# Patient Record
Sex: MALE | Race: AMERICAN INDIAN OR ALASKA NATIVE | NOT HISPANIC OR LATINO | Employment: OTHER | URBAN - METROPOLITAN AREA
[De-identification: names, ages, dates, MRNs, and addresses within clinical notes are randomized per-mention and may not be internally consistent; named-entity substitution may affect disease eponyms.]

---

## 2017-10-30 PROBLEM — I21.09 ANTEROSEPTAL MYOCARDIAL INFARCTION (CMS/HCC): Status: ACTIVE | Noted: 2017-10-30

## 2017-10-30 PROBLEM — I50.9 CONGESTIVE HEART FAILURE (CHF) (CMS/HCC): Status: ACTIVE | Noted: 2017-10-30

## 2017-10-30 PROBLEM — I72.9 ANEURYSM (CMS/HCC): Status: ACTIVE | Noted: 2017-10-30

## 2017-10-30 PROBLEM — I25.10 CAD (CORONARY ARTERY DISEASE): Status: ACTIVE | Noted: 2017-10-30

## 2017-10-30 PROBLEM — I63.9 CVA (CEREBRAL VASCULAR ACCIDENT) (CMS/HCC): Status: ACTIVE | Noted: 2017-10-30

## 2017-10-30 PROBLEM — I25.5 ISCHEMIC CARDIOMYOPATHY: Status: ACTIVE | Noted: 2017-10-30

## 2017-10-30 PROBLEM — I82.90 THROMBUS: Status: ACTIVE | Noted: 2017-10-30

## 2017-10-30 PROBLEM — I47.20 VENTRICULAR TACHYCARDIA (CMS/HCC): Status: ACTIVE | Noted: 2017-10-30

## 2017-10-30 PROBLEM — Z72.0 TOBACCO USE: Status: ACTIVE | Noted: 2017-10-30

## 2017-10-30 PROBLEM — I10 HYPERTENSION: Status: ACTIVE | Noted: 2017-10-30

## 2017-10-30 PROBLEM — I21.09 ANTERIOR MYOCARDIAL INFARCTION (CMS/HCC): Status: ACTIVE | Noted: 2017-10-30

## 2017-10-30 PROBLEM — E78.5 HYPERLIPIDEMIA: Status: ACTIVE | Noted: 2017-10-30

## 2018-01-29 DIAGNOSIS — I25.10 CORONARY ARTERY DISEASE INVOLVING NATIVE CORONARY ARTERY OF NATIVE HEART WITHOUT ANGINA PECTORIS: Primary | ICD-10-CM

## 2018-01-29 RX ORDER — LISINOPRIL 5 MG/1
5 TABLET ORAL 2 TIMES DAILY
Qty: 60 TABLET | Refills: 5 | Status: SHIPPED | OUTPATIENT
Start: 2018-01-29 | End: 2018-07-28

## 2018-08-02 ENCOUNTER — TELEPHONE (OUTPATIENT)
Dept: CARDIOLOGY | Facility: CLINIC | Age: 53
End: 2018-08-02

## 2018-08-02 DIAGNOSIS — I10 HTN (HYPERTENSION), BENIGN: Primary | ICD-10-CM

## 2018-08-02 RX ORDER — LISINOPRIL 5 MG/1
5 TABLET ORAL 2 TIMES DAILY
Qty: 60 TABLET | Refills: 0 | Status: SHIPPED | OUTPATIENT
Start: 2018-08-02 | End: 2019-08-02

## 2018-08-02 RX ORDER — LISINOPRIL 5 MG/1
5 TABLET ORAL DAILY
Qty: 30 TABLET | Refills: 0 | Status: SHIPPED | OUTPATIENT
Start: 2018-08-02 | End: 2018-08-02 | Stop reason: ENTERED-IN-ERROR

## 2018-08-02 NOTE — TELEPHONE ENCOUNTER
Pete was in the office today and had said he was going to run out of medications. Segundo Cotton PA-C saw him last in the office about a year ago. It appears he is a [facility] patient. When Segundo saw him one year ago he was to follow up with [facility] in one month to assess the need for continuing Xarelto. Segundo refilled his amiodarone, Effient, and Xarelto for two months until he can get an appointment. I emailed the schedulers to call the patient's wife, Suzan, (per their request) to get him in for a follow up with Dr. Jenkins's CNP, [facility].

## 2018-08-02 NOTE — TELEPHONE ENCOUNTER
Verbal order obtained to refill 30 days of lisinopril as patient is due for follow up appointment in January by Dr. Herbert.  See order.  JASON Garay

## 2018-11-21 NOTE — PROGRESS NOTES
Cardiology Outpatient Follow-up Note    Subjective    Patient ID: Pete Trejo is a 53 y.o. male.    Chief Complaint:   Chief Complaint   Patient presents with   • Coronary Artery Disease   .    HPI  52 y/o male with history of coronary artery disease, ICM, AICD, HTN, HLD and tobacco smoker.  Last office visit was with YONG Alves    Today's visit patient denies any complaints.  He denies any chest pain, pressure, tightness.  No lightheadedness or dizziness.  No palpitations.  No shortness of breath, PND orthopnea.  No lower leg edema or claudication.  No visual changes or headaches.  No TIA or strokelike symptoms.  States he has been feeling well.  He has not had his AICD interrogated since 2016 we will ensure that is scheduled today.  He denies any firing of his AICD.    Patient does not have a routine exercise program.  He eats a no added salt diet.  He denies alcohol use.  He states he has quit smoking but is still struggling with that.  He drinks one large cup of coffee a day.    He states he recently had his medications filled by his primary care doctor.  Had labs 1 month ago at [facility].    At today's visit his blood pressure is very stable.  His weight is within 1 pound of his weight at his last visit.  Specialty Problems        Cardiology Problems    Anterior myocardial infarction (CMS/HCC) (HCC)        Anteroseptal myocardial infarction (CMS/HCC) (HCC)        CAD (coronary artery disease)        Congestive heart failure (CHF) (CMS/HCC) (HCC)        Hyperlipidemia        Ischemic cardiomyopathy        Ventricular tachycardia (CMS/HCC) (HCC)              No past medical history on file.    Past Surgical History:   Procedure Laterality Date   • APPENDECTOMY         Allergies as of 11/26/2018 - reviewed    Allergen Reaction Noted   • Morphine  07/30/2017   • Tramadol  07/30/2017       Current Outpatient Medications   Medication Sig Dispense Refill   • amiodarone (PACERONE) 200 mg tablet  Take 1 tablet (200 mg total) by mouth daily. 30 tablet 1   • carvedilol (COREG) 25 mg tablet Take 25 mg by mouth 2 (two) times a day with meals.     • furosemide (LASIX) 20 mg tablet Take 20 mg by mouth daily.     • lisinopril (PRINIVIL,ZESTRIL) 5 mg tablet Take 1 tablet (5 mg total) by mouth 2 (two) times a day. 60 tablet 0   • nitroglycerin (NITROSTAT) 0.4 mg SL tablet Place 0.4 mg under the tongue every 5 (five) minutes as needed for chest pain.     • pantoprazole (PROTONIX) 40 mg EC tablet Take 40 mg by mouth daily.     • prasugrel (EFFIENT) 10 mg tablet Take 1 tablet (10 mg total) by mouth daily. 30 tablet 1   • rivaroxaban (XARELTO) 20 mg tablet Take 1 tablet (20 mg total) by mouth daily. 30 tablet 1   • rosuvastatin (CRESTOR) 40 mg tablet Take 40 mg by mouth daily.     • spironolactone (ALDACTONE) 25 mg tablet Take 25 mg by mouth daily.       No current facility-administered medications for this visit.        Family History   Problem Relation Age of Onset   • Heart attack Mother 62   • Other Mother         Abdominal Aortic Aneurysm   • Lung cancer Mother    • Colon cancer Father         Cause of death   • Diabetes Brother         Non-Insulin Dependent   • Heart attack Brother 51        w/ Stents   • Heart disease Brother    • Other Son         Well       Social History     Tobacco Use   • Smoking status: Light Tobacco Smoker     Years: 20.00     Types: Cigarettes   • Tobacco comment: 4 Cigarettes per day   Substance Use Topics   • Alcohol use: No     Comment: Quit drinking in 2007   • Drug use: Not on file       Review of Systems  Review of Systems   Cardiovascular: Negative for chest pain, claudication, cyanosis, dyspnea on exertion, irregular heartbeat, leg swelling, near-syncope, orthopnea, palpitations, paroxysmal nocturnal dyspnea and syncope.   Respiratory: Negative for shortness of breath, sleep disturbances due to breathing and snoring.    Hematologic/Lymphatic: Does not bruise/bleed easily.    Musculoskeletal: Negative for joint pain and myalgias.   Gastrointestinal: Negative for heartburn.   Genitourinary: Negative for nocturia.   Neurological: Negative for dizziness and light-headedness.   Psychiatric/Behavioral: Negative for memory loss.   All other systems reviewed and are negative.      Objective     Vitals:    11/26/18 0844   BP: 122/68   Pulse: 60   Resp: 20   SpO2: 94%     Weight: 113 kg (249 lb 9.6 oz)  Physical Exam   Constitutional: He is oriented to person, place, and time. He appears well-developed.   Obese BMI 34.81   HENT:   Head: Normocephalic.   Eyes: Conjunctivae are normal. Pupils are equal, round, and reactive to light.   Neck: Normal range of motion. No JVD present.   Cardiovascular: Normal rate, regular rhythm and normal heart sounds.   Pulses:       Radial pulses are 2+ on the right side, and 2+ on the left side.        Dorsalis pedis pulses are 2+ on the right side, and 2+ on the left side.   AICD to upper left infraclavicular space   Pulmonary/Chest: Effort normal and breath sounds normal. No respiratory distress. He has no wheezes.   Abdominal: Soft. Bowel sounds are normal.   Genitourinary:   Genitourinary Comments: Deferred   Musculoskeletal: Normal range of motion. He exhibits no edema.   Neurological: He is alert and oriented to person, place, and time.   Skin: Skin is warm.   Psychiatric: He has a normal mood and affect. His behavior is normal. Judgment and thought content normal.   Vitals reviewed.      Data Review:   Lab Results   Component Value Date     09/20/2018     07/29/2017    K 4.0 09/20/2018    K 4.2 07/29/2017     09/20/2018     07/29/2017    CO2 23.9 09/20/2018    CO2 23 07/29/2017    BUN 10 09/20/2018    BUN 12 07/29/2017    CREATININE 0.93 09/20/2018    CREATININE 0.8 07/29/2017    GLUCOSE 139 09/20/2018    GLUCOSE 103 07/29/2017    CALCIUM 8.6 09/20/2018    CALCIUM 9.1 (Delta) 07/29/2017    CALCIUM 9.1 (Delta) 07/29/2017     Lab  Results   Component Value Date    WBC 9.26 09/20/2018    WBC 8.4 07/29/2017    HGB 15.0 09/20/2018    HGB 15.6 07/29/2017    HCT 42.2 09/20/2018    HCT 44.9 07/29/2017    MCV 92.3 09/20/2018    MCV 93.0 07/29/2017     09/20/2018     07/29/2017     Lab Results   Component Value Date    CHOL 96 09/20/2018    CHOL 113 07/29/2017    TRIG 145 09/20/2018    TRIG 108 07/29/2017    HDL 27 09/20/2018    HDL 26 (L) 07/29/2017                 Assessment/Plan   Diagnoses and all orders for this visit:    Coronary artery disease involving native coronary artery of native heart without angina pectoris  -     ECG 12 lead; Future    Presence of cardiac pacemaker  -     Ambulatory referral to Cardiology    Essential hypertension    Mixed hyperlipidemia    Tobacco use    Patient's on chronic medical therapy.  He is tolerating medical therapy.  EKG today shows atrial ventricular dual paced rhythm with a rate of 60.  Patient has had no angina since his last office visit  He does not require any medication refills today  He is taking his medications as directed  His weight has remained stable since his last office visit  Recommend daily exercise with a goal of 30 minutes twice a day  Heart healthy diet  Weight loss with a goal of 25 pounds over the next year    Patient does have AICD.  He has not had an interrogation since 2016.  There is an order placed for interrogation we will ensure he has this scheduled before he leaves today.  His EKG today shows AV dual paced rhythm  Patient denies any firing of his device.    Pressure is well controlled at 122/68.  We will target his systolic at 130 below diastolic less than 90  No changes were made to his current medications.  Recommend heart healthy diet low in sodium  To daily exercise  Weight loss    Patient is currently on Crestor 40 mg.  He is tolerating his statin.  Denies any statin myalgias.  Recent HDL is 27.  Cholesterol 96.  Target his LDL at 70 or below and HDL at  greater than 40  Recommend a diet low in saturated fats and trans-fats  Daily exercise  Weight loss    Patient does state he currently is not smoking but yet he has not completely quit.  He is struggling with this issue.  He does understand the harmful effects of tobacco use on his cardiovascular health as well as his overall health.  Patient does not have a complete quit date but is trying to quit on a daily basis.  He is down to 4 cigarettes  Recommend discussion on tobacco cessation we had with this patient at every opportunity.    Questions were solicited and answered to the patient's satisfaction.  He verbalized understanding of today's office visit as well as the plan of care.  Recommend the patient contact the office for any questions, concerns  Patient verbalized understanding that Dr. Jenkins would be leaving the practice.  Patient will consider new cardiologist prior to his next visit.  Plan for patient to follow-up in 1 year sooner if needed.      If I can be of further help please give me a call at [phone]  Electronically signed by: SALONI RDZ CNP  11/26/2018  9:47 AM

## 2018-11-26 ENCOUNTER — OFFICE VISIT (OUTPATIENT)
Dept: CARDIOLOGY | Facility: CLINIC | Age: 53
End: 2018-11-26
Payer: OTHER GOVERNMENT

## 2018-11-26 VITALS
HEIGHT: 71 IN | HEART RATE: 60 BPM | WEIGHT: 249.6 LBS | RESPIRATION RATE: 20 BRPM | DIASTOLIC BLOOD PRESSURE: 68 MMHG | SYSTOLIC BLOOD PRESSURE: 122 MMHG | BODY MASS INDEX: 34.94 KG/M2 | OXYGEN SATURATION: 94 %

## 2018-11-26 DIAGNOSIS — E78.2 MIXED HYPERLIPIDEMIA: ICD-10-CM

## 2018-11-26 DIAGNOSIS — Z72.0 TOBACCO USE: ICD-10-CM

## 2018-11-26 DIAGNOSIS — I10 ESSENTIAL HYPERTENSION: ICD-10-CM

## 2018-11-26 DIAGNOSIS — Z95.0 PRESENCE OF CARDIAC PACEMAKER: ICD-10-CM

## 2018-11-26 DIAGNOSIS — I25.10 CORONARY ARTERY DISEASE INVOLVING NATIVE CORONARY ARTERY OF NATIVE HEART WITHOUT ANGINA PECTORIS: Primary | ICD-10-CM

## 2018-11-26 PROCEDURE — 99214 OFFICE O/P EST MOD 30 MIN: CPT | Performed by: NURSE PRACTITIONER

## 2018-11-26 PROCEDURE — 93000 ELECTROCARDIOGRAM COMPLETE: CPT | Performed by: INTERNAL MEDICINE

## 2018-11-26 RX ORDER — CARVEDILOL 25 MG/1
25 TABLET ORAL 2 TIMES DAILY WITH MEALS
COMMUNITY
End: 2021-10-18 | Stop reason: SDUPTHER

## 2018-11-26 RX ORDER — NITROGLYCERIN 0.4 MG/1
0.4 TABLET SUBLINGUAL EVERY 5 MIN PRN
COMMUNITY
End: 2024-07-16 | Stop reason: SDUPTHER

## 2018-11-26 RX ORDER — ROSUVASTATIN CALCIUM 40 MG/1
40 TABLET, COATED ORAL NIGHTLY
COMMUNITY
End: 2021-10-18 | Stop reason: SDUPTHER

## 2018-11-26 RX ORDER — PANTOPRAZOLE SODIUM 40 MG/1
40 TABLET, DELAYED RELEASE ORAL DAILY
COMMUNITY

## 2018-11-26 RX ORDER — FUROSEMIDE 20 MG/1
40 TABLET ORAL DAILY
COMMUNITY
End: 2020-10-23 | Stop reason: SDUPTHER

## 2018-11-26 RX ORDER — SPIRONOLACTONE 25 MG/1
25 TABLET ORAL DAILY
COMMUNITY
End: 2020-10-23

## 2018-11-26 ASSESSMENT — ENCOUNTER SYMPTOMS
ORTHOPNEA: 0
DYSPNEA ON EXERTION: 0
MYALGIAS: 0
IRREGULAR HEARTBEAT: 0
NEAR-SYNCOPE: 0
MEMORY LOSS: 0
SHORTNESS OF BREATH: 0
CLAUDICATION: 0
DIZZINESS: 0
BRUISES/BLEEDS EASILY: 0
SYNCOPE: 0
PALPITATIONS: 0
SNORING: 0
HEARTBURN: 0
PND: 0
LIGHT-HEADEDNESS: 0
SLEEP DISTURBANCES DUE TO BREATHING: 0

## 2018-11-26 ASSESSMENT — PAIN SCALES - GENERAL: PAINLEVEL: 0-NO PAIN

## 2018-11-26 NOTE — PATIENT INSTRUCTIONS
Consider Dr. Sainz for New Cardiologist    No changes to current medications  No refills required  EKG shows AV dual paced rhythm rate 60  Will obtain recent labs from [facility]    Recommend daily walking  No added salt diet  Weight loss  Continued smoking cessation

## 2019-01-31 ENCOUNTER — ANCILLARY PROCEDURE (OUTPATIENT)
Dept: CARDIOLOGY | Facility: CLINIC | Age: 54
End: 2019-01-31
Payer: OTHER GOVERNMENT

## 2019-01-31 DIAGNOSIS — Z45.02 ENCOUNTER FOR IMPLANTABLE DEFIBRILLATOR REPROGRAMMING OR CHECK: ICD-10-CM

## 2019-01-31 PROCEDURE — 93284 PRGRMG EVAL IMPLANTABLE DFB: CPT | Performed by: INTERNAL MEDICINE

## 2019-08-12 ENCOUNTER — ANCILLARY PROCEDURE (OUTPATIENT)
Dept: CARDIOLOGY | Facility: CLINIC | Age: 54
End: 2019-08-12
Payer: OTHER GOVERNMENT

## 2019-08-12 DIAGNOSIS — Z45.02 ENCOUNTER FOR INTERROGATION OF CARDIAC DEFIBRILLATOR: ICD-10-CM

## 2019-08-12 PROCEDURE — 93296 REM INTERROG EVL PM/IDS: CPT | Performed by: INTERNAL MEDICINE

## 2019-08-12 PROCEDURE — 93295 DEV INTERROG REMOTE 1/2/MLT: CPT | Performed by: INTERNAL MEDICINE

## 2019-11-12 ENCOUNTER — ANCILLARY PROCEDURE (OUTPATIENT)
Dept: CARDIOLOGY | Facility: CLINIC | Age: 54
End: 2019-11-12
Payer: OTHER GOVERNMENT

## 2019-11-12 DIAGNOSIS — Z45.02 ENCOUNTER FOR INTERROGATION OF CARDIAC DEFIBRILLATOR: ICD-10-CM

## 2019-11-12 PROCEDURE — 93296 REM INTERROG EVL PM/IDS: CPT | Performed by: INTERNAL MEDICINE

## 2019-11-12 PROCEDURE — 93295 DEV INTERROG REMOTE 1/2/MLT: CPT | Performed by: INTERNAL MEDICINE

## 2020-02-11 ENCOUNTER — ANCILLARY PROCEDURE (OUTPATIENT)
Dept: CARDIOLOGY | Facility: CLINIC | Age: 55
End: 2020-02-11
Payer: OTHER GOVERNMENT

## 2020-02-11 DIAGNOSIS — Z45.02 ENCOUNTER FOR INTERROGATION OF CARDIAC DEFIBRILLATOR: ICD-10-CM

## 2020-02-11 PROCEDURE — 93295 DEV INTERROG REMOTE 1/2/MLT: CPT | Mod: NC | Performed by: INTERNAL MEDICINE

## 2020-02-11 PROCEDURE — 93296 REM INTERROG EVL PM/IDS: CPT | Mod: NC | Performed by: INTERNAL MEDICINE

## 2020-05-12 ENCOUNTER — ANCILLARY PROCEDURE (OUTPATIENT)
Dept: CARDIOLOGY | Facility: CLINIC | Age: 55
End: 2020-05-12
Payer: OTHER GOVERNMENT

## 2020-05-12 DIAGNOSIS — Z45.02 ENCOUNTER FOR INTERROGATION OF CARDIAC DEFIBRILLATOR: ICD-10-CM

## 2020-05-12 PROCEDURE — 93296 REM INTERROG EVL PM/IDS: CPT | Performed by: INTERNAL MEDICINE

## 2020-05-12 PROCEDURE — 93295 DEV INTERROG REMOTE 1/2/MLT: CPT | Performed by: INTERNAL MEDICINE

## 2020-06-07 ENCOUNTER — HOSPITAL ENCOUNTER (INPATIENT)
Facility: HOSPITAL | Age: 55
LOS: 2 days | Discharge: HOME | DRG: 274 | End: 2020-06-10
Attending: EMERGENCY MEDICINE | Admitting: INTERNAL MEDICINE
Payer: OTHER GOVERNMENT

## 2020-06-07 ENCOUNTER — ANCILLARY PROCEDURE (OUTPATIENT)
Dept: CARDIOLOGY | Facility: CLINIC | Age: 55
End: 2020-06-07
Payer: OTHER GOVERNMENT

## 2020-06-07 ENCOUNTER — APPOINTMENT (OUTPATIENT)
Dept: RADIOLOGY | Facility: HOSPITAL | Age: 55
DRG: 274 | End: 2020-06-07
Payer: OTHER GOVERNMENT

## 2020-06-07 DIAGNOSIS — I47.20 VT (VENTRICULAR TACHYCARDIA) (CMS/HCC): ICD-10-CM

## 2020-06-07 DIAGNOSIS — Z45.02 ENCOUNTER FOR INTERROGATION OF CARDIAC DEFIBRILLATOR: ICD-10-CM

## 2020-06-07 DIAGNOSIS — I47.20 VENTRICULAR TACHYCARDIA (CMS/HCC): Primary | ICD-10-CM

## 2020-06-07 DIAGNOSIS — Z86.79 S/P ABLATION OF VENTRICULAR ARRHYTHMIA: ICD-10-CM

## 2020-06-07 DIAGNOSIS — Z98.890 S/P ABLATION OF VENTRICULAR ARRHYTHMIA: ICD-10-CM

## 2020-06-07 DIAGNOSIS — Z45.02 ENCOUNTER FOR FITTING OR ADJUSTMENT OF IMPLANTABLE CARDIOVERTER-DEFIBRILLATOR (ICD): ICD-10-CM

## 2020-06-07 LAB
ALBUMIN SERPL-MCNC: 4 G/DL (ref 3.5–5.3)
ALP SERPL-CCNC: 67 U/L (ref 45–115)
ALT SERPL-CCNC: 22 U/L (ref 7–52)
ANION GAP SERPL CALC-SCNC: 9 MMOL/L (ref 3–11)
AST SERPL-CCNC: 15 U/L
BASOPHILS # BLD AUTO: 0.1 10*3/UL
BASOPHILS NFR BLD AUTO: 1 % (ref 0–2)
BILIRUB SERPL-MCNC: 1.13 MG/DL (ref 0.2–1.4)
BUN SERPL-MCNC: 10 MG/DL (ref 7–25)
CALCIUM ALBUM COR SERPL-MCNC: 8.8 MG/DL (ref 8.6–10.3)
CALCIUM SERPL-MCNC: 8.8 MG/DL (ref 8.6–10.3)
CHLORIDE SERPL-SCNC: 105 MMOL/L (ref 98–107)
CO2 SERPL-SCNC: 22 MMOL/L (ref 21–32)
CREAT SERPL-MCNC: 0.94 MG/DL (ref 0.7–1.3)
DELTA HIGH SENSITIVITY TROPONIN I, 1 HOUR: 369
DELTA HIGH SENSITIVITY TROPONIN I, 2 HOUR: 759.6
EOSINOPHIL # BLD AUTO: 0.2 10*3/UL
EOSINOPHIL NFR BLD AUTO: 2 % (ref 0–3)
ERYTHROCYTE [DISTWIDTH] IN BLOOD BY AUTOMATED COUNT: 14.3 % (ref 11.5–15)
GFR SERPL CREATININE-BSD FRML MDRD: 92 ML/MIN/1.73M*2
GLUCOSE SERPL-MCNC: 129 MG/DL (ref 70–105)
HCT VFR BLD AUTO: 45.7 % (ref 38–50)
HGB BLD-MCNC: 15.6 G/DL (ref 13.2–17.2)
LYMPHOCYTES # BLD AUTO: 3.5 10*3/UL
LYMPHOCYTES NFR BLD AUTO: 44 % (ref 15–47)
MAGNESIUM SERPL-MCNC: 1.8 MG/DL (ref 1.8–2.4)
MCH RBC QN AUTO: 32.5 PG (ref 29–34)
MCHC RBC AUTO-ENTMCNC: 34.2 G/DL (ref 32–36)
MCV RBC AUTO: 95 FL (ref 82–97)
MONOCYTES # BLD AUTO: 0.7 10*3/UL
MONOCYTES NFR BLD AUTO: 9 % (ref 5–13)
NEUTROPHILS # BLD AUTO: 3.5 10*3/UL
NEUTROPHILS NFR BLD AUTO: 44 % (ref 46–70)
PLATELET # BLD AUTO: 239 10*3/UL (ref 130–350)
PMV BLD AUTO: 8.6 FL (ref 6.9–10.8)
POTASSIUM SERPL-SCNC: 4.3 MMOL/L (ref 3.5–5.1)
PROT SERPL-MCNC: 6.3 G/DL (ref 6–8.3)
RBC # BLD AUTO: 4.81 10*6/ΜL (ref 4.1–5.8)
SODIUM SERPL-SCNC: 136 MMOL/L (ref 135–145)
TROPONIN I SERPL-MCNC: 16 PG/ML
TROPONIN I SERPL-MCNC: 385 PG/ML
TROPONIN I SERPL-MCNC: 775.6 PG/ML
TSH SERPL DL<=0.05 MIU/L-ACNC: 0.63 UIU/ML (ref 0.34–4.82)
WBC # BLD AUTO: 8 10*3/UL (ref 3.7–9.6)

## 2020-06-07 PROCEDURE — 83735 ASSAY OF MAGNESIUM: CPT | Performed by: EMERGENCY MEDICINE

## 2020-06-07 PROCEDURE — 80299 QUANTITATIVE ASSAY DRUG: CPT | Performed by: NURSE PRACTITIONER

## 2020-06-07 PROCEDURE — 5A2204Z RESTORATION OF CARDIAC RHYTHM, SINGLE: ICD-10-PCS | Performed by: EMERGENCY MEDICINE

## 2020-06-07 PROCEDURE — 85025 COMPLETE CBC W/AUTO DIFF WBC: CPT | Performed by: EMERGENCY MEDICINE

## 2020-06-07 PROCEDURE — 80053 COMPREHEN METABOLIC PANEL: CPT | Performed by: EMERGENCY MEDICINE

## 2020-06-07 PROCEDURE — 92960 CARDIOVERSION ELECTRIC EXT: CPT

## 2020-06-07 PROCEDURE — 99218 *NO LONGER ACTIVE 2023 DO NOT USE* PR INITIAL OBSERVATION CARE/DAY 30 MINUTES: CPT | Mod: 25 | Performed by: INTERNAL MEDICINE

## 2020-06-07 PROCEDURE — 84484 ASSAY OF TROPONIN QUANT: CPT | Performed by: EMERGENCY MEDICINE

## 2020-06-07 PROCEDURE — 93295 DEV INTERROG REMOTE 1/2/MLT: CPT | Mod: NCNR | Performed by: INTERNAL MEDICINE

## 2020-06-07 PROCEDURE — 93296 REM INTERROG EVL PM/IDS: CPT | Mod: NC | Performed by: INTERNAL MEDICINE

## 2020-06-07 PROCEDURE — 6370000100 HC RX 637 (ALT 250 FOR IP): Performed by: NURSE PRACTITIONER

## 2020-06-07 PROCEDURE — 99285 EMERGENCY DEPT VISIT HI MDM: CPT | Performed by: EMERGENCY MEDICINE

## 2020-06-07 PROCEDURE — 96375 TX/PRO/DX INJ NEW DRUG ADDON: CPT

## 2020-06-07 PROCEDURE — 84443 ASSAY THYROID STIM HORMONE: CPT | Performed by: NURSE PRACTITIONER

## 2020-06-07 PROCEDURE — 93005 ELECTROCARDIOGRAM TRACING: CPT | Performed by: EMERGENCY MEDICINE

## 2020-06-07 PROCEDURE — 71045 X-RAY EXAM CHEST 1 VIEW: CPT

## 2020-06-07 PROCEDURE — 93005 ELECTROCARDIOGRAM TRACING: CPT

## 2020-06-07 PROCEDURE — G0378 HOSPITAL OBSERVATION PER HR: HCPCS

## 2020-06-07 PROCEDURE — 36415 COLL VENOUS BLD VENIPUNCTURE: CPT | Performed by: EMERGENCY MEDICINE

## 2020-06-07 PROCEDURE — 6370000100 HC RX 637 (ALT 250 FOR IP): Performed by: INTERNAL MEDICINE

## 2020-06-07 PROCEDURE — 96374 THER/PROPH/DIAG INJ IV PUSH: CPT

## 2020-06-07 PROCEDURE — 6360000200 HC RX 636 W HCPCS (ALT 250 FOR IP): Performed by: EMERGENCY MEDICINE

## 2020-06-07 RX ORDER — FUROSEMIDE 20 MG/1
20 TABLET ORAL NIGHTLY
Status: DISCONTINUED | OUTPATIENT
Start: 2020-06-07 | End: 2020-06-10 | Stop reason: HOSPADM

## 2020-06-07 RX ORDER — METOCLOPRAMIDE HYDROCHLORIDE 5 MG/ML
10 INJECTION INTRAMUSCULAR; INTRAVENOUS EVERY 6 HOURS PRN
Status: DISCONTINUED | OUTPATIENT
Start: 2020-06-07 | End: 2020-06-10 | Stop reason: HOSPADM

## 2020-06-07 RX ORDER — AMIODARONE HYDROCHLORIDE 200 MG/1
400 TABLET ORAL 2 TIMES DAILY WITH MEALS
Status: DISCONTINUED | OUTPATIENT
Start: 2020-06-07 | End: 2020-06-08

## 2020-06-07 RX ORDER — PRASUGREL 10 MG/1
10 TABLET, FILM COATED ORAL DAILY
Status: DISCONTINUED | OUTPATIENT
Start: 2020-06-08 | End: 2020-06-10 | Stop reason: HOSPADM

## 2020-06-07 RX ORDER — SODIUM CHLORIDE 9 MG/ML
INJECTION, SOLUTION INTRAVENOUS
Status: COMPLETED | OUTPATIENT
Start: 2020-06-07 | End: 2020-06-07

## 2020-06-07 RX ORDER — PROPOFOL 10 MG/ML
INJECTION, EMULSION INTRAVENOUS CODE/TRAUMA/SEDATION MEDICATION
Status: COMPLETED | OUTPATIENT
Start: 2020-06-07 | End: 2020-06-07

## 2020-06-07 RX ORDER — ACETAMINOPHEN 325 MG/1
650 TABLET ORAL EVERY 6 HOURS PRN
Status: DISCONTINUED | OUTPATIENT
Start: 2020-06-07 | End: 2020-06-10 | Stop reason: HOSPADM

## 2020-06-07 RX ORDER — LISINOPRIL 5 MG/1
5 TABLET ORAL 2 TIMES DAILY
Status: DISCONTINUED | OUTPATIENT
Start: 2020-06-07 | End: 2020-06-10 | Stop reason: HOSPADM

## 2020-06-07 RX ORDER — CARVEDILOL 25 MG/1
25 TABLET ORAL 2 TIMES DAILY WITH MEALS
Status: DISCONTINUED | OUTPATIENT
Start: 2020-06-07 | End: 2020-06-10 | Stop reason: HOSPADM

## 2020-06-07 RX ORDER — LANSOPRAZOLE 30 MG/1
30 CAPSULE, DELAYED RELEASE ORAL
Status: DISCONTINUED | OUTPATIENT
Start: 2020-06-07 | End: 2020-06-10 | Stop reason: HOSPADM

## 2020-06-07 RX ORDER — LIDOCAINE HCL/PF 100 MG/5ML
100 SYRINGE (ML) INTRAVENOUS ONCE
Status: DISCONTINUED | OUTPATIENT
Start: 2020-06-07 | End: 2020-06-10 | Stop reason: HOSPADM

## 2020-06-07 RX ORDER — LIDOCAINE HCL/PF 100 MG/5ML
SYRINGE (ML) INTRAVENOUS CODE/TRAUMA/SEDATION MEDICATION
Status: COMPLETED | OUTPATIENT
Start: 2020-06-07 | End: 2020-06-07

## 2020-06-07 RX ORDER — AMIODARONE HYDROCHLORIDE 50 MG/ML
150 INJECTION, SOLUTION INTRAVENOUS ONCE
Status: DISCONTINUED | OUTPATIENT
Start: 2020-06-07 | End: 2020-06-10 | Stop reason: HOSPADM

## 2020-06-07 RX ORDER — ROSUVASTATIN CALCIUM 20 MG/1
40 TABLET, COATED ORAL NIGHTLY
Status: DISCONTINUED | OUTPATIENT
Start: 2020-06-07 | End: 2020-06-10 | Stop reason: HOSPADM

## 2020-06-07 RX ORDER — SPIRONOLACTONE 25 MG/1
25 TABLET ORAL DAILY
Status: DISCONTINUED | OUTPATIENT
Start: 2020-06-08 | End: 2020-06-10 | Stop reason: HOSPADM

## 2020-06-07 RX ORDER — AMIODARONE HYDROCHLORIDE 50 MG/ML
INJECTION, SOLUTION INTRAVENOUS CODE/TRAUMA/SEDATION MEDICATION
Status: COMPLETED | OUTPATIENT
Start: 2020-06-07 | End: 2020-06-07

## 2020-06-07 RX ORDER — NITROGLYCERIN 0.4 MG/1
0.4 TABLET SUBLINGUAL EVERY 5 MIN PRN
Status: DISCONTINUED | OUTPATIENT
Start: 2020-06-07 | End: 2020-06-10 | Stop reason: HOSPADM

## 2020-06-07 RX ORDER — LISINOPRIL 5 MG/1
5 TABLET ORAL 2 TIMES DAILY
COMMUNITY
End: 2021-07-13 | Stop reason: HOSPADM

## 2020-06-07 RX ADMIN — RIVAROXABAN 20 MG: 20 TABLET, FILM COATED ORAL at 15:05

## 2020-06-07 RX ADMIN — PROPOFOL 90 MG: 10 INJECTION, EMULSION INTRAVENOUS at 09:21

## 2020-06-07 RX ADMIN — PROPOFOL 30 MG: 10 INJECTION, EMULSION INTRAVENOUS at 09:22

## 2020-06-07 RX ADMIN — CARVEDILOL 25 MG: 25 TABLET, FILM COATED ORAL at 17:53

## 2020-06-07 RX ADMIN — LISINOPRIL 5 MG: 5 TABLET ORAL at 20:26

## 2020-06-07 RX ADMIN — FUROSEMIDE 20 MG: 20 TABLET ORAL at 20:26

## 2020-06-07 RX ADMIN — LANSOPRAZOLE 30 MG: 30 CAPSULE, DELAYED RELEASE PELLETS ORAL at 15:05

## 2020-06-07 RX ADMIN — ROSUVASTATIN CALCIUM 40 MG: 20 TABLET, FILM COATED ORAL at 20:26

## 2020-06-07 RX ADMIN — LIDOCAINE HYDROCHLORIDE 100 MG: 20 INJECTION INTRAVENOUS at 09:19

## 2020-06-07 RX ADMIN — AMIODARONE HYDROCHLORIDE 150 MG: 50 INJECTION, SOLUTION INTRAVENOUS at 09:15

## 2020-06-07 RX ADMIN — AMIODARONE HYDROCHLORIDE 400 MG: 200 TABLET ORAL at 17:53

## 2020-06-07 RX ADMIN — LISINOPRIL 5 MG: 5 TABLET ORAL at 15:06

## 2020-06-07 ASSESSMENT — ENCOUNTER SYMPTOMS
ORTHOPNEA: 0
EYE PAIN: 0
PND: 0
DIZZINESS: 0
WEIGHT LOSS: 0
BLOOD IN STOOL: 0
IRREGULAR HEARTBEAT: 0
HEMATURIA: 0
PALPITATIONS: 1
SHORTNESS OF BREATH: 1
SYNCOPE: 0
DIFFICULTY URINATING: 0
COUGH: 0
DIZZINESS: 1
ABDOMINAL PAIN: 0
BACK PAIN: 0
FLANK PAIN: 0
WEIGHT GAIN: 0
MYALGIAS: 0
HEADACHES: 0
SORE THROAT: 0
VOMITING: 1
FEVER: 0
BRUISES/BLEEDS EASILY: 1
RHINORRHEA: 0
SEIZURES: 0
FOCAL WEAKNESS: 0
ADENOPATHY: 0
DYSPHORIC MOOD: 0
CLAUDICATION: 0
WEAKNESS: 0
NAUSEA: 1
NEAR-SYNCOPE: 1
DIARRHEA: 0

## 2020-06-07 ASSESSMENT — ACTIVITIES OF DAILY LIVING (ADL): ADEQUATE_TO_COMPLETE_ADL: YES

## 2020-06-07 ASSESSMENT — PAIN DESCRIPTION - DESCRIPTORS: DESCRIPTORS: STABBING

## 2020-06-07 NOTE — Clinical Note
From bilateral groins, figure of eight with three way stop cock used for closure on venous sites and julieta pressure held on the right atrieal site for closure.

## 2020-06-07 NOTE — ED PROVIDER NOTES
HPI:  Chief Complaint   Patient presents with   • Chest Pain     pt arrives with CP and states his Pacer fired. Pt arrives with HR of 160s, vomiting     Patient presents by private auto with complaint of chest pain.  He states he was at home in the bathroom but not on the toilet.  He states he felt head rush and anticipated that his AICD would fire.  He was able to Provera for that.  It did fire but only once.  At about the same time he developed very severe anterior chest pain with shortness of breath nausea and vomiting.  He has had an AICD for several years.  No recent problems with it or battery changes.  He states he has been compliant with all of his medications including Eliquis.  HPI    HISTORY:  Past Medical History:   Diagnosis Date   • AICD (automatic cardioverter/defibrillator) present    • H/O heart artery stent    • Heart attack (CMS/HCC) (HCC)    • V-tach (CMS/HCC) (HCC)        Past Surgical History:   Procedure Laterality Date   • APPENDECTOMY         Family History   Problem Relation Age of Onset   • Heart attack Mother 62   • Other Mother         Abdominal Aortic Aneurysm   • Lung cancer Mother    • Colon cancer Father         Cause of death   • Diabetes Brother         Non-Insulin Dependent   • Heart attack Brother 51        w/ Stents   • Heart disease Brother    • Other Son         Well       Social History     Tobacco Use   • Smoking status: Light Tobacco Smoker     Years: 20.00     Types: Cigarettes   • Tobacco comment: 4 Cigarettes per day   Substance Use Topics   • Alcohol use: No     Comment: Quit drinking in 2007   • Drug use: Not on file         ROS:  Review of Systems   Constitutional: Negative for fever.   HENT: Negative for congestion, rhinorrhea and sore throat.    Eyes: Negative for pain.   Respiratory: Positive for shortness of breath. Negative for cough.    Cardiovascular: Positive for chest pain.   Gastrointestinal: Positive for nausea and vomiting. Negative for abdominal pain,  blood in stool and diarrhea.   Genitourinary: Negative for difficulty urinating and flank pain.   Musculoskeletal: Negative for back pain and myalgias.   Skin: Negative for rash.   Allergic/Immunologic: Negative for immunocompromised state.   Neurological: Negative for dizziness, weakness and headaches.   Hematological: Negative for adenopathy.   Psychiatric/Behavioral: Negative for dysphoric mood.       PE:  ED Triage Vitals   Temp Heart Rate Resp BP SpO2   06/07/20 0915 06/07/20 0915 06/07/20 0915 06/07/20 0915 06/07/20 0915   36.8 °C (98.2 °F) (!) 165 24 140/59 95 %      Temp Source Heart Rate Source Patient Position BP Location FiO2 (%)   06/07/20 0915 06/07/20 0918 06/07/20 0929 -- --   Oral Monitor Head of bed 30 degrees or higher         Physical Exam  Vitals signs and nursing note reviewed.   Constitutional:       General: He is in acute distress.      Appearance: He is well-developed. He is diaphoretic.   HENT:      Head: Normocephalic and atraumatic.      Right Ear: External ear normal.      Left Ear: External ear normal.   Eyes:      Conjunctiva/sclera: Conjunctivae normal.   Neck:      Musculoskeletal: Normal range of motion and neck supple.   Cardiovascular:      Rate and Rhythm: Regular rhythm. Tachycardia present.      Heart sounds: Normal heart sounds. No murmur.   Pulmonary:      Effort: Pulmonary effort is normal. No respiratory distress.      Breath sounds: Normal breath sounds.   Abdominal:      General: There is no distension.      Palpations: Abdomen is soft.      Tenderness: There is no abdominal tenderness.   Musculoskeletal:         General: No tenderness.      Right lower leg: No edema.      Left lower leg: No edema.   Skin:     General: Skin is warm.   Neurological:      General: No focal deficit present.      Mental Status: He is alert and oriented to person, place, and time.   Psychiatric:         Mood and Affect: Mood normal.         ED LABS:  Labs Reviewed   CBC WITH AUTO DIFFERENTIAL  - Abnormal       Result Value    WBC 8.0      RBC 4.81      Hemoglobin 15.6      Hematocrit 45.7      MCV 95.0      MCH 32.5      MCHC 34.2      RDW 14.3      Platelets 239      MPV 8.6      Neutrophils% 44 (*)     Lymphocytes% 44      Monocytes% 9      Eosinophils% 2      Basophils% 1      Neutrophils Absolute 3.50      Lymphocytes Absolute 3.50      Monocytes Absolute 0.70      Eosinophils Absolute 0.20      Basophils Absolute 0.10     COMPREHENSIVE METABOLIC PANEL - Abnormal    Sodium 136      Potassium 4.3      Chloride 105      CO2 22      Anion Gap 9      BUN 10      Creatinine 0.94      Glucose 129 (*)     Calcium 8.8      AST 15      ALT (SGPT) 22      Alkaline Phosphatase 67      Total Protein 6.3      Albumin 4.0      Total Bilirubin 1.13      eGFR 92      Corrected Calcium 8.8      Narrative:     Estimated GFR calculated using the 2009 CKD-EPI creatinine equation.   HIGH SENSITIVE TROPONIN I, 1 HOUR - Abnormal    hsTnI 1 hr 385.0 (*)     Delta from 0 Hour 369 (*)    MAGNESIUM - Normal    Magnesium 1.8     HIGH SENSITIVE TROPONIN I - Normal    hsTnI 0 Hour 16.0     HIGH SENSITIVE TROPONIN I PANEL (0HR, 1HR, 2HR)    Narrative:     The following orders were created for panel order HS Troponin I Panel (0HR, 1HR, 2HR) Blood, Venous.  Procedure                               Abnormality         Status                     ---------                               -----------         ------                     HS Troponin I[[id number]]                 Normal              Final result               1HR High Sensitive Trop I[[id number]]     Abnormal            Final result               2HR High Sensitive Tropon...[[id number]]                      In process                   Please view results for these tests on the individual orders.   HIGH SENSITIVE TROPONIN I, 2 HOUR         ED IMAGES:  XR chest portable 1 view   Final Result   IMPRESSION:   Vascular congestion with mild to moderate parahilar interstitial changes,  likely edema.          ED PROCEDURES:  ECG 12 lead - Chest pain    Date/Time: 6/7/2020 9:36 AM  Performed by: Mnial Erazo MD  Authorized by: Minal Erazo MD     Comments:      Moderate to severe distress EKG demonstrates ventricular tachycardia wide-complex rate of 166.  Possible LVH present.  ECG 12 lead - Arrhythmia    Date/Time: 6/7/2020 9:36 AM  Performed by: Minal Erazo MD  Authorized by: Minal Erazo MD     Comments:      EKG performed post cardioversion shows paced rhythm rate of 65.  Some ST elevation in the anterolateral leads similar but slightly more pronounced than previous EKG  Cardioversion    Date/Time: 6/7/2020 10:21 AM  Performed by: Minal Erazo MD  Authorized by: Minal Erazo MD     Consent:     Consent obtained:  Verbal    Consent given by:  Patient    Risks discussed:  Induced arrhythmia    Alternatives discussed:  No treatment  Pre-procedure details:     Cardioversion basis:  Emergent    Rhythm:  Ventricular tachycardia  Attempt one:     Cardioversion mode:  Synchronous    Shock (Joules):  100    Shock outcome:  Conversion to other rhythm  Post-procedure details:     Patient status:  Alert    Patient tolerance of procedure:  Tolerated well, no immediate complications        ED COURSE:     Patient had trial of amiodarone and then lidocaine without change in his rhythm.  Post cardioversion he resumed AV sequential pacing with some slight ST elevation.  On reexamination his chest pain has resolved with cardioversion and return to normal rate.  He will be discussed with cardiologist for further plan.  Interrogation of the pacemaker did show several episodes of sustained and nonsustained VT today.  It is not clear why his AICD did not continue to fire when the V. tach continued.    Patient remained stable.  He was seen in the emergency department by cardiologist on-call.  He will be admitted to the hospital for observation and for further evaluation by electrophysiologist.  His second troponin is  significantly elevated so this will need to be monitored as well.  It is certainly possible he suffered an MI given the elevated heart rate and typical chest pain.       MDM:  MDM    Final diagnoses:   [I47.2] Ventricular tachycardia (CMS/HCC) (HCC)   Elevated troponin rule out MI.     A voice recognition program was used to aid in documentation of this record. Sometimes words are not printed exactly as they were spoken.  While efforts were made to carefully edit and correct any inaccuracies, some errors may be present; please take these into context.  Please contact the provider if errors are identified.        Minal Erazo MD  06/07/20 [id number]

## 2020-06-07 NOTE — Clinical Note
Transseptal puncture performed   Products Recommended: Sun block with Zinc oxide ( Neutrogena, Sheer Zinc  or others) if  allergic to chemical sunscreen. General Sunscreen Counseling: I recommended a broad spectrum sunscreen with a SPF of 30 or higher.  I explained that SPF 30 sunscreens block approximately 97 percent of the sun's harmful rays.  Sunscreens should be applied at least 30 minutes prior to expected sun exposure and then every 2 hours after that as long as sun exposure continues. If swimming or exercising sunscreen should be reapplied every 45 minutes to an hour after getting wet or sweating.  One ounce, or the equivalent of a shot glass full of sunscreen, is adequate to protect the skin not covered by a bathing suit. I also recommended a lip balm with a sunscreen as well. Sun protective clothing can be used in lieu of sunscreen but must be worn the entire time you are exposed to the sun's rays. Detail Level: Generalized

## 2020-06-07 NOTE — H&P
[address] [address]     CARDIOLOGY INPATIENT HPI       Patient name: Pete Trejo  MRN: [id number]  Admit date: 6/7/2020    Subjective    Patient ID: Pete Trejo is a 54 y.o. male.    Chief Complaint:   Chief Complaint   Patient presents with   • Chest Pain     pt arrives with CP and states his Pacer fired. Pt arrives with HR of 160s, vomiting       HPI  Chest pain and AICD shock.    The patient is a pleasant 54-year-old male with history of coronary artery disease dating back to 2010.  At that time the patient suffered from a large anterior wall myocardial infarction and has been left with severely impaired LV systolic function and a large area of scar.  He has not been a frequent visitor to our clinic.  He was last seen in 2017 and last had a heart cath in 2017 and echo in 2017.  Until recently he has not had routine AICD checks.  Sometimes it would be 2 years between AICD checks.    The patient states that he has had several episodes where he is undergone shocks from his device.  The last was in 2017 before today.    The patient states that earlier today he got up early and was sitting down after breakfast.  While walking to the bathroom he had the sudden onset of a head rush and he knew that he was going to get shocked.  He can up bent over the sink and he stayed there for a period of time and then noted that he received a shock.  He was able to make it into the living room but it felt his heart racing and was quite diaphoretic.  He vomited up his breakfast.  He is also noted tightness in his chest and therefore he called 911 and presented to the emergency department.    In the emergency department the patient was given intravenous lidocaine and intravenous amiodarone but did not  convert to normal sinus rhythm.  Subsequently after an  external shock the patient converted to sinus rhythm and is been maintained on amiodarone infusion.  The patient has been on chronic amiodarone therapy for the last 3 years.    The patient denies any significant recent symptoms at all suggestive of angina.  He is felt really good and has been able to walk and work.  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  He has not missed any medications.  He readily gives me a list of his medicines and describes taking each 1 of them and how many he takes a day.  He is taking Xarelto for uncertain reasons.  He personally says he does not recall he is ever had atrial fibrillation.  There is no history of DVT or PE.  He denies any significant adverse side effects or problems related to his medications.    Previous echo showed ejection fraction at 30% with last nuclear scan showing a large anterior fixed defect with ejection fraction 18%.  He has a large amount of fixed defect with minimal rayray-infarct ischemia.  He had an LAD stent performed back in 2010 and then in 2017 had 2 small diagonal stented.    CARDIAC PROBLEM LIST:  Anterior wall myocardial infarction 2010 with LAD stenting.  Stenting of 2 small diagonals in 2017  AICD placement with minimal follow-up.  History of 2 prior AICD shocks.    Past Medical History:   Diagnosis Date   • AICD (automatic cardioverter/defibrillator) present    • H/O heart artery stent    • Heart attack (CMS/HCC) ([facility])    • V-tach (CMS/HCC) ([facility])        Past Surgical History:   Procedure Laterality Date   • APPENDECTOMY         Allergies as of 06/07/2020 - Reviewed 06/07/2020   Allergen Reaction Noted   • Morphine  07/30/2017   • Tramadol  07/30/2017       Medications Prior to Admission   Medication Sig Dispense Refill Last Dose   • lisinopriL (PRINIVIL,ZESTRIL) 5 mg tablet Take 5 mg by mouth 2 (two) times a day     6/7/2020 at am   • rosuvastatin (CRESTOR) 40 mg tablet Take 40 mg  by mouth nightly     6/5/2020 at pm   • spironolactone (ALDACTONE) 25 mg tablet Take 25 mg by mouth daily.   6/7/2020 at am   • pantoprazole (PROTONIX) 40 mg EC tablet Take 40 mg by mouth daily.   6/7/2020 at am   • carvedilol (COREG) 25 mg tablet Take 25 mg by mouth 2 (two) times a day with meals.   6/7/2020 at am   • furosemide (LASIX) 20 mg tablet Take 20 mg by mouth nightly     6/5/2020 at pm   • amiodarone (PACERONE) 200 mg tablet Take 1 tablet (200 mg total) by mouth daily. 30 tablet 1 6/7/2020 at am   • prasugrel (EFFIENT) 10 mg tablet Take 1 tablet (10 mg total) by mouth daily. 30 tablet 1 6/7/2020 at am   • rivaroxaban (XARELTO) 20 mg tablet Take 1 tablet (20 mg total) by mouth daily. 30 tablet 1 6/5/2020 at pm   • nitroglycerin (NITROSTAT) 0.4 mg SL tablet Place 0.4 mg under the tongue every 5 (five) minutes as needed for chest pain.   Unknown at Unknown time         Current Facility-Administered Medications:   •  lidocaine (cardiac) (XYLOCAINE) injection 100 mg, 100 mg, intravenous, Once, Minal Erazo MD  •  amiodarone (CORDARONE) injection 150 mg, 150 mg, intravenous, Once, Minal Erazo MD  •  acetaminophen (TYLENOL) tablet 650 mg, 650 mg, oral, q6h PRN, Naty Benoit CNP  •  metoclopramide (REGLAN) injection 10 mg, 10 mg, intravenous, q6h PRN, Naty Benoit CNP  •  carvediloL (COREG) tablet 25 mg, 25 mg, oral, 2x daily with meals, Naty Benoit CNP  •  furosemide (LASIX) tablet 20 mg, 20 mg, oral, Nightly, Naty Benoit CNP  •  lisinopriL (PRINIVIL,ZESTRIL) tablet 5 mg, 5 mg, oral, 2x daily, Naty Benoit CNP, 5 mg at 06/07/20 1506  •  lansoprazole (PREVACID) capsule 30 mg, 30 mg, oral, Daily at 1600, Naty Benoit CNP, 30 mg at 06/07/20 1505  •  [START ON 6/8/2020] prasugreL (EFFIENT) tablet 10 mg, 10 mg, oral, Daily, Naty Benoit CNP  •  rosuvastatin (CRESTOR) tablet 40 mg, 40 mg, oral, Nightly, Naty Benoit CNP  •  [START ON 6/8/2020] spironolactone (ALDACTONE) tablet 25 mg, 25 mg, oral, Daily,  "Naty Benoit CNP  •  nitroglycerin (NITROSTAT) SL tablet 0.4 mg, 0.4 mg, sublingual, q5 min PRN, Naty Benoit CNP  •  amiodarone (PACERONE) tablet 400 mg, 400 mg, oral, 2x daily with meals, Naty Benoit CNP  •  rivaroxaban (XARELTO) tablet 20 mg, 20 mg, oral, Daily with dinner, Nir Edwards MD, 20 mg at 06/07/20 1505    Family History   Problem Relation Age of Onset   • Heart attack Mother 62   • Other Mother         Abdominal Aortic Aneurysm   • Lung cancer Mother    • Colon cancer Father         Cause of death   • Diabetes Brother         Non-Insulin Dependent   • Heart attack Brother 51        w/ Stents   • Heart disease Brother    • Other Son         Well       Social History     Tobacco Use   • Smoking status: Light Tobacco Smoker     Years: 20.00     Types: Cigarettes   • Tobacco comment: 4 Cigarettes per day   Substance Use Topics   • Alcohol use: No     Comment: Quit drinking in 2007   • Drug use: Not on file       Review of Systems  Review of Systems   Constitution: Negative for weight gain and weight loss.   HENT: Negative for nosebleeds.    Cardiovascular: Positive for chest pain, near-syncope and palpitations. Negative for claudication, cyanosis, dyspnea on exertion, irregular heartbeat, leg swelling/pain, orthopnea, PND and syncope/fainting.   Hematologic/Lymphatic: Negative for clotting problem, major bleeding and cytopenia. Bruises/bleeds easily.   Musculoskeletal: Negative for back pain, muscle weakness and myalgias/muscle aches.   Gastrointestinal: Negative for melena.   Genitourinary: Negative for hematuria.   Neurological: Positive for dizziness. Negative for focal weakness and seizures.         Objective     /54 (BP Location: Right arm, Patient Position: Sitting, Cuff Size: Long Adult)   Pulse 60   Temp 36.4 °C (97.6 °F) (Oral)   Resp 18   Ht 1.803 m (5' 11\")   Wt 114.3 kg (251 lb 15.8 oz)   SpO2 94%   BMI 35.14 kg/m²   Weight: 114.3 kg (251 lb 15.8 oz)    Physical Exam "   Constitutional: He is oriented to person, place, and time. He appears well-developed and well-nourished.   HENT:   Head: Normocephalic.   Neck: Normal carotid pulses, no hepatojugular reflux and no JVD present. Carotid bruit is not present.   Cardiovascular: Normal rate. Exam reveals no gallop and no friction rub.   No murmur heard.  Pulses:       Carotid pulses are 2+ on the right side and 2+ on the left side.       Radial pulses are 2+ on the right side and 2+ on the left side.        Dorsalis pedis pulses are 2+ on the right side and 2+ on the left side.        Posterior tibial pulses are 2+ on the right side and 2+ on the left side.   Pulmonary/Chest: No accessory muscle usage. No respiratory distress. He has no decreased breath sounds. He has no wheezes. He has no rhonchi. He has no rales.   AICD noted in the left upper chest.   Neurological: He is alert and oriented to person, place, and time.   Psychiatric: He has a normal mood and affect. His speech is normal and behavior is normal. Judgment and thought content normal. Cognition and memory are normal.         Data Review:   Lab Results   Component Value Date     06/07/2020    K 4.3 06/07/2020     06/07/2020    CO2 22 06/07/2020    BUN 10 06/07/2020    CREATININE 0.94 06/07/2020    GLUCOSE 129 (H) 06/07/2020    CALCIUM 8.8 06/07/2020     No results found for: CKTOTAL, CKMB, CKMBINDEX, TROPONINI, BNP, POCBNP  Lipids:    Lab Results   Component Value Date    CHOL 96 09/20/2018    CHOL 113 07/29/2017     Lab Results   Component Value Date    HDL 27 09/20/2018    HDL 26 (L) 07/29/2017     Lab Results   Component Value Date    LDLCALC 53 09/20/2018    LDLCALC 65 07/29/2017     Lab Results   Component Value Date    TRIG 145 09/20/2018    TRIG 108 07/29/2017        TSH:   Lab Results   Component Value Date    TSH 0.627 06/07/2020     Magnesium:   Lab Results   Component Value Date    MG 1.8 06/07/2020     Protime-INR:   Lab Results   Component Value  Date    PT 13.7 07/29/2017    INR 1.1 07/29/2017       Electrocardiogram: 6/7/2020  VTE at a rate between 150 and 160 bpm.  This is likely below his rate cut off for shocking which is why he presented in that rhythm and required external DC cardioversion.    Telemetry:  Predominantly normal sinus rhythm at this time    Radiology:  Xr Chest Portable 1 View    Result Date: 6/7/2020  Narrative: Exam: Portable chest, single view on 06/07/2020 at 0938 hours Clinical History: Chest pain, ventricular tachycardia. Comparison(s): Multiple previous studies, most recent chest x-ray 7/26/2017. Findings: There is a multilead left subclavian pacemaker-defibrillator. Heart size upper limits normal for technique. There is mild vascular engorgement and mild peribronchial and perihilar interstitial thickening. Increase interstitial prominence in the parahilar lung bilaterally, right greater than left. No evidence of pleural effusion.     Impression: IMPRESSION: Vascular congestion with mild to moderate parahilar interstitial changes, likely edema.        Assessment/Plan   Active Problems:    Anteroseptal myocardial infarction (CMS/HCC) (HCC)  Remote in 2010.  He has significant ischemic cardiomyopathy with impaired LV function.  Despite his young age he is on amiodarone.  1 should consider EP evaluation for possibility of VT ablation.    CAD (coronary artery disease)  No significant chest pain until he developed the ventricular tachycardia.  He has not had any significant angina or exertional limitations recently.    Hypertension  Treated and controlled    Hyperlipidemia  Treated and controlled    VT (ventricular tachycardia) (CMS/HCC) (HCC)      Plan  · Continue IV amiodarone:  Reassess LV function    · Consider stress testing:  Possible EP evaluation with VT ablation or resetting of his device.            Full Code    Electronically signed by: BRENDON SHAW MD  6/7/2020  4:57 PM

## 2020-06-07 NOTE — MEDICATION HISTORY SPECIALIST NOTES
[facility] [phone]    CSN: [id number]  : [id number]    Information sources:  EPIC  [facility] medication list  Patient's spouse    Allergies verified.    Patient's spouse interviewed via phone who provided all history. Patient's spouse verified medications and provided last doses. Verified with patient's spouse and RN faxed med list from patients room.    New medications added:  Lisinopril 5 mg    Profile was checked for  medications.     **High alert medications**  Rivoroxaban (Xarelto)    See  for medication resources.

## 2020-06-07 NOTE — Clinical Note
Arrhythmia Type: monomorphic VT.   Method of Cardioversion: synchronous.   The arrhythmia was terminated.   Energy shock delivered: 360 joules.   Time shock delivered: 16:26.   Post cardioversion rhythm: sinus rhythm.

## 2020-06-07 NOTE — ED PROCEDURE NOTE
Procedure  Procedural Sedation    Date/Time: 2020 10:10 AM  Performed by: Danuta Auguste MD  Authorized by: Minal Erazo MD     Consent:     Consent obtained:  Verbal and emergent situation    Consent given by:  Patient  Indications:     Procedure performed:  Emergency procedure and cardioversion    Procedure Performed by:  Different physician    Intended level of sedation:  Deep  Pre-sedation assessment:     Time since last food or drink:  1hr    ASA classification: class 3 - patient with severe systemic disease      Neck mobility:  Limited    Mouth openin finger widths    Mallampati score:  II - soft palate, uvula, fauces visible    History of difficult intubation: no      Pre-sedation assessment completed:  2020 9:17 AM  Immediate pre-procedure details:     Reassessment: Patient reassessed immediately prior to procedure      Reviewed: vital signs    Procedure details (see MAR for exact dosages):     Sedation start time:  2020 9:19 AM    Preoxygenation:  Room air    Intra-procedure monitoring:  Blood pressure monitoring, continuous pulse oximetry and EKG    Intra-procedure events: none      Sedation end time:  2020 9:46 AM    Total sedation time (minutes):  27  Post-procedure details:     Post-sedation assessment completed:  2020 9:53 AM    Attendance: Constant attendance by certified staff until patient recovered      Recovery: Patient returned to pre-procedure baseline      Patient Participation:  Complete - patient participated    Airway Patency:  Patent    Anesthetic Complications: No      Respiratory Status:  Acceptable    Patient is stable for discharge or admission: yes      Patient tolerance:  Tolerated well, no immediate complications                 Danuta Auguste MD  20 1013

## 2020-06-07 NOTE — Clinical Note
Arrhythmia Type: monomorphic VT.   Method of Cardioversion: synchronous.   The arrhythmia was terminated.   Energy shock delivered: 360 joules.   Time shock delivered: 17:59.   Post cardioversion rhythm: sinus rhythm.

## 2020-06-08 ENCOUNTER — APPOINTMENT (OUTPATIENT)
Dept: NUCLEAR MEDICINE | Facility: HOSPITAL | Age: 55
DRG: 274 | End: 2020-06-08
Payer: OTHER GOVERNMENT

## 2020-06-08 ENCOUNTER — APPOINTMENT (OUTPATIENT)
Dept: CARDIOLOGY | Facility: HOSPITAL | Age: 55
DRG: 274 | End: 2020-06-08
Payer: OTHER GOVERNMENT

## 2020-06-08 LAB
ANION GAP SERPL CALC-SCNC: 7 MMOL/L (ref 3–11)
ASCENDING AORTA: 3.04 CM
AV LVOT PEAK GRADIENT: 3.1 MMHG
AV MEAN GRADIENT: 3.47 MMHG
AV PEAK GRADIENT: 6.55 MMHG
BUN SERPL-MCNC: 8 MG/DL (ref 7–25)
CALCIUM SERPL-MCNC: 8.9 MG/DL (ref 8.6–10.3)
CHLORIDE SERPL-SCNC: 107 MMOL/L (ref 98–107)
CHOLEST SERPL-MCNC: 127 MG/DL (ref 0–199)
CO2 SERPL-SCNC: 25 MMOL/L (ref 21–32)
CREAT SERPL-MCNC: 0.84 MG/DL (ref 0.7–1.3)
DOP CALC AO PEAK VEL: 1.28 M/S
DOP CALC AO VTI: 24.9 CM
DOP CALC LVOT DIAMETER: 2.79 CM
DOP CALC LVOT STROKE VOLUME: 110 CM3
DOP CALC MV VTI: 21.87 CM
DOP CALC RVOT PEAK VEL: 0.7 M/S
DOP CALCLVOT PEAK VEL VTI: 18 CM
E/A RATIO: 0.8
E/E' RATIO (AVERAGE): 13.5
E/E' RATIO: 13.8
ECHO EF ESTIMATED: 25 %
EJECTION FRACTION: 30 %
FASTING STATUS PATIENT QL REPORTED: YES
GFR SERPL CREATININE-BSD FRML MDRD: 100 ML/MIN/1.73M*2
GLUCOSE SERPL-MCNC: 106 MG/DL (ref 70–105)
HDLC SERPL-MCNC: 29 MG/DL
INTERVENTRICULAR SEPTUM: 1 CM (ref 0.6–1.1)
LA AREA A4C SYSTOLE: 84.7 CM3
LDLC SERPL CALC-MCNC: 76 MG/DL (ref 20–99)
LEFT ATRIUM SIZE: 5.82 CM
LEFT ATRIUM VOLUME INDEX: 39 ML/M2
LEFT ATRIUM VOLUME: 87.8 CM3
LEFT INTERNAL DIMENSION IN SYSTOLE: 6.6 CM (ref 4.19–6.36)
LEFT VENTRICLE DIASTOLIC VOLUME: 454 CM3
LEFT VENTRICLE DIASTOLIC VOLUME: 588 ML
LEFT VENTRICLE SYSTOLIC VOLUME: 291 CM3
LEFT VENTRICULAR INTERNAL DIMENSION IN DIASTOLE: 7.2 CM (ref 7.25–10.07)
LVAD-AP2: 74.9 CM2
MAGNESIUM SERPL-MCNC: 1.9 MG/DL (ref 1.8–2.4)
ML OF DILUTED DEFINITY INJECTED: 3 ML
MV DEC SLOPE: 2130 MM/S2
MV DT: 327 MS
MV MEAN GRADIENT: 0.6 MMHG
MV PEAK A VEL: 78 CM/S
MV PEAK E VEL: 60 CM/S
MV PEAK GRADIENT: 2 MMHG
MV STENOSIS PRESSURE HALF TIME: 94 MS
MV VALVE AREA P 1/2 METHOD: 2.34 CM2
MV VMAX: 78 CM/S
MVA (VTI): 5.03 CM2
NUC STRESS EJECTION FRACTION: 16 %
NUC STRESS TID: 0.87
POSTERIOR WALL: 1 CM (ref 0.6–1.1)
POTASSIUM SERPL-SCNC: 4.2 MMOL/L (ref 3.5–5.1)
PULM VEIN S/D RATIO: 1.4
PV PEAK D VEL: 33 CM/S
PV PEAK GRADIENT: 7.62 MMHG
PV PEAK S VEL: 45 CM/S
PV VMAX: 1.38 M/S
RA AREA: 20.3 CM2
RH CV ECHO AV VALVE AREA VEL: 4.2 CM2
RH CV ECHO AV VALVE AREA VTI: 4.4 CM2
RH CV NM REST DOSE: 8
RH CV NM STRESS DOSE: 28
RH LVOT PEAK VELOCITY REST: 0.9 M/S
RIGHT VENTRICULAR INTERNAL DIMENSION IN DIASTOLE: 3.6 CM
RV AP4 BASE: 3.8 CM
SODIUM SERPL-SCNC: 139 MMOL/L (ref 135–145)
STRESS BASELINE BP: NORMAL MMHG
STRESS BASELINE HR: 60 BPM
STRESS PERCENT HR: 41 %
STRESS POST PEAK BP: NORMAL MMHG
STRESS POST PEAK HR: 69 BPM
STRESS TARGET HR: 142 BPM
SUMMED DIFFERENCE: 0
SUMMED REST SCORE: 44
SUMMED STRESS SCORE: 44
TDI: 4.4 CM/S
TDILATERAL: 4.6 CM/S
TR MAX PG: 23.04 MMHG
TRICUSPID ANNULAR PLANE SYSTOLIC EXCURSION: 1.8 CM
TRICUSPID VALVE PEAK REGURGITATION VELOCITY: 2.4 M/S
TRIGL SERPL-MCNC: 112 MG/DL
TV REST PULMONARY ARTERY PRESSURE: 28 MMHG
Z-SCORE OF LEFT VENTRICULAR DIMENSION IN END DIASTOLE: -1.71
Z-SCORE OF LEFT VENTRICULAR DIMENSION IN END SYSTOLE: 1.94

## 2020-06-08 PROCEDURE — G1004 CDSM NDSC: HCPCS | Performed by: INTERNAL MEDICINE

## 2020-06-08 PROCEDURE — 6370000100 HC RX 637 (ALT 250 FOR IP): Performed by: NURSE PRACTITIONER

## 2020-06-08 PROCEDURE — 6360000200 HC RX 636 W HCPCS (ALT 250 FOR IP): Performed by: NURSE PRACTITIONER

## 2020-06-08 PROCEDURE — 78452 HT MUSCLE IMAGE SPECT MULT: CPT | Mod: 26,ME | Performed by: INTERNAL MEDICINE

## 2020-06-08 PROCEDURE — 93306 TTE W/DOPPLER COMPLETE: CPT | Mod: 26 | Performed by: INTERNAL MEDICINE

## 2020-06-08 PROCEDURE — 99222 1ST HOSP IP/OBS MODERATE 55: CPT | Performed by: NURSE PRACTITIONER

## 2020-06-08 PROCEDURE — 2580000300 HC RX 258: Performed by: NURSE PRACTITIONER

## 2020-06-08 PROCEDURE — 93005 ELECTROCARDIOGRAM TRACING: CPT | Performed by: NURSE PRACTITIONER

## 2020-06-08 PROCEDURE — 83735 ASSAY OF MAGNESIUM: CPT | Performed by: NURSE PRACTITIONER

## 2020-06-08 PROCEDURE — 93017 CV STRESS TEST TRACING ONLY: CPT

## 2020-06-08 PROCEDURE — 93018 CV STRESS TEST I&R ONLY: CPT | Performed by: INTERNAL MEDICINE

## 2020-06-08 PROCEDURE — 36415 COLL VENOUS BLD VENIPUNCTURE: CPT | Performed by: NURSE PRACTITIONER

## 2020-06-08 PROCEDURE — 80048 BASIC METABOLIC PNL TOTAL CA: CPT | Performed by: NURSE PRACTITIONER

## 2020-06-08 PROCEDURE — 93016 CV STRESS TEST SUPVJ ONLY: CPT | Performed by: INTERNAL MEDICINE

## 2020-06-08 PROCEDURE — 93306 TTE W/DOPPLER COMPLETE: CPT

## 2020-06-08 PROCEDURE — 80061 LIPID PANEL: CPT | Performed by: NURSE PRACTITIONER

## 2020-06-08 PROCEDURE — 93010 ELECTROCARDIOGRAM REPORT: CPT | Mod: 59 | Performed by: INTERNAL MEDICINE

## 2020-06-08 PROCEDURE — 6370000100 HC RX 637 (ALT 250 FOR IP): Performed by: INTERNAL MEDICINE

## 2020-06-08 PROCEDURE — (BLANK) HC ROOM ICU INTERMEDIATE

## 2020-06-08 PROCEDURE — 2550000100 HC RX 255: Performed by: NURSE PRACTITIONER

## 2020-06-08 PROCEDURE — 78452 HT MUSCLE IMAGE SPECT MULT: CPT | Mod: ME

## 2020-06-08 PROCEDURE — 99232 SBSQ HOSP IP/OBS MODERATE 35: CPT | Mod: 25 | Performed by: INTERNAL MEDICINE

## 2020-06-08 RX ORDER — REGADENOSON 0.08 MG/ML
0.4 INJECTION, SOLUTION INTRAVENOUS ONCE
Status: COMPLETED | OUTPATIENT
Start: 2020-06-08 | End: 2020-06-08

## 2020-06-08 RX ORDER — AMIODARONE HYDROCHLORIDE 200 MG/1
400 TABLET ORAL DAILY
Status: DISCONTINUED | OUTPATIENT
Start: 2020-06-09 | End: 2020-06-10 | Stop reason: HOSPADM

## 2020-06-08 RX ORDER — SODIUM CHLORIDE 9 MG/ML
100 INJECTION, SOLUTION INTRAVENOUS CONTINUOUS
Status: DISCONTINUED | OUTPATIENT
Start: 2020-06-08 | End: 2020-06-10 | Stop reason: HOSPADM

## 2020-06-08 RX ADMIN — CARVEDILOL 25 MG: 25 TABLET, FILM COATED ORAL at 18:04

## 2020-06-08 RX ADMIN — SODIUM CHLORIDE 100 ML/HR: 9 INJECTION, SOLUTION INTRAVENOUS at 21:23

## 2020-06-08 RX ADMIN — LISINOPRIL 5 MG: 5 TABLET ORAL at 21:03

## 2020-06-08 RX ADMIN — REGADENOSON 0.4 MG: 0.08 INJECTION, SOLUTION INTRAVENOUS at 10:22

## 2020-06-08 RX ADMIN — PRASUGREL 10 MG: 10 TABLET, FILM COATED ORAL at 11:04

## 2020-06-08 RX ADMIN — RIVAROXABAN 20 MG: 20 TABLET, FILM COATED ORAL at 18:05

## 2020-06-08 RX ADMIN — ROSUVASTATIN CALCIUM 40 MG: 20 TABLET, FILM COATED ORAL at 21:03

## 2020-06-08 RX ADMIN — PERFLUTREN 3 ML: 6.52 INJECTION, SUSPENSION INTRAVENOUS at 13:30

## 2020-06-08 RX ADMIN — CARVEDILOL 25 MG: 25 TABLET, FILM COATED ORAL at 11:06

## 2020-06-08 RX ADMIN — LANSOPRAZOLE 30 MG: 30 CAPSULE, DELAYED RELEASE PELLETS ORAL at 17:19

## 2020-06-08 RX ADMIN — AMIODARONE HYDROCHLORIDE 400 MG: 200 TABLET ORAL at 11:05

## 2020-06-08 RX ADMIN — SPIRONOLACTONE 25 MG: 25 TABLET ORAL at 11:05

## 2020-06-08 RX ADMIN — FUROSEMIDE 20 MG: 20 TABLET ORAL at 21:03

## 2020-06-08 RX ADMIN — LISINOPRIL 5 MG: 5 TABLET ORAL at 11:05

## 2020-06-08 NOTE — PLAN OF CARE
Problem: Cardiovascular - Adult  Goal: Maintains optimal cardiac output and hemodynamic stability  Description: INTERVENTIONS:  1. Monitor vital signs and rhythm  2. Monitor for hypotension and other signs of decreased cardiac output  3. Administer and titrate ordered vasoactive medications to optimize hemodynamic stability  4. Monitor for fluid overload/dehydration, weight gain, shortness of breath and activity intolerance  5. Monitor arterial and/or venous puncture sites for bleeding and/or hematoma  6. Assess quality of pulses, capillary refill, edema, sensation, skin color and temperature  7. Assess for signs of decreased coronary artery perfusion - ex. angina  Outcome: Progressing  Goal: Absence of cardiac dysrhythmias or at baseline  Description: INTERVENTIONS:  1. Continuous cardiac monitoring, monitor vital signs, obtain 12 lead EKG if indicated  2. Administer antiarrhythmic and heart rate control medications as ordered  3. Initiate emergency measures for life threatening arrhythmias  4. Monitor electrolytes and administer replacement therapy as ordered  Outcome: Progressing

## 2020-06-08 NOTE — PROGRESS NOTES
[address] [address]                                                  Cardiology Progress Note    Subjective    Patient ID: Pete Trejo is a 54 y.o. male.      HPI    Patient denies any complaints this morning no chest pain, sitting in a chair and comfortable, waiting for stress test and EP evaluation        LOS: 0 days     Allergies as of 06/07/2020 - Reviewed 06/07/2020   Allergen Reaction Noted   • Morphine  07/30/2017   • Tramadol  07/30/2017       Medication:  regadenoson, 0.4 mg, intravenous, Once  lidocaine (cardiac), 100 mg, intravenous, Once  amiodarone, 150 mg, intravenous, Once  carvediloL, 25 mg, oral, 2x daily with meals  furosemide, 20 mg, oral, Nightly  lisinopriL, 5 mg, oral, 2x daily  lansoprazole, 30 mg, oral, Daily at 1600  prasugreL, 10 mg, oral, Daily  rosuvastatin, 40 mg, oral, Nightly  spironolactone, 25 mg, oral, Daily  amiodarone, 400 mg, oral, 2x daily with meals  rivaroxaban, 20 mg, oral, Daily with dinner           Objective     Vital signs in last 24 hours:   Temp:  [36.4 °C (97.6 °F)-37.2 °C (99 °F)] 36.6 °C (97.9 °F)  Heart Rate:  [59-62] 59  Resp:  [6-24] 18  BP: ()/(45-76) 122/62  Intake/Output this shift:  No intake/output data recorded.    Intake/Output Summary (Last 24 hours) at 6/8/2020 1019  Last data filed at 6/8/2020 0419  Gross per 24 hour   Intake 422 ml   Output 351 ml   Net 71 ml         Weight: 108.6 kg (239 lb 8 oz)    Physical Exam   Constitutional: He is oriented to person, place, and time. He appears well-developed and well-nourished. No distress.   Patient in no distress, BMI 33   HENT:   Head: Normocephalic.   Eyes: Pupils are equal, round, and reactive to light. No scleral icterus.   Neck: Normal range of motion. Neck supple. No JVD present. No tracheal deviation present. No thyromegaly present.   No carotid bruit   Cardiovascular: Normal rate, regular rhythm and normal heart sounds. Exam reveals no gallop  and no friction rub.   No murmur heard.  Left infraclavicular pacemaker/ICD pocket well-healed no erythema tenderness or fluctuation   Pulmonary/Chest: Effort normal and breath sounds normal. No respiratory distress. He has no wheezes. He has no rales.   Symmetric air entry no wheezing   Abdominal: Soft. Bowel sounds are normal. He exhibits distension. There is no abdominal tenderness. There is no rebound.   Musculoskeletal: Normal range of motion.         General: No edema.      Comments: No cyanosis, no clubbing, no edema   Neurological: He is alert and oriented to person, place, and time. He has normal reflexes.   Skin: Skin is warm and dry. No rash noted. No erythema.   Psychiatric: He has a normal mood and affect. His behavior is normal. Judgment and thought content normal.   Nursing note and vitals reviewed.        Labs:  BMP:  Lab Results   Component Value Date     06/08/2020    K 4.2 06/08/2020     06/08/2020    CO2 25 06/08/2020    BUN 8 06/08/2020    CREATININE 0.84 06/08/2020    GLUCOSE 106 (H) 06/08/2020    CALCIUM 8.9 06/08/2020     CBC:   Lab Results   Component Value Date    WBC 8.0 06/07/2020    RBC 4.81 06/07/2020    HGB 15.6 06/07/2020    HCT 45.7 06/07/2020     06/07/2020     CMP:  Lab Results   Component Value Date     06/08/2020    K 4.2 06/08/2020     06/08/2020    CO2 25 06/08/2020    GLUCOSE 106 (H) 06/08/2020    CREATININE 0.84 06/08/2020    CALCIUM 8.9 06/08/2020    ALBUMIN 4.0 06/07/2020    ALKPHOS 67 06/07/2020    BILITOT 1.13 06/07/2020    ALT 22 06/07/2020    AST 15 06/07/2020    BUN 8 06/08/2020    ANIONGAP 7 06/08/2020     No results found for: CKTOTAL, CKMBINDEX, TROPONINI, BNP  Lipid:   Lab Results   Component Value Date    CHOL 127 06/08/2020    CHOL 96 09/20/2018    CHOL 113 07/29/2017     Lab Results   Component Value Date    HDL 29 (L) 06/08/2020    HDL 27 09/20/2018    HDL 26 (L) 07/29/2017     Lab Results   Component Value Date    LDLCALC 76  06/08/2020    LDLCALC 53 09/20/2018    LDLCALC 65 07/29/2017     TSH:  Lab Results   Component Value Date    TSH 0.627 06/07/2020     Magnesium:  Lab Results   Component Value Date    MG 1.9 06/08/2020     PT/INR:   No results found for: PT, INR    EKG/Telemetry:  A paced V paced at 60 bpm       Assessment/Plan   Active Problems:    Anteroseptal myocardial infarction (CMS/[facility]) ([facility])    CAD (coronary artery disease)    Hypertension    Hyperlipidemia    VT (ventricular tachycardia) (CMS/[facility]) ([facility])        Plan:    Ischemic cardiomyopathy:  Last echocardiogram in 2017 EF 30%, repeat echocardiogram pending  Significant troponin elevation peak troponin 775 which may be consistent with a primary coronary event versus troponin elevation related to defibrillator shocks  Stress test has been ordered for today and is pending  Continue amiodarone, carvedilol, Lasix, lisinopril, Effient, Xarelto, Crestor, Aldactone    Sustained ventricular tachycardia:  Status post 3 ICD shocks  On amiodarone  Status post cardioversion in the emergency room for sustained VT  Continue guideline directed medical therapy as above  EP consultation is pending  Ischemic assessment is pending  Continue telemetry  Maintain high normal electrolytes  Continue beta-blocker        Felipe Franz MD

## 2020-06-08 NOTE — INTERDISCIPLINARY/THERAPY
Case Management Admission Note  [id number]    Living Situation: lives in  w/ significant other  ADLs: IADL's  Stairs: N  DME: N  Oxygen: N  -  Liters:   -  Company:   CPAP: N  Home Health: N  PCP: Dr. Kim  Funding: [facility]  Support Person: significant other  Transportation at DC: has transpo  Discharge Needs/Barriers: N    Narrative: CM reviewed pt's plan of care.  Pt admitted with VT.  Currently down for stress test.  CM will continue to monitor for any discharge needs.    Dispo: HO

## 2020-06-08 NOTE — NURSING END OF SHIFT
Nursing End of Shift Summary:    Patient: Pete Trejo  MRN: [id number]  : 1965, Age: 54 y.o.    Location: [address] [place] [address]    Nursing Goals  Clinical Goals for the Shift: Monitor vital signs, comfort and safety    Narrative Summary of Progress Toward Clinical Goals:  Patient slept well, with some anxiety about ordered testing.  He is AV paced on telemetry.    Barriers to Goals/Nursing Concerns:  Yes - Required testing    New Patient or Family Concerns/Issues:  No    Shift Summary:      Significant Events & Communications to Providers (last 12 hours)      Last 5 Values    No documentation.              Oxygen Usage (last 12 hours)      Last 5 Values     Row Name 20 0000 20 0400             Oxygen Weaning Trial by Nursing    Is Patient on Room Air OR on the Same Amount of O2 as at Home?  Yes  Yes  Yes                 Mobility (last 12 hours)      Last 5 Values     Row Name 20 1928 20 0000 20 0400          Mobility    Activity  Ambulate in room  Ambulate in [place];Ambulate in room;Bathroom privileges;Chair;Stand at bedside;Turn;Up ad ezekiel  Ambulate in [place];Ambulate in room;Bathroom privileges;Chair;Stand at bedside;Turn;Up ad ezekiel  Ambulate in room;Bathroom privileges;Chair;Stand at bedside;Turn;Up ad ezekiel     Level of Assistance  Independent  Independent  Independent  Independent     Anti-Embolism Intervention  --  Not Ordered  Not Ordered  Not Ordered         Urethral Catheter    Active Urethral Catheter     None            Active Lines    Active Central venous catheter / Peripherally inserted central catheter / Implantable Port / Hemodialysis catheter / Midline Catheter     None              Infusing Medications   Medication Dose Last Rate     PRN Medications   Medication Dose Last Dose   • acetaminophen  650 mg     • metoclopramide (REGLAN) IV orderable  10 mg     • nitroglycerin  0.4 mg       _________________________  China French RN  20 6:02 AM

## 2020-06-08 NOTE — CONSULTATION
Cardiology Inpatient Consult Note    Subjective    Patient ID: Pete Trejo is a 54 y.o. male.    Chief Complaint:   Chief Complaint   Patient presents with   • Chest Pain     pt arrives with CP and states his Pacer fired. Pt arrives with HR of 160s, vomiting       Patient is a pleasant 54-year-old gentleman who was admitted yesterday morning after receiving a shock from his ICD.  He had been standing in his bathroom when he felt lightheaded and a rush sensation like his ICD was going to fire.  He leaned over the sink to brace himself though began to feel better and stood back up and then his defibrillator fired.  He went out and sat in his recliner and then developed severe chest pain.  He called his girlfriends son to drive him to the emergency room.  He could feel his heart racing while sitting in the recliner.  On the way to the hospital he developed severe anterior chest pain, shortness of breath with nausea and vomiting.  On presentation to the ER he was found to be in sustained ventricular tachycardia with rates around 155-160 bpm.  He was externally cardioverted by the ER physician.  His ICD did not deliver therapy as his VT zone is set for 180 and VF zone for to 10 bpm.     He received shocks from his defibrillator in July 2017 and was initiated on amiodarone. He had a Lexiscan Cardiolite stress test in July 2017 that showed an ejection fraction of 14% with a large scar in the LAD territory with akinesis and a small amount of rayray-infarction ischemia.  He underwent a cardiac cath on 7/26/2017 that showed multivessel coronary artery disease, small caliber LAD with occlusion of the LAD at the time of previous STEMI.  Critical stenosis ostium of the second diagonal branch was successfully treated with balloon and 2.25 x 30 mm DIMAS.   Due to akinetic/dyskinetic LV apex he was initiated on Xarelto.  He has been compliant with his cardiac medications which have been  refilled by his primary care physician.      He was last seen in the cardiology clinic 11/26/2018 by Danisha Peters CNP.  He has a history of coronary artery disease with prior small anteroseptal MI 2009 with stent to the LAD, recurrent MI with total occlusion of the LAD with a late presentation in 2010 with PCI and stent, ischemic cardiomyopathy EF 31% in 2013, CHF prior biventricular ICD implant, hypertension, dyslipidemia and prior tobacco abuse though was able to successfully quit several years ago.  He had previously been followed in the heart doctors clinic by Dr. Jenkins.     He had an ICD check 5/12/2020 in the clinic which showed him to be 98% RV paced and 99% LV paced.  There was one mode switch episode.  His device was expected to last another 2.5 years.  There was one nonsustained VT episode.  His device is programmed DDDR lower rate is 60 max tracking and sensor rate 130.  His VT zone 180 bpm with a VF zone of 210 bpm.  The mode switch is 150 bpm.  Thresholds have been fine and lead impedances as well.  He was 15% atrial paced and 100% by V paced.         He currently is in the process of a stress test.  Echo is pending.  Blood pressure running in the low teens to 120s.  Heart rates 60 bpm.  And saturations 93 to 95%.  He is afebrile.    Pete denies any exertional chest pain.  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  Occasionally if he overdoes it he will be short of breath and then just slows down and rests.  He is orthopneic though denies any episodes of PND.  He is not comfortable sleeping flat in the bed.  He denies any lower extremity edema.  He drinks a lot of water during the day and evening due to being physically very active and is up 3 times a night for nocturia.  Occasionally if he is over exerting himself he will notice some palpitations and fluttering sensation.  He denies other lightheaded or dizzy feelings syncopal or near syncopal events.                                                    Specialty Problems        Cardiology Problems    Anterior myocardial infarction (CMS/HCC) (HCC)        Anteroseptal myocardial infarction (CMS/HCC) (HCC)        CAD (coronary artery disease)        Congestive heart failure (CHF) (CMS/HCC) ([facility])        Hyperlipidemia        Ischemic cardiomyopathy        Ventricular tachycardia (CMS/HCC) (HCC)        VT (ventricular tachycardia) (CMS/HCC) ([facility])              Past Medical History:   Diagnosis Date   • AICD (automatic cardioverter/defibrillator) present    • H/O heart artery stent    • Heart attack (CMS/HCC) ([facility])    • V-tach (CMS/HCC) ([facility])        Past Surgical History:   Procedure Laterality Date   • APPENDECTOMY         Allergies as of 06/07/2020 - Reviewed 06/07/2020   Allergen Reaction Noted   • Morphine  07/30/2017   • Tramadol  07/30/2017         Current Facility-Administered Medications:   •  lidocaine (cardiac) (XYLOCAINE) injection 100 mg, 100 mg, intravenous, Once, Minal Erazo MD  •  amiodarone (CORDARONE) injection 150 mg, 150 mg, intravenous, Once, Minal Erazo MD  •  acetaminophen (TYLENOL) tablet 650 mg, 650 mg, oral, q6h PRN, Naty Benoit CNP  •  metoclopramide (REGLAN) injection 10 mg, 10 mg, intravenous, q6h PRN, Naty Benoit CNP  •  carvediloL (COREG) tablet 25 mg, 25 mg, oral, 2x daily with meals, Naty Benoit CNP, Stopped at 06/08/20 0900  •  furosemide (LASIX) tablet 20 mg, 20 mg, oral, Nightly, Naty Benoit CNP, 20 mg at 06/07/20 2026  •  lisinopriL (PRINIVIL,ZESTRIL) tablet 5 mg, 5 mg, oral, 2x daily, Naty Benoit CNP, Stopped at 06/08/20 0900  •  lansoprazole (PREVACID) capsule 30 mg, 30 mg, oral, Daily at 1600, Naty Benoit CNP, 30 mg at 06/07/20 1505  •  prasugreL (EFFIENT) tablet 10 mg, 10 mg, oral, Daily, Naty Benoit CNP, Stopped at 06/08/20 0900  •  rosuvastatin (CRESTOR) tablet 40 mg, 40 mg, oral, Nightly, Naty Benoit CNP, 40 mg at 06/07/20 2026  •  spironolactone (ALDACTONE) tablet  25 mg, 25 mg, oral, Daily, Naty Benoit CNP, Stopped at 06/08/20 0900  •  nitroglycerin (NITROSTAT) SL tablet 0.4 mg, 0.4 mg, sublingual, q5 min PRN, Naty Benoit CNP  •  amiodarone (PACERONE) tablet 400 mg, 400 mg, oral, 2x daily with meals, Naty Benoit CNP, Stopped at 06/08/20 0900  •  rivaroxaban (XARELTO) tablet 20 mg, 20 mg, oral, Daily with dinner, Nir Edwards MD, 20 mg at 06/07/20 1505 and No current outpatient medications on file.    Family History   Problem Relation Age of Onset   • Heart attack Mother 62   • Other Mother         Abdominal Aortic Aneurysm   • Lung cancer Mother    • Colon cancer Father         Cause of death   • Diabetes Brother         Non-Insulin Dependent   • Heart attack Brother 51        w/ Stents   • Heart disease Brother    • Other Son         Well       Social History     Tobacco Use   • Smoking status: Light Tobacco Smoker     Years: 20.00     Types: Cigarettes   • Tobacco comment: 4 Cigarettes per day   Substance Use Topics   • Alcohol use: No     Comment: Quit drinking in 2007   • Drug use: Not on file       Review of Systems  Review of Systems   All other systems reviewed and are negative.      Objective     Vital signs in last 24 hours:   Temp:  [36.4 °C (97.6 °F)-37.2 °C (99 °F)] 36.6 °C (97.9 °F)  Heart Rate:  [59-62] 59  Resp:  [0-24] 18  BP: ()/(45-76) 122/62  Weight: 108.6 kg (239 lb 8 oz)  Intake/Output last 3 shifts:  I/O last 3 completed shifts:  In: 422 [P.O.:422]  Out: 351 [Urine:351]  Intake/Output this shift:  No intake/output data recorded.    Physical Exam   Constitutional: He is oriented to person, place, and time. He appears well-developed and well-nourished.   HENT:   Head: Normocephalic and atraumatic.   Eyes: Pupils are equal, round, and reactive to light. No scleral icterus.   Neck: Normal range of motion. Neck supple. No JVD present. No thyromegaly present.   Cardiovascular: Normal rate, regular rhythm, normal heart sounds and intact distal  pulses. Exam reveals no gallop and no friction rub.   No murmur heard.  Pulses:       Carotid pulses are 2+ on the right side and 2+ on the left side.       Dorsalis pedis pulses are 2+ on the right side and 2+ on the left side.        Posterior tibial pulses are 2+ on the right side and 2+ on the left side.   Pulmonary/Chest: Breath sounds normal. He has no wheezes. He has no rales.   ICD left upper chest shows no sign of erosion or infection   Abdominal: Soft. Bowel sounds are normal. There is no abdominal tenderness.   Musculoskeletal: Normal range of motion.         General: No edema.   Lymphadenopathy:     He has no cervical adenopathy.   Neurological: He is alert and oriented to person, place, and time.   Skin: Skin is warm and dry. No rash noted.   Psychiatric: He has a normal mood and affect. His behavior is normal. Judgment and thought content normal.   Vitals reviewed.      Data Review:   Lab Results   Component Value Date     06/08/2020     06/07/2020    K 4.2 06/08/2020    K 4.3 06/07/2020    CO2 25 06/08/2020    CO2 22 06/07/2020    BUN 8 06/08/2020    BUN 10 06/07/2020    CREATININE 0.84 06/08/2020    CREATININE 0.94 06/07/2020    GLUCOSE 106 (H) 06/08/2020    GLUCOSE 129 (H) 06/07/2020    CALCIUM 8.9 06/08/2020    CALCIUM 8.8 06/07/2020     Lab Results   Component Value Date    WBC 8.0 06/07/2020    HGB 15.6 06/07/2020    HCT 45.7 06/07/2020    MCV 95.0 06/07/2020     06/07/2020     Lab Results   Component Value Date    CHOL 127 06/08/2020    TRIG 112 06/08/2020    HDL 29 (L) 06/08/2020    LDLCALC 76 06/08/2020         chest x-ray6/7/2020: Vascular congestion with mild to moderate perihilar interstitial changes likely edema.  Chest x-ray    Lexiscan Cardiolite stress test 6/8/2020: Pending.    Echo 6/8/2020: Pending    Assessment/Plan   Patient Active Problem List   Diagnosis   • Ventricular tachycardia (CMS/HCC) (HCC)   • Anteroseptal myocardial infarction (CMS/HCC) (HCC)   •  Anterior myocardial infarction (CMS/HCC) ([facility])   • CAD (coronary artery disease)   • Congestive heart failure (CHF) (CMS/HCC) ([facility])   • Ischemic cardiomyopathy   • Thrombus   • Aneurysm (CMS/HCC) ([facility])   • CVA (cerebral vascular accident) (CMS/HCC) ([facility])   • Hypertension   • Hyperlipidemia   • Tobacco use   • VT (ventricular tachycardia) (CMS/[facility]) ([facility])     Assessment:  1.  Sustained ventricular tachycardia 155 to 160 bpm requiring external cardioversion on admission.  On amiodarone since 2017.  2.  Biventricular ICD in place.  3.  Ischemic cardiomyopathy ejection fraction 35% range previously.  4.  Coronary artery disease with prior anteroseptal MI and previous stents to the LAD and more recently the diagonal.  See above.    5.  Congestive heart failure class II.  Previous biventricular ICD implanted.  100% by V paced on most recent device interrogation 5/20.  6.  Hypertension  7.  Hyperlipidemia      Plan/recommendation:  Patient is a pleasant 54-year-old gentleman with a history of ischemic cardiomyopathy, coronary artery disease with prior stent 2009 and reinfarction 2010 with a large anteroseptal MI.  He had restenting of the LAD at that time.  He had shocks from his defibrillator in 2017 and further stent placement to the diagonal.  He was initiated on amiodarone at that time.  He has done well up until recently.  His last device interrogation 5/20 showed one nonsustained VT episode though with there are quite a few episodes on his total device count.  He presented with sustained ventricular tachycardia at a lower rate then his VT zone was programmed.  He required external cardioversion in the ER.  He currently is undergoing cardiac evaluation with stress test and echo today.  If he does not need any cardiac cath consideration should be entertained for proceeding with a VT ablation.  He will need to have his ICD reprogrammed to a lower rate cut off for his VT zone.  He is young and therefore would like to  avoid ongoing use of amiodarone in such a young patient.  I discussed ablation procedure with him and he seems agreeable to proceed if required.  I discussed postprocedural care and follow-up.  He has not had ongoing cardiac care since Dr. Jenkins retired and will need to be established with electrophysiology and cardiology on discharge.    Thank you for this interesting referral if you have any questions or concerns please do not hesitate to call.  Further recommendations pending review with Dr. Montana.        Electronically signed by: Merced Newton CNP  6/8/2020  10:09 AM

## 2020-06-08 NOTE — NURSING END OF SHIFT
Nursing End of Shift Summary:    Patient: Pete Trejo  MRN: [id number]  : 1965, Age: 54 y.o.    Location: [address] [place] [address]    Nursing Goals  Clinical Goals for the Shift: Patient will remain chest pain free.    Narrative Summary of Progress Toward Clinical Goals:  Patient had no complaints of chest pain during this shift. Remained AV paced on telemetry with no episodes of vtach noted. Hourly rounding done, safety maintained, VSS.     Barriers to Goals/Nursing Concerns:  Yes - Echo, stress test     New Patient or Family Concerns/Issues:  No    Shift Summary:      Significant Events & Communications to Providers (last 12 hours)      Last 5 Values    No documentation.              Oxygen Usage (last 12 hours)      Last 5 Values     Row Name 20 1200                   Oxygen Weaning Trial by Nursing    Is Patient on Room Air OR on the Same Amount of O2 as at Home?  Yes                   Mobility (last 12 hours)      Last 5 Values     Row Name 20 1200                   Mobility    Anti-Embolism Intervention  Not Ordered            Urethral Catheter    Active Urethral Catheter     None            Active Lines    Active Central venous catheter / Peripherally inserted central catheter / Implantable Port / Hemodialysis catheter / Midline Catheter     None              Infusing Medications   Medication Dose Last Rate     PRN Medications   Medication Dose Last Dose   • acetaminophen  650 mg     • metoclopramide (REGLAN) IV orderable  10 mg     • nitroglycerin  0.4 mg       _________________________  Casi Corbett RN  20 6:54 PM

## 2020-06-09 ENCOUNTER — ANESTHESIA EVENT (OUTPATIENT)
Dept: CARDIOLOGY | Facility: HOSPITAL | Age: 55
DRG: 274 | End: 2020-06-09
Payer: OTHER GOVERNMENT

## 2020-06-09 ENCOUNTER — ANESTHESIA (OUTPATIENT)
Dept: CARDIOLOGY | Facility: HOSPITAL | Age: 55
DRG: 274 | End: 2020-06-09
Payer: OTHER GOVERNMENT

## 2020-06-09 LAB
ANION GAP SERPL CALC-SCNC: 6 MMOL/L (ref 3–11)
BASOPHILS # BLD AUTO: 0.1 10*3/UL
BASOPHILS NFR BLD AUTO: 1 % (ref 0–2)
BNP SERPL-MCNC: 99 PG/ML (ref 0–100)
BUN SERPL-MCNC: 10 MG/DL (ref 7–25)
CALCIUM SERPL-MCNC: 8.8 MG/DL (ref 8.6–10.3)
CHLORIDE SERPL-SCNC: 107 MMOL/L (ref 98–107)
CO2 SERPL-SCNC: 25 MMOL/L (ref 21–32)
CREAT SERPL-MCNC: 0.82 MG/DL (ref 0.7–1.3)
EOSINOPHIL # BLD AUTO: 0.1 10*3/UL
EOSINOPHIL NFR BLD AUTO: 2 % (ref 0–3)
ERYTHROCYTE [DISTWIDTH] IN BLOOD BY AUTOMATED COUNT: 14.5 % (ref 11.5–15)
GFR SERPL CREATININE-BSD FRML MDRD: 101 ML/MIN/1.73M*2
GLUCOSE SERPL-MCNC: 100 MG/DL (ref 70–105)
HCT VFR BLD AUTO: 45.1 % (ref 38–50)
HGB BLD-MCNC: 15.4 G/DL (ref 13.2–17.2)
LYMPHOCYTES # BLD AUTO: 2.1 10*3/UL
LYMPHOCYTES NFR BLD AUTO: 32 % (ref 15–47)
MAGNESIUM SERPL-MCNC: 1.8 MG/DL (ref 1.8–2.4)
MCH RBC QN AUTO: 31.6 PG (ref 29–34)
MCHC RBC AUTO-ENTMCNC: 34.1 G/DL (ref 32–36)
MCV RBC AUTO: 92.8 FL (ref 82–97)
MONOCYTES # BLD AUTO: 0.6 10*3/UL
MONOCYTES NFR BLD AUTO: 9 % (ref 5–13)
NEUTROPHILS # BLD AUTO: 3.7 10*3/UL
NEUTROPHILS NFR BLD AUTO: 56 % (ref 46–70)
PLATELET # BLD AUTO: 220 10*3/UL (ref 130–350)
PMV BLD AUTO: 8.2 FL (ref 6.9–10.8)
POCT ACTIVATED CLOTTING TIME: 252 SEC (ref 74–137)
POCT ACTIVATED CLOTTING TIME: 290 SEC (ref 74–137)
POCT ACTIVATED CLOTTING TIME: 312 SEC (ref 74–137)
POCT ACTIVATED CLOTTING TIME: 351 SEC (ref 74–137)
POCT ACTIVATED CLOTTING TIME: 351 SEC (ref 74–137)
POCT ACTIVATED CLOTTING TIME: 356 SEC (ref 74–137)
POTASSIUM SERPL-SCNC: 4.1 MMOL/L (ref 3.5–5.1)
RBC # BLD AUTO: 4.86 10*6/ΜL (ref 4.1–5.8)
SODIUM SERPL-SCNC: 138 MMOL/L (ref 135–145)
WBC # BLD AUTO: 6.5 10*3/UL (ref 3.7–9.6)

## 2020-06-09 PROCEDURE — 36415 COLL VENOUS BLD VENIPUNCTURE: CPT | Performed by: NURSE PRACTITIONER

## 2020-06-09 PROCEDURE — C1893 INTRO/SHEATH, FIXED,NON-PEEL: HCPCS | Performed by: INTERNAL MEDICINE

## 2020-06-09 PROCEDURE — 02K83ZZ MAP CONDUCTION MECHANISM, PERCUTANEOUS APPROACH: ICD-10-PCS | Performed by: INTERNAL MEDICINE

## 2020-06-09 PROCEDURE — 6370000100 HC RX 637 (ALT 250 FOR IP): Performed by: NURSE PRACTITIONER

## 2020-06-09 PROCEDURE — 85347 COAGULATION TIME ACTIVATED: CPT

## 2020-06-09 PROCEDURE — 93662 INTRACARDIAC ECG (ICE): CPT | Mod: 26 | Performed by: INTERNAL MEDICINE

## 2020-06-09 PROCEDURE — (BLANK) HC ROOM ICU INTERMEDIATE

## 2020-06-09 PROCEDURE — 6360000200 HC RX 636 W HCPCS (ALT 250 FOR IP): Performed by: NURSE ANESTHETIST, CERTIFIED REGISTERED

## 2020-06-09 PROCEDURE — 2500000200 HC RX 250 WO HCPCS: Performed by: NURSE ANESTHETIST, CERTIFIED REGISTERED

## 2020-06-09 PROCEDURE — 02583ZZ DESTRUCTION OF CONDUCTION MECHANISM, PERCUTANEOUS APPROACH: ICD-10-PCS | Performed by: INTERNAL MEDICINE

## 2020-06-09 PROCEDURE — 80048 BASIC METABOLIC PNL TOTAL CA: CPT | Performed by: NURSE PRACTITIONER

## 2020-06-09 PROCEDURE — 83880 ASSAY OF NATRIURETIC PEPTIDE: CPT | Performed by: NURSE PRACTITIONER

## 2020-06-09 PROCEDURE — 00537 ANES CARDIAC EP PROCEDURES: CPT | Performed by: NURSE ANESTHETIST, CERTIFIED REGISTERED

## 2020-06-09 PROCEDURE — 93655 ICAR CATH ABLTJ DSCRT ARRHYT: CPT | Performed by: INTERNAL MEDICINE

## 2020-06-09 PROCEDURE — 85025 COMPLETE CBC W/AUTO DIFF WBC: CPT | Performed by: NURSE PRACTITIONER

## 2020-06-09 PROCEDURE — 2580000300 HC RX 258: Performed by: NURSE PRACTITIONER

## 2020-06-09 PROCEDURE — C1887 CATHETER, GUIDING: HCPCS | Performed by: INTERNAL MEDICINE

## 2020-06-09 PROCEDURE — (BLANK) HC RECOVERY PHASE-1 1ST  HOUR ACUITY LEVEL 2: Performed by: INTERNAL MEDICINE

## 2020-06-09 PROCEDURE — 83735 ASSAY OF MAGNESIUM: CPT | Performed by: NURSE PRACTITIONER

## 2020-06-09 PROCEDURE — C1732 CATH, EP, DIAG/ABL, 3D/VECT: HCPCS | Performed by: INTERNAL MEDICINE

## 2020-06-09 PROCEDURE — C1726 CATH, BAL DIL, NON-VASCULAR: HCPCS | Performed by: INTERNAL MEDICINE

## 2020-06-09 PROCEDURE — (BLANK) HC EP LAB LEVEL 3 FIRST 15 MIN: Performed by: INTERNAL MEDICINE

## 2020-06-09 PROCEDURE — 93621 COMP EP EVL L PAC&REC C SINS: CPT | Mod: 26 | Performed by: INTERNAL MEDICINE

## 2020-06-09 PROCEDURE — 2580000300 HC RX 258: Performed by: NURSE ANESTHETIST, CERTIFIED REGISTERED

## 2020-06-09 PROCEDURE — 93654 COMPRE EP EVAL TX VT: CPT | Performed by: INTERNAL MEDICINE

## 2020-06-09 PROCEDURE — 6370000100 HC RX 637 (ALT 250 FOR IP): Performed by: INTERNAL MEDICINE

## 2020-06-09 PROCEDURE — 99231 SBSQ HOSP IP/OBS SF/LOW 25: CPT | Performed by: INTERNAL MEDICINE

## 2020-06-09 PROCEDURE — (BLANK) HC EP LAB LEVEL 3 EACH ADDITIONAL MIN: Performed by: INTERNAL MEDICINE

## 2020-06-09 PROCEDURE — 93005 ELECTROCARDIOGRAM TRACING: CPT | Performed by: NURSE PRACTITIONER

## 2020-06-09 PROCEDURE — 93010 ELECTROCARDIOGRAM REPORT: CPT | Mod: NCNR | Performed by: INTERNAL MEDICINE

## 2020-06-09 RX ORDER — DIPHENHYDRAMINE HYDROCHLORIDE 50 MG/ML
INJECTION INTRAMUSCULAR; INTRAVENOUS AS NEEDED
Status: DISCONTINUED | OUTPATIENT
Start: 2020-06-09 | End: 2020-06-09 | Stop reason: SURG

## 2020-06-09 RX ORDER — ETOMIDATE 2 MG/ML
INJECTION INTRAVENOUS AS NEEDED
Status: DISCONTINUED | OUTPATIENT
Start: 2020-06-09 | End: 2020-06-09 | Stop reason: SURG

## 2020-06-09 RX ORDER — HEPARIN SODIUM 1000 [USP'U]/ML
INJECTION, SOLUTION INTRAVENOUS; SUBCUTANEOUS AS NEEDED
Status: DISCONTINUED | OUTPATIENT
Start: 2020-06-09 | End: 2020-06-09 | Stop reason: SURG

## 2020-06-09 RX ORDER — NORETHINDRONE AND ETHINYL ESTRADIOL 0.5-0.035
KIT ORAL AS NEEDED
Status: DISCONTINUED | OUTPATIENT
Start: 2020-06-09 | End: 2020-06-09 | Stop reason: SURG

## 2020-06-09 RX ORDER — ONDANSETRON HYDROCHLORIDE 2 MG/ML
INJECTION, SOLUTION INTRAVENOUS AS NEEDED
Status: DISCONTINUED | OUTPATIENT
Start: 2020-06-09 | End: 2020-06-09 | Stop reason: SURG

## 2020-06-09 RX ORDER — FENTANYL CITRATE/PF 50 MCG/ML
PLASTIC BAG, INJECTION (ML) INTRAVENOUS AS NEEDED
Status: DISCONTINUED | OUTPATIENT
Start: 2020-06-09 | End: 2020-06-09 | Stop reason: SURG

## 2020-06-09 RX ORDER — SUCCINYLCHOLINE CHLORIDE 20 MG/ML INJECTION SOLUTION
SOLUTION AS NEEDED
Status: DISCONTINUED | OUTPATIENT
Start: 2020-06-09 | End: 2020-06-09 | Stop reason: SURG

## 2020-06-09 RX ORDER — HEPARIN SODIUM 1000 [USP'U]/ML
INJECTION, SOLUTION INTRAVENOUS; SUBCUTANEOUS AS NEEDED
Status: DISCONTINUED | OUTPATIENT
Start: 2020-06-09 | End: 2020-06-09 | Stop reason: HOSPADM

## 2020-06-09 RX ORDER — PROTAMINE SULFATE 10 MG/ML
INJECTION, SOLUTION INTRAVENOUS AS NEEDED
Status: DISCONTINUED | OUTPATIENT
Start: 2020-06-09 | End: 2020-06-09 | Stop reason: SURG

## 2020-06-09 RX ORDER — SODIUM CHLORIDE, SODIUM LACTATE, POTASSIUM CHLORIDE, CALCIUM CHLORIDE 600; 310; 30; 20 MG/100ML; MG/100ML; MG/100ML; MG/100ML
INJECTION, SOLUTION INTRAVENOUS CONTINUOUS PRN
Status: DISCONTINUED | OUTPATIENT
Start: 2020-06-09 | End: 2020-06-09 | Stop reason: SURG

## 2020-06-09 RX ORDER — ROCURONIUM BROMIDE 50 MG/5 ML
SYRINGE (ML) INTRAVENOUS AS NEEDED
Status: DISCONTINUED | OUTPATIENT
Start: 2020-06-09 | End: 2020-06-09 | Stop reason: SURG

## 2020-06-09 RX ADMIN — DIPHENHYDRAMINE HYDROCHLORIDE 25 MG: 50 INJECTION, SOLUTION INTRAMUSCULAR; INTRAVENOUS at 18:45

## 2020-06-09 RX ADMIN — Medication 200 MCG: at 16:44

## 2020-06-09 RX ADMIN — PHENYLEPHRINE HYDROCHLORIDE 50 MCG/MIN: 10 INJECTION INTRAVENOUS at 17:33

## 2020-06-09 RX ADMIN — ETOMIDATE 20 MG: 2 INJECTION, SOLUTION INTRAVENOUS at 14:57

## 2020-06-09 RX ADMIN — AMIODARONE HYDROCHLORIDE 400 MG: 200 TABLET ORAL at 08:43

## 2020-06-09 RX ADMIN — EPHEDRINE SULFATE 10 MG: 50 INJECTION, SOLUTION INTRAVENOUS at 16:51

## 2020-06-09 RX ADMIN — EPHEDRINE SULFATE 10 MG: 50 INJECTION, SOLUTION INTRAVENOUS at 16:25

## 2020-06-09 RX ADMIN — SPIRONOLACTONE 25 MG: 25 TABLET ORAL at 08:43

## 2020-06-09 RX ADMIN — HEPARIN SODIUM 2300 UNITS: 1000 INJECTION, SOLUTION INTRAVENOUS; SUBCUTANEOUS at 17:58

## 2020-06-09 RX ADMIN — HEPARIN SODIUM 4000 UNITS: 1000 INJECTION, SOLUTION INTRAVENOUS; SUBCUTANEOUS at 16:21

## 2020-06-09 RX ADMIN — SODIUM CHLORIDE, POTASSIUM CHLORIDE, SODIUM LACTATE AND CALCIUM CHLORIDE: 600; 310; 30; 20 INJECTION, SOLUTION INTRAVENOUS at 14:50

## 2020-06-09 RX ADMIN — HEPARIN SODIUM 4000 UNITS: 1000 INJECTION, SOLUTION INTRAVENOUS; SUBCUTANEOUS at 16:06

## 2020-06-09 RX ADMIN — Medication 50 MG: at 15:17

## 2020-06-09 RX ADMIN — PRASUGREL 10 MG: 10 TABLET, FILM COATED ORAL at 08:43

## 2020-06-09 RX ADMIN — LISINOPRIL 5 MG: 5 TABLET ORAL at 21:10

## 2020-06-09 RX ADMIN — PROTAMINE SULFATE 50 MG: 10 INJECTION, SOLUTION INTRAVENOUS at 18:07

## 2020-06-09 RX ADMIN — CARVEDILOL 25 MG: 25 TABLET, FILM COATED ORAL at 08:42

## 2020-06-09 RX ADMIN — Medication 200 MCG: at 16:32

## 2020-06-09 RX ADMIN — EPHEDRINE SULFATE 10 MG: 50 INJECTION, SOLUTION INTRAVENOUS at 16:12

## 2020-06-09 RX ADMIN — FENTANYL CITRATE 50 MCG: 50 INJECTION, SOLUTION INTRAMUSCULAR; INTRAVENOUS at 17:12

## 2020-06-09 RX ADMIN — Medication 100 MCG: at 16:59

## 2020-06-09 RX ADMIN — EPHEDRINE SULFATE 10 MG: 50 INJECTION, SOLUTION INTRAVENOUS at 15:29

## 2020-06-09 RX ADMIN — ROSUVASTATIN CALCIUM 40 MG: 20 TABLET, FILM COATED ORAL at 21:10

## 2020-06-09 RX ADMIN — Medication 30 MG: at 17:09

## 2020-06-09 RX ADMIN — HEPARIN SODIUM 1100 UNITS/HR: 20000 INJECTION INTRAVENOUS; SUBCUTANEOUS at 15:44

## 2020-06-09 RX ADMIN — SODIUM CHLORIDE 100 ML/HR: 9 INJECTION, SOLUTION INTRAVENOUS at 22:49

## 2020-06-09 RX ADMIN — SUCCINYLCHOLINE CHLORIDE SOL PREF SYR 200 MG/10ML (20 MG/ML) 160 MG: 200/10 SOLUTION PREFILLED SYRINGE at 14:57

## 2020-06-09 RX ADMIN — LISINOPRIL 5 MG: 5 TABLET ORAL at 08:43

## 2020-06-09 RX ADMIN — FENTANYL CITRATE 50 MCG: 50 INJECTION, SOLUTION INTRAMUSCULAR; INTRAVENOUS at 16:07

## 2020-06-09 RX ADMIN — EPHEDRINE SULFATE 10 MG: 50 INJECTION, SOLUTION INTRAVENOUS at 16:22

## 2020-06-09 RX ADMIN — SUGAMMADEX 200 MG: 100 INJECTION, SOLUTION INTRAVENOUS at 18:13

## 2020-06-09 RX ADMIN — ONDANSETRON 4 MG: 2 INJECTION INTRAMUSCULAR; INTRAVENOUS at 18:44

## 2020-06-09 RX ADMIN — HEPARIN SODIUM 1100 UNITS: 1000 INJECTION, SOLUTION INTRAVENOUS; SUBCUTANEOUS at 15:44

## 2020-06-09 RX ADMIN — HEPARIN SODIUM 2000 UNITS: 1000 INJECTION, SOLUTION INTRAVENOUS; SUBCUTANEOUS at 16:38

## 2020-06-09 RX ADMIN — FUROSEMIDE 20 MG: 20 TABLET ORAL at 21:10

## 2020-06-09 RX ADMIN — Medication 100 MCG: at 17:21

## 2020-06-09 RX ADMIN — FENTANYL CITRATE 50 MCG: 50 INJECTION, SOLUTION INTRAMUSCULAR; INTRAVENOUS at 14:57

## 2020-06-09 RX ADMIN — RIVAROXABAN 20 MG: 20 TABLET, FILM COATED ORAL at 21:10

## 2020-06-09 ASSESSMENT — ENCOUNTER SYMPTOMS: EXERCISE TOLERANCE: GOOD (4-7 METS)

## 2020-06-09 NOTE — PROGRESS NOTES
Electrophysiology Inpatient Progress Note    Pete Trejo is a 54 y.o. male whom we are seeing today for the further evaluation of ventricular tachycardia.    HPI: Patient seen and examined today.  Chart reviewed and plan discussed with nurse.  Patient is sitting comfortably in chair.  He denies chest pain, shortness of breath, palpitations, dizziness lightheadedness.  I did review the upcoming ablation procedure with patient, all questions were answered.  He agrees to proceed.    Chief Complaint:   Chief Complaint   Patient presents with   • Chest Pain     pt arrives with CP and states his Pacer fired. Pt arrives with HR of 160s, vomiting       Current Medications:  Current Facility-Administered Medications   Medication Dose Route Frequency Provider Last Rate Last Dose   • normal saline infusion  100 mL/hr intravenous Continuous Merced Newton  mL/hr at 06/09/20 0400 100 mL/hr at 06/09/20 0400   • amiodarone (PACERONE) tablet 400 mg  400 mg oral Daily Merced Newton CNP   400 mg at 06/09/20 0843   • lidocaine (cardiac) (XYLOCAINE) injection 100 mg  100 mg intravenous Once Minal Erazo MD       • amiodarone (CORDARONE) injection 150 mg  150 mg intravenous Once Minal Erazo MD       • acetaminophen (TYLENOL) tablet 650 mg  650 mg oral q6h PRN Naty Benoit CNP       • metoclopramide (REGLAN) injection 10 mg  10 mg intravenous q6h PRN Naty Benoit CNP       • carvediloL (COREG) tablet 25 mg  25 mg oral 2x daily with meals Naty Benoit CNP   25 mg at 06/09/20 0842   • furosemide (LASIX) tablet 20 mg  20 mg oral Nightly Naty Benoit CNP   20 mg at 06/08/20 2103   • lisinopriL (PRINIVIL,ZESTRIL) tablet 5 mg  5 mg oral 2x daily Naty Benoit CNP   5 mg at 06/09/20 0843   • lansoprazole (PREVACID) capsule 30 mg  30 mg oral Daily at 1600 Naty Benoit CNP   30 mg at 06/08/20 1719   • prasugreL (EFFIENT) tablet 10 mg  10 mg oral Daily Naty Benoit CNP   10 mg  at 06/09/20 0843   • rosuvastatin (CRESTOR) tablet 40 mg  40 mg oral Nightly Naty Benoit CNP   40 mg at 06/08/20 2103   • spironolactone (ALDACTONE) tablet 25 mg  25 mg oral Daily Naty Benoit CNP   25 mg at 06/09/20 0843   • nitroglycerin (NITROSTAT) SL tablet 0.4 mg  0.4 mg sublingual q5 min PRN Naty Benoit CNP       • rivaroxaban (XARELTO) tablet 20 mg  20 mg oral Daily with dinner Nir Edwards MD   20 mg at 06/08/20 1805        Objective     Vital signs in last 24 hours:   Temp:  [36.4 °C (97.5 °F)-36.9 °C (98.4 °F)] 36.4 °C (97.5 °F)  Heart Rate:  [60] 60  Resp:  [14-18] 18  BP: (108-130)/(52-74) 124/64  Weight: 108.6 kg (239 lb 8 oz)    Telemetry: AV paced at a rate of 60 bpm.  Occasional PVCs noted.  No significant arrhythmias noted.    Physical Exam   Constitutional: He is oriented to person, place, and time. He appears well-developed and well-nourished.   HENT:   Head: Normocephalic.   Neck: Normal range of motion.   Cardiovascular: Normal rate and normal heart sounds. An irregular rhythm present.   Pulmonary/Chest: Effort normal and breath sounds normal.   Abdominal: Soft. Bowel sounds are normal.   Musculoskeletal: Normal range of motion.   Neurological: He is alert and oriented to person, place, and time.   Skin: Skin is warm and dry.   Psychiatric: He has a normal mood and affect. His behavior is normal.       Lab Results   Component Value Date    WBC 6.5 06/09/2020    HGB 15.4 06/09/2020    HCT 45.1 06/09/2020     06/09/2020    CHOL 127 06/08/2020    TRIG 112 06/08/2020    HDL 29 (L) 06/08/2020    ALT 22 06/07/2020    AST 15 06/07/2020     06/09/2020    K 4.1 06/09/2020     06/09/2020    CREATININE 0.82 06/09/2020    BUN 10 06/09/2020    CO2 25 06/09/2020    TSH 0.627 06/07/2020    PSA 0.29 09/20/2018    INR 1.1 07/29/2017     Data Review:     Us Echo Complete: 6/8/2020  Severe left ventricular systolic dysfunction. Multiple wall motion and Audiss as described below. Severe  ischemic cardiomyopathy. EF 25%.  The left atrium is moderately dilated.  The right atrium is mildly dilated.  Normal central venous pressure (0-5 mmHg).  No pericardial effusion.  Mild tricuspid regurgitation.  Grade I (mild) left ventricular diastolic abnormality.  Minimally elevated left ventricular end-diastolic pressure.  There is no evidence of pulmonary hypertension. Estimated PA pressure 29 mmHg.  No significant aortic or mitral valve disease.      Xr Chest Portable 1 View:  6/7/2020  IMPRESSION: Vascular congestion with mild to moderate parahilar interstitial changes, likely edema.    Assessment/Plan      1. Ventricular tachycardia: Patient is scheduled for ventricular tachycardia ablation with Dr Montana today.  He is currently NPO.  Continue amiodarone 400 mg daily.  Telemetry shows AV paced rhythm at a rate of 60 bpm occasional PVCs with no ventricular tachycardia.  Continue to monitor telemetry.  Labs satisfactory, magnesium 1.8 and potassium 4.1.  Reviewed upcoming ablation procedure with patient all questions answered.  Patient verbalized understanding and willing to proceed.    2. Ischemic cardiomyopathy: Most current EF of 25% on 6/08/2020.  Biventricular-ICD device in place.  Continue carvedilol 25 mg twice a day, Lasix 20 mg daily, lisinopril 5 mg twice a day, and spironolactone 25 mg daily.  Continue Xarelto for akinetic/dyskinetic LV apex.  Further recommendations per general cardiology.    3. CAD: Status post drug-eluting stent to the diagonal branch of the LAD in July 2017.  Stress test in 6/2020 shows extensive infarction overlying almost all segments of left ventricle with only segments of normal perfusion include the inferior wall, basilar lateral, basilar septum, basilar inferior wall.  No evidence of ischemia.  Continue Effient 10 mg a day, Crestor 40 mg, carvedilol 25 mg twice a day, lisinopril 5 mg twice a day.  Other recommendations per general cardiology.     Patient seen in  conjunction with Dr. Montana.    Electronically signed by: Tanika Sun, CNP

## 2020-06-09 NOTE — ANESTHESIA PROCEDURE NOTES
Airway  Urgency: elective    Airway not difficult    General Information and Staff    Patient location during procedure: OR  Anesthesiologist: Arvind Judd MD  CRNA: Tanner Schilder, CRNA  Performed: CRNA     Indications and Patient Condition  Indications for airway management: anesthesia  Sedation level: deep  Preoxygenated: yes  Patient position: sniffing  MILS maintained throughout  Mask difficulty assessment: 1 - vent by mask    Final Airway Details  Final airway type: endotracheal airway      Successful airway: ETT  Cuffed: yes   Successful intubation technique: direct laryngoscopy  Facilitating devices/methods: stylet  Endotracheal tube insertion site: oral  Blade: Saray  Blade size: #3  ETT size (mm): 8.0  Cormack-Lehane Classification: grade IIa - partial view of glottis  Placement verified by: chest auscultation and capnometry   Measured from: lips  ETT to lips (cm): 24  Number of attempts at approach: 1

## 2020-06-09 NOTE — NURSING END OF SHIFT
Nursing End of Shift Summary:    Patient: Pete Trejo  MRN: [id number]  : 1965, Age: 54 y.o.    Location: [facility]    Nursing Goals  Clinical Goals for the Shift: Monitor VS, Tele, provide comfort and safety    Narrative Summary of Progress Toward Clinical Goals:  As of right now patient is still off unit for his procedure.  He left for his procedure early afternoon.    Barriers to Goals/Nursing Concerns:      New Patient or Family Concerns/Issues:    Shift Summary:      Significant Events & Communications to Providers (last 12 hours)      Last 5 Values    No documentation.              Oxygen Usage (last 12 hours)      Last 5 Values     Row Name 20 0821 20 1200                Oxygen Weaning Trial by Nursing    Is Patient on Room Air OR on the Same Amount of O2 as at Home?  Yes  Yes                  Mobility (last 12 hours)      Last 5 Values     Row Name 20 0821                   Mobility    Activity  Up ad ezekiel        Level of Assistance  Independent            Urethral Catheter    Active Urethral Catheter     None            Active Lines    Active Central venous catheter / Peripherally inserted central catheter / Implantable Port / Hemodialysis catheter / Midline Catheter     None              Infusing Medications   Medication Dose Last Rate   • normal saline  100 mL/hr 100 mL/hr (20 0400)     PRN Medications   Medication Dose Last Dose   • heparin   5,000 Units at 20 1610   • [MAR Hold] acetaminophen  650 mg     • [MAR Hold] metoclopramide (REGLAN) IV orderable  10 mg     • [MAR Hold] nitroglycerin  0.4 mg       _________________________  Laurel Mclean RN  20 5:58 PM

## 2020-06-09 NOTE — ANESTHESIA PREPROCEDURE EVALUATION
"Pre-Procedure Assessment    Patient: Pete Trejo, male, 54 y.o.    Ht Readings from Last 1 Encounters:   06/07/20 1.803 m (5' 11\")     Wt Readings from Last 1 Encounters:   06/08/20 108.6 kg (239 lb 8 oz)       Last Vitals  BP      Temp      Pulse     Resp      SpO2      Pain Score         Problem list reviewed and Medical history reviewed           Airway   Mallampati: II  TM distance: >3 FB  Neck ROM: full      Dental      Pulmonary - negative ROS and normal exam   Cardiovascular   Exercise tolerance: good (4-7 METS)  (+) pacemaker, hypertension, past MI, CAD, CHF,     ECG reviewed  Rhythm: regular  Rate: normal  ROS comment: Interpretation Summary     · Severe left ventricular systolic dysfunction. Multiple wall motion and Audiss as described below. Severe ischemic cardiomyopathy. EF 25%.  · The left atrium is moderately dilated.  · The right atrium is mildly dilated.  · Normal central venous pressure (0-5 mmHg).  · No pericardial effusion.  · Mild tricuspid regurgitation.  · Grade I (mild) left ventricular diastolic abnormality.  · Minimally elevated left ventricular end-diastolic pressure.  · There is no evidence of pulmonary hypertension. Estimated PA pressure 29 mmHg.  · No significant aortic or mitral valve disease.           Mental Status/Neuro/Psych    Pt is alert.      (+) CVA,     GI/Hepatic/Renal - negative ROS     Endo/Other - negative ROS   Abdominal           Social History     Tobacco Use   • Smoking status: Light Tobacco Smoker     Years: 20.00     Types: Cigarettes   • Tobacco comment: 4 Cigarettes per day   Substance Use Topics   • Alcohol use: No     Comment: Quit drinking in 2007      Hematology   WBC   Date Value Ref Range Status   06/09/2020 6.5 3.7 - 9.6 10*3/uL Final     RBC   Date Value Ref Range Status   06/09/2020 4.86 4.10 - 5.80 10*6/µL Final     MCV   Date Value Ref Range Status   06/09/2020 92.8 82.0 - 97.0 fL Final     Hemoglobin   Date Value Ref Range Status   06/09/2020 15.4 " 13.2 - 17.2 g/dL Final     Hematocrit   Date Value Ref Range Status   06/09/2020 45.1 38.0 - 50.0 % Final     Platelets   Date Value Ref Range Status   06/09/2020 220 130 - 350 10*3/uL Final      Coagulation   Protime   Date Value Ref Range Status   07/29/2017 13.7 12.3 - 14.8 SEC      PTT   Date Value Ref Range Status   07/29/2017 29 24 - 37 SEC      INR   Date Value Ref Range Status   07/29/2017 1.1 0.9 - 1.1       General Chemistry   Calcium   Date Value Ref Range Status   06/09/2020 8.8 8.6 - 10.3 mg/dL Final     BUN   Date Value Ref Range Status   06/09/2020 10 7 - 25 mg/dL Final     Creatinine   Date Value Ref Range Status   06/09/2020 0.82 0.70 - 1.30 mg/dL Final     Glucose   Date Value Ref Range Status   06/09/2020 100 70 - 105 mg/dL Final     Sodium   Date Value Ref Range Status   06/09/2020 138 135 - 145 mmol/L Final     Potassium   Date Value Ref Range Status   06/09/2020 4.1 3.5 - 5.1 mmol/L Final     Magnesium   Date Value Ref Range Status   06/09/2020 1.8 1.8 - 2.4 mg/dL Final     CO2   Date Value Ref Range Status   06/09/2020 25 21 - 32 mmol/L Final     Chloride   Date Value Ref Range Status   06/09/2020 107 98 - 107 mmol/L Final     Anesthesia Plan    ASA 4   NPO status reviewed: > 6 hours    General         Induction: intravenous   Airway Planning: LMA          Plan for postoperative opioid use.    Anesthetic plan and risks discussed with patient.

## 2020-06-09 NOTE — PROGRESS NOTES
[address] [address]                                                  Cardiology Progress Note    Subjective    Patient ID: Pete Trejo is a 54 y.o. male.      HPI    Patient seen on morning rounds, sitting in a chair, comfortable, n.p.o. in anticipation of VT ablation today        LOS: 1 day     Allergies as of 06/07/2020 - Reviewed 06/07/2020   Allergen Reaction Noted   • Morphine  07/30/2017   • Tramadol  07/30/2017       Medication:  amiodarone, 400 mg, oral, Daily  lidocaine (cardiac), 100 mg, intravenous, Once  amiodarone, 150 mg, intravenous, Once  carvediloL, 25 mg, oral, 2x daily with meals  furosemide, 20 mg, oral, Nightly  lisinopriL, 5 mg, oral, 2x daily  lansoprazole, 30 mg, oral, Daily at 1600  prasugreL, 10 mg, oral, Daily  rosuvastatin, 40 mg, oral, Nightly  spironolactone, 25 mg, oral, Daily  rivaroxaban, 20 mg, oral, Daily with dinner      normal saline, 100 mL/hr, Last Rate: 100 mL/hr (06/09/20 0400)        Objective     Vital signs in last 24 hours:   Temp:  [36.4 °C (97.5 °F)-36.9 °C (98.4 °F)] 36.4 °C (97.5 °F)  Heart Rate:  [60] 60  Resp:  [14-18] 18  BP: (108-130)/(52-74) 124/64  Intake/Output this shift:  No intake/output data recorded.    Intake/Output Summary (Last 24 hours) at 6/9/2020 0937  Last data filed at 6/9/2020 0857  Gross per 24 hour   Intake 1350.83 ml   Output --   Net 1350.83 ml         Weight: 108.6 kg (239 lb 8 oz)    Physical Exam   Constitutional: He is oriented to person, place, and time. He appears well-developed and well-nourished. No distress.   Patient in no distress, BMI 33   HENT:   Head: Normocephalic.   Eyes: Pupils are equal, round, and reactive to light. No scleral icterus.   Neck: Normal range of motion. Neck supple. No JVD present. No tracheal deviation present. No thyromegaly present.   No carotid bruit   Cardiovascular: Normal rate, regular rhythm and normal heart sounds. Exam reveals no gallop and no  friction rub.   No murmur heard.  Pulmonary/Chest: Effort normal and breath sounds normal. No respiratory distress. He has no wheezes. He has no rales.   Symmetric air entry no wheezing   Abdominal: Soft. Bowel sounds are normal. He exhibits distension. There is no abdominal tenderness. There is no rebound.   Musculoskeletal: Normal range of motion.         General: No edema.      Comments: No cyanosis, no clubbing, no edema   Neurological: He is alert and oriented to person, place, and time. He has normal reflexes.   Skin: Skin is warm and dry. No rash noted. No erythema.   Psychiatric: He has a normal mood and affect. His behavior is normal. Judgment and thought content normal.   Nursing note and vitals reviewed.        Labs:  BMP:  Lab Results   Component Value Date     06/09/2020    K 4.1 06/09/2020     06/09/2020    CO2 25 06/09/2020    BUN 10 06/09/2020    CREATININE 0.82 06/09/2020    GLUCOSE 100 06/09/2020    CALCIUM 8.8 06/09/2020     CBC:   Lab Results   Component Value Date    WBC 6.5 06/09/2020    RBC 4.86 06/09/2020    HGB 15.4 06/09/2020    HCT 45.1 06/09/2020     06/09/2020     CMP:  Lab Results   Component Value Date     06/09/2020    K 4.1 06/09/2020     06/09/2020    CO2 25 06/09/2020    GLUCOSE 100 06/09/2020    CREATININE 0.82 06/09/2020    CALCIUM 8.8 06/09/2020    ALBUMIN 4.0 06/07/2020    ALKPHOS 67 06/07/2020    BILITOT 1.13 06/07/2020    ALT 22 06/07/2020    AST 15 06/07/2020    BUN 10 06/09/2020    ANIONGAP 6 06/09/2020     Lab Results   Component Value Date    BNP 99 06/09/2020     Lipid:   Lab Results   Component Value Date    CHOL 127 06/08/2020    CHOL 96 09/20/2018    CHOL 113 07/29/2017     Lab Results   Component Value Date    HDL 29 (L) 06/08/2020    HDL 27 09/20/2018    HDL 26 (L) 07/29/2017     Lab Results   Component Value Date    LDLCALC 76 06/08/2020    LDLCALC 53 09/20/2018    LDLCALC 65 07/29/2017     TSH:  Lab Results   Component Value Date     TSH 0.627 06/07/2020     Magnesium:  Lab Results   Component Value Date    MG 1.8 06/09/2020     PT/INR:   No results found for: PT, INR    EKG/Telemetry:  A paced V paced at 60 bpm       Assessment/Plan   Active Problems:    Ventricular tachycardia (CMS/HCC) ([facility])    Anteroseptal myocardial infarction (CMS/HCC) ([facility])    CAD (coronary artery disease)    Hypertension    Hyperlipidemia    VT (ventricular tachycardia) (CMS/[facility]) ([facility])        Plan:    Ischemic cardiomyopathy:  EF 25%, no evidence of ischemia but extensive infarction on myocardial perfusion study  Continue amiodarone, carvedilol, Lasix, lisinopril, Effient, Xarelto, Crestor, Aldactone  Guideline directed medical therapy for systolic heart failure    Sustained ventricular tachycardia:  Status post 3 ICD shocks  On amiodarone  Status post cardioversion in the emergency room for sustained VT  Continue telemetry  For VT ablation per EP today    Felipe Franz MD

## 2020-06-09 NOTE — NURSING END OF SHIFT
Nursing End of Shift Summary:    Patient: Pete Trejo  MRN: [id number]  : 1965, Age: 54 y.o.    Location: [address] [place] [address]    Nursing Goals  Clinical Goals for the Shift: Monitor VS, Tele, provide comfort and safety    Narrative Summary of Progress Toward Clinical Goals:  Noted stable VS, on AV paced rhythm, no SOB and O2 desaturation on room air, denies pain, remains safe, for ablation today.    Barriers to Goals/Nursing Concerns:  no    New Patient or Family Concerns/Issues:  no    Shift Summary:      Significant Events & Communications to Providers (last 12 hours)      Last 5 Values    No documentation.              Oxygen Usage (last 12 hours)      Last 5 Values     Row Name 20             Oxygen Weaning Trial by Nursing    Is Patient on Room Air OR on the Same Amount of O2 as at Home?  Yes  Yes  Yes                 Mobility (last 12 hours)      Last 5 Values     Row Name 20                   Mobility    Anti-Embolism Intervention  -- see MAR            Urethral Catheter    Active Urethral Catheter     None            Active Lines    Active Central venous catheter / Peripherally inserted central catheter / Implantable Port / Hemodialysis catheter / Midline Catheter     None              Infusing Medications   Medication Dose Last Rate   • normal saline  100 mL/hr 100 mL/hr (20)     PRN Medications   Medication Dose Last Dose   • acetaminophen  650 mg     • metoclopramide (REGLAN) IV orderable  10 mg     • nitroglycerin  0.4 mg       _________________________  Celeste Ko RN  20 5:27 AM

## 2020-06-10 VITALS
DIASTOLIC BLOOD PRESSURE: 73 MMHG | HEART RATE: 59 BPM | WEIGHT: 236.11 LBS | HEIGHT: 71 IN | BODY MASS INDEX: 33.06 KG/M2 | OXYGEN SATURATION: 94 % | RESPIRATION RATE: 18 BRPM | SYSTOLIC BLOOD PRESSURE: 116 MMHG | TEMPERATURE: 97.3 F

## 2020-06-10 PROBLEM — Z79.01 ANTICOAGULATED: Status: ACTIVE | Noted: 2020-06-10

## 2020-06-10 PROBLEM — Z79.899 ON AMIODARONE THERAPY: Status: ACTIVE | Noted: 2020-06-10

## 2020-06-10 PROBLEM — I25.10 CORONARY ARTERY DISEASE: Status: ACTIVE | Noted: 2020-06-10

## 2020-06-10 PROBLEM — Z95.810 AUTOMATIC IMPLANTABLE CARDIOVERTER-DEFIBRILLATOR IN SITU: Status: ACTIVE | Noted: 2020-06-10

## 2020-06-10 PROBLEM — Z86.79 S/P ABLATION OF VENTRICULAR ARRHYTHMIA: Status: ACTIVE | Noted: 2020-06-10

## 2020-06-10 PROBLEM — Z95.5 PRESENCE OF STENT IN CORONARY ARTERY: Status: ACTIVE | Noted: 2020-06-10

## 2020-06-10 PROBLEM — Z72.0 TOBACCO USE: Status: ACTIVE | Noted: 2020-06-10

## 2020-06-10 PROBLEM — Z98.890 S/P ABLATION OF VENTRICULAR ARRHYTHMIA: Status: ACTIVE | Noted: 2020-06-10

## 2020-06-10 LAB
AMIODARONE SERPL-MCNC: 0.5 MCG/ML
ANION GAP SERPL CALC-SCNC: 8 MMOL/L (ref 3–11)
BUN SERPL-MCNC: 12 MG/DL (ref 7–25)
CALCIUM SERPL-MCNC: 8.4 MG/DL (ref 8.6–10.3)
CHLORIDE SERPL-SCNC: 107 MMOL/L (ref 98–107)
CO2 SERPL-SCNC: 22 MMOL/L (ref 21–32)
CREAT SERPL-MCNC: 0.73 MG/DL (ref 0.7–1.3)
DESETHYLAMIODARONE SERPL-MCNC: 0.3 MCG/ML
GFR SERPL CREATININE-BSD FRML MDRD: 106 ML/MIN/1.73M*2
GLUCOSE SERPL-MCNC: 106 MG/DL (ref 70–105)
MAGNESIUM SERPL-MCNC: 1.7 MG/DL (ref 1.8–2.4)
POTASSIUM SERPL-SCNC: 4 MMOL/L (ref 3.5–5.1)
SODIUM SERPL-SCNC: 137 MMOL/L (ref 135–145)

## 2020-06-10 PROCEDURE — 80048 BASIC METABOLIC PNL TOTAL CA: CPT | Performed by: NURSE PRACTITIONER

## 2020-06-10 PROCEDURE — 6370000100 HC RX 637 (ALT 250 FOR IP): Performed by: NURSE PRACTITIONER

## 2020-06-10 PROCEDURE — 36415 COLL VENOUS BLD VENIPUNCTURE: CPT | Performed by: NURSE PRACTITIONER

## 2020-06-10 PROCEDURE — 83735 ASSAY OF MAGNESIUM: CPT | Performed by: NURSE PRACTITIONER

## 2020-06-10 PROCEDURE — 99239 HOSP IP/OBS DSCHRG MGMT >30: CPT | Performed by: NURSE PRACTITIONER

## 2020-06-10 RX ORDER — AMIODARONE HYDROCHLORIDE 200 MG/1
200 TABLET ORAL 2 TIMES DAILY
Qty: 60 TABLET | Refills: 11 | Status: SHIPPED | OUTPATIENT
Start: 2020-06-10 | End: 2020-07-08

## 2020-06-10 RX ORDER — ACETAMINOPHEN 325 MG/1
650 TABLET ORAL EVERY 4 HOURS PRN
Qty: 1 BOTTLE | Refills: 0 | Status: SHIPPED | OUTPATIENT
Start: 2020-06-10 | End: 2020-06-20

## 2020-06-10 RX ADMIN — SPIRONOLACTONE 25 MG: 25 TABLET ORAL at 09:22

## 2020-06-10 RX ADMIN — AMIODARONE HYDROCHLORIDE 400 MG: 200 TABLET ORAL at 09:22

## 2020-06-10 RX ADMIN — PRASUGREL 10 MG: 10 TABLET, FILM COATED ORAL at 09:22

## 2020-06-10 RX ADMIN — LISINOPRIL 5 MG: 5 TABLET ORAL at 09:22

## 2020-06-10 RX ADMIN — CARVEDILOL 25 MG: 25 TABLET, FILM COATED ORAL at 09:22

## 2020-06-10 NOTE — DISCHARGE SUMMARY
Cardiology Discharge Summary      Admitting Provider: Felipe Franz MD  Discharge Provider: Nir Edwards MD  Primary Care Physician at Discharge: SHAHID SILVA, BRAD [phone] Primary Cardiologist: [phone]  Primary electrophysiologist: Dr. Montana [phone]  Admission Date: 6/7/2020     Discharge Date: 6/10/2020    Primary Discharge Diagnosis  Ventricular tachycardia on amiodarone requiring external cardioversion  Ischemic cardiomyopathy EF 30%  Secondary Discharge Diagnosis  Ventricular tachycardia on amiodarone requiring external cardioversion  Status post VT ablation 6/9/2020 by Dr. Montana  Status post reprogramming ICD to the lower zone         Discharge medication list      START taking these medications      Instructions   acetaminophen 325 mg tablet  Commonly known as:  TYLENOL   Take 2 tablets (650 mg total) by mouth every 4 (four) hours as needed for pain scale 1-3/10 for up to 10 days Indications: headache        CHANGE how you take these medications      Instructions   amiodarone 200 mg tablet  Commonly known as:  PACERONE  What changed:    · when to take this  · additional instructions   Take 1 tablet (200 mg total) by mouth 2 (two) times a day After 1 month decrease to 1 tablet daily        CONTINUE taking these medications      Instructions   Coreg 25 mg tablet  Generic drug:  carvediloL      Crestor 40 mg tablet  Generic drug:  rosuvastatin      furosemide 20 mg tablet  Commonly known as:  LASIX      lisinopriL 5 mg tablet  Commonly known as:  PRINIVIL,ZESTRIL      Nitrostat 0.4 mg SL tablet  Generic drug:  nitroglycerin      pantoprazole 40 mg EC tablet  Commonly known as:  PROTONIX      prasugreL 10 mg tablet  Commonly known as:  EFFIENT   Take 1 tablet (10 mg total) by mouth daily.     rivaroxaban 20 mg tablet  Commonly known as:  Xarelto   Take 1 tablet (20 mg total) by mouth daily.     spironolactone 25 mg tablet  Commonly known as:  ALDACTONE            Where to Get Your  Medications      These medications were sent to [facility] - [place], [place] - [address]  [id number], [place] [address]    Phone:  [phone]   · acetaminophen 325 mg tablet  · amiodarone 200 mg tablet         Discharge Condition: good    Discharge Disposition  Final discharge disposition not confirmed  Code Status at Discharge: Full code    Active Issues Requiring Follow-up  Issue: Ventricular tachycardia  Responsible Individual: Heart and vascular [place]  What is Needed: Follow-up with Dr. Montana phone consult 7/8/2020 at 3 PM    Follow-up with a device clinic 7/29/2020  Follow-up Appointments Arranged: Yes       Outpatient Follow-Up  Future Appointments   Date Time Provider Department Center   7/8/2020  3:00 PM Wilfredo Montana MD [facility]   [facility]       Referrals and Follow-ups to Schedule     Weight lifting restrictions      Maximum Weight to Lift:  5 Pounds    For 1 week.  If any increased bruising or bleeding need to extend this to 2 weeks.    Ambulatory referral to Cardiology      Follow-up with Dr. Montana 7/8/2020: 3 PM phone visit    Device check scheduled for the 29th          Test Results Pending at Discharge  Pending Labs     Order Current Status    Amiodarone level Blood, Venous In process          DETAILS OF HOSPITAL STAY    Presenting Problem/History of Present Illness  Ventricular tachycardia (CMS/HCC) (HCC) [I47.2]  VT (ventricular tachycardia) (CMS/HCC) (HCC) [I47.2]  Ventricular tachycardia (CMS/HCC) (HCC) [I47.2]     Patient is a pleasant 54-year-old gentleman who was admitted yesterday morning after receiving a shock from his ICD.  He had been standing in his bathroom when he felt lightheaded and a rush sensation like his ICD was going to fire.  He leaned over the sink to brace himself though began to feel better and stood back up and then his defibrillator fired.  He went out and sat in  his recliner and then developed severe chest pain.  He called his girlfriends son to drive him to the emergency room.  He could feel his heart racing while sitting in the recliner.  On the way to the hospital he developed severe anterior chest pain, shortness of breath with nausea and vomiting.  On presentation to the ER he was found to be in sustained ventricular tachycardia with rates around 155-160 bpm.  He was externally cardioverted by the ER physician.  His ICD did not deliver therapy as his VT zone is set for 180 and VF zone for to 10 bpm.  He was reloaded with amiodarone.  He had chronically been on 200 mg daily since 2017.      He received shocks from his defibrillator in July 2017 and was initiated on amiodarone. He had a Lexiscan Cardiolite stress test in July 2017 that showed an ejection fraction of 14% with a large scar in the LAD territory with akinesis and a small amount of rayray-infarction ischemia.  He underwent a cardiac cath on 7/26/2017 that showed multivessel coronary artery disease, small caliber LAD with occlusion of the LAD at the time of previous STEMI.  Critical stenosis ostium of the second diagonal branch was successfully treated with balloon and 2.25 x 30 mm DIMAS.   Due to akinetic/dyskinetic LV apex he was initiated on Xarelto.  He has been compliant with his cardiac medications which have been refilled by his primary care physician.       He was last seen in the cardiology clinic 11/26/2018 by Danisha Peters CNP for Dr. Jenkins.  He has a history of coronary artery disease with prior small anteroseptal MI 2009 with stent to the LAD, recurrent MI with total occlusion of the LAD with a late presentation in 2010 with PCI and stent, ischemic cardiomyopathy EF 31% in 2013, CHF prior biventricular ICD implant, hypertension, dyslipidemia and prior tobacco abuse though was able to successfully quit several years ago.  He had not had subsequent cardiac follow up.  He had an ICD check 5/12/2020 in  the clinic which showed him to be 98% RV paced and 99% LV paced.  There was one mode switch episode.  His device was expected to last another 2.5 years.  There was one nonsustained VT episode.  His device was programmed DDDR lower rate is 60 max tracking and sensor rate 130.  His VT zone 180 bpm with a VF zone of 210 bpm.  The mode switch is 150 bpm.  Thresholds have been fine and lead impedances as well.  He was 15% atrial paced and 100% by V paced.       Pete had not had any exertional chest pain.  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  Occasionally if he overdoes it he will be short of breath and then just slows down and rests.  He is orthopneic though denies any episodes of PND.  He is not comfortable sleeping flat in the bed.  He denies any lower extremity edema.  He drinks a lot of water during the day and evening due to being physically very active and is up 3 times a night for nocturia.  Occasionally if he is over exerting himself he will notice some palpitations and fluttering sensation.  He denies other lightheaded or dizzy feelings syncopal or near syncopal events.       Hospital Course  Patient underwent cardiac evaluation.  Peak troponin was 775.6.  He underwent Lexiscan Cardiolite stress test which showed ejection fraction of 16%.  Extensive infarction involving almost all segments of the left ventricle.  Only normal perfusion included the inferior wall basilar lateral wall basilar septum and basilar anterior wall.  There was no reversible ischemia.  Marked LV dilation with end-diastolic volume of 588 mL.  Occasional PVCs noted.  Unchanged from 2017 study. He also had an echo done on 6/8/2020 that showed ejection fraction of 25% with severe LV systolic dysfunction and multiple wall motion abnormalities.  The left atrium was mild to moderately dilated, right atrium mildly dilated, mild TR.  Grade 1 diastolic dysfunction.  No pulmonary hypertension PA P 29 mmHg.  No significant  aortic or mitral valve disease.  The left atrial volume index 39 cc/m².    Patient had no further chest pain during the hospitalization.  His rhythm remained stable.  Vital signs remained stable.    Electrophysiology consult was obtained.  Because of sustained VT on admission on amiodarone he was felt to require a VT ablation which was done on 6/9/2020.  He tolerated the procedure well.  He had no ventricular arrhythmias on telemetry monitoring overnight.  He was continued on amiodarone 400 mg daily for 1 month post discharge.  He has been on this since 2017.  We will reevaluate and see if he needs to continue on the 200 mg daily thereafter.  He was reinitiated on his Xarelto post procedure which is in place due to history of LV thrombus.  He is also on Prasugrel for his coronary artery disease and complex anatomy.  He is otherwise on guideline directed therapy for ischemic cardiomyopathy/congestive heart failure with carvedilol, lisinopril and spironolactone.  He was felt ready for discharge.  He will follow-up with Dr. Montana in 1 month.  He is to call in the interim if any problems arise.  He will have a device check on 7/29/2020.  He was instructed to call with any questions or concerns and to send a device transmission if any concern for arrhythmias.  He was cautioned about lifting more than 5 pounds for the next 1 to 2 weeks while sites healed up.    Operative Procedures Performed  [unfilled]  Treatments: cardiac meds: lisinopril (generic), carvedilol, furosemide, amiodarone and Crestor, anticoagulation: Xarelto and pressor grill and Prasugrel     Procedures: VT ablation 6/9/2020 by Dr. Montana  Consults: Electrophysiology    Echocardiogram:   Interpretation Summary     · Severe left ventricular systolic dysfunction. Multiple wall motion and Audiss as described below. Severe ischemic cardiomyopathy. EF 25%.  · The left atrium is moderately dilated.  · The right atrium is mildly dilated.  · Normal central  venous pressure (0-5 mmHg).  · No pericardial effusion.  · Mild tricuspid regurgitation.  · Grade I (mild) left ventricular diastolic abnormality.  · Minimally elevated left ventricular end-diastolic pressure.  · There is no evidence of pulmonary hypertension. Estimated PA pressure 29 mmHg.  · No significant aortic or mitral valve disease.          Lexiscan Cardiolite stress test 6/8/2020:  Interpretation Summary     1.  Abnormal Lexiscan Cardiolite stress test with extensive infarction involving almost all segments of the left ventricle.  The only segments of normal perfusion include the inferior wall, basilar lateral wall, basilar septum, and basilar anterior wall.  The other segments are infarcted.  2.  No evidence of reversible ischemia.  3.  Severe ischemic cardiomyopathy with EF of 16%.  4.  Marketed left ventricular dilatation with an end-diastolic volume of 588 mL's.  5.  Occasional PVCs.       Comment: I do not think think there has been any change since the 2017 study.       Lab Results   Component Value Date     06/10/2020     06/09/2020     06/08/2020    K 4.0 06/10/2020    K 4.1 06/09/2020    K 4.2 06/08/2020    CO2 22 06/10/2020    CO2 25 06/09/2020    CO2 25 06/08/2020    BUN 12 06/10/2020    BUN 10 06/09/2020    BUN 8 06/08/2020    CREATININE 0.73 06/10/2020    CREATININE 0.82 06/09/2020    CREATININE 0.84 06/08/2020    GLUCOSE 106 (H) 06/10/2020    GLUCOSE 100 06/09/2020    GLUCOSE 106 (H) 06/08/2020    CALCIUM 8.4 (L) 06/10/2020    CALCIUM 8.8 06/09/2020    CALCIUM 8.9 06/08/2020     Lab Results   Component Value Date    BNP 99 06/09/2020     Lab Results   Component Value Date    WBC 6.5 06/09/2020    HGB 15.4 06/09/2020    HCT 45.1 06/09/2020    MCV 92.8 06/09/2020     06/09/2020     Lab Results   Component Value Date    CHOL 127 06/08/2020    TRIG 112 06/08/2020    HDL 29 (L) 06/08/2020    LDLCALC 76 06/08/2020       Physical Exam at Discharge  Discharge Condition:  "good  Heart Rate: 59  Resp: 18  BP: 116/73  Temp: 36.3 °C (97.3 °F)  Weight: 107.1 kg (236 lb 1.8 oz)  /73 (BP Location: Left arm, Patient Position: Sitting, Cuff Size: Regular Adult)   Pulse 59   Temp 36.3 °C (97.3 °F)   Resp 18   Ht 1.803 m (5' 11\")   Wt 107.1 kg (236 lb 1.8 oz)   SpO2 94%   BMI 32.93 kg/m²   General appearance: alert, appears stated age, cooperative and no distress  Head: normocephalic, without obvious abnormality, atraumatic  Neck: no adenopathy, no carotid bruit, no JVD, supple, symmetrical, trachea midline, thyroid not enlarged, symmetric, no tenderness/mass/nodules and No bruits heard  Back: symmetric, no curvature. ROM normal. No CVA tenderness., Mild erythema at the site of defib patch  Lungs: clear to auscultation bilaterally  Chest wall: ICD left upper chest shows no sign of version or infection  Heart: regular rate and rhythm, S1, S2 normal, no murmur, click, rub or gallop  Abdomen: soft, non-tender; bowel sounds normal; no masses, no organomegaly  Extremities: extremities normal, warm and well-perfused; no cyanosis, clubbing, or edema  Pulses: 2+ and symmetric  Neurologic: Grossly normal  Bilateral groins without hematoma or active bleeding.  There is some minimal ecchymosis right groin.    Time spent coordinating discharge: Greater than 30 minutes was spent on this discharge including examination of the patient, review of tests, discussion of follow-up.  No family members were present and all patient  questions were asked and answered.  Included in this is the time for preparation of discharge summary.    If you have any questions or concerns please do not hesitate to call.      Electronically signed by: Merced Newton CNP  6/10/2020  9:59 AM  "

## 2020-06-10 NOTE — ANESTHESIA POSTPROCEDURE EVALUATION
Patient: Pete Trejo    Procedure Summary     Date:  06/09/20 Room / Location:  [facility] EP LAB 1 / [facility] EP Lab    Anesthesia Start:  1450 Anesthesia Stop:  1858    Procedure:  Ablation VT (N/A ) Diagnosis:       Ventricular tachycardia (CMS/HCC) (HCC)      (Ventricular tachycardia (CMS/HCC) (HCC) [I47.2])    Provider:  Wilfredo Montana MD Responsible Provider:  Arvind Judd MD    Anesthesia Type:  general ASA Status:  4          Anesthesia Type: general    Last vitals  Vitals Value Taken Time   /71 6/9/2020  7:30 PM   Temp 36.1 °C (97 °F) 6/9/2020  7:30 PM   Pulse 66 6/9/2020  7:30 PM   Resp 12 6/9/2020  7:30 PM   SpO2 95 % 6/9/2020  7:30 PM   Pain Score         Anesthesia Post Evaluation    Patient location during evaluation: PACU  Patient participation: complete - patient participated  Level of consciousness: awake and alert  Pain management: adequate  Airway patency: patent  Anesthetic complications: no  Cardiovascular status: acceptable  Respiratory status: acceptable  Hydration status: acceptable  May dismiss recovered patient based on consultation with the appropriate physicians and/or meeting appropriate discharge criteria      Cosmetic?

## 2020-06-10 NOTE — PLAN OF CARE
Problem: Cardiovascular - Adult  Goal: Maintains optimal cardiac output and hemodynamic stability  Description: INTERVENTIONS:  1. Monitor vital signs and rhythm  2. Monitor for hypotension and other signs of decreased cardiac output  3. Administer and titrate ordered vasoactive medications to optimize hemodynamic stability  4. Monitor for fluid overload/dehydration, weight gain, shortness of breath and activity intolerance  5. Monitor arterial and/or venous puncture sites for bleeding and/or hematoma  6. Assess quality of pulses, capillary refill, edema, sensation, skin color and temperature  7. Assess for signs of decreased coronary artery perfusion - ex. angina  Outcome: Progressing  Goal: Absence of cardiac dysrhythmias or at baseline  Description: INTERVENTIONS:  1. Continuous cardiac monitoring, monitor vital signs, obtain 12 lead EKG if indicated  2. Administer antiarrhythmic and heart rate control medications as ordered  3. Initiate emergency measures for life threatening arrhythmias  4. Monitor electrolytes and administer replacement therapy as ordered  Outcome: Progressing     Problem: Knowledge Deficit  Goal: Patient/family/caregiver demonstrates understanding of disease process, treatment plan, medications, and discharge instructions  Description: INTERVENTIONS:   1. Complete learning assessment and assess knowledge base  2. Provide teaching at level of understanding   3. Provide teaching via preferred learning methods  Outcome: Progressing     Problem: Potential for Compromised Skin Integrity  Goal: Skin Integrity is Maintained or Improved  Description: INTERVENTIONS:  1. Assess and monitor skin integrity  2. Collaborate with interdisciplinary team and initiate plans and interventions as needed  3. Alternate a full bath with partial baths for elderly   4. Monitor patient's hygiene practices   5. Collaborate with wound, ostomy, and continence nurse  Outcome: Progressing  Goal: Nutritional status is  improving  Description: INTERVENTIONS:  1. Monitor and assess patient for malnutrition (ex- brittle hair, bruises, dry skin, pale skin and conjunctiva, muscle wasting, smooth red tongue, and disorientation)  2. Monitor patient's weight and dietary intake as ordered or per policy  3. Determine patient's food preferences and provide high-protein, high-caloric foods as appropriate  4. Assist patient with eating   5. Allow adequate time for meals   6. Encourage patient to take dietary supplement as ordered   7. Collaborate with dietitian  8. Include patient/family/caregiver in decisions related to nutrition  Outcome: Progressing  Goal: MOBILITY IS MAINTAINED OR IMPROVED  Description: INTERVENTIONS  1. Collaborate with interdisciplinary team and initiate plan and interventions as ordered (PT/OT)  2. Encourage ambulation  3. Up to chair for meals  4. Monitor for signs of deconditioning  Outcome: Progressing     Problem: Urinary Incontinence  Goal: Perineal skin integrity is maintained or improved  Description: INTERVENTIONS:  1. Assess genitourinary system, perineal skin, labs (urinalysis), and history of incontinence to include past management, aggravating, and alleviating factors  2. Collaborate with interdisciplinary team including wound, ostomy, and continence nurse and initiate plans and interventions as needed  4. Consider urine containment device  5. Apply skin protectant   6. Develop skin care regimen  7. Provide privacy when changing patient's incontinence device to maintain their dignity  Outcome: Progressing

## 2020-06-10 NOTE — PLAN OF CARE
Problem: Cardiovascular - Adult  Goal: Maintains optimal cardiac output and hemodynamic stability  Description: INTERVENTIONS:  1. Monitor vital signs and rhythm  2. Monitor for hypotension and other signs of decreased cardiac output  3. Administer and titrate ordered vasoactive medications to optimize hemodynamic stability  4. Monitor for fluid overload/dehydration, weight gain, shortness of breath and activity intolerance  5. Monitor arterial and/or venous puncture sites for bleeding and/or hematoma  6. Assess quality of pulses, capillary refill, edema, sensation, skin color and temperature  7. Assess for signs of decreased coronary artery perfusion - ex. angina  Outcome: Progressing  Flowsheets (Taken 6/7/2020 1257 by Casi Corbett RN)  Maintain optimal cardiac output and hemodynamic stability:   Monitor vital signs and rhythm   Monitor for hypotension and other signs of decreased cardiac output   Monitor for fluid overload/dehydration, weight gain, shortness of breath and activity intolerance   Assess quality of pulses, capillary refill, edema, sensation, skin color and temperature   Assess for signs of decreased coronary artery perfusion - ex. angina  Goal: Absence of cardiac dysrhythmias or at baseline  Description: INTERVENTIONS:  1. Continuous cardiac monitoring, monitor vital signs, obtain 12 lead EKG if indicated  2. Administer antiarrhythmic and heart rate control medications as ordered  3. Initiate emergency measures for life threatening arrhythmias  4. Monitor electrolytes and administer replacement therapy as ordered  Outcome: Progressing  Flowsheets (Taken 6/7/2020 1257 by Casi Corbett RN)  Absence of cardiac dysrhythmias or at baseline:   Continuous cardiac monitoring, monitor vital signs, obtain 12 lead EKG if indicated   Administer antiarrhythmic and heart rate control medications as ordered   Initiate emergency measures for life threatening arrhythmias   Monitor electrolytes and administer  replacement therapy as ordered     Problem: Knowledge Deficit  Goal: Patient/family/caregiver demonstrates understanding of disease process, treatment plan, medications, and discharge instructions  Description: INTERVENTIONS:   1. Complete learning assessment and assess knowledge base  2. Provide teaching at level of understanding   3. Provide teaching via preferred learning methods  Outcome: Progressing  Flowsheets (Taken 6/10/2020 0957)  Patient/family/caregiver demonstrates understanding of disease process, treatment plan, medications, and discharge instructions:   Complete learning assessment and assess knowledge base   Provide teaching via preferred learning methods   Provide teaching at level of understanding

## 2020-06-10 NOTE — PLAN OF CARE
Problem: Cardiovascular - Adult  Goal: Maintains optimal cardiac output and hemodynamic stability  Description: INTERVENTIONS:  1. Monitor vital signs and rhythm  2. Monitor for hypotension and other signs of decreased cardiac output  3. Administer and titrate ordered vasoactive medications to optimize hemodynamic stability  4. Monitor for fluid overload/dehydration, weight gain, shortness of breath and activity intolerance  5. Monitor arterial and/or venous puncture sites for bleeding and/or hematoma  6. Assess quality of pulses, capillary refill, edema, sensation, skin color and temperature  7. Assess for signs of decreased coronary artery perfusion - ex. angina  6/10/2020 0958 by Cedrick Figueredo RN  Outcome: Completed  6/10/2020 0957 by Cedrick Figueredo RN  Outcome: Progressing  Flowsheets (Taken 6/7/2020 1257 by Casi Corbett RN)  Maintain optimal cardiac output and hemodynamic stability:   Monitor vital signs and rhythm   Monitor for hypotension and other signs of decreased cardiac output   Monitor for fluid overload/dehydration, weight gain, shortness of breath and activity intolerance   Assess quality of pulses, capillary refill, edema, sensation, skin color and temperature   Assess for signs of decreased coronary artery perfusion - ex. angina  Goal: Absence of cardiac dysrhythmias or at baseline  Description: INTERVENTIONS:  1. Continuous cardiac monitoring, monitor vital signs, obtain 12 lead EKG if indicated  2. Administer antiarrhythmic and heart rate control medications as ordered  3. Initiate emergency measures for life threatening arrhythmias  4. Monitor electrolytes and administer replacement therapy as ordered  6/10/2020 0958 by Cedrick Figueredo RN  Outcome: Completed  6/10/2020 0957 by Cedrick Figueredo RN  Outcome: Progressing  Flowsheets (Taken 6/7/2020 1257 by Casi Corbett RN)  Absence of cardiac dysrhythmias or at baseline:   Continuous cardiac monitoring, monitor  vital signs, obtain 12 lead EKG if indicated   Administer antiarrhythmic and heart rate control medications as ordered   Initiate emergency measures for life threatening arrhythmias   Monitor electrolytes and administer replacement therapy as ordered     Problem: Knowledge Deficit  Goal: Patient/family/caregiver demonstrates understanding of disease process, treatment plan, medications, and discharge instructions  Description: INTERVENTIONS:   1. Complete learning assessment and assess knowledge base  2. Provide teaching at level of understanding   3. Provide teaching via preferred learning methods  6/10/2020 0958 by Cedrick Figueredo RN  Outcome: Completed  6/10/2020 0957 by Cedrick Figueredo RN  Outcome: Progressing  Flowsheets (Taken 6/10/2020 0957)  Patient/family/caregiver demonstrates understanding of disease process, treatment plan, medications, and discharge instructions:   Complete learning assessment and assess knowledge base   Provide teaching via preferred learning methods   Provide teaching at level of understanding     Problem: Potential for Compromised Skin Integrity  Goal: Skin Integrity is Maintained or Improved  Description: INTERVENTIONS:  1. Assess and monitor skin integrity  2. Collaborate with interdisciplinary team and initiate plans and interventions as needed  3. Alternate a full bath with partial baths for elderly   4. Monitor patient's hygiene practices   5. Collaborate with wound, ostomy, and continence nurse  Outcome: Completed  Goal: Nutritional status is improving  Description: INTERVENTIONS:  1. Monitor and assess patient for malnutrition (ex- brittle hair, bruises, dry skin, pale skin and conjunctiva, muscle wasting, smooth red tongue, and disorientation)  2. Monitor patient's weight and dietary intake as ordered or per policy  3. Determine patient's food preferences and provide high-protein, high-caloric foods as appropriate  4. Assist patient with eating   5. Allow adequate  time for meals   6. Encourage patient to take dietary supplement as ordered   7. Collaborate with dietitian  8. Include patient/family/caregiver in decisions related to nutrition  Outcome: Completed  Goal: MOBILITY IS MAINTAINED OR IMPROVED  Description: INTERVENTIONS  1. Collaborate with interdisciplinary team and initiate plan and interventions as ordered (PT/OT)  2. Encourage ambulation  3. Up to chair for meals  4. Monitor for signs of deconditioning  Outcome: Completed     Problem: Urinary Incontinence  Goal: Perineal skin integrity is maintained or improved  Description: INTERVENTIONS:  1. Assess genitourinary system, perineal skin, labs (urinalysis), and history of incontinence to include past management, aggravating, and alleviating factors  2. Collaborate with interdisciplinary team including wound, ostomy, and continence nurse and initiate plans and interventions as needed  4. Consider urine containment device  5. Apply skin protectant   6. Develop skin care regimen  7. Provide privacy when changing patient's incontinence device to maintain their dignity  Outcome: Completed

## 2020-06-10 NOTE — DISCHARGE INSTR - OTHER INFO
Follow-up instructions post ablation.    No lifting, pulling, pushing, dragging more than 5 pounds for 1 week.  Extend this to 2 weeks if there is any bleeding or hematoma in the groin sites.    Do not do any strenuous exercise such as biking, weightlifting, aerobics, golfing for 7 days post procedure.  Extend this to 2 weeks if any bruising or swelling in the groin sites.  No sexual activity for 1 week.    Do not drive for 24 hours post procedure     okay to return to works/school in 2 days.  Again no lifting, pulling, pushing, dragging or other strenuous activity more than 5 pounds for at least a week while everything heals.    No tub baths or swimming for 2 weeks until catheter sites are completely healed.  Okay to shower.  Do not submerge the wound sites for 2 weeks until completely healed.    Use a Band-Aid on the sites for up to a week until healed.    Call the clinic if any recurrent tachycardia, shortness of breath, chest pain, fevers or chills or other concerning symptoms..   -If you have any active bleeding or swelling of the puncture sites lie down and apply firm pressure with 2 or 3 fingers over the puncture site for 15 minutes.    -If you are alone when bleeding occurs or after holding pressure for 15 minutes the bleeding continues call 911 and to continue hold pressure until help arrives.    -Do not try to drive yourself to the hospital.    Call your provider if  -New or expanding swelling greater than the size of a golf ball at the groin puncture sites,  -Signs of infection, redness drainage fever at the puncture sites  -Change in color temperature or sensation of the leg at the puncture site.  Mild bruising and color change without any pain may occur during normal healing and is not a concern.  -Unusual feelings of weakness or faintness      You will be given a follow-up appointment in 1 months post ablation.  Call in the interim if not doing well.  You can always send in a device transmission if not  feeling well.

## 2020-06-16 ENCOUNTER — APPOINTMENT (OUTPATIENT)
Dept: RADIOLOGY | Facility: HOSPITAL | Age: 55
End: 2020-06-16
Payer: OTHER GOVERNMENT

## 2020-06-16 ENCOUNTER — HOSPITAL ENCOUNTER (OUTPATIENT)
Facility: HOSPITAL | Age: 55
Setting detail: OBSERVATION
Discharge: HOME | End: 2020-06-17
Attending: EMERGENCY MEDICINE | Admitting: HOSPITALIST
Payer: OTHER GOVERNMENT

## 2020-06-16 ENCOUNTER — ANCILLARY PROCEDURE (OUTPATIENT)
Dept: CARDIOLOGY | Facility: CLINIC | Age: 55
End: 2020-06-16
Payer: COMMERCIAL

## 2020-06-16 DIAGNOSIS — Z45.02 AICD DISCHARGE: ICD-10-CM

## 2020-06-16 DIAGNOSIS — Z45.02 ENCOUNTER FOR INTERROGATION OF CARDIAC DEFIBRILLATOR: ICD-10-CM

## 2020-06-16 DIAGNOSIS — R79.89 ELEVATED TROPONIN: ICD-10-CM

## 2020-06-16 DIAGNOSIS — I49.01 VF (VENTRICULAR FIBRILLATION) (CMS/HCC): Primary | ICD-10-CM

## 2020-06-16 LAB
ALBUMIN SERPL-MCNC: 3.7 G/DL (ref 3.5–5.3)
ALP SERPL-CCNC: 69 U/L (ref 45–115)
ALT SERPL-CCNC: 25 U/L (ref 7–52)
ANION GAP SERPL CALC-SCNC: 6 MMOL/L (ref 3–11)
APTT PPP: 36.1 SECONDS (ref 25.1–36.5)
AST SERPL-CCNC: 13 U/L
BACTERIA #/AREA URNS AUTO: NORMAL /HPF
BASOPHILS # BLD AUTO: 0.1 10*3/UL
BASOPHILS NFR BLD AUTO: 1 % (ref 0–2)
BILIRUB SERPL-MCNC: 0.71 MG/DL (ref 0.2–1.4)
BILIRUB UR QL STRIP.AUTO: NEGATIVE
BNP SERPL-MCNC: 127 PG/ML (ref 0–100)
BUN SERPL-MCNC: 8 MG/DL (ref 7–25)
CALCIUM ALBUM COR SERPL-MCNC: 8.9 MG/DL (ref 8.6–10.3)
CALCIUM SERPL-MCNC: 8.7 MG/DL (ref 8.6–10.3)
CHLORIDE SERPL-SCNC: 105 MMOL/L (ref 98–107)
CLARITY UR: CLEAR
CO2 SERPL-SCNC: 27 MMOL/L (ref 21–32)
COLOR UR: YELLOW
CREAT SERPL-MCNC: 0.72 MG/DL (ref 0.7–1.3)
DELTA HIGH SENSITIVITY TROPONIN I, 1 HOUR: 22.2
DELTA HIGH SENSITIVITY TROPONIN I, 2 HOUR: 50.5
EOSINOPHIL # BLD AUTO: 0.2 10*3/UL
EOSINOPHIL NFR BLD AUTO: 4 % (ref 0–3)
ERYTHROCYTE [DISTWIDTH] IN BLOOD BY AUTOMATED COUNT: 14.2 % (ref 11.5–15)
GFR SERPL CREATININE-BSD FRML MDRD: 107 ML/MIN/1.73M*2
GLUCOSE SERPL-MCNC: 95 MG/DL (ref 70–105)
GLUCOSE UR STRIP.AUTO-MCNC: NEGATIVE MG/DL
HCT VFR BLD AUTO: 39.6 % (ref 38–50)
HGB BLD-MCNC: 13.7 G/DL (ref 13.2–17.2)
HGB UR QL STRIP.AUTO: NEGATIVE
INR BLD: 1.3
KETONES UR STRIP.AUTO-MCNC: NEGATIVE MG/DL
LEUKOCYTE ESTERASE UR QL STRIP: NEGATIVE
LIPASE SERPL-CCNC: 12 U/L (ref 11–82)
LYMPHOCYTES # BLD AUTO: 2 10*3/UL
LYMPHOCYTES NFR BLD AUTO: 37 % (ref 15–47)
MAGNESIUM SERPL-MCNC: 1.9 MG/DL (ref 1.8–2.4)
MCH RBC QN AUTO: 31.8 PG (ref 29–34)
MCHC RBC AUTO-ENTMCNC: 34.6 G/DL (ref 32–36)
MCV RBC AUTO: 91.8 FL (ref 82–97)
MONOCYTES # BLD AUTO: 0.5 10*3/UL
MONOCYTES NFR BLD AUTO: 9 % (ref 5–13)
NEUTROPHILS # BLD AUTO: 2.6 10*3/UL
NEUTROPHILS NFR BLD AUTO: 48 % (ref 46–70)
NITRITE UR QL STRIP.AUTO: NEGATIVE
PH UR STRIP.AUTO: 7 PH
PLATELET # BLD AUTO: 213 10*3/UL (ref 130–350)
PMV BLD AUTO: 8 FL (ref 6.9–10.8)
POTASSIUM SERPL-SCNC: 3.9 MMOL/L (ref 3.5–5.1)
PROT SERPL-MCNC: 6.2 G/DL (ref 6–8.3)
PROT UR STRIP.AUTO-MCNC: 30 MG/DL
PROTHROMBIN TIME: 14.9 SECONDS (ref 9.4–12.5)
RBC # BLD AUTO: 4.31 10*6/ΜL (ref 4.1–5.8)
RBC #/AREA URNS AUTO: NORMAL /HPF
SODIUM SERPL-SCNC: 138 MMOL/L (ref 135–145)
SP GR UR STRIP.AUTO: 1 (ref 1–1.03)
SQUAMOUS #/AREA URNS AUTO: NEGATIVE /HPF
TROPONIN I SERPL-MCNC: 108.2 PG/ML
TROPONIN I SERPL-MCNC: 136.5 PG/ML
TROPONIN I SERPL-MCNC: 86 PG/ML
UROBILINOGEN UR STRIP.AUTO-MCNC: 2 E.U./DL
WBC # BLD AUTO: 5.5 10*3/UL (ref 3.7–9.6)
WBC #/AREA URNS AUTO: NORMAL /HPF

## 2020-06-16 PROCEDURE — 36415 COLL VENOUS BLD VENIPUNCTURE: CPT | Performed by: EMERGENCY MEDICINE

## 2020-06-16 PROCEDURE — 6370000100 HC RX 637 (ALT 250 FOR IP): Performed by: HOSPITALIST

## 2020-06-16 PROCEDURE — 83690 ASSAY OF LIPASE: CPT | Performed by: EMERGENCY MEDICINE

## 2020-06-16 PROCEDURE — 99220 *NO LONGER ACTIVE 2023 DO NOT USE* PR INITIAL OBSERVATION CARE/DAY 70 MINUTES: CPT | Performed by: HOSPITALIST

## 2020-06-16 PROCEDURE — 99285 EMERGENCY DEPT VISIT HI MDM: CPT | Performed by: EMERGENCY MEDICINE

## 2020-06-16 PROCEDURE — 2580000300 HC RX 258: Performed by: EMERGENCY MEDICINE

## 2020-06-16 PROCEDURE — G0378 HOSPITAL OBSERVATION PER HR: HCPCS

## 2020-06-16 PROCEDURE — 93295 DEV INTERROG REMOTE 1/2/MLT: CPT | Mod: NCNR | Performed by: INTERNAL MEDICINE

## 2020-06-16 PROCEDURE — 84484 ASSAY OF TROPONIN QUANT: CPT | Performed by: EMERGENCY MEDICINE

## 2020-06-16 PROCEDURE — 85025 COMPLETE CBC W/AUTO DIFF WBC: CPT | Performed by: EMERGENCY MEDICINE

## 2020-06-16 PROCEDURE — 81001 URINALYSIS AUTO W/SCOPE: CPT | Performed by: EMERGENCY MEDICINE

## 2020-06-16 PROCEDURE — 85730 THROMBOPLASTIN TIME PARTIAL: CPT | Performed by: EMERGENCY MEDICINE

## 2020-06-16 PROCEDURE — 93005 ELECTROCARDIOGRAM TRACING: CPT | Performed by: EMERGENCY MEDICINE

## 2020-06-16 PROCEDURE — 85610 PROTHROMBIN TIME: CPT | Performed by: EMERGENCY MEDICINE

## 2020-06-16 PROCEDURE — 83735 ASSAY OF MAGNESIUM: CPT | Performed by: EMERGENCY MEDICINE

## 2020-06-16 PROCEDURE — 83880 ASSAY OF NATRIURETIC PEPTIDE: CPT | Performed by: EMERGENCY MEDICINE

## 2020-06-16 PROCEDURE — 80053 COMPREHEN METABOLIC PANEL: CPT | Performed by: EMERGENCY MEDICINE

## 2020-06-16 PROCEDURE — 71045 X-RAY EXAM CHEST 1 VIEW: CPT

## 2020-06-16 PROCEDURE — 93296 REM INTERROG EVL PM/IDS: CPT | Mod: NC | Performed by: INTERNAL MEDICINE

## 2020-06-16 RX ORDER — METOCLOPRAMIDE HYDROCHLORIDE 5 MG/ML
10 INJECTION INTRAMUSCULAR; INTRAVENOUS EVERY 6 HOURS PRN
Status: DISCONTINUED | OUTPATIENT
Start: 2020-06-16 | End: 2020-06-17 | Stop reason: HOSPADM

## 2020-06-16 RX ORDER — LANSOPRAZOLE 30 MG/1
30 CAPSULE, DELAYED RELEASE ORAL
Status: DISCONTINUED | OUTPATIENT
Start: 2020-06-17 | End: 2020-06-17 | Stop reason: HOSPADM

## 2020-06-16 RX ORDER — PRASUGREL 10 MG/1
10 TABLET, FILM COATED ORAL DAILY
Status: DISCONTINUED | OUTPATIENT
Start: 2020-06-17 | End: 2020-06-17 | Stop reason: HOSPADM

## 2020-06-16 RX ORDER — SPIRONOLACTONE 25 MG/1
25 TABLET ORAL DAILY
Status: DISCONTINUED | OUTPATIENT
Start: 2020-06-17 | End: 2020-06-17 | Stop reason: HOSPADM

## 2020-06-16 RX ORDER — FUROSEMIDE 20 MG/1
20 TABLET ORAL DAILY
Status: DISCONTINUED | OUTPATIENT
Start: 2020-06-16 | End: 2020-06-17 | Stop reason: HOSPADM

## 2020-06-16 RX ORDER — METOCLOPRAMIDE 10 MG/1
10 TABLET ORAL EVERY 6 HOURS PRN
Status: DISCONTINUED | OUTPATIENT
Start: 2020-06-16 | End: 2020-06-17 | Stop reason: HOSPADM

## 2020-06-16 RX ORDER — LISINOPRIL 5 MG/1
5 TABLET ORAL 2 TIMES DAILY
Status: DISCONTINUED | OUTPATIENT
Start: 2020-06-16 | End: 2020-06-17 | Stop reason: HOSPADM

## 2020-06-16 RX ORDER — NITROGLYCERIN 0.4 MG/1
0.4 TABLET SUBLINGUAL EVERY 5 MIN PRN
Status: DISCONTINUED | OUTPATIENT
Start: 2020-06-16 | End: 2020-06-17 | Stop reason: HOSPADM

## 2020-06-16 RX ORDER — AMIODARONE HYDROCHLORIDE 200 MG/1
200 TABLET ORAL 2 TIMES DAILY
Status: DISCONTINUED | OUTPATIENT
Start: 2020-06-16 | End: 2020-06-17 | Stop reason: HOSPADM

## 2020-06-16 RX ORDER — SODIUM CHLORIDE 9 MG/ML
500 INJECTION, SOLUTION INTRAVENOUS ONCE
Status: COMPLETED | OUTPATIENT
Start: 2020-06-16 | End: 2020-06-16

## 2020-06-16 RX ORDER — CARVEDILOL 25 MG/1
25 TABLET ORAL 2 TIMES DAILY WITH MEALS
Status: DISCONTINUED | OUTPATIENT
Start: 2020-06-16 | End: 2020-06-17 | Stop reason: HOSPADM

## 2020-06-16 RX ORDER — ROSUVASTATIN CALCIUM 20 MG/1
40 TABLET, COATED ORAL NIGHTLY
Status: DISCONTINUED | OUTPATIENT
Start: 2020-06-16 | End: 2020-06-17 | Stop reason: HOSPADM

## 2020-06-16 RX ADMIN — RIVAROXABAN 20 MG: 20 TABLET, FILM COATED ORAL at 18:21

## 2020-06-16 RX ADMIN — ROSUVASTATIN CALCIUM 40 MG: 20 TABLET, FILM COATED ORAL at 20:28

## 2020-06-16 RX ADMIN — AMIODARONE HYDROCHLORIDE 200 MG: 200 TABLET ORAL at 20:28

## 2020-06-16 RX ADMIN — LISINOPRIL 5 MG: 5 TABLET ORAL at 20:28

## 2020-06-16 RX ADMIN — SODIUM CHLORIDE 500 ML: 9 INJECTION, SOLUTION INTRAVENOUS at 12:45

## 2020-06-16 RX ADMIN — CARVEDILOL 25 MG: 25 TABLET, FILM COATED ORAL at 18:21

## 2020-06-16 RX ADMIN — FUROSEMIDE 20 MG: 20 TABLET ORAL at 18:21

## 2020-06-16 ASSESSMENT — ENCOUNTER SYMPTOMS
SHORTNESS OF BREATH: 0
COUGH: 0
SYNCOPE: 0
HEMATOCHEZIA: 0
BLOOD IN STOOL: 0
BRUISES/BLEEDS EASILY: 0
DIARRHEA: 0
PALPITATIONS: 0
FATIGUE: 0
FEVER: 0
CHILLS: 0
SORE THROAT: 0
CONFUSION: 0
BACK PAIN: 0
DIAPHORESIS: 1
DYSURIA: 0
FLANK PAIN: 0
DIZZINESS: 1
ARTHRALGIAS: 0
EYES NEGATIVE: 1
ABDOMINAL PAIN: 0
DYSPNEA ON EXERTION: 0
AGITATION: 0
LIGHT-HEADEDNESS: 0
EYE PAIN: 0
NEAR-SYNCOPE: 0
WHEEZING: 0
HEMATURIA: 0
DIZZINESS: 0
MYALGIAS: 0
PND: 0
IRREGULAR HEARTBEAT: 0
HEARTBURN: 0
LIGHT-HEADEDNESS: 1
DIAPHORESIS: 0
ORTHOPNEA: 0
ENDOCRINE NEGATIVE: 1
WEAKNESS: 1
RHINORRHEA: 0
NECK PAIN: 0
PSYCHIATRIC NEGATIVE: 1
HEADACHES: 0
NAUSEA: 0
FALLS: 0
SNORING: 0
VOMITING: 0
CONSTIPATION: 0

## 2020-06-16 NOTE — H&P
[address] [address]  History and Physical  Patient name: Pete Trejo  MRN: [id number]  Admit date: 6/16/2020      Chief Complaint: I had a shock      History of Present Illness: A 54 was old male with essential hypertension, coronary artery disease, chronic systolic CHF with ischemic cardiomyopathy with AICD, history of LV thrombus on Xarelto, dyslipidemia, tobacco abuse who had AICD discharge this morning, and came to the ER.    According to the patient he was with his brother house and he stand very quickly and had near syncope syncope and had AICD discharge.    Patient also has AICD discharge last week Sunday and was admitted to the hospital and had EP studies and had ablation, reprogramming of ICD and cardiology has seen and had a stress test.     ( His device was programmed DDDR lower rate is 60 max tracking and sensor rate 130.  His VT zone 180 bpm with a VF zone of 210 bpm.  The mode switch is 150 bpm. )    ER work-up: Sodium 130, potassium 3.9, CO2 27, BUN 8, creatinine 0.7, glucose 95, magnesium 1.9, albumin 3.7, bilirubin 0.7.  EKG showed paced rhythm.    Chest x-ray was unremarkable.,  Significant cardiomyopathy    ER has spoke with EP team, AICD was interrogated, they do not recommend any further testing nor recommendations.    I have spoke with cardiology team, they do not recommend any further cardiac work-up and therefore we will be observing patient overnight, trend troponin, telemetry, check labs in a.m. and discharge home in a.m.        Past Medical History:   Diagnosis Date   • AICD (automatic cardioverter/defibrillator) present    • H/O heart artery stent    • Heart attack (CMS/HCC) ([facility])    • V-tach (CMS/HCC) ([facility])        Past Surgical History:   Procedure Laterality Date   • ABLATION VT N/A 6/9/2020    Procedure: Ablation VT;  Surgeon: Wilfredo Montana MD;  Location: [facility] EP Lab;  Service: Electrophysiology;  Laterality: N/A;  Check with   "Nan in the a.m. regarding scheduling   • APPENDECTOMY         Family History   Problem Relation Age of Onset   • Heart attack Mother 62   • Other Mother         Abdominal Aortic Aneurysm   • Lung cancer Mother    • Colon cancer Father         Cause of death   • Diabetes Brother         Non-Insulin Dependent   • Heart attack Brother 51        w/ Stents   • Heart disease Brother    • Other Son         Well       Social History     Tobacco Use   • Smoking status: Light Tobacco Smoker     Years: 20.00     Types: Cigarettes   • Smokeless tobacco: Never Used   • Tobacco comment: 4 Cigarettes per day   Substance Use Topics   • Alcohol use: No     Comment: Quit drinking in 2007   • Drug use: Not on file       (Not in a hospital admission)      Allergies   Allergen Reactions   • Morphine      ANXIETY   • Tramadol      ANXIETY         Review of Systems:  Positive: AICD discharge  Negative: Fever, chills, cough, nausea, vomiting, chest pain, dyspnea at rest, melena, nausea, all systems reviewed were negative.      Physical Exam:  /69 (Patient Position: Head of bed 30 degrees or higher)   Pulse 60   Temp 36.6 °C (97.9 °F) (Oral)   Resp 23   Ht 1.803 m (5' 11\")   Wt 112.9 kg (248 lb 14.4 oz)   SpO2 94%   BMI 34.71 kg/m²      GEN: No acute distress, obese  HEENT: Pupils equal round reactive to light and accommodation.  Extraocular movements are intact.  Oropharynx clear with no erythema or exudate  Neck: Soft.  Nontender.  No palpable lymphadenopathy  Lungs: Clear to auscultation bilaterally without adventitious sounds appreciated  Heart: Regular rate and rhythm with normal S1 and S2  Abdomen: Soft. Nontender.  Normal active bowel sounds.  No hepatosplenomegaly.  No other masses appreciated.  Extremities: No clubbing cyanosis or edema  Skin: Normal skin turgor  Neurologic: Alert and oriented ×3.  CN II through XII are intact.  5 out of 5 motor strength throughout.  Nonfocal.          Assessment and Plan:    AICD " discharge: Had AICD interrogation, has sustained ventricular tachycardia, spoke with cardiology, no further cardiac work-up and have recent EP with ablation a week ago, observe overnight, trend troponin and check labs in a.m. and discharge home.    Essential hypertension: Continue Coreg 25 mg twice a day, Lasix 20 mg daily, and Aldactone 25 mg daily, lisinopril 5 mg daily, amiodarone 200 mg twice daily for 3 more weeks    Chronic systolic CHF with ischemic cardiomyopathy: Had AICD, continue medical management.  Effient 10 mg daily, Crestor 40 mg daily    Coronary artery disease: Severe coronary artery disease, last cardiac cath July 2017, last stress test was June 8, 2020 with significant scar tissue, no reversible ischemia    History of LV thrombus: Continue Xarelto 20 mg daily    Tobacco use disorder: Education given.    Obesity BMI 35    DVT prophylaxis    Plan of care: observing patient overnight, trend troponin, telemetry, check labs in a.m. and discharge home in a.m.          Electronically signed by: Eron Kang MD  6/16/2020  3:57 PM

## 2020-06-16 NOTE — CONSULTATION
"  [address] [place] [address]                                                   Electrophysiology Inpatient Consult Note    Subjective    Patient ID: Pete Trejo is a 54 y.o. male.    Reason for consult:   Chief Complaint   Patient presents with   • Dizziness     pt arrives to the ED from visiting his brother with reports of his pacemaker firing. pt reports while he was visiting his brother he stood up and  \"got a head rush\"  and sat down and then his pacemaker fired.        HPI   Patient is a pleasant 54-year-old male with past medical history significant for CAD status post PCI with stenting to diagonal and proximal LAD in 2009 and another PCI with stenting to totally occluded LAD in 2010, ischemic cardiomyopathy with most recent EF 25%, CVA in 2010, left mural thrombus, hypertension, and ventricular tachycardia status post CRT-D.  Patient was recently admitted from 6/7/2020- 6/10/2020 after having sustained VT on amiodarone.  Patient has had numerous ventricular arrhythmias as result of an extensive anterior wall scar involving much of the lateral and septal walls as well.  Therefore, he underwent VT ablation on 6/9/2020 by Dr. Montana.  He had a partially successful ablation of border zone areas of the scar especially the inferior apex and attempt to substrate modify his arrhythmia burden.  He was discharged on amiodarone 400 mg daily for 1 month.  He was reinitiated on Xarelto for his history of LV thrombus.    Patient presented to the emergency department today after getting a shock from his ICD.  He was visiting his brother and was feeling well at the time.  He stood up and \"got a head rush\" and sat back down which is when his ICD shocked him.  He did not lose consciousness and felt fine afterwards but was concerned, so he asked his brother to drive him to the hospital.  Electrophysiology was consulted.    Patient states he has been in his usual state of health.  He " has been feeling significantly better recently.  He cannot identify a trigger for his episode today.  He has not been very active other than taking short walks due to concern for his ICD firing.  He has been taking his medications as prescribed. EKG today shows AV dual paced rhythm at 60 bpm, no significant ST or T wave changes.  Vital signs are stable.  Electrolytes, kidney function, and WBCs are normal.  His initial high-sensitivity troponin was 86.  His delta from 0-hour was 22.2 and then 50.5.  His device was interrogated which showed at 10:46 AM, patient had ventricular tachycardia at 178 bpm and received ATP x4 followed by a 41 J shock when he reached the VF zone at 245 bpm.  Chest x-ray was negative for any acute abnormality.  Patient denies chest pain, dyspnea, lightheadedness, dizziness, palpitations, presyncopal or syncopal episodes, or edema.  No PND or orthopnea.  He denies signs or symptoms of bleeding.      Specialty Problems        Cardiology Problems    Anterior myocardial infarction (CMS/HCC) ([facility])        Anteroseptal myocardial infarction (CMS/HCC) ([facility])        CAD (coronary artery disease)        Congestive heart failure (CHF) (CMS/HCC) ([facility])        Hyperlipidemia        Ischemic cardiomyopathy        Ventricular tachycardia (CMS/HCC) ([facility])        VT (ventricular tachycardia) (CMS/[facility]) ([facility])        Coronary artery disease              Past Medical History:   Diagnosis Date   • AICD (automatic cardioverter/defibrillator) present    • H/O heart artery stent    • Heart attack (CMS/HCC) ([facility])    • V-tach (CMS/HCC) ([facility])        Past Surgical History:   Procedure Laterality Date   • ABLATION VT N/A 6/9/2020    Procedure: Ablation VT;  Surgeon: Wilfredo Montana MD;  Location: [facility] EP Lab;  Service: Electrophysiology;  Laterality: N/A;  Check with Dr. Montana in the a.m. regarding scheduling   • APPENDECTOMY         Allergies as of 06/16/2020 - Reviewed 06/16/2020   Allergen Reaction Noted   • Morphine   07/30/2017   • Tramadol  07/30/2017       No current facility-administered medications for this encounter.     Family History   Problem Relation Age of Onset   • Heart attack Mother 62   • Other Mother         Abdominal Aortic Aneurysm   • Lung cancer Mother    • Colon cancer Father         Cause of death   • Diabetes Brother         Non-Insulin Dependent   • Heart attack Brother 51        w/ Stents   • Heart disease Brother    • Other Son         Well       Social History     Tobacco Use   • Smoking status: Light Tobacco Smoker     Years: 20.00     Types: Cigarettes   • Smokeless tobacco: Never Used   • Tobacco comment: 4 Cigarettes per day   Substance Use Topics   • Alcohol use: No     Comment: Quit drinking in 2007   • Drug use: Not on file       Review of Systems  Review of Systems   Constitution: Negative for chills, diaphoresis, fever and malaise/fatigue.   HENT: Negative for nosebleeds.    Eyes: Negative.    Cardiovascular: Negative for chest pain, dyspnea on exertion, irregular heartbeat, leg swelling, near-syncope, orthopnea, palpitations, paroxysmal nocturnal dyspnea and syncope.        ICD shock this morning   Respiratory: Negative for cough, shortness of breath, snoring and wheezing.    Endocrine: Negative.    Hematologic/Lymphatic: Does not bruise/bleed easily.   Skin: Negative.    Musculoskeletal: Negative for falls, muscle weakness and myalgias.   Gastrointestinal: Negative for abdominal pain, constipation, diarrhea, dysphagia, heartburn, hematochezia, nausea and vomiting.   Genitourinary: Negative for hematuria.   Neurological: Negative for dizziness, headaches and light-headedness.   Psychiatric/Behavioral: Negative.        Objective     Vital signs in last 24 hours:   Temp:  [36.6 °C (97.9 °F)] 36.6 °C (97.9 °F)  Heart Rate:  [60] 60  Resp:  [11-22] 11  BP: (109-137)/(50-83) 133/80  Weight: 112.9 kg (248 lb 14.4 oz)  Intake/Output last 3 shifts:  No intake/output data recorded.  Intake/Output this  shift:  No intake/output data recorded.    Physical Exam   Constitutional: He is oriented to person, place, and time. He appears well-developed and well-nourished. No distress.   HENT:   Head: Normocephalic.   Nose: No epistaxis.   Neck: Normal range of motion. No JVD present. Carotid bruit is not present.   Cardiovascular: Normal rate, regular rhythm, normal heart sounds and intact distal pulses. Exam reveals no gallop and no friction rub.   No murmur heard.  Left upper chest device site without signs of infection or erosion   Pulmonary/Chest: Effort normal and breath sounds normal. He has no wheezes. He has no rales.   Abdominal: Soft. Bowel sounds are normal.   Musculoskeletal:         General: No edema.   Neurological: He is alert and oriented to person, place, and time.   Skin: Skin is warm, dry and intact. He is not diaphoretic.   Psychiatric: He has a normal mood and affect. His behavior is normal.   Vitals reviewed.      Labs:  BMP:  Lab Results   Component Value Date     06/16/2020    K 3.9 06/16/2020     06/16/2020    CO2 27 06/16/2020    BUN 8 06/16/2020    CREATININE 0.72 06/16/2020    GLUCOSE 95 06/16/2020    CALCIUM 8.7 06/16/2020     CBC:   Lab Results   Component Value Date    WBC 5.5 06/16/2020    RBC 4.31 06/16/2020    HGB 13.7 06/16/2020    HCT 39.6 06/16/2020     06/16/2020     CMP:  Lab Results   Component Value Date     06/16/2020    K 3.9 06/16/2020     06/16/2020    CO2 27 06/16/2020    GLUCOSE 95 06/16/2020    CREATININE 0.72 06/16/2020    CALCIUM 8.7 06/16/2020    ALBUMIN 3.7 06/16/2020    ALKPHOS 69 06/16/2020    BILITOT 0.71 06/16/2020    ALT 25 06/16/2020    AST 13 06/16/2020    BUN 8 06/16/2020    ANIONGAP 6 06/16/2020     Lab Results   Component Value Date     (H) 06/16/2020     Lipid: No results found for: CHOL, HDL, TRIG, LDLCALC  TSH:  No results found for: TSH  Magnesium:  Lab Results   Component Value Date    MG 1.9 06/16/2020     PT/INR:    Lab Results   Component Value Date    PT 14.9 (H) 06/16/2020    INR 1.3 (H) 06/16/2020       Testing:  Us Echo Complete    Result Date: 6/8/2020  Narrative: · Severe left ventricular systolic dysfunction. Multiple wall motion and Audiss as described below. Severe ischemic cardiomyopathy. EF 25%. · The left atrium is moderately dilated. · The right atrium is mildly dilated. · Normal central venous pressure (0-5 mmHg). · No pericardial effusion. · Mild tricuspid regurgitation. · Grade I (mild) left ventricular diastolic abnormality. · Minimally elevated left ventricular end-diastolic pressure. · There is no evidence of pulmonary hypertension. Estimated PA pressure 29 mmHg. · No significant aortic or mitral valve disease.      Xr Chest Portable 1 View    Result Date: 6/16/2020  Narrative: Chest x-ray one view- AP upright portable 1214 06/16/2020 Clinical History:  chest pain. Comparison/s: 6/7/2020 Findings: AICD and pacer wires. Given the positioning and degree of inspiration, mild cardiomegaly stable. Pulmonary vessels are normal. Right-sided pleural thickening stable. Lungs are clear. Bones are unremarkable.     Impression: IMPRESSION: 1.  Cardiomegaly stable. Nothing acute.    Xr Chest Portable 1 View    Result Date: 6/7/2020  Narrative: Exam: Portable chest, single view on 06/07/2020 at 0938 hours Clinical History: Chest pain, ventricular tachycardia. Comparison(s): Multiple previous studies, most recent chest x-ray 7/26/2017. Findings: There is a multilead left subclavian pacemaker-defibrillator. Heart size upper limits normal for technique. There is mild vascular engorgement and mild peribronchial and perihilar interstitial thickening. Increase interstitial prominence in the parahilar lung bilaterally, right greater than left. No evidence of pleural effusion.     Impression: IMPRESSION: Vascular congestion with mild to moderate parahilar interstitial changes, likely edema.        Assessment:  Active  Problems:    Ventricular tachycardia (CMS/HCC) (HCC)    CAD (coronary artery disease)    Ischemic cardiomyopathy    Hypertension    Automatic implantable cardioverter-defibrillator in situ    On amiodarone therapy    Tobacco use       Problem List Items Addressed This Visit     None           Plan:  1.  Ventricular tachycardia: Status post partially successful VT ablation on 6/9/2020.  Device was interrogated and showed patient received ATP x4 followed by 41 J shock this morning which was appropriate for ventricular arrhythmia.  He did not lose consciousness and felt fine afterwards.  No electrolyte abnormalities.  He has extensive anterior wall scar involving much of the lateral and septal walls as well.  Continue amiodarone 200 mg twice daily and carvedilol 25 mg twice daily.  I reviewed with the patient when he should call the device clinic or when he should come to the ED after getting a shock.    2.  Ischemic cardiomyopathy: Most recent EF was 25% on echocardiogram on 6/8/2020.  He has a CRT-D device in place.  He is on guideline directed medical therapy.  He is also taking Xarelto for akinetic/dyskinetic LV apex with history of LV thrombus.    3.  CAD as described above: He denies signs or symptoms of ischemia including no chest pain.  EKG is without significant ST or T wave changes.  Lexiscan Cardiolite stress test 8 days ago showed extensive infarction overlying almost all segments of left ventricle with only segments and normal perfusion in the inferior wall, basilar lateral, basilar septum, basilar inferior wall.  Troponins are elevated with delta from 0-hour 22.2 and then 50.5.  I reviewed patient's case with Dr. Apple who does not feel any intervention is needed at this time.  Troponin elevation likely in the setting of ICD shock.    Recommend patient keep his appointment with Dr. Montana on 7/8/2020.    Patient was seen in conjunction with Dr. Montana.    Electronically signed by: Donya Parra  CNP  6/16/2020  3:22 PM      A voice recognition program was used to aid in documentation of this record.  Sometimes words are not printed exactly as they were spoken.  While efforts were made to carefully edit and correct any inaccuracies, some errors may be present; please take these into context.  Please contact the provider if errors are identified.

## 2020-06-16 NOTE — ED PROVIDER NOTES
"    HPI:  Chief Complaint   Patient presents with   • Dizziness     pt arrives to the ED from visiting his brother with reports of his pacemaker firing. pt reports while he was visiting his brother he stood up and  \"got a head rush\"  and sat down and then his pacemaker fired.        HPI  Patient is a 54-year-old male who was out visiting his brother.  He states he has a history of V. tach, V. tach ablation, AICD implanted.  Is recently been seen by EP Dr. Sandoval.  He states he felt \"coming on\" it felt similar to when he had been shocked before.  He felt warm hot flushed and a little weak.  Went to stand up and got \"head rush\" had to sit back down and then shocked by his defibrillator.  He is sure it is felt like previous shocks.  Not the first time it has happened.  He does have a history of coronary artery disease.  Significant cardiomyopathy.  He was here about 9 days ago.  Denies any recent illness cough congestion sore throat or fever.  No Covid-19 exposures.  No recent travel.  No leg swelling or calf tenderness.  Has only been around immediate family.  No chest pain now.  No palpitations now.  No abdominal pain.  Nausea has resolved.  Diaphoresis and feeling of hot flash has resolved.  Currently he states he is asymptomatic.  He is not on anything that brings the symptoms on.  Is not anything makes it better or worse.  The events so far have been random.  Leave this device is a [employer].  We will proceed with interrogation.      HISTORY:  Past Medical History:   Diagnosis Date   • AICD (automatic cardioverter/defibrillator) present    • H/O heart artery stent    • Heart attack (CMS/HCC) ([facility])    • V-tach (CMS/HCC) ([facility])        Past Surgical History:   Procedure Laterality Date   • ABLATION VT N/A 6/9/2020    Procedure: Ablation VT;  Surgeon: Wilfredo Montana MD;  Location: [facility] EP Lab;  Service: Electrophysiology;  Laterality: N/A;  Check with Dr. Montana in the a.m. regarding scheduling   • " APPENDECTOMY         Family History   Problem Relation Age of Onset   • Heart attack Mother 62   • Other Mother         Abdominal Aortic Aneurysm   • Lung cancer Mother    • Colon cancer Father         Cause of death   • Diabetes Brother         Non-Insulin Dependent   • Heart attack Brother 51        w/ Stents   • Heart disease Brother    • Other Son         Well       Social History     Tobacco Use   • Smoking status: Light Tobacco Smoker     Years: 20.00     Types: Cigarettes   • Smokeless tobacco: Never Used   • Tobacco comment: 4 Cigarettes per day   Substance Use Topics   • Alcohol use: No     Comment: Quit drinking in 2007   • Drug use: Not on file         ROS:  Review of Systems   Constitutional: Positive for diaphoresis. Negative for chills, fatigue and fever.   HENT: Negative for congestion, ear pain, rhinorrhea and sore throat.    Eyes: Negative for pain.   Respiratory: Negative for cough and shortness of breath.    Cardiovascular: Negative for chest pain and leg swelling.   Gastrointestinal: Negative for abdominal pain, blood in stool, diarrhea, nausea and vomiting.   Genitourinary: Negative for dysuria, flank pain and hematuria.   Musculoskeletal: Negative for arthralgias, back pain, myalgias and neck pain.   Skin: Negative for rash.   Neurological: Positive for dizziness, weakness and light-headedness. Negative for headaches.   Hematological: Does not bruise/bleed easily.   Psychiatric/Behavioral: Negative for agitation and confusion.   All other systems reviewed and are negative.      PE:  ED Triage Vitals   Temp Heart Rate Resp BP SpO2   06/16/20 1107 06/16/20 1107 06/16/20 1107 06/16/20 1107 06/16/20 1107   36.6 °C (97.9 °F) 60 15 128/83 93 %      Temp Source Heart Rate Source Patient Position BP Location FiO2 (%)   06/16/20 1107 -- 06/16/20 1112 -- --   Oral  Head of bed 30 degrees or higher         Physical Exam  Vitals signs and nursing note reviewed.   Constitutional:       General: He is not in  acute distress.     Appearance: He is well-developed. He is not toxic-appearing.   HENT:      Head: Normocephalic and atraumatic.   Eyes:      Extraocular Movements: Extraocular movements intact.      Conjunctiva/sclera: Conjunctivae normal.      Pupils: Pupils are equal, round, and reactive to light.   Neck:      Musculoskeletal: Normal range of motion and neck supple.   Cardiovascular:      Rate and Rhythm: Normal rate and regular rhythm.      Heart sounds: No murmur.   Pulmonary:      Effort: Pulmonary effort is normal. No respiratory distress.      Breath sounds: Normal breath sounds. No stridor. No wheezing.   Abdominal:      General: There is no distension.      Palpations: Abdomen is soft.      Tenderness: There is no abdominal tenderness.   Musculoskeletal: Normal range of motion.         General: No tenderness or deformity.      Right lower leg: No edema.      Left lower leg: No edema.   Skin:     General: Skin is warm and dry.      Capillary Refill: Capillary refill takes less than 2 seconds.   Neurological:      Mental Status: He is alert and oriented to person, place, and time.   Psychiatric:         Mood and Affect: Mood normal.         Behavior: Behavior normal.         Thought Content: Thought content normal.         Judgment: Judgment normal.         ED LABS:  Labs Reviewed   CBC WITH AUTO DIFFERENTIAL - Abnormal       Result Value    WBC 5.5      RBC 4.31      Hemoglobin 13.7      Hematocrit 39.6      MCV 91.8      MCH 31.8      MCHC 34.6      RDW 14.2      Platelets 213      MPV 8.0      Neutrophils% 48      Lymphocytes% 37      Monocytes% 9      Eosinophils% 4 (*)     Basophils% 1      Neutrophils Absolute 2.60      Lymphocytes Absolute 2.00      Monocytes Absolute 0.50      Eosinophils Absolute 0.20      Basophils Absolute 0.10     PROTIME-INR - Abnormal    Protime 14.9 (*)     INR 1.3 (*)    B-TYPE NATRIURETIC PEPTIDE (BNP) - Abnormal     (*)    URINALYSIS, DIPSTICK ONLY, FOR USE WITH  MICROSCOPIC PANEL - Abnormal    Color, Urine Yellow      Clarity, Urine Clear      Specific Gravity, Urine 1.005      Leukocytes, Urine Negative      Nitrite, Urine Negative      Protein, Urine 30  (*)     Ketones, Urine Negative      Urobilinogen, Urine 2.0 (*)     Bilirubin, Urine Negative      Blood, Urine Negative      Glucose, Urine Negative      pH, Urine 7.0     HIGH SENSITIVE TROPONIN I - Abnormal    hsTnI 0 Hour 86.0 (*)    HIGH SENSITIVE TROPONIN I, 1 HOUR - Abnormal    hsTnI 1 hr 108.2 (*)     Delta from 0 Hour 22.2 (*)    HIGH SENSITIVE TROPONIN I, 2 HOUR - Abnormal    hsTnI 2 hr 136.5 (*)     Delta from 0 Hour 50.5 (*)    PTT (ACTIVATED PARTIAL THROMBOPLASTIN TIME) - Normal    PTT 36.1     COMPREHENSIVE METABOLIC PANEL - Normal    Sodium 138      Potassium 3.9      Chloride 105      CO2 27      Anion Gap 6      BUN 8      Creatinine 0.72      Glucose 95      Calcium 8.7      AST 13      ALT (SGPT) 25      Alkaline Phosphatase 69      Total Protein 6.2      Albumin 3.7      Total Bilirubin 0.71      eGFR 107      Corrected Calcium 8.9      Narrative:     Estimated GFR calculated using the 2009 CKD-EPI creatinine equation.   LIPASE - Normal    Lipase 12     MAGNESIUM - Normal    Magnesium 1.9     URINALYSIS, MICROSCOPIC ONLY - Normal    RBC, Urine 0-2      WBC, Urine 0-4      Squamous Epithelial, Urine Negative      Bacteria, Urine None seen     URINALYSIS WITH MICROSCOPIC    Narrative:     The following orders were created for panel order Urinalysis w/microscopic Urine, Clean Catch.  Procedure                               Abnormality         Status                     ---------                               -----------         ------                     Urinalysis, microscopic U...[[id number]]  Normal              Final result               Urinalysis, dipstick Urin...[[id number]]  Abnormal            Final result                 Please view results for these tests on the individual orders.   HIGH SENSITIVE  TROPONIN I PANEL (0HR, 1HR, 2HR)    Narrative:     The following orders were created for panel order HS Troponin I Panel (0HR, 1HR, 2HR) Blood, Venous.  Procedure                               Abnormality         Status                     ---------                               -----------         ------                     HS Troponin I[[id number]]                 Abnormal            Final result               1HR High Sensitive Trop I[[id number]]     Abnormal            Final result               2HR High Sensitive Tropon...[[id number]]  Abnormal            Final result                 Please view results for these tests on the individual orders.         ED IMAGES:  XR chest portable 1 view   Final Result   IMPRESSION:   1.  Cardiomegaly stable. Nothing acute.          ED PROCEDURES:  ECG 12 lead, Chest Pain    Date/Time: 6/16/2020 12:09 PM  Performed by: Ruthy Montgomery MD  Authorized by: Ruthy Montgomery MD     ECG reviewed by ED Physician in the absence of a cardiologist: yes    Previous ECG:     Previous ECG:  Compared to current    Similarity:  No change  Interpretation:     Interpretation: non-specific    Comments:      Atrial ventricular dual paced rhythm, rate of 60, no ST elevations, unchanged from previous dated 6/9/2020        ED COURSE:            MDM:  MDM  54-year-old male arrives after his AICD fired today.  He had symptoms just prior to this concerning for dysrhythmia.  We will proceed with pacemaker interrogation, full cardiac evaluation, check electrolytes magnesium etc.  Chest x-ray.    [employer] is called me they state the rhythm leading up to the defibrillation was V. fib.  Rate over 200.  Patient is asymptomatic.  First troponin is elevated.  He did receive defibrillation by his AICD today.  We will continue to trend.  EP cardiology has been consulted.    Practitioner for EP cardiology states to have reviewed the tracings exceptor and travayl information.  From their standpoint  there is nothing else to do.  Continue on his routine amiodarone.  However troponin is elevated.  She states  she will discuss this with Dr. Montana. AISHA Parra states they are not concerned with the elevated troponin though the second troponin shows increase over the first, delta of 22.  She states she is low again reviewed with cardiology.    NP Demetris states that the being Dr. Angel Apple or not concerned however the third troponin continues to rise.  With trending up troponins now x3 do not see how patient can be discharged without further observation.  Hospitalist has been consulted for ongoing observation overnight and further evaluation.  Dr. Kang for observation CIU tele        Final diagnoses:   [I49.01] VF (ventricular fibrillation) (CMS/[facility]) ([facility])   [Z45.02] AICD discharge   [R79.89] Elevated troponin           A voice recognition program was used to aid in documentation of this record.  Sometimes words are not printed exactly as they were spoken.  While efforts were made to carefully edit and correct any inaccuracies, some areas may be present; please take these into context.  Please contact the physician if areas are identified.        Ruthy Montgomery MD  06/16/20 2082

## 2020-06-16 NOTE — MEDICATION HISTORY SPECIALIST NOTES
[facility] ED-[id number]    CSN: [id number]  : [id number]    Information sources:  EPIC  Complete Dispense Report    Allergies verified.    Patient/Girlfriend interviewed via phone who provided all history. Patient/Girlfriend verified medications and provided last doses. Verified with Complete Dispense Report.    **High alert medications**  Rivoroxaban (Xarelto)    See  for medication resources.

## 2020-06-17 VITALS
TEMPERATURE: 98.8 F | RESPIRATION RATE: 20 BRPM | HEART RATE: 61 BPM | SYSTOLIC BLOOD PRESSURE: 137 MMHG | BODY MASS INDEX: 33.67 KG/M2 | DIASTOLIC BLOOD PRESSURE: 71 MMHG | WEIGHT: 240.52 LBS | HEIGHT: 71 IN | OXYGEN SATURATION: 96 %

## 2020-06-17 LAB
ALBUMIN SERPL-MCNC: 4.2 G/DL (ref 3.5–5.3)
ALP SERPL-CCNC: 78 U/L (ref 45–115)
ALT SERPL-CCNC: 28 U/L (ref 7–52)
ANION GAP SERPL CALC-SCNC: 4 MMOL/L (ref 3–11)
AST SERPL-CCNC: 14 U/L
BASOPHILS # BLD AUTO: 0.1 10*3/UL
BASOPHILS NFR BLD AUTO: 1 % (ref 0–2)
BILIRUB SERPL-MCNC: 1.07 MG/DL (ref 0.2–1.4)
BUN SERPL-MCNC: 9 MG/DL (ref 7–25)
CALCIUM ALBUM COR SERPL-MCNC: 9 MG/DL (ref 8.6–10.3)
CALCIUM SERPL-MCNC: 9.2 MG/DL (ref 8.6–10.3)
CHLORIDE SERPL-SCNC: 106 MMOL/L (ref 98–107)
CO2 SERPL-SCNC: 26 MMOL/L (ref 21–32)
CREAT SERPL-MCNC: 0.76 MG/DL (ref 0.7–1.3)
EOSINOPHIL # BLD AUTO: 0.2 10*3/UL
EOSINOPHIL NFR BLD AUTO: 2 % (ref 0–3)
ERYTHROCYTE [DISTWIDTH] IN BLOOD BY AUTOMATED COUNT: 13.9 % (ref 11.5–15)
GFR SERPL CREATININE-BSD FRML MDRD: 104 ML/MIN/1.73M*2
GLUCOSE SERPL-MCNC: 117 MG/DL (ref 70–105)
HCT VFR BLD AUTO: 43.1 % (ref 38–50)
HGB BLD-MCNC: 14.7 G/DL (ref 13.2–17.2)
LYMPHOCYTES # BLD AUTO: 1.8 10*3/UL
LYMPHOCYTES NFR BLD AUTO: 27 % (ref 15–47)
MCH RBC QN AUTO: 32.2 PG (ref 29–34)
MCHC RBC AUTO-ENTMCNC: 34.1 G/DL (ref 32–36)
MCV RBC AUTO: 94.4 FL (ref 82–97)
MONOCYTES # BLD AUTO: 0.6 10*3/UL
MONOCYTES NFR BLD AUTO: 8 % (ref 5–13)
NEUTROPHILS # BLD AUTO: 4.2 10*3/UL
NEUTROPHILS NFR BLD AUTO: 62 % (ref 46–70)
PLATELET # BLD AUTO: 223 10*3/UL (ref 130–350)
PMV BLD AUTO: 8.2 FL (ref 6.9–10.8)
POTASSIUM SERPL-SCNC: 4.2 MMOL/L (ref 3.5–5.1)
PROT SERPL-MCNC: 6.9 G/DL (ref 6–8.3)
RBC # BLD AUTO: 4.56 10*6/ΜL (ref 4.1–5.8)
SODIUM SERPL-SCNC: 136 MMOL/L (ref 135–145)
WBC # BLD AUTO: 6.8 10*3/UL (ref 3.7–9.6)

## 2020-06-17 PROCEDURE — 36415 COLL VENOUS BLD VENIPUNCTURE: CPT | Performed by: INTERNAL MEDICINE

## 2020-06-17 PROCEDURE — 85025 COMPLETE CBC W/AUTO DIFF WBC: CPT | Performed by: INTERNAL MEDICINE

## 2020-06-17 PROCEDURE — 80053 COMPREHEN METABOLIC PANEL: CPT | Performed by: INTERNAL MEDICINE

## 2020-06-17 PROCEDURE — 6370000100 HC RX 637 (ALT 250 FOR IP): Performed by: HOSPITALIST

## 2020-06-17 PROCEDURE — 99217 *NO LONGER ACTIVE 2023 DO NOT USE* PR OBSERVATION CARE DISCHARGE MANAGEMENT: CPT | Performed by: INTERNAL MEDICINE

## 2020-06-17 PROCEDURE — G0378 HOSPITAL OBSERVATION PER HR: HCPCS

## 2020-06-17 RX ADMIN — AMIODARONE HYDROCHLORIDE 200 MG: 200 TABLET ORAL at 09:36

## 2020-06-17 RX ADMIN — PRASUGREL 10 MG: 10 TABLET, FILM COATED ORAL at 09:36

## 2020-06-17 RX ADMIN — SPIRONOLACTONE 25 MG: 25 TABLET ORAL at 09:36

## 2020-06-17 RX ADMIN — CARVEDILOL 25 MG: 25 TABLET, FILM COATED ORAL at 09:36

## 2020-06-17 RX ADMIN — FUROSEMIDE 20 MG: 20 TABLET ORAL at 09:36

## 2020-06-17 RX ADMIN — LISINOPRIL 5 MG: 5 TABLET ORAL at 09:36

## 2020-06-17 NOTE — CROSS COVER NOTE
Reviewed patient's telemetry with no concerning findings or incidence of VF or VT.  Recommend continuing with current plan and will follow-up as an outpatient at previously scheduled appointment.

## 2020-06-17 NOTE — PLAN OF CARE
Problem: Safety Adult  Goal: Patient will remain safe during hospitalization  Description: INTERVENTIONS    1. Assess patient for fall risk and implement interventions if needed  2. Use safe transport techniques  3. Assess patient using the appropriate Jean Marie skin assessment scale  4. Assess patient for risk of aspiration  5. Assess patient for risk of elopement  6. Assess patient for risk of suicide  Outcome: Progressing

## 2020-06-17 NOTE — DISCHARGE SUMMARY
Inpatient Discharge Summary    BRIEF OVERVIEW    Admitting Provider: Eron Kang MD  Discharge Provider: Jack Hammond MD  Consultations: Cardiology/EP  Primary Care Physician at Discharge: SHAHID SILVA [phone]      Code Status: Full Code    Admission Date: 6/16/2020     Discharge Date: 6/17/2020    Primary Discharge Diagnosis  Ventricular tachycardia    Secondary Discharge Diagnosis  Ischemic cardiomyopathy  AICD discharge  Essential hypertension  History of LV thrombus on Xarelto  Tobacco abuse  Obesity BMI 33    PAST MEDICAL HISTORY  Past Medical History:   Diagnosis Date   • AICD (automatic cardioverter/defibrillator) present    • H/O heart artery stent    • Heart attack (CMS/HCC) ([facility])    • V-tach (CMS/HCC) ([facility])        PAST SURGICAL HISTORY  Past Surgical History:   Procedure Laterality Date   • ABLATION VT N/A 6/9/2020    Procedure: Ablation VT;  Surgeon: Wilfredo Montana MD;  Location: [facility] EP [facility];  Service: Electrophysiology;  Laterality: N/A;  Check with Dr. Montana in the a.m. regarding scheduling   • APPENDECTOMY         SOCIAL HISTORY  Social History     Socioeconomic History   • Marital status: Single     Spouse name: Not on file   • Number of children: Not on file   • Years of education: Not on file   • Highest education level: Not on file   Occupational History   • Not on file   Social Needs   • Financial resource strain: Not on file   • Food insecurity     Worry: Not on file     Inability: Not on file   • Transportation needs     Medical: Not on file     Non-medical: Not on file   Tobacco Use   • Smoking status: Light Tobacco Smoker     Years: 20.00     Types: Cigarettes   • Smokeless tobacco: Never Used   • Tobacco comment: 4 Cigarettes per day   Substance and Sexual Activity   • Alcohol use: No     Comment: Quit drinking in 2007   • Drug use: Not on file   • Sexual activity: Not on file   Lifestyle   • Physical activity     Days per week: Not on file     Minutes per session: Not on file    • Stress: Not on file   Relationships   • Social connections     Talks on phone: Not on file     Gets together: Not on file     Attends Church service: Not on file     Active member of club or organization: Not on file     Attends meetings of clubs or organizations: Not on file     Relationship status: Not on file   • Intimate partner violence     Fear of current or ex partner: Not on file     Emotionally abused: Not on file     Physically abused: Not on file     Forced sexual activity: Not on file   Other Topics Concern   • Not on file   Social History Narrative   • Not on file       FAMILY HISTORY:  family history includes Colon cancer in his father; Diabetes in his brother; Heart attack (age of onset: 51) in his brother; Heart attack (age of onset: 62) in his mother; Heart disease in his brother; Lung cancer in his mother; Other in his mother and son.    ALLERGIES:  Allergies   Allergen Reactions   • Morphine      ANXIETY   • Tramadol      ANXIETY       MEDICATIONS:   Pete Trejo   Home Medication Instructions MIRIAM:[id number]    Printed on:06/17/20 8506   Medication Information                      acetaminophen (TYLENOL) 325 mg tablet  Take 2 tablets (650 mg total) by mouth every 4 (four) hours as needed for pain scale 1-3/10 for up to 10 days Indications: headache             amiodarone (PACERONE) 200 mg tablet  Take 1 tablet (200 mg total) by mouth 2 (two) times a day After 1 month decrease to 1 tablet daily             carvedilol (COREG) 25 mg tablet  Take 25 mg by mouth 2 (two) times a day with meals.             furosemide (LASIX) 20 mg tablet  Take 20 mg by mouth daily               lisinopriL (PRINIVIL,ZESTRIL) 5 mg tablet  Take 5 mg by mouth 2 (two) times a day               nitroglycerin (NITROSTAT) 0.4 mg SL tablet  Place 0.4 mg under the tongue every 5 (five) minutes as needed for chest pain.             pantoprazole (PROTONIX) 40 mg EC tablet  Take 40 mg by mouth daily.              prasugrel (EFFIENT) 10 mg tablet  Take 1 tablet (10 mg total) by mouth daily.             rivaroxaban (XARELTO) 20 mg tablet  Take 1 tablet (20 mg total) by mouth daily.             rosuvastatin (CRESTOR) 40 mg tablet  Take 40 mg by mouth nightly               spironolactone (ALDACTONE) 25 mg tablet  Take 25 mg by mouth daily.                  DISCHARGE DISPOSITION  Code Status at Discharge: Full Code    Diet: Cardiac low-salt diet  Activity: As tolerated    ACTIVE ISSUES REQUIRING FOLLOW-UP  Follow-up Appointments Arranged: Yes        DETAILS OF HOSPITAL STAY    Presenting Problem/History of Present Illness  VF (ventricular fibrillation) (CMS/[facility]) ([facility]) [I49.01]  Elevated troponin [R79.89]  AICD discharge [Z45.02]      Hospital Course  54-year-old male with a past medical history of partially successful VT ablation 6/9/2020 presented with dizziness and felt that his pacemaker had fired.  Device interrogation revealed the patient received ATP x4 followed by 41 J shock for ventricular arrhythmia.  No loss of consciousness.  Potassium 4.2, magnesium 1.9, TSH normal at 0.627.  Echo from 6/8/2020 revealed ejection fraction 25% with grade 1 diastolic abnormality.  No pulmonary hypertension with PA pressure 29 mmHg.  No significant valvular disease.  He has an extensive anterior wall scar involving much of his lateral and septal walls.  Seen by cardiology.  Telemetry reviewed and there is no concerning findings overnight with no incidence of VF or VT.  Cardiology recommendations are to continue amiodarone 200 mg twice daily and carvedilol 25 mg twice daily.  Patient has an appointment with Dr. Montana 7/8/2020 and he has agreed to keep this appointment.    History of LV thrombus on Xarelto.  Compliant with therapy.    History of tobacco use disorder.  Tobacco cessation counseling provided.        Important tests on this admission:  XR chest portable 1 view   Final Result   IMPRESSION:   1.  Cardiomegaly stable. Nothing  acute.          Us Echo Complete    Result Date: 6/8/2020  Narrative: · Severe left ventricular systolic dysfunction. Multiple wall motion and Audiss as described below. Severe ischemic cardiomyopathy. EF 25%. · The left atrium is moderately dilated. · The right atrium is mildly dilated. · Normal central venous pressure (0-5 mmHg). · No pericardial effusion. · Mild tricuspid regurgitation. · Grade I (mild) left ventricular diastolic abnormality. · Minimally elevated left ventricular end-diastolic pressure. · There is no evidence of pulmonary hypertension. Estimated PA pressure 29 mmHg. · No significant aortic or mitral valve disease.      Xr Chest Portable 1 View    Result Date: 6/16/2020  Narrative: Chest x-ray one view- AP upright portable 1214 06/16/2020 Clinical History:  chest pain. Comparison/s: 6/7/2020 Findings: AICD and pacer wires. Given the positioning and degree of inspiration, mild cardiomegaly stable. Pulmonary vessels are normal. Right-sided pleural thickening stable. Lungs are clear. Bones are unremarkable.     Impression: IMPRESSION: 1.  Cardiomegaly stable. Nothing acute.    Xr Chest Portable 1 View    Result Date: 6/7/2020  Narrative: Exam: Portable chest, single view on 06/07/2020 at 0938 hours Clinical History: Chest pain, ventricular tachycardia. Comparison(s): Multiple previous studies, most recent chest x-ray 7/26/2017. Findings: There is a multilead left subclavian pacemaker-defibrillator. Heart size upper limits normal for technique. There is mild vascular engorgement and mild peribronchial and perihilar interstitial thickening. Increase interstitial prominence in the parahilar lung bilaterally, right greater than left. No evidence of pleural effusion.     Impression: IMPRESSION: Vascular congestion with mild to moderate parahilar interstitial changes, likely edema.        Labs:    Results from last 4 days   Lab Units 06/17/20  0835 06/16/20  1227   WBC AUTO 10*3/uL 6.8 5.5   HEMOGLOBIN  g/dL 14.7 13.7   HEMATOCRIT % 43.1 39.6   PLATELETS AUTO 10*3/uL 223 213       Results from last 4 days   Lab Units 06/17/20  0835 06/16/20  1227   SODIUM mmol/L 136 138   POTASSIUM mmol/L 4.2 3.9   CHLORIDE mmol/L 106 105   CO2 mmol/L 26 27   BUN mg/dL 9 8   CREATININE mg/dL 0.76 0.72   CALCIUM mg/dL 9.2 8.7   TOTAL PROTEIN g/dL 6.9 6.2   BILIRUBIN TOTAL mg/dL 1.07 0.71   ALK PHOS U/L 78 69   ALT U/L 28 25   AST U/L 14 13   GLUCOSE mg/dL 117* 95       Lab Results   Component Value Date    CALCIUM 9.2 06/17/2020       Results from last 4 days   Lab Units 06/16/20  1227   APTT SECONDS 36.1   INR  1.3*         Lab Results   Component Value Date    CHOL 127 06/08/2020     Lab Results   Component Value Date    HDL 29 (L) 06/08/2020     Lab Results   Component Value Date    LDLCALC 76 06/08/2020     Lab Results   Component Value Date    TRIG 112 06/08/2020       I have reviewed all the lab results.      Physical Exam at Discharge  Discharge Condition: good  Heart Rate: 61  Resp: 20  BP: 137/71  Temp: 37.1 °C (98.8 °F)  Weight: 109.1 kg (240 lb 8.4 oz)      Patient consented to exam.  General: Appears comfortable and in no distress.   Neuro: No new focal deficits observed.  No motor or sensory deficits observed.  Cranial nerves intact.  Psychiatric: No hallucinations or delusions.   ENT: MMM and no acute oral pathology detected.  CVS: S1 S2 with equal pulses.  Resp: CTABL with good inspiratory effort.  Abdomen: SNT with +BS. No guarding, rebound or tenderness.  Extremities: Good perfusion.  Radial pulses intact.        A voice recognition program was used to aid in documentation of this record. Sometimes words are not printed exactly as they were spoken.  While efforts were made to carefully edit and correct any inaccuracies, some areas may be present; please take these into context.  Please contact the provider if areas are identified.    Jack Hammond MD

## 2020-06-26 ENCOUNTER — HOSPITAL ENCOUNTER (EMERGENCY)
Facility: HOSPITAL | Age: 55
Discharge: HOME | End: 2020-06-26
Attending: EMERGENCY MEDICINE
Payer: OTHER GOVERNMENT

## 2020-06-26 VITALS
TEMPERATURE: 98.2 F | BODY MASS INDEX: 34.65 KG/M2 | DIASTOLIC BLOOD PRESSURE: 76 MMHG | OXYGEN SATURATION: 97 % | WEIGHT: 248.46 LBS | SYSTOLIC BLOOD PRESSURE: 129 MMHG | HEART RATE: 60 BPM | RESPIRATION RATE: 22 BRPM

## 2020-06-26 DIAGNOSIS — R00.2 PALPITATIONS: ICD-10-CM

## 2020-06-26 DIAGNOSIS — I49.3 PVC'S (PREMATURE VENTRICULAR CONTRACTIONS): Primary | ICD-10-CM

## 2020-06-26 LAB
ANION GAP SERPL CALC-SCNC: 8 MMOL/L (ref 3–11)
BASOPHILS # BLD AUTO: 0.1 10*3/UL
BASOPHILS NFR BLD AUTO: 1 % (ref 0–2)
BUN SERPL-MCNC: 12 MG/DL (ref 7–25)
CALCIUM SERPL-MCNC: 9.2 MG/DL (ref 8.6–10.3)
CHLORIDE SERPL-SCNC: 104 MMOL/L (ref 98–107)
CO2 SERPL-SCNC: 23 MMOL/L (ref 21–32)
CREAT SERPL-MCNC: <0.2 MG/DL (ref 0.7–1.3)
EOSINOPHIL # BLD AUTO: 0.2 10*3/UL
EOSINOPHIL NFR BLD AUTO: 3 % (ref 0–3)
ERYTHROCYTE [DISTWIDTH] IN BLOOD BY AUTOMATED COUNT: 14.1 % (ref 11.5–15)
GLUCOSE SERPL-MCNC: 108 MG/DL (ref 70–105)
HCT VFR BLD AUTO: 44.4 % (ref 38–50)
HGB BLD-MCNC: 14.8 G/DL (ref 13.2–17.2)
LYMPHOCYTES # BLD AUTO: 2.7 10*3/UL
LYMPHOCYTES NFR BLD AUTO: 37 % (ref 15–47)
MCH RBC QN AUTO: 31.7 PG (ref 29–34)
MCHC RBC AUTO-ENTMCNC: 33.4 G/DL (ref 32–36)
MCV RBC AUTO: 94.9 FL (ref 82–97)
MONOCYTES # BLD AUTO: 0.8 10*3/UL
MONOCYTES NFR BLD AUTO: 11 % (ref 5–13)
NEUTROPHILS # BLD AUTO: 3.4 10*3/UL
NEUTROPHILS NFR BLD AUTO: 47 % (ref 46–70)
PLATELET # BLD AUTO: 275 10*3/UL (ref 130–350)
PMV BLD AUTO: 8.3 FL (ref 6.9–10.8)
POTASSIUM SERPL-SCNC: 4.2 MMOL/L (ref 3.5–5.1)
RBC # BLD AUTO: 4.68 10*6/ΜL (ref 4.1–5.8)
SODIUM SERPL-SCNC: 135 MMOL/L (ref 135–145)
WBC # BLD AUTO: 7.2 10*3/UL (ref 3.7–9.6)

## 2020-06-26 PROCEDURE — 85025 COMPLETE CBC W/AUTO DIFF WBC: CPT | Performed by: EMERGENCY MEDICINE

## 2020-06-26 PROCEDURE — 93005 ELECTROCARDIOGRAM TRACING: CPT

## 2020-06-26 PROCEDURE — 80048 BASIC METABOLIC PNL TOTAL CA: CPT | Performed by: EMERGENCY MEDICINE

## 2020-06-26 PROCEDURE — 36415 COLL VENOUS BLD VENIPUNCTURE: CPT | Performed by: EMERGENCY MEDICINE

## 2020-06-26 PROCEDURE — 99284 EMERGENCY DEPT VISIT MOD MDM: CPT | Performed by: EMERGENCY MEDICINE

## 2020-06-26 NOTE — ED PROVIDER NOTES
HPI:  Chief Complaint   Patient presents with   • Irregular Heart Beat     pt sates AICD is firing. pt states AICD did not fire today but felt like it was on the verge. Last time his AICD fired was a week ago.      HPI  54-year-old male comes to the emergency department feeling some heart racing and some lightheadedness which lasted for about 3 to 5 seconds.  Symptoms occurred about 2 hours prior to arrival.  Patient feeling his heart was racing his AICD might fire.  His AICD has not fired for about a week.  He did come to the emergency department here and was hospitalized and observed and his amiodarone dose was changed.  He denies chest pain.  Denies fevers or chills.  He was outside working in some heat.  No specific exacerbating or alleviating factors.    HISTORY:  Past Medical History:   Diagnosis Date   • AICD (automatic cardioverter/defibrillator) present    • H/O heart artery stent    • Heart attack (CMS/HCC) (HCC)    • V-tach (CMS/HCC) (HCC)        Past Surgical History:   Procedure Laterality Date   • ABLATION VT N/A 6/9/2020    Procedure: Ablation VT;  Surgeon: Wilfredo Montana MD;  Location: [facility] EP Lab;  Service: Electrophysiology;  Laterality: N/A;  Check with Dr. Montana in the a.m. regarding scheduling   • APPENDECTOMY         Family History   Problem Relation Age of Onset   • Heart attack Mother 62   • Other Mother         Abdominal Aortic Aneurysm   • Lung cancer Mother    • Colon cancer Father         Cause of death   • Diabetes Brother         Non-Insulin Dependent   • Heart attack Brother 51        w/ Stents   • Heart disease Brother    • Other Son         Well       Social History     Tobacco Use   • Smoking status: Light Tobacco Smoker     Years: 20.00     Types: Cigarettes   • Smokeless tobacco: Never Used   • Tobacco comment: 4 Cigarettes per day   Substance Use Topics   • Alcohol use: No     Comment: Quit drinking in 2007   • Drug use: Not on file       ROS:  Constitutional: negative for  fever.  Positive lightheadedness  Eyes: negative for eye pain  ENT: negative for sore throat  Cardiovascular: negative for chest pain.  Positive palpitations  Respiratory: negative for cough  GI: negative for abdominal pain  : negative for hematuria  Musculoskeletal: negative for back pain  Neuro: negative for headache  Hematology: negative for bleeding  Skin: negative for rash    PHYSICAL EXAM:  ED Triage Vitals   Temp Heart Rate Resp BP SpO2   06/26/20 1256 06/26/20 1256 06/26/20 1256 06/26/20 1256 06/26/20 1256   36.8 °C (98.2 °F) 60 21 131/70 96 %      Temp Source Heart Rate Source Patient Position BP Location FiO2 (%)   06/26/20 1256 -- 06/26/20 1400 -- --   Oral  Head of bed 30 degrees or higher       Nursing note and vitals reviewed.  Constitutional: appears well-developed.   Head: Normocephalic and atraumatic.   Eyes: Conjunctiva normal.  Neck: Supple  Cardiovascular: Regular rate and rhythm   Pulmonary/Chest: No respiratory distress.  Clear to auscultation bilaterally.  Abdominal: Soft and nontender.  Normal bowel sounds.  Back: Non-tender.  Musculoskeletal: No edema  Neurological: Alert. No focal deficits.  Skin: Skin is warm and dry. No rash noted.   Psychiatric: Normal mood and affect.    MDM: Patient's exam is unremarkable.  He has been having palpitations.  His amiodarone was recently changed.  Will interrogate his pacer and check electrolytes    Labs Reviewed   BASIC METABOLIC PANEL - Abnormal       Result Value    Sodium 135      Potassium 4.2      Chloride 104      CO2 23      BUN 12      Creatinine <0.20 (*)     Glucose 108 (*)     Calcium 9.2      Anion Gap 8      Narrative:     Estimated GFR calculated using the 2009 CKD-EPI creatinine equation.   CBC WITH AUTO DIFFERENTIAL    WBC 7.2      RBC 4.68      Hemoglobin 14.8      Hematocrit 44.4      MCV 94.9      MCH 31.7      MCHC 33.4      RDW 14.1      Platelets 275      MPV 8.3      Neutrophils% 47      Lymphocytes% 37      Monocytes% 11       Eosinophils% 3      Basophils% 1      Neutrophils Absolute 3.40      Lymphocytes Absolute 2.70      Monocytes Absolute 0.80      Eosinophils Absolute 0.20      Basophils Absolute 0.10           PROCEDURES:  ECG 12 lead - Arrhythmia    Date/Time: 6/26/2020 2:30 PM  Performed by: Andriy Lamar MD  Authorized by: Andriy Lamar MD     Comments:      AV dual paced rhythm at rate 60.  No ST or T wave change for ischemia.  Essentially unchanged as compared to 6/16/2020.        ED COURSE:  Patient's AICD/pacemaker was interrogated.  [employer] is contacted me via telephone and summarized the report which shows his last shock was June 16 and has had several PVCs and in fact has had bigeminy.  He had a 5 beat run of nonsustained V. tach today which resolved spontaneously.  He has had no dysrhythmia here in the emergency department other than some bigeminy.  Case is discussed with cardiologist, Dr. Sevilla, who does not recommend changing patient's medications.  He is advised to follow-up in the EP clinic.  Patient is reassured and is discharged home in stable condition.    CLINICAL IMPRESSION:  Final diagnoses:   [I49.3] PVC's (premature ventricular contractions)   [R00.2] Palpitations   Lightheadedness  Ischemic heart disease           A voice recognition program was used to aid in documentation of this record.  Sometimes words are not printed exactly as they were spoken.  While efforts were made to carefully edit and correct any inaccuracies, some areas may be present; please take these into context.  Please contact the provider if areas are identified.       Andriy Lamar MD  06/26/20 4883

## 2020-06-26 NOTE — DISCHARGE INSTRUCTIONS
Keep your appointment at the electrophysiology clinic with Dr. Watters in early July as planned.  Call their clinic sooner if you are having more symptoms.  Continue medications as directed.

## 2020-06-26 NOTE — MEDICATION HISTORY SPECIALIST NOTES
[facility] ED-215    CSN: [id number]  : [id number]    Information sources:  EPIC      Allergies verified.    Patient interviewed via phone who provided all history. Patient verified medications and provided last doses. Verified with AVS/. Patient was recently discharged from facility. Patient states no changed in medications.      **High alert medications**  Rivoroxaban (Xarelto)      See  for medication resources.

## 2020-06-29 LAB — BSA FOR ECHO PROCEDURE: 2.38 M2

## 2020-07-08 ENCOUNTER — TELEPHONE - BILLABLE (OUTPATIENT)
Dept: CARDIOLOGY | Facility: CLINIC | Age: 55
End: 2020-07-08
Payer: OTHER GOVERNMENT

## 2020-07-08 DIAGNOSIS — Z86.79 S/P ABLATION OF VENTRICULAR ARRHYTHMIA: ICD-10-CM

## 2020-07-08 DIAGNOSIS — Z45.02 ENCOUNTER FOR FITTING OR ADJUSTMENT OF IMPLANTABLE CARDIOVERTER-DEFIBRILLATOR (ICD): ICD-10-CM

## 2020-07-08 DIAGNOSIS — I47.20 VT (VENTRICULAR TACHYCARDIA) (CMS/HCC): ICD-10-CM

## 2020-07-08 DIAGNOSIS — Z98.890 S/P ABLATION OF VENTRICULAR ARRHYTHMIA: ICD-10-CM

## 2020-07-08 PROCEDURE — 99443 *INACTIVE DO NOT USE* PR PHYS/QHP TELEPHONE EVALUATION 21-30 MIN: CPT | Mod: GT | Performed by: INTERNAL MEDICINE

## 2020-07-08 RX ORDER — MEXILETINE HYDROCHLORIDE 200 MG/1
200 CAPSULE ORAL 2 TIMES DAILY
Qty: 60 CAPSULE | Refills: 11 | Status: SHIPPED | OUTPATIENT
Start: 2020-07-08 | End: 2021-06-23 | Stop reason: SDUPTHER

## 2020-07-08 RX ORDER — AMIODARONE HYDROCHLORIDE 200 MG/1
200 TABLET ORAL DAILY
Qty: 60 TABLET | Refills: 11 | COMMUNITY
Start: 2020-07-08 | End: 2020-08-14 | Stop reason: HOSPADM

## 2020-07-08 SDOH — ECONOMIC STABILITY: FOOD INSECURITY
WITHIN THE PAST 12 MONTHS, YOU WORRIED THAT YOUR FOOD WOULD RUN OUT BEFORE YOU GOT THE MONEY TO BUY MORE.: PATIENT DECLINED

## 2020-07-08 SDOH — ECONOMIC STABILITY: FOOD INSECURITY: HOW HARD IS IT FOR YOU TO PAY FOR THE VERY BASICS LIKE FOOD, HOUSING, MEDICAL CARE, AND HEATING?: PATIENT DECLINED

## 2020-07-08 SDOH — ECONOMIC STABILITY: TRANSPORTATION INSECURITY
IN THE PAST 12 MONTHS, HAS LACK OF TRANSPORTATION KEPT YOU FROM MEDICAL APPOINTMENTS OR FROM GETTING MEDICATIONS?: PATIENT DECLINED

## 2020-07-08 SDOH — ECONOMIC STABILITY: FOOD INSECURITY: WITHIN THE PAST 12 MONTHS, THE FOOD YOU BOUGHT JUST DIDN'T LAST AND YOU DIDN'T HAVE MONEY TO GET MORE.: PATIENT DECLINED

## 2020-07-08 ASSESSMENT — ACTIVITIES OF DAILY LIVING (ADL): LACK_OF_TRANSPORTATION: PATIENT DECLINED

## 2020-07-08 NOTE — PROGRESS NOTES
Subjective   Per discussion with Wilfredo Montana MD, Pete, has verbally consented to be treated via a telephone based visit: Yes. A total of 30 minutes were required for this telephone based visit.   Patient Location: Home  Provider Location: Clinic  Technology used by Provider: Phone    HPI  Pete Trejo is a 54 y.o. male who presents for discussion regarding his ventricular arrhythmia condition.  He is a pleasant 54-year-old gentleman with an ischemic cardiomyopathy who suffered an extensive anterior wall myocardial infarction several years ago and has an existing dual-chamber biventricular defibrillator.    He presented last month with recurrent ventricular tachycardia despite being on amiodarone chronically at 200 mg daily which he was on for a number of years for suppression of prior ventricular arrhythmias.  I brought him to the electrophysiology lab and did a voltage map which revealed extensive anterior wall scarring with extension into both lateral and septal walls as well as out around the apex towards the inferior wall.  Numerous ventricular arrhythmias occurred and were induced requiring shocks of many different axes.  I attempted to ablate the border zone area for substrate modification with partial success but he had a shock about a week after the ablation attempt and amiodarone was increased to 200 mg twice daily which she has been on without recurrence.    His condition of congestive heart failure limits him significantly at this point and he appears to have [place] heart association class III heart failure with marked limitation in his ability to perform physical activities of daily living as result with dyspnea on minimal exertion and with severe fatigue symptoms.    HPI    The following have been reviewed and updated as appropriate in this visit:    Allergies   Allergen Reactions   • Morphine      ANXIETY   • Tramadol      ANXIETY     Current Outpatient Medications   Medication Sig  Dispense Refill   • amiodarone (PACERONE) 200 mg tablet Take 1 tablet (200 mg total) by mouth 2 (two) times a day After 1 month decrease to 1 tablet daily 60 tablet 11   • lisinopriL (PRINIVIL,ZESTRIL) 5 mg tablet Take 5 mg by mouth 2 (two) times a day       • rosuvastatin (CRESTOR) 40 mg tablet Take 40 mg by mouth nightly       • nitroglycerin (NITROSTAT) 0.4 mg SL tablet Place 0.4 mg under the tongue every 5 (five) minutes as needed for chest pain.     • spironolactone (ALDACTONE) 25 mg tablet Take 25 mg by mouth daily.     • pantoprazole (PROTONIX) 40 mg EC tablet Take 40 mg by mouth daily.     • carvedilol (COREG) 25 mg tablet Take 25 mg by mouth 2 (two) times a day with meals.     • furosemide (LASIX) 20 mg tablet Take 20 mg by mouth daily       • prasugrel (EFFIENT) 10 mg tablet Take 1 tablet (10 mg total) by mouth daily. 30 tablet 1   • rivaroxaban (XARELTO) 20 mg tablet Take 1 tablet (20 mg total) by mouth daily. 30 tablet 1     No current facility-administered medications for this visit.      Past Medical History:   Diagnosis Date   • AICD (automatic cardioverter/defibrillator) present    • H/O heart artery stent    • Heart attack (CMS/HCC) (HCC)    • V-tach (CMS/HCC) (HCC)      Past Surgical History:   Procedure Laterality Date   • ABLATION VT N/A 6/9/2020    Procedure: Ablation VT;  Surgeon: Wilfredo Montana MD;  Location: [facility] EP Lab;  Service: Electrophysiology;  Laterality: N/A;  Check with Dr. Montana in the a.m. regarding scheduling   • APPENDECTOMY       Family History   Problem Relation Age of Onset   • Heart attack Mother 62   • Other Mother         Abdominal Aortic Aneurysm   • Lung cancer Mother    • Colon cancer Father         Cause of death   • Diabetes Brother         Non-Insulin Dependent   • Heart attack Brother 51        w/ Stents   • Heart disease Brother    • Other Son         Well     Social History     Occupational History   • Not on file   Tobacco Use   • Smoking status: Former  Smoker     Years: 20.00     Types: Cigarettes   • Smokeless tobacco: Never Used   • Tobacco comment: Stopped June 7th, 2020    Substance and Sexual Activity   • Alcohol use: No     Comment: Quit drinking in 2007   • Drug use: Not Currently   • Sexual activity: Defer   Social History Narrative   • Not on file       Review of Systems  As stated above.      Objective   Physical Exam  Voice was strong, speech was clear.  Mentation was normal.  Mood was good.      Assessment/Plan   Diagnoses and all orders for this visit:    Encounter for fitting or adjustment of implantable cardioverter-defibrillator (ICD)  -     Ambulatory referral to Cardiology    VT (ventricular tachycardia) (CMS/HCC) (HCC)  -     Ambulatory referral to Cardiology    S/P ablation of ventricular arrhythmia  -     Ambulatory referral to Cardiology    Severe refractory ventricular arrhythmias and congestive heart failure in a 54-year-old male who suffered a prior extensive anterior wall myocardial infarction with resultant severe ischemic cardiomyopathy      I have recommended he reduce the amiodarone down to 200 mg daily and start mexiletine 200 mg twice daily.  Follow-up in 6 weeks recommended with a device check at that point in time.  I have reached out to Dr. Edwards regarding the potential referral for LVAD and transplant consideration at the [facility].  I feel he may be a candidate should be considered given his situation and concerns that long-term amiodarone toxicity could develop at a point where he may be deemed no longer a candidate as a result and therefore a window of opportunity might be best considered and met with earlier for transplantation rather than later.  Dr. Edwards agreed.

## 2020-07-09 ENCOUNTER — DOCUMENTATION (OUTPATIENT)
Dept: CARDIOLOGY | Facility: CLINIC | Age: 55
End: 2020-07-09

## 2020-07-09 NOTE — PROGRESS NOTES
From: Dr. Wilfredo Montana   Sent: Wednesday, July 8, 2020 2:45 PM  To: Johanna Francis [email or web address]>; Della Rdz [email or web address]>  Subject: MR#[id number] - Maya    This patient has severe congestive heart failure and ventricular tachycardia which is refractory to ablation and even low-dose amiodarone.  He underwent attempted ablation about a month ago but still had a recurrent episode a week after the ablation and he was a  but at this point time I do not feel it is [place] for him to be able to return to work as a  and I do not know when at any point he may be able to based upon the size of his anterior wall scarring and left ventricular systolic dysfunction with congestive heart failure symptoms.  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    Thank you

## 2020-07-10 ENCOUNTER — TELEPHONE (OUTPATIENT)
Dept: CARDIOLOGY | Facility: CLINIC | Age: 55
End: 2020-07-10

## 2020-07-10 NOTE — TELEPHONE ENCOUNTER
Pete called seeking clarification on his amiodarone and the new medication mexiletine.  Informed him that the amiodarone was decreased to 200 mg daily, and that the mexiletine is 1 tab twice a day.  He verbalized understanding and had no further questions.

## 2020-07-28 ENCOUNTER — TELEPHONE (OUTPATIENT)
Dept: CARDIOLOGY | Facility: CLINIC | Age: 55
End: 2020-07-28

## 2020-07-28 NOTE — TELEPHONE ENCOUNTER
"Pete had called and left a message and I returned his call.   He states \" I just can't work anymore, I am in the process of applying for disability and will need a letter from the doctor.\"     I asked him if he had paperwork that needs filled out, and he said he thought the process started with us.   Informed him that he would need to contact SS office or wherever they do the disability and start the process.  Usually then there would be paperwork for us to fill out at some point.  He voiced understanding and said he would check into it.   "

## 2020-08-11 ENCOUNTER — ANCILLARY PROCEDURE (OUTPATIENT)
Dept: CARDIOLOGY | Facility: CLINIC | Age: 55
End: 2020-08-11
Payer: COMMERCIAL

## 2020-08-11 DIAGNOSIS — Z45.02 ENCOUNTER FOR INTERROGATION OF CARDIAC DEFIBRILLATOR: ICD-10-CM

## 2020-08-12 ENCOUNTER — APPOINTMENT (OUTPATIENT)
Dept: RADIOLOGY | Facility: HOSPITAL | Age: 55
DRG: 309 | End: 2020-08-12
Payer: COMMERCIAL

## 2020-08-12 ENCOUNTER — HOSPITAL ENCOUNTER (INPATIENT)
Facility: HOSPITAL | Age: 55
LOS: 2 days | Discharge: HOME | DRG: 309 | End: 2020-08-14
Attending: EMERGENCY MEDICINE | Admitting: HOSPITALIST
Payer: COMMERCIAL

## 2020-08-12 DIAGNOSIS — I47.20 VENTRICULAR TACHYCARDIA (CMS/HCC): ICD-10-CM

## 2020-08-12 DIAGNOSIS — I25.5 ISCHEMIC CARDIOMYOPATHY: ICD-10-CM

## 2020-08-12 DIAGNOSIS — R79.89 ELEVATED TROPONIN: ICD-10-CM

## 2020-08-12 DIAGNOSIS — I47.20 V TACH (CMS/HCC): Primary | ICD-10-CM

## 2020-08-12 DIAGNOSIS — R00.2 PALPITATIONS: ICD-10-CM

## 2020-08-12 DIAGNOSIS — I25.10 CORONARY ARTERY DISEASE INVOLVING NATIVE CORONARY ARTERY OF NATIVE HEART WITHOUT ANGINA PECTORIS: ICD-10-CM

## 2020-08-12 PROBLEM — I49.9 VENTRICULAR ARRHYTHMIA: Status: ACTIVE | Noted: 2020-08-12

## 2020-08-12 LAB
ALBUMIN SERPL-MCNC: 3.7 G/DL (ref 3.5–5.3)
ALP SERPL-CCNC: 80 U/L (ref 45–115)
ALT SERPL-CCNC: 67 U/L (ref 7–52)
ANION GAP SERPL CALC-SCNC: 7 MMOL/L (ref 3–11)
AST SERPL-CCNC: 27 U/L
BASOPHILS # BLD AUTO: 0 10*3/UL
BASOPHILS NFR BLD AUTO: 1 % (ref 0–2)
BILIRUB SERPL-MCNC: 0.62 MG/DL (ref 0.2–1.4)
BUN SERPL-MCNC: 11 MG/DL (ref 7–25)
CALCIUM ALBUM COR SERPL-MCNC: 9.5 MG/DL (ref 8.6–10.3)
CALCIUM SERPL-MCNC: 9.3 MG/DL (ref 8.6–10.3)
CHLORIDE SERPL-SCNC: 108 MMOL/L (ref 98–107)
CO2 SERPL-SCNC: 23 MMOL/L (ref 21–32)
CREAT SERPL-MCNC: 0.64 MG/DL (ref 0.7–1.3)
DELTA HIGH SENSITIVE TROPONIN I, LATE DRAW 2 HOUR: 60.1
DELTA HIGH SENSITIVITY TROPONIN I, 1 HOUR: 20.6
EOSINOPHIL # BLD AUTO: 0.2 10*3/UL
EOSINOPHIL NFR BLD AUTO: 4 % (ref 0–3)
ERYTHROCYTE [DISTWIDTH] IN BLOOD BY AUTOMATED COUNT: 13.9 % (ref 11.5–15)
GFR SERPL CREATININE-BSD FRML MDRD: 112 ML/MIN/1.73M*2
GLUCOSE SERPL-MCNC: 135 MG/DL (ref 70–105)
HCT VFR BLD AUTO: 44.2 % (ref 38–50)
HGB BLD-MCNC: 15.2 G/DL (ref 13.2–17.2)
LYMPHOCYTES # BLD AUTO: 2.4 10*3/UL
LYMPHOCYTES NFR BLD AUTO: 41 % (ref 15–47)
MAGNESIUM SERPL-MCNC: 1.9 MG/DL (ref 1.8–2.4)
MCH RBC QN AUTO: 31.2 PG (ref 29–34)
MCHC RBC AUTO-ENTMCNC: 34.5 G/DL (ref 32–36)
MCV RBC AUTO: 90.4 FL (ref 82–97)
MONOCYTES # BLD AUTO: 0.7 10*3/UL
MONOCYTES NFR BLD AUTO: 12 % (ref 5–13)
NEUTROPHILS # BLD AUTO: 2.5 10*3/UL
NEUTROPHILS NFR BLD AUTO: 43 % (ref 46–70)
PLATELET # BLD AUTO: 227 10*3/UL (ref 130–350)
PMV BLD AUTO: 8.2 FL (ref 6.9–10.8)
POTASSIUM SERPL-SCNC: 4.2 MMOL/L (ref 3.5–5.1)
PROT SERPL-MCNC: 6.5 G/DL (ref 6–8.3)
RBC # BLD AUTO: 4.88 10*6/ΜL (ref 4.1–5.8)
SODIUM SERPL-SCNC: 138 MMOL/L (ref 135–145)
TROPONIN I SERPL-MCNC: 101.8 PG/ML
TROPONIN I SERPL-MCNC: 41.7 PG/ML
TROPONIN I SERPL-MCNC: 62.3 PG/ML
WBC # BLD AUTO: 5.8 10*3/UL (ref 3.7–9.6)

## 2020-08-12 PROCEDURE — 99285 EMERGENCY DEPT VISIT HI MDM: CPT | Performed by: EMERGENCY MEDICINE

## 2020-08-12 PROCEDURE — 80053 COMPREHEN METABOLIC PANEL: CPT | Performed by: EMERGENCY MEDICINE

## 2020-08-12 PROCEDURE — 6360000200 HC RX 636 W HCPCS (ALT 250 FOR IP): Performed by: EMERGENCY MEDICINE

## 2020-08-12 PROCEDURE — 84484 ASSAY OF TROPONIN QUANT: CPT | Performed by: EMERGENCY MEDICINE

## 2020-08-12 PROCEDURE — 99222 1ST HOSP IP/OBS MODERATE 55: CPT | Mod: AI | Performed by: HOSPITALIST

## 2020-08-12 PROCEDURE — 71045 X-RAY EXAM CHEST 1 VIEW: CPT

## 2020-08-12 PROCEDURE — (BLANK) HC ROOM ICU INTERMEDIATE

## 2020-08-12 PROCEDURE — 6370000100 HC RX 637 (ALT 250 FOR IP): Performed by: HOSPITALIST

## 2020-08-12 PROCEDURE — 92960 CARDIOVERSION ELECTRIC EXT: CPT

## 2020-08-12 PROCEDURE — 83735 ASSAY OF MAGNESIUM: CPT | Performed by: EMERGENCY MEDICINE

## 2020-08-12 PROCEDURE — 6360000200 HC RX 636 W HCPCS (ALT 250 FOR IP)

## 2020-08-12 PROCEDURE — 36415 COLL VENOUS BLD VENIPUNCTURE: CPT

## 2020-08-12 PROCEDURE — 93005 ELECTROCARDIOGRAM TRACING: CPT | Performed by: EMERGENCY MEDICINE

## 2020-08-12 PROCEDURE — 85025 COMPLETE CBC W/AUTO DIFF WBC: CPT | Performed by: EMERGENCY MEDICINE

## 2020-08-12 RX ORDER — SPIRONOLACTONE 25 MG/1
25 TABLET ORAL DAILY
Status: DISCONTINUED | OUTPATIENT
Start: 2020-08-12 | End: 2020-08-14 | Stop reason: HOSPADM

## 2020-08-12 RX ORDER — AMIODARONE HYDROCHLORIDE 50 MG/ML
150 INJECTION, SOLUTION INTRAVENOUS ONCE
Status: DISCONTINUED | OUTPATIENT
Start: 2020-08-12 | End: 2020-08-12

## 2020-08-12 RX ORDER — MAG HYDROX/ALUMINUM HYD/SIMETH 200-200-20
1 SUSPENSION, ORAL (FINAL DOSE FORM) ORAL 2 TIMES DAILY PRN
Status: ON HOLD | COMMUNITY
End: 2021-07-12

## 2020-08-12 RX ORDER — MEXILETINE HYDROCHLORIDE 200 MG/1
200 CAPSULE ORAL 2 TIMES DAILY
Status: DISCONTINUED | OUTPATIENT
Start: 2020-08-12 | End: 2020-08-14 | Stop reason: HOSPADM

## 2020-08-12 RX ORDER — PRASUGREL 10 MG/1
10 TABLET, FILM COATED ORAL DAILY
Status: DISCONTINUED | OUTPATIENT
Start: 2020-08-12 | End: 2020-08-14 | Stop reason: HOSPADM

## 2020-08-12 RX ORDER — NITROGLYCERIN 0.4 MG/1
0.4 TABLET SUBLINGUAL EVERY 5 MIN PRN
Status: DISCONTINUED | OUTPATIENT
Start: 2020-08-12 | End: 2020-08-14 | Stop reason: HOSPADM

## 2020-08-12 RX ORDER — METOCLOPRAMIDE HYDROCHLORIDE 5 MG/ML
10 INJECTION INTRAMUSCULAR; INTRAVENOUS EVERY 6 HOURS PRN
Status: DISCONTINUED | OUTPATIENT
Start: 2020-08-12 | End: 2020-08-14 | Stop reason: HOSPADM

## 2020-08-12 RX ORDER — METOCLOPRAMIDE 10 MG/1
10 TABLET ORAL EVERY 6 HOURS PRN
Status: DISCONTINUED | OUTPATIENT
Start: 2020-08-12 | End: 2020-08-14 | Stop reason: HOSPADM

## 2020-08-12 RX ORDER — ADENOSINE 3 MG/ML
6 INJECTION, SOLUTION INTRAVENOUS ONCE
Status: COMPLETED | OUTPATIENT
Start: 2020-08-12 | End: 2020-08-12

## 2020-08-12 RX ORDER — ROSUVASTATIN CALCIUM 20 MG/1
40 TABLET, COATED ORAL NIGHTLY
Status: DISCONTINUED | OUTPATIENT
Start: 2020-08-12 | End: 2020-08-14 | Stop reason: HOSPADM

## 2020-08-12 RX ORDER — DILTIAZEM HYDROCHLORIDE 5 MG/ML
20 INJECTION INTRAVENOUS ONCE
Status: COMPLETED | OUTPATIENT
Start: 2020-08-12 | End: 2020-08-12

## 2020-08-12 RX ORDER — SODIUM CHLORIDE 0.9 % (FLUSH) 0.9 %
3 SYRINGE (ML) INJECTION AS NEEDED
Status: DISCONTINUED | OUTPATIENT
Start: 2020-08-12 | End: 2020-08-14 | Stop reason: HOSPADM

## 2020-08-12 RX ORDER — ADENOSINE 3 MG/ML
12 INJECTION, SOLUTION INTRAVENOUS ONCE
Status: COMPLETED | OUTPATIENT
Start: 2020-08-12 | End: 2020-08-12

## 2020-08-12 RX ORDER — CARVEDILOL 25 MG/1
25 TABLET ORAL 2 TIMES DAILY WITH MEALS
Status: DISCONTINUED | OUTPATIENT
Start: 2020-08-12 | End: 2020-08-14 | Stop reason: HOSPADM

## 2020-08-12 RX ORDER — ACETAMINOPHEN 325 MG/1
325-650 TABLET ORAL EVERY 4 HOURS PRN
Status: DISCONTINUED | OUTPATIENT
Start: 2020-08-12 | End: 2020-08-14 | Stop reason: HOSPADM

## 2020-08-12 RX ORDER — ADENOSINE 3 MG/ML
18 INJECTION, SOLUTION INTRAVENOUS ONCE
Status: COMPLETED | OUTPATIENT
Start: 2020-08-12 | End: 2020-08-12

## 2020-08-12 RX ORDER — ETOMIDATE 2 MG/ML
INJECTION INTRAVENOUS
Status: DISCONTINUED
Start: 2020-08-12 | End: 2020-08-12 | Stop reason: WASHOUT

## 2020-08-12 RX ORDER — ETOMIDATE 2 MG/ML
10 INJECTION INTRAVENOUS ONCE
Status: DISCONTINUED | OUTPATIENT
Start: 2020-08-12 | End: 2020-08-12

## 2020-08-12 RX ORDER — LISINOPRIL 5 MG/1
5 TABLET ORAL 2 TIMES DAILY
Status: DISCONTINUED | OUTPATIENT
Start: 2020-08-12 | End: 2020-08-14 | Stop reason: HOSPADM

## 2020-08-12 RX ORDER — OXYCODONE AND ACETAMINOPHEN 5; 325 MG/1; MG/1
1-2 TABLET ORAL EVERY 4 HOURS PRN
Status: DISCONTINUED | OUTPATIENT
Start: 2020-08-12 | End: 2020-08-14 | Stop reason: HOSPADM

## 2020-08-12 RX ORDER — FUROSEMIDE 20 MG/1
20 TABLET ORAL DAILY
Status: DISCONTINUED | OUTPATIENT
Start: 2020-08-12 | End: 2020-08-14 | Stop reason: HOSPADM

## 2020-08-12 RX ORDER — LANSOPRAZOLE 30 MG/1
30 CAPSULE, DELAYED RELEASE ORAL
Status: DISCONTINUED | OUTPATIENT
Start: 2020-08-13 | End: 2020-08-14 | Stop reason: HOSPADM

## 2020-08-12 RX ORDER — ADENOSINE 3 MG/ML
INJECTION, SOLUTION INTRAVENOUS
Status: COMPLETED
Start: 2020-08-12 | End: 2020-08-12

## 2020-08-12 RX ORDER — ETOMIDATE 2 MG/ML
0.3 INJECTION INTRAVENOUS ONCE
Status: COMPLETED | OUTPATIENT
Start: 2020-08-12 | End: 2020-08-12

## 2020-08-12 RX ADMIN — RIVAROXABAN 20 MG: 20 TABLET, FILM COATED ORAL at 23:11

## 2020-08-12 RX ADMIN — ROSUVASTATIN CALCIUM 40 MG: 20 TABLET, FILM COATED ORAL at 23:11

## 2020-08-12 RX ADMIN — ADENOSINE 6 MG: 3 INJECTION, SOLUTION INTRAVENOUS at 16:47

## 2020-08-12 RX ADMIN — LISINOPRIL 5 MG: 5 TABLET ORAL at 23:17

## 2020-08-12 RX ADMIN — CARVEDILOL 25 MG: 25 TABLET, FILM COATED ORAL at 23:11

## 2020-08-12 RX ADMIN — ADENOSINE 12 MG: 3 INJECTION, SOLUTION INTRAVENOUS at 16:50

## 2020-08-12 RX ADMIN — ETOMIDATE 20 MG: 2 INJECTION, SOLUTION INTRAVENOUS at 18:06

## 2020-08-12 RX ADMIN — DILTIAZEM HYDROCHLORIDE 20 MG: 5 INJECTION INTRAVENOUS at 17:11

## 2020-08-12 RX ADMIN — ADENOSINE 18 MG: 3 INJECTION, SOLUTION INTRAVENOUS at 16:52

## 2020-08-12 RX ADMIN — AMIODARONE HYDROCHLORIDE 1 MG/MIN: 1.8 INJECTION, SOLUTION INTRAVENOUS at 19:55

## 2020-08-12 ASSESSMENT — ACTIVITIES OF DAILY LIVING (ADL)
ADEQUATE_TO_COMPLETE_ADL: YES
PATIENT'S MEMORY ADEQUATE TO SAFELY COMPLETE DAILY ACTIVITIES?: YES

## 2020-08-12 NOTE — ED PROVIDER NOTES
CC: palpitations  HPI:    Patient is a 54 year old with sudden onset and constant palpitations that began 3 hours ago. Patient states that he felt weak and he heard something pop at the time of onset. He denies chest pain or diaphoresis. Patient had a heart attack a couple of months ago. He has a history of tachycardia, hypertension, demand pacemaker, and CAD. Patient is anti coagulated. Patient has been taking all medications as prescribed.     Past Medical History:   Diagnosis Date   • AICD (automatic cardioverter/defibrillator) present    • H/O heart artery stent    • Heart attack (CMS/HCC) (Formerly [facility])    • V-tach (CMS/HCC) (Formerly [facility])        Past Surgical History:   Procedure Laterality Date   • ABLATION VT N/A 6/9/2020    Procedure: Ablation VT;  Surgeon: Wilfredo Montana MD;  Location: [facility] EP Lab;  Service: Electrophysiology;  Laterality: N/A;  Check with Dr. Montana in the a.m. regarding scheduling   • APPENDECTOMY         Social History     Socioeconomic History   • Marital status: Single     Spouse name: Not on file   • Number of children: Not on file   • Years of education: Not on file   • Highest education level: Not on file   Occupational History   • Not on file   Social Needs   • Financial resource strain: Patient refused   • Food insecurity     Worry: Patient refused     Inability: Patient refused   • Transportation needs     Medical: Patient refused     Non-medical: Patient refused   Tobacco Use   • Smoking status: Former Smoker     Years: 20.00     Types: Cigarettes   • Smokeless tobacco: Never Used   • Tobacco comment: Stopped June 7th, 2020    Substance and Sexual Activity   • Alcohol use: No     Comment: Quit drinking in 2007   • Drug use: Not Currently   • Sexual activity: Defer   Lifestyle   • Physical activity     Days per week: Not on file     Minutes per session: Not on file   • Stress: Not on file   Relationships   • Social connections     Talks on phone: Not on file     Gets together: Not on file      Attends Latter day service: Not on file     Active member of club or organization: Not on file     Attends meetings of clubs or organizations: Not on file     Relationship status: Not on file   • Intimate partner violence     Fear of current or ex partner: Not on file     Emotionally abused: Not on file     Physically abused: Not on file     Forced sexual activity: Not on file   Other Topics Concern   • Not on file   Social History Narrative   • Not on file       Family History   Problem Relation Age of Onset   • Heart attack Mother 62   • Other Mother         Abdominal Aortic Aneurysm   • Lung cancer Mother    • Colon cancer Father         Cause of death   • Diabetes Brother         Non-Insulin Dependent   • Heart attack Brother 51        w/ Stents   • Heart disease Brother    • Other Son         Well       Allergies   Allergen Reactions   • Morphine      ANXIETY   • Tramadol      ANXIETY         Current Outpatient Medications:   •  hydrocortisone 1 % ointment, Apply 1 application topically 2 (two) times a day as needed, Disp: , Rfl:   •  amiodarone (PACERONE) 200 mg tablet, Take 200 mg by mouth daily  , Disp: 60 tablet, Rfl: 11  •  mexiletine (MEXITIL) 200 mg capsule, Take 1 capsule (200 mg total) by mouth 2 (two) times a day, Disp: 60 capsule, Rfl: 11  •  lisinopriL (PRINIVIL,ZESTRIL) 5 mg tablet, Take 5 mg by mouth 2 (two) times a day  , Disp: , Rfl:   •  rosuvastatin (CRESTOR) 40 mg tablet, Take 40 mg by mouth nightly  , Disp: , Rfl:   •  nitroglycerin (NITROSTAT) 0.4 mg SL tablet, Place 0.4 mg under the tongue every 5 (five) minutes as needed for chest pain., Disp: , Rfl:   •  spironolactone (ALDACTONE) 25 mg tablet, Take 25 mg by mouth daily., Disp: , Rfl:   •  pantoprazole (PROTONIX) 40 mg EC tablet, Take 40 mg by mouth daily., Disp: , Rfl:   •  carvedilol (COREG) 25 mg tablet, Take 25 mg by mouth 2 (two) times a day with meals., Disp: , Rfl:   •  furosemide (LASIX) 20 mg tablet, Take 20 mg by mouth daily  ,  Disp: , Rfl:   •  prasugrel (EFFIENT) 10 mg tablet, Take 1 tablet (10 mg total) by mouth daily., Disp: 30 tablet, Rfl: 1  •  rivaroxaban (XARELTO) 20 mg tablet, Take 1 tablet (20 mg total) by mouth daily., Disp: 30 tablet, Rfl: 1      ROS:    Constitutional: negative for fever  Neuro: negative for headache, positive for weakness  Eyes: negative for blurred vision  ENT: negative for sore throat  Cardiovascular: negative for chest pain, positive for palpitaitons  Respiratory: negative for shortness of breath  GI: negative for abdominal pain, negative for vomiting  : negative for burning with urination  Musculoskeletal: negative for new joint pain  Rheumatologic: negative for new joint pain        ED Triage Vitals   Temp Heart Rate Resp BP SpO2   08/12/20 1525 08/12/20 1525 08/12/20 1525 08/12/20 1525 08/12/20 1525   36.8 °C (98.2 °F) (!) 141 18 125/83 98 %      Temp Source Heart Rate Source Patient Position BP Location FiO2 (%)   08/12/20 1525 -- 08/12/20 1600 -- --   Oral  Head of bed 30 degrees or higher           Physical Exam:    Nursing note and vitals reviewed.  Constitutional: Uncomfortable appearing but in no acute distress.   HENT: Conjunctiva is normal and non-injected. Mucous membranes moist.   Eyes: Pupils are equal, round, and reactive to light.   Neck: Trachea midline.   Cardiovascular: Tachycardic on monitor. Rapid pulse.   Pulmonary/Chest: No respiratory distress, equal chest rise, full sentences between breaths.   Abdominal: Non-distended.  Musculoskeletal: No edema.   Neurological: Alert. Normal mentation.   Skin: Exposed areas of skin without rash or lesion.  Psychiatric: Normal affect.      Labs Reviewed   CBC WITH AUTO DIFFERENTIAL - Abnormal       Result Value    WBC 5.8      RBC 4.88      Hemoglobin 15.2      Hematocrit 44.2      MCV 90.4      MCH 31.2      MCHC 34.5      RDW 13.9      Platelets 227      MPV 8.2      Neutrophils% 43 (*)     Lymphocytes% 41      Monocytes% 12      Eosinophils%  4 (*)     Basophils% 1      Neutrophils Absolute 2.50      Lymphocytes Absolute 2.40      Monocytes Absolute 0.70      Eosinophils Absolute 0.20      Basophils Absolute 0.00     COMPREHENSIVE METABOLIC PANEL - Abnormal    Sodium 138      Potassium 4.2      Chloride 108 (*)     CO2 23      Anion Gap 7      BUN 11      Creatinine 0.64 (*)     Glucose 135 (*)     Calcium 9.3      AST 27      ALT (SGPT) 67 (*)     Alkaline Phosphatase 80      Total Protein 6.5      Albumin 3.7      Total Bilirubin 0.62      eGFR 112      Corrected Calcium 9.5      Narrative:     Estimated GFR calculated using the 2009 CKD-EPI creatinine equation.   HIGH SENSITIVE TROPONIN I - Abnormal    hsTnI 0 Hour 41.7 (*)    HIGH SENSITIVE TROPONIN I, 1 HOUR - Abnormal    hsTnI 1 hr 62.3 (*)     Delta from 0 Hour 20.6 (*)    MAGNESIUM - Normal    Magnesium 1.9     HIGH SENSITIVE TROPONIN I PANEL (0HR, 1HR, 2HR)    Narrative:     The following orders were created for panel order HS Troponin I Panel (0HR, 1HR, 2HR) Blood, Venous.  Procedure                               Abnormality         Status                     ---------                               -----------         ------                     HS Troponin I[[id number]]                 Abnormal            Final result                 Please view results for these tests on the individual orders.   HIGH SENSITIVE TROPONIN I, 2 HOUR       XR chest portable 1 view   Final Result   IMPRESSION:   1.  Since 6/16/2020 slight interval improved aeration of the left lower lung. Otherwise no significant interval change. Slight interstitial prominence, likely represents mild pulmonary edema. Enlarged cardiac silhouette. No pleural effusions. No pneumothorax left chest wall ICD. Stable prominence of bilateral chelo. Stable thickening of the right pleura.            Procedures:  ECG 12 lead    Date/Time: 8/12/2020 5:07 PM  Performed by: Chong Mulligan MD  Authorized by: Chong Mulligan MD     ECG reviewed by ED  Physician in the absence of a cardiologist: yes    Rate:     ECG rate:  140    ECG rate assessment: tachycardic    Rhythm:     Rhythm: LBBB    Conduction:     Conduction: normal    T waves:     T waves: normal    Other findings:     Other findings comment:  Impression: Regular wide complex tachycardia with left bundle branch block.   Cardioversion    Date/Time: 8/12/2020 6:02 PM  Performed by: Chong Mulligan MD  Authorized by: Chong Mulligan MD     Consent:     Consent obtained:  Verbal and written    Consent given by:  Patient  Pre-procedure details:     Cardioversion basis:  Emergent    Rhythm:  Ventricular tachycardia    Electrode placement:  Anterior-posterior  Attempt one:     Cardioversion mode:  Synchronous    Shock (Joules):  100    Shock outcome:  Conversion to normal sinus rhythm  Post-procedure details:     Patient status:  Alert    Patient tolerance of procedure:  Tolerated well, no immediate complications  ECG 12 lead    Date/Time: 8/12/2020 6:58 PM  Performed by: Chong Mulligan MD  Authorized by: Chong Mulligan MD     ECG reviewed by ED Physician in the absence of a cardiologist: yes    Rate:     ECG rate:  76    ECG rate assessment: normal    Rhythm:     Rhythm comment:  AV dual paced  Ectopy:     Ectopy: PVCs      PVCs:  Frequent  ST segments:     ST segments:  Normal  T waves:     T waves: normal    Other findings:     Other findings comment:  Impression: Paced rhythm good capture with PVCs.       The high probability of sudden, clinically significant, or life-threatening deterioration required my full and direct attention, intervention, and personal management while the patient was critical.  I provided critical care services including those noted below.    50 minutes in addition to separately billable procedures.  Review of laboratory and radiographic studies obtained in the emergency department, initial and reevaluation's of the patient's physical condition, review of all old studies as available, time  spent ruling out differential diagnosis and diagnostic consideration.        MDM:    Patient is presenting to the ED with signs and symptoms of ventricular tachycardia. Patient had a rapid regular rhythm with a wide complex and PVCs. Patient was given Adenosine without response. Patient was then given Diltiazem without improvements in pace or rhythm. Rate remained between 140-155. A cardioversion was completed. Dr. Fernandez performed sedation. 100 J shock was used and was successful as he converted to normal sinus rhythm. Electrolytes were normal. Troponin is elevated. Case was discussed with Dr. Kang who will be hospitalized for further observation and treatment.       Clinical Impression:  Final diagnoses:   [I47.2] V tach (CMS/[facility]) ([facility])   [R79.89] Elevated troponin   [R00.2] Palpitations       By signing my name, I, Priscila Brown, attest that this documentation has been prepared under the direction and in the presence of the attending physician, 8/12/2020, 7:06 PM.      I, Dr. Mulligan personally performed the services described in this document as written by the scribe in my presence.  It has been reviewed and is both accurate and complete.    A voice recognition program was used to aid in the documentation of this record. Sometimes words are not printed exactly as they were spoken. While efforts were made to carefully edit and correct any inaccuracies, some errors may be present; please take these into context. Please contact the provider if errors are identified.      Chong Mulligan MD  08/15/20 6038

## 2020-08-12 NOTE — ED PROCEDURE NOTE
"Procedure  Procedural Sedation    Date/Time: 8/12/2020 5:48 PM  Performed by: Papi Fernandez MD  Authorized by: Papi Fernandez MD     Consent:     Consent obtained:  Written/electronic and emergent situation    Consent given by:  Patient    Anesthetic risks, benefits, and alternatives explained to the patient: Yes    Indications:     Procedure performed:  Cardioversion    Procedure Performed by:  Different physician    Intended level of sedation:  Deep  Pre-sedation assessment:     Time since last food or drink:  7 hours    ASA classification: class 3 - patient with severe systemic disease      Neck mobility:  Full    Mouth opening:  3 or more finger widths    Thyromental distance:  4 finger widths    Mallampati score:  II - soft palate, uvula, fauces visible    Pre-sedation assessments completed and reviewed: airway patency, cardiovascular function, mental status, nausea/vomiting, pain level and respiratory function      Pre-sedation assessment completed:  8/12/2020 6:00 PM  Immediate pre-procedure details:     Reassessment: Patient reassessed immediately prior to procedure      Reviewed: vital signs and relevant labs/tests    Procedure details (see MAR for exact dosages):     Sedation start time:  8/12/2020 6:01 PM    Preoxygenation:  Nasal cannula and nonrebreather mask    Medications:  Etomidate (20 mg)    Intra-procedure monitoring:  Blood pressure monitoring, continuous capnometry, continuous pulse oximetry, EKG and vital sign checks q5\"    Intra-procedure events: none      Intra-procedure management:  Airway repositioning    Sedation end time:  8/12/2020 6:16 PM    Total sedation time (minutes):  15  Post-procedure details:     Post-sedation assessment completed:  8/12/2020 6:17 PM    Attendance: Constant attendance by certified staff until patient recovered      Recovery: Patient returned to pre-procedure baseline      Patient Participation:  Complete - patient participated    Level of Consciousness:  Awake and " alert    Pain Score:  0    Pain Management:  Satisfactory to patient    Airway Patency:  Patent    Anesthetic Complications: No      Cardiovascular Status:  Blood pressure returned to baseline, hemodynamically stable and acceptable    Respiratory Status:  Acceptable    Postoperative Hydration:  Stable    Patient is stable for discharge or admission: yes      Patient tolerance:  Tolerated well, no immediate complications                 Papi Fernandez MD  08/12/20 8789

## 2020-08-12 NOTE — PLAN OF CARE
Patient appears to be in monomorphic stable VT.  Review of his last device interrogation shows nonsustained VT with rates of 140 bpm.  This is likely similar rhythm given the rate.  Would be reasonable to cardiovert patient in the ER and load with IV amiodarone bolus and drip overnight if admitted through the hospitalist service and EP will evaluate tomorrow and make device changes.  Ultimately patient needs either an LVAD or heart transplantation as this will be a recurrent problem with his end-stage ischemic cardiomyopathy.  Continue Xarelto 20 mg daily post cardioversion.

## 2020-08-13 LAB
ALBUMIN SERPL-MCNC: 3.3 G/DL (ref 3.5–5.3)
ALP SERPL-CCNC: 62 U/L (ref 45–115)
ALT SERPL-CCNC: 65 U/L (ref 7–52)
ANION GAP SERPL CALC-SCNC: 7 MMOL/L (ref 3–11)
AST SERPL-CCNC: 34 U/L
BASOPHILS # BLD AUTO: 0.1 10*3/UL
BASOPHILS NFR BLD AUTO: 1 % (ref 0–2)
BILIRUB DIRECT SERPL-MCNC: 0.11 MG/DL
BILIRUB SERPL-MCNC: 0.71 MG/DL (ref 0.2–1.4)
BUN SERPL-MCNC: 9 MG/DL (ref 7–25)
CALCIUM SERPL-MCNC: 9 MG/DL (ref 8.6–10.3)
CHLORIDE SERPL-SCNC: 106 MMOL/L (ref 98–107)
CO2 SERPL-SCNC: 24 MMOL/L (ref 21–32)
CREAT SERPL-MCNC: 0.71 MG/DL (ref 0.7–1.3)
EOSINOPHIL # BLD AUTO: 0.2 10*3/UL
EOSINOPHIL NFR BLD AUTO: 3 % (ref 0–3)
ERYTHROCYTE [DISTWIDTH] IN BLOOD BY AUTOMATED COUNT: 13.4 % (ref 11.5–15)
GFR SERPL CREATININE-BSD FRML MDRD: 107 ML/MIN/1.73M*2
GLUCOSE BLDC GLUCOMTR-MCNC: 111 MG/DL (ref 70–105)
GLUCOSE SERPL-MCNC: 110 MG/DL (ref 70–105)
HCT VFR BLD AUTO: 40.7 % (ref 38–50)
HGB BLD-MCNC: 14.1 G/DL (ref 13.2–17.2)
LYMPHOCYTES # BLD AUTO: 2.2 10*3/UL
LYMPHOCYTES NFR BLD AUTO: 40 % (ref 15–47)
MAGNESIUM SERPL-MCNC: 1.7 MG/DL (ref 1.8–2.4)
MCH RBC QN AUTO: 31.2 PG (ref 29–34)
MCHC RBC AUTO-ENTMCNC: 34.7 G/DL (ref 32–36)
MCV RBC AUTO: 89.9 FL (ref 82–97)
MONOCYTES # BLD AUTO: 0.7 10*3/UL
MONOCYTES NFR BLD AUTO: 13 % (ref 5–13)
NEUTROPHILS # BLD AUTO: 2.4 10*3/UL
NEUTROPHILS NFR BLD AUTO: 43 % (ref 46–70)
PLATELET # BLD AUTO: 197 10*3/UL (ref 130–350)
PMV BLD AUTO: 7.8 FL (ref 6.9–10.8)
POTASSIUM SERPL-SCNC: 4.1 MMOL/L (ref 3.5–5.1)
PROT SERPL-MCNC: 5.7 G/DL (ref 6–8.3)
RBC # BLD AUTO: 4.53 10*6/ΜL (ref 4.1–5.8)
SODIUM SERPL-SCNC: 137 MMOL/L (ref 135–145)
WBC # BLD AUTO: 5.6 10*3/UL (ref 3.7–9.6)

## 2020-08-13 PROCEDURE — (BLANK) HC ROOM ICU INTERMEDIATE

## 2020-08-13 PROCEDURE — 99232 SBSQ HOSP IP/OBS MODERATE 35: CPT | Performed by: FAMILY MEDICINE

## 2020-08-13 PROCEDURE — 82248 BILIRUBIN DIRECT: CPT | Performed by: HOSPITALIST

## 2020-08-13 PROCEDURE — 99253 IP/OBS CNSLTJ NEW/EST LOW 45: CPT | Performed by: NURSE PRACTITIONER

## 2020-08-13 PROCEDURE — 82947 ASSAY GLUCOSE BLOOD QUANT: CPT | Mod: QW

## 2020-08-13 PROCEDURE — 85025 COMPLETE CBC W/AUTO DIFF WBC: CPT | Performed by: HOSPITALIST

## 2020-08-13 PROCEDURE — 6370000100 HC RX 637 (ALT 250 FOR IP): Performed by: NURSE PRACTITIONER

## 2020-08-13 PROCEDURE — 6370000100 HC RX 637 (ALT 250 FOR IP): Performed by: HOSPITALIST

## 2020-08-13 PROCEDURE — 36415 COLL VENOUS BLD VENIPUNCTURE: CPT | Performed by: HOSPITALIST

## 2020-08-13 PROCEDURE — 83735 ASSAY OF MAGNESIUM: CPT | Performed by: HOSPITALIST

## 2020-08-13 PROCEDURE — 6360000200 HC RX 636 W HCPCS (ALT 250 FOR IP): Performed by: NURSE PRACTITIONER

## 2020-08-13 PROCEDURE — 6360000200 HC RX 636 W HCPCS (ALT 250 FOR IP): Performed by: FAMILY MEDICINE

## 2020-08-13 PROCEDURE — 80053 COMPREHEN METABOLIC PANEL: CPT | Performed by: HOSPITALIST

## 2020-08-13 RX ORDER — SODIUM CHLORIDE 9 MG/ML
25 INJECTION, SOLUTION INTRAVENOUS AS NEEDED
Status: DISCONTINUED | OUTPATIENT
Start: 2020-08-13 | End: 2020-08-14 | Stop reason: HOSPADM

## 2020-08-13 RX ORDER — MAGNESIUM SULFATE HEPTAHYDRATE 40 MG/ML
2 INJECTION, SOLUTION INTRAVENOUS ONCE
Status: COMPLETED | OUTPATIENT
Start: 2020-08-13 | End: 2020-08-13

## 2020-08-13 RX ORDER — AMIODARONE HYDROCHLORIDE 200 MG/1
200 TABLET ORAL 2 TIMES DAILY WITH MEALS
Status: DISCONTINUED | OUTPATIENT
Start: 2020-08-13 | End: 2020-08-14 | Stop reason: HOSPADM

## 2020-08-13 RX ADMIN — AMIODARONE HYDROCHLORIDE 1 MG/MIN: 1.8 INJECTION, SOLUTION INTRAVENOUS at 02:15

## 2020-08-13 RX ADMIN — LISINOPRIL 5 MG: 5 TABLET ORAL at 20:00

## 2020-08-13 RX ADMIN — ROSUVASTATIN CALCIUM 40 MG: 20 TABLET, FILM COATED ORAL at 20:00

## 2020-08-13 RX ADMIN — LISINOPRIL 5 MG: 5 TABLET ORAL at 09:12

## 2020-08-13 RX ADMIN — FUROSEMIDE 20 MG: 20 TABLET ORAL at 09:13

## 2020-08-13 RX ADMIN — AMIODARONE HYDROCHLORIDE 0.5 MG/MIN: 1.8 INJECTION, SOLUTION INTRAVENOUS at 12:00

## 2020-08-13 RX ADMIN — AMIODARONE HYDROCHLORIDE 200 MG: 200 TABLET ORAL at 17:57

## 2020-08-13 RX ADMIN — MEXILETINE HYDROCHLORIDE 200 MG: 200 CAPSULE ORAL at 09:18

## 2020-08-13 RX ADMIN — CARVEDILOL 25 MG: 25 TABLET, FILM COATED ORAL at 09:12

## 2020-08-13 RX ADMIN — LANSOPRAZOLE 30 MG: 30 CAPSULE, DELAYED RELEASE PELLETS ORAL at 15:27

## 2020-08-13 RX ADMIN — SODIUM CHLORIDE 25 ML: 9 INJECTION, SOLUTION INTRAVENOUS at 09:24

## 2020-08-13 RX ADMIN — MAGNESIUM SULFATE 2 G: 2 INJECTION INTRAVENOUS at 09:25

## 2020-08-13 RX ADMIN — CARVEDILOL 25 MG: 25 TABLET, FILM COATED ORAL at 17:06

## 2020-08-13 RX ADMIN — PRASUGREL 10 MG: 10 TABLET, FILM COATED ORAL at 09:12

## 2020-08-13 RX ADMIN — MEXILETINE HYDROCHLORIDE 200 MG: 200 CAPSULE ORAL at 20:00

## 2020-08-13 RX ADMIN — RIVAROXABAN 20 MG: 20 TABLET, FILM COATED ORAL at 17:06

## 2020-08-13 ASSESSMENT — ENCOUNTER SYMPTOMS
CHILLS: 0
FEVER: 0
WHEEZING: 0
SYNCOPE: 0
HOARSE VOICE: 0
DYSPNEA ON EXERTION: 0
PALPITATIONS: 1
HEMATURIA: 0
BLURRED VISION: 0
ORTHOPNEA: 0
MYALGIAS: 0
ALTERED MENTAL STATUS: 0
LIGHT-HEADEDNESS: 1
DIZZINESS: 0
BLOATING: 0
JOINT SWELLING: 0
WEAKNESS: 1
SHORTNESS OF BREATH: 0
VOMITING: 0
NAUSEA: 0
COUGH: 0
WEIGHT GAIN: 0

## 2020-08-13 NOTE — PLAN OF CARE
Problem: Cardiovascular - Adult  Goal: Maintains optimal cardiac output and hemodynamic stability  Description: INTERVENTIONS:  1. Monitor vital signs and rhythm  2. Monitor for hypotension and other signs of decreased cardiac output  3. Administer and titrate ordered vasoactive medications to optimize hemodynamic stability  4. Monitor for fluid overload/dehydration, weight gain, shortness of breath and activity intolerance  5. Monitor arterial and/or venous puncture sites for bleeding and/or hematoma  6. Assess quality of pulses, capillary refill, edema, sensation, skin color and temperature  7. Assess for signs of decreased coronary artery perfusion - ex. angina  Outcome: Progressing  Flowsheets (Taken 8/12/2020 2248)  Maintain optimal cardiac output and hemodynamic stability:   Monitor vital signs and rhythm   Monitor for hypotension and other signs of decreased cardiac output   Administer and titrate ordered vasoactive medications to optimize hemodynamic stability   Monitor for fluid overload/dehydration, weight gain, shortness of breath and activity intolerance   Monitor arterial and/or venous puncture sites for bleeding and/or hematoma   Assess quality of pulses, capillary refill, edema, sensation, skin color and temperature   Assess for signs of decreased coronary artery perfusion - ex. angina  Goal: Absence of cardiac dysrhythmias or at baseline  Description: INTERVENTIONS:  1. Continuous cardiac monitoring, monitor vital signs, obtain 12 lead EKG if indicated  2. Administer antiarrhythmic and heart rate control medications as ordered  3. Initiate emergency measures for life threatening arrhythmias  4. Monitor electrolytes and administer replacement therapy as ordered  Outcome: Progressing  Flowsheets (Taken 8/12/2020 2248)  Absence of cardiac dysrhythmias or at baseline:   Continuous cardiac monitoring, monitor vital signs, obtain 12 lead EKG if indicated   Administer antiarrhythmic and heart rate control  medications as ordered   Initiate emergency measures for life threatening arrhythmias   Monitor electrolytes and administer replacement therapy as ordered     Problem: Hematologic - Adult  Goal: Maintains hematologic stability  Description: INTERVENTIONS  1. Assess for signs and symptoms of bleeding or hemorrhage  2. Monitor labs  3. Administer supportive blood products/factors as ordered and appropriate  4. Administer medications as ordered  5. Initiate bleeding precautions as indicated  6. Educate patient/family to report signs/symptoms of bleeding  Outcome: Progressing  Flowsheets (Taken 8/12/2020 2248)  Maintains hematologic stability:   Assess for signs and symptoms of bleeding or hemorrhage   Administer supportive blood products/factors as ordered and appropriate   Initiate bleeding precautions as indicated   Monitor labs   [place] medications as ordered   Educate patient/family to report signs/symptoms of bleeding     Problem: Pain - Adult  Goal: Verbalizes/displays adequate comfort level or baseline comfort level  Description: INTERVENTIONS:  1. Encourage patient to monitor pain and request interventions  2. Assess pain using the appropriate pain scale  3. Administer analgesics based on type and severity of pain and evaluate response  4. Educate/Implement non-pharmacological measures as appropriate and evaluate response  5. Consider cultural, developmental and social influences on pain and pain management  6. Notify Provider if interventions unsuccessful or patient reports new pain  Outcome: Progressing  Flowsheets (Taken 8/12/2020 2248)  Verbalizes/displays adequate comfort level or baseline comfort level:   Encourage patient to monitor pain and request interventions   Assess pain using the appropriate pain scale   Administer analgesics based on type and severity of pain and evaluate response   Educate/Implement non-pharmacological measures as appropriate and evaluate response   Consider cultural,  developmental and social influences on pain and pain management   Notify Provider if interventions unsuccessful or patient reports new pain     Problem: Safety Adult  Goal: Patient will remain safe during hospitalization  Description: INTERVENTIONS    1. Assess patient for fall risk and implement interventions if needed  2. Use safe transport techniques  3. Assess patient using the appropriate Jean Marie skin assessment scale  4. Assess patient for risk of aspiration  5. Assess patient for risk of elopement  6. Assess patient for risk of suicide  Outcome: Progressing  Flowsheets (Taken 8/12/2020 0458)  Patient will remain safe durning hospitalization:   Assess patient for Fall Risk   Assess Patient for Aspirations   Use safe transport   Assess Patient for Risk of Elopement   Assess Patient using the appropriate Jean Marie scale   Assess Patient for Risk of Suicide

## 2020-08-13 NOTE — PLAN OF CARE
Problem: Cardiovascular - Adult  Goal: Maintains optimal cardiac output and hemodynamic stability  Description: INTERVENTIONS:  1. Monitor vital signs and rhythm  2. Monitor for hypotension and other signs of decreased cardiac output  3. Administer and titrate ordered vasoactive medications to optimize hemodynamic stability  4. Monitor for fluid overload/dehydration, weight gain, shortness of breath and activity intolerance  5. Monitor arterial and/or venous puncture sites for bleeding and/or hematoma  6. Assess quality of pulses, capillary refill, edema, sensation, skin color and temperature  7. Assess for signs of decreased coronary artery perfusion - ex. angina  Outcome: Progressing  Goal: Absence of cardiac dysrhythmias or at baseline  Description: INTERVENTIONS:  1. Continuous cardiac monitoring, monitor vital signs, obtain 12 lead EKG if indicated  2. Administer antiarrhythmic and heart rate control medications as ordered  3. Initiate emergency measures for life threatening arrhythmias  4. Monitor electrolytes and administer replacement therapy as ordered  Outcome: Progressing     Problem: Pain - Adult  Goal: Verbalizes/displays adequate comfort level or baseline comfort level  Description: INTERVENTIONS:  1. Encourage patient to monitor pain and request interventions  2. Assess pain using the appropriate pain scale  3. Administer analgesics based on type and severity of pain and evaluate response  4. Educate/Implement non-pharmacological measures as appropriate and evaluate response  5. Consider cultural, developmental and social influences on pain and pain management  6. Notify Provider if interventions unsuccessful or patient reports new pain  Outcome: Progressing     Problem: Safety Adult  Goal: Patient will remain safe during hospitalization  Description: INTERVENTIONS    1. Assess patient for fall risk and implement interventions if needed  2. Use safe transport techniques  3. Assess patient using the  appropriate Jean Marie skin assessment scale  4. Assess patient for risk of aspiration  5. Assess patient for risk of elopement  6. Assess patient for risk of suicide  Outcome: Progressing

## 2020-08-13 NOTE — NURSING END OF SHIFT
Nursing End of Shift Summary:    Patient: Pete Trejo  MRN: [id number]  : 1965, Age: 54 y.o.    Location: [place] [address]    Nursing Goals  Clinical Goals for the Shift: Monitor vital signs, Arrhythmias and safety    Narrative Summary of Progress Toward Clinical Goals:  Vital signs are stable, no arrhythmias noted, no chest pain, Patient is ambulating independently. Safety maintained.     Barriers to Goals/Nursing Concerns:  None    New Patient or Family Concerns/Issues:  No    Shift Summary:      Significant Events & Communications to Providers (last 12 hours)      Last 5 Values     Row Name 20 0534                   Provider Notification    Reason for Communication  Other (Comment) mg 1.7  -LORIE        Provider Name  Myesha BAPTISTE          User Key  (r) = Recorded By, (t) = Taken By, (c) = Cosigned By    Initials Name    LORIE Mclean RN               Oxygen Usage (last 12 hours)      Last 5 Values    No documentation.              Mobility (last 12 hours)      Last 5 Values     Row Name 20 0729 20 0900                Mobility    Activity  --  Ambulate in room;Bathroom privileges       Level of Assistance  --  Independent       Anti-Embolism Devices  Bilateral;AE calf pump  --       Anti-Embolism Intervention  Refused  --           Urethral Catheter    Active Urethral Catheter     None            Active Lines    Active Central venous catheter / Peripherally inserted central catheter / Implantable Port / Hemodialysis catheter / Midline Catheter     None              Infusing Medications   Medication Dose Last Rate   • amiodarone  0.5 mg/min 0.5 mg/min (20 1638)     PRN Medications   Medication Dose Last Dose   • sodium chloride 0.9% (NS)  25 mL Stopped at 20 1155   • nitroglycerin  0.4 mg     • sodium chloride  3 mL     • acetaminophen  325-650 mg     • oxyCODONE-acetaminophen  1-2 tablet     • metoclopramide  10 mg      Or   • metoclopramide  10 mg        _________________________  Og Hess RN  08/13/20 5:31 PM

## 2020-08-13 NOTE — MEDICATION HISTORY SPECIALIST NOTES
[facility] [id number]    Information sources:  EPIC   referral     Patient home medications updated per resources. Multiple attempts made to contact patient and/or family were unsuccessful. Therefore, at this time I am unable to verify information provided by resources and in Epic, along with last doses and compliance of home medications. Medication history specialist will follow up with patient or family  tomorrow morning to continue to attempt to obtain this information.

## 2020-08-13 NOTE — INTERDISCIPLINARY/THERAPY
Case Management Admission Note  [phone]    Living Situation: Lives with SO in private residence   ADLs: Independent   Stairs: N  DME: N  Oxygen: N  -  Liters:   -  Company:   CPAP: N  Home Health: N  PCP: Roula Kim  Funding: [facility]  Support Person: Significant other   Transportation at DC: Has ride   Discharge Needs/Barriers: CM will continue to follow.     Narrative: Admitted with ventricular arrhythmias.  Per cardiology -  monomorphic stable VT. Patient was cardioverted and converted, started on Amiodarone drip. Drip continues. EP consult. Possibly needs LVAD or heart transplant.     Dispo: DCP-DOP     Dr. Wu requesting number for [place] consult. Provider # for transfer center. [phone]    Face sheet faxed to the transfer center and [place] [phone]. Images pushed per radiology by Sonya STAHL.

## 2020-08-13 NOTE — CONSULTATION
ELECTROPHYSIOLOGY CONSULT    Chief Complaint:    I was asked by Dr. Ji to evaluate the patient for ventricular tachycardia.    HPI:    Pete Trejo is a very pleasant 54 y.o. y/o male who presents with a PMH significant for   Patient Active Problem List   Diagnosis   • Ventricular tachycardia (CMS/HCC) (HCC)   • Anteroseptal myocardial infarction (CMS/HCC) (HCC)   • Anterior myocardial infarction (CMS/HCC) (HCC)   • CAD (coronary artery disease)   • Congestive heart failure (CHF) (CMS/HCC) (HCC)   • Ischemic cardiomyopathy   • Thrombus   • Aneurysm (CMS/HCC) (HCC)   • CVA (cerebral vascular accident) (CMS/HCC) (HCC)   • Hypertension   • Hyperlipidemia   • Tobacco use   • VT (ventricular tachycardia) (CMS/HCC) (HCC)   • S/P ablation of ventricular arrhythmia   • Anticoagulated   • Automatic implantable cardioverter-defibrillator in situ   • Presence of stent in coronary artery   • On amiodarone therapy   • Coronary artery disease   • Tobacco use   • AICD discharge   • Ventricular arrhythmia   • V tach (CMS/HCC) (HCC)         Pete is pleasant 54 year old male patient of Dr Montana who presented to the emergency room with complaints of heart palpitations.      Present to the emergency room on 8/12/2020 with complaints of heart palpitations EKG revealed monomorphic ventricular tachycardia at a rate of 141 bpm.  Patient then was given adenosine with no response.  Diltiazem was tried with improvements in heart rhythm but rate remained between 140 and 155 bpm.  Patient was then successfully cardioverted by Dr. Fernandez with 100 J.  Patient was then started amiodarone drip with a referral to electrophysiology for further recommendations and treatment.    He has a known history of CAD, ventricular tachycardia, ischemic cardiomyopathy is post dual-chamber biventricular relator, LV thrombus on Xarelto, dyslipidemia, hypertension.  Patient was recently taken to the EP lab for possible VT ablation with Dr. Montana on  6/9/2020 unfortunately patient had recurrent numerous ventricular arrhythmias as result of extensive anterior wall scar also involving much of the lateral and septal walls.  He did partially successfully ablate a border zone area of the scar especially in the inferior apex and attempt to substrate modify his arrhythmia burden.  Patient was sent home on amiodarone 200 mg twice a day for a month and then 200 mg daily.  He is currently taking mexiletine 200 mg twice a day.  On 7/8/2020 patient had a telemedicine visit with Dr. Montana and a potential referral for LVAD and transplant consideration at [facility] was discussed.    Patient was seen and examined today.  Chart review and plan discussed with nurse.  Patient is sitting comfortably in bed with his son at bedside.  During examination patient denies chest pain, shortness of breath, palpitations, dizziness or lightheadedness.  Dr. Wu discussed possible options with patient including referral to [facility] or [place] for possible LVAD or heart transplant.  All questions were answered, patient and son verbalized understanding.    Medications:    Current Facility-Administered Medications   Medication Dose Route Frequency Provider Last Rate Last Dose   • amiodarone (NEXTERONE) 360 mg/200 mL (1.8 mg/mL) infusion (premix)  0.5 mg/min intravenous Continuous Angelica Brunner CNP 16.67 mL/hr at 08/13/20 1200 0.5 mg/min at 08/13/20 1200   • sodium chloride 0.9% (NS) carrier flush 25 mL  25 mL intravenous PRN Mayra Ji DO   Stopped at 08/13/20 1155   • prasugreL (EFFIENT) tablet 10 mg  10 mg oral Daily Eron Kang MD   10 mg at 08/13/20 0912   • rosuvastatin (CRESTOR) tablet 40 mg  40 mg oral Nightly Eron Kang MD   40 mg at 08/12/20 2311   • nitroglycerin (NITROSTAT) SL tablet 0.4 mg  0.4 mg sublingual q5 min PRN Eron aKng MD       • spironolactone (ALDACTONE) tablet 25 mg  25 mg oral Daily Eron Kang MD       • lansoprazole (PREVACID) capsule 30 mg  30 mg oral Daily  at 1600 Eron Kang MD       • carvediloL (COREG) tablet 25 mg  25 mg oral 2x daily with meals Eron Kang MD   25 mg at 08/13/20 0912   • furosemide (LASIX) tablet 20 mg  20 mg oral Daily Eron Kang MD   20 mg at 08/13/20 0913   • lisinopriL (PRINIVIL,ZESTRIL) tablet 5 mg  5 mg oral 2x daily Eron Kang MD   5 mg at 08/13/20 0912   • mexiletine (MEXITIL) capsule 200 mg  200 mg oral 2x daily Eron Kang MD   200 mg at 08/13/20 0918   • sodium chloride flush 3 mL  3 mL intravenous PRN Eron Kang MD       • acetaminophen (TYLENOL) tablet 325-650 mg  325-650 mg oral q4h PRN Eron Kang MD       • oxyCODONE-acetaminophen (PERCOCET) 5-325 mg 1-2 tablet  1-2 tablet oral q4h PRN Eron Kang MD       • metoclopramide (REGLAN) tablet 10 mg  10 mg oral q6h PRN Eron Kang MD        Or   • metoclopramide (REGLAN) injection 10 mg  10 mg intravenous q6h PRN Eron Kang MD       • rivaroxaban (XARELTO) tablet 20 mg  20 mg oral Daily with dinner Eron Kang MD   20 mg at 08/12/20 2311        Fam Hx:    No family history of sudden cardiac death.   Family History   Problem Relation Age of Onset   • Heart attack Mother 62   • Other Mother         Abdominal Aortic Aneurysm   • Lung cancer Mother    • Colon cancer Father         Cause of death   • Diabetes Brother         Non-Insulin Dependent   • Heart attack Brother 51        w/ Stents   • Heart disease Brother    • Other Son         Well          Soc Hx:    Social History     Occupational History   • Not on file   Tobacco Use   • Smoking status: Former Smoker     Years: 20.00     Types: Cigarettes   • Smokeless tobacco: Never Used   • Tobacco comment: Stopped June 7th, 2020    Substance and Sexual Activity   • Alcohol use: No     Comment: Quit drinking in 2007   • Drug use: Not Currently   • Sexual activity: Defer   Social History Narrative   • Not on file        ROS:    Review of Systems   Constitution: Negative for chills, fever and weight gain.   HENT: Negative for congestion and hoarse voice.     Eyes: Negative for blurred vision.   Cardiovascular: Positive for palpitations. Negative for chest pain, dyspnea on exertion, leg swelling, orthopnea and syncope.   Respiratory: Negative for cough, shortness of breath and wheezing.    Endocrine: Negative for cold intolerance and heat intolerance.   Hematologic/Lymphatic: Negative for bleeding problem.   Skin: Negative for itching.   Musculoskeletal: Negative for joint swelling and myalgias.   Gastrointestinal: Negative for bloating, nausea and vomiting.   Genitourinary: Negative for hematuria.   Neurological: Positive for light-headedness and weakness. Negative for dizziness.   Psychiatric/Behavioral: Negative for altered mental status.   Allergic/Immunologic: Negative for hives.       Physical Exam:    Vitals:  Temp:  [36.6 °C (97.9 °F)-37 °C (98.6 °F)] 36.6 °C (97.9 °F)  Heart Rate:  [] 60  Resp:  [14-27] 20  BP: ()/(38-92) 118/64      Telemetry: Paced rhythm at 60 bpm.  No ectopy or significant arrhythmias noted.    Physical Exam    Laboratory values:    Lab Results   Component Value Date    INR 1.3 (H) 06/16/2020    INR 1.1 07/29/2017     Lab Results   Component Value Date    WBC 5.6 08/13/2020    RBC 4.53 08/13/2020    HGB 14.1 08/13/2020    HCT 40.7 08/13/2020    MCV 89.9 08/13/2020    MCH 31.2 08/13/2020    MCHC 34.7 08/13/2020    RDW 13.4 08/13/2020     08/13/2020     No components found for: TROP   Lab Results   Component Value Date    TSH 0.627 06/07/2020        Diagnostic Data:    Xr Chest Portable 1 View: 8/12/2020    IMPRESSION: 1.  Since 6/16/2020 slight interval improved aeration of the left lower lung. Otherwise no significant interval change. Slight interstitial prominence, likely represents mild pulmonary edema. Enlarged cardiac silhouette. No pleural effusions. No pneumothorax left chest wall ICD. Stable prominence of bilateral chelo. Stable thickening of the right pleura.    Assessment and Plan:    1. Ventricular tachycardia:  Patient presented to the emergency room in ventricular tachycardia at a rate of 141 bpm.  He was given adenosine and diltiazem with little response.  He was then cardioverted with 100 J and transferred to the floor for further recommendations and evaluations.  Telemetry reviewed showing no further episodes of ventricular tachycardia or ectopy.  He is currently on amiodarone drip this will be discontinued after the current bag and started on oral amiodarone 200 mg twice a day.  He should continue mexiletine 100 mg twice a day.  Patient's device was adjusted by Dr. Wu to a lower detection rate of 130 bpm.  Patient will be referred to [facility] or [facility] for further recommendations and treatment.    2. Ischemic cardiomyopathy: Status post biventricular ICD placement.  Recent echocardiogram on 6/8/2020 revealed severe left ventricular systolic dysfunction.  Multiple wall motion abnormalities with an ejection fraction of 25%.  Left atrium is moderately dilated at 5.82 cm and an ROCKY at 39 mL/m2.  Grade 1 mild left ventricular diastolic abnormality.  Continue current medical therapy.    3. Coronary artery disease: Status post PCI to LAD in 2009, 2010, and 2017.  Currently on carvedilol 25 mg twice a day, lisinopril 5 mg twice a day, Effient 10 mg daily, and atorvastatin 40 mg daily.  Continue current medical therapy.     Thank you for allowing our participation in the care of this very pleasant patient.  Please do not hesitate to contact us at [phone] with any questions or concerns that you may have.    Seen in conjunction with Dr Wu.    Tanika Sun, CNP     Please note that portions of this document were generated with speech recognition software.  Reasonable efforts have been made to correct any transcriptional errors however some typographical errors may remain.

## 2020-08-13 NOTE — PROGRESS NOTES
Internal/Family Medicine Daily Progress Note   LOS: 1 day     Chief complaint  Palpitation with generalized weakness      Subjective      Patient presented to the emergency department yesterday with heart palpitations, EKG indicated monomorphic ventricular tachycardia with rate of 141 bpm.  Adenosine was given without response, diltiazem yielded improvement but rate continued between 140 and 155 bpm.  He was subsequently cardioverted with 100 J.  Amiodarone infusion was initiated.  Patient is well-known to cardiology department and is a patient of Dr. Montana.  He has multiple cardiac diagnoses as well as MI x3.  Dr. Montana has been discussing referral for LVAD and transplant at [facility].  Dr. uW (EP) was on call today and evaluated patient.  I discussed case with Dr. Wu who has spoken to [facility] twice today in consideration of above.  Patient AICD was adjusted by Dr. Wu and current health status as well as medical management were fully evaluated by him.      Objective       Patient consented for exam    Vital signs:  Temp:  [36.7 °C (98 °F)-37 °C (98.6 °F)] 37 °C (98.6 °F)  Heart Rate:  [] 61  Resp:  [14-27] 20  BP: ()/(38-92) 140/86    Physical Exam  Vitals signs reviewed.   Constitutional:       Appearance: Normal appearance.   Cardiovascular:      Rate and Rhythm: Normal rate and regular rhythm.      Pulses: Normal pulses.   Pulmonary:      Effort: Pulmonary effort is normal.      Breath sounds: Normal breath sounds.   Abdominal:      General: Bowel sounds are normal.      Palpations: Abdomen is soft.   Musculoskeletal:      Right lower leg: Edema present.      Left lower leg: Edema present.   Skin:     General: Skin is warm and dry.      Capillary Refill: Capillary refill takes less than 2 seconds.   Neurological:      General: No focal deficit present.      Mental Status: He is alert and oriented to person, place, and time.   Psychiatric:         Mood and Affect: Mood normal.          Behavior: Behavior normal.         Thought Content: Thought content normal.         Judgment: Judgment normal.     Telemetry indicates paced rhythm low 60s      Assessment/Plan       Principal Problem:    Ventricular arrhythmia  Active Problems:    CAD (coronary artery disease)    Congestive heart failure (CHF) (CMS/[facility]) ([facility])    Ischemic cardiomyopathy    Thrombus    Aneurysm (CMS/[facility]) ([facility])    Hypertension    Hyperlipidemia    Tobacco use    S/P ablation of ventricular arrhythmia    Automatic implantable cardioverter-defibrillator in situ    Presence of stent in coronary artery    Coronary artery disease    V tach (CMS/[facility]) ([facility])         Plan of care for today:  Multiple cardiac diagnoses as noted above who was evaluated by Dr. Wu today.  He wishes to observe patient overnight as well as continue investigation for further recommendations and treatment from either [facility] or the [facility].  Current medical management is to continue.  Hospitalist service will support Dr. Wu with discharge or transfer as indicated.      DVT Prophylaxis: Xarelto    Code Status: Full Code    Reason for continued hospitalization:    Anticipated date of discharge: Tomorrow    Time spent on this encounter:    25 min, >50% spent on counseling and coordination of care with patient, consultant and nurse        I have reviewed all the lab results.    Admission on 08/12/2020   Component Date Value Ref Range Status   • WBC 08/12/2020 5.8  3.7 - 9.6 10*3/uL Final   • RBC 08/12/2020 4.88  4.10 - 5.80 10*6/µL Final   • Hemoglobin 08/12/2020 15.2  13.2 - 17.2 g/dL Final   • Hematocrit 08/12/2020 44.2  38.0 - 50.0 % Final   • MCV 08/12/2020 90.4  82.0 - 97.0 fL Final   • MCH 08/12/2020 31.2  29.0 - 34.0 pg Final   • MCHC 08/12/2020 34.5  32.0 - 36.0 g/dL Final   • RDW 08/12/2020 13.9  11.5 - 15.0 % Final   • Platelets 08/12/2020 227  130 - 350 10*3/uL Final   • MPV 08/12/2020 8.2  6.9 - 10.8 fL Final   • Neutrophils%  08/12/2020 43* 46 - 70 % Final   • Lymphocytes% 08/12/2020 41  15 - 47 % Final   • Monocytes% 08/12/2020 12  5 - 13 % Final   • Eosinophils% 08/12/2020 4* 0 - 3 % Final   • Basophils% 08/12/2020 1  0 - 2 % Final   • Neutrophils Absolute 08/12/2020 2.50  10*3/uL Final   • Lymphocytes Absolute 08/12/2020 2.40  10*3/uL Final   • Monocytes Absolute 08/12/2020 0.70  10*3/uL Final   • Eosinophils Absolute 08/12/2020 0.20  10*3/uL Final   • Basophils Absolute 08/12/2020 0.00  10*3/uL Final   • Sodium 08/12/2020 138  135 - 145 mmol/L Final   • Potassium 08/12/2020 4.2  3.5 - 5.1 mmol/L Final   • Chloride 08/12/2020 108* 98 - 107 mmol/L Final   • CO2 08/12/2020 23  21 - 32 mmol/L Final   • Anion Gap 08/12/2020 7  3 - 11 mmol/L Final   • BUN 08/12/2020 11  7 - 25 mg/dL Final   • Creatinine 08/12/2020 0.64* 0.70 - 1.30 mg/dL Final   • Glucose 08/12/2020 135* 70 - 105 mg/dL Final   • Calcium 08/12/2020 9.3  8.6 - 10.3 mg/dL Final   • AST 08/12/2020 27  <40 U/L Final   • ALT (SGPT) 08/12/2020 67* 7 - 52 U/L Final   • Alkaline Phosphatase 08/12/2020 80  45 - 115 U/L Final   • Total Protein 08/12/2020 6.5  6.0 - 8.3 g/dL Final   • Albumin 08/12/2020 3.7  3.5 - 5.3 g/dL Final   • Total Bilirubin 08/12/2020 0.62  0.20 - 1.40 mg/dL Final   • eGFR 08/12/2020 112  >60 mL/min/1.73m*2 Final   • Corrected Calcium 08/12/2020 9.5  8.6 - 10.3 mg/dL Final   • Magnesium 08/12/2020 1.9  1.8 - 2.4 mg/dL Final   • hsTnI 0 Hour 08/12/2020 41.7* <=19.8 pg/mL Final   • hsTnI 1 hr 08/12/2020 62.3* <=19.8 pg/mL Final   • Delta from 0 Hour 08/12/2020 20.6* <12 Final   • hsTnI 2 hr 08/12/2020 101.8* <=19.8 pg/mL Final   • Late Draw Delta from 0 Hour 08/12/2020 60.1   Final   • Sodium 08/13/2020 137  135 - 145 mmol/L Final   • Potassium 08/13/2020 4.1  3.5 - 5.1 mmol/L Final   • Chloride 08/13/2020 106  98 - 107 mmol/L Final   • CO2 08/13/2020 24  21 - 32 mmol/L Final   • BUN 08/13/2020 9  7 - 25 mg/dL Final   • Creatinine 08/13/2020 0.71  0.70 - 1.30  mg/dL Final   • Glucose 08/13/2020 110* 70 - 105 mg/dL Final   • Calcium 08/13/2020 9.0  8.6 - 10.3 mg/dL Final   • Anion Gap 08/13/2020 7  3 - 11 mmol/L Final   • eGFR 08/13/2020 107  >60 mL/min/1.73m*2 Final   • WBC 08/13/2020 5.6  3.7 - 9.6 10*3/uL Final   • RBC 08/13/2020 4.53  4.10 - 5.80 10*6/µL Final   • Hemoglobin 08/13/2020 14.1  13.2 - 17.2 g/dL Final   • Hematocrit 08/13/2020 40.7  38.0 - 50.0 % Final   • MCV 08/13/2020 89.9  82.0 - 97.0 fL Final   • MCH 08/13/2020 31.2  29.0 - 34.0 pg Final   • MCHC 08/13/2020 34.7  32.0 - 36.0 g/dL Final   • RDW 08/13/2020 13.4  11.5 - 15.0 % Final   • Platelets 08/13/2020 197  130 - 350 10*3/uL Final   • MPV 08/13/2020 7.8  6.9 - 10.8 fL Final   • Neutrophils% 08/13/2020 43* 46 - 70 % Final   • Lymphocytes% 08/13/2020 40  15 - 47 % Final   • Monocytes% 08/13/2020 13  5 - 13 % Final   • Eosinophils% 08/13/2020 3  0 - 3 % Final   • Basophils% 08/13/2020 1  0 - 2 % Final   • Neutrophils Absolute 08/13/2020 2.40  10*3/uL Final   • Lymphocytes Absolute 08/13/2020 2.20  10*3/uL Final   • Monocytes Absolute 08/13/2020 0.70  10*3/uL Final   • Eosinophils Absolute 08/13/2020 0.20  10*3/uL Final   • Basophils Absolute 08/13/2020 0.10  10*3/uL Final   • Magnesium 08/13/2020 1.7* 1.8 - 2.4 mg/dL Final   • Albumin 08/13/2020 3.3* 3.5 - 5.3 g/dL Final   • Total Bilirubin 08/13/2020 0.71  0.20 - 1.40 mg/dL Final   • Bilirubin, Direct 08/13/2020 0.11  <0.20 mg/dL Final   • Alkaline Phosphatase 08/13/2020 62  45 - 115 U/L Final   • AST 08/13/2020 34  <40 U/L Final   • ALT (SGPT) 08/13/2020 65* 7 - 52 U/L Final   • Total Protein 08/13/2020 5.7* 6.0 - 8.3 g/dL Final       Pain Management Panel     There is no flowsheet data to display.            No results found for: CD4  No results found for: IRAIS  BP Readings from Last 3 Encounters:   08/13/20 140/86   06/26/20 129/76   06/17/20 137/71     Lab Results   Component Value Date    NEUTROABS 2.40 08/13/2020     No results found for:  PHENYTOIN, PHENOBARB, VPAT, CBMZ  Lab Results   Component Value Date    CKTOTAL 83 03/09/2015    TROPONINI <0.030 07/28/2017     No results found for: CTLJVPJF19  No results found for:   No results found for: CEA  No results found for: DDIMER    No results found for: HGBA1C  Lab Results   Component Value Date    LDLCALC 76 06/08/2020    CREATININE 0.71 08/13/2020     No results found for: SEDRATE  No results found for: CRP  No results found for: HEPAIGM, HEPBIGM, HEPCAB, HEPBSAB, HEPBSAG  No components found for: HIVCOMBO  No results found for: IRON, TIBC, FERRITIN  No results found for: DIGOXIN  Lab Results   Component Value Date    CHOL 127 06/08/2020    CHOL 96 09/20/2018    CHOL 113 07/29/2017     Lab Results   Component Value Date    HDL 29 (L) 06/08/2020    HDL 27 09/20/2018    HDL 26 (L) 07/29/2017     Lab Results   Component Value Date    LDLCALC 76 06/08/2020    LDLCALC 53 09/20/2018    LDLCALC 65 07/29/2017     Lab Results   Component Value Date    TRIG 112 06/08/2020    TRIG 145 09/20/2018    TRIG 108 07/29/2017     No results found for: OCCULTBLD  Lab Results   Component Value Date    CALCIUM 9.0 08/13/2020     No results found for: HCGQUAL, HCGPREGUR  Lab Results   Component Value Date    INR 1.3 (H) 06/16/2020    INR 1.1 07/29/2017    PT 14.9 (H) 06/16/2020    PT 13.7 07/29/2017     No results found for: SPEP, UPEP  Lab Results   Component Value Date    TSH 0.627 06/07/2020     No results found for: RF        XR chest portable 1 view   Final Result   IMPRESSION:   1.  Since 6/16/2020 slight interval improved aeration of the left lower lung. Otherwise no significant interval change. Slight interstitial prominence, likely represents mild pulmonary edema. Enlarged cardiac silhouette. No pleural effusions. No pneumothorax left chest wall ICD. Stable prominence of bilateral chelo. Stable thickening of the right pleura.        Xr Chest Portable 1 View    Result Date: 8/12/2020  Narrative: Exam: Portable  Chest AP 08/12/2020 Clinical History:  Palpitations Comparison(s): Chest radiograph 6/16/2020 Findings: Since 6/16/2020 slight interval improved aeration of the left lower lung. Otherwise no significant interval change. Slight interstitial prominence, likely represents mild pulmonary edema. Enlarged cardiac silhouette. No pleural effusions. No pneumothorax left chest wall ICD. Stable prominence of bilateral chelo. Stable thickening of the right pleura.     Impression: IMPRESSION: 1.  Since 6/16/2020 slight interval improved aeration of the left lower lung. Otherwise no significant interval change. Slight interstitial prominence, likely represents mild pulmonary edema. Enlarged cardiac silhouette. No pleural effusions. No pneumothorax left chest wall ICD. Stable prominence of bilateral chelo. Stable thickening of the right pleura.      Microbiology Results (last 21 days)     ** No results found for the last 504 hours. **                Mayra Ji, DO    Hospitalist Service         A voice recognition program was used to aid in documentation of this record.  Sometimes words are not printed exactly as they were spoken.  While efforts were made to carefully edit and correct any inaccuracies, some errors may be present; please take these into context.  Please contact the provider if errors are identified.

## 2020-08-13 NOTE — H&P
[address] [address]    History and Physical    Patient name: Pete Trejo  MRN: [id number]  Admit date: 8/12/2020         Chief Complaint:      History of Present Illness: 54 was old male with essential hypertension, coronary artery disease, chronic systolic CHF with ischemic cardiomyopathy with AICD, history of LV thrombus on Xarelto, dyslipidemia, tobacco abuse who had sudden onset of generalized weakness associated with palpitation this afternoon.    Patient was very active this morning outside doing his own yard, the 20 take a rest at home and suddenly he feel very weak and his heart was palpitating.     In ER patient has amiodarone GTT, had unstable nonsustained V. tach, was on amiodarone infusion and had cardioversion with 100 J, and converted back to normal sinus rhythm.      ER work-up:  Sodium 130, potassium 4.2, magnesium 1.9, albumin 3.7, troponin 41.7, and 62.3, WBC 5, hemoglobin 15,     EKG showed nonsustained V. tach.     Chest x-ray was unremarkable,  Significant cardiomyopathy    ER has consulted cardiology, recommend amiodarone GTT overnight and have EP to evaluate patient in the morning for further medication changes or device reprogramming.         Past Medical History:   Diagnosis Date   • AICD (automatic cardioverter/defibrillator) present    • H/O heart artery stent    • Heart attack (CMS/HCC) ([facility])    • V-tach (CMS/HCC) ([facility])        Past Surgical History:   Procedure Laterality Date   • ABLATION VT N/A 6/9/2020    Procedure: Ablation VT;  Surgeon: Wilfredo Montana MD;  Location: [facility] EP Lab;  Service: Electrophysiology;  Laterality: N/A;  Check with Dr. Montana in the a.m. regarding scheduling   • APPENDECTOMY         Family History   Problem Relation Age of Onset   • Heart attack Mother 62   • Other Mother         Abdominal Aortic Aneurysm   • Lung cancer Mother    • Colon cancer Father         Cause of death   • Diabetes Brother          Non-Insulin Dependent   • Heart attack Brother 51        w/ Stents   • Heart disease Brother    • Other Son         Well       Social History     Tobacco Use   • Smoking status: Former Smoker     Years: 20.00     Types: Cigarettes   • Smokeless tobacco: Never Used   • Tobacco comment: Stopped June 7th, 2020    Substance Use Topics   • Alcohol use: No     Comment: Quit drinking in 2007   • Drug use: Not Currently       (Not in a hospital admission)      Allergies   Allergen Reactions   • Morphine      ANXIETY   • Tramadol      ANXIETY         Review of Systems:  Positive:  Significant weakness and palpitation, all resolved  Negative: Fever, chills, cough, nausea, vomiting, chest pain, dyspnea at rest, melena, nausea, all systems reviewed were negative.    Physical Exam:  /63 (Patient Position: Head of bed 30 degrees or higher)   Pulse 61   Temp 36.9 °C (98.4 °F) (Oral)   Resp 23   Wt 116.3 kg (256 lb 6.3 oz)   SpO2 93%   BMI 35.76 kg/m²        GEN: No acute distress, obese  HEENT: Pupils equal round reactive to light and accommodation.  Extraocular movements are intact.  Oropharynx clear with no erythema or exudate  Neck: Soft.  Nontender.  No palpable lymphadenopathy  Lungs: Clear to auscultation bilaterally without adventitious sounds appreciated  Heart: Regular rate and rhythm with normal S1 and S2  Abdomen: Soft. Nontender.  Normal active bowel sounds.  No hepatosplenomegaly.  No other masses appreciated.  Extremities: No clubbing cyanosis or edema  Skin: Normal skin turgor  Neurologic: Alert and oriented ×3.  CN II through XII are intact.  5 out of 5 motor strength throughout.  Nonfocal.        Assessment and Plan:    Ventricular arrhythmia:  unstable nonsustained VT treat tach, had cardioversion with 100 J, on amiodarone GTTand mexiletine 200 mg twice daily, await EP evaluation for medication changes or device programming, they are considering left ventricular assistive device and heart transplant  at [place]     Essential hypertension: Continue Coreg 25 mg twice a day, Lasix 20 mg daily, and Aldactone 25 mg daily, lisinopril 5 mg daily, amiodarone 200 mg daily      Chronic systolic CHF with ischemic cardiomyopathy: Had AICD, continue medical management.  Effient 10 mg daily, Crestor 40 mg daily     Coronary artery disease: Severe coronary artery disease, last cardiac cath July 2017, last stress test was June 8, 2020 with significant scar tissue, no reversible ischemia     History of LV thrombus: Continue Xarelto 20 mg daily     Tobacco use disorder: Education given.     Obesity BMI 35     DVT prophylaxis: Xarelto daily started tonight     Plan of care:  Continue amiodarone GTT, mexiletine 200 mg twice daily, await EP recommendation in a.m.    Electronically signed by: Eron Kang MD  8/12/2020  7:54 PM

## 2020-08-14 VITALS
WEIGHT: 253.53 LBS | SYSTOLIC BLOOD PRESSURE: 121 MMHG | RESPIRATION RATE: 22 BRPM | OXYGEN SATURATION: 95 % | DIASTOLIC BLOOD PRESSURE: 61 MMHG | TEMPERATURE: 98.7 F | HEIGHT: 71 IN | BODY MASS INDEX: 35.49 KG/M2 | HEART RATE: 60 BPM

## 2020-08-14 PROCEDURE — 6370000100 HC RX 637 (ALT 250 FOR IP): Performed by: NURSE PRACTITIONER

## 2020-08-14 PROCEDURE — 6370000100 HC RX 637 (ALT 250 FOR IP): Performed by: HOSPITALIST

## 2020-08-14 PROCEDURE — 99232 SBSQ HOSP IP/OBS MODERATE 35: CPT | Performed by: FAMILY MEDICINE

## 2020-08-14 PROCEDURE — 99238 HOSP IP/OBS DSCHRG MGMT 30/<: CPT | Performed by: NURSE PRACTITIONER

## 2020-08-14 PROCEDURE — 93010 ELECTROCARDIOGRAM REPORT: CPT | Performed by: INTERNAL MEDICINE

## 2020-08-14 PROCEDURE — 93005 ELECTROCARDIOGRAM TRACING: CPT | Performed by: NURSE PRACTITIONER

## 2020-08-14 RX ORDER — AMIODARONE HYDROCHLORIDE 200 MG/1
200 TABLET ORAL 2 TIMES DAILY WITH MEALS
Qty: 60 TABLET | Refills: 4 | Status: SHIPPED | OUTPATIENT
Start: 2020-08-14 | End: 2021-01-19 | Stop reason: SDUPTHER

## 2020-08-14 RX ADMIN — MEXILETINE HYDROCHLORIDE 200 MG: 200 CAPSULE ORAL at 10:53

## 2020-08-14 RX ADMIN — LISINOPRIL 5 MG: 5 TABLET ORAL at 08:40

## 2020-08-14 RX ADMIN — PRASUGREL 10 MG: 10 TABLET, FILM COATED ORAL at 08:41

## 2020-08-14 RX ADMIN — AMIODARONE HYDROCHLORIDE 200 MG: 200 TABLET ORAL at 08:40

## 2020-08-14 RX ADMIN — SPIRONOLACTONE 25 MG: 25 TABLET ORAL at 08:40

## 2020-08-14 RX ADMIN — FUROSEMIDE 20 MG: 20 TABLET ORAL at 08:40

## 2020-08-14 RX ADMIN — CARVEDILOL 25 MG: 25 TABLET, FILM COATED ORAL at 08:40

## 2020-08-14 NOTE — NURSING NOTE
[facility] called looking for us to send images and documentation from echo, along with an H&P. LM for CM, will follow up 8/15   [phone]  Fax: [phone]

## 2020-08-14 NOTE — PLAN OF CARE
Problem: Cardiovascular - Adult  Goal: Maintains optimal cardiac output and hemodynamic stability  Description: INTERVENTIONS:  1. Monitor vital signs and rhythm  2. Monitor for hypotension and other signs of decreased cardiac output  3. Administer and titrate ordered vasoactive medications to optimize hemodynamic stability  4. Monitor for fluid overload/dehydration, weight gain, shortness of breath and activity intolerance  5. Monitor arterial and/or venous puncture sites for bleeding and/or hematoma  6. Assess quality of pulses, capillary refill, edema, sensation, skin color and temperature  7. Assess for signs of decreased coronary artery perfusion - ex. angina  Outcome: Progressing  Flowsheets (Taken 8/12/2020 2248 by Laurel Mclean RN)  Maintain optimal cardiac output and hemodynamic stability:   Monitor vital signs and rhythm   Monitor for hypotension and other signs of decreased cardiac output   Administer and titrate ordered vasoactive medications to optimize hemodynamic stability   Monitor for fluid overload/dehydration, weight gain, shortness of breath and activity intolerance   Monitor arterial and/or venous puncture sites for bleeding and/or hematoma   Assess quality of pulses, capillary refill, edema, sensation, skin color and temperature   Assess for signs of decreased coronary artery perfusion - ex. angina  Goal: Absence of cardiac dysrhythmias or at baseline  Description: INTERVENTIONS:  1. Continuous cardiac monitoring, monitor vital signs, obtain 12 lead EKG if indicated  2. Administer antiarrhythmic and heart rate control medications as ordered  3. Initiate emergency measures for life threatening arrhythmias  4. Monitor electrolytes and administer replacement therapy as ordered  Outcome: Progressing  Flowsheets (Taken 8/12/2020 2248 by Laurel Mclean RN)  Absence of cardiac dysrhythmias or at baseline:   Continuous cardiac monitoring, monitor vital signs, obtain 12 lead EKG if  indicated   Administer antiarrhythmic and heart rate control medications as ordered   Initiate emergency measures for life threatening arrhythmias   Monitor electrolytes and administer replacement therapy as ordered     Problem: Hematologic - Adult  Goal: Maintains hematologic stability  Description: INTERVENTIONS  1. Assess for signs and symptoms of bleeding or hemorrhage  2. Monitor labs  3. Administer supportive blood products/factors as ordered and appropriate  4. Administer medications as ordered  5. Initiate bleeding precautions as indicated  6. Educate patient/family to report signs/symptoms of bleeding  Outcome: Progressing  Flowsheets (Taken 8/12/2020 2248 by Laurel Mclean RN)  Maintains hematologic stability:   Assess for signs and symptoms of bleeding or hemorrhage   Administer supportive blood products/factors as ordered and appropriate   Initiate bleeding precautions as indicated   Monitor labs   [place] medications as ordered   Educate patient/family to report signs/symptoms of bleeding     Problem: Pain - Adult  Goal: Verbalizes/displays adequate comfort level or baseline comfort level  Description: INTERVENTIONS:  1. Encourage patient to monitor pain and request interventions  2. Assess pain using the appropriate pain scale  3. Administer analgesics based on type and severity of pain and evaluate response  4. Educate/Implement non-pharmacological measures as appropriate and evaluate response  5. Consider cultural, developmental and social influences on pain and pain management  6. Notify Provider if interventions unsuccessful or patient reports new pain  Outcome: Progressing  Flowsheets (Taken 8/12/2020 2248 by Laurel Mclean RN)  Verbalizes/displays adequate comfort level or baseline comfort level:   Encourage patient to monitor pain and request interventions   Assess pain using the appropriate pain scale   Administer analgesics based on type and severity of pain and evaluate response    Educate/Implement non-pharmacological measures as appropriate and evaluate response   Consider cultural, developmental and social influences on pain and pain management   Notify Provider if interventions unsuccessful or patient reports new pain     Problem: Safety Adult  Goal: Patient will remain safe during hospitalization  Description: INTERVENTIONS    1. Assess patient for fall risk and implement interventions if needed  2. Use safe transport techniques  3. Assess patient using the appropriate Jean Marie skin assessment scale  4. Assess patient for risk of aspiration  5. Assess patient for risk of elopement  6. Assess patient for risk of suicide  Outcome: Progressing  Flowsheets (Taken 8/12/2020 0252 by Laurel Mclean RN)  Patient will remain safe durning hospitalization:   Assess patient for Fall Risk   Assess Patient for Aspirations   Use safe transport   Assess Patient for Risk of Elopement   Assess Patient using the appropriate Jean Marie scale   Assess Patient for Risk of Suicide

## 2020-08-14 NOTE — DISCHARGE SUMMARY
Cardiology Discharge Summary      Admitting Provider: Eron Kang MD  Discharge Provider: Jasmyne Tyson MD  Primary Care Physician at Discharge: SHAHID SILVA, [phone]   Primary Cardiologist: Dr Edwards    Admission Date: 8/12/2020     Discharge Date: 8/14/2020    Primary Discharge Diagnosis  Principal Problem:    Ventricular arrhythmia  Active Problems:    CAD (coronary artery disease)    Congestive heart failure (CHF) (CMS/[facility]) ([facility])    Ischemic cardiomyopathy    Thrombus    Aneurysm (CMS/[facility]) ([facility])    Hypertension    Hyperlipidemia    Tobacco use    S/P ablation of ventricular arrhythmia    Automatic implantable cardioverter-defibrillator in situ    Presence of stent in coronary artery    Coronary artery disease    V tach (CMS/[facility]) ([facility])       Discharge medication list      CHANGE how you take these medications      Instructions   amiodarone 200 mg tablet  Commonly known as:  PACERONE  What changed:  when to take this   Take 1 tablet (200 mg total) by mouth 2 (two) times a day with meals        CONTINUE taking these medications      Instructions   Coreg 25 mg tablet  Generic drug:  carvediloL      Crestor 40 mg tablet  Generic drug:  rosuvastatin      furosemide 20 mg tablet  Commonly known as:  LASIX      hydrocortisone 1 % ointment      lisinopriL 5 mg tablet  Commonly known as:  PRINIVIL,ZESTRIL      mexiletine 200 mg capsule  Commonly known as:  MEXITIL   Take 1 capsule (200 mg total) by mouth 2 (two) times a day     Nitrostat 0.4 mg SL tablet  Generic drug:  nitroglycerin      pantoprazole 40 mg EC tablet  Commonly known as:  PROTONIX      prasugreL 10 mg tablet  Commonly known as:  EFFIENT   Take 1 tablet (10 mg total) by mouth daily.     rivaroxaban 20 mg tablet  Commonly known as:  Xarelto   Take 1 tablet (20 mg total) by mouth daily.     spironolactone 25 mg tablet  Commonly known as:  ALDACTONE            Where to Get Your Medications      These medications were sent to [facility]  [facility] - [place], [place] - [address]  [id number], [place] [address]    Phone:  [phone]   · amiodarone 200 mg tablet         Discharge Condition: good    Outpatient Follow-Up  Future Appointments   Date Time Provider Department Center   8/19/2020  2:30 PM [facility] DEVICE 1 [facility]       Referrals and Follow-ups to Schedule     Ambulatory referral to Cardiac Electrophysiology      Please schedule patient with dr rojas only in 2 weeks.    Ambulatory referral to Cardiology      Please schedule patient to see Dr. Edwards in the next 2 to 4 weeks.  Post hospital visit          DETAILS OF HOSPITAL STAY    Presenting Problem/History of Present Illness  Palpitations [R00.2]  V tach (CMS/HCC) (HCC) [I47.2]  Elevated troponin [R79.89]    Hospital Course  Pete is a pleasant 54-year-old patient of Dr. Rojas's.  He has a lengthy history of ischemic cardiomyopathy status post ICD placement.  Ventricular tachycardia status EP study with VT ablation.    Pete presented the emergency room on 8/12/2020 with complaints of heart palpitations EKG revealed monomorphic ventricular tachycardia at a rate of 141 bpm.  Patient then was given adenosine with no response.  Diltiazem was tried with improvements in heart rhythm but rate remained between 140 and 155 bpm.  Patient was then successfully cardioverted by Dr. Fernandez with 100 J.  Patient was then started amiodarone drip with a referral to electrophysiology for further recommendations and treatment.    Patient had his defibrillator reprogrammed yesterday to allow for antitachycardia pacing to begin at a rate of 130 bpm rather than 140 bpm.  For the full details of reprogramming please refer to the scanned Paceart document. Telemetry was reviewed today patient has not had further episodes of NSVT or ventricular tachycardia.  The amiodarone was increased to 200 mg twice a day until his follow-up with Dr. Rojas.  He will remain on  mexiletine 200 mg twice a day.  Patient will refer to [facility] or [facility] for evaluation at transplant center.    Patient seen and examined today.  Chart and plan discussed nurse.  Patient is sitting comfortably in the bed with wife at bedside.  Patient is eager to go home today.  He has had no further symptoms of palpitations, shortness of breath, or chest pain.  I reviewed upcoming plan with patient and his wife in regards to referral process to [facility] or [facility].  Patient is eager to get the process started.    Patient will follow-up with Dr. Montana in 2 weeks and Dr. Edwards in the next 2 to 4 weeks.  We will continue the referral process for a transplant program at either [facility] or [facility].    Patient Active Problem List   Diagnosis   • Ventricular tachycardia (CMS/HCC) (HCC)   • Anteroseptal myocardial infarction (CMS/HCC) (HCC)   • Anterior myocardial infarction (CMS/HCC) (HCC)   • CAD (coronary artery disease)   • Congestive heart failure (CHF) (CMS/HCC) ([facility])   • Ischemic cardiomyopathy   • Thrombus   • Aneurysm (CMS/HCC) (HCC)   • CVA (cerebral vascular accident) (CMS/HCC) ([facility])   • Hypertension   • Hyperlipidemia   • Tobacco use   • VT (ventricular tachycardia) (CMS/HCC) ([facility])   • S/P ablation of ventricular arrhythmia   • Anticoagulated   • Automatic implantable cardioverter-defibrillator in situ   • Presence of stent in coronary artery   • On amiodarone therapy   • Coronary artery disease   • Tobacco use   • AICD discharge   • Ventricular arrhythmia   • V tach (CMS/HCC) ([facility])     Physical Exam at Discharge  Discharge Condition: good  Heart Rate: 60  Resp: 22  BP: 121/61  Temp: 37.1 °C (98.7 °F)  Weight: 115 kg (253 lb 8.5 oz)    Telemetry: Paced rhythm at 60 bpm.  No NSVT or sustained ventricular tachycardia noted.    Physical Exam   Constitutional: He is oriented to person, place, and time. He appears well-developed and well-nourished.   HENT:   Head:  Normocephalic.   Neck: Normal range of motion.   Cardiovascular: Normal rate, regular rhythm and normal heart sounds.   Pulmonary/Chest: Effort normal and breath sounds normal.   Abdominal: Soft. Bowel sounds are normal.   Musculoskeletal: Normal range of motion.   Neurological: He is alert and oriented to person, place, and time.   Skin: Skin is warm and dry.   Psychiatric: He has a normal mood and affect. His behavior is normal.     Time spent coordinating discharge: 20 minutes    Patient seen in conjunction with Dr Wu.    Electronically signed by: Tanika Sun CNP

## 2020-08-14 NOTE — MEDICATION HISTORY SPECIALIST NOTES
[facility] [id number]    Follow up on previous unable to assess patient. Patient initially updated by med history specialist with information available at that time.    Interviewed patient in [place] who confirmed medications.

## 2020-08-14 NOTE — INTERDISCIPLINARY/THERAPY
Case Management Progress Note  [phone]    Diagnosis: Ventricular arrthmia    Narrative/Plan of Care: EP interrogated and reprogrammed the patients defibrillator to allow for antitachycardia pacing beginning at a rate of 130 bpm. Currently AV paced. Per EP notes - Dr. Wu discussed possible options with patient including referral to [facility] or [place] for possible LVAD or heart transplant.      Discussed Discharge Needs/Topics: Dr. Wu requested number for [facility] consult. Provided # for transfer center. [phone]. Received call from Elizabeth at [facility] [phone]. They would like a [employer] application sent for [place] Medicaid as they work exclusively with them.     Worked with MAVIS Cramer to complete the [employer] application. Sent to Trice at [employer]. Contacted Trice at [employer] to escalate the application. Voicemail left for her. Trice called back and sent paperwork to Bela GAMBLE. Trice will contact the patient once the paperwork signed and will file the application this afternoon for review early Monday morning.     Contacted Bela GAMBLE. She will get the signatures on the application and send to Trice at [employer].     Verified which [facility] patient belongs to and he belongs to [facility]. Attempted to contact [facility] to place a referral for the [facility] but they did not answer their phones in contract health.      Face sheet faxed to the [facility] transfer center and [facility] [phone]. Images pushed per radiology by Sonya STAHL.     Disposition: DCP-DOP

## 2020-08-14 NOTE — PROGRESS NOTES
[address] [address]  Daily Progress Note  Patient name: Pete Trejo  MRN: [id number]  Full Code  Primary Care Physician: SHAHID SILVA CNP   LOS: 2 days   Date of Service: August 14, 2020    Summary of care: Patient presented initially to the emergency room with palpitations, EKG indicated monomorphic ventricular tachycardia with a rate of 141.  He was given adenosine without response.  Diltiazem yielded minimal improvement with rate still between 140 and 155.  He was subsequently cardioverted with 100 J.  Started on an amiodarone infusion.  Cardiology was consulted as he has had multiple cardiac issues.  Dr. Montana had been discussing referral for LVAD and transplant at [facility].  Dr. Wu has been handling this patient here in the hospital and they are currently awaiting calls back from [facility] for potential transfer.    Subjective   Pete is doing well today.  He denies any chest pain or palpitations, no shortness of breath.  Objective   Vitals:Temp:  [36.6 °C (97.8 °F)-36.8 °C (98.3 °F)] 36.7 °C (98.1 °F)  Heart Rate:  [59-61] 59  Resp:  [18-20] 18  BP: (119-132)/(66-76) 125/66  Physical Exam:   Patient consented to exam    Constitutional: Pleasant 54-year-old gentleman sitting in bed comfortably being visited by his son, cooperative  HEENT: Normocephalic/atraumatic  Neck: Soft, supple  Heart:: S1, S2, regular rate rhythm  Lungs: Clear  Abdomen: Bowel sounds positive, soft  Extremities: Trace edema bilaterally  Neurologic: Alert and oriented x3  Psych: Appropriate affect, appropriate mood  Skin: Warm, dry, well perfused  Medications:   Scheduled Meds: amiodarone, 200 mg, oral, 2x daily with meals  prasugreL, 10 mg, oral, Daily  rosuvastatin, 40 mg, oral, Nightly  spironolactone, 25 mg, oral, Daily  lansoprazole, 30 mg, oral, Daily at 1600  carvediloL, 25 mg, oral, 2x daily with meals  furosemide, 20 mg, oral,  Daily  lisinopriL, 5 mg, oral, 2x daily  mexiletine, 200 mg, oral, 2x daily  rivaroxaban, 20 mg, oral, Daily with dinner      Continuous Infusions:    PRN Meds: •  sodium chloride 0.9% (NS)  •  nitroglycerin  •  Insert peripheral IV **AND** Maintain IV access **AND** Saline lock IV **AND** sodium chloride  •  acetaminophen  •  oxyCODONE-acetaminophen  •  metoclopramide **OR** metoclopramide    Consultants: Cardiology, EP  Procedures: Cardioversion    Problem List:  Principal Problem:    Ventricular arrhythmia  Active Problems:    CAD (coronary artery disease)    Congestive heart failure (CHF) (CMS/[facility]) ([facility])    Ischemic cardiomyopathy    Thrombus    Aneurysm (CMS/[facility]) ([facility])    Hypertension    Hyperlipidemia    Tobacco use    S/P ablation of ventricular arrhythmia    Automatic implantable cardioverter-defibrillator in situ    Presence of stent in coronary artery    Coronary artery disease    V tach (CMS/[facility]) ([facility])        Assessment/Plan   Ventricular arrhythmia-awaiting recommendations from cardiology EP to decipher whether patient can be discharged home with outpatient follow-up with [facility] versus [facility].  Currently controlled with amiodarone, Mexitil, Coreg    Hypertension-continue Coreg, Lasix, lisinopril, spironolactone      DVT prophylaxis: Xarelto  GI prophylaxis: Low risk  Disposition: Will depend on coordination between EP cardiology and referral Center    Electronically signed by: Jasmyne Tyson MD  8/14/2020  3:24 PM

## 2020-08-14 NOTE — PLAN OF CARE
Problem: Cardiovascular - Adult  Goal: Maintains optimal cardiac output and hemodynamic stability  Description: INTERVENTIONS:  1. Monitor vital signs and rhythm  2. Monitor for hypotension and other signs of decreased cardiac output  3. Administer and titrate ordered vasoactive medications to optimize hemodynamic stability  4. Monitor for fluid overload/dehydration, weight gain, shortness of breath and activity intolerance  5. Monitor arterial and/or venous puncture sites for bleeding and/or hematoma  6. Assess quality of pulses, capillary refill, edema, sensation, skin color and temperature  7. Assess for signs of decreased coronary artery perfusion - ex. angina  Outcome: Progressing  Goal: Absence of cardiac dysrhythmias or at baseline  Description: INTERVENTIONS:  1. Continuous cardiac monitoring, monitor vital signs, obtain 12 lead EKG if indicated  2. Administer antiarrhythmic and heart rate control medications as ordered  3. Initiate emergency measures for life threatening arrhythmias  4. Monitor electrolytes and administer replacement therapy as ordered  Outcome: Progressing     Problem: Hematologic - Adult  Goal: Maintains hematologic stability  Description: INTERVENTIONS  1. Assess for signs and symptoms of bleeding or hemorrhage  2. Monitor labs  3. Administer supportive blood products/factors as ordered and appropriate  4. Administer medications as ordered  5. Initiate bleeding precautions as indicated  6. Educate patient/family to report signs/symptoms of bleeding  Outcome: Progressing     Problem: Safety Adult  Goal: Patient will remain safe during hospitalization  Description: INTERVENTIONS    1. Assess patient for fall risk and implement interventions if needed  2. Use safe transport techniques  3. Assess patient using the appropriate Jean Marie skin assessment scale  4. Assess patient for risk of aspiration  5. Assess patient for risk of elopement  6. Assess patient for risk of suicide  Outcome:  Progressing

## 2020-08-15 PROCEDURE — 93295 DEV INTERROG REMOTE 1/2/MLT: CPT | Performed by: INTERNAL MEDICINE

## 2020-08-15 PROCEDURE — 93296 REM INTERROG EVL PM/IDS: CPT | Performed by: INTERNAL MEDICINE

## 2020-08-17 NOTE — INTERDISCIPLINARY/THERAPY
Note: 8/17/2020     Faxed  H&P, Discharge Summary and Echo report to [phone]. Images for Echos were pushed on 8/13/2020. Will follow up with radiology to resend.

## 2020-08-19 ENCOUNTER — ANCILLARY PROCEDURE (OUTPATIENT)
Dept: CARDIOLOGY | Facility: CLINIC | Age: 55
End: 2020-08-19
Payer: COMMERCIAL

## 2020-08-19 DIAGNOSIS — Z45.02 ENCOUNTER FOR INTERROGATION OF CARDIAC DEFIBRILLATOR: Primary | ICD-10-CM

## 2020-08-19 PROCEDURE — 93284 PRGRMG EVAL IMPLANTABLE DFB: CPT | Performed by: INTERNAL MEDICINE

## 2020-08-28 ENCOUNTER — TELEMEDICINE (OUTPATIENT)
Dept: CARDIOLOGY | Facility: CLINIC | Age: 55
End: 2020-08-28
Payer: COMMERCIAL

## 2020-08-28 DIAGNOSIS — I47.20 VENTRICULAR TACHYCARDIA (CMS/HCC): ICD-10-CM

## 2020-08-28 DIAGNOSIS — I25.5 ISCHEMIC CARDIOMYOPATHY: ICD-10-CM

## 2020-08-28 PROCEDURE — 98966 PH1 ASSMT&MGMT NQHP 5-10: CPT | Performed by: INTERNAL MEDICINE

## 2020-08-28 NOTE — PROGRESS NOTES
Subjective   Per discussion with Wilfredo Montana MD, Pete, has verbally consented to be treated via telemedicine (telephone) visit: Yes.  Patient Location: Home  Provider Location: Clinic  Technology used by Provider: Phone    HPI  Pete Trejo is a 54 y.o. male who presents for follow-up of his ventricular tachycardia condition.    He has a lengthy history of ischemic cardiomyopathy status post ICD placement.  Ventricular tachycardia status EP study with VT ablation in mid June 2024 recurrent arrhythmias which were refractory to medical therapy at which time we found extensive scarring with only a single strip of viable muscle tissue in the inferior wall and numerous arrhythmia circuits were noted and he was unstable during these which prevented mapping during tachycardia to any significant degree he has been tried on high dose amiodarone and mexiletine at this point and recently was discharged from the hospital.     Pete presented to the emergency room on 8/12/2020 with complaints of heart palpitations EKG revealed monomorphic ventricular tachycardia at a rate of 141 bpm.  Patient then was given adenosine with no response.  Diltiazem was tried with improvements in heart rhythm but rate remained between 140 and 155 bpm.  Patient was then successfully cardioverted by Dr. Fernandez with 100 J.  Patient was then started amiodarone drip with a referral to electrophysiology for further recommendations and treatment.     Patient had his defibrillator reprogrammed yesterday to allow for antitachycardia pacing to begin at a rate of 130 bpm rather than 140 bpm.  For the full details of reprogramming please refer to the scanned Paceart document. Telemetry was reviewed today patient has not had further episodes of NSVT or ventricular tachycardia.  The amiodarone was increased to 200 mg twice a day.    We reached out to larger centers in which transplant and heart failure management is specialized and are having some  difficulty with getting any place to accept him for evaluation.  He is an [facility] patient.    Both [facility] and [facility] have been reached out to and we are waiting to hear back.    Since discharge she has had no recurrence of arrhythmia symptoms and is feeling in his usual state but is not doing much because he is concerned that he may have arrhythmias again.    HPI    The following have been reviewed and updated as appropriate in this visit:    Allergies   Allergen Reactions   • Morphine      ANXIETY   • Tramadol      ANXIETY     Current Outpatient Medications   Medication Sig Dispense Refill   • amiodarone (PACERONE) 200 mg tablet Take 1 tablet (200 mg total) by mouth 2 (two) times a day with meals 60 tablet 4   • hydrocortisone 1 % ointment Apply 1 application topically 2 (two) times a day as needed     • mexiletine (MEXITIL) 200 mg capsule Take 1 capsule (200 mg total) by mouth 2 (two) times a day 60 capsule 11   • lisinopriL (PRINIVIL,ZESTRIL) 5 mg tablet Take 5 mg by mouth 2 (two) times a day       • rosuvastatin (CRESTOR) 40 mg tablet Take 40 mg by mouth nightly       • nitroglycerin (NITROSTAT) 0.4 mg SL tablet Place 0.4 mg under the tongue every 5 (five) minutes as needed for chest pain.     • spironolactone (ALDACTONE) 25 mg tablet Take 25 mg by mouth daily.     • pantoprazole (PROTONIX) 40 mg EC tablet Take 40 mg by mouth daily.     • carvedilol (COREG) 25 mg tablet Take 25 mg by mouth 2 (two) times a day with meals.     • furosemide (LASIX) 20 mg tablet Take 20 mg by mouth daily       • prasugrel (EFFIENT) 10 mg tablet Take 1 tablet (10 mg total) by mouth daily. 30 tablet 1   • rivaroxaban (XARELTO) 20 mg tablet Take 1 tablet (20 mg total) by mouth daily. 30 tablet 1     No current facility-administered medications for this visit.      Past Medical History:   Diagnosis Date   • AICD (automatic cardioverter/defibrillator) present    • H/O heart artery stent    • Heart attack (CMS/HCC) ([facility])     • V-tach (CMS/HCC) (HCC)      Past Surgical History:   Procedure Laterality Date   • ABLATION VT N/A 6/9/2020    Procedure: Ablation VT;  Surgeon: Wilfredo Montana MD;  Location: [facility] EP Lab;  Service: Electrophysiology;  Laterality: N/A;  Check with Dr. Montana in the a.m. regarding scheduling   • APPENDECTOMY        Family History   Problem Relation Age of Onset   • Heart attack Mother 62   • Other Mother         Abdominal Aortic Aneurysm   • Lung cancer Mother    • Colon cancer Father         Cause of death   • Diabetes Brother         Non-Insulin Dependent   • Heart attack Brother 51        w/ Stents   • Heart disease Brother    • Other Son         Well     Social History     Occupational History   • Not on file   Tobacco Use   • Smoking status: Former Smoker     Years: 20.00     Types: Cigarettes   • Smokeless tobacco: Never Used   • Tobacco comment: Stopped June 7th, 2020    Substance and Sexual Activity   • Alcohol use: No     Comment: Quit drinking in 2007   • Drug use: Not Currently   • Sexual activity: Defer   Social History Narrative   • Not on file       Review of Systems  Negative except as stated above in history of present illness  Objective   Physical Exam  Voice was strong, speech was clear.  Mentation was normal.  Mood was good.  Assessment/Plan   Diagnoses and all orders for this visit:    Ischemic cardiomyopathy  -     Ambulatory referral to Cardiac Electrophysiology    Ventricular tachycardia (CMS/HCC) (HCC)  -     Ambulatory referral to Cardiac Electrophysiology      These ventricular arrhythmias have been difficult to manage and refractory to therapies and we have concerns for long-term use of amiodarone as result of organ toxicity potential.  We will continue to try to reach out to specialized centers for help with potential management including potential transplantation since it is going to be difficult for any ablation therapy to be successful given the degree of scar and the amount of  ventricular arrhythmias.  However, if we cannot get help from a specialized transplant center I would consider trying once again to perform a ventricular arrhythmia ablation but this time utilizing support with an Impella pump.  This would be a last ditch effort and will be held in reserve until such time that we are clearly unable to get help from other specialized centers.      Due to technical difficulties telephone visit was done instead of telemedicine/tele-video

## 2020-08-31 ENCOUNTER — TELEPHONE (OUTPATIENT)
Dept: CARDIOLOGY | Facility: CLINIC | Age: 55
End: 2020-08-31

## 2020-09-01 ENCOUNTER — TELEPHONE (OUTPATIENT)
Dept: CARDIOLOGY | Facility: CLINIC | Age: 55
End: 2020-09-01

## 2020-09-01 NOTE — TELEPHONE ENCOUNTER
Pete and his son Buddy had called regarding the referral to [facility] as indicated per Dr. Montana clinic visit note of 8/28/2020.      They both indicate that [facility] needs  information so that they can work on authorizing appointments/referral at the [facility].  So can be faxed to [phone] attention Monika or Kirill at [facility] .   Informed them I would fax the clinic visit note from Dr. Montana as it explains it all well.   They voiced agreement.    Copy of visit note faxed to # above.      I called and spoke with Gerardo Sun CNP and she indicates that the referral process has been started with the [facility], and that our medical records was notified and sent his records from here.

## 2020-09-01 NOTE — TELEPHONE ENCOUNTER
Re: Ref to [facility] - [facility] needs statement re: treatment. - please call PT>[phone] PT states this is Urgent.

## 2020-09-15 ENCOUNTER — TELEPHONE (OUTPATIENT)
Dept: CARDIOLOGY | Facility: CLINIC | Age: 55
End: 2020-09-15

## 2020-09-15 NOTE — TELEPHONE ENCOUNTER
Please call Monika Haynes CNP office please call as she needs to know why PT is having [facility] in Oct with [facility]AP - [phone] leave detailed msg if no ans as she is in clinic.

## 2020-09-15 NOTE — TELEPHONE ENCOUNTER
Called nurse back and let her know that we have not received a referral card from [facility] and patient had to be cancelled for his appointment that was scheduled for today and rescheduled for October. Awaiting for referral from S.

## 2020-09-27 ENCOUNTER — ANCILLARY PROCEDURE (OUTPATIENT)
Dept: CARDIOLOGY | Facility: CLINIC | Age: 55
End: 2020-09-27
Payer: COMMERCIAL

## 2020-09-27 DIAGNOSIS — Z45.02 ENCOUNTER FOR INTERROGATION OF CARDIAC DEFIBRILLATOR: ICD-10-CM

## 2020-09-28 PROCEDURE — 93296 REM INTERROG EVL PM/IDS: CPT | Mod: NC | Performed by: INTERNAL MEDICINE

## 2020-09-28 PROCEDURE — 93295 DEV INTERROG REMOTE 1/2/MLT: CPT | Mod: NC | Performed by: INTERNAL MEDICINE

## 2020-09-29 ENCOUNTER — ANCILLARY PROCEDURE (OUTPATIENT)
Dept: CARDIOLOGY | Facility: CLINIC | Age: 55
End: 2020-09-29
Payer: COMMERCIAL

## 2020-09-29 DIAGNOSIS — Z45.02 ENCOUNTER FOR INTERROGATION OF CARDIAC DEFIBRILLATOR: ICD-10-CM

## 2020-09-30 PROCEDURE — 93295 DEV INTERROG REMOTE 1/2/MLT: CPT | Mod: NC | Performed by: INTERNAL MEDICINE

## 2020-09-30 PROCEDURE — 93296 REM INTERROG EVL PM/IDS: CPT | Mod: NC | Performed by: INTERNAL MEDICINE

## 2020-10-02 ENCOUNTER — ANCILLARY PROCEDURE (OUTPATIENT)
Dept: CARDIOLOGY | Facility: CLINIC | Age: 55
End: 2020-10-02
Payer: COMMERCIAL

## 2020-10-02 DIAGNOSIS — Z45.02 ENCOUNTER FOR INTERROGATION OF CARDIAC DEFIBRILLATOR: ICD-10-CM

## 2020-10-02 PROCEDURE — 93295 DEV INTERROG REMOTE 1/2/MLT: CPT | Mod: NC | Performed by: INTERNAL MEDICINE

## 2020-10-02 PROCEDURE — 93296 REM INTERROG EVL PM/IDS: CPT | Mod: NC | Performed by: INTERNAL MEDICINE

## 2020-10-03 ENCOUNTER — ANCILLARY PROCEDURE (OUTPATIENT)
Dept: CARDIOLOGY | Facility: CLINIC | Age: 55
End: 2020-10-03
Payer: COMMERCIAL

## 2020-10-03 DIAGNOSIS — Z45.02 ENCOUNTER FOR INTERROGATION OF CARDIAC DEFIBRILLATOR: ICD-10-CM

## 2020-10-03 PROCEDURE — 93296 REM INTERROG EVL PM/IDS: CPT | Mod: NC | Performed by: INTERNAL MEDICINE

## 2020-10-03 PROCEDURE — 93295 DEV INTERROG REMOTE 1/2/MLT: CPT | Mod: NC | Performed by: INTERNAL MEDICINE

## 2020-10-05 ENCOUNTER — ANCILLARY PROCEDURE (OUTPATIENT)
Dept: CARDIOLOGY | Facility: CLINIC | Age: 55
End: 2020-10-05
Payer: COMMERCIAL

## 2020-10-05 DIAGNOSIS — Z45.02 ENCOUNTER FOR INTERROGATION OF CARDIAC DEFIBRILLATOR: ICD-10-CM

## 2020-10-05 PROCEDURE — 93295 DEV INTERROG REMOTE 1/2/MLT: CPT | Mod: NC | Performed by: INTERNAL MEDICINE

## 2020-10-05 PROCEDURE — 93296 REM INTERROG EVL PM/IDS: CPT | Mod: NC | Performed by: INTERNAL MEDICINE

## 2020-10-23 ENCOUNTER — OFFICE VISIT (OUTPATIENT)
Dept: CARDIOLOGY | Facility: CLINIC | Age: 55
End: 2020-10-23
Payer: COMMERCIAL

## 2020-10-23 VITALS
WEIGHT: 262.8 LBS | SYSTOLIC BLOOD PRESSURE: 128 MMHG | HEART RATE: 63 BPM | HEIGHT: 71 IN | OXYGEN SATURATION: 95 % | BODY MASS INDEX: 36.79 KG/M2 | DIASTOLIC BLOOD PRESSURE: 62 MMHG

## 2020-10-23 DIAGNOSIS — I25.10 CORONARY ARTERY DISEASE INVOLVING NATIVE CORONARY ARTERY OF NATIVE HEART WITHOUT ANGINA PECTORIS: ICD-10-CM

## 2020-10-23 DIAGNOSIS — Z95.0 PRESENCE OF CARDIAC PACEMAKER: ICD-10-CM

## 2020-10-23 DIAGNOSIS — I47.20 VT (VENTRICULAR TACHYCARDIA) (CMS/HCC): Primary | ICD-10-CM

## 2020-10-23 DIAGNOSIS — Z79.899 ON AMIODARONE THERAPY: ICD-10-CM

## 2020-10-23 DIAGNOSIS — I25.5 ISCHEMIC CARDIOMYOPATHY: ICD-10-CM

## 2020-10-23 DIAGNOSIS — I25.5 ISCHEMIC CARDIOMYOPATHY: Primary | ICD-10-CM

## 2020-10-23 DIAGNOSIS — Z95.810 AUTOMATIC IMPLANTABLE CARDIOVERTER-DEFIBRILLATOR IN SITU: ICD-10-CM

## 2020-10-23 PROCEDURE — 99214 OFFICE O/P EST MOD 30 MIN: CPT | Performed by: INTERNAL MEDICINE

## 2020-10-23 RX ORDER — SPIRONOLACTONE 25 MG/1
25 TABLET ORAL 2 TIMES DAILY
Qty: 180 TABLET | Refills: 3 | Status: SHIPPED | OUTPATIENT
Start: 2020-10-23 | End: 2021-10-18 | Stop reason: SDUPTHER

## 2020-10-23 RX ORDER — FUROSEMIDE 20 MG/1
40 TABLET ORAL DAILY
Qty: 180 TABLET | Refills: 3 | Status: ON HOLD | OUTPATIENT
Start: 2020-10-23 | End: 2021-07-12

## 2020-10-23 ASSESSMENT — ENCOUNTER SYMPTOMS
CLAUDICATION: 0
PND: 0
SYNCOPE: 0
HEMATOCHEZIA: 0
LIGHT-HEADEDNESS: 1
ORTHOPNEA: 0
SHORTNESS OF BREATH: 1
WEIGHT GAIN: 1
DIZZINESS: 1
IRREGULAR HEARTBEAT: 0
BRUISES/BLEEDS EASILY: 0
PALPITATIONS: 1
NEAR-SYNCOPE: 0
MYALGIAS: 0

## 2020-10-23 ASSESSMENT — PAIN SCALES - GENERAL: PAINLEVEL: 0-NO PAIN

## 2020-10-23 NOTE — LETTER
[facility]  [address] [place] [address] [phone]  Dept: [phone]  10/23/20      Roula Kim CNP  [address]   [place] [address]        To: Roula Kim CNP      Thank you for referring your patient, Pete Trejo, to receive care in my office. I have enclosed a summary of the care provided to Pete on 10/23/20.    Please contact me with any questions you may have regarding the visit.    Sincerely,         BRENDON SHAW MD  [address] [place] [id number] [phone]    CC: BRAD Martínez MD

## 2020-10-23 NOTE — PATIENT INSTRUCTIONS
Increase furosemide to 40 mg daily  Increase spironolactone to 50 mg daily  BMP in 2 weeks  Amiodarone level in 2 weeks

## 2020-10-23 NOTE — PROGRESS NOTES
Cardiology Outpatient Follow-up Note    Subjective    Patient ID: Pete Trejo is a 55 y.o. male.    Chief Complaint: No chief complaint on file.  .    HPI  Overall the patient is not doing as well as he would like.  He notes exertional symptoms of shortness of breath but without PND orthopnea.  His weight is up to 263 pounds he states normally he runs in the 2 43-2 48 range.  He has not noticed any significant lower extremity edema.  He states his exercise tolerance is very poor and he finds it difficult to go into stores because of lightheadedness and dizziness although his blood pressure is quite good today.  He continues to have episodic irregular heartbeats.  On his device check on October 5 he continues to have multiple episodes of ventricular tachycardia treated with ATP sometimes up to 8 times.  He is not had specific adverse side effects or problems related to the mexiletine or amiodarone at this time but it does not seem that it is working very well.  He has not had any AICD shocks.    He denies any chest pain or angina.  But he clearly feels like he is gone downhill since August after he was admitted and had his device reprogrammed and initiated on antirhythm therapy.    Specialty Problems        Cardiology Problems    Anterior myocardial infarction (CMS/HCC) ([facility])        Anteroseptal myocardial infarction (CMS/HCC) ([facility])        CAD (coronary artery disease)        Congestive heart failure (CHF) (CMS/HCC) ([facility])        Hyperlipidemia        Ischemic cardiomyopathy        Ventricular tachycardia (CMS/HCC) (HCC)        VT (ventricular tachycardia) (CMS/HCC) ([facility])        Coronary artery disease        V tach (CMS/HCC) ([facility])        Ventricular arrhythmia              Past Medical History:   Diagnosis Date   • AICD (automatic cardioverter/defibrillator) present    • H/O heart artery stent    • Heart attack (CMS/HCC) ([facility])    • V-tach (CMS/HCC) ([facility])        Past  Surgical History:   Procedure Laterality Date   • ABLATION VT N/A 6/9/2020    Procedure: Ablation VT;  Surgeon: Wilfredo Montana MD;  Location: [facility] EP Lab;  Service: Electrophysiology;  Laterality: N/A;  Check with Dr. Montana in the a.m. regarding scheduling   • APPENDECTOMY         Allergies as of 10/23/2020 - Reviewed 10/23/2020   Allergen Reaction Noted   • Morphine  07/30/2017   • Tramadol  07/30/2017       Current Outpatient Medications   Medication Sig Dispense Refill   • spironolactone (ALDACTONE) 25 mg tablet Take 1 tablet (25 mg total) by mouth 2 (two) times a day 180 tablet 3   • furosemide (LASIX) 20 mg tablet Take 2 tablets (40 mg total) by mouth daily 180 tablet 3   • amiodarone (PACERONE) 200 mg tablet Take 1 tablet (200 mg total) by mouth 2 (two) times a day with meals 60 tablet 4   • mexiletine (MEXITIL) 200 mg capsule Take 1 capsule (200 mg total) by mouth 2 (two) times a day 60 capsule 11   • lisinopriL (PRINIVIL,ZESTRIL) 5 mg tablet Take 5 mg by mouth 2 (two) times a day       • rosuvastatin (CRESTOR) 40 mg tablet Take 40 mg by mouth nightly       • nitroglycerin (NITROSTAT) 0.4 mg SL tablet Place 0.4 mg under the tongue every 5 (five) minutes as needed for chest pain.     • pantoprazole (PROTONIX) 40 mg EC tablet Take 40 mg by mouth daily.     • carvedilol (COREG) 25 mg tablet Take 25 mg by mouth 2 (two) times a day with meals.     • prasugrel (EFFIENT) 10 mg tablet Take 1 tablet (10 mg total) by mouth daily. 30 tablet 1   • rivaroxaban (XARELTO) 20 mg tablet Take 1 tablet (20 mg total) by mouth daily. 30 tablet 1   • hydrocortisone 1 % ointment Apply 1 application topically 2 (two) times a day as needed       No current facility-administered medications for this visit.        Family History   Problem Relation Age of Onset   • Heart attack Mother 62   • Other Mother         Abdominal Aortic Aneurysm   • Lung cancer Mother    • Colon cancer Father         Cause of death   • Diabetes Brother          Non-Insulin Dependent   • Heart attack Brother 51        w/ Stents   • Heart disease Brother    • Other Son         Well       Social History     Tobacco Use   • Smoking status: Former Smoker     Years: 20.00     Types: Cigarettes   • Smokeless tobacco: Never Used   • Tobacco comment: Stopped June 7th, 2020    Substance Use Topics   • Alcohol use: No     Comment: Quit drinking in 2007   • Drug use: Not Currently       Review of Systems  Review of Systems   Constitution: Positive for weight gain.   HENT: Negative for nosebleeds.    Cardiovascular: Positive for dyspnea on exertion and palpitations. Negative for chest pain, claudication, cyanosis, irregular heartbeat, leg swelling/pain, near-syncope, orthopnea, PND and syncope/fainting.   Respiratory: Positive for shortness of breath.    Hematologic/Lymphatic: Negative for major bleeding. Does not bruise/bleed easily.   Musculoskeletal: Negative for muscle weakness and myalgias/muscle aches.   Gastrointestinal: Negative for hematochezia and melena.   Neurological: Positive for dizziness and light-headedness.   All other systems reviewed and are negative.    Objective     Vitals:    10/23/20 0823   BP: 128/62   Pulse: 63   SpO2: 95%     Weight: 119.2 kg (262 lb 12.8 oz)  Physical Exam   Constitutional: He is oriented to person, place, and time. He appears well-developed and well-nourished.   HENT:   Head: Normocephalic and atraumatic.   Neck: JVD present. Normal carotid pulses and no hepatojugular reflux present. Carotid bruit is not present.   Cardiovascular: Normal rate and regular rhythm.  Occasional extrasystoles are present. Exam reveals gallop, S3 and S4.   Pulses:       Carotid pulses are 2+ on the right side and 2+ on the left side.       Radial pulses are 2+ on the right side and 2+ on the left side.   No significant edema   Pulmonary/Chest: No accessory muscle usage. No respiratory distress. He has no decreased breath sounds. He has no wheezes. He has no  rhonchi. He has no rales.   Neurological: He is alert and oriented to person, place, and time.   Psychiatric: His speech is normal and behavior is normal. Judgment and thought content normal. His mood appears anxious. Cognition and memory are normal.       Data Review:   Sodium   Date Value Ref Range Status   08/13/2020 137 135 - 145 mmol/L Final     Potassium   Date Value Ref Range Status   08/13/2020 4.1 3.5 - 5.1 mmol/L Final     Chloride   Date Value Ref Range Status   08/13/2020 106 98 - 107 mmol/L Final     CO2   Date Value Ref Range Status   08/13/2020 24 21 - 32 mmol/L Final     BUN   Date Value Ref Range Status   08/13/2020 9 7 - 25 mg/dL Final     Creatinine   Date Value Ref Range Status   08/13/2020 0.71 0.70 - 1.30 mg/dL Final     Glucose   Date Value Ref Range Status   08/13/2020 110 (H) 70 - 105 mg/dL Final     Calcium   Date Value Ref Range Status   08/13/2020 9.0 8.6 - 10.3 mg/dL Final           Assessment/Plan   Problem List Items Addressed This Visit        Circulatory    Coronary artery disease    Relevant Medications    spironolactone (ALDACTONE) 25 mg tablet    Ischemic cardiomyopathy  Class III congestive heart failure.  Mild jugular venous distention on examination today but is probably mildly volume overloaded.  Despite his lightheadedness and dizziness and the increase his diuretics including furosemide 40 mg daily and spironolactone 50 mg daily.  We will get follow-up laboratory [place] [facility] and 2 weeks.  We will also check amiodarone level at that time and to see how he is doing with his device check.  We will send a note to Dr. Montana and see if he would be willing to consider an attempted ablation with an Impella in place.    Relevant Medications    spironolactone (ALDACTONE) 25 mg tablet    furosemide (LASIX) 20 mg tablet    VT (ventricular tachycardia) (CMS/HCC) ([facility]) - Primary  This remains consistent although ATP is pacing him consistently back into sinus rhythm but he does have  symptomatic tachycardia.    Relevant Medications    spironolactone (ALDACTONE) 25 mg tablet       Other    Automatic implantable cardioverter-defibrillator in situ  See recent device check    On amiodarone therapy  Check amiodarone levels in 2 weeks and have a follow-up with electrophysiology.  Follow-up with us in 4 weeks.            Electronically signed by: BRENDON SHAW MD  10/23/2020  9:06 AM

## 2020-11-02 ENCOUNTER — ANCILLARY PROCEDURE (OUTPATIENT)
Dept: CARDIOLOGY | Facility: CLINIC | Age: 55
End: 2020-11-02
Payer: COMMERCIAL

## 2020-11-02 DIAGNOSIS — Z45.02 ENCOUNTER FOR INTERROGATION OF CARDIAC DEFIBRILLATOR: ICD-10-CM

## 2020-11-02 PROCEDURE — 93295 DEV INTERROG REMOTE 1/2/MLT: CPT | Mod: NC | Performed by: INTERNAL MEDICINE

## 2020-11-02 PROCEDURE — 93296 REM INTERROG EVL PM/IDS: CPT | Mod: NC | Performed by: INTERNAL MEDICINE

## 2020-11-10 ENCOUNTER — ANCILLARY PROCEDURE (OUTPATIENT)
Dept: CARDIOLOGY | Facility: CLINIC | Age: 55
End: 2020-11-10
Payer: COMMERCIAL

## 2020-11-10 DIAGNOSIS — Z45.02 ENCOUNTER FOR INTERROGATION OF CARDIAC DEFIBRILLATOR: ICD-10-CM

## 2020-11-10 PROCEDURE — 93296 REM INTERROG EVL PM/IDS: CPT | Mod: NC | Performed by: INTERNAL MEDICINE

## 2020-11-10 PROCEDURE — 93295 DEV INTERROG REMOTE 1/2/MLT: CPT | Mod: NC | Performed by: INTERNAL MEDICINE

## 2020-11-18 ENCOUNTER — OFFICE VISIT (OUTPATIENT)
Dept: CARDIOLOGY | Facility: CLINIC | Age: 55
End: 2020-11-18
Payer: COMMERCIAL

## 2020-11-18 VITALS
HEART RATE: 60 BPM | DIASTOLIC BLOOD PRESSURE: 76 MMHG | OXYGEN SATURATION: 95 % | HEIGHT: 72 IN | WEIGHT: 262 LBS | SYSTOLIC BLOOD PRESSURE: 144 MMHG | BODY MASS INDEX: 35.49 KG/M2

## 2020-11-18 DIAGNOSIS — I50.42 CHRONIC COMBINED SYSTOLIC AND DIASTOLIC CONGESTIVE HEART FAILURE (CMS/HCC): ICD-10-CM

## 2020-11-18 DIAGNOSIS — Z95.810 AUTOMATIC IMPLANTABLE CARDIOVERTER-DEFIBRILLATOR IN SITU: ICD-10-CM

## 2020-11-18 DIAGNOSIS — I25.5 ISCHEMIC CARDIOMYOPATHY: Primary | ICD-10-CM

## 2020-11-18 DIAGNOSIS — I47.20 V TACH (CMS/HCC): ICD-10-CM

## 2020-11-18 PROCEDURE — 99214 OFFICE O/P EST MOD 30 MIN: CPT | Performed by: INTERNAL MEDICINE

## 2020-11-18 SDOH — HEALTH STABILITY: MENTAL HEALTH: HOW OFTEN DO YOU HAVE SIX OR MORE DRINKS ON ONE OCCASION?: NOT ASKED

## 2020-11-18 SDOH — HEALTH STABILITY: MENTAL HEALTH: HOW MANY DRINKS CONTAINING ALCOHOL DO YOU HAVE ON A TYPICAL DAY WHEN YOU ARE DRINKING?: NOT ASKED

## 2020-11-18 SDOH — HEALTH STABILITY: MENTAL HEALTH: HOW OFTEN DO YOU HAVE A DRINK CONTAINING ALCOHOL?: NOT ASKED

## 2020-11-18 ASSESSMENT — ENCOUNTER SYMPTOMS
MYALGIAS: 1
LOSS OF BALANCE: 1
WEIGHT GAIN: 1
PALPITATIONS: 1
DIZZINESS: 1
ORTHOPNEA: 1
BRUISES/BLEEDS EASILY: 1
LIGHT-HEADEDNESS: 1

## 2020-11-18 ASSESSMENT — PAIN SCALES - GENERAL: PAINLEVEL: 0-NO PAIN

## 2020-11-18 NOTE — PROGRESS NOTES
Cardiology Outpatient Follow-up Note    Subjective    Patient ID: Pete Trejo is a 55 y.o. male.    Chief Complaint:   Chief Complaint   Patient presents with   • Coronary Artery Disease   • Ventricular Tachycardia   .    HPI  The patient is seen from the standpoint of coronary artery disease with ischemic cardiomyopathy.  The patient's weight continues to go up because he cannot really do much for physical activity due to shortness of breath.  He does deny PND or orthopnea.  He does not feel that upping his diuretics improved his shortness of breath at all.  He states in fact it can make him feel worse as he noted worsening lightheadedness and dizziness and an overall sense of feeling wobbly.  He describes no AICD shocks.  Upon review of his most recent remote device he has had multiple ATPs.  He may go for a month without having anything and then he may have an hour or period of time where he has multiple multiple events.    He notices occasional chest tightness with exertion.  He does note a sense of his heart racing at times.  He does seem to tolerate all of his medications quite well and has been quite compliant.  He has not had any outside evaluation.  I did review notes with Dr. Montana recently.  The patient himself would like [facility] for a more comprehensive ablation perhaps with the use of Impella because of the hypotension involved in the patient's previous study which did not allow them to get adequate time to do this ablation.    The patient should also be seen in advanced heart failure clinic.  He is doing well on his present medications and would recommend continuation of his Spironolactone 50 mg daily and laboratory work.  We will have him get labs presently and then will see how he does.    Specialty Problems        Cardiology Problems    Anterior myocardial infarction (CMS/HCC) (HCC)         Anteroseptal myocardial infarction (CMS/HCC) ([facility])        CAD (coronary artery disease)        Congestive heart failure (CHF) (CMS/[facility]) ([facility])        Hyperlipidemia        Ischemic cardiomyopathy        Ventricular tachycardia (CMS/[facility]) ([facility])        VT (ventricular tachycardia) (CMS/[facility]) ([facility])        Coronary artery disease        V tach (CMS/[facility]) ([facility])        Ventricular arrhythmia              Past Medical History:   Diagnosis Date   • CAD (coronary artery disease)    • CHF (congestive heart failure) (CMS/[facility]) ([facility])    • CVA (cerebral vascular accident) (CMS/[facility]) ([facility]) 2010   • GERD (gastroesophageal reflux disease)    • Heart attack (CMS/[facility]) ([facility])    • Ischemic cardiomyopathy    • V-tach (CMS/[facility]) ([facility])        Past Surgical History:   Procedure Laterality Date   • ABLATION VT N/A 06/09/2020    Procedure: Ablation VT;  Surgeon: Wilfredo Montana MD;  Location: [facility] EP Lab;  Service: Electrophysiology;  Laterality: N/A;  Check with Dr. Montana in the a.m. regarding scheduling   • APPENDECTOMY     • CORONARY ARTERY STENTING  2009    2.5 X 24-mm Taxus DIMAS to first diagonal. 3.0 X 8-mm Taxus stent to proximal LAD just proximal to diagonal.   • CORONARY ARTERY STENTING  2010    3.5 X 15-mm Promus DIMAS to totally occluded LAD   • CORONARY ARTERY STENTING  2011    2.25 X 30-mm Resolute DIMAS to 2nd diagonal.  Balloon angioplasty through strut to improve blood flow to distal LAD   • ICD SC NEW  2011    [employer] Cognis Model #N119, Serial #[id number]. [employer] Reliance Model #0185, Serial #[id number]. LV lead Acuity Model #45+91, Serial #[id number]. Atrial lead Model #4469, Serial #[id number]       Allergies as of 11/18/2020 - Reviewed 11/18/2020   Allergen Reaction Noted   • Morphine  07/30/2017   • Tramadol  07/30/2017       Current Outpatient Medications   Medication Sig Dispense Refill   • spironolactone (ALDACTONE) 25 mg tablet Take 1 tablet (25 mg total) by mouth 2 (two) times a day 180 tablet 3   • furosemide (LASIX) 20 mg  tablet Take 2 tablets (40 mg total) by mouth daily 180 tablet 3   • amiodarone (PACERONE) 200 mg tablet Take 1 tablet (200 mg total) by mouth 2 (two) times a day with meals 60 tablet 4   • hydrocortisone 1 % ointment Apply 1 application topically 2 (two) times a day as needed     • mexiletine (MEXITIL) 200 mg capsule Take 1 capsule (200 mg total) by mouth 2 (two) times a day 60 capsule 11   • lisinopriL (PRINIVIL,ZESTRIL) 5 mg tablet Take 5 mg by mouth 2 (two) times a day       • rosuvastatin (CRESTOR) 40 mg tablet Take 40 mg by mouth nightly       • nitroglycerin (NITROSTAT) 0.4 mg SL tablet Place 0.4 mg under the tongue every 5 (five) minutes as needed for chest pain.     • pantoprazole (PROTONIX) 40 mg EC tablet Take 40 mg by mouth daily.     • carvedilol (COREG) 25 mg tablet Take 25 mg by mouth 2 (two) times a day with meals.     • prasugrel (EFFIENT) 10 mg tablet Take 1 tablet (10 mg total) by mouth daily. 30 tablet 1   • rivaroxaban (XARELTO) 20 mg tablet Take 1 tablet (20 mg total) by mouth daily. 30 tablet 1     No current facility-administered medications for this visit.        Family History   Problem Relation Age of Onset   • Heart attack Mother 62   • Other Mother         Abdominal Aortic Aneurysm   • Lung cancer Mother    • Colon cancer Father         Cause of death   • Diabetes Brother         Non-Insulin Dependent   • Heart attack Brother 51        w/ Stents   • Heart disease Brother    • Other Son         Well       Social History     Tobacco Use   • Smoking status: Former Smoker     Packs/day: 0.75     Years: 30.00     Pack years: 22.50     Types: Cigarettes     Quit date: 2020     Years since quittin.4   • Smokeless tobacco: Never Used   Substance Use Topics   • Alcohol use: Yes     Alcohol/week: 6.0 standard drinks     Types: 6 Cans of beer per week     Comment: Quit drinking in    • Drug use: Not Currently       Review of Systems  Review of Systems   Constitution: Positive for  malaise/fatigue and weight gain.   HENT: Positive for hearing loss.    Cardiovascular: Positive for dyspnea on exertion, orthopnea and palpitations.   Hematologic/Lymphatic: Bruises/bleeds easily.   Musculoskeletal: Positive for myalgias/muscle aches.   Gastrointestinal: Positive for acid reflux.   Genitourinary: Positive for nocturia.   Neurological: Positive for dizziness, light-headedness and loss of balance.   All other systems reviewed and are negative.    Objective     Vitals:    11/18/20 1303   BP: 144/76   Pulse: 60   SpO2: 95%     Weight: 118.8 kg (262 lb)  Physical Exam   Constitutional: He is oriented to person, place, and time. He appears well-developed and well-nourished.   HENT:   Head: Normocephalic.   Neck: Normal carotid pulses, no hepatojugular reflux and no JVD present. Carotid bruit is not present.   Cardiovascular: Normal rate. Exam reveals no gallop and no friction rub.   No murmur heard.  Pulses:       Carotid pulses are 2+ on the right side and 2+ on the left side.       Radial pulses are 2+ on the right side and 2+ on the left side.        Dorsalis pedis pulses are 2+ on the right side and 2+ on the left side.        Posterior tibial pulses are 2+ on the right side and 2+ on the left side.   Pulmonary/Chest: No accessory muscle usage. No respiratory distress. He has no decreased breath sounds. He has no wheezes. He has no rhonchi. He has no rales.   Neurological: He is alert and oriented to person, place, and time.   Psychiatric: He has a normal mood and affect. His speech is normal and behavior is normal. Judgment and thought content normal. Cognition and memory are normal.       Data Review:   Sodium   Date Value Ref Range Status   08/13/2020 137 135 - 145 mmol/L Final     Potassium   Date Value Ref Range Status   08/13/2020 4.1 3.5 - 5.1 mmol/L Final     Chloride   Date Value Ref Range Status   08/13/2020 106 98 - 107 mmol/L Final     CO2   Date Value Ref Range Status   08/13/2020 24 21 -  32 mmol/L Final     BUN   Date Value Ref Range Status   08/13/2020 9 7 - 25 mg/dL Final     Creatinine   Date Value Ref Range Status   08/13/2020 0.71 0.70 - 1.30 mg/dL Final     Glucose   Date Value Ref Range Status   08/13/2020 110 (H) 70 - 105 mg/dL Final     Calcium   Date Value Ref Range Status   08/13/2020 9.0 8.6 - 10.3 mg/dL Final           Assessment/Plan   Problem List Items Addressed This Visit        Circulatory    Congestive heart failure (CHF) (CMS/[facility]) ([facility])  The patient symptoms would be considered class III at this time.  I strongly recommend the patient be seen and evaluated at the [facility] cardiology and EP program for advanced heart failure management and consideration for an Impella assisted ablation of his ventricular tachycardia.  If it cannot be arranged at [facility] we can put in a consultation request through [facility].    Ischemic cardiomyopathy - Primary  No recurrent symptoms of ischemia and recent evaluations have demonstrated no evidence of significant obstructive coronary disease.    V tach (CMS/[facility]) (HCC)       Other    Automatic implantable cardioverter-defibrillator in situ  The patient continues to experience intermittent heart racing with multiple episodes of ATP.  He is pacing 96% of the time in the right and left ventricle.  We will continue the same medications and follow-up in 3 months.            Electronically signed by: BRENDON SHAW MD  11/18/2020  1:26 PM

## 2020-11-25 ENCOUNTER — TELEPHONE (OUTPATIENT)
Dept: CARDIOLOGY | Facility: CLINIC | Age: 55
End: 2020-11-25

## 2020-11-25 NOTE — TELEPHONE ENCOUNTER
----- Message from Nir Edwards MD sent at 11/25/2020  8:32 AM MST -----  Could you please call the patient and let them know that their recent testing was normal.  All of his labs were normal and the level of the amiodarone was in the perfect range so no changes in health.  
Patient is notified and verbalizes understanding.  
No lesions; no rash

## 2020-12-04 ENCOUNTER — TELEPHONE (OUTPATIENT)
Dept: CARDIOLOGY | Facility: CLINIC | Age: 55
End: 2020-12-04

## 2020-12-11 ENCOUNTER — TELEPHONE (OUTPATIENT)
Dept: CARDIOLOGY | Facility: CLINIC | Age: 55
End: 2020-12-11

## 2020-12-11 NOTE — TELEPHONE ENCOUNTER
Patient is called.  He is told that Dr Edwards is out this week but will return next week.  He should expect a call back from us then.  He verbalizes understanding.

## 2020-12-23 ENCOUNTER — TELEPHONE (OUTPATIENT)
Dept: CARDIOLOGY | Facility: CLINIC | Age: 55
End: 2020-12-23

## 2020-12-24 ENCOUNTER — TELEPHONE (OUTPATIENT)
Dept: CARDIOLOGY | Facility: CLINIC | Age: 55
End: 2020-12-24

## 2020-12-24 NOTE — TELEPHONE ENCOUNTER
I spoke with Roula Kim nurse ( [phone])  she said that the  Lauren is still working on approval.  Patient was approved for his referral to   CHF clinic. I notified patient of CHF clinic approval then I  transferred to scheduling for appointment date/time. .  I will follow up on Monday regarding referral to [facility] EP department.     NATY Hill LPN

## 2020-12-24 NOTE — TELEPHONE ENCOUNTER
Msg sent to Lauren MCMANUS who had sent a msg to scheduling regarding this patient as I am not aware of this pt needing an appt with [facility] and per Prema, pt does not have auth from [facility] to set up the appt with Casi.

## 2020-12-29 ENCOUNTER — TELEPHONE (OUTPATIENT)
Dept: CARDIOLOGY | Facility: CLINIC | Age: 55
End: 2020-12-29

## 2020-12-29 NOTE — TELEPHONE ENCOUNTER
Pt calling in regarding status of referral to [facility]     Please call back regarding status

## 2020-12-29 NOTE — TELEPHONE ENCOUNTER
I spoke with [place] case management again regarding referral to [facility] Ep/Advanced CHF Clinic .  All office notes regarding referral request faxed again to [facility]  .  I then spoke with patient about the waiting approval for from [place] to proceed with the referral.  Upon receiving the approval his referral will be completed at that time.   Patient voiced verbal understanding of waiting on approval of referral at this time.    NATY Hill LPN

## 2020-12-31 NOTE — TELEPHONE ENCOUNTER
Patient was referred to the [facility] EP and advanced CHF clinic.  [facility] was unsure as to which CHF clinic patient was referred to.  I sent a new referral with all office notes to [facility] for approved to [facility].  I spoke with patient about the referral to [facility]  Referral.  Patient voiced verbal understanding of going to [place] .  NATY Hill LPN

## 2021-01-11 ENCOUNTER — TELEPHONE (OUTPATIENT)
Dept: CARDIOLOGY | Facility: CLINIC | Age: 56
End: 2021-01-11

## 2021-01-11 NOTE — TELEPHONE ENCOUNTER
I spoke with  regarding patients referral to the [facility] EP/Advanced CHF Clinic.  [facility] approval for referral was fax to [phone] on 11/18/20.  I spoke with the [facility] referral line in [place].  The referral did not go through.  I contacted [facility] and requested the referral approval be faxed to [facility] to be placed with all office visit notes, labs and imaging to fax to [facility] [place] Fax # [phone]  Phone # [phone] .. Attention : Dinh upon receipt patient will be scheduled.  I contacted patient regarding referral process he will contact  in the am to request referral approval be faxed to [facility] to process.  NATY Hill LPN

## 2021-01-19 ENCOUNTER — TELEPHONE (OUTPATIENT)
Dept: CARDIOLOGY | Facility: CLINIC | Age: 56
End: 2021-01-19

## 2021-01-19 DIAGNOSIS — I47.20 VENTRICULAR TACHYCARDIA (CMS/HCC): ICD-10-CM

## 2021-01-19 DIAGNOSIS — I25.5 ISCHEMIC CARDIOMYOPATHY: ICD-10-CM

## 2021-01-19 RX ORDER — AMIODARONE HYDROCHLORIDE 200 MG/1
200 TABLET ORAL 2 TIMES DAILY WITH MEALS
Qty: 180 TABLET | Refills: 3 | Status: SHIPPED | OUTPATIENT
Start: 2021-01-19 | End: 2021-07-13 | Stop reason: HOSPADM

## 2021-01-19 NOTE — TELEPHONE ENCOUNTER
Patient called for refill of amiodarone. Med protocol reviewed, patient advised to get eye exam. All other protocol items pass.

## 2021-02-08 DIAGNOSIS — Z79.899 LONG TERM CURRENT USE OF ANTIARRHYTHMIC MEDICAL THERAPY: Primary | ICD-10-CM

## 2021-02-09 ENCOUNTER — OFFICE VISIT (OUTPATIENT)
Dept: CARDIOLOGY | Facility: CLINIC | Age: 56
End: 2021-02-09
Payer: COMMERCIAL

## 2021-02-09 ENCOUNTER — ANCILLARY PROCEDURE (OUTPATIENT)
Dept: CARDIOLOGY | Facility: CLINIC | Age: 56
End: 2021-02-09
Payer: COMMERCIAL

## 2021-02-09 ENCOUNTER — APPOINTMENT (OUTPATIENT)
Dept: LAB | Facility: CLINIC | Age: 56
End: 2021-02-09
Payer: COMMERCIAL

## 2021-02-09 VITALS
OXYGEN SATURATION: 95 % | HEART RATE: 60 BPM | HEIGHT: 72 IN | SYSTOLIC BLOOD PRESSURE: 142 MMHG | DIASTOLIC BLOOD PRESSURE: 82 MMHG | WEIGHT: 275.6 LBS | BODY MASS INDEX: 37.33 KG/M2 | RESPIRATION RATE: 20 BRPM

## 2021-02-09 DIAGNOSIS — E78.2 MIXED HYPERLIPIDEMIA: ICD-10-CM

## 2021-02-09 DIAGNOSIS — Z72.0 TOBACCO USE: ICD-10-CM

## 2021-02-09 DIAGNOSIS — Z79.899 LONG TERM CURRENT USE OF ANTIARRHYTHMIC MEDICAL THERAPY: ICD-10-CM

## 2021-02-09 DIAGNOSIS — Z95.810 AUTOMATIC IMPLANTABLE CARDIOVERTER-DEFIBRILLATOR IN SITU: ICD-10-CM

## 2021-02-09 DIAGNOSIS — I25.5 ISCHEMIC CARDIOMYOPATHY: ICD-10-CM

## 2021-02-09 DIAGNOSIS — I50.42 CHRONIC COMBINED SYSTOLIC AND DIASTOLIC CONGESTIVE HEART FAILURE (CMS/HCC): ICD-10-CM

## 2021-02-09 DIAGNOSIS — I50.9 HEART FAILURE, UNSPECIFIED HF CHRONICITY, UNSPECIFIED HEART FAILURE TYPE (CMS/HCC): ICD-10-CM

## 2021-02-09 DIAGNOSIS — Z45.02 ENCOUNTER FOR INTERROGATION OF CARDIAC DEFIBRILLATOR: ICD-10-CM

## 2021-02-09 DIAGNOSIS — I47.20 VENTRICULAR TACHYCARDIA (CMS/HCC): Primary | ICD-10-CM

## 2021-02-09 LAB
ALBUMIN SERPL-MCNC: 4.3 G/DL (ref 3.5–5.3)
ALP SERPL-CCNC: 75 U/L (ref 45–115)
ALT SERPL-CCNC: 98 U/L (ref 7–52)
ANION GAP SERPL CALC-SCNC: 9 MMOL/L (ref 3–11)
AST SERPL-CCNC: 45 U/L
BILIRUB DIRECT SERPL-MCNC: 0.16 MG/DL
BILIRUB SERPL-MCNC: 1 MG/DL (ref 0.2–1.4)
BUN SERPL-MCNC: 13 MG/DL (ref 7–25)
CALCIUM ALBUM COR SERPL-MCNC: 8.8 MG/DL (ref 8.6–10.3)
CALCIUM SERPL-MCNC: 9 MG/DL (ref 8.6–10.3)
CHLORIDE SERPL-SCNC: 104 MMOL/L (ref 98–107)
CO2 SERPL-SCNC: 23 MMOL/L (ref 21–32)
CREAT SERPL-MCNC: 0.87 MG/DL (ref 0.7–1.3)
GFR SERPL CREATININE-BSD FRML MDRD: 98 ML/MIN/1.73M*2
GLUCOSE SERPL-MCNC: 143 MG/DL (ref 70–105)
MAGNESIUM SERPL-MCNC: 1.9 MG/DL (ref 1.8–2.4)
POTASSIUM SERPL-SCNC: 3.9 MMOL/L (ref 3.5–5.1)
PROT SERPL-MCNC: 6.8 G/DL (ref 6–8.3)
SODIUM SERPL-SCNC: 136 MMOL/L (ref 135–145)
TSH SERPL DL<=0.05 MIU/L-ACNC: 2.21 UIU/ML (ref 0.34–4.82)

## 2021-02-09 PROCEDURE — 80053 COMPREHEN METABOLIC PANEL: CPT

## 2021-02-09 PROCEDURE — 83735 ASSAY OF MAGNESIUM: CPT

## 2021-02-09 PROCEDURE — 99212 OFFICE O/P EST SF 10 MIN: CPT | Performed by: INTERNAL MEDICINE

## 2021-02-09 PROCEDURE — 82248 BILIRUBIN DIRECT: CPT

## 2021-02-09 PROCEDURE — 93296 REM INTERROG EVL PM/IDS: CPT | Performed by: INTERNAL MEDICINE

## 2021-02-09 PROCEDURE — 93295 DEV INTERROG REMOTE 1/2/MLT: CPT | Performed by: INTERNAL MEDICINE

## 2021-02-09 PROCEDURE — 36415 COLL VENOUS BLD VENIPUNCTURE: CPT | Performed by: INTERNAL MEDICINE

## 2021-02-09 PROCEDURE — 84443 ASSAY THYROID STIM HORMONE: CPT

## 2021-02-09 ASSESSMENT — ENCOUNTER SYMPTOMS
FOCAL WEAKNESS: 0
DIZZINESS: 0
CLAUDICATION: 0
ORTHOPNEA: 1
LIGHT-HEADEDNESS: 0
IRREGULAR HEARTBEAT: 0
SYNCOPE: 0
PALPITATIONS: 0
HEMATOCHEZIA: 0
PND: 0
NEAR-SYNCOPE: 0
SHORTNESS OF BREATH: 1
HEMATURIA: 0
BRUISES/BLEEDS EASILY: 1

## 2021-02-09 ASSESSMENT — PAIN SCALES - GENERAL: PAINLEVEL: 0-NO PAIN

## 2021-02-09 NOTE — LETTER
[facility]ARDIOLOG[facility]  [address] [place] [address]  [phone]  Dept: [phone]  02/09/21      Kirill Mancia DNP  [address] [place] [address]        To: Kirill Mancia DNP      Thank you for referring your patient, Pete Trejo, to receive care in my office. I have enclosed a summary of the care provided to Pete on 02/09/21.    Please contact me with any questions you may have regarding the visit.    Sincerely,         BRENDON SHAW MD  [address] [place] [address]  [phone]    CC: Wilfredo Montana MD

## 2021-02-09 NOTE — PROGRESS NOTES
Cardiology Outpatient Follow-up Note    Subjective    Patient ID: Pete Trejo is a 55 y.o. male.    Chief Complaint:   Chief Complaint   Patient presents with   • Coronary Artery Disease   • Congestive Heart Failure   • Hyperlipidemia   • Cardiomyopathy   .    HPI  The patient seems to be getting along well at this time.  He denies any significant shocks from his defibrillator.  He is had any sense or awareness of his heart rhythm.  He denies any significant symptomatic chest pain.  He however notes significant limiting symptoms of shortness of breath.  He denies any adverse side effects other than gastrointestinal from the standpoint of his antirhythm therapy.    The patient denies any significant symptomatic bleeding complications or problems.  He describes no angina.  He is not aware of any antitachycardia pacing which fortunately he has had very little of.  He was recently seen and evaluated in [place] at [facility] for possible transplant or further evaluation.  Fortunately his rhythm is stable.  His BNP is normal at 36 despite his symptomatic shortness of breath.  He was able to test negative for nicotine, alcohol or other adverse medications of abuse.  He is to go back there next week for additional evaluation including pulmonary function testing.    Specialty Problems        Cardiology Problems    Anterior myocardial infarction (CMS/HCC) (HCC)        Anteroseptal myocardial infarction (CMS/HCC) (HCC)        CAD (coronary artery disease)        Congestive heart failure (CHF) (CMS/HCC) (HCC)        Hyperlipidemia        Ischemic cardiomyopathy        Ventricular tachycardia (CMS/HCC) (HCC)        VT (ventricular tachycardia) (CMS/HCC) (HCC)        Coronary artery disease        V tach (CMS/HCC) (HCC)        Ventricular arrhythmia              Past Medical History:   Diagnosis Date   • CAD (coronary artery disease)    • CHF (congestive heart failure) (CMS/HCC)  ([facility])    • CVA (cerebral vascular accident) (CMS/HCC) ([facility]) 2010   • GERD (gastroesophageal reflux disease)    • Heart attack (CMS/HCC) ([facility])    • Ischemic cardiomyopathy    • V-tach (CMS/HCC) ([facility])        Past Surgical History:   Procedure Laterality Date   • ABLATION VT N/A 06/09/2020    Procedure: Ablation VT;  Surgeon: Wilfredo Montana MD;  Location: [facility] EP Lab;  Service: Electrophysiology;  Laterality: N/A;  Check with Dr. Montana in the a.m. regarding scheduling   • APPENDECTOMY     • CORONARY ARTERY STENTING  2009    2.5 X 24-mm Taxus DIMAS to first diagonal. 3.0 X 8-mm Taxus stent to proximal LAD just proximal to diagonal.   • CORONARY ARTERY STENTING  2010    3.5 X 15-mm Promus DIMAS to totally occluded LAD   • CORONARY ARTERY STENTING  2011    2.25 X 30-mm Resolute DIMAS to 2nd diagonal.  Balloon angioplasty through strut to improve blood flow to distal LAD   • ICD SC NEW  2011    [employer] Cognis Model #N119, Serial #[id number]. Endotak Reliance Model #0185, Serial #[id number]. LV lead Acuity Model #45+91, Serial #[id number]. Atrial lead Model #4469, Serial #[id number]       Allergies as of 02/09/2021 - Reviewed 02/09/2021   Allergen Reaction Noted   • Morphine  07/30/2017   • Tramadol  07/30/2017       Current Outpatient Medications   Medication Sig Dispense Refill   • amiodarone (PACERONE) 200 mg tablet Take 1 tablet (200 mg total) by mouth 2 (two) times a day with meals 180 tablet 3   • spironolactone (ALDACTONE) 25 mg tablet Take 1 tablet (25 mg total) by mouth 2 (two) times a day 180 tablet 3   • furosemide (LASIX) 20 mg tablet Take 2 tablets (40 mg total) by mouth daily 180 tablet 3   • hydrocortisone 1 % ointment Apply 1 application topically 2 (two) times a day as needed     • mexiletine (MEXITIL) 200 mg capsule Take 1 capsule (200 mg total) by mouth 2 (two) times a day 60 capsule 11   • lisinopriL (PRINIVIL,ZESTRIL) 5 mg tablet Take 5 mg by mouth 2 (two) times a day       • rosuvastatin (CRESTOR) 40 mg  tablet Take 40 mg by mouth nightly       • nitroglycerin (NITROSTAT) 0.4 mg SL tablet Place 0.4 mg under the tongue every 5 (five) minutes as needed for chest pain.     • pantoprazole (PROTONIX) 40 mg EC tablet Take 40 mg by mouth daily.     • carvedilol (COREG) 25 mg tablet Take 25 mg by mouth 2 (two) times a day with meals.     • prasugrel (EFFIENT) 10 mg tablet Take 1 tablet (10 mg total) by mouth daily. 30 tablet 1   • rivaroxaban (XARELTO) 20 mg tablet Take 1 tablet (20 mg total) by mouth daily. 30 tablet 1     No current facility-administered medications for this visit.        Family History   Problem Relation Age of Onset   • Heart attack Mother 62   • Other Mother         Abdominal Aortic Aneurysm   • Lung cancer Mother    • Colon cancer Father         Cause of death   • Diabetes Brother         Non-Insulin Dependent   • Heart attack Brother 51        w/ Stents   • Heart disease Brother    • Other Son         Well       Social History     Tobacco Use   • Smoking status: Former Smoker     Packs/day: 0.75     Years: 30.00     Pack years: 22.50     Types: Cigarettes     Quit date: 2020     Years since quittin.6   • Smokeless tobacco: Never Used   Substance Use Topics   • Alcohol use: Not Currently     Comment: Quit drinking in    • Drug use: Not Currently       Review of Systems  Review of Systems   Cardiovascular: Positive for dyspnea on exertion and orthopnea. Negative for chest pain, claudication, cyanosis, irregular heartbeat, leg swelling/pain, near-syncope, palpitations, PND and syncope/fainting.   Respiratory: Positive for shortness of breath.    Hematologic/Lymphatic: Bruises/bleeds easily.   Gastrointestinal: Negative for hematochezia and melena.   Genitourinary: Negative for hematuria.   Neurological: Negative for dizziness, focal weakness and light-headedness.   All other systems reviewed and are negative.    Objective     Vitals:    21 0942   BP: 142/82   Pulse: 60   Resp: 20    SpO2: 95%     Weight: 125 kg (275 lb 9.6 oz)  Physical Exam   Constitutional: He is oriented to person, place, and time. He appears well-developed and well-nourished.   HENT:   Head: Normocephalic.   Neck: Normal carotid pulses, no hepatojugular reflux and no JVD present. Carotid bruit is not present.   Cardiovascular: Normal rate. Exam reveals no gallop and no friction rub.   No murmur heard.  Pulses:       Carotid pulses are 2+ on the right side and 2+ on the left side.       Radial pulses are 2+ on the right side and 2+ on the left side.        Dorsalis pedis pulses are 2+ on the right side and 2+ on the left side.        Posterior tibial pulses are 2+ on the right side and 2+ on the left side.   Pulmonary/Chest: No accessory muscle usage. No respiratory distress. He has no decreased breath sounds. He has no wheezes. He has no rhonchi. He has no rales.   AICD noted in the left upper chest.   Neurological: He is alert and oriented to person, place, and time.   Psychiatric: He has a normal mood and affect. His speech is normal and behavior is normal. Judgment and thought content normal. Cognition and memory are normal.       Data Review:   Sodium   Date Value Ref Range Status   02/09/2021 136 135 - 145 mmol/L Final     Potassium   Date Value Ref Range Status   02/09/2021 3.9 3.5 - 5.1 mmol/L Final     Chloride   Date Value Ref Range Status   02/09/2021 104 98 - 107 mmol/L Final     CO2   Date Value Ref Range Status   02/09/2021 23 21 - 32 mmol/L Final     BUN   Date Value Ref Range Status   02/09/2021 13 7 - 25 mg/dL Final     Creatinine   Date Value Ref Range Status   02/09/2021 0.87 0.70 - 1.30 mg/dL Final     Glucose   Date Value Ref Range Status   02/09/2021 143 (H) 70 - 105 mg/dL Final     Calcium   Date Value Ref Range Status   02/09/2021 9.0 8.6 - 10.3 mg/dL Final           Assessment/Plan   Problem List Items Addressed This Visit        Circulatory    Congestive heart failure (CHF) (CMS/[facility])  ([facility])  Despite his shortness of breath his BNP was normal at 36.  He appears to be euvolemic on examination today.  His lungs demonstrate decreased breath sounds but no significant rales.  There is no jugular venous distention noted.    Ischemic cardiomyopathy  No significant angina.  Large anterior apical scar.  Previous catheterization was reviewed.  LAD and diagonals were widely patent at that time.  We will continue to monitor    Ventricular tachycardia (CMS/HCC) ([facility]) - Primary  Last remote check was in November.  He had several episodes of ATP in October and one in November.  He has no sense or awareness of his heart rhythm presently       Endocrine/Metabolic    Hyperlipidemia  Treated and controlled       Other    Automatic implantable cardioverter-defibrillator in situ  This appears to be working normally.  He is going back to [place] and they will likely assess his device and we will not assess that here in the office today.    Tobacco use  Based on recent nicotine urinary testing he has not been smoking.      Other Visit Diagnoses     Heart failure, unspecified HF chronicity, unspecified heart failure type (CMS/HCC) ([facility])  Follow-up in 3 months              Electronically signed by: Deidre Stover RN  2/9/2021  9:46 AM

## 2021-03-10 LAB — BSA FOR ECHO PROCEDURE: 2.52 M2

## 2021-05-11 ENCOUNTER — ANCILLARY PROCEDURE (OUTPATIENT)
Dept: CARDIOLOGY | Facility: CLINIC | Age: 56
End: 2021-05-11
Payer: COMMERCIAL

## 2021-05-11 DIAGNOSIS — Z45.02 ENCOUNTER FOR INTERROGATION OF CARDIAC DEFIBRILLATOR: ICD-10-CM

## 2021-05-12 ENCOUNTER — OFFICE VISIT (OUTPATIENT)
Dept: CARDIOLOGY | Facility: CLINIC | Age: 56
End: 2021-05-12
Payer: COMMERCIAL

## 2021-05-12 VITALS
WEIGHT: 275 LBS | HEART RATE: 60 BPM | BODY MASS INDEX: 37.3 KG/M2 | SYSTOLIC BLOOD PRESSURE: 121 MMHG | RESPIRATION RATE: 18 BRPM | OXYGEN SATURATION: 96 % | DIASTOLIC BLOOD PRESSURE: 83 MMHG

## 2021-05-12 DIAGNOSIS — Z72.0 TOBACCO USE: ICD-10-CM

## 2021-05-12 DIAGNOSIS — I21.09: Primary | ICD-10-CM

## 2021-05-12 DIAGNOSIS — I47.20 V TACH (CMS/HCC): ICD-10-CM

## 2021-05-12 DIAGNOSIS — I50.42 CHRONIC COMBINED SYSTOLIC AND DIASTOLIC CONGESTIVE HEART FAILURE (CMS/HCC): ICD-10-CM

## 2021-05-12 DIAGNOSIS — I50.9 HEART FAILURE, UNSPECIFIED HF CHRONICITY, UNSPECIFIED HEART FAILURE TYPE (CMS/HCC): ICD-10-CM

## 2021-05-12 DIAGNOSIS — E78.2 MIXED HYPERLIPIDEMIA: ICD-10-CM

## 2021-05-12 DIAGNOSIS — Z95.810 AUTOMATIC IMPLANTABLE CARDIOVERTER-DEFIBRILLATOR IN SITU: ICD-10-CM

## 2021-05-12 DIAGNOSIS — I25.5 ISCHEMIC CARDIOMYOPATHY: ICD-10-CM

## 2021-05-12 DIAGNOSIS — Z86.79 S/P ABLATION OF VENTRICULAR ARRHYTHMIA: ICD-10-CM

## 2021-05-12 DIAGNOSIS — Z98.890 S/P ABLATION OF VENTRICULAR ARRHYTHMIA: ICD-10-CM

## 2021-05-12 DIAGNOSIS — Z79.899 ON AMIODARONE THERAPY: ICD-10-CM

## 2021-05-12 PROCEDURE — 99214 OFFICE O/P EST MOD 30 MIN: CPT | Performed by: INTERNAL MEDICINE

## 2021-05-12 ASSESSMENT — PAIN SCALES - GENERAL: PAINLEVEL: 0-NO PAIN

## 2021-05-12 ASSESSMENT — ENCOUNTER SYMPTOMS
NEAR-SYNCOPE: 0
BACK PAIN: 0
JOINT SWELLING: 0
DIZZINESS: 0
ORTHOPNEA: 0
SYNCOPE: 0
HEMATURIA: 0
PALPITATIONS: 0
LIGHT-HEADEDNESS: 0
CLAUDICATION: 0
PND: 0
IRREGULAR HEARTBEAT: 0
BRUISES/BLEEDS EASILY: 1

## 2021-05-12 NOTE — LETTER
[facility]  [address] [place] [address]  [phone]  Dept: [phone]  05/12/21      Kirill Mancia DNP  [address] [place] [address]        To: Kirill Mancia DNP      Thank you for referring your patient, Pete Trejo, to receive care in my office. I have enclosed a summary of the care provided to Pete on 05/12/21.    Please contact me with any questions you may have regarding the visit.    Sincerely,         BRENDON SHAW MD  [address] [place] [address]  [phone]    CC: No Recipients

## 2021-05-12 NOTE — PROGRESS NOTES
Cardiology Outpatient Follow-up Note    Subjective    Patient ID: Pete Trejo is a 55 y.o. male.    Chief Complaint:   Chief Complaint   Patient presents with   • Congestive Heart Failure   • Cardiomyopathy   • Hyperlipidemia   .    HPI  The patient is seen in follow-up from standpoint of dilated cardiomyopathy with severe ischemic cardiomyopathy.  The patient has just returned from [facility] where he was reevaluated for cardiac transplantation.  The patient seems to be doing better at this particular time and tolerating his medications well.  He is not had any ICD shocks.  He demonstrated 2 episodes of nonsustained ventricular tachycardia.  These are relatively slow and treated with ATP.  He has not had any significant symptomatic episodes.  He denies angina.  He has had some lightheadedness and dizziness but generally seems to tolerate his medications well.    No change in his breathing, episodes of tachycardia or any significant lower extremity edema laboratory testing was all unremarkable recently.    Specialty Problems        Cardiology Problems    Anterior myocardial infarction (CMS/HCC) ([facility])        Anteroseptal myocardial infarction (CMS/HCC) ([facility])        CAD (coronary artery disease)        Congestive heart failure (CHF) (CMS/[facility]) ([facility])        Hyperlipidemia        Ischemic cardiomyopathy        Ventricular tachycardia (CMS/[facility]) ([facility])        VT (ventricular tachycardia) (CMS/[facility]) ([facility])        Coronary artery disease        V tach (CMS/[facility]) ([facility])        Ventricular arrhythmia              Past Medical History:   Diagnosis Date   • CAD (coronary artery disease)    • CHF (congestive heart failure) (CMS/HCC) ([facility])    • CVA (cerebral vascular accident) (CMS/HCC) ([facility]) 2010   • GERD (gastroesophageal reflux disease)    • Heart attack (CMS/HCC) ([facility])    • Ischemic cardiomyopathy    • V-tach (CMS/[facility]) ([facility])        Past Surgical History:   Procedure  Laterality Date   • ABLATION VT N/A 06/09/2020    Procedure: Ablation VT;  Surgeon: Wilfreod Montana MD;  Location: [facility] EP Lab;  Service: Electrophysiology;  Laterality: N/A;  Check with Dr. Montana in the a.m. regarding scheduling   • APPENDECTOMY     • CORONARY ARTERY STENTING  2009    2.5 X 24-mm Taxus DIMAS to first diagonal. 3.0 X 8-mm Taxus stent to proximal LAD just proximal to diagonal.   • CORONARY ARTERY STENTING  2010    3.5 X 15-mm Promus DIMAS to totally occluded LAD   • CORONARY ARTERY STENTING  2011    2.25 X 30-mm Resolute DIMAS to 2nd diagonal.  Balloon angioplasty through strut to improve blood flow to distal LAD   • CORONARY ARTERY STENTING  07/28/2017    second diagonal branch LAD Resolute 2.25 x 30-mm DIMAS   • ICD SC NEW  2011    [place] Cognis Model #N119, Serial #[id number]. [employer] Reliance Model #0185, Serial #[id number]. LV lead Acuity Model #45+91, Serial #[id number]. Atrial lead Model #4469, Serial #[id number]       Allergies as of 05/12/2021 - Reviewed 05/12/2021   Allergen Reaction Noted   • Morphine  07/30/2017   • Tramadol  07/30/2017       Current Outpatient Medications   Medication Sig Dispense Refill   • amiodarone (PACERONE) 200 mg tablet Take 1 tablet (200 mg total) by mouth 2 (two) times a day with meals 180 tablet 3   • spironolactone (ALDACTONE) 25 mg tablet Take 1 tablet (25 mg total) by mouth 2 (two) times a day 180 tablet 3   • furosemide (LASIX) 20 mg tablet Take 2 tablets (40 mg total) by mouth daily 180 tablet 3   • mexiletine (MEXITIL) 200 mg capsule Take 1 capsule (200 mg total) by mouth 2 (two) times a day 60 capsule 11   • lisinopriL (PRINIVIL,ZESTRIL) 5 mg tablet Take 5 mg by mouth 2 (two) times a day       • rosuvastatin (CRESTOR) 40 mg tablet Take 40 mg by mouth nightly       • nitroglycerin (NITROSTAT) 0.4 mg SL tablet Place 0.4 mg under the tongue every 5 (five) minutes as needed for chest pain.     • pantoprazole (PROTONIX) 40 mg EC tablet Take 40 mg by mouth daily.     •  carvedilol (COREG) 25 mg tablet Take 25 mg by mouth 2 (two) times a day with meals.     • prasugrel (EFFIENT) 10 mg tablet Take 1 tablet (10 mg total) by mouth daily. 30 tablet 1   • rivaroxaban (XARELTO) 20 mg tablet Take 1 tablet (20 mg total) by mouth daily. 30 tablet 1   • hydrocortisone 1 % ointment Apply 1 application topically 2 (two) times a day as needed       No current facility-administered medications for this visit.       Family History   Problem Relation Age of Onset   • Heart attack Mother 62   • Other Mother         Abdominal Aortic Aneurysm   • Lung cancer Mother    • Colon cancer Father         Cause of death   • Diabetes Brother         Non-Insulin Dependent   • Heart attack Brother 51        w/ Stents   • Heart disease Brother    • Other Son         Well       Social History     Tobacco Use   • Smoking status: Former Smoker     Packs/day: 0.75     Years: 30.00     Pack years: 22.50     Types: Cigarettes     Quit date: 2020     Years since quittin.9   • Smokeless tobacco: Never Used   Substance Use Topics   • Alcohol use: Not Currently     Comment: Quit drinking in    • Drug use: Not Currently       Review of Systems  Review of Systems   HENT: Negative for nosebleeds.    Cardiovascular: Positive for dyspnea on exertion. Negative for chest pain, claudication, cyanosis, irregular heartbeat, leg swelling/pain, near-syncope, orthopnea, palpitations, PND and syncope/fainting.   Hematologic/Lymphatic: Bruises/bleeds easily.   Musculoskeletal: Negative for back pain, joint pain and joint swelling.   Gastrointestinal: Negative for melena.   Genitourinary: Negative for hematuria.   Neurological: Negative for dizziness and light-headedness.   All other systems reviewed and are negative.    Objective     Vitals:    21 0830   BP: 121/83   Pulse: 60   Resp: 18   SpO2: 96%     Weight: 124.7 kg (275 lb)  Physical Exam  Constitutional:       Appearance: He is well-developed.   HENT:      Head:  Normocephalic.   Neck:      Vascular: Normal carotid pulses. No carotid bruit, hepatojugular reflux or JVD.   Cardiovascular:      Rate and Rhythm: Normal rate.      Pulses:           Carotid pulses are 2+ on the right side and 2+ on the left side.       Radial pulses are 2+ on the right side and 2+ on the left side.        Dorsalis pedis pulses are 2+ on the right side and 2+ on the left side.        Posterior tibial pulses are 2+ on the right side and 2+ on the left side.      Heart sounds: No murmur heard.   No friction rub. No gallop.    Pulmonary:      Effort: No accessory muscle usage or respiratory distress.      Breath sounds: No decreased breath sounds, wheezing, rhonchi or rales.   Neurological:      Mental Status: He is alert and oriented to person, place, and time.   Psychiatric:         Speech: Speech normal.         Behavior: Behavior normal.         Thought Content: Thought content normal.         Judgment: Judgment normal.         Data Review:   Sodium   Date Value Ref Range Status   02/09/2021 136 135 - 145 mmol/L Final     Potassium   Date Value Ref Range Status   02/09/2021 3.9 3.5 - 5.1 mmol/L Final     Chloride   Date Value Ref Range Status   02/09/2021 104 98 - 107 mmol/L Final     CO2   Date Value Ref Range Status   02/09/2021 23 21 - 32 mmol/L Final     BUN   Date Value Ref Range Status   02/09/2021 13 7 - 25 mg/dL Final     Creatinine   Date Value Ref Range Status   02/09/2021 0.87 0.70 - 1.30 mg/dL Final     Glucose   Date Value Ref Range Status   02/09/2021 143 (H) 70 - 105 mg/dL Final     Calcium   Date Value Ref Range Status   02/09/2021 9.0 8.6 - 10.3 mg/dL Final           Assessment/Plan   Problem List Items Addressed This Visit        Cardiovascular and Mediastinum    Anteroseptal myocardial infarction (CMS/HCC) (HCC) - Primary  No recurrent angina    Congestive heart failure (CHF) (CMS/HCC) (HCC)  Chronic stable shortness of breath with normal BNP.    Relevant Orders    Comprehensive  metabolic panel Blood, Venous    B-type natriuretic peptide (BNP) Blood, Venous    CBC Blood, Venous    Thyroid Stimulating Hormone, Ultrasensitive Blood, Venous    Hyperlipidemia  Treated and controlled    Ischemic cardiomyopathy  Ejection fraction is stable at 25%    Relevant Orders    Thyroid Stimulating Hormone, Ultrasensitive Blood, Venous    V tach (CMS/HCC) ([facility])  No symptomatic ventricular arrhythmias.  Several slow episodes of nonsustained VT treated with ATP.    Relevant Orders    Thyroid Stimulating Hormone, Ultrasensitive Blood, Venous       Other    Automatic implantable cardioverter-defibrillator in situ  He is pacing 98% of the time in the right ventricle and 99% of the time in the left ventricle.  The patient will follow up with us in 4 months with repeat laboratory work.    On amiodarone therapy    Relevant Orders    Thyroid Stimulating Hormone, Ultrasensitive Blood, Venous    S/P ablation of ventricular arrhythmia    Tobacco use      Other Visit Diagnoses     Heart failure, unspecified HF chronicity, unspecified heart failure type (CMS/HCC) ([facility])                Electronically signed by: BRENDON SHAW MD  5/12/2021  8:37 AM

## 2021-05-24 ENCOUNTER — ANCILLARY PROCEDURE (OUTPATIENT)
Dept: CARDIOLOGY | Facility: CLINIC | Age: 56
End: 2021-05-24
Payer: COMMERCIAL

## 2021-05-24 DIAGNOSIS — Z45.02 ENCOUNTER FOR INTERROGATION OF CARDIAC DEFIBRILLATOR: Primary | ICD-10-CM

## 2021-05-24 PROCEDURE — 93284 PRGRMG EVAL IMPLANTABLE DFB: CPT | Performed by: INTERNAL MEDICINE

## 2021-05-25 PROCEDURE — 93295 DEV INTERROG REMOTE 1/2/MLT: CPT | Performed by: INTERNAL MEDICINE

## 2021-05-25 PROCEDURE — 93296 REM INTERROG EVL PM/IDS: CPT | Performed by: INTERNAL MEDICINE

## 2021-06-23 DIAGNOSIS — I47.20 VT (VENTRICULAR TACHYCARDIA) (CMS/HCC): ICD-10-CM

## 2021-06-24 RX ORDER — MEXILETINE HYDROCHLORIDE 200 MG/1
200 CAPSULE ORAL 2 TIMES DAILY
Qty: 180 CAPSULE | Refills: 1 | Status: SHIPPED | OUTPATIENT
Start: 2021-06-24 | End: 2021-07-15

## 2021-07-10 ENCOUNTER — APPOINTMENT (OUTPATIENT)
Dept: RADIOLOGY | Facility: HOSPITAL | Age: 56
DRG: 309 | End: 2021-07-10
Payer: COMMERCIAL

## 2021-07-10 ENCOUNTER — HOSPITAL ENCOUNTER (INPATIENT)
Facility: HOSPITAL | Age: 56
LOS: 1 days | Discharge: HOME | DRG: 309 | End: 2021-07-13
Attending: EMERGENCY MEDICINE | Admitting: HOSPITALIST
Payer: COMMERCIAL

## 2021-07-10 DIAGNOSIS — R07.9 CHEST PAIN WITH HIGH RISK FOR CARDIAC ETIOLOGY: Primary | ICD-10-CM

## 2021-07-10 DIAGNOSIS — R06.02 SHORTNESS OF BREATH: ICD-10-CM

## 2021-07-10 DIAGNOSIS — I50.42 CHRONIC COMBINED SYSTOLIC AND DIASTOLIC CHF, NYHA CLASS 2 AND ACC/AHA STAGE C (CMS/HCC): ICD-10-CM

## 2021-07-10 DIAGNOSIS — I25.5 ISCHEMIC CARDIOMYOPATHY: ICD-10-CM

## 2021-07-10 PROBLEM — I20.0 UNSTABLE ANGINA (CMS/HCC): Status: ACTIVE | Noted: 2021-07-10

## 2021-07-10 LAB
ALBUMIN SERPL-MCNC: 4.1 G/DL (ref 3.5–5.3)
ALP SERPL-CCNC: 73 U/L (ref 45–115)
ALT SERPL-CCNC: 91 U/L (ref 7–52)
ANION GAP SERPL CALC-SCNC: 8 MMOL/L (ref 3–11)
APTT PPP: 38.6 SECONDS (ref 25.1–36.5)
AST SERPL-CCNC: 42 U/L
BACTERIA #/AREA URNS AUTO: NORMAL /HPF
BASOPHILS # BLD AUTO: 0.1 10*3/UL
BASOPHILS NFR BLD AUTO: 1 % (ref 0–2)
BILIRUB SERPL-MCNC: 1 MG/DL (ref 0.2–1.4)
BILIRUB UR QL STRIP.AUTO: NEGATIVE
BNP SERPL-MCNC: 51 PG/ML (ref 0–100)
BUN SERPL-MCNC: 13 MG/DL (ref 7–25)
CALCIUM ALBUM COR SERPL-MCNC: 8.9 MG/DL (ref 8.6–10.3)
CALCIUM SERPL-MCNC: 9 MG/DL (ref 8.6–10.3)
CHLORIDE SERPL-SCNC: 102 MMOL/L (ref 98–107)
CLARITY UR: CLEAR
CO2 SERPL-SCNC: 22 MMOL/L (ref 21–32)
COLOR UR: ABNORMAL
CREAT SERPL-MCNC: 0.84 MG/DL (ref 0.7–1.3)
DELTA HIGH SENSITIVITY TROPONIN I, 1 HOUR: 3.4
EOSINOPHIL # BLD AUTO: 0.3 10*3/UL
EOSINOPHIL NFR BLD AUTO: 5 % (ref 0–3)
ERYTHROCYTE [DISTWIDTH] IN BLOOD BY AUTOMATED COUNT: 14.2 % (ref 11.5–15)
ETHANOL SERPL-MCNC: <10 MG/DL (ref 0–10)
GFR SERPL CREATININE-BSD FRML MDRD: 99 ML/MIN/1.73M*2
GLUCOSE SERPL-MCNC: 105 MG/DL (ref 70–105)
GLUCOSE UR STRIP.AUTO-MCNC: NEGATIVE MG/DL
HCT VFR BLD AUTO: 43.9 % (ref 38–50)
HGB BLD-MCNC: 15 G/DL (ref 13.2–17.2)
HGB UR QL STRIP.AUTO: ABNORMAL
INR BLD: 1.2
KETONES UR STRIP.AUTO-MCNC: NEGATIVE MG/DL
LEUKOCYTE ESTERASE UR QL STRIP: NEGATIVE
LIPASE SERPL-CCNC: 12 U/L (ref 11–82)
LYMPHOCYTES # BLD AUTO: 2.4 10*3/UL
LYMPHOCYTES NFR BLD AUTO: 41 % (ref 15–47)
MAGNESIUM SERPL-MCNC: 1.8 MG/DL (ref 1.8–2.4)
MCH RBC QN AUTO: 32 PG (ref 29–34)
MCHC RBC AUTO-ENTMCNC: 34.2 G/DL (ref 32–36)
MCV RBC AUTO: 93.4 FL (ref 82–97)
MONOCYTES # BLD AUTO: 0.7 10*3/UL
MONOCYTES NFR BLD AUTO: 13 % (ref 5–13)
NEUTROPHILS # BLD AUTO: 2.5 10*3/UL
NEUTROPHILS NFR BLD AUTO: 41 % (ref 46–70)
NITRITE UR QL STRIP.AUTO: NEGATIVE
PH UR STRIP.AUTO: 6 PH
PLATELET # BLD AUTO: 202 10*3/UL (ref 130–350)
PMV BLD AUTO: 8.2 FL (ref 6.9–10.8)
POTASSIUM SERPL-SCNC: 4 MMOL/L (ref 3.5–5.1)
PROT SERPL-MCNC: 6.6 G/DL (ref 6–8.3)
PROT UR STRIP.AUTO-MCNC: NEGATIVE MG/DL
PROTHROMBIN TIME: 14.1 SECONDS (ref 9.4–12.5)
RBC # BLD AUTO: 4.7 10*6/ΜL (ref 4.1–5.8)
RBC #/AREA URNS AUTO: NEGATIVE /HPF
SODIUM SERPL-SCNC: 132 MMOL/L (ref 135–145)
SP GR UR STRIP.AUTO: 1 (ref 1–1.03)
SQUAMOUS #/AREA URNS AUTO: NORMAL /HPF
TROPONIN I SERPL-MCNC: 21.9 PG/ML
TROPONIN I SERPL-MCNC: 25.3 PG/ML
UROBILINOGEN UR STRIP.AUTO-MCNC: <2 E.U./DL
WBC # BLD AUTO: 6 10*3/UL (ref 3.7–9.6)
WBC #/AREA URNS AUTO: NEGATIVE /HPF

## 2021-07-10 PROCEDURE — 36415 COLL VENOUS BLD VENIPUNCTURE: CPT | Performed by: EMERGENCY MEDICINE

## 2021-07-10 PROCEDURE — 81001 URINALYSIS AUTO W/SCOPE: CPT | Performed by: EMERGENCY MEDICINE

## 2021-07-10 PROCEDURE — 80053 COMPREHEN METABOLIC PANEL: CPT | Performed by: EMERGENCY MEDICINE

## 2021-07-10 PROCEDURE — 99284 EMERGENCY DEPT VISIT MOD MDM: CPT | Performed by: EMERGENCY MEDICINE

## 2021-07-10 PROCEDURE — 85025 COMPLETE CBC W/AUTO DIFF WBC: CPT | Performed by: EMERGENCY MEDICINE

## 2021-07-10 PROCEDURE — 83880 ASSAY OF NATRIURETIC PEPTIDE: CPT | Performed by: EMERGENCY MEDICINE

## 2021-07-10 PROCEDURE — 84484 ASSAY OF TROPONIN QUANT: CPT | Performed by: EMERGENCY MEDICINE

## 2021-07-10 PROCEDURE — 85610 PROTHROMBIN TIME: CPT | Performed by: EMERGENCY MEDICINE

## 2021-07-10 PROCEDURE — 82077 ASSAY SPEC XCP UR&BREATH IA: CPT | Performed by: EMERGENCY MEDICINE

## 2021-07-10 PROCEDURE — 85730 THROMBOPLASTIN TIME PARTIAL: CPT | Performed by: EMERGENCY MEDICINE

## 2021-07-10 PROCEDURE — 83690 ASSAY OF LIPASE: CPT | Performed by: EMERGENCY MEDICINE

## 2021-07-10 PROCEDURE — 93005 ELECTROCARDIOGRAM TRACING: CPT | Performed by: EMERGENCY MEDICINE

## 2021-07-10 PROCEDURE — 71045 X-RAY EXAM CHEST 1 VIEW: CPT

## 2021-07-10 PROCEDURE — 99220 *NO LONGER ACTIVE 2023 DO NOT USE* PR INITIAL OBSERVATION CARE/DAY 70 MINUTES: CPT | Performed by: INTERNAL MEDICINE

## 2021-07-10 PROCEDURE — 83735 ASSAY OF MAGNESIUM: CPT | Performed by: EMERGENCY MEDICINE

## 2021-07-10 RX ORDER — NAPROXEN SODIUM 220 MG/1
324 TABLET, FILM COATED ORAL ONCE
Status: COMPLETED | OUTPATIENT
Start: 2021-07-10 | End: 2021-07-10

## 2021-07-10 RX ADMIN — NAPROXEN SODIUM 324 MG: 220 TABLET, FILM COATED ORAL at 23:29

## 2021-07-11 ENCOUNTER — APPOINTMENT (OUTPATIENT)
Dept: NUCLEAR MEDICINE | Facility: HOSPITAL | Age: 56
DRG: 309 | End: 2021-07-11
Payer: COMMERCIAL

## 2021-07-11 PROBLEM — I50.42: Status: ACTIVE | Noted: 2021-07-11

## 2021-07-11 LAB
ANION GAP SERPL CALC-SCNC: 10 MMOL/L (ref 3–11)
BUN SERPL-MCNC: 11 MG/DL (ref 7–25)
CALCIUM SERPL-MCNC: 9.1 MG/DL (ref 8.6–10.3)
CHLORIDE SERPL-SCNC: 103 MMOL/L (ref 98–107)
CHOLEST SERPL-MCNC: 119 MG/DL (ref 0–199)
CO2 SERPL-SCNC: 23 MMOL/L (ref 21–32)
CREAT SERPL-MCNC: 0.78 MG/DL (ref 0.7–1.3)
DELTA HIGH SENSITIVITY TROPONIN I, 2 HOUR: 6.3
FASTING STATUS PATIENT QL REPORTED: YES
GFR SERPL CREATININE-BSD FRML MDRD: 102 ML/MIN/1.73M*2
GLUCOSE SERPL-MCNC: 105 MG/DL (ref 70–105)
HDLC SERPL-MCNC: 35 MG/DL
LDLC SERPL CALC-MCNC: 60 MG/DL (ref 20–99)
NUC STRESS TID: 1.04
NUC STRESS TPD: 65 %
POTASSIUM SERPL-SCNC: 4.1 MMOL/L (ref 3.5–5.1)
RH CV NM REST DOSE: 7.4
RH CV NM STRESS DOSE: 22.3
SODIUM SERPL-SCNC: 136 MMOL/L (ref 135–145)
STRESS BASELINE BP: ABNORMAL MMHG
STRESS BASELINE HR: 60 BPM
STRESS O2 SAT REST: 95 %
STRESS PERCENT HR: 40 %
STRESS POST O2 SAT PEAK: 96 %
STRESS POST PEAK BP: ABNORMAL MMHG
STRESS POST PEAK HR: 67 BPM
STRESS TARGET HR: 141 BPM
SUMMED DIFFERENCE: 1
SUMMED REST SCORE: 41
SUMMED STRESS SCORE: 42
TRIGL SERPL-MCNC: 120 MG/DL
TROPONIN I SERPL-MCNC: 28.2 PG/ML

## 2021-07-11 PROCEDURE — 2580000300 HC RX 258: Performed by: INTERNAL MEDICINE

## 2021-07-11 PROCEDURE — 93018 CV STRESS TEST I&R ONLY: CPT | Performed by: INTERNAL MEDICINE

## 2021-07-11 PROCEDURE — 99232 SBSQ HOSP IP/OBS MODERATE 35: CPT | Performed by: HOSPITALIST

## 2021-07-11 PROCEDURE — 80061 LIPID PANEL: CPT | Performed by: INTERNAL MEDICINE

## 2021-07-11 PROCEDURE — G0378 HOSPITAL OBSERVATION PER HR: HCPCS

## 2021-07-11 PROCEDURE — 93016 CV STRESS TEST SUPVJ ONLY: CPT | Performed by: INTERNAL MEDICINE

## 2021-07-11 PROCEDURE — 99284 EMERGENCY DEPT VISIT MOD MDM: CPT

## 2021-07-11 PROCEDURE — 36415 COLL VENOUS BLD VENIPUNCTURE: CPT | Performed by: INTERNAL MEDICINE

## 2021-07-11 PROCEDURE — 78452 HT MUSCLE IMAGE SPECT MULT: CPT | Mod: 26,MG | Performed by: INTERNAL MEDICINE

## 2021-07-11 PROCEDURE — 6360000200 HC RX 636 W HCPCS (ALT 250 FOR IP): Performed by: INTERNAL MEDICINE

## 2021-07-11 PROCEDURE — 96365 THER/PROPH/DIAG IV INF INIT: CPT

## 2021-07-11 PROCEDURE — G1004 CDSM NDSC: HCPCS | Performed by: INTERNAL MEDICINE

## 2021-07-11 PROCEDURE — 80048 BASIC METABOLIC PNL TOTAL CA: CPT | Performed by: INTERNAL MEDICINE

## 2021-07-11 PROCEDURE — 93017 CV STRESS TEST TRACING ONLY: CPT

## 2021-07-11 PROCEDURE — 6370000100 HC RX 637 (ALT 250 FOR IP): Performed by: INTERNAL MEDICINE

## 2021-07-11 PROCEDURE — 6370000100 HC RX 637 (ALT 250 FOR IP): Performed by: HOSPITALIST

## 2021-07-11 PROCEDURE — A9500 TC99M SESTAMIBI: HCPCS

## 2021-07-11 RX ORDER — MEXILETINE HYDROCHLORIDE 200 MG/1
200 CAPSULE ORAL 2 TIMES DAILY
Status: DISCONTINUED | OUTPATIENT
Start: 2021-07-11 | End: 2021-07-13 | Stop reason: HOSPADM

## 2021-07-11 RX ORDER — NITROGLYCERIN 0.4 MG/1
0.4 TABLET SUBLINGUAL EVERY 5 MIN PRN
Status: DISCONTINUED | OUTPATIENT
Start: 2021-07-11 | End: 2021-07-13 | Stop reason: HOSPADM

## 2021-07-11 RX ORDER — ACETAMINOPHEN 325 MG/1
650 TABLET ORAL EVERY 4 HOURS PRN
Status: DISCONTINUED | OUTPATIENT
Start: 2021-07-11 | End: 2021-07-13 | Stop reason: HOSPADM

## 2021-07-11 RX ORDER — LISINOPRIL 5 MG/1
5 TABLET ORAL 2 TIMES DAILY
Status: DISCONTINUED | OUTPATIENT
Start: 2021-07-11 | End: 2021-07-12

## 2021-07-11 RX ORDER — MAGNESIUM SULFATE HEPTAHYDRATE 40 MG/ML
2 INJECTION, SOLUTION INTRAVENOUS ONCE AS NEEDED
Status: DISCONTINUED | OUTPATIENT
Start: 2021-07-11 | End: 2021-07-13 | Stop reason: HOSPADM

## 2021-07-11 RX ORDER — PRASUGREL 10 MG/1
10 TABLET, FILM COATED ORAL DAILY
Status: DISCONTINUED | OUTPATIENT
Start: 2021-07-11 | End: 2021-07-13 | Stop reason: HOSPADM

## 2021-07-11 RX ORDER — SPIRONOLACTONE 25 MG/1
25 TABLET ORAL 2 TIMES DAILY
Status: DISCONTINUED | OUTPATIENT
Start: 2021-07-11 | End: 2021-07-13 | Stop reason: HOSPADM

## 2021-07-11 RX ORDER — REGADENOSON 0.08 MG/ML
0.4 INJECTION, SOLUTION INTRAVENOUS ONCE
Status: COMPLETED | OUTPATIENT
Start: 2021-07-11 | End: 2021-07-11

## 2021-07-11 RX ORDER — ROSUVASTATIN CALCIUM 20 MG/1
40 TABLET, COATED ORAL NIGHTLY
Status: DISCONTINUED | OUTPATIENT
Start: 2021-07-11 | End: 2021-07-13 | Stop reason: HOSPADM

## 2021-07-11 RX ORDER — SODIUM CHLORIDE 9 MG/ML
50 INJECTION, SOLUTION INTRAVENOUS CONTINUOUS
Status: DISCONTINUED | OUTPATIENT
Start: 2021-07-11 | End: 2021-07-11

## 2021-07-11 RX ORDER — SODIUM CHLORIDE 0.9 % (FLUSH) 0.9 %
3 SYRINGE (ML) INJECTION AS NEEDED
Status: DISCONTINUED | OUTPATIENT
Start: 2021-07-11 | End: 2021-07-13 | Stop reason: HOSPADM

## 2021-07-11 RX ORDER — AMIODARONE HYDROCHLORIDE 200 MG/1
200 TABLET ORAL 2 TIMES DAILY WITH MEALS
Status: DISCONTINUED | OUTPATIENT
Start: 2021-07-11 | End: 2021-07-11

## 2021-07-11 RX ORDER — FUROSEMIDE 40 MG/1
40 TABLET ORAL DAILY
Status: DISCONTINUED | OUTPATIENT
Start: 2021-07-11 | End: 2021-07-12

## 2021-07-11 RX ORDER — AMIODARONE HYDROCHLORIDE 200 MG/1
200 TABLET ORAL
Status: DISCONTINUED | OUTPATIENT
Start: 2021-07-11 | End: 2021-07-13 | Stop reason: HOSPADM

## 2021-07-11 RX ORDER — HYDRALAZINE HYDROCHLORIDE 20 MG/ML
10 INJECTION INTRAMUSCULAR; INTRAVENOUS EVERY 4 HOURS PRN
Status: DISCONTINUED | OUTPATIENT
Start: 2021-07-11 | End: 2021-07-13 | Stop reason: HOSPADM

## 2021-07-11 RX ORDER — LANSOPRAZOLE 30 MG/1
30 CAPSULE, DELAYED RELEASE ORAL
Status: DISCONTINUED | OUTPATIENT
Start: 2021-07-11 | End: 2021-07-13 | Stop reason: HOSPADM

## 2021-07-11 RX ORDER — CARVEDILOL 25 MG/1
25 TABLET ORAL 2 TIMES DAILY WITH MEALS
Status: DISCONTINUED | OUTPATIENT
Start: 2021-07-11 | End: 2021-07-13 | Stop reason: HOSPADM

## 2021-07-11 RX ORDER — ONDANSETRON HYDROCHLORIDE 2 MG/ML
4 INJECTION, SOLUTION INTRAVENOUS EVERY 6 HOURS PRN
Status: DISCONTINUED | OUTPATIENT
Start: 2021-07-11 | End: 2021-07-13 | Stop reason: HOSPADM

## 2021-07-11 RX ORDER — ASPIRIN 81 MG/1
81 TABLET ORAL DAILY
Status: DISCONTINUED | OUTPATIENT
Start: 2021-07-11 | End: 2021-07-13 | Stop reason: HOSPADM

## 2021-07-11 RX ORDER — BISACODYL 10 MG/1
10 SUPPOSITORY RECTAL DAILY PRN
Status: DISCONTINUED | OUTPATIENT
Start: 2021-07-11 | End: 2021-07-13 | Stop reason: HOSPADM

## 2021-07-11 RX ADMIN — PRASUGREL 10 MG: 10 TABLET, FILM COATED ORAL at 14:08

## 2021-07-11 RX ADMIN — FUROSEMIDE 40 MG: 40 TABLET ORAL at 14:08

## 2021-07-11 RX ADMIN — SPIRONOLACTONE 25 MG: 25 TABLET ORAL at 14:08

## 2021-07-11 RX ADMIN — ROSUVASTATIN CALCIUM 40 MG: 20 TABLET, FILM COATED ORAL at 21:00

## 2021-07-11 RX ADMIN — AMIODARONE HYDROCHLORIDE 200 MG: 200 TABLET ORAL at 18:24

## 2021-07-11 RX ADMIN — MAGNESIUM SULFATE HEPTAHYDRATE 1 G: 500 INJECTION, SOLUTION INTRAMUSCULAR; INTRAVENOUS at 01:12

## 2021-07-11 RX ADMIN — LISINOPRIL 5 MG: 5 TABLET ORAL at 21:01

## 2021-07-11 RX ADMIN — MEXILETINE HYDROCHLORIDE 200 MG: 200 CAPSULE ORAL at 14:07

## 2021-07-11 RX ADMIN — SPIRONOLACTONE 25 MG: 25 TABLET ORAL at 21:01

## 2021-07-11 RX ADMIN — RIVAROXABAN 20 MG: 20 TABLET, FILM COATED ORAL at 18:24

## 2021-07-11 RX ADMIN — RIVAROXABAN 20 MG: 20 TABLET, FILM COATED ORAL at 01:12

## 2021-07-11 RX ADMIN — LISINOPRIL 5 MG: 5 TABLET ORAL at 14:07

## 2021-07-11 RX ADMIN — SODIUM CHLORIDE 50 ML/HR: 9 INJECTION, SOLUTION INTRAVENOUS at 02:18

## 2021-07-11 RX ADMIN — CARVEDILOL 25 MG: 25 TABLET, FILM COATED ORAL at 14:08

## 2021-07-11 RX ADMIN — MEXILETINE HYDROCHLORIDE 200 MG: 200 CAPSULE ORAL at 21:54

## 2021-07-11 RX ADMIN — CARVEDILOL 25 MG: 25 TABLET, FILM COATED ORAL at 18:23

## 2021-07-11 RX ADMIN — ASPIRIN 81 MG: 81 TABLET ORAL at 11:45

## 2021-07-11 RX ADMIN — SODIUM CHLORIDE 50 ML/HR: 9 INJECTION, SOLUTION INTRAVENOUS at 00:14

## 2021-07-11 RX ADMIN — AMIODARONE HYDROCHLORIDE 200 MG: 200 TABLET ORAL at 14:08

## 2021-07-11 RX ADMIN — REGADENOSON 0.4 MG: 0.08 INJECTION, SOLUTION INTRAVENOUS at 09:27

## 2021-07-11 ASSESSMENT — HEART SCORE
HEART SCORE: 7
TROPONIN: 1-3 TIMES NORMAL LIMIT
AGE: 45-64
HISTORY: HIGHLY SUSPICIOUS
ECG: NON-SPECIFIC REPOLARIZATION DISTURBANCE
RISK FACTORS: >2 RISK FACTORS OR HX OF ATHEROSCLEROTIC DISEASE

## 2021-07-11 ASSESSMENT — ENCOUNTER SYMPTOMS
FLANK PAIN: 0
BACK PAIN: 0
NECK PAIN: 0
DYSURIA: 0
BLOOD IN STOOL: 0
CONFUSION: 0
NAUSEA: 0
FEVER: 0
SORE THROAT: 0
BRUISES/BLEEDS EASILY: 0
CHILLS: 0
EYE PAIN: 0
DIAPHORESIS: 0
FATIGUE: 0
SHORTNESS OF BREATH: 1
ARTHRALGIAS: 0
HEMATURIA: 0
RHINORRHEA: 0
ABDOMINAL PAIN: 0
DIARRHEA: 0
AGITATION: 0
COUGH: 0
HEADACHES: 0
MYALGIAS: 0
WEAKNESS: 0
VOMITING: 0

## 2021-07-11 ASSESSMENT — ACTIVITIES OF DAILY LIVING (ADL): ADEQUATE_TO_COMPLETE_ADL: YES

## 2021-07-11 NOTE — MEDICATION HISTORY SPECIALIST NOTES
"    [facility] [id number]    CSN: [id number]  : [id number]    Information sources:  EPIC  Rx meds in [facility] are \"house meds\" and have been e-prescribed accordingly.       Allergies verified.    Patient interviewed via phone who provided all history. Patient verified medications and provided last doses. [facility] to follow up with [facility] to obtain a medication list. Rx meds in Epic are \"house meds\" and have been e-prescribed accordingly.       Profile was checked for  medications.     Discrepancies:  See yellow notes. [facility] to follow up with [facility] in the morning.    **High alert medications**  Rivoroxaban (Xarelto)        "

## 2021-07-11 NOTE — ED PROVIDER NOTES
"    HPI:  Chief Complaint   Patient presents with   • Chest Pain     PT ARRIVES POV FOR REPORTS OF CP STARTING AROUND 7 PM TONIGHT. PT REPORTS CENTRALIZED CP, DENIES RADIATION, DESCRIBES PAIN AS PRESSURE. PT REPORTS HE HAS A PACEMAKER DUE TO HEART ATTACKS IN THE PAST. PT REPORTS PAIN IS NOT SIMILAR TO PAIN IN THE PAST AND HAS NOT \"FELT RIGHT\" ALL WEEK.    • Dizziness     PT REPORTS DIZZINESS WHEN HE IS LAYING DOWN        HPI  Patient is a 55-year-old gentleman who presents with chest pain.  Patient has a longstanding history of coronary artery disease, CHF, severe ischemic cardiomyopathy with an EF of 13%.  Has even been previously evaluated for heart transplant.  He states tonight he was with his son.  They were looking at a truck.  All of a sudden he began having the sensation that he was \"going to get shocked\".  States he sat back and prepared himself to be defibrillated.  He became ill, diaphoretic, lightheaded.  All of the symptoms that he has had before when he thought he was going to get shocked.  He states however he never got shocked.  He then had increasing centralized chest pain and pressure.  It was heaviness tightness that radiated across the anterior chest.  It was similar to previous heart attacks.  He states all week he just has not felt \"right\".  He felt dizzy.  He got lightheaded.  He did not pass out.  There is no focal weakness numbness slurred speech facial droop or altered mental status.  Symptoms persisted for some time but have since resolved.  Lives here now symptom-free.  No defibrillation.  He denies any recent illness cough or congestion.  Has not had Covid vaccinations.  Has not had Covid.  No leg swelling or calf tenderness.  No history of PE or DVT.  Recently had a nondiagnostic stress test in [place] being evaluated for heart transplant.  He states he was told that he was not a candidate for heart transplant as the medications are \"doing their job\".  Test results according to their " MyChart was nondiagnostic because heart rate target was not achieved.      HISTORY:  Past Medical History:   Diagnosis Date   • CAD (coronary artery disease)    • CHF (congestive heart failure) (CMS/[facility]) ([facility])    • CVA (cerebral vascular accident) (CMS/[facility]) ([facility])    • GERD (gastroesophageal reflux disease)    • Heart attack (CMS/[facility]) ([facility])    • Ischemic cardiomyopathy    • V-tach (CMS/[facility]) ([facility])        Past Surgical History:   Procedure Laterality Date   • ABLATION VT N/A 2020    Procedure: Ablation VT;  Surgeon: Wilfredo Montana MD;  Location: [facility] EP Lab;  Service: Electrophysiology;  Laterality: N/A;  Check with Dr. Montana in the a.m. regarding scheduling   • APPENDECTOMY     • CORONARY ARTERY STENTING      2.5 X 24-mm Taxus DIMAS to first diagonal. 3.0 X 8-mm Taxus stent to proximal LAD just proximal to diagonal.   • CORONARY ARTERY STENTING      3.5 X 15-mm Promus DIMAS to totally occluded LAD   • CORONARY ARTERY STENTING      2.25 X 30-mm Resolute DIMAS to 2nd diagonal.  Balloon angioplasty through strut to improve blood flow to distal LAD   • CORONARY ARTERY STENTING  2017    second diagonal branch LAD Resolute 2.25 x 30-mm DIMAS   • ICD SC NEW      [employer] Cognis Model #N119, Serial #[id number]. Endotak Reliance Model #0185, Serial #[id number]. LV lead Acuity Model #45+91, Serial #[id number]. Atrial lead Model #4469, Serial #[id number]       Family History   Problem Relation Age of Onset   • Heart attack Mother 62   • Other Mother         Abdominal Aortic Aneurysm   • Lung cancer Mother    • Colon cancer Father         Cause of death   • Diabetes Brother         Non-Insulin Dependent   • Heart attack Brother 51        w/ Stents   • Heart disease Brother    • Other Son         Well       Social History     Tobacco Use   • Smoking status: Former Smoker     Packs/day: 0.75     Years: 30.00     Pack years: 22.50     Types: Cigarettes     Quit date: 2020     Years since quittin.0   •  Smokeless tobacco: Never Used   Substance Use Topics   • Alcohol use: Not Currently     Comment: Quit drinking in 2007   • Drug use: Not Currently         ROS:  Review of Systems   Constitutional: Negative for chills, diaphoresis, fatigue and fever.   HENT: Negative for congestion, ear pain, rhinorrhea and sore throat.    Eyes: Negative for pain.   Respiratory: Positive for shortness of breath. Negative for cough.    Cardiovascular: Positive for chest pain. Negative for leg swelling.   Gastrointestinal: Negative for abdominal pain, blood in stool, diarrhea, nausea and vomiting.   Genitourinary: Negative for dysuria, flank pain and hematuria.   Musculoskeletal: Negative for arthralgias, back pain, myalgias and neck pain.   Skin: Negative for rash.   Neurological: Negative for weakness and headaches.   Hematological: Does not bruise/bleed easily.   Psychiatric/Behavioral: Negative for agitation and confusion.   All other systems reviewed and are negative.      PE:  ED Triage Vitals   Temp Heart Rate Resp BP SpO2   07/10/21 2010 07/10/21 2010 07/10/21 2010 07/10/21 2010 07/10/21 2010   36.5 °C (97.7 °F) 60 16 (!) 184/92 97 %      Temp Source Heart Rate Source Patient Position BP Location FiO2 (%)   07/10/21 2010 07/11/21 0134 07/10/21 2045 07/11/21 0134 --   Oral Monitor Head of bed 30 degrees or higher Left arm        Physical Exam  Vitals and nursing note reviewed.   Constitutional:       General: He is not in acute distress.     Appearance: He is well-developed. He is not toxic-appearing.   HENT:      Head: Normocephalic and atraumatic.   Eyes:      Extraocular Movements: Extraocular movements intact.      Conjunctiva/sclera: Conjunctivae normal.      Pupils: Pupils are equal, round, and reactive to light.   Cardiovascular:      Rate and Rhythm: Normal rate and regular rhythm.      Heart sounds: Murmur heard.     Pulmonary:      Effort: Pulmonary effort is normal. No respiratory distress.      Breath sounds: Normal  breath sounds. No stridor. No wheezing.   Abdominal:      General: There is no distension.      Palpations: Abdomen is soft.      Tenderness: There is no abdominal tenderness.   Musculoskeletal:         General: No tenderness or deformity. Normal range of motion.      Cervical back: Normal range of motion and neck supple.      Right lower leg: No edema.      Left lower leg: No edema.   Skin:     General: Skin is warm and dry.      Capillary Refill: Capillary refill takes less than 2 seconds.   Neurological:      General: No focal deficit present.      Mental Status: He is alert and oriented to person, place, and time.   Psychiatric:         Mood and Affect: Mood normal.         Behavior: Behavior normal.         Thought Content: Thought content normal.         Judgment: Judgment normal.         ED LABS:  Labs Reviewed   CBC WITH AUTO DIFFERENTIAL - Abnormal       Result Value    WBC 6.0      RBC 4.70      Hemoglobin 15.0      Hematocrit 43.9      MCV 93.4      MCH 32.0      MCHC 34.2      RDW 14.2      Platelets 202      MPV 8.2      Neutrophils% 41 (*)     Lymphocytes% 41      Monocytes% 13      Eosinophils% 5 (*)     Basophils% 1      ANC (auto diff) 2.50      Lymphocytes Absolute 2.40      Monocytes Absolute 0.70      Eosinophils Absolute 0.30      Basophils Absolute 0.10     PROTIME-INR - Abnormal    Protime 14.1 (*)     INR 1.2 (*)    PTT (ACTIVATED PARTIAL THROMBOPLASTIN TIME) - Abnormal    PTT 38.6 (*)    COMPREHENSIVE METABOLIC PANEL - Abnormal    Sodium 132 (*)     Potassium 4.0      Chloride 102      CO2 22      Anion Gap 8      BUN 13      Creatinine 0.84      Glucose 105      Calcium 9.0      AST 42 (*)     ALT (SGPT) 91 (*)     Alkaline Phosphatase 73      Total Protein 6.6      Albumin 4.1      Total Bilirubin 1.00      eGFR 99      Corrected Calcium 8.9      Narrative:     Estimated GFR calculated using the 2009 CKD-EPI creatinine equation.   HIGH SENSITIVE TROPONIN I - Abnormal    hsTnI 0 Hour 21.9  (*)    URINALYSIS, DIPSTICK ONLY, FOR USE WITH MICROSCOPIC PANEL - Abnormal    Color, Urine Straw      Clarity, Urine Clear      Specific Gravity, Urine 1.003      Leukocytes, Urine Negative      Nitrite, Urine Negative      Protein, Urine Negative      Ketones, Urine Negative      Urobilinogen, Urine <2.0      Bilirubin, Urine Negative      Blood, Urine Small (*)     Glucose, Urine Negative      pH, Urine 6.0     HIGH SENSITIVE TROPONIN I, 1 HOUR - Abnormal    hsTnI 1 hr 25.3 (*)     Delta from 0 Hour 3.4     HIGH SENSITIVE TROPONIN I, 2 HOUR - Abnormal    Delta from 0 Hour 6.3      hsTnI 2 hr 28.2 (*)    LIPASE - Normal    Lipase 12     MAGNESIUM - Normal    Magnesium 1.8     B-TYPE NATRIURETIC PEPTIDE (BNP) - Normal    BNP 51     ETHANOL - Normal    Ethanol Lvl <10      Narrative:     This blood alcohol is for medical use only.   80 mg/dL is legally intoxicated.      URINALYSIS, MICROSCOPIC ONLY - Normal    RBC, Urine Negative      WBC, Urine Negative      Squamous Epithelial, Urine 0-4      Bacteria, Urine None seen     HIGH SENSITIVE TROPONIN I PANEL (0HR, 1HR, 2HR)    Narrative:     The following orders were created for panel order HS Troponin I Panel (0HR, 1HR, 2HR) Blood, Venous.  Procedure                               Abnormality         Status                     ---------                               -----------         ------                     HS Troponin I[[id number]]                 Abnormal            Final result               1HR High Sensitive Trop I[[id number]]     Abnormal            Final result               2HR High Sensitive Tropon...[[id number]]  Abnormal            Final result                 Please view results for these tests on the individual orders.   URINALYSIS WITH MICROSCOPIC    Narrative:     The following orders were created for panel order Urinalysis w/microscopic Urine, Clean Catch.  Procedure                               Abnormality         Status                     ---------                                -----------         ------                     Urinalysis, microscopic U...[[id number]]  Normal              Final result               Urinalysis, dipstick Urin...[[id number]]  Abnormal            Final result                 Please view results for these tests on the individual orders.         ED IMAGES:  XR chest portable 1 view   Final Result   IMPRESSION:   1.  Interval increased cardiomegaly with mild pulmonary edema.          ED PROCEDURES:  ECG 12 lead, Chest Pain    Date/Time: 7/10/2021 9:01 PM  Performed by: Ruthy Montgomery MD  Authorized by: Ruthy Montgomery MD     ECG reviewed by ED Physician in the absence of a cardiologist: yes    Previous ECG:     Previous ECG:  Compared to current    Similarity:  No change  Interpretation:     Interpretation: non-specific    Comments:      Dual paced complexes, rate 68, nonspecific ST-T morphology secondary to pacemaker, this is unchanged from previous dated 8/14/2020, no change in ST morphology from previous, does not meet STEMI criteria.        ED COURSE:       HEART Score: 7    Sepsis Quality Bundle     MDM:  MDM  (Now, symptom-free 55-year-old gentleman with a history of significant coronary artery disease and the story concerning for acute coronary syndrome.  We will proceed with laboratory diagnostics, cardiac evaluation, chest x-ray, interrogation of pacemaker.  It is a [employer].    First troponin is mildly elevated.  Somewhat nondiagnostic.  Patient has had both higher and lower troponins previously.  We will continue to trend.  Given his high risk comorbidities I have requested hospitalist consultation for ongoing inpatient cardiac evaluation.  It appears patient's recent stress test in [place] was nondiagnostic.  He probably needs a Lexiscan as he is not able to reach target heart rate on an exercise stress test      Final diagnoses:   [R07.9] Chest pain with high risk for cardiac etiology           A voice recognition program  was used to aid in documentation of this record.  Sometimes words are not printed exactly as they were spoken.  While efforts were made to carefully edit and correct any inaccuracies, some areas may be present; please take these into context.  Please contact the physician if areas are identified.        Ruthy Montgomery MD  07/11/21 0222

## 2021-07-11 NOTE — H&P
"Chief complaint: Chest Pressure     HPI  Pete Trejo is a 55 y.o. male, with complicated PMHx of CAD S/p multiple LAD & Dx stents, ICM with combined systolic & diastolic CHF w/ EF 25%, V- tach s/p ablation, s/p AICD, hyperlipidemia CVA and GERD presents to ER with chest pressure which started around 7 PM tonight.  Patient was sitting in his car when noted \" chest and head pressure\" similar to the feeling prior to being shocked.  Chest pressure midsternal without radiation.  Severity 4/10 initially and currently resolved.  Reports chronic dyspnea on exertion, unchanged.  Reports generalized weakness over the past week.  No nausea or vomiting.  No fevers or chills.  Denies any exertional component.  Does report dizziness when laying down.  Denies any shocks from his AICD.      At [facility], patient is hypertensive 184/92 afebrile 97.7 °F.  Labs with creatinine of 0.84, magnesium 1.8, potassium 4.0, troponin 21.9 followed by 25.3.  BNP 51.  WBC 6.0, hemoglobin 15 platelets 202.  Patient given aspirin 324 mg p.o. x1.      Past Medical History:   Diagnosis Date   • CAD (coronary artery disease)    • CHF (congestive heart failure) (CMS/[facility]) ([facility])    • CVA (cerebral vascular accident) (CMS/HCC) ([facility]) 2010   • GERD (gastroesophageal reflux disease)    • Heart attack (CMS/[facility]) ([facility])    • Ischemic cardiomyopathy    • V-tach (CMS/[facility]) ([facility])           Past Surgical History:   Procedure Laterality Date   • ABLATION VT N/A 06/09/2020    Procedure: Ablation VT;  Surgeon: Wilfredo Montana MD;  Location: [facility] EP Lab;  Service: Electrophysiology;  Laterality: N/A;  Check with Dr. Montana in the a.m. regarding scheduling   • APPENDECTOMY     • CORONARY ARTERY STENTING  2009    2.5 X 24-mm Taxus DIMAS to first diagonal. 3.0 X 8-mm Taxus stent to proximal LAD just proximal to diagonal.   • CORONARY ARTERY STENTING  2010    3.5 X 15-mm Promus DIMAS to totally occluded LAD   • CORONARY ARTERY STENTING  2011    2.25 X 30-mm " Resolute DIMAS to 2nd diagonal.  Balloon angioplasty through strut to improve blood flow to distal LAD   • CORONARY ARTERY STENTING  07/28/2017    second diagonal branch LAD Resolute 2.25 x 30-mm DIMAS   • ICD SC NEW  2011    [employer] Cognis Model #N119, Serial #[id number]. [employer] Reliance Model #0185, Serial #[id number]. LV lead Acuity Model #45+91, Serial #[id number]. Atrial lead Model #4469, Serial #[id number]         Social History:  Pete Trejo  reports that he quit smoking about 13 months ago. His smoking use included cigarettes. He has a 22.50 pack-year smoking history. He has never used smokeless tobacco. He reports previous alcohol use. He reports previous drug use.    Family History:  family history includes Colon cancer in his father; Diabetes in his brother; Heart attack (age of onset: 51) in his brother; Heart attack (age of onset: 62) in his mother; Heart disease in his brother; Lung cancer in his mother; Other in his mother and son.      Allergies:  Allergies   Allergen Reactions   • Morphine      ANXIETY   • Tramadol      ANXIETY       Medications:   Prior to Admission medications    Medication Sig Start Date End Date Taking? Authorizing Provider   mexiletine (MEXITIL) 200 mg capsule Take 1 capsule (200 mg total) by mouth 2 (two) times a day 6/24/21 6/24/22  Wilfredo Montana MD   amiodarone (PACERONE) 200 mg tablet Take 1 tablet (200 mg total) by mouth 2 (two) times a day with meals 1/19/21 1/19/22  Nir Edwards MD   spironolactone (ALDACTONE) 25 mg tablet Take 1 tablet (25 mg total) by mouth 2 (two) times a day 10/23/20   Nir Edwards MD   furosemide (LASIX) 20 mg tablet Take 2 tablets (40 mg total) by mouth daily 10/23/20 10/23/21  Nir Edwards MD   hydrocortisone 1 % ointment Apply 1 application topically 2 (two) times a day as needed    Historical Provider, MD   lisinopriL (PRINIVIL,ZESTRIL) 5 mg tablet Take 5 mg by mouth 2 (two) times a day      Historical Provider, MD   rosuvastatin (CRESTOR) 40 mg  tablet Take 40 mg by mouth nightly      Historical Provider, MD   nitroglycerin (NITROSTAT) 0.4 mg SL tablet Place 0.4 mg under the tongue every 5 (five) minutes as needed for chest pain.    Historical Provider, MD   pantoprazole (PROTONIX) 40 mg EC tablet Take 40 mg by mouth daily.    Historical Provider, MD   carvedilol (COREG) 25 mg tablet Take 25 mg by mouth 2 (two) times a day with meals.    Historical Provider, MD   prasugrel (EFFIENT) 10 mg tablet Take 1 tablet (10 mg total) by mouth daily. 8/2/18   YONG Alves   rivaroxaban (XARELTO) 20 mg tablet Take 1 tablet (20 mg total) by mouth daily. 8/2/18   YONG Alves       Review of Systems:  10 Point Review of System was discussed with patient & family, Essential negative except for Pertinent Positive reported in HPI.     Objective     Vital signs:    Temp:  [36.5 °C (97.7 °F)] 36.5 °C (97.7 °F)  Heart Rate:  [60] 60  Resp:  [16-18] 18  BP: (138-184)/(79-96) 138/81      Exam:  General appearance: alert, appears older than stated age, cooperative, fatigued, no distress and moderately obese  Neck: no JVD  Lungs: diminished breath sounds  Heart: S1, S2 normal  Abdomen: soft, non-tender; bowel sounds normal; no masses, no organomegaly  Extremities: extremities normal, warm and well-perfused; no cyanosis, clubbing, or edema  Skin: Skin color, texture, turgor normal. No rashes or lesions  Neurologic: Mental status: Alert, oriented, thought content appropriate    EKG: AV paced at 60 bpm.  QTc 478.  Nonspecific ST changes.     Labs:  A1c: No results found for: HGBA1C  CBC with Platelet:    Lab Results   Component Value Date    WBC 6.0 07/10/2021    HGB 15.0 07/10/2021    HCT 43.9 07/10/2021     07/10/2021    RBC 4.70 07/10/2021    MCV 93.4 07/10/2021    MCH 32.0 07/10/2021    MCHC 34.2 07/10/2021    RDW 14.2 07/10/2021    MPV 8.2 07/10/2021     Comp:   Lab Results   Component Value Date     (L) 07/10/2021    K 4.0 07/10/2021     07/10/2021     CO2 22 07/10/2021    BUN 13 07/10/2021    CREATININE 0.84 07/10/2021    GLUCOSE 105 07/10/2021    CALCIUM 9.0 07/10/2021    PROT 6.6 07/10/2021    ALBUMIN 4.1 07/10/2021    AST 42 (H) 07/10/2021    ALT 91 (H) 07/10/2021    ALKPHOS 73 07/10/2021    BILITOT 1.00 07/10/2021     Magnesium:   Lab Results   Component Value Date    MG 1.8 07/10/2021     Protime-INR:   Lab Results   Component Value Date    PT 14.1 (H) 07/10/2021    INR 1.2 (H) 07/10/2021     TSH:   Lab Results   Component Value Date    TSH 2.207 02/09/2021     U/A Macroscopic:    Lab Results   Component Value Date    COLORU Straw 07/10/2021    SPECGRAVU 1.003 07/10/2021    MELLY 6.0 07/10/2021    LEUKOCYTESU Negative 07/10/2021    NITRITEU Negative 07/10/2021    PROTUR Negative 07/10/2021    GLUCOSEU Negative 07/10/2021    KETONESU Negative 07/10/2021    UROBILINOGEN <2.0 07/10/2021    BLOODU Small (A) 07/10/2021     High sensitive troponin: 21.9 > 25.3  BNP 51      Imaging:  XR chest portable 1 view    Result Date: 7/10/2021  Exam: Portable Chest AP 07/10/2021 Clinical History:  Shortness of breath Comparison(s): Chest radiograph 8/12/2020 Findings: Since 8/12/2020 interval increased size of the enlarged cardiac silhouette. Left chest wall ICD. Mild interstitial prominence, likely representing mild pulmonary edema. No focal pulmonary consolidations.     IMPRESSION: 1.  Interval increased cardiomegaly with mild pulmonary edema.        Assessment/Plan      Principal Problem:    Unstable angina (CMS/HCC) ([facility])  Active Problems:    CAD (coronary artery disease)    S/P ablation of ventricular arrhythmia    Chronic combined systolic and diastolic CHF, NYHA class 2 and ACC/AHA stage C (CMS/HCC) ([facility])    Automatic implantable cardioverter-defibrillator in situ       55-year-old male with complicated PMHx of CAD S/p multiple LAD & Dx stents, ICM with combined systolic & diastolic CHF w/ EF 25%, V- tach s/p ablation, s/p AICD, hyperlipidemia CVA and GERD being  admitted with suspected unstable angina.    # Unstable Angina  # CAD S/p multiple LAD & Dx stents  # Chronic combined Systolic & Diastolic CHF with EF of 25%  # VT S/p Ablation   # S/p AICD    -Patient currently asymptomatic and mildly elevated troponin.  -Unstable angina protocol order set utilized.  -Restarted home regimen of Aspirin, Effient, and Xarelto as anticoagulation.  -Resume home regimen of Crestor, lisinopril and Coreg.  -Resume home regimen of mexiletine and amiodarone for VT.  -AICD interrogation done in the ER, summary not available  -Lexiscan stress test done at [place] on May 2021 was nondiagnostic, due to patient achieving 85% heart rate.  -Monitor on telemetry with serial troponins.  -Discussed with patient, he is in agreement with repeating Lexiscan in a.m.  -May need to consider cardiology consultation if Lexiscan abnormal.  -Cardiac transplant evaluation was done at [place], currently not being pursued actively.      DVT Prophylaxis: pharmacologic prophylaxis (with any of the following: Xarelto)      Catheters & Lines: Peripheral    Wounds: None reported    Code Status: Full Code    Expected Length of Stay: Hospitalized 1-2 midnights    Discussed with Provider: Dr. Montgomery in ER      Time Spent:   Total 70 Min, of which more than 50% on care coordination and counseling with patient and wife Suzan at bedside.  Discussed with the ER physician along with any necessary consultants.  Case will be discussed with daytime hospitalist in the morning for assumption of care.      AARON SHARPE MD      Please note:  Dragon Voice recognition program was used to aid in documentation of this record.  Sometimes words are not printed exactly as they were spoken.  While efforts were made to carefully edit and correct any inaccuracies, some areas may be present; please take these into context.  Please contact the provider if areas are identified.

## 2021-07-11 NOTE — PROGRESS NOTES
[facility] Hospitalist Services  [address] [place] [address]    HOSPITALIST PROGRESS NOTE    Date of Service: 7/11/2021      Patient name: Pete Trejo  MRN: [id number]   LOS: 0 days     Subjective   Chief complaint:     Not having chest pain at this time .  No shortness of breath     No fever no cough .     Occasionally having sensation of light-headedness    Review of systems  10 point review of systems performed and negative other than stated above    Objective     Temp:  [36.5 °C (97.7 °F)-36.6 °C (97.9 °F)] 36.5 °C (97.7 °F)  Heart Rate:  [60-61] 61  Resp:  [10-20] 20  BP: (135-184)/(75-96) 135/76    I/O last 3 completed shifts:  In: 107.4 [I.V.:107.4]  Out: 625 [Urine:625]    No intake/output data recorded.    Physical Exam  General: Awake, alert, appears comfortable, NAD. Cooperative, pleasant.  Neuro: No motor or sensory deficits observed. Cranial nerves intact. O x 3  Psychiatric: No hallucinations or delusions.   ENT: MMM, EOMI, PERRLA  CVS: normal S1, S2. No murmur, rubs, gallops.  Intact and equal pulses.  Resp: normal lung sounds bilaterally, equal air entry, no respiratory distress  Abdomen: soft, non-tender, non-distended. +BS. No rebound or guarding  Extremities: well perfused.  Radial pulses intact. No cyanosis, clubbing or edema    Medications:     Current Facility-Administered Medications:   •  Insert peripheral IV, , , Once **AND** Maintain IV access, , , Until discontinued **AND** Saline lock IV, , , Once **AND** sodium chloride flush 3 mL, 3 mL, intravenous, PRN, Joni Grant MD  •  nitroglycerin (NITROSTAT) SL tablet 0.4 mg, 0.4 mg, sublingual, q5 min PRN, Joni Grant MD  •  acetaminophen (TYLENOL) tablet 650 mg, 650 mg, oral, q4h PRN, Joni Grant MD  •  magnesium sulfate 2 g in SWFI 50 mL IVPB (premix), 2 g, intravenous, Once PRN, Joni Grant MD  •  aspirin EC tablet 81 mg, 81 mg, oral, Daily, Joni Grant MD  •  bisacodyL (DULCOLAX) suppository 10  mg, 10 mg, rectal, Daily PRN, Joni Grant MD  •  ondansetron (ZOFRAN) injection 4 mg, 4 mg, intravenous, q6h PRN, Joni Grant MD  •  antacid/lidocaine 2% viscous (GI COCKTAIL KIT) 45 mL suspension, 45 mL, oral, 3x daily PRN, Joni Grant MD  •  amiodarone (PACERONE) tablet 200 mg, 200 mg, oral, 2x daily with meals, Joni Grant MD  •  carvediloL (COREG) tablet 25 mg, 25 mg, oral, 2x daily with meals, Joni Grant MD  •  furosemide (LASIX) tablet 40 mg, 40 mg, oral, Daily, Joni Grant MD  •  lisinopriL (PRINIVIL,ZESTRIL) tablet 5 mg, 5 mg, oral, 2x daily, Joni Grant MD  •  mexiletine (MEXITIL) capsule 200 mg, 200 mg, oral, 2x daily, Joni Grant MD  •  lansoprazole (PREVACID) capsule 30 mg, 30 mg, oral, Daily at 1600, Jnoi Grant MD  •  prasugreL (EFFIENT) tablet 10 mg, 10 mg, oral, Daily, Joni Grant MD  •  rivaroxaban (XARELTO) tablet 20 mg, 20 mg, oral, Daily with dinner, Joni Grant MD, 20 mg at 07/11/21 0112  •  rosuvastatin (CRESTOR) tablet 40 mg, 40 mg, oral, Nightly, Joni Grant MD  •  spironolactone (ALDACTONE) tablet 25 mg, 25 mg, oral, 2x daily, Joni Grant MD  •  hydrALAZINE (APRESOLINE) injection 10 mg, 10 mg, intravenous, q4h PRN, Joni Grant MD  •  sodium chloride 0.9 % infusion, 50 mL/hr, intravenous, Continuous, Joni Grant MD, Last Rate: 50 mL/hr at 07/11/21 0218, 50 mL/hr at 07/11/21 0218    Lab Results   Component Value Date    WBC 6.0 07/10/2021    HGB 15.0 07/10/2021    HCT 43.9 07/10/2021    MCV 93.4 07/10/2021     07/10/2021       Lab Results   Component Value Date    GLUCOSE 105 07/11/2021    CALCIUM 9.1 07/11/2021     07/11/2021    K 4.1 07/11/2021    CO2 23 07/11/2021     07/11/2021    BUN 11 07/11/2021    CREATININE 0.78 07/11/2021    ANIONGAP 10 07/11/2021         MDM    Chest x ray with mild pulmonary edema    Assessment/Plan   Assessment:  Principal Problem:    Unstable angina (CMS/HCC) (HCC)  Active Problems:     CAD (coronary artery disease)    S/P ablation of ventricular arrhythmia    Automatic implantable cardioverter-defibrillator in situ    Chronic combined systolic and diastolic CHF, NYHA class 2 and ACC/AHA stage C (CMS/HCC) ([facility])    Plan:    Ventricular tachycardia -     Numerous episodes over the past year of V Tach resolving by anti tachycardia from his AICD.    He was hospitalized now after episode of V Tach which resolved by anti tachycardia pacing from his AICD    Today he had another episode with rates in the 120's.    He is describing symptoms similar to past episodes with AICD firing    He is already taking multiple medications for his CAD and V Tach.  Increase frequency of Amiodarone from twice to three times a day    Dr. Escalona planning on repeating ablation in the near future     History of CAD -   Several cardiac catheterization in the past for placement of stents to LAD with last cardiac cath on 7/20217 placing stent to second diagonal branch of LAD     Restarted home regimen of Aspirin, Effient, and Xarelto as anticoagulation.  Resume home regimen of Crestor, lisinopril and Coreg.  Resume home regimen of mexiletine and amiodarone for VT.    Nuclear stress test with finding of large size, severe intensity fixed perfusion defect in apex, apical inferior, apical anterior to mid anterior, apical septum and apical lateral.  Nuclear scan this morning awaiting results    No cardiac catheterization at this time      Additional Cares:  Lines:  Etienne:  Telemetry:  DVT prophylaxis:  CODE STATUS:  Isolation:  Disposition:    Care plan discussed with patient and nurse. All questions answered. Please see orders.     Time spent: 35 minutes with > 50% spent counseling and coordinating care and discussing plan regarding     Electronically signed by:   Barry Tobias MD  7/11/2021      If any questions please call

## 2021-07-11 NOTE — PLAN OF CARE
Problem: Pain - Adult  Goal: Verbalizes/displays adequate comfort level or baseline comfort level  Description: INTERVENTIONS:  1. Encourage patient to monitor pain and request interventions  2. Assess pain using the appropriate pain scale  3. Administer analgesics based on type and severity of pain and evaluate response  4. Educate/Implement non-pharmacological measures as appropriate and evaluate response  5. Consider cultural, developmental and social influences on pain and pain management  6. Notify Provider if interventions unsuccessful or patient reports new pain  Outcome: Progressing     Problem: Infection - Adult  Goal: Absence of infection during hospitalization  Description: INTERVENTIONS:  1. Assess and monitor for signs and symptoms of infection  2. Monitor lab/diagnostic results  3. Monitor all insertion sites/wounds/incisions  4. Monitor secretions for changes in amount and color  5. Administer medications as ordered  6. Educate and encourage patient and family to use good hand hygiene technique  7. Identify and educate in appropriate isolation precautions for identified infection/condition  Outcome: Progressing     Problem: Safety Adult  Goal: Patient will remain safe during hospitalization  Description: INTERVENTIONS    1. Assess patient for fall risk and implement interventions if needed  2. Use safe transport techniques  3. Assess patient using the appropriate Jean Marie skin assessment scale  4. Assess patient for risk of aspiration  5. Assess patient for risk of elopement  6. Assess patient for risk of suicide  Outcome: Progressing     Problem: Daily Care  Goal: Daily care needs are met  Description: INTERVENTIONS:   1. Assess and monitor skin integrity  2. Identify patients at risk for skin breakdown on admission and per policy  3. Assess and monitor ability to perform self care and identify potential discharge needs  4. Assess skin integrity/risk for skin breakdown  5. Assist patient with  activities of daily living as needed  6. Encourage independent activity per ability   7. Provide mouth care   8. Include patient/family/caregiver in decisions related to daily care   Outcome: Progressing     Problem: Knowledge Deficit  Goal: Patient/family/caregiver demonstrates understanding of disease process, treatment plan, medications, and discharge instructions  Description: INTERVENTIONS:   1. Complete learning assessment and assess knowledge base  2. Provide teaching at level of understanding   3. Provide teaching via preferred learning methods  Outcome: Progressing     Problem: Discharge Barriers  Goal: Patient's discharge needs are met  Description: INTERVENTIONS:  1. Assess patient for self-management skills  2. Encourage participation in management  3. Identify potential discharge barriers on admission and throughout hospital stay  4. Involve patient/family/caregiver in discharge planning process  5. Collaborate with case management/ for discharge needs  Outcome: Progressing     Problem: Cardiovascular - Adult  Goal: Maintains optimal cardiac output and hemodynamic stability  Description: INTERVENTIONS:  1. Monitor vital signs and rhythm  2. Monitor for hypotension and other signs of decreased cardiac output  3. Administer and titrate ordered vasoactive medications to optimize hemodynamic stability  4. Monitor for fluid overload/dehydration, weight gain, shortness of breath and activity intolerance  5. Monitor arterial and/or venous puncture sites for bleeding and/or hematoma  6. Assess quality of pulses, capillary refill, edema, sensation, skin color and temperature  7. Assess for signs of decreased coronary artery perfusion - ex. angina  Outcome: Progressing  Goal: Absence of cardiac dysrhythmias or at baseline  Description: INTERVENTIONS:  1. Continuous cardiac monitoring, monitor vital signs, obtain 12 lead EKG if indicated  2. Administer antiarrhythmic and heart rate control  medications as ordered  3. Initiate emergency measures for life threatening arrhythmias  4. Monitor electrolytes and administer replacement therapy as ordered  Outcome: Progressing     Problem: Respiratory - Adult  Goal: Achieves optimal ventilation and oxygenation  Description: INTERVENTIONS:  1. Assess for changes in respiratory status  2. Assess for changes in mentation and behavior  3. Position to facilitate oxygenation and minimize respiratory effort  4. Oxygen supplementation based on oxygen saturation or ABGs  5. Assess patient's ability to cough effectively  6. Encourage broncho-pulmonary hygiene including cough, deep breathe  7. Assess the need for suctioning   8. Assess and instruct to report SOB or any respiratory difficulty  9. Respiratory Therapy support as indicated, including medications and treatment.  Outcome: Progressing  Goal: Achieves optimal ventilation and oxygenation with noninvasive CPAP/BiLEVEL support  Description: INTERVENTIONS:  1. Provide education to patient/family about rationale and expected outcomes associated with therapy  2. Position patient to facilitate optimal ventilation/oxygenation status and minimize respiratory effort  3. Position patient to reduce aspiration risk, elevate head of bed at least 35 degrees or higher, as applicable  4. Assess effectiveness of therapy on ventilation/oxygenation status based on oxygen saturation and/or arterial blood gases, as indicated  5. Assess patient for changes in respiratory and physiological status  6. Auscultate breath sounds and assess chest excursion, as indicated  7. Assess patient for changes in mentation and behavior  8. Routinely monitor equipment for proper performance and settings  9. Assess and monitor skin condition, in relationship to the respiratory interface  10. Assure equipment alarm volume is adequate for the patient's environment  11. Immediately respond to equipment alarm, to assess patient and/or cause for alarm  12.  Follow universal infection control/hospital policy(ies)/standards  Outcome: Progressing     Problem: Metabolic/Fluid and Electrolytes - Adult  Goal: Electrolytes maintained within normal limits  Description: INTERVENTIONS:  1. Monitor labs and assess patient for signs and symptoms of electrolyte imbalances  2. Administer electrolyte replacement as ordered  3. Monitor response to electrolyte replacements, including repeat lab results as appropriate  4. Fluid restriction as ordered  5. Instruct patient on fluid and nutrition restrictions as appropriate  Outcome: Progressing  Goal: Maintain Optimal Renal Function and Hemodynamic Stability  Description: INTERVENTIONS:  1. Monitor labs and assess for signs and symptoms of volume excess or deficit  2. Monitor intake, output and patient weight  3. Monitor urine specific gravity, serum osmolarity and serum sodium as indicated or ordered  4. Monitor response to interventions for patient's volume status, including labs, urine output, blood pressure (other measures as available)  5. Encourage oral intake as appropriate  6. Instruct patient on fluid and nutrition restrictions as appropriate  Outcome: Progressing  Goal: Glucose maintained within prescribed range  Description: INTERVENTIONS:  1. Monitor blood glucose as ordered  2. Assess for signs and symptoms of hyperglycemia and hypoglycemia  3. Administer ordered medications to maintain glucose within target range  4. Assess barriers to adequate nutritional intake and initiate nutrition consult as needed  5. Assess baseline knowledge and provide education as indicated  6. Monitor exercise as may reduce the requirements for insulin  Outcome: Progressing     Problem: Hematologic - Adult  Goal: Maintains hematologic stability  Description: INTERVENTIONS  1. Assess for signs and symptoms of bleeding or hemorrhage  2. Monitor labs  3. Administer supportive blood products/factors as ordered and appropriate  4. Administer  medications as ordered  5. Initiate bleeding precautions as indicated  6. Educate patient/family to report signs/symptoms of bleeding  Outcome: Progressing

## 2021-07-12 PROBLEM — R07.9 CHEST PAIN WITH HIGH RISK FOR CARDIAC ETIOLOGY: Status: ACTIVE | Noted: 2021-07-12

## 2021-07-12 LAB
ANION GAP SERPL CALC-SCNC: 9 MMOL/L (ref 3–11)
BASOPHILS # BLD AUTO: 0 10*3/UL
BASOPHILS NFR BLD AUTO: 1 % (ref 0–2)
BNP SERPL-MCNC: 50 PG/ML (ref 0–100)
BUN SERPL-MCNC: 14 MG/DL (ref 7–25)
CALCIUM SERPL-MCNC: 9.1 MG/DL (ref 8.6–10.3)
CHLORIDE SERPL-SCNC: 102 MMOL/L (ref 98–107)
CO2 SERPL-SCNC: 24 MMOL/L (ref 21–32)
CREAT SERPL-MCNC: 0.9 MG/DL (ref 0.7–1.3)
EOSINOPHIL # BLD AUTO: 0.2 10*3/UL
EOSINOPHIL NFR BLD AUTO: 4 % (ref 0–3)
ERYTHROCYTE [DISTWIDTH] IN BLOOD BY AUTOMATED COUNT: 14.3 % (ref 11.5–15)
GFR SERPL CREATININE-BSD FRML MDRD: 96 ML/MIN/1.73M*2
GLUCOSE SERPL-MCNC: 112 MG/DL (ref 70–105)
HCT VFR BLD AUTO: 42.8 % (ref 38–50)
HGB BLD-MCNC: 14.6 G/DL (ref 13.2–17.2)
LYMPHOCYTES # BLD AUTO: 1.8 10*3/UL
LYMPHOCYTES NFR BLD AUTO: 35 % (ref 15–47)
MAGNESIUM SERPL-MCNC: 2 MG/DL (ref 1.8–2.4)
MCH RBC QN AUTO: 31.8 PG (ref 29–34)
MCHC RBC AUTO-ENTMCNC: 34.1 G/DL (ref 32–36)
MCV RBC AUTO: 93.3 FL (ref 82–97)
MONOCYTES # BLD AUTO: 0.5 10*3/UL
MONOCYTES NFR BLD AUTO: 10 % (ref 5–13)
NEUTROPHILS # BLD AUTO: 2.5 10*3/UL
NEUTROPHILS NFR BLD AUTO: 50 % (ref 46–70)
PHOSPHATE SERPL-MCNC: 3.3 MG/DL (ref 2.5–4.9)
PLATELET # BLD AUTO: 191 10*3/UL (ref 130–350)
PMV BLD AUTO: 8.1 FL (ref 6.9–10.8)
POTASSIUM SERPL-SCNC: 4 MMOL/L (ref 3.5–5.1)
RBC # BLD AUTO: 4.59 10*6/ΜL (ref 4.1–5.8)
SODIUM SERPL-SCNC: 135 MMOL/L (ref 135–145)
WBC # BLD AUTO: 5.1 10*3/UL (ref 3.7–9.6)

## 2021-07-12 PROCEDURE — 6370000100 HC RX 637 (ALT 250 FOR IP): Performed by: INTERNAL MEDICINE

## 2021-07-12 PROCEDURE — 83880 ASSAY OF NATRIURETIC PEPTIDE: CPT | Performed by: NURSE PRACTITIONER

## 2021-07-12 PROCEDURE — 80048 BASIC METABOLIC PNL TOTAL CA: CPT | Performed by: HOSPITALIST

## 2021-07-12 PROCEDURE — 99232 SBSQ HOSP IP/OBS MODERATE 35: CPT | Performed by: HOSPITALIST

## 2021-07-12 PROCEDURE — 85025 COMPLETE CBC W/AUTO DIFF WBC: CPT | Performed by: HOSPITALIST

## 2021-07-12 PROCEDURE — 6370000100 HC RX 637 (ALT 250 FOR IP): Performed by: HOSPITALIST

## 2021-07-12 PROCEDURE — 36415 COLL VENOUS BLD VENIPUNCTURE: CPT | Performed by: HOSPITALIST

## 2021-07-12 PROCEDURE — 80299 QUANTITATIVE ASSAY DRUG: CPT | Performed by: NURSE PRACTITIONER

## 2021-07-12 PROCEDURE — 6370000100 HC RX 637 (ALT 250 FOR IP): Performed by: NURSE PRACTITIONER

## 2021-07-12 PROCEDURE — 83735 ASSAY OF MAGNESIUM: CPT | Performed by: HOSPITALIST

## 2021-07-12 PROCEDURE — 80151 DRUG ASSAY AMIODARONE: CPT | Performed by: NURSE PRACTITIONER

## 2021-07-12 PROCEDURE — 1110000100 HC ROOM PRIVATE W TELE

## 2021-07-12 PROCEDURE — 84100 ASSAY OF PHOSPHORUS: CPT | Performed by: HOSPITALIST

## 2021-07-12 RX ORDER — FUROSEMIDE 40 MG/1
40 TABLET ORAL
Status: DISCONTINUED | OUTPATIENT
Start: 2021-07-12 | End: 2021-07-13 | Stop reason: HOSPADM

## 2021-07-12 RX ORDER — FUROSEMIDE 40 MG/1
40 TABLET ORAL DAILY
COMMUNITY
End: 2021-07-13 | Stop reason: HOSPADM

## 2021-07-12 RX ORDER — SACUBITRIL AND VALSARTAN 24; 26 MG/1; MG/1
1 TABLET, FILM COATED ORAL 2 TIMES DAILY
Status: CANCELLED | OUTPATIENT
Start: 2021-07-13

## 2021-07-12 RX ORDER — SACUBITRIL AND VALSARTAN 24; 26 MG/1; MG/1
1 TABLET, FILM COATED ORAL 2 TIMES DAILY
Status: DISCONTINUED | OUTPATIENT
Start: 2021-07-13 | End: 2021-07-13 | Stop reason: HOSPADM

## 2021-07-12 RX ADMIN — SPIRONOLACTONE 25 MG: 25 TABLET ORAL at 20:11

## 2021-07-12 RX ADMIN — LANSOPRAZOLE 30 MG: 30 CAPSULE, DELAYED RELEASE ORAL at 17:13

## 2021-07-12 RX ADMIN — FUROSEMIDE 40 MG: 40 TABLET ORAL at 17:12

## 2021-07-12 RX ADMIN — CARVEDILOL 25 MG: 25 TABLET, FILM COATED ORAL at 09:53

## 2021-07-12 RX ADMIN — MEXILETINE HYDROCHLORIDE 200 MG: 200 CAPSULE ORAL at 20:11

## 2021-07-12 RX ADMIN — AMIODARONE HYDROCHLORIDE 200 MG: 200 TABLET ORAL at 17:12

## 2021-07-12 RX ADMIN — ASPIRIN 81 MG: 81 TABLET ORAL at 09:53

## 2021-07-12 RX ADMIN — PRASUGREL 10 MG: 10 TABLET, FILM COATED ORAL at 09:53

## 2021-07-12 RX ADMIN — CARVEDILOL 25 MG: 25 TABLET, FILM COATED ORAL at 17:12

## 2021-07-12 RX ADMIN — ROSUVASTATIN CALCIUM 40 MG: 20 TABLET, FILM COATED ORAL at 20:11

## 2021-07-12 RX ADMIN — AMIODARONE HYDROCHLORIDE 200 MG: 200 TABLET ORAL at 13:35

## 2021-07-12 RX ADMIN — MEXILETINE HYDROCHLORIDE 200 MG: 200 CAPSULE ORAL at 11:09

## 2021-07-12 RX ADMIN — LISINOPRIL 5 MG: 5 TABLET ORAL at 09:53

## 2021-07-12 RX ADMIN — FUROSEMIDE 40 MG: 40 TABLET ORAL at 09:53

## 2021-07-12 RX ADMIN — RIVAROXABAN 20 MG: 20 TABLET, FILM COATED ORAL at 17:12

## 2021-07-12 RX ADMIN — AMIODARONE HYDROCHLORIDE 200 MG: 200 TABLET ORAL at 09:53

## 2021-07-12 RX ADMIN — SPIRONOLACTONE 25 MG: 25 TABLET ORAL at 09:53

## 2021-07-12 NOTE — PROGRESS NOTES
[facility] Hospitalist Services  [address] [place] [address]    HOSPITALIST PROGRESS NOTE    Date of Service: 7/12/2021      Patient name: Pete Trejo  MRN: [id number]   LOS: 0 days     Subjective   Chief complaint:     No chest pain , no shortness of breath overnight    Review of systems  10 point review of systems performed and negative other than stated above    Objective     Temp:  [36.5 °C (97.7 °F)-36.9 °C (98.4 °F)] 36.9 °C (98.4 °F)  Heart Rate:  [60-65] 65  Resp:  [17-21] 19  BP: (112-130)/(60-70) 112/61    I/O last 3 completed shifts:  In: 987.8 [P.O.:600; I.V.:387.8]  Out: 2175 [Urine:2175]    I/O this shift:  In: 480 [P.O.:480]  Out: -     Physical Exam  General: Awake, alert, appears comfortable, NAD. Cooperative, pleasant.  Neuro: No motor or sensory deficits observed. Cranial nerves intact. O x 3  Psychiatric: No hallucinations or delusions.   ENT: MMM, EOMI, PERRLA  CVS: normal S1, S2. No murmur, rubs, gallops.  Intact and equal pulses.  Resp: normal lung sounds bilaterally, equal air entry, no respiratory distress  Abdomen:moderate breath sounds  Extremities: well perfused.  Radial pulses intact. No cyanosis, clubbing or edema    Medications:     Current Facility-Administered Medications:   •  furosemide (LASIX) tablet 40 mg, 40 mg, oral, 2x daily diuretic, Merced Newton CNP  •  [START ON 7/13/2021] sacubitriL-valsartan (ENTRESTO) 24-26 mg per tablet 1 tablet, 1 tablet, oral, 2x daily, Merced ProveBRAD hyman  •  Insert peripheral IV, , , Once **AND** Maintain IV access, , , Until discontinued **AND** Saline lock IV, , , Once **AND** sodium chloride flush 3 mL, 3 mL, intravenous, PRN, Joni Grant MD  •  nitroglycerin (NITROSTAT) SL tablet 0.4 mg, 0.4 mg, sublingual, q5 min PRN, Joni Grant MD  •  acetaminophen (TYLENOL) tablet 650 mg, 650 mg, oral, q4h PRN, Joni Grant MD  •  magnesium sulfate 2 g in SWFI 50 mL IVPB (premix), 2 g, intravenous, Once PRN, Joni  MD Juanita  •  aspirin EC tablet 81 mg, 81 mg, oral, Daily, Joni Grant MD, 81 mg at 07/12/21 0953  •  bisacodyL (DULCOLAX) suppository 10 mg, 10 mg, rectal, Daily PRN, Joni Grant MD  •  ondansetron (ZOFRAN) injection 4 mg, 4 mg, intravenous, q6h PRN, Joni Grant MD  •  antacid/lidocaine 2% viscous (GI COCKTAIL KIT) 45 mL suspension, 45 mL, oral, 3x daily PRN, Joni Grant MD  •  carvediloL (COREG) tablet 25 mg, 25 mg, oral, 2x daily with meals, Joni Grant MD, 25 mg at 07/12/21 0953  •  mexiletine (MEXITIL) capsule 200 mg, 200 mg, oral, 2x daily, Joni Grant MD, 200 mg at 07/12/21 1109  •  lansoprazole (PREVACID) capsule 30 mg, 30 mg, oral, Daily at 1600, Joni Grant MD  •  prasugreL (EFFIENT) tablet 10 mg, 10 mg, oral, Daily, Joni Grant MD, 10 mg at 07/12/21 0953  •  rivaroxaban (XARELTO) tablet 20 mg, 20 mg, oral, Daily with dinner, Joni Grant MD, 20 mg at 07/11/21 1824  •  rosuvastatin (CRESTOR) tablet 40 mg, 40 mg, oral, Nightly, Joni Grant MD, 40 mg at 07/11/21 2100  •  spironolactone (ALDACTONE) tablet 25 mg, 25 mg, oral, 2x daily, Joni Grant MD, 25 mg at 07/12/21 0953  •  hydrALAZINE (APRESOLINE) injection 10 mg, 10 mg, intravenous, q4h PRN, Joni Grant MD  •  amiodarone (PACERONE) tablet 200 mg, 200 mg, oral, 3x daily with meals, Barry Tobias MD, 200 mg at 07/12/21 1335    Lab Results   Component Value Date    WBC 5.1 07/12/2021    HGB 14.6 07/12/2021    HCT 42.8 07/12/2021    MCV 93.3 07/12/2021     07/12/2021       Lab Results   Component Value Date    GLUCOSE 112 (H) 07/12/2021    CALCIUM 9.1 07/12/2021     07/12/2021    K 4.0 07/12/2021    CO2 24 07/12/2021     07/12/2021    BUN 14 07/12/2021    CREATININE 0.90 07/12/2021    ANIONGAP 9 07/12/2021         MDM        Assessment/Plan   Assessment:  Principal Problem:    Unstable angina (CMS/HCC) (HCC)  Active Problems:    CAD (coronary artery disease)    S/P ablation of ventricular  arrhythmia    Automatic implantable cardioverter-defibrillator in situ    Chronic combined systolic and diastolic CHF, NYHA class 2 and ACC/AHA stage C (CMS/HCC) (HCC)    Chest pain with high risk for cardiac etiology        Plan:    Ventricular tachycardia -      Numerous episodes over the past year of V Tach resolving by anti tachycardia from his AICD.     He was hospitalized now after episode of V Tach which resolved by anti tachycardia pacing from his AICD     Today he had another episode with rates in the 120's.    He is describing symptoms similar to past episodes with AICD firing     He is already taking multiple medications for his CAD and V Tach.  Increase frequency of Amiodarone from twice to three times a day     Dr. Escalona planning on repeating ablation in the near future      History of CAD -   Several cardiac catheterization in the past for placement of stents to LAD with last cardiac cath on 7/20217 placing stent to second diagonal branch of LAD     Nuclear stress test with finding of large size, severe intensity fixed perfusion defect in apex, apical inferior, apical anterior to mid anterior, apical septum and apical lateral.  Based on results of stress test cardiac catheterization is not recommended    Restarted home regimen of Aspirin, Effient, and Xarelto as anticoagulation.  Resume home regimen of Crestor, lisinopril and Coreg.  Continue home regimen of mexiletine and amiodarone for VT.  Entresto was started today .     Close monitoring of BP and kidney function while on Entresto    History of tobacco use disorder  Lung exam with diminished breath sounds  No wheezing on exam but recommend inhaler as needed at discharge        Additional Cares:  Lines:  Etienne:  Telemetry:  DVT prophylaxis:  CODE STATUS:  Isolation:  Disposition:    Care plan discussed with patient and nurse. All questions answered. Please see orders.     Electronically signed by:   Barry Tobias MD  7/12/2021      If any questions  please call

## 2021-07-12 NOTE — INTERDISCIPLINARY/THERAPY
Case Management Admission Note    Phone # [phone]    Living Situation: Spouse/significant other, Family members Private residence  [place]          ADLs: Needs Assistance  Stairs:      HME/CPAP: None      Oxygen: No      Home Health:No     Current Resources: Home health services      Diabetes/supplies: Do you have Diabetes?: No  PCP: Kirill Mancia DNP  Funding: [facility]  Pharmacy:[facility] - [place], [place] - [address]    Support Person: Primary Emergency Contact: Suzan Alves, Home Phone: [phone], Mobile Phone: [phone], Relation: Significant Other  Needs transportation assistance at DC: No     Discharge Needs/Barriers:  No barriers identified at this time  Narrative: Admitted with bilateral LE cellulitis. Reviewed chart, pt sleeping. Will meet with patient tomorrow.  Dispo:    DOP

## 2021-07-12 NOTE — MEDICATION HISTORY SPECIALIST NOTES
[facility] [id number]    Follow up. Called [facility] to obtain a medication list to verify medications.  EPIC updated accordingly.    See  for medication resources.

## 2021-07-12 NOTE — CONSULTATION
"  [address] [place] [address]                                                    Electrophysiology Inpatient Progress Note    Subjective    Patient ID: Pete Trejo is a 55 y.o. male.    Chief Complaint:   Chief Complaint   Patient presents with   • Chest Pain     PT ARRIVES POV FOR REPORTS OF CP STARTING AROUND 7 PM TONIGHT. PT REPORTS CENTRALIZED CP, DENIES RADIATION, DESCRIBES PAIN AS PRESSURE. PT REPORTS HE HAS A PACEMAKER DUE TO HEART ATTACKS IN THE PAST. PT REPORTS PAIN IS NOT SIMILAR TO PAIN IN THE PAST AND HAS NOT \"FELT RIGHT\" ALL WEEK.    • Dizziness     PT REPORTS DIZZINESS WHEN HE IS LAYING DOWN         LOS: 0 days     HPI:  Patient presented to the ER 7/10/2021 with complaints of chest pain.  He began having a sensation that he was going to get shocked and became ill, diaphoretic and lightheaded which were all the symptoms he had prior prior to getting shocked previously.  He did not get a shock though had increasing centralized chest pain and pressure, heaviness tightness radiating across the anterior chest.  He became presyncopal though did not pass out.  The symptoms persisted for quite some time though by the time he was admitted had resolved.  He has been chronically on amiodarone 200 mg twice daily and mexiletine 200 mg twice daily.  He has a biventricular ICD in place.  QRS duration on twelve-lead EKG is 160 ms. He currently is AV paced on telemetry monitoring at a rate of 60 bpm.  He had an 11 beat run of VT at 1/19/2002 yesterday.    Patient had device interrogated in the emergency department on admission. Patient did have an episode of ventricular tachycardia occurring at 1849 on July 10, 2021 heart rate about 141 with 2 attempts at ATP, the last of which being successful.  The entire duration lasted 1 minute 22 seconds. He has had 61 total episodes since 8/19/2020 with 18 occurring in the VT 1 zone and 43 nonsustained VT episodes.  ATP was delivered 29 " times with a success rate of 72%.  He is 90% atrial paced and 98 to 99% BiV paced.  No atrial arrhythmias.  He has had 11 point 1K total PVCs with 7 episodes of 3 or more PVCs. He has had some NSVT while on telemetry at the hospital.  He follows with Dr. Edwards for general cardiology.      He was seen by Bozena Vasquez CNP and amiodarone increased to 3 times daily.  He had a Lexiscan Cardiolite stress test yesterday.  EF was not obtained as they were not able to gait the imaging due to LV being severely dilated.  Imaging revealed a large sized severe intensity fixed perfusion defect apex, apical inferior, apical anterior to mid anterior, apical septum and apical lateral.  There was no reversible ischemia.  His most recent ejection fraction by echo 6/8/2020 was 25%.  Chest x-ray revealed increased cardiomegaly with mild pulmonary edema.  He currently is on guideline directed medical therapy for ischemic cardiomyopathy with carvedilol 25 mg twice daily, lisinopril 5 mg twice daily and spironolactone 25 mg twice daily.  He has mildly elevated liver enzymes with an AST of 42 and an ALT of 91.  He is on Crestor 40 mg nightly.    Patient seen and examined.  Discussed with nurse and referring doctor.  He currently denies any chest discomfort.  He was having some intermittent shortness of breath prior to admission.  He is orthopneic.  He has had to sleep elevated for quite some time.  He denies episodes of PND.  No lower extremity edema though he does feel abdominal bloating and swelling at times.  He has been adding some salt to his food.      Patient has a longstanding history of ventricular tachycardia which are refractory to medical therapies.    Presented to the ER 8/12/2020 with complaints of heart palpitations and was found to be in monomorphic VT at 141 bpm.  There was no improvement with adenosine or diltiazem and he was subsequently cardioverted with 100 J shock and started on IV amiodarone.  He underwent EP study  with VT ablation in June 2020 by Dr. Montana however there was extensive scarring and numerous arrhythmia circuits were noted.  He was unstable during mapping which prevented any significant attempts at ablation.  He has been maintained on high-dose amiodarone and mexiletine.  It was recommended at that time he pursue work-up but more specialized centers for potential management, including heart transplantation.  There was discussion, however, that if there was no help to be gained from the specialized centers, Dr. Montana would consider repeat attempt with Impella assisted VT ablation. He has recently been worked up for possible heart transplantation at [facility] 5/21 but was felt to be stable and not requiring transplant at the time.  EF 17.5% on transthoracic echo at that time.    He has a known history of CAD, ventricular tachycardia, ischemic cardiomyopathy is post dual-chamber biventricular relator, LV thrombus on Xarelto, dyslipidemia, hypertension.  Patient was recently taken to the EP lab for possible VT ablation with Dr. Montana on 6/9/2020 unfortunately patient had recurrent numerous ventricular arrhythmias as result of extensive anterior wall scar also involving much of the lateral and septal walls.  He did partially successfully ablate a border zone area of the scar especially in the inferior apex and attempt to substrate modify his arrhythmia burden.  Patient was sent home on amiodarone 200 mg twice a day for a month and then 200 mg daily.  He is currently taking mexiletine 200 mg twice a day.  On 7/8/2020 patient had a telemedicine visit with Dr. Montana and a potential referral for LVAD and transplant consideration at [facility] was discussed.    He has a known history of CAD, ventricular tachycardia, ischemic cardiomyopathy is post dual-chamber biventricular relator, LV thrombus on Xarelto, dyslipidemia, hypertension.  Patient was recently taken to the EP lab for possible VT ablation with  Dr. Montana on 6/9/2020 unfortunately patient had recurrent numerous ventricular arrhythmias as result of extensive anterior wall scar also involving much of the lateral and septal walls.  He did partially successfully ablate a border zone area of the scar especially in the inferior apex and attempt to substrate modify his arrhythmia burden.  Patient was sent home on amiodarone 200 mg twice a day for a month and then 200 mg daily as well as mexiletine 200 mg twice a day.                ICD:  Settings as of 5/24/21  Ventricular Tachy Settings   bpm ATP 41J, 41J, 41Jx6   bpm Scan Burst 41J, 41J, 41Jx4  VT-1 120 bpm Burst Ramp Shocks Off  Atrial Tachy Settings  ATR Mode Switch 150 bpm VDI  Ketan/CRT Settings  Mode DDDR  Lower Rate Limit 60 ppm  Maximum Tracking Rate 110 ppm  Maximum Sensor Rate 115 ppm  Paced AV Delay 130 - 130 ms  Sensed AV Delay 110 - 110 ms  A-Refractory (PVARP) 240 - 280 ms  RV-Refractory (RVRP) 230 - 250 ms  LV-Refractory (LVRP) 250 ms  Ventricular Pacing Chamber BiV  LV Offset -20 ms  Pacing Output  Atrial 2.0 V @ 0.4ms  Right Ventricular 2.5 V @ 0.4ms  Left Ventricular 2.6 V @ 0.4ms  Sensitivity  Atrial AGC 0.25 mV  Right Ventricular AGC 0.6 mV  Left Ventricular AGC 1.0 mV  Leads Configuration (Pace/Sense)  Atrial Bipolar  Right Ventricular Bipolar  Left Ventricular LVtip>>RV  Sensors  Accelerometer On    Allergies as of 07/10/2021 - Reviewed 07/10/2021   Allergen Reaction Noted   • Morphine  07/30/2017   • Tramadol  07/30/2017       Current Facility-Administered Medications:   •  Insert peripheral IV, , , Once **AND** Maintain IV access, , , Until discontinued **AND** Saline lock IV, , , Once **AND** sodium chloride flush 3 mL, 3 mL, intravenous, PRN, Joni Grant MD  •  nitroglycerin (NITROSTAT) SL tablet 0.4 mg, 0.4 mg, sublingual, q5 min PRN, Joni Grant MD  •  acetaminophen (TYLENOL) tablet 650 mg, 650 mg, oral, q4h PRN, Joni Grant MD  •  magnesium sulfate 2 g in  SWFI 50 mL IVPB (premix), 2 g, intravenous, Once PRN, Joni Grant MD  •  aspirin EC tablet 81 mg, 81 mg, oral, Daily, Joni Grant MD, 81 mg at 07/12/21 0953  •  bisacodyL (DULCOLAX) suppository 10 mg, 10 mg, rectal, Daily PRN, Joni Grant MD  •  ondansetron (ZOFRAN) injection 4 mg, 4 mg, intravenous, q6h PRN, Joni Grant MD  •  antacid/lidocaine 2% viscous (GI COCKTAIL KIT) 45 mL suspension, 45 mL, oral, 3x daily PRN, Joni Grant MD  •  carvediloL (COREG) tablet 25 mg, 25 mg, oral, 2x daily with meals, Joni Grant MD, 25 mg at 07/12/21 0953  •  furosemide (LASIX) tablet 40 mg, 40 mg, oral, Daily, Joni Grant MD, 40 mg at 07/12/21 0953  •  lisinopriL (PRINIVIL,ZESTRIL) tablet 5 mg, 5 mg, oral, 2x daily, Joni Grant MD, 5 mg at 07/12/21 0953  •  mexiletine (MEXITIL) capsule 200 mg, 200 mg, oral, 2x daily, Joni Grant MD, 200 mg at 07/11/21 2154  •  lansoprazole (PREVACID) capsule 30 mg, 30 mg, oral, Daily at 1600, Joni Grant MD  •  prasugreL (EFFIENT) tablet 10 mg, 10 mg, oral, Daily, Joni Grant MD, 10 mg at 07/12/21 0953  •  rivaroxaban (XARELTO) tablet 20 mg, 20 mg, oral, Daily with dinner, Joni Grant MD, 20 mg at 07/11/21 1824  •  rosuvastatin (CRESTOR) tablet 40 mg, 40 mg, oral, Nightly, Joni Grant MD, 40 mg at 07/11/21 2100  •  spironolactone (ALDACTONE) tablet 25 mg, 25 mg, oral, 2x daily, Joni Grant MD, 25 mg at 07/12/21 0953  •  hydrALAZINE (APRESOLINE) injection 10 mg, 10 mg, intravenous, q4h PRN, Joni Grant MD  •  amiodarone (PACERONE) tablet 200 mg, 200 mg, oral, 3x daily with meals, Barry Tobias MD, 200 mg at 07/12/21 0953    Objective     Vital signs in last 24 hours:   Temp:  [36.5 °C (97.7 °F)-36.9 °C (98.4 °F)] 36.8 °C (98.3 °F)  Heart Rate:  [59-80] 60  Resp:  [18-21] 19  BP: (113-137)/(60-71) 113/62  Weight: 126.2 kg (278 lb 3.5 oz)    Intake/Output this shift:  I/O this shift:  In: 240 [P.O.:240]  Out: -     Intake/Output Summary (Last  24 hours) at 7/12/2021 1032  Last data filed at 7/12/2021 0932  Gross per 24 hour   Intake 1120.36 ml   Output 1550 ml   Net -429.64 ml     Cumulative I&O:    Physical Exam  Vitals reviewed.   Constitutional:       Appearance: Normal appearance. He is obese.   HENT:      Head: Normocephalic and atraumatic.   Eyes:      General: No scleral icterus.        Right eye: No discharge.         Left eye: No discharge.      Conjunctiva/sclera: Conjunctivae normal.   Cardiovascular:      Rate and Rhythm: Normal rate and regular rhythm.      Pulses: Normal pulses.      Heart sounds: Normal heart sounds.      Comments: ICD left upper chest shows no evidence of erosion or infection.  Positive HJR.  Pulmonary:      Effort: Pulmonary effort is normal.      Comments: Diminished breath sounds  Abdominal:      General: Bowel sounds are normal.      Palpations: Abdomen is soft.      Comments: Central obesity    Musculoskeletal:         General: No swelling.      Cervical back: Normal range of motion and neck supple.      Right lower leg: No edema.      Left lower leg: No edema.   Skin:     General: Skin is warm and dry.   Neurological:      General: No focal deficit present.      Mental Status: He is alert and oriented to person, place, and time.   Psychiatric:         Mood and Affect: Mood normal.         Behavior: Behavior normal.         Data Review:   BMP:  Lab Results   Component Value Date     07/12/2021    K 4.0 07/12/2021     07/12/2021    CO2 24 07/12/2021    BUN 14 07/12/2021    CREATININE 0.90 07/12/2021    GLUCOSE 112 (H) 07/12/2021    CALCIUM 9.1 07/12/2021     CBC:   Lab Results   Component Value Date    WBC 5.1 07/12/2021    RBC 4.59 07/12/2021    HGB 14.6 07/12/2021    HCT 42.8 07/12/2021     07/12/2021     CMP:  Lab Results   Component Value Date     07/12/2021    K 4.0 07/12/2021     07/12/2021    CO2 24 07/12/2021    GLUCOSE 112 (H) 07/12/2021    CREATININE 0.90 07/12/2021    CALCIUM  9.1 07/12/2021    ALBUMIN 4.1 07/10/2021    ALKPHOS 73 07/10/2021    BILITOT 1.00 07/10/2021    ALT 91 (H) 07/10/2021    AST 42 (H) 07/10/2021    BUN 14 07/12/2021    ANIONGAP 9 07/12/2021     Lab Results   Component Value Date    BNP 51 07/10/2021     Lipid:   Lab Results   Component Value Date    CHOL 119 07/11/2021    HDL 35 (L) 07/11/2021    TRIG 120 07/11/2021    LDLCALC 60 07/11/2021     TSH:  No results found for: TSH  Magnesium:  Lab Results   Component Value Date    MG 2.0 07/12/2021     PT/INR:   Lab Results   Component Value Date    PT 14.1 (H) 07/10/2021    INR 1.2 (H) 07/10/2021       Tests:  NM Lexiscan cardiolite complete    Result Date: 7/11/2021  Narrative: · Technically difficult study to perform.  Gated images could not able to obtain hence cannot comment on left ventricle systolic function and contractility. · There is a large size, severe intensity fixed perfusion defect in apex, apical inferior, apical anterior to mid anterior, apical septum and apical lateral.  There is no reversible ischemia.  Unable to obtain gated images because left ventricle is severely dilated.  Cannot comment the left ventricle systolic function and wall motion.     XR chest portable 1 view    Result Date: 7/10/2021  Narrative: Exam: Portable Chest AP 07/10/2021 Clinical History:  Shortness of breath Comparison(s): Chest radiograph 8/12/2020 Findings: Since 8/12/2020 interval increased size of the enlarged cardiac silhouette. Left chest wall ICD. Mild interstitial prominence, likely representing mild pulmonary edema. No focal pulmonary consolidations.     Impression: IMPRESSION: 1.  Interval increased cardiomegaly with mild pulmonary edema.      Assessment/Plan   Patient Active Problem List    Diagnosis Date Noted   • Chronic combined systolic and diastolic CHF, NYHA class 2 and ACC/AHA stage C (CMS/HCC) ([facility]) 07/11/2021   • Unstable angina (CMS/HCC) ([facility]) 07/10/2021   • Ventricular arrhythmia 08/12/2020   • V tach  (CMS/[facility]) ([facility]) 08/12/2020   • AICD discharge 06/16/2020   • S/P ablation of ventricular arrhythmia 06/10/2020   • Anticoagulated 06/10/2020   • Automatic implantable cardioverter-defibrillator in situ 06/10/2020   • Presence of stent in coronary artery 06/10/2020   • On amiodarone therapy 06/10/2020   • Coronary artery disease 06/10/2020   • Tobacco use 06/10/2020   • VT (ventricular tachycardia) (CMS/[facility]) ([facility]) 06/07/2020   • Ventricular tachycardia (CMS/[facility]) ([facility]) 10/30/2017   • Anteroseptal myocardial infarction (CMS/[facility]) ([facility]) 10/30/2017   • Anterior myocardial infarction (CMS/[facility]) ([facility]) 10/30/2017   • CAD (coronary artery disease) 10/30/2017   • Congestive heart failure (CHF) (CMS/[facility]) ([facility]) 10/30/2017   • Ischemic cardiomyopathy 10/30/2017   • Thrombus 10/30/2017   • Aneurysm (CMS/[facility]) ([facility]) 10/30/2017   • CVA (cerebral vascular accident) (CMS/[facility]) ([facility]) 10/30/2017   • Hypertension 10/30/2017   • Hyperlipidemia 10/30/2017   • Tobacco use 10/30/2017       Assessment:  1.  Recurrent VT on amiodarone 200 twice daily and mexiletine 200 mg twice daily.  2.  Severe ischemic cardiomyopathy, most recent ejection fraction 17.5% [facility] 5/21.  3.  Coronary artery disease with multiple prior MIs and stents.  See above.  4.  Congestive heart failure systolic/diastolic, chronic slightly decompensated on admission.  5.  Anticoagulated for low ejection fraction.  6.  Cardiac risk factors of hypertension, dyslipidemia, history of tobacco use,  7.  Biventricular ICD in place with approximately 1-1/2 years remaining on the battery longevity.    Plan:   Ventricular tachycardia:Patient presented with episode of ventricular tachycardia at a rate of 141 bpm terminated with second burst of ATP. He has had 61 total episodes since 8/19/2020 with 18 occurring in the VT 1 zone and 43 nonsustained VT episodes.  ATP was delivered 29 times with a success rate of 72%.  He is 90% atrial paced and 98 to 99% BiV paced.  No atrial  arrhythmias.  Amiodarone was increased to 200 mg 3 times daily.  He continues on mexiletine 200 mg twice daily.  He had one nonsustained VT episode yesterday lasting 11 beats.  We will check amiodarone and mexiletine levels to see if we have room to increase the mexiletine further.  His liver enzymes have been elevated with AST 42 and ALT 91, he is also on Crestor.   We will continue to monitor his device for further ventricular arrhythmias.  He does have follow-up scheduled with Dr. Montana in the next month or so.  He has had previous attempt at VT ablation though was hemodynamically unstable with a VT and was not able to be done.  Would consider Impella assisted VT in the future if needed.        Ischemic cardiomyopathy: He has been evaluated at [facility] and was not felt to require transplant at this time.  EF 17.5% on TTE 5/21.      CHF: He has been orthopneic, his chest x-ray showed mild pulmonary edema and he has noted abdominal swelling as well.  He has been utilizing some salt at times.  I encouraged him to follow a 2 g sodium diet or below.  He should be weighing himself daily.  We will increase Bumex to 40 mg twice daily.  We will stop lisinopril and switch to Entresto 24/26 mg p.o. twice daily.  He has been on Lasix 40 mg daily, will increase to 40 mg twice daily.  He is on guideline directed therapy for CHF currently with lisinopril, carvedilol and spironolactone.  Could consider tweaking his biventricular ICD with AV optimization as an outpatient.  QRS duration paced is 160 ms.  He is currently 98 to 99% BiV paced.  No atrial arrhythmias have been seen on device interrogation.  We will recheck electrolytes in a.m.    We will discuss with Dr. Wu for any further recommendations.    Electronically signed by: Merced Newton CNP  7/12/2021  10:32 AM      A voice recognition program was used to aid in documentation of this record.  Sometimes words are not printed exactly as they were  spoken.  While efforts were made to carefully edit and correct any inaccuracies, some errors may be present; please take these into context.  Please contact the provider if errors are identified.

## 2021-07-13 ENCOUNTER — TELEPHONE (OUTPATIENT)
Dept: INTERNAL MEDICINE | Facility: CLINIC | Age: 56
End: 2021-07-13

## 2021-07-13 VITALS
SYSTOLIC BLOOD PRESSURE: 134 MMHG | RESPIRATION RATE: 19 BRPM | BODY MASS INDEX: 38.64 KG/M2 | TEMPERATURE: 98.5 F | OXYGEN SATURATION: 94 % | WEIGHT: 276.02 LBS | DIASTOLIC BLOOD PRESSURE: 62 MMHG | HEIGHT: 71 IN | HEART RATE: 60 BPM

## 2021-07-13 PROBLEM — I25.10 LAD STENOSIS: Status: ACTIVE | Noted: 2021-07-13

## 2021-07-13 LAB
ANION GAP SERPL CALC-SCNC: 8 MMOL/L (ref 3–11)
BUN SERPL-MCNC: 16 MG/DL (ref 7–25)
CALCIUM SERPL-MCNC: 9 MG/DL (ref 8.6–10.3)
CHLORIDE SERPL-SCNC: 102 MMOL/L (ref 98–107)
CO2 SERPL-SCNC: 26 MMOL/L (ref 21–32)
CREAT SERPL-MCNC: 0.96 MG/DL (ref 0.7–1.3)
GFR SERPL CREATININE-BSD FRML MDRD: 89 ML/MIN/1.73M*2
GLUCOSE SERPL-MCNC: 110 MG/DL (ref 70–105)
MAGNESIUM SERPL-MCNC: 1.8 MG/DL (ref 1.8–2.4)
POTASSIUM SERPL-SCNC: 4.3 MMOL/L (ref 3.5–5.1)
SODIUM SERPL-SCNC: 136 MMOL/L (ref 135–145)

## 2021-07-13 PROCEDURE — 6370000100 HC RX 637 (ALT 250 FOR IP): Performed by: HOSPITALIST

## 2021-07-13 PROCEDURE — 36415 COLL VENOUS BLD VENIPUNCTURE: CPT | Performed by: NURSE PRACTITIONER

## 2021-07-13 PROCEDURE — 80048 BASIC METABOLIC PNL TOTAL CA: CPT | Performed by: NURSE PRACTITIONER

## 2021-07-13 PROCEDURE — 6370000100 HC RX 637 (ALT 250 FOR IP): Performed by: INTERNAL MEDICINE

## 2021-07-13 PROCEDURE — 83735 ASSAY OF MAGNESIUM: CPT | Performed by: NURSE PRACTITIONER

## 2021-07-13 PROCEDURE — 6370000100 HC RX 637 (ALT 250 FOR IP): Performed by: NURSE PRACTITIONER

## 2021-07-13 PROCEDURE — 99238 HOSP IP/OBS DSCHRG MGMT 30/<: CPT | Performed by: HOSPITALIST

## 2021-07-13 RX ORDER — AMIODARONE HYDROCHLORIDE 200 MG/1
200 TABLET ORAL
Qty: 270 TABLET | Refills: 3 | Status: SHIPPED | OUTPATIENT
Start: 2021-07-13 | End: 2021-10-18

## 2021-07-13 RX ORDER — ALBUTEROL SULFATE 90 UG/1
2 INHALANT RESPIRATORY (INHALATION) EVERY 6 HOURS PRN
Qty: 1 INHALER | Refills: 1 | Status: SHIPPED | OUTPATIENT
Start: 2021-07-13

## 2021-07-13 RX ORDER — FUROSEMIDE 40 MG/1
40 TABLET ORAL DAILY
Qty: 90 TABLET | Refills: 3 | Status: SHIPPED | OUTPATIENT
Start: 2021-07-13 | End: 2021-10-18 | Stop reason: SDUPTHER

## 2021-07-13 RX ORDER — ASPIRIN 81 MG/1
81 TABLET ORAL DAILY
Qty: 90 TABLET | Refills: 3 | Status: ON HOLD | OUTPATIENT
Start: 2021-07-14 | End: 2023-10-19 | Stop reason: WASHOUT

## 2021-07-13 RX ORDER — SACUBITRIL AND VALSARTAN 24; 26 MG/1; MG/1
1 TABLET, FILM COATED ORAL 2 TIMES DAILY
Qty: 180 TABLET | Refills: 3 | Status: SHIPPED | OUTPATIENT
Start: 2021-07-13 | End: 2021-10-18 | Stop reason: SDUPTHER

## 2021-07-13 RX ADMIN — PRASUGREL 10 MG: 10 TABLET, FILM COATED ORAL at 09:49

## 2021-07-13 RX ADMIN — ACETAMINOPHEN 650 MG: 325 TABLET ORAL at 03:40

## 2021-07-13 RX ADMIN — SPIRONOLACTONE 25 MG: 25 TABLET ORAL at 09:48

## 2021-07-13 RX ADMIN — ASPIRIN 81 MG: 81 TABLET ORAL at 09:49

## 2021-07-13 RX ADMIN — FUROSEMIDE 40 MG: 40 TABLET ORAL at 09:49

## 2021-07-13 RX ADMIN — CARVEDILOL 25 MG: 25 TABLET, FILM COATED ORAL at 09:48

## 2021-07-13 RX ADMIN — AMIODARONE HYDROCHLORIDE 200 MG: 200 TABLET ORAL at 12:32

## 2021-07-13 RX ADMIN — MEXILETINE HYDROCHLORIDE 200 MG: 200 CAPSULE ORAL at 11:26

## 2021-07-13 RX ADMIN — AMIODARONE HYDROCHLORIDE 200 MG: 200 TABLET ORAL at 09:48

## 2021-07-13 RX ADMIN — FUROSEMIDE 40 MG: 40 TABLET ORAL at 12:32

## 2021-07-13 NOTE — INTERDISCIPLINARY/THERAPY
Case Management Discharge Note    Phone # [phone]    Discharge Disposition:  HO    Needs transportation assistance at DC: No       Specialty Referrals: Cardiology, PCP    Support System Notified: by patient    Narrative: No needs identified at time of discharge.

## 2021-07-13 NOTE — TELEPHONE ENCOUNTER
Caller would like to discuss (a) appointment Writer has advised caller of a callback from within 24 hours.    Patient: Pete Trejo    Caller Name (Last and first, relation/role): Sonya    Name of Facility: Hospital    Callback Number: [phone]    Best Availability: anytime    Fax Number: [phone]    Additional Info: Needing hospital discharge appt set up - new pt     Did you confirm the message with the caller: Yes    Is it okay that the nurse communicates your response through [employer]? No

## 2021-07-13 NOTE — DISCHARGE SUMMARY
"Hospitalist Discharge Summary    Patient Name: Pete Trejo  : 1965  Medical Record Number: [id number]  Admit Date: 7/10/2021  Discharge Date:  2021    Length of Stay: 1  Admitting Provider: Barry Tobias MD Discharge Provider: Barry Tobias MD    Admitting Diagnosis: Unstable angina (CMS/[facility]) ([facility])    Final Diagnoses:     Ventricular tachycardia  Coronary artery disease   LAD stenosis     Brief History:     55-year-old gentleman with history of CAD , CHF , severe ischemic cardiomyopathy with an EF of 13% previously evaluated for  Heart transplant presented to ED complaining of sensation of \"going to get shocked\"   .  He felt lightheaded , diaphoretic All of the symptoms that he has had before when he thought he was going to get shocked.   but never received a shock , next he felt chest pain and pressure   Vital signs in ED :  Temp 97.7 pulse 60 /92 oxygen 97%.    Chest x ray with interval increased cardiomegaly with mild pulmonary edema .  He was admitted for treatment of Ventricular Tachycardia , interrogation of AICD .  Possible doing nuclear stress test in am     Past Medical History  Past Medical History:   Diagnosis Date   • CAD (coronary artery disease)    • CHF (congestive heart failure) (CMS/HCC) ([facility])    • CVA (cerebral vascular accident) (CMS/[facility]) ([facility])    • GERD (gastroesophageal reflux disease)    • Heart attack (CMS/[facility]) ([facility])    • Ischemic cardiomyopathy    • V-tach (CMS/[facility]) ([facility])        Past Surgical History  Past Surgical History:   Procedure Laterality Date   • ABLATION VT N/A 2020    Procedure: Ablation VT;  Surgeon: Wilfredo Montana MD;  Location: [facility] EP Lab;  Service: Electrophysiology;  Laterality: N/A;  Check with Dr. Montana in the a.m. regarding scheduling   • APPENDECTOMY     • CORONARY ARTERY STENTING      2.5 X 24-mm Taxus DIMAS to first diagonal. 3.0 X 8-mm Taxus stent to proximal LAD just proximal to diagonal.   • CORONARY ARTERY STENTING      3.5 X " 15-mm Promus DIMAS to totally occluded LAD   • CORONARY ARTERY STENTING  2011    2.25 X 30-mm Resolute DIMAS to 2nd diagonal.  Balloon angioplasty through strut to improve blood flow to distal LAD   • CORONARY ARTERY STENTING  07/28/2017    second diagonal branch LAD Resolute 2.25 x 30-mm DIMAS   • ICD SC NEW  2011    [employer] Cognis Model #N119, Serial #[id number]. [employer]iance Model #0185, Serial #[id number]. LV lead Acuity Model #45+91, Serial #[id number]. Atrial lead Model #4469, Serial #[id number]       Allergies  Allergies   Allergen Reactions   • Morphine      ANXIETY   • Tramadol      ANXIETY       Hospital Course by Problem List:     55 y.o. male, with complicated PMHx of CAD S/p multiple LAD & Dx stents, ICM with combined systolic & diastolic CHF w/ EF 25%, V- tach s/p ablation, s/p AICD, hyperlipidemia CVA and GERD presents to ER .   History of ventricular tachycardia .   He presented with chest pressure numerous episodes of V Tachycardia over the past year resolving with anti tachycardia from his AICD.   Interrogation of AICD revealed that the AICD was firing at rate of 141 beats per minute terminated with second burst of anti tachycardia pacing .     Chest x ray with interval increased cardiomegaly with mild pulmonary edema  .  During hospital course he had another short episode of tachycardia with rates in the 120's with development of symptoms similar to the ones he described initially in the ED He is seen in Cardiology clinic by Dr. Escalona who is planning on repeating ablation in the near future  He has history of CAD undergoing several cardiac catheterizations with placement of stents to LAD with last cardiac cath on 7/2017 with placement of stent to second diagonal branch of LAD .  During this admission he underwent nuclear stress test with finding of large size, severe intensity fixed perfusion defect in apex, apical inferior, apical anterior to mid anterior, apical septum and apical lateral.    Based on  results of stress test cardiac catheterization was not recommended  He was restarted on his home regimen of Aspirin, Effient, and Xarelto as anticoagulation.  Also restarted on home regimen of Crestor, lisinopril and Coreg.   He is currently taking Amiodarone and Mexiletine for his A Fib with Amiodarone increased in frequency from twice to three times a day for two weeks after discharge .   No change to home medication of Mexiletine   .  Seen by Electrophysiology team who recommended starting on Entresto at discharge.    In addition he was prescribed albuterol inhaler as needed because diminished breath sounds on exam and long standing history of tobacco use disorder with 1 pack per day x 30 years .       Consultants:    Cardiology     Procedures:    Nuclear stress test on 7/11/2021 :    · There is a large size, severe intensity fixed perfusion defect in apex, apical inferior, apical anterior to mid anterior, apical septum and apical lateral.  There is no reversible ischemia.  Unable to obtain gated images because left ventricle is severely dilated.  Cannot comment the left ventricle systolic function and wall motion.       Condition at Discharge: Fair      Final Exam:     General: Awake, alert, appears comfortable, NAD. Cooperative, pleasant.  Neuro: No motor or sensory deficits observed. Cranial nerves intact. O x 3  Psychiatric: No hallucinations or delusions.   ENT: MMM, EOMI, PERRLA  CVS: normal S1, S2. No murmur, rubs, gallops.  Intact and equal pulses.  Resp: normal lung sounds bilaterally, equal air entry, no respiratory distress  Abdomen:moderate breath sounds  Extremities: well perfused.  Radial pulses intact. No cyanosis, clubbing or edema    Disposition/Plans for Post-Hospital Care/Assistance: 01 - Home or Self-Care    Outpatient Follow-Up    Nir Edwards MD  [address] [place] [id number]  [phone]          [facility]  [address] [facility]  [id number] [phone]        Test Results Pending at Discharge  Pending Labs     Order Current Status    Amiodarone level Blood, Venous In process    Mexiletine, serum Blood, Venous In process        Medication Instructions Given to the Patient at Discharge:  Current Discharge Medication List      START taking these medications    Details   sacubitriL-valsartan (ENTRESTO) 24-26 mg tablet Take 1 tablet by mouth 2 (two) times a day  Qty: 180 tablet, Refills: 3    Associated Diagnoses: Chronic combined systolic and diastolic CHF, NYHA class 2 and ACC/AHA stage C (CMS/[facility]) ([facility])      aspirin 81 mg EC tablet Take 1 tablet (81 mg total) by mouth daily  Qty: 90 tablet, Refills: 3    Associated Diagnoses: Chronic combined systolic and diastolic CHF, NYHA class 2 and ACC/AHA stage C (CMS/[facility]) ([facility])      albuterol HFA (PROVENTIL HFA;VENTOLIN HFA) 90 mcg/actuation inhaler Inhale 2 puffs every 6 (six) hours as needed for wheezing or shortness of breath  Qty: 1 Inhaler, Refills: 1    Associated Diagnoses: Shortness of breath           Current Discharge Medication List      CONTINUE these medications which have NOT CHANGED    Details   mexiletine (MEXITIL) 200 mg capsule Take 1 capsule (200 mg total) by mouth 2 (two) times a day  Qty: 180 capsule, Refills: 1    Associated Diagnoses: VT (ventricular tachycardia) (CMS/[facility]) ([facility])      spironolactone (ALDACTONE) 25 mg tablet Take 1 tablet (25 mg total) by mouth 2 (two) times a day  Qty: 180 tablet, Refills: 3    Associated Diagnoses: Ischemic cardiomyopathy; Coronary artery disease involving native coronary artery of native heart without angina pectoris; VT (ventricular tachycardia) (CMS/[facility]) ([facility])      pantoprazole (PROTONIX) 40 mg EC tablet Take 40 mg by mouth daily.      carvedilol (COREG) 25 mg tablet Take 25 mg by mouth 2 (two) times a day with meals.      prasugrel (EFFIENT) 10 mg tablet Take 1 tablet (10 mg total) by mouth daily.  Qty: 30 tablet, Refills: 1    Associated  Diagnoses: Coronary artery disease involving native coronary artery of native heart without angina pectoris      rivaroxaban (XARELTO) 20 mg tablet Take 1 tablet (20 mg total) by mouth daily.  Qty: 30 tablet, Refills: 1    Associated Diagnoses: Coronary artery disease involving native coronary artery of native heart without angina pectoris      rosuvastatin (CRESTOR) 40 mg tablet Take 40 mg by mouth nightly        nitroglycerin (NITROSTAT) 0.4 mg SL tablet Place 0.4 mg under the tongue every 5 (five) minutes as needed for chest pain.           Current Discharge Medication List      CONTINUE these medications which have CHANGED    Details   amiodarone (PACERONE) 200 mg tablet Take 1 tablet (200 mg total) by mouth 3 (three) times a day with meals Cut down to twice daily with meals in 2 weeks  Qty: 270 tablet, Refills: 3    Associated Diagnoses: Chronic combined systolic and diastolic CHF, NYHA class 2 and ACC/AHA stage C (CMS/[facility]) (H[facility])      furosemide (LASIX) 40 mg tablet Take 1 tablet (40 mg total) by mouth daily If weight goes up 3 pounds overnight take an extra dose of Lasix x1 day  Qty: 90 tablet, Refills: 3    Associated Diagnoses: Chronic combined systolic and diastolic CHF, NYHA class 2 and ACC/AHA stage C (CMS/HCC) (HCC)           Current Discharge Medication List      STOP taking these medications       lisinopriL (PRINIVIL,ZESTRIL) 5 mg tablet Comments:   Reason for Stopping:                 Diet: regular diet    Pertinent Diagnostic Results:  Radiology: NM Lexiscan cardiolite complete    Result Date: 7/11/2021  Narrative: · Technically difficult study to perform.  Gated images could not able to obtain hence cannot comment on left ventricle systolic function and contractility. · There is a large size, severe intensity fixed perfusion defect in apex, apical inferior, apical anterior to mid anterior, apical septum and apical lateral.  There is no reversible ischemia.  Unable to obtain gated images because  left ventricle is severely dilated.  Cannot comment the left ventricle systolic function and wall motion.     XR chest portable 1 view    Result Date: 7/10/2021  Narrative: Exam: Portable Chest AP 07/10/2021 Clinical History:  Shortness of breath Comparison(s): Chest radiograph 8/12/2020 Findings: Since 8/12/2020 interval increased size of the enlarged cardiac silhouette. Left chest wall ICD. Mild interstitial prominence, likely representing mild pulmonary edema. No focal pulmonary consolidations.     Impression: IMPRESSION: 1.  Interval increased cardiomegaly with mild pulmonary edema.      Barry Tobias MD  7/13/2021  1:00 PM    Copies of this summary should be routed to the following:  Primary Care Provider: Kirill Mancia

## 2021-07-13 NOTE — TELEPHONE ENCOUNTER
Called Sonya back, pt anticipates d/c from [facility] on 7/13/21. Pt was scheduled with Dr. Smith for f/u and to establish care on 7/27/21. Please contact patient within two business days. (Sonya carreno mgr will advise pt to change medicaid or obtain a referral prior to apptmt. She will also advise to request records. Pt wants to switch providers from [facility]).

## 2021-07-13 NOTE — DISCHARGE INSTRUCTIONS
Please follow up with your primary care provider at [facility] post hospital stay in 7-10 days. Dr. Cobos on July 22nd at 11:00am.

## 2021-07-13 NOTE — PROGRESS NOTES
"[address] [place] [address]                                                    Electrophysiology Inpatient Progress Note    Subjective    Patient ID: Pete rTejo is a 55 y.o. male.    Chief Complaint:   Chief Complaint   Patient presents with   • Chest Pain     PT ARRIVES POV FOR REPORTS OF CP STARTING AROUND 7 PM TONIGHT. PT REPORTS CENTRALIZED CP, DENIES RADIATION, DESCRIBES PAIN AS PRESSURE. PT REPORTS HE HAS A PACEMAKER DUE TO HEART ATTACKS IN THE PAST. PT REPORTS PAIN IS NOT SIMILAR TO PAIN IN THE PAST AND HAS NOT \"FELT RIGHT\" ALL WEEK.    • Dizziness     PT REPORTS DIZZINESS WHEN HE IS LAYING DOWN         LOS: 1 day     HPI:  Patient is anticipating discharge today.  No recurrent arrhythmias since 7/11/2021.  Remains sinus rhythm BiV paced at 60 bpm.  QTc interval 470 ms range.  QRS duration 160 ms.  Lisinopril discontinued yesterday and will be replacing with Entresto 24/26 mg p.o. twice daily starting tonight after 36-hour washout.  Mexiletine and amiodarone levels drawn yesterday.  Results are still pending.  No recurrent arrhythmias since 7/11/2021 when he had a 11 beat run of nonsustained VT.    Patient seen and examined.  He is feeling well.  He is anxious to be discharged.  He denies any chest discomfort or significant shortness of breath.  He has chronic orthopnea sleeping with the head of the bed and 2 pillow elevation.  No edema.  No other significant complaints.  Does note occasional acid reflux and spite of acid suppression medication.      Allergies as of 07/10/2021 - Reviewed 07/10/2021   Allergen Reaction Noted   • Morphine  07/30/2017   • Tramadol  07/30/2017       Current Facility-Administered Medications:   •  furosemide (LASIX) tablet 40 mg, 40 mg, oral, 2x daily diuretic, Merced Provell, CNP, 40 mg at 07/13/21 0997  •  sacubitriL-valsartan (ENTRESTO) 24-26 mg per tablet 1 tablet, 1 tablet, oral, 2x daily, Merced Newton CNP  •  Insert peripheral " IV, , , Once **AND** Maintain IV access, , , Until discontinued **AND** Saline lock IV, , , Once **AND** sodium chloride flush 3 mL, 3 mL, intravenous, PRN, Joni Grant MD  •  nitroglycerin (NITROSTAT) SL tablet 0.4 mg, 0.4 mg, sublingual, q5 min PRN, Joni Grant MD  •  acetaminophen (TYLENOL) tablet 650 mg, 650 mg, oral, q4h PRN, Joni Grant MD, 650 mg at 07/13/21 0340  •  magnesium sulfate 2 g in SWFI 50 mL IVPB (premix), 2 g, intravenous, Once PRN, Joni Grant MD  •  aspirin EC tablet 81 mg, 81 mg, oral, Daily, Joni Grant MD, 81 mg at 07/13/21 0949  •  bisacodyL (DULCOLAX) suppository 10 mg, 10 mg, rectal, Daily PRN, Joni Grant MD  •  ondansetron (ZOFRAN) injection 4 mg, 4 mg, intravenous, q6h PRN, Joni Grant MD  •  antacid/lidocaine 2% viscous (GI COCKTAIL KIT) 45 mL suspension, 45 mL, oral, 3x daily PRN, Joni Grant MD  •  carvediloL (COREG) tablet 25 mg, 25 mg, oral, 2x daily with meals, Joni Grant MD, 25 mg at 07/13/21 0948  •  mexiletine (MEXITIL) capsule 200 mg, 200 mg, oral, 2x daily, Joni Grant MD, 200 mg at 07/13/21 1126  •  lansoprazole (PREVACID) capsule 30 mg, 30 mg, oral, Daily at 1600, Joni Grant MD, 30 mg at 07/12/21 1713  •  prasugreL (EFFIENT) tablet 10 mg, 10 mg, oral, Daily, Joni Grant MD, 10 mg at 07/13/21 0949  •  rivaroxaban (XARELTO) tablet 20 mg, 20 mg, oral, Daily with dinner, Joni Grant MD, 20 mg at 07/12/21 1712  •  rosuvastatin (CRESTOR) tablet 40 mg, 40 mg, oral, Nightly, Joni Grant MD, 40 mg at 07/12/21 2011  •  spironolactone (ALDACTONE) tablet 25 mg, 25 mg, oral, 2x daily, Joni Grant MD, 25 mg at 07/13/21 0948  •  hydrALAZINE (APRESOLINE) injection 10 mg, 10 mg, intravenous, q4h PRN, Joni Grant MD  •  amiodarone (PACERONE) tablet 200 mg, 200 mg, oral, 3x daily with meals, Barry Tobias MD, 200 mg at 07/13/21 0948    Objective     Vital signs in last 24 hours:   Temp:  [36.7 °C (98 °F)-37 °C (98.6 °F)] 36.9 °C  (98.5 °F)  Heart Rate:  [60-81] 60  Resp:  [17-19] 19  BP: (112-128)/(61-70) 128/68  Weight: 125.2 kg (276 lb 0.3 oz)    Intake/Output this shift:  I/O this shift:  In: 586 [P.O.:586]  Out: -     Intake/Output Summary (Last 24 hours) at 7/13/2021 1209  Last data filed at 7/13/2021 1052  Gross per 24 hour   Intake 1276 ml   Output --   Net 1276 ml         Physical Exam  Vitals reviewed.   Constitutional:       Appearance: Normal appearance. He is obese.   HENT:      Head: Normocephalic and atraumatic.   Eyes:      General: No scleral icterus.        Right eye: No discharge.         Left eye: No discharge.      Conjunctiva/sclera: Conjunctivae normal.   Cardiovascular:      Rate and Rhythm: Normal rate and regular rhythm.      Pulses: Normal pulses.      Heart sounds: Normal heart sounds.      Comments: ICD left upper chest shows no evidence of erosion or infection.  Positive HJR.  Pulmonary:      Effort: Pulmonary effort is normal.      Comments: Diminished breath sounds  Abdominal:      General: Bowel sounds are normal.      Palpations: Abdomen is soft.      Comments: Central obesity    Musculoskeletal:         General: No swelling.      Cervical back: Normal range of motion and neck supple.      Right lower leg: No edema.      Left lower leg: No edema.   Skin:     General: Skin is warm and dry.   Neurological:      General: No focal deficit present.      Mental Status: He is alert and oriented to person, place, and time.   Psychiatric:         Mood and Affect: Mood normal.         Behavior: Behavior normal.         Data Review:   BMP:  Lab Results   Component Value Date     07/13/2021    K 4.3 07/13/2021     07/13/2021    CO2 26 07/13/2021    BUN 16 07/13/2021    CREATININE 0.96 07/13/2021    GLUCOSE 110 (H) 07/13/2021    CALCIUM 9.0 07/13/2021     CBC:   Lab Results   Component Value Date    WBC 5.1 07/12/2021    RBC 4.59 07/12/2021    HGB 14.6 07/12/2021    HCT 42.8 07/12/2021     07/12/2021      CMP:  Lab Results   Component Value Date     07/13/2021    K 4.3 07/13/2021     07/13/2021    CO2 26 07/13/2021    GLUCOSE 110 (H) 07/13/2021    CREATININE 0.96 07/13/2021    CALCIUM 9.0 07/13/2021    ALBUMIN 4.1 07/10/2021    ALKPHOS 73 07/10/2021    BILITOT 1.00 07/10/2021    ALT 91 (H) 07/10/2021    AST 42 (H) 07/10/2021    BUN 16 07/13/2021    ANIONGAP 8 07/13/2021     Lab Results   Component Value Date    BNP 50 07/12/2021    BNP 51 07/10/2021     Lipid:   Lab Results   Component Value Date    CHOL 119 07/11/2021    HDL 35 (L) 07/11/2021    TRIG 120 07/11/2021    LDLCALC 60 07/11/2021     TSH:  No results found for: TSH  Magnesium:  Lab Results   Component Value Date    MG 1.8 07/13/2021     PT/INR:   Lab Results   Component Value Date    PT 14.1 (H) 07/10/2021    INR 1.2 (H) 07/10/2021       Tests:  NM Lexiscan cardiolite complete    Result Date: 7/11/2021  Narrative: · Technically difficult study to perform.  Gated images could not able to obtain hence cannot comment on left ventricle systolic function and contractility. · There is a large size, severe intensity fixed perfusion defect in apex, apical inferior, apical anterior to mid anterior, apical septum and apical lateral.  There is no reversible ischemia.  Unable to obtain gated images because left ventricle is severely dilated.  Cannot comment the left ventricle systolic function and wall motion.     XR chest portable 1 view    Result Date: 7/10/2021  Narrative: Exam: Portable Chest AP 07/10/2021 Clinical History:  Shortness of breath Comparison(s): Chest radiograph 8/12/2020 Findings: Since 8/12/2020 interval increased size of the enlarged cardiac silhouette. Left chest wall ICD. Mild interstitial prominence, likely representing mild pulmonary edema. No focal pulmonary consolidations.     Impression: IMPRESSION: 1.  Interval increased cardiomegaly with mild pulmonary edema.      Assessment/Plan   Patient Active Problem List    Diagnosis  Date Noted   • Chest pain with high risk for cardiac etiology 07/12/2021   • Chronic combined systolic and diastolic CHF, NYHA class 2 and ACC/AHA stage C (CMS/[facility]) ([facility]) 07/11/2021   • Unstable angina (CMS/[facility]) ([facility]) 07/10/2021   • Ventricular arrhythmia 08/12/2020   • V tach (CMS/[facility]) ([facility]) 08/12/2020   • AICD discharge 06/16/2020   • S/P ablation of ventricular arrhythmia 06/10/2020   • Anticoagulated 06/10/2020   • Automatic implantable cardioverter-defibrillator in situ 06/10/2020   • Presence of stent in coronary artery 06/10/2020   • On amiodarone therapy 06/10/2020   • Coronary artery disease 06/10/2020   • Tobacco use 06/10/2020   • VT (ventricular tachycardia) (CMS/[facility]) ([facility]) 06/07/2020   • Ventricular tachycardia (CMS/[facility]) ([facility]) 10/30/2017   • Anteroseptal myocardial infarction (CMS/[facility]) ([facility]) 10/30/2017   • Anterior myocardial infarction (CMS/[facility]) ([facility]) 10/30/2017   • CAD (coronary artery disease) 10/30/2017   • Congestive heart failure (CHF) (CMS/[facility]) ([facility]) 10/30/2017   • Ischemic cardiomyopathy 10/30/2017   • Thrombus 10/30/2017   • Aneurysm (CMS/[facility]) ([facility]) 10/30/2017   • CVA (cerebral vascular accident) (CMS/[facility]) ([facility]) 10/30/2017   • Hypertension 10/30/2017   • Hyperlipidemia 10/30/2017   • Tobacco use 10/30/2017       Assessment:  1.  Recurrent VT on amiodarone 200 twice daily and mexiletine 200 mg twice daily.  2.  Severe ischemic cardiomyopathy, most recent ejection fraction 17.5% [facility] 5/21.  3.  Coronary artery disease with multiple prior MIs and stents.  See above.  4.  Congestive heart failure systolic/diastolic, chronic slightly decompensated on admission.  5.  Anticoagulated for low ejection fraction.  6.  Cardiac risk factors of hypertension, dyslipidemia, history of tobacco use,  7.  Biventricular ICD in place with approximately 1-1/2 years remaining on the battery longevity.    Plan:   Ventricular tachycardia:Patient presented with episode of ventricular tachycardia at a rate  of 141 bpm terminated with second burst of ATP. He has had 61 total episodes since 8/19/2020 with 18 occurring in the VT 1 zone and 43 nonsustained VT episodes.  ATP was delivered 29 times with a success rate of 72%.  He is 90% atrial paced and 98 to 99% BiV paced.  No atrial arrhythmias.  Amiodarone was increased to 200 mg 3 times daily.  He continues on mexiletine 200 mg twice daily.  He had one nonsustained VT episode yesterday lasting 11 beats.    Amiodarone and mexiletine levels are pending.  He has not had any recurrent arrhythmias since the 11th when he had a 11 beat run. His liver enzymes have been elevated with AST 42 and ALT 91, he is also on Crestor.   We will continue to monitor his device for further ventricular arrhythmias.    Will need to continue to monitor liver enzymes.  He does have follow-up scheduled with Dr. Montana in the next month or so.  He has had previous attempt at VT ablation though was hemodynamically unstable with a VT and was not able to be done.  Would consider Impella assisted VT in the future if needed.    He does have some GI irritation at times in the encouraged him to make sure he is taking amiodarone and mexiletine on a full stomach.  We will continue amiodarone 200 mg 3 times daily with meals for 2 weeks then decrease to twice daily.  May consider going up on his mexiletine to 3 times daily if levels are reasonable.       Ischemic cardiomyopathy: He has been evaluated at [facility] and was not felt to require transplant at this time.  EF 17.5% on TTE 5/21.        CHF: He has had chronic orthopneic, his chest x-ray on admission showed mild pulmonary edema and he has noted abdominal swelling as well.  He has been utilizing some salt at times.  I encouraged him to follow a 2 g sodium diet or below.  He should be weighing himself daily.  He did receive an extra dose of Lasix yesterday.  His weight is down 1 kg.   He was instructed to take an extra dose of Lasix on a  as needed basis for weight gain of 3 pounds overnight x1 dose.  We stopped lisinopril and transitioning to Entresto 24/26 mg p.o. twice daily.  He has been on guideline directed therapy for CHF currently with lisinopril (switching to Entresto), carvedilol and spironolactone.  Continue an exercise program 150 minutes weekly.  Follow-up with Dr. Edwards. Could consider tweaking his biventricular ICD with AV optimization as an outpatient.  QRS duration paced is 160 ms.  He is currently 98 to 99% BiV paced.  No atrial arrhythmias have been seen on device interrogation.     Plan discussed in conjunction with Dr. Wu.    Electronically signed by: Merced Newton CNP  7/13/2021  12:09 PM      A voice recognition program was used to aid in documentation of this record.  Sometimes words are not printed exactly as they were spoken.  While efforts were made to carefully edit and correct any inaccuracies, some errors may be present; please take these into context.  Please contact the provider if errors are identified.

## 2021-07-14 LAB
AMIODARONE SERPL-MCNC: 1.3 MCG/ML
DESETHYLAMIODARONE SERPL-MCNC: 0.9 MCG/ML
MEXILETINE TROUGH SERPL-MCNC: 0.2 MCG/ML

## 2021-07-15 NOTE — TELEPHONE ENCOUNTER
The patient was contacted 7/15/21 following recent hospitalization at [facility].   The patient was discharged from the hospital on 7/13/21.  Information was obtained today through patient and chart review of the recent discharge summary.  The patient’s main diagnosis during the hospitalization was atrial fibrillation.  There were medication changes.  The medication list has been updated on the patient’s chart.  The patient has a follow up appointment scheduled with Dr. Smith on 7/27/21.  The patient does not need any additional referrals.  Any additional testing and labs will be discussed at the patient’s upcoming post-hospital follow up appointment.     Patient states he is doing well and was able to  his medications. He states he has no questions re: discharge instructions. He states understanding that he is to call Medicaid to have them switch his PCP to Dr. Smith and that he is to request records be sent to our office. Directions given to the clinic and appointment details reviewed.

## 2021-07-16 NOTE — PROGRESS NOTES
Please call the patient regarding abnormal results.  Amiodarone levels are okay continue with the same plan.  After 2 weeks cut back the amiodarone to twice a day.  Take with meals.The mexiletine level was quite low.  Increase the mexiletine to 3 times a day with meals.  New prescription sent.

## 2021-07-27 ENCOUNTER — APPOINTMENT (OUTPATIENT)
Dept: LAB | Facility: CLINIC | Age: 56
End: 2021-07-27
Payer: COMMERCIAL

## 2021-07-27 ENCOUNTER — OFFICE VISIT (OUTPATIENT)
Dept: INTERNAL MEDICINE | Facility: CLINIC | Age: 56
End: 2021-07-27
Payer: COMMERCIAL

## 2021-07-27 VITALS
HEIGHT: 71 IN | SYSTOLIC BLOOD PRESSURE: 110 MMHG | WEIGHT: 275 LBS | RESPIRATION RATE: 18 BRPM | HEART RATE: 60 BPM | DIASTOLIC BLOOD PRESSURE: 72 MMHG | BODY MASS INDEX: 38.5 KG/M2 | TEMPERATURE: 98.2 F | OXYGEN SATURATION: 95 %

## 2021-07-27 DIAGNOSIS — I10 ESSENTIAL HYPERTENSION: ICD-10-CM

## 2021-07-27 DIAGNOSIS — Z79.899 ON AMIODARONE THERAPY: ICD-10-CM

## 2021-07-27 DIAGNOSIS — I63.9 CEREBROVASCULAR ACCIDENT (CVA), UNSPECIFIED MECHANISM (CMS/HCC): ICD-10-CM

## 2021-07-27 DIAGNOSIS — I25.10 CORONARY ARTERY DISEASE INVOLVING NATIVE HEART, UNSPECIFIED VESSEL OR LESION TYPE, UNSPECIFIED WHETHER ANGINA PRESENT: ICD-10-CM

## 2021-07-27 DIAGNOSIS — R11.0 NAUSEA: ICD-10-CM

## 2021-07-27 DIAGNOSIS — R06.83 SNORING: Primary | ICD-10-CM

## 2021-07-27 LAB
ALBUMIN SERPL-MCNC: 4.3 G/DL (ref 3.5–5.3)
ALP SERPL-CCNC: 65 U/L (ref 45–115)
ALT SERPL-CCNC: 83 U/L (ref 7–52)
ANION GAP SERPL CALC-SCNC: 10 MMOL/L (ref 3–11)
AST SERPL-CCNC: 36 U/L
BASOPHILS # BLD AUTO: 0.1 10*3/UL
BASOPHILS NFR BLD AUTO: 1 % (ref 0–2)
BILIRUB SERPL-MCNC: 0.84 MG/DL (ref 0.2–1.4)
BUN SERPL-MCNC: 9 MG/DL (ref 7–25)
CALCIUM ALBUM COR SERPL-MCNC: 8.8 MG/DL (ref 8.6–10.3)
CALCIUM SERPL-MCNC: 9 MG/DL (ref 8.6–10.3)
CHLORIDE SERPL-SCNC: 102 MMOL/L (ref 98–107)
CO2 SERPL-SCNC: 24 MMOL/L (ref 21–32)
CREAT SERPL-MCNC: 0.86 MG/DL (ref 0.7–1.3)
EOSINOPHIL # BLD AUTO: 0.2 10*3/UL
EOSINOPHIL NFR BLD AUTO: 4 % (ref 0–3)
ERYTHROCYTE [DISTWIDTH] IN BLOOD BY AUTOMATED COUNT: 14.7 % (ref 11.5–15)
GFR SERPL CREATININE-BSD FRML MDRD: 98 ML/MIN/1.73M*2
GLUCOSE SERPL-MCNC: 98 MG/DL (ref 70–105)
HCT VFR BLD AUTO: 46.3 % (ref 38–50)
HGB BLD-MCNC: 15.2 G/DL (ref 13.2–17.2)
LYMPHOCYTES # BLD AUTO: 2.4 10*3/UL
LYMPHOCYTES NFR BLD AUTO: 41 % (ref 15–47)
MCH RBC QN AUTO: 31 PG (ref 29–34)
MCHC RBC AUTO-ENTMCNC: 32.9 G/DL (ref 32–36)
MCV RBC AUTO: 94.3 FL (ref 82–97)
MONOCYTES # BLD AUTO: 0.8 10*3/UL
MONOCYTES NFR BLD AUTO: 13 % (ref 5–13)
NEUTROPHILS # BLD AUTO: 2.4 10*3/UL
NEUTROPHILS NFR BLD AUTO: 41 % (ref 46–70)
PLATELET # BLD AUTO: 240 10*3/UL (ref 130–350)
PMV BLD AUTO: 7.7 FL (ref 6.9–10.8)
POTASSIUM SERPL-SCNC: 3.9 MMOL/L (ref 3.5–5.1)
PROT SERPL-MCNC: 6.6 G/DL (ref 6–8.3)
RBC # BLD AUTO: 4.91 10*6/ΜL (ref 4.1–5.8)
SODIUM SERPL-SCNC: 136 MMOL/L (ref 135–145)
WBC # BLD AUTO: 5.8 10*3/UL (ref 3.7–9.6)

## 2021-07-27 PROCEDURE — 85025 COMPLETE CBC W/AUTO DIFF WBC: CPT | Performed by: INTERNAL MEDICINE

## 2021-07-27 PROCEDURE — 36415 COLL VENOUS BLD VENIPUNCTURE: CPT | Performed by: INTERNAL MEDICINE

## 2021-07-27 PROCEDURE — 99204 OFFICE O/P NEW MOD 45 MIN: CPT | Performed by: INTERNAL MEDICINE

## 2021-07-27 PROCEDURE — 80053 COMPREHEN METABOLIC PANEL: CPT | Performed by: INTERNAL MEDICINE

## 2021-07-27 ASSESSMENT — PAIN SCALES - GENERAL: PAINLEVEL: 0-NO PAIN

## 2021-07-27 NOTE — PROGRESS NOTES
Subjective      Pete Trejo is a 55 y.o. male who presents for establishing care.    HPI    Pete Pereira is a 55-year-old male with a past medical history significant for CVA, coronary artery disease with combined systolic and diastolic heart failure, congestive heart failure, hyperlipidemia, GERD, and tobacco use who comes in today for hospital follow-up as well as to establish care.    The patient was recently seen in the hospital from 7/10/2021 until 7/13/2021 for unstable angina and was also noted to have palpitations and found to be in ventricular tachycardia.  Ejection fraction was noted to be 13%.  He does have an AICD and felt like it was going to shock him however it never did release the shock.  Over the past year it has been noted that he has had multiple episodes of ventricular tachycardia that resolved with intervention from his AICD.  Interrogation of his AICD for this last hospital stay did reveal that his heart was beating at a rate of 141 bpm and that it was terminated with a burst of antitachycardia pacing.  Chest x-ray in the hospital showed increasing of his cardiomegaly and did show some pulmonary edema.  Patient was seen by cardiology with plan for repeating an ablation in the near future.  He does have a history of multiple stent placements for CAD with the last one being in July 2017 with a placement of a stent to the second diagonal branch of the LAD.  During this last hospital stay he did have a nuclear stress test showing a perfusion defect in the apex.  Based on these results repeat cardiac catheterization was not recommended.  He was started on Entresto at the recommendation of electrophysiology.    Concerns today:   - Since the recent hospital stay Pete notes that he is more light headed than normal and more dizzy.  He notes that his mexilitine was increased to 3 times daily and that his amiodarone was also increased in the hospital.  No  vomiting and no diarrhea or changes with  urination.  He does feel like he is constantly urinating with his furosemide.  Other new medications includes sacubitril-valsartan.  This light headedness has been every day since the hospital.  Has not been worsening.  Has not had any falls due to this.     - Also feeling very nauseated, almost feels like he always has to have something in his stomach.  Some days are better than others but no particular triggers are noted.  No particular foods are bothering him.  He does note that he felt worse while taking any caffeine so he stopped drinking any caffeine but does not feel better.      Recent medical issues:   -Recent hospitalization as described above.  He was also seen in the hospital from 8/12/2020 until 8/14/2020 for an episode of monomorphic ventricular tachycardia with a rate of 141 bpm.    -Patient has been following with cardiology for his cardiac history.  Of note he has recently been seen by the [facility] for possibility of cardiac transplantation for his ischemic cardiomyopathy.    Chronic medical issues   -Coronary artery disease: Since hospitalization has not had any chest pain with exertion.     -Hyperlipidemia:LDL was 60 on 7/11/2021   -Ischemic cardiomyopathy:  Has always had some orthopnea but has not noticed it worsening.  No peripheral edema.    -Ventricular tachycardia: No episodes of palpitations since being released from the hospital.  Heart rate is 60 today.     -History of CVA: Happened a couple of days after a heart attack.  Patient was having facial droop but it then resolved.   - Has STOP-BANG score of 8.    Is feeling tired after sleeping.  Does have history of observed apnea.  Does have history of hypertension.  Does have loud snoring.      Healthcare maintenance   - Colon cancer screening: last done about 2 years ago, plan was to follow up in 5 years.  His father has a history of colon cancer.   - No family history of prostate cancer.   - Up to date on shingles  vaccination   - Up to date on pneumonia vaccination   - Still due for COVID vaccination    The following have been reviewed and updated as appropriate in this visit:  Tobacco  Allergies  Meds  Problems  Med Hx  Surg Hx  Fam Hx         Allergies   Allergen Reactions   • Morphine      ANXIETY   • Tramadol      ANXIETY     Current Outpatient Medications   Medication Sig Dispense Refill   • mexiletine (MEXITIL) 200 mg capsule Take 1 capsule (200 mg total) by mouth 3 (three) times a day with meals 270 capsule 3   • amiodarone (PACERONE) 200 mg tablet Take 1 tablet (200 mg total) by mouth 3 (three) times a day with meals Cut down to twice daily with meals in 2 weeks 270 tablet 3   • furosemide (LASIX) 40 mg tablet Take 1 tablet (40 mg total) by mouth daily If weight goes up 3 pounds overnight take an extra dose of Lasix x1 day 90 tablet 3   • sacubitriL-valsartan (ENTRESTO) 24-26 mg tablet Take 1 tablet by mouth 2 (two) times a day 180 tablet 3   • aspirin 81 mg EC tablet Take 1 tablet (81 mg total) by mouth daily 90 tablet 3   • albuterol HFA (PROVENTIL HFA;VENTOLIN HFA) 90 mcg/actuation inhaler Inhale 2 puffs every 6 (six) hours as needed for wheezing or shortness of breath 1 Inhaler 1   • spironolactone (ALDACTONE) 25 mg tablet Take 1 tablet (25 mg total) by mouth 2 (two) times a day 180 tablet 3   • rosuvastatin (CRESTOR) 40 mg tablet Take 40 mg by mouth nightly       • nitroglycerin (NITROSTAT) 0.4 mg SL tablet Place 0.4 mg under the tongue every 5 (five) minutes as needed for chest pain.     • pantoprazole (PROTONIX) 40 mg EC tablet Take 40 mg by mouth daily.     • carvedilol (COREG) 25 mg tablet Take 25 mg by mouth 2 (two) times a day with meals.     • prasugrel (EFFIENT) 10 mg tablet Take 1 tablet (10 mg total) by mouth daily. 30 tablet 1   • rivaroxaban (XARELTO) 20 mg tablet Take 1 tablet (20 mg total) by mouth daily. 30 tablet 1     No current facility-administered medications for this visit.     Past  Medical History:   Diagnosis Date   • CAD (coronary artery disease)    • CHF (congestive heart failure) (CMS/[facility]) ([facility])    • CVA (cerebral vascular accident) (CMS/[facility]) ([facility]) 2010   • GERD (gastroesophageal reflux disease)    • Heart attack (CMS/[facility]) ([facility])     x3   • Ischemic cardiomyopathy    • V-tach (CMS/[facility]) ([facility])      Past Surgical History:   Procedure Laterality Date   • ABLATION VT N/A 2020    Procedure: Ablation VT;  Surgeon: Wilfredo Montana MD;  Location: [facility];  Service: Electrophysiology;  Laterality: N/A;  Check with Dr. Montana in the a.m. regarding scheduling   • APPENDECTOMY     • CORONARY ARTERY STENTING      2.5 X 24-mm Taxus DIMAS to first diagonal. 3.0 X 8-mm Taxus stent to proximal LAD just proximal to diagonal.   • CORONARY ARTERY STENTING      3.5 X 15-mm Promus DIMAS to totally occluded LAD   • CORONARY ARTERY STENTING      2.25 X 30-mm Resolute DIMAS to 2nd diagonal.  Balloon angioplasty through strut to improve blood flow to distal LAD   • CORONARY ARTERY STENTING  2017    second diagonal branch LAD Resolute 2.25 x 30-mm DIMAS   • ICD SC NEW      [employer] Cognis Model #N119, Serial #[id number]. [employer] Reliance Model #0185, Serial #[id number]. LV lead Acuity Model #45+91, Serial #[id number]. Atrial lead Model #4469, Serial #[id number]     Family History   Problem Relation Age of Onset   • Heart attack Mother 62   • Other Mother         Abdominal Aortic Aneurysm   • Lung cancer Mother    • Colon cancer Father         Cause of death   • Diabetes Brother         Non-Insulin Dependent   • Heart attack Brother 51        w/ Stents   • Heart disease Brother    • Other Son         Well     Social History     Occupational History   • Not on file   Tobacco Use   • Smoking status: Former Smoker     Packs/day: 0.75     Years: 30.00     Pack years: 22.50     Types: Cigarettes     Quit date: 2020     Years since quittin.1   • Smokeless tobacco: Never Used   Substance and Sexual  "Activity   • Alcohol use: Not Currently     Comment: Quit drinking in 2007   • Drug use: Not Currently   • Sexual activity: Defer   Social History Narrative   • Not on file       Review of Systems    10 point review of systems performed and negative except as per HPI.    Objective   /80 (BP Location: Left arm, Patient Position: Sitting, Cuff Size: Regular Adult)   Pulse 60   Temp 36.8 °C (98.2 °F) (Temporal)   Resp 18   Ht 1.803 m (5' 11\")   Wt 124.7 kg (275 lb)   SpO2 95%   BMI 38.35 kg/m²     Physical Exam  Vitals reviewed.   Constitutional:       General: He is not in acute distress.  HENT:      Head: Normocephalic and atraumatic.      Mouth/Throat:      Mouth: Mucous membranes are moist.      Pharynx: Oropharynx is clear. No oropharyngeal exudate.   Eyes:      General: No scleral icterus.     Extraocular Movements: Extraocular movements intact.      Pupils: Pupils are equal, round, and reactive to light.   Cardiovascular:      Rate and Rhythm: Normal rate and regular rhythm.      Pulses: Normal pulses.      Heart sounds: No murmur heard.     Pulmonary:      Effort: Pulmonary effort is normal. No respiratory distress.      Breath sounds: No wheezing or rales.   Abdominal:      General: Bowel sounds are normal. There is no distension.      Palpations: Abdomen is soft.      Tenderness: There is no abdominal tenderness. There is no guarding.   Musculoskeletal:         General: No tenderness or deformity.      Cervical back: Neck supple. No rigidity.      Right lower leg: No edema.      Left lower leg: No edema.   Lymphadenopathy:      Cervical: No cervical adenopathy.   Skin:     General: Skin is warm and dry.      Capillary Refill: Capillary refill takes less than 2 seconds.      Findings: No erythema or rash.   Neurological:      Mental Status: He is alert.      Cranial Nerves: No cranial nerve deficit.      Motor: No weakness.      Deep Tendon Reflexes: Reflexes normal.   Psychiatric:         Mood " and Affect: Mood normal.         Behavior: Behavior normal.         Thought Content: Thought content normal.     Results for BENY BARBOSA (MRN [id number]) as of 8/3/2021 13:47   Ref. Range 7/27/2021 15:21   Sodium Latest Ref Range: 135 - 145 mmol/L 136   Potassium Latest Ref Range: 3.5 - 5.1 mmol/L 3.9   Chloride Latest Ref Range: 98 - 107 mmol/L 102   CO2 Latest Ref Range: 21 - 32 mmol/L 24   Anion Gap Latest Ref Range: 3 - 11 mmol/L 10   BUN Latest Ref Range: 7 - 25 mg/dL 9   Creatinine, Ser Latest Ref Range: 0.70 - 1.30 mg/dL 0.86   eGFR Latest Ref Range: >60 mL/min/1.73m*2 98   Glucose Latest Ref Range: 70 - 105 mg/dL 98   Calcium Latest Ref Range: 8.6 - 10.3 mg/dL 9.0   Alkaline Phosphatase Latest Ref Range: 45 - 115 U/L 65   Albumin Latest Ref Range: 3.5 - 5.3 g/dL 4.3   Total Protein Latest Ref Range: 6.0 - 8.3 g/dL 6.6   AST Latest Ref Range: <40 U/L 36   ALT (SGPT) Latest Ref Range: 7 - 52 U/L 83 (H)   Total Bilirubin Latest Ref Range: 0.20 - 1.40 mg/dL 0.84   Corrected Calcium Latest Ref Range: 8.6 - 10.3 mg/dL 8.8   WBC Latest Ref Range: 3.7 - 9.6 10*3/uL 5.8   RBC Latest Ref Range: 4.10 - 5.80 10*6/µL 4.91   Hemoglobin Latest Ref Range: 13.2 - 17.2 g/dL 15.2   Hematocrit Latest Ref Range: 38.0 - 50.0 % 46.3   MCV Latest Ref Range: 82.0 - 97.0 fL 94.3   MCH Latest Ref Range: 29.0 - 34.0 pg 31.0   MCHC Latest Ref Range: 32.0 - 36.0 g/dL 32.9   RDW Latest Ref Range: 11.5 - 15.0 % 14.7   Platelets Latest Ref Range: 130 - 350 10*3/uL 240   MPV Latest Ref Range: 6.9 - 10.8 fL 7.7   Neutrophils% Latest Ref Range: 46 - 70 % 41 (L)   Lymphocytes% Latest Ref Range: 15 - 47 % 41   Monocytes% Latest Ref Range: 5 - 13 % 13   Eosinophils% Latest Ref Range: 0 - 3 % 4 (H)   Basophils% Latest Ref Range: 0 - 2 % 1   ANC (auto diff) Latest Units: 10*3/UL 2.40   Lymphs # (Absolute) Latest Units: 10*3/uL 2.40   Monocytes # (Absolute) Latest Units: 10*3/uL 0.80   Eos # (Absolute) Latest Units: 10*3/uL 0.20   Baso # (Absolute)  Latest Units: 10*3/uL 0.10       ASSESSMENT AND PLAN   1. Snoring  Patient does have a STOP-BANG score of 8 and does have a history of snoring.  We will be doing a home sleep test  - Home sleep test; Future    2. Nausea  No cause for his nausea noted on his lab work today.  - CBC w/auto differential Blood, Venous; Future  - Comprehensive metabolic panel Blood, Venous; Future    3. Cerebrovascular accident (CVA), unspecified mechanism (CMS/HCC) (HCC)  Stable neurologic exam.  No other concerns today.    4. Coronary artery disease involving native heart, unspecified vessel or lesion type, unspecified whether angina present  Stable physical exam.  No concerning symptoms.    5. Essential hypertension  Blood pressure looks good today.  Patient does have some lightheadedness on standing.  He will actually be going down on his amiodarone and we discussed that after he goes down on his amiodarone if he continues to have symptoms then he should contact the clinic and I can reach out to cardiology to see if they would adjust any of his other medications.    6. On amiodarone therapy     Nikhil Smith MD

## 2021-08-10 ENCOUNTER — ANCILLARY PROCEDURE (OUTPATIENT)
Dept: CARDIOLOGY | Facility: CLINIC | Age: 56
End: 2021-08-10
Payer: COMMERCIAL

## 2021-08-10 DIAGNOSIS — Z45.02 ENCOUNTER FOR INTERROGATION OF CARDIAC DEFIBRILLATOR: ICD-10-CM

## 2021-08-10 PROCEDURE — 93295 DEV INTERROG REMOTE 1/2/MLT: CPT | Mod: NC | Performed by: INTERNAL MEDICINE

## 2021-08-10 PROCEDURE — 93296 REM INTERROG EVL PM/IDS: CPT | Mod: NC | Performed by: INTERNAL MEDICINE

## 2021-08-11 ENCOUNTER — TELEPHONE (OUTPATIENT)
Dept: CARDIOLOGY | Facility: CLINIC | Age: 56
End: 2021-08-11

## 2021-08-12 NOTE — TELEPHONE ENCOUNTER
Informed Pete that our records indicate this medication was ordered 7/13/2021 and given 9 months worth.  This nurse also called [facility] to verify that they have this on file and will be filling medication.  Called Pete to notify him the prescription will be filled.  Advised to call when prescription runs out to have medication reordered under Dr. Edwards's name as it was ordered under hospitalist while patient was in the hospital.

## 2021-08-17 ENCOUNTER — ANCILLARY PROCEDURE (OUTPATIENT)
Dept: SLEEP MEDICINE | Facility: HOSPITAL | Age: 56
End: 2021-08-17
Payer: COMMERCIAL

## 2021-08-17 VITALS — WEIGHT: 275 LBS | BODY MASS INDEX: 38.5 KG/M2 | HEIGHT: 71 IN

## 2021-08-17 DIAGNOSIS — R06.83 SNORING: ICD-10-CM

## 2021-08-17 PROCEDURE — 95806 SLEEP STUDY UNATT&RESP EFFT: CPT | Mod: 26 | Performed by: PSYCHIATRY & NEUROLOGY

## 2021-08-17 PROCEDURE — 95806 SLEEP STUDY UNATT&RESP EFFT: CPT

## 2021-08-17 NOTE — PATIENT INSTRUCTIONS
Patient is here to  HST equipment. Patient watched Sleep Disorders video. With assistance patient was instructed on how to apply HST equipment by self-application. Application instructions,  patient morning questionnaire, and activity log sent with patient. Patient instructed to call the [facility] with any concerns during the study. Patient will do the study tonight and will return equipment tomorrow.    MARK Fisher 8/17/21

## 2021-08-20 DIAGNOSIS — G47.33 OBSTRUCTIVE SLEEP APNEA: Primary | ICD-10-CM

## 2021-08-24 ENCOUNTER — TELEPHONE (OUTPATIENT)
Dept: SLEEP MEDICINE | Facility: HOSPITAL | Age: 56
End: 2021-08-24

## 2021-09-06 ENCOUNTER — ANCILLARY PROCEDURE (OUTPATIENT)
Dept: CARDIOLOGY | Facility: CLINIC | Age: 56
End: 2021-09-06
Payer: COMMERCIAL

## 2021-09-06 DIAGNOSIS — Z45.02 ENCOUNTER FOR INTERROGATION OF CARDIAC DEFIBRILLATOR: ICD-10-CM

## 2021-09-06 PROCEDURE — 93295 DEV INTERROG REMOTE 1/2/MLT: CPT | Performed by: INTERNAL MEDICINE

## 2021-09-06 PROCEDURE — 93296 REM INTERROG EVL PM/IDS: CPT | Performed by: INTERNAL MEDICINE

## 2021-09-18 ENCOUNTER — APPOINTMENT (OUTPATIENT)
Dept: RADIOLOGY | Facility: HOSPITAL | Age: 56
DRG: 309 | End: 2021-09-18
Payer: COMMERCIAL

## 2021-09-18 ENCOUNTER — HOSPITAL ENCOUNTER (INPATIENT)
Facility: HOSPITAL | Age: 56
LOS: 2 days | Discharge: HOME | DRG: 309 | End: 2021-09-20
Attending: EMERGENCY MEDICINE | Admitting: INTERNAL MEDICINE
Payer: COMMERCIAL

## 2021-09-18 DIAGNOSIS — R55 NEAR SYNCOPE: ICD-10-CM

## 2021-09-18 DIAGNOSIS — Z79.01 CHRONIC ANTICOAGULATION: ICD-10-CM

## 2021-09-18 DIAGNOSIS — I47.20 SUSTAINED VENTRICULAR TACHYCARDIA (CMS/HCC): ICD-10-CM

## 2021-09-18 DIAGNOSIS — R42 DIZZINESS: Primary | ICD-10-CM

## 2021-09-18 DIAGNOSIS — I47.20 VT (VENTRICULAR TACHYCARDIA) (CMS/HCC): ICD-10-CM

## 2021-09-18 DIAGNOSIS — R00.2 PALPITATIONS: ICD-10-CM

## 2021-09-18 DIAGNOSIS — I25.5 ISCHEMIC CARDIOMYOPATHY: ICD-10-CM

## 2021-09-18 DIAGNOSIS — I50.42 CHRONIC COMBINED SYSTOLIC AND DIASTOLIC CHF, NYHA CLASS 2 AND ACC/AHA STAGE C (CMS/HCC): ICD-10-CM

## 2021-09-18 DIAGNOSIS — Z86.79 HISTORY OF VENTRICULAR TACHYCARDIA: ICD-10-CM

## 2021-09-18 DIAGNOSIS — Z95.0 PACEMAKER: ICD-10-CM

## 2021-09-18 DIAGNOSIS — R79.89 ELEVATED TROPONIN: ICD-10-CM

## 2021-09-18 DIAGNOSIS — Z20.822 COVID-19 RULED OUT BY CLINICAL CRITERIA: ICD-10-CM

## 2021-09-18 PROBLEM — I47.29 VENTRICULAR TACHYCARDIA (PAROXYSMAL) (CMS/HCC): Status: ACTIVE | Noted: 2021-09-18

## 2021-09-18 LAB
ALBUMIN SERPL-MCNC: 4.3 G/DL (ref 3.5–5.3)
ALP SERPL-CCNC: 66 U/L (ref 45–115)
ALT SERPL-CCNC: 85 U/L (ref 7–52)
ANION GAP SERPL CALC-SCNC: 8 MMOL/L (ref 3–11)
AST SERPL-CCNC: 45 U/L
BILIRUB SERPL-MCNC: 1.16 MG/DL (ref 0.2–1.4)
BUN SERPL-MCNC: 13 MG/DL (ref 7–25)
CALCIUM ALBUM COR SERPL-MCNC: 9 MG/DL (ref 8.6–10.3)
CALCIUM SERPL-MCNC: 9.2 MG/DL (ref 8.6–10.3)
CHLORIDE SERPL-SCNC: 102 MMOL/L (ref 98–107)
CO2 SERPL-SCNC: 24 MMOL/L (ref 21–32)
CREAT SERPL-MCNC: 0.86 MG/DL (ref 0.7–1.3)
DELTA HIGH SENSITIVITY TROPONIN I, 1 HOUR: 17.6
DELTA HIGH SENSITIVITY TROPONIN I, 2 HOUR: 24.2
ERYTHROCYTE [DISTWIDTH] IN BLOOD BY AUTOMATED COUNT: 14.2 % (ref 11.5–15)
GFR SERPL CREATININE-BSD FRML MDRD: 98 ML/MIN/1.73M*2
GLUCOSE SERPL-MCNC: 117 MG/DL (ref 70–105)
HCT VFR BLD AUTO: 46.2 % (ref 38–50)
HGB BLD-MCNC: 15.8 G/DL (ref 13.2–17.2)
MAGNESIUM SERPL-MCNC: 1.9 MG/DL (ref 1.8–2.4)
MCH RBC QN AUTO: 32 PG (ref 29–34)
MCHC RBC AUTO-ENTMCNC: 34.3 G/DL (ref 32–36)
MCV RBC AUTO: 93.4 FL (ref 82–97)
PLATELET # BLD AUTO: 221 10*3/UL (ref 130–350)
PMV BLD AUTO: 8.4 FL (ref 6.9–10.8)
POTASSIUM SERPL-SCNC: 4.1 MMOL/L (ref 3.5–5.1)
PROT SERPL-MCNC: 6.9 G/DL (ref 6–8.3)
RBC # BLD AUTO: 4.95 10*6/ΜL (ref 4.1–5.8)
SODIUM SERPL-SCNC: 134 MMOL/L (ref 135–145)
TROPONIN I SERPL-MCNC: 71.4 PG/ML
TROPONIN I SERPL-MCNC: 89 PG/ML
TROPONIN I SERPL-MCNC: 95.6 PG/ML
TSH SERPL DL<=0.05 MIU/L-ACNC: 4.02 UIU/ML (ref 0.34–4.82)
WBC # BLD AUTO: 6.5 10*3/UL (ref 3.7–9.6)

## 2021-09-18 PROCEDURE — 80151 DRUG ASSAY AMIODARONE: CPT | Performed by: EMERGENCY MEDICINE

## 2021-09-18 PROCEDURE — 71045 X-RAY EXAM CHEST 1 VIEW: CPT

## 2021-09-18 PROCEDURE — 84484 ASSAY OF TROPONIN QUANT: CPT | Performed by: EMERGENCY MEDICINE

## 2021-09-18 PROCEDURE — 36415 COLL VENOUS BLD VENIPUNCTURE: CPT | Performed by: EMERGENCY MEDICINE

## 2021-09-18 PROCEDURE — 80053 COMPREHEN METABOLIC PANEL: CPT | Performed by: EMERGENCY MEDICINE

## 2021-09-18 PROCEDURE — 6360000200 HC RX 636 W HCPCS (ALT 250 FOR IP): Performed by: EMERGENCY MEDICINE

## 2021-09-18 PROCEDURE — 99285 EMERGENCY DEPT VISIT HI MDM: CPT | Performed by: EMERGENCY MEDICINE

## 2021-09-18 PROCEDURE — 99223 1ST HOSP IP/OBS HIGH 75: CPT | Mod: AI | Performed by: INTERNAL MEDICINE

## 2021-09-18 PROCEDURE — 85027 COMPLETE CBC AUTOMATED: CPT | Performed by: EMERGENCY MEDICINE

## 2021-09-18 PROCEDURE — 84443 ASSAY THYROID STIM HORMONE: CPT | Performed by: INTERNAL MEDICINE

## 2021-09-18 PROCEDURE — 2580000300 HC RX 258: Performed by: EMERGENCY MEDICINE

## 2021-09-18 PROCEDURE — 83735 ASSAY OF MAGNESIUM: CPT | Performed by: EMERGENCY MEDICINE

## 2021-09-18 PROCEDURE — (BLANK) HC ROOM ICU INTERMEDIATE

## 2021-09-18 PROCEDURE — 93005 ELECTROCARDIOGRAM TRACING: CPT | Performed by: EMERGENCY MEDICINE

## 2021-09-18 PROCEDURE — 96366 THER/PROPH/DIAG IV INF ADDON: CPT

## 2021-09-18 PROCEDURE — 96365 THER/PROPH/DIAG IV INF INIT: CPT

## 2021-09-18 RX ORDER — ADHESIVE BANDAGE
30 BANDAGE TOPICAL DAILY PRN
Status: DISCONTINUED | OUTPATIENT
Start: 2021-09-18 | End: 2021-09-20 | Stop reason: HOSPADM

## 2021-09-18 RX ORDER — ZOLPIDEM TARTRATE 5 MG/1
5 TABLET ORAL NIGHTLY PRN
Status: DISCONTINUED | OUTPATIENT
Start: 2021-09-18 | End: 2021-09-20 | Stop reason: HOSPADM

## 2021-09-18 RX ORDER — NITROGLYCERIN 0.4 MG/1
0.4 TABLET SUBLINGUAL EVERY 5 MIN PRN
Status: DISCONTINUED | OUTPATIENT
Start: 2021-09-18 | End: 2021-09-18 | Stop reason: SDUPTHER

## 2021-09-18 RX ORDER — SODIUM CHLORIDE 9 MG/ML
10 INJECTION, SOLUTION INTRAVENOUS CONTINUOUS
Status: DISCONTINUED | OUTPATIENT
Start: 2021-09-18 | End: 2021-09-20 | Stop reason: HOSPADM

## 2021-09-18 RX ORDER — ONDANSETRON HYDROCHLORIDE 2 MG/ML
4 INJECTION, SOLUTION INTRAVENOUS EVERY 6 HOURS PRN
Status: DISCONTINUED | OUTPATIENT
Start: 2021-09-18 | End: 2021-09-20 | Stop reason: HOSPADM

## 2021-09-18 RX ORDER — NAPROXEN SODIUM 220 MG/1
324 TABLET, FILM COATED ORAL ONCE
Status: COMPLETED | OUTPATIENT
Start: 2021-09-18 | End: 2021-09-18

## 2021-09-18 RX ORDER — SODIUM CHLORIDE 9 MG/ML
100 INJECTION, SOLUTION INTRAVENOUS CONTINUOUS
Status: DISCONTINUED | OUTPATIENT
Start: 2021-09-18 | End: 2021-09-19

## 2021-09-18 RX ORDER — ALUMINUM HYDROXIDE, MAGNESIUM HYDROXIDE, AND SIMETHICONE 1200; 120; 1200 MG/30ML; MG/30ML; MG/30ML
30 SUSPENSION ORAL EVERY 4 HOURS PRN
Status: DISCONTINUED | OUTPATIENT
Start: 2021-09-18 | End: 2021-09-20 | Stop reason: HOSPADM

## 2021-09-18 RX ORDER — BISACODYL 5 MG
5 TABLET, DELAYED RELEASE (ENTERIC COATED) ORAL DAILY PRN
Status: DISCONTINUED | OUTPATIENT
Start: 2021-09-18 | End: 2021-09-20 | Stop reason: HOSPADM

## 2021-09-18 RX ORDER — NITROGLYCERIN 0.4 MG/1
0.4 TABLET SUBLINGUAL EVERY 5 MIN PRN
Status: DISCONTINUED | OUTPATIENT
Start: 2021-09-18 | End: 2021-09-20 | Stop reason: HOSPADM

## 2021-09-18 RX ORDER — ACETAMINOPHEN 325 MG/1
650 TABLET ORAL EVERY 4 HOURS PRN
Status: DISCONTINUED | OUTPATIENT
Start: 2021-09-18 | End: 2021-09-20 | Stop reason: HOSPADM

## 2021-09-18 RX ADMIN — AMIODARONE HYDROCHLORIDE 150 MG: 1.5 INJECTION, SOLUTION INTRAVENOUS at 20:58

## 2021-09-18 RX ADMIN — SODIUM CHLORIDE 100 ML/HR: 9 INJECTION, SOLUTION INTRAVENOUS at 20:54

## 2021-09-18 RX ADMIN — NAPROXEN SODIUM 324 MG: 220 TABLET, FILM COATED ORAL at 20:37

## 2021-09-18 RX ADMIN — AMIODARONE HYDROCHLORIDE 1 MG/MIN: 1.8 INJECTION, SOLUTION INTRAVENOUS at 21:12

## 2021-09-18 RX ADMIN — SODIUM CHLORIDE 100 ML/HR: 9 INJECTION, SOLUTION INTRAVENOUS at 18:19

## 2021-09-18 ASSESSMENT — ACTIVITIES OF DAILY LIVING (ADL)
PATIENT'S MEMORY ADEQUATE TO SAFELY COMPLETE DAILY ACTIVITIES?: YES
ADEQUATE_TO_COMPLETE_ADL: YES

## 2021-09-18 NOTE — ED PROVIDER NOTES
Palpitations (PT arrives to ED co rapid heart beat and dizziness. Symptom onset approx 0900 this am. Hx Afib. Pt has a pacemaker, ([employer]).) and Dizziness        HPI:    Patient is a 55 y.o. male presenting to the emergency department with intermittent palpitations over the past 8 hours. He reports associated dizziness, chest pain, and shortness of breath. He describes his chest pain as a fullness. His symptoms started suddenly at 9 am this morning. Before this he felt fine. His symptoms are currently gone as well. He denies any syncope. Patient has a history of ischemic cardiomyopathy and v-tach. He has a pacemaker. He has had similar symptoms in the past which he thinks have been related to dehydration. He is fully vaccinated against COVID-19 and denies COVID-19 symptoms.    Past Medical History:   Diagnosis Date   • CAD (coronary artery disease)    • CHF (congestive heart failure) (CMS/[facility]) ([facility])    • CVA (cerebral vascular accident) (CMS/[facility]) ([facility]) 2010   • GERD (gastroesophageal reflux disease)    • Heart attack (CMS/[facility]) ([facility])     x3   • Ischemic cardiomyopathy    • V-tach (CMS/[facility]) ([facility])        Past Surgical History:   Procedure Laterality Date   • ABLATION VT N/A 06/09/2020    Procedure: Ablation VT;  Surgeon: Wilfredo Montana MD;  Location: [facility] EP Lab;  Service: Electrophysiology;  Laterality: N/A;  Check with Dr. Montana in the a.m. regarding scheduling   • APPENDECTOMY     • CORONARY ARTERY STENTING  2009    2.5 X 24-mm Taxus DIMAS to first diagonal. 3.0 X 8-mm Taxus stent to proximal LAD just proximal to diagonal.   • CORONARY ARTERY STENTING  2010    3.5 X 15-mm Promus DIMAS to totally occluded LAD   • CORONARY ARTERY STENTING  2011    2.25 X 30-mm Resolute DIMAS to 2nd diagonal.  Balloon angioplasty through strut to improve blood flow to distal LAD   • CORONARY ARTERY STENTING  07/28/2017    second diagonal branch LAD Resolute 2.25 x 30-mm DIMAS   • ICD SC NEW  2011    [employer] Cognis  Model #N119, Serial #[id number]. [employer]iance Model #0185, Serial #[id number]. LV lead Acuity Model #45+91, Serial #[id number]. Atrial lead Model #4469, Serial #[id number]       Social History     Socioeconomic History   • Marital status: Single     Spouse name: Not on file   • Number of children: Not on file   • Years of education: Not on file   • Highest education level: Not on file   Occupational History   • Not on file   Tobacco Use   • Smoking status: Former Smoker     Packs/day: 0.75     Years: 30.00     Pack years: 22.50     Types: Cigarettes     Quit date: 2020     Years since quittin.2   • Smokeless tobacco: Never Used   Substance and Sexual Activity   • Alcohol use: Not Currently     Comment: Quit drinking in    • Drug use: Not Currently   • Sexual activity: Defer   Other Topics Concern   • Not on file   Social History Narrative   • Not on file     Social Determinants of Health     Financial Resource Strain:    • Difficulty of Paying Living Expenses: Not on file   Food Insecurity:    • Worried About Running Out of Food in the Last Year: Not on file   • Ran Out of Food in the Last Year: Not on file   Transportation Needs:    • Lack of Transportation (Medical): Not on file   • Lack of Transportation (Non-Medical): Not on file   Physical Activity:    • Days of Exercise per Week: Not on file   • Minutes of Exercise per Session: Not on file   Stress:    • Feeling of Stress : Not on file   Social Connections:    • Frequency of Communication with Friends and Family: Not on file   • Frequency of Social Gatherings with Friends and Family: Not on file   • Attends [employer] Services: Not on file   • Active Member of Clubs or Organizations: Not on file   • Attends Club or Organization Meetings: Not on file   • Marital Status: Not on file   Intimate Partner Violence:    • Fear of Current or Ex-Partner: Not on file   • Emotionally Abused: Not on file   • Physically Abused: Not on file   • Sexually Abused: Not on file        Family History   Problem Relation Age of Onset   • Heart attack Mother 62   • Other Mother         Abdominal Aortic Aneurysm   • Lung cancer Mother    • Colon cancer Father         Cause of death   • Diabetes Brother         Non-Insulin Dependent   • Heart attack Brother 51        w/ Stents   • Heart disease Brother    • Other Son         Well       Allergies   Allergen Reactions   • Morphine      ANXIETY   • Tramadol      ANXIETY       No current outpatient medications on file.      ROS:  Constitutional: Negative for fever.   HENT: Negative for sore throat.    Eyes: Negative for pain.   Respiratory: positive for shortness of breath.    Cardiovascular: positive for chest pain, positive for palpitations.   Gastrointestinal: Negative for abdominal pain.   Endocrine: Negative for polyuria.   Genitourinary: Negative for flank pain.   Musculoskeletal: Negative for back pain.   Neurological: Negative for headaches, positive for dizziness.   Hematological: Negative for bleeding.       ED Triage Vitals   Temp Heart Rate Resp BP SpO2   09/18/21 1552 09/18/21 1552 09/18/21 1552 09/18/21 1552 09/18/21 1552   (S) 36.5 °C (97.7 °F) 60 18 125/68 96 %      Temp Source Heart Rate Source Patient Position BP Location FiO2 (%)   09/18/21 1552 09/18/21 2259 09/18/21 1751 09/18/21 2259 --   Oral Monitor Head of bed 30 degrees or higher Left arm          Physical Exam:  Nursing note and vitals reviewed.  Constitutional: appears well-developed. Mild distress.   HENT: Normal.   Head: Normocephalic and atraumatic.   Eyes: Pupils are equal, round, and reactive to light.   Neck: Supple, no lymphadenopathy  Cardiovascular: Regular rate and rhythm with no murmur, rub, or gallop.  Normal pulses. Left upper chest AICD pacemaker.  Pulmonary/Chest: No respiratory distress.  Clear to auscultation bilaterally.  Abdominal: Soft and nontender.    Back: No CVA tenderness.  Musculoskeletal: No edema  Neurological: Alert and oriented.  No gross  weakness.  Skin: Skin is warm and dry. No rash noted.   Psychiatric: Normal mood and affect.       Labs Reviewed   COMPREHENSIVE METABOLIC PANEL - Abnormal       Result Value    Sodium 134 (*)     Potassium 4.1      Chloride 102      CO2 24      Anion Gap 8      BUN 13      Creatinine 0.86      Glucose 117 (*)     Calcium 9.2      AST 45 (*)     ALT (SGPT) 85 (*)     Alkaline Phosphatase 66      Total Protein 6.9      Albumin 4.3      Total Bilirubin 1.16      eGFR 98      Corrected Calcium 9.0      Narrative:     Estimated GFR calculated using the 2009 CKD-EPI creatinine equation.   HIGH SENSITIVE TROPONIN I - Abnormal    hsTnI 0 Hour 71.4 (*)    HIGH SENSITIVE TROPONIN I, 1 HOUR - Abnormal    hsTnI 1 hr 89.0 (*)     Delta from 0 Hour 17.6 (*)    HIGH SENSITIVE TROPONIN I, 2 HOUR - Abnormal    Delta from 0 Hour 24.2 (*)     hsTnI 2 hr 95.6 (*)    MAGNESIUM - Normal    Magnesium 1.9     CBC - Normal    WBC 6.5      RBC 4.95      Hemoglobin 15.8      Hematocrit 46.2      MCV 93.4      MCH 32.0      MCHC 34.3      RDW 14.2      Platelets 221      MPV 8.4     TSH - Normal    TSH 4.023     HIGH SENSITIVE TROPONIN I PANEL (0HR, 1HR, 2HR)    Narrative:     The following orders were created for panel order HS Troponin I Panel (0HR, 1HR, 2HR) Blood, Venous.  Procedure                               Abnormality         Status                     ---------                               -----------         ------                     HS Troponin I[[id number]]                 Abnormal            Final result               1HR High Sensitive Trop I[[id number]]     Abnormal            Final result               2HR High Sensitive Tropon...[[id number]]  Abnormal            Final result                 Please view results for these tests on the individual orders.   AMIODARONE LEVEL       XR chest portable 1 view   Final Result   IMPRESSION:   No acute disease.            ED Course as of Sep 19 0013   Sat Sep 18, 2021   2036 Given patient's  persistent sustained ventricular tachycardia will initiate amiodarone therapy obtain urgent cardiology evaluation.    [PT]   2042 Patient is currently pain-free at this time.    [PT]      ED Course User Index  [PT] Doroteo Murcia MD         MDM:    Old records reviewed. Echo in June 2020 showed EF of 25%. Stress test 2 months ago was read as difficult to interpret. Comprehensive evaluation performed in the emergency department today. Continuous cardiac and respiratory monitoring were performed in the ED. Nursing notes were reviewed as well as labs and imaging studies. I spoke with [facility] after pacemaker interrogation and patient did have sustained run of v-tach for quite some time from 9 am to 1 pm. Patient given Amiodarone bolus and was placed on Amiodarone infusion protocol. Patient was pain free in the emergency department. I spoke with cardiologist on call Dr. Rodriguez who agreed with Amiodarone and recommended hospitalist admission. He did not feel any intervention was required at this time. Case was discussed with the hospitalist who agreed to hospitalize the patient for further evaluation and management. Patient remained stable.      Pete Trejo was evaluated in the Emergency Department on 9/18/2021 for the symptoms described in the history of present illness. He was evaluated in the context of the global COVID-19 pandemic, which necessitated consideration that the patient might be at risk for infection with the SARS-COV-2 virus that causes COVID-19. Institutional protocols and algorithms that pertain to the evaluation of patients at risk for COVID-19 are in a state of rapid change based on information released by regulatory bodies including the CDC and federal and state regulations. These policies and algorithms were followed during the patient's care in the ED.    Critical Care  Performed by: DOROTEO MURCIA  Authorized by: DOROTEO MURCIA     Critical care provider statement:      Critical care time (minutes):  35    Critical care time was exclusive of:  Separately billable procedures and treating other patients    Critical care was time spent personally by me on the following activities:  Blood draw for specimens, development of treatment plan with patient or surrogate, discussions with consultants, evaluation of patient's response to treatment, examination of patient, obtaining history from patient or surrogate, ordering and performing treatments and interventions, ordering and review of laboratory studies, ordering and review of radiographic studies, pulse oximetry, re-evaluation of patient's condition and review of old charts        ECG 12 lead, Arrhythmia    Date/Time: 9/18/2021 6:11 PM  Performed by: Doroteo Murcia MD  Authorized by: Doroteo Murcia MD     ECG reviewed by ED Physician in the absence of a cardiologist: yes    Comments:      Impression: AV dual paced rhythm, rate 60.            Clinical Impression:  Final diagnoses:   [R42] Dizziness   [R00.2] Palpitations   [Z95.0] Pacemaker   [Z79.01] Chronic anticoagulation   [I25.5] Ischemic cardiomyopathy   [Z86.79] History of ventricular tachycardia   [Z20.822] COVID-19 ruled out by clinical criteria   [R77.8] Elevated troponin   [I47.2] Sustained ventricular tachycardia (CMS/HCC) (HCC)   [R55] Near syncope       By signing my name, I, Segundo Laureano, attest that this documentation has been prepared under the direction and in the presence of Dr. Murcia, 9/18/2021, 6:08 PM.    I, Dr. Doroteo Murcia, personally performed the services described in this documentation as typed by the scribe while in my presence and it is both accurate and complete.     A voice recognition program was used to aid in the documentation of this record. Sometimes words are not printed exactly as they were spoken. While efforts were made to carefully edit and correct any inaccuracies, some errors may be present; please take these into context. Please  contact the provider if errors are identified.      Doroteo Murcia MD  09/19/21 0013

## 2021-09-19 ENCOUNTER — APPOINTMENT (OUTPATIENT)
Dept: CARDIOLOGY | Facility: HOSPITAL | Age: 56
DRG: 309 | End: 2021-09-19
Payer: COMMERCIAL

## 2021-09-19 PROBLEM — Z72.0 TOBACCO USE: Status: RESOLVED | Noted: 2020-06-10 | Resolved: 2021-09-19

## 2021-09-19 PROBLEM — I63.9 CVA (CEREBRAL VASCULAR ACCIDENT) (CMS/HCC): Status: RESOLVED | Noted: 2017-10-30 | Resolved: 2021-09-19

## 2021-09-19 PROBLEM — R79.89 ELEVATED TROPONIN: Status: ACTIVE | Noted: 2021-09-19

## 2021-09-19 PROBLEM — Z20.822 COVID-19 RULED OUT BY CLINICAL CRITERIA: Status: ACTIVE | Noted: 2021-09-19

## 2021-09-19 PROBLEM — I21.09 ANTERIOR MYOCARDIAL INFARCTION (CMS/HCC): Status: RESOLVED | Noted: 2017-10-30 | Resolved: 2021-09-19

## 2021-09-19 PROBLEM — Z86.79 HISTORY OF VENTRICULAR TACHYCARDIA: Status: ACTIVE | Noted: 2021-09-19

## 2021-09-19 PROBLEM — R07.9 CHEST PAIN WITH HIGH RISK FOR CARDIAC ETIOLOGY: Status: RESOLVED | Noted: 2021-07-12 | Resolved: 2021-09-19

## 2021-09-19 PROBLEM — I47.20 VT (VENTRICULAR TACHYCARDIA) (CMS/HCC): Status: RESOLVED | Noted: 2020-06-07 | Resolved: 2021-09-19

## 2021-09-19 PROBLEM — I20.0 UNSTABLE ANGINA (CMS/HCC): Status: RESOLVED | Noted: 2021-07-10 | Resolved: 2021-09-19

## 2021-09-19 PROBLEM — I47.20 VENTRICULAR TACHYCARDIA (CMS/HCC): Status: RESOLVED | Noted: 2017-10-30 | Resolved: 2021-09-19

## 2021-09-19 PROBLEM — I21.09 ANTEROSEPTAL MYOCARDIAL INFARCTION (CMS/HCC): Status: RESOLVED | Noted: 2017-10-30 | Resolved: 2021-09-19

## 2021-09-19 PROBLEM — I48.0 PAROXYSMAL ATRIAL FIBRILLATION (CMS/HCC): Status: ACTIVE | Noted: 2021-09-19

## 2021-09-19 PROBLEM — I49.9 VENTRICULAR ARRHYTHMIA: Status: RESOLVED | Noted: 2020-08-12 | Resolved: 2021-09-19

## 2021-09-19 PROBLEM — I25.10 CAD (CORONARY ARTERY DISEASE): Status: RESOLVED | Noted: 2017-10-30 | Resolved: 2021-09-19

## 2021-09-19 PROBLEM — Z79.01 CHRONIC ANTICOAGULATION: Status: ACTIVE | Noted: 2021-09-19

## 2021-09-19 PROBLEM — I25.10 LAD STENOSIS: Status: RESOLVED | Noted: 2021-07-13 | Resolved: 2021-09-19

## 2021-09-19 PROBLEM — I82.90 THROMBUS: Status: RESOLVED | Noted: 2017-10-30 | Resolved: 2021-09-19

## 2021-09-19 PROBLEM — I50.9 CONGESTIVE HEART FAILURE (CHF) (CMS/HCC): Status: RESOLVED | Noted: 2017-10-30 | Resolved: 2021-09-19

## 2021-09-19 PROBLEM — I25.10 CORONARY ARTERY DISEASE: Status: RESOLVED | Noted: 2020-06-10 | Resolved: 2021-09-19

## 2021-09-19 PROBLEM — I47.20 SUSTAINED VENTRICULAR TACHYCARDIA (CMS/HCC): Status: ACTIVE | Noted: 2021-09-19

## 2021-09-19 PROBLEM — Z45.02 AICD DISCHARGE: Status: RESOLVED | Noted: 2020-06-16 | Resolved: 2021-09-19

## 2021-09-19 PROBLEM — I47.20 V TACH (CMS/HCC): Status: RESOLVED | Noted: 2020-08-12 | Resolved: 2021-09-19

## 2021-09-19 PROBLEM — R55 NEAR SYNCOPE: Status: ACTIVE | Noted: 2021-09-19

## 2021-09-19 PROBLEM — I72.9 ANEURYSM (CMS/HCC): Status: RESOLVED | Noted: 2017-10-30 | Resolved: 2021-09-19

## 2021-09-19 PROBLEM — Z79.01 ANTICOAGULATED: Status: RESOLVED | Noted: 2020-06-10 | Resolved: 2021-09-19

## 2021-09-19 PROBLEM — Z72.0 TOBACCO USE: Status: RESOLVED | Noted: 2017-10-30 | Resolved: 2021-09-19

## 2021-09-19 LAB
ANION GAP SERPL CALC-SCNC: 7 MMOL/L (ref 3–11)
ASCENDING AORTA: 3.18 CM
AV LVOT PEAK GRADIENT: 9.6 MMHG
AV MEAN GRADIENT: 5.81 MMHG
AV PEAK GRADIENT: 11.02 MMHG
BUN SERPL-MCNC: 10 MG/DL (ref 7–25)
CALCIUM SERPL-MCNC: 8.6 MG/DL (ref 8.6–10.3)
CHLORIDE SERPL-SCNC: 104 MMOL/L (ref 98–107)
CO2 SERPL-SCNC: 26 MMOL/L (ref 21–32)
CREAT SERPL-MCNC: 0.83 MG/DL (ref 0.7–1.3)
DOP CALC AO PEAK VEL: 1.66 M/S
DOP CALC AO VTI: 28.7 CM
DOP CALC LVOT DIAMETER: 2.52 CM
DOP CALC LVOT STROKE VOLUME: 128 CM3
DOP CALC MV VTI: 30.38 CM
DOP CALC RVOT PEAK VEL: 0.9 M/S
DOP CALCLVOT PEAK VEL VTI: 25.7 CM
E/A RATIO: 0.7
E/E' RATIO (AVERAGE): 8.3
E/E' RATIO: 7.2
EJECTION FRACTION: 27 %
GFR SERPL CREATININE-BSD FRML MDRD: 100 ML/MIN/1.73M*2
GLUCOSE SERPL-MCNC: 87 MG/DL (ref 70–105)
INTERVENTRICULAR SEPTUM: 1 CM (ref 0.6–1.1)
LA AREA A4C SYSTOLE: 114 CM3
LEFT ATRIUM SIZE: 4.46 CM
LEFT ATRIUM VOLUME INDEX: 54 ML/M2
LEFT ATRIUM VOLUME: 131 CM3
LEFT INTERNAL DIMENSION IN SYSTOLE: 6.5 CM (ref 2.1–4)
LEFT VENTRICLE DIASTOLIC VOLUME: 575 CM3
LEFT VENTRICLE SYSTOLIC VOLUME: 418 CM3
LEFT VENTRICULAR INTERNAL DIMENSION IN DIASTOLE: 7.4 CM (ref 3.5–6)
LVAD-AP2: 89.4 CM2
MAGNESIUM SERPL-MCNC: 2 MG/DL (ref 1.8–2.4)
ML OF DILUTED DEFINITY INJECTED: 3 ML
MV DEC SLOPE: 5480 MM/S2
MV DT: 190 MS
MV MEAN GRADIENT: 1.4 MMHG
MV PEAK A VEL: 72 CM/S
MV PEAK E VEL: 51.4 CM/S
MV PEAK GRADIENT: 4 MMHG
MV STENOSIS PRESSURE HALF TIME: 55 MS
MV VALVE AREA P 1/2 METHOD: 4 CM2
MV VMAX: 106 CM/S
MVA (VTI): 4.22 CM2
PHOSPHATE SERPL-MCNC: 2.8 MG/DL (ref 2.5–4.9)
POSTERIOR WALL: 1 CM (ref 0.6–1.1)
POTASSIUM SERPL-SCNC: 3.8 MMOL/L (ref 3.5–5.1)
PULM VEIN S/D RATIO: 1.3
PV PEAK D VEL: 31 CM/S
PV PEAK GRADIENT: 4.58 MMHG
PV PEAK S VEL: 40 CM/S
PV VMAX: 1.07 M/S
RA AREA: 18.5 CM2
RH CV ECHO AV VALVE AREA VEL: 4.7 CM2
RH CV ECHO AV VALVE AREA VTI: 4.5 CM2
RH LVOT PEAK VELOCITY REST: 1.6 M/S
RIGHT VENTRICULAR INTERNAL DIMENSION IN DIASTOLE: 3.5 CM
RV AP4 BASE: 3.7 CM
S': 15 CM/S
SODIUM SERPL-SCNC: 137 MMOL/L (ref 135–145)
TDI: 7.2 CM/S
TDILATERAL: 5.4 CM/S
TRICUSPID ANNULAR PLANE SYSTOLIC EXCURSION: 2.4 CM
TROPONIN I SERPL-MCNC: 92.7 PG/ML

## 2021-09-19 PROCEDURE — 6360000200 HC RX 636 W HCPCS (ALT 250 FOR IP): Performed by: INTERNAL MEDICINE

## 2021-09-19 PROCEDURE — 93306 TTE W/DOPPLER COMPLETE: CPT | Mod: 26 | Performed by: INTERNAL MEDICINE

## 2021-09-19 PROCEDURE — 84484 ASSAY OF TROPONIN QUANT: CPT | Performed by: INTERNAL MEDICINE

## 2021-09-19 PROCEDURE — 84100 ASSAY OF PHOSPHORUS: CPT | Performed by: INTERNAL MEDICINE

## 2021-09-19 PROCEDURE — 93306 TTE W/DOPPLER COMPLETE: CPT

## 2021-09-19 PROCEDURE — 36415 COLL VENOUS BLD VENIPUNCTURE: CPT | Performed by: INTERNAL MEDICINE

## 2021-09-19 PROCEDURE — (BLANK) HC ROOM ICU INTERMEDIATE

## 2021-09-19 PROCEDURE — 99232 SBSQ HOSP IP/OBS MODERATE 35: CPT | Performed by: STUDENT IN AN ORGANIZED HEALTH CARE EDUCATION/TRAINING PROGRAM

## 2021-09-19 PROCEDURE — 2580000300 HC RX 258: Performed by: INTERNAL MEDICINE

## 2021-09-19 PROCEDURE — 80048 BASIC METABOLIC PNL TOTAL CA: CPT | Performed by: INTERNAL MEDICINE

## 2021-09-19 PROCEDURE — 2550000100 HC RX 255: Performed by: INTERNAL MEDICINE

## 2021-09-19 PROCEDURE — 6360000200 HC RX 636 W HCPCS (ALT 250 FOR IP): Performed by: EMERGENCY MEDICINE

## 2021-09-19 PROCEDURE — 83735 ASSAY OF MAGNESIUM: CPT | Performed by: INTERNAL MEDICINE

## 2021-09-19 PROCEDURE — 6370000100 HC RX 637 (ALT 250 FOR IP): Performed by: INTERNAL MEDICINE

## 2021-09-19 RX ORDER — MEXILETINE HYDROCHLORIDE 200 MG/1
200 CAPSULE ORAL
Status: DISCONTINUED | OUTPATIENT
Start: 2021-09-19 | End: 2021-09-20 | Stop reason: HOSPADM

## 2021-09-19 RX ORDER — SPIRONOLACTONE 25 MG/1
25 TABLET ORAL
Status: DISCONTINUED | OUTPATIENT
Start: 2021-09-19 | End: 2021-09-20 | Stop reason: HOSPADM

## 2021-09-19 RX ORDER — SODIUM CHLORIDE 0.9 % (FLUSH) 0.9 %
3 SYRINGE (ML) INJECTION ONCE
Status: COMPLETED | OUTPATIENT
Start: 2021-09-19 | End: 2021-09-19

## 2021-09-19 RX ORDER — ROSUVASTATIN CALCIUM 20 MG/1
40 TABLET, COATED ORAL NIGHTLY
Status: DISCONTINUED | OUTPATIENT
Start: 2021-09-19 | End: 2021-09-20 | Stop reason: HOSPADM

## 2021-09-19 RX ORDER — LANSOPRAZOLE 30 MG/1
30 CAPSULE, DELAYED RELEASE ORAL
Status: DISCONTINUED | OUTPATIENT
Start: 2021-09-19 | End: 2021-09-20 | Stop reason: HOSPADM

## 2021-09-19 RX ORDER — FUROSEMIDE 40 MG/1
40 TABLET ORAL DAILY
Status: DISCONTINUED | OUTPATIENT
Start: 2021-09-19 | End: 2021-09-20 | Stop reason: HOSPADM

## 2021-09-19 RX ORDER — ASPIRIN 81 MG/1
81 TABLET ORAL DAILY
Status: DISCONTINUED | OUTPATIENT
Start: 2021-09-19 | End: 2021-09-20 | Stop reason: HOSPADM

## 2021-09-19 RX ORDER — PRASUGREL 10 MG/1
10 TABLET, FILM COATED ORAL DAILY
Status: DISCONTINUED | OUTPATIENT
Start: 2021-09-19 | End: 2021-09-20 | Stop reason: HOSPADM

## 2021-09-19 RX ORDER — SACUBITRIL AND VALSARTAN 24; 26 MG/1; MG/1
1 TABLET, FILM COATED ORAL 2 TIMES DAILY
Status: DISCONTINUED | OUTPATIENT
Start: 2021-09-19 | End: 2021-09-20 | Stop reason: HOSPADM

## 2021-09-19 RX ORDER — CARVEDILOL 25 MG/1
25 TABLET ORAL 2 TIMES DAILY WITH MEALS
Status: DISCONTINUED | OUTPATIENT
Start: 2021-09-19 | End: 2021-09-20 | Stop reason: HOSPADM

## 2021-09-19 RX ADMIN — SODIUM CHLORIDE 10 ML/HR: 9 INJECTION, SOLUTION INTRAVENOUS at 00:36

## 2021-09-19 RX ADMIN — ROSUVASTATIN CALCIUM 40 MG: 20 TABLET, FILM COATED ORAL at 21:04

## 2021-09-19 RX ADMIN — SPIRONOLACTONE 25 MG: 25 TABLET ORAL at 15:24

## 2021-09-19 RX ADMIN — MAGNESIUM SULFATE HEPTAHYDRATE 1 G: 500 INJECTION, SOLUTION INTRAMUSCULAR; INTRAVENOUS at 00:37

## 2021-09-19 RX ADMIN — Medication 3 ML: at 11:25

## 2021-09-19 RX ADMIN — SACUBITRIL AND VALSARTAN 1 TABLET: 24; 26 TABLET, FILM COATED ORAL at 21:04

## 2021-09-19 RX ADMIN — SPIRONOLACTONE 25 MG: 25 TABLET ORAL at 09:47

## 2021-09-19 RX ADMIN — PRASUGREL 10 MG: 10 TABLET, FILM COATED ORAL at 09:47

## 2021-09-19 RX ADMIN — RIVAROXABAN 20 MG: 20 TABLET, FILM COATED ORAL at 09:46

## 2021-09-19 RX ADMIN — AMIODARONE HYDROCHLORIDE 1 MG/MIN: 1.8 INJECTION, SOLUTION INTRAVENOUS at 02:46

## 2021-09-19 RX ADMIN — MEXILETINE HYDROCHLORIDE 200 MG: 200 CAPSULE ORAL at 09:47

## 2021-09-19 RX ADMIN — AMIODARONE HYDROCHLORIDE 0.5 MG/MIN: 1.8 INJECTION, SOLUTION INTRAVENOUS at 23:46

## 2021-09-19 RX ADMIN — FUROSEMIDE 40 MG: 40 TABLET ORAL at 09:47

## 2021-09-19 RX ADMIN — PERFLUTREN 3 ML: 6.52 INJECTION, SUSPENSION INTRAVENOUS at 11:25

## 2021-09-19 RX ADMIN — CARVEDILOL 25 MG: 25 TABLET, FILM COATED ORAL at 18:06

## 2021-09-19 RX ADMIN — MEXILETINE HYDROCHLORIDE 200 MG: 200 CAPSULE ORAL at 18:06

## 2021-09-19 RX ADMIN — AMIODARONE HYDROCHLORIDE 0.5 MG/MIN: 1.8 INJECTION, SOLUTION INTRAVENOUS at 12:26

## 2021-09-19 RX ADMIN — CARVEDILOL 25 MG: 25 TABLET, FILM COATED ORAL at 09:47

## 2021-09-19 RX ADMIN — LANSOPRAZOLE 30 MG: 30 CAPSULE, DELAYED RELEASE ORAL at 16:10

## 2021-09-19 RX ADMIN — ASPIRIN 81 MG: 81 TABLET ORAL at 09:47

## 2021-09-19 RX ADMIN — MEXILETINE HYDROCHLORIDE 200 MG: 200 CAPSULE ORAL at 13:19

## 2021-09-19 RX ADMIN — SACUBITRIL AND VALSARTAN 1 TABLET: 24; 26 TABLET, FILM COATED ORAL at 09:47

## 2021-09-19 ASSESSMENT — ENCOUNTER SYMPTOMS
WEAKNESS: 0
JOINT SWELLING: 0
SHORTNESS OF BREATH: 0
ORTHOPNEA: 0
FEVER: 0
DIZZINESS: 0
BLOATING: 0
COUGH: 0
MYALGIAS: 0
DYSPNEA ON EXERTION: 0
SYNCOPE: 0
ALTERED MENTAL STATUS: 0
WHEEZING: 0
PALPITATIONS: 1
HEMATURIA: 0
BLURRED VISION: 0
LIGHT-HEADEDNESS: 0
WEIGHT GAIN: 0
NAUSEA: 0
VOMITING: 0
CHILLS: 0
HOARSE VOICE: 0

## 2021-09-19 NOTE — CONSULTATION
ELECTROPHYSIOLOGY CONSULT    Chief Complaint:    I was asked by Dr. Meehan to evaluate the patient for reticular tachycardia requiring therapy.    HPI:    Pete Trejo is a very pleasant 55 y.o. y/o male who presents with a PMH significant for   Patient Active Problem List   Diagnosis   • Ischemic cardiomyopathy   • Hypertension   • Hyperlipidemia   • S/P ablation of ventricular arrhythmia   • Automatic implantable cardioverter-defibrillator in situ   • Presence of stent in coronary artery   • On amiodarone therapy   • Chronic combined systolic and diastolic CHF, NYHA class 2 and ACC/AHA stage C (CMS/HCC) (HCC)   • Ventricular tachycardia (paroxysmal) (CMS/HCC) (HCC)   • COVID-19 ruled out by clinical criteria   • Near syncope   • Sustained ventricular tachycardia (CMS/HCC) (HCC)   • Chronic anticoagulation   • Elevated troponin   • History of ventricular tachycardia   • Paroxysmal atrial fibrillation (CMS/HCC) (HCC)         Pete is well-known to the electrophysiology practice.  He has multiple medical issues with a significant history of ventricular tachycardia requiring therapy from his ICD.    He has a known history of CAD, ventricular tachycardia, ischemic cardiomyopathy is post dual-chamber biventricular relator, LV thrombus on Xarelto, dyslipidemia, hypertension.  Patient was recently taken to the EP lab in 06/2020 possible VT ablation with Dr. Montana on 6/9/2020 unfortunately patient had recurrent numerous ventricular arrhythmias as result of extensive anterior wall scar also involving much of the lateral and septal walls.  He did partially successfully ablate a border zone area of the scar especially in the inferior apex and attempt to substrate modify his arrhythmia burden.  Patient has been on amiodarone and mexiletine therapy and was referred to [facility] for further recommendations for possible LVAD or transplant.  Patient was told that his medical therapy was working and he did not  qualify for LVAD or transplant at that time.    Patient present to the emergency room on 9/18/2021 with complaints of intermittent palpitations for roughly 8 hours.  He underwent a pacemaker interrogation in the emergency room he is found to have multiple episodes of ventricular tachycardia throughout the day requiring ATP.  Patient was started on amiodarone drip and cardiology electrophysiology was consulted.    Patient was seen and examined today.  Chart reviewed plan discussed with nurse.  Patient is resting company in bed with no complaints this morning.  He tells me that on Saturday he was quite a bit more active than usual moving things out in the garage.  He does believe that he was possibly dehydrated.  He did not have chest pain or shortness of breath but yesterday he felt fluttering in his chest all day.  He denies ICD shock.  He has not missed his medications he has been very compliant.      Medications:    Current Facility-Administered Medications   Medication Dose Route Frequency Provider Last Rate Last Admin   • aspirin EC tablet 81 mg  81 mg oral Daily Jan Ernst MD       • carvediloL (COREG) tablet 25 mg  25 mg oral 2x daily with meals Jan Ernst MD       • furosemide (LASIX) tablet 40 mg  40 mg oral Daily Jan Ernst MD       • mexiletine (MEXITIL) capsule 200 mg  200 mg oral 3x daily with meals Jan Ernst MD       • lansoprazole (PREVACID) capsule 30 mg  30 mg oral Daily at 1600 Jan Ernst MD       • prasugreL (EFFIENT) tablet 10 mg  10 mg oral Daily Jan Ernst MD       • rivaroxaban (XARELTO) tablet 20 mg  20 mg oral Daily Jan Ernst MD       • rosuvastatin (CRESTOR) tablet 40 mg  40 mg oral Nightly Jan Ernst MD       • sacubitriL-valsartan (ENTRESTO) 24-26 mg per tablet 1 tablet  1 tablet oral 2x daily Jan Ernst MD       • spironolactone (ALDACTONE) tablet 25 mg  25 mg oral 2x daily diuretic Jan Ernst MD       • amiodarone (NEXTERONE) 360 mg/200 mL (1.8 mg/mL) infusion (premix)  0.5  mg/min intravenous Continuous Doroteo Murcia MD 16.67 mL/hr at 21 0345 0.5 mg/min at 21 0345   • sodium chloride 0.9% (NS) carrier infusion  10 mL/hr intravenous Continuous Jan Ernst MD   Stopped at 21 0201   • acetaminophen (TYLENOL) tablet 650 mg  650 mg oral q4h PRN Jan Ernst MD       • nitroglycerin (NITROSTAT) SL tablet 0.4 mg  0.4 mg sublingual q5 min PRN Jan Ernst MD       • magnesium hydroxide (MILK OF MAGNESIA) 400 mg/5 mL oral suspension 30 mL  30 mL oral Daily PRN Jan Ernst MD       • ondansetron (ZOFRAN) injection 4 mg  4 mg intravenous q6h PRN Jan Ernst MD       • alum-mag hydroxide-simeth (MAALOX ADVANCED) suspension 30 mL  30 mL oral q4h PRN Jan Ernst MD       • zolpidem (AMBIEN) tablet 5 mg  5 mg oral Nightly PRN Jan Ernst MD       • bisacodyL (DULCOLAX) EC tablet 5 mg  5 mg oral Daily PRN Jan Ernst MD            Fam Hx:    No family history of sudden cardiac death.   Family History   Problem Relation Age of Onset   • Heart attack Mother 62   • Other Mother         Abdominal Aortic Aneurysm   • Lung cancer Mother    • Colon cancer Father         Cause of death   • Diabetes Brother         Non-Insulin Dependent   • Heart attack Brother 51        w/ Stents   • Heart disease Brother    • Other Son         Well          Soc Hx:    Social History     Occupational History   • Not on file   Tobacco Use   • Smoking status: Former Smoker     Packs/day: 0.75     Years: 30.00     Pack years: 22.50     Types: Cigarettes     Quit date: 2020     Years since quittin.2   • Smokeless tobacco: Never Used   Substance and Sexual Activity   • Alcohol use: Not Currently     Comment: Quit drinking in    • Drug use: Not Currently   • Sexual activity: Defer   Social History Narrative   • Not on file        ROS:    Review of Systems   Constitutional: Negative for chills, fever and weight gain.   HENT: Negative for congestion and hoarse voice.    Eyes: Negative for blurred  vision.   Cardiovascular: Positive for palpitations. Negative for chest pain, dyspnea on exertion, leg swelling, orthopnea and syncope.   Respiratory: Negative for cough, shortness of breath and wheezing.    Endocrine: Negative for cold intolerance and heat intolerance.   Hematologic/Lymphatic: Negative for bleeding problem.   Skin: Negative for itching.   Musculoskeletal: Negative for joint swelling and myalgias.   Gastrointestinal: Negative for bloating, nausea and vomiting.   Genitourinary: Negative for hematuria.   Neurological: Negative for dizziness, light-headedness and weakness.   Psychiatric/Behavioral: Negative for altered mental status.   Allergic/Immunologic: Negative for hives.       Physical Exam:    Vitals:  Temp:  [36.5 °C (97.7 °F)-36.9 °C (98.4 °F)] 36.8 °C (98.2 °F)  Heart Rate:  [59-60] 60  Resp:  [15-27] 18  BP: (105-144)/(61-84) 135/81      Telemetry: AV paced at 61 beats a minute.  No ventricular arrhythmias overnight.        Physical Exam  Constitutional:       Appearance: He is well-developed.   HENT:      Head: Normocephalic.   Cardiovascular:      Rate and Rhythm: Normal rate and regular rhythm.      Heart sounds: Normal heart sounds.   Pulmonary:      Effort: Pulmonary effort is normal.      Breath sounds: Normal breath sounds.   Abdominal:      General: Bowel sounds are normal.      Palpations: Abdomen is soft.   Musculoskeletal:         General: Normal range of motion.      Cervical back: Normal range of motion.   Skin:     General: Skin is warm and dry.   Neurological:      Mental Status: He is alert and oriented to person, place, and time.   Psychiatric:         Behavior: Behavior normal.       Laboratory values:    Lab Results   Component Value Date    INR 1.2 (H) 07/10/2021    INR 1.3 (H) 06/16/2020    INR 1.1 07/29/2017     Lab Results   Component Value Date    WBC 6.5 09/18/2021    RBC 4.95 09/18/2021    HGB 15.8 09/18/2021    HCT 46.2 09/18/2021    MCV 93.4 09/18/2021    MCH 32.0  2021    MCHC 34.3 2021    RDW 14.2 2021     2021     No components found for: TROP   Lab Results   Component Value Date    TSH 4.023 2021      Diagnostic data:    XR chest portable 1 view: 2021  IMPRESSION: No acute disease.    Echocardiogram: 2021 (preliminary result)  Biplane ejection fraction is 27%.  Severe left ventricular systolic dysfunction.  Normal left ventricular wall thickness.  Left ventricular diastolic abnormality.  The left atrium is severely dilated.  The right atrium is dilated.  No pericardial effusion.     [id number]        Assessment and Plan:    1.  Ventricular tachycardia: Patient had multiple episodes of ventricular tachycardia yesterday on his device check requiring numerous episodes of ATP.  EF 27%.  Patient has been compliant medically.  Continue amiodarone drip and mexiletine 200 mg 3 times a day.  Telemetry overnight has revealed AV paced rhythm with no ventricular arrhythmias.  Recommend keeping potassium greater than 4.0 and magnesium around 2.0.  Will reevaluate patient's rhythm in the morning and possibly schedule patient to follow-up with Dr. Leiva in the clinic.    Thank you for allowing our participation in the care of this very pleasant patient.  Please do not hesitate to contact us at [phone] with any questions or concerns that you may have.    Tanika Sun, CNP     Plan made in conjunction with Dr. Montana.    Please note that portions of this document were generated with speech recognition software.  Reasonable efforts have been made to correct any transcriptional errors however some typographical errors may remain.

## 2021-09-19 NOTE — INTERDISCIPLINARY/THERAPY
"Case Management Admission Note    Phone # [phone]    Living Situation: Spouse/significant other Private residence            ADLs: Independent  Stairs: No    HME/CPAP: None      Oxygen: No      Home Health:No     Current Resources: None      Diabetes/supplies: Do you have Diabetes?: No  PCP: Nikhil Smith MD  Funding: Medicaid  Pharmacy:[facility] - [place], [place] - [address]    Support Person: Primary Emergency Contact: Suzan Alves, Home Phone: [phone], Mobile Phone: [phone], Relation: Significant Other  Needs transportation assistance at DC: No     Discharge Needs/Barriers:  none  Narrative: Admit with  Arrythmias, dizziness, has AICD in place. He states he feels a bit better today but not \"normal\" he was able to completed CM assessment.  Dispo: home          "

## 2021-09-19 NOTE — H&P
History and Physical    09/18/21  9:31 PM    Chief Complaint   Patient presents with   • Palpitations     PT arrives to ED co rapid heart beat and dizziness. Symptom onset approx 0900 this am. Hx Afib. Pt has a pacemaker, ([employer]).   • Dizziness       HPI  Patient is a 55 y.o. moderately obese male with significant PMH of recurrent ventricular tachycardia with AICD in place, paroxysmal atrial fibrillation on Xarelto, hypertension, coronary artery disease, cardiac stent, combined systolic and diastolic CHF, ischemic cardiomyopathy, GERD and others as listed below presented to ER with complaint of palpitation and dizziness since 9 AM today morning.  Patient's wife present at bedside during my ablation in ER.  Patient says that he had intermittent racing heart since morning which was not going away. He was also feeling fullness and heaviness in his chest.  He did not pass out. He also felt dizziness and lightheadedness in the evening which prompted him to come to ER.   He denies any fever, chills, chest pain, shortness of breath, headache, vomiting, abdominal pain, diarrhea or dysuria.  He was recently admitted in this hospital approximately 2 months ago in July 2021.    Upon arrival in ER, his AICD was interrogated which revealed sustained run of V. tach for some time in the morning between 9 AM to 1 PM.  Subsequently, amiodarone infusion protocol after its bolus dose has been started in the ER.  Now currently, he is in sinus rhythm.  He is feeling much better.  He denies any complaints at this time.  Cardiology consult to Dr. Rodriguez has been called by Dr. Murcia and had recommended to admit under hospitalist service, continue amiodarone drip per protocol and cardiology service will consult in the morning.    Significant labs: Troponin 71.4, 89 and 95.6.  Magnesium 1.9, TSH 4.02, blood sugar 117, WBC count 6500, platelet count 221,000, AST 45, potassium 4.1.  Chest x-ray shows a stable cardiomegaly.  No  acute pending,      Past Medical History:   Diagnosis Date   • CAD (coronary artery disease)    • CHF (congestive heart failure) (CMS/HCC) ([facility])    • CVA (cerebral vascular accident) (CMS/HCC) ([facility]) 2010   • Dyslipidemia    • GERD (gastroesophageal reflux disease)    • GERD (gastroesophageal reflux disease)    • Heart attack (CMS/HCC) ([facility])     x3   • Hypertension    • Ischemic cardiomyopathy    • Paroxysmal atrial fibrillation (CMS/HCC) ([facility])     On Xarelto   • V-tach (CMS/HCC) ([facility])     AICD in place       Past Surgical History:   Procedure Laterality Date   • ABLATION VT N/A 06/09/2020    Procedure: Ablation VT;  Surgeon: Wilfredo Montana MD;  Location: [facility];  Service: Electrophysiology;  Laterality: N/A;  Check with Dr. Montana in the a.m. regarding scheduling   • APPENDECTOMY     • CORONARY ARTERY STENTING  2009    2.5 X 24-mm Taxus DIMAS to first diagonal. 3.0 X 8-mm Taxus stent to proximal LAD just proximal to diagonal.   • CORONARY ARTERY STENTING  2010    3.5 X 15-mm Promus DIMAS to totally occluded LAD   • CORONARY ARTERY STENTING  2011    2.25 X 30-mm Resolute DIMAS to 2nd diagonal.  Balloon angioplasty through strut to improve blood flow to distal LAD   • CORONARY ARTERY STENTING  07/28/2017    second diagonal branch LAD Resolute 2.25 x 30-mm DIMAS   • ICD SC NEW  2011    [employer] Cognis Model #N119, Serial #[id number]. [employer] Reliance Model #0185, Serial #[id number]. LV lead Acuity Model #45+91, Serial #[id number]. Atrial lead Model #4469, Serial #[id number]       Family History   Problem Relation Age of Onset   • Heart attack Mother 62   • Other Mother         Abdominal Aortic Aneurysm   • Lung cancer Mother    • Colon cancer Father         Cause of death   • Diabetes Brother         Non-Insulin Dependent   • Heart attack Brother 51        w/ Stents   • Heart disease Brother    • Other Son         Well       Social History     Socioeconomic History   • Marital status: Single     Spouse name: Not on file   •  Number of children: Not on file   • Years of education: Not on file   • Highest education level: Not on file   Occupational History   • Not on file   Tobacco Use   • Smoking status: Former Smoker     Packs/day: 0.75     Years: 30.00     Pack years: 22.50     Types: Cigarettes     Quit date: 2020     Years since quittin.2   • Smokeless tobacco: Never Used   Substance and Sexual Activity   • Alcohol use: Not Currently     Comment: Quit drinking in    • Drug use: Not Currently   • Sexual activity: Defer   Other Topics Concern   • Not on file   Social History Narrative   • Not on file     Social Determinants of Health     Financial Resource Strain:    • Difficulty of Paying Living Expenses: Not on file   Food Insecurity:    • Worried About Running Out of Food in the Last Year: Not on file   • Ran Out of Food in the Last Year: Not on file   Transportation Needs:    • Lack of Transportation (Medical): Not on file   • Lack of Transportation (Non-Medical): Not on file   Physical Activity:    • Days of Exercise per Week: Not on file   • Minutes of Exercise per Session: Not on file   Stress:    • Feeling of Stress : Not on file   Social Connections:    • Frequency of Communication with Friends and Family: Not on file   • Frequency of Social Gatherings with Friends and Family: Not on file   • Attends Nondenominational Services: Not on file   • Active Member of Clubs or Organizations: Not on file   • Attends Club or Organization Meetings: Not on file   • Marital Status: Not on file   Intimate Partner Violence:    • Fear of Current or Ex-Partner: Not on file   • Emotionally Abused: Not on file   • Physically Abused: Not on file   • Sexually Abused: Not on file       Allergies   Allergen Reactions   • Morphine      ANXIETY   • Tramadol      ANXIETY       Current Facility-Administered Medications   Medication Dose Route Frequency Provider Last Rate Last Admin   • aspirin EC tablet 81 mg  81 mg oral Daily Jan Ernst MD       •  carvediloL (COREG) tablet 25 mg  25 mg oral 2x daily with meals Jan Ernst MD       • furosemide (LASIX) tablet 40 mg  40 mg oral Daily Jan Ernst MD       • mexiletine (MEXITIL) capsule 200 mg  200 mg oral 3x daily with meals Jan Ernst MD       • lansoprazole (PREVACID) capsule 30 mg  30 mg oral Daily at 1600 Jan Ernst MD       • prasugreL (EFFIENT) tablet 10 mg  10 mg oral Daily Jan Ernst MD       • rivaroxaban (XARELTO) tablet 20 mg  20 mg oral Daily Jan Ernst MD       • rosuvastatin (CRESTOR) tablet 40 mg  40 mg oral Nightly Jan Ernst MD       • sacubitriL-valsartan (ENTRESTO) 24-26 mg per tablet 1 tablet  1 tablet oral 2x daily Jan Ernst MD       • spironolactone (ALDACTONE) tablet 25 mg  25 mg oral 2x daily diuretic Jan Ernst MD       • amiodarone (NEXTERONE) 360 mg/200 mL (1.8 mg/mL) infusion (premix)  1 mg/min intravenous Continuous Doroteo Murcia MD 33.3 mL/hr at 09/19/21 0034 1 mg/min at 09/19/21 0034    Followed by   • amiodarone (NEXTERONE) 360 mg/200 mL (1.8 mg/mL) infusion (premix)  0.5 mg/min intravenous Continuous Doroteo Murcia MD       • sodium chloride 0.9% (NS) carrier infusion  10 mL/hr intravenous Continuous Jan Ernst MD 10 mL/hr at 09/19/21 0036 10 mL/hr at 09/19/21 0036   • acetaminophen (TYLENOL) tablet 650 mg  650 mg oral q4h PRN Jan Ernst MD       • nitroglycerin (NITROSTAT) SL tablet 0.4 mg  0.4 mg sublingual q5 min PRN Jan Ernst MD       • magnesium hydroxide (MILK OF MAGNESIA) 400 mg/5 mL oral suspension 30 mL  30 mL oral Daily PRN Jan Ernst MD       • ondansetron (ZOFRAN) injection 4 mg  4 mg intravenous q6h PRN Jan Ernst MD       • alum-mag hydroxide-simeth (MAALOX ADVANCED) suspension 30 mL  30 mL oral q4h PRN Jan Ernst MD       • zolpidem (AMBIEN) tablet 5 mg  5 mg oral Nightly PRN Jan Ernst MD       • bisacodyL (DULCOLAX) EC tablet 5 mg  5 mg oral Daily PRN Jan Ernst MD           Prior to Admission medications    Medication Sig Start Date End  Date Taking? Authorizing Provider   mexiletine (MEXITIL) 200 mg capsule Take 1 capsule (200 mg total) by mouth 3 (three) times a day with meals 7/15/21 7/15/22  Merced Newton CNP   amiodarone (PACERONE) 200 mg tablet Take 1 tablet (200 mg total) by mouth 3 (three) times a day with meals Cut down to twice daily with meals in 2 weeks 7/13/21 7/13/22  Barry Tobias MD   furosemide (LASIX) 40 mg tablet Take 1 tablet (40 mg total) by mouth daily If weight goes up 3 pounds overnight take an extra dose of Lasix x1 day 7/13/21 7/13/22  Barry Tobias MD   sacubitriL-valsartan (ENTRESTO) 24-26 mg tablet Take 1 tablet by mouth 2 (two) times a day 7/13/21 7/13/22  Barry Tobias MD   aspirin 81 mg EC tablet Take 1 tablet (81 mg total) by mouth daily 7/14/21 7/14/22  Barry Tobias MD   albuterol HFA (PROVENTIL HFA;VENTOLIN HFA) 90 mcg/actuation inhaler Inhale 2 puffs every 6 (six) hours as needed for wheezing or shortness of breath 7/13/21   Barry Tobias MD   spironolactone (ALDACTONE) 25 mg tablet Take 1 tablet (25 mg total) by mouth 2 (two) times a day 10/23/20   Nir Edwards MD   rosuvastatin (CRESTOR) 40 mg tablet Take 40 mg by mouth nightly      Historical Provider, MD   nitroglycerin (NITROSTAT) 0.4 mg SL tablet Place 0.4 mg under the tongue every 5 (five) minutes as needed for chest pain.    Historical Provider, MD   pantoprazole (PROTONIX) 40 mg EC tablet Take 40 mg by mouth daily.    Historical Provider, MD   carvedilol (COREG) 25 mg tablet Take 25 mg by mouth 2 (two) times a day with meals.    Historical Provider, MD   prasugrel (EFFIENT) 10 mg tablet Take 1 tablet (10 mg total) by mouth daily. 8/2/18   YONG Alves   rivaroxaban (XARELTO) 20 mg tablet Take 1 tablet (20 mg total) by mouth daily. 8/2/18   YONG Alves       Review of Systems  10 point review of systems have been reviewed and are grossly unremarkable except those which have been mentioned in the history of present illness above otherwise  negative.    Temp:  [36.5 °C (97.7 °F)-36.9 °C (98.4 °F)] 36.5 °C (97.7 °F)  Heart Rate:  [59-60] 60  Resp:  [15-27] 19  BP: (105-144)/(61-84) 135/79    No intake/output data recorded.    Physical Exam  Constitutional:       General: He is not in acute distress.     Appearance: Normal appearance. He is obese. He is not ill-appearing.   HENT:      Head: Normocephalic and atraumatic.      Mouth/Throat:      Mouth: Mucous membranes are dry.   Eyes:      Extraocular Movements: Extraocular movements intact.      Conjunctiva/sclera: Conjunctivae normal.      Pupils: Pupils are equal, round, and reactive to light.   Cardiovascular:      Rate and Rhythm: Normal rate and regular rhythm.      Pulses: Normal pulses.      Heart sounds: Normal heart sounds.   Pulmonary:      Effort: Pulmonary effort is normal.      Breath sounds: Normal breath sounds. No wheezing.   Abdominal:      General: Abdomen is flat. Bowel sounds are normal. There is no distension.      Palpations: Abdomen is soft.      Tenderness: There is no abdominal tenderness. There is no guarding.   Musculoskeletal:         General: Normal range of motion.      Cervical back: Normal range of motion and neck supple. No rigidity.      Right lower leg: No edema.      Left lower leg: No edema.   Skin:     General: Skin is warm and dry.   Neurological:      General: No focal deficit present.      Mental Status: He is alert and oriented to person, place, and time.      Motor: No weakness.   Psychiatric:         Mood and Affect: Mood normal.         Behavior: Behavior normal.        Basic:   Lab Results   Component Value Date     (L) 09/18/2021    K 4.1 09/18/2021     09/18/2021    CO2 24 09/18/2021    BUN 13 09/18/2021    CREATININE 0.86 09/18/2021    GLUCOSE 117 (H) 09/18/2021    CALCIUM 9.2 09/18/2021     Adult ABG: No results found for: PHART, UKR2UFJ, PO2ART, PDG8RHS, BEART, Z8OUNVFT, HGBART, KZV6QYT  CBC with Platelet:    Lab Results   Component Value  Date    WBC 6.5 09/18/2021    HGB 15.8 09/18/2021    HCT 46.2 09/18/2021     09/18/2021    RBC 4.95 09/18/2021    MCV 93.4 09/18/2021    MCH 32.0 09/18/2021    MCHC 34.3 09/18/2021    RDW 14.2 09/18/2021    MPV 8.4 09/18/2021     Comp:   Lab Results   Component Value Date     (L) 09/18/2021    K 4.1 09/18/2021     09/18/2021    CO2 24 09/18/2021    BUN 13 09/18/2021    CREATININE 0.86 09/18/2021    GLUCOSE 117 (H) 09/18/2021    CALCIUM 9.2 09/18/2021    PROT 6.9 09/18/2021    ALBUMIN 4.3 09/18/2021    AST 45 (H) 09/18/2021    ALT 85 (H) 09/18/2021    ALKPHOS 66 09/18/2021    BILITOT 1.16 09/18/2021     Coags:   Lab Results   Component Value Date    PT 14.1 (H) 07/10/2021    APTT 38.6 (H) 07/10/2021    INR 1.2 (H) 07/10/2021     Magnesium:   Lab Results   Component Value Date    MG 1.9 09/18/2021     U/A Macroscopic:    Lab Results   Component Value Date    COLORU Straw 07/10/2021    SPECGRAVU 1.003 07/10/2021    MELLY 6.0 07/10/2021    LEUKOCYTESU Negative 07/10/2021    NITRITEU Negative 07/10/2021    PROTUR Negative 07/10/2021    GLUCOSEU Negative 07/10/2021    KETONESU Negative 07/10/2021    UROBILINOGEN <2.0 07/10/2021    BLOODU Small (A) 07/10/2021     Blood (Aerobic and Anaerobic):  No results found for: BLOODCX  Cerebrospinal Fluid (CSF): No results found for: CSFCX  Urine: No results found for: URINECX    XR chest portable 1 view    Result Date: 9/18/2021  Narrative: Exam: Portable chest, single view 09/18/2021 Clinical History:  Shortness of breath Comparison(s): Available Findings: The lungs are clear. The heart size enlarged unchanged but pulmonary vascularity are within normal limits. Cardiac device     Impression: IMPRESSION: No acute disease.      Principal Problem:    Ventricular tachycardia (paroxysmal) (CMS/HCC) (HCC)  Active Problems:    Ischemic cardiomyopathy    Hypertension    Hyperlipidemia    S/P ablation of ventricular arrhythmia    Automatic implantable  cardioverter-defibrillator in situ    Presence of stent in coronary artery    On amiodarone therapy    Chronic combined systolic and diastolic CHF, NYHA class 2 and ACC/AHA stage C (CMS/HCC) ([facility])    COVID-19 ruled out by clinical criteria    Near syncope    Sustained ventricular tachycardia (CMS/HCC) ([facility])    Chronic anticoagulation    Elevated troponin    History of ventricular tachycardia    Paroxysmal atrial fibrillation (CMS/HCC) ([facility])      Assessment and Plan:    1.  Paroxysmal ventricular tachycardia with AICD in place.  AICD interrogation revealed sustained V. tach episodes sometime in between 9 AM-1 PM today.  Magnesium, potassium and TSH level within normal limits.  Magnesium level is 1.9.  Troponin slightly elevated possibly due to rapid ventricular response.  Patient denies any chest pain.  Chest x-ray shows stable cardiomegaly and no any acute infiltrates.  Amiodarone drip started in ER after bolus dose.  Admit to HVU with telemetry.  2D echocardiogram in the morning.  Continue amiodarone drip per protocol.  Magnesium sulfate 1 g IV x1 dose.  Check BMP and magnesium level in the morning.  Follow cardiology consult in the morning.    2.  Hypertension, history of sustained V. tach, paroxysmal atrial fibrillation, ischemic cardiomyopathy, history of cardiac stent, CHF and dyslipidemia.  Continue his aspirin, TPN, Xarelto, mexiletine, Entresto, Coreg, Lasix and Crestor.  Will hold his Pacerone as he is on amiodarone drip.    3.  GERD.  Continue his Protonix.    DVT prophylaxis: On Xarelto.    Plan of care discussed with the patient and his wife at bedside in detail and they verbalized understanding.    Further medical care will be resumed by day hospitalist in the morning per hospital policy.    Total time spent in reviewing the chart, writing the orders and face-to-face evaluation of the patient is 75 minutes out of which more than 50% of the time was spent in face-to-face counseling the patient and  coordinating his care. I have also discussed in entirety with ER physician Dr. Murcia regarding the management of the patient.    A voice recognition program was used to aid in documentation of this record.  Sometimes words are not printed exactly as they were spoken.  While efforts were made to carefully edit and correct any inaccuracies, some areas may be present; please take these into context.  Please contact the provider if areas are identified.    TIMOTEO CORTES MD

## 2021-09-19 NOTE — PLAN OF CARE
Problem: Cardiovascular - Adult  Goal: Maintains optimal cardiac output and hemodynamic stability  Description: INTERVENTIONS:  1. Monitor vital signs and rhythm  2. Monitor for hypotension and other signs of decreased cardiac output  3. Administer and titrate ordered vasoactive medications to optimize hemodynamic stability  4. Monitor for fluid overload/dehydration, weight gain, shortness of breath and activity intolerance  5. Monitor arterial and/or venous puncture sites for bleeding and/or hematoma  6. Assess quality of pulses, capillary refill, edema, sensation, skin color and temperature  7. Assess for signs of decreased coronary artery perfusion - ex. angina  Outcome: Progressing  Flowsheets (Taken 9/19/2021 1132)  Maintain optimal cardiac output and hemodynamic stability:   Monitor vital signs and rhythm   Monitor for hypotension and other signs of decreased cardiac output   Monitor for fluid overload/dehydration, weight gain, shortness of breath and activity intolerance   Administer and titrate ordered vasoactive medications to optimize hemodynamic stability   Monitor arterial and/or venous puncture sites for bleeding and/or hematoma   Assess for signs of decreased coronary artery perfusion - ex. angina   Assess quality of pulses, capillary refill, edema, sensation, skin color and temperature  Goal: Absence of cardiac dysrhythmias or at baseline  Description: INTERVENTIONS:  1. Continuous cardiac monitoring, monitor vital signs, obtain 12 lead EKG if indicated  2. Administer antiarrhythmic and heart rate control medications as ordered  3. Initiate emergency measures for life threatening arrhythmias  4. Monitor electrolytes and administer replacement therapy as ordered  Outcome: Progressing  Flowsheets (Taken 9/19/2021 1132)  Absence of cardiac dysrhythmias or at baseline:   Continuous cardiac monitoring, monitor vital signs, obtain 12 lead EKG if indicated   Initiate emergency measures for life threatening  arrhythmias   Administer antiarrhythmic and heart rate control medications as ordered   Monitor electrolytes and administer replacement therapy as ordered

## 2021-09-19 NOTE — PROGRESS NOTES
Provider Progress Note    Assessment/Plan        LOS: 1 day     Pete Trejo is a 55 y.o. male admitted for palpitations secondary to sustained VTach on device interrogation    Assessment    • Paroxysmal Ventricular Tachycardia -  Sustained episodes between 9am-1pm 09/18. S/p Biventricular ICD. Started on amiodarone gtt. Echo pending. Cardiology evaluation. Keep Mg>2, K>4.      Chronic Medical Conditions    • GERD - continue protonix  • Ischemic cardiomyopathy/Systolic heart failure -  EF 17.5% in 05/21  • CAD with history of myocardial infarction - aspirin, crestor, prasuguel, entresto, xarelto. Last angiorgam 7/2017 with placement of stent to second diagonal branch of LAD   • Hx of tobacco use    VTE Prophylaxis: Xarelto  Code Status/Goals of Care: Full Code  Disposition: Pending clinical improvement     Guanakito Jarquin MD  Hospitalist    A voice recognition program was used to aid in documentation of this record.  Sometime words are not presented exactly as they were spoken while efforts were made to carefully edit and correct any inaccuracies, some errors may be present .  please take this into context.  Please contact the provider if errors are identified  ____________________________________________________________________________________________________________    Subjective        No chest pain. No dizziness since admission     Objective       VITAL SIGNS:  Temp:  [36.5 °C (97.7 °F)-36.9 °C (98.4 °F)] 36.5 °C (97.7 °F)  Heart Rate:  [59-60] 60  Resp:  [15-27] 18  BP: (105-144)/(61-84) 136/79  Wt Readings from Last 3 Encounters:   09/19/21 124.8 kg (275 lb 2.2 oz)   08/17/21 124.7 kg (275 lb)   07/27/21 124.7 kg (275 lb)      Intake/Output last 3 shifts:  I/O last 3 completed shifts:  In: 1142.9 [P.O.:225; I.V.:917.9]  Out: 375 [Urine:375]  Intake/Output this shift:    Intake/Output Summary (Last 24 hours) at 9/19/2021 0652  Last data filed at 9/19/2021 0419  Gross per 24 hour   Intake 1142.88 ml   Output 375 ml    Net 767.88 ml       Physical Exam:    General: Awake, alert, appropriate and appears comfortable  HEENT: PERRLA, neck supple  Cv: regular rate rhythm, no murmurs  Pulm: Bilateral clear to auscultation, no crackles or wheeze. No respiratory distress  GI/Abdomen: Soft, nontender, nondistended, normal BS  MSK/Extremities: Warm, normal cap refill, no pedal edema  Neuro: CNII-CXII grossly normal.  Pysch: Normal affect and speech. Mood appropriate.   Derm: No rashes      RESULTS AND DECISION MAKING:  I have personally reviewed all the lab results.  Results from last 4 days   Lab Units 09/18/21  1826   WBC AUTO 10*3/uL 6.5   HEMOGLOBIN g/dL 15.8   HEMATOCRIT % 46.2   PLATELETS AUTO 10*3/uL 221     Results from last 4 days   Lab Units 09/18/21  1826   SODIUM mmol/L 134*   POTASSIUM mmol/L 4.1   CHLORIDE mmol/L 102   CO2 mmol/L 24   BUN mg/dL 13   CREATININE mg/dL 0.86   CALCIUM mg/dL 9.2   TOTAL PROTEIN g/dL 6.9   BILIRUBIN TOTAL mg/dL 1.16   ALK PHOS U/L 66   ALT U/L 85*   AST U/L 45*   GLUCOSE mg/dL 117*                     Lab Results   Component Value Date    TROPHS 71.4 (H) 09/18/2021    CEWTWG5WQ 89.0 (H) 09/18/2021    HSDELTA1 17.6 (HH) 09/18/2021    TRFLRB1BH 95.6 (H) 09/18/2021    HSDELTA2 24.2 (HH) 09/18/2021                                  No results found for: [id number]  Results from last 4 days   Lab Units 09/18/21  1826   TSH uIU/ml 4.023         No results found for: OCCULTBLD  Lab Results   Component Value Date    CALCIUM 9.2 09/18/2021    PHOS 3.3 07/12/2021     No results found for: HCGQUAL, HCGPREGUR      CULTURES:  Microbiology Results (last 21 days)     ** No results found for the last 504 hours. **          IMAGING:  I have personally reviewed the imaging.  XR chest portable 1 view  Narrative: Exam:   Portable chest, single view 09/18/2021    Clinical History:   Shortness of breath    Comparison(s):  Available    Findings:  The lungs are clear. The heart size enlarged unchanged but pulmonary  vascularity are within normal limits. Cardiac device  Impression: IMPRESSION:  No acute disease.

## 2021-09-20 VITALS
TEMPERATURE: 98.2 F | OXYGEN SATURATION: 96 % | HEIGHT: 71 IN | BODY MASS INDEX: 38.3 KG/M2 | HEART RATE: 61 BPM | RESPIRATION RATE: 18 BRPM | WEIGHT: 273.59 LBS | SYSTOLIC BLOOD PRESSURE: 108 MMHG | DIASTOLIC BLOOD PRESSURE: 70 MMHG

## 2021-09-20 LAB
ALBUMIN SERPL-MCNC: 3.7 G/DL (ref 3.5–5.3)
ALP SERPL-CCNC: 57 U/L (ref 45–115)
ALT SERPL-CCNC: 80 U/L (ref 7–52)
ANION GAP SERPL CALC-SCNC: 7 MMOL/L (ref 3–11)
AST SERPL-CCNC: 40 U/L
BILIRUB SERPL-MCNC: 0.8 MG/DL (ref 0.2–1.4)
BUN SERPL-MCNC: 9 MG/DL (ref 7–25)
CALCIUM ALBUM COR SERPL-MCNC: 8.8 MG/DL (ref 8.6–10.3)
CALCIUM SERPL-MCNC: 8.6 MG/DL (ref 8.6–10.3)
CHLORIDE SERPL-SCNC: 106 MMOL/L (ref 98–107)
CO2 SERPL-SCNC: 25 MMOL/L (ref 21–32)
CREAT SERPL-MCNC: 0.83 MG/DL (ref 0.7–1.3)
GFR SERPL CREATININE-BSD FRML MDRD: 100 ML/MIN/1.73M*2
GLUCOSE SERPL-MCNC: 124 MG/DL (ref 70–105)
POTASSIUM SERPL-SCNC: 4 MMOL/L (ref 3.5–5.1)
PROT SERPL-MCNC: 6.3 G/DL (ref 6–8.3)
SODIUM SERPL-SCNC: 138 MMOL/L (ref 135–145)

## 2021-09-20 PROCEDURE — 80053 COMPREHEN METABOLIC PANEL: CPT | Performed by: STUDENT IN AN ORGANIZED HEALTH CARE EDUCATION/TRAINING PROGRAM

## 2021-09-20 PROCEDURE — 36415 COLL VENOUS BLD VENIPUNCTURE: CPT | Performed by: STUDENT IN AN ORGANIZED HEALTH CARE EDUCATION/TRAINING PROGRAM

## 2021-09-20 PROCEDURE — 6360000200 HC RX 636 W HCPCS (ALT 250 FOR IP): Performed by: EMERGENCY MEDICINE

## 2021-09-20 PROCEDURE — 6370000100 HC RX 637 (ALT 250 FOR IP): Performed by: INTERNAL MEDICINE

## 2021-09-20 PROCEDURE — 99238 HOSP IP/OBS DSCHRG MGMT 30/<: CPT | Performed by: STUDENT IN AN ORGANIZED HEALTH CARE EDUCATION/TRAINING PROGRAM

## 2021-09-20 RX ORDER — AMIODARONE HYDROCHLORIDE 200 MG/1
200 TABLET ORAL
Qty: 270 TABLET | Refills: 3 | Status: CANCELLED | OUTPATIENT
Start: 2021-09-20 | End: 2022-09-20

## 2021-09-20 RX ORDER — MEXILETINE HYDROCHLORIDE 200 MG/1
200 CAPSULE ORAL
Qty: 270 CAPSULE | Refills: 3 | OUTPATIENT
Start: 2021-09-20 | End: 2022-09-20

## 2021-09-20 RX ADMIN — ASPIRIN 81 MG: 81 TABLET ORAL at 08:30

## 2021-09-20 RX ADMIN — CARVEDILOL 25 MG: 25 TABLET, FILM COATED ORAL at 08:30

## 2021-09-20 RX ADMIN — MEXILETINE HYDROCHLORIDE 200 MG: 200 CAPSULE ORAL at 08:30

## 2021-09-20 RX ADMIN — AMIODARONE HYDROCHLORIDE 0.5 MG/MIN: 1.8 INJECTION, SOLUTION INTRAVENOUS at 10:46

## 2021-09-20 RX ADMIN — SPIRONOLACTONE 25 MG: 25 TABLET ORAL at 08:30

## 2021-09-20 RX ADMIN — FUROSEMIDE 40 MG: 40 TABLET ORAL at 08:30

## 2021-09-20 RX ADMIN — SACUBITRIL AND VALSARTAN 1 TABLET: 24; 26 TABLET, FILM COATED ORAL at 08:30

## 2021-09-20 RX ADMIN — RIVAROXABAN 20 MG: 20 TABLET, FILM COATED ORAL at 08:30

## 2021-09-20 RX ADMIN — PRASUGREL 10 MG: 10 TABLET, FILM COATED ORAL at 08:30

## 2021-09-20 NOTE — PROGRESS NOTES
[address] [place] [address]                                                    Electrophysiology Inpatient Progress Note    Subjective    Patient ID: Pete Trejo is a 55 y.o. male.    Chief Complaint:   Chief Complaint   Patient presents with   • Palpitations     PT arrives to ED co rapid heart beat and dizziness. Symptom onset approx 0900 this am. Hx Afib. Pt has a pacemaker, ([employer]).   • Dizziness        LOS: 2 days     HPI:      Patient was continued on amiodarone drip and mexiletine 200 mg 3 times daily.    He had been in the hospital and was discharged 7/13/2021 on amiodarone 200 mg 3 times daily for 2 weeks then twice daily as well as mexiletine 200 mg twice daily.  He was also on carvedilol 25 mg twice daily.  I did contact the patient post discharge to increase the mexiletine to 200 mg 3 times daily with meals assess mexiletine level came back low.          Allergies as of 09/18/2021 - Reviewed 09/18/2021   Allergen Reaction Noted   • Morphine  07/30/2017   • Tramadol  07/30/2017       Current Facility-Administered Medications:   •  aspirin EC tablet 81 mg, 81 mg, oral, Daily, Jan Ernst MD, 81 mg at 09/20/21 0830  •  carvediloL (COREG) tablet 25 mg, 25 mg, oral, 2x daily with meals, Jan Ernst MD, 25 mg at 09/20/21 0830  •  furosemide (LASIX) tablet 40 mg, 40 mg, oral, Daily, Jan Ernst MD, 40 mg at 09/20/21 0830  •  mexiletine (MEXITIL) capsule 200 mg, 200 mg, oral, 3x daily with meals, Jan Ernst MD, 200 mg at 09/20/21 0830  •  lansoprazole (PREVACID) capsule 30 mg, 30 mg, oral, Daily at 1600, Jan Ernst MD, 30 mg at 09/19/21 1610  •  prasugreL (EFFIENT) tablet 10 mg, 10 mg, oral, Daily, Jan Ernst MD, 10 mg at 09/20/21 0830  •  rivaroxaban (XARELTO) tablet 20 mg, 20 mg, oral, Daily, Jan Ernst MD, 20 mg at 09/20/21 0830  •  rosuvastatin (CRESTOR) tablet 40 mg, 40 mg, oral, Nightly, Jan Ernst MD, 40 mg at 09/19/21 0390  •   sacubitriL-valsartan (ENTRESTO) 24-26 mg per tablet 1 tablet, 1 tablet, oral, 2x daily, Jan Ernst MD, 1 tablet at 21 0830  •  spironolactone (ALDACTONE) tablet 25 mg, 25 mg, oral, 2x daily diuretic, Jan Ernst MD, 25 mg at 21 0830  •  [COMPLETED] amiodarone 150 mg/100 mL (1.5 mg/mL) IVPB - LOADING DOSE (premix), 150 mg, intravenous, Once, Stopped at 21 **FOLLOWED BY** [] amiodarone (NEXTERONE) 360 mg/200 mL (1.8 mg/mL) infusion (premix), 1 mg/min, intravenous, Continuous, Stopped at 21 0344 **FOLLOWED BY** amiodarone (NEXTERONE) 360 mg/200 mL (1.8 mg/mL) infusion (premix), 0.5 mg/min, intravenous, Continuous, Doroteo Murcia MD, Last Rate: 16.67 mL/hr at 21, 0.5 mg/min at 21  •  sodium chloride 0.9% (NS) carrier infusion, 10 mL/hr, intravenous, Continuous, Jan Ernst MD, Stopped at 21 0201  •  acetaminophen (TYLENOL) tablet 650 mg, 650 mg, oral, q4h PRN, Jan Ernst MD  •  nitroglycerin (NITROSTAT) SL tablet 0.4 mg, 0.4 mg, sublingual, q5 min PRN, Jan Ernst MD  •  magnesium hydroxide (MILK OF MAGNESIA) 400 mg/5 mL oral suspension 30 mL, 30 mL, oral, Daily PRN, Jan Ernst MD  •  ondansetron (ZOFRAN) injection 4 mg, 4 mg, intravenous, q6h PRN, Jan Ernst MD  •  alum-mag hydroxide-simeth (MAALOX ADVANCED) suspension 30 mL, 30 mL, oral, q4h PRN, Jan Ernst MD  •  zolpidem (AMBIEN) tablet 5 mg, 5 mg, oral, Nightly PRN, Jan Ernst MD  •  bisacodyL (DULCOLAX) EC tablet 5 mg, 5 mg, oral, Daily PRN, Jan Ernst MD    Objective     Vital signs in last 24 hours:   Temp:  [36.4 °C (97.5 °F)-36.9 °C (98.4 °F)] 36.9 °C (98.4 °F)  Heart Rate:  [60] 60  Resp:  [12-20] 20  BP: (111-135)/(58-86) 135/86  Weight: 124.1 kg (273 lb 9.5 oz)    Intake/Output this shift:  No intake/output data recorded.    Intake/Output Summary (Last 24 hours) at 2021 0916  Last data filed at 2021 0627  Gross per 24 hour   Intake 1548.36 ml   Output 2225 ml   Net -676.64 ml      Cumulative I&O: Weight down 4 kg since admission    Physical Exam  Vitals reviewed.   Constitutional:       Appearance: He is well-developed. He is obese.   HENT:      Head: Normocephalic and atraumatic.   Eyes:      General: No scleral icterus.     Pupils: Pupils are equal, round, and reactive to light.   Neck:      Thyroid: No thyromegaly.      Vascular: No JVD.   Cardiovascular:      Rate and Rhythm: Normal rate and regular rhythm.      Pulses: Intact distal pulses.           Carotid pulses are 2+ on the right side and 2+ on the left side.       Dorsalis pedis pulses are 2+ on the right side and 2+ on the left side.        Posterior tibial pulses are 2+ on the right side and 2+ on the left side.      Heart sounds: Normal heart sounds. No murmur heard.   No friction rub. No gallop.       Comments: ICD left upper chest shows no sign of erosion or infection  Pulmonary:      Breath sounds: Normal breath sounds. No wheezing or rales.   Abdominal:      General: Bowel sounds are normal.      Palpations: Abdomen is soft.      Tenderness: There is no abdominal tenderness.   Musculoskeletal:         General: Normal range of motion.      Cervical back: Normal range of motion and neck supple.   Lymphadenopathy:      Cervical: No cervical adenopathy.   Skin:     General: Skin is warm and dry.      Findings: No rash.   Neurological:      General: No focal deficit present.      Mental Status: He is alert and oriented to person, place, and time.   Psychiatric:         Mood and Affect: Mood normal.         Behavior: Behavior normal.         Thought Content: Thought content normal.         Judgment: Judgment normal.                   Data Review:   BMP:  Lab Results   Component Value Date     09/20/2021    K 4.0 09/20/2021     09/20/2021    CO2 25 09/20/2021    BUN 9 09/20/2021    CREATININE 0.83 09/20/2021    GLUCOSE 124 (H) 09/20/2021    CALCIUM 8.6 09/20/2021     CBC:   Lab Results   Component Value Date    WBC  6.5 09/18/2021    RBC 4.95 09/18/2021    HGB 15.8 09/18/2021    HCT 46.2 09/18/2021     09/18/2021     CMP:  Lab Results   Component Value Date     09/20/2021    K 4.0 09/20/2021     09/20/2021    CO2 25 09/20/2021    GLUCOSE 124 (H) 09/20/2021    CREATININE 0.83 09/20/2021    CALCIUM 8.6 09/20/2021    ALBUMIN 3.7 09/20/2021    ALKPHOS 57 09/20/2021    BILITOT 0.80 09/20/2021    ALT 80 (H) 09/20/2021    AST 40 (H) 09/20/2021    BUN 9 09/20/2021    ANIONGAP 7 09/20/2021     No results found for: CKTOTAL, CKMBINDEX, TROPONINI, BNP  Lipid: No results found for: CHOL, HDL, TRIG, LDLCALC  TSH:  Lab Results   Component Value Date    TSH 4.023 09/18/2021     Magnesium:  Lab Results   Component Value Date    MG 2.0 09/19/2021     PT/INR:   No results found for: PT, INR    Tests:  US Echo complete    Result Date: 9/19/2021  Narrative: · Left ventricle is severely dilated and globally hypokinetic · Severely reduced LV function, EF 20 to 25% · Grade 1 diastolic dysfunction with normal LV filling pressures · No LV thrombus noted with Definity contrast · Normal RV size and function · Severe left atrial enlargement (LA VI 54 mL/m²) · No significant valvulopathy · No pericardial effusion · No significant change from prior echo report    XR chest portable 1 view    Result Date: 9/18/2021  Narrative: Exam: Portable chest, single view 09/18/2021 Clinical History:  Shortness of breath Comparison(s): Available Findings: The lungs are clear. The heart size enlarged unchanged but pulmonary vascularity are within normal limits. Cardiac device     Impression: IMPRESSION: No acute disease.    Radiology: NM Lexiscan cardiolite complete     Result Date: 7/11/2021  Narrative: · Technically difficult study to perform.  Gated images could not able to obtain hence cannot comment on left ventricle systolic function and contractility. · There is a large size, severe intensity fixed perfusion defect in apex, apical inferior,  apical anterior to mid anterior, apical septum and apical lateral.  There is no reversible ischemia.  Unable to obtain gated images because left ventricle is severely dilated.  Cannot comment the left ventricle systolic function and wall motion.      Echo 6/20:  Interpretation Summary    · Severe left ventricular systolic dysfunction. Multiple wall motion and Audiss as described below. Severe ischemic cardiomyopathy. EF 25%.  · The left atrium is moderately dilated.  · The right atrium is mildly dilated.  · Normal central venous pressure (0-5 mmHg).  · No pericardial effusion.  · Mild tricuspid regurgitation.  · Grade I (mild) left ventricular diastolic abnormality.  · Minimally elevated left ventricular end-diastolic pressure.  · There is no evidence of pulmonary hypertension. Estimated PA pressure 29 mmHg.  · No significant aortic or mitral valve disease.                       Assessment/Plan   Patient Active Problem List    Diagnosis Date Noted   • COVID-19 ruled out by clinical criteria 09/19/2021   • Near syncope 09/19/2021   • Sustained ventricular tachycardia (CMS/HCC) ([facility]) 09/19/2021   • Chronic anticoagulation 09/19/2021   • Elevated troponin 09/19/2021   • History of ventricular tachycardia 09/19/2021   • Paroxysmal atrial fibrillation (CMS/HCC) ([facility]) 09/19/2021   • Ventricular tachycardia (paroxysmal) (CMS/HCC) ([facility]) 09/18/2021   • Chronic combined systolic and diastolic CHF, NYHA class 2 and ACC/AHA stage C (CMS/HCC) ([facility]) 07/11/2021   • S/P ablation of ventricular arrhythmia 06/10/2020   • Automatic implantable cardioverter-defibrillator in situ 06/10/2020   • Presence of stent in coronary artery 06/10/2020   • On amiodarone therapy 06/10/2020   • Ischemic cardiomyopathy 10/30/2017   • Hypertension 10/30/2017   • Hyperlipidemia 10/30/2017       Plan:  Patient was readmitted with multiple episodes of VT requiring numerous episodes of ATP.  EF is 27%.  He had been compliant on his amiodarone and  mexiletine.  Currently on amiodarone infusion and mexiletine 200 mg 3 times daily with meals.  Need to keep potassium greater than 4 and magnesium greater than 2.  He was concerned with the battery status on his defibrillator.  Discussed with him his most recent remote transmission early September revealed 1 year remaining on the battery.  Currently on IV amiodarone.  Will resume p.o. 200 mg 3 times daily with meals.  He will continue mexiletine 200 mg 3 times daily with meals.  Again I cautioned him to make sure he is taking it on a full stomach.  Follow-up in the clinic in 1 month.    Plan discussed in conjunction with Dr. Montana.    Electronically signed by: Merced Newton CNP  9/20/2021  9:28 AM      A voice recognition program was used to aid in documentation of this record.  Sometimes words are not printed exactly as they were spoken.  While efforts were made to carefully edit and correct any inaccuracies, some errors may be present; please take these into context.  Please contact the provider if errors are identified.

## 2021-09-20 NOTE — DISCHARGE SUMMARY
Inpatient Discharge Summary    BRIEF OVERVIEW  Admitting Provider: Jan Ernst MD  Discharge Provider: Guanakito Jarquin MD  Primary Care Physician at Discharge: Nikhil Smith -[phone]    Admission Date: 9/18/2021     Discharge Date: 9/20/2021    Principal Problem:    Ventricular tachycardia (paroxysmal) (CMS/HCC) ([facility])  Active Problems:    Ischemic cardiomyopathy    Hypertension    Hyperlipidemia    S/P ablation of ventricular arrhythmia    Automatic implantable cardioverter-defibrillator in situ    Presence of stent in coronary artery    On amiodarone therapy    Chronic combined systolic and diastolic CHF, NYHA class 2 and ACC/AHA stage C (CMS/HCC) ([facility])    COVID-19 ruled out by clinical criteria    Near syncope    Sustained ventricular tachycardia (CMS/HCC) ([facility])    Chronic anticoagulation    Elevated troponin    History of ventricular tachycardia    Paroxysmal atrial fibrillation (CMS/[facility]) ([facility])      Discharge Disposition  Disposition: Home   Code Status at Discharge: Full    DETAILS OF HOSPITAL STAY    Presenting Problem/History of Present Illness  Patient is a 55 y.o. moderately obese male with significant PMH of recurrent ventricular tachycardia with AICD in place, paroxysmal atrial fibrillation on Xarelto, hypertension, coronary artery disease, cardiac stent, combined systolic and diastolic CHF, ischemic cardiomyopathy, GERD and others as listed below presented to ER with complaint of palpitation and dizziness since 9 AM today morning.  Patient's wife present at bedside during my ablation in ER.  Patient says that he had intermittent racing heart since morning which was not going away. He was also feeling fullness and heaviness in his chest.  He did not pass out. He also felt dizziness and lightheadedness in the evening which prompted him to come to ER.   He denies any fever, chills, chest pain, shortness of breath, headache, vomiting, abdominal pain, diarrhea or dysuria.  He was recently admitted in this  [facility] approximately 2 months ago in July 2021.     Upon arrival in ER, his AICD was interrogated which revealed sustained run of V. tach for some time in the morning between 9 AM to 1 PM.  Subsequently, amiodarone infusion protocol after its bolus dose has been started in the ER.  Now currently, he is in sinus rhythm.  He is feeling much better.  He denies any complaints at this time.  Cardiology consult to Dr. Rodriguez has been called by Dr. Murcia and had recommended to admit under hospitalist service, continue amiodarone drip per protocol and cardiology service will consult in the morning.     Significant labs: Troponin 71.4, 89 and 95.6.  Magnesium 1.9, TSH 4.02, blood sugar 117, WBC count 6500, platelet count 221,000, AST 45, potassium 4.1.  Chest x-ray shows a stable cardiomegaly.  No acute pending,          Hospital Course  Pete was admitted for presyncope and palpitations secondary to recurrent paroxysmal ventricular tachycardia from underlying ischemic cardiomyopathy.  This was despite medical compliance. his AICD was interrogated on admission and was functioning well.  He was started on amiodarone drip and this was discontinued on day of discharge.  He will be transitioned to oral amiodarone which he had been prescribed prior admission. he had no further events while inpatient.  Cardiology evaluated him and recommended keeping potassium and magnesium within normal limits and follow-up with Dr. Leiva in clinic.  His echocardiography did not show anything new.  He has chronic EF 20-25%.  It was recommended he maintain compliance with this medications and follow-up with cardiology as an outpatient.    Consults: Cardiology  Procedures: Echo  Pertinent Test Results: None  Pertinent Medication changes: None    Active Issues Requiring Follow-up    1.  Paroxysmal ventricular tachycardia -follow-up with cardiology Dr. Leiva October 18 in the clinic.  Continue medications as prescribed prior  admission.      Outpatient Follow-Up  Future Appointments   Date Time Provider Department Center   9/30/2021  6:30 PM SLEEP MEDICINE [facility], ROOM [facility] SLEEP    10/18/2021  1:50 PM Devorah Aleman CNP [facility]    10/27/2021  8:30 AM Nikhil Smith MD [facility] INMED    11/29/2021 10:30 AM [facility] DEVICE 1 [facility]          Test Results Pending at Discharge  Pending Labs     Order Current Status    Amiodarone level Blood, Venous In process                 Your medication list      CONTINUE taking these medications      Instructions Last Dose Given Next Dose Due   albuterol HFA 90 mcg/actuation inhaler      Inhale 2 puffs every 6 (six) hours as needed for wheezing or shortness of breath       amiodarone 200 mg tablet  Commonly known as: PACERONE      Take 1 tablet (200 mg total) by mouth 3 (three) times a day with meals Cut down to twice daily with meals in 2 weeks       aspirin 81 mg EC tablet      Take 1 tablet (81 mg total) by mouth daily       Coreg 25 mg tablet  Generic drug: carvediloL           Crestor 40 mg tablet  Generic drug: rosuvastatin           furosemide 40 mg tablet  Commonly known as: LASIX      Take 1 tablet (40 mg total) by mouth daily If weight goes up 3 pounds overnight take an extra dose of Lasix x1 day       mexiletine 200 mg capsule  Commonly known as: MEXITIL      Take 1 capsule (200 mg total) by mouth 3 (three) times a day with meals       Nitrostat 0.4 mg SL tablet  Generic drug: nitroglycerin           pantoprazole 40 mg EC tablet  Commonly known as: PROTONIX           prasugreL 10 mg tablet  Commonly known as: EFFIENT      Take 1 tablet (10 mg total) by mouth daily.       rivaroxaban 20 mg tablet  Commonly known as: Xarelto      Take 1 tablet (20 mg total) by mouth daily.       sacubitriL-valsartan 24-26 mg tablet  Commonly known as: ENTRESTO      Take 1 tablet by mouth 2 (two) times a day       spironolactone 25 mg tablet  Commonly known as: ALDACTONE      Take 1 tablet (25 mg  total) by mouth 2 (two) times a day              Physical Exam at Discharge  Discharge Condition: good  Heart Rate: 60  Resp: 20  BP: 135/86  Temp: 36.9 °C (98.4 °F)  Weight: 124.1 kg (273 lb 9.5 oz)    Constitutional:  Well-developed well-nourished. No apparent distress.  Respiratory: Lungs clear to auscultation. No wheezes, rales, or rhonchi.  Cardiovascular: Regular. Normal S1-S2. No murmurs. No JVD. No peripheral edema.  Abdomen: Nontender, nondistended  Extremities: No cyanosis, no swelling, no edema. No calf tenderness.  Skin: Warm dry. No rashes, no jaundice, no lesions.  Neuro: Alert and oriented ×3. nonfocal.

## 2021-09-20 NOTE — INTERDISCIPLINARY/THERAPY
Case Management Discharge Note  [id number]    Discharge Disposition: HO    Transportation: family    Specialty Referrals: cardiology    Support System Notified: per patient

## 2021-09-21 ENCOUNTER — PATIENT OUTREACH (OUTPATIENT)
Dept: FAMILY MEDICINE | Facility: CLINIC | Age: 56
End: 2021-09-21

## 2021-09-21 NOTE — Clinical Note
TCM call completed.  Unable to schedule patient within the 14 day timeframe, please follow-up with pt.  Patient discharged on 9/20/21

## 2021-09-21 NOTE — PROGRESS NOTES
Pete Trejo was contacted following recent hospitalization at [facility]. The patient was discharged from the hospital on 9/20/2021 .The Discharge Summary and After Visit Summary were reviewed. The patient's main diagnosis during the hospitalization was fast heart beat.  Followup appointment: NEEDED with PCP. Any additional testing and labs will be discussed at the patient's upcoming post-hospital follow up appointment.    Transitional Care Management Follow Up (most recent)     Transitional Care Management - 09/21/21 0959        OTHER    Date of post hospital outreach 09/21/21     Contact Status Contact done     Speaking with the Patient or Patient's Caregiver? Patient     Is the patient on the Diabetes registry? N     Were patient's home medications changed or did they have any new medications added during the hospitalization? N     Did someone go over the discharge summary with the patient or the patient's caregiver and discuss the medications listed on it? Y     Does the patient or patient's caregiver have any questions about the medication changes? No     Patient verbalized understanding of when to contact health care provider or when to get help right away? Y     Discharge instructions reviewed with patient or patient's caregiver and all questions answered? Y     Is there a Home Health/DME Plan being enacted? Please note name of HH agency, DME vendor, contacted/received N     Does patient have psychosocial issues that might impact their health status? None     Does patient have financial barriers to care? None                  Juana Gold RN  September 21, 2021 10:07 AM

## 2021-09-22 LAB
AMIODARONE SERPL-MCNC: 1.4 MCG/ML
DESETHYLAMIODARONE SERPL-MCNC: 0.9 MCG/ML

## 2021-09-28 ENCOUNTER — OFFICE VISIT (OUTPATIENT)
Dept: INTERNAL MEDICINE | Facility: CLINIC | Age: 56
End: 2021-09-28
Payer: COMMERCIAL

## 2021-09-28 VITALS
OXYGEN SATURATION: 96 % | DIASTOLIC BLOOD PRESSURE: 70 MMHG | RESPIRATION RATE: 16 BRPM | HEART RATE: 60 BPM | SYSTOLIC BLOOD PRESSURE: 116 MMHG | TEMPERATURE: 98.1 F | BODY MASS INDEX: 38.63 KG/M2 | WEIGHT: 277 LBS

## 2021-09-28 DIAGNOSIS — I48.0 PAROXYSMAL ATRIAL FIBRILLATION (CMS/HCC): ICD-10-CM

## 2021-09-28 DIAGNOSIS — I10 ESSENTIAL HYPERTENSION: ICD-10-CM

## 2021-09-28 DIAGNOSIS — Z98.890 S/P ABLATION OF VENTRICULAR ARRHYTHMIA: ICD-10-CM

## 2021-09-28 DIAGNOSIS — I50.42 CHRONIC COMBINED SYSTOLIC AND DIASTOLIC CHF, NYHA CLASS 2 AND ACC/AHA STAGE C (CMS/HCC): Primary | ICD-10-CM

## 2021-09-28 DIAGNOSIS — Z86.79 S/P ABLATION OF VENTRICULAR ARRHYTHMIA: ICD-10-CM

## 2021-09-28 PROCEDURE — 99214 OFFICE O/P EST MOD 30 MIN: CPT | Performed by: NURSE PRACTITIONER

## 2021-09-28 ASSESSMENT — ENCOUNTER SYMPTOMS
DIARRHEA: 0
COUGH: 0
FATIGUE: 0
SLEEP DISTURBANCE: 0
FREQUENCY: 0
ABDOMINAL PAIN: 0
EYES NEGATIVE: 1
ENDOCRINE NEGATIVE: 1
DIFFICULTY URINATING: 0
WEAKNESS: 0
NUMBNESS: 0
HEADACHES: 0
LIGHT-HEADEDNESS: 0
NERVOUS/ANXIOUS: 0
CONSTITUTIONAL NEGATIVE: 1
CONSTIPATION: 0
CHEST TIGHTNESS: 0
CARDIOVASCULAR NEGATIVE: 1
MUSCULOSKELETAL NEGATIVE: 1
DIZZINESS: 0
NAUSEA: 0
SHORTNESS OF BREATH: 0

## 2021-09-28 ASSESSMENT — PAIN SCALES - GENERAL: PAINLEVEL: 0-NO PAIN

## 2021-09-28 NOTE — PROGRESS NOTES
Subjective      Patient ID: Pete Trejo is a 56 y.o. male.    Chief Complaint   Patient presents with   • Hospital Follow Up        HPI  HPI   Admission Date: 9/18/2021     Discharge Date: 9/20/2021  Patient is a 55 y.o. moderately obese male with significant PMH of recurrent ventricular tachycardia with AICD in place, paroxysmal atrial fibrillation on Xarelto, hypertension, coronary artery disease, cardiac stent, combined systolic and diastolic CHF, ischemic cardiomyopathy, GERD and others as listed below presented to ER with complaint of palpitation and dizziness.  Patient's wife present at bedside during my ablation in ER.  Patient says that he had intermittent racing heart since morning which was not going away. He was also feeling fullness and heaviness in his chest.  He did not pass out. He also felt dizziness and lightheadedness in the evening which prompted him to come to ER.   He denies any fever, chills, chest pain, shortness of breath, headache, vomiting, abdominal pain, diarrhea or dysuria.  He was recently admitted in this hospital approximately 2 months ago in July 2021.    Admitted for presyncope and palpitations secondary to recurrent paroxysmal ventricular tachycardia from underlying ischemic cardiomyopathy.  This was despite medical compliance. his AICD was interrogated on admission and was functioning well.  He was started on amiodarone drip and this was discontinued on day of discharge.  He will be transitioned to oral amiodarone which he had been prescribed prior admission. he had no further events while inpatient.  Cardiology evaluated him and recommended keeping potassium and magnesium within normal limits and follow-up with Dr. Leiva in clinic.  His echocardiography did not show anything new.  He has chronic EF 20-25%.  It was recommended he maintain compliance with this medications and follow-up with cardiology as an outpatient.  Principal Problem:    Ventricular tachycardia  (paroxysmal) (CMS/HCC) (HCC)  Active Problems:    Ischemic cardiomyopathy    Hypertension    Hyperlipidemia    S/P ablation of ventricular arrhythmia    Automatic implantable cardioverter-defibrillator in situ    Presence of stent in coronary artery    On amiodarone therapy    Chronic combined systolic and diastolic CHF, NYHA class 2 and ACC/AHA stage C (CMS/HCC) (HCC)    COVID-19 ruled out by clinical criteria    Near syncope    Sustained ventricular tachycardia (CMS/HCC) (HCC)    Chronic anticoagulation    Elevated troponin    History of ventricular tachycardia    Paroxysmal atrial fibrillation (CMS/HCC) (HCC)  ROS  Review of Systems   Constitutional: Negative.  Negative for fatigue.   HENT: Negative.    Eyes: Negative.    Respiratory: Negative for cough, chest tightness and shortness of breath.    Cardiovascular: Negative.    Gastrointestinal: Negative for abdominal pain, constipation, diarrhea and nausea.   Endocrine: Negative.    Genitourinary: Negative for difficulty urinating, frequency and urgency.   Musculoskeletal: Negative.    Skin: Negative.    Neurological: Negative for dizziness, weakness, light-headedness, numbness and headaches.   Psychiatric/Behavioral: Negative for behavioral problems, self-injury, sleep disturbance and suicidal ideas. The patient is not nervous/anxious.         Comprehensive Medical and Social History  Patient Active Problem List   Diagnosis   • Ischemic cardiomyopathy   • Hypertension   • Hyperlipidemia   • S/P ablation of ventricular arrhythmia   • Automatic implantable cardioverter-defibrillator in situ   • Presence of stent in coronary artery   • On amiodarone therapy   • Chronic combined systolic and diastolic CHF, NYHA class 2 and ACC/AHA stage C (CMS/HCC) (HCC)   • Ventricular tachycardia (paroxysmal) (CMS/HCC) (HCC)   • COVID-19 ruled out by clinical criteria   • Near syncope   • Sustained ventricular tachycardia (CMS/HCC) (HCC)   • Chronic anticoagulation   • Elevated  troponin   • History of ventricular tachycardia   • Paroxysmal atrial fibrillation (CMS/HCC) ([facility])     Past Medical History:   Diagnosis Date   • CAD (coronary artery disease)    • CHF (congestive heart failure) (CMS/HCC) ([facility])    • CVA (cerebral vascular accident) (CMS/HCC) ([facility]) 2010   • Dyslipidemia    • GERD (gastroesophageal reflux disease)    • GERD (gastroesophageal reflux disease)    • Heart attack (CMS/HCC) ([facility])     x3   • Hypertension    • Ischemic cardiomyopathy    • Paroxysmal atrial fibrillation (CMS/[facility]) ([facility])     On Xarelto   • V-tach (CMS/HCC) ([facility])     AICD in place     Past Surgical History:   Procedure Laterality Date   • ABLATION VT N/A 06/09/2020    Procedure: Ablation VT;  Surgeon: Wilfredo Montana MD;  Location: [facility] EP Lab;  Service: Electrophysiology;  Laterality: N/A;  Check with Dr. Montana in the a.m. regarding scheduling   • APPENDECTOMY     • CORONARY ARTERY STENTING  2009    2.5 X 24-mm Taxus DIMAS to first diagonal. 3.0 X 8-mm Taxus stent to proximal LAD just proximal to diagonal.   • CORONARY ARTERY STENTING  2010    3.5 X 15-mm Promus DIMAS to totally occluded LAD   • CORONARY ARTERY STENTING  2011    2.25 X 30-mm Resolute DIMAS to 2nd diagonal.  Balloon angioplasty through strut to improve blood flow to distal LAD   • CORONARY ARTERY STENTING  07/28/2017    second diagonal branch LAD Resolute 2.25 x 30-mm DIMAS   • ICD SC NEW  2011    [employer] [place] [employer] Cognis Model #N119, Serial #[id number]. [employer]otak Reliance Model #0185, Serial #[id number]. LV lead Acuity Model #45+91, Serial #[id number]. Atrial lead Model #4469, Serial #[id number]     Allergies   Allergen Reactions   • Morphine      ANXIETY   • Tramadol      ANXIETY     Current Outpatient Medications   Medication Sig Dispense Refill   • mexiletine (MEXITIL) 200 mg capsule Take 1 capsule (200 mg total) by mouth 3 (three) times a day with meals 270 capsule 3   • amiodarone (PACERONE) 200 mg tablet Take 1 tablet (200 mg total) by mouth 3 (three) times a  day with meals Cut down to twice daily with meals in 2 weeks 270 tablet 3   • furosemide (LASIX) 40 mg tablet Take 1 tablet (40 mg total) by mouth daily If weight goes up 3 pounds overnight take an extra dose of Lasix x1 day 90 tablet 3   • sacubitriL-valsartan (ENTRESTO) 24-26 mg tablet Take 1 tablet by mouth 2 (two) times a day 180 tablet 3   • aspirin 81 mg EC tablet Take 1 tablet (81 mg total) by mouth daily 90 tablet 3   • albuterol HFA (PROVENTIL HFA;VENTOLIN HFA) 90 mcg/actuation inhaler Inhale 2 puffs every 6 (six) hours as needed for wheezing or shortness of breath 1 Inhaler 1   • spironolactone (ALDACTONE) 25 mg tablet Take 1 tablet (25 mg total) by mouth 2 (two) times a day 180 tablet 3   • rosuvastatin (CRESTOR) 40 mg tablet Take 40 mg by mouth nightly       • nitroglycerin (NITROSTAT) 0.4 mg SL tablet Place 0.4 mg under the tongue every 5 (five) minutes as needed for chest pain.     • pantoprazole (PROTONIX) 40 mg EC tablet Take 40 mg by mouth daily.     • carvedilol (COREG) 25 mg tablet Take 25 mg by mouth 2 (two) times a day with meals.     • prasugrel (EFFIENT) 10 mg tablet Take 1 tablet (10 mg total) by mouth daily. 30 tablet 1   • rivaroxaban (XARELTO) 20 mg tablet Take 1 tablet (20 mg total) by mouth daily. 30 tablet 1     No current facility-administered medications for this visit.     Social History     Occupational History   • Not on file   Tobacco Use   • Smoking status: Former Smoker     Packs/day: 0.75     Years: 30.00     Pack years: 22.50     Types: Cigarettes     Quit date: 2020     Years since quittin.3   • Smokeless tobacco: Never Used   Substance and Sexual Activity   • Alcohol use: Not Currently     Comment: Quit drinking in    • Drug use: Not Currently   • Sexual activity: Defer   Social History Narrative   • Not on file       Physical Exam  /70 (BP Location: Left arm, Patient Position: Sitting, Cuff Size: Large Adult)   Pulse 60   Temp 36.7 °C (98.1 °F)  (Temporal)   Resp 16   Wt 125.6 kg (277 lb)   SpO2 96%   BMI 38.63 kg/m²     GENERAL: Patient is alert and oriented, in no acute distress.  HEENT: Normocephalic, atraumatic. EOMI, PERRLA, sclerae anicteric.  Oropharynx within normal limits.  No tenderness to palpation of sinuses.    NECK: No cervical lymphadenopathy.  No carotid bruits, thyromegaly, or supraclavicular lymph nodes appreciated.  Trachea is midline.  LUNGS:  Clear to auscultation bilaterally, both anterior and posterior.  CARDIOVASCULAR EXAM:Regular rate and rhythm without murmur, rub, or gallop.    ABDOMEN:  Soft. Positive bowel sounds in all four quadrants.  No rebound. No guarding.  No hepatosplenomegaly or renal bruits appreciated.  Nontender, nondistended.  EXTREMITIES:  No clubbing, cyanosis, or edema.    Assessment/Plan     Problem List Items Addressed This Visit        Cardiovascular and Mediastinum    Chronic combined systolic and diastolic CHF, NYHA class 2 and ACC/AHA stage C (CMS/HCC) (HCC) - Primary    Paroxysmal atrial fibrillation (CMS/HCC) (HCC)       Circulatory    Hypertension       Other    S/P ablation of ventricular arrhythmia           Plan of care is already established per patient and discharge summary.  Patient scheduled for sleep study tomorrow and will follow up with cardiology on October 18..  To Dr. Crenshaw follow-up with him on 27 October.  Patient is very well read and has excellent knowledge of his cardiac health.  Patient return to clinic as directed by plan of care.      JACKY TALAMANTES CNP  2:19 PM, 9/28/2021.        A voice recognition program was used to aid in documentation of this record. Sometimes words are not presented exactly as they were spoken.  While efforts were made to carefully edit and correct any inaccuracies, some errors may be present.  Please take this into context.  Please contact the provider if errors are identified.

## 2021-09-30 ENCOUNTER — ANCILLARY PROCEDURE (OUTPATIENT)
Dept: SLEEP MEDICINE | Facility: HOSPITAL | Age: 56
End: 2021-09-30
Payer: COMMERCIAL

## 2021-09-30 VITALS
RESPIRATION RATE: 18 BRPM | HEART RATE: 60 BPM | HEIGHT: 71 IN | DIASTOLIC BLOOD PRESSURE: 71 MMHG | BODY MASS INDEX: 38.5 KG/M2 | WEIGHT: 275 LBS | SYSTOLIC BLOOD PRESSURE: 118 MMHG | OXYGEN SATURATION: 94 %

## 2021-09-30 PROCEDURE — 95811 POLYSOM 6/>YRS CPAP 4/> PARM: CPT | Mod: 26 | Performed by: PSYCHIATRY & NEUROLOGY

## 2021-09-30 PROCEDURE — 95811 POLYSOM 6/>YRS CPAP 4/> PARM: CPT

## 2021-10-01 ENCOUNTER — CONSULT (OUTPATIENT)
Dept: SLEEP MEDICINE | Facility: HOSPITAL | Age: 56
End: 2021-10-01
Payer: COMMERCIAL

## 2021-10-01 DIAGNOSIS — Z95.810 AUTOMATIC IMPLANTABLE CARDIOVERTER-DEFIBRILLATOR IN SITU: ICD-10-CM

## 2021-10-01 DIAGNOSIS — G47.19 EXCESSIVE DAYTIME SLEEPINESS: ICD-10-CM

## 2021-10-01 DIAGNOSIS — I48.0 PAROXYSMAL ATRIAL FIBRILLATION (CMS/HCC): ICD-10-CM

## 2021-10-01 DIAGNOSIS — I25.5 ISCHEMIC CARDIOMYOPATHY: ICD-10-CM

## 2021-10-01 DIAGNOSIS — I50.42 CHRONIC COMBINED SYSTOLIC AND DIASTOLIC CHF, NYHA CLASS 2 AND ACC/AHA STAGE C (CMS/HCC): ICD-10-CM

## 2021-10-01 DIAGNOSIS — I10 ESSENTIAL HYPERTENSION: ICD-10-CM

## 2021-10-01 DIAGNOSIS — G47.33 OBSTRUCTIVE SLEEP APNEA: Primary | ICD-10-CM

## 2021-10-01 DIAGNOSIS — R06.83 SNORING: ICD-10-CM

## 2021-10-01 PROCEDURE — 99204 OFFICE O/P NEW MOD 45 MIN: CPT

## 2021-10-01 NOTE — PROGRESS NOTES
HPI:  Pete Trejo is an 56 y.o. male.  The recent portable 4-channel home sleep study performed August 17 confirmed the presence of obstructive sleep apnea with 39.1 apneas/hypopneas per hour and a minimum O2 sat of 80%.  In addition he did complete a quality of life assessment index and sleep questionnaire for this visit.    He is here with complaint of being tired all the time.  He has had multiple problems.  He has had snoring but in addition has had stroke and significant cardiac difficulty.  He believes that he sleeps 5 or 6 hours in a typical night.  He has had loud snoring.  Apneas have been witnessed.  He feels like he gets too little sleep at night and does have daytime hypersomnolence.  He is often sleepy throughout the day.  He notes fatigue and lack of energy.  He has had action filled dreams but denies other parasomnias.  He denies trouble with memory, concentration or focus.  He has had episodes of weakness with emotional situations.  He denies other narcoleptic symptoms.  He denies leg symptoms interfering with sleep.  Sleep has not impacted his quality of life nor been associated with emotional impairments.    [place] sleepiness score is 12/24.    Habits include consumption of no alcohol.  He denies caffeine consumption.  He quit smoking 2 years ago but did smoke a pack per day for 20 years.    Past Medical History:   Diagnosis Date   • CAD (coronary artery disease)    • CHF (congestive heart failure) (CMS/[facility]) ([facility])    • CVA (cerebral vascular accident) (CMS/HCC) ([facility]) 2010   • Dyslipidemia    • GERD (gastroesophageal reflux disease)    • GERD (gastroesophageal reflux disease)    • Heart attack (CMS/HCC) ([facility])     x3   • Hypertension    • Ischemic cardiomyopathy    • Paroxysmal atrial fibrillation (CMS/HCC) ([facility])     On Xarelto   • V-tach (CMS/HCC) ([facility])     AICD in place        Past Surgical History:   Procedure Laterality Date   • ABLATION VT N/A 06/09/2020    Procedure: Ablation VT;   Surgeon: Wilfredo Montana MD;  Location: [facility] EP Lab;  Service: Electrophysiology;  Laterality: N/A;  Check with Dr. Montana in the a.m. regarding scheduling   • APPENDECTOMY     • CORONARY ARTERY STENTING  2009    2.5 X 24-mm Taxus DIMAS to first diagonal. 3.0 X 8-mm Taxus stent to proximal LAD just proximal to diagonal.   • CORONARY ARTERY STENTING  2010    3.5 X 15-mm Promus DIMAS to totally occluded LAD   • CORONARY ARTERY STENTING  2011    2.25 X 30-mm Resolute DIMAS to 2nd diagonal.  Balloon angioplasty through strut to improve blood flow to distal LAD   • CORONARY ARTERY STENTING  07/28/2017    second diagonal branch LAD Resolute 2.25 x 30-mm DIMAS   • ICD SC NEW  2011    [employer] [employer] Cognis Model #N119, Serial #[id number]. [employer] Reliance Model #0185, Serial #[id number]. LV lead Acuity Model #45+91, Serial #[id number]. Atrial lead Model #4469, Serial #[id number]         Current Outpatient Medications:   •  mexiletine (MEXITIL) 200 mg capsule, Take 1 capsule (200 mg total) by mouth 3 (three) times a day with meals, Disp: 270 capsule, Rfl: 3  •  amiodarone (PACERONE) 200 mg tablet, Take 1 tablet (200 mg total) by mouth 3 (three) times a day with meals Cut down to twice daily with meals in 2 weeks, Disp: 270 tablet, Rfl: 3  •  furosemide (LASIX) 40 mg tablet, Take 1 tablet (40 mg total) by mouth daily If weight goes up 3 pounds overnight take an extra dose of Lasix x1 day, Disp: 90 tablet, Rfl: 3  •  sacubitriL-valsartan (ENTRESTO) 24-26 mg tablet, Take 1 tablet by mouth 2 (two) times a day, Disp: 180 tablet, Rfl: 3  •  aspirin 81 mg EC tablet, Take 1 tablet (81 mg total) by mouth daily, Disp: 90 tablet, Rfl: 3  •  albuterol HFA (PROVENTIL HFA;VENTOLIN HFA) 90 mcg/actuation inhaler, Inhale 2 puffs every 6 (six) hours as needed for wheezing or shortness of breath, Disp: 1 Inhaler, Rfl: 1  •  spironolactone (ALDACTONE) 25 mg tablet, Take 1 tablet (25 mg total) by mouth 2 (two) times a day, Disp: 180 tablet, Rfl: 3  •   rosuvastatin (CRESTOR) 40 mg tablet, Take 40 mg by mouth nightly  , Disp: , Rfl:   •  nitroglycerin (NITROSTAT) 0.4 mg SL tablet, Place 0.4 mg under the tongue every 5 (five) minutes as needed for chest pain., Disp: , Rfl:   •  pantoprazole (PROTONIX) 40 mg EC tablet, Take 40 mg by mouth daily., Disp: , Rfl:   •  carvedilol (COREG) 25 mg tablet, Take 25 mg by mouth 2 (two) times a day with meals., Disp: , Rfl:   •  prasugrel (EFFIENT) 10 mg tablet, Take 1 tablet (10 mg total) by mouth daily., Disp: 30 tablet, Rfl: 1  •  rivaroxaban (XARELTO) 20 mg tablet, Take 1 tablet (20 mg total) by mouth daily., Disp: 30 tablet, Rfl: 1    Allergies   Allergen Reactions   • Morphine      ANXIETY   • Tramadol      ANXIETY       Family History   Problem Relation Age of Onset   • Heart attack Mother 62   • Other Mother         Abdominal Aortic Aneurysm   • Lung cancer Mother    • Colon cancer Father         Cause of death   • Diabetes Brother         Non-Insulin Dependent   • Heart attack Brother 51        w/ Stents   • Heart disease Brother    • Other Son         Well       Social History     Socioeconomic History   • Marital status: Single     Spouse name: Not on file   • Number of children: Not on file   • Years of education: Not on file   • Highest education level: Not on file   Occupational History   • Not on file   Tobacco Use   • Smoking status: Former Smoker     Packs/day: 0.75     Years: 30.00     Pack years: 22.50     Types: Cigarettes     Quit date: 2020     Years since quittin.3   • Smokeless tobacco: Never Used   Substance and Sexual Activity   • Alcohol use: Not Currently     Comment: Quit drinking in    • Drug use: Not Currently   • Sexual activity: Defer   Other Topics Concern   • Not on file   Social History Narrative   • Not on file     Social Determinants of Health     Financial Resource Strain:    • Difficulty of Paying Living Expenses: Not on file   Food Insecurity:    • Worried About Running Out of  Food in the Last Year: Not on file   • Ran Out of Food in the Last Year: Not on file   Transportation Needs:    • Lack of Transportation (Medical): Not on file   • Lack of Transportation (Non-Medical): Not on file   Physical Activity:    • Days of Exercise per Week: Not on file   • Minutes of Exercise per Session: Not on file   Stress:    • Feeling of Stress : Not on file   Social Connections:    • Frequency of Communication with Friends and Family: Not on file   • Frequency of Social Gatherings with Friends and Family: Not on file   • Attends Qu[employer] Services: Not on file   • Active Member of Clubs or Organizations: Not on file   • Attends Club or Organization Meetings: Not on file   • Marital Status: Not on file   Intimate Partner Violence:    • Fear of Current or Ex-Partner: Not on file   • Emotionally Abused: Not on file   • Physically Abused: Not on file   • Sexually Abused: Not on file   He is single and lives here in [place],.  He is not working outside the home.  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    Review of Systems   He did complete a full 14 point review of systems in the questionnaire for this visit.  Pertinent positives include unexpected weight change.  He notes dental problems as well as rhinorrhea and tinnitus.  He notes apnea.  There has been shortness of breath and wheezing.  He notes urinary urgency.  Dizziness has been noted as well as lightheadedness and numbness.  He notes tremor and weakness.  Easy bruisability has been noted.  Has had snoring, daytime sleepiness, nocturia, difficulty initiating and maintaining sleep.  He denies other pertinent positives.    I have reviewed and discussed the entire history above with the patient.    Vital signs last night reveal height 71 inches, weight 275 pounds, BMI is 38.4, pulse 60, respiration 18, blood pressure 118/71, SPO2 94%, pain level 0, neck circumference 20 inches, [place] sleepiness score  12/24.      General: No acute distress   HEENT: Head is atraumatic. Pharynx is clear. Mucus membranes are moist. no oral lesions   Neck: Supple. No carotid bruits.   Heart: Regular rate and rhythm. No murmurs noted.   Lungs: Clear to auscultation; normal respiratory effort     Extremities: No edema or cyanosis. peripheral pulses intact       Neurological exam:   Mental status: Alert and oriented x 3. Speech and language are normal. Attention and concentration are normal. Recent and remote memory intact. Fund of knowledge normal. Mood and affect are normal.  Cranial nerves: CN I: not tested. CN II: Pupils equal and reactive. Visual fields are full to confrontation. Fundoscopic exam is normal bilaterally. CN III, IV,VI: extraocular motility is intact. No nystagmus. Normal smooth pursuit and saccade initiation. CN V: facial sensation is intact to light touch in V1, V2, and V3 distribution. CN VII: Muscles of facial expression are strong and symmetric. CN VIII: hearing is grossly intact. CN IX,X: Palate elevates symmetrically. CN XI: sternocleidomastoid and trapezius are normal. CN XII: tongue protrudes in midline.   Motor: Normal tone. No abnormal movements. No pronator drift.   Proximal/Distal RUE 5/5 Proximal/Distal LUE 5/5   Proximal/Distal RLE 5/5 Proximal/Distal LLE 5/5   Sensation: Intact to light touch and  pin prick throughout. Normal vibration.  Reflexes: (R/L) Biceps 2+/2+ Triceps 2+/2+ Brachioradialis 2+/2+ Patellar 2+/2+ Achilles 2+/2+ Plantar response downgoing bilaterally   Coordination: Intact to finger to nose and heel to shin. Also FFM and HENOK are normal.  Gait: Normal station and gait. Able to heel, toe, and tandem walk without difficulty.  Romberg was absent.  The patient turns without difficulty.    I have completely reviewed and discussed the sleep study with the patient and the technicians.  I also gave the patient pages from the sleep study demonstrating fairly normal sleep as well as his  difficulty with sleep apnea.      Assessment    1.  Severe obstructive sleep apnea as confirmed by the recent portable 4-channel home sleep study from August 17 with 39.1 apneas/hypopneas per hour and a minimum O2 sat of 80%.  With CPAP titration he did have some difficulty with intolerance.  He did have some treatment emergent central sleep apneas.  During the diagnostic component he did not have any centrals.  It appears that tolerance is the major problem here.    2.  Daytime hypersomnolence    3.  Snoring    4.  Essential hypertension    5.  History of 3 heart attacks    6.  Congestive heart failure with combined systolic and diastolic involvement.    7.  Ischemic cardiomyopathy with cardiac ejection fraction of 20- 25%.    8.  Paroxysmal atrial fibrillation    Plan    1.  The initial results of the sleep study were reviewed with the patient and the technicians    2.  CPAP is being prescribed at 8 cm water through a nasal mask with heated humidifier and exhalation unloading her heated tubing    3.  I am asking the patient to begin using CPAP just sitting in a recliner watching TV for 30 to 60 minutes at a time for a week or 2 before you can try using this in bed.  He definitely struggled with tolerance with the CPAP.    4.  Certainly further review with repeat study in the future could be pursued.  The patient's cardiac ejection fraction was low enough that if there were problems with persistent central apneas ASV would not be a real consideration.  It looks like he has been trouble was obstructive based on the diagnostic component of the study.    5.  Follow-up in sleep clinic will be coordinated with patient for 6 to 8 weeks.    8.  Patient does have an AICD in situ.    The plan for this visit has been reviewed.  The patient and/or the patient's surrogate has expressed a good understanding of the assessment and recommendation from today's visit.  Verbal and/or written instructions have been included.  No  apparent barriers to understanding this information.  Patient's questions were addressed.  A total of 45 minutes was required for this visit.  Greater than 50% of this time was spent in direct face-to-face communication with the patient and/or surrogate caregiver to provide counseling and coordination of care for sleep difficulty.  More than 45 minutes of time was involved in examining this patient, obtaining further history from him, reviewing the sleep questionnaire with him that he completed for this visit, reviewing electronic health records, giving him pages from the sleep study and discussing sleep apnea and normal sleep, discussing CPAP equipment and the vendors and plans for management and follow-up, dictating notes and writing orders..           Dragon voice recognition program was used to aid in this documentation which might generate inaccuracies despite efforts made to avoid them.  Please take it into context and contact the provider if areas of questionable text are identified.

## 2021-10-01 NOTE — LETTER
[facility]  [address] [place] [address] [place] [address]  [phone]  Dept: [phone]  October 1, 2021     Nikhil Smith MD  [address] [facility] [place] [address]    Patient: Pete Trejo   YOB: 1965   Date of Visit: 10/1/2021       To:  Nikhil Smith MD    Our mutual patient, Pete Trejo, was seen in my office on 10/1/2021. Below are my notes.     Dar Piña MD  10/1/2021  9:39 AM  Signed    HPI:  Pete Trejo is an 56 y.o. male.  The recent portable 4-channel home sleep study performed August 17 confirmed the presence of obstructive sleep apnea with 39.1 apneas/hypopneas per hour and a minimum O2 sat of 80%.  In addition he did complete a quality of life assessment index and sleep questionnaire for this visit.    He is here with complaint of being tired all the time.  He has had multiple problems.  He has had snoring but in addition has had stroke and significant cardiac difficulty.  He believes that he sleeps 5 or 6 hours in a typical night.  He has had loud snoring.  Apneas have been witnessed.  He feels like he gets too little sleep at night and does have daytime hypersomnolence.  He is often sleepy throughout the day.  He notes fatigue and lack of energy.  He has had action filled dreams but denies other parasomnias.  He denies trouble with memory, concentration or focus.  He has had episodes of weakness with emotional situations.  He denies other narcoleptic symptoms.  He denies leg symptoms interfering with sleep.  Sleep has not impacted his quality of life nor been associated with emotional impairments.    [place] sleepiness score is 12/24.    Habits include consumption of no alcohol.  He denies caffeine consumption.  He quit smoking 2 years ago but did smoke a pack per day for 20 years.    Past Medical History:   Diagnosis Date   • CAD (coronary artery disease)    • CHF (congestive heart failure) (CMS/HCC) ([facility])    • CVA (cerebral vascular accident)  (CMS/HCC) ([facility]) 2010   • Dyslipidemia    • GERD (gastroesophageal reflux disease)    • GERD (gastroesophageal reflux disease)    • Heart attack (CMS/HCC) (HCC)     x3   • Hypertension    • Ischemic cardiomyopathy    • Paroxysmal atrial fibrillation (CMS/HCC) (HCC)     On Xarelto   • V-tach (CMS/HCC) (HCC)     AICD in place        Past Surgical History:   Procedure Laterality Date   • ABLATION VT N/A 06/09/2020    Procedure: Ablation VT;  Surgeon: Wilfredo Montana MD;  Location: [facility];  Service: Electrophysiology;  Laterality: N/A;  Check with Dr. Montana in the a.m. regarding scheduling   • APPENDECTOMY     • CORONARY ARTERY STENTING  2009    2.5 X 24-mm Taxus DIMAS to first diagonal. 3.0 X 8-mm Taxus stent to proximal LAD just proximal to diagonal.   • CORONARY ARTERY STENTING  2010    3.5 X 15-mm Promus DIMAS to totally occluded LAD   • CORONARY ARTERY STENTING  2011    2.25 X 30-mm Resolute DIMAS to 2nd diagonal.  Balloon angioplasty through strut to improve blood flow to distal LAD   • CORONARY ARTERY STENTING  07/28/2017    second diagonal branch LAD Resolute 2.25 x 30-mm DIMAS   • ICD SC NEW  2011    [employer] Cognis Model #N119, Serial #[id number]. [place] Reliance Model #0185, Serial #[id number]. LV lead Acuity Model #45+91, Serial #[id number]. Atrial lead Model #4469, Serial #[id number]         Current Outpatient Medications:   •  mexiletine (MEXITIL) 200 mg capsule, Take 1 capsule (200 mg total) by mouth 3 (three) times a day with meals, Disp: 270 capsule, Rfl: 3  •  amiodarone (PACERONE) 200 mg tablet, Take 1 tablet (200 mg total) by mouth 3 (three) times a day with meals Cut down to twice daily with meals in 2 weeks, Disp: 270 tablet, Rfl: 3  •  furosemide (LASIX) 40 mg tablet, Take 1 tablet (40 mg total) by mouth daily If weight goes up 3 pounds overnight take an extra dose of Lasix x1 day, Disp: 90 tablet, Rfl: 3  •  sacubitriL-valsartan (ENTRESTO) 24-26 mg tablet, Take 1 tablet by mouth 2 (two) times a day,  Disp: 180 tablet, Rfl: 3  •  aspirin 81 mg EC tablet, Take 1 tablet (81 mg total) by mouth daily, Disp: 90 tablet, Rfl: 3  •  albuterol HFA (PROVENTIL HFA;VENTOLIN HFA) 90 mcg/actuation inhaler, Inhale 2 puffs every 6 (six) hours as needed for wheezing or shortness of breath, Disp: 1 Inhaler, Rfl: 1  •  spironolactone (ALDACTONE) 25 mg tablet, Take 1 tablet (25 mg total) by mouth 2 (two) times a day, Disp: 180 tablet, Rfl: 3  •  rosuvastatin (CRESTOR) 40 mg tablet, Take 40 mg by mouth nightly  , Disp: , Rfl:   •  nitroglycerin (NITROSTAT) 0.4 mg SL tablet, Place 0.4 mg under the tongue every 5 (five) minutes as needed for chest pain., Disp: , Rfl:   •  pantoprazole (PROTONIX) 40 mg EC tablet, Take 40 mg by mouth daily., Disp: , Rfl:   •  carvedilol (COREG) 25 mg tablet, Take 25 mg by mouth 2 (two) times a day with meals., Disp: , Rfl:   •  prasugrel (EFFIENT) 10 mg tablet, Take 1 tablet (10 mg total) by mouth daily., Disp: 30 tablet, Rfl: 1  •  rivaroxaban (XARELTO) 20 mg tablet, Take 1 tablet (20 mg total) by mouth daily., Disp: 30 tablet, Rfl: 1    Allergies   Allergen Reactions   • Morphine      ANXIETY   • Tramadol      ANXIETY       Family History   Problem Relation Age of Onset   • Heart attack Mother 62   • Other Mother         Abdominal Aortic Aneurysm   • Lung cancer Mother    • Colon cancer Father         Cause of death   • Diabetes Brother         Non-Insulin Dependent   • Heart attack Brother 51        w/ Stents   • Heart disease Brother    • Other Son         Well       Social History     Socioeconomic History   • Marital status: Single     Spouse name: Not on file   • Number of children: Not on file   • Years of education: Not on file   • Highest education level: Not on file   Occupational History   • Not on file   Tobacco Use   • Smoking status: Former Smoker     Packs/day: 0.75     Years: 30.00     Pack years: 22.50     Types: Cigarettes     Quit date: 2020     Years since quittin.3   •  Smokeless tobacco: Never Used   Substance and Sexual Activity   • Alcohol use: Not Currently     Comment: Quit drinking in 2007   • Drug use: Not Currently   • Sexual activity: Defer   Other Topics Concern   • Not on file   Social History Narrative   • Not on file     Social Determinants of Health     Financial Resource Strain:    • Difficulty of Paying Living Expenses: Not on file   Food Insecurity:    • Worried About Running Out of Food in the Last Year: Not on file   • Ran Out of Food in the Last Year: Not on file   Transportation Needs:    • Lack of Transportation (Medical): Not on file   • Lack of Transportation (Non-Medical): Not on file   Physical Activity:    • Days of Exercise per Week: Not on file   • Minutes of Exercise per Session: Not on file   Stress:    • Feeling of Stress : Not on file   Social Connections:    • Frequency of Communication with Friends and Family: Not on file   • Frequency of Social Gatherings with Friends and Family: Not on file   • Attends Druze Services: Not on file   • Active Member of Clubs or Organizations: Not on file   • Attends Club or Organization Meetings: Not on file   • Marital Status: Not on file   Intimate Partner Violence:    • Fear of Current or Ex-Partner: Not on file   • Emotionally Abused: Not on file   • Physically Abused: Not on file   • Sexually Abused: Not on file   He is single and lives here in [place],.  He is not working outside the home.  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    Review of Systems   He did complete a full 14 point review of systems in the questionnaire for this visit.  Pertinent positives include unexpected weight change.  He notes dental problems as well as rhinorrhea and tinnitus.  He notes apnea.  There has been shortness of breath and wheezing.  He notes urinary urgency.  Dizziness has been noted as well as lightheadedness and numbness.  He notes tremor and weakness.  Easy bruisability has been  noted.  Has had snoring, daytime sleepiness, nocturia, difficulty initiating and maintaining sleep.  He denies other pertinent positives.    I have reviewed and discussed the entire history above with the patient.    Vital signs last night reveal height 71 inches, weight 275 pounds, BMI is 38.4, pulse 60, respiration 18, blood pressure 118/71, SPO2 94%, pain level 0, neck circumference 20 inches, [place] sleepiness score 12/24.      General: No acute distress   HEENT: Head is atraumatic. Pharynx is clear. Mucus membranes are moist. no oral lesions   Neck: Supple. No carotid bruits.   Heart: Regular rate and rhythm. No murmurs noted.   Lungs: Clear to auscultation; normal respiratory effort     Extremities: No edema or cyanosis. peripheral pulses intact       Neurological exam:   Mental status: Alert and oriented x 3. Speech and language are normal. Attention and concentration are normal. Recent and remote memory intact. Fund of knowledge normal. Mood and affect are normal.  Cranial nerves: CN I: not tested. CN II: Pupils equal and reactive. Visual fields are full to confrontation. Fundoscopic exam is normal bilaterally. CN III, IV,VI: extraocular motility is intact. No nystagmus. Normal smooth pursuit and saccade initiation. CN V: facial sensation is intact to light touch in V1, V2, and V3 distribution. CN VII: Muscles of facial expression are strong and symmetric. CN VIII: hearing is grossly intact. CN IX,X: Palate elevates symmetrically. CN XI: sternocleidomastoid and trapezius are normal. CN XII: tongue protrudes in midline.   Motor: Normal tone. No abnormal movements. No pronator drift.   Proximal/Distal RUE 5/5 Proximal/Distal LUE 5/5   Proximal/Distal RLE 5/5 Proximal/Distal LLE 5/5   Sensation: Intact to light touch and  pin prick throughout. Normal vibration.  Reflexes: (R/L) Biceps 2+/2+ Triceps 2+/2+ Brachioradialis 2+/2+ Patellar 2+/2+ Achilles 2+/2+ Plantar response downgoing bilaterally   Coordination:  Intact to finger to nose and heel to shin. Also FFM and HENOK are normal.  Gait: Normal station and gait. Able to heel, toe, and tandem walk without difficulty.  Romberg was absent.  The patient turns without difficulty.    I have completely reviewed and discussed the sleep study with the patient and the technicians.  I also gave the patient pages from the sleep study demonstrating fairly normal sleep as well as his difficulty with sleep apnea.      Assessment    1.  Severe obstructive sleep apnea as confirmed by the recent portable 4-channel home sleep study from August 17 with 39.1 apneas/hypopneas per hour and a minimum O2 sat of 80%.  With CPAP titration he did have some difficulty with intolerance.  He did have some treatment emergent central sleep apneas.  During the diagnostic component he did not have any centrals.  It appears that tolerance is the major problem here.    2.  Daytime hypersomnolence    3.  Snoring    4.  Essential hypertension    5.  History of 3 heart attacks    6.  Congestive heart failure with combined systolic and diastolic involvement.    7.  Ischemic cardiomyopathy with cardiac ejection fraction of 20- 25%.    8.  Paroxysmal atrial fibrillation    Plan    1.  The initial results of the sleep study were reviewed with the patient and the technicians    2.  CPAP is being prescribed at 8 cm water through a nasal mask with heated humidifier and exhalation unloading her heated tubing    3.  I am asking the patient to begin using CPAP just sitting in a recliner watching TV for 30 to 60 minutes at a time for a week or 2 before you can try using this in bed.  He definitely struggled with tolerance with the CPAP.    4.  Certainly further review with repeat study in the future could be pursued.  The patient's cardiac ejection fraction was low enough that if there were problems with persistent central apneas ASV would not be a real consideration.  It looks like he has been trouble was obstructive  based on the diagnostic component of the study.    5.  Follow-up in sleep clinic will be coordinated with patient for 6 to 8 weeks.    8.  Patient does have an AICD in situ.    The plan for this visit has been reviewed.  The patient and/or the patient's surrogate has expressed a good understanding of the assessment and recommendation from today's visit.  Verbal and/or written instructions have been included.  No apparent barriers to understanding this information.  Patient's questions were addressed.  A total of 45 minutes was required for this visit.  Greater than 50% of this time was spent in direct face-to-face communication with the patient and/or surrogate caregiver to provide counseling and coordination of care for sleep difficulty.  More than 45 minutes of time was involved in examining this patient, obtaining further history from him, reviewing the sleep questionnaire with him that he completed for this visit, reviewing electronic health records, giving him pages from the sleep study and discussing sleep apnea and normal sleep, discussing CPAP equipment and the vendors and plans for management and follow-up, dictating notes and writing orders..           Dragon voice recognition program was used to aid in this documentation which might generate inaccuracies despite efforts made to avoid them.  Please take it into context and contact the provider if areas of questionable text are identified.                 If you have questions, please do not hesitate to call me. I look forward to following your patient along with you.         Sincerely,        Sleep Medicine Consult        CC: No Recipients

## 2021-10-06 ENCOUNTER — APPOINTMENT (OUTPATIENT)
Dept: RADIOLOGY | Facility: HOSPITAL | Age: 56
End: 2021-10-06
Payer: COMMERCIAL

## 2021-10-06 ENCOUNTER — HOSPITAL ENCOUNTER (EMERGENCY)
Facility: HOSPITAL | Age: 56
Discharge: HOME | End: 2021-10-06
Attending: STUDENT IN AN ORGANIZED HEALTH CARE EDUCATION/TRAINING PROGRAM | Admitting: INTERNAL MEDICINE
Payer: COMMERCIAL

## 2021-10-06 ENCOUNTER — DOCUMENTATION (OUTPATIENT)
Dept: CARDIOLOGY | Facility: CLINIC | Age: 56
End: 2021-10-06

## 2021-10-06 VITALS
HEIGHT: 71 IN | WEIGHT: 279.98 LBS | SYSTOLIC BLOOD PRESSURE: 113 MMHG | HEART RATE: 60 BPM | RESPIRATION RATE: 20 BRPM | BODY MASS INDEX: 39.2 KG/M2 | DIASTOLIC BLOOD PRESSURE: 81 MMHG | OXYGEN SATURATION: 95 % | TEMPERATURE: 97.7 F

## 2021-10-06 DIAGNOSIS — Z95.0 CARDIAC PACEMAKER: ICD-10-CM

## 2021-10-06 DIAGNOSIS — I47.29 VENTRICULAR TACHYCARDIA (PAROXYSMAL) (CMS/HCC): Primary | ICD-10-CM

## 2021-10-06 LAB
ALBUMIN SERPL-MCNC: 4 G/DL (ref 3.5–5.3)
ALP SERPL-CCNC: 77 U/L (ref 45–115)
ALT SERPL-CCNC: 106 U/L (ref 7–52)
ANION GAP SERPL CALC-SCNC: 6 MMOL/L (ref 3–11)
APTT PPP: 41.9 SECONDS (ref 25.1–36.5)
AST SERPL-CCNC: 50 U/L
BASOPHILS # BLD AUTO: 0.1 10*3/UL
BASOPHILS NFR BLD AUTO: 1 % (ref 0–2)
BILIRUB SERPL-MCNC: 0.86 MG/DL (ref 0.2–1.4)
BUN SERPL-MCNC: 14 MG/DL (ref 7–25)
CALCIUM ALBUM COR SERPL-MCNC: 9 MG/DL (ref 8.6–10.3)
CALCIUM SERPL-MCNC: 9 MG/DL (ref 8.6–10.3)
CHLORIDE SERPL-SCNC: 105 MMOL/L (ref 98–107)
CO2 SERPL-SCNC: 27 MMOL/L (ref 21–32)
CREAT SERPL-MCNC: 1.09 MG/DL (ref 0.7–1.3)
DELTA HIGH SENSITIVITY TROPONIN I, 2 HOUR: -0.8
EOSINOPHIL # BLD AUTO: 0.2 10*3/UL
EOSINOPHIL NFR BLD AUTO: 4 % (ref 0–3)
ERYTHROCYTE [DISTWIDTH] IN BLOOD BY AUTOMATED COUNT: 14.5 % (ref 11.5–15)
GFR SERPL CREATININE-BSD FRML MDRD: 76 ML/MIN/1.73M*2
GLUCOSE SERPL-MCNC: 148 MG/DL (ref 70–105)
HCT VFR BLD AUTO: 46.1 % (ref 38–50)
HGB BLD-MCNC: 15.4 G/DL (ref 13.2–17.2)
INR BLD: 1.9
LYMPHOCYTES # BLD AUTO: 2.3 10*3/UL
LYMPHOCYTES NFR BLD AUTO: 39 % (ref 15–47)
MAGNESIUM SERPL-MCNC: 1.8 MG/DL (ref 1.8–2.4)
MCH RBC QN AUTO: 31.8 PG (ref 29–34)
MCHC RBC AUTO-ENTMCNC: 33.5 G/DL (ref 32–36)
MCV RBC AUTO: 95 FL (ref 82–97)
MONOCYTES # BLD AUTO: 0.7 10*3/UL
MONOCYTES NFR BLD AUTO: 11 % (ref 5–13)
NEUTROPHILS # BLD AUTO: 2.7 10*3/UL
NEUTROPHILS NFR BLD AUTO: 45 % (ref 46–70)
PLATELET # BLD AUTO: 233 10*3/UL (ref 130–350)
PMV BLD AUTO: 8.4 FL (ref 6.9–10.8)
POTASSIUM SERPL-SCNC: 4 MMOL/L (ref 3.5–5.1)
PROT SERPL-MCNC: 6.6 G/DL (ref 6–8.3)
PROTHROMBIN TIME: 22.7 SECONDS (ref 9.4–12.5)
RBC # BLD AUTO: 4.85 10*6/ΜL (ref 4.1–5.8)
SODIUM SERPL-SCNC: 138 MMOL/L (ref 135–145)
TROPONIN I SERPL-MCNC: 25.4 PG/ML
TROPONIN I SERPL-MCNC: 26.2 PG/ML
TROPONIN I SERPL-MCNC: 26.2 PG/ML
WBC # BLD AUTO: 6 10*3/UL (ref 3.7–9.6)

## 2021-10-06 PROCEDURE — 99285 EMERGENCY DEPT VISIT HI MDM: CPT | Performed by: STUDENT IN AN ORGANIZED HEALTH CARE EDUCATION/TRAINING PROGRAM

## 2021-10-06 PROCEDURE — 71045 X-RAY EXAM CHEST 1 VIEW: CPT

## 2021-10-06 PROCEDURE — 80053 COMPREHEN METABOLIC PANEL: CPT | Performed by: STUDENT IN AN ORGANIZED HEALTH CARE EDUCATION/TRAINING PROGRAM

## 2021-10-06 PROCEDURE — 84484 ASSAY OF TROPONIN QUANT: CPT | Performed by: STUDENT IN AN ORGANIZED HEALTH CARE EDUCATION/TRAINING PROGRAM

## 2021-10-06 PROCEDURE — 83735 ASSAY OF MAGNESIUM: CPT | Performed by: STUDENT IN AN ORGANIZED HEALTH CARE EDUCATION/TRAINING PROGRAM

## 2021-10-06 PROCEDURE — 93005 ELECTROCARDIOGRAM TRACING: CPT

## 2021-10-06 PROCEDURE — 85025 COMPLETE CBC W/AUTO DIFF WBC: CPT | Performed by: STUDENT IN AN ORGANIZED HEALTH CARE EDUCATION/TRAINING PROGRAM

## 2021-10-06 PROCEDURE — 6360000200 HC RX 636 W HCPCS (ALT 250 FOR IP): Performed by: STUDENT IN AN ORGANIZED HEALTH CARE EDUCATION/TRAINING PROGRAM

## 2021-10-06 PROCEDURE — 85610 PROTHROMBIN TIME: CPT | Performed by: STUDENT IN AN ORGANIZED HEALTH CARE EDUCATION/TRAINING PROGRAM

## 2021-10-06 PROCEDURE — 96366 THER/PROPH/DIAG IV INF ADDON: CPT

## 2021-10-06 PROCEDURE — 36415 COLL VENOUS BLD VENIPUNCTURE: CPT | Performed by: STUDENT IN AN ORGANIZED HEALTH CARE EDUCATION/TRAINING PROGRAM

## 2021-10-06 PROCEDURE — 96365 THER/PROPH/DIAG IV INF INIT: CPT

## 2021-10-06 PROCEDURE — 2580000300 HC RX 258: Performed by: STUDENT IN AN ORGANIZED HEALTH CARE EDUCATION/TRAINING PROGRAM

## 2021-10-06 PROCEDURE — 85730 THROMBOPLASTIN TIME PARTIAL: CPT | Performed by: STUDENT IN AN ORGANIZED HEALTH CARE EDUCATION/TRAINING PROGRAM

## 2021-10-06 PROCEDURE — 96376 TX/PRO/DX INJ SAME DRUG ADON: CPT

## 2021-10-06 RX ORDER — SODIUM CHLORIDE 9 MG/ML
25-50 INJECTION, SOLUTION INTRAVENOUS AS NEEDED
Status: DISCONTINUED | OUTPATIENT
Start: 2021-10-06 | End: 2021-10-06 | Stop reason: HOSPADM

## 2021-10-06 RX ORDER — SODIUM CHLORIDE, SODIUM LACTATE, POTASSIUM CHLORIDE, AND CALCIUM CHLORIDE .6; .31; .03; .02 G/100ML; G/100ML; G/100ML; G/100ML
1000 INJECTION, SOLUTION INTRAVENOUS ONCE
Status: COMPLETED | OUTPATIENT
Start: 2021-10-06 | End: 2021-10-06

## 2021-10-06 RX ADMIN — AMIODARONE HYDROCHLORIDE 1 MG/MIN: 1.8 INJECTION, SOLUTION INTRAVENOUS at 05:07

## 2021-10-06 RX ADMIN — SODIUM CHLORIDE, POTASSIUM CHLORIDE, SODIUM LACTATE AND CALCIUM CHLORIDE 1000 ML: 600; 310; 30; 20 INJECTION, SOLUTION INTRAVENOUS at 04:46

## 2021-10-06 RX ADMIN — AMIODARONE HYDROCHLORIDE 150 MG: 1.5 INJECTION, SOLUTION INTRAVENOUS at 04:52

## 2021-10-06 ASSESSMENT — ENCOUNTER SYMPTOMS
SYNCOPE: 0
BLOATING: 0
WHEEZING: 0
ALTERED MENTAL STATUS: 0
HOARSE VOICE: 0
WEIGHT GAIN: 0
PALPITATIONS: 1
ORTHOPNEA: 0
HEMATURIA: 0
FEVER: 0
NAUSEA: 0
JOINT SWELLING: 0
COUGH: 0
BLURRED VISION: 0
DIZZINESS: 0
VOMITING: 0
WEAKNESS: 0
SHORTNESS OF BREATH: 0
MYALGIAS: 0
CHILLS: 0
DYSPNEA ON EXERTION: 0
LIGHT-HEADEDNESS: 0

## 2021-10-06 NOTE — PROGRESS NOTES
I called Dr Nam's office at [facility] to schedule an appointment for Pete to follow-up.  The soonest telehealth appointment he had was the end of December.  I did ask the nurse to ask Dr. Nam if patient could be seen sooner.  She was going to review the case with him and call patient directly.  I did let Pete know this update.

## 2021-10-06 NOTE — ED PROVIDER NOTES
Chief Complaint   Patient presents with   • Palpitations     patient feels like his heart is just taking off; diaphoretic. feels short of breath and denies chest pain. says it woke him up out of a ddead sleep and is similiar to his previous MI. history of 3 MIs, pacemaker and afib.           HPI:    Patient is a 56 y.o. male who presents with palpitations, chest tightness, dizziness and dyspnea.  He states he woke up from sleep at approximately 0300 hrs. this morning with palpitations and chest tightness.  He does have a history of atrial fibrillation with a pacemaker in place, he is anticoagulated on Xarelto.  Current on exam he continues to complain of rapid palpitations and mild chest tightness.  He denies syncope.  He feels mildly short of breath.  He was recently admitted with similar symptoms, per EMR chart review, patient was discharged on 9/20/2021 after admission for paroxysmal ventricular tachycardia.        Past Medical History:   Diagnosis Date   • CAD (coronary artery disease)    • CHF (congestive heart failure) (CMS/[facility]) ([facility])    • CVA (cerebral vascular accident) (CMS/HCC) ([facility]) 2010   • Dyslipidemia    • GERD (gastroesophageal reflux disease)    • GERD (gastroesophageal reflux disease)    • Heart attack (CMS/HCC) ([facility])     x3   • Hypertension    • Ischemic cardiomyopathy    • Paroxysmal atrial fibrillation (CMS/HCC) ([facility])     On Xarelto   • V-tach (CMS/HCC) ([facility])     AICD in place       Past Surgical History:   Procedure Laterality Date   • ABLATION VT N/A 06/09/2020    Procedure: Ablation VT;  Surgeon: Wilfredo Montana MD;  Location: [facility] EP Lab;  Service: Electrophysiology;  Laterality: N/A;  Check with Dr. Montana in the a.m. regarding scheduling   • APPENDECTOMY     • CORONARY ARTERY STENTING  2009    2.5 X 24-mm Taxus DIMAS to first diagonal. 3.0 X 8-mm Taxus stent to proximal LAD just proximal to diagonal.   • CORONARY ARTERY STENTING  2010    3.5 X 15-mm Promus DIMAS to totally occluded LAD   •  CORONARY ARTERY STENTING      2.25 X 30-mm Resolute DIMAS to 2nd diagonal.  Balloon angioplasty through strut to improve blood flow to distal LAD   • CORONARY ARTERY STENTING  2017    second diagonal branch LAD Resolute 2.25 x 30-mm DIMAS   • ICD SC NEW      [employer] Cognis Model #N119, Serial #[id number]. [place] [employer] Model #0185, Serial #[id number]. LV lead Acuity Model #45+91, Serial #[id number]. Atrial lead Model #4469, Serial #[id number]       Social History     Socioeconomic History   • Marital status: Single     Spouse name: Not on file   • Number of children: Not on file   • Years of education: Not on file   • Highest education level: Not on file   Occupational History   • Not on file   Tobacco Use   • Smoking status: Former Smoker     Packs/day: 0.75     Years: 30.00     Pack years: 22.50     Types: Cigarettes     Quit date: 2020     Years since quittin.3   • Smokeless tobacco: Never Used   Substance and Sexual Activity   • Alcohol use: Not Currently     Comment: Quit drinking in    • Drug use: Not Currently   • Sexual activity: Defer   Other Topics Concern   • Not on file   Social History Narrative   • Not on file     Social Determinants of Health     Financial Resource Strain:    • Difficulty of Paying Living Expenses: Not on file   Food Insecurity:    • Worried About Running Out of Food in the Last Year: Not on file   • Ran Out of Food in the Last Year: Not on file   Transportation Needs:    • Lack of Transportation (Medical): Not on file   • Lack of Transportation (Non-Medical): Not on file   Physical Activity:    • Days of Exercise per Week: Not on file   • Minutes of Exercise per Session: Not on file   Stress:    • Feeling of Stress : Not on file   Social Connections:    • Frequency of Communication with Friends and Family: Not on file   • Frequency of Social Gatherings with Friends and Family: Not on file   • Attends [employer] Services: Not on file   • Active Member of Clubs or  Organizations: Not on file   • Attends Club or Organization Meetings: Not on file   • Marital Status: Not on file   Intimate Partner Violence:    • Fear of Current or Ex-Partner: Not on file   • Emotionally Abused: Not on file   • Physically Abused: Not on file   • Sexually Abused: Not on file       Family History   Problem Relation Age of Onset   • Heart attack Mother 62   • Other Mother         Abdominal Aortic Aneurysm   • Lung cancer Mother    • Colon cancer Father         Cause of death   • Diabetes Brother         Non-Insulin Dependent   • Heart attack Brother 51        w/ Stents   • Heart disease Brother    • Other Son         Well       Allergies   Allergen Reactions   • Morphine      ANXIETY   • Tramadol      ANXIETY         Current Outpatient Medications:   •  mexiletine (MEXITIL) 200 mg capsule, Take 1 capsule (200 mg total) by mouth 3 (three) times a day with meals, Disp: 270 capsule, Rfl: 3  •  amiodarone (PACERONE) 200 mg tablet, Take 1 tablet (200 mg total) by mouth 3 (three) times a day with meals Cut down to twice daily with meals in 2 weeks, Disp: 270 tablet, Rfl: 3  •  furosemide (LASIX) 40 mg tablet, Take 1 tablet (40 mg total) by mouth daily If weight goes up 3 pounds overnight take an extra dose of Lasix x1 day, Disp: 90 tablet, Rfl: 3  •  sacubitriL-valsartan (ENTRESTO) 24-26 mg tablet, Take 1 tablet by mouth 2 (two) times a day, Disp: 180 tablet, Rfl: 3  •  aspirin 81 mg EC tablet, Take 1 tablet (81 mg total) by mouth daily, Disp: 90 tablet, Rfl: 3  •  albuterol HFA (PROVENTIL HFA;VENTOLIN HFA) 90 mcg/actuation inhaler, Inhale 2 puffs every 6 (six) hours as needed for wheezing or shortness of breath, Disp: 1 Inhaler, Rfl: 1  •  spironolactone (ALDACTONE) 25 mg tablet, Take 1 tablet (25 mg total) by mouth 2 (two) times a day, Disp: 180 tablet, Rfl: 3  •  rosuvastatin (CRESTOR) 40 mg tablet, Take 40 mg by mouth nightly  , Disp: , Rfl:   •  nitroglycerin (NITROSTAT) 0.4 mg SL tablet, Place 0.4  mg under the tongue every 5 (five) minutes as needed for chest pain., Disp: , Rfl:   •  pantoprazole (PROTONIX) 40 mg EC tablet, Take 40 mg by mouth daily., Disp: , Rfl:   •  carvedilol (COREG) 25 mg tablet, Take 25 mg by mouth 2 (two) times a day with meals., Disp: , Rfl:   •  prasugrel (EFFIENT) 10 mg tablet, Take 1 tablet (10 mg total) by mouth daily., Disp: 30 tablet, Rfl: 1  •  rivaroxaban (XARELTO) 20 mg tablet, Take 1 tablet (20 mg total) by mouth daily., Disp: 30 tablet, Rfl: 1      ROS:  Constitutional: Denies fever  Eyes: Denies eye pain or discharge  ENT: Denies sore throat. Denies congestion  Cardiovascular: Denies chest pain.    Respiratory: Denies cough. Denies hemoptysis  GI: Denies abdominal pain.  Denies nausea or vomiting.  Denies diarrhea.  : Denies dysuria.  Denies hematuria.  Musculoskeletal: Denies back pain  Neuro: Denies headache.  Denies peripheral numbness or tingling.  Hematology: Denies easy bleeding or bruising  Dermatology: Denies rash      ED Triage Vitals   Temp Heart Rate Resp BP SpO2   10/06/21 0357 10/06/21 0357 10/06/21 0357 10/06/21 0400 10/06/21 0357   36.5 °C (97.7 °F) 119 20 100/64 92 %      Temp Source Heart Rate Source Patient Position BP Location FiO2 (%)   10/06/21 0357 -- 10/06/21 0526 -- --   Oral  Head of bed 30 degrees or higher           Physical Exam:  Nursing note and vitals reviewed.  Constitutional: appears well-developed.   Head: Normocephalic and atraumatic.   Eyes: Pupils are equal, round, and reactive to light.  Extraocular movements intact.  Neck: Supple, full range of motion.  Cardiovascular: Regular rate and rhythm.  No murmur, rub, or gallop.  Normal pulses.  Normal peripheral perfusion. No peripheral edema  Pulmonary/Chest: No respiratory distress.  Clear to auscultation bilaterally.  No chest wall tenderness.  Abdominal: Soft and nontender.    Back: No CVA tenderness.  No midline tenderness.  Full range of motion.  Musculoskeletal: No swelling or  deformity.  Full range of motion of bilateral upper and lower extremities.  Neurological: Alert.  CN II-XII intact.  Motor and sensation intact and equal throughout.  No focal neurologic deficits.  Skin: Skin is warm and dry. No rash noted.   Psychiatric: Normal mood and affect.           Labs:  Labs Reviewed   CBC WITH AUTO DIFFERENTIAL - Abnormal       Result Value    WBC 6.0      RBC 4.85      Hemoglobin 15.4      Hematocrit 46.1      MCV 95.0      MCH 31.8      MCHC 33.5      RDW 14.5      Platelets 233      MPV 8.4      Neutrophils% 45 (*)     Lymphocytes% 39      Monocytes% 11      Eosinophils% 4 (*)     Basophils% 1      ANC (auto diff) 2.70      Lymphocytes Absolute 2.30      Monocytes Absolute 0.70      Eosinophils Absolute 0.20      Basophils Absolute 0.10     COMPREHENSIVE METABOLIC PANEL - Abnormal    Sodium 138      Potassium 4.0      Chloride 105      CO2 27      Anion Gap 6      BUN 14      Creatinine 1.09      Glucose 148 (*)     Calcium 9.0      AST 50 (*)     ALT (SGPT) 106 (*)     Alkaline Phosphatase 77      Total Protein 6.6      Albumin 4.0      Total Bilirubin 0.86      eGFR 76      Corrected Calcium 9.0      Narrative:     Estimated GFR calculated using the 2009 CKD-EPI creatinine equation.   PROTIME-INR - Abnormal    Protime 22.7 (*)     INR 1.9 (*)    PTT (ACTIVATED PARTIAL THROMBOPLASTIN TIME) - Abnormal    PTT 41.9 (*)    HIGH SENSITIVE TROPONIN I - Abnormal    hsTnI 0 Hour 26.2 (*)    HIGH SENSITIVE TROPONIN I, 1 HOUR - Abnormal    hsTnI 1 hr 26.2 (*)    HIGH SENSITIVE TROPONIN I, 2 HOUR - Abnormal    Delta from 0 Hour -0.8      hsTnI 2 hr 25.4 (*)    MAGNESIUM - Normal    Magnesium 1.8     HIGH SENSITIVE TROPONIN I PANEL (0HR, 1HR, 2HR)    Narrative:     The following orders were created for panel order HS Troponin I Panel (0HR, 1HR, 2HR) Blood, Venous.  Procedure                               Abnormality         Status                     ---------                                -----------         ------                     HS Troponin I[[id number]]                 Abnormal            Final result               1HR High Sensitive Trop I[[id number]]     Abnormal            Final result               2HR High Sensitive Tropon...[[id number]]  Abnormal            Final result                 Please view results for these tests on the individual orders.   LAVENDER TOP RAINBOW         Imaging:  XR chest portable 1 view   Final Result   IMPRESSION:   1.  Cardiomegaly stable.   2.  Nothing acute.                MDM:    Patient presents for evaluation of palpitations, chest pain or dyspnea.  Concern for recurrent ventricular tachycardia, EKG concerning for ventricular tachycardia.  Low suspicion for ACS with known history of CAD.  Suspicion for pneumonia with no other respiratory symptoms.  Patient placed on continuous cardiac monitoring continuous pulse oximetry secondary to chest pain to monitor for dysrhythmia as patient is in imminent risk of deterioration with continued ventricular tachycardia, cardiac monitor does show monomorphic ventricular tachycardia per my interpretation with rates in the 110s to 130s.  Patient did become transiently hypotensive in the emergency department, defibrillator set up with potential need for emergent cardioversion.  Patient was provided IV fluid bolus with improvement in blood pressure.  Initiated amiodarone bolus and drip with appropriate rate control, conversion of ventricular tachycardia and patient ultimately was noted to be in inappropriately paced rhythm with dual paced rhythm on cardiac monitor with rate of 60.  Remains otherwise hemodynamically stable on amiodarone drip.  Comprehensive medical evaluation performed in the emergency department.  Prior records reviewed in EMR with recent admission with similar complaint, patient with paroxysmal ventricular tachycardia.  Chest x-ray with no acute cardiopulmonary process per my interpretation  Labs including CBC  and chemistry with mild hyperglycemia at 148 without DKA, transaminitis, otherwise unremarkable.  No leukocytosis or anemia.  No evidence of acute kidney injury.  No other significant acute electrolyte abnormality.  Troponin mildly elevated, likely chronic troponin leak, downtrending per EMR chart review.  Continue to trend.  Case discussed with cardiology, Dr. Herbert, initial plan for admission to hospitalist service with plan for electrophysiology consult as inpatient..  Case was discussed with Dr. Merino who accepts patient for admission.  Patient remains hemodynamically stable in the emergency department  Discussed patient's case with admitting hospitalist, Dr. Alexander, after coordinating care, decision made to proceed with cardiology admission.  Case was discussed with EP cardiology, Gerardo Sun CNP.  Case also discussed with Dr. Meehan, cardiology.  Patient ultimately accepted for admission with cardiology service with EP to follow as inpatient.      Given   ED Medication Administration from 10/06/2021 0351 to 10/06/2021 0759       Date/Time Order Dose Route Action Action by     10/06/2021 0446 LR bolus 1,000 mL 1,000 mL intravenous New Bag/New Syringe Dove, A     10/06/2021 0551 LR bolus 1,000 mL 0 mL intravenous Stopped Dove, RAMIREZ     10/06/2021 0452 amiodarone 150 mg/100 mL (1.5 mg/mL) IVPB - LOADING DOSE (premix) 150 mg intravenous New Bag/New Syringe Dove, A     10/06/2021 0505 amiodarone 150 mg/100 mL (1.5 mg/mL) IVPB - LOADING DOSE (premix) 0 mg intravenous Stopped LISSETTE Quigley     10/06/2021 0507 amiodarone (NEXTERONE) 360 mg/200 mL (1.8 mg/mL) infusion (premix) 1 mg/min intravenous New Bag/New Syringe LISSETTE Quigley            ED Course as of Oct 06 0759   Wed Oct 06, 2021   0404 EKG: Sinus tachycardia at 116 bpm.  Wide-complex tachycardia, concerning for ventricular tachycardia.  Diffuse ST changes associated with wide-complex rhythm.  QTc prolonged at 630.  Concerning for ischemia.  Rhythm strip  with sinus tachycardia at 116 bpm.  Compared to prior EKG from 9/18/2021 at which time patient had dual paced rhythm.    [MF]   0445 Cardiac monitor with ventricular tachycardia with rates in the 110s to 130s per my interpretation.    [MF]   0456 BP improving with IV fluid bolus.  Will initiate amiodarone bolus and drip with concern for ventricular tachycardia.    [MF]   0520 Patient with rate control on cardiac monitor, rate currently at 60 with dual paced rhythm per my interpretation with underlying atrial fibrillation.    [MF]   0536 Mildly elevated, downtrending per EMR chart review and from 1 week ago.   HS Troponin I Panel (0HR, 1HR, 2HR) Blood, Venous(!):    hsTnI 0 Hour 26.2(!) [MF]   0544 Discussed with Dr. Herbert, cardiology.  Recommends admission to hospitalist service, EP cardiology to be consulted as inpatient.    [MF]   0744 Discussed with Dr. Alexander, admitting hospitalist, recommends patient be primarily admitted with electrophysiology with paroxysmal ventricular tachycardia on amiodarone drip.    [MF]   0751 Discussed with Gerardo Sun CNP with EP cardiology. Will evaluate    [MF]   0755 Discussed with Dr. Meehan, cardiology, accepts patient for admission.    [MF]      ED Course User Index  [MF] Alexandrea Castillo MD         Procedures    Critical Care  Performed by: ALEXANDREA CASTILLO  Authorized by: ALEXANDREA CASTILLO    Critical care provider statement:     Critical care time (minutes):  35    Critical care time was exclusive of:  Separately billable procedures and treating other patients    Critical care was time spent personally by me on the following activities:  Blood draw for specimens, development of treatment plan with patient or surrogate, discussions with consultants, evaluation of patient's response to treatment, examination of patient, obtaining history from patient or surrogate, ordering and performing treatments and interventions, ordering and review of laboratory studies, ordering and review  of radiographic studies, pulse oximetry, re-evaluation of patient's condition and review of old charts      Clinical Impression:    Paroxysmal ventricular tachycardia  Palpitations  Chest pain  Dyspnea  Dizziness  Ischemic cardiomyopathy  Chronic troponin leak  Chronic anticoagulation  Paced cardiac rhythm  Atrial fibrillation  Transaminitis  Hyperglycemia  Obesity        Final diagnoses:   [I47.2] Ventricular tachycardia (paroxysmal) (CMS/HCC) (HCC)   [Z95.0] Cardiac pacemaker           A voice recognition program was used to aid in documentation of this record.  Sometimes words are not printed exactly as they were spoken.  While efforts were made to carefully edit and correct any inaccuracies, some areas may be present; please take these into context.  Please contact the provider if areas are identified.     Vaibhav Castillo MD  10/06/21 0924

## 2021-10-06 NOTE — CONSULTATION
ELECTROPHYSIOLOGY CONSULT    Chief Complaint:    I was asked by Dr. Castillo to evaluate the patient for ventricular tachycardia.    HPI:    Pete Trejo is a very pleasant 56 y.o. y/o male who presents with a PMH significant for   Patient Active Problem List   Diagnosis   • Ischemic cardiomyopathy   • Hypertension   • Hyperlipidemia   • S/P ablation of ventricular arrhythmia   • Automatic implantable cardioverter-defibrillator in situ   • Presence of stent in coronary artery   • On amiodarone therapy   • Chronic combined systolic and diastolic CHF, NYHA class 2 and ACC/AHA stage C (CMS/HCC) (HCC)   • Ventricular tachycardia (paroxysmal) (CMS/HCC) (HCC)   • COVID-19 ruled out by clinical criteria   • Near syncope   • Sustained ventricular tachycardia (CMS/HCC) (HCC)   • Chronic anticoagulation   • Elevated troponin   • History of ventricular tachycardia   • Paroxysmal atrial fibrillation (CMS/HCC) (HCC)   • Obstructive sleep apnea         Pete  presents today to the emergency room after he was awoken his sleep with palpitations.  He did not receive an ICD shock.  He has been very compliant with his medications.  He was started on IV amiodarone in the emergency room with resolution of his arrhythmia.    Patient was recently admitted in September after he had intermittent episodes of ventricular tachycardia requiring ATP.  Patient was given IV amiodarone during that admission and sent home on increased dose of amiodarone.    Patient is well-known to the electrophysiology practice.  He has multiple medical issues with a significant history of ventricular tachycardia requiring therapy from his ICD.     He has a known history of CAD, ventricular tachycardia, ischemic cardiomyopathy is post dual-chamber biventricular relator, LV thrombus on Xarelto, dyslipidemia, hypertension.  Patient was recently taken to the EP lab in 06/2020 possible VT ablation with Dr. Montana on 6/9/2020 unfortunately patient had recurrent  numerous ventricular arrhythmias as result of extensive anterior wall scar also involving much of the lateral and septal walls.  He did partially successfully ablate a border zone area of the scar especially in the inferior apex and attempt to substrate modify his arrhythmia burden.  Patient has been on amiodarone and mexiletine therapy and was referred to [facility] for further recommendations for possible LVAD or transplant. Patient was told that his medical therapy was working and he did not qualify for LVAD or transplant at that time.     Patient was seen and examined today.  Chart reviewed and plan discussed with the emergency room nurse and patient.  During examination patient denies chest pain, shortness of breath, palpitations, lightheaded or dizziness.  Patient's been compliant with medications and denies missing a dose.  During his last admission a few weeks ago he had been outside working and thought it was related to dehydration but today he denies being dehydrated.  Patient was awoke in the middle the night with severe palpitations.  He tells me he had no accompanying symptoms with these palpitations.    I did discussed with patient Dr. Montana's plan of patient returning to the [facility] for further recommendations for possible complex ventricular tachycardia ablation versus LVAD.  Patient agreed with plan.    Medications:    Current Facility-Administered Medications   Medication Dose Route Frequency Provider Last Rate Last Admin   • sodium chloride 0.9% (NS) carrier flush 25-50 mL  25-50 mL intravenous PRN Vaibhav Castillo MD       • sodium chloride 0.9% (NS) carrier flush 25-50 mL  25-50 mL intravenous PRN Vaibhav Castillo MD       • amiodarone (NEXTERONE) 360 mg/200 mL (1.8 mg/mL) infusion (premix)  1 mg/min intravenous Continuous Vaibhav Castillo MD 33.3 mL/hr at 10/06/21 0507 1 mg/min at 10/06/21 0507    Followed by   • amiodarone (NEXTERONE) 360 mg/200 mL (1.8 mg/mL) infusion  (premix)  0.5 mg/min intravenous Continuous Vaibhav Castillo MD         Current Outpatient Medications   Medication Sig Dispense Refill   • mexiletine (MEXITIL) 200 mg capsule Take 1 capsule (200 mg total) by mouth 3 (three) times a day with meals 270 capsule 3   • amiodarone (PACERONE) 200 mg tablet Take 1 tablet (200 mg total) by mouth 3 (three) times a day with meals Cut down to twice daily with meals in 2 weeks 270 tablet 3   • furosemide (LASIX) 40 mg tablet Take 1 tablet (40 mg total) by mouth daily If weight goes up 3 pounds overnight take an extra dose of Lasix x1 day 90 tablet 3   • sacubitriL-valsartan (ENTRESTO) 24-26 mg tablet Take 1 tablet by mouth 2 (two) times a day 180 tablet 3   • aspirin 81 mg EC tablet Take 1 tablet (81 mg total) by mouth daily 90 tablet 3   • albuterol HFA (PROVENTIL HFA;VENTOLIN HFA) 90 mcg/actuation inhaler Inhale 2 puffs every 6 (six) hours as needed for wheezing or shortness of breath 1 Inhaler 1   • spironolactone (ALDACTONE) 25 mg tablet Take 1 tablet (25 mg total) by mouth 2 (two) times a day 180 tablet 3   • rosuvastatin (CRESTOR) 40 mg tablet Take 40 mg by mouth nightly       • nitroglycerin (NITROSTAT) 0.4 mg SL tablet Place 0.4 mg under the tongue every 5 (five) minutes as needed for chest pain.     • pantoprazole (PROTONIX) 40 mg EC tablet Take 40 mg by mouth daily.     • carvedilol (COREG) 25 mg tablet Take 25 mg by mouth 2 (two) times a day with meals.     • prasugrel (EFFIENT) 10 mg tablet Take 1 tablet (10 mg total) by mouth daily. 30 tablet 1   • rivaroxaban (XARELTO) 20 mg tablet Take 1 tablet (20 mg total) by mouth daily. 30 tablet 1        Fam Hx:    No family history of sudden cardiac death.   Family History   Problem Relation Age of Onset   • Heart attack Mother 62   • Other Mother         Abdominal Aortic Aneurysm   • Lung cancer Mother    • Colon cancer Father         Cause of death   • Diabetes Brother         Non-Insulin Dependent   • Heart attack Brother  51        w/ Stents   • Heart disease Brother    • Other Son         Well          Soc Hx:    Social History     Occupational History   • Not on file   Tobacco Use   • Smoking status: Former Smoker     Packs/day: 0.75     Years: 30.00     Pack years: 22.50     Types: Cigarettes     Quit date: 2020     Years since quittin.3   • Smokeless tobacco: Never Used   Substance and Sexual Activity   • Alcohol use: Not Currently     Comment: Quit drinking in    • Drug use: Not Currently   • Sexual activity: Defer   Social History Narrative   • Not on file        ROS:    Review of Systems   Constitutional: Negative for chills, fever and weight gain.   HENT: Negative for congestion and hoarse voice.    Eyes: Negative for blurred vision.   Cardiovascular: Positive for palpitations. Negative for chest pain, dyspnea on exertion, leg swelling, orthopnea and syncope.   Respiratory: Negative for cough, shortness of breath and wheezing.    Endocrine: Negative for cold intolerance and heat intolerance.   Hematologic/Lymphatic: Negative for bleeding problem.   Skin: Negative for itching.   Musculoskeletal: Negative for joint swelling and myalgias.   Gastrointestinal: Negative for bloating, nausea and vomiting.   Genitourinary: Negative for hematuria.   Neurological: Negative for dizziness, light-headedness and weakness.   Psychiatric/Behavioral: Negative for altered mental status.   Allergic/Immunologic: Negative for hives.       Physical Exam:    Vitals:  Temp:  [36.5 °C (97.7 °F)] 36.5 °C (97.7 °F)  Heart Rate:  [] 60  Resp:  [11-22] 15  BP: ()/(60-86) 109/77      Telemetry: Paced rhythm at 60 beats a minute, with episodes of ventricular tachycardia noted.            Physical Exam  Constitutional:       Appearance: He is well-developed.   HENT:      Head: Normocephalic.   Cardiovascular:      Rate and Rhythm: Normal rate and regular rhythm.      Heart sounds: Normal heart sounds.   Pulmonary:      Effort:  Pulmonary effort is normal.      Breath sounds: Normal breath sounds.   Abdominal:      General: Bowel sounds are normal.      Palpations: Abdomen is soft.   Musculoskeletal:         General: Normal range of motion.      Cervical back: Normal range of motion.   Skin:     General: Skin is warm and dry.   Neurological:      Mental Status: He is alert and oriented to person, place, and time.   Psychiatric:         Behavior: Behavior normal.           Laboratory values:    Lab Results   Component Value Date    INR 1.9 (H) 10/06/2021    INR 1.2 (H) 07/10/2021    INR 1.3 (H) 06/16/2020     Lab Results   Component Value Date    WBC 6.0 10/06/2021    RBC 4.85 10/06/2021    HGB 15.4 10/06/2021    HCT 46.1 10/06/2021    MCV 95.0 10/06/2021    MCH 31.8 10/06/2021    MCHC 33.5 10/06/2021    RDW 14.5 10/06/2021     10/06/2021     No components found for: TROP   Lab Results   Component Value Date    TSH 4.023 09/18/2021      Diagnostic Data:     US Echo complete: 9/19/2021  · Left ventricle is severely dilated and globally hypokinetic · Severely reduced LV function, EF 20 to 25% · Grade 1 diastolic dysfunction with normal LV filling pressures · No LV thrombus noted with Definity contrast · Normal RV size and function · Severe left atrial enlargement (LA VI 54 mL/m²) · No significant valvulopathy · No pericardial effusion · No significant change from prior echo report    XR chest portable 1 view: 10/6/2021  IMPRESSION: 1.  Cardiomegaly stable. 2.  Nothing acute.    EKG: 10/06/2021        Assessment and Plan:    1. Ventricular tachycardia: Ventricular tachycardia noted on EKG and patient's ICD.  Patient received ATP with no shocks.  Given IV amiodarone bolus with resolution of her arrhythmia.  I discussed the case with Dr. Montana who would like patient to follow-up at the [facility] as this is his second admission for ventricular tachycardia in 2 weeks.  Patient is on amiodarone and mexiletine.  I will call  patient with the recommendations of the [facility].      Thank you for allowing our participation in the care of this very pleasant patient.  Please do not hesitate to contact us at [phone] with any questions or concerns that you may have.    Tanika Sun, CNP     Plan made in conjunction with Dr Montana.    Please note that portions of this document were generated with speech recognition software.  Reasonable efforts have been made to correct any transcriptional errors however some typographical errors may remain.

## 2021-10-14 ENCOUNTER — TELEPHONE (OUTPATIENT)
Dept: CARDIOLOGY | Facility: CLINIC | Age: 56
End: 2021-10-14

## 2021-10-14 DIAGNOSIS — I50.42 CHRONIC COMBINED SYSTOLIC AND DIASTOLIC CHF, NYHA CLASS 2 AND ACC/AHA STAGE C (CMS/HCC): ICD-10-CM

## 2021-10-14 NOTE — TELEPHONE ENCOUNTER
Minoo with [facility] pharmacy wanting clarification on amiodarone frequency.  States patient had stated that amiodarone was supposed be taken 3 times a day but per order should have been decreased to twice a day after 2 weeks. This nurse spoke to Pete who stated that after he was in the hospital on 10/6/2021 he was informed that he was supposed to be going back up to amiodarone 200 mg 3 times a day for 2 weeks and then decrease again to twice a day. Will clarify with provider

## 2021-10-18 ENCOUNTER — OFFICE VISIT (OUTPATIENT)
Dept: CARDIOLOGY | Facility: CLINIC | Age: 56
End: 2021-10-18
Payer: COMMERCIAL

## 2021-10-18 VITALS — RESPIRATION RATE: 20 BRPM | BODY MASS INDEX: 39.53 KG/M2 | HEIGHT: 71 IN | OXYGEN SATURATION: 95 % | WEIGHT: 282.4 LBS

## 2021-10-18 DIAGNOSIS — Z98.890 S/P ABLATION OF VENTRICULAR ARRHYTHMIA: ICD-10-CM

## 2021-10-18 DIAGNOSIS — G47.33 OBSTRUCTIVE SLEEP APNEA: ICD-10-CM

## 2021-10-18 DIAGNOSIS — Z86.79 S/P ABLATION OF VENTRICULAR ARRHYTHMIA: ICD-10-CM

## 2021-10-18 DIAGNOSIS — Z79.899 ON AMIODARONE THERAPY: ICD-10-CM

## 2021-10-18 DIAGNOSIS — I47.20 VT (VENTRICULAR TACHYCARDIA) (CMS/HCC): ICD-10-CM

## 2021-10-18 DIAGNOSIS — I48.0 PAROXYSMAL ATRIAL FIBRILLATION (CMS/HCC): ICD-10-CM

## 2021-10-18 DIAGNOSIS — E78.2 MIXED HYPERLIPIDEMIA: ICD-10-CM

## 2021-10-18 DIAGNOSIS — Z95.5 PRESENCE OF STENT IN CORONARY ARTERY: ICD-10-CM

## 2021-10-18 DIAGNOSIS — I25.10 CORONARY ARTERY DISEASE INVOLVING NATIVE CORONARY ARTERY OF NATIVE HEART WITHOUT ANGINA PECTORIS: ICD-10-CM

## 2021-10-18 DIAGNOSIS — Z95.810 AUTOMATIC IMPLANTABLE CARDIOVERTER-DEFIBRILLATOR IN SITU: ICD-10-CM

## 2021-10-18 DIAGNOSIS — I47.29 VENTRICULAR TACHYCARDIA (PAROXYSMAL) (CMS/HCC): ICD-10-CM

## 2021-10-18 DIAGNOSIS — I25.5 ISCHEMIC CARDIOMYOPATHY: ICD-10-CM

## 2021-10-18 DIAGNOSIS — Z79.01 CHRONIC ANTICOAGULATION: ICD-10-CM

## 2021-10-18 DIAGNOSIS — I47.20 SUSTAINED VENTRICULAR TACHYCARDIA (CMS/HCC): Primary | ICD-10-CM

## 2021-10-18 DIAGNOSIS — I50.42 CHRONIC COMBINED SYSTOLIC AND DIASTOLIC CHF, NYHA CLASS 2 AND ACC/AHA STAGE C (CMS/HCC): ICD-10-CM

## 2021-10-18 PROBLEM — R79.89 ELEVATED TROPONIN: Status: RESOLVED | Noted: 2021-09-19 | Resolved: 2021-10-18

## 2021-10-18 PROBLEM — R55 NEAR SYNCOPE: Status: RESOLVED | Noted: 2021-09-19 | Resolved: 2021-10-18

## 2021-10-18 PROCEDURE — 99214 OFFICE O/P EST MOD 30 MIN: CPT | Performed by: NURSE PRACTITIONER

## 2021-10-18 PROCEDURE — 93005 ELECTROCARDIOGRAM TRACING: CPT | Performed by: NURSE PRACTITIONER

## 2021-10-18 RX ORDER — AMIODARONE HYDROCHLORIDE 200 MG/1
200 TABLET ORAL 2 TIMES DAILY
Qty: 60 TABLET | Refills: 6 | Status: ON HOLD | OUTPATIENT
Start: 2021-10-18 | End: 2022-01-27 | Stop reason: SDUPTHER

## 2021-10-18 RX ORDER — PRASUGREL 10 MG/1
10 TABLET, FILM COATED ORAL DAILY
Qty: 30 TABLET | Refills: 6 | Status: SHIPPED | OUTPATIENT
Start: 2021-10-18 | End: 2021-11-23 | Stop reason: ALTCHOICE

## 2021-10-18 RX ORDER — SACUBITRIL AND VALSARTAN 24; 26 MG/1; MG/1
1 TABLET, FILM COATED ORAL 2 TIMES DAILY
Qty: 60 TABLET | Refills: 6 | Status: SHIPPED | OUTPATIENT
Start: 2021-10-18 | End: 2022-06-02 | Stop reason: SDUPTHER

## 2021-10-18 RX ORDER — SPIRONOLACTONE 25 MG/1
25 TABLET ORAL 2 TIMES DAILY
Qty: 60 TABLET | Refills: 6 | Status: SHIPPED
Start: 2021-10-18 | End: 2022-06-02 | Stop reason: SDUPTHER

## 2021-10-18 RX ORDER — FUROSEMIDE 40 MG/1
40 TABLET ORAL DAILY
Qty: 30 TABLET | Refills: 6 | Status: SHIPPED | OUTPATIENT
Start: 2021-10-18 | End: 2022-07-28 | Stop reason: SDUPTHER

## 2021-10-18 RX ORDER — ROSUVASTATIN CALCIUM 40 MG/1
40 TABLET, COATED ORAL NIGHTLY
Qty: 30 TABLET | Refills: 6 | Status: SHIPPED | OUTPATIENT
Start: 2021-10-18 | End: 2022-06-02 | Stop reason: SDUPTHER

## 2021-10-18 RX ORDER — CARVEDILOL 25 MG/1
25 TABLET ORAL 2 TIMES DAILY WITH MEALS
Qty: 60 TABLET | Refills: 6 | Status: SHIPPED | OUTPATIENT
Start: 2021-10-18 | End: 2022-06-02 | Stop reason: SDUPTHER

## 2021-10-18 ASSESSMENT — ENCOUNTER SYMPTOMS
NERVOUS/ANXIOUS: 1
IRREGULAR HEARTBEAT: 0
WEIGHT GAIN: 1
DYSPNEA ON EXERTION: 1
NAUSEA: 1
BRUISES/BLEEDS EASILY: 1
MUSCULOSKELETAL NEGATIVE: 1
ABDOMINAL PAIN: 0
HEMATURIA: 0
ANOREXIA: 0
SHORTNESS OF BREATH: 1
LIGHT-HEADEDNESS: 1
DIZZINESS: 1
PALPITATIONS: 1
SNORING: 1

## 2021-10-18 ASSESSMENT — PAIN SCALES - GENERAL: PAINLEVEL: 0-NO PAIN

## 2021-10-18 NOTE — PATIENT INSTRUCTIONS
1.  We will review Pete's medications including prasugrel and aspirin with Dr. Edwards.  To see if he still continues to need to be on dual antiplatelet therapy.  Taking dual antiplatelet therapy along with his blood thinner can increase risk of bleeding.  2.  He has appointment with [facility] this week for telemedicine point appointment to review his treatment for ventricular tachycardia.  3.  We will have him come back to the clinic in 6 months for reevaluation before that time if needed depending on [facility] and electrophysiology's recommendation.

## 2021-10-18 NOTE — PROGRESS NOTES
[address] [place] [address]                                         Cardiology Outpatient Follow-up Note    Subjective    Patient ID: Pete Trejo is a 56 y.o. male.    Chief Complaint: Cardiomyopathy, Hypertension, Hyperlipidemia, Congestive Heart Failure, Atrial Fibrillation, Coronary Artery Disease, and Hx Vtach      Pete is here today for post hospital visit.  He has had a longstanding history of anterior scar related monomorphoc nonsustained VT, this has worsened over the last few months.  He is also followed by Dr. Montana and Gerardo Sun CNP.  Pt is here today for post hospital visit.     Patient was seen in July when he presented to the emergency room for lightheadedness, diaphoresis, and feelings like he was going to pass out.  Interpretation showed that he had nonsustained VT resolved with ATP.  He did not receive a shock.  Patient underwent a stress test that admission and showed large area of scar in the anterior distribution consistent with his prior infarct.  He was on amiodarone and mexiletine at that time.  He was initiated on Entresto.  He then represented to the hospital September 19 again with palpitations lasting for about an hour, interrogation showed multiple episodes of VT requiring ATP-he was treated with amiodarone fusion. His mexiletine dose was increased to 3 times daily for his levels came back low.  He verified he was taking carvedilol.  His potassium and magnesium was normal when he presented.  And they increased his amiodarone back to 200 mg 3 times daily.  He then again represented October 6 again with nonsustained VT, receiving ATP but no shocks.    Patient comes in today for evaluation, he denies any palpitations or feeling of racing heart rhythm.  He is having a virtual appointment with a physician from [facility] and consideration for complex ventricular tachycardia ablation versus LVAD.  Speaking with the patient he states he went to [facility]  last year for LVAD evaluation.  They recommended continuing medical therapy for now.    He does have some nausea with his medications but no weight loss.  He states he has been tobacco free for about 2 years.  He is put on about 50 to 60 pounds.  He was told by the [facility] that he needed to lose weight.  He states he is bored and he does a lot of snacking at home.     Patient is on triple therapy.  He remains on Effient.  There is mention of a stroke on his chart, he states this occurred at the time of his infarct but the symptoms were transient.  He has had no recent stenting, his initial stent was a Taxus stent, 1 year later he had a Promus drug-eluting stent placed to the proximal LAD.    Twelve-lead EKG from October 6 shows VT:  a QRS width of 205 ms.  VT rate was 116 bpm.  No other EKG showing his nonsustained VT.  Twelve-lead EKG today shows atrial sensed biventricular paced, QRS width of 159 ms.  BiV paced 99% of the time per most recent interrogation.               Specialty Comments/Past Cardiac Hx:  Last updated by: Deidre Stover 05/04/2021   Primary Cardiologist: Jerry     CAD:   Anterior MI [PCI. 3.5 X 15-mm Promus drug eluting stent to totally occluded LAD] - 12/3/2010   Sm Anteroseptal MI [PCI. 2.25 X 24-mm Taxus drug eluting stent to diagonal. 3.0 X 8-mm Taxus stent to proximal LAD] - 7/25/2009   Progressive CAD ivolving LAD/Diagonal [Stent]     CHF/CM:   CHF   EF 31% - 2013   Severe ischemic cardionyopathy [BiVentricular ICD. [employer] Cognis HE Model #N119,Serial #[id number]. RV lead Guidant Model #0185,Serial #[id number]. LV lead BS Model #4591,Serial #[id number]. RA lead Guidant Model #4469, Serial #[id number]] - 2011       ARRHYTHMIA:   Ventricular Tachycardia [AICD]   2021 recurrent NS-VT, slow VT  2020 Hx of VT ablation per Dr. Montana - 06/09/2020 VT-Partially successful ablation of border zone areas of the scar especially the inferior apex in an attempt to substrate modify his  arrhythmia burden       PVD:   CVA - 12/2010 - at time of MI, no residual    OTHER:   LV Mural Thrombus - resolved  Anteroapical akinetic aneurysm     RISK FACTORS:   Hypertension   Dyslipidemia [Hyperlipidemia]       CARDIOVASCULAR PROCEDURES:     PCI (PTCA: LAD bifurcation   Stent: D2 2.25x30 (RESOLUTE))   PCI (%   Stent: LAD (PROMUS) 3.5x15mm) - 12/3/2010   PCI (Stent: D1 (TAXUS) 2.5x24mm, LAD prox (TAXUS) 30x8mm) - 7/25/2009   PCI 07/28/2017 second diagonal branch LAD Resolute 2.25 x 30-mm DIMAS       Prior Imaging/Testing History     EKG - 10/8/2015   EKG - 08/14/2020 AV paced 60 bpm     Devices (Bi-Ventricular ICD ([employer] N119), Serial# [id number]) - 10/10/2011   Devices (ICD Interrogation: 1 mode-switch, 1 Nonsustained VT)     Echo - 06/08/2020 · Severe LV systolic dysfunction, Severe ischemic CM, EF 25%,   LA moderately dilated; RA mildly dilated, Mild TR, Grade I LV diastolic abnormality, Minimally elevated LV end-diastolic pressure.     MPI (Mild rayray-infarct anterior wall ischemia; Lexiscan)   Lexiscan - 07/27/2017 -Large scar noted in the LAD territory associated with akinesis   secondary to prior MI, small amount of rayray-infarct ischemia noted, LV systolic function is severely depressed: EF 14%   Lexiscan - 06/08/2020 No changes since 2017 study      Current Outpatient Medications:   •  spironolactone (ALDACTONE) 25 mg tablet, Take 1 tablet (25 mg total) by mouth 2 (two) times a day, Disp: 60 tablet, Rfl: 6  •  carvediloL (Coreg) 25 mg tablet, Take 1 tablet (25 mg total) by mouth 2 (two) times a day with meals, Disp: 60 tablet, Rfl: 6  •  sacubitriL-valsartan (ENTRESTO) 24-26 mg tablet, Take 1 tablet by mouth 2 (two) times a day, Disp: 60 tablet, Rfl: 6  •  rosuvastatin (Crestor) 40 mg tablet, Take 1 tablet (40 mg total) by mouth nightly, Disp: 30 tablet, Rfl: 6  •  rivaroxaban (Xarelto) 20 mg tablet, Take 1 tablet (20 mg total) by mouth daily, Disp: 30 tablet, Rfl: 6  •  furosemide  (LASIX) 40 mg tablet, Take 1 tablet (40 mg total) by mouth daily If weight goes up 3 pounds overnight take an extra dose of Lasix x1 day, Disp: 30 tablet, Rfl: 6  •  prasugreL (EFFIENT) 10 mg tablet, Take 1 tablet (10 mg total) by mouth daily, Disp: 30 tablet, Rfl: 6  •  amiodarone (PACERONE) 200 mg tablet, Take 1 tablet (200 mg total) by mouth 2 (two) times a day, Disp: 60 tablet, Rfl: 6  •  mexiletine (MEXITIL) 200 mg capsule, Take 1 capsule (200 mg total) by mouth 3 (three) times a day with meals, Disp: 270 capsule, Rfl: 3  •  aspirin 81 mg EC tablet, Take 1 tablet (81 mg total) by mouth daily, Disp: 90 tablet, Rfl: 3  •  albuterol HFA (PROVENTIL HFA;VENTOLIN HFA) 90 mcg/actuation inhaler, Inhale 2 puffs every 6 (six) hours as needed for wheezing or shortness of breath, Disp: 1 Inhaler, Rfl: 1  •  nitroglycerin (NITROSTAT) 0.4 mg SL tablet, Place 0.4 mg under the tongue every 5 (five) minutes as needed for chest pain., Disp: , Rfl:   •  pantoprazole (PROTONIX) 40 mg EC tablet, Take 40 mg by mouth daily., Disp: , Rfl:     Review of Systems  Review of Systems   Constitutional: Positive for malaise/fatigue and weight gain.   HENT: Negative for nosebleeds.    Cardiovascular: Positive for chest pain, dyspnea on exertion and palpitations (In and out of Vtach X2 since 10/6/21.). Negative for irregular heartbeat and leg swelling.   Respiratory: Positive for shortness of breath and snoring (Patient states he just got diagnosed with LAVON. No CPAP yet. ).    Hematologic/Lymphatic: Bruises/bleeds easily.   Musculoskeletal: Negative.    Gastrointestinal: Positive for nausea. Negative for abdominal pain, anorexia and melena.   Genitourinary: Negative for hematuria.   Neurological: Positive for dizziness and light-headedness.   Psychiatric/Behavioral: The patient is nervous/anxious (When in fast rhythm. ).        Objective     Vitals:    10/18/21 1420   Resp:    SpO2: 95%     Weight: 128.1 kg (282 lb 6.4 oz)  Physical  Exam  Vitals reviewed.   Constitutional:       Appearance: Normal appearance. He is obese.   HENT:      Head: Normocephalic and atraumatic.      Nose: Nose normal.      Mouth/Throat:      Mouth: Mucous membranes are dry.   Eyes:      General: No scleral icterus.  Cardiovascular:      Rate and Rhythm: Normal rate and regular rhythm.      Heart sounds: Normal heart sounds. No murmur heard.        Comments: Left pectoral ICD without tenderness or erosion.  Pulmonary:      Effort: Pulmonary effort is normal.      Breath sounds: Normal breath sounds. No rales.   Chest:      Chest wall: No tenderness.   Abdominal:      General: There is distension.      Palpations: Abdomen is soft.   Musculoskeletal:      Right lower leg: No edema.      Left lower leg: No edema.   Skin:     General: Skin is warm and dry.      Capillary Refill: Capillary refill takes less than 2 seconds.      Findings: No rash.   Neurological:      General: No focal deficit present.      Mental Status: He is alert and oriented to person, place, and time.   Psychiatric:         Mood and Affect: Mood normal.         Behavior: Behavior normal.         Data Review:   Sodium   Date Value Ref Range Status   10/06/2021 138 135 - 145 mmol/L Final     Potassium   Date Value Ref Range Status   10/06/2021 4.0 3.5 - 5.1 mmol/L Final     Chloride   Date Value Ref Range Status   10/06/2021 105 98 - 107 mmol/L Final     CO2   Date Value Ref Range Status   10/06/2021 27 21 - 32 mmol/L Final     BUN   Date Value Ref Range Status   10/06/2021 14 7 - 25 mg/dL Final     Creatinine   Date Value Ref Range Status   10/06/2021 1.09 0.70 - 1.30 mg/dL Final     Glucose   Date Value Ref Range Status   10/06/2021 148 (H) 70 - 105 mg/dL Final     Calcium   Date Value Ref Range Status   10/06/2021 9.0 8.6 - 10.3 mg/dL Final     WBC   Date Value Ref Range Status   10/06/2021 6.0 3.7 - 9.6 10*3/uL Final     Hemoglobin   Date Value Ref Range Status   10/06/2021 15.4 13.2 - 17.2 g/dL Final      Hematocrit   Date Value Ref Range Status   10/06/2021 46.1 38.0 - 50.0 % Final     MCV   Date Value Ref Range Status   10/06/2021 95.0 82.0 - 97.0 fL Final     Platelets   Date Value Ref Range Status   10/06/2021 233 130 - 350 10*3/uL Final       Lipid:   Cholesterol   Date Value Ref Range Status   07/11/2021 119 0 - 199 mg/dL Final     HDL   Date Value Ref Range Status   07/11/2021 35 (L) >=40 mg/dL Final     Triglycerides   Date Value Ref Range Status   07/11/2021 120 <=149 mg/dL Final     LDL Calculated   Date Value Ref Range Status   07/11/2021 60 20 - 99 mg/dL Final       Assessment/Plan   Diagnoses and all orders for this visit:    Sustained ventricular tachycardia (CMS/HCC) (HCC)  -     ECG 12 lead -Normal, Today    Chronic combined systolic and diastolic CHF, NYHA class 2 and ACC/AHA stage C (CMS/HCC) (HCC)  -     Ambulatory referral to Cardiology    Mixed hyperlipidemia    Ischemic cardiomyopathy    Paroxysmal atrial fibrillation (CMS/HCC) (HCC)    Ventricular tachycardia (paroxysmal) (CMS/HCC) ([facility])    Automatic implantable cardioverter-defibrillator in situ    Chronic anticoagulation    Obstructive sleep apnea    On amiodarone therapy    S/P ablation of ventricular arrhythmia    Presence of stent in coronary artery      Plan /Discussion  #1 patient has a virtual appointment with the [facility] this Friday regarding treatment plan for nonsustained VT.  #2.  Patient since discharge has not had any worsening palpitations or tachycardias and no ICD shock.  #3.  Patient is on triple therapy of rivaroxaban, prasugrel, and aspirin.  His stenting procedure was last done in 2010.  It is unclear why he is still on dual antiplatelet therapy.    Review of his medications, it is not clear why he remains on Xarelto.  He had been last seen by cardiology in 2018, and then when he represented in June 2020 it was due to his first ICD shock due to VT. He did have a history of an LV thrombus in the remote  past probable at time of infarct.     Reviewing the chart I do not see any evidence of paroxysmal atrial fibrillation, there was a note that he had been on Xarelto for LV thrombus in the past.  Reviewing echocardiograms as far back as 2017 there has been no mention of intracardiac Thrombi.  His EF is varied between 25 and 30%.  An apical left ventricular thrombus noted per echocardiogram December 2010 EF 40% at that time.  A follow-up echo was not performed until July 2011 were no thrombus was noted.    Event recorder was 2011 which did not mention any atrial or ventricular arrhythmias.    Although patient may be at higher risk for paroxysmal atrial fibrillation, I do not see any EKGs with any to paroxysmal atrial fibrillation.  There was a ICD interpretation from January 2019 to June 2020 that showed 2.3 minutes of atrial fibrillation however this was over 495 days.  Interpretation since 2020 and 2021 show no atrial fibrillation burden.    I would like to mitigate his risk of bleeding, will review with Dr. Edwards his anticoagulant and antiplatelet therapy.  We may be able to discontinue prasugrel and aspirin-consider long-term Plavix, it is not clear whether he is on dual antiplatelet therapy based on the location of his LAD stent, and review continued anticoagulation therapy with Dr. Edwards.    4.  He did have elevated AST of 50 and ALT of 106 when he was in the hospital.  This is trended upward since February 2021, it may be related to higher doses of amiodarone and the addition of mexiletine.  Would recommend rechecking hepatic panel in 1 month.  Most recent TSH is from September 18 which is 4.023.  This is also slowly trending upward.  Would recommend rechecking this in 1 month also.    4.  45 min with pt and chart- see back in 6 months with CMP, TSH.        Patient Instructions   1.  We will review Pete's medications including prasugrel and aspirin with Dr. Edwards.  To see if he still continues to need to be on  dual antiplatelet therapy.  Taking dual antiplatelet therapy along with his blood thinner can increase risk of bleeding.  2.  He has appointment with [facility] this week for telemedicine point appointment to review his treatment for ventricular tachycardia.  3.  We will have him come back to the clinic in 6 months for reevaluation before that time if needed depending on [facility] and electrophysiology's recommendation.      Return for Physical - In Office.    The patient verbalized correct understanding and agreement to today's instructuctions and plan.  Patient voiced that questions have been addressed.    Electronically signed by: Devorah Aleman CNP  10/18/2021  5:31 PM

## 2021-10-18 NOTE — Clinical Note
Dr. Edwards can you review my note in regards to anticoagulation and DAPT.  I don't see PAF, but definitely has risk factors for potential. I don't see strong indication for DAPT.  Should we stop DAPT?  Would like to avoid triple therapy.

## 2021-10-19 NOTE — TELEPHONE ENCOUNTER
Tanika Sun, CNP  You 4 hours ago (9:30 AM)     SS    Yes he should be on 200mg twice a day      Pete notified and verbalized understanding.

## 2021-10-25 ENCOUNTER — DOCUMENTATION (OUTPATIENT)
Dept: CARDIOLOGY | Facility: CLINIC | Age: 56
End: 2021-10-25

## 2021-10-25 NOTE — PROGRESS NOTES
Please contact pt  That Dr. Edwards reviewed and  he may stop his Prasugrel.  Continue low dose  ASA and Xarelto, triple therapy is no longer needed.      Thanks MD Devorah Yen CNP  I do not see an indication for DAPT.  I do not believe he has ever had atrial fibrillation but he certainly is at risk for stroke given his heart failure and wall motion abnormality of the apex.  I think I would just choose to get rid of either aspirin or Plavix and continue other anticoagulation.             Previous Messages       ----- Message -----   From: Devorah Aleman CNP   Sent: 10/18/2021   5:59 PM MDT   To: MD Dr. Jerry Benites can you review my note in regards to anticoagulation and DAPT.  I don't see PAF, but definitely has risk factors for potential. I don't see strong indication for DAPT.  Should we stop DAPT?  Would like to avoid triple therapy.

## 2021-10-27 ENCOUNTER — OFFICE VISIT (OUTPATIENT)
Dept: INTERNAL MEDICINE | Facility: CLINIC | Age: 56
End: 2021-10-27
Payer: COMMERCIAL

## 2021-10-27 VITALS
SYSTOLIC BLOOD PRESSURE: 120 MMHG | HEIGHT: 71 IN | TEMPERATURE: 97.3 F | OXYGEN SATURATION: 94 % | RESPIRATION RATE: 16 BRPM | HEART RATE: 60 BPM | BODY MASS INDEX: 38.64 KG/M2 | DIASTOLIC BLOOD PRESSURE: 76 MMHG | WEIGHT: 276 LBS

## 2021-10-27 DIAGNOSIS — Z79.01 CHRONIC ANTICOAGULATION: ICD-10-CM

## 2021-10-27 DIAGNOSIS — I10 PRIMARY HYPERTENSION: ICD-10-CM

## 2021-10-27 DIAGNOSIS — Z00.00 HEALTHCARE MAINTENANCE: Primary | ICD-10-CM

## 2021-10-27 DIAGNOSIS — G47.33 OBSTRUCTIVE SLEEP APNEA: ICD-10-CM

## 2021-10-27 DIAGNOSIS — I10 PRIMARY HYPERTENSION: Primary | ICD-10-CM

## 2021-10-27 DIAGNOSIS — I25.5 ISCHEMIC CARDIOMYOPATHY: ICD-10-CM

## 2021-10-27 DIAGNOSIS — R42 LIGHTHEADEDNESS: ICD-10-CM

## 2021-10-27 PROCEDURE — 90471 IMMUNIZATION ADMIN: CPT | Mod: FLU

## 2021-10-27 PROCEDURE — 99214 OFFICE O/P EST MOD 30 MIN: CPT | Mod: 25 | Performed by: INTERNAL MEDICINE

## 2021-10-27 PROCEDURE — 90686 IIV4 VACC NO PRSV 0.5 ML IM: CPT | Mod: FLU

## 2021-10-27 ASSESSMENT — PAIN SCALES - GENERAL: PAINLEVEL: 0-NO PAIN

## 2021-10-27 NOTE — PROGRESS NOTES
Subjective      Pete Trejo is a 56 y.o. male who presents for 3-month follow-up.    HPI    Pete Pereira is a 56-year-old male with a past medical history significant for CVA, coronary artery disease with combined systolic and diastolic heart failure, , hyperlipidemia, GERD, and tobacco use who comes in today for a 3-month follow-up.    Since last seeing the patient he has been hospitalized from 9/18/2021 until 9/20/2021 for ventricular tachycardia.  He saw Senia Iverson on 9/28/2021 for hospital follow-up and was noted to be doing well.    He saw neurology on 10/1/2021 for obstructive sleep apnea and was started on CPAP at 8 cm of water.    He has also been following up with electrophysiology and cardiology for his ventricular tachycardia.  Their notes do describe the possibility of AICD placement in [place].  He has also been switched from triple therapy to aspirin with Xarelto.    Today Pete is noting that he has some instability since being in the hospital.  He has not had any falls.  He does notice that he has to stand up slowly.  He feels light headed but denies any vertiginous symptoms.  He notes that this will only last for a few seconds.    He does note that both times he had VT was when he was sleeping.  Since starting his CPAP he has not noticed any     Sometimes does have some shortness of breath at night but weight has been consistent.  He does describe some orthopnea.    For his asthma he has not been needing his PRN albuterol.    The following have been reviewed and updated as appropriate in this visit:  Tobacco  Allergies  Meds  Problems  Med Hx  Surg Hx  Fam Hx         Allergies   Allergen Reactions   • Morphine      ANXIETY   • Tramadol      ANXIETY     Current Outpatient Medications   Medication Sig Dispense Refill   • spironolactone (ALDACTONE) 25 mg tablet Take 1 tablet (25 mg total) by mouth 2 (two) times a day 60 tablet 6   • carvediloL (Coreg) 25 mg tablet Take 1 tablet (25 mg  total) by mouth 2 (two) times a day with meals 60 tablet 6   • sacubitriL-valsartan (ENTRESTO) 24-26 mg tablet Take 1 tablet by mouth 2 (two) times a day 60 tablet 6   • rosuvastatin (Crestor) 40 mg tablet Take 1 tablet (40 mg total) by mouth nightly 30 tablet 6   • rivaroxaban (Xarelto) 20 mg tablet Take 1 tablet (20 mg total) by mouth daily 30 tablet 6   • furosemide (LASIX) 40 mg tablet Take 1 tablet (40 mg total) by mouth daily If weight goes up 3 pounds overnight take an extra dose of Lasix x1 day 30 tablet 6   • prasugreL (EFFIENT) 10 mg tablet Take 1 tablet (10 mg total) by mouth daily 30 tablet 6   • amiodarone (PACERONE) 200 mg tablet Take 1 tablet (200 mg total) by mouth 2 (two) times a day 60 tablet 6   • mexiletine (MEXITIL) 200 mg capsule Take 1 capsule (200 mg total) by mouth 3 (three) times a day with meals 270 capsule 3   • aspirin 81 mg EC tablet Take 1 tablet (81 mg total) by mouth daily 90 tablet 3   • albuterol HFA (PROVENTIL HFA;VENTOLIN HFA) 90 mcg/actuation inhaler Inhale 2 puffs every 6 (six) hours as needed for wheezing or shortness of breath 1 Inhaler 1   • nitroglycerin (NITROSTAT) 0.4 mg SL tablet Place 0.4 mg under the tongue every 5 (five) minutes as needed for chest pain.     • pantoprazole (PROTONIX) 40 mg EC tablet Take 40 mg by mouth daily.       No current facility-administered medications for this visit.     Past Medical History:   Diagnosis Date   • CAD (coronary artery disease)    • CHF (congestive heart failure) (CMS/HCC) (HCC)    • CVA (cerebral vascular accident) (CMS/HCC) (HCC) 2010   • Dyslipidemia    • GERD (gastroesophageal reflux disease)    • GERD (gastroesophageal reflux disease)    • Heart attack (CMS/HCC) (HCC)     x3   • Hypertension    • Ischemic cardiomyopathy    • Paroxysmal atrial fibrillation (CMS/HCC) (HCC)     On Xarelto   • V-tach (CMS/HCC) (HCC)     AICD in place     Past Surgical History:   Procedure Laterality Date   • ABLATION VT N/A 06/09/2020     Procedure: Ablation VT;  Surgeon: Wilfredo Montana MD;  Location: [facility] EP Lab;  Service: Electrophysiology;  Laterality: N/A;  Check with Dr. Montana in the a.m. regarding scheduling   • APPENDECTOMY     • CORONARY ARTERY STENTING      2.5 X 24-mm Taxus DIMAS to first diagonal. 3.0 X 8-mm Taxus stent to proximal LAD just proximal to diagonal.   • CORONARY ARTERY STENTING      3.5 X 15-mm Promus DIMAS to totally occluded LAD   • CORONARY ARTERY STENTING      2.25 X 30-mm Resolute DIMAS to 2nd diagonal.  Balloon angioplasty through strut to improve blood flow to distal LAD   • CORONARY ARTERY STENTING  2017    second diagonal branch LAD Resolute 2.25 x 30-mm DIMAS   • ICD SC NEW      [employer] Cognis Model #N119, Serial #[id number]. [employer] Reliance Model #0185, Serial #[id number]. LV lead Acuity Model #45+91, Serial #[id number]. Atrial lead Model #4469, Serial #[id number]     Family History   Problem Relation Age of Onset   • Heart attack Mother 62   • Other Mother         Abdominal Aortic Aneurysm   • Lung cancer Mother    • Colon cancer Father         Cause of death   • Diabetes Brother         Non-Insulin Dependent   • Heart attack Brother 51        w/ Stents   • Heart disease Brother    • Other Son         Well     Social History     Occupational History   • Not on file   Tobacco Use   • Smoking status: Former Smoker     Packs/day: 0.75     Years: 30.00     Pack years: 22.50     Types: Cigarettes     Quit date: 2020     Years since quittin.3   • Smokeless tobacco: Never Used   Vaping Use   • Vaping Use: Never used   Substance and Sexual Activity   • Alcohol use: Not Currently     Comment: Quit drinking in    • Drug use: Not Currently   • Sexual activity: Defer   Social History Narrative   • Not on file       Review of Systems    Objective   /76 (BP Location: Left arm, Patient Position: Sitting, Cuff Size: Long Adult)   Pulse 60   Temp 36.3 °C (97.3 °F) (Temporal)   Resp 16   Ht 1.803  "m (5' 11\")   Wt 125.2 kg (276 lb)   SpO2 94%   BMI 38.49 kg/m²     Physical Exam  Vitals reviewed.   Constitutional:       General: He is not in acute distress.  HENT:      Head: Normocephalic and atraumatic.      Mouth/Throat:      Mouth: Mucous membranes are moist.      Pharynx: Oropharynx is clear. No oropharyngeal exudate.   Neck:      Comments: Jugular venous pressure normal  Cardiovascular:      Rate and Rhythm: Normal rate and regular rhythm.      Pulses: Normal pulses.      Heart sounds: No murmur heard.     Pulmonary:      Effort: Pulmonary effort is normal. No respiratory distress.      Breath sounds: No wheezing or rales.   Musculoskeletal:         General: No tenderness or deformity.      Cervical back: Neck supple. No rigidity.      Right lower leg: No edema.      Left lower leg: No edema.   Lymphadenopathy:      Cervical: No cervical adenopathy.   Skin:     General: Skin is warm and dry.      Capillary Refill: Capillary refill takes less than 2 seconds.      Findings: No erythema or rash.   Neurological:      Mental Status: He is alert.          ASSESSMENT AND PLAN    1. Healthcare maintenance  For patient's Healthcare maintenance we will be performing influenza vaccination today.  We did also discussed that the patient will be eligible for Covid booster coming up shortly and he does anticipate that he will be getting the booster.    2. Ischemic cardiomyopathy  Discussed with patient his fluid status.  His JVP appears normal, no edema, no crackles on the lungs, weight has been stable.  All of this suggest that his fluid status has been stable.  He does still have some shortness of breath at night however this could be due to his obstructive sleep apnea.  He will be pursuing evaluation with [facility] for possible cardiac assist device.  Otherwise he does follow-up with cardiology.    3. Primary hypertension  Blood pressure appears to be under good control today.  Patient does describe " some symptoms of lightheadedness.  We could in theory go down on some of his blood pressure control however that would increase his risk of going back into ventricular tachycardia therefore we have to balance the 2.  He has not had any falls and is able to compensate for his lightheadedness on standing therefore I would like to keep him on the same medications for the time being.    4. Obstructive sleep apnea  Has just received his CPAP.  He will be starting to use it more regularly here coming up.  He will start by using it during the daytime so he can grow accustomed to it and then he will start wearing it at night.    5. Chronic anticoagulation  No abnormal bleeding today.  The patient does have notes from cardiology stating that he can stop his prasugrel    6. Lightheadedness  As noted above we must balance his lightheadedness with the risk of ventricular tachycardia.  He is able to compensate for his lightheadedness for the time being therefore I will not be decreasing any of his cardiac medications.     Nikhil Smith MD

## 2021-10-29 ENCOUNTER — OFFICE VISIT (OUTPATIENT)
Dept: INTERNAL MEDICINE | Facility: CLINIC | Age: 56
End: 2021-10-29
Payer: COMMERCIAL

## 2021-10-29 VITALS
RESPIRATION RATE: 16 BRPM | HEART RATE: 62 BPM | SYSTOLIC BLOOD PRESSURE: 126 MMHG | DIASTOLIC BLOOD PRESSURE: 82 MMHG | BODY MASS INDEX: 38.64 KG/M2 | TEMPERATURE: 96.7 F | OXYGEN SATURATION: 96 % | HEIGHT: 71 IN | WEIGHT: 276 LBS

## 2021-10-29 DIAGNOSIS — L91.8 SKIN TAG: Primary | ICD-10-CM

## 2021-10-29 PROCEDURE — 17003 DESTRUCT PREMALG LES 2-14: CPT | Performed by: INTERNAL MEDICINE

## 2021-10-29 PROCEDURE — 17000 DESTRUCT PREMALG LESION: CPT | Performed by: INTERNAL MEDICINE

## 2021-10-29 ASSESSMENT — PAIN SCALES - GENERAL: PAINLEVEL: 0-NO PAIN

## 2021-10-29 NOTE — PROGRESS NOTES
Subjective      Pete Trejo is a 56 y.o. male who presents for cryotherapy of skin tag.    KELSIE Freeman comes in today for cryotherapy of skin tags.  He has multiple skin tags located on his upper chest.  These do cause some irritation when he wears a shirt but otherwise do not bother him.  No other signs of systemic infection today.  No fevers chills nausea vomiting or other such symptoms.  He has not had any recent skin rashes or other concerns.    The following have been reviewed and updated as appropriate in this visit:         Allergies   Allergen Reactions   • Morphine      ANXIETY   • Tramadol      ANXIETY     Current Outpatient Medications   Medication Sig Dispense Refill   • spironolactone (ALDACTONE) 25 mg tablet Take 1 tablet (25 mg total) by mouth 2 (two) times a day 60 tablet 6   • carvediloL (Coreg) 25 mg tablet Take 1 tablet (25 mg total) by mouth 2 (two) times a day with meals 60 tablet 6   • sacubitriL-valsartan (ENTRESTO) 24-26 mg tablet Take 1 tablet by mouth 2 (two) times a day 60 tablet 6   • rosuvastatin (Crestor) 40 mg tablet Take 1 tablet (40 mg total) by mouth nightly 30 tablet 6   • rivaroxaban (Xarelto) 20 mg tablet Take 1 tablet (20 mg total) by mouth daily 30 tablet 6   • furosemide (LASIX) 40 mg tablet Take 1 tablet (40 mg total) by mouth daily If weight goes up 3 pounds overnight take an extra dose of Lasix x1 day 30 tablet 6   • prasugreL (EFFIENT) 10 mg tablet Take 1 tablet (10 mg total) by mouth daily 30 tablet 6   • amiodarone (PACERONE) 200 mg tablet Take 1 tablet (200 mg total) by mouth 2 (two) times a day 60 tablet 6   • mexiletine (MEXITIL) 200 mg capsule Take 1 capsule (200 mg total) by mouth 3 (three) times a day with meals 270 capsule 3   • aspirin 81 mg EC tablet Take 1 tablet (81 mg total) by mouth daily 90 tablet 3   • albuterol HFA (PROVENTIL HFA;VENTOLIN HFA) 90 mcg/actuation inhaler Inhale 2 puffs every 6 (six) hours as needed for wheezing or shortness of breath 1  Inhaler 1   • nitroglycerin (NITROSTAT) 0.4 mg SL tablet Place 0.4 mg under the tongue every 5 (five) minutes as needed for chest pain.     • pantoprazole (PROTONIX) 40 mg EC tablet Take 40 mg by mouth daily.       No current facility-administered medications for this visit.     Past Medical History:   Diagnosis Date   • CAD (coronary artery disease)    • CHF (congestive heart failure) (CMS/HCC) ([facility])    • CVA (cerebral vascular accident) (CMS/HCC) ([facility]) 2010   • Dyslipidemia    • GERD (gastroesophageal reflux disease)    • GERD (gastroesophageal reflux disease)    • Heart attack (CMS/HCC) ([facility])     x3   • Hypertension    • Ischemic cardiomyopathy    • Paroxysmal atrial fibrillation (CMS/[facility]) ([facility])     On Xarelto   • V-tach (CMS/[facility]) ([facility])     AICD in place     Past Surgical History:   Procedure Laterality Date   • ABLATION VT N/A 06/09/2020    Procedure: Ablation VT;  Surgeon: Wilfredo Montana MD;  Location: [facility] EP Lab;  Service: Electrophysiology;  Laterality: N/A;  Check with Dr. Montana in the a.m. regarding scheduling   • APPENDECTOMY     • CORONARY ARTERY STENTING  2009    2.5 X 24-mm Taxus DIMAS to first diagonal. 3.0 X 8-mm Taxus stent to proximal LAD just proximal to diagonal.   • CORONARY ARTERY STENTING  2010    3.5 X 15-mm Promus DIMAS to totally occluded LAD   • CORONARY ARTERY STENTING  2011    2.25 X 30-mm Resolute DIMAS to 2nd diagonal.  Balloon angioplasty through strut to improve blood flow to distal LAD   • CORONARY ARTERY STENTING  07/28/2017    second diagonal branch LAD Resolute 2.25 x 30-mm DIMAS   • ICD SC NEW  2011    [place] [employer] Cognis Model #N119, Serial #[id number]. [employer] Reliance Model #0185, Serial #[id number]. LV lead Acuity Model #45+91, Serial #[id number]. Atrial lead Model #4469, Serial #[id number]     Family History   Problem Relation Age of Onset   • Heart attack Mother 62   • Other Mother         Abdominal Aortic Aneurysm   • Lung cancer Mother    • Colon cancer Father         Cause of death    • Diabetes Brother         Non-Insulin Dependent   • Heart attack Brother 51        w/ Stents   • Heart disease Brother    • Other Son         Well     Social History     Occupational History   • Not on file   Tobacco Use   • Smoking status: Former Smoker     Packs/day: 0.75     Years: 30.00     Pack years: 22.50     Types: Cigarettes     Quit date: 2020     Years since quittin.3   • Smokeless tobacco: Never Used   Vaping Use   • Vaping Use: Never used   Substance and Sexual Activity   • Alcohol use: Not Currently     Comment: Quit drinking in    • Drug use: Not Currently   • Sexual activity: Defer   Social History Narrative   • Not on file       Review of Systems    Objective   There were no vitals taken for this visit.    Physical Exam  Constitutional:       Appearance: Normal appearance.   HENT:      Nose: Nose normal. No congestion or rhinorrhea.   Eyes:      General: No scleral icterus.     Pupils: Pupils are equal, round, and reactive to light.   Skin:     Comments: Multiple skin tags located on the upper chest   Neurological:      Mental Status: He is alert.         Procedure note  Patient had multiple skin tags of the upper chest which were identified.  These skin tags were then subjected to liquid nitrogen for approximately 30 seconds and then allowed to thaw, this was repeated two other times.  The patient tolerated the procedure well.    ASSESSMENT AND PLAN   1. Skin tag  Patient underwent cryotherapy today.  He will follow-up if he has any other concerns.     Nikhil Smith MD

## 2021-11-02 ENCOUNTER — TELEPHONE (OUTPATIENT)
Dept: CARDIOLOGY | Facility: CLINIC | Age: 56
End: 2021-11-02

## 2021-11-02 NOTE — TELEPHONE ENCOUNTER
Called patient this morning and went over medication recommendations from Dr. Edwards. Patient will stop prasugrel and continue Xarelto and 81mg aspirin.         ----- Message from Devorah Aleman CNP sent at 10/25/2021  5:22 PM MDT -----  Please see note and call pt. Tx LL

## 2021-11-02 NOTE — TELEPHONE ENCOUNTER
Pt will be needing a prior auth from STEVEN about his recent appointment in [place].    He'd like to hear back from STEVEN or his nurse.

## 2021-11-03 NOTE — TELEPHONE ENCOUNTER
Returned call to patient.  He states he needs an authorization from Dr. Edwards in regards to his heart transplant in [place].  Patient is already following with a provider in [place].    We will have our medical records pushed records just in case patient needs additional information.    Patient had no further questions or concerns at this time.

## 2021-11-09 PROCEDURE — 93000 ELECTROCARDIOGRAM COMPLETE: CPT | Performed by: INTERNAL MEDICINE

## 2021-11-10 ENCOUNTER — ANCILLARY PROCEDURE (OUTPATIENT)
Dept: CARDIOLOGY | Facility: CLINIC | Age: 56
End: 2021-11-10
Payer: COMMERCIAL

## 2021-11-11 ENCOUNTER — ANCILLARY PROCEDURE (OUTPATIENT)
Dept: CARDIOLOGY | Facility: CLINIC | Age: 56
End: 2021-11-11
Payer: COMMERCIAL

## 2021-11-11 DIAGNOSIS — Z45.02 ENCOUNTER FOR INTERROGATION OF CARDIAC DEFIBRILLATOR: ICD-10-CM

## 2021-11-15 ENCOUNTER — TELEPHONE (OUTPATIENT)
Dept: CARDIOLOGY | Facility: CLINIC | Age: 56
End: 2021-11-15
Payer: COMMERCIAL

## 2021-11-18 ENCOUNTER — APPOINTMENT (OUTPATIENT)
Dept: LAB | Facility: CLINIC | Age: 56
End: 2021-11-18
Payer: COMMERCIAL

## 2021-11-18 DIAGNOSIS — I25.5 ISCHEMIC CARDIOMYOPATHY: ICD-10-CM

## 2021-11-18 DIAGNOSIS — Z79.899 ON AMIODARONE THERAPY: ICD-10-CM

## 2021-11-18 DIAGNOSIS — I50.42 CHRONIC COMBINED SYSTOLIC AND DIASTOLIC CHF, NYHA CLASS 2 AND ACC/AHA STAGE C (CMS/HCC): ICD-10-CM

## 2021-11-18 LAB
ALBUMIN SERPL-MCNC: 4.1 G/DL (ref 3.5–5.3)
ALP SERPL-CCNC: 77 U/L (ref 45–115)
ALT SERPL-CCNC: 135 U/L (ref 7–52)
ANION GAP SERPL CALC-SCNC: 11 MMOL/L (ref 3–11)
AST SERPL-CCNC: 68 U/L
BILIRUB DIRECT SERPL-MCNC: 0.14 MG/DL
BILIRUB SERPL-MCNC: 0.83 MG/DL (ref 0.2–1.4)
BUN SERPL-MCNC: 12 MG/DL (ref 7–25)
CALCIUM SERPL-MCNC: 9.2 MG/DL (ref 8.6–10.3)
CHLORIDE SERPL-SCNC: 105 MMOL/L (ref 98–107)
CO2 SERPL-SCNC: 24 MMOL/L (ref 21–32)
CREAT SERPL-MCNC: 0.88 MG/DL (ref 0.7–1.3)
GFR SERPL CREATININE-BSD FRML MDRD: 97 ML/MIN/1.73M*2
GLUCOSE SERPL-MCNC: 112 MG/DL (ref 70–105)
POTASSIUM SERPL-SCNC: 4.1 MMOL/L (ref 3.5–5.1)
PROT SERPL-MCNC: 6.5 G/DL (ref 6–8.3)
SODIUM SERPL-SCNC: 140 MMOL/L (ref 135–145)
TSH SERPL DL<=0.05 MIU/L-ACNC: 2.59 UIU/ML (ref 0.34–4.82)

## 2021-11-18 PROCEDURE — 80053 COMPREHEN METABOLIC PANEL: CPT

## 2021-11-18 PROCEDURE — 82248 BILIRUBIN DIRECT: CPT

## 2021-11-18 PROCEDURE — 36415 COLL VENOUS BLD VENIPUNCTURE: CPT | Performed by: NURSE PRACTITIONER

## 2021-11-18 PROCEDURE — 84443 ASSAY THYROID STIM HORMONE: CPT

## 2021-11-23 ENCOUNTER — TELEPHONE (OUTPATIENT)
Dept: CARDIOLOGY | Facility: CLINIC | Age: 56
End: 2021-11-23
Payer: COMMERCIAL

## 2021-11-23 NOTE — LETTER
November 23, 2021     Dr. Nam  [facility]  Department of electrophysiology and cardiovascular disease    Patient: Pete Trejo   YOB: 1965   Date of Visit: 11/23/2021       Dear Dr. Nam,    I am referring my patient, Pete Trejo, to you for evaluation of ventricular arrhythmias.  The patient has a longstanding history of ischemic cardiomyopathy secondary to a late presenting anterior wall myocardial infarction many years ago.  Since that time he has been seen and evaluated at the [facility] for possible heart transplant as he has had recurrent ventricular arrhythmias refractory to ablation.  At this time we do not have the capability of proceeding with more aggressive ventricular tachycardia ablation due to previous history of hemodynamic compromise associated with previous attempts.  Short of a heart transplant his best long-term ability to stay out of the hospital would be enhanced by a comprehensive ablation for his refractory ventricular arrhythmias..     I appreciate your assistance in his care and look forward to your findings and recommendations.         Sincerely,                           Nir Edwards MD FACC      CC: No Recipients

## 2021-11-23 NOTE — TELEPHONE ENCOUNTER
"Voicemail left on this nurse's phone from patient stating that he has an ablation set for 11/29/2021 at the [facility].  States that he was informed that he needs a referral sent over to them or they will cancel this procedure.  Fax number to send referral list [phone].  Per patient Gerardo Sun told him there \"was nothing else they could do for\" him and therefore referred elsewhere.  Do see documentation on 10/6/2021 that same speed spoke to Dr. Nam's office.  Patient would like a phone call when this has been sent.  "

## 2021-11-24 ENCOUNTER — NURSE TRIAGE (OUTPATIENT)
Dept: FAMILY MEDICINE | Facility: CLINIC | Age: 56
End: 2021-11-24
Payer: COMMERCIAL

## 2021-11-24 NOTE — TELEPHONE ENCOUNTER
COVID-19 SCREENING ASSESSMENT     CRITERIA FOR TESTING  Is patient symptomatic: No:   ASYMPTOMATIC TESTING GROUP:  COVID ASYMPTOMATIC TESTING GROUP: presurgical      Reason for Disposition  • Patient meets criteria or  agreements for asymptomatic testing.    Protocols used: COVID-19 TRIAGE PROTOCOL MONUMENT HEALTH

## 2021-11-26 ENCOUNTER — APPOINTMENT (OUTPATIENT)
Dept: LAB | Facility: URGENT CARE | Age: 56
End: 2021-11-26
Payer: COMMERCIAL

## 2021-11-26 DIAGNOSIS — Z11.52 ENCOUNTER FOR SCREENING FOR COVID-19: ICD-10-CM

## 2021-11-26 LAB — SARS-COV-2 RNA RESP QL NAA+PROBE: NEGATIVE

## 2021-11-26 PROCEDURE — 99211 OFF/OP EST MAY X REQ PHY/QHP: CPT | Mod: LAB | Performed by: FAMILY MEDICINE

## 2021-11-26 PROCEDURE — 93295 DEV INTERROG REMOTE 1/2/MLT: CPT | Mod: NC | Performed by: INTERNAL MEDICINE

## 2021-11-26 PROCEDURE — 93296 REM INTERROG EVL PM/IDS: CPT | Mod: NC | Performed by: INTERNAL MEDICINE

## 2021-11-26 PROCEDURE — U0005 INFEC AGEN DETEC AMPLI PROBE: HCPCS | Performed by: FAMILY MEDICINE

## 2021-11-26 NOTE — TELEPHONE ENCOUNTER
"Patient notified that we do give referrals for \"blood transfusion\".  He needs to go through PCP.  Stated understanding.  "

## 2022-01-12 ENCOUNTER — ANCILLARY PROCEDURE (OUTPATIENT)
Dept: CARDIOLOGY | Facility: CLINIC | Age: 57
End: 2022-01-12
Payer: COMMERCIAL

## 2022-01-12 DIAGNOSIS — Z45.02 ENCOUNTER FOR INTERROGATION OF CARDIAC DEFIBRILLATOR: ICD-10-CM

## 2022-01-24 ENCOUNTER — APPOINTMENT (OUTPATIENT)
Dept: LAB | Facility: HOSPITAL | Age: 57
End: 2022-01-24
Payer: COMMERCIAL

## 2022-01-24 DIAGNOSIS — I47.29 VENTRICULAR TACHYCARDIA (PAROXYSMAL) (CMS/HCC): ICD-10-CM

## 2022-01-24 LAB
ANION GAP SERPL CALC-SCNC: 8 MMOL/L (ref 3–11)
BUN SERPL-MCNC: 12 MG/DL (ref 7–25)
CALCIUM SERPL-MCNC: 9.1 MG/DL (ref 8.6–10.3)
CHLORIDE SERPL-SCNC: 105 MMOL/L (ref 98–107)
CO2 SERPL-SCNC: 26 MMOL/L (ref 21–32)
CREAT SERPL-MCNC: 0.86 MG/DL (ref 0.7–1.3)
GFR SERPL CREATININE-BSD FRML MDRD: 102 ML/MIN/1.73M*2
GLUCOSE SERPL-MCNC: 111 MG/DL (ref 70–105)
MAGNESIUM SERPL-MCNC: 1.9 MG/DL (ref 1.8–2.4)
POTASSIUM SERPL-SCNC: 3.9 MMOL/L (ref 3.5–5.1)
SODIUM SERPL-SCNC: 139 MMOL/L (ref 135–145)

## 2022-01-24 PROCEDURE — 80048 BASIC METABOLIC PNL TOTAL CA: CPT

## 2022-01-24 PROCEDURE — 36415 COLL VENOUS BLD VENIPUNCTURE: CPT

## 2022-01-24 PROCEDURE — 83735 ASSAY OF MAGNESIUM: CPT

## 2022-01-24 PROCEDURE — 80151 DRUG ASSAY AMIODARONE: CPT

## 2022-01-25 ENCOUNTER — APPOINTMENT (OUTPATIENT)
Dept: RADIOLOGY | Facility: HOSPITAL | Age: 57
End: 2022-01-25
Payer: COMMERCIAL

## 2022-01-25 ENCOUNTER — HOSPITAL ENCOUNTER (OUTPATIENT)
Facility: HOSPITAL | Age: 57
Setting detail: OBSERVATION
Discharge: HOME | End: 2022-01-27
Attending: EMERGENCY MEDICINE | Admitting: HOSPITALIST
Payer: COMMERCIAL

## 2022-01-25 DIAGNOSIS — I47.20 V TACH (CMS/HCC): ICD-10-CM

## 2022-01-25 DIAGNOSIS — I47.20 VENTRICULAR TACHYCARDIA (CMS/HCC): Primary | ICD-10-CM

## 2022-01-25 DIAGNOSIS — R74.01 ELEVATED ALT MEASUREMENT: ICD-10-CM

## 2022-01-25 DIAGNOSIS — R79.89 ELEVATED TROPONIN: ICD-10-CM

## 2022-01-25 DIAGNOSIS — R07.9 CHEST PAIN: ICD-10-CM

## 2022-01-25 DIAGNOSIS — I25.10 CORONARY ARTERY DISEASE: ICD-10-CM

## 2022-01-25 DIAGNOSIS — R73.9 HYPERGLYCEMIA: ICD-10-CM

## 2022-01-25 DIAGNOSIS — I50.42 CHRONIC COMBINED SYSTOLIC AND DIASTOLIC CHF, NYHA CLASS 2 AND ACC/AHA STAGE C (CMS/HCC): ICD-10-CM

## 2022-01-25 LAB
ALBUMIN SERPL-MCNC: 4.1 G/DL (ref 3.5–5.3)
ALP SERPL-CCNC: 74 U/L (ref 45–115)
ALT SERPL-CCNC: 60 U/L (ref 7–52)
ANION GAP SERPL CALC-SCNC: 9 MMOL/L (ref 3–11)
AST SERPL-CCNC: 30 U/L
BASOPHILS # BLD AUTO: 0 10*3/UL
BASOPHILS NFR BLD AUTO: 1 % (ref 0–2)
BILIRUB SERPL-MCNC: 0.87 MG/DL (ref 0.2–1.4)
BNP SERPL-MCNC: 79 PG/ML (ref 0–100)
BUN SERPL-MCNC: 10 MG/DL (ref 7–25)
CALCIUM ALBUM COR SERPL-MCNC: 9.3 MG/DL (ref 8.6–10.3)
CALCIUM SERPL-MCNC: 9.4 MG/DL (ref 8.6–10.3)
CHLORIDE SERPL-SCNC: 104 MMOL/L (ref 98–107)
CO2 SERPL-SCNC: 25 MMOL/L (ref 21–32)
CREAT SERPL-MCNC: 0.92 MG/DL (ref 0.7–1.3)
DELTA HIGH SENSITIVITY TROPONIN I, 1 HOUR: 3.5
EOSINOPHIL # BLD AUTO: 0.2 10*3/UL
EOSINOPHIL NFR BLD AUTO: 4 % (ref 0–3)
ERYTHROCYTE [DISTWIDTH] IN BLOOD BY AUTOMATED COUNT: 14.1 % (ref 11.5–15)
GFR SERPL CREATININE-BSD FRML MDRD: 98 ML/MIN/1.73M*2
GLUCOSE SERPL-MCNC: 137 MG/DL (ref 70–105)
HCT VFR BLD AUTO: 44.4 % (ref 38–50)
HGB BLD-MCNC: 15.2 G/DL (ref 13.2–17.2)
LYMPHOCYTES # BLD AUTO: 3 10*3/UL
LYMPHOCYTES NFR BLD AUTO: 46 % (ref 15–47)
MAGNESIUM SERPL-MCNC: 1.9 MG/DL (ref 1.8–2.4)
MCH RBC QN AUTO: 31.8 PG (ref 29–34)
MCHC RBC AUTO-ENTMCNC: 34.2 G/DL (ref 32–36)
MCV RBC AUTO: 93.1 FL (ref 82–97)
MONOCYTES # BLD AUTO: 0.8 10*3/UL
MONOCYTES NFR BLD AUTO: 13 % (ref 5–13)
NEUTROPHILS # BLD AUTO: 2.4 10*3/UL
NEUTROPHILS NFR BLD AUTO: 37 % (ref 46–70)
PLATELET # BLD AUTO: 216 10*3/UL (ref 130–350)
PMV BLD AUTO: 8.4 FL (ref 6.9–10.8)
POTASSIUM SERPL-SCNC: 4.1 MMOL/L (ref 3.5–5.1)
PROT SERPL-MCNC: 6.9 G/DL (ref 6–8.3)
RBC # BLD AUTO: 4.77 10*6/ΜL (ref 4.1–5.8)
SODIUM SERPL-SCNC: 138 MMOL/L (ref 135–145)
TROPONIN I SERPL-MCNC: 27.2 PG/ML
TROPONIN I SERPL-MCNC: 30.7 PG/ML
TSH SERPL DL<=0.05 MIU/L-ACNC: 6 UIU/ML (ref 0.34–4.82)
WBC # BLD AUTO: 6.5 10*3/UL (ref 3.7–9.6)

## 2022-01-25 PROCEDURE — 87637 SARSCOV2&INF A&B&RSV AMP PRB: CPT | Performed by: EMERGENCY MEDICINE

## 2022-01-25 PROCEDURE — 84481 FREE ASSAY (FT-3): CPT | Performed by: HOSPITALIST

## 2022-01-25 PROCEDURE — 93295 DEV INTERROG REMOTE 1/2/MLT: CPT | Performed by: INTERNAL MEDICINE

## 2022-01-25 PROCEDURE — 83735 ASSAY OF MAGNESIUM: CPT | Performed by: HOSPITALIST

## 2022-01-25 PROCEDURE — 99285 EMERGENCY DEPT VISIT HI MDM: CPT | Performed by: EMERGENCY MEDICINE

## 2022-01-25 PROCEDURE — 80053 COMPREHEN METABOLIC PANEL: CPT | Performed by: EMERGENCY MEDICINE

## 2022-01-25 PROCEDURE — 85025 COMPLETE CBC W/AUTO DIFF WBC: CPT | Performed by: EMERGENCY MEDICINE

## 2022-01-25 PROCEDURE — 84484 ASSAY OF TROPONIN QUANT: CPT | Performed by: EMERGENCY MEDICINE

## 2022-01-25 PROCEDURE — 84439 ASSAY OF FREE THYROXINE: CPT | Performed by: EMERGENCY MEDICINE

## 2022-01-25 PROCEDURE — 93005 ELECTROCARDIOGRAM TRACING: CPT

## 2022-01-25 PROCEDURE — 71045 X-RAY EXAM CHEST 1 VIEW: CPT

## 2022-01-25 PROCEDURE — 36415 COLL VENOUS BLD VENIPUNCTURE: CPT | Performed by: EMERGENCY MEDICINE

## 2022-01-25 PROCEDURE — 84443 ASSAY THYROID STIM HORMONE: CPT | Performed by: EMERGENCY MEDICINE

## 2022-01-25 PROCEDURE — 93005 ELECTROCARDIOGRAM TRACING: CPT | Performed by: EMERGENCY MEDICINE

## 2022-01-25 PROCEDURE — 99220 *NO LONGER ACTIVE 2023 DO NOT USE* PR INITIAL OBSERVATION CARE/DAY 70 MINUTES: CPT | Performed by: HOSPITALIST

## 2022-01-25 PROCEDURE — 83880 ASSAY OF NATRIURETIC PEPTIDE: CPT | Performed by: EMERGENCY MEDICINE

## 2022-01-25 PROCEDURE — 93296 REM INTERROG EVL PM/IDS: CPT | Performed by: INTERNAL MEDICINE

## 2022-01-25 RX ORDER — NITROGLYCERIN 0.4 MG/1
0.4 TABLET SUBLINGUAL EVERY 5 MIN PRN
Status: DISCONTINUED | OUTPATIENT
Start: 2022-01-25 | End: 2022-01-26 | Stop reason: SDUPTHER

## 2022-01-25 RX ORDER — SODIUM CHLORIDE 9 MG/ML
25-50 INJECTION, SOLUTION INTRAVENOUS AS NEEDED
Status: DISCONTINUED | OUTPATIENT
Start: 2022-01-25 | End: 2022-01-27 | Stop reason: HOSPADM

## 2022-01-25 RX ORDER — SODIUM CHLORIDE 9 MG/ML
250 INJECTION, SOLUTION INTRAVENOUS ONCE
Status: DISCONTINUED | OUTPATIENT
Start: 2022-01-25 | End: 2022-01-27 | Stop reason: HOSPADM

## 2022-01-25 RX ORDER — NAPROXEN SODIUM 220 MG/1
325 TABLET, FILM COATED ORAL ONCE
Status: COMPLETED | OUTPATIENT
Start: 2022-01-25 | End: 2022-01-25

## 2022-01-25 RX ORDER — MORPHINE SULFATE 4 MG/ML
4 INJECTION, SOLUTION INTRAMUSCULAR; INTRAVENOUS ONCE
Status: DISCONTINUED | OUTPATIENT
Start: 2022-01-25 | End: 2022-01-27 | Stop reason: HOSPADM

## 2022-01-25 RX ORDER — PROPOFOL 10 MG/ML
INJECTION, EMULSION INTRAVENOUS
Status: DISCONTINUED
Start: 2022-01-25 | End: 2022-01-27 | Stop reason: HOSPADM

## 2022-01-25 RX ORDER — ONDANSETRON HYDROCHLORIDE 2 MG/ML
4 INJECTION, SOLUTION INTRAVENOUS ONCE
Status: DISCONTINUED | OUTPATIENT
Start: 2022-01-25 | End: 2022-01-27 | Stop reason: HOSPADM

## 2022-01-25 RX ORDER — AMIODARONE HYDROCHLORIDE 200 MG/1
200 TABLET ORAL DAILY
Status: DISCONTINUED | OUTPATIENT
Start: 2022-01-26 | End: 2022-01-27 | Stop reason: HOSPADM

## 2022-01-25 RX ADMIN — NAPROXEN SODIUM 325 MG: 220 TABLET, FILM COATED ORAL at 22:10

## 2022-01-26 ENCOUNTER — APPOINTMENT (OUTPATIENT)
Dept: CARDIOLOGY | Facility: HOSPITAL | Age: 57
End: 2022-01-26
Payer: COMMERCIAL

## 2022-01-26 LAB
BSA FOR ECHO PROCEDURE: 2.48 M2
DELTA HIGH SENSITIVITY TROPONIN I, 2 HOUR: 14.1
E/A RATIO: 0.9
E/E' RATIO (AVERAGE): 16.2
E/E' RATIO: 19.2
EJECTION FRACTION: 25 %
FLUAV RNA NPH QL NAA+NON-PROBE: NEGATIVE
FLUBV RNA NPH QL NAA+NON-PROBE: NEGATIVE
INTERVENTRICULAR SEPTUM: 1.2 CM (ref 0.6–1.1)
LA AREA A4C SYSTOLE: 83.9 CM3
LEFT ATRIUM VOLUME INDEX: 33 ML/M2
LEFT ATRIUM VOLUME: 78 CM3
LEFT INTERNAL DIMENSION IN SYSTOLE: 6.6 CM (ref 5.32–8.06)
LEFT VENTRICLE DIASTOLIC VOLUME: 425 CM3
LEFT VENTRICLE SYSTOLIC VOLUME: 321 CM3
LEFT VENTRICULAR INTERNAL DIMENSION IN DIASTOLE: 7.3 CM (ref 9.28–12.89)
LVAD-AP2: 70.5 CM2
ML OF DILUTED DEFINITY INJECTED: 4 ML
MV DT: 285 MS
MV PEAK A VEL: 59 CM/S
MV PEAK E VEL: 53.1 CM/S
POSTERIOR WALL: 1.1 CM (ref 0.6–1.1)
PULM VEIN S/D RATIO: 1
PV PEAK D VEL: 31 CM/S
PV PEAK S VEL: 32 CM/S
RA AREA: 15.4 CM2
RSV RNA NPH QL NAA+NON-PROBE: NEGATIVE
RV AP4 BASE: 3 CM
S': 6.7 CM/S
SARS-COV-2 RNA RESP QL NAA+PROBE: NEGATIVE
T3FREE SERPL-MCNC: 3.7 PG/ML (ref 2–3.5)
T4 FREE SERPL-MCNC: 1.01 NG/DL (ref 0.65–1.44)
TDI: 2.8 CM/S
TDILATERAL: 4.1 CM/S
TR MAX PG: 16.97 MMHG
TRICUSPID VALVE PEAK REGURGITATION VELOCITY: 2.06 M/S
TROPONIN I SERPL-MCNC: 41.3 PG/ML
Z-SCORE OF LEFT VENTRICULAR DIMENSION IN END DIASTOLE: -4.04
Z-SCORE OF LEFT VENTRICULAR DIMENSION IN END SYSTOLE: 0.06

## 2022-01-26 PROCEDURE — 2550000100 HC RX 255: Performed by: STUDENT IN AN ORGANIZED HEALTH CARE EDUCATION/TRAINING PROGRAM

## 2022-01-26 PROCEDURE — 6370000100 HC RX 637 (ALT 250 FOR IP): Performed by: STUDENT IN AN ORGANIZED HEALTH CARE EDUCATION/TRAINING PROGRAM

## 2022-01-26 PROCEDURE — 93283 PRGRMG EVAL IMPLANTABLE DFB: CPT

## 2022-01-26 PROCEDURE — 93308 TTE F-UP OR LMTD: CPT

## 2022-01-26 PROCEDURE — 6370000100 HC RX 637 (ALT 250 FOR IP): Performed by: HOSPITALIST

## 2022-01-26 PROCEDURE — 93321 DOPPLER ECHO F-UP/LMTD STD: CPT | Mod: 26 | Performed by: INTERNAL MEDICINE

## 2022-01-26 PROCEDURE — 93308 TTE F-UP OR LMTD: CPT | Mod: 26 | Performed by: INTERNAL MEDICINE

## 2022-01-26 PROCEDURE — G0378 HOSPITAL OBSERVATION PER HR: HCPCS

## 2022-01-26 PROCEDURE — 99214 OFFICE O/P EST MOD 30 MIN: CPT | Performed by: NURSE PRACTITIONER

## 2022-01-26 PROCEDURE — 6370000100 HC RX 637 (ALT 250 FOR IP): Performed by: NURSE PRACTITIONER

## 2022-01-26 PROCEDURE — 99232 SBSQ HOSP IP/OBS MODERATE 35: CPT | Performed by: STUDENT IN AN ORGANIZED HEALTH CARE EDUCATION/TRAINING PROGRAM

## 2022-01-26 PROCEDURE — 93325 DOPPLER ECHO COLOR FLOW MAPG: CPT | Mod: 26 | Performed by: INTERNAL MEDICINE

## 2022-01-26 RX ORDER — ONDANSETRON 4 MG/1
4 TABLET, FILM COATED ORAL EVERY 6 HOURS PRN
Status: DISCONTINUED | OUTPATIENT
Start: 2022-01-26 | End: 2022-01-27 | Stop reason: HOSPADM

## 2022-01-26 RX ORDER — LANOLIN ALCOHOL/MO/W.PET/CERES
800 CREAM (GRAM) TOPICAL ONCE
Status: COMPLETED | OUTPATIENT
Start: 2022-01-26 | End: 2022-01-26

## 2022-01-26 RX ORDER — SPIRONOLACTONE 25 MG/1
25 TABLET ORAL
Status: DISCONTINUED | OUTPATIENT
Start: 2022-01-26 | End: 2022-01-27 | Stop reason: HOSPADM

## 2022-01-26 RX ORDER — SACUBITRIL AND VALSARTAN 24; 26 MG/1; MG/1
1 TABLET, FILM COATED ORAL 2 TIMES DAILY
Status: DISCONTINUED | OUTPATIENT
Start: 2022-01-26 | End: 2022-01-27 | Stop reason: HOSPADM

## 2022-01-26 RX ORDER — OXYCODONE HYDROCHLORIDE 5 MG/1
5-10 TABLET ORAL EVERY 4 HOURS PRN
Status: DISCONTINUED | OUTPATIENT
Start: 2022-01-26 | End: 2022-01-27 | Stop reason: HOSPADM

## 2022-01-26 RX ORDER — FUROSEMIDE 40 MG/1
40 TABLET ORAL DAILY
Status: DISCONTINUED | OUTPATIENT
Start: 2022-01-26 | End: 2022-01-27 | Stop reason: HOSPADM

## 2022-01-26 RX ORDER — NITROGLYCERIN 0.4 MG/1
0.4 TABLET SUBLINGUAL EVERY 5 MIN PRN
Status: DISCONTINUED | OUTPATIENT
Start: 2022-01-26 | End: 2022-01-27 | Stop reason: HOSPADM

## 2022-01-26 RX ORDER — ACETAMINOPHEN 325 MG/1
325-650 TABLET ORAL EVERY 4 HOURS PRN
Status: DISCONTINUED | OUTPATIENT
Start: 2022-01-26 | End: 2022-01-27 | Stop reason: HOSPADM

## 2022-01-26 RX ORDER — SODIUM CHLORIDE 0.9 % (FLUSH) 0.9 %
3 SYRINGE (ML) INJECTION AS NEEDED
Status: DISCONTINUED | OUTPATIENT
Start: 2022-01-26 | End: 2022-01-27 | Stop reason: HOSPADM

## 2022-01-26 RX ORDER — POTASSIUM CHLORIDE 750 MG/1
20 TABLET, FILM COATED, EXTENDED RELEASE ORAL DAILY
Status: DISCONTINUED | OUTPATIENT
Start: 2022-01-26 | End: 2022-01-27 | Stop reason: HOSPADM

## 2022-01-26 RX ORDER — ROSUVASTATIN CALCIUM 20 MG/1
40 TABLET, COATED ORAL NIGHTLY
Status: DISCONTINUED | OUTPATIENT
Start: 2022-01-26 | End: 2022-01-27 | Stop reason: HOSPADM

## 2022-01-26 RX ORDER — ONDANSETRON HYDROCHLORIDE 2 MG/ML
4 INJECTION, SOLUTION INTRAVENOUS EVERY 6 HOURS PRN
Status: DISCONTINUED | OUTPATIENT
Start: 2022-01-26 | End: 2022-01-27 | Stop reason: HOSPADM

## 2022-01-26 RX ORDER — MEXILETINE HYDROCHLORIDE 200 MG/1
200 CAPSULE ORAL EVERY 12 HOURS SCHEDULED
Status: DISCONTINUED | OUTPATIENT
Start: 2022-01-26 | End: 2022-01-27 | Stop reason: HOSPADM

## 2022-01-26 RX ORDER — CARVEDILOL 25 MG/1
25 TABLET ORAL 2 TIMES DAILY WITH MEALS
Status: DISCONTINUED | OUTPATIENT
Start: 2022-01-26 | End: 2022-01-27 | Stop reason: HOSPADM

## 2022-01-26 RX ORDER — SODIUM CHLORIDE 0.9 % (FLUSH) 0.9 %
10 SYRINGE (ML) INJECTION AS NEEDED
Status: DISCONTINUED | OUTPATIENT
Start: 2022-01-26 | End: 2022-01-27 | Stop reason: HOSPADM

## 2022-01-26 RX ORDER — ASPIRIN 81 MG/1
81 TABLET ORAL DAILY
Status: DISCONTINUED | OUTPATIENT
Start: 2022-01-26 | End: 2022-01-27 | Stop reason: HOSPADM

## 2022-01-26 RX ADMIN — MEXILETINE HYDROCHLORIDE 200 MG: 200 CAPSULE ORAL at 21:10

## 2022-01-26 RX ADMIN — RIVAROXABAN 20 MG: 20 TABLET, FILM COATED ORAL at 10:41

## 2022-01-26 RX ADMIN — SPIRONOLACTONE 25 MG: 25 TABLET ORAL at 15:30

## 2022-01-26 RX ADMIN — CARVEDILOL 25 MG: 25 TABLET, FILM COATED ORAL at 08:44

## 2022-01-26 RX ADMIN — SACUBITRIL AND VALSARTAN 1 TABLET: 24; 26 TABLET, FILM COATED ORAL at 21:10

## 2022-01-26 RX ADMIN — MEXILETINE HYDROCHLORIDE 200 MG: 200 CAPSULE ORAL at 15:30

## 2022-01-26 RX ADMIN — PERFLUTREN 4 ML: 6.52 INJECTION, SUSPENSION INTRAVENOUS at 10:32

## 2022-01-26 RX ADMIN — CARVEDILOL 25 MG: 25 TABLET, FILM COATED ORAL at 17:59

## 2022-01-26 RX ADMIN — ROSUVASTATIN CALCIUM 40 MG: 20 TABLET, FILM COATED ORAL at 21:10

## 2022-01-26 RX ADMIN — Medication 10 ML: at 10:36

## 2022-01-26 RX ADMIN — ASPIRIN 81 MG: 81 TABLET ORAL at 08:45

## 2022-01-26 RX ADMIN — POTASSIUM CHLORIDE 20 MEQ: 750 TABLET, EXTENDED RELEASE ORAL at 08:45

## 2022-01-26 RX ADMIN — SPIRONOLACTONE 25 MG: 25 TABLET ORAL at 08:45

## 2022-01-26 RX ADMIN — SACUBITRIL AND VALSARTAN 1 TABLET: 24; 26 TABLET, FILM COATED ORAL at 08:44

## 2022-01-26 RX ADMIN — FUROSEMIDE 40 MG: 40 TABLET ORAL at 08:45

## 2022-01-26 RX ADMIN — MAGNESIUM OXIDE 800 MG: 400 TABLET ORAL at 10:40

## 2022-01-26 RX ADMIN — AMIODARONE HYDROCHLORIDE 200 MG: 200 TABLET ORAL at 08:45

## 2022-01-26 ASSESSMENT — ENCOUNTER SYMPTOMS
IRREGULAR HEARTBEAT: 1
HEMATOCHEZIA: 0
DYSPNEA ON EXERTION: 0
DIAPHORESIS: 0
ABDOMINAL PAIN: 0
FEVER: 0
HEARTBURN: 0
DIZZINESS: 0
SHORTNESS OF BREATH: 0
SNORING: 0
COUGH: 0
NAUSEA: 0
LIGHT-HEADEDNESS: 0
PALPITATIONS: 1
CHILLS: 0
FALLS: 0
ORTHOPNEA: 0
EYES NEGATIVE: 1
ENDOCRINE NEGATIVE: 1
DIARRHEA: 0
WHEEZING: 0
BRUISES/BLEEDS EASILY: 0
HEADACHES: 0
SYNCOPE: 0
PSYCHIATRIC NEGATIVE: 1
VOMITING: 0
HEMATURIA: 0
MYALGIAS: 0
PND: 0
NEAR-SYNCOPE: 0
CONSTIPATION: 0

## 2022-01-26 ASSESSMENT — ACTIVITIES OF DAILY LIVING (ADL)
ADEQUATE_TO_COMPLETE_ADL: YES
PATIENT'S MEMORY ADEQUATE TO SAFELY COMPLETE DAILY ACTIVITIES?: YES

## 2022-01-26 NOTE — ED PROVIDER NOTES
"Chest Pain (Pt arrives to ED C/O feeling like his \"chest is all full\" and that his heart is beating funny.  Denies any N/V, c/o feeling a little short of breath. History of MI, and pacemaker. This all started approx 30 min pta)        HPI:  This is a 56-year-old male presenting to the emergency department with complaints of tachycardia.    Patient states that between 30 and 60 minutes ago his heart started racing.  He feels a fullness in his heart.  Patient has minimal shortness of breath.  No radiation of his discomfort.  He denies nausea or diaphoresis.    Patient has a history of a pacemaker.  He believes that he has a defibrillator as well.      Past Medical History:   Diagnosis Date   • CAD (coronary artery disease)    • CHF (congestive heart failure) (CMS/[facility]) ([facility])    • CVA (cerebral vascular accident) (CMS/[facility]) ([facility]) 2010   • Dyslipidemia    • GERD (gastroesophageal reflux disease)    • GERD (gastroesophageal reflux disease)    • Heart attack (CMS/[facility]) ([facility])     x3   • Hypertension    • Ischemic cardiomyopathy    • Paroxysmal atrial fibrillation (CMS/[facility]) ([facility])     On Xarelto   • V-tach (CMS/[facility]) ([facility])     AICD in place       Past Surgical History:   Procedure Laterality Date   • ABLATION VT N/A 06/09/2020    Procedure: Ablation VT;  Surgeon: Wilfredo Montana MD;  Location: [facility] EP [facility];  Service: Electrophysiology;  Laterality: N/A;  Check with Dr. Montana in the a.m. regarding scheduling   • APPENDECTOMY     • CORONARY ARTERY STENTING  2009    2.5 X 24-mm Taxus DIMAS to first diagonal. 3.0 X 8-mm Taxus stent to proximal LAD just proximal to diagonal.   • CORONARY ARTERY STENTING  2010    3.5 X 15-mm Promus DIMAS to totally occluded LAD   • CORONARY ARTERY STENTING  2011    2.25 X 30-mm Resolute DIMAS to 2nd diagonal.  Balloon angioplasty through strut to improve blood flow to distal LAD   • CORONARY ARTERY STENTING  07/28/2017    second diagonal branch LAD Resolute 2.25 x 30-mm DIMAS   • ICD SC NEW  2011    [place] " [employer] Cognis Model #N119, Serial #[id number]. [place] [employer] Model #0185, Serial #[id number]. LV lead Acuity Model #45+91, Serial #[id number]. Atrial lead Model #4469, Serial #[id number]       Social History     Socioeconomic History   • Marital status: Single     Spouse name: Not on file   • Number of children: Not on file   • Years of education: Not on file   • Highest education level: Not on file   Occupational History   • Not on file   Tobacco Use   • Smoking status: Former Smoker     Packs/day: 0.75     Years: 30.00     Pack years: 22.50     Types: Cigarettes     Quit date: 2020     Years since quittin.6   • Smokeless tobacco: Never Used   Vaping Use   • Vaping Use: Never used   Substance and Sexual Activity   • Alcohol use: Not Currently     Comment: Quit drinking in    • Drug use: Not Currently   • Sexual activity: Defer   Other Topics Concern   • Not on file   Social History Narrative   • Not on file     Social Determinants of Health     Financial Resource Strain: Not on file   Food Insecurity: Not on file   Transportation Needs: Not on file   Physical Activity: Not on file   Stress: Not on file   Social Connections: Not on file   Intimate Partner Violence: Not on file   Housing Stability: Not on file       Family History   Problem Relation Age of Onset   • Heart attack Mother 62   • Other Mother         Abdominal Aortic Aneurysm   • Lung cancer Mother    • Colon cancer Father         Cause of death   • Diabetes Brother         Non-Insulin Dependent   • Heart attack Brother 51        w/ Stents   • Heart disease Brother    • Other Son         Well       Allergies   Allergen Reactions   • Morphine      ANXIETY   • Tramadol      ANXIETY         Current Outpatient Medications:   •  potassium chloride (K-TAB) 20 mEq CR tablet, Take 1 tablet (20 mEq total) by mouth daily, Disp: 90 tablet, Rfl: 3  •  magnesium chloride (SLOW-MAG) 64 mg tablet,delayed release (DR/EC), Take 1 tablet (64 mg total) by mouth 2 (two)  times a day with meals, Disp: 180 tablet, Rfl: 3  •  spironolactone (ALDACTONE) 25 mg tablet, Take 1 tablet (25 mg total) by mouth 2 (two) times a day, Disp: 60 tablet, Rfl: 6  •  carvediloL (Coreg) 25 mg tablet, Take 1 tablet (25 mg total) by mouth 2 (two) times a day with meals, Disp: 60 tablet, Rfl: 6  •  sacubitriL-valsartan (ENTRESTO) 24-26 mg tablet, Take 1 tablet by mouth 2 (two) times a day, Disp: 60 tablet, Rfl: 6  •  rosuvastatin (Crestor) 40 mg tablet, Take 1 tablet (40 mg total) by mouth nightly, Disp: 30 tablet, Rfl: 6  •  rivaroxaban (Xarelto) 20 mg tablet, Take 1 tablet (20 mg total) by mouth daily, Disp: 30 tablet, Rfl: 6  •  furosemide (LASIX) 40 mg tablet, Take 1 tablet (40 mg total) by mouth daily If weight goes up 3 pounds overnight take an extra dose of Lasix x1 day, Disp: 30 tablet, Rfl: 6  •  amiodarone (PACERONE) 200 mg tablet, Take 1 tablet (200 mg total) by mouth 2 (two) times a day (Patient taking differently: Take 200 mg by mouth daily  ), Disp: 60 tablet, Rfl: 6  •  aspirin 81 mg EC tablet, Take 1 tablet (81 mg total) by mouth daily, Disp: 90 tablet, Rfl: 3  •  albuterol HFA (PROVENTIL HFA;VENTOLIN HFA) 90 mcg/actuation inhaler, Inhale 2 puffs every 6 (six) hours as needed for wheezing or shortness of breath, Disp: 1 Inhaler, Rfl: 1  •  nitroglycerin (NITROSTAT) 0.4 mg SL tablet, Place 0.4 mg under the tongue every 5 (five) minutes as needed for chest pain., Disp: , Rfl:   •  pantoprazole (PROTONIX) 40 mg EC tablet, Take 40 mg by mouth daily., Disp: , Rfl:       ROS:  Constitutional: negative for fever  Eyes: negative for visual changes  ENT: negative for sore throat  Cardiovascular: Positive for chest pain  Respiratory: negative for shortness of breath   GI: negative for abdominal pain  : negative for hematuria  Musculoskeletal: negative for back pain  Neuro: negative for headache  Hematology: negative for bleeding  Immunology: negative for joint pain      ED Triage Vitals   Temp  Heart Rate Resp BP SpO2   01/25/22 2132 01/25/22 2132 01/25/22 2132 01/25/22 2132 01/25/22 2132   37.2 °C (99 °F) (!) 138 18 (!) 126/101 92 %      Temp Source Heart Rate Source Patient Position BP Location FiO2 (%)   01/25/22 2132 -- 01/25/22 2145 -- --   Oral  Head of bed 30 degrees or higher           Physical Exam:  Nursing note and vitals reviewed.  Constitutional: Uncomfortable but nontoxic-appearing middle-aged male. Patient answers questions appropriately.  Head: Normocephalic and atraumatic. Mucous membranes are moist.   Eyes: Pupils are equal, round, and reactive to light.   Neck: Supple.  Cardiovascular: Tachycardic without murmur.   Pulmonary/Chest: No respiratory distress.  Clear to auscultation bilaterally.  Abdominal: Soft and nontender.    Back: No CVA tenderness. No midline tenderness.   Musculoskeletal: Minimal bilateral edema  Neurological: Alert.   Skin: Skin is warm and dry. No rash noted.   Psychiatric: Normal mood and affect.        Labs:  Labs Reviewed   COMPREHENSIVE METABOLIC PANEL - Abnormal       Result Value    Sodium 138      Potassium 4.1      Chloride 104      CO2 25      Anion Gap 9      BUN 10      Creatinine 0.92      Glucose 137 (*)     Calcium 9.4      AST 30      ALT (SGPT) 60 (*)     Alkaline Phosphatase 74      Total Protein 6.9      Albumin 4.1      Total Bilirubin 0.87      Corrected Calcium 9.3      eGFR 98      Narrative:     Calculation based on the 2021 Chronic Kidney Disease Epidemiology Collaboration (CKD-EPI) equation refit without adjustment for race.   CBC WITH AUTO DIFFERENTIAL - Abnormal    WBC 6.5      RBC 4.77      Hemoglobin 15.2      Hematocrit 44.4      MCV 93.1      MCH 31.8      MCHC 34.2      RDW 14.1      Platelets 216      MPV 8.4      Neutrophils% 37 (*)     Lymphocytes% 46      Monocytes% 13      Eosinophils% 4 (*)     Basophils% 1      ANC (auto diff) 2.40      Lymphocytes Absolute 3.00      Monocytes Absolute 0.80      Eosinophils Absolute 0.20       Basophils Absolute 0.00     TSH - Abnormal    TSH 6.005 (*)    HIGH SENSITIVE TROPONIN I - Abnormal    hsTnI 0 Hour 27.2 (*)    B-TYPE NATRIURETIC PEPTIDE (BNP) - Normal    BNP 79     SARS/INFLUENZA/RSV QUADRUPLEX PCR   HIGH SENSITIVE TROPONIN I PANEL (0HR, 1HR, 2HR)    Narrative:     The following orders were created for panel order HS Troponin I Panel (0HR, 1HR, 2HR) Blood, Venous.  Procedure                               Abnormality         Status                     ---------                               -----------         ------                     HS Troponin I[[id number]]                 Abnormal            Final result               1HR High Sensitive Trop I[[id number]]                         In process                 2HR High Sensitive Tropon...[[id number]]                                                   Please view results for these tests on the individual orders.   HIGH SENSITIVE TROPONIN I, 1 HOUR   HIGH SENSITIVE TROPONIN I, 2 HOUR   T4, FREE   LAVENDER TOP RAINBOW       Imaging:  XR chest portable 1 view   Final Result   IMPRESSION:   1.  No acute cardiopulmonary abnormality.   2.  Stable cardiomegaly.          MDM:   This is a 56-year-old male presenting to the emergency department with a wide-complex tachycardia. Symptoms would be concerning for V. tach. Patient has a pacemaker I see no spikes in his initial EKG. He was placed to a trauma bay. Patient's blood pressure began to trend downward. As it approached 100, the decision was made to cardiovert the patient. As preparations were being made for sedation patient converted into a paced rhythm with a rate of 61. Labs will be checked. Patient will be observed. He does complain of some chest pain and will be given aspirin, morphine, Zofran, and nitroglycerin.    Patient is currently pain-free.  TSH is minimally elevated at 6.0.  BNP is normal.  CMP reveals minimal hyperglycemia at 137.  CBC is normal.  Initial troponin returns at 27.  Chest x-ray is  negative.  Case was discussed with cardiology.  It was not thought additional antiarrhythmics would be necessary as the patient is currently on 3.  Patient will be admitted to the hospitalist service.  Case was discussed with the admitting physician.            Given   Medications   sodium chloride 0.9% (NS) carrier flush 25-50 mL (has no administration in time range)   sodium chloride 0.9 % bolus 250 mL (has no administration in time range)   sodium chloride 0.9% (NS) carrier flush 25-50 mL (has no administration in time range)   propofoL (DIPRIVAN) injection (has no administration in time range)   nitroglycerin (NITROSTAT) SL tablet 0.4 mg (0.4 mg sublingual Not Given 1/25/22 2210)   morphine injection 4 mg (4 mg intravenous Not Given 1/25/22 2205)   ondansetron (ZOFRAN) injection 4 mg (has no administration in time range)   sodium chloride 0.9 % bolus 250 mL (has no administration in time range)   aspirin chewable tablet 325 mg (325 mg oral Given 1/25/22 2210)         Procedure    ECG 12 lead    Date/Time: 1/25/2022 10:04 PM  Performed by: Papi Fernandez MD  Authorized by: Papi Fernandez MD     Comments:      V. tach with a rate of 138. Wide-complex. No pacer spikes noted.  ECG 12 lead - Chest pain    Date/Time: 1/25/2022 10:04 PM  Performed by: Papi Fernandez MD  Authorized by: Papi Fernandez MD     ECG 12 lead - Arrhythmia    Date/Time: 1/25/2022 10:09 PM  Performed by: Papi Fernandez MD  Authorized by: Papi Fernandez MD     Comments:      Paced rhythm with a rate of 61. Spikes noted. Wide QRS.      Critical Care:  The high probability of sudden, clinically significant, or life-threatening deterioration required my full and direct attention, intervention, and personal management while the patient was critical.  I provided critical care services including those noted below.    35 minutes in addition to separately billable procedures.  Review of laboratory and radiographic studies obtained in the emergency department, initial and  reevaluation's of the patient's physical condition, review of all old studies as available, time spent ruling out differential diagnosis and diagnostic consideration.      Clinical Impression:    Final diagnoses:   [I47.2] Ventricular tachycardia (CMS/HCC) (HCC)   [R07.9] Chest pain   [R77.8] Elevated troponin   [I25.10] Coronary artery disease   [R73.9] Hyperglycemia   [R74.01] Elevated ALT measurement         I, Papi Fernandez MD, personally performed the services described in this documentation.     Papi Fernandez MD  01/25/22 7409

## 2022-01-26 NOTE — CONSULTATION
"  [address] [place] [address]                                                   Electrophysiology Inpatient Consult Note    Subjective    Patient ID: Pete Trejo is a 56 y.o. male.    Reason for consult:   Chief Complaint   Patient presents with   • Chest Pain     Pt arrives to ED C/O feeling like his \"chest is all full\" and that his heart is beating funny.  Denies any N/V, c/o feeling a little short of breath. History of MI, and pacemaker. This all started approx 30 min pta       HPI   Patient is a very pleasant 56-year-old male with past medical history significant for CAD, ventricular tachycardia, ischemic cardiomyopathy is post dual-chamber biventricular ICD, LV thrombus on Xarelto, dyslipidemia, hypertension.  Patient was recently taken to the EP lab for possible VT ablation with Dr. Montana on 6/9/2020. Unfortunately patient had recurrent numerous ventricular arrhythmias as result of extensive anterior wall scar also involving much of the lateral and septal walls.  He did partially successfully ablate a border zone area of the scar especially in the inferior apex and attempt to substrate modify his arrhythmia burden.  Patient has been on amiodarone and mexiletine therapy and was referred to [facility] for further recommendations for possible VAD or transplant. Patient was told that his medical therapy was working and he did not qualify for VAD or transplant at that time.    Patient was then admitted in September 2021 with recurrent ventricular tachycardia requiring ATP.  He was reloaded on amiodarone and sent home on an increased dose.  He was again readmitted in October 2021 with more recurrent episodes.  He received ATP but no shocks.  He was given IV amiodarone bolus with resolution of his arrhythmia.  Dr. Montana recommended patient follow-up at the [facility] for possible complex ventricular tachycardia ablation versus VAD/transplant.    On " 11/29/2021, patient underwent EP study by Dr. Lema for recurrent symptomatic VT requiring ATP despite high doses of amiodarone and mexiletine.  Extensive substrate modification was performed with noninducibility for clinical VT at the end.  He was inducible for fast LBLS VT with 400 triples and required CVN.  His amiodarone was decreased to 200 mg daily and mexiletine was stopped.  The future plan was going to be to switch to sotalol for chronic suppression.  Patient is scheduled for routine follow-up on 1/31/2022.    He presented to the emergency department yesterday evening after experiencing heart racing and a fullness in his chest.  He was found to be in ventricular tachycardia in the 130s.  The decision was made to cardiovert him but as preparations were being made, he spontaneously converted.  It was recommended he be admitted for further evaluation.    Patient reports he felt well for approximately 5-6 weeks post ablation in [place] but recently began experiencing racing in his heart again.  He received ATP x1 for ventricular tachycardia at 124 bpm on 1/11/2022 and then again received ATP x8 yesterday for VT at 139 bpm.  He continues to have episodes of NSVT but has not required any further therapy.  He denies any other symptoms including no chest pain, dyspnea, lightheadedness, dizziness, presyncopal or syncopal episodes, or edema.  No signs or symptoms of bleeding.    He is currently taking amiodarone 200 mg daily and carvedilol 25 mg twice daily for his arrhythmia.  He is hemodynamically stable.  No significant electrolyte abnormalities on admission.  Labs are unremarkable except TSH is 6.005.  Covid negative.  I interrogated his device today which shows the above arrhythmias.  He has a VT 1 zone set at 120 bpm with ATP only, VT zone at 160 bpm, and VF zone 210 bpm.      Specialty Problems        Cardiology Problems    Hyperlipidemia        Hypertension        Ischemic cardiomyopathy        Automatic  implantable cardioverter-defibrillator in situ        On amiodarone therapy        Presence of stent in coronary artery        S/P ablation of ventricular arrhythmia        Chronic combined systolic and diastolic CHF, NYHA class 2 and ACC/AHA stage C (CMS/HCC) (HCC)        Ventricular tachycardia (paroxysmal) (CMS/HCC) (HCC)        Chronic anticoagulation        History of ventricular tachycardia        Paroxysmal atrial fibrillation (CMS/HCC) (HCC)        Sustained ventricular tachycardia (CMS/HCC) (HCC)        V tach (CMS/HCC) (HCC)              Past Medical History:   Diagnosis Date   • CAD (coronary artery disease)    • CHF (congestive heart failure) (CMS/HCC) (HCC)    • CVA (cerebral vascular accident) (CMS/HCC) (HCC) 2010   • Dyslipidemia    • GERD (gastroesophageal reflux disease)    • GERD (gastroesophageal reflux disease)    • Heart attack (CMS/HCC) (HCC)     x3   • Hypertension    • Ischemic cardiomyopathy    • Paroxysmal atrial fibrillation (CMS/HCC) (HCC)     On Xarelto   • V-tach (CMS/HCC) ([facility])     AICD in place       Past Surgical History:   Procedure Laterality Date   • ABLATION VT N/A 06/09/2020    Procedure: Ablation VT;  Surgeon: Wilfredo Montana MD;  Location: [facility] EP Lab;  Service: Electrophysiology;  Laterality: N/A;  Check with Dr. Montana in the a.m. regarding scheduling   • APPENDECTOMY     • CORONARY ARTERY STENTING  2009    2.5 X 24-mm Taxus DIMAS to first diagonal. 3.0 X 8-mm Taxus stent to proximal LAD just proximal to diagonal.   • CORONARY ARTERY STENTING  2010    3.5 X 15-mm Promus DIMAS to totally occluded LAD   • CORONARY ARTERY STENTING  2011    2.25 X 30-mm Resolute DIMAS to 2nd diagonal.  Balloon angioplasty through strut to improve blood flow to distal LAD   • CORONARY ARTERY STENTING  07/28/2017    second diagonal branch LAD Resolute 2.25 x 30-mm DIMAS   • ICD SC NEW  2011    [employer] Cognis Model #N119, Serial #[id number]. [employer] Reliance Model #0185, Serial #[id number]. LV lead  Acuity Model #45+91, Serial #[id number]. Atrial lead Model #4469, Serial #[id number]       Allergies as of 01/25/2022 - Reviewed 01/25/2022   Allergen Reaction Noted   • Morphine  07/30/2017   • Tramadol  07/30/2017         Current Facility-Administered Medications:   •  aspirin EC tablet 81 mg, 81 mg, oral, Daily, Deangelo Gorman MD, 81 mg at 01/26/22 0845  •  carvediloL (COREG) tablet 25 mg, 25 mg, oral, 2x daily with meals, Deangelo Gorman MD, 25 mg at 01/26/22 0844  •  furosemide (LASIX) tablet 40 mg, 40 mg, oral, Daily, Deangelo Gorman MD, 40 mg at 01/26/22 0845  •  nitroglycerin (NITROSTAT) SL tablet 0.4 mg, 0.4 mg, sublingual, q5 min PRN, Deangelo Gorman MD  •  potassium chloride (KLOR-CON) CR tablet 20 mEq, 20 mEq, oral, Daily, Deangelo Gorman MD, 20 mEq at 01/26/22 0845  •  rivaroxaban (XARELTO) tablet 20 mg, 20 mg, oral, Daily, Deangelo Gorman MD, 20 mg at 01/26/22 1041  •  rosuvastatin (CRESTOR) tablet 40 mg, 40 mg, oral, Nightly, Deangelo Gorman MD  •  sacubitriL-valsartan (ENTRESTO) 24-26 mg per tablet 1 tablet, 1 tablet, oral, 2x daily, Deangelo Gorman MD, 1 tablet at 01/26/22 0844  •  spironolactone (ALDACTONE) tablet 25 mg, 25 mg, oral, 2x daily diuretic, Deangelo Gorman MD, 25 mg at 01/26/22 0845  •  Insert peripheral IV, , , Once **AND** Maintain IV access, , , Until discontinued **AND** Saline lock IV, , , Once **AND** sodium chloride flush 3 mL, 3 mL, intravenous, PRN, Deangelo Gorman MD  •  acetaminophen (TYLENOL) tablet 325-650 mg, 325-650 mg, oral, q4h PRN, Deangelo Gorman MD  •  oxyCODONE (ROXICODONE) immediate release tablet 5-10 mg, 5-10 mg, oral, q4h PRN, Deangelo Gorman MD  •  ondansetron (ZOFRAN) tablet 4 mg, 4 mg, oral, q6h PRN **OR** ondansetron (ZOFRAN) injection 4 mg, 4 mg, intravenous, q6h PRN, Deangelo Gorman MD  •  sodium chloride flush 10 mL, 10 mL, intravenous, PRN, Razia Savage MD, 10 mL at 01/26/22 1036  •  mexiletine (MEXITIL) capsule 200 mg, 200 mg, oral,  q12h [facility]  •  Insert peripheral IV, , , Once **AND** sodium chloride 0.9% (NS) carrier flush 25-50 mL, 25-50 mL, intravenous, PRN, Papi Fernandez MD  •  sodium chloride 0.9 % bolus 250 mL, 250 mL, intravenous, Once, Papi Fernandez MD  •  Insert peripheral IV, , , Once **AND** sodium chloride 0.9% (NS) carrier flush 25-50 mL, 25-50 mL, intravenous, PRN, Papi Fernandez MD  •  propofoL (DIPRIVAN) injection, , , ,   •  morphine injection 4 mg, 4 mg, intravenous, Once, Papi Fernandez MD  •  ondansetron (ZOFRAN) injection 4 mg, 4 mg, intravenous, Once, Papi Fernandez MD  •  sodium chloride 0.9 % bolus 250 mL, 250 mL, intravenous, Once, Papi Fernandez MD  •  amiodarone (PACERONE) tablet 200 mg, 200 mg, oral, Daily, Deangelo Gorman MD, 200 mg at 22 0845    Family History   Problem Relation Age of Onset   • Heart attack Mother 62   • Other Mother         Abdominal Aortic Aneurysm   • Lung cancer Mother    • Colon cancer Father         Cause of death   • Diabetes Brother         Non-Insulin Dependent   • Heart attack Brother 51        w/ Stents   • Heart disease Brother    • Other Son         Well       Social History     Tobacco Use   • Smoking status: Former Smoker     Packs/day: 0.75     Years: 30.00     Pack years: 22.50     Types: Cigarettes     Quit date: 2020     Years since quittin.6   • Smokeless tobacco: Never Used   Vaping Use   • Vaping Use: Never used   Substance Use Topics   • Alcohol use: Not Currently     Comment: Quit drinking in    • Drug use: Not Currently       Review of Systems  Review of Systems   Constitutional: Negative for chills, diaphoresis, fever and malaise/fatigue.   HENT: Negative for nosebleeds.    Eyes: Negative.    Cardiovascular: Positive for irregular heartbeat and palpitations. Negative for chest pain, dyspnea on exertion, leg swelling, near-syncope, orthopnea, paroxysmal nocturnal dyspnea and syncope.   Respiratory: Negative for cough, shortness of breath, snoring and  wheezing.    Endocrine: Negative.    Hematologic/Lymphatic: Does not bruise/bleed easily.   Skin: Negative.    Musculoskeletal: Negative for falls, muscle weakness and myalgias.   Gastrointestinal: Negative for abdominal pain, constipation, diarrhea, dysphagia, heartburn, hematochezia, nausea and vomiting.   Genitourinary: Negative for hematuria.   Neurological: Negative for dizziness, headaches and light-headedness.   Psychiatric/Behavioral: Negative.        Objective     Vital signs in last 24 hours:   Temp:  [36.4 °C (97.5 °F)-37.2 °C (99 °F)] 37 °C (98.6 °F)  Heart Rate:  [] 60  Resp:  [10-21] 14  BP: ()/() 110/67  SpO2/FiO2 Ratio Using Approximate FiO2 (%):  [321.4-342.9] 342.9  Estimated P/F Ratio Using Approximate FiO2 (%):  [278.6-296] 296  Weight: 122.7 kg (270 lb 8.1 oz)  Intake/Output last 3 shifts:  I/O last 3 completed shifts:  In: 250 [P.O.:250]  Out: 0   Intake/Output this shift:  I/O this shift:  In: 730 [P.O.:730]  Out: -     Physical Exam  Vitals and nursing note reviewed.   Constitutional:       General: He is not in acute distress.     Appearance: He is well-developed. He is not diaphoretic.   HENT:      Head: Normocephalic.   Neck:      Vascular: No carotid bruit or JVD.   Cardiovascular:      Rate and Rhythm: Normal rate and regular rhythm.      Pulses: Intact distal pulses.      Heart sounds: Normal heart sounds. No murmur heard.    No friction rub. No gallop.      Comments: Left upper chest device site without signs of infection or erosion  Pulmonary:      Effort: Pulmonary effort is normal.      Breath sounds: Normal breath sounds. No wheezing or rales.   Abdominal:      General: Bowel sounds are normal.      Palpations: Abdomen is soft.   Musculoskeletal:      Cervical back: Normal range of motion.   Skin:     General: Skin is warm and dry.   Neurological:      Mental Status: He is alert and oriented to person, place, and time.   Psychiatric:         Behavior: Behavior  normal.         Labs:  BMP:  Lab Results   Component Value Date     01/25/2022    K 4.1 01/25/2022     01/25/2022    CO2 25 01/25/2022    BUN 10 01/25/2022    CREATININE 0.92 01/25/2022    GLUCOSE 137 (H) 01/25/2022    CALCIUM 9.4 01/25/2022     CBC:   Lab Results   Component Value Date    WBC 6.5 01/25/2022    RBC 4.77 01/25/2022    HGB 15.2 01/25/2022    HCT 44.4 01/25/2022     01/25/2022     CMP:  Lab Results   Component Value Date     01/25/2022    K 4.1 01/25/2022     01/25/2022    CO2 25 01/25/2022    GLUCOSE 137 (H) 01/25/2022    CREATININE 0.92 01/25/2022    CALCIUM 9.4 01/25/2022    ALBUMIN 4.1 01/25/2022    ALKPHOS 74 01/25/2022    BILITOT 0.87 01/25/2022    ALT 60 (H) 01/25/2022    AST 30 01/25/2022    BUN 10 01/25/2022    ANIONGAP 9 01/25/2022     Lab Results   Component Value Date    BNP 79 01/25/2022     Lipid: No results found for: CHOL, HDL, TRIG, LDLCALC  TSH:  Lab Results   Component Value Date    TSH 6.005 (H) 01/25/2022     Magnesium:  Lab Results   Component Value Date    MG 1.9 01/25/2022     PT/INR:   No results found for: PT, INR    Testing:  XR chest portable 1 view    Result Date: 1/25/2022  Narrative: EXAM: DX CHEST PORTABLE 1 VW 01/25/2022 CLINICAL HISTORY:  Shortness of breath TECHNIQUE: Single portable upright AP view of the chest. COMPARISON: Chest x-ray 10/6/2021. FINDINGS: Stable cardiomegaly. Left chest AICD with subclavian approach and leads projecting over the right atrium, right ventricle, and coronary sinus, unchanged. Chronic right lateral pleural thickening, unchanged. Mild focal scarring in the peripheral left midlung zone, unchanged. No focal pulmonary consolidation or pleural effusion. No pneumothorax. No evidence of heart failure. No acute osseous abnormality.     Impression: IMPRESSION: 1.  No acute cardiopulmonary abnormality. 2.  Stable cardiomegaly.    Remote ICD check    Result Date: 1/25/2022  Narrative: Implants   No active implants to  display in this view.  Allergies Allergen Reactions • Morphine    ANXIETY • Tramadol    ANXIETY Prior to Admission medications  Medication Sig Start Date End Date Taking? Authorizing Provider amiodarone (PACERONE) 200 mg tablet Take 1 tablet (200 mg total) by mouth 2 (two) times a day with meals 8/14/20 9/13/20  Tanika Sun CNP hydrocortisone 1 % ointment Apply 1 application topically 2 (two) times a day as needed    Historical Provider, MD mexiletine (MEXITIL) 200 mg capsule Take 1 capsule (200 mg total) by mouth 2 (two) times a day 7/8/20 7/8/21  Wilfredo Montana MD lisinopriL (PRINIVIL,ZESTRIL) 5 mg tablet Take 5 mg by mouth 2 (two) times a day      Historical Provider, MD rosuvastatin (CRESTOR) 40 mg tablet Take 40 mg by mouth nightly      Historical Provider, MD nitroglycerin (NITROSTAT) 0.4 mg SL tablet Place 0.4 mg under the tongue every 5 (five) minutes as needed for chest pain.    Historical Provider, MD spironolactone (ALDACTONE) 25 mg tablet Take 25 mg by mouth daily.    Historical Provider, MD pantoprazole (PROTONIX) 40 mg EC tablet Take 40 mg by mouth daily.    Historical Provider, MD carvedilol (COREG) 25 mg tablet Take 25 mg by mouth 2 (two) times a day with meals.    Historical Provider, MD furosemide (LASIX) 20 mg tablet Take 20 mg by mouth daily      Historical Provider, MD prasugrel (EFFIENT) 10 mg tablet Take 1 tablet (10 mg total) by mouth daily. 8/2/18   YONG Alves rivaroxaban (XARELTO) 20 mg tablet Take 1 tablet (20 mg total) by mouth daily. 8/2/18   YONG Alves I have reviewed the remote interrogation of the implanted device today and found it to be functioning normally.  For the complete details, please refer to the scanned [employer] document.  Briefly, the patient has a [employer] biventricular defibrillator with 8 months estimated remaining battery longevity.  Right atrial pacing takes place 96% of the time and biventricular pacing 100%. Since the prior  interrogation there have been two episodes of ventricular tachycardia treated with antitachycardia pacing, these occurred on Jan 11, no shocks were delivered.        Assessment:  Active Problems:    V tach (CMS/HCC) ([facility])       Problem List Items Addressed This Visit     None      Visit Diagnoses     Ventricular tachycardia (CMS/HCC) ([facility])    -  Primary    Chest pain        Elevated troponin        Coronary artery disease        Hyperglycemia        Elevated ALT measurement               Plan:  Recurrent VT: S/p 2 VT ablations as described above (6/9/2020 by Dr. Montaan and 11/29/2021 by Dr. Lema at [facility]).  Patient's most recent episodes were slow VT in 120s and 130s.  He received ATP x8 but did not reach his VT zone at 160 bpm to receive any shocks.  His ATP was unsuccessful but he ended up converting spontaneously in our ED.  No significant electrolyte abnormalities or identifiable triggers.  Spoke with Dr. Lema who recommends resuming mexiletine 200 mg twice daily and adjust device settings as follows: ATP burst cycle length changed to 81% and ATP pulses per burst to be more aggressive at 12.  Continue amiodarone 200 mg daily.  Patient has an appointment in their clinic on 1/31/2022.  We will monitor the patient overnight and reevaluate tomorrow, regarding timing of discharge.      Plan discussed in conjunction with Dr. Wu.    Electronically signed by: Donya Parra CNP  1/26/2022  2:37 PM      A voice recognition program was used to aid in documentation of this record.  Sometimes words are not printed exactly as they were spoken.  While efforts were made to carefully edit and correct any inaccuracies, some errors may be present; please take these into context.  Please contact the provider if errors are identified.

## 2022-01-26 NOTE — H&P
"       HISTORY AND PHYSICAL NOTE  Deangelo Gorman MD      CHIEF COMPLAINT:  Chest discomfort    HPI:  56 years old  gentleman with past medical history of dilated cardiomyopathy/HFrEF, recurrent V. tach, atrial fibrillation, AICD placement and COPD who is presenting to our emergency room with chest discomfort starting around 9:30 PM.    Patient reports that suddenly around 9:30 PM he started having a mid chest funny feeling as if the \"chest was all full\" which lasted about 30 minutes, he describes it as discomfort and chest pain necessarily.  He reports mild associated shortness of breath and otherwise denies any other symptoms such as nausea, sweating, palpitation, lightheadedness, headache, fever, chills, cough, vomiting, abdominal pain, heartburn, diarrhea or urinary symptoms.    Patient arrived to our ED in what appeared to be ventricular tachycardia and was about to be cardioverted before he converted to a paced rhythm by himself.    ALLERGIES:  Allergies   Allergen Reactions   • Morphine      ANXIETY   • Tramadol      ANXIETY         HOME MEDICATIONS:    Current Outpatient Medications:   •  potassium chloride (K-TAB) 20 mEq CR tablet, Take 1 tablet (20 mEq total) by mouth daily, Disp: 90 tablet, Rfl: 3  •  magnesium chloride (SLOW-MAG) 64 mg tablet,delayed release (DR/EC), Take 1 tablet (64 mg total) by mouth 2 (two) times a day with meals, Disp: 180 tablet, Rfl: 3  •  spironolactone (ALDACTONE) 25 mg tablet, Take 1 tablet (25 mg total) by mouth 2 (two) times a day, Disp: 60 tablet, Rfl: 6  •  carvediloL (Coreg) 25 mg tablet, Take 1 tablet (25 mg total) by mouth 2 (two) times a day with meals, Disp: 60 tablet, Rfl: 6  •  sacubitriL-valsartan (ENTRESTO) 24-26 mg tablet, Take 1 tablet by mouth 2 (two) times a day, Disp: 60 tablet, Rfl: 6  •  rosuvastatin (Crestor) 40 mg tablet, Take 1 tablet (40 mg total) by mouth nightly, Disp: 30 tablet, Rfl: 6  •  rivaroxaban (Xarelto) 20 mg tablet, Take 1 tablet " (20 mg total) by mouth daily, Disp: 30 tablet, Rfl: 6  •  furosemide (LASIX) 40 mg tablet, Take 1 tablet (40 mg total) by mouth daily If weight goes up 3 pounds overnight take an extra dose of Lasix x1 day, Disp: 30 tablet, Rfl: 6  •  amiodarone (PACERONE) 200 mg tablet, Take 1 tablet (200 mg total) by mouth 2 (two) times a day (Patient taking differently: Take 200 mg by mouth daily  ), Disp: 60 tablet, Rfl: 6  •  aspirin 81 mg EC tablet, Take 1 tablet (81 mg total) by mouth daily, Disp: 90 tablet, Rfl: 3  •  albuterol HFA (PROVENTIL HFA;VENTOLIN HFA) 90 mcg/actuation inhaler, Inhale 2 puffs every 6 (six) hours as needed for wheezing or shortness of breath, Disp: 1 Inhaler, Rfl: 1  •  nitroglycerin (NITROSTAT) 0.4 mg SL tablet, Place 0.4 mg under the tongue every 5 (five) minutes as needed for chest pain., Disp: , Rfl:   •  pantoprazole (PROTONIX) 40 mg EC tablet, Take 40 mg by mouth daily., Disp: , Rfl:     PMH:   Past Medical History:   Diagnosis Date   • CAD (coronary artery disease)    • CHF (congestive heart failure) (CMS/HCC) ([facility])    • CVA (cerebral vascular accident) (CMS/HCC) ([facility]) 2010   • Dyslipidemia    • GERD (gastroesophageal reflux disease)    • GERD (gastroesophageal reflux disease)    • Heart attack (CMS/HCC) (HCC)     x3   • Hypertension    • Ischemic cardiomyopathy    • Paroxysmal atrial fibrillation (CMS/HCC) (HCC)     On Xarelto   • V-tach (CMS/HCC) (HCC)     AICD in place      FAMILY HISTORY:  Family History   Problem Relation Age of Onset   • Heart attack Mother 62   • Other Mother         Abdominal Aortic Aneurysm   • Lung cancer Mother    • Colon cancer Father         Cause of death   • Diabetes Brother         Non-Insulin Dependent   • Heart attack Brother 51        w/ Stents   • Heart disease Brother    • Other Son         Well      SOCIAL HISTORY:  Patient  reports that he quit smoking about 19 months ago. His smoking use included cigarettes. He has a 22.50 pack-year smoking history. He  "has never used smokeless tobacco. He reports previous alcohol use. He reports previous drug use.     ROS:  All systems reviewed and a 14 point review of system is remarkable for what has been mentioned in the body of present illness.      VITAL SIGNS:  /64 (Patient Position: Head of bed 30 degrees or higher)   Pulse 60   Temp 37.2 °C (99 °F) (Oral)   Resp 10   Ht 1.803 m (5' 11\")   Wt 122.7 kg (270 lb 8.1 oz)   SpO2 91%   BMI 37.73 kg/m²     PHYSICAL EXAM  General Appearance: Middle-age  gentleman in no pain or distress at the time of my encounter.  HEENT:  PERRLA. No neck vein distension.  No neck rigidity.  CHEST:  Clear to auscultation. No wheezing or crackles.  HEART:  Nr S1 and S  ABDOMEN:  Soft, positive bowel sounds, no tenderness, no distention. No ascites  MUSCULOSKELETAL: No lower or upper extremities edema or swelling. Joints are grossly unremarkable.  SKIN/HAIR/NAILS:  no remarkable rash or lesions.  NEURO:  Grossly non-focal  PSYCH:  Alert and orient times three    LABS AND DIAGNOSTICS  Results for orders placed or performed during the hospital encounter of 01/25/22   Comprehensive metabolic panel Blood, Venous   Result Value Ref Range    Sodium 138 135 - 145 mmol/L    Potassium 4.1 3.5 - 5.1 MMOL/L    Chloride 104 98 - 107 mmol/L    CO2 25 21 - 32 mmol/L    Anion Gap 9 3 - 11 mmol/L    BUN 10 7 - 25 mg/dL    Creatinine 0.92 0.70 - 1.30 mg/dL    Glucose 137 (H) 70 - 105 mg/dL    Calcium 9.4 8.6 - 10.3 mg/dL    AST 30 <40 U/L    ALT (SGPT) 60 (H) 7 - 52 U/L    Alkaline Phosphatase 74 45 - 115 U/L    Total Protein 6.9 6.0 - 8.3 g/dL    Albumin 4.1 3.5 - 5.3 g/dL    Total Bilirubin 0.87 0.20 - 1.40 mg/dL    Corrected Calcium 9.3 8.6 - 10.3 mg/dL    eGFR 98 >60 mL/min/1.73m*2   CBC w/auto differential Blood, Venous   Result Value Ref Range    WBC 6.5 3.7 - 9.6 10*3/uL    RBC 4.77 4.10 - 5.80 10*6/µL    Hemoglobin 15.2 13.2 - 17.2 g/dL    Hematocrit 44.4 38.0 - 50.0 %    MCV 93.1 82.0 - " 97.0 fL    MCH 31.8 29.0 - 34.0 pg    MCHC 34.2 32.0 - 36.0 g/dL    RDW 14.1 11.5 - 15.0 %    Platelets 216 130 - 350 10*3/uL    MPV 8.4 6.9 - 10.8 fL    Neutrophils% 37 (L) 46 - 70 %    Lymphocytes% 46 15 - 47 %    Monocytes% 13 5 - 13 %    Eosinophils% 4 (H) 0 - 3 %    Basophils% 1 0 - 2 %    ANC (auto diff) 2.40 10*3/UL    Lymphocytes Absolute 3.00 10*3/uL    Monocytes Absolute 0.80 10*3/uL    Eosinophils Absolute 0.20 10*3/uL    Basophils Absolute 0.00 10*3/uL   Thyroid Stimulating Hormone, Ultrasensitive Blood, Venous   Result Value Ref Range    TSH 6.005 (H) 0.340 - 4.820 uIU/ml   B-type natriuretic peptide (BNP) Blood, Venous   Result Value Ref Range    BNP 79 0 - 100 pg/mL   HS Troponin I   Result Value Ref Range    hsTnI 0 Hour 27.2 (H) <=19.8 pg/mL       EKG on arrival: V. tach  EKG repeat: Paced rhythm    XR chest portable 1 view   Final Result   IMPRESSION:   1.  No acute cardiopulmonary abnormality.   2.  Stable cardiomegaly.             ASSESSMENT and PLAN     • Ventricular tachycardia: Patient converted to paced rhythm shortly after arrival to ED.  Patient will be monitored overnight.  Electrolytes are within normal limits.  Serial troponin is unremarkable.  Dr. Merrill from cardiology service has already been consulted by ED provider.    • Evaded TSH: We will check a free T3 and free T4 levels.    • History of HFrEF, dyslipidemia, atrial fibrillation and essential hypertension: We will continue home medications.    • DVT PROPHYLAXIS: Eliquis.    • Code status: Full code per default.    Patient is none expected to stay admitted in the hospital for 2 or more midnights.  Past medical history, social history and family history were reviewed and obtained from the patient directly.        ABRAHAM HEATON MD  11:11 PM, 1/25/2022.

## 2022-01-26 NOTE — INTERDISCIPLINARY/THERAPY
Case Management Admission Note    Phone # [phone]    Living Situation: Spouse/significant other Private residence            ADLs: Independent  Stairs: No    HME/CPAP: CPAP   CPAP Equipment Vendor: Unsure  Oxygen: No   Home Health:No  Current Resources: [facility]   Diabetes/supplies: Do you have Diabetes?: No  PCP: Nikhil Smith MD  Funding: [facility] & [facility]  Pharmacy:[facility] - [place], [place] - [address]    Support Person: Primary Emergency Contact: Suzan Alves, Home Phone: [phone], Mobile Phone: [phone], Relation: Significant Other  Needs transportation assistance at DC: No     Discharge Needs/Barriers: Cardiology eval    Narrative: CM met and spoke with pt at bedside. CM introduced myself and teaching regarding CM role. Pt verbalized understanding. Pt denies dc needs or concerns. CM will cont to monitor.    Dispo: HO

## 2022-01-26 NOTE — MEDICATION HISTORY SPECIALIST NOTES
[facility] HVU [id number]    CSN: [id number]  : [id number]    [facility]    Allergies verified.    Patient interviewed via phone who provided all history. Patient verified medications and provided last doses. Verified with [facility].     Discrepancies:  Patient stated he is not taking Potassium or Magnesium  Patient taking Amiodarone 200 mg qd.     Profile was checked for  medications.      See  for medication resources.

## 2022-01-26 NOTE — ED PROCEDURE NOTE
Procedure  Procedural Sedation    Date/Time: 1/25/2022 9:44 PM  Performed by: Jaylon Wesley DO  Authorized by: Jaylon Wesley DO     Indications:     Procedure performed:  Emergency procedure    Procedure Performed by:  Different physician    Intended level of sedation:  Deep  Procedure details (see MAR for exact dosages):     Intra-procedure monitoring:  Blood pressure monitoring, continuous pulse oximetry and EKG

## 2022-01-26 NOTE — PROGRESS NOTES
[address] [place] [address]  Daily Progress Note  Patient name: Pete Trejo  MRN: [id number]   LOS: 0 days     Subjective   Patient seen and examined today.  He slept well and denies any additional episodes of palpitations.  Denies any chest pain or shortness of breath.  Denies leg swelling.  Vital signs are stable.        Objective   Vitals:Temp:  [36.4 °C (97.5 °F)-37.2 °C (99 °F)] 37 °C (98.6 °F)  Heart Rate:  [] 60  Resp:  [10-21] 14  BP: ()/() 110/67  SpO2/FiO2 Ratio Using Approximate FiO2 (%):  [321.4-342.9] 342.9  Estimated P/F Ratio Using Approximate FiO2 (%):  [278.6-296] 296  Physical Exam:   General: NAD  HEENT: No pallor, cyanosis or jaundice. EOMI, PERRLA, moist mucus membranes  Neck: Supple nontender without palpable lymphadenopathy JVD bruits or goiter appreciated  Lungs: Clear to auscultation bilaterally  Heart: Regular rate and rhythm with normal S1 and S2  Abdomen: Soft nontender. normoactive bowel sounds. no hepatosplenomegaly  Extremities: No clubbing, cyanosis, or edema.  Neurologic: Alert and oriented ×3.  CN II through XII intact.  Nonfocal   Psych: normal mood  Musculoskeletal: no joint tenderness    Results from last 4 days   Lab Units 01/25/22  2215   WBC AUTO 10*3/uL 6.5   HEMOGLOBIN g/dL 15.2   HEMATOCRIT % 44.4   PLATELETS AUTO 10*3/uL 216     Results from last 4 days   Lab Units 01/25/22  2238 01/25/22  2215 01/24/22  1556   SODIUM mmol/L  --  138 139   POTASSIUM MMOL/L  --  4.1 3.9   CHLORIDE mmol/L  --  104 105   CO2 mmol/L  --  25 26   BUN mg/dL  --  10 12   CREATININE mg/dL  --  0.92 0.86   CALCIUM mg/dL  --  9.4 9.1   MAGNESIUM mg/dL 1.9  --  1.9   ALBUMIN g/dL  --  4.1  --    TOTAL PROTEIN g/dL  --  6.9  --    BILIRUBIN TOTAL mg/dL  --  0.87  --    ALK PHOS U/L  --  74  --    ALT U/L  --  60*  --    AST U/L  --  30  --    GLUCOSE mg/dL  --  137* 111*         Assessment/Plan   56-year-old male with history of severe  ischemic cardiomyopathy, EF 20 to 25%, MI with anterior wall scarring resulting in recurrent V. tach s/p ICD and ablation presented to ER after episodes of palpitations.  He did not receive any ICD shock.  He was found to have slow VT in 120s-130s that resolved spontaneously in ER.    Recurrent VT  Patient underwent 2 VT ablations(6/9/2020 by Dr. [place] and 11/29/2021 by Dr. Lema at [facility]).    He follows with Dr. Lema in [place] and is scheduled to see him in clinic on 1/31/22  Appreciate EP cardiology recommendations:Spoke with Dr. Lema who recommends resuming mexiletine 200 mg twice daily and adjust device settings.  Continue amiodarone.  Observe patient overnight  Plan to observe patient overnight on telemetry as per EP recs  Continue with amiodarone.  Mexiletine added  Monitor    Coronary artery disease  Ischemic cardiomyopathy with EF 20 to 25%, stable on repeat limited echo  Continue with heart failure medications.  On Coreg, Entresto, spironolactone  Continue with aspirin and statin.  He is also chronically anticoagulated with Xarelto due to history of LV thrombus.  His back that had resolved on repeat echocardiogram however cardiology decided to continue with anticoagulation as he is high risk for A. fib and had a brief episode of A. fib on monitor    Essential hypertension  Blood pressure controlled.  Continue with home medications    Elevated TSH  Elevated T3.  Normal T4.  Likely related to amiodarone    DVT prophylaxis: Xarelto  CODE STATUS: Full code      Electronically signed by: Razia Savage MD  1/26/2022  2:52 PM

## 2022-01-26 NOTE — NURSING END OF SHIFT
Nursing End of Shift Summary:    Patient: Pete Trejo  MRN: [id number]  : 1965, Age: 56 y.o.    Location: [address] [place] [address]    Nursing Goals       Narrative Summary of Progress Toward Clinical Goals:  Pete got some sleep. VS stable, heart rate remained in 60's. No complaints of pain. He ambulated from ED bed to HVU with no issues.     Barriers to Goals/Nursing Concerns:  None    New Patient or Family Concerns/Issues:  None    Shift Summary:   Significant Events & Communications to Providers (last 12 hours)     Last 5 Values    No documentation.             Oxygen Usage (last 12 hours)     Last 5 Values     Row Name 22 0358                   Oxygen Weaning Trial by Nursing    Is Patient on Room Air OR on the Same Amount of O2 as at Home? No        Are You Performing the QShift O2 Weaning Trial? No                Mobility (last 12 hours)     Last 5 Values    No documentation.               Urethral Catheter     Active Urethral Catheter     None            Active Lines     Active Central venous catheter / Peripherally inserted central catheter / Implantable Port / Hemodialysis catheter / Midline Catheter     None              Infusing Medications   Medication Dose Last Rate     PRN Medications   Medication Dose Last Admin   • nitroglycerin  0.4 mg     • sodium chloride  3 mL     • acetaminophen  325-650 mg     • oxyCODONE  5-10 mg     • ondansetron  4 mg      Or   • ondansetron  4 mg     • sodium chloride 0.9% (NS)  25-50 mL     • sodium chloride 0.9% (NS)  25-50 mL     • nitroglycerin  0.4 mg       _________________________  Elizabeth Garcia RN  22 6:03 AM

## 2022-01-27 ENCOUNTER — ANCILLARY PROCEDURE (OUTPATIENT)
Dept: CARDIOLOGY | Facility: CLINIC | Age: 57
End: 2022-01-27
Payer: COMMERCIAL

## 2022-01-27 VITALS
BODY MASS INDEX: 39.6 KG/M2 | OXYGEN SATURATION: 96 % | DIASTOLIC BLOOD PRESSURE: 80 MMHG | RESPIRATION RATE: 15 BRPM | HEART RATE: 59 BPM | TEMPERATURE: 98.6 F | SYSTOLIC BLOOD PRESSURE: 138 MMHG | WEIGHT: 282.9 LBS | HEIGHT: 71 IN

## 2022-01-27 DIAGNOSIS — Z45.02 ENCOUNTER FOR INTERROGATION OF CARDIAC DEFIBRILLATOR: ICD-10-CM

## 2022-01-27 LAB
AMIODARONE SERPL-MCNC: 1.2 MCG/ML
BSA FOR ECHO PROCEDURE: 2.48 M2
DESETHYLAMIODARONE SERPL-MCNC: 0.8 MCG/ML

## 2022-01-27 PROCEDURE — 93283 PRGRMG EVAL IMPLANTABLE DFB: CPT | Mod: 26 | Performed by: INTERNAL MEDICINE

## 2022-01-27 PROCEDURE — 6370000100 HC RX 637 (ALT 250 FOR IP): Performed by: HOSPITALIST

## 2022-01-27 PROCEDURE — 99224 *NO LONGER ACTIVE 2023 DO NOT USE*  PR SBSQ OBSERVATION CARE/DAY 15 MIN: CPT | Performed by: NURSE PRACTITIONER

## 2022-01-27 PROCEDURE — 93010 ELECTROCARDIOGRAM REPORT: CPT | Performed by: INTERNAL MEDICINE

## 2022-01-27 PROCEDURE — 99239 HOSP IP/OBS DSCHRG MGMT >30: CPT | Performed by: STUDENT IN AN ORGANIZED HEALTH CARE EDUCATION/TRAINING PROGRAM

## 2022-01-27 PROCEDURE — G0378 HOSPITAL OBSERVATION PER HR: HCPCS

## 2022-01-27 PROCEDURE — 93005 ELECTROCARDIOGRAM TRACING: CPT | Performed by: NURSE PRACTITIONER

## 2022-01-27 PROCEDURE — 6370000100 HC RX 637 (ALT 250 FOR IP): Performed by: NURSE PRACTITIONER

## 2022-01-27 RX ORDER — AMIODARONE HYDROCHLORIDE 200 MG/1
200 TABLET ORAL DAILY
Qty: 30 TABLET | Refills: 6 | Status: SHIPPED | OUTPATIENT
Start: 2022-01-27 | End: 2023-05-02 | Stop reason: ALTCHOICE

## 2022-01-27 RX ORDER — MEXILETINE HYDROCHLORIDE 200 MG/1
200 CAPSULE ORAL EVERY 12 HOURS SCHEDULED
Qty: 60 CAPSULE | Refills: 6 | Status: SHIPPED | OUTPATIENT
Start: 2022-01-27 | End: 2022-08-17

## 2022-01-27 RX ADMIN — CARVEDILOL 25 MG: 25 TABLET, FILM COATED ORAL at 08:27

## 2022-01-27 RX ADMIN — FUROSEMIDE 40 MG: 40 TABLET ORAL at 08:28

## 2022-01-27 RX ADMIN — RIVAROXABAN 20 MG: 20 TABLET, FILM COATED ORAL at 12:26

## 2022-01-27 RX ADMIN — SACUBITRIL AND VALSARTAN 1 TABLET: 24; 26 TABLET, FILM COATED ORAL at 08:28

## 2022-01-27 RX ADMIN — MEXILETINE HYDROCHLORIDE 200 MG: 200 CAPSULE ORAL at 08:28

## 2022-01-27 RX ADMIN — POTASSIUM CHLORIDE 20 MEQ: 750 TABLET, EXTENDED RELEASE ORAL at 08:28

## 2022-01-27 RX ADMIN — SPIRONOLACTONE 25 MG: 25 TABLET ORAL at 08:28

## 2022-01-27 RX ADMIN — AMIODARONE HYDROCHLORIDE 200 MG: 200 TABLET ORAL at 08:28

## 2022-01-27 RX ADMIN — ASPIRIN 81 MG: 81 TABLET ORAL at 08:28

## 2022-01-27 NOTE — NURSING END OF SHIFT
Nursing End of Shift Summary:    Patient: Pete Trejo  MRN: [id number]  : 1965, Age: 56 y.o.    Location: [address] [place] [address]    Nursing Goals    Narrative Summary of Progress Toward Clinical Goals:  Pete is doing very well. He got lots of sleep. Nothing significant to report.     Barriers to Goals/Nursing Concerns:      New Patient or Family Concerns/Issues:      Shift Summary:   Significant Events & Communications to Providers (last 12 hours)     Last 5 Values    No documentation.             Oxygen Usage (last 12 hours)     Last 5 Values     Row Name 22 0406                Oxygen Weaning Trial by Nursing    Is Patient on Room Air OR on the Same Amount of O2 as at Home? Yes Yes       Are You Performing the QShift O2 Weaning Trial? No No               Mobility (last 12 hours)     Last 5 Values     Row Name 22                   Mobility    Activity Bathroom privileges        Level of Assistance Independent        Length of Time in Chair (min) 240        Distance Ambulated (feet) 20 Feet        Distance Ambulated (Meters Calculated) 6.1 Meters        Distance Ambulated (Meters Calculated)(Do Not Use) 6.1 Feet                  Urethral Catheter     Active Urethral Catheter     None            Active Lines     Active Central venous catheter / Peripherally inserted central catheter / Implantable Port / Hemodialysis catheter / Midline Catheter     None              Infusing Medications   Medication Dose Last Rate     PRN Medications   Medication Dose Last Admin   • nitroglycerin  0.4 mg     • sodium chloride  3 mL     • acetaminophen  325-650 mg     • oxyCODONE  5-10 mg     • ondansetron  4 mg      Or   • ondansetron  4 mg     • sodium chloride  10 mL 10 mL at 22 1036   • sodium chloride 0.9% (NS)  25-50 mL     • sodium chloride 0.9% (NS)  25-50 mL       _________________________  Elizabeth Garcia RN  22 7:21 AM

## 2022-01-27 NOTE — DISCHARGE INSTRUCTIONS
The only medication change is resuming mexiletine 200 mg twice daily.  Follow-up at [facility] as planned on Monday.

## 2022-01-27 NOTE — NURSING END OF SHIFT
Nursing End of Shift Summary:    Patient: Pete Trejo  MRN: [id number]  : 1965, Age: 56 y.o.    Location: [address] [place] [address]    Nursing Goals  Clinical Goals for the Shift: tele, safety, comfort    Narrative Summary of Progress Toward Clinical Goals:  Patient remained safe and comfortable throughout shift. No runs of Vtach were noted on the tele. Remained independent in the room, vitals stable. Will continue to monitor.     Barriers to Goals/Nursing Concerns:  No    New Patient or Family Concerns/Issues:  No    Shift Summary:   Significant Events & Communications to Providers (last 12 hours)     Last 5 Values    No documentation.             Oxygen Usage (last 12 hours)     Last 5 Values    No documentation.             Mobility (last 12 hours)     Last 5 Values     Row Name 22 0842 22 4907                Mobility    Activity Ambulate in [place];Ambulate in room;Bathroom privileges Chair position in bed       Level of Assistance Independent Independent                 Urethral Catheter     Active Urethral Catheter     None            Active Lines     Active Central venous catheter / Peripherally inserted central catheter / Implantable Port / Hemodialysis catheter / Midline Catheter     None              Infusing Medications   Medication Dose Last Rate     PRN Medications   Medication Dose Last Admin   • nitroglycerin  0.4 mg     • sodium chloride  3 mL     • acetaminophen  325-650 mg     • oxyCODONE  5-10 mg     • ondansetron  4 mg      Or   • ondansetron  4 mg     • sodium chloride  10 mL 10 mL at 22 1036   • sodium chloride 0.9% (NS)  25-50 mL     • sodium chloride 0.9% (NS)  25-50 mL       _________________________  Marge Kauffman RN  22 6:05 PM

## 2022-01-27 NOTE — DISCHARGE SUMMARY
[address] [place] [address]  Discharge Summary    Patient name: Pete Trejo  MRN: [id number]    Admission Date: 1/25/2022       Discharge Date: 1/27/2022    Admitting Provider: Deangelo Gorman MD  Discharge Provider: Razia Savage MD  Primary Care Physician at Discharge: Nikhil Smith [phone]     Discharge Disposition  01 - Home or Self-Care  Code Status at Discharge: Full Code    Outpatient Follow-Up  Future Appointments   Date Time Provider Department Center   2/22/2022  2:30 PM [facility] DEVICE 1 [facility]   4/27/2022  7:00 AM LAB [facility] LAB [facility]   4/27/2022  8:30 AM Nikhil Smith MD [facility] INMED [facility]       Discharge Diagnosis  Recurrent V. tach post MI s/p ICD and 2 ablations  Coronary artery disease  Ischemic cardiomyopathy with EF 20 to 25%  Essential hypertension  History of LV thrombus-resolved on subsequent echocardiogram  Elevated TSH       Discharge medication list      START taking these medications      Instructions   mexiletine 200 mg capsule  Commonly known as: MEXITIL   Take 1 capsule (200 mg total) by mouth every 12 (twelve) hours        CONTINUE taking these medications      Instructions   albuterol HFA 90 mcg/actuation inhaler   Inhale 2 puffs every 6 (six) hours as needed for wheezing or shortness of breath     amiodarone 200 mg tablet  Commonly known as: PACERONE   Take 1 tablet (200 mg total) by mouth daily     aspirin 81 mg EC tablet   Take 1 tablet (81 mg total) by mouth daily     carvediloL 25 mg tablet  Commonly known as: Coreg   Take 1 tablet (25 mg total) by mouth 2 (two) times a day with meals     furosemide 40 mg tablet  Commonly known as: LASIX   Take 1 tablet (40 mg total) by mouth daily If weight goes up 3 pounds overnight take an extra dose of Lasix x1 day     magnesium chloride 64 mg tablet,delayed release (DR/EC)  Commonly known as: SLOW-MAG   Take 1 tablet (64 mg total) by mouth 2 (two) times a day with meals     Nitrostat 0.4 mg SL tablet  Generic  drug: nitroglycerin      pantoprazole 40 mg EC tablet  Commonly known as: PROTONIX      potassium chloride 20 mEq CR tablet  Commonly known as: K-TAB   Take 1 tablet (20 mEq total) by mouth daily     rivaroxaban 20 mg tablet  Commonly known as: Xarelto   Take 1 tablet (20 mg total) by mouth daily     rosuvastatin 40 mg tablet  Commonly known as: Crestor   Take 1 tablet (40 mg total) by mouth nightly     sacubitriL-valsartan 24-26 mg tablet  Commonly known as: ENTRESTO   Take 1 tablet by mouth 2 (two) times a day     spironolactone 25 mg tablet  Commonly known as: ALDACTONE   Take 1 tablet (25 mg total) by mouth 2 (two) times a day           Where to Get Your Medications      These medications were sent to [facility] - [place], [place] - [address]  [id number], [facility] [address]    Phone: [phone]   · amiodarone 200 mg tablet  · mexiletine 200 mg capsule         Hospital Course  56-year-old male with history of severe ischemic cardiomyopathy, EF 20 to 25%, MI with anterior wall scarring resulting in recurrent V. tach s/p ICD and ablation presented to ER after episodes of palpitations.  He did not receive any ICD shock.  Patient has complicated cardiac history and has a history of recurrent V. tach after MI. Patient underwent ICD placement and 2 VT ablations(6/9/2020 by Dr. Montana and 11/29/2021 by Dr. Lema at [facility]).    He follows with Dr. Lema in [place] and is scheduled to see him in clinic on 1/31/22.  He is on amiodarone, carvedilol, Entresto, spironolactone and reports compliance to heart failure medication.  He is also anticoagulated with Xarelto due to history of LV thrombus.     He was found to have slow VT in 120s-130s that resolved spontaneously in ER.  Patient was evaluated by EP cardiology who spoke to Dr. Lema in [place].  Dr. Lema recommended patient to be initiated on mexiletin and adjust device settings as follows: ATP burst cycle length  "changed to 81% and ATP pulses per burst to be more aggressive at 12.  He was observed overnight after initiating mexiletine and no additional episodes of V. tach were noted on telemetry.  2D echo showed EF of 20 to 25%, diffuse global hypokinesis with hyperdynamic anterolateral wall, no LV thrombus.  Echo findings are similar to previous echo done in 9/21.  Patient will continue with mexiletine along with amiodarone as an outpatient and follow-up with Dr. Lema on 1/31/2022.  He is stable to be discharged from cardiology and medical standpoint.    Physical Exam at Discharge   Discharge Condition: stable  /80 (BP Location: Right arm, Patient Position: Head of bed 30 degrees or higher, Cuff Size: Long Adult)   Pulse 59   Temp 37 °C (98.6 °F) (Oral)   Resp 15   Ht 1.803 m (5' 11\")   Wt 128.3 kg (282 lb 14.4 oz)   SpO2 96%   BMI 39.46 kg/m²   Weight: 128.3 kg (282 lb 14.4 oz)  HEENT:  PERRLA. Extra ocular movements are intact. Oral pharynx clear without erythema of exudate.   Neck:  Supple. Nontender.  No palpable lymphadenopathy, JVD, or goiter.  Lungs: Clear to auscultation bilaterally without adventitious sounds appreciated  Heart: Regular rate and rhythm with normal S1, S2  Abdomen: Soft.  Nontender.  Normal active bowel sounds.  No hepatosplenomegaly or other masses appreciated.  Extremities: No clubbing, cyanosis, or edema.  Skin: Normal skin turgor  Neurologic: Alert oriented ×3.  CN II through XII intact.  5 out of 5 motor strength throughout upper and lower extremities.      Time spent coordinating discharge: 33 mins    Electronically signed by: Razia Savage MD  1/27/2022  1:11 PM    "

## 2022-01-27 NOTE — PROGRESS NOTES
"  [address] [place] [address]                                                    Electrophysiology Inpatient Progress Note    Subjective    Patient ID: Pete Trejo is a 56 y.o. male.    Chief Complaint:   Chief Complaint   Patient presents with   • Chest Pain     Pt arrives to ED C/O feeling like his \"chest is all full\" and that his heart is beating funny.  Denies any N/V, c/o feeling a little short of breath. History of MI, and pacemaker. This all started approx 30 min pta        LOS: 0 days     HPI:  Patient was seen and examined.  He was initiated on mexiletine 200 mg twice daily yesterday and continued on amiodarone 200 mg daily.  Device settings were adjusted.  Overnight telemetry shows no episodes of ventricular tachycardia.  He states he is feeling well but does have a little \"wobbliness\" which he experienced last time he was started on mexiletine.  Overall he is feeling well and is anxious to be discharged.  He denies chest pain, dyspnea, lightheadedness, dizziness, racing in his heart, presyncopal or syncopal episodes, or edema.  Intermittently experiences palpitations but these are tolerable and brief. Denies signs or symptoms of bleeding.      Allergies as of 01/25/2022 - Reviewed 01/25/2022   Allergen Reaction Noted   • Morphine  07/30/2017   • Tramadol  07/30/2017       Current Facility-Administered Medications:   •  aspirin EC tablet 81 mg, 81 mg, oral, Daily, Deangelo Gorman MD, 81 mg at 01/27/22 0828  •  carvediloL (COREG) tablet 25 mg, 25 mg, oral, 2x daily with meals, Deangelo Gorman MD, 25 mg at 01/27/22 0827  •  furosemide (LASIX) tablet 40 mg, 40 mg, oral, Daily, Deangelo Gorman MD, 40 mg at 01/27/22 0828  •  nitroglycerin (NITROSTAT) SL tablet 0.4 mg, 0.4 mg, sublingual, q5 min PRN, Deangelo Gorman MD  •  potassium chloride (KLOR-CON) CR tablet 20 mEq, 20 mEq, oral, Daily, Deangelo Gorman MD, 20 mEq at 01/27/22 0828  •  rivaroxaban (XARELTO) " tablet 20 mg, 20 mg, oral, Daily, Deangelo Gorman MD, 20 mg at 01/26/22 1041  •  rosuvastatin (CRESTOR) tablet 40 mg, 40 mg, oral, Nightly, Deangelo Gorman MD, 40 mg at 01/26/22 2110  •  sacubitriL-valsartan (ENTRESTO) 24-26 mg per tablet 1 tablet, 1 tablet, oral, 2x daily, Deangelo Gorman MD, 1 tablet at 01/27/22 0828  •  spironolactone (ALDACTONE) tablet 25 mg, 25 mg, oral, 2x daily diuretic, Deangelo Gorman MD, 25 mg at 01/27/22 0828  •  Insert peripheral IV, , , Once **AND** Maintain IV access, , , Until discontinued **AND** Saline lock IV, , , Once **AND** sodium chloride flush 3 mL, 3 mL, intravenous, PRN, Deangelo Gorman MD  •  acetaminophen (TYLENOL) tablet 325-650 mg, 325-650 mg, oral, q4h PRN, Deangelo Gorman MD  •  oxyCODONE (ROXICODONE) immediate release tablet 5-10 mg, 5-10 mg, oral, q4h PRN, Deangelo Gorman MD  •  ondansetron (ZOFRAN) tablet 4 mg, 4 mg, oral, q6h PRN **OR** ondansetron (ZOFRAN) injection 4 mg, 4 mg, intravenous, q6h PRN, Deangelo Gorman MD  •  sodium chloride flush 10 mL, 10 mL, intravenous, PRN, Razia Savage MD, 10 mL at 01/26/22 1036  •  mexiletine (MEXITIL) capsule 200 mg, 200 mg, oral, q12h KULDEEP, Donya Parra CNP, 200 mg at 01/27/22 0828  •  Insert peripheral IV, , , Once **AND** sodium chloride 0.9% (NS) carrier flush 25-50 mL, 25-50 mL, intravenous, PRN, Papi Fernandez MD  •  sodium chloride 0.9 % bolus 250 mL, 250 mL, intravenous, Once, Papi Fernandez MD  •  Insert peripheral IV, , , Once **AND** sodium chloride 0.9% (NS) carrier flush 25-50 mL, 25-50 mL, intravenous, PRN, Papi Fernandez MD  •  propofoL (DIPRIVAN) injection, , , ,   •  morphine injection 4 mg, 4 mg, intravenous, Once, Papi Fernandez MD  •  ondansetron (ZOFRAN) injection 4 mg, 4 mg, intravenous, Once, Papi Fernandez MD  •  sodium chloride 0.9 % bolus 250 mL, 250 mL, intravenous, Once, Papi Fernandez MD  •  amiodarone (PACERONE) tablet 200 mg, 200 mg, oral, Daily, Deangelo Gorman MD, 200 mg at 01/27/22  0828    Objective     Vital signs in last 24 hours:   Temp:  [36.9 °C (98.4 °F)-37 °C (98.6 °F)] 37 °C (98.6 °F)  Heart Rate:  [57-61] 59  Resp:  [14-20] 15  BP: (110-147)/(67-87) 138/80  Weight: 128.3 kg (282 lb 14.4 oz)    Intake/Output this shift:  I/O this shift:  In: 480 [P.O.:480]  Out: -     Intake/Output Summary (Last 24 hours) at 1/27/2022 1115  Last data filed at 1/27/2022 0943  Gross per 24 hour   Intake 1980 ml   Output 0 ml   Net 1980 ml       Physical Exam  Vitals and nursing note reviewed.   Constitutional:       General: He is not in acute distress.     Appearance: He is well-developed. He is not diaphoretic.   HENT:      Head: Normocephalic.   Neck:      Vascular: No carotid bruit or JVD.   Cardiovascular:      Rate and Rhythm: Normal rate and regular rhythm.      Pulses: Intact distal pulses.      Heart sounds: Normal heart sounds. No murmur heard.    No friction rub. No gallop.      Comments: Left upper chest device site without signs of infection or erosion.  Pulmonary:      Effort: Pulmonary effort is normal.      Breath sounds: Normal breath sounds. No wheezing or rales.   Abdominal:      General: Bowel sounds are normal.      Palpations: Abdomen is soft.   Musculoskeletal:      Cervical back: Normal range of motion.   Skin:     General: Skin is warm and dry.   Neurological:      Mental Status: He is alert and oriented to person, place, and time.   Psychiatric:         Behavior: Behavior normal.         Data Review:   BMP:  Lab Results   Component Value Date     01/25/2022    K 4.1 01/25/2022     01/25/2022    CO2 25 01/25/2022    BUN 10 01/25/2022    CREATININE 0.92 01/25/2022    GLUCOSE 137 (H) 01/25/2022    CALCIUM 9.4 01/25/2022     CBC:   Lab Results   Component Value Date    WBC 6.5 01/25/2022    RBC 4.77 01/25/2022    HGB 15.2 01/25/2022    HCT 44.4 01/25/2022     01/25/2022     CMP:  Lab Results   Component Value Date     01/25/2022    K 4.1 01/25/2022      01/25/2022    CO2 25 01/25/2022    GLUCOSE 137 (H) 01/25/2022    CREATININE 0.92 01/25/2022    CALCIUM 9.4 01/25/2022    ALBUMIN 4.1 01/25/2022    ALKPHOS 74 01/25/2022    BILITOT 0.87 01/25/2022    ALT 60 (H) 01/25/2022    AST 30 01/25/2022    BUN 10 01/25/2022    ANIONGAP 9 01/25/2022     Lab Results   Component Value Date    BNP 79 01/25/2022     Lipid: No results found for: CHOL, HDL, TRIG, LDLCALC  TSH:  Lab Results   Component Value Date    TSH 6.005 (H) 01/25/2022     Magnesium:  Lab Results   Component Value Date    MG 1.9 01/25/2022     PT/INR:   No results found for: PT, INR    Tests:  [facility]    Result Date: 1/26/2022  Narrative: 1.  Severely reduce systolic function, estimated EF around 20 to 25%, there is a grade 1 diastolic dysfunction with elevated filling pressures, 2. Diffuse global hypokinesis with hyperdynamic anterolateral wall.  The LV walls appear thin, apex appears aneurysmatic.  No LV thrombus noted with definity. 3. Normal right atrium.  Mildly dilated left atrium 4. Normal RV size and function. 5. No significant valvular abnormalities detected. 6. No pericardial effusion. 7. RVSP was not estimated due to inability to obtain TR jet. 8. This echo was compared with prior echo done on 9/19/2021 without significant changes    XR chest portable 1 view    Result Date: 1/25/2022  Narrative: EXAM: DX CHEST PORTABLE 1 VW 01/25/2022 CLINICAL HISTORY:  Shortness of breath TECHNIQUE: Single portable upright AP view of the chest. COMPARISON: Chest x-ray 10/6/2021. FINDINGS: Stable cardiomegaly. Left chest AICD with subclavian approach and leads projecting over the right atrium, right ventricle, and coronary sinus, unchanged. Chronic right lateral pleural thickening, unchanged. Mild focal scarring in the peripheral left midlung zone, unchanged. No focal pulmonary consolidation or pleural effusion. No pneumothorax. No evidence of heart failure. No acute osseous abnormality.     Impression: IMPRESSION:  1.  No acute cardiopulmonary abnormality. 2.  Stable cardiomegaly.    Device check - pacemaker/AICD    Result Date: 1/27/2022  Narrative: Implants   No active implants to display in this view.  Allergies Allergen Reactions • Morphine    ANXIETY • Tramadol    ANXIETY Prior to Admission medications  Medication Sig Start Date End Date Taking? Authorizing Provider amiodarone (PACERONE) 200 mg tablet Take 1 tablet (200 mg total) by mouth 2 (two) times a day with meals 8/14/20 9/13/20  Tanika Sun CNP hydrocortisone 1 % ointment Apply 1 application topically 2 (two) times a day as needed    Historical Provider, MD mexiletine (MEXITIL) 200 mg capsule Take 1 capsule (200 mg total) by mouth 2 (two) times a day 7/8/20 7/8/21  Wilfredo Montana MD lisinopriL (PRINIVIL,ZESTRIL) 5 mg tablet Take 5 mg by mouth 2 (two) times a day      Historical Provider, MD rosuvastatin (CRESTOR) 40 mg tablet Take 40 mg by mouth nightly      Historical Provider, MD nitroglycerin (NITROSTAT) 0.4 mg SL tablet Place 0.4 mg under the tongue every 5 (five) minutes as needed for chest pain.    Historical Provider, MD spironolactone (ALDACTONE) 25 mg tablet Take 25 mg by mouth daily.    Historical Provider, MD pantoprazole (PROTONIX) 40 mg EC tablet Take 40 mg by mouth daily.    Historical Provider, MD carvedilol (COREG) 25 mg tablet Take 25 mg by mouth 2 (two) times a day with meals.    Historical Provider, MD furosemide (LASIX) 20 mg tablet Take 20 mg by mouth daily      Historical Provider, MD prasugrel (EFFIENT) 10 mg tablet Take 1 tablet (10 mg total) by mouth daily. 8/2/18   YONG Alves rivaroxaban (XARELTO) 20 mg tablet Take 1 tablet (20 mg total) by mouth daily. 8/2/18   YONG Alves I have reviewed the remote interrogation of the implanted device today and found it to be functioning normally.  For the complete details, please refer to the scanned Paceart document.  Briefly, the patient has a [employer] biventricular  defibrillator with 8 months estimated remaining battery longevity.  Right atrial pacing takes place 96% of the time and biventricular pacing 100%. Since the prior interrogation there has been an episodes of ventricular tachycardia since the prior interrogation.  This took place on 1/25 and was treated with a 41 J shock after ATP failed.      Remote ICD check    Result Date: 1/25/2022  Narrative: Implants   No active implants to display in this view.  Allergies Allergen Reactions • Morphine    ANXIETY • Tramadol    ANXIETY Prior to Admission medications  Medication Sig Start Date End Date Taking? Authorizing Provider amiodarone (PACERONE) 200 mg tablet Take 1 tablet (200 mg total) by mouth 2 (two) times a day with meals 8/14/20 9/13/20  Tanika Sun CNP hydrocortisone 1 % ointment Apply 1 application topically 2 (two) times a day as needed    Historical Provider, MD mexiletine (MEXITIL) 200 mg capsule Take 1 capsule (200 mg total) by mouth 2 (two) times a day 7/8/20 7/8/21  Wilfredo Montana MD lisinopriL (PRINIVIL,ZESTRIL) 5 mg tablet Take 5 mg by mouth 2 (two) times a day      Historical Provider, MD rosuvastatin (CRESTOR) 40 mg tablet Take 40 mg by mouth nightly      Historical Provider, MD nitroglycerin (NITROSTAT) 0.4 mg SL tablet Place 0.4 mg under the tongue every 5 (five) minutes as needed for chest pain.    Historical Provider, MD spironolactone (ALDACTONE) 25 mg tablet Take 25 mg by mouth daily.    Historical Provider, MD pantoprazole (PROTONIX) 40 mg EC tablet Take 40 mg by mouth daily.    Historical Provider, MD carvedilol (COREG) 25 mg tablet Take 25 mg by mouth 2 (two) times a day with meals.    Historical Provider, MD furosemide (LASIX) 20 mg tablet Take 20 mg by mouth daily      Historical Provider, MD prasugrel (EFFIENT) 10 mg tablet Take 1 tablet (10 mg total) by mouth daily. 8/2/18   YONG Alves rivaroxaban (XARELTO) 20 mg tablet Take 1 tablet (20 mg total) by mouth daily. 8/2/18    YONG Alves I have reviewed the remote interrogation of the implanted device today and found it to be functioning normally.  For the complete details, please refer to the scanned Paceart document.  Briefly, the patient has a [employer] biventricular defibrillator with 8 months estimated remaining battery longevity.  Right atrial pacing takes place 96% of the time and biventricular pacing 100%. Since the prior interrogation there have been two episodes of ventricular tachycardia treated with antitachycardia pacing, these occurred on Jan 11, no shocks were delivered.        Assessment/Plan   Patient Active Problem List    Diagnosis Date Noted   • V tach (CMS/[facility]) ([facility]ly [facility]) 01/25/2022   • Obstructive sleep apnea 10/01/2021   • COVID-19 ruled out by clinical criteria 09/19/2021   • Sustained ventricular tachycardia (CMS/[facility]) ([facility]) 09/19/2021   • Chronic anticoagulation 09/19/2021   • History of ventricular tachycardia 09/19/2021   • Paroxysmal atrial fibrillation (CMS/[facility]) ([facility]) 09/19/2021   • Ventricular tachycardia (paroxysmal) (CMS/[facility]) ([facility]) 09/18/2021   • Chronic combined systolic and diastolic CHF, 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 Montana and 11/29/2021 by Dr. Lema at [facility]).  Patient's most recent episodes were slow VT in 120s and 130s.  He received ATP x8 but did not reach his VT zone at 160 bpm to receive any shocks.  His ATP was unsuccessful but he ended up converting spontaneously in our ED.  No significant electrolyte abnormalities or identifiable triggers.  Spoke with Dr. Lema  yesterday who recommended resuming mexiletine 200 mg twice daily and adjust device settings as follows: ATP burst cycle length changed to 81% and ATP pulses per burst to be more aggressive at 12.  Continue amiodarone 200 mg daily.  No sustained arrhythmias noted on telemetry overnight. Patient has an appointment in their clinic on 1/31/2022.  He may be discharged from our standpoint.      Plan discussed in conjunction with Dr. Wu.    Electronically signed by: Donya Parra CNP  1/27/2022  11:15 AM      A voice recognition program was used to aid in documentation of this record.  Sometimes words are not printed exactly as they were spoken.  While efforts were made to carefully edit and correct any inaccuracies, some errors may be present; please take these into context.  Please contact the provider if errors are identified.

## 2022-02-02 ENCOUNTER — PATIENT OUTREACH (OUTPATIENT)
Dept: FAMILY MEDICINE | Facility: CLINIC | Age: 57
End: 2022-02-02
Payer: COMMERCIAL

## 2022-02-02 NOTE — PROGRESS NOTES
Pete Andersonis was contacted following recent hospitalization at [facility]. The patient was discharged from the hospital on 1/27/2022 .The Discharge Summary and After Visit Summary were reviewed. The patient's main diagnosis during the hospitalization was tachycardia.  Followup appointment: 2/7/2022 with Dr Lainez. Any additional testing and labs will be discussed at the patient's upcoming post-hospital follow up appointment.    Transitional Care Management Follow Up (most recent)     Transitional Care Management - 02/02/22 1218        OTHER    Date of post hospital outreach 02/02/22     Contact Status Contact done     Speaking with the Patient or Patient's Caregiver? Patient     Is the patient on the Diabetes registry? N     Were patient's home medications changed or did they have any new medications added during the hospitalization? N     Did someone go over the discharge summary with the patient or the patient's caregiver and discuss the medications listed on it? Y     Does the patient or patient's caregiver have any questions about the medication changes? changed meds when seen in [place] for follow up after ablation  Dr Garrett     Patient verbalized understanding of when to contact health care provider or when to get help right away? Y     Discharge instructions reviewed with patient or patient's caregiver and all questions answered? Y     Is there a Home Health/DME Plan being enacted? Please note name of HH agency, DME vendor, contacted/received N     Does patient have psychosocial issues that might impact their health status? None     Does patient have financial barriers to care? None                  Mabel Roe RN  February 2, 2022 12:26 PM

## 2022-02-02 NOTE — Clinical Note
Routing this message as an FYI only. If any further action needs to be taken please have your clinic nursing staff follow up with the patient.

## 2022-02-07 ENCOUNTER — OFFICE VISIT (OUTPATIENT)
Dept: INTERNAL MEDICINE | Facility: CLINIC | Age: 57
End: 2022-02-07
Payer: COMMERCIAL

## 2022-02-07 VITALS
OXYGEN SATURATION: 95 % | SYSTOLIC BLOOD PRESSURE: 124 MMHG | DIASTOLIC BLOOD PRESSURE: 70 MMHG | HEIGHT: 71 IN | TEMPERATURE: 97.4 F | HEART RATE: 69 BPM | BODY MASS INDEX: 39.9 KG/M2 | RESPIRATION RATE: 20 BRPM | WEIGHT: 285 LBS

## 2022-02-07 DIAGNOSIS — I48.0 PAROXYSMAL ATRIAL FIBRILLATION (CMS/HCC): ICD-10-CM

## 2022-02-07 DIAGNOSIS — Z95.810 AUTOMATIC IMPLANTABLE CARDIOVERTER-DEFIBRILLATOR IN SITU: ICD-10-CM

## 2022-02-07 DIAGNOSIS — Z95.5 PRESENCE OF STENT IN CORONARY ARTERY: ICD-10-CM

## 2022-02-07 DIAGNOSIS — I47.29 VENTRICULAR TACHYCARDIA (PAROXYSMAL) (CMS/HCC): ICD-10-CM

## 2022-02-07 DIAGNOSIS — Z98.890 S/P ABLATION OF VENTRICULAR ARRHYTHMIA: ICD-10-CM

## 2022-02-07 DIAGNOSIS — I25.5 ISCHEMIC CARDIOMYOPATHY: Primary | ICD-10-CM

## 2022-02-07 DIAGNOSIS — Z86.79 S/P ABLATION OF VENTRICULAR ARRHYTHMIA: ICD-10-CM

## 2022-02-07 PROCEDURE — 99214 OFFICE O/P EST MOD 30 MIN: CPT | Performed by: INTERNAL MEDICINE

## 2022-02-07 ASSESSMENT — PAIN SCALES - GENERAL: PAINLEVEL: 0-NO PAIN

## 2022-02-07 NOTE — PROGRESS NOTES
Subjective      Pete Trejo is a 56 y.o. male who presents for *post hospital**.    HPI has  chf ,  Ef 20 %  Recent aicd  Dc  Has returned from [facility], mexilitine increased to tid ,  Watches  Salt and weight , had breathing tests   for amiodarone  Returning   To [place]  For ablation    The following have been reviewed and updated as appropriate in this visit:          Allergies   Allergen Reactions   • Morphine      ANXIETY   • Tramadol      ANXIETY     Current Outpatient Medications   Medication Sig Dispense Refill   • amiodarone (PACERONE) 200 mg tablet Take 1 tablet (200 mg total) by mouth daily 30 tablet 6   • mexiletine (MEXITIL) 200 mg capsule Take 1 capsule (200 mg total) by mouth every 12 (twelve) hours (Patient taking differently: Take 200 mg by mouth 3 (three) times a day  ) 60 capsule 6   • potassium chloride (K-TAB) 20 mEq CR tablet Take 1 tablet (20 mEq total) by mouth daily 90 tablet 3   • magnesium chloride (SLOW-MAG) 64 mg tablet,delayed release (DR/EC) Take 1 tablet (64 mg total) by mouth 2 (two) times a day with meals 180 tablet 3   • spironolactone (ALDACTONE) 25 mg tablet Take 1 tablet (25 mg total) by mouth 2 (two) times a day 60 tablet 6   • carvediloL (Coreg) 25 mg tablet Take 1 tablet (25 mg total) by mouth 2 (two) times a day with meals 60 tablet 6   • sacubitriL-valsartan (ENTRESTO) 24-26 mg tablet Take 1 tablet by mouth 2 (two) times a day 60 tablet 6   • rosuvastatin (Crestor) 40 mg tablet Take 1 tablet (40 mg total) by mouth nightly 30 tablet 6   • rivaroxaban (Xarelto) 20 mg tablet Take 1 tablet (20 mg total) by mouth daily 30 tablet 6   • furosemide (LASIX) 40 mg tablet Take 1 tablet (40 mg total) by mouth daily If weight goes up 3 pounds overnight take an extra dose of Lasix x1 day 30 tablet 6   • aspirin 81 mg EC tablet Take 1 tablet (81 mg total) by mouth daily 90 tablet 3   • albuterol HFA (PROVENTIL HFA;VENTOLIN HFA) 90 mcg/actuation inhaler Inhale 2 puffs every 6  (six) hours as needed for wheezing or shortness of breath 1 Inhaler 1   • pantoprazole (PROTONIX) 40 mg EC tablet Take 40 mg by mouth daily.     • nitroglycerin (NITROSTAT) 0.4 mg SL tablet Place 0.4 mg under the tongue every 5 (five) minutes as needed for chest pain.       No current facility-administered medications for this visit.     Past Medical History:   Diagnosis Date   • CAD (coronary artery disease)    • CHF (congestive heart failure) (CMS/[facility]) ([facility])    • CVA (cerebral vascular accident) (CMS/[facility]) ([facility]) 2010   • Dyslipidemia    • GERD (gastroesophageal reflux disease)    • GERD (gastroesophageal reflux disease)    • Heart attack (CMS/[facility]) ([facility])     x3   • Hypertension    • Ischemic cardiomyopathy    • Paroxysmal atrial fibrillation (CMS/[facility]) ([facility])     On Xarelto   • V-tach (CMS/[facility]) ([facility])     AICD in place     Past Surgical History:   Procedure Laterality Date   • ABLATION VT N/A 06/09/2020    Procedure: Ablation VT;  Surgeon: Wilfredo Montana MD;  Location: [facility] EP [facility];  Service: Electrophysiology;  Laterality: N/A;  Check with Dr. Montana in the a.m. regarding scheduling   • APPENDECTOMY     • CORONARY ARTERY STENTING  2009    2.5 X 24-mm Taxus DIMAS to first diagonal. 3.0 X 8-mm Taxus stent to proximal LAD just proximal to diagonal.   • CORONARY ARTERY STENTING  2010    3.5 X 15-mm Promus DIMAS to totally occluded LAD   • CORONARY ARTERY STENTING  2011    2.25 X 30-mm Resolute DIMAS to 2nd diagonal.  Balloon angioplasty through strut to improve blood flow to distal LAD   • CORONARY ARTERY STENTING  07/28/2017    second diagonal branch LAD Resolute 2.25 x 30-mm DIMAS   • ICD SC NEW  2011    New [employer]is Model #N119, Serial #[id number]. [employer] Model #0185, Serial #[id number]. LV lead Acuity Model #45+91, Serial #[id number]. Atrial lead Model #4469, Serial #[id number]     Family History   Problem Relation Age of Onset   • Heart attack Mother 62   • Other Mother         Abdominal Aortic Aneurysm   • Lung  "cancer Mother    • Colon cancer Father         Cause of death   • Diabetes Brother         Non-Insulin Dependent   • Heart attack Brother 51        w/ Stents   • Heart disease Brother    • Other Son         Well     Social History     Occupational History   • Not on file   Tobacco Use   • Smoking status: Former Smoker     Packs/day: 0.75     Years: 30.00     Pack years: 22.50     Types: Cigarettes     Quit date: 2020     Years since quittin.6   • Smokeless tobacco: Never Used   Vaping Use   • Vaping Use: Never used   Substance and Sexual Activity   • Alcohol use: Not Currently     Comment: Quit drinking in    • Drug use: Not Currently   • Sexual activity: Defer   Social History Narrative   • Not on file       Review of Systems patient states he checks his weight every day and because of it varies more than 3 pounds, tries to avoid any extra salt.    Objective   /70 (BP Location: Right arm, Patient Position: Sitting, Cuff Size: Regular Adult)   Pulse 69   Temp 36.3 °C (97.4 °F) (Temporal)   Resp 20   Ht 1.803 m (5' 11\")   Wt 129.3 kg (285 lb)   SpO2 95%   BMI 39.75 kg/m²     Physical Exam  HEENT TMs sclera normal  Neck is without JVD masses AICD in place on the left leg  Lungs are clear  Heart regular rate and rhythm without a rub  Abdomen is soft no masses  Extremities no significant edema  Neurologic grossly intact no flap or tremor  Skin without petechiae or jaundice    ASSESSMENT AND PLAN    post hospital  chf  dcm vt aicd bi v  Possible ablation per  [facility]  Follow  Dr burton   Cad 3 stents       Voice recognition with transcription service was used and errors will occur.  Total time face to face spent with patient was **35* minutes with more that 50% of counseling and coordination of care regarding the assessment and plan.     Diagnoses and all orders for this visit:    Ischemic cardiomyopathy    S/P ablation of ventricular arrhythmia    Automatic implantable cardioverter-defibrillator in " situ    Ventricular tachycardia (paroxysmal) (CMS/HCC) (HCC)    Paroxysmal atrial fibrillation (CMS/HCC) (HCC)    Presence of stent in coronary artery        Electronically signed by : Gurdeep Lainez MD

## 2022-02-15 ENCOUNTER — ANCILLARY PROCEDURE (OUTPATIENT)
Dept: CARDIOLOGY | Facility: CLINIC | Age: 57
End: 2022-02-15
Payer: COMMERCIAL

## 2022-02-15 DIAGNOSIS — Z45.02 ENCOUNTER FOR INTERROGATION OF CARDIAC DEFIBRILLATOR: ICD-10-CM

## 2022-02-15 LAB — BSA FOR ECHO PROCEDURE: 2.48 M2

## 2022-02-15 PROCEDURE — 93296 REM INTERROG EVL PM/IDS: CPT | Mod: NC | Performed by: INTERNAL MEDICINE

## 2022-02-15 PROCEDURE — 93295 DEV INTERROG REMOTE 1/2/MLT: CPT | Mod: NC | Performed by: INTERNAL MEDICINE

## 2022-02-22 ENCOUNTER — ANCILLARY PROCEDURE (OUTPATIENT)
Dept: CARDIOLOGY | Facility: CLINIC | Age: 57
End: 2022-02-22
Payer: COMMERCIAL

## 2022-02-22 DIAGNOSIS — Z45.02 ENCOUNTER FOR INTERROGATION OF CARDIAC DEFIBRILLATOR: Primary | ICD-10-CM

## 2022-03-04 ENCOUNTER — OFFICE VISIT (OUTPATIENT)
Dept: CARDIOLOGY | Facility: CLINIC | Age: 57
End: 2022-03-04
Payer: COMMERCIAL

## 2022-03-04 ENCOUNTER — ANCILLARY PROCEDURE (OUTPATIENT)
Dept: CARDIOLOGY | Facility: CLINIC | Age: 57
End: 2022-03-04
Payer: COMMERCIAL

## 2022-03-04 VITALS
BODY MASS INDEX: 39.9 KG/M2 | OXYGEN SATURATION: 94 % | WEIGHT: 285 LBS | DIASTOLIC BLOOD PRESSURE: 74 MMHG | HEART RATE: 59 BPM | HEIGHT: 71 IN | SYSTOLIC BLOOD PRESSURE: 120 MMHG

## 2022-03-04 DIAGNOSIS — Z79.899 LONG TERM CURRENT USE OF ANTIARRHYTHMIC DRUG: ICD-10-CM

## 2022-03-04 DIAGNOSIS — I63.9 CEREBROVASCULAR ACCIDENT (CVA), UNSPECIFIED MECHANISM (CMS/HCC): ICD-10-CM

## 2022-03-04 DIAGNOSIS — I25.10 CORONARY ARTERY DISEASE INVOLVING NATIVE HEART, UNSPECIFIED VESSEL OR LESION TYPE, UNSPECIFIED WHETHER ANGINA PRESENT: ICD-10-CM

## 2022-03-04 DIAGNOSIS — E78.2 MIXED HYPERLIPIDEMIA: ICD-10-CM

## 2022-03-04 DIAGNOSIS — Z45.02 AICD DISCHARGE: ICD-10-CM

## 2022-03-04 DIAGNOSIS — G47.33 OBSTRUCTIVE SLEEP APNEA: ICD-10-CM

## 2022-03-04 DIAGNOSIS — Z95.810 AUTOMATIC IMPLANTABLE CARDIOVERTER-DEFIBRILLATOR IN SITU: ICD-10-CM

## 2022-03-04 DIAGNOSIS — Z98.890 S/P ABLATION OF VENTRICULAR ARRHYTHMIA: ICD-10-CM

## 2022-03-04 DIAGNOSIS — I25.5 ISCHEMIC CARDIOMYOPATHY: ICD-10-CM

## 2022-03-04 DIAGNOSIS — Z86.79 S/P ABLATION OF VENTRICULAR ARRHYTHMIA: ICD-10-CM

## 2022-03-04 DIAGNOSIS — I48.0 PAROXYSMAL ATRIAL FIBRILLATION (CMS/HCC): ICD-10-CM

## 2022-03-04 DIAGNOSIS — I47.20 VT (VENTRICULAR TACHYCARDIA) (CMS/HCC): Primary | ICD-10-CM

## 2022-03-04 DIAGNOSIS — I82.90 THROMBUS: ICD-10-CM

## 2022-03-04 DIAGNOSIS — I47.20 V TACH (CMS/HCC): ICD-10-CM

## 2022-03-04 DIAGNOSIS — I21.09 ANTERIOR MYOCARDIAL INFARCTION (CMS/HCC): ICD-10-CM

## 2022-03-04 DIAGNOSIS — I50.42 CHRONIC COMBINED SYSTOLIC AND DIASTOLIC CHF, NYHA CLASS 2 AND ACC/AHA STAGE C (CMS/HCC): ICD-10-CM

## 2022-03-04 DIAGNOSIS — I10 PRIMARY HYPERTENSION: ICD-10-CM

## 2022-03-04 DIAGNOSIS — Z79.01 ANTICOAGULATED: ICD-10-CM

## 2022-03-04 DIAGNOSIS — Z79.01 CHRONIC ANTICOAGULATION: ICD-10-CM

## 2022-03-04 DIAGNOSIS — Z95.5 PRESENCE OF STENT IN CORONARY ARTERY: ICD-10-CM

## 2022-03-04 LAB — BSA FOR ECHO PROCEDURE: 2.54 M2

## 2022-03-04 PROCEDURE — 99214 OFFICE O/P EST MOD 30 MIN: CPT | Mod: 25 | Performed by: NURSE PRACTITIONER

## 2022-03-04 PROCEDURE — 93005 ELECTROCARDIOGRAM TRACING: CPT | Mod: 59 | Performed by: INTERNAL MEDICINE

## 2022-03-04 PROCEDURE — 93289 INTERROG DEVICE EVAL HEART: CPT | Mod: NC | Performed by: INTERNAL MEDICINE

## 2022-03-04 ASSESSMENT — ENCOUNTER SYMPTOMS: PALPITATIONS: 1

## 2022-03-04 ASSESSMENT — PAIN SCALES - GENERAL: PAINLEVEL: 0-NO PAIN

## 2022-03-04 NOTE — PROGRESS NOTES
Subjective    Patient ID: Pete Trejo is a 56 y.o. male.    Chief Complaint:   Chief Complaint   Patient presents with   • Hypertension   • Hyperlipidemia     Ischemic Cardiomyopathy     Right knee is a pleasant 56-year-old male with a past medical history significant for CAD, VT, ischemic cardiomyopathy, dual-chamber biventricular ICD, LV thrombus on chronic anticoagulation, dyslipidemia, hypertension.  He was taken to the EP lab 6/9/2020 by Dr. Montana for VT ablation.  Unfortunately he had recurrent numerous ventricular arrhythmias as a result of extensive anterior wall scar involving much of the lateral and septal walls.  Ablation was partially successful with ablation of a border zone area of the scar especially in the inferior apex and attempt to substrate modify arrhythmia burden.  He had been on amiodarone and mexiletine and was referred to [facility] for further recommendations and possible LVAD or transplant.  He was told he did not qualify for either 1.  He had recurrent VT requiring ATP in September 2021 and reloaded with amiodarone and discharged on an increased dose.  He was readmitted 10/21 with more recurrent episodes and received ATP though no shocks.  IV amiodarone was rebolused with resolution.  He again was referred to you see him for possible complex VT ablation versus Vatter transplant.    On 11/29/2021 patient underwent EP testing by Dr. Lema for recurrent symptomatic VT requiring ATP in spite of high doses of amiodarone and mexiletine.  Extensive substrate modification was performed with none inducibility at the end of procedure.  Amiodarone was decreased to 200 mg daily and mexiletine discontinued.  Future plan was to switch to sotalol for chronic suppression.  Unfortunately presented to the emergency room 1/25/2022 with sensation of racing in the heart and chest fullness.  He was found to be in VT at 130 bpm.  He did convert spontaneously.  He had felt well for 5 or 6 weeks post ablation in  [place] however began experiencing recurrent symptoms with ATP for VT at 124 bpm on 1/11/2022 and then ATP x8 for VT at 139 bpm.  [facility] rein[facility] of antiarrhythmic therapy with the addition of mexiletine.  He was discharged on amiodarone 200 mg daily and mexiletine 200 mg twice daily.  He was [facility]22 and mexiletine increased to 3 times daily and he was continued on amiodarone 200 mg daily.  Plan was to discuss with Dr. Stanley and the EP team to see if any further recommendations or ablation at this time.  Patient has not heard back from them yet.    Pete indicates he has been feeling well though can feel a few palpitations on a daily basis.  He has been walking for exercise though and trying not to push too hard.  He denies any chest discomfort.  He states he has been short of breath for years which is no different.  He denies PND orthopnea or lower extremity swelling.  No lightheadedness dizziness syncopal or near syncopal events.  He does use his CPAP consistently.  He is on supplemental potassium and magnesium.  Blood pressure today 120/74.  His twelve-lead EKG reveals AV paced rhythm at 60 bpm with a QTC of 466 ms.  QRS duration is 138 ms BiV paced.      Last updated by: 3/4/2022 by Merced CAMERON CNP  Primary Cardiologist: Jerry     CAD:   Anterior MI (PCI. 3.5 X 15-mm Promus drug eluting stent to totally occluded LAD) - 12/3/2010   Sm Anteroseptal MI (PCI. 2.25 X 24-mm Taxus drug eluting stent to diagonal. 3.0 X 8-mm Taxus stent to proximal LAD) - 7/25/2009   Progressive CAD ivolving LAD/Diagonal (Stent)     CHF/CM:   CHF   EF 31% - 2013   Severe ischemic cardionyopathy (BiVentricular ICD. [employer] Cognis HE Model #N119,Serial #[id number]. RV lead Guidant Model #0185,Serial #[id number]. LV lead BS Model #4591,Serial #[id number]. RA lead Guidant Model #4469, Serial #[id number]) - 2011       ARRHYTHMIA:   Ventricular Tachycardia (AICD)   2021  recurrent NS-VT, slow VT  2020 Hx of VT ablation per Dr. Montana - 06/09/2020 VT-Partially successful ablation of border zone areas of the scar especially the inferior apex in an attempt to substrate modify his arrhythmia burden    Recurrent palpitations 1/26/2021 with admission [place] slow monomorphic  bpm with failed ATP x8 and no shocks delivered. Spontaneously converted out of the VT while in the ER. Mexiletine resumed at 200 mg twice daily in addition to amiodarone. ATP programmed more aggressively.      Extensive repeat VT ablation [facility] 11/29/2021, mexiletine discontinued    Recurrent VT with failed ATP x8 1/25/2022 spontaneously converted.  Mexiletine 200 mg twice daily plus amiodarone 200 mg daily, mexiletine increased to 3 times daily. 1/31/2022    Device interrogation [facility] 1/31/22: 96% atrial paced, 99% BiV paced, recurrent episode nonsustained  bpm 1/29/2022 not sustained 28 seconds.      PVD:   CVA - 12/2010 - at time of MI, no residual    OTHER:   LV Mural Thrombus - resolved  Anteroapical akinetic aneurysm     RISK FACTORS:   Hypertension   Dyslipidemia (Hyperlipidemia)       CARDIOVASCULAR PROCEDURES:     PCI (PTCA: LAD bifurcation   Stent: D2 2.25x30 (RESOLUTE))   PCI (%   Stent: LAD (PROMUS) 3.5x15mm) - 12/3/2010   PCI (Stent: D1 (TAXUS) 2.5x24mm, LAD prox (TAXUS) 30x8mm) - 7/25/2009   PCI 07/28/2017 second diagonal branch LAD Resolute 2.25 x 30-mm DIMAS       Prior Imaging/Testing History     EKG - 10/8/2015   EKG - 08/14/2020 AV paced 60 bpm     Devices (Bi-Ventricular ICD (Sbjxnz-Msuksuqy-Bihpwi N119), Serial# [id number]) - 10/10/2011   Devices (ICD Interrogation: 1 mode-switch, 1 Nonsustained VT)     Echo - 06/08/2020 · Severe LV systolic dysfunction, Severe ischemic CM, EF 25%,   LA moderately dilated; RA mildly dilated, Mild TR, Grade I LV diastolic abnormality, Minimally elevated LV end-diastolic pressure.     MPI (Mild rayray-infarct anterior wall ischemia; Lexiscan)    Lexiscan - 07/27/2017 -Large scar noted in the LAD territory associated with akinesis   secondary to prior MI, small amount of rayray-infarct ischemia noted, LV systolic function is severely depressed: EF 14%   Lexiscan - 06/08/2020 No changes since 2017 study    Stress test with a VO2 5/5/2021 [facility]: Nondiagnostic for ischemia as patient did not reach 85% of maximum heart rate.  Isolated PVCs, single PVC pair.  On beta-blocker.  Moderately decreased functional capacity.  Maintained 93% or greater saturation.    Right heart cath 5/6/2021: You see him.  RV 35/06.  Mean PW 15, EDP 14, A wave 11.  PAP 36/15 mean 22.  PA saturation 66, pulmonary vein 93, PA 66, PA 66, AO 93%.  Becki cardiac output 5.88.    ZIO monitor 3/21: 14-day monitor, rare ectopy, 4 beats nonsustained VT, triggered events corresponded to sinus rhythm.  Minimum heart rate 60 maximum 140 average 63 bpm.      Specialty Problems        Cardiology Problems    Hyperlipidemia        Hypertension        Ischemic cardiomyopathy        Automatic implantable cardioverter-defibrillator in situ        On amiodarone therapy        Presence of stent in coronary artery        S/P ablation of ventricular arrhythmia        Chronic combined systolic and diastolic CHF, NYHA class 2 and ACC/AHA stage C (CMS/HCC) ([facility])        Ventricular tachycardia (paroxysmal) (CMS/HCC) ([facility])        Chronic anticoagulation        History of ventricular tachycardia        Paroxysmal atrial fibrillation (CMS/HCC) ([facility])        Sustained ventricular tachycardia (CMS/HCC) ([facility])        V tach (CMS/HCC) ([facility])            Past Medical History:   Diagnosis Date   • CAD (coronary artery disease)    • CHF (congestive heart failure) (CMS/HCC) ([facility])    • CVA (cerebral vascular accident) (CMS/HCC) ([facility]) 2010   • Dyslipidemia    • GERD (gastroesophageal reflux disease)    • GERD (gastroesophageal reflux disease)    • Heart attack (CMS/HCC) ([facility])     x3   • Hypertension    • Ischemic cardiomyopathy    •  Paroxysmal atrial fibrillation (CMS/HCC) (H[facility])     On Xarelto   • V-tach (CMS/HCC) (HCC)     AICD in place     Past Surgical History:   Procedure Laterality Date   • ABLATION VT N/A 06/09/2020    Procedure: Ablation VT;  Surgeon: Wilfredo Montana MD;  Location: [facility] EP Lab;  Service: Electrophysiology;  Laterality: N/A;  Check with Dr. Montana in the a.m. regarding scheduling   • APPENDECTOMY     • CORONARY ARTERY STENTING  2009    2.5 X 24-mm Taxus DIMAS to first diagonal. 3.0 X 8-mm Taxus stent to proximal LAD just proximal to diagonal.   • CORONARY ARTERY STENTING  2010    3.5 X 15-mm Promus DIMAS to totally occluded LAD   • CORONARY ARTERY STENTING  2011    2.25 X 30-mm Resolute DIMAS to 2nd diagonal.  Balloon angioplasty through strut to improve blood flow to distal LAD   • CORONARY ARTERY STENTING  07/28/2017    second diagonal branch LAD Resolute 2.25 x 30-mm DIMAS   • ICD SC NEW  2011    [employer] Cognis Model #N119, Serial #[id number]. [employer] Reliance Model #0185, Serial #[id number]. LV lead Acuity Model #45+91, Serial #[id number]. Atrial lead Model #4469, Serial #[id number]     Allergies as of 03/04/2022 - Reviewed 03/04/2022   Allergen Reaction Noted   • Morphine  07/30/2017   • Tramadol  07/30/2017     Current Outpatient Medications   Medication Sig Dispense Refill   • amiodarone (PACERONE) 200 mg tablet Take 1 tablet (200 mg total) by mouth daily 30 tablet 6   • mexiletine (MEXITIL) 200 mg capsule Take 1 capsule (200 mg total) by mouth every 12 (twelve) hours (Patient taking differently: Take 200 mg by mouth 3 (three) times a day) 60 capsule 6   • potassium chloride (K-TAB) 20 mEq CR tablet Take 1 tablet (20 mEq total) by mouth daily 90 tablet 3   • magnesium chloride (SLOW-MAG) 64 mg tablet,delayed release (DR/EC) Take 1 tablet (64 mg total) by mouth 2 (two) times a day with meals 180 tablet 3   • spironolactone (ALDACTONE) 25 mg tablet Take 1 tablet (25 mg total) by mouth 2 (two) times a day 60 tablet 6   •  carvediloL (Coreg) 25 mg tablet Take 1 tablet (25 mg total) by mouth 2 (two) times a day with meals 60 tablet 6   • sacubitriL-valsartan (ENTRESTO) 24-26 mg tablet Take 1 tablet by mouth 2 (two) times a day 60 tablet 6   • rosuvastatin (Crestor) 40 mg tablet Take 1 tablet (40 mg total) by mouth nightly 30 tablet 6   • rivaroxaban (Xarelto) 20 mg tablet Take 1 tablet (20 mg total) by mouth daily 30 tablet 6   • furosemide (LASIX) 40 mg tablet Take 1 tablet (40 mg total) by mouth daily If weight goes up 3 pounds overnight take an extra dose of Lasix x1 day 30 tablet 6   • aspirin 81 mg EC tablet Take 1 tablet (81 mg total) by mouth daily 90 tablet 3   • albuterol HFA (PROVENTIL HFA;VENTOLIN HFA) 90 mcg/actuation inhaler Inhale 2 puffs every 6 (six) hours as needed for wheezing or shortness of breath 1 Inhaler 1   • nitroglycerin (NITROSTAT) 0.4 mg SL tablet Place 0.4 mg under the tongue every 5 (five) minutes as needed for chest pain.     • pantoprazole (PROTONIX) 40 mg EC tablet Take 40 mg by mouth daily.       No current facility-administered medications for this visit.     Family History   Problem Relation Age of Onset   • Heart attack Mother 62   • Other Mother         Abdominal Aortic Aneurysm   • Lung cancer Mother    • Colon cancer Father         Cause of death   • Diabetes Brother         Non-Insulin Dependent   • Heart attack Brother 51        w/ Stents   • Heart disease Brother    • Other Son         Well     Social History     Tobacco Use   • Smoking status: Former Smoker     Packs/day: 0.75     Years: 30.00     Pack years: 22.50     Types: Cigarettes     Quit date: 2020     Years since quittin.7   • Smokeless tobacco: Never Used   Vaping Use   • Vaping Use: Never used   Substance Use Topics   • Alcohol use: Not Currently     Comment: Quit drinking in    • Drug use: Not Currently       Review of Systems  Review of Systems   Cardiovascular: Positive for palpitations.   Respiratory: Positive for  "sleep apnea.         Utilizes CPAP   All other systems reviewed and are negative.        Objective         Physical Exam  Vitals reviewed.   Constitutional:       Appearance: He is well-developed. He is obese.   HENT:      Head: Normocephalic and atraumatic.   Eyes:      General: No scleral icterus.     Comments: Pupils equal   Neck:      Thyroid: No thyromegaly.      Vascular: No carotid bruit or JVD.   Cardiovascular:      Rate and Rhythm: Normal rate and regular rhythm.      Pulses: Normal pulses and intact distal pulses.           Carotid pulses are 2+ on the right side and 2+ on the left side.       Dorsalis pedis pulses are 2+ on the right side and 2+ on the left side.        Posterior tibial pulses are 2+ on the right side and 2+ on the left side.      Heart sounds: Normal heart sounds. No murmur heard.    No friction rub. No gallop.   Pulmonary:      Effort: Pulmonary effort is normal.      Breath sounds: Normal breath sounds. No wheezing, rhonchi or rales.   Abdominal:      General: Abdomen is flat. Bowel sounds are normal.      Palpations: Abdomen is soft.      Tenderness: There is no abdominal tenderness.   Musculoskeletal:         General: Normal range of motion.      Cervical back: Normal range of motion and neck supple.      Right lower leg: No edema.      Left lower leg: No edema.   Lymphadenopathy:      Cervical: No cervical adenopathy.   Skin:     General: Skin is warm and dry.      Findings: No rash.   Neurological:      General: No focal deficit present.      Mental Status: He is alert and oriented to person, place, and time.   Psychiatric:         Mood and Affect: Mood normal.         Behavior: Behavior normal.         Thought Content: Thought content normal.         Judgment: Judgment normal.         Data Review:   Vitals:    03/04/22 1223   BP: 120/74   Pulse: 59   SpO2: 94%   Height: 1.803 m (5' 11\")   Weight: 129.3 kg (285 lb)   PainSc: 0-No pain   Patient Position: Sitting     Sodium   Date " Value Ref Range Status   01/25/2022 138 135 - 145 mmol/L Final   01/24/2022 139 135 - 145 mmol/L Final   11/18/2021 140 135 - 145 mmol/L Final     Potassium   Date Value Ref Range Status   01/25/2022 4.1 3.5 - 5.1 MMOL/L Final   01/24/2022 3.9 3.5 - 5.1 MMOL/L Final   11/18/2021 4.1 3.5 - 5.1 MMOL/L Final     CO2   Date Value Ref Range Status   01/25/2022 25 21 - 32 mmol/L Final   01/24/2022 26 21 - 32 mmol/L Final   11/18/2021 24 21 - 32 mmol/L Final     BUN   Date Value Ref Range Status   01/25/2022 10 7 - 25 mg/dL Final   01/24/2022 12 7 - 25 mg/dL Final   11/18/2021 12 7 - 25 mg/dL Final     Creatinine   Date Value Ref Range Status   01/25/2022 0.92 0.70 - 1.30 mg/dL Final   01/24/2022 0.86 0.70 - 1.30 mg/dL Final   11/18/2021 0.88 0.70 - 1.30 mg/dL Final     Glucose   Date Value Ref Range Status   01/25/2022 137 (H) 70 - 105 mg/dL Final   01/24/2022 111 (H) 70 - 105 mg/dL Final   11/18/2021 112 (H) 70 - 105 mg/dL Final     Calcium   Date Value Ref Range Status   01/25/2022 9.4 8.6 - 10.3 mg/dL Final   01/24/2022 9.1 8.6 - 10.3 mg/dL Final   11/18/2021 9.2 8.6 - 10.3 mg/dL Final     hsTnI 0 Hour   Date Value Ref Range Status   01/25/2022 27.2 (H) <=19.8 pg/mL Final   10/06/2021 26.2 (H) <=19.8 pg/mL Final   09/19/2021 92.7 (H) <=19.8 pg/mL Final     BNP   Date Value Ref Range Status   01/25/2022 79 0 - 100 pg/mL Final   07/12/2021 50 0 - 100 pg/mL Final   07/10/2021 51 0 - 100 pg/mL Final     WBC   Date Value Ref Range Status   01/25/2022 6.5 3.7 - 9.6 10*3/uL Final     Hemoglobin   Date Value Ref Range Status   01/25/2022 15.2 13.2 - 17.2 g/dL Final     Hematocrit   Date Value Ref Range Status   01/25/2022 44.4 38.0 - 50.0 % Final     MCV   Date Value Ref Range Status   01/25/2022 93.1 82.0 - 97.0 fL Final     Platelets   Date Value Ref Range Status   01/25/2022 216 130 - 350 10*3/uL Final     Cholesterol   Date Value Ref Range Status   07/11/2021 119 0 - 199 mg/dL Final     Triglycerides   Date Value Ref Range  Status   07/11/2021 120 <=149 mg/dL Final     HDL   Date Value Ref Range Status   07/11/2021 35 (L) >=40 mg/dL Final     LDL Calculated   Date Value Ref Range Status   07/11/2021 60 20 - 99 mg/dL Final           Assessment/Plan   Diagnoses and all orders for this visit:    VT (ventricular tachycardia) ([facility]) ([facility])    V tach ([facility]) ([facility])  -     Ambulatory referral to Cardiology  -     ECG 12 lead -Normal, Today  -     Device check - outpatient; Future    Thrombus    S/P ablation of ventricular arrhythmia    Presence of stent in coronary artery    Paroxysmal atrial fibrillation (CMS/[facility]) ([facility])    Ischemic cardiomyopathy    Primary hypertension    Mixed hyperlipidemia    Coronary artery disease involving native heart, unspecified vessel or lesion type, unspecified whether angina present    Chronic combined systolic and diastolic CHF, NYHA class 2 and ACC/AHA stage C (CMS[facility]) ([facility])    Chronic anticoagulation    Automatic implantable cardioverter-defibrillator in situ    Anticoagulated    Anterior myocardial infarction (CMS/[facility]) ([facility])    Obstructive sleep apnea    AICD discharge    Cerebrovascular accident (CVA), unspecified mechanism ([facility]) ([facility])    Long term current use of antiarrhythmic drug    Plan/recommendation:  1.  Ventricular tachycardia: Pete has undergone 2 different VT ablations as noted above.  His most recent one was at [facility] by Dr. Stanley on 11/29/2021.  He had subsequent recurrence with slower VT in the 130 bpm range.  He received ATP x8 however did not reach the VT zone programmed for 160 bpm therefore did not receive any shocks.  ATP was unsuccessful however he converted spontaneously in the ER.  There were no significant electrolyte abnormalities or identifiable triggers.  After conferring with [facility] Dr. Lincoln recommended resuming mexiletine 200 mg twice daily and adjusting the device settings.  ATP burst cycle length changed to 81% and ATP pulses for burst to be more aggressive 12.   Continue amiodarone 200 mg daily.  Follow-up with them on 1/31/2022 which Seth did.  He was instructed to increase the mexiletine to 200 mg 3 times daily and continue the amiodarone 200 mg daily.  Plan was to discuss for further recommendations regarding potential third ablation procedure.  He has not heard anything back from them yet.  He plans on calling them this next week.  2.  Biventricular ICD: High percentage BiV pacing On device interrogation today.  Nonsustained VT episode since 1/31/2022 lasting 28 seconds.  This was a rate of 136 bpm / 445 ms.  He has approximately 8 months remaining on his battery.  3.  Atrial fibrillation: No mode switching episodes amiodarone.  Anticoagulation: Currently anticoagulated with Xarelto grams daily.  [id number] score is at least 5.  History of LV mural thrombus, anteroapical akinetic aneurysm and CVA at the time of his MI 2010 with no significant residual deficit.  4.  CAD: Follows with Ryann Aleman CNP and Dr. Edwards.  5.  CHF: Euvolemic on exam today.GDMT with spironolactone carvedilol and Entresto.  Exercising by walking daily about a mile.  Tolerates that well without any significant symptoms.  6.  Obesity: Diet exercise weight loss recommended.  Dietary information provided for Mediterranean type diet.    Follow-up in the clinic in 3 months.  Continue the same medications.

## 2022-03-04 NOTE — PATIENT INSTRUCTIONS
Continue same meds    Followup in 3 months    Cardiac prevention:  1.  Lipid profile check annually.  2.  Diabetes screening annually.  3.  Maintain healthy blood pressure with a goal 120/80 or below consistently.  4.  Follow-up no added salt diet.  5.  Maintain healthy eating habits:  Primarily plant-based diet.  Lean meats, chicken, seafood several times a week or fish oil capsules, avoid processed meats or excessive red meats, cut off visible fat  Whole grains/beans peas lentils quinoa, farro,  Avoid refined carbohydrates: Cookies, pies, cakes, donuts, ice cream, candy.  5+ servings of fruits/vegetables daily.  6.  If you smoke smoking cessation recommended.  If you need help call the [place] quit hotline [phone] CST Monday through Friday 7 AM to 11 PM and 8 AM to 5 PM Saturday.  7.  Maintain healthy weight with goal for BMI in the normal range less than 25.  8.  Exercise regularly: 30 to 45 minutes most days of the week.  Weightbearing exercise helps with bone health.  Weight training several times a week to maintain muscular strength.  9.  Maintain adequate hydration with 6 to 8 glasses of water per day.  Goal should be approximately 2 quarts daily of all fluids combined.  Coffee can act as a diuretic.  Recommend not more than 1 to 2 cups/day. Avoid any sugar sweetened beverages.    Exercise/weight management:  1.  Strive to maintain close to ideal body weight  2.  Exercise at least 150 minutes/week with a combination of cardio and strength training  3.  Avoid  alcohol if you have atrial fibrillation.  Recommend less than 1 alcoholic beverage per day for females and no more than 2 daily for males.    For patient with arterial disease (coronary, peripheral arterial disease ) a plant based diet may reverse plaque buildup: In addition to cholesterol lowering medications consider adding the following:    · Look into Dr. Cooper's diet and the [place] diet which are plant based. The Pritikin diet allows  "for some meat and may be a better place to start initially.  · Look into getting aged garlic extract (kyolic) which has been shown to reverse plaque on CAT scans  · Look into plant sterols (cholest-off) which may help lower cholesterol levels  · Try to increase fiber intake particularly before meals that do not have a lot of fruits and vegetables in them or before meals that have meat or dairy in them to help bind up some of the cholesterol and saturated fat in your bowels  · Consider watching The Game Changer on [employer] and other documentaries like Yones over [employer], [employer], Fast Food Nation etc...  · Try to maintain less than 2 gram salt restricted diet and avoid adding salt to food. Read food labels for hidden salt.  · Look into books like Smallaa, Fast Food Nation, Un-Do-It, Fiber Fueled  · IF SOMETHING COMES IN A PACKAGE THINK TWICE ABOUT EATING IT, ESPECIALLY IF IT CONTAINS WORDS YOU CANNOT PRONOUNCE OR \"NATURAL AND ARTIFICAL FLAVORS\"   Unless not doing well in the interim.  "

## 2022-03-21 PROCEDURE — 93010 ELECTROCARDIOGRAM REPORT: CPT | Performed by: INTERNAL MEDICINE

## 2022-04-27 ENCOUNTER — APPOINTMENT (OUTPATIENT)
Dept: LAB | Facility: CLINIC | Age: 57
End: 2022-04-27
Payer: COMMERCIAL

## 2022-04-27 ENCOUNTER — OFFICE VISIT (OUTPATIENT)
Dept: INTERNAL MEDICINE | Facility: CLINIC | Age: 57
End: 2022-04-27
Payer: COMMERCIAL

## 2022-04-27 VITALS
TEMPERATURE: 96.9 F | DIASTOLIC BLOOD PRESSURE: 89 MMHG | HEART RATE: 84 BPM | OXYGEN SATURATION: 92 % | RESPIRATION RATE: 18 BRPM | BODY MASS INDEX: 40.17 KG/M2 | WEIGHT: 288 LBS | SYSTOLIC BLOOD PRESSURE: 139 MMHG

## 2022-04-27 DIAGNOSIS — I10 PRIMARY HYPERTENSION: ICD-10-CM

## 2022-04-27 DIAGNOSIS — I48.0 PAROXYSMAL ATRIAL FIBRILLATION (CMS/HCC): ICD-10-CM

## 2022-04-27 DIAGNOSIS — Z79.899 ON AMIODARONE THERAPY: ICD-10-CM

## 2022-04-27 DIAGNOSIS — E11.9 TYPE 2 DIABETES MELLITUS WITHOUT COMPLICATION, WITHOUT LONG-TERM CURRENT USE OF INSULIN (CMS/HCC): ICD-10-CM

## 2022-04-27 DIAGNOSIS — I25.5 ISCHEMIC CARDIOMYOPATHY: ICD-10-CM

## 2022-04-27 DIAGNOSIS — G47.33 OBSTRUCTIVE SLEEP APNEA: ICD-10-CM

## 2022-04-27 DIAGNOSIS — Z98.890 S/P ABLATION OF VENTRICULAR ARRHYTHMIA: ICD-10-CM

## 2022-04-27 DIAGNOSIS — Z79.899 ON AMIODARONE THERAPY: Primary | ICD-10-CM

## 2022-04-27 DIAGNOSIS — Z86.79 S/P ABLATION OF VENTRICULAR ARRHYTHMIA: ICD-10-CM

## 2022-04-27 DIAGNOSIS — Z79.01 CHRONIC ANTICOAGULATION: ICD-10-CM

## 2022-04-27 LAB
ALBUMIN SERPL-MCNC: 4.3 G/DL (ref 3.5–5.3)
ALP SERPL-CCNC: 56 U/L (ref 45–115)
ALT SERPL-CCNC: 50 U/L (ref 7–52)
ANION GAP SERPL CALC-SCNC: 8 MMOL/L (ref 3–11)
AST SERPL-CCNC: 23 U/L
BASOPHILS # BLD AUTO: 0.1 10*3/UL
BASOPHILS NFR BLD AUTO: 1 % (ref 0–2)
BILIRUB SERPL-MCNC: 0.94 MG/DL (ref 0.2–1.4)
BUN SERPL-MCNC: 14 MG/DL (ref 7–25)
CALCIUM ALBUM COR SERPL-MCNC: 9 MG/DL (ref 8.6–10.3)
CALCIUM SERPL-MCNC: 9.2 MG/DL (ref 8.6–10.3)
CHLORIDE SERPL-SCNC: 105 MMOL/L (ref 98–107)
CO2 SERPL-SCNC: 25 MMOL/L (ref 21–32)
CREAT SERPL-MCNC: 1.01 MG/DL (ref 0.7–1.3)
EOSINOPHIL # BLD AUTO: 0.2 10*3/UL
EOSINOPHIL NFR BLD AUTO: 4 % (ref 0–3)
ERYTHROCYTE [DISTWIDTH] IN BLOOD BY AUTOMATED COUNT: 14.8 % (ref 11.5–15)
EST. AVERAGE GLUCOSE BLD GHB EST-MCNC: 154.2 MG/DL
GFR SERPL CREATININE-BSD FRML MDRD: 88 ML/MIN/1.73M*2
GLUCOSE SERPL-MCNC: 139 MG/DL (ref 70–105)
HBA1C MFR BLD: 7 % (ref 4–6)
HCT VFR BLD AUTO: 44.7 % (ref 38–50)
HGB BLD-MCNC: 15.5 G/DL (ref 13.2–17.2)
LYMPHOCYTES # BLD AUTO: 1.9 10*3/UL
LYMPHOCYTES NFR BLD AUTO: 39 % (ref 15–47)
MCH RBC QN AUTO: 31.6 PG (ref 29–34)
MCHC RBC AUTO-ENTMCNC: 34.7 G/DL (ref 32–36)
MCV RBC AUTO: 91.3 FL (ref 82–97)
MONOCYTES # BLD AUTO: 0.5 10*3/UL
MONOCYTES NFR BLD AUTO: 10 % (ref 5–13)
NEUTROPHILS # BLD AUTO: 2.2 10*3/UL
NEUTROPHILS NFR BLD AUTO: 46 % (ref 46–70)
PLATELET # BLD AUTO: 217 10*3/UL (ref 130–350)
PMV BLD AUTO: 7.5 FL (ref 6.9–10.8)
POTASSIUM SERPL-SCNC: 4.1 MMOL/L (ref 3.5–5.1)
PROT SERPL-MCNC: 6.9 G/DL (ref 6–8.3)
RBC # BLD AUTO: 4.89 10*6/ΜL (ref 4.1–5.8)
SODIUM SERPL-SCNC: 138 MMOL/L (ref 135–145)
TSH SERPL DL<=0.05 MIU/L-ACNC: 3.24 UIU/ML (ref 0.34–4.82)
WBC # BLD AUTO: 4.8 10*3/UL (ref 3.7–9.6)

## 2022-04-27 PROCEDURE — 99214 OFFICE O/P EST MOD 30 MIN: CPT | Performed by: INTERNAL MEDICINE

## 2022-04-27 PROCEDURE — 84443 ASSAY THYROID STIM HORMONE: CPT | Performed by: INTERNAL MEDICINE

## 2022-04-27 PROCEDURE — 83036 HEMOGLOBIN GLYCOSYLATED A1C: CPT | Performed by: INTERNAL MEDICINE

## 2022-04-27 PROCEDURE — 36415 COLL VENOUS BLD VENIPUNCTURE: CPT | Performed by: INTERNAL MEDICINE

## 2022-04-27 PROCEDURE — 85025 COMPLETE CBC W/AUTO DIFF WBC: CPT | Performed by: INTERNAL MEDICINE

## 2022-04-27 PROCEDURE — 80053 COMPREHEN METABOLIC PANEL: CPT | Performed by: INTERNAL MEDICINE

## 2022-04-27 RX ORDER — METFORMIN HYDROCHLORIDE 500 MG/1
500 TABLET, EXTENDED RELEASE ORAL
Qty: 90 TABLET | Refills: 3 | Status: SHIPPED | OUTPATIENT
Start: 2022-04-27 | End: 2023-01-26 | Stop reason: SDUPTHER

## 2022-04-27 ASSESSMENT — PAIN SCALES - GENERAL: PAINLEVEL: 0-NO PAIN

## 2022-04-27 NOTE — PROGRESS NOTES
Subjective      Pete Trejo is a 56 y.o. male who presents for follow-up visit.    HPI    Patient is a 56-year-old male who comes in today for follow-up visit.  He does have a history of significant arrhythmias and does follow-up with cardiology for this.  He was last seen on 3/4/2022 and at that point was noted to be doing fairly well.  Most recently he had an ablation for ventricular tachycardia on 11/29/2021 at [facility].    Patient was hospitalized from 1/5/2022 until 1/27/2022 for recurrence of his ventricular tachycardia.  At this visit his mexiletine was increased to 3 times daily.  In addition he is on amiodarone and has done breathing tests for this.    Concerns today:   - Had headaches a couple of months ago.  In the afternoon.  These went away.   - For a couple of months has had a side ache in the left lower quadrant.  Has a daily bowel movement that is soft and easy to pass.   - He feels like he dries himself out with the medication.  No lower extremity swelling.  No PND but does have shortness of breath when he is completely flat but feels fine with 2 pillows.  Has been doing a lot of walking.  He does walk half a mile a day but doesn't have chest pain.      Chronic issues to follow-up   - Coronary artery disease: Will be going down to [place] early May.  He has been trying to get his weight down.  No chest pain.  Does walk half a mile everyday without chest pain.     - Obstructive sleep apnea: On CPAP at 8 cmH2O, doing well on this.     - Congestive heart failure:  Stable   - Ventricular tachycardia: As described above patient does follow with [facility].  On amiodarone therapy, TSH was slightly elevated in January, rechecking today.  Pacemaker battery life when checked in March had less than a year left.     - History of CVA: No persistent deficit.  No slurring of speech, no difficulty swallowing, no focal weakness.  He does notice that sometimes his leg will go numb on the left side but it has  been there for years, but no pain with this.     - Hyperlipidemia: LDL last July was at 60.     - GERD:  No stomach issues.    - History of tobacco use: no recent use.     Healthcare maintenance:   - Had a colonoscopy 5 years ago, was called for repeat.        The following have been reviewed and updated as appropriate in this visit:   Tobacco  Allergies  Meds  Problems  Med Hx  Surg Hx  Fam Hx         Allergies   Allergen Reactions   • Morphine      ANXIETY   • Tramadol      ANXIETY     Current Outpatient Medications   Medication Sig Dispense Refill   • amiodarone (PACERONE) 200 mg tablet Take 1 tablet (200 mg total) by mouth daily 30 tablet 6   • mexiletine (MEXITIL) 200 mg capsule Take 1 capsule (200 mg total) by mouth every 12 (twelve) hours (Patient taking differently: Take 200 mg by mouth 3 (three) times a day) 60 capsule 6   • potassium chloride (K-TAB) 20 mEq CR tablet Take 1 tablet (20 mEq total) by mouth daily 90 tablet 3   • magnesium chloride (SLOW-MAG) 64 mg tablet,delayed release (DR/EC) Take 1 tablet (64 mg total) by mouth 2 (two) times a day with meals 180 tablet 3   • spironolactone (ALDACTONE) 25 mg tablet Take 1 tablet (25 mg total) by mouth 2 (two) times a day 60 tablet 6   • carvediloL (Coreg) 25 mg tablet Take 1 tablet (25 mg total) by mouth 2 (two) times a day with meals 60 tablet 6   • sacubitriL-valsartan (ENTRESTO) 24-26 mg tablet Take 1 tablet by mouth 2 (two) times a day 60 tablet 6   • rosuvastatin (Crestor) 40 mg tablet Take 1 tablet (40 mg total) by mouth nightly 30 tablet 6   • rivaroxaban (Xarelto) 20 mg tablet Take 1 tablet (20 mg total) by mouth daily 30 tablet 6   • furosemide (LASIX) 40 mg tablet Take 1 tablet (40 mg total) by mouth daily If weight goes up 3 pounds overnight take an extra dose of Lasix x1 day 30 tablet 6   • aspirin 81 mg EC tablet Take 1 tablet (81 mg total) by mouth daily 90 tablet 3   • albuterol HFA (PROVENTIL HFA;VENTOLIN HFA) 90 mcg/actuation inhaler  Inhale 2 puffs every 6 (six) hours as needed for wheezing or shortness of breath 1 Inhaler 1   • nitroglycerin (NITROSTAT) 0.4 mg SL tablet Place 0.4 mg under the tongue every 5 (five) minutes as needed for chest pain.     • pantoprazole (PROTONIX) 40 mg EC tablet Take 40 mg by mouth daily.       No current facility-administered medications for this visit.     Past Medical History:   Diagnosis Date   • CAD (coronary artery disease)    • CHF (congestive heart failure) (CMS/[facility]) ([facility])    • CVA (cerebral vascular accident) (CMS/[facility]) ([facility]) 2010   • Dyslipidemia    • GERD (gastroesophageal reflux disease)    • GERD (gastroesophageal reflux disease)    • Heart attack (CMS/[facility]) ([facility])     x3   • Hypertension    • Ischemic cardiomyopathy    • Paroxysmal atrial fibrillation (CMS/[facility]) ([facility])     On Xarelto   • V-tach (CMS/[facility]) ([facility])     AICD in place     Past Surgical History:   Procedure Laterality Date   • ABLATION VT N/A 06/09/2020    Procedure: Ablation VT;  Surgeon: Wilfredo Montana MD;  Location: [facility] EP Lab;  Service: Electrophysiology;  Laterality: N/A;  Check with Dr. Montana in the a.m. regarding scheduling   • APPENDECTOMY     • CORONARY ARTERY STENTING  2009    2.5 X 24-mm Taxus DIMAS to first diagonal. 3.0 X 8-mm Taxus stent to proximal LAD just proximal to diagonal.   • CORONARY ARTERY STENTING  2010    3.5 X 15-mm Promus DIMAS to totally occluded LAD   • CORONARY ARTERY STENTING  2011    2.25 X 30-mm Resolute DIMAS to 2nd diagonal.  Balloon angioplasty through strut to improve blood flow to distal LAD   • CORONARY ARTERY STENTING  07/28/2017    second diagonal branch LAD Resolute 2.25 x 30-mm DIMAS   • ICD SC NEW  2011    [employer] Cognis Model #N119, Serial #[id number]. [place] Reliance Model #0185, Serial #[id number]. LV lead Acuity Model #45+91, Serial #[id number]. Atrial lead Model #4469, Serial #[id number]     Family History   Problem Relation Age of Onset   • Heart attack Mother 62   • Other Mother         Abdominal  Aortic Aneurysm   • Lung cancer Mother    • Colon cancer Father         Cause of death   • Diabetes Brother         Non-Insulin Dependent   • Heart attack Brother 51        w/ Stents   • Heart disease Brother    • Other Son         Well     Social History     Socioeconomic History   • Marital status: Single   Tobacco Use   • Smoking status: Former Smoker     Packs/day: 0.75     Years: 30.00     Pack years: 22.50     Types: Cigarettes     Quit date: 2020     Years since quittin.8   • Smokeless tobacco: Never Used   Vaping Use   • Vaping Use: Never used   Substance and Sexual Activity   • Alcohol use: Not Currently     Comment: Quit drinking in    • Drug use: Not Currently   • Sexual activity: Defer     Social Determinants of Health     Tobacco Use: Medium Risk   • Smoking Tobacco Use: Former Smoker   • Smokeless Tobacco Use: Never Used       Review of Systems    Objective   /89   Pulse 84   Temp 36.1 °C (96.9 °F)   Resp 18   Wt 130.6 kg (288 lb)   SpO2 92%   BMI 40.17 kg/m²     Physical Exam  Vitals reviewed.   Constitutional:       General: He is not in acute distress.     Appearance: Normal appearance.   HENT:      Head: Normocephalic and atraumatic.      Nose: Nose normal. No congestion or rhinorrhea.      Mouth/Throat:      Mouth: Mucous membranes are moist.      Pharynx: Oropharynx is clear. No oropharyngeal exudate.   Eyes:      General: No scleral icterus.     Pupils: Pupils are equal, round, and reactive to light.   Neck:      Comments: Jugular venous pressure normal  Cardiovascular:      Rate and Rhythm: Normal rate and regular rhythm.      Pulses: Normal pulses.      Heart sounds: No murmur heard.  Pulmonary:      Effort: Pulmonary effort is normal. No respiratory distress.      Breath sounds: No wheezing or rales.   Musculoskeletal:         General: No tenderness or deformity.      Cervical back: Neck supple. No rigidity.      Right lower leg: No edema.      Left lower leg: No  edema.   Lymphadenopathy:      Cervical: No cervical adenopathy.   Skin:     General: Skin is warm and dry.      Capillary Refill: Capillary refill takes less than 2 seconds.      Findings: No erythema or rash.   Neurological:      Mental Status: He is alert.      Results for BENY BARBOSA (MRN [id number]) as of 4/27/2022 13:43   Ref. Range 4/27/2022 07:22   Sodium Latest Ref Range: 135 - 145 mmol/L 138   Potassium Latest Ref Range: 3.5 - 5.1 MMOL/L 4.1   Chloride Latest Ref Range: 98 - 107 mmol/L 105   CO2 Latest Ref Range: 21 - 32 mmol/L 25   Anion Gap Latest Ref Range: 3 - 11 mmol/L 8   BUN Latest Ref Range: 7 - 25 mg/dL 14   Creatinine, Ser Latest Ref Range: 0.70 - 1.30 mg/dL 1.01   eGFR Latest Ref Range: >60 mL/min/1.73m*2 88   Glucose Latest Ref Range: 70 - 105 mg/dL 139 (H)   Calcium Latest Ref Range: 8.6 - 10.3 mg/dL 9.2   Alkaline Phosphatase Latest Ref Range: 45 - 115 U/L 56   Albumin Latest Ref Range: 3.5 - 5.3 g/dL 4.3   Total Protein Latest Ref Range: 6.0 - 8.3 g/dL 6.9   AST Latest Ref Range: <40 U/L 23   ALT (SGPT) Latest Ref Range: 7 - 52 U/L 50   Total Bilirubin Latest Ref Range: 0.20 - 1.40 mg/dL 0.94   Corrected Calcium Latest Ref Range: 8.6 - 10.3 mg/dL 9.0   WBC Latest Ref Range: 3.7 - 9.6 10*3/uL 4.8   RBC Latest Ref Range: 4.10 - 5.80 10*6/µL 4.89   Hemoglobin Latest Ref Range: 13.2 - 17.2 g/dL 15.5   Hematocrit Latest Ref Range: 38.0 - 50.0 % 44.7   MCV Latest Ref Range: 82.0 - 97.0 fL 91.3   MCH Latest Ref Range: 29.0 - 34.0 pg 31.6   MCHC Latest Ref Range: 32.0 - 36.0 g/dL 34.7   RDW Latest Ref Range: 11.5 - 15.0 % 14.8   Platelets Latest Ref Range: 130 - 350 10*3/uL 217   MPV Latest Ref Range: 6.9 - 10.8 fL 7.5   Neutrophils% Latest Ref Range: 46 - 70 % 46   Lymphocytes% Latest Ref Range: 15 - 47 % 39   Monocytes% Latest Ref Range: 5 - 13 % 10   Eosinophils% Latest Ref Range: 0 - 3 % 4 (H)   Basophils% Latest Ref Range: 0 - 2 % 1   ANC (auto diff) Latest Units: 10*3/UL 2.20   Lymphs #  (Absolute) Latest Units: 10*3/uL 1.90   Monocytes # (Absolute) Latest Units: 10*3/uL 0.50   Eos # (Absolute) Latest Units: 10*3/uL 0.20   Baso # (Absolute) Latest Units: 10*3/uL 0.10   Hemoglobin A1C Latest Ref Range: 4.0 - 6.0 % 7.0 (H)   Estimated Average Glucose Latest Units: mg/dL 154.2   TSH Latest Ref Range: 0.340 - 4.820 uIU/ml 3.236       ASSESSMENT AND PLAN    1. On amiodarone therapy  Due to the patient having a history of being on amiodarone therapy we will be rechecking TSH today.  It was slightly elevated in January however the level today came back as normal.  - Thyroid Stimulating Hormone, Ultrasensitive Blood, Venous; Future  - Hemoglobin A1c (glycosylated) Blood, Venous; Future    2. Type 2 diabetes mellitus without complication, without long-term current use of insulin (CMS/[facility]) ([facility])  Patient's hemoglobin A1c is at 7.0, diagnosing diabetes mellitus.  We will start him on metformin and follow-up in 3 months to see how he is doing.  - metFORMIN XR (GLUCOPHAGE-XR) 500 mg 24 hr tablet; Take 1 tablet (500 mg total) by mouth daily with breakfast  Dispense: 90 tablet; Refill: 3    3. Ischemic cardiomyopathy  Under good control.  No PND or orthopnea.  No hypervolemia.    4. S/P ablation of ventricular arrhythmia  Patient does occasionally have episodes of feeling like his heart will skip a beat but otherwise this is under good control    5. Paroxysmal atrial fibrillation (CMS/[facility]) ([facility])  No current signs of atrial fibrillation    6. Primary hypertension  Blood pressure is a little elevated today, we will continue to follow the    7. Chronic anticoagulation  No abnormal bleeding    8. Obstructive sleep apnea  Patient tolerating CPAP well     Nikhil Smith MD

## 2022-04-27 NOTE — PATIENT INSTRUCTIONS
[facility]    Address: [address], [place] [address]    Phone: [phone]                Suggest an edit · Own this business?

## 2022-05-23 ENCOUNTER — TELEPHONE (OUTPATIENT)
Dept: CARDIOLOGY | Facility: CLINIC | Age: 57
End: 2022-05-23
Payer: MEDICARE

## 2022-05-23 NOTE — TELEPHONE ENCOUNTER
Notified patient that he reached DARIUS with his defibrillator. Scheduling will be notified to coordinate setting up replacement. Patient verbalized understanding, denied further questions/concerns.

## 2022-05-24 ENCOUNTER — TELEPHONE (OUTPATIENT)
Dept: CARDIOLOGY | Facility: CLINIC | Age: 57
End: 2022-05-24
Payer: MEDICARE

## 2022-05-24 ENCOUNTER — PREP FOR CASE (OUTPATIENT)
Dept: CARDIOLOGY | Facility: CLINIC | Age: 57
End: 2022-05-24
Payer: MEDICARE

## 2022-05-24 DIAGNOSIS — I47.29 NSVT (NONSUSTAINED VENTRICULAR TACHYCARDIA) (CMS/HCC): Primary | ICD-10-CM

## 2022-05-24 NOTE — TELEPHONE ENCOUNTER
----- Message from Wilfredo Montana MD sent at 5/24/2022 10:13 AM MDT -----  I think we can go ahead a schedule the generator change.    Thanks  ----- Message -----  From: Della Rdz RN  Sent: 5/23/2022   4:56 PM MDT  To: Wilfredo Montana MD, Gela Joyner    He is needing a CRT-D generator replacement, triggered DARIUS on 5/21/22. He was last seen by [place] on 3/4/22 and you last had a phone visit with him on 8/28/20. Last echo was 1/26/22.

## 2022-05-26 ENCOUNTER — TELEPHONE (OUTPATIENT)
Dept: CARDIOLOGY | Facility: CLINIC | Age: 57
End: 2022-05-26
Payer: MEDICARE

## 2022-05-26 DIAGNOSIS — I25.5 ISCHEMIC CARDIOMYOPATHY: Primary | ICD-10-CM

## 2022-05-26 NOTE — TELEPHONE ENCOUNTER
You are scheduled to have a defibrillator generator change to be done on 6/20/22 at 1400 with Dr. Montana at [facility].    Please arrive at the  of [facility] on 6/20/22 at 1115 for your appointment with the Preadmission department at 1130. Please use the south entrance off of [address]. Please bring your medications in their original containers, including over the counter medications.    Please wear a mask into the hospital. The current policy allows one visitor to come in with you.    Please call me if you develop COVID symptoms or have a positive COVID test between now and your procedure date.    Please have nothing to eat after midnight on the day of the procedure but you may have some clear liquids (water, fruit juices without pulp,carbonated beverages, clear tea, black coffee) until you check in at the .    You may take your morning medications with the exception of any vitamins, supplements or any diuretics (hold Lasix and Spironolactone). Hold Metformin the morning of your procedure.    Hold your dose of Entresto the evening before and morning of your procedure.    Please hold your Xarelto day before and day of your procedure.    Please do a chlorhexadine scrub over your entire torso the evening before and the morning of your procedure. This prep can be obtained in any pharmacy OTC (Hibiclens) or you may pick it up at the  at the [facility].    Please plan for a possible overnight stay. You will need a  when you are discharged after your procedure and someone to stay with you that night if you go home.    Pete verbalizes understanding of all instructions and denies further questions.

## 2022-05-26 NOTE — LETTER
[address] [id number]  Dept: [phone]  June 6, 2022    Pete NATY Maya  [address]  [place] [address]      Dear Mr. Trejo:    You are scheduled to have a defibrillator generator change to be done on 6/20/22 with Dr. Montana at [facility].    Please arrive at the  of [facility] on 6/20/22 at 11:15 AM for your appointment with the Preadmission department at 11:30 AM. Please use the south entrance off of [address]. Please bring your medications in their original containers, including over the counter medications.    Please wear a mask into the hospital. The current policy allows one visitor to come in with you.    Please call me if you develop COVID symptoms or have a positive COVID test between now and your procedure date.    Please have nothing to eat after midnight on the day of the procedure but you may have some clear liquids (water, fruit juices without pulp,carbonated beverages, clear tea, black coffee) until you check in at the .    You may take your morning medications with the exception of any vitamins, supplements or any diuretics (hold Lasix and Spironolactone). Hold Metformin the morning of your procedure.    Hold your dose of Entresto the evening before and morning of your procedure.    Please hold your Xarelto day before and day of your procedure.    Please do a chlorhexadine scrub over your entire torso the evening before and the morning of your procedure. This prep can be obtained in any pharmacy OTC (Hibiclens) or you may pick it up at the  at the [facility].    Please plan for a possible overnight stay. You will need a  when you are discharged after your procedure and someone to stay with you that night if you go home.    If you have any questions or concerns, please don't hesitate to call me at [phone].    Sincerely,    Della Rdz RN

## 2022-06-02 DIAGNOSIS — I50.42 CHRONIC COMBINED SYSTOLIC AND DIASTOLIC CHF, NYHA CLASS 2 AND ACC/AHA STAGE C (CMS/HCC): ICD-10-CM

## 2022-06-02 DIAGNOSIS — I25.5 ISCHEMIC CARDIOMYOPATHY: ICD-10-CM

## 2022-06-02 DIAGNOSIS — I47.20 VT (VENTRICULAR TACHYCARDIA) (CMS/HCC): ICD-10-CM

## 2022-06-02 DIAGNOSIS — I25.10 CORONARY ARTERY DISEASE INVOLVING NATIVE CORONARY ARTERY OF NATIVE HEART WITHOUT ANGINA PECTORIS: ICD-10-CM

## 2022-06-03 PROCEDURE — 93295 DEV INTERROG REMOTE 1/2/MLT: CPT | Performed by: INTERNAL MEDICINE

## 2022-06-03 PROCEDURE — 93296 REM INTERROG EVL PM/IDS: CPT | Performed by: INTERNAL MEDICINE

## 2022-06-03 RX ORDER — SACUBITRIL AND VALSARTAN 24; 26 MG/1; MG/1
1 TABLET, FILM COATED ORAL 2 TIMES DAILY
Qty: 180 TABLET | Refills: 3 | Status: ON HOLD | OUTPATIENT
Start: 2022-06-03 | End: 2023-10-19 | Stop reason: WASHOUT

## 2022-06-03 RX ORDER — SPIRONOLACTONE 25 MG/1
25 TABLET ORAL 2 TIMES DAILY
Qty: 180 TABLET | Refills: 3 | Status: ON HOLD | OUTPATIENT
Start: 2022-06-03 | End: 2023-10-19 | Stop reason: WASHOUT

## 2022-06-03 RX ORDER — ROSUVASTATIN CALCIUM 40 MG/1
40 TABLET, COATED ORAL NIGHTLY
Qty: 90 TABLET | Refills: 3 | Status: SHIPPED | OUTPATIENT
Start: 2022-06-03 | End: 2023-03-09 | Stop reason: SDUPTHER

## 2022-06-03 RX ORDER — CARVEDILOL 25 MG/1
25 TABLET ORAL 2 TIMES DAILY WITH MEALS
Qty: 180 TABLET | Refills: 3 | Status: ON HOLD | OUTPATIENT
Start: 2022-06-03 | End: 2023-10-19 | Stop reason: WASHOUT

## 2022-06-06 NOTE — PROGRESS NOTES
Subjective    Patient ID: Pete Trejo is a 56 y.o. male.    Chief Complaint:   Chief Complaint   Patient presents with   • Hypertension   • Cardiomyopathy   • Hyperlipidemia     Pete is a pleasant 56-year-old male with a past medical history significant for CAD, VT, ischemic cardiomyopathy, biventricular ICD, LV thrombus on chronic anticoagulation, dyslipidemia, hypertension.  He was taken to the EP lab 6/9/2020 by Dr. Montana for VT ablation.  Unfortunately he had recurrent numerous ventricular arrhythmias as a result of extensive anterior wall scar involving much of the lateral and septal walls.  Ablation was partially successful with ablation of a border zone area of the scar especially in the inferior apex and attempt to substrate modify arrhythmia burden.  He had been on amiodarone and mexiletine and was referred [facility] for further recommendations and possible LVAD or transplant.  He was told he did not qualify for either. He had recurrent VT requiring ATP in September 2021 and reloaded with amiodarone and discharged on an increased dose.  He was readmitted 10/21 with more  episodes and received ATP though no shocks.  IV amiodarone was rebolused with resolution.  He again was referred to [facility] for possible complex VT ablation versus LVAD or transplant.     On 11/29/2021 patient underwent EP testing by Dr. Lema in [place] for recurrent symptomatic VT requiring ATP in spite of high doses of amiodarone and mexiletine.  Extensive substrate modification was performed with none inducibility at the end of procedure.  Amiodarone was decreased to 200 mg daily and mexiletine discontinued.  Future plan was to switch to sotalol for chronic suppression.  Unfortunately presented to the emergency room 1/25/2022 with sensation of racing in the heart and chest fullness.  He was found to be in VT at 130 bpm.  He did convert spontaneously.  He had felt well for 5 or 6 weeks post ablation in [place]  however began experiencing recurrent symptoms with ATP for VT at 124 bpm on 1/11/2022 and then ATP x8 for VT at 139 bpm.  After discussion with EP [facility] recommended reinstitution of antiarrhythmic therapy with the addition of mexiletine.  He was discharged on amiodarone 200 mg daily and mexiletine 200 mg twice daily.  He was seen at [facility] on 1/31/2022 and mexiletine increased to 3 times daily and he was continued on amiodarone 200 mg daily.  Plan was to discuss with Dr. Lema and the EP team to see if any further recommendations or ablation at this time.  When last seen patient has not heard back from them yet.     Right knee seems to be doing well at this point.  He has not been aware of any recurrent arrhythmias.  He continues to be on amiodarone and mexiletine.  Still has not heard anything back from [place].  He denies chest discomfort.  No episodes of PND.  He does sleep with 2 pillows.  No lower extremity edema.  He has been walking most days and if he pushes himself at times he will have to go sit down and rest.  He denies shortness of breath with normal activities.  Occasionally he will notice a few palpitations though no sustained racing or fluttering.  He stands up slowly as he gets lightheaded occasionally.  He is trying to use his CPAP though at times it feels like it takes his breath away.  His mask seems to fit okay.  He follows with the neurology sleep clinic.  He was recently seen by primary care and felt to be doing well.  Blood pressure today 110/70.  Weight is 291 pounds.  EKG shows paced rhythm at 60 bpm.  QRS duration is 134 ms.  His ICD has reached DARIUS and he is scheduled to have it replaced on 6/20/2022.      His most recent echo 1/26/2022 revealed severely reduced systolic function with an EF of 20 to 25%, grade 1 diastolic dysfunction and elevated filling pressures.  Diffuse global hypokinesis with hyperdynamic anterolateral wall.  [place] is aneurysmal.  Left atrium mildly dilated  with no significant valvular abnormalities.  This is essentially unchanged from his prior echo.  LA VI 33 mL/m².    On his most recent device transmission 3/21/2021 there was 1 nonsustained VT episode which occurred on 3/11/2022 lasting 15 seconds 12 beats in duration bradycardia pacing is DDDR 60-1 10.  VT 1 zone 120s per minute with burst pacing, no shocks.  VT zone 160 bpm with ATP scan, burst and shock therapy.  VF zone 210 bpm with ATP and shock therapy..  He is 94% atrial paced and 100% BiV paced.  Thresholds and lead impedances look fine.  LV lead threshold 1 V at 0.4 ms, RV lead 1.1 V 0.4 ms and RA 0.5 V at 0.4 ms.    Limited echo 1/26/2022 revealing EF 20 to 25% with grade 1 diastolic dysfunction and elevated filling pressures.  Diffuse global hypokinesis with hyperdynamic anterolateral wall.  [place] aneurysmatic.  No LV thrombus.  Mildly dilated LA 33 mL/m².  No significant valvular disease.    Last updated by: 6/8/2022/EMP CNP  Primary Cardiologist: Jerry     CAD:   Anterior MI (PCI. 3.5 X 15-mm Promus drug eluting stent to totally occluded LAD) - 12/3/2010   Sm Anteroseptal MI (PCI. 2.25 X 24-mm Taxus drug eluting stent to diagonal. 3.0 X 8-mm Taxus stent to proximal LAD) - 7/25/2009   Progressive CAD ivolving LAD/Diagonal (Stent)     CHF/CM:   CHF   EF 31% - 2013   Severe ischemic cardionyopathy (BiVentricular ICD. [employer] Cognis HE Model #N119,Serial #[id number]. RV lead Guidant Model #0185,Serial #[id number]. LV lead  Model #4591,Serial #[id number]. RA lead Guidant Model #4469, Serial #[id number]) - 2011       ARRHYTHMIA:   Ventricular Tachycardia (AICD)   2021 recurrent NS-VT, slow VT  2020 Hx of VT ablation per Dr. Montana - 06/09/2020 VT-Partially successful ablation of border zone areas of the scar especially the inferior apex in an attempt to substrate modify his arrhythmia burden    Recurrent palpitations 1/26/2021 with admission [place] slow monomorphic  bpm with failed ATP x8 and no  shocks delivered. Spontaneously converted out of the VT while in the ER. Mexiletine resumed at 200 mg twice daily in addition to amiodarone. ATP programmed more aggressively.      Extensive repeat VT ablation [facility] 11/29/2021, mexiletine discontinued    Recurrent VT with failed ATP x8 1/25/2022 spontaneously converted.  Mexiletine 200 mg twice daily plus amiodarone 200 mg daily, mexiletine increased to 3 times daily. 1/31/2022    Device interrogation [facility] 1/31/22: 96% atrial paced, 99% BiV paced, recurrent episode nonsustained  bpm 1/29/2022 not sustained 28 seconds.      PVD:   CVA - 12/2010 - at time of MI, no residual    OTHER:   LV Mural Thrombus - resolved  Anteroapical akinetic aneurysm     RISK FACTORS:   Hypertension   Dyslipidemia (Hyperlipidemia)       CARDIOVASCULAR PROCEDURES:     PCI (PTCA: LAD bifurcation   Stent: D2 2.25x30 (RESOLUTE))   PCI (%   Stent: LAD (PROMUS) 3.5x15mm) - 12/3/2010   PCI (Stent: D1 (TAXUS) 2.5x24mm, LAD prox (TAXUS) 30x8mm) - 7/25/2009   PCI 07/28/2017 second diagonal branch LAD Resolute 2.25 x 30-mm DIMAS       Prior Imaging/Testing History     EKG - 10/8/2015   EKG - 08/14/2020 AV paced 60 bpm     Devices (Bi-Ventricular ICD (Zcoqij-Kkzozhva-Iguihi N119), Serial# [id number]) - 10/10/2011   Devices (ICD Interrogation: 1 mode-switch, 1 Nonsustained VT)     Echo - 06/08/2020 · Severe LV systolic dysfunction, Severe ischemic CM, EF 25%,   LA moderately dilated; RA mildly dilated, Mild TR, Grade I LV diastolic abnormality, Minimally elevated LV end-diastolic pressure.     Limited echo 1/26/2022 revealing EF 20 to 25% with grade 1 diastolic dysfunction and elevated filling pressures.  Diffuse global hypokinesis with hyperdynamic anterolateral wall.  [place] aneurysmatic.  No LV thrombus.  Mildly dilated LA 33 mL/m².  No significant valvular disease.        MPI (Mild rayray-infarct anterior wall ischemia; Lexiscan)   Lexiscan - 07/27/2017 -Large scar noted in the LAD territory  associated with akinesis   secondary to prior MI, small amount of rayray-infarct ischemia noted, LV systolic function is severely depressed: EF 14%   Lexiscan - 06/08/2020 No changes since 2017 study    Stress test with a VO2 5/5/2021 [facility]: Nondiagnostic for ischemia as patient did not reach 85% of maximum heart rate. Isolated PVCs, single PVC pair. On beta-blocker. Moderately decreased functional capacity. Maintained 93% or greater saturation.    Right heart cath 5/6/2021: You see him. RV 35/06. Mean PW 15, EDP 14, A wave 11. PAP 36/15 mean 22. PA saturation 66, pulmonary vein 93, PA 66, PA 66, AO 93%. Becki cardiac output 5.88.    ZIO monitor 3/21: 14-day monitor, rare ectopy, 4 beats nonsustained VT, triggered events corresponded to sinus rhythm. Minimum heart rate 60 maximum 140 average 63 bpm.      Specialty Problems        Cardiology Problems    Hyperlipidemia        Hypertension        Ischemic cardiomyopathy        Automatic implantable cardioverter-defibrillator in situ        On amiodarone therapy        Presence of stent in coronary artery        S/P ablation of ventricular arrhythmia        Chronic combined systolic and diastolic CHF, NYHA class 2 and ACC/AHA stage C (CMS/HCC) ([facility])        Ventricular tachycardia (paroxysmal) (CMS/[facility]) ([facility])        Chronic anticoagulation        History of ventricular tachycardia        Paroxysmal atrial fibrillation (CMS/[facility]) ([facility])        Sustained ventricular tachycardia (CMS/HCC) ([facility])        V tach (CMS/HCC) ([facility])        Long term current use of antiarrhythmic drug            Past Medical History:   Diagnosis Date   • CAD (coronary artery disease)    • CHF (congestive heart failure) (CMS/HCC) ([facility])    • CVA (cerebral vascular accident) (CMS/HCC) ([facility]) 2010   • Dyslipidemia    • GERD (gastroesophageal reflux disease)    • GERD (gastroesophageal reflux disease)    • Heart attack (CMS/HCC) ([facility])     x3   • Hypertension    • Ischemic cardiomyopathy    • Paroxysmal atrial  fibrillation (CMS/HCC) (HCC)     On Xarelto   • V-tach (CMS/HCC) (HCC)     AICD in place     Past Surgical History:   Procedure Laterality Date   • ABLATION VT N/A 06/09/2020    Procedure: Ablation VT;  Surgeon: Wilfredo Montana MD;  Location: [facility] EP Lab;  Service: Electrophysiology;  Laterality: N/A;  Check with Dr. Montana in the a.m. regarding scheduling   • APPENDECTOMY     • CORONARY ARTERY STENTING  2009    2.5 X 24-mm Taxus DIMAS to first diagonal. 3.0 X 8-mm Taxus stent to proximal LAD just proximal to diagonal.   • CORONARY ARTERY STENTING  2010    3.5 X 15-mm Promus DIMAS to totally occluded LAD   • CORONARY ARTERY STENTING  2011    2.25 X 30-mm Resolute DIMAS to 2nd diagonal.  Balloon angioplasty through strut to improve blood flow to distal LAD   • CORONARY ARTERY STENTING  07/28/2017    second diagonal branch LAD Resolute 2.25 x 30-mm DIMAS   • ICD SC NEW  2011    [employer] Cognis Model #N119, Serial #[id number]. [employer] Reliance Model #0185, Serial #[id number]. LV lead Acuity Model #45+91, Serial #[id number]. Atrial lead Model #4469, Serial #[id number]     Allergies as of 06/07/2022 - Reviewed 06/07/2022   Allergen Reaction Noted   • Morphine  07/30/2017   • Tramadol  07/30/2017     Current Outpatient Medications   Medication Sig Dispense Refill   • carvediloL (Coreg) 25 mg tablet Take 1 tablet (25 mg total) by mouth 2 (two) times a day with meals 180 tablet 3   • rosuvastatin (Crestor) 40 mg tablet Take 1 tablet (40 mg total) by mouth nightly 90 tablet 3   • sacubitriL-valsartan (ENTRESTO) 24-26 mg tablet Take 1 tablet by mouth 2 (two) times a day 180 tablet 3   • spironolactone (ALDACTONE) 25 mg tablet Take 1 tablet (25 mg total) by mouth 2 (two) times a day 180 tablet 3   • metFORMIN XR (GLUCOPHAGE-XR) 500 mg 24 hr tablet Take 1 tablet (500 mg total) by mouth daily with breakfast 90 tablet 3   • amiodarone (PACERONE) 200 mg tablet Take 1 tablet (200 mg total) by mouth daily 30 tablet 6   • mexiletine (MEXITIL)  200 mg capsule Take 1 capsule (200 mg total) by mouth every 12 (twelve) hours (Patient taking differently: Take 200 mg by mouth 3 (three) times a day) 60 capsule 6   • potassium chloride (K-TAB) 20 mEq CR tablet Take 1 tablet (20 mEq total) by mouth daily 90 tablet 3   • magnesium chloride (SLOW-MAG) 64 mg tablet,delayed release (DR/EC) Take 1 tablet (64 mg total) by mouth 2 (two) times a day with meals 180 tablet 3   • rivaroxaban (Xarelto) 20 mg tablet Take 1 tablet (20 mg total) by mouth daily 30 tablet 6   • furosemide (LASIX) 40 mg tablet Take 1 tablet (40 mg total) by mouth daily If weight goes up 3 pounds overnight take an extra dose of Lasix x1 day 30 tablet 6   • aspirin 81 mg EC tablet Take 1 tablet (81 mg total) by mouth daily 90 tablet 3   • albuterol HFA (PROVENTIL HFA;VENTOLIN HFA) 90 mcg/actuation inhaler Inhale 2 puffs every 6 (six) hours as needed for wheezing or shortness of breath 1 Inhaler 1   • nitroglycerin (NITROSTAT) 0.4 mg SL tablet Place 0.4 mg under the tongue every 5 (five) minutes as needed for chest pain.     • pantoprazole (PROTONIX) 40 mg EC tablet Take 40 mg by mouth daily.       No current facility-administered medications for this visit.     Family History   Problem Relation Age of Onset   • Heart attack Mother 62   • Other Mother         Abdominal Aortic Aneurysm   • Lung cancer Mother    • Colon cancer Father         Cause of death   • Diabetes Brother         Non-Insulin Dependent   • Heart attack Brother 51        w/ Stents   • Heart disease Brother    • Other Son         Well     Social History     Tobacco Use   • Smoking status: Former Smoker     Packs/day: 0.75     Years: 30.00     Pack years: 22.50     Types: Cigarettes     Quit date: 2020     Years since quittin.0   • Smokeless tobacco: Never Used   Vaping Use   • Vaping Use: Never used   Substance Use Topics   • Alcohol use: Not Currently     Comment: Quit drinking in    • Drug use: Not Currently       Review  of Systems  Review of Systems   Constitutional: Negative.   HENT: Negative.    Eyes: Negative.    Cardiovascular: Negative.    Respiratory: Positive for sleep apnea.         Trying to utilize CPAP. At times feels like it takes his breath away.   Endocrine: Negative.    Hematologic/Lymphatic: Negative.    Skin: Negative.    Musculoskeletal: Negative for back pain.   Gastrointestinal: Negative.    Genitourinary: Negative.    Neurological: Positive for light-headedness.   Psychiatric/Behavioral: Negative.          Objective         Physical Exam  Vitals reviewed.   Constitutional:       Appearance: He is well-developed. He is obese.   HENT:      Head: Normocephalic and atraumatic.   Eyes:      General: No scleral icterus.     Comments: Pupils equal   Neck:      Thyroid: No thyromegaly.      Vascular: No carotid bruit or JVD.   Cardiovascular:      Rate and Rhythm: Normal rate and regular rhythm.      Pulses: Normal pulses and intact distal pulses.           Carotid pulses are 2+ on the right side and 2+ on the left side.       Dorsalis pedis pulses are 2+ on the right side and 2+ on the left side.        Posterior tibial pulses are 2+ on the right side and 2+ on the left side.      Heart sounds: Normal heart sounds. No murmur heard.    No friction rub. No gallop.      Comments: ICD left upper chest without erosion or infection.  Pulmonary:      Effort: Pulmonary effort is normal.      Breath sounds: Normal breath sounds. No wheezing, rhonchi or rales.   Abdominal:      General: Abdomen is flat. Bowel sounds are normal.      Palpations: Abdomen is soft.      Tenderness: There is no abdominal tenderness.   Musculoskeletal:         General: Normal range of motion.      Cervical back: Normal range of motion and neck supple.      Right lower leg: No edema.      Left lower leg: No edema.   Lymphadenopathy:      Cervical: No cervical adenopathy.   Skin:     General: Skin is warm and dry.      Findings: No rash.  "  Neurological:      General: No focal deficit present.      Mental Status: He is alert and oriented to person, place, and time.   Psychiatric:         Mood and Affect: Mood normal.         Behavior: Behavior normal.         Thought Content: Thought content normal.         Judgment: Judgment normal.         Data Review:   Vitals:    06/07/22 0850   BP: 110/70   Pulse: 59   SpO2: 94%   Height: 1.803 m (5' 11\")   Weight: 132 kg (291 lb)   PainSc: 0-No pain     Appointment on 04/27/2022   Component Date Value   • WBC 04/27/2022 4.8    • RBC 04/27/2022 4.89    • Hemoglobin 04/27/2022 15.5    • Hematocrit 04/27/2022 44.7    • MCV 04/27/2022 91.3    • MCH 04/27/2022 31.6    • MCHC 04/27/2022 34.7    • RDW 04/27/2022 14.8    • Platelets 04/27/2022 217    • MPV 04/27/2022 7.5    • Neutrophils% 04/27/2022 46    • Lymphocytes% 04/27/2022 39    • Monocytes% 04/27/2022 10    • Eosinophils% 04/27/2022 4 (A)   • Basophils% 04/27/2022 1    • ANC (auto diff) 04/27/2022 2.20    • Lymphocytes Absolute 04/27/2022 1.90    • Monocytes Absolute 04/27/2022 0.50    • Eosinophils Absolute 04/27/2022 0.20    • Basophils Absolute 04/27/2022 0.10    • Sodium 04/27/2022 138    • Potassium 04/27/2022 4.1    • Chloride 04/27/2022 105    • CO2 04/27/2022 25    • Anion Gap 04/27/2022 8    • BUN 04/27/2022 14    • Creatinine 04/27/2022 1.01    • Glucose 04/27/2022 139 (A)   • Calcium 04/27/2022 9.2    • AST 04/27/2022 23    • ALT (SGPT) 04/27/2022 50    • Alkaline Phosphatase 04/27/2022 56    • Total Protein 04/27/2022 6.9    • Albumin 04/27/2022 4.3    • Total Bilirubin 04/27/2022 0.94    • Corrected Calcium 04/27/2022 9.0    • eGFR 04/27/2022 88    • Hemoglobin A1C 04/27/2022 7.0 (A)   • Estimated Average Glucose 04/27/2022 154.2    • TSH 04/27/2022 3.236      Sodium   Date Value Ref Range Status   04/27/2022 138 135 - 145 mmol/L Final   01/25/2022 138 135 - 145 mmol/L Final   01/24/2022 139 135 - 145 mmol/L Final     Potassium   Date Value Ref " Range Status   04/27/2022 4.1 3.5 - 5.1 MMOL/L Final   01/25/2022 4.1 3.5 - 5.1 MMOL/L Final   01/24/2022 3.9 3.5 - 5.1 MMOL/L Final     CO2   Date Value Ref Range Status   04/27/2022 25 21 - 32 mmol/L Final   01/25/2022 25 21 - 32 mmol/L Final   01/24/2022 26 21 - 32 mmol/L Final     BUN   Date Value Ref Range Status   04/27/2022 14 7 - 25 mg/dL Final   01/25/2022 10 7 - 25 mg/dL Final   01/24/2022 12 7 - 25 mg/dL Final     Creatinine   Date Value Ref Range Status   04/27/2022 1.01 0.70 - 1.30 mg/dL Final   01/25/2022 0.92 0.70 - 1.30 mg/dL Final   01/24/2022 0.86 0.70 - 1.30 mg/dL Final     Glucose   Date Value Ref Range Status   04/27/2022 139 (H) 70 - 105 mg/dL Final   01/25/2022 137 (H) 70 - 105 mg/dL Final   01/24/2022 111 (H) 70 - 105 mg/dL Final     Calcium   Date Value Ref Range Status   04/27/2022 9.2 8.6 - 10.3 mg/dL Final   01/25/2022 9.4 8.6 - 10.3 mg/dL Final   01/24/2022 9.1 8.6 - 10.3 mg/dL Final     hsTnI 0 Hour   Date Value Ref Range Status   01/25/2022 27.2 (H) <=19.8 pg/mL Final   10/06/2021 26.2 (H) <=19.8 pg/mL Final   09/19/2021 92.7 (H) <=19.8 pg/mL Final     BNP   Date Value Ref Range Status   01/25/2022 79 0 - 100 pg/mL Final   07/12/2021 50 0 - 100 pg/mL Final   07/10/2021 51 0 - 100 pg/mL Final     WBC   Date Value Ref Range Status   04/27/2022 4.8 3.7 - 9.6 10*3/uL Final   01/25/2022 6.5 3.7 - 9.6 10*3/uL Final   10/06/2021 6.0 3.7 - 9.6 10*3/uL Final     Hemoglobin   Date Value Ref Range Status   04/27/2022 15.5 13.2 - 17.2 g/dL Final   01/25/2022 15.2 13.2 - 17.2 g/dL Final   10/06/2021 15.4 13.2 - 17.2 g/dL Final     Hematocrit   Date Value Ref Range Status   04/27/2022 44.7 38.0 - 50.0 % Final   01/25/2022 44.4 38.0 - 50.0 % Final   10/06/2021 46.1 38.0 - 50.0 % Final     MCV   Date Value Ref Range Status   04/27/2022 91.3 82.0 - 97.0 fL Final   01/25/2022 93.1 82.0 - 97.0 fL Final   10/06/2021 95.0 82.0 - 97.0 fL Final     Platelets   Date Value Ref Range Status   04/27/2022 217 130  - 350 10*3/uL Final   01/25/2022 216 130 - 350 10*3/uL Final   10/06/2021 233 130 - 350 10*3/uL Final     Cholesterol   Date Value Ref Range Status   07/11/2021 119 0 - 199 mg/dL Final   06/08/2020 127 0 - 199 mg/dL Final   09/20/2018 96 mg/dL Final   07/29/2017 113 <200 mg/dL      Triglycerides   Date Value Ref Range Status   07/11/2021 120 <=149 mg/dL Final   06/08/2020 112 <=149 mg/dL Final   09/20/2018 145 mg/dL Final   07/29/2017 108 <150 mg/dL      HDL   Date Value Ref Range Status   07/11/2021 35 (L) >=40 mg/dL Final   06/08/2020 29 (L) >=40 mg/dL Final   09/20/2018 27 mg/dL Final   07/29/2017 26 (L) 40 - 60 mg/dL      LDL Calculated   Date Value Ref Range Status   07/11/2021 60 20 - 99 mg/dL Final   06/08/2020 76 20 - 99 mg/dL Final   09/20/2018 53 mg/dL Final   07/29/2017 65 <100 mg/dL            Assessment/Plan   Diagnoses and all orders for this visit:    Ischemic cardiomyopathy    Primary hypertension    Mixed hyperlipidemia    S/P ablation of ventricular arrhythmia    Automatic implantable cardioverter-defibrillator in situ    Presence of stent in coronary artery    Chronic combined systolic and diastolic CHF, NYHA class 2 and ACC/AHA stage C (CMS/HCC) (HCC)    Ventricular tachycardia (paroxysmal) (CMS/HCC) (HCC)    Chronic anticoagulation    Long term current use of antiarrhythmic drug    Obstructive sleep apnea    LAD stenosis    V tach (CMS/HCC) (HCC)    Paroxysmal atrial fibrillation (CMS/HCC) (HCC)    Anticoagulated    Ventricular tachycardia (CMS/HCC) (HCC)    Plan/recommendation:    1.  Ventricular tachycardia: Pete has undergone 2 different VT ablations as noted above.  His most recent one was at [facility] by Dr. Stanley on 11/29/2021.  He had subsequent recurrence with slower VT in the 130 bpm range.  He received ATP x8 however did not reach the VT zone programmed for 160 bpm therefore did not receive any shocks.  ATP was unsuccessful however he converted spontaneously in the ER.  There were no  significant electrolyte abnormalities or identifiable triggers.  After conferring with [facility] Dr. Lincoln recommended resuming mexiletine 200 mg twice daily and adjusting the device settings.  ATP burst cycle length changed to 81% and ATP pulses for burst to be more aggressive 12.  Continue amiodarone 200 mg daily.  Follow-up with them on 1/31/2022 which Seth did.  He was instructed to increase the mexiletine to 200 mg 3 times daily and continue the amiodarone 200 mg daily.  Plan was to discuss for further recommendations regarding potential third ablation procedure.  He has not heard anything back from them yet.  His his device check January showed a VT episode lasting 28 seconds.  This was at a rate of 136 bpm for 145 ms cycle length.  In March revealed 1 nonsustained VT episode lasting 12 beats.  We will continue to monitor.  Continue amiodarone 200 mg daily and mexiletine 200 mg 3 times daily with meals.  2.  Biventricular ICD: 100% BiV paced.  ICD reached DARIUS late May.  He is scheduled for device replacement 6/20/2022 with Dr. Montana.    3.  Atrial fibrillation: No mode switching episodes on amiodarone.  Anticoagulation: Currently anticoagulated with Xarelto 20 mg  daily.  [id number] score is at least 5.    LA VI 33 mL/m².  4.  CAD: Follows with Ryann Aleman CNP and Dr. Edwards. History of LV mural thrombus, anteroapical akinetic aneurysm.  5. CVA at the time of his MI 2010 with no significant residual deficit.    6.  CHF/CM: Euvolemic on exam today.GDMT with spironolactone carvedilol and Entresto.  Exercising by walking daily about a mile.  Tolerates that well without any significant symptoms.  Most recent echo 1/22 EF continued to be in the 20 to 25% range.  100% biventricular paced.  6.  Obesity: Diet exercise weight loss recommended.  Dietary information provided for Mediterranean type diet.

## 2022-06-07 ENCOUNTER — OFFICE VISIT (OUTPATIENT)
Dept: CARDIOLOGY | Facility: CLINIC | Age: 57
End: 2022-06-07
Payer: COMMERCIAL

## 2022-06-07 VITALS
BODY MASS INDEX: 40.74 KG/M2 | SYSTOLIC BLOOD PRESSURE: 110 MMHG | OXYGEN SATURATION: 94 % | HEART RATE: 59 BPM | DIASTOLIC BLOOD PRESSURE: 70 MMHG | WEIGHT: 291 LBS | HEIGHT: 71 IN

## 2022-06-07 DIAGNOSIS — E66.813 CLASS 3 SEVERE OBESITY DUE TO EXCESS CALORIES WITH SERIOUS COMORBIDITY IN ADULT, UNSPECIFIED BMI (CMS/HCC): ICD-10-CM

## 2022-06-07 DIAGNOSIS — Z86.79 S/P ABLATION OF VENTRICULAR ARRHYTHMIA: ICD-10-CM

## 2022-06-07 DIAGNOSIS — G47.33 OBSTRUCTIVE SLEEP APNEA: ICD-10-CM

## 2022-06-07 DIAGNOSIS — I25.10 LAD STENOSIS: ICD-10-CM

## 2022-06-07 DIAGNOSIS — Z79.899 LONG TERM CURRENT USE OF ANTIARRHYTHMIC DRUG: ICD-10-CM

## 2022-06-07 DIAGNOSIS — Z98.890 S/P ABLATION OF VENTRICULAR ARRHYTHMIA: ICD-10-CM

## 2022-06-07 DIAGNOSIS — Z95.810 AUTOMATIC IMPLANTABLE CARDIOVERTER-DEFIBRILLATOR IN SITU: ICD-10-CM

## 2022-06-07 DIAGNOSIS — I48.0 PAROXYSMAL ATRIAL FIBRILLATION (CMS/HCC): ICD-10-CM

## 2022-06-07 DIAGNOSIS — I25.5 ISCHEMIC CARDIOMYOPATHY: Primary | ICD-10-CM

## 2022-06-07 DIAGNOSIS — E78.2 MIXED HYPERLIPIDEMIA: ICD-10-CM

## 2022-06-07 DIAGNOSIS — I47.20 VENTRICULAR TACHYCARDIA (CMS/HCC): ICD-10-CM

## 2022-06-07 DIAGNOSIS — I47.20 V TACH (CMS/HCC): ICD-10-CM

## 2022-06-07 DIAGNOSIS — Z95.5 PRESENCE OF STENT IN CORONARY ARTERY: ICD-10-CM

## 2022-06-07 DIAGNOSIS — E66.01 CLASS 3 SEVERE OBESITY DUE TO EXCESS CALORIES WITH SERIOUS COMORBIDITY IN ADULT, UNSPECIFIED BMI (CMS/HCC): ICD-10-CM

## 2022-06-07 DIAGNOSIS — I47.29 VENTRICULAR TACHYCARDIA (PAROXYSMAL) (CMS/HCC): ICD-10-CM

## 2022-06-07 DIAGNOSIS — I50.42 CHRONIC COMBINED SYSTOLIC AND DIASTOLIC CHF, NYHA CLASS 2 AND ACC/AHA STAGE C (CMS/HCC): ICD-10-CM

## 2022-06-07 DIAGNOSIS — Z79.01 ANTICOAGULATED: ICD-10-CM

## 2022-06-07 DIAGNOSIS — E08.00 DIABETES MELLITUS DUE TO UNDERLYING CONDITION WITH HYPEROSMOLARITY WITHOUT COMA, WITHOUT LONG-TERM CURRENT USE OF INSULIN (CMS/HCC): ICD-10-CM

## 2022-06-07 DIAGNOSIS — Z79.01 CHRONIC ANTICOAGULATION: ICD-10-CM

## 2022-06-07 DIAGNOSIS — I10 PRIMARY HYPERTENSION: ICD-10-CM

## 2022-06-07 PROCEDURE — 99214 OFFICE O/P EST MOD 30 MIN: CPT | Mod: 25 | Performed by: NURSE PRACTITIONER

## 2022-06-07 PROCEDURE — 93005 ELECTROCARDIOGRAM TRACING: CPT | Performed by: NURSE PRACTITIONER

## 2022-06-07 ASSESSMENT — PAIN SCALES - GENERAL: PAINLEVEL: 0-NO PAIN

## 2022-06-07 ASSESSMENT — ENCOUNTER SYMPTOMS: CARDIOVASCULAR NEGATIVE: 1

## 2022-06-07 NOTE — PATIENT INSTRUCTIONS
Diabetic education    They will call to get you scheduled    [place] with Neurology group regarding sleep apnea and cpap    ICD replacement 6/20/22    ABIs in 3 months or so    Followup in 3 months    Cardiac prevention:  1.  Lipid profile check annually.  2.  Diabetes screening annually.  3.  Maintain healthy blood pressure with a goal 120/80 or below consistently.  4.  Follow-up no added salt diet.  5.  Maintain healthy eating habits:  Primarily plant-based diet.  Lean meats, chicken, seafood several times a week or fish oil capsules, avoid processed meats or excessive red meats, cut off visible fat  Whole grains/beans peas lentils quinoa, farro,  Avoid refined carbohydrates: Cookies, pies, cakes, donuts, ice cream, candy.  5+ servings of fruits/vegetables daily.  6.  If you smoke smoking cessation recommended.  If you need help call the [place] quit hotline [phone] CST Monday through Friday 7 AM to 11 PM and 8 AM to 5 PM Saturday.  7.  Maintain healthy weight with goal for BMI in the normal range less than 25.  8.  Exercise regularly: 30 to 45 minutes most days of the week.  Weightbearing exercise helps with bone health.  Weight training several times a week to maintain muscular strength.  9.  Maintain adequate hydration with 6 to 8 glasses of water per day.  Goal should be approximately 2 quarts daily of all fluids combined.  Coffee can act as a diuretic.  Recommend not more than 1 to 2 cups/day. Avoid any sugar sweetened beverages.    Exercise/weight management:  1.  Strive to maintain close to ideal body weight  2.  Exercise at least 150 minutes/week with a combination of cardio and strength training  3.  Avoid  alcohol if you have atrial fibrillation.  Recommend less than 1 alcoholic beverage per day for females and no more than 2 daily for males.    For patient with arterial disease (coronary, peripheral arterial disease ) a plant based diet may reverse plaque buildup: In addition to cholesterol  "lowering medications consider adding the following:    Look into Dr. Cooper's diet and the [place]nish diet which are plant based. The Pritikin diet allows for some meat and may be a better place to start initially.  Look into getting aged garlic extract (kyolic) which has been shown to reverse plaque on CAT scans  Look into plant sterols (cholest-off) which may help lower cholesterol levels  Try to increase fiber intake particularly before meals that do not have a lot of fruits and vegetables in them or before meals that have meat or dairy in them to help bind up some of the cholesterol and saturated fat in your bowels  Consider watching The Game Changer on [employer] and other documentaries like [place] over knives, Food Inc, Fast Food Nation etc...  Try to maintain less than 2 gram salt restricted diet and avoid adding salt to food. Read food labels for hidden salt.  Look into books like Hooked, Fast Food Nation, Un-Do-It, Fiber Fueled  IF SOMETHING COMES IN A PACKAGE THINK TWICE ABOUT EATING IT, ESPECIALLY IF IT CONTAINS WORDS YOU CANNOT PRONOUNCE OR \"NATURAL AND ARTIFICAL FLAVORS\"       Dietary and lifestyle recommendations  The Pritikin diet allows for some meat and may be a better place to start initially. Remember that smaller changes are often best because the stack up and lead to more sustainable large changes  Look into Dr. Cooper's diet and the [place] diet which are plant based.   Try to increase fiber intake particularly before meals that do not have a lot of fruits and vegetables in them or before meals that have meat or dairy in them to help bind up some of the cholesterol and saturated fat in your bowels  Consider watching the game changer on [employer] and other documentaries like [place] over knives, Food Inc, Fast Food Nation, What the Health etc...  Try to maintain less than 2 gram salt restricted diet and avoid adding salt to food. Read food labels for hidden salt.  Look into books like Hooked, Fast Food " "Nation, Un-Do-It, Fiber Fueled, How Not to Diet, How Not to Die  Look into a nonprofit website called nutritionfacts.org for resources   Cookbooks:   The No Meat Athlete Cookbook: Whole Food, Plant-Based Recipes to Fuel Your Workouts?and the Rest of Your Life  IF SOMETHING COMES IN A PACKAGE THINK TWICE ABOUT EATING IT, ESPECIALLY IF IT CONTAINS WORDS YOU CANNOT PRONOUNCE OR \"NATURAL AND ARTIFICAL FLAVORS\"  For recipes go to:  ForksoverPROSimity.Returbo  Straightupfood.com  Happyhealthylonglife.com  Look to get your medications on [employer]. for cheaper perscriptions  Consider shopping at [employer] or the Market in town and or the [employer] market        "

## 2022-06-08 ASSESSMENT — ENCOUNTER SYMPTOMS
LIGHT-HEADEDNESS: 1
EYES NEGATIVE: 1
BACK PAIN: 0
PSYCHIATRIC NEGATIVE: 1
ENDOCRINE NEGATIVE: 1
CONSTITUTIONAL NEGATIVE: 1
GASTROINTESTINAL NEGATIVE: 1
HEMATOLOGIC/LYMPHATIC NEGATIVE: 1

## 2022-06-19 ENCOUNTER — ANESTHESIA EVENT (OUTPATIENT)
Dept: CARDIOLOGY | Facility: HOSPITAL | Age: 57
End: 2022-06-19
Payer: COMMERCIAL

## 2022-06-20 ENCOUNTER — ANESTHESIA (OUTPATIENT)
Dept: CARDIOLOGY | Facility: HOSPITAL | Age: 57
End: 2022-06-20
Payer: COMMERCIAL

## 2022-06-20 ENCOUNTER — ANCILLARY PROCEDURE (OUTPATIENT)
Dept: CARDIOLOGY | Facility: CLINIC | Age: 57
End: 2022-06-20
Payer: COMMERCIAL

## 2022-06-20 ENCOUNTER — APPOINTMENT (OUTPATIENT)
Dept: LAB | Facility: HOSPITAL | Age: 57
End: 2022-06-20
Payer: COMMERCIAL

## 2022-06-20 ENCOUNTER — HOSPITAL ENCOUNTER (OUTPATIENT)
Facility: HOSPITAL | Age: 57
Setting detail: OUTPATIENT SURGERY
Discharge: HOME | End: 2022-06-20
Attending: INTERNAL MEDICINE | Admitting: INTERNAL MEDICINE
Payer: COMMERCIAL

## 2022-06-20 VITALS
DIASTOLIC BLOOD PRESSURE: 68 MMHG | RESPIRATION RATE: 16 BRPM | OXYGEN SATURATION: 93 % | SYSTOLIC BLOOD PRESSURE: 125 MMHG | TEMPERATURE: 98.1 F | HEART RATE: 60 BPM

## 2022-06-20 DIAGNOSIS — I47.29 NSVT (NONSUSTAINED VENTRICULAR TACHYCARDIA) (CMS/HCC): ICD-10-CM

## 2022-06-20 DIAGNOSIS — Z45.02 ENCOUNTER FOR INTERROGATION OF CARDIAC DEFIBRILLATOR: ICD-10-CM

## 2022-06-20 PROCEDURE — 6360000200 HC RX 636 W HCPCS (ALT 250 FOR IP): Performed by: NURSE ANESTHETIST, CERTIFIED REGISTERED

## 2022-06-20 PROCEDURE — (BLANK) HC MAC ANESTHESIA FACILITY CHARGE 1ST 15 MIN: Performed by: INTERNAL MEDICINE

## 2022-06-20 PROCEDURE — 2500000200 HC RX 250 WO HCPCS: Performed by: NURSE ANESTHETIST, CERTIFIED REGISTERED

## 2022-06-20 PROCEDURE — 4810002000 HC EP LAB LEVEL 4 EACH ADDITIONAL MIN: Performed by: INTERNAL MEDICINE

## 2022-06-20 PROCEDURE — 85025 COMPLETE CBC W/AUTO DIFF WBC: CPT | Performed by: INTERNAL MEDICINE

## 2022-06-20 PROCEDURE — (BLANK) HC MAC ANESTHESIA FACILITY CHARGE EACH ADDITIONAL MIN: Performed by: INTERNAL MEDICINE

## 2022-06-20 PROCEDURE — 00534 ANES INSERTION/RPLCMT CVDFB: CPT | Mod: G8,QS,P3 | Performed by: NURSE ANESTHETIST, CERTIFIED REGISTERED

## 2022-06-20 PROCEDURE — (BLANK) HC RECOVERY PHASE-2 EACH ADDITIONAL 1/2 HOUR ACUITY LEVEL 1: Performed by: INTERNAL MEDICINE

## 2022-06-20 PROCEDURE — 80048 BASIC METABOLIC PNL TOTAL CA: CPT | Performed by: INTERNAL MEDICINE

## 2022-06-20 PROCEDURE — (BLANK) HC RECOVERY PHASE-2 1ST 1/2 HOUR ACUITY LEVEL 1: Performed by: INTERNAL MEDICINE

## 2022-06-20 PROCEDURE — 33263 RMVL & RPLCMT DFB GEN 2 LEAD: CPT | Performed by: INTERNAL MEDICINE

## 2022-06-20 PROCEDURE — 4810001900 HC EP LAB LEVEL 4 FIRST 15 MIN: Performed by: INTERNAL MEDICINE

## 2022-06-20 PROCEDURE — 2720000000 HC SUPP 272 WO HCPCS: Performed by: INTERNAL MEDICINE

## 2022-06-20 PROCEDURE — 2500000200 HC RX 250 WO HCPCS: Performed by: INTERNAL MEDICINE

## 2022-06-20 PROCEDURE — 6360000200 HC RX 636 W HCPCS (ALT 250 FOR IP): Mod: JW | Performed by: NURSE ANESTHETIST, CERTIFIED REGISTERED

## 2022-06-20 PROCEDURE — C1882 AICD, OTHER THAN SING/DUAL: HCPCS | Performed by: INTERNAL MEDICINE

## 2022-06-20 DEVICE — IMPLANTABLE DEVICE: Type: IMPLANTABLE DEVICE | Status: FUNCTIONAL

## 2022-06-20 RX ORDER — ACETAMINOPHEN 325 MG/1
325-650 TABLET ORAL EVERY 4 HOURS PRN
Status: DISCONTINUED | OUTPATIENT
Start: 2022-06-20 | End: 2022-06-20 | Stop reason: HOSPADM

## 2022-06-20 RX ORDER — LIDOCAINE HYDROCHLORIDE 10 MG/ML
INJECTION, SOLUTION EPIDURAL; INFILTRATION; INTRACAUDAL; PERINEURAL AS NEEDED
Status: DISCONTINUED | OUTPATIENT
Start: 2022-06-20 | End: 2022-06-20 | Stop reason: HOSPADM

## 2022-06-20 RX ORDER — CEFAZOLIN SODIUM 1 G/3ML
INJECTION, POWDER, FOR SOLUTION INTRAMUSCULAR; INTRAVENOUS AS NEEDED
Status: DISCONTINUED | OUTPATIENT
Start: 2022-06-20 | End: 2022-06-20 | Stop reason: SURG

## 2022-06-20 RX ORDER — PROPOFOL 10 MG/ML
INJECTION, EMULSION INTRAVENOUS CONTINUOUS PRN
Status: DISCONTINUED | OUTPATIENT
Start: 2022-06-20 | End: 2022-06-20 | Stop reason: SURG

## 2022-06-20 RX ORDER — PROPOFOL 10 MG/ML
INJECTION, EMULSION INTRAVENOUS AS NEEDED
Status: DISCONTINUED | OUTPATIENT
Start: 2022-06-20 | End: 2022-06-20 | Stop reason: SURG

## 2022-06-20 RX ADMIN — PROPOFOL 30 MG: 10 INJECTION, EMULSION INTRAVENOUS at 12:08

## 2022-06-20 RX ADMIN — PROPOFOL 50 MCG/KG/MIN: 10 INJECTION, EMULSION INTRAVENOUS at 12:07

## 2022-06-20 RX ADMIN — DEXMEDETOMIDINE HYDROCHLORIDE 12 MCG: 4 INJECTION, SOLUTION INTRAVENOUS at 12:08

## 2022-06-20 RX ADMIN — PROPOFOL 50 MG: 10 INJECTION, EMULSION INTRAVENOUS at 12:07

## 2022-06-20 RX ADMIN — PROPOFOL 15 MG: 10 INJECTION, EMULSION INTRAVENOUS at 12:18

## 2022-06-20 RX ADMIN — PROPOFOL 30 MG: 10 INJECTION, EMULSION INTRAVENOUS at 12:21

## 2022-06-20 RX ADMIN — CEFAZOLIN SODIUM 3 G: 1 INJECTION, POWDER, FOR SOLUTION INTRAMUSCULAR; INTRAVENOUS at 12:13

## 2022-06-20 ASSESSMENT — ENCOUNTER SYMPTOMS: EXERCISE TOLERANCE: POOR (<4 METS)

## 2022-06-20 NOTE — H&P
Patient has a H&P from less than 30 days ago by a member of our HVI Provider team.  Please see the visit from 6/7/2022 by Merced Newton CNP. This history was reviewed and was last updated on 06/20/22 by YONG Birch. There has been no significant change in patient's history since the last evaluation. The patient has not had any change in his symptoms since that evaluation.    LAST ECHO EF : 25%  Last NPO at: 8 PM last evening  Medications taken this morning: As documented by the nurses.    Mallampati class: III (soft and hard palate and base of uvula visible)   Full ROM of neck Yes  ASA class: ASA 3 - Patient with moderate systemic disease with functional limitations    Results from last 4 days   Lab Units 06/20/22  1115   WBC AUTO 10*3/uL 5.1   RBC AUTO 10*6/µL 4.70   HEMOGLOBIN g/dL 15.0   HEMATOCRIT % 43.3   PLATELETS AUTO 10*3/uL 207            Physicalexam:  Constitutional: he  is oriented to person, place, and time. he appears well-developed and well-nourished.   HENT: Unremarkable  Head: Normocephalic.   Eyes: Conjunctivae and EOM are normal. Pupils are equal, round, and reactive to light.   Neck: Normal range of motion and full passive range of motion without pain. Neck supple.   Cardiovascular: Normal rate, regular rhythm, S1 normal, S2 normal, normal heart sounds and intact distal pulses.    Pulses:       Radial pulses are 2+ on the right side, and 2+ on the left side.        Dorsalis pedis pulses are 2+ on the right side, and 2+ on the left side.        Posterior tibial pulses are 2+ on the right side, and 2+ on the left side.   Pulmonary/Chest: Effort normal and breath sounds normal.   Abdominal: Soft. Bowel sounds are normal.   Musculoskeletal: Normal range of motion.   Neurological: he  is alert and oriented to person, place, and time.he has normal strength and normal reflexes.   Skin: Skin is warm, dry and intact.   Psychiatric: he has a normal mood and affect. his  behavior is normal. Judgment  and thought content normal. Cognition and memory are normal.           Diagnosis:   Principal Problem:    V tach (CMS/HCC) (HCC)  Device generator reached DARIUS     Plan: Device generator replacement.  Procedure risk and benefit discussed with the patient in detail.  He is in agreement to proceed.  Laboratory result from today reviewed.  We will continue monitor and make further recommendation based on his status after the procedure.      This patient seen in conjunction with Dr. Montana, please see his addendum note for additional detail.    Electronically Signed by JACQUELINE Marks PA-C  6/20/2022  11:38 AM

## 2022-06-20 NOTE — Clinical Note
Prepped: left chest. The site was clipped. Prepped with: ChloraPrep. The patient was draped  in the usual sterile fashion.

## 2022-06-20 NOTE — DISCHARGE INSTRUCTIONS
Cardiac Electrophysiology    Pacemaker/Defibrillator Generator Change Discharge Instructions                                   Incision Check Appointment-     You will be seen in 1-2 weeks for an incision check and pacemaker evaluation. This will be done at the EP clinic. If you have staples, stitches, or steri-strips, they will be removed at this time.     This appointment will be mailed to you. IF YOU DO NOT RECEIVE AN APPOINTMENT FOR YOUR INCISION CHECK WITHIN 7 DAYS AFTER DISCHARGE, CALL [phone]     Incision Care -    If you have a clear plastic dressing covering your incision, please remove this in 2 days.     If you have steri-strips (small bandages) covering your incision, do not remove them.     You may apply ice packs to reduce pain and swelling. Do not apply ice packs directly to the skin. Please limit the use of ice packs to 20 minutes per hour.    Keep the incision area clean and until your incision check appointment.    You may shower. You may allow the incision to get wet but please do not scrub the area, simply let the water run over it and gently pat dry afterward.    Do not put any ointment, cream, or lotion on your incision.     If you find any redness, swelling, drainage, warmth, or have a fever greater than 100 degrees, notify the  Main Office [phone] during normal business hours.       Activity - You may continue regular daily activities, but limit strenuous movements of the arm closest to your device. Avoid pulling yourself up with that arm, do not raise that elbow higher than shoulder height, and do not lift anything weighing more than 5 pounds with that arm for 10 days. Do not do any activities such as golfing, vacuuming, or mowing the lawn for 10 days. Prevent any hard blows to the site. Please don't immobilize your arm (no sling). Gentle movements are beneficial. Immobilization can lead to a frozen shoulder.        Driving - If you feel up to it, you may drive in 2-3  days. Start with local/short trips to familiar places. Avoid high traffic areas for the first few days.  Do not drive until you have stopped taking prescription pain medication.    ID Card - You will be given a temporary ID card at discharge. A permanent ID card will be mailed to you by the device company within 8 weeks. CARRY YOUR ID CARD WITH YOU AT ALL TIMES.    Follow-up Care -    PACEMAKER: You will continue to be followed remotely by the Pacemaker Clinic at [facility]. The frequency of remote follow up will depend on the type of implanted device you have. You will also continue yearly visits to see your physician or advanced care practitioner at the EP Clinic.    DEFIBRILLATOR: You will continue to be followed remotely by the EP Device Clinic. The frequency of remote follow up will depend on the type of implanted device you have If you have never used a remote transmitter, this will be discussed at your incision check appointment.     * If your defibrillator gives you a shock, please notify the EP Device Clinic at [phone] (Monday - Friday).  If you are shocked more than once in a day or several times in a week, go to the nearest Emergency Room.     If you have any questions or problems, several people are available to help you. Most of our staff is available Monday through Friday, from 7:30 a.m.-5:30 p.m. You should expect a call back within 24 hours if you leave a message. For emergencies after-hours or on weekends, please call [phone] and ask for the Electrophysiologist on-call.    IMPORTANT PHONE NUMBERS:     EP EP Main Office (Monday-Friday, 7:30 a.m. - 5:30 p.m.):    Office [phone]     Appointments for Incision Check and first 3 month clinic visit: [phone]          Cardiology On-Call:   For after-hours or weekend emergencies [phone] and ask for Electrophysiologist On-Call

## 2022-06-20 NOTE — ANESTHESIA PREPROCEDURE EVALUATION
"Pre-Procedure Assessment    Patient: Pete Trejo, male, 56 y.o.    Ht Readings from Last 1 Encounters:   22 1.803 m (5' 11\")     Wt Readings from Last 1 Encounters:   22 132 kg (291 lb)       Last Vitals  BP      Temp      Pulse     Resp      SpO2      Pain Score         Problem list reviewed and Medical history reviewed    No history of anesthetic complications:          Airway   Mallampati: II  TM distance: >3 FB  Neck ROM: full      Dental      Pulmonary - normal exam   (+) sleep apnea,   Cardiovascular   Exercise tolerance: poor (<4 METS)  (+) pacemaker, hypertension, past MI, CAD, CHF,     ECG reviewed  Rhythm: regular  Rate: normal  ROS comment: Interpretation Summary    1.  Severely reduce systolic function, estimated EF around 20 to 25%, there is a grade 1 diastolic dysfunction with elevated filling pressures,   2. Diffuse global hypokinesis with hyperdynamic anterolateral wall.  The LV walls appear thin, apex appears aneurysmatic.  No LV thrombus noted with definity.   3. Normal right atrium.  Mildly dilated left atrium  4. Normal RV size and function.  5. No significant valvular abnormalities detected.   6. No pericardial effusion.  7. RVSP was not estimated due to inability to obtain TR jet.   8. This echo was compared with prior echo done on 2021 without significant changes        Mental Status/Neuro/Psych    Pt is alert.      (+) CVA,     GI/Hepatic/Renal    (+) GERD well controlled,     Endo/Other - negative ROS   Abdominal             Social History     Tobacco Use   • Smoking status: Former Smoker     Packs/day: 0.75     Years: 30.00     Pack years: 22.50     Types: Cigarettes     Quit date: 2020     Years since quittin.0   • Smokeless tobacco: Never Used   Substance Use Topics   • Alcohol use: Not Currently     Comment: Quit drinking in       Hematology   WBC   Date Value Ref Range Status   2022 4.8 3.7 - 9.6 10*3/uL Final     RBC   Date Value Ref Range Status "   04/27/2022 4.89 4.10 - 5.80 10*6/µL Final     MCV   Date Value Ref Range Status   04/27/2022 91.3 82.0 - 97.0 fL Final     Hemoglobin   Date Value Ref Range Status   04/27/2022 15.5 13.2 - 17.2 g/dL Final     Hematocrit   Date Value Ref Range Status   04/27/2022 44.7 38.0 - 50.0 % Final     Platelets   Date Value Ref Range Status   04/27/2022 217 130 - 350 10*3/uL Final      Coagulation   Protime   Date Value Ref Range Status   10/06/2021 22.7 (H) 9.4 - 12.5 seconds Final     PTT   Date Value Ref Range Status   10/06/2021 41.9 (H) 25.1 - 36.5 SECONDS Final   07/29/2017 29 24 - 37 SEC      INR   Date Value Ref Range Status   10/06/2021 1.9 (H) <=1.1 Final   07/29/2017 1.1 0.9 - 1.1       General Chemistry   POC Glucose   Date Value Ref Range Status   08/13/2020 111 (H) 70 - 105 mg/dL Final     Comment:     Notified RN     Calcium   Date Value Ref Range Status   04/27/2022 9.2 8.6 - 10.3 mg/dL Final     BUN   Date Value Ref Range Status   04/27/2022 14 7 - 25 mg/dL Final     Creatinine   Date Value Ref Range Status   04/27/2022 1.01 0.70 - 1.30 mg/dL Final     Glucose   Date Value Ref Range Status   04/27/2022 139 (H) 70 - 105 mg/dL Final     Sodium   Date Value Ref Range Status   04/27/2022 138 135 - 145 mmol/L Final     Potassium   Date Value Ref Range Status   04/27/2022 4.1 3.5 - 5.1 MMOL/L Final     Magnesium   Date Value Ref Range Status   01/25/2022 1.9 1.8 - 2.4 mg/dL Final     CO2   Date Value Ref Range Status   04/27/2022 25 21 - 32 mmol/L Final     Chloride   Date Value Ref Range Status   04/27/2022 105 98 - 107 mmol/L Final     Anesthesia Plan    ASA 3   NPO status reviewed: > 8 hours    MAC         Induction: intravenous             Plan for postoperative opioid use.    Anesthetic plan and risks discussed with patient.  Use of blood products discussed with patient who consented to blood products.     Plan discussed with CRNA.

## 2022-06-20 NOTE — ANESTHESIA POSTPROCEDURE EVALUATION
"Lactation Follow Up Visit    Date of Visit: 2017    Baby's current age: 1 month    Reason for Visit: clicking with breast feeding and bottle feeding; \"gassy\" and uncomfortable frequently at night (Started on Zantac on 2017)    Date of last weight: 2017 done at [facility]  Last weight:  9 lb 7oz  gm    Current Weight: weight not taken (scale error)    Feeding Issues/Changes since last visit: has always noted since at home    Supplementing: none    Pumping: once if needed    Meds/Herbals: Insulin pump;prenatal, Thera juan miguel, Lactation complete     Output :  WNL     Feeding Assessment:   Kaitlyn actively latched. 2 sucks before she begin clicking. Gisele's milk volume is adequate. She reports large volumes when pumping when soon after discharge. No milk supply issues with siblings and successfully breast fed for 1 year.      Summary:   Kaitlyn latch is slightly shallow. Her tongue does reach her lips. When sucking on my finger her tongue is slightly back in mouth, but does come forward with sucking. No click noted when sucking on my finger. Introduced nipple shield and click resolved.     Plan of Care:   1. Use 24mm nipple shield  2. In cradle hold for feeding, have her head in the crook of your arm  3. She should be tummy to tummy, with ear, shoulder and hip in alignment  4. Monitor lower lip to make sure it is outward  5. If clicking starts, stop feeding and relatch  6. Keep her elevated after feeding for about 20 minutes  7. Calculated from her last weight she should take approximately 80mL in a bottle feeding  8. Please check back with me to report if clicking has been resolved   9. May possibly refer to Speech in [facility] (Alissa Riley)      Lactation Consultant: Gin Ritter, JEAN-PAULN, RN, IBCLC    Start time: 1 pm End time: 2:15pm    >60 minute Lactation Consultation with > 50% of time spent on breastfeeding counseling and coordination of care.    [facility] " Patient: Pete Trejo    Procedure Summary     Date: 06/20/22 Room / Location: [facility] EP LAB 1 / [facility] EP Lab    Anesthesia Start: 1204 Anesthesia Stop:     Procedure: BI-V ICD Gen Change (N/A ) Diagnosis:       NSVT (nonsustained ventricular tachycardia) (CMS/HCC) (HCC)      (NSVT (nonsustained ventricular tachycardia) (CMS/HCC) (HCC) [I47.2])    Providers: Wilfredo Montana MD Responsible Provider: Vaibhav Concepcion MD    Anesthesia Type: MAC ASA Status: 3          Anesthesia Type: MAC    Last vitals  Vitals Value Taken Time   /65 06/20/22 1300   Temp     Pulse 60 06/20/22 1300   Resp 36 06/20/22 1328   SpO2 96 % 06/20/22 1300   0-10 Pain Score 0 - No pain 06/20/22 1245   Vitals shown include unvalidated device data.    Anesthesia Post Evaluation    Patient location during evaluation: PACU  Patient participation: complete - patient participated  Level of consciousness: awake and alert  Pain management: adequate  Airway patency: patent  Anesthetic complications: no  Cardiovascular status: acceptable  Respiratory status: acceptable  Hydration status: acceptable  May dismiss recovered patient based on consultation with the appropriate physicians and/or meeting appropriate discharge criteria      Cosmetic?  This procedure is not cosmetic.   [facility]  Phone (Appts): [phone]    Nurse Line: [phone]

## 2022-06-21 PROCEDURE — 93000 ELECTROCARDIOGRAM COMPLETE: CPT | Performed by: INTERNAL MEDICINE

## 2022-06-29 ENCOUNTER — CLINICAL SUPPORT (OUTPATIENT)
Dept: CARDIOLOGY | Facility: CLINIC | Age: 57
End: 2022-06-29
Payer: COMMERCIAL

## 2022-06-29 DIAGNOSIS — I47.29 NSVT (NONSUSTAINED VENTRICULAR TACHYCARDIA) (CMS/HCC): ICD-10-CM

## 2022-06-29 NOTE — PROGRESS NOTES
Pete is a 36-year-old patient of Dr. Montana who had a generator change done on 06/20/2022.  He was to the clinic today just to have his incision checked.  The area was clean dry the incision line was intact.  He remained on the incision was a skin glue.  There showed no signs of infection.  The patient said that it was starting to itch, and I explained to him that it is probably from the skin glue and it healing.  He says it feels perfectly fine he said he does not feel anything different from the new generator than he did from the past.  I instructed him not to scrub the area just let the glue fall off.  And if he does have more concerns or questions down the road he is to give us a call back here at the clinic.

## 2022-07-07 DIAGNOSIS — Z86.79 S/P ABLATION OF VENTRICULAR ARRHYTHMIA: ICD-10-CM

## 2022-07-07 DIAGNOSIS — Z98.890 S/P ABLATION OF VENTRICULAR ARRHYTHMIA: ICD-10-CM

## 2022-07-07 DIAGNOSIS — Z86.79 HISTORY OF VENTRICULAR TACHYCARDIA: ICD-10-CM

## 2022-07-07 RX ORDER — MAGNESIUM CHLORIDE 64 MG
64 TABLET, DELAYED RELEASE (ENTERIC COATED) ORAL 2 TIMES DAILY WITH MEALS
Qty: 180 TABLET | Refills: 0 | Status: SHIPPED | OUTPATIENT
Start: 2022-07-07 | End: 2023-01-26 | Stop reason: SDUPTHER

## 2022-07-26 DIAGNOSIS — I10 PRIMARY HYPERTENSION: ICD-10-CM

## 2022-07-26 DIAGNOSIS — E78.2 MIXED HYPERLIPIDEMIA: Primary | ICD-10-CM

## 2022-07-28 ENCOUNTER — OFFICE VISIT (OUTPATIENT)
Dept: INTERNAL MEDICINE | Facility: CLINIC | Age: 57
End: 2022-07-28
Payer: COMMERCIAL

## 2022-07-28 ENCOUNTER — APPOINTMENT (OUTPATIENT)
Dept: LAB | Facility: CLINIC | Age: 57
End: 2022-07-28
Payer: COMMERCIAL

## 2022-07-28 VITALS
HEART RATE: 60 BPM | DIASTOLIC BLOOD PRESSURE: 68 MMHG | BODY MASS INDEX: 39.19 KG/M2 | SYSTOLIC BLOOD PRESSURE: 116 MMHG | OXYGEN SATURATION: 95 % | TEMPERATURE: 96.7 F | WEIGHT: 281 LBS

## 2022-07-28 DIAGNOSIS — I50.42 CHRONIC COMBINED SYSTOLIC AND DIASTOLIC CHF, NYHA CLASS 2 AND ACC/AHA STAGE C (CMS/HCC): ICD-10-CM

## 2022-07-28 DIAGNOSIS — E11.9 TYPE 2 DIABETES MELLITUS WITHOUT COMPLICATION, WITHOUT LONG-TERM CURRENT USE OF INSULIN (CMS/HCC): ICD-10-CM

## 2022-07-28 DIAGNOSIS — I10 PRIMARY HYPERTENSION: Primary | ICD-10-CM

## 2022-07-28 DIAGNOSIS — I48.0 PAROXYSMAL ATRIAL FIBRILLATION (CMS/HCC): ICD-10-CM

## 2022-07-28 DIAGNOSIS — I10 PRIMARY HYPERTENSION: ICD-10-CM

## 2022-07-28 DIAGNOSIS — E78.2 MIXED HYPERLIPIDEMIA: ICD-10-CM

## 2022-07-28 DIAGNOSIS — Z79.01 CHRONIC ANTICOAGULATION: ICD-10-CM

## 2022-07-28 DIAGNOSIS — G47.33 OBSTRUCTIVE SLEEP APNEA: ICD-10-CM

## 2022-07-28 LAB
ALBUMIN SERPL-MCNC: 4.3 G/DL (ref 3.5–5.3)
ALP SERPL-CCNC: 52 U/L (ref 45–115)
ALT SERPL-CCNC: 98 U/L (ref 7–52)
ANION GAP SERPL CALC-SCNC: 8 MMOL/L (ref 3–11)
AST SERPL-CCNC: 37 U/L
BASOPHILS # BLD AUTO: 0 10*3/UL
BASOPHILS NFR BLD AUTO: 0.8 % (ref 0–2)
BILIRUB SERPL-MCNC: 0.88 MG/DL (ref 0.2–1.4)
BUN SERPL-MCNC: 11 MG/DL (ref 7–25)
CALCIUM ALBUM COR SERPL-MCNC: 8.6 MG/DL (ref 8.6–10.3)
CALCIUM SERPL-MCNC: 8.8 MG/DL (ref 8.6–10.3)
CHLORIDE SERPL-SCNC: 103 MMOL/L (ref 98–107)
CHOLEST SERPL-MCNC: 106 MG/DL (ref 0–199)
CO2 SERPL-SCNC: 25 MMOL/L (ref 21–32)
CREAT SERPL-MCNC: 0.86 MG/DL (ref 0.7–1.3)
EOSINOPHIL # BLD AUTO: 0.1 10*3/UL
EOSINOPHIL NFR BLD AUTO: 3.1 % (ref 0–3)
ERYTHROCYTE [DISTWIDTH] IN BLOOD BY AUTOMATED COUNT: 14 % (ref 11.5–15)
EST. AVERAGE GLUCOSE BLD GHB EST-MCNC: 139.9 MG/DL
FASTING STATUS PATIENT QL REPORTED: YES
GFR SERPL CREATININE-BSD FRML MDRD: 102 ML/MIN/1.73M*2
GLUCOSE SERPL-MCNC: 128 MG/DL (ref 70–105)
HBA1C MFR BLD: 6.5 % (ref 4–6)
HCT VFR BLD AUTO: 42.9 % (ref 38–50)
HDLC SERPL-MCNC: 25 MG/DL
HGB BLD-MCNC: 15.3 G/DL (ref 13.2–17.2)
LDLC SERPL CALC-MCNC: 50 MG/DL (ref 20–99)
LYMPHOCYTES # BLD AUTO: 1.7 10*3/UL
LYMPHOCYTES NFR BLD AUTO: 44.6 % (ref 15–47)
MCH RBC QN AUTO: 32 PG (ref 29–34)
MCHC RBC AUTO-ENTMCNC: 35.6 G/DL (ref 32–36)
MCV RBC AUTO: 89.8 FL (ref 82–97)
MONOCYTES # BLD AUTO: 0.4 10*3/UL
MONOCYTES NFR BLD AUTO: 10.2 % (ref 5–13)
NEUTROPHILS # BLD AUTO: 1.5 10*3/UL
NEUTROPHILS NFR BLD AUTO: 41.3 % (ref 46–70)
PLATELET # BLD AUTO: 167 10*3/UL (ref 130–350)
PMV BLD AUTO: 7.4 FL (ref 6.9–10.8)
POTASSIUM SERPL-SCNC: 4.2 MMOL/L (ref 3.5–5.1)
PROT SERPL-MCNC: 6.8 G/DL (ref 6–8.3)
RBC # BLD AUTO: 4.77 10*6/ΜL (ref 4.1–5.8)
SODIUM SERPL-SCNC: 136 MMOL/L (ref 135–145)
TRIGL SERPL-MCNC: 154 MG/DL
WBC # BLD AUTO: 3.8 10*3/UL (ref 3.7–9.6)

## 2022-07-28 PROCEDURE — 80053 COMPREHEN METABOLIC PANEL: CPT | Mod: PO

## 2022-07-28 PROCEDURE — 83036 HEMOGLOBIN GLYCOSYLATED A1C: CPT | Mod: PO

## 2022-07-28 PROCEDURE — 80061 LIPID PANEL: CPT | Mod: PO

## 2022-07-28 PROCEDURE — 36415 COLL VENOUS BLD VENIPUNCTURE: CPT | Mod: PO

## 2022-07-28 PROCEDURE — G0463 HOSPITAL OUTPT CLINIC VISIT: HCPCS | Mod: PO | Performed by: INTERNAL MEDICINE

## 2022-07-28 PROCEDURE — 85025 COMPLETE CBC W/AUTO DIFF WBC: CPT | Mod: PO

## 2022-07-28 PROCEDURE — 99214 OFFICE O/P EST MOD 30 MIN: CPT | Performed by: INTERNAL MEDICINE

## 2022-07-28 RX ORDER — FUROSEMIDE 40 MG/1
40 TABLET ORAL DAILY
Qty: 90 TABLET | Refills: 3 | Status: SHIPPED | OUTPATIENT
Start: 2022-07-28 | End: 2022-07-28 | Stop reason: SDUPTHER

## 2022-07-28 RX ORDER — FUROSEMIDE 40 MG/1
40 TABLET ORAL DAILY
Qty: 60 TABLET | Refills: 0 | Status: SHIPPED | OUTPATIENT
Start: 2022-07-28 | End: 2023-01-26 | Stop reason: SDUPTHER

## 2022-07-28 ASSESSMENT — PAIN SCALES - GENERAL: PAINLEVEL: 0-NO PAIN

## 2022-07-28 NOTE — PROGRESS NOTES
Subjective      Pete Trejo is a 56 y.o. male who presents for follow-up.    HPI    Patient is a 56-year-old male with a past medical history significant for coronary artery disease, sleep apnea, congestive heart failure, and ventricular tachycardia who comes in today for a 3-month follow-up.  At her last visit his hemoglobin A1c was elevated at 7.0 therefore he was started on metformin.    Since last seeing the patient he has been following up with cardiology, due to recurrence of VT he has been restarted on mexiletine.  There is some consideration for a third ablation procedure.  At the end of June he did undergo device generator replacement.    Concerns today:   - Overall doing well.      Chronic issues to follow-up   - Coronary artery disease: Will be going down to [place] in the near future.  He has been trying to get his weight down.  No chest pain.  Does walk half a mile everyday without chest pain.     - Obstructive sleep apnea: On CPAP at 8 cmH2O, doing well on this.     - Congestive heart failure:  Stable, no PND or orthopnea.     - Ventricular tachycardia: As described above patient does follow with [facility].  On amiodarone therapy as well as mexiletine. Pacemaker battery replaced at the end of last month.       - History of CVA: No persistent deficit.  No slurring of speech, no difficulty swallowing, no focal weakness.  He does notice that sometimes his leg will go numb on the left side but it has been there for years, but no pain with this.     - Hyperlipidemia: LDL under good control at 50    - GERD:  No stomach issues.    - History of tobacco use: no recent use.    - Diabetes mellitus type 2: Recently started on metformin.    The following have been reviewed and updated as appropriate in this visit:   Tobacco  Allergies  Meds  Problems  Med Hx  Surg Hx  Fam Hx           Allergies   Allergen Reactions   • Morphine      ANXIETY   • Tramadol      ANXIETY     Current Outpatient Medications    Medication Sig Dispense Refill   • magnesium chloride (SLOW-MAG) 64 mg tablet,delayed release (DR/EC) Take 1 tablet (64 mg total) by mouth 2 (two) times a day with meals 180 tablet 0   • carvediloL (Coreg) 25 mg tablet Take 1 tablet (25 mg total) by mouth 2 (two) times a day with meals 180 tablet 3   • rosuvastatin (Crestor) 40 mg tablet Take 1 tablet (40 mg total) by mouth nightly 90 tablet 3   • sacubitriL-valsartan (ENTRESTO) 24-26 mg tablet Take 1 tablet by mouth 2 (two) times a day 180 tablet 3   • spironolactone (ALDACTONE) 25 mg tablet Take 1 tablet (25 mg total) by mouth 2 (two) times a day 180 tablet 3   • metFORMIN XR (GLUCOPHAGE-XR) 500 mg 24 hr tablet Take 1 tablet (500 mg total) by mouth daily with breakfast 90 tablet 3   • amiodarone (PACERONE) 200 mg tablet Take 1 tablet (200 mg total) by mouth daily 30 tablet 6   • mexiletine (MEXITIL) 200 mg capsule Take 1 capsule (200 mg total) by mouth every 12 (twelve) hours (Patient taking differently: Take 200 mg by mouth 3 (three) times a day) 60 capsule 6   • potassium chloride (K-TAB) 20 mEq CR tablet Take 1 tablet (20 mEq total) by mouth daily 90 tablet 3   • rivaroxaban (Xarelto) 20 mg tablet Take 1 tablet (20 mg total) by mouth daily 30 tablet 6   • aspirin 81 mg EC tablet Take 1 tablet (81 mg total) by mouth daily 90 tablet 3   • albuterol HFA (PROVENTIL HFA;VENTOLIN HFA) 90 mcg/actuation inhaler Inhale 2 puffs every 6 (six) hours as needed for wheezing or shortness of breath 1 Inhaler 1   • nitroglycerin (NITROSTAT) 0.4 mg SL tablet Place 0.4 mg under the tongue every 5 (five) minutes as needed for chest pain.     • pantoprazole (PROTONIX) 40 mg EC tablet Take 40 mg by mouth daily.     • furosemide (LASIX) 40 mg tablet Take 1 tablet (40 mg total) by mouth daily If weight goes up 3 pounds overnight take an extra dose of Lasix x1 day 90 tablet 3     No current facility-administered medications for this visit.     Past Medical History:   Diagnosis Date    • CAD (coronary artery disease)    • CHF (congestive heart failure) (CMS/HCC) ([facility])    • CVA (cerebral vascular accident) (CMS/HCC) ([facility]) 2010   • Dyslipidemia    • GERD (gastroesophageal reflux disease)    • GERD (gastroesophageal reflux disease)    • Heart attack (CMS/HCC) ([facility])     x3   • Hypertension    • Ischemic cardiomyopathy    • Paroxysmal atrial fibrillation (CMS/HCC) ([facility])     On Xarelto   • V-tach (CMS/HCC) ([facility])     AICD in place     Past Surgical History:   Procedure Laterality Date   • ABLATION VT N/A 06/09/2020    Procedure: Ablation VT;  Surgeon: Wilfredo Montana MD;  Location: [facility] EP Lab;  Service: Electrophysiology;  Laterality: N/A;  Check with Dr. Montana in the a.m. regarding scheduling   • APPENDECTOMY     • CORONARY ARTERY STENTING  2009    2.5 X 24-mm Taxus DIMAS to first diagonal. 3.0 X 8-mm Taxus stent to proximal LAD just proximal to diagonal.   • CORONARY ARTERY STENTING  2010    3.5 X 15-mm Promus DIMAS to totally occluded LAD   • CORONARY ARTERY STENTING  2011    2.25 X 30-mm Resolute DIMAS to 2nd diagonal.  Balloon angioplasty through strut to improve blood flow to distal LAD   • CORONARY ARTERY STENTING  07/28/2017    second diagonal branch LAD Resolute 2.25 x 30-mm DIMAS   • ICD DC GEN CHANGE N/A 6/20/2022    Procedure: BI-V ICD Gen Change;  Surgeon: Wilfredo Montana MD;  Location: [facility] EP Lab;  Service: Cardiovascular;  Laterality: N/A;   • ICD SC NEW  2011    [employer] Cognis Model #N119, Serial #[id number]. [place] Reliance Model #0185, Serial #[id number]. LV lead Acuity Model #45+91, Serial #[id number]. Atrial lead Model #4469, Serial #[id number]     Family History   Problem Relation Age of Onset   • Heart attack Mother 62   • Other Mother         Abdominal Aortic Aneurysm   • Lung cancer Mother    • Colon cancer Father         Cause of death   • Diabetes Brother         Non-Insulin Dependent   • Heart attack Brother 51        w/ Stents   • Heart disease Brother    • Other Son         Well      Social History     Socioeconomic History   • Marital status: Single   Tobacco Use   • Smoking status: Former Smoker     Packs/day: 0.75     Years: 30.00     Pack years: 22.50     Types: Cigarettes     Quit date: 2020     Years since quittin.1   • Smokeless tobacco: Never Used   Vaping Use   • Vaping Use: Never used   Substance and Sexual Activity   • Alcohol use: Not Currently     Comment: Quit drinking in    • Drug use: Not Currently   • Sexual activity: Defer     Social Determinants of Health     Tobacco Use: Medium Risk   • Smoking Tobacco Use: Former Smoker   • Smokeless Tobacco Use: Never Used       Review of Systems    Objective   /68   Pulse 60   Temp (!) 35.9 °C (96.7 °F)   Wt 127.5 kg (281 lb)   SpO2 95%   BMI 39.19 kg/m²     Physical Exam  Vitals reviewed.   Constitutional:       General: He is not in acute distress.     Appearance: Normal appearance.   HENT:      Head: Normocephalic and atraumatic.      Nose: Nose normal. No congestion or rhinorrhea.      Mouth/Throat:      Mouth: Mucous membranes are moist.      Pharynx: Oropharynx is clear. No oropharyngeal exudate.   Eyes:      General: No scleral icterus.     Pupils: Pupils are equal, round, and reactive to light.   Neck:      Comments: Jugular venous pressure normal  Cardiovascular:      Rate and Rhythm: Normal rate and regular rhythm.      Pulses: Normal pulses.      Heart sounds: No murmur heard.  Pulmonary:      Effort: Pulmonary effort is normal. No respiratory distress.      Breath sounds: No wheezing or rales.   Musculoskeletal:         General: No tenderness or deformity.      Cervical back: Neck supple. No rigidity.      Right lower leg: No edema.      Left lower leg: No edema.   Lymphadenopathy:      Cervical: No cervical adenopathy.   Skin:     General: Skin is warm and dry.      Capillary Refill: Capillary refill takes less than 2 seconds.      Findings: No erythema or rash.   Neurological:      Mental Status: He  is alert.     Results for BENY BARBOSA (MRN [id number]) as of 7/28/2022 10:10   Ref. Range 7/28/2022 07:08   Sodium Latest Ref Range: 135 - 145 mmol/L 136   Potassium Latest Ref Range: 3.5 - 5.1 MMOL/L 4.2   Chloride Latest Ref Range: 98 - 107 mmol/L 103   CO2 Latest Ref Range: 21 - 32 mmol/L 25   Anion Gap Latest Ref Range: 3 - 11 mmol/L 8   BUN Latest Ref Range: 7 - 25 mg/dL 11   Creatinine, Ser Latest Ref Range: 0.70 - 1.30 mg/dL 0.86   eGFR Latest Ref Range: >60 mL/min/1.73m*2 102   Glucose Latest Ref Range: 70 - 105 mg/dL 128 (H)   Calcium Latest Ref Range: 8.6 - 10.3 mg/dL 8.8   Alkaline Phosphatase Latest Ref Range: 45 - 115 U/L 52   Albumin Latest Ref Range: 3.5 - 5.3 g/dL 4.3   Total Protein Latest Ref Range: 6.0 - 8.3 g/dL 6.8   AST Latest Ref Range: <40 U/L 37   ALT (SGPT) Latest Ref Range: 7 - 52 U/L 98 (H)   Total Bilirubin Latest Ref Range: 0.20 - 1.40 mg/dL 0.88   Corrected Calcium Latest Ref Range: 8.6 - 10.3 mg/dL 8.6   Cholesterol Latest Ref Range: 0 - 199 mg/dL 106   Triglycerides Latest Ref Range: <=149 mg/dL 154 (H)   HDL Latest Ref Range: >=40 mg/dL 25 (L)   LDL Calculated Latest Ref Range: 20 - 99 mg/dL 50   Fasting? Unknown Yes   WBC Latest Ref Range: 3.7 - 9.6 10*3/uL 3.8   RBC Latest Ref Range: 4.10 - 5.80 10*6/µL 4.77   Hemoglobin Latest Ref Range: 13.2 - 17.2 g/dL 15.3   Hematocrit Latest Ref Range: 38.0 - 50.0 % 42.9   MCV Latest Ref Range: 82.0 - 97.0 fL 89.8   MCH Latest Ref Range: 29.0 - 34.0 pg 32.0   MCHC Latest Ref Range: 32.0 - 36.0 g/dL 35.6   RDW Latest Ref Range: 11.5 - 15.0 % 14.0   Platelets Latest Ref Range: 130 - 350 10*3/uL 167   MPV Latest Ref Range: 6.9 - 10.8 fL 7.4   Neutrophils% Latest Ref Range: 46.0 - 70.0 % 41.3 (L)   Lymphocytes% Latest Ref Range: 15.0 - 47.0 % 44.6   Monocytes% Latest Ref Range: 5.0 - 13.0 % 10.2   Eosinophils% Latest Ref Range: 0.0 - 3.0 % 3.1 (H)   Basophils% Latest Ref Range: 0.0 - 2.0 % 0.8   ANC (auto diff) Latest Units: 10*3/UL 1.50    Lymphs # (Absolute) Latest Units: 10*3/uL 1.70   Monocytes # (Absolute) Latest Units: 10*3/uL 0.40   Eos # (Absolute) Latest Units: 10*3/uL 0.10   Baso # (Absolute) Latest Units: 10*3/uL 0.00   Hemoglobin A1C Latest Ref Range: 4.0 - 6.0 % 6.5 (H)   Estimated Average Glucose Latest Units: mg/dL 139.9       ASSESSMENT AND PLAN   1. Chronic combined systolic and diastolic CHF, NYHA class 2 and ACC/AHA stage C (CMS/HCC) ([facility])  Continuing furosemide for the patient.  - furosemide (LASIX) 40 mg tablet; Take 1 tablet (40 mg total) by mouth daily If weight goes up 3 pounds overnight take an extra dose of Lasix x1 day  Dispense: 90 tablet; Refill: 3    2. Primary hypertension  Blood pressure under good control today, continuing current medications.    3. Mixed hyperlipidemia  LDL at goal of being less than 70, continuing current medication.    4. Chronic anticoagulation  No abnormal bleeding, blood counts look good.  Continuing current medication    5. Paroxysmal atrial fibrillation (CMS/HCC) ([facility])  Patient appears to be in normal sinus rhythm today.  No other concerns.    6. Obstructive sleep apnea  Doing well in terms of LAVON    7. Type 2 diabetes mellitus without complication, without long-term current use of insulin (CMS/HCC) ([facility])  Hemoglobin A1c is at 6.5 which is at our goal of less than 7.  Continuing metformin.     Nikhil Smith MD

## 2022-08-03 DIAGNOSIS — I25.10 CORONARY ARTERY DISEASE INVOLVING NATIVE CORONARY ARTERY OF NATIVE HEART WITHOUT ANGINA PECTORIS: ICD-10-CM

## 2022-08-10 DIAGNOSIS — I47.20 VENTRICULAR TACHYCARDIA (CMS/HCC): ICD-10-CM

## 2022-08-10 RX ORDER — MEXILETINE HYDROCHLORIDE 200 MG/1
200 CAPSULE ORAL EVERY 12 HOURS SCHEDULED
Status: CANCELLED | OUTPATIENT
Start: 2022-08-10

## 2022-08-10 NOTE — TELEPHONE ENCOUNTER
No new care gaps identified.  [facility] Embedded Care Gaps. Reference number: [id number]. 8/10/2022 10:05:40 AM JENNIFER

## 2022-08-10 NOTE — TELEPHONE ENCOUNTER
Medication refill request:  Medication(s):  mexiletine not filled due to (route to Nurse Pool) Dosage clarification needed  Manual Review message review  Medication not assigned a protocol  Historical Provider, please review for refill

## 2022-08-10 NOTE — TELEPHONE ENCOUNTER
Patient has contacted the pharmacy? Yes    Patient Advised: Patient not sure if the pharmacy ever sent request     Name of Medications (Have patient read from the bottle or snip from medication list):      Are you having any trouble with the medication:   No    How many doses do you have left: (Multiple med requests; Report the doses of the medication with the least amount left) 12    Is the Diagnosis (DX) active? no    Additional Information: Patient will be out on friday     Patient's preferred pharmacy has been noted and populated.    Is it okay that the nurse communicates your response through [employer]? Yes      Caller has been advised: Your request does not guarantee an immediate refill. Please allow up to 72 hours for completion.

## 2022-08-16 ENCOUNTER — TELEPHONE (OUTPATIENT)
Dept: CARDIOLOGY | Facility: CLINIC | Age: 57
End: 2022-08-16

## 2022-08-17 ENCOUNTER — TELEPHONE (OUTPATIENT)
Dept: CARDIOLOGY | Facility: CLINIC | Age: 57
End: 2022-08-17
Payer: COMMERCIAL

## 2022-08-17 DIAGNOSIS — I47.20 VENTRICULAR TACHYCARDIA (CMS/HCC): ICD-10-CM

## 2022-08-17 RX ORDER — MEXILETINE HYDROCHLORIDE 200 MG/1
200 CAPSULE ORAL 3 TIMES DAILY
Qty: 270 CAPSULE | Refills: 0 | Status: SHIPPED | OUTPATIENT
Start: 2022-08-17 | End: 2022-09-16 | Stop reason: SDUPTHER

## 2022-08-17 NOTE — TELEPHONE ENCOUNTER
Refill request received for mexiletine 200 mg TID. Per EMP last note 6/20/22- patient is to continue taking mexiletine 200 mg 3 times a day. Medication refilled to [facility].

## 2022-09-12 NOTE — PROGRESS NOTES
Subjective    Patient ID: Pete Trejo is a 56 y.o. male.    Chief Complaint:   Chief Complaint   Patient presents with    Ventricular Tachycardia        Pete is a pleasant 56-year-old male with a past medical history significant for CAD, VT, ischemic cardiomyopathy, biventricular ICD, LV thrombus on chronic anticoagulation, dyslipidemia, hypertension.  He was taken to the EP lab 6/9/2020 by Dr. Montana for VT ablation.  Unfortunately he had recurrent numerous ventricular arrhythmias as a result of extensive anterior wall scar involving much of the lateral and septal walls.  Ablation was partially successful with ablation of a border zone area of the scar especially in the inferior apex and attempt to substrate modify arrhythmia burden.  He had been on amiodarone and mexiletine and was referred to [facility] for further recommendations and possible LVAD or transplant.  He was told he did not qualify for either. He had recurrent VT requiring ATP in September 2021 and reloaded with amiodarone and discharged on an increased dose.  He was readmitted 10/21 with more  episodes and received ATP though no shocks.  IV amiodarone was rebolused with resolution.  He again was referred to [facility] for possible complex VT ablation versus LVAD or transplant.     On 11/29/2021 patient underwent EP testing by Dr. Lema in [place] for recurrent symptomatic VT requiring ATP in spite of high doses of amiodarone and mexiletine.  Extensive substrate modification was performed with none inducibility at the end of procedure.  Amiodarone was decreased to 200 mg daily and mexiletine discontinued.  Future plan was to switch to sotalol for chronic suppression.  Unfortunately presented to the emergency room 1/25/2022 with sensation of racing in the heart and chest fullness.  He was found to be in VT at 130 bpm.  He did convert spontaneously.  He had felt well for 5 or 6 weeks post ablation in [place] however began experiencing  recurrent symptoms with ATP for VT at 124 bpm on 1/11/2022 and then ATP x8 for VT at 139 bpm.  After discussion with EP [facility] recommended reinstitution of antiarrhythmic therapy with the addition of mexiletine.  He was discharged on amiodarone 200 mg daily and mexiletine 200 mg twice daily.  He was seen at [facility] on 1/31/2022 and mexiletine increased to 3 times daily and he was continued on amiodarone 200 mg daily.  Plan was to discuss with Dr. Lema and the EP team to see if any further recommendations or ablation at this time.  When last seen patient has not heard back from them yet though was doing well when seen on 6/7/2022.  Since then he has had his ICD generator changed out by Dr. Montana.    ICD was interrogated today.  He has not had any VT episodes since generator change out.  No nonsustained episodes either on the amiodarone and mexiletine.  He does have some PACs and PVCs.  EKG today showing AV paced rhythm rate of 60.  Blood pressure 106/64.  Pete indicates he has been feeling well though has not been exercising as he had been in the springtime due to the excessive heat.  He does not tolerate heat well.  He has not had any chest discomfort or shortness of breath with normal activity.  He does have HERNANDEZ.  No PND orthopnea or lower extremity swelling.  He denies a sensation of palpitations racing or fluttering.  He is scheduled to follow-up in [place] November 1.    Last updated by: 9/13/2022 Merced CAMERON CNP  Primary Cardiologist: Jerry   Electrophysiology: Dr. Montana  Problem list:  CAD:   Anterior MI [PCI. 3.5 X 15-mm Promus drug eluting stent to totally occluded LAD] - 12/3/2010   Sm Anteroseptal MI [PCI. 2.25 X 24-mm Taxus drug eluting stent to diagonal. 3.0 X 8-mm Taxus stent to proximal LAD] - 7/25/2009   Progressive CAD ivolving LAD/Diagonal [Stent]     CHF/CM:   CHF   EF 31% - 2013   Severe ischemic cardionyopathy [BiVentricular ICD. [employer] HE Model #N119,Serial  #[id number]. RV lead Guidant Model #0185,Serial #[id number]. LV lead  Model #4591,Serial #[id number]. RA lead Guidant Model #4469, Serial #[id number]] - 2011     DeviceICD changed out 6/20/2022 [employer] model G125          ARRHYTHMIA:   Ventricular Tachycardia [AICD]   2021 recurrent NS-VT, slow VT  2020 Hx of VT ablation per Dr. Montana - 06/09/2020 VT-Partially successful ablation of border zone areas of the scar especially the inferior apex in an attempt to substrate modify his arrhythmia burden    Recurrent palpitations 1/26/2021 with admission [place] slow monomorphic  bpm with failed ATP x8 and no shocks delivered. Spontaneously converted out of the VT while in the ER. Mexiletine resumed at 200 mg twice daily in addition to amiodarone. ATP programmed more aggressively.      Extensive repeat VT ablation [facility] 11/29/2021, mexiletine discontinued    Recurrent VT with failed ATP x8 1/25/2022 spontaneously converted.  Mexiletine 200 mg twice daily plus amiodarone 200 mg daily, mexiletine increased to 3 times daily. 1/31/2022    Device interrogation [facility] 1/31/22: 96% atrial paced, 99% BiV paced, recurrent episode nonsustained  bpm 1/29/2022 not sustained 28 seconds.      PVD:   CVA - 12/2010 - at time of MI, no residual    OTHER:   LV Mural Thrombus - resolved  Anteroapical akinetic aneurysm     RISK FACTORS:   Hypertension   Dyslipidemia [Hyperlipidemia]       CARDIOVASCULAR PROCEDURES:     PCI (PTCA: LAD bifurcation   Stent: D2 2.25x30 (RESOLUTE))   PCI (%   Stent: LAD (PROMUS) 3.5x15mm) - 12/3/2010   PCI (Stent: D1 (TAXUS) 2.5x24mm, LAD prox (TAXUS) 30x8mm) - 7/25/2009   PCI 07/28/2017 second diagonal branch LAD Resolute 2.25 x 30-mm DIMAS       Prior Imaging/Testing History     EKG - 10/8/2015   EKG - 08/14/2020 AV paced 60 bpm     Devices (Bi-Ventricular ICD (Gfwyny-Mzmnnenh-Dztheb N119), Serial# [id number]) - 10/10/2011   Devices (ICD Interrogation: 1 mode-switch, 1 Nonsustained VT)     Echo -  06/08/2020 · Severe LV systolic dysfunction, Severe ischemic CM, EF 25%,   LA moderately dilated; RA mildly dilated, Mild TR, Grade I LV diastolic abnormality, Minimally elevated LV end-diastolic pressure.     Limited echo 1/26/2022 revealing EF 20 to 25% with grade 1 diastolic dysfunction and elevated filling pressures. Diffuse global hypokinesis with hyperdynamic anterolateral wall. Orrum aneurysmatic. No LV thrombus. Mildly dilated LA 33 mL/m². No significant valvular disease.    MPI (Mild rayray-infarct anterior wall ischemia; Lexiscan)   Lexiscan - 07/27/2017 -Large scar noted in the LAD territory associated with akinesis   secondary to prior MI, small amount of rayray-infarct ischemia noted, LV systolic function is severely depressed: EF 14%   Lexiscan - 06/08/2020 No changes since 2017 study    Stress test with a VO2 5/5/2021 [facility]: Nondiagnostic for ischemia as patient did not reach 85% of maximum heart rate. Isolated PVCs, single PVC pair. On beta-blocker. Moderately decreased functional capacity. Maintained 93% or greater saturation.    Right heart cath 5/6/2021: You see him. RV 35/06. Mean PW 15, EDP 14, A wave 11. PAP 36/15 mean 22. PA saturation 66, pulmonary vein 93, PA 66, PA 66, AO 93%. Becki cardiac output 5.88.    ZIO monitor 3/21: 14-day monitor, rare ectopy, 4 beats nonsustained VT, triggered events corresponded to sinus rhythm. Minimum heart rate 60 maximum 140 average 63 bpm.      Specialty Problems          Cardiology Problems    Hyperlipidemia        Hypertension        Ischemic cardiomyopathy        Automatic implantable cardioverter-defibrillator in situ        On amiodarone therapy        Presence of stent in coronary artery        S/P ablation of ventricular arrhythmia        Chronic combined systolic and diastolic CHF, NYHA class 2 and ACC/AHA stage C (CMS/HCC) (HCC)        Ventricular tachycardia (paroxysmal) (CMS/HCC) (HCC)        Chronic anticoagulation        History of ventricular  tachycardia        Paroxysmal atrial fibrillation (CMS/HCC) ([facility])        Sustained ventricular tachycardia (CMS/HCC) ([facility])        V tach (CMS/[facility]) ([facility])        Long term current use of antiarrhythmic drug         Past Medical History:   Diagnosis Date    CAD (coronary artery disease)     CHF (congestive heart failure) (CMS/HCC) ([facility])     CVA (cerebral vascular accident) (CMS/HCC) ([facility]) 2010    Dyslipidemia     GERD (gastroesophageal reflux disease)     GERD (gastroesophageal reflux disease)     Heart attack (CMS/HCC) ([facility])     x3    Hypertension     Ischemic cardiomyopathy     Paroxysmal atrial fibrillation (CMS/[facility]) ([facility])     On Xarelto    V-tach (CMS/[facility]) ([facility])     AICD in place     Past Surgical History:   Procedure Laterality Date    ABLATION VT N/A 06/09/2020    Procedure: Ablation VT;  Surgeon: Wilfredo Montana MD;  Location: [facility] EP Lab;  Service: Electrophysiology;  Laterality: N/A;  Check with Dr. Montana in the a.m. regarding scheduling    APPENDECTOMY      CORONARY ARTERY STENTING  2009    2.5 X 24-mm Taxus DIMAS to first diagonal. 3.0 X 8-mm Taxus stent to proximal LAD just proximal to diagonal.    CORONARY ARTERY STENTING  2010    3.5 X 15-mm Promus DIMAS to totally occluded LAD    CORONARY ARTERY STENTING  2011    2.25 X 30-mm Resolute DIMAS to 2nd diagonal.  Balloon angioplasty through strut to improve blood flow to distal LAD    CORONARY ARTERY STENTING  07/28/2017    second diagonal branch LAD Resolute 2.25 x 30-mm DIMAS    ICD DC GEN CHANGE N/A 6/20/2022    Procedure: BI-V ICD Gen Change;  Surgeon: Wilfredo Montana MD;  Location: [facility] EP Lab;  Service: Cardiovascular;  Laterality: N/A;    ICD SC NEW  2011    [employer] Cognis Model #N119, Serial #[id number]. [employer]iance Model #0185, Serial #[id number]. LV lead Acuity Model #45+91, Serial #[id number]. Atrial lead Model #4469, Serial #[id number]     Allergies as of 09/16/2022 - Reviewed 09/16/2022   Allergen Reaction Noted    Morphine  07/30/2017    Tramadol   07/30/2017     Current Outpatient Medications   Medication Sig Dispense Refill    cholecalciferol, vitamin D3, (cholecalciferol) 25 mcg (1,000 unit) tablet Take 1,000 Units by mouth daily      rivaroxaban (Xarelto) 20 mg tablet Take 1 tablet (20 mg total) by mouth daily 60 tablet 0    furosemide (LASIX) 40 mg tablet Take 1 tablet (40 mg total) by mouth daily If weight goes up 3 pounds overnight take an extra dose of Lasix x1 day 60 tablet 0    magnesium chloride (SLOW-MAG) 64 mg tablet,delayed release (DR/EC) Take 1 tablet (64 mg total) by mouth 2 (two) times a day with meals 180 tablet 0    carvediloL (Coreg) 25 mg tablet Take 1 tablet (25 mg total) by mouth 2 (two) times a day with meals 180 tablet 3    rosuvastatin (Crestor) 40 mg tablet Take 1 tablet (40 mg total) by mouth nightly 90 tablet 3    sacubitriL-valsartan (ENTRESTO) 24-26 mg tablet Take 1 tablet by mouth 2 (two) times a day 180 tablet 3    spironolactone (ALDACTONE) 25 mg tablet Take 1 tablet (25 mg total) by mouth 2 (two) times a day 180 tablet 3    metFORMIN XR (GLUCOPHAGE-XR) 500 mg 24 hr tablet Take 1 tablet (500 mg total) by mouth daily with breakfast 90 tablet 3    amiodarone (PACERONE) 200 mg tablet Take 1 tablet (200 mg total) by mouth daily 30 tablet 6    potassium chloride (K-TAB) 20 mEq CR tablet Take 1 tablet (20 mEq total) by mouth daily 90 tablet 3    aspirin 81 mg EC tablet Take 1 tablet (81 mg total) by mouth daily 90 tablet 3    albuterol HFA (PROVENTIL HFA;VENTOLIN HFA) 90 mcg/actuation inhaler Inhale 2 puffs every 6 (six) hours as needed for wheezing or shortness of breath 1 Inhaler 1    nitroglycerin (NITROSTAT) 0.4 mg SL tablet Place 0.4 mg under the tongue every 5 (five) minutes as needed for chest pain.      pantoprazole (PROTONIX) 40 mg EC tablet Take 40 mg by mouth daily.      mexiletine (MEXITIL) 200 mg capsule Take 1 capsule (200 mg total) by mouth 3 (three) times a day 270 capsule 3     No current facility-administered  medications for this visit.     Family History   Problem Relation Age of Onset    Heart attack Mother 62    Other Mother         Abdominal Aortic Aneurysm    Lung cancer Mother     Colon cancer Father         Cause of death    Diabetes Brother         Non-Insulin Dependent    Heart attack Brother 51        w/ Stents    Heart disease Brother     Other Son         Well     Social History     Tobacco Use    Smoking status: Former     Packs/day: 0.75     Years: 30.00     Pack years: 22.50     Types: Cigarettes     Quit date: 2020     Years since quittin.2    Smokeless tobacco: Never   Vaping Use    Vaping Use: Never used   Substance Use Topics    Alcohol use: Not Currently     Comment: Quit drinking in     Drug use: Not Currently       Review of Systems  Review of Systems   Constitutional: Negative.   HENT: Negative.     Eyes: Negative.    Cardiovascular:  Positive for dyspnea on exertion.   Respiratory: Negative.          Sleep apnea.  Does not use the CPAP consistently.   Endocrine: Negative.    Hematologic/Lymphatic: Negative.    Skin: Negative.    Musculoskeletal: Negative.    Gastrointestinal: Negative.    Genitourinary: Negative.    Neurological: Negative.    Psychiatric/Behavioral: Negative.     Allergic/Immunologic: Negative.        Objective         Physical Exam  Vitals reviewed.   Constitutional:       Appearance: He is well-developed. He is obese.   HENT:      Head: Normocephalic and atraumatic.   Eyes:      General: No scleral icterus.     Comments: Pupils equal   Neck:      Thyroid: No thyromegaly.      Vascular: No carotid bruit or JVD.   Cardiovascular:      Rate and Rhythm: Normal rate and regular rhythm.      Pulses: Normal pulses and intact distal pulses.           Carotid pulses are 2+ on the right side and 2+ on the left side.       Dorsalis pedis pulses are 2+ on the right side and 2+ on the left side.        Posterior tibial pulses are 2+ on the right side and 2+ on the left side.       "Heart sounds: Normal heart sounds. No murmur heard.    No friction rub. No gallop.      Comments: ICD left upper chest without erosion or infection.  Pulmonary:      Effort: Pulmonary effort is normal.      Breath sounds: Normal breath sounds. No wheezing, rhonchi or rales.   Abdominal:      General: Abdomen is flat. Bowel sounds are normal.      Palpations: Abdomen is soft.      Tenderness: There is no abdominal tenderness.   Musculoskeletal:         General: Normal range of motion.      Cervical back: Normal range of motion and neck supple.      Right lower leg: No edema.      Left lower leg: No edema.   Lymphadenopathy:      Cervical: No cervical adenopathy.   Skin:     General: Skin is warm and dry.      Findings: No rash.   Neurological:      General: No focal deficit present.      Mental Status: He is alert and oriented to person, place, and time.   Psychiatric:         Mood and Affect: Mood normal.         Behavior: Behavior normal.         Thought Content: Thought content normal.         Judgment: Judgment normal.       Data Review:   Vitals:    09/16/22 1022   BP: 106/64   Pulse: 60   SpO2: 98%   Height: 1.803 m (5' 11\")   Weight: 127.9 kg (282 lb)   PainSc: 0-No pain   Patient Position: Sitting     Sodium   Date Value Ref Range Status   07/28/2022 136 135 - 145 mmol/L Final   06/20/2022 136 135 - 145 mmol/L Final   04/27/2022 138 135 - 145 mmol/L Final     Potassium   Date Value Ref Range Status   07/28/2022 4.2 3.5 - 5.1 MMOL/L Final   06/20/2022 4.4 3.5 - 5.1 MMOL/L Final   04/27/2022 4.1 3.5 - 5.1 MMOL/L Final     CO2   Date Value Ref Range Status   07/28/2022 25 21 - 32 mmol/L Final   06/20/2022 25 21 - 32 mmol/L Final   04/27/2022 25 21 - 32 mmol/L Final     BUN   Date Value Ref Range Status   07/28/2022 11 7 - 25 mg/dL Final   06/20/2022 9 7 - 25 mg/dL Final   04/27/2022 14 7 - 25 mg/dL Final     Creatinine   Date Value Ref Range Status   07/28/2022 0.86 0.70 - 1.30 mg/dL Final   06/20/2022 0.91 0.70 " - 1.30 mg/dL Final   04/27/2022 1.01 0.70 - 1.30 mg/dL Final     Glucose   Date Value Ref Range Status   07/28/2022 128 (H) 70 - 105 mg/dL Final   06/20/2022 111 (H) 70 - 105 mg/dL Final   04/27/2022 139 (H) 70 - 105 mg/dL Final     Calcium   Date Value Ref Range Status   07/28/2022 8.8 8.6 - 10.3 mg/dL Final   06/20/2022 9.3 8.6 - 10.3 mg/dL Final   04/27/2022 9.2 8.6 - 10.3 mg/dL Final     hsTnI 0 Hour   Date Value Ref Range Status   01/25/2022 27.2 (H) <=19.8 pg/mL Final   10/06/2021 26.2 (H) <=19.8 pg/mL Final   09/19/2021 92.7 (H) <=19.8 pg/mL Final     BNP   Date Value Ref Range Status   01/25/2022 79 0 - 100 pg/mL Final   07/12/2021 50 0 - 100 pg/mL Final   07/10/2021 51 0 - 100 pg/mL Final     WBC   Date Value Ref Range Status   07/28/2022 3.8 3.7 - 9.6 10*3/uL Final   06/20/2022 5.1 3.7 - 9.6 10*3/uL Final   04/27/2022 4.8 3.7 - 9.6 10*3/uL Final     Hemoglobin   Date Value Ref Range Status   07/28/2022 15.3 13.2 - 17.2 g/dL Final   06/20/2022 15.0 13.2 - 17.2 g/dL Final   04/27/2022 15.5 13.2 - 17.2 g/dL Final     Hematocrit   Date Value Ref Range Status   07/28/2022 42.9 38.0 - 50.0 % Final   06/20/2022 43.3 38.0 - 50.0 % Final   04/27/2022 44.7 38.0 - 50.0 % Final     MCV   Date Value Ref Range Status   07/28/2022 89.8 82.0 - 97.0 fL Final   06/20/2022 92.1 82.0 - 97.0 fL Final   04/27/2022 91.3 82.0 - 97.0 fL Final     Platelets   Date Value Ref Range Status   07/28/2022 167 130 - 350 10*3/uL Final   06/20/2022 207 130 - 350 10*3/uL Final   04/27/2022 217 130 - 350 10*3/uL Final     Cholesterol   Date Value Ref Range Status   07/28/2022 106 0 - 199 mg/dL Final   07/11/2021 119 0 - 199 mg/dL Final   06/08/2020 127 0 - 199 mg/dL Final     Triglycerides   Date Value Ref Range Status   07/28/2022 154 (H) <=149 mg/dL Final   07/11/2021 120 <=149 mg/dL Final   06/08/2020 112 <=149 mg/dL Final     HDL   Date Value Ref Range Status   07/28/2022 25 (L) >=40 mg/dL Final   07/11/2021 35 (L) >=40 mg/dL Final    06/08/2020 29 (L) >=40 mg/dL Final     LDL Calculated   Date Value Ref Range Status   07/28/2022 50 20 - 99 mg/dL Final   07/11/2021 60 20 - 99 mg/dL Final   06/08/2020 76 20 - 99 mg/dL Final           Assessment/Plan   Diagnoses and all orders for this visit:    Cerebrovascular accident (CVA), unspecified mechanism (CMS/HCC) (HCC)    Obstructive sleep apnea    Anticoagulated    Coronary artery disease involving native heart, unspecified vessel or lesion type, unspecified whether angina present    AICD discharge    Long term current use of antiarrhythmic drug    Paroxysmal atrial fibrillation (CMS/HCC) (HCC)    Chronic anticoagulation    Sustained ventricular tachycardia (CMS/HCC) (HCC)    Chronic combined systolic and diastolic CHF, NYHA class 2 and ACC/AHA stage C (CMS/HCC) ([facility])    On amiodarone therapy    Automatic implantable cardioverter-defibrillator in situ    S/P ablation of ventricular arrhythmia    Mixed hyperlipidemia    Ischemic cardiomyopathy    Primary hypertension    Plan/recommendation:  1.  Ventricular tachycardia: Pete is a pleasant 56-year-old male with a history of ventricular tachycardia.  He has undergone 2 different VT ablations.  The most recent one was done at [facility] by Dr. Stanley on 11/29/2021.  He subsequently had recurrence with a slower VT in the 130 bpm range.  He received ATP x 8 however did not reach the VT zone programmed 460 bpm and therefore did not receive any shocks.  ATP was unsuccessful however he converted spontaneously while in the ER.  There were no identifiable triggers or electrolyte abnormalities.  He was resumed on mexiletine 200 mg twice daily and device settings adjusted.  He was continued on amiodarone 200 mg daily.  He had follow-up with them on 1/31/2022 and was instructed to increase the mexiletine to 200 mg 3 times daily and continue on the amiodarone 200 mg daily.  Initially there was plan to consider a third ablation however that has not been done.  He is  scheduled to follow-up with them in November.  Device check today did not reveal any ventricular arrhythmias.  His biventricular ICD  was just changed out 3 months ago.  2.  Biventricular ICD: He is 100% BiV paced.  93% atrial paced.  No ventricular events noted on the counters.  Site has healed up nicely without any issue.  3.  Atrial fibrillation: Has not had any mode switching episodes on the amiodarone.  Continue to monitor.LA VI 33 mL/m².  4.  Anticoagulation: Currently anticoagulated with Xarelto 20 mg daily.  His [id number] score is at least 5.  He has not had any bleeding issues.  5.  CAD: Follows with Dr. Edwards and Ryann Aleman, CNP.  Asymptomatic currently.  6.  Cardiomyopathy: Ejection fraction 20 to 25% on limited echo 1/26/2022.  Has a history of LV mural thrombus, anteroapical akinetic aneurysm.  No thrombus noted with Definity on echo 1/22.  On GDMT with spironolactone, carvedilol and Entresto.  Walking as he had recently due to the extremely hot weather however plans to restart.  7.  CVA: Had a CVA at the time of his MI in 2010 with no significant residual deficit.  8.  Obesity: BMI 39.3.  Diet exercise weight loss recommended.  Lifestyle recommendations provided.    No medication changes were made today.  We will plan on seeing him back in the clinic in 6 months for follow-up or sooner if the need arises.  We will review the records from [place] when they become available after his November 1, 2022 appointment.

## 2022-09-16 ENCOUNTER — ANCILLARY PROCEDURE (OUTPATIENT)
Dept: CARDIOLOGY | Facility: CLINIC | Age: 57
End: 2022-09-16
Payer: COMMERCIAL

## 2022-09-16 ENCOUNTER — OFFICE VISIT (OUTPATIENT)
Dept: CARDIOLOGY | Facility: CLINIC | Age: 57
End: 2022-09-16
Payer: COMMERCIAL

## 2022-09-16 VITALS
OXYGEN SATURATION: 98 % | HEIGHT: 71 IN | SYSTOLIC BLOOD PRESSURE: 106 MMHG | WEIGHT: 282 LBS | DIASTOLIC BLOOD PRESSURE: 64 MMHG | HEART RATE: 60 BPM | BODY MASS INDEX: 39.48 KG/M2

## 2022-09-16 DIAGNOSIS — Z95.810 AUTOMATIC IMPLANTABLE CARDIOVERTER-DEFIBRILLATOR IN SITU: ICD-10-CM

## 2022-09-16 DIAGNOSIS — I47.29 VENTRICULAR TACHYCARDIA (PAROXYSMAL) (CMS/HCC): ICD-10-CM

## 2022-09-16 DIAGNOSIS — Z86.79 S/P ABLATION OF VENTRICULAR ARRHYTHMIA: ICD-10-CM

## 2022-09-16 DIAGNOSIS — E78.2 MIXED HYPERLIPIDEMIA: ICD-10-CM

## 2022-09-16 DIAGNOSIS — I25.10 CORONARY ARTERY DISEASE INVOLVING NATIVE HEART, UNSPECIFIED VESSEL OR LESION TYPE, UNSPECIFIED WHETHER ANGINA PRESENT: ICD-10-CM

## 2022-09-16 DIAGNOSIS — I50.42 CHRONIC COMBINED SYSTOLIC AND DIASTOLIC CHF, NYHA CLASS 2 AND ACC/AHA STAGE C (CMS/HCC): ICD-10-CM

## 2022-09-16 DIAGNOSIS — Z45.02 ENCOUNTER FOR INTERROGATION OF CARDIAC DEFIBRILLATOR: Primary | ICD-10-CM

## 2022-09-16 DIAGNOSIS — I25.5 ISCHEMIC CARDIOMYOPATHY: ICD-10-CM

## 2022-09-16 DIAGNOSIS — Z45.02 ENCOUNTER FOR INTERROGATION OF CARDIAC DEFIBRILLATOR: ICD-10-CM

## 2022-09-16 DIAGNOSIS — Z79.899 LONG TERM CURRENT USE OF ANTIARRHYTHMIC DRUG: ICD-10-CM

## 2022-09-16 DIAGNOSIS — I63.9 CEREBROVASCULAR ACCIDENT (CVA), UNSPECIFIED MECHANISM (CMS/HCC): Primary | ICD-10-CM

## 2022-09-16 DIAGNOSIS — E08.00 DIABETES MELLITUS DUE TO UNDERLYING CONDITION WITH HYPEROSMOLARITY WITHOUT COMA, WITHOUT LONG-TERM CURRENT USE OF INSULIN (CMS/HCC): ICD-10-CM

## 2022-09-16 DIAGNOSIS — Z79.01 ANTICOAGULATED: ICD-10-CM

## 2022-09-16 DIAGNOSIS — I48.0 PAROXYSMAL ATRIAL FIBRILLATION (CMS/HCC): ICD-10-CM

## 2022-09-16 DIAGNOSIS — I25.10 LAD STENOSIS: ICD-10-CM

## 2022-09-16 DIAGNOSIS — E66.813 CLASS 3 SEVERE OBESITY DUE TO EXCESS CALORIES WITH SERIOUS COMORBIDITY IN ADULT, UNSPECIFIED BMI (CMS/HCC): ICD-10-CM

## 2022-09-16 DIAGNOSIS — Z95.5 PRESENCE OF STENT IN CORONARY ARTERY: ICD-10-CM

## 2022-09-16 DIAGNOSIS — Z98.890 S/P ABLATION OF VENTRICULAR ARRHYTHMIA: ICD-10-CM

## 2022-09-16 DIAGNOSIS — E66.01 CLASS 3 SEVERE OBESITY DUE TO EXCESS CALORIES WITH SERIOUS COMORBIDITY IN ADULT, UNSPECIFIED BMI (CMS/HCC): ICD-10-CM

## 2022-09-16 DIAGNOSIS — Z45.02 AICD DISCHARGE: ICD-10-CM

## 2022-09-16 DIAGNOSIS — I47.20 VENTRICULAR TACHYCARDIA (CMS/HCC): ICD-10-CM

## 2022-09-16 DIAGNOSIS — I10 PRIMARY HYPERTENSION: ICD-10-CM

## 2022-09-16 DIAGNOSIS — G47.33 OBSTRUCTIVE SLEEP APNEA: ICD-10-CM

## 2022-09-16 DIAGNOSIS — I47.20 SUSTAINED VENTRICULAR TACHYCARDIA (CMS/HCC): ICD-10-CM

## 2022-09-16 DIAGNOSIS — Z79.01 CHRONIC ANTICOAGULATION: ICD-10-CM

## 2022-09-16 DIAGNOSIS — I47.20 V TACH (CMS/HCC): ICD-10-CM

## 2022-09-16 DIAGNOSIS — Z79.899 ON AMIODARONE THERAPY: ICD-10-CM

## 2022-09-16 LAB
BSA FOR ECHO PROCEDURE: 2.53 M2
LEFT ABI: 1.14
LEFT ANTERIOR TIBIAL: 150
LEFT ARM BP: 138
LEFT POSTERIOR TIBIAL: 157
LEFTATADPA: NORMAL
RIGHT ABI: 1.12
RIGHT ANTERIOR TIBIAL: 151
RIGHT ARM BP: 133
RIGHT POSTERIOR TIBIAL: 154
RIGHTATADPA: NORMAL

## 2022-09-16 PROCEDURE — 93922 UPR/L XTREMITY ART 2 LEVELS: CPT

## 2022-09-16 PROCEDURE — 93284 PRGRMG EVAL IMPLANTABLE DFB: CPT | Mod: TC | Performed by: INTERNAL MEDICINE

## 2022-09-16 PROCEDURE — 93284 PRGRMG EVAL IMPLANTABLE DFB: CPT

## 2022-09-16 PROCEDURE — 93922 UPR/L XTREMITY ART 2 LEVELS: CPT | Mod: 26 | Performed by: INTERNAL MEDICINE

## 2022-09-16 PROCEDURE — 93284 PRGRMG EVAL IMPLANTABLE DFB: CPT | Mod: 26 | Performed by: INTERNAL MEDICINE

## 2022-09-16 PROCEDURE — 93005 ELECTROCARDIOGRAM TRACING: CPT | Performed by: NURSE PRACTITIONER

## 2022-09-16 PROCEDURE — 99214 OFFICE O/P EST MOD 30 MIN: CPT | Mod: 25 | Performed by: NURSE PRACTITIONER

## 2022-09-16 PROCEDURE — G0463 HOSPITAL OUTPT CLINIC VISIT: HCPCS | Mod: 25 | Performed by: NURSE PRACTITIONER

## 2022-09-16 RX ORDER — CHOLECALCIFEROL (VITAMIN D3) 25 MCG
1000 TABLET ORAL DAILY
COMMUNITY

## 2022-09-16 RX ORDER — MEXILETINE HYDROCHLORIDE 200 MG/1
200 CAPSULE ORAL 3 TIMES DAILY
Qty: 270 CAPSULE | Refills: 3 | Status: SHIPPED | OUTPATIENT
Start: 2022-09-16 | End: 2022-11-16 | Stop reason: SDUPTHER

## 2022-09-16 ASSESSMENT — ENCOUNTER SYMPTOMS
HEMATOLOGIC/LYMPHATIC NEGATIVE: 1
NEUROLOGICAL NEGATIVE: 1
EYES NEGATIVE: 1
PSYCHIATRIC NEGATIVE: 1
RESPIRATORY NEGATIVE: 1
MUSCULOSKELETAL NEGATIVE: 1
GASTROINTESTINAL NEGATIVE: 1
ENDOCRINE NEGATIVE: 1
ALLERGIC/IMMUNOLOGIC NEGATIVE: 1
CONSTITUTIONAL NEGATIVE: 1

## 2022-09-16 ASSESSMENT — PAIN SCALES - GENERAL: PAINLEVEL: 0-NO PAIN

## 2022-09-16 NOTE — PATIENT INSTRUCTIONS
Followup in 6 months    Continue same meds    Cardiac prevention:  1.  Lipid profile check annually.  2.  Diabetes screening annually.  3.  Maintain healthy blood pressure with a goal 120/80 or below consistently.  4.  Follow-up no added salt diet.  5.  Maintain healthy eating habits:  Primarily plant-based diet.  Lean meats, chicken, seafood several times a week or fish oil capsules, avoid processed meats or excessive red meats, cut off visible fat  Whole grains/beans peas lentils quinoa, farro,  Avoid refined carbohydrates: Cookies, pies, cakes, donuts, ice cream, candy.  5+ servings of fruits/vegetables daily.  6.  If you smoke smoking cessation recommended.  If you need help call the [place] quit hotline [phone] CST Monday through Friday 7 AM to 11 PM and 8 AM to 5 PM Saturday.  7.  Maintain healthy weight with goal for BMI in the normal range less than 25.  8.  Exercise regularly: 30 to 45 minutes most days of the week.  Weightbearing exercise helps with bone health.  Weight training several times a week to maintain muscular strength.  9.  Maintain adequate hydration with 6 to 8 glasses of water per day.  Goal should be approximately 2 quarts daily of all fluids combined.  Coffee can act as a diuretic.  Recommend not more than 1 to 2 cups/day. Avoid any sugar sweetened beverages.    Exercise/weight management:  1.  Strive to maintain close to ideal body weight  2.  Exercise at least 150 minutes/week with a combination of cardio and strength training  3.  Avoid  alcohol if you have atrial fibrillation.  Recommend less than 1 alcoholic beverage per day for females and no more than 2 daily for males.    For patient with arterial disease (coronary, peripheral arterial disease ) a plant based diet may reverse plaque buildup: In addition to cholesterol lowering medications consider adding the following:    Look into Dr. Cooper's diet and the Ornish diet which are plant based. The Pritikin diet allows for  "some meat and may be a better place to start initially.  Look into getting aged garlic extract (kyolic) which has been shown to reverse plaque on CAT scans  Look into plant sterols (cholest-off) which may help lower cholesterol levels  Try to increase fiber intake particularly before meals that do not have a lot of fruits and vegetables in them or before meals that have meat or dairy in them to help bind up some of the cholesterol and saturated fat in your bowels  Consider watching The Game Changer on [employer] and other documentaries like Speculator over knives, Food Inc, Fast Food Nation etc...  Try to maintain less than 2 gram salt restricted diet and avoid adding salt to food. Read food labels for hidden salt.  Look into books like Hooked, Fast Food Nation, Un-Do-It, Fiber Fueled  IF SOMETHING COMES IN A PACKAGE THINK TWICE ABOUT EATING IT, ESPECIALLY IF IT CONTAINS WORDS YOU CANNOT PRONOUNCE OR \"NATURAL AND ARTIFICAL FLAVORS\"       Dietary and lifestyle recommendations  The Pritikin diet allows for some meat and may be a better place to start initially. Remember that smaller changes are often best because the stack up and lead to more sustainable large changes  Look into Dr. Cooper's diet and the Ornish diet which are plant based.   Try to increase fiber intake particularly before meals that do not have a lot of fruits and vegetables in them or before meals that have meat or dairy in them to help bind up some of the cholesterol and saturated fat in your bowels  Consider watching the game changer on [employer] and other documentaries like Speculator over knives, Food Inc, Fast Food Nation, What the Health etc...  Try to maintain less than 2 gram salt restricted diet and avoid adding salt to food. Read food labels for hidden salt.  Look into books like [employer], Fast Food Nation, Un-Do-It, Fiber Fueled, How Not to Diet, How Not to Die  Look into a nonprofit website called nutritionfacts.org for resources   Cookbooks:   The No " "Meat Athlete Cookbook: Whole Food, Plant-Based Recipes to Fuel Your Workouts?and the Rest of Your Life  IF SOMETHING COMES IN A PACKAGE THINK TWICE ABOUT EATING IT, ESPECIALLY IF IT CONTAINS WORDS YOU CANNOT PRONOUNCE OR \"NATURAL AND ARTIFICAL FLAVORS\"  For recipes go to:  DrDoctor  Look to get your medications on [employer] for cheaper perscriptions  Consider shopping at [employer] or the Market in town and or the [employer] market    "

## 2022-09-16 NOTE — LETTER
[facility]  [address] [place] [address]  [phone]  Dept: [phone]  09/16/22        No Recipients        To:   No Recipients      Thank you for referring your patient, Peet Trejo, to receive care in my office. I have enclosed a summary of the care provided to Pete on 09/16/22.    Please contact me with any questions you may have regarding the visit.    Sincerely,         Merced Newton, CNP  [address] [place] [address]  [phone]    CC:   No Recipients

## 2022-09-23 PROCEDURE — 93010 ELECTROCARDIOGRAM REPORT: CPT | Performed by: INTERNAL MEDICINE

## 2022-11-08 PROCEDURE — 93296 REM INTERROG EVL PM/IDS: CPT | Performed by: INTERNAL MEDICINE

## 2022-11-08 PROCEDURE — 93295 DEV INTERROG REMOTE 1/2/MLT: CPT | Performed by: INTERNAL MEDICINE

## 2022-11-16 DIAGNOSIS — I47.20 VENTRICULAR TACHYCARDIA (CMS/HCC): ICD-10-CM

## 2022-11-16 RX ORDER — MEXILETINE HYDROCHLORIDE 200 MG/1
200 CAPSULE ORAL 3 TIMES DAILY
Qty: 270 CAPSULE | Refills: 0 | Status: SHIPPED | OUTPATIENT
Start: 2022-11-16 | End: 2023-10-20 | Stop reason: HOSPADM

## 2022-11-16 NOTE — TELEPHONE ENCOUNTER
Patient is requesting a refill of mexiletine. Patient is overdue for a magnesium level. Please schedule patient for lab, thanks!

## 2022-11-28 ENCOUNTER — APPOINTMENT (OUTPATIENT)
Dept: LAB | Facility: CLINIC | Age: 57
End: 2022-11-28
Payer: COMMERCIAL

## 2022-11-28 DIAGNOSIS — I47.20 VENTRICULAR TACHYCARDIA (CMS/HCC): ICD-10-CM

## 2022-11-28 LAB
ALBUMIN SERPL-MCNC: 4.1 G/DL (ref 3.5–5.3)
ALP SERPL-CCNC: 66 U/L (ref 45–115)
ALT SERPL-CCNC: 39 U/L (ref 7–52)
ANION GAP SERPL CALC-SCNC: 10 MMOL/L (ref 3–11)
AST SERPL-CCNC: 24 U/L
BILIRUB SERPL-MCNC: 0.94 MG/DL (ref 0.2–1.4)
BUN SERPL-MCNC: 11 MG/DL (ref 7–25)
CALCIUM ALBUM COR SERPL-MCNC: 9.5 MG/DL (ref 8.6–10.3)
CALCIUM SERPL-MCNC: 9.6 MG/DL (ref 8.6–10.3)
CHLORIDE SERPL-SCNC: 104 MMOL/L (ref 98–107)
CO2 SERPL-SCNC: 24 MMOL/L (ref 21–32)
CREAT SERPL-MCNC: 0.88 MG/DL (ref 0.7–1.3)
GFR SERPL CREATININE-BSD FRML MDRD: 101 ML/MIN/1.73M*2
GLUCOSE SERPL-MCNC: 126 MG/DL (ref 70–105)
MAGNESIUM SERPL-MCNC: 1.9 MG/DL (ref 1.8–2.4)
POTASSIUM SERPL-SCNC: 4.5 MMOL/L (ref 3.5–5.1)
PROT SERPL-MCNC: 6.8 G/DL (ref 6–8.3)
SODIUM SERPL-SCNC: 138 MMOL/L (ref 135–145)

## 2022-11-28 PROCEDURE — 36415 COLL VENOUS BLD VENIPUNCTURE: CPT

## 2022-11-28 PROCEDURE — 83735 ASSAY OF MAGNESIUM: CPT | Performed by: NURSE PRACTITIONER

## 2022-11-28 PROCEDURE — 80053 COMPREHEN METABOLIC PANEL: CPT | Performed by: NURSE PRACTITIONER

## 2023-01-25 DIAGNOSIS — I10 PRIMARY HYPERTENSION: Primary | ICD-10-CM

## 2023-01-25 DIAGNOSIS — E11.9 TYPE 2 DIABETES MELLITUS WITHOUT COMPLICATION, WITHOUT LONG-TERM CURRENT USE OF INSULIN (CMS/HCC): ICD-10-CM

## 2023-01-26 ENCOUNTER — LAB (OUTPATIENT)
Dept: LAB | Facility: CLINIC | Age: 58
End: 2023-01-26
Payer: MEDICARE

## 2023-01-26 ENCOUNTER — OFFICE VISIT (OUTPATIENT)
Dept: INTERNAL MEDICINE | Facility: CLINIC | Age: 58
End: 2023-01-26
Payer: MEDICARE

## 2023-01-26 VITALS
OXYGEN SATURATION: 97 % | WEIGHT: 288.4 LBS | HEART RATE: 62 BPM | DIASTOLIC BLOOD PRESSURE: 78 MMHG | TEMPERATURE: 97.5 F | RESPIRATION RATE: 15 BRPM | SYSTOLIC BLOOD PRESSURE: 138 MMHG | HEIGHT: 71 IN | BODY MASS INDEX: 40.38 KG/M2

## 2023-01-26 DIAGNOSIS — M54.50 ACUTE RIGHT-SIDED LOW BACK PAIN WITHOUT SCIATICA: Primary | ICD-10-CM

## 2023-01-26 DIAGNOSIS — I50.42 CHRONIC COMBINED SYSTOLIC AND DIASTOLIC CHF, NYHA CLASS 2 AND ACC/AHA STAGE C (CMS/HCC): ICD-10-CM

## 2023-01-26 DIAGNOSIS — I10 PRIMARY HYPERTENSION: ICD-10-CM

## 2023-01-26 DIAGNOSIS — E11.9 TYPE 2 DIABETES MELLITUS WITHOUT COMPLICATION, WITHOUT LONG-TERM CURRENT USE OF INSULIN (CMS/HCC): ICD-10-CM

## 2023-01-26 DIAGNOSIS — Z98.890 S/P ABLATION OF VENTRICULAR ARRHYTHMIA: ICD-10-CM

## 2023-01-26 DIAGNOSIS — Z86.79 S/P ABLATION OF VENTRICULAR ARRHYTHMIA: ICD-10-CM

## 2023-01-26 DIAGNOSIS — Z86.79 HISTORY OF VENTRICULAR TACHYCARDIA: ICD-10-CM

## 2023-01-26 DIAGNOSIS — I10 PRIMARY HYPERTENSION: Primary | ICD-10-CM

## 2023-01-26 LAB
ANION GAP SERPL CALC-SCNC: 6 MMOL/L (ref 3–11)
BUN SERPL-MCNC: 14 MG/DL (ref 7–25)
CALCIUM SERPL-MCNC: 9 MG/DL (ref 8.6–10.3)
CHLORIDE SERPL-SCNC: 104 MMOL/L (ref 98–107)
CO2 SERPL-SCNC: 28 MMOL/L (ref 21–32)
CREAT SERPL-MCNC: 0.97 MG/DL (ref 0.7–1.3)
ERYTHROCYTE [DISTWIDTH] IN BLOOD BY AUTOMATED COUNT: 14.1 % (ref 11.5–15)
EST. AVERAGE GLUCOSE BLD GHB EST-MCNC: 142.7 MG/DL
GFR SERPL CREATININE-BSD FRML MDRD: 92 ML/MIN/1.73M*2
GLUCOSE SERPL-MCNC: 130 MG/DL (ref 70–105)
HBA1C MFR BLD: 6.6 % (ref 4–6)
HCT VFR BLD AUTO: 44.4 % (ref 38–50)
HGB BLD-MCNC: 15 G/DL (ref 13.2–17.2)
MCH RBC QN AUTO: 31.5 PG (ref 29–34)
MCHC RBC AUTO-ENTMCNC: 33.8 G/DL (ref 32–36)
MCV RBC AUTO: 93.2 FL (ref 82–97)
PLATELET # BLD AUTO: 207 10*3/UL (ref 130–350)
PMV BLD AUTO: 7.6 FL (ref 6.9–10.8)
POTASSIUM SERPL-SCNC: 4.6 MMOL/L (ref 3.5–5.1)
RBC # BLD AUTO: 4.77 10*6/ΜL (ref 4.1–5.8)
SODIUM SERPL-SCNC: 138 MMOL/L (ref 135–145)
WBC # BLD AUTO: 5.3 10*3/UL (ref 3.7–9.6)

## 2023-01-26 PROCEDURE — 80048 BASIC METABOLIC PNL TOTAL CA: CPT | Mod: PO | Performed by: INTERNAL MEDICINE

## 2023-01-26 PROCEDURE — 83036 HEMOGLOBIN GLYCOSYLATED A1C: CPT | Mod: PO | Performed by: INTERNAL MEDICINE

## 2023-01-26 PROCEDURE — 90686 IIV4 VACC NO PRSV 0.5 ML IM: CPT | Mod: FLU,PO | Performed by: INTERNAL MEDICINE

## 2023-01-26 PROCEDURE — 83036 HEMOGLOBIN GLYCOSYLATED A1C: CPT | Mod: PO

## 2023-01-26 PROCEDURE — G0463 HOSPITAL OUTPT CLINIC VISIT: HCPCS | Mod: 25,PO | Performed by: INTERNAL MEDICINE

## 2023-01-26 PROCEDURE — 85027 COMPLETE CBC AUTOMATED: CPT | Mod: PO

## 2023-01-26 PROCEDURE — 90471 IMMUNIZATION ADMIN: CPT | Mod: FLU,PO | Performed by: INTERNAL MEDICINE

## 2023-01-26 PROCEDURE — 99214 OFFICE O/P EST MOD 30 MIN: CPT | Mod: 25 | Performed by: INTERNAL MEDICINE

## 2023-01-26 PROCEDURE — 36415 COLL VENOUS BLD VENIPUNCTURE: CPT | Mod: PO

## 2023-01-26 PROCEDURE — 36415 COLL VENOUS BLD VENIPUNCTURE: CPT | Mod: PO | Performed by: INTERNAL MEDICINE

## 2023-01-26 PROCEDURE — 80048 BASIC METABOLIC PNL TOTAL CA: CPT | Mod: PO

## 2023-01-26 PROCEDURE — 85027 COMPLETE CBC AUTOMATED: CPT | Mod: PO | Performed by: INTERNAL MEDICINE

## 2023-01-26 RX ORDER — FUROSEMIDE 40 MG/1
40 TABLET ORAL DAILY
Qty: 90 TABLET | Refills: 3 | Status: SHIPPED | OUTPATIENT
Start: 2023-01-26 | End: 2023-05-04 | Stop reason: DRUGHIGH

## 2023-01-26 RX ORDER — MAGNESIUM CHLORIDE 64 MG
64 TABLET, DELAYED RELEASE (ENTERIC COATED) ORAL 2 TIMES DAILY WITH MEALS
Qty: 180 TABLET | Refills: 3 | Status: SHIPPED | OUTPATIENT
Start: 2023-01-26 | End: 2023-10-25 | Stop reason: SDUPTHER

## 2023-01-26 RX ORDER — POTASSIUM CHLORIDE 1500 MG/1
20 TABLET, EXTENDED RELEASE ORAL DAILY
Qty: 90 TABLET | Refills: 3 | Status: SHIPPED | OUTPATIENT
Start: 2023-01-26 | End: 2023-05-02 | Stop reason: DRUGHIGH

## 2023-01-26 RX ORDER — METFORMIN HYDROCHLORIDE 500 MG/1
500 TABLET, EXTENDED RELEASE ORAL
Qty: 90 TABLET | Refills: 3 | Status: SHIPPED | OUTPATIENT
Start: 2023-01-26 | End: 2023-10-20 | Stop reason: HOSPADM

## 2023-01-26 RX ORDER — CYCLOBENZAPRINE HCL 5 MG
5 TABLET ORAL 3 TIMES DAILY PRN
Qty: 30 TABLET | Refills: 0 | Status: SHIPPED | OUTPATIENT
Start: 2023-01-26 | End: 2023-05-02 | Stop reason: DRUGHIGH

## 2023-01-26 ASSESSMENT — PAIN SCALES - GENERAL: PAINLEVEL: 0-NO PAIN

## 2023-01-26 NOTE — PROGRESS NOTES
Subjective      Pete Trejo is a 57 y.o. male who presents for follow-up for chronic conditions including coronary artery disease, obstructive sleep apnea, congestive heart failure, hyperlipidemia, and diabetes mellitus type 2.    HPI    Concerns today:   - Following with electrophysiology in [place] with plans for switching from amiodarone to sotalol although according to some notes there is a question about possibly switching to tikosyn instead.  No palpitations recently.  No chest pain.      Chronic issues to follow-up   - Coronary artery disease:   He has been trying to get his weight down.  No chest pain.  Does walk half a mile everyday without chest pain.     - Obstructive sleep apnea: On CPAP at 8 cmH2O, doing well on this.     - Congestive heart failure:  Stable, no PND or orthopnea.     - Ventricular tachycardia: As described above patient does follow with [facility].  On amiodarone therapy as well as mexiletine. Pacemaker battery replaced at the end of last month.       - History of CVA: No persistent deficit.  No slurring of speech, no difficulty swallowing, no focal weakness.  He does notice that sometimes his leg will go numb on the left side but it has been there for years, but no pain with this.     - Hyperlipidemia: LDL under good control at 51    - GERD:  No stomach issues.    - History of tobacco use: no recent use.    - Diabetes mellitus type 2: Recently started on metformin.      The following have been reviewed and updated as appropriate in this visit:   Tobacco  Allergies  Meds  Problems  Med Hx  Surg Hx  Fam Hx         Allergies   Allergen Reactions    Morphine      ANXIETY    Tramadol      ANXIETY     Current Outpatient Medications   Medication Sig Dispense Refill    mexiletine (MEXITIL) 200 mg capsule Take 1 capsule (200 mg total) by mouth 3 (three) times a day 270 capsule 0    carvediloL (Coreg) 25 mg tablet Take 1 tablet (25 mg total) by mouth 2 (two) times a day with meals 180  tablet 3    rosuvastatin (Crestor) 40 mg tablet Take 1 tablet (40 mg total) by mouth nightly 90 tablet 3    sacubitriL-valsartan (ENTRESTO) 24-26 mg tablet Take 1 tablet by mouth 2 (two) times a day 180 tablet 3    spironolactone (ALDACTONE) 25 mg tablet Take 1 tablet (25 mg total) by mouth 2 (two) times a day 180 tablet 3    amiodarone (PACERONE) 200 mg tablet Take 1 tablet (200 mg total) by mouth daily (Patient taking differently: Take 100 mg by mouth daily) 30 tablet 6    albuterol HFA (PROVENTIL HFA;VENTOLIN HFA) 90 mcg/actuation inhaler Inhale 2 puffs every 6 (six) hours as needed for wheezing or shortness of breath 1 Inhaler 1    nitroglycerin (NITROSTAT) 0.4 mg SL tablet Place 0.4 mg under the tongue every 5 (five) minutes as needed for chest pain.      pantoprazole (PROTONIX) 40 mg EC tablet Take 40 mg by mouth daily.      cyclobenzaprine (FLEXERIL) 5 mg tablet Take 1 tablet (5 mg total) by mouth 3 (three) times a day as needed for muscle spasms for up to 10 days 30 tablet 0    furosemide (LASIX) 40 mg tablet Take 1 tablet (40 mg total) by mouth daily If weight goes up 3 pounds overnight take an extra dose of Lasix x1 day 90 tablet 3    magnesium chloride (SLOW-MAG) 64 mg tablet,delayed release (DR/EC) Take 1 tablet (64 mg total) by mouth 2 (two) times a day with meals 180 tablet 3    metFORMIN XR (GLUCOPHAGE-XR) 500 mg 24 hr tablet Take 1 tablet (500 mg total) by mouth daily with breakfast 90 tablet 3    potassium chloride (K-TAB) 20 mEq CR tablet Take 1 tablet (20 mEq total) by mouth daily 90 tablet 3    cholecalciferol, vitamin D3, (cholecalciferol) 25 mcg (1,000 unit) tablet Take 1,000 Units by mouth daily      rivaroxaban (Xarelto) 20 mg tablet Take 1 tablet (20 mg total) by mouth daily 60 tablet 0    aspirin 81 mg EC tablet Take 1 tablet (81 mg total) by mouth daily 90 tablet 3     No current facility-administered medications for this visit.     Past Medical History:   Diagnosis Date    CAD (coronary  artery disease)     CHF (congestive heart failure) (CMS/[facility]) ([facility])     CVA (cerebral vascular accident) (CMS/[facility]) ([facility]) 2010    Dyslipidemia     GERD (gastroesophageal reflux disease)     GERD (gastroesophageal reflux disease)     Heart attack (CMS/[facility]) ([facility])     x3    Hypertension     Ischemic cardiomyopathy     Paroxysmal atrial fibrillation (CMS/[facility]) ([facility])     On Xarelto    V-tach     AICD in place     Past Surgical History:   Procedure Laterality Date    ABLATION VT N/A 06/09/2020    Procedure: Ablation VT;  Surgeon: Wilfredo Montana MD;  Location: [facility];  Service: Electrophysiology;  Laterality: N/A;  Check with Dr. Montana in the a.m. regarding scheduling    APPENDECTOMY      CORONARY ARTERY STENTING  2009    2.5 X 24-mm Taxus DIMAS to first diagonal. 3.0 X 8-mm Taxus stent to proximal LAD just proximal to diagonal.    CORONARY ARTERY STENTING  2010    3.5 X 15-mm Promus DIMAS to totally occluded LAD    CORONARY ARTERY STENTING  2011    2.25 X 30-mm Resolute DIMAS to 2nd diagonal.  Balloon angioplasty through strut to improve blood flow to distal LAD    CORONARY ARTERY STENTING  07/28/2017    second diagonal branch LAD Resolute 2.25 x 30-mm DIMAS    ICD DC GEN CHANGE N/A 6/20/2022    Procedure: BI-V ICD Gen Change;  Surgeon: Wilfredo Montana MD;  Location: [facility];  Service: Cardiovascular;  Laterality: N/A;    ICD SC NEW  2011    [employer] Cognis Model #N119, Serial #[id number]. [place] Reliance Model #0185, Serial #[id number]. LV lead Acuity Model #45+91, Serial #[id number]. Atrial lead Model #4469, Serial #[id number]     Family History   Problem Relation Age of Onset    Heart attack Mother 62    Other Mother         Abdominal Aortic Aneurysm    Lung cancer Mother     Colon cancer Father         Cause of death    Diabetes Brother         Non-Insulin Dependent    Heart attack Brother 51        w/ Stents    Heart disease Brother     Other Son         Well     Social History     Socioeconomic History    Marital  "status: Single   Tobacco Use    Smoking status: Former     Packs/day: 0.75     Years: 30.00     Pack years: 22.50     Types: Cigarettes     Quit date: 2020     Years since quittin.6    Smokeless tobacco: Never   Vaping Use    Vaping Use: Never used   Substance and Sexual Activity    Alcohol use: Not Currently     Comment: Quit drinking in     Drug use: Not Currently    Sexual activity: Defer     Social Determinants of Health     Tobacco Use: Medium Risk    Smoking Tobacco Use: Former    Smokeless Tobacco Use: Never       Review of Systems    Objective   /78 (BP Location: Right arm, Patient Position: Sitting, Cuff Size: Long Adult)   Pulse 62   Temp 36.4 °C (97.5 °F) (Temporal)   Resp 15   Ht 1.803 m (5' 11\")   Wt 130.8 kg (288 lb 6.4 oz)   SpO2 97%   BMI 40.22 kg/m²     Physical Exam  Vitals reviewed.   Constitutional:       General: He is not in acute distress.     Appearance: Normal appearance.   HENT:      Head: Normocephalic and atraumatic.      Nose: Nose normal. No congestion or rhinorrhea.      Mouth/Throat:      Mouth: Mucous membranes are moist.      Pharynx: Oropharynx is clear. No oropharyngeal exudate.   Eyes:      General: No scleral icterus.     Pupils: Pupils are equal, round, and reactive to light.   Neck:      Comments: Jugular venous pressure normal  Cardiovascular:      Rate and Rhythm: Normal rate and regular rhythm.      Pulses: Normal pulses.      Heart sounds: No murmur heard.  Pulmonary:      Effort: Pulmonary effort is normal. No respiratory distress.      Breath sounds: No wheezing or rales.   Musculoskeletal:         General: No tenderness or deformity.      Cervical back: Neck supple. No rigidity.      Right lower leg: No edema.      Left lower leg: No edema.   Lymphadenopathy:      Cervical: No cervical adenopathy.   Skin:     General: Skin is warm and dry.      Capillary Refill: Capillary refill takes less than 2 seconds.      Findings: No erythema or rash. "   Neurological:      Mental Status: He is alert.      Latest Reference Range & Units 01/26/23 07:15   Sodium 135 - 145 mmol/L 138   Potassium 3.5 - 5.1 MMOL/L 4.6   Chloride 98 - 107 mmol/L 104   CO2 21 - 32 mmol/L 28   Anion Gap 3 - 11 mmol/L 6   BUN 7 - 25 mg/dL 14   Creatinine, Ser 0.70 - 1.30 mg/dL 0.97   eGFR >60 mL/min/1.73m*2 92   Glucose 70 - 105 mg/dL 130 (H)   Calcium 8.6 - 10.3 mg/dL 9.0   WBC 3.7 - 9.6 10*3/uL 5.3   RBC 4.10 - 5.80 10*6/µL 4.77   Hemoglobin 13.2 - 17.2 g/dL 15.0   Hematocrit 38.0 - 50.0 % 44.4   MCV 82.0 - 97.0 fL 93.2   MCH 29.0 - 34.0 pg 31.5   MCHC 32.0 - 36.0 g/dL 33.8   RDW 11.5 - 15.0 % 14.1   Platelets 130 - 350 10*3/uL 207   MPV 6.9 - 10.8 fL 7.6   Hemoglobin A1C 4.0 - 6.0 % 6.6 (H)   Estimated Average Glucose mg/dL 142.7   (H): Data is abnormally high    ASSESSMENT AND PLAN   1. Acute right-sided low back pain without sciatica  Continuing cyclobenzaprine which patient will sometimes take for low back pain.  Also discussed the possibility of doing physical therapy however he is not interested in that at this time.  - cyclobenzaprine (FLEXERIL) 5 mg tablet; Take 1 tablet (5 mg total) by mouth 3 (three) times a day as needed for muscle spasms for up to 10 days  Dispense: 30 tablet; Refill: 0    2. Chronic combined systolic and diastolic CHF, NYHA class 2 and ACC/AHA stage C (CMS/HCC) (HCC)  Euvolemic on physical exam today.  - furosemide (LASIX) 40 mg tablet; Take 1 tablet (40 mg total) by mouth daily If weight goes up 3 pounds overnight take an extra dose of Lasix x1 day  Dispense: 90 tablet; Refill: 3    3. S/P ablation of ventricular arrhythmia  Magnesium is slightly low however we will have him increase his magnesium supplementation to twice daily  - magnesium chloride (SLOW-MAG) 64 mg tablet,delayed release (DR/EC); Take 1 tablet (64 mg total) by mouth 2 (two) times a day with meals  Dispense: 180 tablet; Refill: 3  - potassium chloride (K-TAB) 20 mEq CR tablet; Take 1 tablet  (20 mEq total) by mouth daily  Dispense: 90 tablet; Refill: 3    4. History of ventricular tachycardia  - magnesium chloride (SLOW-MAG) 64 mg tablet,delayed release (DR/EC); Take 1 tablet (64 mg total) by mouth 2 (two) times a day with meals  Dispense: 180 tablet; Refill: 3  - potassium chloride (K-TAB) 20 mEq CR tablet; Take 1 tablet (20 mEq total) by mouth daily  Dispense: 90 tablet; Refill: 3    5. Type 2 diabetes mellitus without complication, without long-term current use of insulin (CMS/[facility]) ([facility])  Hemoglobin A1c is at goal of 6.6.  Continuing metformin  - metFORMIN XR (GLUCOPHAGE-XR) 500 mg 24 hr tablet; Take 1 tablet (500 mg total) by mouth daily with breakfast  Dispense: 90 tablet; Refill: 3     Nikhil Smith MD

## 2023-03-09 DIAGNOSIS — I25.5 ISCHEMIC CARDIOMYOPATHY: ICD-10-CM

## 2023-03-09 RX ORDER — ROSUVASTATIN CALCIUM 40 MG/1
40 TABLET, COATED ORAL NIGHTLY
Qty: 90 TABLET | Refills: 0 | Status: SHIPPED | OUTPATIENT
Start: 2023-03-09 | End: 2024-07-16 | Stop reason: SDUPTHER

## 2023-04-03 ENCOUNTER — ANCILLARY PROCEDURE (OUTPATIENT)
Dept: CARDIOLOGY | Facility: CLINIC | Age: 58
End: 2023-04-03
Payer: MEDICARE

## 2023-04-03 DIAGNOSIS — Z45.02 ENCOUNTER FOR INTERROGATION OF CARDIAC DEFIBRILLATOR: ICD-10-CM

## 2023-04-03 DIAGNOSIS — Z45.02 ENCOUNTER FOR INTERROGATION OF CARDIAC DEFIBRILLATOR: Primary | ICD-10-CM

## 2023-04-03 PROCEDURE — 93284 PRGRMG EVAL IMPLANTABLE DFB: CPT

## 2023-04-04 PROCEDURE — 93284 PRGRMG EVAL IMPLANTABLE DFB: CPT | Mod: 26 | Performed by: INTERNAL MEDICINE

## 2023-05-01 DIAGNOSIS — Z79.899 ON AMIODARONE THERAPY: Primary | ICD-10-CM

## 2023-05-02 ENCOUNTER — OFFICE VISIT (OUTPATIENT)
Dept: CARDIOLOGY | Facility: CLINIC | Age: 58
End: 2023-05-02
Payer: MEDICARE

## 2023-05-02 VITALS
WEIGHT: 275 LBS | BODY MASS INDEX: 38.35 KG/M2 | DIASTOLIC BLOOD PRESSURE: 79 MMHG | SYSTOLIC BLOOD PRESSURE: 138 MMHG | RESPIRATION RATE: 14 BRPM | OXYGEN SATURATION: 95 % | HEART RATE: 66 BPM

## 2023-05-02 DIAGNOSIS — Z79.01 CHRONIC ANTICOAGULATION: ICD-10-CM

## 2023-05-02 DIAGNOSIS — I25.5 ISCHEMIC CARDIOMYOPATHY: ICD-10-CM

## 2023-05-02 DIAGNOSIS — I47.29 VENTRICULAR TACHYCARDIA (PAROXYSMAL) (CMS/HCC): Primary | ICD-10-CM

## 2023-05-02 DIAGNOSIS — I50.42 CHRONIC COMBINED SYSTOLIC AND DIASTOLIC CHF, NYHA CLASS 2 AND ACC/AHA STAGE C (CMS/HCC): ICD-10-CM

## 2023-05-02 DIAGNOSIS — Z95.810 AUTOMATIC IMPLANTABLE CARDIOVERTER-DEFIBRILLATOR IN SITU: ICD-10-CM

## 2023-05-02 PROCEDURE — G0463 HOSPITAL OUTPT CLINIC VISIT: HCPCS | Performed by: INTERNAL MEDICINE

## 2023-05-02 PROCEDURE — 99214 OFFICE O/P EST MOD 30 MIN: CPT | Performed by: INTERNAL MEDICINE

## 2023-05-02 PROCEDURE — 93005 ELECTROCARDIOGRAM TRACING: CPT | Performed by: INTERNAL MEDICINE

## 2023-05-02 RX ORDER — POTASSIUM CHLORIDE 750 MG/1
30 TABLET, EXTENDED RELEASE ORAL DAILY
COMMUNITY
Start: 2023-03-20 | End: 2023-10-25 | Stop reason: SDUPTHER

## 2023-05-02 RX ORDER — CYCLOBENZAPRINE HCL 10 MG
TABLET ORAL
Status: ON HOLD | COMMUNITY
Start: 2023-01-26 | End: 2023-10-19

## 2023-05-02 ASSESSMENT — ENCOUNTER SYMPTOMS
BRUISES/BLEEDS EASILY: 1
NEAR-SYNCOPE: 0
HEMATOCHEZIA: 0
IRREGULAR HEARTBEAT: 0
LIGHT-HEADEDNESS: 1
DIZZINESS: 0
SYNCOPE: 0
PALPITATIONS: 0
HEMATURIA: 0
PND: 0
ORTHOPNEA: 0
CLAUDICATION: 0

## 2023-05-02 ASSESSMENT — PAIN SCALES - GENERAL: PAINLEVEL: 0-NO PAIN

## 2023-05-02 NOTE — PROGRESS NOTES
Cardiology Outpatient Follow-up Note    Subjective    Patient ID: Pete Trejo is a 57 y.o. male.    Chief Complaint:   Chief Complaint   Patient presents with    Coronary Artery Disease   .  The patient is seen in follow-up from the standpoint of ischemic cardiomyopathy.  Patient has severely impaired LV systolic function from an old anterior wall myocardial infarction.  He is on anticoagulation due to the presence of previous LV thrombus.  He denies any complaints of angina.  He denies any VTE.  He states that he had his device checked recently and some tweaks were made and he feels a whole lot better since then where he has not noted a lot of heart racing or skipping.  He notes no shocks.    He had laboratory work performed yesterday at [facility].  We do not have that available yet.  He can still has occasional lightheadedness and dizziness.  He notes prompt diuresis with the diuretics and spends first 3 hours going to the bathroom after he takes it in the morning.  Sounds like he has not had any real fluid or volume problems and he can always take an additional dose if needed through again to reduce his furosemide to 20 mg daily to avoid him getting dehydrated.    He is also noted to have blood in his stool on exam.  Is going to get a colonoscopy.  He can hold his Xarelto for 3 days prior to that.  His lipid panel is excellent no other changes.  If he continues to have no evidence of recurrent ventricular tachycardia on his device checks I think would be reasonable to discontinue the mexiletine.    Coronary Artery Disease  Pertinent negatives include no chest pain, dizziness, leg swelling or palpitations.     Specialty Problems          Cardiology Problems    Hyperlipidemia        Hypertension        Ischemic cardiomyopathy        Automatic implantable cardioverter-defibrillator in situ        On amiodarone therapy        Presence of stent in coronary artery        S/P  ablation of ventricular arrhythmia        Chronic combined systolic and diastolic CHF, NYHA class 2 and ACC/AHA stage C (CMS/[facility])        Ventricular tachycardia (paroxysmal) (CMS/[facility])        Chronic anticoagulation        History of ventricular tachycardia        Paroxysmal atrial fibrillation (CMS/[facility])        Sustained ventricular tachycardia (CMS/[facility])        V tach (CMS/[facility])        Long term current use of antiarrhythmic drug           Past Medical History:   Diagnosis Date    CAD (coronary artery disease)     CHF (congestive heart failure) (CMS/[facility])     CVA (cerebral vascular accident) (CMS/[facility]) 2010    Dyslipidemia     GERD (gastroesophageal reflux disease)     GERD (gastroesophageal reflux disease)     Heart attack (CMS/[facility])     x3    Hypertension     Ischemic cardiomyopathy     Paroxysmal atrial fibrillation (CMS/[facility])     On Xarelto    V-tach (CMS/[facility])     AICD in place       Past Surgical History:   Procedure Laterality Date    ABLATION VT N/A 06/09/2020    Procedure: Ablation VT;  Surgeon: Wilfredo Montana MD;  Location: [facility] EP Lab;  Service: Electrophysiology;  Laterality: N/A;  Check with Dr. Montana in the a.m. regarding scheduling    APPENDECTOMY      CORONARY ARTERY STENTING  2009    2.5 X 24-mm Taxus DIMAS to first diagonal. 3.0 X 8-mm Taxus stent to proximal LAD just proximal to diagonal.    CORONARY ARTERY STENTING  2010    3.5 X 15-mm Promus DIMAS to totally occluded LAD    CORONARY ARTERY STENTING  2011    2.25 X 30-mm Resolute DIMAS to 2nd diagonal.  Balloon angioplasty through strut to improve blood flow to distal LAD    CORONARY ARTERY STENTING  07/28/2017    second diagonal branch LAD Resolute 2.25 x 30-mm DIMAS    ICD DC GEN CHANGE N/A 6/20/2022    Procedure: BI-V ICD Gen Change;  Surgeon: Wilfredo Montana MD;  Location: [facility] EP Lab;  Service: Cardiovascular;  Laterality: N/A;    ICD SC NEW  2011    [employer] Cognis Model #N119, Serial #[id number]. [employer] Reliance Model #0185, Serial #[id number].  LV lead Acuity Model #45+91, Serial #[id number]. Atrial lead Model #4469, Serial #[id number]       Allergies as of 05/02/2023 - Reviewed 05/02/2023   Allergen Reaction Noted    Morphine  07/30/2017    Tramadol  07/30/2017       Current Outpatient Medications   Medication Sig Dispense Refill    cyclobenzaprine (FLEXERIL) 10 mg tablet SMARTSIG:Tablet(s) By Mouth      potassium chloride 10 mEq CR tablet Take 1 tablet (10 mEq total) by mouth 2 (two) times a day      sotaloL (BETAPACE) 120 mg tablet Take 1 tablet (120 mg total) by mouth every 12 hours      rosuvastatin (Crestor) 40 mg tablet Take 1 tablet (40 mg total) by mouth nightly 90 tablet 0    furosemide (LASIX) 40 mg tablet Take 1 tablet (40 mg total) by mouth daily If weight goes up 3 pounds overnight take an extra dose of Lasix x1 day 90 tablet 3    magnesium chloride (SLOW-MAG) 64 mg tablet,delayed release (DR/EC) Take 1 tablet (64 mg total) by mouth 2 (two) times a day with meals 180 tablet 3    metFORMIN XR (GLUCOPHAGE-XR) 500 mg 24 hr tablet Take 1 tablet (500 mg total) by mouth daily with breakfast 90 tablet 3    mexiletine (MEXITIL) 200 mg capsule Take 1 capsule (200 mg total) by mouth 3 (three) times a day (Patient taking differently: Take 1 capsule (200 mg total) by mouth 2 (two) times a day) 270 capsule 0    cholecalciferol, vitamin D3, 25 mcg (1,000 unit) tablet Take 1 tablet (1,000 Units total) by mouth daily      rivaroxaban (Xarelto) 20 mg tablet Take 1 tablet (20 mg total) by mouth daily 60 tablet 0    carvediloL (Coreg) 25 mg tablet Take 1 tablet (25 mg total) by mouth 2 (two) times a day with meals 180 tablet 3    sacubitriL-valsartan (ENTRESTO) 24-26 mg tablet Take 1 tablet by mouth 2 (two) times a day 180 tablet 3    spironolactone (ALDACTONE) 25 mg tablet Take 1 tablet (25 mg total) by mouth 2 (two) times a day 180 tablet 3    aspirin 81 mg EC tablet Take 1 tablet (81 mg total) by mouth daily 90 tablet 3    albuterol HFA (PROVENTIL HFA;VENTOLIN HFA)  90 mcg/actuation inhaler Inhale 2 puffs every 6 (six) hours as needed for wheezing or shortness of breath 1 Inhaler 1    nitroglycerin (NITROSTAT) 0.4 mg SL tablet Place 1 tablet (0.4 mg total) under the tongue every 5 (five) minutes as needed for chest pain      pantoprazole (PROTONIX) 40 mg EC tablet Take 1 tablet (40 mg total) by mouth daily       No current facility-administered medications for this visit.       Family History   Problem Relation Age of Onset    Heart attack Mother 62    Other Mother         Abdominal Aortic Aneurysm    Lung cancer Mother     Colon cancer Father         Cause of death    Diabetes Brother         Non-Insulin Dependent    Heart attack Brother 51        w/ Stents    Heart disease Brother     Other Son         Well       Social History     Tobacco Use    Smoking status: Former     Packs/day: 0.75     Years: 30.00     Pack years: 22.50     Types: Cigarettes     Quit date: 2020     Years since quittin.9    Smokeless tobacco: Never   Vaping Use    Vaping Use: Never used   Substance Use Topics    Alcohol use: Not Currently     Comment: Quit drinking in     Drug use: Not Currently       Review of Systems  Review of Systems   Cardiovascular:  Negative for chest pain, claudication, cyanosis, dyspnea on exertion, irregular heartbeat, leg swelling/pain, near-syncope, orthopnea, palpitations, PND and syncope/fainting.   Respiratory:  Positive for sleep apnea.    Endocrine: Positive for diabetic.   Hematologic/Lymphatic: Bruises/bleeds easily.   Gastrointestinal:  Negative for hematochezia.   Genitourinary:  Negative for hematuria.   Neurological:  Positive for light-headedness. Negative for dizziness.   All other systems reviewed and are negative.    Objective     Vitals:    23 0855   BP: 138/79   Pulse: 66   Resp: 14   SpO2: 95%     Weight: 124.7 kg (275 lb)  Physical Exam  Constitutional:       Appearance: He is well-developed.   HENT:      Head: Normocephalic.   Neck:       Vascular: Normal carotid pulses. No carotid bruit, hepatojugular reflux or JVD.   Cardiovascular:      Rate and Rhythm: Normal rate and regular rhythm.      Pulses:           Carotid pulses are 2+ on the right side and 2+ on the left side.       Radial pulses are 2+ on the right side and 2+ on the left side.        Dorsalis pedis pulses are 2+ on the right side and 2+ on the left side.        Posterior tibial pulses are 2+ on the right side and 2+ on the left side.      Heart sounds: No murmur heard.    No friction rub. No gallop.   Pulmonary:      Effort: No accessory muscle usage or respiratory distress.      Breath sounds: No decreased breath sounds, wheezing, rhonchi or rales.   Musculoskeletal:      Right lower leg: No edema.      Left lower leg: No edema.   Neurological:      Mental Status: He is alert and oriented to person, place, and time.   Psychiatric:         Mood and Affect: Mood normal.         Speech: Speech normal.         Behavior: Behavior normal.         Thought Content: Thought content normal.         Judgment: Judgment normal.       Data Review:   Sodium   Date Value Ref Range Status   01/26/2023 138 135 - 145 mmol/L Final   11/28/2022 138 135 - 145 mmol/L Final   07/28/2022 136 135 - 145 mmol/L Final     Potassium   Date Value Ref Range Status   01/26/2023 4.6 3.5 - 5.1 MMOL/L Final   11/28/2022 4.5 3.5 - 5.1 MMOL/L Final   07/28/2022 4.2 3.5 - 5.1 MMOL/L Final     CO2   Date Value Ref Range Status   01/26/2023 28 21 - 32 mmol/L Final   11/28/2022 24 21 - 32 mmol/L Final   07/28/2022 25 21 - 32 mmol/L Final     BUN   Date Value Ref Range Status   01/26/2023 14 7 - 25 mg/dL Final   11/28/2022 11 7 - 25 mg/dL Final   07/28/2022 11 7 - 25 mg/dL Final     Creatinine   Date Value Ref Range Status   01/26/2023 0.97 0.70 - 1.30 mg/dL Final   11/28/2022 0.88 0.70 - 1.30 mg/dL Final   07/28/2022 0.86 0.70 - 1.30 mg/dL Final     Glucose   Date Value Ref Range Status   01/26/2023 130 (H) 70 - 105 mg/dL  Final   11/28/2022 126 (H) 70 - 105 mg/dL Final   07/28/2022 128 (H) 70 - 105 mg/dL Final     Calcium   Date Value Ref Range Status   01/26/2023 9.0 8.6 - 10.3 mg/dL Final   11/28/2022 9.6 8.6 - 10.3 mg/dL Final   07/28/2022 8.8 8.6 - 10.3 mg/dL Final           Assessment/Plan   Problem List Items Addressed This Visit          Cardiac and Vasculature    Automatic implantable cardioverter-defibrillator in situ  Recent device changes seem to have improved his overall sense of wellbeing and ability to do activities.    Chronic combined systolic and diastolic CHF, NYHA class 2 and ACC/AHA stage C (CMS/HCC)  Patient continues class II.  I would like to bump up his Entresto but his blood pressure runs on the low side occasionally.  He is always fighting to stay hydrated so I think may be dropping the dose of his furosemide might be helpful in terms of being able to titrate up his  Entresto.    Ischemic cardiomyopathy  Stable no recurrent angina    Ventricular tachycardia (paroxysmal) (CMS/HCC) - Primary  No recurrent VT.  He was taken off amiodarone and switched to sotalol.  He was maintained on 200 mg twice daily mexiletine.  If he has no recurrent problems with his next device check and follow-up with cardiology I will discontinue the mexiletine.  He also notes that his thyroid function studies were quite abnormal and we will await those test results.  Since the patient had until just recently come off the amiodarone we may just want to wait a longer period of time before we go ahead and place him on thyroid medicine or do something different for the thyroid until the amiodarone is completely out of his system and his physiology has a time to catch up and normalize.Return to clinic in 6 months    Relevant Orders    ECG 12 lead -Normal, Today (Completed)       Coag and Thromboembolic    Chronic anticoagulation         Electronically signed by: BRENDON SHAW MD  5/2/2023  9:28 AM

## 2023-05-04 ENCOUNTER — DOCUMENTATION (OUTPATIENT)
Dept: CARDIOLOGY | Facility: CLINIC | Age: 58
End: 2023-05-04
Payer: MEDICARE

## 2023-05-04 PROCEDURE — 93010 ELECTROCARDIOGRAM REPORT: CPT | Performed by: INTERNAL MEDICINE

## 2023-05-04 RX ORDER — FUROSEMIDE 20 MG/1
20 TABLET ORAL DAILY
COMMUNITY
End: 2023-09-22 | Stop reason: DRUGHIGH

## 2023-06-29 PROCEDURE — 93295 DEV INTERROG REMOTE 1/2/MLT: CPT | Performed by: INTERNAL MEDICINE

## 2023-06-29 PROCEDURE — 93296 REM INTERROG EVL PM/IDS: CPT | Performed by: INTERNAL MEDICINE

## 2023-07-19 ENCOUNTER — TELEPHONE (OUTPATIENT)
Dept: INTERNAL MEDICINE | Facility: CLINIC | Age: 58
End: 2023-07-19
Payer: MEDICARE

## 2023-07-19 DIAGNOSIS — I10 PRIMARY HYPERTENSION: ICD-10-CM

## 2023-07-19 DIAGNOSIS — E11.9 TYPE 2 DIABETES MELLITUS WITHOUT COMPLICATION, WITHOUT LONG-TERM CURRENT USE OF INSULIN (CMS/HCC): Primary | ICD-10-CM

## 2023-07-19 NOTE — TELEPHONE ENCOUNTER
Patient has a lab appointment on 07/25/23.   There are no future labs and patient is 3 days early for a fasting lipid.     I ordered a A1c, CBC and CMP.     Patients appointment is on 07/26/2023.

## 2023-07-21 ENCOUNTER — TELEPHONE (OUTPATIENT)
Dept: INTERNAL MEDICINE | Facility: CLINIC | Age: 58
End: 2023-07-21
Payer: MEDICARE

## 2023-07-21 NOTE — TELEPHONE ENCOUNTER
I would hold metformin on the morning of the procedure but otherwise he should continue all medications

## 2023-07-21 NOTE — TELEPHONE ENCOUNTER
Caller would like to discuss (a) medication Writer has advised caller of a callback from within 24 hours.    Patient: Pete Trejo    Caller Name (Last and first, relation/role): self    Name of Facility: [facility]    Callback Number: [phone]    Best Availability: anytime    Fax Number: na    Additional Info: Would like a call to know if he needs to stop any medications for his colonoscopy on 7/25/23    Did you confirm the message with the caller: Yes    Is it okay that the nurse communicates your response through [facility]? No

## 2023-07-21 NOTE — TELEPHONE ENCOUNTER
I returned call to Pete and advised of Dr. Felipe's direction. He voiced understanding and had no further questions.

## 2023-07-25 ENCOUNTER — ANESTHESIA EVENT (OUTPATIENT)
Dept: GASTROENTEROLOGY | Facility: HOSPITAL | Age: 58
End: 2023-07-25
Payer: MEDICARE

## 2023-07-25 ENCOUNTER — ANESTHESIA (OUTPATIENT)
Dept: GASTROENTEROLOGY | Facility: HOSPITAL | Age: 58
End: 2023-07-25
Payer: MEDICARE

## 2023-07-25 ENCOUNTER — LAB (OUTPATIENT)
Dept: LAB | Facility: HOSPITAL | Age: 58
End: 2023-07-25
Payer: MEDICARE

## 2023-07-25 ENCOUNTER — HOSPITAL ENCOUNTER (OUTPATIENT)
Facility: HOSPITAL | Age: 58
Setting detail: OUTPATIENT SURGERY
Discharge: HOME | End: 2023-07-25
Attending: INTERNAL MEDICINE | Admitting: INTERNAL MEDICINE
Payer: MEDICARE

## 2023-07-25 VITALS
DIASTOLIC BLOOD PRESSURE: 60 MMHG | HEART RATE: 60 BPM | WEIGHT: 275.13 LBS | HEIGHT: 70 IN | OXYGEN SATURATION: 96 % | SYSTOLIC BLOOD PRESSURE: 109 MMHG | BODY MASS INDEX: 39.39 KG/M2 | RESPIRATION RATE: 16 BRPM | TEMPERATURE: 97 F

## 2023-07-25 DIAGNOSIS — E11.9 TYPE 2 DIABETES MELLITUS WITHOUT COMPLICATION, WITHOUT LONG-TERM CURRENT USE OF INSULIN (CMS/HCC): ICD-10-CM

## 2023-07-25 DIAGNOSIS — I10 PRIMARY HYPERTENSION: ICD-10-CM

## 2023-07-25 LAB
ALBUMIN SERPL-MCNC: 3.9 G/DL (ref 3.5–5.3)
ALP SERPL-CCNC: 67 U/L (ref 45–115)
ALT SERPL-CCNC: 32 U/L (ref 7–52)
ANION GAP SERPL CALC-SCNC: 11 MMOL/L (ref 3–11)
AST SERPL-CCNC: 19 U/L
BASOPHILS # BLD AUTO: 0 10*3/UL
BASOPHILS NFR BLD AUTO: 0.6 % (ref 0–2)
BILIRUB SERPL-MCNC: 0.94 MG/DL (ref 0.2–1.4)
BUN SERPL-MCNC: 6 MG/DL (ref 7–25)
CALCIUM ALBUM COR SERPL-MCNC: 9.3 MG/DL (ref 8.6–10.3)
CALCIUM SERPL-MCNC: 9.2 MG/DL (ref 8.6–10.3)
CHLORIDE SERPL-SCNC: 106 MMOL/L (ref 98–107)
CO2 SERPL-SCNC: 24 MMOL/L (ref 21–32)
CREAT SERPL-MCNC: 0.79 MG/DL (ref 0.7–1.3)
EGFRCR SERPLBLD CKD-EPI 2021: 104 ML/MIN/1.73M*2
EOSINOPHIL # BLD AUTO: 0.3 10*3/UL
EOSINOPHIL NFR BLD AUTO: 4 % (ref 0–3)
ERYTHROCYTE [DISTWIDTH] IN BLOOD BY AUTOMATED COUNT: 15 % (ref 11.5–15)
EST. AVERAGE GLUCOSE BLD GHB EST-MCNC: 134.1 MG/DL
GLUCOSE BLDC GLUCOMTR-SCNC: 123 MG/DL (ref 70–105)
GLUCOSE SERPL-MCNC: 127 MG/DL (ref 70–105)
HBA1C MFR BLD: 6.3 % (ref 4–6)
HCT VFR BLD AUTO: 44.5 % (ref 38–50)
HGB BLD-MCNC: 15.4 G/DL (ref 13.2–17.2)
LYMPHOCYTES # BLD AUTO: 2.7 10*3/UL
LYMPHOCYTES NFR BLD AUTO: 40.3 % (ref 15–47)
MCH RBC QN AUTO: 30.7 PG (ref 29–34)
MCHC RBC AUTO-ENTMCNC: 34.6 G/DL (ref 32–36)
MCV RBC AUTO: 88.6 FL (ref 82–97)
MONOCYTES # BLD AUTO: 0.6 10*3/UL
MONOCYTES NFR BLD AUTO: 8.4 % (ref 5–13)
NEUTROPHILS # BLD AUTO: 3.1 10*3/UL
NEUTROPHILS NFR BLD AUTO: 46.7 % (ref 46–70)
PLATELET # BLD AUTO: 221 10*3/UL (ref 130–350)
PMV BLD AUTO: 7.7 FL (ref 6.9–10.8)
POTASSIUM SERPL-SCNC: 4 MMOL/L (ref 3.5–5.1)
PROT SERPL-MCNC: 6.9 G/DL (ref 6–8.3)
RBC # BLD AUTO: 5.02 10*6/ΜL (ref 4.1–5.8)
SODIUM SERPL-SCNC: 141 MMOL/L (ref 135–145)
WBC # BLD AUTO: 6.7 10*3/UL (ref 3.7–9.6)

## 2023-07-25 PROCEDURE — 2580000300 HC RX 258: Performed by: NURSE ANESTHETIST, CERTIFIED REGISTERED

## 2023-07-25 PROCEDURE — (BLANK) HC MAC ANESTHESIA FACILITY CHARGE 1ST 15 MIN: Performed by: INTERNAL MEDICINE

## 2023-07-25 PROCEDURE — 80053 COMPREHEN METABOLIC PANEL: CPT

## 2023-07-25 PROCEDURE — 2500000200 HC RX 250 WO HCPCS: Performed by: NURSE ANESTHETIST, CERTIFIED REGISTERED

## 2023-07-25 PROCEDURE — 00811 ANES LWR INTST NDSC NOS: CPT | Performed by: NURSE ANESTHETIST, CERTIFIED REGISTERED

## 2023-07-25 PROCEDURE — 83036 HEMOGLOBIN GLYCOSYLATED A1C: CPT

## 2023-07-25 PROCEDURE — 82947 ASSAY GLUCOSE BLOOD QUANT: CPT | Mod: QW

## 2023-07-25 PROCEDURE — (BLANK): Performed by: INTERNAL MEDICINE

## 2023-07-25 PROCEDURE — 85025 COMPLETE CBC W/AUTO DIFF WBC: CPT

## 2023-07-25 PROCEDURE — 6360000200 HC RX 636 W HCPCS (ALT 250 FOR IP): Performed by: NURSE ANESTHETIST, CERTIFIED REGISTERED

## 2023-07-25 PROCEDURE — (BLANK) HC RECOVERY PHASE-2 EACH ADDITIONAL 1/2 HOUR ACUITY LEVEL 1: Performed by: INTERNAL MEDICINE

## 2023-07-25 PROCEDURE — (BLANK) HC RECOVERY PHASE-2 1ST 1/2 HOUR ACUITY LEVEL 1: Performed by: INTERNAL MEDICINE

## 2023-07-25 PROCEDURE — (BLANK) HC MAC ANESTHESIA FACILITY CHARGE EACH ADDITIONAL MIN: Performed by: INTERNAL MEDICINE

## 2023-07-25 PROCEDURE — 88305 TISSUE EXAM BY PATHOLOGIST: CPT

## 2023-07-25 PROCEDURE — 36415 COLL VENOUS BLD VENIPUNCTURE: CPT

## 2023-07-25 PROCEDURE — 2720000000 HC SUPP 272 WO HCPCS: Performed by: INTERNAL MEDICINE

## 2023-07-25 RX ORDER — PROPOFOL 10 MG/ML
INJECTION, EMULSION INTRAVENOUS CONTINUOUS PRN
Status: DISCONTINUED | OUTPATIENT
Start: 2023-07-25 | End: 2023-07-25 | Stop reason: SURG

## 2023-07-25 RX ORDER — DEXMEDETOMIDINE HYDROCHLORIDE 4 UG/ML
INJECTION, SOLUTION INTRAVENOUS AS NEEDED
Status: DISCONTINUED | OUTPATIENT
Start: 2023-07-25 | End: 2023-07-25 | Stop reason: SURG

## 2023-07-25 RX ORDER — METOPROLOL TARTRATE 1 MG/ML
INJECTION, SOLUTION INTRAVENOUS AS NEEDED
Status: DISCONTINUED | OUTPATIENT
Start: 2023-07-25 | End: 2023-07-25 | Stop reason: SURG

## 2023-07-25 RX ORDER — SODIUM CHLORIDE, SODIUM LACTATE, POTASSIUM CHLORIDE, CALCIUM CHLORIDE 600; 310; 30; 20 MG/100ML; MG/100ML; MG/100ML; MG/100ML
INJECTION, SOLUTION INTRAVENOUS CONTINUOUS PRN
Status: DISCONTINUED | OUTPATIENT
Start: 2023-07-25 | End: 2023-07-25 | Stop reason: SURG

## 2023-07-25 RX ADMIN — DEXMEDETOMIDINE HYDROCHLORIDE 4 MCG: 4 INJECTION, SOLUTION INTRAVENOUS at 07:09

## 2023-07-25 RX ADMIN — Medication 100 MCG: at 07:21

## 2023-07-25 RX ADMIN — DEXMEDETOMIDINE HYDROCHLORIDE 4 MCG: 4 INJECTION, SOLUTION INTRAVENOUS at 07:06

## 2023-07-25 RX ADMIN — SODIUM CHLORIDE, POTASSIUM CHLORIDE, SODIUM LACTATE AND CALCIUM CHLORIDE: 600; 310; 30; 20 INJECTION, SOLUTION INTRAVENOUS at 07:09

## 2023-07-25 RX ADMIN — PROPOFOL 150 MCG/KG/MIN: 10 INJECTION, EMULSION INTRAVENOUS at 07:09

## 2023-07-25 RX ADMIN — METOROPROLOL TARTRATE 1 MG: 5 INJECTION, SOLUTION INTRAVENOUS at 07:24

## 2023-07-25 RX ADMIN — Medication 100 MCG: at 07:14

## 2023-07-25 RX ADMIN — DEXMEDETOMIDINE HYDROCHLORIDE 8 MCG: 4 INJECTION, SOLUTION INTRAVENOUS at 07:02

## 2023-07-25 RX ADMIN — DEXMEDETOMIDINE HYDROCHLORIDE 4 MCG: 4 INJECTION, SOLUTION INTRAVENOUS at 06:59

## 2023-07-25 ASSESSMENT — ENCOUNTER SYMPTOMS
FEVER: 0
VOMITING: 0
ARTHRALGIAS: 0
DIFFICULTY URINATING: 0
NECK PAIN: 0
SHORTNESS OF BREATH: 0
DYSURIA: 0
HEADACHES: 0
COUGH: 0
EXERCISE TOLERANCE: GOOD (4-7 METS)
HEMATURIA: 0
EYE REDNESS: 0
ABDOMINAL DISTENTION: 0
DIZZINESS: 0
DIARRHEA: 0
TROUBLE SWALLOWING: 0
WEAKNESS: 0
UNEXPECTED WEIGHT CHANGE: 0
BRUISES/BLEEDS EASILY: 0
TREMORS: 0
LIGHT-HEADEDNESS: 0
COLOR CHANGE: 0
CONFUSION: 0
APPETITE CHANGE: 0
PALPITATIONS: 0
NECK STIFFNESS: 0
AGITATION: 0
FREQUENCY: 0
ANAL BLEEDING: 0
JOINT SWELLING: 0
POLYDIPSIA: 0
MYALGIAS: 0
SPEECH DIFFICULTY: 0
PHOTOPHOBIA: 0
BLOOD IN STOOL: 0
EYE DISCHARGE: 0
SEIZURES: 0
CONSTIPATION: 0
WHEEZING: 0
ADENOPATHY: 0
NAUSEA: 0
POLYPHAGIA: 0
NUMBNESS: 0
ABDOMINAL PAIN: 0
EYE ITCHING: 0
SLEEP DISTURBANCE: 0
CHOKING: 0
NERVOUS/ANXIOUS: 0
FATIGUE: 0

## 2023-07-25 NOTE — H&P
GI PRE-PROCEDURE H&P      07/25/23    Pete Trejo is a 57 y.o. male with a personal history of colonic adenomatous polyps on his last colonoscopy 5 years ago, who presented for his follow-up colonoscopy today for colonic adenoma surveillance.  Recent stool testing revealed FOBT positivity.  No family history of colorectal cancer.    History  Past Medical History:   Diagnosis Date    CAD (coronary artery disease)     CHF (congestive heart failure) (CMS/HCC)     CVA (cerebral vascular accident) (CMS/HCC) 2010    Dyslipidemia     GERD (gastroesophageal reflux disease)     GERD (gastroesophageal reflux disease)     Heart attack (CMS/HCC)     x3    Hypertension     Ischemic cardiomyopathy     Paroxysmal atrial fibrillation (CMS/HCC)     On Xarelto    V-tach (CMS/[facility])     AICD in place     Past Surgical History:   Procedure Laterality Date    ABLATION VT N/A 06/09/2020    Procedure: Ablation VT;  Surgeon: Wilfredo Montana MD;  Location: [facility] EP [facility];  Service: Electrophysiology;  Laterality: N/A;  Check with Dr. Montana in the a.m. regarding scheduling    APPENDECTOMY      CORONARY ARTERY STENTING  2009    2.5 X 24-mm Taxus DIMAS to first diagonal. 3.0 X 8-mm Taxus stent to proximal LAD just proximal to diagonal.    CORONARY ARTERY STENTING  2010    3.5 X 15-mm Promus DIMAS to totally occluded LAD    CORONARY ARTERY STENTING  2011    2.25 X 30-mm Resolute DIMAS to 2nd diagonal.  Balloon angioplasty through strut to improve blood flow to distal LAD    CORONARY ARTERY STENTING  07/28/2017    second diagonal branch LAD Resolute 2.25 x 30-mm DIMAS    ICD DC GEN CHANGE N/A 6/20/2022    Procedure: BI-V ICD Gen Change;  Surgeon: Wilfredo Montana MD;  Location: [facility] EP [facility];  Service: Cardiovascular;  Laterality: N/A;    ICD SC NEW  2011    [employer] Cognis Model #N119, Serial #[id number]. [employer] Reliance Model #0185, Serial #[id number]. LV lead Acuity Model #45+91, Serial #[id number]. Atrial lead Model #4469, Serial #[id number]     Family  History   Problem Relation Age of Onset    Heart attack Mother 62    Other Mother         Abdominal Aortic Aneurysm    Lung cancer Mother     Colon cancer Father         Cause of death    Diabetes Brother         Non-Insulin Dependent    Heart attack Brother 51        w/ Stents    Heart disease Brother     Other Son         Well     Social History     Socioeconomic History    Marital status: Single     Spouse name: Not on file    Number of children: Not on file    Years of education: Not on file    Highest education level: Not on file   Occupational History    Not on file   Tobacco Use    Smoking status: Former     Packs/day: 0.75     Years: 30.00     Pack years: 22.50     Types: Cigarettes     Quit date: 6/7/2020     Years since quitting: 3.1    Smokeless tobacco: Never   Vaping Use    Vaping Use: Never used   Substance and Sexual Activity    Alcohol use: Not Currently     Comment: Quit drinking in 2007    Drug use: Not Currently    Sexual activity: Defer   Other Topics Concern    Not on file   Social History Narrative    Not on file     Social Determinants of Health     Financial Resource Strain: Unknown (7/8/2020)    Overall Financial Resource Strain (CARDIA)     Difficulty of Paying Living Expenses: Patient refused   Food Insecurity: Unknown (7/8/2020)    Hunger Vital Sign     Worried About Running Out of Food in the Last Year: Patient refused     Ran Out of Food in the Last Year: Patient refused   Transportation Needs: Unknown (7/8/2020)    PRAPARE - Transportation     Lack of Transportation (Medical): Patient refused     Lack of Transportation (Non-Medical): Patient refused   Physical Activity: Not on file   Stress: Not on file   Social Connections: Not on file   Intimate Partner Violence: Not on file   Housing Stability: Not on file       Review of Systems  Review of Systems   Constitutional:  Negative for appetite change, fatigue, fever and unexpected weight change.   HENT:  Negative for ear pain, mouth  sores and trouble swallowing.    Eyes:  Negative for photophobia, discharge, redness, itching and visual disturbance.   Respiratory:  Negative for cough, choking, shortness of breath and wheezing.    Cardiovascular:  Negative for chest pain, palpitations and leg swelling.   Gastrointestinal:  Negative for abdominal distention, abdominal pain, anal bleeding, blood in stool, constipation, diarrhea, nausea and vomiting.   Endocrine: Negative for cold intolerance, heat intolerance, polydipsia, polyphagia and polyuria.   Genitourinary:  Negative for difficulty urinating, dysuria, frequency, hematuria and urgency.   Musculoskeletal:  Negative for arthralgias, joint swelling, myalgias, neck pain and neck stiffness.   Skin:  Negative for color change, pallor and rash.   Allergic/Immunologic: Negative for environmental allergies, food allergies and immunocompromised state.   Neurological:  Negative for dizziness, tremors, seizures, syncope, speech difficulty, weakness, light-headedness, numbness and headaches.   Hematological:  Negative for adenopathy. Does not bruise/bleed easily.   Psychiatric/Behavioral:  Negative for agitation, confusion and sleep disturbance. The patient is not nervous/anxious.        Objective     Physical Exam  Nursing notes and vitals reviewed.  Constitutional: Alert and oriented to person, place, and time; no acute distress.   HEENT: Normocephalic and atraumatic. PERRLA. EOM intact. Conjunctivae are clear.   Neck: Supple with no lymphadenopathy.   Cardiovascular: Normal rate, regular rhythm and normal heart sounds.    Pulmonary: Clear to ascultation bilaterally. No wheezes or rhonchi.   Abdominal: Non-distended; soft; normoactive bowel sounds; non-tender to palpation with no rebound or guarding; no palpable masses or organomegaly.     Lab Review   Admission on 07/25/2023   Component Date Value Ref Range Status    POC Glucose 07/25/2023 123 (H)  70 - 105 mg/dL Final    Notified Provider   Lab on  07/25/2023   Component Date Value Ref Range Status    Sodium 07/25/2023 141  135 - 145 mmol/L Final    Potassium 07/25/2023 4.0  3.5 - 5.1 MMOL/L Final    Chloride 07/25/2023 106  98 - 107 mmol/L Final    CO2 07/25/2023 24  21 - 32 mmol/L Final    Anion Gap 07/25/2023 11  3 - 11 mmol/L Final    BUN 07/25/2023 6 (L)  7 - 25 mg/dL Final    Creatinine 07/25/2023 0.79  0.70 - 1.30 mg/dL Final    Glucose 07/25/2023 127 (H)  70 - 105 mg/dL Final    Calcium 07/25/2023 9.2  8.6 - 10.3 mg/dL Final    AST 07/25/2023 19  <40 U/L Final    ALT (SGPT) 07/25/2023 32  7 - 52 U/L Final    Alkaline Phosphatase 07/25/2023 67  45 - 115 U/L Final    Total Protein 07/25/2023 6.9  6.0 - 8.3 g/dL Final    Albumin 07/25/2023 3.9  3.5 - 5.3 g/dL Final    Total Bilirubin 07/25/2023 0.94  0.20 - 1.40 mg/dL Final    Corrected Calcium 07/25/2023 9.3  8.6 - 10.3 mg/dL Final    eGFR 07/25/2023 104  >60 mL/min/1.73m*2 Final    WBC 07/25/2023 6.7  3.7 - 9.6 10*3/uL Final    RBC 07/25/2023 5.02  4.10 - 5.80 10*6/µL Final    Hemoglobin 07/25/2023 15.4  13.2 - 17.2 g/dL Final    Hematocrit 07/25/2023 44.5  38.0 - 50.0 % Final    MCV 07/25/2023 88.6  82.0 - 97.0 fL Final    MCH 07/25/2023 30.7  29.0 - 34.0 pg Final    MCHC 07/25/2023 34.6  32.0 - 36.0 g/dL Final    RDW 07/25/2023 15.0  11.5 - 15.0 % Final    Platelets 07/25/2023 221  130 - 350 10*3/uL Final    MPV 07/25/2023 7.7  6.9 - 10.8 fL Final    Neutrophils% 07/25/2023 46.7  46.0 - 70.0 % Final    Lymphocytes% 07/25/2023 40.3  15.0 - 47.0 % Final    Monocytes% 07/25/2023 8.4  5.0 - 13.0 % Final    Eosinophils% 07/25/2023 4.0 (H)  0.0 - 3.0 % Final    Basophils% 07/25/2023 0.6  0.0 - 2.0 % Final    ANC (auto diff) 07/25/2023 3.10  10*3/UL Final    Lymphocytes Absolute 07/25/2023 2.70  10*3/uL Final    Monocytes Absolute 07/25/2023 0.60  10*3/uL Final    Eosinophils Absolute 07/25/2023 0.30  10*3/uL Final    Basophils Absolute 07/25/2023 0.00  10*3/uL Final        Imaging  No results  found.    ASSESSMENT/PLAN:   Proceed with surveillance colonoscopy as scheduled    The risks and benefits of colonoscopy have been discussed with the patient, including possibility of the procedure-related complications such as bleeding, infection that can be transmitted with the scope, and perforation; as well as sedation-related complications, including hypotension, hypoxia and even death. The patient appeared to understand all the risks involved and gave me permission to proceed.     Hortencia Carson MD

## 2023-07-25 NOTE — ANESTHESIA PREPROCEDURE EVALUATION
"Pre-Procedure Assessment    Patient: Pete Trejo, male, 57 y.o.    Ht Readings from Last 1 Encounters:   01/26/23 1.803 m (5' 11\")     Wt Readings from Last 1 Encounters:   05/02/23 124.7 kg (275 lb)       Last Vitals  BP      Temp      Pulse     Resp      SpO2      Pain Score         Problem list reviewed and Medical history reviewed           Airway   Mallampati: II  TM distance: >3 FB  Neck ROM: full      Dental      Pulmonary - normal exam   (+) sleep apnea,   Cardiovascular   Exercise tolerance: good (4-7 METS)  (+) pacemaker, hypertension, past MI, CAD, CHF,     Rhythm: regular  Rate: normal  ROS comment: 1.  Severely reduce systolic function, estimated EF around 20 to 25%, there is a grade 1 diastolic dysfunction with elevated filling pressures,   2. Diffuse global hypokinesis with hyperdynamic anterolateral wall.  The LV walls appear thin, apex appears aneurysmatic.  No LV thrombus noted with definity.   3. Normal right atrium.  Mildly dilated left atrium  4. Normal RV size and function.  5. No significant valvular abnormalities detected.   6. No pericardial effusion.  7. RVSP was not estimated due to inability to obtain TR jet.   8. This echo was compared with prior echo done on 9/19/2021 without significant changes      Mental Status/Neuro/Psych    Pt is alert.      (+) CVA,     GI/Hepatic/Renal    (+) GERD,     Endo/Other - negative ROS   Abdominal           Social History     Tobacco Use   • Smoking status: Former     Packs/day: 0.75     Years: 30.00     Pack years: 22.50     Types: Cigarettes     Quit date: 6/7/2020     Years since quitting: 3.1   • Smokeless tobacco: Never   Substance Use Topics   • Alcohol use: Not Currently     Comment: Quit drinking in 2007      Hematology   WBC   Date Value Ref Range Status   01/26/2023 5.3 3.7 - 9.6 10*3/uL Final     RBC   Date Value Ref Range Status   01/26/2023 4.77 4.10 - 5.80 10*6/µL Final     MCV   Date Value Ref Range Status   01/26/2023 93.2 82.0 - 97.0 " fL Final     Hemoglobin   Date Value Ref Range Status   01/26/2023 15.0 13.2 - 17.2 g/dL Final     Hematocrit   Date Value Ref Range Status   01/26/2023 44.4 38.0 - 50.0 % Final     Platelets   Date Value Ref Range Status   01/26/2023 207 130 - 350 10*3/uL Final      Coagulation   Protime   Date Value Ref Range Status   10/06/2021 22.7 (H) 9.4 - 12.5 seconds Final     PTT   Date Value Ref Range Status   10/06/2021 41.9 (H) 25.1 - 36.5 SECONDS Final   07/29/2017 29 24 - 37 SEC      INR   Date Value Ref Range Status   10/06/2021 1.9 (H) <=1.1 Final   07/29/2017 1.1 0.9 - 1.1       General Chemistry   POC Glucose   Date Value Ref Range Status   08/13/2020 111 (H) 70 - 105 mg/dL Final     Comment:     Notified RN     Calcium   Date Value Ref Range Status   01/26/2023 9.0 8.6 - 10.3 mg/dL Final     BUN   Date Value Ref Range Status   01/26/2023 14 7 - 25 mg/dL Final     Creatinine   Date Value Ref Range Status   01/26/2023 0.97 0.70 - 1.30 mg/dL Final     Glucose   Date Value Ref Range Status   01/26/2023 130 (H) 70 - 105 mg/dL Final     Sodium   Date Value Ref Range Status   01/26/2023 138 135 - 145 mmol/L Final     Potassium   Date Value Ref Range Status   01/26/2023 4.6 3.5 - 5.1 MMOL/L Final     Magnesium   Date Value Ref Range Status   11/28/2022 1.9 1.8 - 2.4 mg/dL Final     CO2   Date Value Ref Range Status   01/26/2023 28 21 - 32 mmol/L Final     Chloride   Date Value Ref Range Status   01/26/2023 104 98 - 107 mmol/L Final     Anesthesia Plan    ASA 4   NPO status reviewed: > 6 hours    MAC         Induction: intravenous                 Anesthetic plan and risks discussed with patient.

## 2023-07-25 NOTE — ANESTHESIA POSTPROCEDURE EVALUATION
Patient: Pete Trejo    Procedure Summary     Date: 07/25/23 Room / Location: [facility] / [facility]    Anesthesia Start: 0657 Anesthesia Stop: 0735    Procedure: COLONOSCOPY with Polypectomy (Anus) Diagnosis:       Positive fecal occult blood test      History of adenomatous polyp of colon      (Colon Polyps)      (Hemorrhoids)    Providers: Hortencia Carson MD Responsible Provider: Arvind Judd MD    Anesthesia Type: MAC ASA Status: 4          Anesthesia Type: MAC    Last vitals  Vitals Value Taken Time   /58 07/25/23 0740   Temp 36.1 °C (97 °F) 07/25/23 0733   Pulse 60 07/25/23 0740   Resp 16 07/25/23 0740   SpO2 96 % 07/25/23 0740   0-10 Pain Score 0 - No pain 07/25/23 0740       Anesthesia Post Evaluation    Patient location during evaluation: PACU  Patient participation: complete - patient participated  Level of consciousness: awake and alert  Pain management: adequate  Airway patency: patent  Anesthetic complications: no  Cardiovascular status: acceptable  Respiratory status: acceptable  Hydration status: acceptable  May dismiss recovered patient based on consultation with the appropriate physicians and/or meeting appropriate discharge criteria      Cosmetic?  This procedure is not cosmetic.

## 2023-07-25 NOTE — POST-PROCEDURE NOTE
Brief GI Post-op Note     *See provation record for full operative report*     07/25/23    Pete Terjo is a 57 y.o. male     Pre-op Diagnosis:   Colonic adenoma surveillance    Procedure:    COLONOSCOPY with polypectomy    Anesthesia: MAC-team     Specimens: 2 polyps    Complications: None      Findings:  Please see my procedure report      Recommendations: Please see my procedure report      Hortencia Carson MD

## 2023-09-22 ENCOUNTER — LAB (OUTPATIENT)
Dept: LAB | Facility: CLINIC | Age: 58
End: 2023-09-22
Payer: MEDICARE

## 2023-09-22 ENCOUNTER — OFFICE VISIT (OUTPATIENT)
Dept: INTERNAL MEDICINE | Facility: CLINIC | Age: 58
End: 2023-09-22
Payer: MEDICARE

## 2023-09-22 VITALS
DIASTOLIC BLOOD PRESSURE: 64 MMHG | HEART RATE: 60 BPM | WEIGHT: 274 LBS | OXYGEN SATURATION: 94 % | HEIGHT: 70 IN | SYSTOLIC BLOOD PRESSURE: 120 MMHG | TEMPERATURE: 97.4 F | BODY MASS INDEX: 39.22 KG/M2 | RESPIRATION RATE: 16 BRPM

## 2023-09-22 DIAGNOSIS — F17.211 NICOTINE DEPENDENCE, CIGARETTES, IN REMISSION: ICD-10-CM

## 2023-09-22 DIAGNOSIS — Z86.79 HISTORY OF VENTRICULAR TACHYCARDIA: ICD-10-CM

## 2023-09-22 DIAGNOSIS — Z11.59 ENCOUNTER FOR HEPATITIS C SCREENING TEST FOR LOW RISK PATIENT: ICD-10-CM

## 2023-09-22 DIAGNOSIS — E11.9 TYPE 2 DIABETES MELLITUS WITHOUT COMPLICATION, WITHOUT LONG-TERM CURRENT USE OF INSULIN (CMS/HCC): ICD-10-CM

## 2023-09-22 DIAGNOSIS — E11.9 TYPE 2 DIABETES MELLITUS WITHOUT COMPLICATION, WITHOUT LONG-TERM CURRENT USE OF INSULIN (CMS/HCC): Primary | ICD-10-CM

## 2023-09-22 DIAGNOSIS — I10 PRIMARY HYPERTENSION: ICD-10-CM

## 2023-09-22 DIAGNOSIS — F17.201 NICOTINE DEPENDENCE IN REMISSION, UNSPECIFIED NICOTINE PRODUCT TYPE: ICD-10-CM

## 2023-09-22 DIAGNOSIS — G47.33 OBSTRUCTIVE SLEEP APNEA: ICD-10-CM

## 2023-09-22 DIAGNOSIS — I25.5 ISCHEMIC CARDIOMYOPATHY: ICD-10-CM

## 2023-09-22 LAB
ALBUMIN SERPL-MCNC: 4.3 G/DL (ref 3.5–5.3)
ALP SERPL-CCNC: 68 U/L (ref 45–115)
ALT SERPL-CCNC: 22 U/L (ref 7–52)
ANION GAP SERPL CALC-SCNC: 8 MMOL/L (ref 3–11)
AST SERPL-CCNC: 14 U/L
BASOPHILS # BLD AUTO: 0.1 10*3/UL
BASOPHILS NFR BLD AUTO: 1.1 % (ref 0–2)
BILIRUB SERPL-MCNC: 0.95 MG/DL (ref 0.2–1.4)
BUN SERPL-MCNC: 11 MG/DL (ref 7–25)
CALCIUM ALBUM COR SERPL-MCNC: 8.9 MG/DL (ref 8.6–10.3)
CALCIUM SERPL-MCNC: 9.1 MG/DL (ref 8.6–10.3)
CHLORIDE SERPL-SCNC: 105 MMOL/L (ref 98–107)
CO2 SERPL-SCNC: 24 MMOL/L (ref 21–32)
CREAT SERPL-MCNC: 0.77 MG/DL (ref 0.7–1.3)
CREAT UR-MCNC: 78.9 MG/DL
EGFRCR SERPLBLD CKD-EPI 2021: 105 ML/MIN/1.73M*2
EOSINOPHIL # BLD AUTO: 0.3 10*3/UL
EOSINOPHIL NFR BLD AUTO: 4.8 % (ref 0–3)
ERYTHROCYTE [DISTWIDTH] IN BLOOD BY AUTOMATED COUNT: 14.8 % (ref 11.5–15)
EST. AVERAGE GLUCOSE BLD GHB EST-MCNC: 131.2 MG/DL
GLUCOSE SERPL-MCNC: 112 MG/DL (ref 70–105)
HBA1C MFR BLD: 6.2 % (ref 4–6)
HCT VFR BLD AUTO: 45.6 % (ref 38–50)
HCV AB SER QL: NORMAL
HGB BLD-MCNC: 15.5 G/DL (ref 13.2–17.2)
LYMPHOCYTES # BLD AUTO: 2.5 10*3/UL
LYMPHOCYTES NFR BLD AUTO: 42.2 % (ref 15–47)
MCH RBC QN AUTO: 31.3 PG (ref 29–34)
MCHC RBC AUTO-ENTMCNC: 34.1 G/DL (ref 32–36)
MCV RBC AUTO: 91.9 FL (ref 82–97)
MICROALBUMIN UR-MCNC: <7 MG/L (ref 0–30)
MONOCYTES # BLD AUTO: 0.7 10*3/UL
MONOCYTES NFR BLD AUTO: 11 % (ref 5–13)
NEUTROPHILS # BLD AUTO: 2.5 10*3/UL
NEUTROPHILS NFR BLD AUTO: 40.9 % (ref 46–70)
PLATELET # BLD AUTO: 212 10*3/UL (ref 130–350)
PMV BLD AUTO: 7.6 FL (ref 6.9–10.8)
POTASSIUM SERPL-SCNC: 4 MMOL/L (ref 3.5–5.1)
PROT SERPL-MCNC: 6.6 G/DL (ref 6–8.3)
RBC # BLD AUTO: 4.96 10*6/ΜL (ref 4.1–5.8)
SODIUM SERPL-SCNC: 137 MMOL/L (ref 135–145)
WBC # BLD AUTO: 6 10*3/UL (ref 3.7–9.6)

## 2023-09-22 PROCEDURE — 82043 UR ALBUMIN QUANTITATIVE: CPT | Mod: PO

## 2023-09-22 PROCEDURE — 85025 COMPLETE CBC W/AUTO DIFF WBC: CPT | Mod: PO

## 2023-09-22 PROCEDURE — 83036 HEMOGLOBIN GLYCOSYLATED A1C: CPT | Mod: PO

## 2023-09-22 PROCEDURE — 99214 OFFICE O/P EST MOD 30 MIN: CPT | Mod: 25 | Performed by: INTERNAL MEDICINE

## 2023-09-22 PROCEDURE — 90715 TDAP VACCINE 7 YRS/> IM: CPT | Performed by: INTERNAL MEDICINE

## 2023-09-22 PROCEDURE — 36415 COLL VENOUS BLD VENIPUNCTURE: CPT | Mod: PO

## 2023-09-22 PROCEDURE — G0463 HOSPITAL OUTPT CLINIC VISIT: HCPCS | Mod: PO | Performed by: INTERNAL MEDICINE

## 2023-09-22 PROCEDURE — 90471 IMMUNIZATION ADMIN: CPT | Performed by: INTERNAL MEDICINE

## 2023-09-22 PROCEDURE — 90472 IMMUNIZATION ADMIN EACH ADD: CPT | Mod: PO | Performed by: INTERNAL MEDICINE

## 2023-09-22 PROCEDURE — 86803 HEPATITIS C AB TEST: CPT

## 2023-09-22 PROCEDURE — 80053 COMPREHEN METABOLIC PANEL: CPT | Mod: PO

## 2023-09-22 PROCEDURE — 90471 IMMUNIZATION ADMIN: CPT | Mod: PO,FLU | Performed by: INTERNAL MEDICINE

## 2023-09-22 RX ORDER — TAMSULOSIN HYDROCHLORIDE 0.4 MG/1
0.4 CAPSULE ORAL DAILY
COMMUNITY
Start: 2023-08-23

## 2023-09-22 RX ORDER — FUROSEMIDE 40 MG/1
40 TABLET ORAL DAILY
COMMUNITY
Start: 2023-08-09 | End: 2024-07-16 | Stop reason: SDUPTHER

## 2023-09-22 ASSESSMENT — PAIN SCALES - GENERAL: PAINLEVEL: 0-NO PAIN

## 2023-09-22 NOTE — PROGRESS NOTES
Subjective      Pete Trejo is a 57 y.o. male who presents for yearly physical.    HPI    Concerns today:   - Two days ago he was holding his grandson and his grandson was kicking on his abdomen and had some reproducible pain in that area.  It only hurts when he pushes on it.  No diarrhea or constipation.  No blood in the stool.  No troubles with urination.      Chronic issues to follow-up   - Coronary artery disease:   No chest pain on exertion.  No decrease in exercise tolerance.    - Obstructive sleep apnea: On CPAP at 8 cmH2O, doing well on this.     - Congestive heart failure:  Stable, no PND or orthopnea.     - Ventricular tachycardia: On sotalol therapy, no longer on amiodarone.     - History of CVA: No persistent deficit.  No slurring of speech, no difficulty swallowing, no focal weakness.  He does notice that sometimes his leg will go numb on the left side but it has been there for years, but no pain with this.     - Hyperlipidemia: LDL under good control at 51    - GERD:  No stomach issues.    - History of tobacco use: no recent use.    - Diabetes mellitus type 2: Recently started on metformin.      The following have been reviewed and updated as appropriate in this visit:   Tobacco  Allergies  Meds  Problems  Med Hx  Surg Hx  Fam Hx         Allergies   Allergen Reactions    Morphine      ANXIETY    Tramadol      ANXIETY     Current Outpatient Medications   Medication Sig Dispense Refill    furosemide (LASIX) 40 mg tablet Take 1 tablet (40 mg total) by mouth 2 (two) times a day      cyclobenzaprine (FLEXERIL) 10 mg tablet SMARTSIG:Tablet(s) By Mouth      potassium chloride 10 mEq CR tablet Take 1 tablet (10 mEq total) by mouth daily      sotaloL (BETAPACE) 120 mg tablet Take 1 tablet (120 mg total) by mouth every 12 hours      rosuvastatin (Crestor) 40 mg tablet Take 1 tablet (40 mg total) by mouth nightly 90 tablet 0    magnesium chloride (SLOW-MAG) 64 mg tablet,delayed release (DR/EC) Take 1  tablet (64 mg total) by mouth 2 (two) times a day with meals 180 tablet 3    metFORMIN XR (GLUCOPHAGE-XR) 500 mg 24 hr tablet Take 1 tablet (500 mg total) by mouth daily with breakfast 90 tablet 3    mexiletine (MEXITIL) 200 mg capsule Take 1 capsule (200 mg total) by mouth 3 (three) times a day (Patient taking differently: Take 1 capsule (200 mg total) by mouth 2 (two) times a day) 270 capsule 0    cholecalciferol, vitamin D3, 25 mcg (1,000 unit) tablet Take 1 tablet (1,000 Units total) by mouth daily      rivaroxaban (Xarelto) 20 mg tablet Take 1 tablet (20 mg total) by mouth daily 60 tablet 0    carvediloL (Coreg) 25 mg tablet Take 1 tablet (25 mg total) by mouth 2 (two) times a day with meals 180 tablet 3    sacubitriL-valsartan (ENTRESTO) 24-26 mg tablet Take 1 tablet by mouth 2 (two) times a day 180 tablet 3    spironolactone (ALDACTONE) 25 mg tablet Take 1 tablet (25 mg total) by mouth 2 (two) times a day 180 tablet 3    aspirin 81 mg EC tablet Take 1 tablet (81 mg total) by mouth daily 90 tablet 3    albuterol HFA (PROVENTIL HFA;VENTOLIN HFA) 90 mcg/actuation inhaler Inhale 2 puffs every 6 (six) hours as needed for wheezing or shortness of breath 1 Inhaler 1    pantoprazole (PROTONIX) 40 mg EC tablet Take 1 tablet (40 mg total) by mouth daily      tamsulosin (FLOMAX) 0.4 mg capsule Take 1 capsule (0.4 mg total) by mouth daily      nitroglycerin (NITROSTAT) 0.4 mg SL tablet Place 1 tablet (0.4 mg total) under the tongue every 5 (five) minutes as needed for chest pain       No current facility-administered medications for this visit.     Past Medical History:   Diagnosis Date    CAD (coronary artery disease)     CHF (congestive heart failure) (CMS/Formerly [facility])     CVA (cerebral vascular accident) (CMS/Formerly [facility]) 2010    Dyslipidemia     GERD (gastroesophageal reflux disease)     GERD (gastroesophageal reflux disease)     Heart attack (CMS/Formerly [facility])     x3    Hypertension     Ischemic cardiomyopathy     Paroxysmal atrial  fibrillation (CMS/HCC)     On Xarelto    V-tach (CMS/HCC)     AICD in place     Past Surgical History:   Procedure Laterality Date    ABLATION VT N/A 06/09/2020    Procedure: Ablation VT;  Surgeon: Wilfredo Montana MD;  Location: [facility] EP Lab;  Service: Electrophysiology;  Laterality: N/A;  Check with Dr. Montana in the a.m. regarding scheduling    APPENDECTOMY      COLONOSCOPY N/A 7/25/2023    Procedure: COLONOSCOPY with Polypectomy;  Surgeon: Hortencia Carson MD;  Location: [facility];  Service: Endoscopy;  Laterality: N/A;    CORONARY ARTERY STENTING  2009    2.5 X 24-mm Taxus DIMAS to first diagonal. 3.0 X 8-mm Taxus stent to proximal LAD just proximal to diagonal.    CORONARY ARTERY STENTING  2010    3.5 X 15-mm Promus DIMAS to totally occluded LAD    CORONARY ARTERY STENTING  2011    2.25 X 30-mm Resolute DIMAS to 2nd diagonal.  Balloon angioplasty through strut to improve blood flow to distal LAD    CORONARY ARTERY STENTING  07/28/2017    second diagonal branch LAD Resolute 2.25 x 30-mm DIMAS    ICD DC GEN CHANGE N/A 6/20/2022    Procedure: BI-V ICD Gen Change;  Surgeon: Wilfredo Montana MD;  Location: [facility] EP Lab;  Service: Cardiovascular;  Laterality: N/A;    ICD SC NEW  2011    [employer] Cognis Model #N119, Serial #[id number]. [employer] Reliance Model #0185, Serial #[id number]. LV lead Acuity Model #45+91, Serial #[id number]. Atrial lead Model #4469, Serial #[id number]     Family History   Problem Relation Age of Onset    Heart attack Mother 62    Other Mother         Abdominal Aortic Aneurysm    Lung cancer Mother     Colon cancer Father         Cause of death    Diabetes Brother         Non-Insulin Dependent    Heart attack Brother 51        w/ Stents    Heart disease Brother     Other Son         Well     Social History     Socioeconomic History    Marital status: Single   Tobacco Use    Smoking status: Former     Packs/day: 0.75     Years: 30.00     Additional pack years: 0.00     Total pack years: 22.50     Types:  "Cigarettes     Quit date: 6/7/2020     Years since quitting: 3.2    Smokeless tobacco: Never   Vaping Use    Vaping Use: Never used   Substance and Sexual Activity    Alcohol use: Not Currently     Comment: Quit drinking in 2007    Drug use: Not Currently    Sexual activity: Yes     Partners: Female     Social Determinants of Health     Tobacco Use: Medium Risk (9/22/2023)    Patient History     Smoking Tobacco Use: Former     Smokeless Tobacco Use: Never   Financial Resource Strain: Unknown (7/8/2020)    Overall Financial Resource Strain (CARDIA)     Difficulty of Paying Living Expenses: Patient refused   Food Insecurity: Unknown (7/8/2020)    Hunger Vital Sign     Worried About Running Out of Food in the Last Year: Patient refused     Ran Out of Food in the Last Year: Patient refused   Transportation Needs: Unknown (7/8/2020)    PRAPARE - Transportation     Lack of Transportation (Medical): Patient refused     Lack of Transportation (Non-Medical): Patient refused       Review of Systems    Objective   /64 (BP Location: Right arm, Patient Position: Sitting, Cuff Size: Long Adult)   Pulse 60   Temp 36.3 °C (97.4 °F) (Temporal)   Resp 16   Ht 1.778 m (5' 10\")   Wt 124.3 kg (274 lb)   SpO2 94%   BMI 39.31 kg/m²     Physical Exam  Vitals reviewed.   Constitutional:       General: He is not in acute distress.     Appearance: Normal appearance.   HENT:      Head: Normocephalic and atraumatic.      Nose: Nose normal. No congestion or rhinorrhea.      Mouth/Throat:      Mouth: Mucous membranes are moist.      Pharynx: Oropharynx is clear. No oropharyngeal exudate.   Eyes:      General: No scleral icterus.     Pupils: Pupils are equal, round, and reactive to light.   Neck:      Comments: Jugular venous pressure normal  Cardiovascular:      Rate and Rhythm: Normal rate and regular rhythm.      Pulses: Normal pulses.      Heart sounds: No murmur heard.  Pulmonary:      Effort: Pulmonary effort is normal. No " respiratory distress.      Breath sounds: No wheezing or rales.   Musculoskeletal:         General: No tenderness or deformity.      Cervical back: Neck supple. No rigidity.      Right lower leg: No edema.      Left lower leg: No edema.   Feet:      Right foot:      Protective Sensation: 10 sites tested.  10 sites sensed.      Left foot:      Protective Sensation: 10 sites tested.  10 sites sensed.   Lymphadenopathy:      Cervical: No cervical adenopathy.   Skin:     General: Skin is warm and dry.      Capillary Refill: Capillary refill takes less than 2 seconds.      Findings: No erythema or rash.   Neurological:      Mental Status: He is alert.         ASSESSMENT AND PLAN   1. Type 2 diabetes mellitus without complication, without long-term current use of insulin (CMS/formerly [facility])  Rechecking hemoglobin A1c today  - Hemoglobin A1c (glycosylated) Blood, Venous; Future  - Urine Albumin/Creatinine Ratio Urine, Clean Catch; Future    2. Primary hypertension  Rechecking basic labs  - CBC w/auto differential Blood, Venous; Future  - Comprehensive metabolic panel Blood, Venous; Future    3. History of ventricular tachycardia  Doing well on sotalol    4. Obstructive sleep apnea  Doing well on CPAP    5. Ischemic cardiomyopathy  Euvolemic on exam today    6. Nicotine dependence in remission, unspecified nicotine product type  Low-dose CT screening for lung cancer  - CT chest lung cancer low dose screening; Future    7. Nicotine dependence, cigarettes, in remission  - CT chest lung cancer low dose screening; Future    8. Encounter for hepatitis C screening test for low risk patient  - Hepatitis C antibody Blood, Venous; Future     Nikhil Smith MD

## 2023-09-28 PROCEDURE — 93295 DEV INTERROG REMOTE 1/2/MLT: CPT | Performed by: INTERNAL MEDICINE

## 2023-09-28 PROCEDURE — 93296 REM INTERROG EVL PM/IDS: CPT | Performed by: INTERNAL MEDICINE

## 2023-10-18 ENCOUNTER — APPOINTMENT (OUTPATIENT)
Dept: CARDIOLOGY | Facility: HOSPITAL | Age: 58
End: 2023-10-18
Payer: MEDICARE

## 2023-10-18 ENCOUNTER — HOSPITAL ENCOUNTER (OUTPATIENT)
Facility: HOSPITAL | Age: 58
Setting detail: OBSERVATION
Discharge: HOME | End: 2023-10-20
Attending: EMERGENCY MEDICINE | Admitting: INTERNAL MEDICINE
Payer: MEDICARE

## 2023-10-18 ENCOUNTER — TRANSFERRED RECORDS (OUTPATIENT)
Dept: HEALTH INFORMATION MANAGEMENT | Facility: CLINIC | Age: 58
End: 2023-10-18

## 2023-10-18 ENCOUNTER — APPOINTMENT (OUTPATIENT)
Dept: RADIOLOGY | Facility: HOSPITAL | Age: 58
End: 2023-10-18
Payer: MEDICARE

## 2023-10-18 DIAGNOSIS — R42 LIGHT HEADED: ICD-10-CM

## 2023-10-18 DIAGNOSIS — I47.20 VENTRICULAR TACHYARRHYTHMIA (CMS/HCC): ICD-10-CM

## 2023-10-18 DIAGNOSIS — I47.20 SUSTAINED VENTRICULAR TACHYCARDIA (CMS/HCC): ICD-10-CM

## 2023-10-18 DIAGNOSIS — I50.42 CHRONIC COMBINED SYSTOLIC AND DIASTOLIC CHF, NYHA CLASS 2 AND ACC/AHA STAGE C (CMS/HCC): ICD-10-CM

## 2023-10-18 DIAGNOSIS — Z45.02 ICD (IMPLANTABLE CARDIOVERTER-DEFIBRILLATOR) DISCHARGE: Primary | ICD-10-CM

## 2023-10-18 LAB
ALBUMIN SERPL-MCNC: 4.3 G/DL (ref 3.5–5.3)
ALP SERPL-CCNC: 67 U/L (ref 45–115)
ALT SERPL-CCNC: 23 U/L (ref 7–52)
ANION GAP SERPL CALC-SCNC: 10 MMOL/L (ref 3–11)
AST SERPL-CCNC: 18 U/L
BASOPHILS # BLD AUTO: 0.1 10*3/UL
BASOPHILS NFR BLD AUTO: 1.1 % (ref 0–2)
BILIRUB SERPL-MCNC: 1 MG/DL (ref 0.2–1.4)
BUN SERPL-MCNC: 12 MG/DL (ref 7–25)
CALCIUM ALBUM COR SERPL-MCNC: 8.7 MG/DL (ref 8.6–10.3)
CALCIUM SERPL-MCNC: 8.9 MG/DL (ref 8.6–10.3)
CHLORIDE SERPL-SCNC: 103 MMOL/L (ref 98–107)
CO2 SERPL-SCNC: 22 MMOL/L (ref 21–32)
CREAT SERPL-MCNC: 0.83 MG/DL (ref 0.7–1.3)
EGFRCR SERPLBLD CKD-EPI 2021: 102 ML/MIN/1.73M*2
EOSINOPHIL # BLD AUTO: 0.3 10*3/UL
EOSINOPHIL NFR BLD AUTO: 4.6 % (ref 0–3)
ERYTHROCYTE [DISTWIDTH] IN BLOOD BY AUTOMATED COUNT: 14.5 % (ref 11.5–15)
GLUCOSE SERPL-MCNC: 119 MG/DL (ref 70–105)
HCT VFR BLD AUTO: 44.2 % (ref 38–50)
HGB BLD-MCNC: 15.3 G/DL (ref 13.2–17.2)
LYMPHOCYTES # BLD AUTO: 3.2 10*3/UL
LYMPHOCYTES NFR BLD AUTO: 52.1 % (ref 15–47)
MAGNESIUM SERPL-MCNC: 2 MG/DL (ref 1.8–2.4)
MCH RBC QN AUTO: 31.3 PG (ref 29–34)
MCHC RBC AUTO-ENTMCNC: 34.6 G/DL (ref 32–36)
MCV RBC AUTO: 90.5 FL (ref 82–97)
MONOCYTES # BLD AUTO: 0.8 10*3/UL
MONOCYTES NFR BLD AUTO: 12.6 % (ref 5–13)
NEUTROPHILS # BLD AUTO: 1.8 10*3/UL
NEUTROPHILS NFR BLD AUTO: 29.6 % (ref 46–70)
PLATELET # BLD AUTO: 170 10*3/UL (ref 130–350)
PMV BLD AUTO: 8.6 FL (ref 6.9–10.8)
POTASSIUM SERPL-SCNC: 4.2 MMOL/L (ref 3.5–5.1)
PROT SERPL-MCNC: 6.8 G/DL (ref 6–8.3)
RBC # BLD AUTO: 4.88 10*6/ΜL (ref 4.1–5.8)
SODIUM SERPL-SCNC: 135 MMOL/L (ref 135–145)
TROPONIN I SERPL-MCNC: 139.4 PG/ML
TROPONIN I SERPL-MCNC: 70.9 PG/ML
WBC # BLD AUTO: 6.2 10*3/UL (ref 3.7–9.6)

## 2023-10-18 PROCEDURE — 84484 ASSAY OF TROPONIN QUANT: CPT | Performed by: EMERGENCY MEDICINE

## 2023-10-18 PROCEDURE — 80053 COMPREHEN METABOLIC PANEL: CPT | Performed by: EMERGENCY MEDICINE

## 2023-10-18 PROCEDURE — 85025 COMPLETE CBC W/AUTO DIFF WBC: CPT | Performed by: EMERGENCY MEDICINE

## 2023-10-18 PROCEDURE — 83735 ASSAY OF MAGNESIUM: CPT | Performed by: EMERGENCY MEDICINE

## 2023-10-18 PROCEDURE — 99284 EMERGENCY DEPT VISIT MOD MDM: CPT | Performed by: EMERGENCY MEDICINE

## 2023-10-18 PROCEDURE — 99223 1ST HOSP IP/OBS HIGH 75: CPT | Performed by: INTERNAL MEDICINE

## 2023-10-18 PROCEDURE — 36415 COLL VENOUS BLD VENIPUNCTURE: CPT | Performed by: EMERGENCY MEDICINE

## 2023-10-18 PROCEDURE — 93005 ELECTROCARDIOGRAM TRACING: CPT

## 2023-10-18 PROCEDURE — 71045 X-RAY EXAM CHEST 1 VIEW: CPT

## 2023-10-19 ENCOUNTER — APPOINTMENT (OUTPATIENT)
Dept: CARDIOLOGY | Facility: HOSPITAL | Age: 58
End: 2023-10-19
Payer: MEDICARE

## 2023-10-19 PROBLEM — N40.0 BENIGN PROSTATIC HYPERPLASIA WITHOUT LOWER URINARY TRACT SYMPTOMS: Status: ACTIVE | Noted: 2023-10-19

## 2023-10-19 PROBLEM — Z86.79 S/P ABLATION OF VENTRICULAR ARRHYTHMIA: Status: RESOLVED | Noted: 2020-06-10 | Resolved: 2023-10-19

## 2023-10-19 PROBLEM — G47.33 OBSTRUCTIVE SLEEP APNEA: Status: RESOLVED | Noted: 2021-10-01 | Resolved: 2023-10-19

## 2023-10-19 PROBLEM — K21.9 GASTROESOPHAGEAL REFLUX DISEASE WITHOUT ESOPHAGITIS: Status: ACTIVE | Noted: 2023-10-19

## 2023-10-19 PROBLEM — I47.20 VENTRICULAR TACHYCARDIA (PAROXYSMAL) (CMS/HCC): Status: RESOLVED | Noted: 2021-09-18 | Resolved: 2023-10-19

## 2023-10-19 PROBLEM — I47.29 VENTRICULAR TACHYCARDIA (PAROXYSMAL) (CMS/HCC): Status: RESOLVED | Noted: 2021-09-18 | Resolved: 2023-10-19

## 2023-10-19 PROBLEM — I47.20 V TACH (CMS/HCC): Status: RESOLVED | Noted: 2022-01-25 | Resolved: 2023-10-19

## 2023-10-19 PROBLEM — Z98.890 S/P ABLATION OF VENTRICULAR ARRHYTHMIA: Status: RESOLVED | Noted: 2020-06-10 | Resolved: 2023-10-19

## 2023-10-19 PROBLEM — Z20.822 COVID-19 RULED OUT BY CLINICAL CRITERIA: Status: RESOLVED | Noted: 2021-09-19 | Resolved: 2023-10-19

## 2023-10-19 PROBLEM — E11.69 DIABETES MELLITUS TYPE 2 IN OBESE (CMS/HCC): Status: ACTIVE | Noted: 2023-10-19

## 2023-10-19 PROBLEM — E66.9 MODERATE OBESITY: Status: ACTIVE | Noted: 2023-10-19

## 2023-10-19 PROBLEM — R79.89 ELEVATED TROPONIN: Status: ACTIVE | Noted: 2023-10-19

## 2023-10-19 PROBLEM — E66.9 DIABETES MELLITUS TYPE 2 IN OBESE (CMS/HCC): Status: ACTIVE | Noted: 2023-10-19

## 2023-10-19 PROBLEM — Z79.899 ON AMIODARONE THERAPY: Status: RESOLVED | Noted: 2020-06-10 | Resolved: 2023-10-19

## 2023-10-19 PROBLEM — D72.820 LYMPHOCYTOSIS (SYMPTOMATIC): Status: ACTIVE | Noted: 2023-10-19

## 2023-10-19 LAB
ASCENDING AORTA: 3.06 CM
AV LVOT PEAK GRADIENT: 3.8 MMHG
AV MEAN GRADIENT: 3.07 MMHG
AV PEAK GRADIENT: 6.15 MMHG
BSA FOR ECHO PROCEDURE: 2.48 M2
DELTA HIGH SENSITIVITY TROPONIN I, 1 HOUR: 84
DELTA HIGH SENSITIVITY TROPONIN I, 2 HOUR: 123.4
DOP CALC AO PEAK VEL: 1.24 M/S
DOP CALC AO VTI: 26.5 CM
DOP CALC LVOT DIAMETER: 2.61 CM
DOP CALC LVOT STROKE VOLUME: 108 CM3
DOP CALC MV VTI: 24.07 CM
DOP CALC RVOT PEAK VEL: 0.6 M/S
DOP CALCLVOT PEAK VEL VTI: 20.1 CM
E/A RATIO: 1.1
E/E' RATIO (AVERAGE): 13.7
E/E' RATIO: 15
EJECTION FRACTION: 25 %
EJECTION FRACTION: 25 %
INTERVENTRICULAR SEPTUM: 1 CM (ref 0.6–1.1)
IVC PROX: 2.02 CM
LA AREA A4C SYSTOLE: 109 CM3
LEFT ATRIUM VOLUME INDEX: 53 ML/M2
LEFT ATRIUM VOLUME: 126 CM3
LEFT INTERNAL DIMENSION IN SYSTOLE: 6.3 CM (ref 5–7.57)
LEFT VENTRICLE DIASTOLIC VOLUME: 418 CM3
LEFT VENTRICLE SYSTOLIC VOLUME: 315 CM3
LEFT VENTRICULAR INTERNAL DIMENSION IN DIASTOLE: 7.2 CM (ref 8.69–12.08)
LVAD-AP2: 71 CM2
MAGNESIUM SERPL-MCNC: 1.9 MG/DL (ref 1.8–2.4)
ML OF DILUTED DEFINITY INJECTED: 3 ML
MV DEC SLOPE: 5200 MM/S2
MV DT: 238 MS
MV MEAN GRADIENT: 1 MMHG
MV PEAK A VEL: 65 CM/S
MV PEAK E VEL: 68.3 CM/S
MV PEAK GRADIENT: 4 MMHG
MV STENOSIS PRESSURE HALF TIME: 52 MS
MV VALVE AREA P 1/2 METHOD: 4.23 CM2
MV VMAX: 95 CM/S
MVA (VTI): 4.47 CM2
PHOSPHATE SERPL-MCNC: 3.4 MG/DL (ref 2.5–4.9)
POSTERIOR WALL: 1 CM (ref 0.6–1.1)
PULM VEIN S/D RATIO: 1.3
PV PEAK D VEL: 37 CM/S
PV PEAK GRADIENT: 3.39 MMHG
PV PEAK S VEL: 47 CM/S
PV VMAX: 0.92 M/S
RA AREA: 25.4 CM2
RH CV ECHO AV VALVE AREA VEL: 4.2 CM2
RH CV ECHO AV VALVE AREA VTI: 4.1 CM2
RH LVOT PEAK VELOCITY REST: 1 M/S
RV AP4 BASE: 4.1 CM
S': 16 CM/S
TDI: 4.5 CM/S
TDILATERAL: 5.5 CM/S
TRICUSPID ANNULAR PLANE SYSTOLIC EXCURSION: 2.2 CM
TROPONIN I SERPL-MCNC: 223.4 PG/ML
TROPONIN I SERPL-MCNC: 262.8 PG/ML
TROPONIN I SERPL-MCNC: 432.7 PG/ML
TSH SERPL DL<=0.05 MIU/L-ACNC: 2.83 UIU/ML (ref 0.34–4.82)
Z-SCORE OF LEFT VENTRICULAR DIMENSION IN END DIASTOLE: -3.53
Z-SCORE OF LEFT VENTRICULAR DIMENSION IN END SYSTOLE: 0.19

## 2023-10-19 PROCEDURE — 6370000100 HC RX 637 (ALT 250 FOR IP): Performed by: NURSE PRACTITIONER

## 2023-10-19 PROCEDURE — 84484 ASSAY OF TROPONIN QUANT: CPT | Performed by: EMERGENCY MEDICINE

## 2023-10-19 PROCEDURE — 36415 COLL VENOUS BLD VENIPUNCTURE: CPT | Performed by: INTERNAL MEDICINE

## 2023-10-19 PROCEDURE — 2550000100 HC RX 255: Performed by: INTERNAL MEDICINE

## 2023-10-19 PROCEDURE — 6370000100 HC RX 637 (ALT 250 FOR IP): Performed by: INTERNAL MEDICINE

## 2023-10-19 PROCEDURE — 99214 OFFICE O/P EST MOD 30 MIN: CPT | Mod: FS,NRAPP | Performed by: NURSE PRACTITIONER

## 2023-10-19 PROCEDURE — G0378 HOSPITAL OBSERVATION PER HR: HCPCS

## 2023-10-19 PROCEDURE — 93306 TTE W/DOPPLER COMPLETE: CPT

## 2023-10-19 PROCEDURE — 93306 TTE W/DOPPLER COMPLETE: CPT | Mod: 26 | Performed by: INTERNAL MEDICINE

## 2023-10-19 PROCEDURE — 84100 ASSAY OF PHOSPHORUS: CPT | Performed by: INTERNAL MEDICINE

## 2023-10-19 PROCEDURE — 99214 OFFICE O/P EST MOD 30 MIN: CPT | Mod: RVUONLY | Performed by: NURSE PRACTITIONER

## 2023-10-19 PROCEDURE — 84443 ASSAY THYROID STIM HORMONE: CPT | Performed by: NURSE PRACTITIONER

## 2023-10-19 PROCEDURE — C8929 TTE W OR WO FOL WCON,DOPPLER: HCPCS

## 2023-10-19 PROCEDURE — 83735 ASSAY OF MAGNESIUM: CPT | Performed by: INTERNAL MEDICINE

## 2023-10-19 PROCEDURE — 93010 ELECTROCARDIOGRAM REPORT: CPT | Performed by: INTERNAL MEDICINE

## 2023-10-19 PROCEDURE — 36415 COLL VENOUS BLD VENIPUNCTURE: CPT | Performed by: EMERGENCY MEDICINE

## 2023-10-19 PROCEDURE — 84484 ASSAY OF TROPONIN QUANT: CPT | Performed by: INTERNAL MEDICINE

## 2023-10-19 RX ORDER — SOTALOL HYDROCHLORIDE 120 MG/1
120 TABLET ORAL EVERY 12 HOURS SCHEDULED
Status: DISCONTINUED | OUTPATIENT
Start: 2023-10-19 | End: 2023-10-20 | Stop reason: HOSPADM

## 2023-10-19 RX ORDER — TAMSULOSIN HYDROCHLORIDE 0.4 MG/1
0.4 CAPSULE ORAL
Status: DISCONTINUED | OUTPATIENT
Start: 2023-10-19 | End: 2023-10-20 | Stop reason: HOSPADM

## 2023-10-19 RX ORDER — ROSUVASTATIN CALCIUM 20 MG/1
40 TABLET, COATED ORAL NIGHTLY
Status: DISCONTINUED | OUTPATIENT
Start: 2023-10-19 | End: 2023-10-20 | Stop reason: HOSPADM

## 2023-10-19 RX ORDER — ASPIRIN 81 MG/1
81 TABLET ORAL DAILY
COMMUNITY
End: 2023-11-22 | Stop reason: SDUPTHER

## 2023-10-19 RX ORDER — ONDANSETRON 4 MG/1
4 TABLET, FILM COATED ORAL EVERY 6 HOURS PRN
Status: DISCONTINUED | OUTPATIENT
Start: 2023-10-19 | End: 2023-10-20 | Stop reason: HOSPADM

## 2023-10-19 RX ORDER — LANSOPRAZOLE 30 MG/1
30 CAPSULE, DELAYED RELEASE ORAL
Status: DISCONTINUED | OUTPATIENT
Start: 2023-10-19 | End: 2023-10-20 | Stop reason: HOSPADM

## 2023-10-19 RX ORDER — MEXILETINE HYDROCHLORIDE 200 MG/1
200 CAPSULE ORAL EVERY 8 HOURS SCHEDULED
Status: DISCONTINUED | OUTPATIENT
Start: 2023-10-19 | End: 2023-10-20 | Stop reason: HOSPADM

## 2023-10-19 RX ORDER — POTASSIUM CHLORIDE 750 MG/1
10 TABLET, FILM COATED, EXTENDED RELEASE ORAL DAILY
Status: DISCONTINUED | OUTPATIENT
Start: 2023-10-19 | End: 2023-10-20

## 2023-10-19 RX ORDER — SPIRONOLACTONE 25 MG/1
25 TABLET ORAL 2 TIMES DAILY WITH MEALS
Status: DISCONTINUED | OUTPATIENT
Start: 2023-10-19 | End: 2023-10-20

## 2023-10-19 RX ORDER — MEXILETINE HYDROCHLORIDE 200 MG/1
200 CAPSULE ORAL 2 TIMES DAILY
Status: DISCONTINUED | OUTPATIENT
Start: 2023-10-19 | End: 2023-10-19

## 2023-10-19 RX ORDER — DEXTROSE 50 % IN WATER (D50W) INTRAVENOUS SYRINGE
15-30
Status: DISCONTINUED | OUTPATIENT
Start: 2023-10-19 | End: 2023-10-20 | Stop reason: HOSPADM

## 2023-10-19 RX ORDER — SACUBITRIL AND VALSARTAN 24; 26 MG/1; MG/1
1 TABLET, FILM COATED ORAL 2 TIMES DAILY
COMMUNITY
End: 2023-10-20 | Stop reason: HOSPADM

## 2023-10-19 RX ORDER — ACETAMINOPHEN 325 MG/1
650 TABLET ORAL EVERY 6 HOURS PRN
Status: DISCONTINUED | OUTPATIENT
Start: 2023-10-19 | End: 2023-10-20 | Stop reason: HOSPADM

## 2023-10-19 RX ORDER — ONDANSETRON HYDROCHLORIDE 2 MG/ML
4 INJECTION, SOLUTION INTRAVENOUS EVERY 6 HOURS PRN
Status: DISCONTINUED | OUTPATIENT
Start: 2023-10-19 | End: 2023-10-20 | Stop reason: HOSPADM

## 2023-10-19 RX ORDER — ALUMINUM HYDROXIDE, MAGNESIUM HYDROXIDE, AND SIMETHICONE 1200; 120; 1200 MG/30ML; MG/30ML; MG/30ML
30 SUSPENSION ORAL 3 TIMES DAILY PRN
Status: DISCONTINUED | OUTPATIENT
Start: 2023-10-19 | End: 2023-10-20 | Stop reason: HOSPADM

## 2023-10-19 RX ORDER — SACUBITRIL AND VALSARTAN 24; 26 MG/1; MG/1
1 TABLET, FILM COATED ORAL 2 TIMES DAILY
Status: DISCONTINUED | OUTPATIENT
Start: 2023-10-19 | End: 2023-10-20

## 2023-10-19 RX ORDER — CHOLECALCIFEROL (VITAMIN D3) 25 MCG
1000 TABLET ORAL DAILY
Status: DISCONTINUED | OUTPATIENT
Start: 2023-10-19 | End: 2023-10-20 | Stop reason: HOSPADM

## 2023-10-19 RX ORDER — ADHESIVE BANDAGE
30 BANDAGE TOPICAL DAILY PRN
Status: DISCONTINUED | OUTPATIENT
Start: 2023-10-19 | End: 2023-10-20 | Stop reason: HOSPADM

## 2023-10-19 RX ORDER — DEXTROSE 40 %
15 GEL (GRAM) ORAL
Status: DISCONTINUED | OUTPATIENT
Start: 2023-10-19 | End: 2023-10-20 | Stop reason: HOSPADM

## 2023-10-19 RX ORDER — FUROSEMIDE 40 MG/1
40 TABLET ORAL
Status: DISCONTINUED | OUTPATIENT
Start: 2023-10-19 | End: 2023-10-20

## 2023-10-19 RX ORDER — ASPIRIN 81 MG/1
81 TABLET ORAL DAILY
Status: DISCONTINUED | OUTPATIENT
Start: 2023-10-19 | End: 2023-10-20 | Stop reason: HOSPADM

## 2023-10-19 RX ORDER — SOTALOL HYDROCHLORIDE 120 MG/1
120 TABLET ORAL EVERY 12 HOURS
COMMUNITY
End: 2023-10-25 | Stop reason: SDUPTHER

## 2023-10-19 RX ORDER — CARVEDILOL 25 MG/1
25 TABLET ORAL 2 TIMES DAILY WITH MEALS
Status: DISCONTINUED | OUTPATIENT
Start: 2023-10-19 | End: 2023-10-20 | Stop reason: HOSPADM

## 2023-10-19 RX ORDER — POLYETHYLENE GLYCOL (3350) 17 G/17G
17 POWDER, FOR SOLUTION ORAL DAILY
Status: DISCONTINUED | OUTPATIENT
Start: 2023-10-19 | End: 2023-10-20 | Stop reason: HOSPADM

## 2023-10-19 RX ORDER — SPIRONOLACTONE 25 MG/1
25 TABLET ORAL 2 TIMES DAILY
COMMUNITY
End: 2023-10-20 | Stop reason: HOSPADM

## 2023-10-19 RX ORDER — CARVEDILOL 25 MG/1
37.5 TABLET ORAL 2 TIMES DAILY WITH MEALS
COMMUNITY
End: 2023-11-07 | Stop reason: SDUPTHER

## 2023-10-19 RX ADMIN — SPIRONOLACTONE 25 MG: 25 TABLET ORAL at 07:42

## 2023-10-19 RX ADMIN — TAMSULOSIN HYDROCHLORIDE 0.4 MG: 0.4 CAPSULE ORAL at 17:01

## 2023-10-19 RX ADMIN — Medication 1000 UNITS: at 07:42

## 2023-10-19 RX ADMIN — SOTALOL HYDROCHLORIDE 120 MG: 120 TABLET ORAL at 20:36

## 2023-10-19 RX ADMIN — CARVEDILOL 25 MG: 25 TABLET, FILM COATED ORAL at 17:01

## 2023-10-19 RX ADMIN — ASPIRIN 81 MG: 81 TABLET ORAL at 07:42

## 2023-10-19 RX ADMIN — SACUBITRIL AND VALSARTAN 1 TABLET: 24; 26 TABLET, FILM COATED ORAL at 07:42

## 2023-10-19 RX ADMIN — POTASSIUM CHLORIDE 10 MEQ: 750 TABLET, FILM COATED, EXTENDED RELEASE ORAL at 07:43

## 2023-10-19 RX ADMIN — PERFLUTREN 3 ML: 6.52 INJECTION, SUSPENSION INTRAVENOUS at 09:30

## 2023-10-19 RX ADMIN — ROSUVASTATIN CALCIUM 40 MG: 20 TABLET, FILM COATED ORAL at 20:36

## 2023-10-19 RX ADMIN — CARVEDILOL 25 MG: 25 TABLET, FILM COATED ORAL at 07:42

## 2023-10-19 RX ADMIN — MEXILETINE HYDROCHLORIDE 200 MG: 200 CAPSULE ORAL at 14:31

## 2023-10-19 RX ADMIN — RIVAROXABAN 20 MG: 20 TABLET, FILM COATED ORAL at 17:01

## 2023-10-19 RX ADMIN — SOTALOL HYDROCHLORIDE 120 MG: 120 TABLET ORAL at 07:43

## 2023-10-19 RX ADMIN — MEXILETINE HYDROCHLORIDE 200 MG: 200 CAPSULE ORAL at 20:36

## 2023-10-19 RX ADMIN — SACUBITRIL AND VALSARTAN 1 TABLET: 24; 26 TABLET, FILM COATED ORAL at 20:36

## 2023-10-19 RX ADMIN — FUROSEMIDE 40 MG: 40 TABLET ORAL at 07:43

## 2023-10-19 RX ADMIN — FUROSEMIDE 40 MG: 40 TABLET ORAL at 14:31

## 2023-10-19 RX ADMIN — LANSOPRAZOLE 30 MG: 30 CAPSULE, DELAYED RELEASE ORAL at 17:01

## 2023-10-19 RX ADMIN — SPIRONOLACTONE 25 MG: 25 TABLET ORAL at 17:01

## 2023-10-19 RX ADMIN — MEXILETINE HYDROCHLORIDE 200 MG: 200 CAPSULE ORAL at 08:09

## 2023-10-19 ASSESSMENT — ENCOUNTER SYMPTOMS
SYNCOPE: 0
DYSPNEA ON EXERTION: 0
EYES NEGATIVE: 1
WHEEZING: 0
HEADACHES: 0
NEAR-SYNCOPE: 0
IRREGULAR HEARTBEAT: 0
BRUISES/BLEEDS EASILY: 0
HEARTBURN: 0
COUGH: 0
ORTHOPNEA: 0
VOMITING: 0
PSYCHIATRIC NEGATIVE: 1
HEMATOCHEZIA: 0
DIAPHORESIS: 0
ABDOMINAL PAIN: 0
FEVER: 0
CHILLS: 0
DIZZINESS: 0
FALLS: 0
SHORTNESS OF BREATH: 0
NAUSEA: 0
PALPITATIONS: 0
PND: 0
SNORING: 0
LIGHT-HEADEDNESS: 0
ENDOCRINE NEGATIVE: 1
MYALGIAS: 0
DIARRHEA: 0
HEMATURIA: 0
CONSTIPATION: 0

## 2023-10-19 ASSESSMENT — CHA2DS2 SCORE
PRIOR STROKE OR TIA OR THROMBOEMBOLISM: YES
AGE IN YEARS: <65
SEX: MALE
DIABETES: YES
HYPERTENSION: YES

## 2023-10-19 ASSESSMENT — CHA2DS2-VASC SCORE
ESTIMATED_RISK_OF_STROKE_TIA_EMBOLISM_IN_%: 13.6
CHA2DS2-VASC SCORE: 6
ESTIMATED_RISK_OF_ISCHEMIC_STROKE_IN_%: 9.7

## 2023-10-19 ASSESSMENT — ACTIVITIES OF DAILY LIVING (ADL): ADEQUATE_TO_COMPLETE_ADL: YES

## 2023-10-19 NOTE — PLAN OF CARE
Problem: Pain - Adult  Goal: Verbalizes/displays adequate comfort level or baseline comfort level  Description: INTERVENTIONS:  1. Encourage patient to monitor pain and request interventions  2. Assess pain using the appropriate pain scale  3. Administer analgesics based on type and severity of pain and evaluate response  4. Educate/Implement non-pharmacological measures as appropriate and evaluate response  5. Consider cultural, developmental and social influences on pain and pain management  6. Notify Provider if interventions unsuccessful or patient reports new pain  Outcome: Progressing     Problem: Infection - Adult  Goal: Absence of infection during hospitalization  Description: INTERVENTIONS:  1. Assess and monitor for signs and symptoms of infection  2. Monitor lab/diagnostic results  3. Monitor all insertion sites/wounds/incisions  4. Monitor secretions for changes in amount and color  5. Administer medications as ordered  6. Educate and encourage patient and family to use good hand hygiene technique  7. Identify and educate in appropriate isolation precautions for identified infection/condition  Outcome: Progressing     Problem: Safety Adult  Goal: Patient will remain safe during hospitalization  Description: INTERVENTIONS    1. Assess patient for fall risk and implement interventions if needed  2. Use safe transport techniques  3. Assess patient using the appropriate Jean Marie skin assessment scale  4. Assess patient for risk of aspiration  5. Assess patient for risk of elopement  6. Assess patient for risk of suicide  Outcome: Progressing     Problem: Daily Care  Goal: Daily care needs are met  Description: INTERVENTIONS:   1. Assess and monitor skin integrity  2. Identify patients at risk for skin breakdown on admission and per policy  3. Assess and monitor ability to perform self care and identify potential discharge needs  4. Assess skin integrity/risk for skin breakdown  5. Assist patient with  activities of daily living as needed  6. Encourage independent activity per ability   7. Provide mouth care   8. Include patient/family/caregiver in decisions related to daily care   Outcome: Progressing     Problem: Knowledge Deficit  Goal: Patient/family/caregiver demonstrates understanding of disease process, treatment plan, medications, and discharge instructions  Description: INTERVENTIONS:   1. Complete learning assessment and assess knowledge base  2. Provide teaching at level of understanding   3. Provide teaching via preferred learning methods  Outcome: Progressing     Problem: Discharge Barriers  Goal: Patient's discharge needs are met  Description: INTERVENTIONS:  1. Assess patient for self-management skills  2. Encourage participation in management  3. Identify potential discharge barriers on admission and throughout hospital stay  4. Involve patient/family/caregiver in discharge planning process  5. Collaborate with case management/ for discharge needs  Outcome: Progressing     Problem: Cardiovascular - Adult  Goal: Maintains optimal cardiac output and hemodynamic stability  Description: INTERVENTIONS:  1. Monitor vital signs and rhythm  2. Monitor for hypotension and other signs of decreased cardiac output  3. Administer and titrate ordered vasoactive medications to optimize hemodynamic stability  4. Monitor for fluid overload/dehydration, weight gain, shortness of breath and activity intolerance  5. Monitor arterial and/or venous puncture sites for bleeding and/or hematoma  6. Assess quality of pulses, capillary refill, edema, sensation, skin color and temperature  7. Assess for signs of decreased coronary artery perfusion - ex. angina  Outcome: Progressing  Goal: Absence of cardiac dysrhythmias or at baseline  Description: INTERVENTIONS:  1. Continuous cardiac monitoring, monitor vital signs, obtain 12 lead EKG if indicated  2. Administer antiarrhythmic and heart rate control  medications as ordered  3. Initiate emergency measures for life threatening arrhythmias  4. Monitor electrolytes and administer replacement therapy as ordered  Outcome: Progressing     Problem: Respiratory - Adult  Goal: Achieves optimal ventilation and oxygenation  Description: INTERVENTIONS:  1. Assess for changes in respiratory status  2. Assess for changes in mentation and behavior  3. Position to facilitate oxygenation and minimize respiratory effort  4. Oxygen supplementation based on oxygen saturation or ABGs  5. Assess patient's ability to cough effectively  6. Encourage broncho-pulmonary hygiene including cough, deep breathe  7. Assess the need for suctioning   8. Assess and instruct to report SOB or any respiratory difficulty  9. Respiratory Therapy support as indicated, including medications and treatment.  Outcome: Progressing  Goal: Achieves optimal ventilation and oxygenation with noninvasive CPAP/BiLEVEL support  Description: INTERVENTIONS:  1. Provide education to patient/family about rationale and expected outcomes associated with therapy  2. Position patient to facilitate optimal ventilation/oxygenation status and minimize respiratory effort  3. Position patient to reduce aspiration risk, elevate head of bed at least 35 degrees or higher, as applicable  4. Assess effectiveness of therapy on ventilation/oxygenation status based on oxygen saturation and/or arterial blood gases, as indicated  5. Assess patient for changes in respiratory and physiological status  6. Auscultate breath sounds and assess chest excursion, as indicated  7. Assess patient for changes in mentation and behavior  8. Routinely monitor equipment for proper performance and settings  9. Assess and monitor skin condition, in relationship to the respiratory interface  10. Assure equipment alarm volume is adequate for the patient's environment  11. Immediately respond to equipment alarm, to assess patient and/or cause for alarm  12.  Follow universal infection control/hospital policy(ies)/standards  Outcome: Progressing     Problem: Metabolic/Fluid and Electrolytes - Adult  Goal: Electrolytes maintained within normal limits  Description: INTERVENTIONS:  1. Monitor labs and assess patient for signs and symptoms of electrolyte imbalances  2. Administer electrolyte replacement as ordered  3. Monitor response to electrolyte replacements, including repeat lab results as appropriate  4. Fluid restriction as ordered  5. Instruct patient on fluid and nutrition restrictions as appropriate  Outcome: Progressing  Goal: Maintain Optimal Renal Function and Hemodynamic Stability  Description: INTERVENTIONS:  1. Monitor labs and assess for signs and symptoms of volume excess or deficit  2. Monitor intake, output and patient weight  3. Monitor urine specific gravity, serum osmolarity and serum sodium as indicated or ordered  4. Monitor response to interventions for patient's volume status, including labs, urine output, blood pressure (other measures as available)  5. Encourage oral intake as appropriate  6. Instruct patient on fluid and nutrition restrictions as appropriate  Outcome: Progressing  Goal: Glucose maintained within prescribed range  Description: INTERVENTIONS:  1. Monitor blood glucose as ordered  2. Assess for signs and symptoms of hyperglycemia and hypoglycemia  3. Administer ordered medications to maintain glucose within target range  4. Assess barriers to adequate nutritional intake and initiate nutrition consult as needed  5. Assess baseline knowledge and provide education as indicated  6. Monitor exercise as may reduce the requirements for insulin  Outcome: Progressing

## 2023-10-19 NOTE — MEDICATION HISTORY SPECIALIST NOTES
"    [facility] [id number]    Patients medication history was entered into Epic by nurse. Pharmacy Medication History Specialist reviewed nurse's update and verified with resource(s). Medication History Specialist did not interview this patient; however, will follow up if requested.      Information sources:  EPIC  Complete Dispense Report  Pharmacy - [facility] med list  Rx meds in Epic are \"house meds\" and have been e-prescribed accordingly.     Profile was checked for  medications. Added the following meds back to profile as they had  and subsequently fallen off:  Carvedilol  Aspirin  Xarelto  Entresto  Sotalol  Spironolactone    Discontinued medications:  Cyclobenzaprine - pharmacy policy from 23    Discrepancies:  Metformin - patient not taking.  Mexiletine - patient taking differently: 2 times daily.  Nitrostat - has at home.    "

## 2023-10-19 NOTE — PLAN OF CARE
Problem: Pain - Adult  Goal: Verbalizes/displays adequate comfort level or baseline comfort level  Description: INTERVENTIONS:  1. Encourage patient to monitor pain and request interventions  2. Assess pain using the appropriate pain scale  3. Administer analgesics based on type and severity of pain and evaluate response  4. Educate/Implement non-pharmacological measures as appropriate and evaluate response  5. Consider cultural, developmental and social influences on pain and pain management  6. Notify Provider if interventions unsuccessful or patient reports new pain  Outcome: Progressing  Flowsheets (Taken 10/19/2023 0119)  Verbalizes/displays adequate comfort level or baseline comfort level:   Encourage patient to monitor pain and request interventions   Assess pain using the appropriate pain scale   Administer analgesics based on type and severity of pain and evaluate response   Educate/Implement non-pharmacological measures as appropriate and evaluate response     Problem: Safety Adult  Goal: Patient will remain safe during hospitalization  Description: INTERVENTIONS    1. Assess patient for fall risk and implement interventions if needed  2. Use safe transport techniques  3. Assess patient using the appropriate Jean Marie skin assessment scale  4. Assess patient for risk of aspiration  5. Assess patient for risk of elopement  6. Assess patient for risk of suicide  Outcome: Progressing  Flowsheets (Taken 10/19/2023 0119)  Patient will remain safe durning hospitalization:   Assess patient for Fall Risk   Assess Patient for Aspirations   Use safe transport   Assess Patient for Risk of Elopement   Assess Patient using the appropriate Jean Marie scale   Assess Patient for Risk of Suicide     Problem: Cardiovascular - Adult  Goal: Maintains optimal cardiac output and hemodynamic stability  Description: INTERVENTIONS:  1. Monitor vital signs and rhythm  2. Monitor for hypotension and other signs of decreased cardiac  output  3. Administer and titrate ordered vasoactive medications to optimize hemodynamic stability  4. Monitor for fluid overload/dehydration, weight gain, shortness of breath and activity intolerance  5. Monitor arterial and/or venous puncture sites for bleeding and/or hematoma  6. Assess quality of pulses, capillary refill, edema, sensation, skin color and temperature  7. Assess for signs of decreased coronary artery perfusion - ex. angina  Outcome: Progressing  Flowsheets (Taken 10/19/2023 0119)  Maintain optimal cardiac output and hemodynamic stability:   Monitor vital signs and rhythm   Monitor for hypotension and other signs of decreased cardiac output   Administer and titrate ordered vasoactive medications to optimize hemodynamic stability   Monitor for fluid overload/dehydration, weight gain, shortness of breath and activity intolerance   Monitor arterial and/or venous puncture sites for bleeding and/or hematoma   Assess quality of pulses, capillary refill, edema, sensation, skin color and temperature   Assess for signs of decreased coronary artery perfusion - ex. angina  Goal: Absence of cardiac dysrhythmias or at baseline  Description: INTERVENTIONS:  1. Continuous cardiac monitoring, monitor vital signs, obtain 12 lead EKG if indicated  2. Administer antiarrhythmic and heart rate control medications as ordered  3. Initiate emergency measures for life threatening arrhythmias  4. Monitor electrolytes and administer replacement therapy as ordered  Outcome: Progressing  Flowsheets (Taken 10/19/2023 0119)  Absence of cardiac dysrhythmias or at baseline:   Continuous cardiac monitoring, monitor vital signs, obtain 12 lead EKG if indicated   Administer antiarrhythmic and heart rate control medications as ordered     Problem: Metabolic/Fluid and Electrolytes - Adult  Goal: Electrolytes maintained within normal limits  Description: INTERVENTIONS:  1. Monitor labs and assess patient for signs and symptoms of  electrolyte imbalances  2. Administer electrolyte replacement as ordered  3. Monitor response to electrolyte replacements, including repeat lab results as appropriate  4. Fluid restriction as ordered  5. Instruct patient on fluid and nutrition restrictions as appropriate  Outcome: Progressing  Flowsheets (Taken 10/19/2023 0119)  Electrolytes maintained within normal limits:   Monitor labs and assess patient for signs and symptoms of electrolyte imbalances   Administer electrolyte replacement as ordered   Monitor response to electrolyte replacements, including repeat lab results as appropriate  Goal: Maintain Optimal Renal Function and Hemodynamic Stability  Description: INTERVENTIONS:  1. Monitor labs and assess for signs and symptoms of volume excess or deficit  2. Monitor intake, output and patient weight  3. Monitor urine specific gravity, serum osmolarity and serum sodium as indicated or ordered  4. Monitor response to interventions for patient's volume status, including labs, urine output, blood pressure (other measures as available)  5. Encourage oral intake as appropriate  6. Instruct patient on fluid and nutrition restrictions as appropriate  Outcome: Progressing  Flowsheets (Taken 10/19/2023 0119)  Maintain optimal renal function and [facility] stability:   Monitor labs and assess for signs and symptoms of volume excess or deficit   Monitor intake, output and patient weight   Monitor urine specific gravity, serum osmolarity and serum sodium as indicated or ordered   Monitor response to interventions for patient's volume status, including labs, urine output, blood pressure (other measures as available)   Encourage oral intake as appropriate

## 2023-10-19 NOTE — H&P
History and Physical    10/18/23  11:35 PM    Chief Complaint   Patient presents with    Palpitations     Patient states about 15-20 minutes ago his pacemaker fired. States he is unsure what the parameters are but he became very flush and lightheaded right before it went off. Denies chest pain but states he has palpitations.        HPI  Patient is a 58 y.o. moderately obese male with significant PMH of hypertension, paroxysmal atrial fibrillation, history of V. tach with AICD in place, CAD with cardiac stent, chronic combined systolic and diastolic CHF with LVEF of 25% and grade 1 diastolic dysfunction, dyslipidemia, ischemic cardiomyopathy, GERD, BPH, DM type II and many others as listed below presented to ED with complaint of AICD discharge today evening at about 8 PM at home while he was watching TV.  Patient's family member present at bedside during my evaluation in ED.  Patient says that he felt lightheaded and dizziness and palpitation prior to AICD discharge.  Upon arrival in ED, his vitals are stable.  EKG shows 71 bpm atrial ventricular dual paced rhythm.  AICD device check has been done in ED which showed V. tach lasting for 1 minute 7 seconds as per ED physician.  EP consult to Dr. Montana has been called by ED.  Otherwise patient denies chest pain, shortness of breath, fever or chills, headache, nausea or vomiting, abdominal pain, diarrhea or constipation dysuria.  He states that Peritrate to his AICD has been recently changed about more than 1 year ago.    Significant labs: Troponin trending up 70.0-> 139.4-> 223.4.  Hemoglobin 15.3, WC count 6200, potassium 4.2, magnesium 2.0, creatinine 0.83, calcium 8.9 and LFTs within normal limits.  Chest x-ray negative for any acute cardiopulmonary abnormality.      Past Medical History:   Diagnosis Date    BPH (benign prostatic hyperplasia)     CAD (coronary artery disease)     Status post stent    CHF (congestive heart failure) (CMS/[facility])     Chronic combined  systolic and diastolic CHF (congestive heart failure) (CMS/[facility])     LVEF 25% with grade 1 diastolic dysfunction as per echo on 1/26/2022.    CVA (cerebral vascular accident) (CMS/HCC) 2010    Without residual deficit.    Diabetes mellitus type 2 in obese (CMS/HCC)     Dyslipidemia     GERD (gastroesophageal reflux disease)     Heart attack (CMS/[facility])     x3    Hypertension     Ischemic cardiomyopathy     Morbid obesity with BMI of 40.0-44.9, adult (CMS/HCC)     Paroxysmal atrial fibrillation (CMS/HCC)     On Xarelto    V-tach (CMS/HCC)     AICD in place       Past Surgical History:   Procedure Laterality Date    ABLATION VT N/A 06/09/2020    Procedure: Ablation VT;  Surgeon: Wilfredo Montana MD;  Location: [facility];  Service: Electrophysiology;  Laterality: N/A;  Check with Dr. Montana in the a.m. regarding scheduling    APPENDECTOMY      COLONOSCOPY N/A 7/25/2023    Procedure: COLONOSCOPY with Polypectomy;  Surgeon: Hortencia Carson MD;  Location: [facility];  Service: Endoscopy;  Laterality: N/A;    CORONARY ARTERY STENTING  2009    2.5 X 24-mm Taxus DIMAS to first diagonal. 3.0 X 8-mm Taxus stent to proximal LAD just proximal to diagonal.    CORONARY ARTERY STENTING  2010    3.5 X 15-mm Promus DIMAS to totally occluded LAD    CORONARY ARTERY STENTING  2011    2.25 X 30-mm Resolute DIMAS to 2nd diagonal.  Balloon angioplasty through strut to improve blood flow to distal LAD    CORONARY ARTERY STENTING  07/28/2017    second diagonal branch LAD Resolute 2.25 x 30-mm DIMAS    ICD DC GEN CHANGE N/A 6/20/2022    Procedure: BI-V ICD Gen Change;  Surgeon: Wilfredo Montana MD;  Location: [facility];  Service: Cardiovascular;  Laterality: N/A;    ICD SC NEW  2011    [employer] Cognis Model #N119, Serial #[id number]. [employer] Reliance Model #0185, Serial #[id number]. LV lead Acuity Model #45+91, Serial #[id number]. Atrial lead Model #4469, Serial #[id number]       Family History   Problem Relation Age of Onset    Heart attack Mother 62     Other Mother         Abdominal Aortic Aneurysm    Lung cancer Mother     Colon cancer Father         Cause of death    Diabetes Brother         Non-Insulin Dependent    Heart attack Brother 51        w/ Stents    Heart disease Brother     Other Son         Well       Social History     Socioeconomic History    Marital status: Single     Spouse name: Not on file    Number of children: Not on file    Years of education: Not on file    Highest education level: Not on file   Occupational History    Not on file   Tobacco Use    Smoking status: Former     Packs/day: 0.75     Years: 30.00     Additional pack years: 0.00     Total pack years: 22.50     Types: Cigarettes     Quit date: 6/7/2020     Years since quitting: 3.3    Smokeless tobacco: Never   Vaping Use    Vaping Use: Never used   Substance and Sexual Activity    Alcohol use: Not Currently     Comment: Quit drinking in 2007    Drug use: Not Currently    Sexual activity: Yes     Partners: Female   Other Topics Concern    Not on file   Social History Narrative    Not on file     Social Determinants of Health     Financial Resource Strain: Unknown (7/8/2020)    Overall Financial Resource Strain (CARDIA)     Difficulty of Paying Living Expenses: Patient refused   Food Insecurity: Unknown (7/8/2020)    Hunger Vital Sign     Worried About Running Out of Food in the Last Year: Patient refused     Ran Out of Food in the Last Year: Patient refused   Transportation Needs: Unknown (7/8/2020)    PRAPARE - Transportation     Lack of Transportation (Medical): Patient refused     Lack of Transportation (Non-Medical): Patient refused   Physical Activity: Not on file   Stress: Not on file   Social Connections: Not on file   Intimate Partner Violence: Not on file   Housing Stability: Not on file       Allergies   Allergen Reactions    Morphine      ANXIETY    Tramadol      ANXIETY       No current facility-administered medications for this encounter.     Current Outpatient  Medications   Medication Sig Dispense Refill    tamsulosin (FLOMAX) 0.4 mg capsule Take 1 capsule (0.4 mg total) by mouth daily      furosemide (LASIX) 40 mg tablet Take 1 tablet (40 mg total) by mouth 2 (two) times a day      cyclobenzaprine (FLEXERIL) 10 mg tablet SMARTSIG:Tablet(s) By Mouth      potassium chloride 10 mEq CR tablet Take 1 tablet (10 mEq total) by mouth daily      sotaloL (BETAPACE) 120 mg tablet Take 1 tablet (120 mg total) by mouth every 12 hours      rosuvastatin (Crestor) 40 mg tablet Take 1 tablet (40 mg total) by mouth nightly 90 tablet 0    magnesium chloride (SLOW-MAG) 64 mg tablet,delayed release (DR/EC) Take 1 tablet (64 mg total) by mouth 2 (two) times a day with meals 180 tablet 3    metFORMIN XR (GLUCOPHAGE-XR) 500 mg 24 hr tablet Take 1 tablet (500 mg total) by mouth daily with breakfast 90 tablet 3    mexiletine (MEXITIL) 200 mg capsule Take 1 capsule (200 mg total) by mouth 3 (three) times a day (Patient taking differently: Take 1 capsule (200 mg total) by mouth 2 (two) times a day) 270 capsule 0    cholecalciferol, vitamin D3, 25 mcg (1,000 unit) tablet Take 1 tablet (1,000 Units total) by mouth daily      rivaroxaban (Xarelto) 20 mg tablet Take 1 tablet (20 mg total) by mouth daily 60 tablet 0    carvediloL (Coreg) 25 mg tablet Take 1 tablet (25 mg total) by mouth 2 (two) times a day with meals 180 tablet 3    sacubitriL-valsartan (ENTRESTO) 24-26 mg tablet Take 1 tablet by mouth 2 (two) times a day 180 tablet 3    spironolactone (ALDACTONE) 25 mg tablet Take 1 tablet (25 mg total) by mouth 2 (two) times a day 180 tablet 3    aspirin 81 mg EC tablet Take 1 tablet (81 mg total) by mouth daily 90 tablet 3    albuterol HFA (PROVENTIL HFA;VENTOLIN HFA) 90 mcg/actuation inhaler Inhale 2 puffs every 6 (six) hours as needed for wheezing or shortness of breath 1 Inhaler 1    nitroglycerin (NITROSTAT) 0.4 mg SL tablet Place 1 tablet (0.4 mg total) under the tongue every 5 (five) minutes as  needed for chest pain      pantoprazole (PROTONIX) 40 mg EC tablet Take 1 tablet (40 mg total) by mouth daily         Prior to Admission medications    Medication Sig Start Date End Date Taking? Authorizing Provider   tamsulosin (FLOMAX) 0.4 mg capsule Take 1 capsule (0.4 mg total) by mouth daily 8/23/23   Francisco Javier Mahmood MD   furosemide (LASIX) 40 mg tablet Take 1 tablet (40 mg total) by mouth 2 (two) times a day 8/9/23   Francisco Javier Mahmood MD   cyclobenzaprine (FLEXERIL) 10 mg tablet SMARTSIG:Tablet(s) By Mouth 1/26/23   Francisco Javier Mahmood MD   potassium chloride 10 mEq CR tablet Take 1 tablet (10 mEq total) by mouth daily 3/20/23   Francisco Javier Mahmood MD   sotaloL (BETAPACE) 120 mg tablet Take 1 tablet (120 mg total) by mouth every 12 hours 3/17/23 9/22/23  Francisco Javier Mahmood MD   rosuvastatin (Crestor) 40 mg tablet Take 1 tablet (40 mg total) by mouth nightly 3/9/23 3/8/24  Devorah Aleman CNP   magnesium chloride (SLOW-MAG) 64 mg tablet,delayed release (DR/EC) Take 1 tablet (64 mg total) by mouth 2 (two) times a day with meals 1/26/23 1/26/24  Nikhil Smith MD   metFORMIN XR (GLUCOPHAGE-XR) 500 mg 24 hr tablet Take 1 tablet (500 mg total) by mouth daily with breakfast 1/26/23 1/26/24  Nikhil Smith MD   mexiletine (MEXITIL) 200 mg capsule Take 1 capsule (200 mg total) by mouth 3 (three) times a day  Patient taking differently: Take 1 capsule (200 mg total) by mouth 2 (two) times a day 11/16/22 11/16/23  Merced Newton CNP   cholecalciferol, vitamin D3, 25 mcg (1,000 unit) tablet Take 1 tablet (1,000 Units total) by mouth daily    Francisco Javier Mahmood MD   rivaroxaban (Xarelto) 20 mg tablet Take 1 tablet (20 mg total) by mouth daily 8/3/22 9/22/23  Merced Newton CNP   carvediloL (Coreg) 25 mg tablet Take 1 tablet (25 mg total) by mouth 2 (two) times a day with meals 6/3/22 9/22/23  Devorah Aleman CNP   sacubitriL-valsartan (ENTRESTO) 24-26 mg tablet Take 1 tablet by mouth 2  (two) times a day 6/3/22 9/22/23  Devorah Aleman CNP   spironolactone (ALDACTONE) 25 mg tablet Take 1 tablet (25 mg total) by mouth 2 (two) times a day 6/3/22 9/22/23  Devorah Aleman CNP   aspirin 81 mg EC tablet Take 1 tablet (81 mg total) by mouth daily 7/14/21 9/22/23  Barry Tobias MD   albuterol HFA (PROVENTIL HFA;VENTOLIN HFA) 90 mcg/actuation inhaler Inhale 2 puffs every 6 (six) hours as needed for wheezing or shortness of breath 7/13/21   Barry Tobias MD   nitroglycerin (NITROSTAT) 0.4 mg SL tablet Place 1 tablet (0.4 mg total) under the tongue every 5 (five) minutes as needed for chest pain    ProviderFrancisco Javier MD   pantoprazole (PROTONIX) 40 mg EC tablet Take 1 tablet (40 mg total) by mouth daily    Provider, MD Francisco Javier       Review of Systems  10 point review of systems have been reviewed and are grossly unremarkable except those which have been mentioned in the history of present illness above otherwise negative.    Temp:  [36.4 °C (97.5 °F)] 36.4 °C (97.5 °F)  Heart Rate:  [45-60] 60  Resp:  [9-102] 102  SpO2:  [93 %-96 %] 94 %  BP: (109-141)/(59-84) 109/67    No intake/output data recorded.    Physical Exam  Constitutional:       General: He is not in acute distress.     Appearance: Normal appearance. He is obese.   HENT:      Head: Normocephalic and atraumatic.      Mouth/Throat:      Mouth: Mucous membranes are moist.   Eyes:      Extraocular Movements: Extraocular movements intact.      Conjunctiva/sclera: Conjunctivae normal.      Pupils: Pupils are equal, round, and reactive to light.   Cardiovascular:      Rate and Rhythm: Normal rate and regular rhythm.      Pulses: Normal pulses.      Heart sounds: Normal heart sounds.   Pulmonary:      Effort: Pulmonary effort is normal. No respiratory distress.      Breath sounds: Normal breath sounds.   Abdominal:      General: Abdomen is flat. Bowel sounds are normal. There is no distension.      Palpations: Abdomen is soft.       "Tenderness: There is no abdominal tenderness. There is no guarding.   Musculoskeletal:         General: No tenderness. Normal range of motion.      Cervical back: Normal range of motion and neck supple. No rigidity.      Right lower leg: No edema.      Left lower leg: No edema.   Skin:     General: Skin is warm and dry.   Neurological:      General: No focal deficit present.      Mental Status: He is alert and oriented to person, place, and time.      Motor: No weakness.   Psychiatric:         Mood and Affect: Mood normal.         Behavior: Behavior normal.           Basic:   Lab Results   Component Value Date     10/18/2023    K 4.2 10/18/2023     10/18/2023    CO2 22 10/18/2023    BUN 12 10/18/2023    CREATININE 0.83 10/18/2023    GLUCOSE 119 (H) 10/18/2023    CALCIUM 8.9 10/18/2023     Adult ABG: No results found for: \"PHART\", \"HTJ5KQF\", \"PO2ART\", \"ZZN8URR\", \"BEART\", \"Z3TWIXEE\", \"HGBART\", \"ALG3CUE\"  CBC with Platelet:    Lab Results   Component Value Date    WBC 6.2 10/18/2023    HGB 15.3 10/18/2023    HCT 44.2 10/18/2023     10/18/2023    RBC 4.88 10/18/2023    MCV 90.5 10/18/2023    MCH 31.3 10/18/2023    MCHC 34.6 10/18/2023    RDW 14.5 10/18/2023    MPV 8.6 10/18/2023     Comp:   Lab Results   Component Value Date     10/18/2023    K 4.2 10/18/2023     10/18/2023    CO2 22 10/18/2023    BUN 12 10/18/2023    CREATININE 0.83 10/18/2023    GLUCOSE 119 (H) 10/18/2023    CALCIUM 8.9 10/18/2023    PROT 6.8 10/18/2023    ALBUMIN 4.3 10/18/2023    AST 18 10/18/2023    ALT 23 10/18/2023    ALKPHOS 67 10/18/2023    BILITOT 1.00 10/18/2023     Coags:   Lab Results   Component Value Date    PT 22.7 (H) 10/06/2021    APTT 41.9 (H) 10/06/2021    INR 1.9 (H) 10/06/2021     Magnesium:   Lab Results   Component Value Date    MG 2.0 10/18/2023     U/A Macroscopic:    Lab Results   Component Value Date    COLORU Straw 07/10/2021    SPECGRAVU 1.003 07/10/2021    MELLY 6.0 07/10/2021    LEUKOCYTESU " "Negative 07/10/2021    NITRITEU Negative 07/10/2021    PROTUR Negative 07/10/2021    GLUCOSEU Negative 07/10/2021    KETONESU Negative 07/10/2021    UROBILINOGEN <2.0 07/10/2021    BLOODU Small (A) 07/10/2021     Blood (Aerobic and Anaerobic):  No results found for: \"BLOODCX\"  Cerebrospinal Fluid (CSF): No results found for: \"CSFCX\"  Urine: No results found for: \"URINECX\"    XR chest portable 1 view    Result Date: 10/18/2023  Narrative: EXAM: DX CHEST PORTABLE 1 VW DATE: 10/18/2023 9:40 PM INDICATION:  dizziness COMPARISON: 1/25/2022 Findings: Cardiomegaly. AICD. There is no failure, focal infiltrate. No pneumothorax.     Impression: IMPRESSION: 1. Negative chest      Principal Problem:    AICD discharge  Active Problems:    Ischemic cardiomyopathy    Hypertension    Hyperlipidemia    Automatic implantable cardioverter-defibrillator in situ    Presence of stent in coronary artery    Chronic combined systolic and diastolic CHF, NYHA class 2 and ACC/AHA stage C (CMS/HCC)    Sustained ventricular tachycardia (CMS/HCC)    Chronic anticoagulation    History of ventricular tachycardia    Paroxysmal atrial fibrillation (CMS/HCC)    Long term current use of antiarrhythmic drug    Gastroesophageal reflux disease without esophagitis    Benign prostatic hyperplasia without lower urinary tract symptoms    Diabetes mellitus type 2 in obese (CMS/HCC)    Moderate obesity    Elevated troponin    Lymphocytosis (symptomatic)      Assessment and Plan    #AICD discharge due to an episode of V. tach lasting for 1 minute 7 seconds as per device check per ED physician.  #Mildly elevated troponin likely due to episode of ventricular tachycardia.  Patient denies any chest pain.  Troponins are 70.0-> 139.4-> 223.4.  Admit to telemetry.  Will repeat troponin in the morning.  2D echocardiogram in the morning.  Continue his sotalol and mexiletine.  Follow EP consult with Dr. Montana in the morning.    #Hypertension, chronic combined systolic " and diastolic CHF, paroxysmal atrial fibrillation, history of cardiac stent, V. tach with AICD in place and dyslipidemia.  6 continue his aspirin, Xarelto, sotalol, mexiletine, Coreg, Entresto, Aldactone, Lasix with KCl and Crestor.    #DM type II, seems to be controlled.  Blood sugar level 119.  A1c level was 6.2 on 9/22/2023.  Insulin sliding scale with coverage.  Accu-Cheks before every meal and at bedtime.  He says that he has not been taking his metformin about 3 months.    #BPH.  Continue his Flomax.    #GERD.  Continue Protonix.    #DVT prophylaxis: On Xarelto.    Plan of care discussed with the patient and his family members at bedside in detail and they verbalized understanding.    Further medical care will be resumed by day hospitalist in the morning per hospital policy.    Total time spent in reviewing the chart, writing the orders and face-to-face evaluation of the patient is 75 minutes out of which more than 50% of the time was spent in face-to-face counseling the patient and coordinating his care. I have also discussed in entirety with ER physician Dr. Solomon regarding the management of the patient.    A voice recognition program was used to aid in documentation of this record.  Sometimes words are not printed exactly as they were spoken.  While efforts were made to carefully edit and correct any inaccuracies, some areas may be present; please take these into context.  Please contact the provider if areas are identified.      TIMOTEO CORTES MD

## 2023-10-19 NOTE — CONSULTATION
Cardiology Inpatient CHF Consult Note    Primary cardiologist: Dr. Edwards (general) Follows with [facility]    Physician requesting consult: Nia Parra CNP    Reason for consult: Establish Care for Chronic HFrEF      Subjective   Patient ID: Pete Trejo is a 58 y.o. male.    Chief Complaint:   Chief Complaint   Patient presents with    Palpitations     Patient states about 15-20 minutes ago his pacemaker fired. States he is unsure what the parameters are but he became very flush and lightheaded right before it went off. Denies chest pain but states he has palpitations.        HPI    Pete is a 59 yo M  with complex cardiac history significant for CAD complicated by prior MI (PCI to LAD and diag), ischemic cardiomyopathy, chronic HFrEF with severely reduced LVEF, ,TIA/CVA in 2010 in context of LV thrombus, and recurrent VT s/p initial ablation 6/2020 ([facility]) with recurrent VT requiring repeat ablation 11/2021 at [facility]. Antiarrhythmic therapy of mexiletine and sotalol. Admission 10/18/2023 for recurrent VT. CHF consulted for establishment of care for chronic HFrEF.    Today, he is doing well from a cardiovascular standpoint. No further rhythm issues. Followed with EP. States that he was overall doing well. Over the last week, he felt that he may have been more dehydrated that normal as he has to keep his fluid intake high as he feels his diuretic regimen at home makes him urinate frequently.  Close to three L per day of fluids he feels he intakes. No overt shortness of breath or edema in the legs. No recent viral illness.    Has not been told to increase Entresto in the past or has tried SGLT2i in the past.    Covered by [employer] insurance as well as supplement.    Social: Used to work in construction, unable to tolerate work after last MI. Lives with girlfriend (long-term relationship of 13-14 years).  Last transplant evaluation at [facility] No issues since 2021 since last ablation.  Possibly was too well for candidate  for transplantation.    HPI     Specialty Problems          Cardiology Problems    Hyperlipidemia        Hypertension        Ischemic cardiomyopathy        Automatic implantable cardioverter-defibrillator in situ        Presence of stent in coronary artery        Chronic combined systolic and diastolic CHF, NYHA class 2 and ACC/AHA stage C (CMS/[facility])        Chronic anticoagulation        History of ventricular tachycardia        Paroxysmal atrial fibrillation (CMS/[facility])        Sustained ventricular tachycardia (CMS/[facility])        Long term current use of antiarrhythmic drug        * (Principal) AICD discharge        Elevated troponin           Past Medical History:   Diagnosis Date    BPH (benign prostatic hyperplasia)     CAD (coronary artery disease)     Status post stent    CHF (congestive heart failure) (CMS/[facility])     Chronic combined systolic and diastolic CHF (congestive heart failure) (CMS/[facility])     LVEF 25% with grade 1 diastolic dysfunction as per echo on 1/26/2022.    CVA (cerebral vascular accident) (CMS/[facility]) 2010    Without residual deficit.    Diabetes mellitus type 2 in obese (CMS/[facility])     Dyslipidemia     GERD (gastroesophageal reflux disease)     Heart attack (CMS/[facility])     x3    Hypertension     Ischemic cardiomyopathy     Morbid obesity with BMI of 40.0-44.9, adult (CMS/[facility])     Paroxysmal atrial fibrillation (CMS/[facility])     On Xarelto    V-tach (CMS/[facility])     AICD in place       Past Surgical History:   Procedure Laterality Date    ABLATION VT N/A 06/09/2020    Procedure: Ablation VT;  Surgeon: Wilfredo Montana MD;  Location: [facility] EP Lab;  Service: Electrophysiology;  Laterality: N/A;  Check with Dr. Montana in the a.m. regarding scheduling    APPENDECTOMY      COLONOSCOPY N/A 7/25/2023    Procedure: COLONOSCOPY with Polypectomy;  Surgeon: Hortencia Carson MD;  Location: [facility] Endoscopy;  Service: Endoscopy;  Laterality: N/A;    CORONARY ARTERY STENTING  2009    2.5 X 24-mm Taxus DIMAS to first diagonal. 3.0 X  8-mm Taxus stent to proximal LAD just proximal to diagonal.    CORONARY ARTERY STENTING  2010    3.5 X 15-mm Promus DIMAS to totally occluded LAD    CORONARY ARTERY STENTING  2011    2.25 X 30-mm Resolute DIMAS to 2nd diagonal.  Balloon angioplasty through strut to improve blood flow to distal LAD    CORONARY ARTERY STENTING  07/28/2017    second diagonal branch LAD Resolute 2.25 x 30-mm DIMAS    ICD DC GEN CHANGE N/A 6/20/2022    Procedure: BI-V ICD Gen Change;  Surgeon: Wilfredo Montana MD;  Location: [facility] EP Lab;  Service: Cardiovascular;  Laterality: N/A;    ICD SC NEW  2011    [employer] Cognis Model #N119, Serial #[id number]. [employer] Reliance Model #0185, Serial #[id number]. LV lead Acuity Model #45+91, Serial #[id number]. Atrial lead Model #4469, Serial #[id number]       Allergies as of 10/18/2023 - Reviewed 10/18/2023   Allergen Reaction Noted    Morphine  07/30/2017    Tramadol  07/30/2017        Family History   Problem Relation Age of Onset    Heart attack Mother 62    Other Mother         Abdominal Aortic Aneurysm    Lung cancer Mother     Colon cancer Father         Cause of death    Diabetes Brother         Non-Insulin Dependent    Heart attack Brother 51        w/ Stents    Heart disease Brother     Other Son         Well       Social History     Tobacco Use    Smoking status: Former     Packs/day: 0.75     Years: 30.00     Additional pack years: 0.00     Total pack years: 22.50     Types: Cigarettes     Quit date: 6/7/2020     Years since quitting: 3.3    Smokeless tobacco: Never   Vaping Use    Vaping Use: Never used   Substance Use Topics    Alcohol use: Not Currently     Comment: Quit drinking in 2007    Drug use: Not Currently       Home Cardiac Medications:  Reviewed.    Review of Systems  Review of Systems   Constitutional: Positive for malaise/fatigue.   Cardiovascular:  Positive for palpitations.   Neurological:  Positive for weakness.         Objective     Vital signs in last 24 hours:   Temp:  [36.7 °C  (98 °F)-36.9 °C (98.4 °F)] 36.8 °C (98.2 °F)  Heart Rate:  [59-67] 59  Resp:  [18] 18  SpO2:  [90 %-92 %] 92 %  BP: (110-122)/(59-71) 122/71  Weight: 123 kg (271 lb 2.7 oz)  Intake/Output last 3 shifts:  I/O last 3 completed shifts:  In: 1940 [P.O.:1940]  Out: 800 [Urine:800]  Intake/Output this shift:  No intake/output data recorded.    Intake/Output Summary (Last 24 hours) at 10/20/2023 1223  Last data filed at 10/20/2023 0600  Gross per 24 hour   Intake 860 ml   Output --   Net 860 ml       Physical Exam   General: Comfortable not in any acute distress, alert awake and oriented x3  HEENT: Normocephalic atraumatic  Neck: No jugular venous distention or carotid bruit  Chest: Clear to auscultation, no adventitious breath sounds  CVS: S1-S2, regular rate rhythm, no murmurs rubs or gallops  Abdomen: Soft, nontender nondistended, normoactive bowel sounds present  Extremities: No edema cyanosis or clubbing  Vascular: 2+ radial pulsation bilaterally, 2+ dorsalis pedis and posterior tibial pulsation  Neurologic evaluation: No focal deficits, motor symmetric  Skin: No ecchymosis bruises or rashes  Mood: Euthymic    Data Review:     Current Scheduled Medications:  empagliflozin, 10 mg, oral, Daily  sacubitriL-valsartan, 1.5 tablet, oral, 2x daily  [START ON 10/21/2023] spironolactone, 50 mg, oral, Daily  polyethylene glycol, 17 g, oral, Daily  aspirin, 81 mg, oral, Daily  carvediloL, 25 mg, oral, 2x daily with meals  cholecalciferol (vitamin D3), 1,000 Units, oral, Daily  lansoprazole, 30 mg, oral, Daily at 1600  rivaroxaban, 20 mg, oral, Daily with dinner  rosuvastatin, 40 mg, oral, Nightly  sotaloL, 120 mg, oral, q12h KULDEEP  tamsulosin, 0.4 mg, oral, Daily with dinner  mexiletine, 200 mg, oral, q8h KULDEEP        Labs:  BMP:  Lab Results   Component Value Date     10/20/2023    K 3.8 10/20/2023     10/20/2023    CO2 23 10/20/2023    BUN 12 10/20/2023    CREATININE 0.84 10/20/2023    GLUCOSE 120 (H) 10/20/2023     "CALCIUM 8.9 10/20/2023     CBC:   Lab Results   Component Value Date    WBC 4.4 10/20/2023    RBC 4.86 10/20/2023    HGB 15.3 10/20/2023    HCT 44.1 10/20/2023     10/20/2023     CMP:  Lab Results   Component Value Date     10/20/2023    K 3.8 10/20/2023     10/20/2023    CO2 23 10/20/2023    GLUCOSE 120 (H) 10/20/2023    CREATININE 0.84 10/20/2023    CALCIUM 8.9 10/20/2023    ALBUMIN 4.3 10/18/2023    ALKPHOS 67 10/18/2023    BILITOT 1.00 10/18/2023    ALT 23 10/18/2023    AST 18 10/18/2023    BUN 12 10/20/2023    ANIONGAP 11 10/20/2023     HS Troponin:    Lab Results   Component Value Date    TROPHS 432.7 (HH) 10/19/2023    HTZSOV6LH 223.4 (HH) 10/18/2023    HSDELTA1 84 (HH) 10/18/2023    AJFWVS4TI 262.8 (HH) 10/19/2023    HSDELTA2 123.4 (HH) 10/19/2023      BNP:      Lipids:     Thyroid:    Results from last 4 days   Lab Units 10/19/23  0435   TSH uIU/ml 2.833     Magnesium:    Lab Results   Component Value Date    MG 2.0 10/20/2023     PT/INR:   No results found for: \"PT\", \"INR\"    Echocardiogram:    Results for orders placed during the hospital encounter of 10/18/23    US Echo complete    Interpretation Summary    Normal left ventricular wall thickness.    Biplane ejection fraction is 25%.    Severe left ventricular systolic dysfunction.  Large anteroapical aneurysm.  Only the basilar left ventricular segments have preserved contractility.  No LV mural thrombi.    Grade II (moderate) left ventricular diastolic abnormality.    Elevated left ventricular filling pressure.    The left atrium is severely dilated.    The right atrium is moderately dilated.    Normal central venous pressure (0-5 mmHg).    No pericardial effusion.    Inadequate TR spectral Doppler to accurately assess right ventricular systolic pressure.    Comment: No obvious changes from the January 2022 echo.    Radiology:  CXR 10/18/2023: stable cardiomegaly. No acute findings.    Rhc 5/2021  Baseline:  1.RAP 6, RV 35/6, PAP " 36/15 (22), PCWP 15  2. CO/CI were 5.89/2.43 by assumed Becki with a PA saturation of 66%. CO/CI were 7.4/3.05 by thermodilution. PVR 1.19 HUTCHINS using Becki, 0.94 HUTCHINS using thermodilution.    Complications: None  EBL: None  Specimens removed: None    Summary:  1. Mildly elevated right and left filling pressures. Normal PA pressures. Normal CO.  2. Manual pressure for hemostasis    NM stress 7/2021  Technically difficult study to perform.  Gated images could not able to obtain hence cannot comment on left ventricle systolic function and contractility.  There is a large size, severe intensity fixed perfusion defect in apex, apical inferior, apical anterior to mid anterior, apical septum and apical lateral.  There is no reversible ischemia.  Unable to obtain gated images because left ventricle is severely dilated.  Cannot comment the left ventricle systolic function and wall motion.    EKG:    AV paced      Vitals:    10/20/23 0755   BP: 122/71   Pulse: 59   Resp: 18   Temp: 36.8 °C (98.2 °F)   SpO2: 92%         Assessment/Plan   Patient Active Problem List   Diagnosis    Ischemic cardiomyopathy    Hypertension    Hyperlipidemia    Automatic implantable cardioverter-defibrillator in situ    Presence of stent in coronary artery    Chronic combined systolic and diastolic CHF, NYHA class 2 and ACC/AHA stage C (CMS/HCC)    Sustained ventricular tachycardia (CMS/HCC)    Chronic anticoagulation    History of ventricular tachycardia    Paroxysmal atrial fibrillation (CMS/HCC)    Long term current use of antiarrhythmic drug    AICD discharge    Gastroesophageal reflux disease without esophagitis    Benign prostatic hyperplasia without lower urinary tract symptoms    Diabetes mellitus type 2 in obese (CMS/HCC)    Moderate obesity    Elevated troponin    Lymphocytosis (symptomatic)       Assessment/ Plan    Chronic HFeEF  Ischemic Cardiomyopathy  NYHA III/C  Echocardiogram 10/19/2023: LVEF 25% no significant change from prior with  similar WMA  Labs:  NT-Pro BNP: 126  TSH: 2.8  A1C: 6.2 (9/2023)  Iron: not indicated  GDMT:  BB: coreg 25 mg bid  ACE/ARB/ARNI: Entresto 24/26 mg bid (increase to 1 1/2 tablet bid)  MRA:spironolactone 50 mg daily  SGLT2i: jardiance 10 mg daily  Diuretics: furosemide 40 mg daily PRN for weight gain greater than 3-5 lbs in one day  Labs one week (ordered)  Device: CRT-D  Advanced Therapies: Not indicated  Strict intake and output, daily standing weights only, 2 L fluid restriction. Continue daily weights at home.  CHF Coordinator order placed. Patient has been living with this for years, however, patient can become established with our local program now to help coordinate care.  OP follow-up in 2 weeks. Gail will help coordinate this.  Therefore outpatient follow-up should be considered and will keep relationship with [facility].  Ok to discharge on current regimen from CHF standpoint.    EP  Recurrent VT s/p ablations and AAD  CRT-D [place] [employer] 2011  Ablation in June 2020 with Dr. Montana, recurrent VT with albation in 11/2021 at [facility]  AAD: amiodarone (discontinued march 2023), mexiletine, and sotalol (start 2023)  Admission for recurrent VT. Follows with [facility] and locally with Dr. Sandoval.    CAD  PCI 2009: 2.5x24mm Taxus DIMAS to D1, 2.0 x 8mm DIMAS to prox LAD  PCI 2010: 2.5 x 15mm DIMAS to LAD-  PCI 2011: 2.96w00vk DIMAS to D2   Continue Effient and high intensity statin. No ASA to avoid triple therapy.  Elevated troponin this admission. Likely d/t shock and not coronary related. No wma changes on echo.    LV Thrombus  TIA  History of LV thrombus after 2nd MI in 2010.  TIA/CVA thought to be cardioembolic  Continue Xarelto 20 mg daily.    The patient was seen and examined and plan formulated in conjunction with Dr. Leiva.    Electronically signed by: Dolores Garcia CNP  10/20/2023  12:23 PM    A voice recognition program was used to aid in documentation of this record. Sometimes words are not printed exactly as they  were spoken. While efforts were made to carefully edit and correct any inaccuracies, some errors may be present; please take these into context. Please contact the provider if errors are identified.

## 2023-10-19 NOTE — ED PROVIDER NOTES
HPI:  Chief Complaint   Patient presents with    Palpitations     Patient states about 15-20 minutes ago his pacemaker fired. States he is unsure what the parameters are but he became very flush and lightheaded right before it went off. Denies chest pain but states he has palpitations.        HPI  Patient is a 58-year-old male with history of LAVON, V. tach, chronic anticoagulation on Xarelto, paroxysmal A-fib, CHF, status post ablation, hypertension, hyperlipidemia presenting with episode of lightheadedness tonight with subsequent firing of his ICD.  Patient states that he was sitting down and reading his tablet when he had a few minutes of lightheadedness that was coming and going.  Then his ICD fired once.  States he has had a fire before however not in a while since he made some medication adjustments.  States that he currently feels like his mouth is really dry and just feels off and slightly nauseous.  No vomiting.  No recent fever, chills, cough, congestion, chest pain, abdominal pain, or any other acute symptoms.    I reviewed echocardiogram results from 1/26/2022.  Showed EF 20 to 25%.  HISTORY:  Past Medical History:   Diagnosis Date    CAD (coronary artery disease)     CHF (congestive heart failure) (CMS/HCC)     CVA (cerebral vascular accident) (CMS/[facility]) 2010    Dyslipidemia     GERD (gastroesophageal reflux disease)     GERD (gastroesophageal reflux disease)     Heart attack (CMS/HCC)     x3    Hypertension     Ischemic cardiomyopathy     Paroxysmal atrial fibrillation (CMS/HCC)     On Xarelto    V-tach (CMS/[facility])     AICD in place       Past Surgical History:   Procedure Laterality Date    ABLATION VT N/A 06/09/2020    Procedure: Ablation VT;  Surgeon: Wilfredo Montana MD;  Location: [facility] EP Lab;  Service: Electrophysiology;  Laterality: N/A;  Check with Dr. Montana in the a.m. regarding scheduling    APPENDECTOMY      COLONOSCOPY N/A 7/25/2023    Procedure: COLONOSCOPY with Polypectomy;  Surgeon:  Hortencia Carson MD;  Location: [facility] Endoscopy;  Service: Endoscopy;  Laterality: N/A;    CORONARY ARTERY STENTING  2009    2.5 X 24-mm Taxus DIMAS to first diagonal. 3.0 X 8-mm Taxus stent to proximal LAD just proximal to diagonal.    CORONARY ARTERY STENTING  2010    3.5 X 15-mm Promus DIMAS to totally occluded LAD    CORONARY ARTERY STENTING  2011    2.25 X 30-mm Resolute DIMAS to 2nd diagonal.  Balloon angioplasty through strut to improve blood flow to distal LAD    CORONARY ARTERY STENTING  07/28/2017    second diagonal branch LAD Resolute 2.25 x 30-mm DIMAS    ICD DC GEN CHANGE N/A 6/20/2022    Procedure: BI-V ICD Gen Change;  Surgeon: Wilfredo Montana MD;  Location: [facility] EP Lab;  Service: Cardiovascular;  Laterality: N/A;    ICD SC NEW  2011    [employer] Cognis Model #N119, Serial #[id number]. [employer] Reliance Model #0185, Serial #[id number]. LV lead Acuity Model #45+91, Serial #[id number]. Atrial lead Model #4469, Serial #[id number]       Family History   Problem Relation Age of Onset    Heart attack Mother 62    Other Mother         Abdominal Aortic Aneurysm    Lung cancer Mother     Colon cancer Father         Cause of death    Diabetes Brother         Non-Insulin Dependent    Heart attack Brother 51        w/ Stents    Heart disease Brother     Other Son         Well       Social History     Tobacco Use    Smoking status: Former     Packs/day: 0.75     Years: 30.00     Additional pack years: 0.00     Total pack years: 22.50     Types: Cigarettes     Quit date: 6/7/2020     Years since quitting: 3.3    Smokeless tobacco: Never   Vaping Use    Vaping Use: Never used   Substance Use Topics    Alcohol use: Not Currently     Comment: Quit drinking in 2007    Drug use: Not Currently         ROS:  Review of Systems   Constitutional:         ROS per HPI, otherwise noncontributory.       PE:  ED Triage Vitals [10/18/23 2043]   Temp Heart Rate Resp BP SpO2   36.4 °C (97.5 °F) 59 18 141/80 95 %      Mean BP (mmHg) Temp Source Heart  Rate Source Patient Position BP Location   100 Oral -- -- --      FiO2 (%)       --           Physical Exam  Vitals and nursing note reviewed.   Constitutional:       General: He is not in acute distress.     Appearance: Normal appearance.   HENT:      Head: Normocephalic.      Right Ear: External ear normal.      Left Ear: External ear normal.      Nose: Nose normal.      Mouth/Throat:      Mouth: Mucous membranes are moist.   Cardiovascular:      Rate and Rhythm: Normal rate and regular rhythm.      Pulses: Normal pulses.   Pulmonary:      Effort: Pulmonary effort is normal.      Breath sounds: Normal breath sounds.   Chest:      Chest wall: No tenderness.   Abdominal:      Palpations: Abdomen is soft.      Tenderness: There is no abdominal tenderness.   Skin:     General: Skin is warm.      Capillary Refill: Capillary refill takes less than 2 seconds.   Neurological:      Mental Status: He is alert and oriented to person, place, and time.   Psychiatric:         Mood and Affect: Mood normal.         ED LABS:  Labs Reviewed   COMPREHENSIVE METABOLIC PANEL - Abnormal       Result Value    Sodium 135      Potassium 4.2      Chloride 103      CO2 22      Anion Gap 10      BUN 12      Creatinine 0.83      Glucose 119 (*)     Calcium 8.9      AST 18      ALT (SGPT) 23      Alkaline Phosphatase 67      Total Protein 6.8      Albumin 4.3      Total Bilirubin 1.00      Corrected Calcium 8.7      eGFR 102      Narrative:     Calculation based on the 2021 Chronic Kidney Disease Epidemiology Collaboration (CKD-EPI) equation refit without adjustment for race.   CBC WITH AUTO DIFFERENTIAL - Abnormal    WBC 6.2      RBC 4.88      Hemoglobin 15.3      Hematocrit 44.2      MCV 90.5      MCH 31.3      MCHC 34.6      RDW 14.5      Platelets 170      MPV 8.6      Neutrophils% 29.6 (*)     Lymphocytes% 52.1 (*)     Monocytes% 12.6      Eosinophils% 4.6 (*)     Basophils% 1.1      ANC (auto diff) 1.80      Lymphocytes Absolute 3.20       Monocytes Absolute 0.80      Eosinophils Absolute 0.30      Basophils Absolute 0.10     HIGH SENSITIVE TROPONIN I - Abnormal    hsTnI 0 Hour 70.9 (*)    HIGH SENSITIVE TROPONIN I - Abnormal    hsTnI 0 Hour 139.4 (*)    HIGH SENSITIVE TROPONIN I, 1 HOUR - Abnormal    hsTnI 1 hr 223.4 (*)     Delta from 0 Hour 84 (*)    MAGNESIUM - Normal    Magnesium 2.0     HIGH SENSITIVE TROPONIN I PANEL (0HR, 1HR, 2HR)    Narrative:     The following orders were created for panel order HS Troponin I Panel (0HR, 1HR, 2HR) Blood, Venous.  Procedure                               Abnormality         Status                     ---------                               -----------         ------                     HS Troponin I[[id number]]                 Abnormal            Final result               1HR High Sensitive Trop I[[id number]]     Abnormal            Final result               2HR High Sensitive Tropon...[[id number]]                                                   Please view results for these tests on the individual orders.   HIGH SENSITIVE TROPONIN I, 2 HOUR         ED IMAGES:  XR chest portable 1 view   Final Result   IMPRESSION:   1. Negative chest          ED PROCEDURES:  Procedures    ED COURSE:  ED Course as of 10/19/23 0014   Wed Oct 18, 2023   2126 EKG independently reviewed.  Shows AV dual paced complexes, rate 66, no acute ST elevations or depressions or T wave abnormalities.  Overall similar to previous EKG. [LB]   2159 hsTnI 0 Hour(!): 70.9 [LB]   2159 My interpretation of chest Xray with no signs of pneumonia, pneumothorax, effusions, or fractures.  Radiology read similar.   [LB]   2237  I reviewed interrogation results of patient's ICD.  Today at 8:07 PM did have run of V. tach lasting approximately 1 minute and 7 seconds. [LB]      ED Course User Index  [LB] Pamela Solomon,           Sepsis Quality Bundle           MDM:  Medical Decision Making  Patient overall hemodynamically stable and nontoxic.  Vital signs  unremarkable.  I reviewed telemetry which showed regular rhythm, rate 60s.  Differential diagnosis includes but is limited to arrhythmia, ICD discharge, ACS, arrhythmia, electrolyte abnormality.  EKG overall looks similar compared to previous no signs concerning for acute ischemia or arrhythmia.  Electrolytes within normal limits.  No leukocytosis or anemia.  Initial troponin 70.9, could be from potential arrhythmia or ICD discharge earlier today, will continue to trend.  ICD interrogated which did show run of V. tach.  Consulted electrophysiology, Dr. Montana.  He recommends admission, monitoring on telemetry, and they will evaluate him in the morning.  Patient agreeable to plan for admission and comfortable and asymptomatic at this time.  Discussed with hospitalist and they also agree with plan for admission.    A voice recognition program was used to aid in medical record documentation. Some words may be printed not exactly as they were spoken. Efforts were made to carefully edit and correct any inaccuracies; however, some errors may be present.        Final diagnoses:   [Z45.02] ICD (implantable cardioverter-defibrillator) discharge   [I47.20] Ventricular tachyarrhythmia (CMS/[facility])   [R42] Light headed     10/18/2023 11:33 PM                      Pamela Solomon DO  10/19/23 0015

## 2023-10-19 NOTE — CONSULTATION
[address] [address]                                                   Electrophysiology Inpatient Consult Note    Subjective    Patient ID: Pete Trejo is a 58 y.o. male.    Reason for consult:   Chief Complaint   Patient presents with    Palpitations     Patient states about 15-20 minutes ago his pacemaker fired. States he is unsure what the parameters are but he became very flush and lightheaded right before it went off. Denies chest pain but states he has palpitations.        HPI  Patient is a 58-year-old male with past medical history significant for CAD, VT, ischemic cardiomyopathy, biventricular ICD, CVA in 2010, PAF, LV thrombus on chronic anticoagulation, dyslipidemia, and hypertension.  On 6/9/2020, patient was taken to the EP lab for SVT ablation. Unfortunately, he had recurrent numerous ventricular arrhythmias as a result of extensive anterior wall scar involving much of the lateral and septal walls.  Ablation was partially successful with ablation of a border zone area of the scar especially in the inferior apex and attempt to substrate modify arrhythmia burden.  He had been on amiodarone and mexiletine and was referred to [facility] for further recommendations and possible LVAD or transplant.  He was told he did not qualify for either. He had recurrent VT requiring ATP in September 2021 and reloaded with amiodarone and discharged on an increased dose.  He was readmitted in October 2021 with more VT episodes and received ATP but no shocks.  He was reloaded on amiodarone and again referred to [facility] for possible complex VT ablation.    On 11/29/2021, patient underwent EP testing by Dr. Lema in [place] for recurrent symptomatic VT requiring ATP in spite of high doses of amiodarone and mexiletine.  Extensive substrate modification was performed with none inducibility at the end of procedure.  Amiodarone was decreased to 200 mg daily and  mexiletine discontinued.  Future plan was to switch to sotalol for chronic suppression.  He continued to have recurrence in January 2022.  There was discussion with EP at the [facility] who recommended resuming antiarrhythmic therapy with the addition of mexiletine.  There was discussion about a third ablation potentially.  Patient was admitted in March 2023 at the [facility] for sotalol loading at which time amiodarone was discontinued.  Patient also follows with Dr. Edwards in clinic and was last seen in May 2023.  He has not followed up with [facility] since his sotalol admission.    Patient presented to the ED yesterday after receiving an ICD shock.  He has been in his usual state of health.  He has been taking his medications as prescribed.  He denies any recent chest pain, dyspnea, lightheadedness, dizziness, palpitations or racing his heart, presyncopal or syncopal episodes, or edema.  No signs or symptoms of bleeding.  He thought he may have been dehydrated but kidney function is satisfactory.    Echocardiogram today shows EF 25%, large anterior apical aneurysm, no LV mural thrombi, no significant valvular disease, and ROCKY 53 cc/m².  He is afebrile.  Blood pressure is satisfactory.  No significant electrolyte abnormalities.  Creatinine clearance 169 mL/min.  Review of telemetry shows no further ventricular arrhythmias.      Specialty Problems          Cardiology Problems    Hyperlipidemia        Hypertension        Ischemic cardiomyopathy        Automatic implantable cardioverter-defibrillator in situ        Presence of stent in coronary artery        Chronic combined systolic and diastolic CHF, NYHA class 2 and ACC/AHA stage C (CMS/HCC)        Chronic anticoagulation        History of ventricular tachycardia        Paroxysmal atrial fibrillation (CMS/HCC)        Sustained ventricular tachycardia (CMS/HCC)        Long term current use of antiarrhythmic drug        *  (Principal) AICD discharge        Elevated troponin           Past Medical History:   Diagnosis Date    BPH (benign prostatic hyperplasia)     CAD (coronary artery disease)     Status post stent    CHF (congestive heart failure) (CMS/[facility])     Chronic combined systolic and diastolic CHF (congestive heart failure) (CMS/[facility])     LVEF 25% with grade 1 diastolic dysfunction as per echo on 1/26/2022.    CVA (cerebral vascular accident) (CMS/[facility]) 2010    Without residual deficit.    Diabetes mellitus type 2 in obese (CMS/[facility])     Dyslipidemia     GERD (gastroesophageal reflux disease)     Heart attack (CMS/[facility])     x3    Hypertension     Ischemic cardiomyopathy     Morbid obesity with BMI of 40.0-44.9, adult (CMS/[facility])     Paroxysmal atrial fibrillation (CMS/[facility])     On Xarelto    V-tach (CMS/HCC)     AICD in place       Past Surgical History:   Procedure Laterality Date    ABLATION VT N/A 06/09/2020    Procedure: Ablation VT;  Surgeon: Wilfredo Montana MD;  Location: [facility] EP Lab;  Service: Electrophysiology;  Laterality: N/A;  Check with Dr. Montana in the a.m. regarding scheduling    APPENDECTOMY      COLONOSCOPY N/A 7/25/2023    Procedure: COLONOSCOPY with Polypectomy;  Surgeon: Hortencia Carson MD;  Location: [facility] Endoscopy;  Service: Endoscopy;  Laterality: N/A;    CORONARY ARTERY STENTING  2009    2.5 X 24-mm Taxus DIMAS to first diagonal. 3.0 X 8-mm Taxus stent to proximal LAD just proximal to diagonal.    CORONARY ARTERY STENTING  2010    3.5 X 15-mm Promus DIMAS to totally occluded LAD    CORONARY ARTERY STENTING  2011    2.25 X 30-mm Resolute DIMAS to 2nd diagonal.  Balloon angioplasty through strut to improve blood flow to distal LAD    CORONARY ARTERY STENTING  07/28/2017    second diagonal branch LAD Resolute 2.25 x 30-mm DIMAS    ICD DC GEN CHANGE N/A 6/20/2022    Procedure: BI-V ICD Gen Change;  Surgeon: Wilfredo Montana MD;  Location: [facility] EP Lab;  Service: Cardiovascular;  Laterality: N/A;    ICD SC NEW  2011     [employer] Cognis Model #N119, Serial #[id number]. [employer] Reliance Model #0185, Serial #[id number]. LV lead Acuity Model #45+91, Serial #[id number]. Atrial lead Model #4469, Serial #[id number]       Allergies as of 10/18/2023 - Reviewed 10/18/2023   Allergen Reaction Noted    Morphine  07/30/2017    Tramadol  07/30/2017         Current Facility-Administered Medications:     ondansetron (ZOFRAN) tablet 4 mg, 4 mg, oral, q6h PRN **OR** ondansetron (ZOFRAN) injection 4 mg, 4 mg, intravenous, q6h PRN, Jan Ernst MD    magnesium hydroxide (MILK OF MAGNESIA) 400 mg/5 mL oral suspension 30 mL, 30 mL, oral, Daily PRN, Jan Ernst MD    polyethylene glycol (GLYCOLAX) powder 17 g, 17 g, oral, Daily, Jan Ernst MD    alum-mag hydroxide-simeth (MAALOX ADVANCED) suspension 30 mL, 30 mL, oral, 3x daily PRN, Jan Ernst MD    acetaminophen (TYLENOL) tablet 650 mg, 650 mg, oral, q6h PRN, Jan Ernst MD    aspirin EC tablet 81 mg, 81 mg, oral, Daily, Jan Ernst MD, 81 mg at 10/19/23 0742    carvediloL (COREG) tablet 25 mg, 25 mg, oral, 2x daily with meals, Jan Ernst MD, 25 mg at 10/19/23 0742    cholecalciferol (vitamin D3) 1,000 unit (25mcg) tab/cap 1,000 Units, 1,000 Units, oral, Daily, Jan Ernst MD, 1,000 Units at 10/19/23 0742    furosemide (LASIX) tablet 40 mg, 40 mg, oral, 2x daily diuretic, Jan Ernst MD, 40 mg at 10/19/23 0743    lansoprazole (PREVACID) capsule 30 mg, 30 mg, oral, Daily at 1600, Jan Ernst MD    potassium chloride (KLOR-CON) CR tablet 10 mEq, 10 mEq, oral, Daily, Jan Ernst MD, 10 mEq at 10/19/23 0743    rivaroxaban (XARELTO) tablet 20 mg, 20 mg, oral, Daily with dinner, Jan Ernst MD    rosuvastatin (CRESTOR) tablet 40 mg, 40 mg, oral, Nightly, Jan Ernst MD    sacubitriL-valsartan (ENTRESTO) 24-26 mg per tablet 1 tablet, 1 tablet, oral, 2x daily, Jan Ernst MD, 1 tablet at 10/19/23 0742    sotaloL (BETAPACE) tablet 120 mg, 120 mg, oral, q12h KULDEEP, Jan Ernst MD, 120 mg at 10/19/23  0743    spironolactone (ALDACTONE) tablet 25 mg, 25 mg, oral, 2x daily with meals, Jan Ernst MD, 25 mg at 10/19/23 0742    tamsulosin (FLOMAX) 24 hr capsule 0.4 mg, 0.4 mg, oral, Daily with dinner, Jan Ernst MD    dextrose 50 % in water (D50W) syringe 15-30 mL, 15-30 mL, intravenous, q15 min PRN, Jan Ernst MD    dextrose (GLUTOSE) 40 % gel 15.2 g, 15.2 g, oral, q15 min PRN, Jan Ernst MD    glucagon (GLUCAGEN) injection 1 mg, 1 mg, intramuscular, q15 min PRN **OR** glucagon (GLUCAGEN) injection 1 mg, 1 mg, subcutaneous, q15 min PRN, Jan Ernst MD    mexiletine (MEXITIL) capsule 200 mg, 200 mg, oral, q8h KULDEEP, Donya Parra, CNP    Family History   Problem Relation Age of Onset    Heart attack Mother 62    Other Mother         Abdominal Aortic Aneurysm    Lung cancer Mother     Colon cancer Father         Cause of death    Diabetes Brother         Non-Insulin Dependent    Heart attack Brother 51        w/ Stents    Heart disease Brother     Other Son         Well       Social History     Tobacco Use    Smoking status: Former     Packs/day: 0.75     Years: 30.00     Additional pack years: 0.00     Total pack years: 22.50     Types: Cigarettes     Quit date: 6/7/2020     Years since quitting: 3.3    Smokeless tobacco: Never   Vaping Use    Vaping Use: Never used   Substance Use Topics    Alcohol use: Not Currently     Comment: Quit drinking in 2007    Drug use: Not Currently       Review of Systems  Review of Systems   Constitutional: Negative for chills, diaphoresis, fever and malaise/fatigue.   HENT:  Negative for nosebleeds.    Eyes: Negative.    Cardiovascular:  Negative for chest pain, dyspnea on exertion, irregular heartbeat, leg swelling, near-syncope, orthopnea, palpitations, paroxysmal nocturnal dyspnea and syncope.   Respiratory:  Negative for cough, shortness of breath, snoring and wheezing.    Endocrine: Negative.    Hematologic/Lymphatic: Does not bruise/bleed easily.   Skin: Negative.     Musculoskeletal:  Negative for falls, muscle weakness and myalgias.   Gastrointestinal:  Negative for abdominal pain, constipation, diarrhea, dysphagia, heartburn, hematochezia, nausea and vomiting.   Genitourinary:  Negative for hematuria.   Neurological:  Negative for dizziness, headaches and light-headedness.   Psychiatric/Behavioral: Negative.         Objective     Vital signs in last 24 hours:   Temp:  [36.4 °C (97.5 °F)-36.7 °C (98.1 °F)] 36.7 °C (98 °F)  Heart Rate:  [45-63] 60  Resp:  [9-102] 20  SpO2:  [92 %-96 %] 92 %  BP: (107-141)/(59-84) 112/61  Weight: 122.4 kg (269 lb 12.8 oz)  Intake/Output last 3 shifts:  No intake/output data recorded.  Intake/Output this shift:  I/O this shift:  In: 1380 [P.O.:1380]  Out: 800 [Urine:800]    Physical Exam  Vitals and nursing note reviewed.   Constitutional:       General: He is not in acute distress.     Appearance: He is well-developed. He is not diaphoretic.   HENT:      Head: Normocephalic.   Neck:      Vascular: No carotid bruit or JVD.   Cardiovascular:      Rate and Rhythm: Normal rate and regular rhythm.      Pulses: Intact distal pulses.      Heart sounds: Normal heart sounds. No murmur heard.     No friction rub. No gallop.      Comments: Left upper chest device site is without signs of infection or erosion  Pulmonary:      Effort: Pulmonary effort is normal.      Breath sounds: Normal breath sounds. No wheezing or rales.   Abdominal:      General: Bowel sounds are normal.      Palpations: Abdomen is soft.   Musculoskeletal:      Cervical back: Normal range of motion.      Right lower leg: No edema.      Left lower leg: No edema.   Skin:     General: Skin is warm and dry.   Neurological:      Mental Status: He is alert and oriented to person, place, and time.   Psychiatric:         Behavior: Behavior normal.         Labs:  BMP:  Lab Results   Component Value Date     10/18/2023    K 4.2 10/18/2023     10/18/2023    CO2 22 10/18/2023    BUN 12  "10/18/2023    CREATININE 0.83 10/18/2023    GLUCOSE 119 (H) 10/18/2023    CALCIUM 8.9 10/18/2023     CBC:   Lab Results   Component Value Date    WBC 6.2 10/18/2023    RBC 4.88 10/18/2023    HGB 15.3 10/18/2023    HCT 44.2 10/18/2023     10/18/2023     CMP:  Lab Results   Component Value Date     10/18/2023    K 4.2 10/18/2023     10/18/2023    CO2 22 10/18/2023    GLUCOSE 119 (H) 10/18/2023    CREATININE 0.83 10/18/2023    CALCIUM 8.9 10/18/2023    ALBUMIN 4.3 10/18/2023    ALKPHOS 67 10/18/2023    BILITOT 1.00 10/18/2023    ALT 23 10/18/2023    AST 18 10/18/2023    BUN 12 10/18/2023    ANIONGAP 10 10/18/2023     No results found for: \"CKTOTAL\", \"CKMBINDEX\", \"TROPONINI\", \"BNP\", \"POCBNP\"  Lipid: No results found for: \"CHOL\", \"HDL\", \"TRIG\", \"LDLCALC\"  TSH:  Lab Results   Component Value Date    TSH 2.833 10/19/2023     Magnesium:  Lab Results   Component Value Date    MG 1.9 10/19/2023     PT/INR:   No results found for: \"PT\", \"INR\"    Testing:  US Echo complete    Result Date: 10/19/2023  Narrative:   Normal left ventricular wall thickness.   Biplane ejection fraction is 25%.   Severe left ventricular systolic dysfunction.  Large anteroapical aneurysm.  Only the basilar left ventricular segments have preserved contractility.  No LV mural thrombi.   Grade II (moderate) left ventricular diastolic abnormality.   Elevated left ventricular filling pressure.   The left atrium is severely dilated.   The right atrium is moderately dilated.   Normal central venous pressure (0-5 mmHg).   No pericardial effusion.   Inadequate TR spectral Doppler to accurately assess right ventricular systolic pressure. Comment: No obvious changes from the January 2022 echo.     XR chest portable 1 view    Result Date: 10/18/2023  Narrative: EXAM: DX CHEST PORTABLE 1 VW DATE: 10/18/2023 9:40 PM INDICATION:  dizziness COMPARISON: 1/25/2022 Findings: Cardiomegaly. AICD. There is no failure, focal infiltrate. No pneumothorax. "     Impression: IMPRESSION: 1. Negative chest        CHADS2 Score: 6 (10/19/2023  2:31 PM)  Risk of ischemic stroke %: 9.7 % (10/19/2023  2:31 PM)  Risk of stroke/TIA/systemic embolism %: 13.6 % (10/19/2023  2:31 PM)       Assessment:  Principal Problem:    AICD discharge  Active Problems:    Ischemic cardiomyopathy    Hypertension    Hyperlipidemia    Automatic implantable cardioverter-defibrillator in situ    Presence of stent in coronary artery    Chronic combined systolic and diastolic CHF, NYHA class 2 and ACC/AHA stage C (CMS/HCC)    Sustained ventricular tachycardia (CMS/HCC)    Chronic anticoagulation    History of ventricular tachycardia    Paroxysmal atrial fibrillation (CMS/HCC)    Long term current use of antiarrhythmic drug    Gastroesophageal reflux disease without esophagitis    Benign prostatic hyperplasia without lower urinary tract symptoms    Diabetes mellitus type 2 in obese (CMS/HCC)    Moderate obesity    Elevated troponin    Lymphocytosis (symptomatic)       Problem List Items Addressed This Visit    None  Visit Diagnoses       ICD (implantable cardioverter-defibrillator) discharge    -  Primary    Ventricular tachyarrhythmia (CMS/HCC)        Relevant Medications    aspirin EC tablet 81 mg    carvediloL (COREG) tablet 25 mg    rivaroxaban (XARELTO) tablet 20 mg (Start on 10/19/2023  6:00 PM)    rosuvastatin (CRESTOR) tablet 40 mg    sacubitriL-valsartan (ENTRESTO) 24-26 mg per tablet 1 tablet    sotaloL (BETAPACE) tablet 120 mg    mexiletine (MEXITIL) capsule 200 mg    Light headed                 Plan:  1.  Recurrent VT: He has undergone 2 ablation procedures (one here in 2020 and the most recent done at [facility] by Dr. Lema in 2021).  He continues to have recurrence.  He was on amiodarone in the past which was switched to sotalol in March 2023.  He has been continued on mexiletine but his dose was decreased to twice daily when he was doing well and not having recurrence.  He  did not have any side effects or issues on 3 times daily dosing.  EF 25% which is stable.  No significant electrolyte issues.  TSH normal.  He has been in his usual state of health and taking his medications as prescribed.  Will increase mexiletine to 200 mg 3 times daily.  Can increase sotalol to 180 mg twice daily if he continues to have recurrence (not currently in ADHF).  Encouraged patient to follow-up with his electrophysiologist in [place] since he is due for an appointment.    2.  Ischemic cardiomyopathy: EF 25% which is stable.  Euvolemic on exam.  On GDMT but could use further optimization.  In 2020, he was referred to [facility] for consideration of transplant versus VAD but was told that he did not qualify for either.  Has CRT-D.  90% biventricular pacing.  Heart logic 3.  We will consult heart failure team.      Plan discussed in conjunction with Dr. Wu.    Electronically signed by: Donya Parra CNP  10/19/2023  2:17 PM      A voice recognition program was used to aid in documentation of this record.  Sometimes words are not printed exactly as they were spoken.  While efforts were made to carefully edit and correct any inaccuracies, some errors may be present; please take these into context.  Please contact the provider if errors are identified.

## 2023-10-19 NOTE — INTERDISCIPLINARY/THERAPY
Case Management Admission Note    Phone # [phone]    Living Situation: Spouse/significant other Private residence            ADLs: Independent  Stairs: Yes 1 flight  HME/CPAP: None      Oxygen: No   Home Health:No  Current Resources: None   Diabetes/supplies: Do you have Diabetes?: No  PCP: Nikhil Smith MD  Funding: [facility]  Pharmacy:[address]    Source of Information: Patient Interview  Support Person: Primary Emergency Contact: Suzan Alves, Home Phone: [phone], Mobile Phone: [phone], Relation: Significant Other     Needs transportation assistance at DC: No     Discharge Needs/Barriers:  ECHO  Narrative: Chart reviewed. Pt needs discussed in MDR. CM to pt bedside. CM introduced self and educated pt on CM role. Pt denies discharge questions or concerns. No discharge needs assessed. CM will continue to follow for discharge planning.  Dispo: [facility]

## 2023-10-20 VITALS
OXYGEN SATURATION: 91 % | TEMPERATURE: 98.1 F | RESPIRATION RATE: 18 BRPM | DIASTOLIC BLOOD PRESSURE: 76 MMHG | HEART RATE: 60 BPM | HEIGHT: 70 IN | BODY MASS INDEX: 38.82 KG/M2 | SYSTOLIC BLOOD PRESSURE: 121 MMHG | WEIGHT: 271.17 LBS

## 2023-10-20 LAB
ANION GAP SERPL CALC-SCNC: 11 MMOL/L (ref 3–11)
BUN SERPL-MCNC: 12 MG/DL (ref 7–25)
CALCIUM SERPL-MCNC: 8.9 MG/DL (ref 8.6–10.3)
CHLORIDE SERPL-SCNC: 101 MMOL/L (ref 98–107)
CO2 SERPL-SCNC: 23 MMOL/L (ref 21–32)
CREAT SERPL-MCNC: 0.84 MG/DL (ref 0.7–1.3)
EGFRCR SERPLBLD CKD-EPI 2021: 102 ML/MIN/1.73M*2
ERYTHROCYTE [DISTWIDTH] IN BLOOD BY AUTOMATED COUNT: 14.5 % (ref 11.5–15)
FERRITIN SERPL-MCNC: 34.5 NG/ML (ref 24–336)
GLUCOSE SERPL-MCNC: 120 MG/DL (ref 70–105)
HCT VFR BLD AUTO: 44.1 % (ref 38–50)
HGB BLD-MCNC: 15.3 G/DL (ref 13.2–17.2)
IRON SATN MFR SERPL: 25 % (ref 13–50)
IRON SERPL-MCNC: 100 UG/DL (ref 50–175)
MAGNESIUM SERPL-MCNC: 2 MG/DL (ref 1.8–2.4)
MCH RBC QN AUTO: 31.4 PG (ref 29–34)
MCHC RBC AUTO-ENTMCNC: 34.6 G/DL (ref 32–36)
MCV RBC AUTO: 90.8 FL (ref 82–97)
NT-PROBNP SERPL-MCNC: 126 NG/L
PLATELET # BLD AUTO: 160 10*3/UL (ref 130–350)
PMV BLD AUTO: 8.3 FL (ref 6.9–10.8)
POTASSIUM SERPL-SCNC: 3.8 MMOL/L (ref 3.5–5.1)
RBC # BLD AUTO: 4.86 10*6/ΜL (ref 4.1–5.8)
SODIUM SERPL-SCNC: 135 MMOL/L (ref 135–145)
TIBC SERPL-MCNC: 393 UG/DL (ref 250–450)
UIBC SERPL-MCNC: 293 UG/DL (ref 155–355)
WBC # BLD AUTO: 4.4 10*3/UL (ref 3.7–9.6)

## 2023-10-20 PROCEDURE — 6370000100 HC RX 637 (ALT 250 FOR IP): Performed by: INTERNAL MEDICINE

## 2023-10-20 PROCEDURE — 83735 ASSAY OF MAGNESIUM: CPT | Performed by: NURSE PRACTITIONER

## 2023-10-20 PROCEDURE — 36415 COLL VENOUS BLD VENIPUNCTURE: CPT | Performed by: NURSE PRACTITIONER

## 2023-10-20 PROCEDURE — 99239 HOSP IP/OBS DSCHRG MGMT >30: CPT | Performed by: HOSPITALIST

## 2023-10-20 PROCEDURE — 80048 BASIC METABOLIC PNL TOTAL CA: CPT | Performed by: HOSPITALIST

## 2023-10-20 PROCEDURE — 93005 ELECTROCARDIOGRAM TRACING: CPT | Performed by: NURSE PRACTITIONER

## 2023-10-20 PROCEDURE — 6370000100 HC RX 637 (ALT 250 FOR IP): Performed by: NURSE PRACTITIONER

## 2023-10-20 PROCEDURE — 93010 ELECTROCARDIOGRAM REPORT: CPT | Performed by: INTERNAL MEDICINE

## 2023-10-20 PROCEDURE — 82728 ASSAY OF FERRITIN: CPT | Performed by: NURSE PRACTITIONER

## 2023-10-20 PROCEDURE — 85027 COMPLETE CBC AUTOMATED: CPT | Performed by: NURSE PRACTITIONER

## 2023-10-20 PROCEDURE — 99214 OFFICE O/P EST MOD 30 MIN: CPT | Mod: FS | Performed by: NURSE PRACTITIONER

## 2023-10-20 PROCEDURE — G0378 HOSPITAL OBSERVATION PER HR: HCPCS

## 2023-10-20 PROCEDURE — 83880 ASSAY OF NATRIURETIC PEPTIDE: CPT | Performed by: NURSE PRACTITIONER

## 2023-10-20 PROCEDURE — 83540 ASSAY OF IRON: CPT | Performed by: NURSE PRACTITIONER

## 2023-10-20 RX ORDER — SACUBITRIL AND VALSARTAN 24; 26 MG/1; MG/1
1.5 TABLET, FILM COATED ORAL 2 TIMES DAILY
Qty: 90 TABLET | Refills: 11 | Status: SHIPPED | OUTPATIENT
Start: 2023-10-20 | End: 2023-11-02 | Stop reason: DRUGHIGH

## 2023-10-20 RX ORDER — SPIRONOLACTONE 50 MG/1
50 TABLET, FILM COATED ORAL DAILY
Qty: 30 TABLET | Refills: 11 | Status: SHIPPED | OUTPATIENT
Start: 2023-10-21 | End: 2023-10-25 | Stop reason: SDUPTHER

## 2023-10-20 RX ORDER — FUROSEMIDE 40 MG/1
40 TABLET ORAL DAILY PRN
Status: DISCONTINUED | OUTPATIENT
Start: 2023-10-20 | End: 2023-10-20 | Stop reason: HOSPADM

## 2023-10-20 RX ORDER — SACUBITRIL AND VALSARTAN 24; 26 MG/1; MG/1
1.5 TABLET, FILM COATED ORAL 2 TIMES DAILY
Status: DISCONTINUED | OUTPATIENT
Start: 2023-10-20 | End: 2023-10-20 | Stop reason: HOSPADM

## 2023-10-20 RX ORDER — SPIRONOLACTONE 50 MG/1
50 TABLET, FILM COATED ORAL DAILY
Status: DISCONTINUED | OUTPATIENT
Start: 2023-10-21 | End: 2023-10-20 | Stop reason: HOSPADM

## 2023-10-20 RX ORDER — MEXILETINE HYDROCHLORIDE 200 MG/1
200 CAPSULE ORAL EVERY 8 HOURS SCHEDULED
Qty: 90 CAPSULE | Refills: 11 | Status: SHIPPED | OUTPATIENT
Start: 2023-10-20 | End: 2024-01-19

## 2023-10-20 RX ADMIN — MEXILETINE HYDROCHLORIDE 200 MG: 200 CAPSULE ORAL at 06:33

## 2023-10-20 RX ADMIN — SOTALOL HYDROCHLORIDE 120 MG: 120 TABLET ORAL at 08:54

## 2023-10-20 RX ADMIN — POTASSIUM CHLORIDE 10 MEQ: 750 TABLET, FILM COATED, EXTENDED RELEASE ORAL at 08:54

## 2023-10-20 RX ADMIN — SPIRONOLACTONE 25 MG: 25 TABLET ORAL at 08:54

## 2023-10-20 RX ADMIN — Medication 1000 UNITS: at 08:54

## 2023-10-20 RX ADMIN — SACUBITRIL AND VALSARTAN 1 TABLET: 24; 26 TABLET, FILM COATED ORAL at 08:54

## 2023-10-20 RX ADMIN — MEXILETINE HYDROCHLORIDE 200 MG: 200 CAPSULE ORAL at 14:20

## 2023-10-20 RX ADMIN — CARVEDILOL 25 MG: 25 TABLET, FILM COATED ORAL at 08:54

## 2023-10-20 RX ADMIN — FUROSEMIDE 40 MG: 40 TABLET ORAL at 08:54

## 2023-10-20 RX ADMIN — ASPIRIN 81 MG: 81 TABLET ORAL at 08:58

## 2023-10-20 RX ADMIN — EMPAGLIFLOZIN 10 MG: 10 TABLET, FILM COATED ORAL at 12:07

## 2023-10-20 ASSESSMENT — ENCOUNTER SYMPTOMS
PALPITATIONS: 1
WEAKNESS: 1

## 2023-10-20 NOTE — PLAN OF CARE
Problem: Pain - Adult  Goal: Verbalizes/displays adequate comfort level or baseline comfort level  Description: INTERVENTIONS:  1. Encourage patient to monitor pain and request interventions  2. Assess pain using the appropriate pain scale  3. Administer analgesics based on type and severity of pain and evaluate response  4. Educate/Implement non-pharmacological measures as appropriate and evaluate response  5. Consider cultural, developmental and social influences on pain and pain management  6. Notify Provider if interventions unsuccessful or patient reports new pain  Outcome: Adequate for Discharge     Problem: Infection - Adult  Goal: Absence of infection during hospitalization  Description: INTERVENTIONS:  1. Assess and monitor for signs and symptoms of infection  2. Monitor lab/diagnostic results  3. Monitor all insertion sites/wounds/incisions  4. Monitor secretions for changes in amount and color  5. Administer medications as ordered  6. Educate and encourage patient and family to use good hand hygiene technique  7. Identify and educate in appropriate isolation precautions for identified infection/condition  Outcome: Adequate for Discharge     Problem: Safety Adult  Goal: Patient will remain safe during hospitalization  Description: INTERVENTIONS    1. Assess patient for fall risk and implement interventions if needed  2. Use safe transport techniques  3. Assess patient using the appropriate Jean Marie skin assessment scale  4. Assess patient for risk of aspiration  5. Assess patient for risk of elopement  6. Assess patient for risk of suicide  Outcome: Adequate for Discharge     Problem: Daily Care  Goal: Daily care needs are met  Description: INTERVENTIONS:   1. Assess and monitor skin integrity  2. Identify patients at risk for skin breakdown on admission and per policy  3. Assess and monitor ability to perform self care and identify potential discharge needs  4. Assess skin integrity/risk for skin  breakdown  5. Assist patient with activities of daily living as needed  6. Encourage independent activity per ability   7. Provide mouth care   8. Include patient/family/caregiver in decisions related to daily care   Outcome: Adequate for Discharge     Problem: Knowledge Deficit  Goal: Patient/family/caregiver demonstrates understanding of disease process, treatment plan, medications, and discharge instructions  Description: INTERVENTIONS:   1. Complete learning assessment and assess knowledge base  2. Provide teaching at level of understanding   3. Provide teaching via preferred learning methods  Outcome: Adequate for Discharge     Problem: Discharge Barriers  Goal: Patient's discharge needs are met  Description: INTERVENTIONS:  1. Assess patient for self-management skills  2. Encourage participation in management  3. Identify potential discharge barriers on admission and throughout hospital stay  4. Involve patient/family/caregiver in discharge planning process  5. Collaborate with case management/ for discharge needs  Outcome: Adequate for Discharge     Problem: Cardiovascular - Adult  Goal: Maintains optimal cardiac output and hemodynamic stability  Description: INTERVENTIONS:  1. Monitor vital signs and rhythm  2. Monitor for hypotension and other signs of decreased cardiac output  3. Administer and titrate ordered vasoactive medications to optimize hemodynamic stability  4. Monitor for fluid overload/dehydration, weight gain, shortness of breath and activity intolerance  5. Monitor arterial and/or venous puncture sites for bleeding and/or hematoma  6. Assess quality of pulses, capillary refill, edema, sensation, skin color and temperature  7. Assess for signs of decreased coronary artery perfusion - ex. angina  Outcome: Adequate for Discharge  Goal: Absence of cardiac dysrhythmias or at baseline  Description: INTERVENTIONS:  1. Continuous cardiac monitoring, monitor vital signs, obtain 12 lead  EKG if indicated  2. Administer antiarrhythmic and heart rate control medications as ordered  3. Initiate emergency measures for life threatening arrhythmias  4. Monitor electrolytes and administer replacement therapy as ordered  Outcome: Adequate for Discharge     Problem: Respiratory - Adult  Goal: Achieves optimal ventilation and oxygenation  Description: INTERVENTIONS:  1. Assess for changes in respiratory status  2. Assess for changes in mentation and behavior  3. Position to facilitate oxygenation and minimize respiratory effort  4. Oxygen supplementation based on oxygen saturation or ABGs  5. Assess patient's ability to cough effectively  6. Encourage broncho-pulmonary hygiene including cough, deep breathe  7. Assess the need for suctioning   8. Assess and instruct to report SOB or any respiratory difficulty  9. Respiratory Therapy support as indicated, including medications and treatment.  Outcome: Adequate for Discharge  Goal: Achieves optimal ventilation and oxygenation with noninvasive CPAP/BiLEVEL support  Description: INTERVENTIONS:  1. Provide education to patient/family about rationale and expected outcomes associated with therapy  2. Position patient to facilitate optimal ventilation/oxygenation status and minimize respiratory effort  3. Position patient to reduce aspiration risk, elevate head of bed at least 35 degrees or higher, as applicable  4. Assess effectiveness of therapy on ventilation/oxygenation status based on oxygen saturation and/or arterial blood gases, as indicated  5. Assess patient for changes in respiratory and physiological status  6. Auscultate breath sounds and assess chest excursion, as indicated  7. Assess patient for changes in mentation and behavior  8. Routinely monitor equipment for proper performance and settings  9. Assess and monitor skin condition, in relationship to the respiratory interface  10. Assure equipment alarm volume is adequate for the patient's  environment  11. Immediately respond to equipment alarm, to assess patient and/or cause for alarm  12. Follow universal infection control/hospital policy(ies)/standards  Outcome: Adequate for Discharge     Problem: Metabolic/Fluid and Electrolytes - Adult  Goal: Electrolytes maintained within normal limits  Description: INTERVENTIONS:  1. Monitor labs and assess patient for signs and symptoms of electrolyte imbalances  2. Administer electrolyte replacement as ordered  3. Monitor response to electrolyte replacements, including repeat lab results as appropriate  4. Fluid restriction as ordered  5. Instruct patient on fluid and nutrition restrictions as appropriate  Outcome: Adequate for Discharge  Goal: Maintain Optimal Renal Function and Hemodynamic Stability  Description: INTERVENTIONS:  1. Monitor labs and assess for signs and symptoms of volume excess or deficit  2. Monitor intake, output and patient weight  3. Monitor urine specific gravity, serum osmolarity and serum sodium as indicated or ordered  4. Monitor response to interventions for patient's volume status, including labs, urine output, blood pressure (other measures as available)  5. Encourage oral intake as appropriate  6. Instruct patient on fluid and nutrition restrictions as appropriate  Outcome: Adequate for Discharge  Goal: Glucose maintained within prescribed range  Description: INTERVENTIONS:  1. Monitor blood glucose as ordered  2. Assess for signs and symptoms of hyperglycemia and hypoglycemia  3. Administer ordered medications to maintain glucose within target range  4. Assess barriers to adequate nutritional intake and initiate nutrition consult as needed  5. Assess baseline knowledge and provide education as indicated  6. Monitor exercise as may reduce the requirements for insulin  Outcome: Adequate for Discharge

## 2023-10-20 NOTE — INTERDISCIPLINARY/THERAPY
Case Management Discharge Note    Phone # [phone]    Discharge Disposition: HO     Needs transportation assistance at DC: No    Specialty Referrals:          Active Ambulatory Referrals   (From admission, onward)                 Start     Ordered    10/20/23 0000  Ambulatory referral to CHF Clinic        Comments: Advanced tx, EF 25%;  has appt w/ Dr Leiva    10/20/23 0852    10/20/23 0000  Ambulatory referral to CHF Clinic        Comments: Establish at CHF Clinic;       has appt w/ RAMIREZ Pablo CNP    10/20/23 1240    10/20/23 0000  Ambulatory referral to Cardiac Electrophysiology        Comments: Please schedule with me in 4-6 weeks    10/20/23 1326                    Support System Notified: Pt to notify    Narrative: Chart reviewed. Pt needs discussed with bedside RN. Discharge orders placed. No discharge needs assessed. Follow up appointments scheduled.

## 2023-10-20 NOTE — INTERDISCIPLINARY/THERAPY
HEART FAILURE EDUCATOR:  [phone]      Order from LISSETTE Garcia CNP for CHF clinic follow up with Dr Leiva.    POST HOSPITAL APPOINTMENT with Dr Leiva at the [facility] on 11/24/2023 @ 1600.  This will be on AVS.      ADDENDUM  1241    Pr Dr Leiva, rescheduled post hospital appointment to A BRAD Pablo on 11/6/2023 at 1050.  This will be on AVS.

## 2023-10-20 NOTE — DISCHARGE SUMMARY
DISCHARGE SUMMARY        ADMISSION DATE:  10/18/2023        DISCHARGE DATE:  10/20/2023    PRIMARY DISCHARGE DIAGNOSIS:      -- AICD discharge for sustained V. tach    SECONDARY DISCHARGE DIAGNOSES:  -- Prior history of V. tach ablations x2  -- Ischemic cardiomyopathy with chronic combined systolic and diastolic heart failure  -- Primary hypertension  -- History of paroxysmal atrial fibrillation, chronic anticoagulation  -- CAD, history of coronary stent  -- Type 2 diabetes, well controlled  -- BPH  -- GERD    DISCHARGE DISPOSITION:       Home    CODE STATUS AT DISCHARGE:   Full    CONSULTS:  Electrophysiology  Heart failure cardiology    IMAGING STUDIES:  US Echo complete  Result Date: 10/19/2023  Narrative:   Normal left ventricular wall thickness.   Biplane ejection fraction is 25%.   Severe left ventricular systolic dysfunction.  Large anteroapical aneurysm.  Only the basilar left ventricular segments have preserved contractility.  No LV mural thrombi.   Grade II (moderate) left ventricular diastolic abnormality.   Elevated left ventricular filling pressure.   The left atrium is severely dilated.   The right atrium is moderately dilated.   Normal central venous pressure (0-5 mmHg).   No pericardial effusion.   Inadequate TR spectral Doppler to accurately assess right ventricular systolic pressure. Comment: No obvious changes from the January 2022 echo.     XR chest portable 1 view  Result Date: 10/18/2023  Impression: IMPRESSION: 1. Negative chest    OUTPATIENT FOLLOWUP:    Electrophysiology  Family medicine  Cardiology  Heart failure clinic  Device clinic    HOSPITAL COURSE:   This 58-year-old male patient with a history of V. tach, V. tach ablation x2, AICD in situ, CAD, history of coronary stent, chronic combined systolic and diastolic heart failure, LVEF 25%, and type 2 diabetes was admitted on 10/18/2023 by Dr. Ernst.  He had presented after experiencing an AICD shock which was preceded by some  lightheadedness while at rest.  Device interrogation revealed that he had experienced V. tach lasting 67 seconds.  Electrophysiology was consulted and evaluated the patient the next morning.  His mexiletine was increased to 200 mg 3 times daily.  He was then referred to the heart failure team for evaluation.  His Entresto was increased to 1.5 tablets twice daily.  Torsemide was changed to as needed and parameters were given to him by the heart failure service.  Furthermore, Jardiance was added 10 mg daily.  The patient remained stable throughout his hospitalization.  Arrangements were made for follow-up in EP and heart failure clinic.      DISCHARGE MEDICATIONS:     Medication List      START taking these medications     empagliflozin 10 mg tablet tablet; Commonly known as: JARDIANCE; Take 10   mg by mouth daily; Start taking on: October 21, 2023     CHANGE how you take these medications     mexiletine 200 mg capsule; Commonly known as: MEXITIL; Take 1 capsule   (200 mg total) by mouth every 8 (eight) hours; What changed: when to take   this   sacubitriL-valsartan 24-26 mg tablet; Commonly known as: ENTRESTO; Take   1.5 tablets by mouth 2 (two) times a day; What changed: how much to take,   Another medication with the same name was removed. Continue taking this   medication, and follow the directions you see here.   spironolactone 50 mg tablet; Commonly known as: ALDACTONE; Take 1 tablet   (50 mg total) by mouth daily; Start taking on: October 21, 2023; What   changed: medication strength, how much to take, when to take this, Another   medication with the same name was removed. Continue taking this   medication, and follow the directions you see here.     CONTINUE taking these medications     albuterol HFA 90 mcg/actuation inhaler; Inhale 2 puffs every 6 (six)   hours as needed for wheezing or shortness of breath   aspirin 81 mg EC tablet   cholecalciferol (vitamin D3) 25 mcg (1,000 unit) tablet   Coreg 25 mg  tablet; Generic drug: carvediloL   furosemide 40 mg tablet; Commonly known as: LASIX   magnesium chloride 64 mg tablet,delayed release (DR/EC); Commonly known   as: SLOW-MAG; Take 1 tablet (64 mg total) by mouth 2 (two) times a day   with meals   nitroglycerin 0.4 mg SL tablet; Commonly known as: NITROSTAT   pantoprazole 40 mg EC tablet; Commonly known as: PROTONIX   potassium chloride 10 mEq CR tablet   rosuvastatin 40 mg tablet; Commonly known as: Crestor; Take 1 tablet (40   mg total) by mouth nightly   sotaloL 120 mg tablet; Commonly known as: BETAPACE   tamsulosin 0.4 mg capsule; Commonly known as: FLOMAX   Xarelto 20 mg tablet; Generic drug: rivaroxaban     STOP taking these medications     metFORMIN  mg 24 hr tablet; Commonly known as: GLUCOPHAGE-XR        Physical Exam at Discharge  Heart Rate: 60  Resp: 18  BP: 121/76  Temp: 36.7 °C (98.1 °F)  Weight: 123 kg (271 lb 2.7 oz)    General:  No acute distress, appears comfortable and nontoxic.  Neurological/ mentation:  Awake, calm, not confused.    Chest:  Clear to auscultation bilaterally.  Cardiovascular:  Regular rate and rhythm, no murmur, rub or gallop noted.  Abdomen:  Soft, nontender, nondistended.   Extremities:  No bilateral lower extremity pitting edema.    Labs: CBC, BMP stable    On the day of discharge, I discussed this case with: Dr. Leiva; Tanika Sun CNP; primary RN; care manager        AMOUNT OF TIME SPENT BY ME PERFORMING THIS DISCHARGE PROCESS:  35 minutes      Romain Armstrong MD

## 2023-10-20 NOTE — PROGRESS NOTES
Electrophysiology Inpatient Progress Note    Pete Trejo is a 58 y.o. male whom we are seeing today for the further evaluation of ventricular tachycardia with ICD shock.    HPI: Patient was seen and examined today.  Chart reviewed and plans discussed with nurse.  Patient is resting comfortably in bed this morning.  He denies chest pain, shortness of breath, palpitations, lightheaded or dizziness.  Has been ambulating throughout his room with no complaints.    Chief Complaint:   Chief Complaint   Patient presents with    Palpitations     Patient states about 15-20 minutes ago his pacemaker fired. States he is unsure what the parameters are but he became very flush and lightheaded right before it went off. Denies chest pain but states he has palpitations.        Current Medications:  Current Facility-Administered Medications   Medication Dose Route Frequency Provider Last Rate Last Admin    ondansetron (ZOFRAN) tablet 4 mg  4 mg oral q6h PRN Jan Ernst MD        Or    ondansetron (ZOFRAN) injection 4 mg  4 mg intravenous q6h PRN Jan Ernst MD        magnesium hydroxide (MILK OF MAGNESIA) 400 mg/5 mL oral suspension 30 mL  30 mL oral Daily PRN Jan Ernst MD        polyethylene glycol (GLYCOLAX) powder 17 g  17 g oral Daily Jan Ernst MD        alum-mag hydroxide-simeth (MAALOX ADVANCED) suspension 30 mL  30 mL oral 3x daily PRN Jan Ernst MD        acetaminophen (TYLENOL) tablet 650 mg  650 mg oral q6h PRN Jan Ernst MD        aspirin EC tablet 81 mg  81 mg oral Daily Jan Ernst MD   81 mg at 10/19/23 0742    carvediloL (COREG) tablet 25 mg  25 mg oral 2x daily with meals Jan Ernst MD   25 mg at 10/19/23 1701    cholecalciferol (vitamin D3) 1,000 unit (25mcg) tab/cap 1,000 Units  1,000 Units oral Daily Jan Ernst MD   1,000 Units at 10/19/23 0742    furosemide (LASIX) tablet 40 mg  40 mg oral 2x daily diuretic Jan Ernst MD   40 mg at 10/19/23  1431    lansoprazole (PREVACID) capsule 30 mg  30 mg oral Daily at 1600 Jan Ernst MD   30 mg at 10/19/23 1701    potassium chloride (KLOR-CON) CR tablet 10 mEq  10 mEq oral Daily Jan Ernst MD   10 mEq at 10/19/23 0743    rivaroxaban (XARELTO) tablet 20 mg  20 mg oral Daily with dinner Jan Ernst MD   20 mg at 10/19/23 1701    rosuvastatin (CRESTOR) tablet 40 mg  40 mg oral Nightly Jan Ernst MD   40 mg at 10/19/23 2036    sacubitriL-valsartan (ENTRESTO) 24-26 mg per tablet 1 tablet  1 tablet oral 2x daily Jan Ernst MD   1 tablet at 10/19/23 2036    sotaloL (BETAPACE) tablet 120 mg  120 mg oral q12h KULDEEP Jan Ernst MD   120 mg at 10/19/23 2036    spironolactone (ALDACTONE) tablet 25 mg  25 mg oral 2x daily with meals Jan Ernst MD   25 mg at 10/19/23 1701    tamsulosin (FLOMAX) 24 hr capsule 0.4 mg  0.4 mg oral Daily with dinner Jan Ernst MD   0.4 mg at 10/19/23 1701    dextrose 50 % in water (D50W) syringe 15-30 mL  15-30 mL intravenous q15 min PRN Jan Ernst MD        dextrose (GLUTOSE) 40 % gel 15.2 g  15.2 g oral q15 min PRN Jan Ernst MD        glucagon (GLUCAGEN) injection 1 mg  1 mg intramuscular q15 min PRN Jan Ernst MD        Or    glucagon (GLUCAGEN) injection 1 mg  1 mg subcutaneous q15 min PRN Jan Ernst MD        mexiletine (MEXITIL) capsule 200 mg  200 mg oral q8h [facility] Donya Parra, CNP   200 mg at 10/20/23 0633          Objective     Vital signs in last 24 hours:   Temp:  [36.7 °C (98 °F)-36.9 °C (98.4 °F)] 36.8 °C (98.2 °F)  Heart Rate:  [59-67] 59  Resp:  [18-20] 18  SpO2:  [90 %-92 %] 92 %  BP: (110-122)/(59-71) 122/71  Weight: 123 kg (271 lb 2.7 oz)    Telemetry:        Physical Exam  Constitutional:       Appearance: He is well-developed.   HENT:      Head: Normocephalic.   Cardiovascular:      Rate and Rhythm: Normal rate and regular rhythm.      Heart sounds: Normal heart sounds.   Pulmonary:      Effort: Pulmonary effort is normal.      Breath sounds: Normal breath  sounds.   Abdominal:      General: Bowel sounds are normal.      Palpations: Abdomen is soft.   Musculoskeletal:         General: Normal range of motion.      Cervical back: Normal range of motion.   Skin:     General: Skin is warm and dry.   Neurological:      Mental Status: He is alert and oriented to person, place, and time.   Psychiatric:         Behavior: Behavior normal.         Lab Results   Component Value Date    WBC 4.4 10/20/2023    HGB 15.3 10/20/2023    HCT 44.1 10/20/2023     10/20/2023    CHOL 106 07/28/2022    TRIG 154 (H) 07/28/2022    HDL 25 (L) 07/28/2022    ALT 23 10/18/2023    AST 18 10/18/2023     10/20/2023    K 3.8 10/20/2023     10/20/2023    CREATININE 0.84 10/20/2023    BUN 12 10/20/2023    CO2 23 10/20/2023    TSH 2.833 10/19/2023    PSA 0.29 09/20/2018    INR 1.9 (H) 10/06/2021    HGBA1C 6.2 (H) 09/22/2023    MICROALBUR <7.0 09/22/2023       Diagnostic data:    US Echo complete: 10/19/2023    Normal left ventricular wall thickness.   Biplane ejection fraction is 25%.   Severe left ventricular systolic dysfunction.  Large anteroapical aneurysm.  Only the basilar left ventricular segments have preserved contractility.  No LV mural thrombi.   Grade II (moderate) left ventricular diastolic abnormality.   Elevated left ventricular filling pressure.   The left atrium is severely dilated.   The right atrium is moderately dilated.   Normal central venous pressure (0-5 mmHg).   No pericardial effusion.   Inadequate TR spectral Doppler to accurately assess right ventricular systolic pressure. Comment: No obvious changes from the January 2022 echo.     XR chest portable 1 view: 10/18/2023  IMPRESSION: 1. Negative chest    EKG: 10/20/2023      Assessment/Plan      Ventricular tachycardia  Status post 2 ventricular tachycardia ablations, first one in 2020 here at [facility] in 1/9/2021 at the [facility] by Dr. Lema.  In March 2023 patient was switched from  amiodarone to sotalol with a decrease in his mexiletine to 200 mg twice a day.  Patient was admitted yesterday after recurrent ventricular tachycardia and ICD shock.  Mexiletine was increased yesterday to 200 mg 3 times a day with no recurrence on telemetry.  If patient continues to have recurrence could consider increasing sotalol to 100 mg twice a day.    Ischemic cardiomyopathy  EF 25%.  Patient appears to be euvolemic on exam.  Dr. Leiva and heart failure team has been consulted to optimize patient's heart failure medications.  Follows with [facility] for consideration of transplant versus bad but has not met criteria yet.  He does have CRT-D in place with 90% biventricular pacing.  Further recommendations per heart failure team.    From electrophysiology standpoint patient is stable for discharge.  He will follow-up with the EP in 4 to 6 weeks.  Continue mexiletine 200 mg 3 times a day.  I asked the patient to call the clinic if he experiences any further symptoms or ICD shocks.    Electronically signed by: Tanika Sun CNP     Plan made conjunction with Dr. Wu.

## 2023-10-20 NOTE — PLAN OF CARE
Problem: Pain - Adult  Goal: Verbalizes/displays adequate comfort level or baseline comfort level  Description: INTERVENTIONS:  1. Encourage patient to monitor pain and request interventions  2. Assess pain using the appropriate pain scale  3. Administer analgesics based on type and severity of pain and evaluate response  4. Educate/Implement non-pharmacological measures as appropriate and evaluate response  5. Consider cultural, developmental and social influences on pain and pain management  6. Notify Provider if interventions unsuccessful or patient reports new pain  Outcome: Progressing  Flowsheets (Taken 10/19/2023 0119)  Verbalizes/displays adequate comfort level or baseline comfort level:   Encourage patient to monitor pain and request interventions   Assess pain using the appropriate pain scale   Administer analgesics based on type and severity of pain and evaluate response   Educate/Implement non-pharmacological measures as appropriate and evaluate response     Problem: Safety Adult  Goal: Patient will remain safe during hospitalization  Description: INTERVENTIONS    1. Assess patient for fall risk and implement interventions if needed  2. Use safe transport techniques  3. Assess patient using the appropriate Jean Marie skin assessment scale  4. Assess patient for risk of aspiration  5. Assess patient for risk of elopement  6. Assess patient for risk of suicide  Outcome: Progressing  Flowsheets (Taken 10/19/2023 0119)  Patient will remain safe durning hospitalization:   Assess patient for Fall Risk   Assess Patient for Aspirations   Use safe transport   Assess Patient for Risk of Elopement   Assess Patient using the appropriate Jean Marie scale   Assess Patient for Risk of Suicide     Problem: Cardiovascular - Adult  Goal: Maintains optimal cardiac output and hemodynamic stability  Description: INTERVENTIONS:  1. Monitor vital signs and rhythm  2. Monitor for hypotension and other signs of decreased cardiac  output  3. Administer and titrate ordered vasoactive medications to optimize hemodynamic stability  4. Monitor for fluid overload/dehydration, weight gain, shortness of breath and activity intolerance  5. Monitor arterial and/or venous puncture sites for bleeding and/or hematoma  6. Assess quality of pulses, capillary refill, edema, sensation, skin color and temperature  7. Assess for signs of decreased coronary artery perfusion - ex. angina  Outcome: Progressing  Flowsheets (Taken 10/19/2023 0119)  Maintain optimal cardiac output and hemodynamic stability:   Monitor vital signs and rhythm   Monitor for hypotension and other signs of decreased cardiac output   Administer and titrate ordered vasoactive medications to optimize hemodynamic stability   Monitor for fluid overload/dehydration, weight gain, shortness of breath and activity intolerance   Monitor arterial and/or venous puncture sites for bleeding and/or hematoma   Assess quality of pulses, capillary refill, edema, sensation, skin color and temperature   Assess for signs of decreased coronary artery perfusion - ex. angina  Goal: Absence of cardiac dysrhythmias or at baseline  Description: INTERVENTIONS:  1. Continuous cardiac monitoring, monitor vital signs, obtain 12 lead EKG if indicated  2. Administer antiarrhythmic and heart rate control medications as ordered  3. Initiate emergency measures for life threatening arrhythmias  4. Monitor electrolytes and administer replacement therapy as ordered  Outcome: Progressing  Flowsheets (Taken 10/19/2023 0119)  Absence of cardiac dysrhythmias or at baseline:   Continuous cardiac monitoring, monitor vital signs, obtain 12 lead EKG if indicated   Administer antiarrhythmic and heart rate control medications as ordered     Problem: Respiratory - Adult  Goal: Achieves optimal ventilation and oxygenation  Description: INTERVENTIONS:  1. Assess for changes in respiratory status  2. Assess for changes in mentation and  behavior  3. Position to facilitate oxygenation and minimize respiratory effort  4. Oxygen supplementation based on oxygen saturation or ABGs  5. Assess patient's ability to cough effectively  6. Encourage broncho-pulmonary hygiene including cough, deep breathe  7. Assess the need for suctioning   8. Assess and instruct to report SOB or any respiratory difficulty  9. Respiratory Therapy support as indicated, including medications and treatment.  Outcome: Progressing  Flowsheets (Taken 10/19/2023 0119)  Achieves optimal ventilation and oxygenation:   Assess for changes in respiratory status   Assess for changes in mentation and behavior   Position to facilitate oxygenation and minimize respiratory effort   Oxygen supplementation based on oxygen saturation or arterial blood gases   Encourage broncho-pulmonary hygiene including cough, deep breathe   Assess patient's ability to cough effectively

## 2023-10-20 NOTE — NURSING END OF SHIFT
Nursing End of Shift Summary:    Patient: Pete Trejo  MRN: [id number]  : 1965, Age: 58 y.o.    Location: [address] [place] [address]    Nursing Goals  Clinical Goals for the Shift: monitor vs; maintain safety and comfort    Narrative Summary of Progress Toward Clinical Goals:  Pt alert and oriented. No complains of pain. Education on medication and heart failure done. Questions answered. Plan of care reviewed with patient.    Barriers to Goals/Nursing Concerns:  No    New Patient or Family Concerns/Issues:  No    Shift Summary:   Significant Events & Communications to Providers (last 12 hours)       Last 5 Values    No documentation.                 Oxygen Usage (last 12 hours)       Last 5 Values       Row Name 10/19/23 0736                   Room Air or Baseline Oxygen Trial by Nursing    Is Patient on Room Air OR on the Same Amount of O2 as at Home? Yes                      Mobility (last 12 hours)       Last 5 Values       Row Name 10/19/23 0736 10/19/23 0800 10/19/23 1030 10/19/23 1141 10/19/23 1539       Mobility    Activity -- Chair position in bed Ambulate in room;Bathroom privileges;Back to bed -- --    Level of Assistance -- -- Independent -- --    Length of Time in Chair (min) -- 0 0 -- --    Distance Ambulated (feet) -- 0 Feet 20 Feet -- --    Distance Ambulated (Meters Calculated) -- 0 Meters 6.1 Meters -- --    Patient Position Supine -- -- Supine Supine    Turning -- Turns self Turns self -- --    Distance Ambulated (Meters Calculated)(Do Not Use) -- 0 Feet 6.1 Feet -- --      Row Name 10/19/23 1704                   Mobility    Activity Ambulate in room (P)                       Urethral Catheter       Active Urethral Catheter       None                  Active Lines       Active Central venous catheter / Peripherally inserted central catheter / Implantable Port / Hemodialysis catheter / Midline Catheter       None                  Infusing Medications   Medication Dose Last Rate     PRN Medications    Medication Dose Last Admin    ondansetron  4 mg      Or    ondansetron  4 mg      magnesium hydroxide  30 mL      alum-mag hydroxide-simeth  30 mL      acetaminophen  650 mg      dextrose 50 % in water (D50W)  15-30 mL      dextrose  15.2 g      glucagon  1 mg      Or    glucagon  1 mg

## 2023-10-21 ENCOUNTER — PATIENT OUTREACH (OUTPATIENT)
Dept: FAMILY MEDICINE | Facility: HOSPITAL | Age: 58
End: 2023-10-21
Payer: MEDICARE

## 2023-10-21 NOTE — Clinical Note
Dinora please see below note re: nader increased pain.    It appears her pump medication was changed to dialaudid from morphine on 3/23:    Intrathecal Pump:  Company (size):  [employer] 20 ml  Battery Life:  November 2027  Drug Concentration:  Changed to Hydromorphone 0.5 mg/mL  Infusion:  Simple continuous  Daily Dose:  0.91305 mg/day  PTM:  Disabled  Refill date:  09/02/2021    Next OV;4/6/21, or you could see her on 30 march, tuesday at 1430?                 Transitional Care Management (TCM) call completed.   Patient has a TCM appointment with Dr. Gr scheduled on 10/25/23.

## 2023-10-21 NOTE — PROGRESS NOTES
Pete Trejo was contacted following recent hospitalization at [facility]. The patient was discharged from the hospital on 10/20/2023 .The Discharge Summary and After Visit Summary were reviewed. The patient's main diagnosis during the hospitalization was AICD discharge .  Followup appointment: 10/25/23. Any additional testing and labs will be discussed at the patient's upcoming post-hospital follow up appointment.    Transitional Care Management Follow Up (most recent)       Transitional Care Management - 10/21/23 1114          OTHER    Date of post hospital outreach 10/21/23     Contact Status Contact done     Speaking with the Patient or Patient's Caregiver? Patient     Is the patient on the Diabetes registry? Y     Is the patient comfortable being contacted by a Diabetes Care Specialist?  N     Were patient's home medications changed or did they have any new medications added during the hospitalization? Y     Did someone go over the discharge summary with the patient or the patient's caregiver and discuss the medications listed on it? Y     Does the patient or patient's caregiver have any questions about the medication changes? N     Patient verbalized understanding of when to contact health care provider or when to get help right away? Y     Discharge instructions reviewed with patient or patient's caregiver and all questions answered? Y     Is there a Home Health/DME Plan being enacted? Please note name of HH agency, DME vendor, contacted/received N     Does patient have psychosocial issues that might impact their health status? None     Does patient have financial barriers to care? None                      Patricia Matamoros RN  October 21, 2023 11:19 AM

## 2023-10-25 ENCOUNTER — TELEPHONE (OUTPATIENT)
Dept: FAMILY MEDICINE | Facility: CLINIC | Age: 58
End: 2023-10-25

## 2023-10-25 ENCOUNTER — OFFICE VISIT (OUTPATIENT)
Dept: FAMILY MEDICINE | Facility: CLINIC | Age: 58
End: 2023-10-25
Payer: MEDICARE

## 2023-10-25 VITALS
DIASTOLIC BLOOD PRESSURE: 66 MMHG | BODY MASS INDEX: 38.31 KG/M2 | HEART RATE: 64 BPM | SYSTOLIC BLOOD PRESSURE: 100 MMHG | TEMPERATURE: 96.9 F | OXYGEN SATURATION: 95 % | WEIGHT: 267 LBS

## 2023-10-25 DIAGNOSIS — I50.42 CHRONIC COMBINED SYSTOLIC AND DIASTOLIC CHF, NYHA CLASS 2 AND ACC/AHA STAGE C (CMS/HCC): ICD-10-CM

## 2023-10-25 DIAGNOSIS — I47.20 SUSTAINED VENTRICULAR TACHYCARDIA (CMS/HCC): Primary | ICD-10-CM

## 2023-10-25 DIAGNOSIS — Z45.02 AICD DISCHARGE: ICD-10-CM

## 2023-10-25 PROCEDURE — 99495 TRANSJ CARE MGMT MOD F2F 14D: CPT | Mod: PO | Performed by: FAMILY MEDICINE

## 2023-10-25 PROCEDURE — 99495 TRANSJ CARE MGMT MOD F2F 14D: CPT | Performed by: FAMILY MEDICINE

## 2023-10-25 RX ORDER — POTASSIUM CHLORIDE 750 MG/1
30 TABLET, EXTENDED RELEASE ORAL DAILY
Qty: 135 TABLET | Refills: 0 | Status: SHIPPED | OUTPATIENT
Start: 2023-10-25 | End: 2023-10-25 | Stop reason: SDUPTHER

## 2023-10-25 RX ORDER — MAGNESIUM CHLORIDE 64 MG
64 TABLET, DELAYED RELEASE (ENTERIC COATED) ORAL 2 TIMES DAILY WITH MEALS
Qty: 180 TABLET | Refills: 0 | Status: SHIPPED | OUTPATIENT
Start: 2023-10-25 | End: 2023-12-07 | Stop reason: HOSPADM

## 2023-10-25 RX ORDER — SOTALOL HYDROCHLORIDE 120 MG/1
120 TABLET ORAL EVERY 12 HOURS
Qty: 60 TABLET | Refills: 0 | Status: SHIPPED | OUTPATIENT
Start: 2023-10-25 | End: 2023-11-22 | Stop reason: SDUPTHER

## 2023-10-25 RX ORDER — POTASSIUM CHLORIDE 750 MG/1
30 TABLET, EXTENDED RELEASE ORAL DAILY
Qty: 270 TABLET | Refills: 0 | Status: SHIPPED | OUTPATIENT
Start: 2023-10-25 | End: 2024-07-16 | Stop reason: SDUPTHER

## 2023-10-25 RX ORDER — SPIRONOLACTONE 50 MG/1
50 TABLET, FILM COATED ORAL DAILY
Qty: 90 TABLET | Refills: 0 | Status: ON HOLD | OUTPATIENT
Start: 2023-10-25 | End: 2023-11-30

## 2023-10-25 ASSESSMENT — PAIN SCALES - GENERAL: PAINLEVEL: 0-NO PAIN

## 2023-10-25 NOTE — TELEPHONE ENCOUNTER
[facility] pharmacy is calling to clarify if the 45 day supply (135 tablets) is correct for patient's potassium Rx since you refilled his magnesium and spironolactone for 90 days. Please advise if any changes are needed on the qty.

## 2023-10-25 NOTE — PROGRESS NOTES
Subjective          Chief Complaint   Patient presents with    Hospital Follow Up       PCP: Nikhil Smith MD     Pete Trejo is a 58 y.o. old male, here for an evaluation of TCM. This is an established patient.  Seen in cross coverage today.            ADMISSION DATE:  10/18/2023         DISCHARGE DATE:  10/20/2023     Date of outreach: 10/21/2023    PRIMARY DISCHARGE DIAGNOSIS:      -- AICD discharge for sustained V. tach     SECONDARY DISCHARGE DIAGNOSES:  -- Prior history of V. tach ablations x2  -- Ischemic cardiomyopathy with chronic combined systolic and diastolic heart failure  -- Primary hypertension  -- History of paroxysmal atrial fibrillation, chronic anticoagulation  -- CAD, history of coronary stent  -- Type 2 diabetes, well controlled  -- BPH  -- GERD     DISCHARGE DISPOSITION:       Home     CODE STATUS AT DISCHARGE:   Full     CONSULTS:  Electrophysiology  Heart failure cardiology     IMAGING STUDIES:  US Echo complete  Result Date: 10/19/2023  Narrative:   Normal left ventricular wall thickness.   Biplane ejection fraction is 25%.   Severe left ventricular systolic dysfunction.  Large anteroapical aneurysm.  Only the basilar left ventricular segments have preserved contractility.  No LV mural thrombi.   Grade II (moderate) left ventricular diastolic abnormality.   Elevated left ventricular filling pressure.   The left atrium is severely dilated.   The right atrium is moderately dilated.   Normal central venous pressure (0-5 mmHg).   No pericardial effusion.   Inadequate TR spectral Doppler to accurately assess right ventricular systolic pressure. Comment: No obvious changes from the January 2022 echo.      XR chest portable 1 view  Result Date: 10/18/2023  Impression: IMPRESSION: 1. Negative chest     OUTPATIENT FOLLOWUP:    Electrophysiology  Family medicine  Cardiology  Heart failure clinic  Device clinic      Patient was admitted to the hospital for AICD discharge.  Device was  interrogated found he had sustained V. tach for 67 seconds.  EP and CHF services were consulted.  Medications changes include:  Mexiletine increased to 200 mg 3 times a day  Entresto increased to 1.5 tablets twice daily  Torsemide changed to as needed.  Patient was provided with parameters.  Jardiance was started at 10 mg once daily    He is comfortable with the medication changes.  Still little lightheaded and unsteady on his feet while he is trying to get used to the new doses.  He does need a couple medication refills today.       Review of systems: see HPI   Review of Systems       Objective      Vitals:    10/25/23 1056   BP: 100/66   BP Location: Left arm   Patient Position: Sitting   Cuff Size: Regular Adult   Pulse: 64   Temp: 36.1 °C (96.9 °F)   TempSrc: Temporal   SpO2: 95%   Weight: 121.1 kg (267 lb)         Body mass index is 38.31 kg/m².         Physical Exam  Vitals reviewed.   Constitutional:       General: He is not in acute distress.     Appearance: He is not ill-appearing.   Cardiovascular:      Rate and Rhythm: Normal rate and regular rhythm.      Heart sounds: No murmur heard.  Pulmonary:      Effort: No respiratory distress.      Breath sounds: No wheezing, rhonchi or rales.   Neurological:      Mental Status: He is alert.   Psychiatric:         Mood and Affect: Mood normal.         Ortho Exam         Assessment and Plan   Encounter Diagnoses   Name Primary?    Sustained ventricular tachycardia (CMS/HCC) Yes    Chronic combined systolic and diastolic CHF, NYHA class 2 and ACC/AHA stage C (CMS/HCC)     AICD discharge        Hospital records reviewed today, including discharge summary, consult notes, imaging study reports and lab reports.  Patient was admitted to the cardiology service, EP service and CHF service were consulted.  He had sustained V. tach for 67 seconds followed by an AICD discharge.  Medications were adjusted and he is to follow-up with his specialty services for ongoing  management.    -He is able to confirm and understands the medication changes made in hospital  -No additional AICD discharge.  No chest pain.  No new concerns.  - meds reviewed and short term refills provided for him. Noted he is on sotalol and carvedilol - confirmed this is what is discharge paperwork also states.   - To get additional refills from cardiologist and PCP.     New Medications Ordered This Visit   Medications    sotaloL (BETAPACE) 120 mg tablet     Sig: Take 1 tablet (120 mg total) by mouth every 12 (twelve) hours     Dispense:  60 tablet     Refill:  0    magnesium chloride (SLOW-MAG) 64 mg tablet,delayed release (DR/EC)     Sig: Take 1 tablet (64 mg total) by mouth 2 (two) times a day with meals     Dispense:  180 tablet     Refill:  0    potassium chloride 10 mEq CR tablet     Sig: Take 3 tablets (30 mEq total) by mouth daily     Dispense:  135 tablet     Refill:  0    spironolactone (ALDACTONE) 50 mg tablet     Sig: Take 1 tablet (50 mg total) by mouth daily     Dispense:  90 tablet     Refill:  0                Follow up: with specialty care teams and PCP as scheduled           Histories       Active problem list, medications, allergies, medical history and social determinants of health were reviewed today and updated as needed.

## 2023-10-25 NOTE — PATIENT INSTRUCTIONS
Here is a summary of today's visit.       Thank you for allowing us to participate in your healthcare. You are the reason we are here, and I hope that we provided you with the excellent service you deserve. Please let us know before you leave if there is anything else we can do for you today. Our goal is to partner with you and your primary care team to meet your health care needs today.      Medications:   New medications and/or refills (if ordered) have been sent to your pharmacy.        Other Instructions:   Please check out with the  before leaving today.      If you have any questions that arise after your visit today, please follow up with your primary care or relevant specialty care team.  If you have any new or urgent concerns, we are happy to see you again!     Be well,    Maile Bobby MD     We believe in information transparency, and we believe you deserve to see your information as soon as it is available.  We believe this builds trust and better relationships.    We release ALL lab & imaging results to you via [employer] as soon as they are available. Therefore, you may see some  results even before we do. Please give us 2 business days to review and let you know our thoughts. We look at every  result. We will contact you with any results that concern us.    If your results are not concerning, we will mail a letter, or send an online message about the results. If your results are  concerning, we may reach out by phone or schedule a follow-up visit. However, if you have an immediate concern, you  can send us a message or call our clinic. Otherwise, we prefer that you wait 2 business days for us to contact you or that  we discuss the results at your next appointment.

## 2023-10-25 NOTE — TELEPHONE ENCOUNTER
[facility] pharmacy is calling regarding quantity for the .    Writer advised call of a call back from the nurse within 24 hours.    Patient: Pete Trejo    Caller Name (last and first, relation/role): marizol    Name of Facility: [facility]     Callback Number: [phone]    Fax Number: [phone]     Additional Info: other 2 rxs sent were 90 days should this one be also?     Did you confirm the message with the caller: Yes    Is it okay that the nurse communicates your response through [employer]? No

## 2023-10-26 ENCOUNTER — HOSPITAL ENCOUNTER (OUTPATIENT)
Dept: CT IMAGING | Facility: HOSPITAL | Age: 58
Discharge: HOME | End: 2023-10-26
Payer: MEDICARE

## 2023-10-26 DIAGNOSIS — F17.211 NICOTINE DEPENDENCE, CIGARETTES, IN REMISSION: ICD-10-CM

## 2023-10-26 DIAGNOSIS — F17.201 NICOTINE DEPENDENCE IN REMISSION, UNSPECIFIED NICOTINE PRODUCT TYPE: ICD-10-CM

## 2023-10-26 PROCEDURE — 71271 CT THORAX LUNG CANCER SCR C-: CPT

## 2023-10-30 ENCOUNTER — TELEPHONE (OUTPATIENT)
Dept: CARDIOLOGY | Facility: CLINIC | Age: 58
End: 2023-10-30
Payer: MEDICARE

## 2023-10-30 NOTE — TELEPHONE ENCOUNTER
Docket sent on 10/20/23 for ATP x3 with 1 41J shock delivered on 10/18/23. Appears patient was admitted from 10/18/23-10/20/23. Patient had 2 additional episodes of VT with APT x3 on 10/27/23. Docket sent to Dr. [place] with message routed to [facility] for review.

## 2023-11-01 ENCOUNTER — ANCILLARY PROCEDURE (OUTPATIENT)
Dept: CARDIOLOGY | Facility: CLINIC | Age: 58
End: 2023-11-01
Payer: MEDICARE

## 2023-11-01 ENCOUNTER — OFFICE VISIT (OUTPATIENT)
Dept: CARDIOLOGY | Facility: CLINIC | Age: 58
End: 2023-11-01
Payer: MEDICARE

## 2023-11-01 VITALS
HEART RATE: 60 BPM | HEIGHT: 71 IN | BODY MASS INDEX: 37.38 KG/M2 | DIASTOLIC BLOOD PRESSURE: 58 MMHG | SYSTOLIC BLOOD PRESSURE: 110 MMHG | WEIGHT: 267 LBS

## 2023-11-01 DIAGNOSIS — Z95.810 AUTOMATIC IMPLANTABLE CARDIOVERTER-DEFIBRILLATOR IN SITU: Primary | ICD-10-CM

## 2023-11-01 DIAGNOSIS — I25.5 ISCHEMIC CARDIOMYOPATHY: Primary | ICD-10-CM

## 2023-11-01 DIAGNOSIS — I25.5 ISCHEMIC CARDIOMYOPATHY: ICD-10-CM

## 2023-11-01 DIAGNOSIS — I50.42 CHRONIC COMBINED SYSTOLIC AND DIASTOLIC CHF, NYHA CLASS 2 AND ACC/AHA STAGE C (CMS/HCC): ICD-10-CM

## 2023-11-01 DIAGNOSIS — Z95.810 AUTOMATIC IMPLANTABLE CARDIOVERTER-DEFIBRILLATOR IN SITU: ICD-10-CM

## 2023-11-01 DIAGNOSIS — I10 PRIMARY HYPERTENSION: ICD-10-CM

## 2023-11-01 DIAGNOSIS — I48.0 PAROXYSMAL ATRIAL FIBRILLATION (CMS/HCC): ICD-10-CM

## 2023-11-01 DIAGNOSIS — I47.20 SUSTAINED VENTRICULAR TACHYCARDIA (CMS/HCC): ICD-10-CM

## 2023-11-01 DIAGNOSIS — E78.2 MIXED HYPERLIPIDEMIA: ICD-10-CM

## 2023-11-01 LAB — BSA FOR ECHO PROCEDURE: 2.46 M2

## 2023-11-01 PROCEDURE — 93284 PRGRMG EVAL IMPLANTABLE DFB: CPT | Mod: 26 | Performed by: INTERNAL MEDICINE

## 2023-11-01 PROCEDURE — 99214 OFFICE O/P EST MOD 30 MIN: CPT | Performed by: INTERNAL MEDICINE

## 2023-11-01 PROCEDURE — 93284 PRGRMG EVAL IMPLANTABLE DFB: CPT

## 2023-11-01 PROCEDURE — G0463 HOSPITAL OUTPT CLINIC VISIT: HCPCS | Performed by: INTERNAL MEDICINE

## 2023-11-01 ASSESSMENT — ENCOUNTER SYMPTOMS
NEAR-SYNCOPE: 1
ORTHOPNEA: 0
LIGHT-HEADEDNESS: 1
IRREGULAR HEARTBEAT: 1
PALPITATIONS: 1
SYNCOPE: 0
PND: 0
CLAUDICATION: 0
SHORTNESS OF BREATH: 1
MYALGIAS: 0
BRUISES/BLEEDS EASILY: 1
DIZZINESS: 1

## 2023-11-01 ASSESSMENT — PAIN SCALES - GENERAL: PAINLEVEL: 0-NO PAIN

## 2023-11-01 NOTE — PROGRESS NOTES
Cardiology Outpatient Follow-up Note    Subjective    Patient ID: Pete Trejo is a 58 y.o. male.    Chief Complaint:   Chief Complaint   Patient presents with    Coronary Artery Disease    Congestive Heart Failure   .    HPI  The patient is seen in follow-up from the standpoint of dilated ischemic cardiomyopathy with previous anteroseptal infarct and recurrent ventricular arrhythmias.  The patient was recently hospitalized [facility] on October 18 for an episode of getting shocked by his ICD.  He had ATP x3 and then a subsequent 41 J shock.  During that hospitalization there were changes made in his medical therapy including an increase in the mexiletine to 200 mg 3 times a day and his Entresto was increased by heart failure to 1-1/2 tablets twice a day and Jardiance 10 mg daily was added.  His Lasix was made as needed which she has not had to use.  His sotalol remained at 120 mg twice daily and carvedilol at 25 mg twice daily.    During that time in the hospital his potassium was noted to be 3.8.  His oral potassium replacement therapy was increased.  There is no evidence of dehydration.  His BNP was just 126.  He states that he is noted that he feels very dehydrated and thirsty all the time.  He has noted a mild increase in his lightheadedness and dizziness.  He is also continued to have intermittent episodes of tachycardia which he notices shortly after taking his mexiletine in the morning.  He gets lightheaded and dizzy and cannot be standing has to sit down and wait.  He has noted a decrease in urination.  His blood pressures at home have been adequate running similar to today's at 110 systolic or so.  He otherwise denies any new adverse side effects from the medicines.  On review of his device check during his October hospitalization he had had intermittent episodes of ventricular tachycardia not requiring any therapy on September 25, 2020 eighth, 29th and  October 12.  We will have the patient get a device check today.    The patient's device check today demonstrates 2 episodes of VT on October 27 but nothing since.  There was something that happened on October 28 but those do not correlate with the patient's symptoms.  I am uncertain if it is related to the medicine or not but I think that given the fact that he had a 38 beat run of ventricular tachycardia with lightheadedness dizziness  requiring 2 ATPs that the extra mexiletine is not going to be beneficial.  Since patient is euvolemic, somewhat dehydrated, his BNP is normal essentially at 126 I do not think that the patient's heart failure is his biggest problem so I am going to reduce his Entresto back to 1 tablet twice a day and I am going to increase his carvedilol to 37.5 mg twice a day and attempts to try and reduce ventricular arrhythmias so he does not have further tachycardia or shocks.  He is also not pacing high enough percent of the time and generally his heart rates run in the mid 60s and low 70s and this may allow us to have a greater amount of biventricular pacing if this fails we will have to see if EP has any further suggestions.  Otherwise he is scheduled to follow-up with them in several weeks and then I will follow-up with him in about a month again and see how he is doing.  He was advised to increase his fluid intake secondary to the fact that he is feels dehydrated and is thirsty all the time.  His BNP is essentially normal and his sodium level is normal so I think we can avoid over restriction of his fluids.      Specialty Problems          Cardiology Problems    Hyperlipidemia        Hypertension        Ischemic cardiomyopathy        Automatic implantable cardioverter-defibrillator in situ        Presence of stent in coronary artery        Chronic combined systolic and diastolic CHF, NYHA class 2 and ACC/AHA stage C (CMS/HCC)        Chronic anticoagulation        History of ventricular  tachycardia        Paroxysmal atrial fibrillation (CMS/[facility])        Sustained ventricular tachycardia (CMS/[facility])        Long term current use of antiarrhythmic drug        AICD discharge        Elevated troponin           Past Medical History:   Diagnosis Date    BPH (benign prostatic hyperplasia)     CAD (coronary artery disease)     Status post stent    CHF (congestive heart failure) (CMS/[facility])     Chronic combined systolic and diastolic CHF (congestive heart failure) (CMS/[facility])     LVEF 25% with grade 1 diastolic dysfunction as per echo on 1/26/2022.    CVA (cerebral vascular accident) (CMS/HCC) 2010    Without residual deficit.    Diabetes mellitus type 2 in obese (CMS/[facility])     Dyslipidemia     GERD (gastroesophageal reflux disease)     Heart attack (CMS/[facility])     x3    Hypertension     Ischemic cardiomyopathy     Morbid obesity with BMI of 40.0-44.9, adult (CMS/[facility])     Paroxysmal atrial fibrillation (CMS/[facility])     On Xarelto    V-tach (CMS/HCC)     AICD in place       Past Surgical History:   Procedure Laterality Date    ABLATION VT N/A 06/09/2020    Procedure: Ablation VT;  Surgeon: Wilfredo Montana MD;  Location: [facility] EP Lab;  Service: Electrophysiology;  Laterality: N/A;  Check with Dr. Montana in the a.m. regarding scheduling    APPENDECTOMY      COLONOSCOPY N/A 7/25/2023    Procedure: COLONOSCOPY with Polypectomy;  Surgeon: Hortencia Carson MD;  Location: [facility] Endoscopy;  Service: Endoscopy;  Laterality: N/A;    CORONARY ARTERY STENTING  2009    2.5 X 24-mm Taxus DIMAS to first diagonal. 3.0 X 8-mm Taxus stent to proximal LAD just proximal to diagonal.    CORONARY ARTERY STENTING  2010    3.5 X 15-mm Promus DIMAS to totally occluded LAD    CORONARY ARTERY STENTING  2011    2.25 X 30-mm Resolute DIMAS to 2nd diagonal.  Balloon angioplasty through strut to improve blood flow to distal LAD    CORONARY ARTERY STENTING  07/28/2017    second diagonal branch LAD Resolute 2.25 x 30-mm DIMAS    ICD DC GEN CHANGE N/A 6/20/2022     Procedure: BI-V ICD Gen Change;  Surgeon: Wilfredo Montana MD;  Location: [facility] EP Lab;  Service: Cardiovascular;  Laterality: N/A;    ICD SC NEW  2011    [place] [employer] Cognis Model #N119, Serial #[id number]. [place] [employer] Model #0185, Serial #[id number]. LV lead Acuity Model #45+91, Serial #[id number]. Atrial lead Model #4469, Serial #[id number]       Allergies as of 11/01/2023 - Reviewed 11/01/2023   Allergen Reaction Noted    Morphine  07/30/2017    Tramadol  07/30/2017       Current Outpatient Medications   Medication Sig Dispense Refill    sotaloL (BETAPACE) 120 mg tablet Take 1 tablet (120 mg total) by mouth every 12 (twelve) hours 60 tablet 0    magnesium chloride (SLOW-MAG) 64 mg tablet,delayed release (DR/EC) Take 1 tablet (64 mg total) by mouth 2 (two) times a day with meals 180 tablet 0    spironolactone (ALDACTONE) 50 mg tablet Take 1 tablet (50 mg total) by mouth daily 90 tablet 0    potassium chloride 10 mEq CR tablet Take 3 tablets (30 mEq total) by mouth daily (Patient taking differently: Take 2 tablets (20 mEq total) by mouth daily) 270 tablet 0    mexiletine (MEXITIL) 200 mg capsule Take 1 capsule (200 mg total) by mouth every 8 (eight) hours 90 capsule 11    sacubitriL-valsartan (ENTRESTO) 24-26 mg tablet Take 1.5 tablets by mouth 2 (two) times a day 90 tablet 11    empagliflozin (JARDIANCE) 10 mg tablet tablet Take 10 mg by mouth daily 30 each 11    carvediloL (Coreg) 25 mg tablet Take 1 tablet (25 mg total) by mouth 2 (two) times a day with meals      aspirin 81 mg EC tablet Take 1 tablet (81 mg total) by mouth daily      rivaroxaban (Xarelto) 20 mg tablet Take 1 tablet (20 mg total) by mouth daily      tamsulosin (FLOMAX) 0.4 mg capsule Take 1 capsule (0.4 mg total) by mouth daily      furosemide (LASIX) 40 mg tablet Take 1 tablet (40 mg total) by mouth daily If weight goes up 3 pounds overnight take an extra dose that day.      rosuvastatin (Crestor) 40 mg tablet Take 1 tablet (40 mg total) by mouth  nightly 90 tablet 0    cholecalciferol, vitamin D3, 25 mcg (1,000 unit) tablet Take 1 tablet (1,000 Units total) by mouth daily      albuterol HFA (PROVENTIL HFA;VENTOLIN HFA) 90 mcg/actuation inhaler Inhale 2 puffs every 6 (six) hours as needed for wheezing or shortness of breath 1 Inhaler 1    nitroglycerin (NITROSTAT) 0.4 mg SL tablet Place 1 tablet (0.4 mg total) under the tongue every 5 (five) minutes as needed for chest pain      pantoprazole (PROTONIX) 40 mg EC tablet Take 1 tablet (40 mg total) by mouth daily       No current facility-administered medications for this visit.       Family History   Problem Relation Age of Onset    Heart attack Mother 62    Other Mother         Abdominal Aortic Aneurysm    Lung cancer Mother     Colon cancer Father         Cause of death    Diabetes Brother         Non-Insulin Dependent    Heart attack Brother 51        w/ Stents    Heart disease Brother     Other Son         Well       Social History     Tobacco Use    Smoking status: Former     Packs/day: 0.75     Years: 30.00     Additional pack years: 0.00     Total pack years: 22.50     Types: Cigarettes     Quit date: 6/7/2020     Years since quitting: 3.4    Smokeless tobacco: Never   Vaping Use    Vaping Use: Never used   Substance Use Topics    Alcohol use: Not Currently     Comment: Quit drinking in 2007    Drug use: Not Currently       Review of Systems  Review of Systems   Constitutional: Negative for malaise/fatigue.   Cardiovascular:  Positive for dyspnea on exertion, irregular heartbeat, near-syncope and palpitations. Negative for chest pain, claudication, cyanosis, leg swelling/pain, orthopnea, PND and syncope/fainting.   Respiratory:  Positive for shortness of breath.    Endocrine: Negative for diabetic.   Hematologic/Lymphatic: Negative for major bleeding. Bruises/bleeds easily.   Musculoskeletal:  Negative for muscle weakness and myalgias/muscle aches.   Neurological:  Positive for dizziness and  light-headedness.   All other systems reviewed and are negative.    Objective     Vitals:    11/01/23 1454   BP: 110/58   Pulse: 60     Weight: 121.1 kg (267 lb)  Physical Exam  Constitutional:       Appearance: He is well-developed. He is not diaphoretic.   HENT:      Head: Normocephalic.   Neck:      Vascular: Normal carotid pulses. No carotid bruit, hepatojugular reflux or JVD.   Cardiovascular:      Rate and Rhythm: Normal rate and regular rhythm.      Pulses:           Carotid pulses are 2+ on the right side and 2+ on the left side.       Radial pulses are 2+ on the right side and 2+ on the left side.        Dorsalis pedis pulses are 2+ on the right side and 2+ on the left side.        Posterior tibial pulses are 2+ on the right side and 2+ on the left side.      Heart sounds: No murmur heard.     No friction rub. No gallop.   Pulmonary:      Effort: No accessory muscle usage or respiratory distress.      Breath sounds: No decreased breath sounds, wheezing, rhonchi or rales.   Chest:       Musculoskeletal:      Right lower leg: No edema.      Left lower leg: No edema.   Skin:     General: Skin is warm and dry.   Neurological:      Mental Status: He is alert and oriented to person, place, and time.   Psychiatric:         Mood and Affect: Mood normal.         Speech: Speech normal.         Behavior: Behavior normal.         Thought Content: Thought content normal.         Judgment: Judgment normal.         Data Review:   Sodium   Date Value Ref Range Status   10/20/2023 135 135 - 145 mmol/L Final   10/18/2023 135 135 - 145 mmol/L Final   09/22/2023 137 135 - 145 mmol/L Final     Potassium   Date Value Ref Range Status   10/20/2023 3.8 3.5 - 5.1 MMOL/L Final   10/18/2023 4.2 3.5 - 5.1 MMOL/L Final   09/22/2023 4.0 3.5 - 5.1 MMOL/L Final     CO2   Date Value Ref Range Status   10/20/2023 23 21 - 32 mmol/L Final   10/18/2023 22 21 - 32 mmol/L Final   09/22/2023 24 21 - 32 mmol/L Final     BUN   Date Value Ref Range  Status   10/20/2023 12 7 - 25 mg/dL Final   10/18/2023 12 7 - 25 mg/dL Final   09/22/2023 11 7 - 25 mg/dL Final     Creatinine   Date Value Ref Range Status   10/20/2023 0.84 0.70 - 1.30 mg/dL Final   10/18/2023 0.83 0.70 - 1.30 mg/dL Final   09/22/2023 0.77 0.70 - 1.30 mg/dL Final     Glucose   Date Value Ref Range Status   10/20/2023 120 (H) 70 - 105 mg/dL Final   10/18/2023 119 (H) 70 - 105 mg/dL Final   09/22/2023 112 (H) 70 - 105 mg/dL Final     Calcium   Date Value Ref Range Status   10/20/2023 8.9 8.6 - 10.3 mg/dL Final   10/18/2023 8.9 8.6 - 10.3 mg/dL Final   09/22/2023 9.1 8.6 - 10.3 mg/dL Final           Assessment/Plan   Problem List Items Addressed This Visit          Cardiac and Vasculature    Automatic implantable cardioverter-defibrillator in situ  Status post recent shock.  Recurrent episodes of tachycardia without shocks.  We will send to EP for device check.  He should have follow-up visits with Gerardo Sun from EP and heart failure.    Relevant Orders    Device check - outpatient    Chronic combined systolic and diastolic CHF, NYHA class 2 and ACC/AHA stage C (CMS/HCC)  New addition of Jardiance with increase in his home dose of Entresto to 1-1/2 tabs 2 times daily.  He seems to tolerate this well.  He feels dehydrated and thirsty all the time.  This may be related to the Jardiance.  I did advise him to increase his fluid intake since he does not appear to be volume overloaded on examination today and his BNP was normal.  Sodium level was normal during his recent hospitalization.  Decrease Entresto to 1 tablet twice daily  Increase carvedilol to 37.5 mg twice daily  Continue follow-up with the EP  Follow-up with cardiology in 1 month    Hyperlipidemia  Treated and well controlled    Hypertension  Treated and well controlled    Ischemic cardiomyopathy - Primary  Status post large anteroseptal wall myocardial infarction complicated by cardiogenic shock and left ventricular thrombus.  Reduced ejection  fraction to 25%.  Ejection fraction has been stable.  No significant LV thrombus noted on recent echo.  No significant angina.    Relevant Orders    Device check - outpatient    Paroxysmal atrial fibrillation (CMS/HCC)  No evidence of atrial fib on his device check    Sustained ventricular tachycardia (CMS/HCC)  Recent history of nonsustained ventricular tachycardia not requiring therapy with a single episode of ATP x3 and 41 J shock on October 18.  He has recurrent symptoms.  We will wait and see what the device check shows.         Electronically signed by: BRENDON SHAW MD  11/1/2023  3:06 PM

## 2023-11-02 ENCOUNTER — DOCUMENTATION (OUTPATIENT)
Dept: CARDIOLOGY | Facility: CLINIC | Age: 58
End: 2023-11-02
Payer: MEDICARE

## 2023-11-02 RX ORDER — SACUBITRIL AND VALSARTAN 24; 26 MG/1; MG/1
1 TABLET, FILM COATED ORAL 2 TIMES DAILY
COMMUNITY
End: 2023-11-07 | Stop reason: SDUPTHER

## 2023-11-06 ENCOUNTER — OFFICE VISIT (OUTPATIENT)
Dept: CARDIOLOGY | Facility: CLINIC | Age: 58
End: 2023-11-06
Payer: MEDICARE

## 2023-11-06 ENCOUNTER — LAB (OUTPATIENT)
Dept: LAB | Facility: CLINIC | Age: 58
End: 2023-11-06
Payer: MEDICARE

## 2023-11-06 VITALS
DIASTOLIC BLOOD PRESSURE: 64 MMHG | OXYGEN SATURATION: 96 % | WEIGHT: 272.5 LBS | HEART RATE: 60 BPM | BODY MASS INDEX: 38.01 KG/M2 | SYSTOLIC BLOOD PRESSURE: 120 MMHG

## 2023-11-06 DIAGNOSIS — I50.22 CHRONIC SYSTOLIC (CONGESTIVE) HEART FAILURE (CMS/HCC): Primary | ICD-10-CM

## 2023-11-06 DIAGNOSIS — I50.22 CHRONIC SYSTOLIC (CONGESTIVE) HEART FAILURE (CMS/HCC): ICD-10-CM

## 2023-11-06 LAB
ANION GAP SERPL CALC-SCNC: 8 MMOL/L (ref 3–11)
BUN SERPL-MCNC: 11 MG/DL (ref 7–25)
CALCIUM SERPL-MCNC: 9.4 MG/DL (ref 8.6–10.3)
CHLORIDE SERPL-SCNC: 104 MMOL/L (ref 98–107)
CO2 SERPL-SCNC: 23 MMOL/L (ref 21–32)
CREAT SERPL-MCNC: 0.72 MG/DL (ref 0.7–1.3)
EGFRCR SERPLBLD CKD-EPI 2021: 107 ML/MIN/1.73M*2
GLUCOSE SERPL-MCNC: 120 MG/DL (ref 70–105)
POTASSIUM SERPL-SCNC: 4 MMOL/L (ref 3.5–5.1)
SODIUM SERPL-SCNC: 135 MMOL/L (ref 135–145)

## 2023-11-06 PROCEDURE — 99214 OFFICE O/P EST MOD 30 MIN: CPT | Performed by: NURSE PRACTITIONER

## 2023-11-06 PROCEDURE — G0463 HOSPITAL OUTPT CLINIC VISIT: HCPCS | Performed by: NURSE PRACTITIONER

## 2023-11-06 PROCEDURE — 80048 BASIC METABOLIC PNL TOTAL CA: CPT

## 2023-11-06 PROCEDURE — 36415 COLL VENOUS BLD VENIPUNCTURE: CPT

## 2023-11-06 ASSESSMENT — ENCOUNTER SYMPTOMS
NEUROLOGICAL NEGATIVE: 1
ORTHOPNEA: 0
SHORTNESS OF BREATH: 0
DIARRHEA: 0
EYES NEGATIVE: 1
MUSCULOSKELETAL NEGATIVE: 1
ENDOCRINE NEGATIVE: 1
DYSPNEA ON EXERTION: 0
COUGH: 0
ABDOMINAL PAIN: 0
PALPITATIONS: 0
PSYCHIATRIC NEGATIVE: 1
DYSURIA: 0

## 2023-11-06 ASSESSMENT — PAIN SCALES - GENERAL: PAINLEVEL: 0-NO PAIN

## 2023-11-06 NOTE — PATIENT INSTRUCTIONS
No changes to medications today  Follow up in 3 months  Call if any questions/concerns/changes in symptoms

## 2023-11-07 ENCOUNTER — TELEPHONE (OUTPATIENT)
Dept: CARDIOLOGY | Facility: CLINIC | Age: 58
End: 2023-11-07
Payer: MEDICARE

## 2023-11-07 DIAGNOSIS — I50.42 CHRONIC COMBINED SYSTOLIC AND DIASTOLIC CHF, NYHA CLASS 2 AND ACC/AHA STAGE C (CMS/HCC): Primary | ICD-10-CM

## 2023-11-07 DIAGNOSIS — I48.0 PAROXYSMAL ATRIAL FIBRILLATION (CMS/HCC): ICD-10-CM

## 2023-11-07 DIAGNOSIS — Z86.79 HISTORY OF VENTRICULAR TACHYCARDIA: ICD-10-CM

## 2023-11-07 RX ORDER — SACUBITRIL AND VALSARTAN 24; 26 MG/1; MG/1
1 TABLET, FILM COATED ORAL 2 TIMES DAILY
Qty: 60 TABLET | Refills: 2 | Status: SHIPPED | OUTPATIENT
Start: 2023-11-07 | End: 2023-12-07 | Stop reason: HOSPADM

## 2023-11-07 RX ORDER — CARVEDILOL 25 MG/1
37.5 TABLET ORAL 2 TIMES DAILY WITH MEALS
Qty: 90 TABLET | Refills: 2 | Status: SHIPPED | OUTPATIENT
Start: 2023-11-07 | End: 2023-12-07 | Stop reason: HOSPADM

## 2023-11-07 NOTE — TELEPHONE ENCOUNTER
[facility] called seeking clarification regarding entresto dosing, and carvedilol dosing.      Last visit 11/1/2023 with Dr. Edwards.  He indicated in that note that he decreased the Entresto 24-26 mg, down to 1 tab p.o. twice daily.  He also increase the carvedilol to 37.5 mg twice daily.  No prescription sent at that time.  Spoke with Dr. Edwards today to clarify if he did intend to have patient on both carvedilol and sotalol.  Dr. Edwards verbalized that yes he is to continue on both.  I also confirmed dosing of entresto,and carvedilol w/Dr. Edwards as above.  Scripts sent.

## 2023-11-20 ENCOUNTER — TELEPHONE (OUTPATIENT)
Dept: CARDIOLOGY | Facility: CLINIC | Age: 58
End: 2023-11-20
Payer: MEDICARE

## 2023-11-20 NOTE — TELEPHONE ENCOUNTER
On 11/18/23 patient received 3 bursts of ATP for VT. There also was an additional 1 episode VT No therapy (monitor only d/t VT not meeting criteria for therapy) during this time. These episodes occurred between the hours 2123-2647. On 11/19/23 between 3532-6717 another 10 bursts of ATP with additional 4 VT No therapy episodes occurred. This morning (11/20/23) between the hours of 4891-5684, prior to transmission sending, another 6 bursts of ATP and 2 VT  No Therapy episodes occurred. Total bursts of ATP from 11/18-11/20 was 19. No shocks delivered. VT rates during episodes avg. in the 150s. Presenting rhythm appears to show AP/BiVP@ 60bpm. Docket created in [employer]. Message routed to Dr. Edwards's team to follow up with patient as patient primary with general cardiology.

## 2023-11-21 NOTE — TELEPHONE ENCOUNTER
"Please reference encounter on 10/30/2023.  Was notified by KOSTAS Small that she received a call on the general nursing line from JASON Irizarry about patient's ATP for VT. Dr. Edwards saw patient 11/1/2023 regarding this please reference this note Dr. Edwards would like patient to follow with EP as he has precipitously seen them last year.  Do see appointment with Gerardo on 11/22/2023.  Dr. Edwards is out until 11/27/2023.  Did forward message to Gerardo as she will be seeing this patient.  In Dr. Edwards's absence brought this to office call Dr. Mcclure.  Who advised patient should follow with EP regarding this.  That if patient is symptomatic he should seek medical attention in the emergency room.  Did call and speak to Pete.  He states that he does experience lightheadedness and dizziness.  States that this occurs if he \"strains\" himself.  This has been going on and is not a new symptom.  Advised patient if he has a sensation that he is going to be shocked, feeling like he is going to pass out, or any other symptoms out of the baseline for him he needs to seek medical attention prior to his appointment on 11/22/23. Pete verbalized understanding.  "

## 2023-11-22 ENCOUNTER — OFFICE VISIT (OUTPATIENT)
Dept: CARDIOLOGY | Facility: CLINIC | Age: 58
End: 2023-11-22
Payer: MEDICARE

## 2023-11-22 VITALS
HEIGHT: 71 IN | DIASTOLIC BLOOD PRESSURE: 64 MMHG | SYSTOLIC BLOOD PRESSURE: 116 MMHG | BODY MASS INDEX: 37.52 KG/M2 | OXYGEN SATURATION: 96 % | WEIGHT: 268 LBS | HEART RATE: 62 BPM

## 2023-11-22 DIAGNOSIS — I47.20 VENTRICULAR TACHYARRHYTHMIA (CMS/HCC): ICD-10-CM

## 2023-11-22 DIAGNOSIS — I48.0 PAROXYSMAL ATRIAL FIBRILLATION (CMS/HCC): Primary | ICD-10-CM

## 2023-11-22 DIAGNOSIS — I50.42 CHRONIC COMBINED SYSTOLIC AND DIASTOLIC CHF, NYHA CLASS 2 AND ACC/AHA STAGE C (CMS/HCC): ICD-10-CM

## 2023-11-22 DIAGNOSIS — I47.20 SUSTAINED VENTRICULAR TACHYCARDIA (CMS/HCC): ICD-10-CM

## 2023-11-22 PROCEDURE — 93005 ELECTROCARDIOGRAM TRACING: CPT | Performed by: NURSE PRACTITIONER

## 2023-11-22 PROCEDURE — G0463 HOSPITAL OUTPT CLINIC VISIT: HCPCS | Performed by: NURSE PRACTITIONER

## 2023-11-22 PROCEDURE — 99213 OFFICE O/P EST LOW 20 MIN: CPT | Performed by: NURSE PRACTITIONER

## 2023-11-22 RX ORDER — SOTALOL HYDROCHLORIDE 120 MG/1
120 TABLET ORAL EVERY 12 HOURS
Qty: 180 TABLET | Refills: 3 | Status: ON HOLD | OUTPATIENT
Start: 2023-11-22 | End: 2023-11-30 | Stop reason: DRUGHIGH

## 2023-11-22 RX ORDER — SOTALOL HYDROCHLORIDE 120 MG/1
120 TABLET ORAL EVERY 12 HOURS
Qty: 180 TABLET | Refills: 3 | Status: SHIPPED | OUTPATIENT
Start: 2023-11-22 | End: 2023-11-22 | Stop reason: SDUPTHER

## 2023-11-22 RX ORDER — ASPIRIN 81 MG/1
81 TABLET ORAL DAILY
Qty: 90 TABLET | Refills: 3 | Status: SHIPPED | OUTPATIENT
Start: 2023-11-22 | End: 2024-12-03 | Stop reason: SDUPTHER

## 2023-11-22 ASSESSMENT — ENCOUNTER SYMPTOMS
DIZZINESS: 1
FALLS: 0
MEMORY LOSS: 0
IRREGULAR HEARTBEAT: 0
SYNCOPE: 0
CHILLS: 0
PALPITATIONS: 1
WEAKNESS: 0
SHORTNESS OF BREATH: 0
ALTERED MENTAL STATUS: 0
DIARRHEA: 0
FEVER: 0
MYALGIAS: 0
LIGHT-HEADEDNESS: 1

## 2023-11-22 NOTE — PROGRESS NOTES
Electrophysiology Outpatient Follow-up Note    Subjective     Chief Complaint  Ventricular tachycardia    HPI  Pete Trejo is a pleasant 58 y.o. male patient of Dr. Wu.  Patient is a history of cardiomyopathy status post biventricular ICD, hypertension, hyperlipidemia, ventricular tachycardia status post ablation, coronary disease status post PCI, combined systolic and diastolic congestive heart failure, COVID-19, active sleep apnea, type 2 diabetes.    On June 2020 electrophysiology was consulted while patient was in the hospital for recurrent ventricular tachycardia.  During his hospitalization in June 2020 Dr. Montana took patient to the EP lab where he attempted to ablate patient's scar, he was only able to ablate partially along the border zone areas of the scar.  Patient had been on amiodarone and continued to have breakthrough ventricular tachycardia with ICD shocks.  Patient continued to have ICD shocks throughout that summer and was later placed on mexiletine and referred to [facility] for further recommendation and management of his cardiomyopathy and ventricular arrhythmias.  Patient went to [facility] which he was told he did not qualify for an LVAD or transplant at that time because his medical therapy was working.  Patient continued to have episodes of NSVT and in November 2021 patient underwent EP study with extensive substrate modification at the [facility] with Dr. Lema.  At that visit patient amiodarone and mexiletine were discontinued and patient was changed to sotalol.  Patient did well until October of 2023 when he redeveloped ventricular tachycardia followed by ICD shock and mexiletine was increased to 200 mg 3 times a day.    Most recent echocardiogram on 10/19/2020 was reviewed and reveals severe left ventricular systolic dysfunction with ejection fraction of 25%.  Large anterior apical aneurysm.  Only the basilar left ventricular segments  have preserved contractility.  No LV mural thrombus noted.  Grade 2 moderate left ventricular diastolic abnormality.  Left atrium is severely dilated with an LA VI of 53 mL M2.    Review of patient's arrhythmia logbook today multiple episodes of NSVT with ATP and no shocks in the last 3 days.  Patient is atrial pacing 59% of the time, RV pacing 71% with time and LV pacing 70% of the time.    EKG today reveals AV paced rhythm at 62 beats a minute,  ms,  ms, QTc 413 ms.  EKG today similar to previous EKGs.    Patient is here today for follow-up visit.  Patient is doing fairly well despite his complicated medical history.  Patient notes he is having ATP as he can feel it.  Patient tells me that lately he has had a lot of anxiety in the morning after taking his morning dose of mexiletine with quite a bit of palpitations.  The review of his logbook revealed the majority of his NSVT with ATP is in the morning hours.  He does not have this problem when he takes his afternoon and nighttime mexiletine.  He did note that he feels better when he eats with it.  Also unfortunately patient was confused on his carvedilol dose and only been taking 25 mg twice a day in which Dr. Woods did recommend that he take 37.5 mg twice a day.  This could be contributing to his breakthrough arrhythmias also.  Patient's lab work is up-to-date.        Past Surgical History:   Procedure Laterality Date    ABLATION VT N/A 06/09/2020    Procedure: Ablation VT;  Surgeon: Wilfredo Montana MD;  Location: [facility] EP Lab;  Service: Electrophysiology;  Laterality: N/A;  Check with Dr. Montana in the a.m. regarding scheduling    APPENDECTOMY      COLONOSCOPY N/A 7/25/2023    Procedure: COLONOSCOPY with Polypectomy;  Surgeon: Hortencia Carson MD;  Location: [facility] Endoscopy;  Service: Endoscopy;  Laterality: N/A;    CORONARY ARTERY STENTING  2009    2.5 X 24-mm Taxus DIMAS to first diagonal. 3.0 X 8-mm Taxus stent to proximal LAD just proximal to  diagonal.    CORONARY ARTERY STENTING  2010    3.5 X 15-mm Promus DIMAS to totally occluded LAD    CORONARY ARTERY STENTING  2011    2.25 X 30-mm Resolute DIMAS to 2nd diagonal.  Balloon angioplasty through strut to improve blood flow to distal LAD    CORONARY ARTERY STENTING  07/28/2017    second diagonal branch LAD Resolute 2.25 x 30-mm DIMAS    ICD DC GEN CHANGE N/A 6/20/2022    Procedure: BI-V ICD Gen Change;  Surgeon: Wilfredo Montana MD;  Location: [facility] EP Lab;  Service: Cardiovascular;  Laterality: N/A;    ICD SC NEW  2011    [employer] [employer] Cognis Model #N119, Serial #[id number]. [place] Reliance Model #0185, Serial #[id number]. LV lead Acuity Model #45+91, Serial #[id number]. Atrial lead Model #4469, Serial #[id number]       Allergies as of 11/22/2023 - Reviewed 11/22/2023   Allergen Reaction Noted    Morphine  07/30/2017    Tramadol  07/30/2017       Current Outpatient Medications   Medication Sig Dispense Refill    carvediloL (Coreg) 25 mg tablet Take 1.5 tablets (37.5 mg total) by mouth 2 (two) times a day with meals (Patient taking differently: Take 1 tablet (25 mg total) by mouth 2 (two) times a day with meals) 90 tablet 2    sacubitriL-valsartan (ENTRESTO) 24-26 mg tablet Take 1 tablet by mouth 2 (two) times a day 60 tablet 2    magnesium chloride (SLOW-MAG) 64 mg tablet,delayed release (DR/EC) Take 1 tablet (64 mg total) by mouth 2 (two) times a day with meals 180 tablet 0    spironolactone (ALDACTONE) 50 mg tablet Take 1 tablet (50 mg total) by mouth daily 90 tablet 0    potassium chloride 10 mEq CR tablet Take 3 tablets (30 mEq total) by mouth daily (Patient taking differently: Take 2 tablets (20 mEq total) by mouth daily) 270 tablet 0    mexiletine (MEXITIL) 200 mg capsule Take 1 capsule (200 mg total) by mouth every 8 (eight) hours 90 capsule 11    empagliflozin (JARDIANCE) 10 mg tablet tablet Take 10 mg by mouth daily 30 each 11    tamsulosin (FLOMAX) 0.4 mg capsule Take 1 capsule (0.4 mg total) by mouth daily       furosemide (LASIX) 40 mg tablet Take 1 tablet (40 mg total) by mouth daily If weight goes up 3 pounds overnight take an extra dose that day.      rosuvastatin (Crestor) 40 mg tablet Take 1 tablet (40 mg total) by mouth nightly 90 tablet 0    cholecalciferol, vitamin D3, 25 mcg (1,000 unit) tablet Take 1 tablet (1,000 Units total) by mouth daily      albuterol HFA (PROVENTIL HFA;VENTOLIN HFA) 90 mcg/actuation inhaler Inhale 2 puffs every 6 (six) hours as needed for wheezing or shortness of breath 1 Inhaler 1    nitroglycerin (NITROSTAT) 0.4 mg SL tablet Place 1 tablet (0.4 mg total) under the tongue every 5 (five) minutes as needed for chest pain      pantoprazole (PROTONIX) 40 mg EC tablet Take 1 tablet (40 mg total) by mouth daily      rivaroxaban (Xarelto) 20 mg tablet Take 1 tablet (20 mg total) by mouth daily 90 tablet 3    sotaloL (BETAPACE) 120 mg tablet Take 1 tablet (120 mg total) by mouth every 12 (twelve) hours 180 tablet 3    aspirin 81 mg EC tablet Take 1 tablet (81 mg total) by mouth daily 90 tablet 3     No current facility-administered medications for this visit.       Family History   Problem Relation Age of Onset    Heart attack Mother 62    Other Mother         Abdominal Aortic Aneurysm    Lung cancer Mother     Colon cancer Father         Cause of death    Diabetes Brother         Non-Insulin Dependent    Heart attack Brother 51        w/ Stents    Heart disease Brother     Other Son         Well       Social History     Tobacco Use    Smoking status: Former     Packs/day: 0.75     Years: 30.00     Additional pack years: 0.00     Total pack years: 22.50     Types: Cigarettes     Quit date: 6/7/2020     Years since quitting: 3.4    Smokeless tobacco: Never   Vaping Use    Vaping Use: Never used   Substance Use Topics    Alcohol use: Not Currently     Comment: Quit drinking in 2007    Drug use: Not Currently       Review of Systems  Review of Systems   Constitutional: Negative for chills, fever  and malaise/fatigue.   HENT:  Negative for congestion and nosebleeds.    Cardiovascular:  Positive for palpitations. Negative for chest pain, dyspnea on exertion, irregular heartbeat, leg swelling/pain and syncope/fainting.   Respiratory:  Negative for shortness of breath and sleep apnea.    Skin:  Negative for rash.   Musculoskeletal:  Negative for falls, joint pain and myalgias/muscle aches.   Gastrointestinal:  Negative for diarrhea and melena.   Genitourinary:  Negative for nocturia.   Neurological:  Positive for dizziness and light-headedness. Negative for weakness.   Psychiatric/Behavioral:  Negative for altered mental status and memory loss.         Objective     Vitals:    11/22/23 0934   BP: 116/64   Pulse: 62   SpO2: 96%     Weight: 121.6 kg (268 lb)    Physical Exam  Constitutional:       Appearance: He is well-developed.   HENT:      Head: Normocephalic.   Cardiovascular:      Rate and Rhythm: Normal rate and regular rhythm.      Heart sounds: Normal heart sounds.   Pulmonary:      Effort: Pulmonary effort is normal.      Breath sounds: Normal breath sounds.   Abdominal:      General: Bowel sounds are normal.      Palpations: Abdomen is soft.   Musculoskeletal:         General: Normal range of motion.      Cervical back: Normal range of motion.   Skin:     General: Skin is warm and dry.   Neurological:      Mental Status: He is alert and oriented to person, place, and time.   Psychiatric:         Behavior: Behavior normal.         Data Review:   Sodium   Date Value Ref Range Status   11/06/2023 135 135 - 145 mmol/L Final     Potassium   Date Value Ref Range Status   11/06/2023 4.0 3.5 - 5.1 MMOL/L Final     Chloride   Date Value Ref Range Status   11/06/2023 104 98 - 107 mmol/L Final     CO2   Date Value Ref Range Status   11/06/2023 23 21 - 32 mmol/L Final     BUN   Date Value Ref Range Status   11/06/2023 11 7 - 25 mg/dL Final     Creatinine   Date Value Ref Range Status   11/06/2023 0.72 0.70 - 1.30  mg/dL Final     Glucose   Date Value Ref Range Status   11/06/2023 120 (H) 70 - 105 mg/dL Final     Calcium   Date Value Ref Range Status   11/06/2023 9.4 8.6 - 10.3 mg/dL Final     WBC   Date Value Ref Range Status   10/20/2023 4.4 3.7 - 9.6 10*3/uL Final     Hemoglobin   Date Value Ref Range Status   10/20/2023 15.3 13.2 - 17.2 g/dL Final     Hematocrit   Date Value Ref Range Status   10/20/2023 44.1 38.0 - 50.0 % Final     MCV   Date Value Ref Range Status   10/20/2023 90.8 82.0 - 97.0 fL Final     Platelets   Date Value Ref Range Status   10/20/2023 160 130 - 350 10*3/uL Final     Lab Results   Component Value Date    TSH 2.833 10/19/2023       Assessment/Plan     Ventricular tachyarrhythmia   Noted episodes of NSVT on his device check with episodes of ATP and no ICD shocks.  Recommended patient go back to taking carvedilol 37.5 mg twice a day.  Will continue mexiletine 3 times a day and encourage patient to eat with this medication.  Continue sotalol 120 mg twice a day, EKG and QTc stable.  Patient's lab work is up-to-date.  He will be due for lab work and EKG in 6 months.  Will continue to monitor routine device checks. If patient continues to have episodes of nonsustained ventricular tachycardia could consider increasing sotalol to 180 mg twice a day which will require hospitalization.    Ischemic cardiomyopathy  EF 25%.  NYHA class I.  Patient is on guideline guideline directed medical therapy.  Did discuss case with Dr. Leiva who agrees to see patient sooner for possible referral for consideration of LVAD versus transplant.    Patient will follow-up in 6 months or sooner if needed.  All questions were answered, patient verbalized understanding, and agrees with plan.    Patient Instructions   Take Coreg 37.5 mg twice a day (1.5 tablets)    Return in about 6 months (around 5/22/2024).    Patient Education: Ready to learn, no apparent learning barriers were identified; learning preference includes  listening.  Explained diagnosis and treatment plan; patient expressed understanding of the content.    Tanika Sun CNP        A voice recognition program was used to aid in documentation of this record.  Sometimes words are not printed exactly as they were spoken.  While efforts were made to carefully edit and correct any inaccuracies, some errors may be present; please take these into context.  Please contact the provider if errors are identified.

## 2023-11-27 DIAGNOSIS — I50.42 CHRONIC COMBINED SYSTOLIC AND DIASTOLIC CHF, NYHA CLASS 2 AND ACC/AHA STAGE C (CMS/HCC): ICD-10-CM

## 2023-11-27 DIAGNOSIS — I48.0 PAROXYSMAL ATRIAL FIBRILLATION (CMS/HCC): ICD-10-CM

## 2023-11-27 DIAGNOSIS — I47.20 SUSTAINED VENTRICULAR TACHYCARDIA (CMS/HCC): ICD-10-CM

## 2023-11-27 PROCEDURE — 93010 ELECTROCARDIOGRAM REPORT: CPT | Mod: 59 | Performed by: INTERNAL MEDICINE

## 2023-11-27 RX ORDER — ASPIRIN 81 MG/1
81 TABLET ORAL DAILY
OUTPATIENT
Start: 2023-11-27

## 2023-11-27 RX ORDER — SOTALOL HYDROCHLORIDE 120 MG/1
120 TABLET ORAL EVERY 12 HOURS
OUTPATIENT
Start: 2023-11-27 | End: 2024-11-26

## 2023-11-27 NOTE — TELEPHONE ENCOUNTER
Device recorded multiple, appropriate,  successful and unsuccessful treated VT episodes on Nov 24 and Nov 25, 2023. No shocks delivered. Total of 12 unsuccessfully treated events, each treated with ATP X 1 and 3 successfully treated events, each treated with ATP X 5. VT rates ~120bpm with retrograde p waves on EGMs reviewed. Most recent 41J shock was on 10/18/23. 41 total ATP bursts in counters since reset on 11/1/23. Recent in clinic with Gerardo Sun DNP on 11/22/23. Docket sent due to increasing tachy therapy. Message routed to EP nursing follow up.

## 2023-11-27 NOTE — TELEPHONE ENCOUNTER
Patient was called and informed of device transmission results. He stated understanding and said he has felt intermittent palpitations and nausea during those episodes. Patient's medication list is up to date. He is currently taking sotalol 120 mg BID and mexiletine 200 mg TID for antiarrhythmics. Please advise, thanks!

## 2023-11-29 ENCOUNTER — TELEPHONE (OUTPATIENT)
Dept: CARDIOLOGY | Facility: CLINIC | Age: 58
End: 2023-11-29
Payer: MEDICARE

## 2023-11-29 NOTE — TELEPHONE ENCOUNTER
Patient called into the clinic today saying he has had more symptoms such as the ones he had when he received the ATP. The device clinic said he has had multiple ATP every day for the past 7 or so days. Pete has felt palpitations and shortness of breath. (See previous documentation thread from 10/31 started by Edie HALE) Gerardo Sun DNP, and Dr. Wu reviewed the information and recommended the patient be admitted for sotalol increase to 180 mg. The patient was informed of their recommendations and said he would wait for a call for a bed to become available. Routing to [facility] to check for beds.

## 2023-11-30 ENCOUNTER — HOSPITAL ENCOUNTER (INPATIENT)
Facility: HOSPITAL | Age: 58
LOS: 7 days | Discharge: HOME | DRG: 251 | End: 2023-12-07
Attending: INTERNAL MEDICINE | Admitting: INTERNAL MEDICINE
Payer: MEDICARE

## 2023-11-30 ENCOUNTER — APPOINTMENT (OUTPATIENT)
Dept: RADIOLOGY | Facility: HOSPITAL | Age: 58
DRG: 251 | End: 2023-11-30
Payer: MEDICARE

## 2023-11-30 DIAGNOSIS — Z86.79 HISTORY OF VENTRICULAR TACHYCARDIA: ICD-10-CM

## 2023-11-30 DIAGNOSIS — Z95.5 PRESENCE OF STENT IN CORONARY ARTERY: ICD-10-CM

## 2023-11-30 DIAGNOSIS — I47.20 VT (VENTRICULAR TACHYCARDIA) (CMS/HCC): ICD-10-CM

## 2023-11-30 DIAGNOSIS — I50.42 CHRONIC COMBINED SYSTOLIC AND DIASTOLIC CHF, NYHA CLASS 2 AND ACC/AHA STAGE C (CMS/HCC): Primary | ICD-10-CM

## 2023-11-30 LAB
ALBUMIN SERPL-MCNC: 4 G/DL (ref 3.5–5.3)
ALP SERPL-CCNC: 71 U/L (ref 45–115)
ALT SERPL-CCNC: 29 U/L (ref 7–52)
ANION GAP SERPL CALC-SCNC: 9 MMOL/L (ref 3–11)
AST SERPL-CCNC: 17 U/L
BILIRUB SERPL-MCNC: 0.89 MG/DL (ref 0.2–1.4)
BUN SERPL-MCNC: 11 MG/DL (ref 7–25)
CALCIUM ALBUM COR SERPL-MCNC: 9.3 MG/DL (ref 8.6–10.3)
CALCIUM SERPL-MCNC: 9.3 MG/DL (ref 8.6–10.3)
CHLORIDE SERPL-SCNC: 106 MMOL/L (ref 98–107)
CO2 SERPL-SCNC: 24 MMOL/L (ref 21–32)
CREAT SERPL-MCNC: 0.76 MG/DL (ref 0.7–1.3)
EGFRCR SERPLBLD CKD-EPI 2021: 105 ML/MIN/1.73M*2
GLUCOSE SERPL-MCNC: 116 MG/DL (ref 70–105)
MAGNESIUM SERPL-MCNC: 2 MG/DL (ref 1.8–2.4)
POTASSIUM SERPL-SCNC: 4 MMOL/L (ref 3.5–5.1)
PROT SERPL-MCNC: 6.5 G/DL (ref 6–8.3)
SODIUM SERPL-SCNC: 139 MMOL/L (ref 135–145)

## 2023-11-30 PROCEDURE — 80053 COMPREHEN METABOLIC PANEL: CPT | Performed by: NURSE PRACTITIONER

## 2023-11-30 PROCEDURE — 36415 COLL VENOUS BLD VENIPUNCTURE: CPT | Performed by: NURSE PRACTITIONER

## 2023-11-30 PROCEDURE — 93005 ELECTROCARDIOGRAM TRACING: CPT | Performed by: NURSE PRACTITIONER

## 2023-11-30 PROCEDURE — 93005 ELECTROCARDIOGRAM TRACING: CPT | Performed by: INTERNAL MEDICINE

## 2023-11-30 PROCEDURE — 6360000200 HC RX 636 W HCPCS (ALT 250 FOR IP): Performed by: NURSE PRACTITIONER

## 2023-11-30 PROCEDURE — (BLANK) HC ROOM PRIVATE

## 2023-11-30 PROCEDURE — 93010 ELECTROCARDIOGRAM REPORT: CPT | Performed by: INTERNAL MEDICINE

## 2023-11-30 PROCEDURE — (BLANK) HC ROOM ICU INTERMEDIATE

## 2023-11-30 PROCEDURE — 6370000100 HC RX 637 (ALT 250 FOR IP): Performed by: NURSE PRACTITIONER

## 2023-11-30 PROCEDURE — 71045 X-RAY EXAM CHEST 1 VIEW: CPT

## 2023-11-30 PROCEDURE — 99222 1ST HOSP IP/OBS MODERATE 55: CPT | Mod: FS,AI | Performed by: NURSE PRACTITIONER

## 2023-11-30 PROCEDURE — 83735 ASSAY OF MAGNESIUM: CPT | Performed by: NURSE PRACTITIONER

## 2023-11-30 RX ORDER — LORAZEPAM 1 MG/1
1 TABLET ORAL 2 TIMES DAILY PRN
Status: DISCONTINUED | OUTPATIENT
Start: 2023-11-30 | End: 2023-12-07 | Stop reason: HOSPADM

## 2023-11-30 RX ORDER — TAMSULOSIN HYDROCHLORIDE 0.4 MG/1
0.4 CAPSULE ORAL NIGHTLY
Status: DISCONTINUED | OUTPATIENT
Start: 2023-11-30 | End: 2023-12-07 | Stop reason: HOSPADM

## 2023-11-30 RX ORDER — SPIRONOLACTONE 25 MG/1
50 TABLET ORAL DAILY
COMMUNITY
Start: 2023-11-24 | End: 2024-11-11 | Stop reason: SDUPTHER

## 2023-11-30 RX ORDER — NITROGLYCERIN 0.4 MG/1
0.4 TABLET SUBLINGUAL EVERY 5 MIN PRN
Status: DISCONTINUED | OUTPATIENT
Start: 2023-11-30 | End: 2023-12-07 | Stop reason: HOSPADM

## 2023-11-30 RX ORDER — POTASSIUM CHLORIDE 750 MG/1
30 TABLET, FILM COATED, EXTENDED RELEASE ORAL DAILY
Status: DISCONTINUED | OUTPATIENT
Start: 2023-12-01 | End: 2023-12-07 | Stop reason: HOSPADM

## 2023-11-30 RX ORDER — ALUMINUM HYDROXIDE, MAGNESIUM HYDROXIDE, AND SIMETHICONE 1200; 120; 1200 MG/30ML; MG/30ML; MG/30ML
30 SUSPENSION ORAL 3 TIMES DAILY PRN
Status: DISCONTINUED | OUTPATIENT
Start: 2023-11-30 | End: 2023-12-07 | Stop reason: HOSPADM

## 2023-11-30 RX ORDER — FUROSEMIDE 40 MG/1
40 TABLET ORAL DAILY
Status: DISCONTINUED | OUTPATIENT
Start: 2023-12-01 | End: 2023-12-01

## 2023-11-30 RX ORDER — SODIUM CHLORIDE 0.9 % (FLUSH) 0.9 %
3 SYRINGE (ML) INJECTION AS NEEDED
Status: DISCONTINUED | OUTPATIENT
Start: 2023-11-30 | End: 2023-12-07 | Stop reason: HOSPADM

## 2023-11-30 RX ORDER — ENOXAPARIN SODIUM 100 MG/ML
40 INJECTION SUBCUTANEOUS
Status: DISCONTINUED | OUTPATIENT
Start: 2023-11-30 | End: 2023-11-30 | Stop reason: ALTCHOICE

## 2023-11-30 RX ORDER — ACETAMINOPHEN 500 MG
500 TABLET ORAL EVERY 6 HOURS PRN
Status: DISCONTINUED | OUTPATIENT
Start: 2023-11-30 | End: 2023-12-07 | Stop reason: HOSPADM

## 2023-11-30 RX ORDER — ALBUTEROL SULFATE 90 UG/1
2 INHALANT RESPIRATORY (INHALATION) EVERY 6 HOURS PRN
Status: DISCONTINUED | OUTPATIENT
Start: 2023-11-30 | End: 2023-12-07 | Stop reason: HOSPADM

## 2023-11-30 RX ORDER — LANOLIN ALCOHOL/MO/W.PET/CERES
400 CREAM (GRAM) TOPICAL DAILY
Status: DISCONTINUED | OUTPATIENT
Start: 2023-12-01 | End: 2023-12-04

## 2023-11-30 RX ORDER — TRAZODONE HYDROCHLORIDE 50 MG/1
25 TABLET ORAL NIGHTLY PRN
Status: DISCONTINUED | OUTPATIENT
Start: 2023-11-30 | End: 2023-12-07 | Stop reason: HOSPADM

## 2023-11-30 RX ORDER — SACUBITRIL AND VALSARTAN 24; 26 MG/1; MG/1
1 TABLET, FILM COATED ORAL 2 TIMES DAILY
Status: DISCONTINUED | OUTPATIENT
Start: 2023-11-30 | End: 2023-12-04

## 2023-11-30 RX ORDER — SPIRONOLACTONE 50 MG/1
50 TABLET, FILM COATED ORAL DAILY
Status: DISCONTINUED | OUTPATIENT
Start: 2023-12-01 | End: 2023-12-07 | Stop reason: HOSPADM

## 2023-11-30 RX ORDER — ROSUVASTATIN CALCIUM 20 MG/1
40 TABLET, COATED ORAL NIGHTLY
Status: DISCONTINUED | OUTPATIENT
Start: 2023-11-30 | End: 2023-12-07 | Stop reason: HOSPADM

## 2023-11-30 RX ORDER — SOTALOL HYDROCHLORIDE 120 MG/1
60 TABLET ORAL ONCE
Status: COMPLETED | OUTPATIENT
Start: 2023-11-30 | End: 2023-11-30

## 2023-11-30 RX ORDER — SOTALOL HYDROCHLORIDE 120 MG/1
180 TABLET ORAL EVERY 12 HOURS SCHEDULED
Status: DISCONTINUED | OUTPATIENT
Start: 2023-11-30 | End: 2023-12-07 | Stop reason: HOSPADM

## 2023-11-30 RX ORDER — CHOLECALCIFEROL (VITAMIN D3) 25 MCG
2000 TABLET ORAL DAILY
Status: DISCONTINUED | OUTPATIENT
Start: 2023-12-01 | End: 2023-12-07 | Stop reason: HOSPADM

## 2023-11-30 RX ORDER — ASPIRIN 81 MG/1
81 TABLET ORAL DAILY
Status: DISCONTINUED | OUTPATIENT
Start: 2023-12-01 | End: 2023-12-07 | Stop reason: HOSPADM

## 2023-11-30 RX ORDER — MEXILETINE HYDROCHLORIDE 200 MG/1
200 CAPSULE ORAL EVERY 8 HOURS SCHEDULED
Status: DISCONTINUED | OUTPATIENT
Start: 2023-11-30 | End: 2023-12-07 | Stop reason: HOSPADM

## 2023-11-30 RX ORDER — ADHESIVE BANDAGE
30 BANDAGE TOPICAL DAILY PRN
Status: DISCONTINUED | OUTPATIENT
Start: 2023-11-30 | End: 2023-12-07 | Stop reason: HOSPADM

## 2023-11-30 RX ORDER — LANSOPRAZOLE 30 MG/1
30 CAPSULE, DELAYED RELEASE ORAL
Status: DISCONTINUED | OUTPATIENT
Start: 2023-11-30 | End: 2023-12-07 | Stop reason: HOSPADM

## 2023-11-30 RX ADMIN — SOTALOL HYDROCHLORIDE 180 MG: 120 TABLET ORAL at 20:10

## 2023-11-30 RX ADMIN — LANSOPRAZOLE 30 MG: 30 CAPSULE, DELAYED RELEASE ORAL at 18:07

## 2023-11-30 RX ADMIN — CARVEDILOL 37.5 MG: 25 TABLET, FILM COATED ORAL at 18:07

## 2023-11-30 RX ADMIN — SOTALOL HYDROCHLORIDE 60 MG: 120 TABLET ORAL at 14:20

## 2023-11-30 RX ADMIN — MEXILETINE HYDROCHLORIDE 200 MG: 200 CAPSULE ORAL at 20:12

## 2023-11-30 RX ADMIN — ENOXAPARIN SODIUM 40 MG: 100 INJECTION SUBCUTANEOUS at 17:03

## 2023-11-30 RX ADMIN — TAMSULOSIN HYDROCHLORIDE 0.4 MG: 0.4 CAPSULE ORAL at 20:12

## 2023-11-30 RX ADMIN — ROSUVASTATIN CALCIUM 40 MG: 20 TABLET, FILM COATED ORAL at 20:12

## 2023-11-30 RX ADMIN — SACUBITRIL AND VALSARTAN 1 TABLET: 24; 26 TABLET, FILM COATED ORAL at 20:12

## 2023-11-30 ASSESSMENT — ENCOUNTER SYMPTOMS
PALPITATIONS: 1
ENDOCRINE COMMENTS: DIABETIC
PND: 0
IRREGULAR HEARTBEAT: 1
LIGHT-HEADEDNESS: 1
EYES NEGATIVE: 1
GASTROINTESTINAL NEGATIVE: 1
FEVER: 0
SHORTNESS OF BREATH: 1
CHILLS: 0
ORTHOPNEA: 0
SYNCOPE: 0
NEAR-SYNCOPE: 0
NERVOUS/ANXIOUS: 1
DIZZINESS: 1
DYSPNEA ON EXERTION: 1

## 2023-11-30 ASSESSMENT — ACTIVITIES OF DAILY LIVING (ADL): ADEQUATE_TO_COMPLETE_ADL: YES

## 2023-11-30 NOTE — Clinical Note
Prepped: groin, right radial and right brachial. The site was clipped. Prepped with: ChloraPrep. The patient was draped  in the usual sterile fashion.

## 2023-11-30 NOTE — Clinical Note
Balloon was inflated. Balloon inflated in the diagonal LAD.Max pressure = 10 he. Total duration = 120 seconds. Yes

## 2023-11-30 NOTE — TELEPHONE ENCOUNTER
I called and spoke to Pete.  He will be admitted to room [address] on [facility] [address].  Merced Newton CNP is aware.Per Merced, he should take his morning medications.  Pete verbalizes understanding and has no further questions.

## 2023-11-30 NOTE — Clinical Note
Sheath(s) removed from the right radial artery. Closure device used: Radial Band. Total cc's of air in band = 12. Hemostasis achieved.

## 2023-11-30 NOTE — Clinical Note
ASA score: 3. Mallampati: III. Planned anesthesia type is moderate sedation. Sedation plan and risk discussed with patient.

## 2023-11-30 NOTE — Clinical Note
Balloon was inflated. Balloon inflated in the diagonal LAD.Max pressure = 10 he. Total duration = 120 seconds.

## 2023-11-30 NOTE — MEDICATION HISTORY SPECIALIST NOTES
"    [id number]    CSN: [id number]  : [id number]    Information sources:  EPIC  Complete Dispense Report  Pharmacy - [facility] med list  Rx meds in Epic are \"house meds\" and have been e-prescribed accordingly.     Allergies verified.    Patient interviewed who provided all history. Patient verified medications and provided last doses. Verified with Complete Dispense Report and [facility] med list.    Discontinued medications:  Metformin - discontinued on 10/20/23 as stop taking at discharge    Discrepancies:  Nitrostat - has at home.  Furosemide - takes as needed.  Albuterol HFA - has at home.    **High alert medications**  Rivoroxaban (Xarelto)    See  for medication resources.    "

## 2023-11-30 NOTE — PLAN OF CARE
Problem: Cardiovascular - Adult  Goal: Maintains optimal cardiac output and hemodynamic stability  Description: INTERVENTIONS:  1. Monitor vital signs and rhythm  2. Monitor for hypotension and other signs of decreased cardiac output  3. Administer and titrate ordered vasoactive medications to optimize hemodynamic stability  4. Monitor for fluid overload/dehydration, weight gain, shortness of breath and activity intolerance  5. Monitor arterial and/or venous puncture sites for bleeding and/or hematoma  6. Assess quality of pulses, capillary refill, edema, sensation, skin color and temperature  7. Assess for signs of decreased coronary artery perfusion - ex. angina  Outcome: Progressing  Goal: Absence of cardiac dysrhythmias or at baseline  Description: INTERVENTIONS:  1. Continuous cardiac monitoring, monitor vital signs, obtain 12 lead EKG if indicated  2. Administer antiarrhythmic and heart rate control medications as ordered  3. Initiate emergency measures for life threatening arrhythmias  4. Monitor electrolytes and administer replacement therapy as ordered  Outcome: Progressing     Problem: Pain - Adult  Goal: Verbalizes/displays adequate comfort level or baseline comfort level  Description: INTERVENTIONS:  1. Encourage patient to monitor pain and request interventions  2. Assess pain using the appropriate pain scale  3. Administer analgesics based on type and severity of pain and evaluate response  4. Educate/Implement non-pharmacological measures as appropriate and evaluate response  5. Consider cultural, developmental and social influences on pain and pain management  6. Notify Provider if interventions unsuccessful or patient reports new pain  Outcome: Progressing     Problem: Infection - Adult  Goal: Absence of infection during hospitalization  Description: INTERVENTIONS:  1. Assess and monitor for signs and symptoms of infection  2. Monitor lab/diagnostic results  3. Monitor all insertion  sites/wounds/incisions  4. Monitor secretions for changes in amount and color  5. Administer medications as ordered  6. Educate and encourage patient and family to use good hand hygiene technique  7. Identify and educate in appropriate isolation precautions for identified infection/condition  Outcome: Progressing     Problem: Safety Adult  Goal: Patient will remain safe during hospitalization  Description: INTERVENTIONS    1. Assess patient for fall risk and implement interventions if needed  2. Use safe transport techniques  3. Assess patient using the appropriate Jean Marie skin assessment scale  4. Assess patient for risk of aspiration  5. Assess patient for risk of elopement  6. Assess patient for risk of suicide  Outcome: Progressing     Problem: Daily Care  Goal: Daily care needs are met  Description: INTERVENTIONS:   1. Assess and monitor skin integrity  2. Identify patients at risk for skin breakdown on admission and per policy  3. Assess and monitor ability to perform self care and identify potential discharge needs  4. Assess skin integrity/risk for skin breakdown  5. Assist patient with activities of daily living as needed  6. Encourage independent activity per ability   7. Provide mouth care   8. Include patient/family/caregiver in decisions related to daily care   Outcome: Progressing     Problem: Knowledge Deficit  Goal: Patient/family/caregiver demonstrates understanding of disease process, treatment plan, medications, and discharge instructions  Description: INTERVENTIONS:   1. Complete learning assessment and assess knowledge base  2. Provide teaching at level of understanding   3. Provide teaching via preferred learning methods  Outcome: Progressing     Problem: Discharge Barriers  Goal: Patient's discharge needs are met  Description: INTERVENTIONS:  1. Assess patient for self-management skills  2. Encourage participation in management  3. Identify potential discharge barriers on admission and  throughout hospital stay  4. Involve patient/family/caregiver in discharge planning process  5. Collaborate with case management/ for discharge needs  Outcome: Progressing

## 2023-12-01 ENCOUNTER — APPOINTMENT (OUTPATIENT)
Dept: CARDIOLOGY | Facility: HOSPITAL | Age: 58
DRG: 251 | End: 2023-12-01
Payer: MEDICARE

## 2023-12-01 LAB
ANION GAP SERPL CALC-SCNC: 10 MMOL/L (ref 3–11)
BNP SERPL-MCNC: 151 PG/ML (ref 0–100)
BUN SERPL-MCNC: 10 MG/DL (ref 7–25)
CALCIUM SERPL-MCNC: 8.8 MG/DL (ref 8.6–10.3)
CHLORIDE SERPL-SCNC: 106 MMOL/L (ref 98–107)
CO2 SERPL-SCNC: 21 MMOL/L (ref 21–32)
CREAT SERPL-MCNC: 0.8 MG/DL (ref 0.7–1.3)
EGFRCR SERPLBLD CKD-EPI 2021: 103 ML/MIN/1.73M*2
ERYTHROCYTE [DISTWIDTH] IN BLOOD BY AUTOMATED COUNT: 14.3 % (ref 11.5–15)
GLUCOSE SERPL-MCNC: 115 MG/DL (ref 70–105)
HCT VFR BLD AUTO: 45 % (ref 38–50)
HGB BLD-MCNC: 15.7 G/DL (ref 13.2–17.2)
MAGNESIUM SERPL-MCNC: 1.9 MG/DL (ref 1.8–2.4)
MCH RBC QN AUTO: 31.9 PG (ref 29–34)
MCHC RBC AUTO-ENTMCNC: 34.8 G/DL (ref 32–36)
MCV RBC AUTO: 91.7 FL (ref 82–97)
PLATELET # BLD AUTO: 186 10*3/UL (ref 130–350)
PMV BLD AUTO: 8.6 FL (ref 6.9–10.8)
POTASSIUM SERPL-SCNC: 4.3 MMOL/L (ref 3.5–5.1)
RBC # BLD AUTO: 4.91 10*6/ΜL (ref 4.1–5.8)
SODIUM SERPL-SCNC: 137 MMOL/L (ref 135–145)
WBC # BLD AUTO: 6.2 10*3/UL (ref 3.7–9.6)

## 2023-12-01 PROCEDURE — 93005 ELECTROCARDIOGRAM TRACING: CPT | Performed by: INTERNAL MEDICINE

## 2023-12-01 PROCEDURE — 36415 COLL VENOUS BLD VENIPUNCTURE: CPT | Performed by: NURSE PRACTITIONER

## 2023-12-01 PROCEDURE — 93010 ELECTROCARDIOGRAM REPORT: CPT | Mod: 76 | Performed by: INTERNAL MEDICINE

## 2023-12-01 PROCEDURE — 93288 INTERROG EVL PM/LDLS PM IP: CPT

## 2023-12-01 PROCEDURE — 83735 ASSAY OF MAGNESIUM: CPT | Performed by: NURSE PRACTITIONER

## 2023-12-01 PROCEDURE — 93010 ELECTROCARDIOGRAM REPORT: CPT | Performed by: INTERNAL MEDICINE

## 2023-12-01 PROCEDURE — 93284 PRGRMG EVAL IMPLANTABLE DFB: CPT | Mod: 26 | Performed by: INTERNAL MEDICINE

## 2023-12-01 PROCEDURE — 85027 COMPLETE CBC AUTOMATED: CPT | Performed by: INTERNAL MEDICINE

## 2023-12-01 PROCEDURE — 83880 ASSAY OF NATRIURETIC PEPTIDE: CPT | Performed by: NURSE PRACTITIONER

## 2023-12-01 PROCEDURE — 6370000100 HC RX 637 (ALT 250 FOR IP): Performed by: NURSE PRACTITIONER

## 2023-12-01 PROCEDURE — 80048 BASIC METABOLIC PNL TOTAL CA: CPT | Performed by: NURSE PRACTITIONER

## 2023-12-01 PROCEDURE — 99232 SBSQ HOSP IP/OBS MODERATE 35: CPT | Mod: FS | Performed by: NURSE PRACTITIONER

## 2023-12-01 PROCEDURE — (BLANK) HC ROOM ICU INTERMEDIATE

## 2023-12-01 RX ADMIN — MEXILETINE HYDROCHLORIDE 200 MG: 200 CAPSULE ORAL at 14:52

## 2023-12-01 RX ADMIN — CARVEDILOL 37.5 MG: 25 TABLET, FILM COATED ORAL at 18:02

## 2023-12-01 RX ADMIN — SOTALOL HYDROCHLORIDE 180 MG: 120 TABLET ORAL at 20:21

## 2023-12-01 RX ADMIN — MEXILETINE HYDROCHLORIDE 200 MG: 200 CAPSULE ORAL at 21:03

## 2023-12-01 RX ADMIN — TAMSULOSIN HYDROCHLORIDE 0.4 MG: 0.4 CAPSULE ORAL at 20:20

## 2023-12-01 RX ADMIN — POTASSIUM CHLORIDE 30 MEQ: 750 TABLET, FILM COATED, EXTENDED RELEASE ORAL at 08:03

## 2023-12-01 RX ADMIN — MAGNESIUM OXIDE 400 MG: 400 TABLET ORAL at 07:58

## 2023-12-01 RX ADMIN — FUROSEMIDE 40 MG: 40 TABLET ORAL at 08:03

## 2023-12-01 RX ADMIN — RIVAROXABAN 20 MG: 20 TABLET, FILM COATED ORAL at 18:03

## 2023-12-01 RX ADMIN — Medication 2000 UNITS: at 07:58

## 2023-12-01 RX ADMIN — ROSUVASTATIN CALCIUM 40 MG: 20 TABLET, FILM COATED ORAL at 20:20

## 2023-12-01 RX ADMIN — ASPIRIN 81 MG: 81 TABLET ORAL at 07:58

## 2023-12-01 RX ADMIN — LANSOPRAZOLE 30 MG: 30 CAPSULE, DELAYED RELEASE ORAL at 17:57

## 2023-12-01 RX ADMIN — SPIRONOLACTONE 50 MG: 50 TABLET ORAL at 07:58

## 2023-12-01 RX ADMIN — CARVEDILOL 37.5 MG: 25 TABLET, FILM COATED ORAL at 07:59

## 2023-12-01 RX ADMIN — SACUBITRIL AND VALSARTAN 1 TABLET: 24; 26 TABLET, FILM COATED ORAL at 20:20

## 2023-12-01 RX ADMIN — EMPAGLIFLOZIN 10 MG: 10 TABLET, FILM COATED ORAL at 08:03

## 2023-12-01 RX ADMIN — SACUBITRIL AND VALSARTAN 1 TABLET: 24; 26 TABLET, FILM COATED ORAL at 08:05

## 2023-12-01 RX ADMIN — MEXILETINE HYDROCHLORIDE 200 MG: 200 CAPSULE ORAL at 06:01

## 2023-12-01 RX ADMIN — SOTALOL HYDROCHLORIDE 180 MG: 120 TABLET ORAL at 07:59

## 2023-12-01 NOTE — INTERDISCIPLINARY/THERAPY
Case Management Admission Note    Phone # [phone]    Living Situation: Spouse/significant other Private residence            ADLs: Independent  Stairs: Yes 6 in to home, flight to basement  HME/CPAP: None      Oxygen: No   Home Health:No  Current Resources: None   Diabetes/supplies: Do you have Diabetes?: No  PCP: Nikhil Smith MD  Funding: [facility]  Pharmacy:[address]    Source of Information: Patient Interview  Support Person: Primary Emergency Contact: Suzan Alves, Home Phone: [phone], Mobile Phone: [phone], Relation: Significant Other  Advance Directives (For Healthcare)  Advance Directive: Patient does not have advance directive  No Advance Directive: Patient would not like information  Needs transportation assistance at DC: Friend     Discharge Needs/Barriers:  ICD check, medication management  Narrative: Chart reviewed. Pt needs discussed in MDR. CM to pt bedside. CM introduced self and educated pt on CM role. Pt denies discharge questions or concerns. CM will continue to follow for discharge planning.  Dispo: HO

## 2023-12-01 NOTE — PLAN OF CARE
Problem: Cardiovascular - Adult  Goal: Maintains optimal cardiac output and hemodynamic stability  Description: INTERVENTIONS:  1. Monitor vital signs and rhythm  2. Monitor for hypotension and other signs of decreased cardiac output  3. Administer and titrate ordered vasoactive medications to optimize hemodynamic stability  4. Monitor for fluid overload/dehydration, weight gain, shortness of breath and activity intolerance  5. Monitor arterial and/or venous puncture sites for bleeding and/or hematoma  6. Assess quality of pulses, capillary refill, edema, sensation, skin color and temperature  7. Assess for signs of decreased coronary artery perfusion - ex. angina  Outcome: Progressing  Flowsheets (Taken 12/1/2023 0907)  Maintain optimal cardiac output and hemodynamic stability:   Monitor vital signs and rhythm   Monitor for hypotension and other signs of decreased cardiac output   Administer and titrate ordered vasoactive medications to optimize hemodynamic stability   Monitor for fluid overload/dehydration, weight gain, shortness of breath and activity intolerance  Goal: Absence of cardiac dysrhythmias or at baseline  Description: INTERVENTIONS:  1. Continuous cardiac monitoring, monitor vital signs, obtain 12 lead EKG if indicated  2. Administer antiarrhythmic and heart rate control medications as ordered  3. Initiate emergency measures for life threatening arrhythmias  4. Monitor electrolytes and administer replacement therapy as ordered  Outcome: Progressing  Flowsheets (Taken 12/1/2023 0907)  Absence of cardiac dysrhythmias or at baseline:   Continuous cardiac monitoring, monitor vital signs, obtain 12 lead EKG if indicated   Initiate emergency measures for life threatening arrhythmias   Administer antiarrhythmic and heart rate control medications as ordered   Monitor electrolytes and administer replacement therapy as ordered     Problem: Pain - Adult  Goal: Verbalizes/displays adequate comfort level or  baseline comfort level  Description: INTERVENTIONS:  1. Encourage patient to monitor pain and request interventions  2. Assess pain using the appropriate pain scale  3. Administer analgesics based on type and severity of pain and evaluate response  4. Educate/Implement non-pharmacological measures as appropriate and evaluate response  5. Consider cultural, developmental and social influences on pain and pain management  6. Notify Provider if interventions unsuccessful or patient reports new pain  Outcome: Progressing  Flowsheets (Taken 12/1/2023 0907)  Verbalizes/displays adequate comfort level or baseline comfort level:   Encourage patient to monitor pain and request interventions   Assess pain using the appropriate pain scale   Administer analgesics based on type and severity of pain and evaluate response     Problem: Infection - Adult  Goal: Absence of infection during hospitalization  Description: INTERVENTIONS:  1. Assess and monitor for signs and symptoms of infection  2. Monitor lab/diagnostic results  3. Monitor all insertion sites/wounds/incisions  4. Monitor secretions for changes in amount and color  5. Administer medications as ordered  6. Educate and encourage patient and family to use good hand hygiene technique  7. Identify and educate in appropriate isolation precautions for identified infection/condition  Outcome: Progressing  Flowsheets (Taken 12/1/2023 0907)  Absence of infection during hospitalization:   Assess and monitor for signs and symptoms of infection   Monitor lab/diagnostic results     Problem: Safety Adult  Goal: Patient will remain safe during hospitalization  Description: INTERVENTIONS    1. Assess patient for fall risk and implement interventions if needed  2. Use safe transport techniques  3. Assess patient using the appropriate Jean Marie skin assessment scale  4. Assess patient for risk of aspiration  5. Assess patient for risk of elopement  6. Assess patient for risk of  suicide  Outcome: Progressing  Flowsheets (Taken 12/1/2023 0907)  Patient will remain safe durning hospitalization:   Assess patient for Fall Risk   Assess Patient using the appropriate Jean Marie scale   Use safe transport     Problem: Daily Care  Goal: Daily care needs are met  Description: INTERVENTIONS:   1. Assess and monitor skin integrity  2. Identify patients at risk for skin breakdown on admission and per policy  3. Assess and monitor ability to perform self care and identify potential discharge needs  4. Assess skin integrity/risk for skin breakdown  5. Assist patient with activities of daily living as needed  6. Encourage independent activity per ability   7. Provide mouth care   8. Include patient/family/caregiver in decisions related to daily care   Outcome: Progressing  Flowsheets (Taken 12/1/2023 0907)  Daily care needs are met:   Assess and monitor skin integrity   Identify patients at risk for skin breakdown on admission and per policy   Assess and monitor ability to perform self care and identify potential discharge needs     Problem: Knowledge Deficit  Goal: Patient/family/caregiver demonstrates understanding of disease process, treatment plan, medications, and discharge instructions  Description: INTERVENTIONS:   1. Complete learning assessment and assess knowledge base  2. Provide teaching at level of understanding   3. Provide teaching via preferred learning methods  Outcome: Progressing  Flowsheets (Taken 12/1/2023 0907)  Patient/family/caregiver demonstrates understanding of disease process, treatment plan, medications, and discharge instructions:   Complete learning assessment and assess knowledge base   Provide teaching at level of understanding   Provide teaching via preferred learning methods     Problem: Discharge Barriers  Goal: Patient's discharge needs are met  Description: INTERVENTIONS:  1. Assess patient for self-management skills  2. Encourage participation in management  3. Identify  potential discharge barriers on admission and throughout hospital stay  4. Involve patient/family/caregiver in discharge planning process  5. Collaborate with case management/ for discharge needs  Outcome: Progressing  Flowsheets (Taken 12/1/2023 0907)  Patient discharge needs are met:   Assess patient for self-management skills   Encourage participation in management   Identify potential discharge barriers on admission and throughout hospital stay

## 2023-12-01 NOTE — H&P
Subjective    Patient ID: Pete Trejo is a 58 y.o. male.    Chief Complaint: No chief complaint on file.    Pete Trejo is a pleasant 58 y.o. male patient of Dr. Wu.  Patient is a history of cardiomyopathy status post biventricular ICD, hypertension, hyperlipidemia, ventricular tachycardia status post ablation, coronary disease status post PCI, combined systolic and diastolic congestive heart failure, COVID-19, active sleep apnea, type 2 diabetes.  In June 2020  Patient was seen in the hospital by electrophysiology  For recurrent ventricular tachycardia.  He was taken to  EP lab By Dr. Montana  For VT ablation.  He was only able to partially ablate along the border zone  Areas of the scar.  He had been on amiodarone and continued to have breakthrough ventricular tachycardia with ICD shocks.  He continued to have  Or ICD shocks throughout the summer  And was  Placed on mexiletine  Then referred to [facility] for  Further recommendations  And  Treatment  Of his ventricular tachycardia.  He was evaluated for LVAD  You have seen  And did not qualify  At the time because his medical therapy was working.  He continued to have episodes of nonsustained VT  And  Underwent EP study  And substrate modification [facility] with Dr. Lema  11/21.  Amnio and mexiletine were discontinued  And he was changed to sotalol.  He subsequently did well up until 10/23  When he developed  VT followed by ICD shock.  He was admitted at that time and mexiletine was increased to 200 mg 3 times daily.    Patient was most recently seen  11/22/2023 by Gerardo Sun CNP  In the clinic.  An echo 10/19/2020  Revealed severe LV systolic dysfunction with an EF of 25%  And large anteroapical aneurysm.  Only the basal  Left ventricular segments had preserved contractility.  No LV thrombus noted.  He had grade 2 moderate  LV diastolic abnormality.  LA VI 53 mL/m².  His arrhythmia logbook  On the ICD download was reviewed  Revealing multiple episodes  of nonsustained VT with ATP    Though no shocks  In the 3 days prior.  He was noted to be 59%  Atrial paced  And  Around 70%  BiV paced.  He was symptomatic  With  Palpitations and anxiety  Correlating with  Nonsustained VT and ATP.  Carvedilol was increased to 37.5 mg twice daily.  He is also followed by Dr. Leiva in the clinic.  Yesterday  Patient called into the clinic saying  He was  Having more symptoms  Such as when he received ATP  Previously.  He noted multiple ATP episodes every day for the past 7 days or so  With palpitations and shortness of breath.  Dr. Wu reviewed the information and recommended patient be admitted for increasing sotalol to 180 mg twice daily.  He presents today  For admission.    Pete indicates since his ICD shock he has continued to feel fluttering and palpitations and an unknown easy feeling in his chest.  He has been more fatigued and short of breath with exertion.  He has some orthostatic lightheadedness.  No syncope.  He does not wear compression hose.  Blood pressure looks good currently in the 130s systolic.  Continues to have some nonsustained runs on telemetry.  He denies PND orthopnea or lower extremity swelling.      Specialty Problems          Cardiology Problems    Hyperlipidemia        Hypertension        Ischemic cardiomyopathy        Automatic implantable cardioverter-defibrillator in situ        Presence of stent in coronary artery        Chronic combined systolic and diastolic CHF, NYHA class 2 and ACC/AHA stage C (CMS/HCC)        Chronic anticoagulation        History of ventricular tachycardia        Paroxysmal atrial fibrillation (CMS/HCC)        Sustained ventricular tachycardia (CMS/HCC)        Long term current use of antiarrhythmic drug        AICD discharge        Elevated troponin        VT (ventricular tachycardia) (CMS/HCC)         Past Medical History:   Diagnosis Date    BPH (benign prostatic hyperplasia)     CAD (coronary artery disease)      Status post stent    CHF (congestive heart failure) (CMS/[facility])     Chronic combined systolic and diastolic CHF (congestive heart failure) (CMS/[facility])     LVEF 25% with grade 1 diastolic dysfunction as per echo on 1/26/2022.    CVA (cerebral vascular accident) (CMS/HCC) 2010    Without residual deficit.    Diabetes mellitus type 2 in obese (CMS/[facility])     Dyslipidemia     GERD (gastroesophageal reflux disease)     Heart attack (CMS/[facility])     x3    Hypertension     Ischemic cardiomyopathy     Morbid obesity with BMI of 40.0-44.9, adult (CMS/HCC)     Paroxysmal atrial fibrillation (CMS/HCC)     On Xarelto    V-tach (CMS/HCC)     AICD in place     Past Surgical History:   Procedure Laterality Date    ABLATION VT N/A 06/09/2020    Procedure: Ablation VT;  Surgeon: Wilfredo Montana MD;  Location: [facility] EP Lab;  Service: Electrophysiology;  Laterality: N/A;  Check with Dr. Montana in the a.m. regarding scheduling    APPENDECTOMY      COLONOSCOPY N/A 7/25/2023    Procedure: COLONOSCOPY with Polypectomy;  Surgeon: Hortencia Carson MD;  Location: [facility] Endoscopy;  Service: Endoscopy;  Laterality: N/A;    CORONARY ARTERY STENTING  2009    2.5 X 24-mm Taxus DIMAS to first diagonal. 3.0 X 8-mm Taxus stent to proximal LAD just proximal to diagonal.    CORONARY ARTERY STENTING  2010    3.5 X 15-mm Promus DIMAS to totally occluded LAD    CORONARY ARTERY STENTING  2011    2.25 X 30-mm Resolute DIMAS to 2nd diagonal.  Balloon angioplasty through strut to improve blood flow to distal LAD    CORONARY ARTERY STENTING  07/28/2017    second diagonal branch LAD Resolute 2.25 x 30-mm DIMAS    ICD DC GEN CHANGE N/A 6/20/2022    Procedure: BI-V ICD Gen Change;  Surgeon: Wilfredo Montana MD;  Location: [facility] EP Lab;  Service: Cardiovascular;  Laterality: N/A;    ICD SC NEW  2011    [employer] Cognis Model #N119, Serial #[id number]. [employer] Reliance Model #0185, Serial #[id number]. LV lead Acuity Model #45+91, Serial #[id number]. Atrial lead Model #4469, Serial  #[id number]     Allergies as of 11/30/2023 - Reviewed 11/30/2023   Allergen Reaction Noted    Morphine  07/30/2017    Tramadol  07/30/2017     Current Outpatient Medications   Medication Sig Dispense Refill    carvediloL (Coreg) 25 mg tablet Take 1.5 tablets (37.5 mg total) by mouth 2 (two) times a day with meals (Patient taking differently: Take 1 tablet (25 mg total) by mouth 2 (two) times a day with meals) 90 tablet 2    sacubitriL-valsartan (ENTRESTO) 24-26 mg tablet Take 1 tablet by mouth 2 (two) times a day 60 tablet 2    magnesium chloride (SLOW-MAG) 64 mg tablet,delayed release (DR/EC) Take 1 tablet (64 mg total) by mouth 2 (two) times a day with meals 180 tablet 0    spironolactone (ALDACTONE) 50 mg tablet Take 1 tablet (50 mg total) by mouth daily 90 tablet 0    potassium chloride 10 mEq CR tablet Take 3 tablets (30 mEq total) by mouth daily (Patient taking differently: Take 2 tablets (20 mEq total) by mouth daily) 270 tablet 0    mexiletine (MEXITIL) 200 mg capsule Take 1 capsule (200 mg total) by mouth every 8 (eight) hours 90 capsule 11    empagliflozin (JARDIANCE) 10 mg tablet tablet Take 10 mg by mouth daily 30 each 11    tamsulosin (FLOMAX) 0.4 mg capsule Take 1 capsule (0.4 mg total) by mouth daily        furosemide (LASIX) 40 mg tablet Take 1 tablet (40 mg total) by mouth daily If weight goes up 3 pounds overnight take an extra dose that day.        rosuvastatin (Crestor) 40 mg tablet Take 1 tablet (40 mg total) by mouth nightly 90 tablet 0    cholecalciferol, vitamin D3, 25 mcg (1,000 unit) tablet Take 1 tablet (1,000 Units total) by mouth daily        albuterol HFA (PROVENTIL HFA;VENTOLIN HFA) 90 mcg/actuation inhaler Inhale 2 puffs every 6 (six) hours as needed for wheezing or shortness of breath 1 Inhaler 1    nitroglycerin (NITROSTAT) 0.4 mg SL tablet Place 1 tablet (0.4 mg total) under the tongue every 5 (five) minutes as needed for chest pain        pantoprazole (PROTONIX) 40 mg EC tablet Take 1  tablet (40 mg total) by mouth daily        rivaroxaban (Xarelto) 20 mg tablet Take 1 tablet (20 mg total) by mouth daily 90 tablet 3    sotaloL (BETAPACE) 120 mg tablet Take 1 tablet (120 mg total) by mouth every 12 (twelve) hours 180 tablet 3    aspirin 81 mg EC tablet Take 1 tablet (81 mg total) by mouth daily 90 tablet 3      Family History   Problem Relation Age of Onset    Heart attack Mother 62    Other Mother         Abdominal Aortic Aneurysm    Lung cancer Mother     Colon cancer Father         Cause of death    Diabetes Brother         Non-Insulin Dependent    Heart attack Brother 51        w/ Stents    Heart disease Brother     Other Son         Well     Social History     Tobacco Use    Smoking status: Former     Packs/day: 0.75     Years: 30.00     Additional pack years: 0.00     Total pack years: 22.50     Types: Cigarettes     Quit date: 6/7/2020     Years since quitting: 3.4    Smokeless tobacco: Never   Vaping Use    Vaping Use: Never used   Substance Use Topics    Alcohol use: Not Currently     Comment: Quit drinking in 2007    Drug use: Not Currently     Review of Systems  Review of Systems   Constitutional: Positive for malaise/fatigue. Negative for chills and fever.   HENT: Negative.     Eyes: Negative.    Cardiovascular:  Positive for dyspnea on exertion, irregular heartbeat and palpitations. Negative for leg swelling, near-syncope, orthopnea, paroxysmal nocturnal dyspnea and syncope.   Respiratory:  Positive for shortness of breath.    Endocrine:        Diabetic   Skin: Negative.    Gastrointestinal: Negative.    Genitourinary: Negative.    Neurological:  Positive for dizziness and light-headedness.   Psychiatric/Behavioral:  The patient is nervous/anxious.           Concerned with  ATP therapy  And potential for ICD shocks.       Objective   Physical Exam  Vitals reviewed.   Constitutional:       Appearance: He is well-developed. He is obese.   HENT:      Head: Normocephalic and atraumatic.  "  Eyes:      General: No scleral icterus.     Comments: Pupils equal   Neck:      Thyroid: No thyromegaly.      Vascular: No carotid bruit or JVD.   Cardiovascular:      Rate and Rhythm: Normal rate and regular rhythm.      Pulses: Normal pulses and intact distal pulses.           Carotid pulses are 2+ on the right side and 2+ on the left side.       Dorsalis pedis pulses are 2+ on the right side and 2+ on the left side.        Posterior tibial pulses are 2+ on the right side and 2+ on the left side.      Heart sounds: Normal heart sounds. No murmur heard.     No friction rub. No gallop.      Comments:   ICD left upper chest without erosion or infection  Pulmonary:      Effort: Pulmonary effort is normal.      Breath sounds: Normal breath sounds. No wheezing, rhonchi or rales.   Abdominal:      General: Abdomen is flat. Bowel sounds are normal.      Palpations: Abdomen is soft.      Tenderness: There is no abdominal tenderness.   Musculoskeletal:         General: Normal range of motion.      Cervical back: Normal range of motion and neck supple.      Right lower leg: No edema.      Left lower leg: No edema.   Lymphadenopathy:      Cervical: No cervical adenopathy.   Skin:     General: Skin is warm and dry.      Findings: No rash.   Neurological:      General: No focal deficit present.      Mental Status: He is alert and oriented to person, place, and time.   Psychiatric:         Mood and Affect: Mood normal.         Behavior: Behavior normal.         Thought Content: Thought content normal.         Judgment: Judgment normal.         Data Review:   Lab Results   Component Value Date     11/30/2023    K 4.0 11/30/2023    CO2 24 11/30/2023    BUN 11 11/30/2023    CREATININE 0.76 11/30/2023    GLUCOSE 116 (H) 11/30/2023    CALCIUM 9.3 11/30/2023     No results found for: \"CKMBINDEX\", \"TROPHS\", \"BNP\", \"POCBNP\"  No results found for: \"WBC\", \"HGB\", \"HCT\", \"MCV\", \"PLT\"  No results found for: \"CHOL\", \"TRIG\", \"HDL\", " "\"LDLDIRECT\", \"LDLCALC\"  x-rays as ordered  normal sinus rhythm and   Ventricular bigeminy.  PVCs  Have the same morphology.        Assessment/Plan   [unfilled]  1.  Recurrent ventricular tachycardia on sotalol and mexiletine with ATP delivered by ICD.  2.  Prior VT ablations [facility]  3.  Severe ischemic cardiomyopathy EF 25%  4.  Biventricular ICD approximately 70% BiV paced due to PVCs.  5.  Combined systolic diastolic congestive heart failure NYHA class II-III.  6.  OSAS  7.  Diabetes mellitus type 2  8.  CAD/PCI  9.  Hypertension  10.  Dyslipidemia    Plan/recommendation:  Ventricular tachycardia: Pete is a pleasant 58-year-old male with a history of severe ischemic cardiomyopathy ejection fraction 25%, CAD with prior PCI, chronic systolic diastolic CHF class II and biventricular ICD.  He has had 2 prior VT ablations and in spite of that had recurrent VT requiring initiation of sotalol and subsequently mexiletine.  He continues to have multiple runs of VT terminated by ATP which are symptomatic with palpitations and anxiety.  He is currently being admitted for telemetry monitoring while we increased the dose of his sotalol to 180 mg every 12 hours.  Will monitor for proarrhythmia on telemetry x 5 doses.  Replace electrolytes to midpoint of normal range.  Daily EKGs 2 hours post sotalol dosing.  Will continue the carvedilol 37.5 mg twice daily and other cardiac medications unchanged.  Will interrogate his ICD.    Ischemic cardiomyopathy: EF is 25%.  On GDMT.  CHF clinic has seen him previously.  NYHA class II symptoms.  Dr. Leiva has been consulted regarding possible referral for LVAD versus transplant.    Further recommendations pending patient's clinical course.  Patient seen in conjunction with Dr. Wu.  "

## 2023-12-01 NOTE — NURSING END OF SHIFT
Nursing End of Shift Summary:    Patient: Pete Trejo  MRN: [id number]  : 1965, Age: 58 y.o.    Location: [address] [place] [address]    Nursing Goals  Clinical Goals for the Shift: maintain safety    Narrative Summary of Progress Toward Clinical Goals:  A/ox4. VSS on RA. Direct admit for sotalol loading. No complaints of pain. Medications given as directed. 19 beat run of vtach- team aware.     Barriers to Goals/Nursing Concerns:  No    New Patient or Family Concerns/Issues:  No    Shift Summary:   Significant Events & Communications to Providers (last 12 hours)       Last 5 Values    No documentation.                 Oxygen Usage (last 12 hours)       Last 5 Values    No documentation.                 Mobility (last 12 hours)       Last 5 Values       Row Name 23 1130 23 1137 23 1556 23 1600          Mobility    Activity Up ad ezekiel -- -- Up ad ezekiel     Level of Assistance Independent -- -- Independent     Length of Time in Chair (min) -- -- -- 0     Distance Ambulated (feet) 20 Feet -- -- 10 Feet     Distance Ambulated (Meters Calculated) 6.1 Meters -- -- 3.05 Meters     Patient Position Sitting Sitting Supine Sitting     Turning Turns self -- -- Turns self     Distance Ambulated (Meters Calculated)(Do Not Use) 6.1 Feet -- -- 3.05 Feet                   Urethral Catheter       Active Urethral Catheter       None                  Active Lines       Active Central venous catheter / Peripherally inserted central catheter / Implantable Port / Hemodialysis catheter / Midline Catheter       None                  Infusing Medications   Medication Dose Last Rate     PRN Medications   Medication Dose Last Admin    sodium chloride  3 mL      sodium chloride  1 spray      sodium chloride-aloe vera  1 Application      acetaminophen  500 mg      traZODone  25 mg      magnesium hydroxide  30 mL      alum-mag hydroxide-simeth  30 mL      albuterol HFA  2 puff      nitroglycerin  0.4 mg

## 2023-12-01 NOTE — PROGRESS NOTES
[address] [address]                                                    Electrophysiology Inpatient Progress Note    Subjective    Patient ID: Pete Trejo is a 58 y.o. male.    Chief Complaint: No chief complaint on file.       LOS: 1 day     HPI:  Pete Trejo is a pleasant 58 y.o. male patient of Dr. Wu.  Patient is a history of cardiomyopathy status post biventricular ICD, hypertension, hyperlipidemia, ventricular tachycardia status post ablation, coronary disease status post PCI, combined systolic and diastolic congestive heart failure, COVID-19, active sleep apnea, type 2 diabetes.  In June 2020  Patient was seen in the hospital by electrophysiology  For recurrent ventricular tachycardia.  He was taken to  EP lab By Dr. Montana  For VT ablation.  He was only able to partially ablate along the border zone  Areas of the scar.  He had been on amiodarone and continued to have breakthrough ventricular tachycardia with ICD shocks.  He continued to have  Or ICD shocks throughout the summer  And was  Placed on mexiletine  [facility]  [facility]  And  Treatment  Of his ventricular tachycardia.  He was evaluated for LVAD  You have seen  And did not qualify  At the time because his medical therapy was working.  He continued to have episodes of nonsustained VT  And  Underwent EP study  And substrate modification [facility] with Dr. Lema  11/21.  Amnio and mexiletine were discontinued  And he was changed to sotalol.  He subsequently did well up until 10/23  When he developed  VT followed by ICD shock.  He was admitted at that time and mexiletine was increased to 200 mg 3 times daily.     Patient was most recently seen  11/22/2023 by Gerardo Sun CNP  In the clinic.  An echo 10/19/2020  Revealed severe LV systolic dysfunction with an EF of 25%  And large anteroapical aneurysm.  Only the basal  Left ventricular segments had preserved  contractility.  No LV thrombus noted.  He had grade 2 moderate  LV diastolic abnormality.  LA VI 53 mL/m².  His arrhythmia logbook  On the ICD download was reviewed  Revealing multiple episodes of nonsustained VT with ATP    Though no shocks  In the 3 days prior.  He was noted to be 59%  Atrial paced  And  Around 70%  BiV paced.  He was symptomatic  With  Palpitations and anxiety  Correlating with  Nonsustained VT and ATP.  Carvedilol was increased to 37.5 mg twice daily.  He is also followed by Dr. Leiva in the clinic.  Yesterday  Patient called into the clinic saying  He was  Having more symptoms  Such as when he received ATP  Previously.  He noted multiple ATP episodes every day for the past 7 days or so  With palpitations and shortness of breath.  Dr. Wu reviewed the information and recommended patient be admitted for increasing sotalol to 180 mg twice daily.      Patient was seen and examined.  He was admitted yesterday and received 2 doses of sotalol 180 mg yesterday.  EKG today 2 hours post a.m. dose showed a QTc of just over 450 ms.  He has had some brief episodes of VF not requiring therapy for or lasting long enough to be recorded by the ICD.  Device was checked this morning by Jaime.  The last ATP was on the 29th at 3 AM.  He is feeling well though noting still some shortness of breath when he walks around.  No chest pain or shortness of breath at rest.  No lower extremity edema.  No abdominal complaints.  Blood pressures low 100s to 1 teens.  Afebrile.  Heart rates in the 60s.Potassium this morning 4.3 and magnesium 1.9.  BNP was 151.  Creatinine 0.80.  Still having occasional bigeminal PVCs, isolated PVCs.  Last run was at 6:26 AM.          Allergies as of 11/30/2023 - Reviewed 11/30/2023   Allergen Reaction Noted    Morphine  07/30/2017    Tramadol  07/30/2017       Current Facility-Administered Medications:     sotaloL (BETAPACE) tablet 180 mg, 180 mg, oral, q12h Lamar LIGHT Evelyn, CNP, 180  mg at 11/30/23 2010    Maintain IV access, , , Until discontinued **AND** Saline lock IV, , , Once **AND** sodium chloride flush 3 mL, 3 mL, intravenous, PRN, Provell, Merced, CNP    sodium chloride (OCEAN) 0.65 % nasal spray 1 spray, 1 spray, Each Nostril, PRN, Provell, Merced, CNP    sodium chloride-aloe vera (AYR) nasal gel 1 Application, 1 Application, Each Nostril, PRN, Provell, Merced, CNP    acetaminophen (TYLENOL) tablet 500 mg, 500 mg, oral, q6h PRN, Provell, Merced, CNP    traZODone (DESYREL) tablet 25 mg, 25 mg, oral, Nightly PRN, Provell, Merced, CNP    magnesium hydroxide (MILK OF MAGNESIA) 400 mg/5 mL oral suspension 30 mL, 30 mL, oral, Daily PRN, Provell, Merced, CNP    alum-mag hydroxide-simeth (MAALOX ADVANCED) suspension 30 mL, 30 mL, oral, 3x daily PRN, Provell, Merced, CNP    aspirin EC tablet 81 mg, 81 mg, oral, Daily, Provell, Merced, CNP    albuterol HFA 90 mcg/actuation inhaler 2 puff, 2 puff, inhalation, q6h PRN, Provell, Merced, CNP    carvediloL (COREG) tablet 37.5 mg, 37.5 mg, oral, 2x daily with meals, Provell, Merced, CNP, 37.5 mg at 11/30/23 1807    cholecalciferol (vitamin D3) 1,000 unit (25mcg) tab/cap 2,000 Units, 2,000 Units, oral, Daily, Provell, Merced, CNP    empagliflozin (JARDIANCE) tablet 10 mg, 10 mg, oral, Daily, Provell, Merced, CNP    furosemide (LASIX) tablet 40 mg, 40 mg, oral, Daily, Provell, Merced, CNP    magnesium oxide (MAG-OX) tablet 400 mg, 400 mg, oral, Daily, Provell, Merced, CNP    mexiletine (MEXITIL) capsule 200 mg, 200 mg, oral, q8h KULDEEP, Provell, Merced, CNP, 200 mg at 12/01/23 0601    nitroglycerin (NITROSTAT) SL tablet 0.4 mg, 0.4 mg, sublingual, q5 min PRN, Merced Newton CNP    lansoprazole (PREVACID) capsule 30 mg, 30 mg, oral, Daily at 1600, Merced Newton CNP, 30 mg at 11/30/23 1807    potassium chloride (KLOR-CON) CR tablet 30 mEq, 30 mEq, oral, Daily, Merced Newton CNP    rivaroxaban (XARELTO) tablet 20 mg, 20 mg, oral, Daily with dinner,  ProveMerced hyman, CNP    rosuvastatin (CRESTOR) tablet 40 mg, 40 mg, oral, Nightly, Provell, Merced, CNP, 40 mg at 11/30/23 2012    sacubitriL-valsartan (ENTRESTO) 24-26 mg per tablet 1 tablet, 1 tablet, oral, 2x daily, Provell, Merced, CNP, 1 tablet at 11/30/23 2012    spironolactone (ALDACTONE) tablet 50 mg, 50 mg, oral, Daily, ProveMerced hyman, CNP    tamsulosin (FLOMAX) 24 hr capsule 0.4 mg, 0.4 mg, oral, Nightly, Provell, Merced, CNP, 0.4 mg at 11/30/23 2012    LORazepam (ATIVAN) tablet 1 mg, 1 mg, oral, 2x daily PRN, ProveIsha hymann, CNP    Objective     Vital signs in last 24 hours:   Temp:  [36.5 °C (97.7 °F)-36.7 °C (98 °F)] 36.6 °C (97.8 °F)  Heart Rate:  [41-70] 41  Resp:  [16-20] 16  SpO2:  [90 %-98 %] 92 %  BP: (101-131)/(50-66) 101/50  Weight: 123.2 kg (271 lb 9.6 oz)    Intake/Output this shift:  I/O this shift:  In: 15 [P.O.:15]  Out: -     Intake/Output Summary (Last 24 hours) at 12/1/2023 0750  Last data filed at 12/1/2023 0731  Gross per 24 hour   Intake 965 ml   Output 0 ml   Net 965 ml     Cumulative I&O:    Physical Exam  Vitals reviewed.   Constitutional:       Appearance: He is well-developed. He is obese.   HENT:      Head: Normocephalic and atraumatic.   Eyes:      General: No scleral icterus.     Comments: Pupils equal   Neck:      Thyroid: No thyromegaly.      Vascular: No carotid bruit or JVD.   Cardiovascular:      Rate and Rhythm: Normal rate and regular rhythm.      Heart sounds: Normal heart sounds. No murmur heard.     No friction rub. No gallop.      Comments:   ICD left upper chest without erosion or infection  Pulmonary:      Effort: Pulmonary effort is normal.      Breath sounds: Normal breath sounds. No wheezing, rhonchi or rales.   Abdominal:      General: Abdomen is flat. Bowel sounds are normal.      Palpations: Abdomen is soft.      Tenderness: There is no abdominal tenderness.   Musculoskeletal:         General: Normal range of motion.      Cervical back: Normal range of  "motion and neck supple.      Right lower leg: No edema.      Left lower leg: No edema.   Lymphadenopathy:      Cervical: No cervical adenopathy.   Skin:     General: Skin is warm and dry.      Findings: No rash.   Neurological:      General: No focal deficit present.      Mental Status: He is alert and oriented to person, place, and time.   Psychiatric:         Mood and Affect: Mood normal.         Behavior: Behavior normal.         Thought Content: Thought content normal.         Judgment: Judgment normal.         Data Review:   BMP:  Lab Results   Component Value Date     12/01/2023    K 4.3 12/01/2023     12/01/2023    CO2 21 12/01/2023    BUN 10 12/01/2023    CREATININE 0.80 12/01/2023    GLUCOSE 115 (H) 12/01/2023    CALCIUM 8.8 12/01/2023     CBC: No results found for: \"WBC\", \"RBC\", \"HGB\", \"HCT\", \"PLT\"  CMP:  Lab Results   Component Value Date     12/01/2023    K 4.3 12/01/2023     12/01/2023    CO2 21 12/01/2023    GLUCOSE 115 (H) 12/01/2023    CREATININE 0.80 12/01/2023    CALCIUM 8.8 12/01/2023    ALBUMIN 4.0 11/30/2023    ALKPHOS 71 11/30/2023    BILITOT 0.89 11/30/2023    ALT 29 11/30/2023    AST 17 11/30/2023    BUN 10 12/01/2023    ANIONGAP 10 12/01/2023     Lab Results   Component Value Date     (H) 12/01/2023     Lipid: No results found for: \"CHOL\", \"HDL\", \"TRIG\", \"LDLCALC\"  TSH:  No results found for: \"TSH\"  Magnesium:  Lab Results   Component Value Date    MG 1.9 12/01/2023     PT/INR:   No results found for: \"PT\", \"INR\"    Tests:  XR chest portable 1 view    Result Date: 11/30/2023  Narrative: Exam: Single view chest x-ray 11/30/2023 . Clinical History:   heart failure with reduced ejection fraction Comparison(s): Available Findings: 3-lead pacemaker. Cardiomegaly. No acute infiltrate or effusion..     Impression: IMPRESSION:  1.  No acute abnormalities.      Assessment/Plan   Patient Active Problem List    Diagnosis Date Noted    VT (ventricular tachycardia) " (CMS/[facility]) 11/30/2023    Gastroesophageal reflux disease without esophagitis 10/19/2023    Benign prostatic hyperplasia without lower urinary tract symptoms 10/19/2023    Diabetes mellitus type 2 in obese (CMS/[facility]) 10/19/2023    Moderate obesity 10/19/2023    Elevated troponin 10/19/2023    Lymphocytosis (symptomatic) 10/19/2023    AICD discharge 10/18/2023    Long term current use of antiarrhythmic drug 03/04/2022    Sustained ventricular tachycardia (CMS/[facility]) 09/19/2021    Chronic anticoagulation 09/19/2021    History of ventricular tachycardia 09/19/2021    Paroxysmal atrial fibrillation (CMS/[facility]) 09/19/2021    Chronic combined systolic and diastolic CHF, NYHA class 2 and ACC/AHA stage C (CMS/[facility]) 07/11/2021    Automatic implantable cardioverter-defibrillator in situ 06/10/2020    Presence of stent in coronary artery 06/10/2020    Ischemic cardiomyopathy 10/30/2017    Hypertension 10/30/2017    Hyperlipidemia 10/30/2017     Assessment/Plan   1.  Recurrent ventricular tachycardia on sotalol and mexiletine with ATP delivered by ICD.  2.  Prior VT ablations [place] [facility]  3.  Severe ischemic cardiomyopathy EF 25%  4.  Biventricular ICD approximately 70% BiV paced due to PVCs.  5.  Combined systolic diastolic congestive heart failure NYHA class II-III.  6.  OSAS  7.  Diabetes mellitus type 2  8.  CAD/PCI  9.  Hypertension  10.  Dyslipidemia    Plan:  Continue sotalol 180 mg every 12 hours.  Tolerating well so far.  Seems to be helping with decreasing number of PVCs and bigeminal events as well as the nonsustained VF episodes.  No therapy from his device since 11/29/2023 at 3 AM when he received ATP.  On GDMT for his CAD and CHF.  BNP is elevated a bit at 151.  He continues to complain of some dyspnea on exertion.  Will increase the Lasix to 60 mg daily.  Continue to monitor for evidence of proarrhythmia and efficacy of sotalol.  Continue other home meds unchanged.  May be able to be discharged tomorrow late  afternoon if tolerating the sotalol.    Plan discussed in conjunction with Dr. Wu.    Electronically signed by: Merced Newton CNP  12/1/2023  7:50 AM      A voice recognition program was used to aid in documentation of this record.  Sometimes words are not printed exactly as they were spoken.  While efforts were made to carefully edit and correct any inaccuracies, some errors may be present; please take these into context.  Please contact the provider if errors are identified.

## 2023-12-02 PROCEDURE — (BLANK) HC ROOM ICU INTERMEDIATE

## 2023-12-02 PROCEDURE — 6370000100 HC RX 637 (ALT 250 FOR IP): Performed by: INTERNAL MEDICINE

## 2023-12-02 PROCEDURE — 93005 ELECTROCARDIOGRAM TRACING: CPT | Performed by: INTERNAL MEDICINE

## 2023-12-02 PROCEDURE — 6370000100 HC RX 637 (ALT 250 FOR IP): Performed by: NURSE PRACTITIONER

## 2023-12-02 PROCEDURE — 99231 SBSQ HOSP IP/OBS SF/LOW 25: CPT | Performed by: INTERNAL MEDICINE

## 2023-12-02 RX ORDER — CARVEDILOL 25 MG/1
50 TABLET ORAL 2 TIMES DAILY WITH MEALS
Status: DISCONTINUED | OUTPATIENT
Start: 2023-12-02 | End: 2023-12-07 | Stop reason: HOSPADM

## 2023-12-02 RX ORDER — CARVEDILOL 12.5 MG/1
12.5 TABLET ORAL ONCE
Status: COMPLETED | OUTPATIENT
Start: 2023-12-02 | End: 2023-12-02

## 2023-12-02 RX ADMIN — MAGNESIUM OXIDE 400 MG: 400 TABLET ORAL at 08:08

## 2023-12-02 RX ADMIN — CARVEDILOL 12.5 MG: 12.5 TABLET, FILM COATED ORAL at 10:23

## 2023-12-02 RX ADMIN — SACUBITRIL AND VALSARTAN 1 TABLET: 24; 26 TABLET, FILM COATED ORAL at 20:38

## 2023-12-02 RX ADMIN — CARVEDILOL 37.5 MG: 25 TABLET, FILM COATED ORAL at 08:07

## 2023-12-02 RX ADMIN — CARVEDILOL 50 MG: 25 TABLET, FILM COATED ORAL at 17:46

## 2023-12-02 RX ADMIN — RIVAROXABAN 20 MG: 20 TABLET, FILM COATED ORAL at 17:46

## 2023-12-02 RX ADMIN — TAMSULOSIN HYDROCHLORIDE 0.4 MG: 0.4 CAPSULE ORAL at 20:38

## 2023-12-02 RX ADMIN — ASPIRIN 81 MG: 81 TABLET ORAL at 08:07

## 2023-12-02 RX ADMIN — POTASSIUM CHLORIDE 30 MEQ: 750 TABLET, FILM COATED, EXTENDED RELEASE ORAL at 08:07

## 2023-12-02 RX ADMIN — Medication 2000 UNITS: at 08:08

## 2023-12-02 RX ADMIN — SACUBITRIL AND VALSARTAN 1 TABLET: 24; 26 TABLET, FILM COATED ORAL at 08:07

## 2023-12-02 RX ADMIN — LANSOPRAZOLE 30 MG: 30 CAPSULE, DELAYED RELEASE ORAL at 16:38

## 2023-12-02 RX ADMIN — MEXILETINE HYDROCHLORIDE 200 MG: 200 CAPSULE ORAL at 12:46

## 2023-12-02 RX ADMIN — ROSUVASTATIN CALCIUM 40 MG: 20 TABLET, FILM COATED ORAL at 20:38

## 2023-12-02 RX ADMIN — EMPAGLIFLOZIN 10 MG: 10 TABLET, FILM COATED ORAL at 08:08

## 2023-12-02 RX ADMIN — SPIRONOLACTONE 50 MG: 50 TABLET ORAL at 08:08

## 2023-12-02 RX ADMIN — FUROSEMIDE 60 MG: 40 TABLET ORAL at 08:08

## 2023-12-02 RX ADMIN — SOTALOL HYDROCHLORIDE 180 MG: 120 TABLET ORAL at 08:03

## 2023-12-02 RX ADMIN — SOTALOL HYDROCHLORIDE 180 MG: 120 TABLET ORAL at 20:37

## 2023-12-02 RX ADMIN — MEXILETINE HYDROCHLORIDE 200 MG: 200 CAPSULE ORAL at 20:38

## 2023-12-02 RX ADMIN — MEXILETINE HYDROCHLORIDE 200 MG: 200 CAPSULE ORAL at 06:31

## 2023-12-02 NOTE — PROGRESS NOTES
No problems updated.                                                  Electrophysiology Inpatient Progress Note    Pete Trejo is a 58 y.o. male whom we are seeing today for the further evaluation of VT.    Had asxs VT this morning at around 8am terminated with ATP.  QT looks ok on telemetry.    On Sotalol 180 BID (4 doses are in).  Mexitil 200 TID. Coreg 37.5 BID  Lytes are OK.    Had a long conversation with the patient and his son.      Problem List Items Addressed This Visit          Cardiac and Vasculature    Chronic combined systolic and diastolic CHF, NYHA class 2 and ACC/AHA stage C (CMS/HCC) - Primary    Relevant Medications    sotaloL (BETAPACE) tablet 180 mg    aspirin EC tablet 81 mg    mexiletine (MEXITIL) capsule 200 mg    nitroglycerin (NITROSTAT) SL tablet 0.4 mg    rosuvastatin (CRESTOR) tablet 40 mg    sacubitriL-valsartan (ENTRESTO) 24-26 mg per tablet 1 tablet    furosemide (LASIX) tablet 60 mg    carvediloL (COREG) tablet 50 mg (Start on 12/2/2023  6:00 PM)    carvediloL (COREG) tablet 12.5 mg    Other Relevant Orders    Ambulatory referral to CHF Clinic    History of ventricular tachycardia    Relevant Orders    Ambulatory referral to CHF Clinic        Events overnight:    Chief Complaint: No chief complaint on file.      Current Medications:  Current Facility-Administered Medications   Medication Dose Route Frequency Provider Last Rate Last Admin    carvediloL (COREG) tablet 50 mg  50 mg oral 2x daily with meals Wilfredo Montana MD        carvediloL (COREG) tablet 12.5 mg  12.5 mg oral Once Wilfredo Montana MD        furosemide (LASIX) tablet 60 mg  60 mg oral Daily ProveMerced hyman CNP   60 mg at 12/02/23 0808    sotaloL (BETAPACE) tablet 180 mg  180 mg oral q12h KULDEPE ProveBriseyda hymanelyn, CNP   180 mg at 12/02/23 0803    sodium chloride flush 3 mL  3 mL intravenous PRN ProveMerced hyman CNP        sodium chloride (OCEAN) 0.65 % nasal spray 1 spray  1 spray Each Nostril PRN Merced Newton  CNP        sodium chloride-aloe vera (AYR) nasal gel 1 Application  1 Application Each Nostril PRN Merced Newton CNP        acetaminophen (TYLENOL) tablet 500 mg  500 mg oral q6h PRN Merced Newton CNP        traZODone (DESYREL) tablet 25 mg  25 mg oral Nightly PRN Merced Newton CNP        magnesium hydroxide (MILK OF MAGNESIA) 400 mg/5 mL oral suspension 30 mL  30 mL oral Daily PRN Merced Newton CNP        alum-mag hydroxide-simeth (MAALOX ADVANCED) suspension 30 mL  30 mL oral 3x daily PRN Merced Newton CNP        aspirin EC tablet 81 mg  81 mg oral Daily Merced Newton CNP   81 mg at 12/02/23 0807    albuterol HFA 90 mcg/actuation inhaler 2 puff  2 puff inhalation q6h PRN Merced Newton CNP        cholecalciferol (vitamin D3) 1,000 unit (25mcg) tab/cap 2,000 Units  2,000 Units oral Daily ProveMerced hyman CNP   2,000 Units at 12/02/23 0808    empagliflozin (JARDIANCE) tablet 10 mg  10 mg oral Daily Merced Newton CNP   10 mg at 12/02/23 0808    magnesium oxide (MAG-OX) tablet 400 mg  400 mg oral Daily Merced Newton CNP   400 mg at 12/02/23 0808    mexiletine (MEXITIL) capsule 200 mg  200 mg oral q8h KULDEEP Merced Newton CNP   200 mg at 12/02/23 0631    nitroglycerin (NITROSTAT) SL tablet 0.4 mg  0.4 mg sublingual q5 min PRN Merced Newton CNP        lansoprazole (PREVACID) capsule 30 mg  30 mg oral Daily at 1600 Merced Newton CNP   30 mg at 12/01/23 1757    potassium chloride (KLOR-CON) CR tablet 30 mEq  30 mEq oral Daily ProveMerced hyman CNP   30 mEq at 12/02/23 0807    rivaroxaban (XARELTO) tablet 20 mg  20 mg oral Daily with dinner Merced Newton CNP   20 mg at 12/01/23 1803    rosuvastatin (CRESTOR) tablet 40 mg  40 mg oral Nightly ProveBriseyda hymanelyn, CNP   40 mg at 12/01/23 2020    sacubitriL-valsartan (ENTRESTO) 24-26 mg per tablet 1 tablet  1 tablet oral 2x daily Merced Newton CNP   1 tablet at 12/02/23 0807    spironolactone (ALDACTONE) tablet 50 mg  50 mg oral Daily Lamar  Merced, CNP   50 mg at 12/02/23 0808    tamsulosin (FLOMAX) 24 hr capsule 0.4 mg  0.4 mg oral Nightly ProveMerced hyman CNP   0.4 mg at 12/01/23 2020    LORazepam (ATIVAN) tablet 1 mg  1 mg oral 2x daily PRN Provell, Merced, CNP              Objective     Vital signs in last 24 hours:   Temp:  [36.5 °C (97.7 °F)-36.7 °C (98.1 °F)] 36.7 °C (98.1 °F)  Heart Rate:  [52-65] 52  Resp:  [14-18] 16  SpO2:  [90 %-93 %] 91 %  BP: (102-128)/(56-73) 107/73  Weight: 120.8 kg (266 lb 4.8 oz)          Lab Results   Component Value Date    WBC 6.2 12/01/2023    HGB 15.7 12/01/2023    HCT 45.0 12/01/2023     12/01/2023    CHOL 106 07/28/2022    TRIG 154 (H) 07/28/2022    HDL 25 (L) 07/28/2022    ALT 29 11/30/2023    AST 17 11/30/2023     12/01/2023    K 4.3 12/01/2023     12/01/2023    CREATININE 0.80 12/01/2023    BUN 10 12/01/2023    CO2 21 12/01/2023    TSH 2.833 10/19/2023    PSA 0.29 09/20/2018    INR 1.9 (H) 10/06/2021    HGBA1C 6.2 (H) 09/22/2023    MICROALBUR <7.0 09/22/2023         Data Review:   Imaging       XR chest portable 1 view    Result Date: 11/30/2023  Exam: Single view chest x-ray 11/30/2023 . Clinical History:   heart failure with reduced ejection fraction Comparison(s): Available Findings: 3-lead pacemaker. Cardiomegaly. No acute infiltrate or effusion..     IMPRESSION:  1.  No acute abnormalities.        Patient Active Problem List   Diagnosis    Ischemic cardiomyopathy    Hypertension    Hyperlipidemia    Automatic implantable cardioverter-defibrillator in situ    Presence of stent in coronary artery    Chronic combined systolic and diastolic CHF, NYHA class 2 and ACC/AHA stage C (CMS/HCC)    Sustained ventricular tachycardia (CMS/HCC)    Chronic anticoagulation    History of ventricular tachycardia    Paroxysmal atrial fibrillation (CMS/HCC)    Long term current use of antiarrhythmic drug    AICD discharge    Gastroesophageal reflux disease without esophagitis    Benign prostatic  hyperplasia without lower urinary tract symptoms    Diabetes mellitus type 2 in obese (CMS/HCC)    Moderate obesity    Elevated troponin    Lymphocytosis (symptomatic)    VT (ventricular tachycardia) (CMS/HCC)     Assessment/Plan      Active Problems:    VT (ventricular tachycardia) (CMS/HCC)    Increased the Coreg to 50 BID (gave an extra 12.5 this morning).  12 lead daily to follow-up Qtc  Observe on tele over the next 24 hours...    Electronically signed by: Wilfredo Montana MD  12/2/2023  9:59 AM

## 2023-12-02 NOTE — NURSING END OF SHIFT
Nursing End of Shift Summary:    Patient: Pete Trejo  MRN: [id number]  : 1965, Age: 58 y.o.    Location: [address] [place] [address]    Nursing Goals  Clinical Goals for the Shift: maintain safety, monitor EKG    Narrative Summary of Progress Toward Clinical Goals:  A/Ox4. VSS on RA. No complaints of pain. Medications given as directed. Tolerating sotalol loading well.     Barriers to Goals/Nursing Concerns:  No    New Patient or Family Concerns/Issues:  No    Shift Summary:   Significant Events & Communications to Providers (last 12 hours)       Last 5 Values    No documentation.                 Oxygen Usage (last 12 hours)       Last 5 Values    No documentation.                 Mobility (last 12 hours)       Last 5 Values       Row Name 23 0731 23 0800 23 1130 23 1215 23 1559       Mobility    Activity -- Up ad ezekiel -- Up ad ezekiel --    Level of Assistance -- Independent -- Independent --    Length of Time in Chair (min) -- 0 -- -- --    Patient Position Supine Sitting Supine Sitting Supine    Turning -- Turns self -- Turns self --      Row Name 23 1630                   Mobility    Activity Up ad ezekiel;Ambulate in [place]        Level of Assistance Independent        Length of Time in Chair (min) 0        Distance Ambulated (feet) 30 Feet        Distance Ambulated (Meters Calculated) 9.14 Meters        Turning Turns self        Distance Ambulated (Meters Calculated)(Do Not Use) 9.14 Feet                      Urethral Catheter       Active Urethral Catheter       None                  Active Lines       Active Central venous catheter / Peripherally inserted central catheter / Implantable Port / Hemodialysis catheter / Midline Catheter       None                  Infusing Medications   Medication Dose Last Rate     PRN Medications   Medication Dose Last Admin    sodium chloride  3 mL      sodium chloride  1 spray      sodium chloride-aloe vera  1 Application      acetaminophen  500 mg       traZODone  25 mg      magnesium hydroxide  30 mL      alum-mag hydroxide-simeth  30 mL      albuterol HFA  2 puff      nitroglycerin  0.4 mg      LORazepam  1 mg

## 2023-12-02 NOTE — NURSING NOTE
Pt has had several runs of vtach this shift, hr increased to 118 bpm at highest rate.  Noted pt to be lying on right side for 3 of these episodes.  Encouraged pt to lay on back with hob elevated or lay on left side.  Pt is completely asymptomatic and episodes end on own. Continue monitoring.

## 2023-12-03 LAB
ANION GAP SERPL CALC-SCNC: 11 MMOL/L (ref 3–11)
BASOPHILS # BLD AUTO: 0.1 10*3/UL
BASOPHILS NFR BLD AUTO: 0.9 % (ref 0–2)
BUN SERPL-MCNC: 17 MG/DL (ref 7–25)
CALCIUM SERPL-MCNC: 9.5 MG/DL (ref 8.6–10.3)
CHLORIDE SERPL-SCNC: 104 MMOL/L (ref 98–107)
CO2 SERPL-SCNC: 21 MMOL/L (ref 21–32)
CREAT SERPL-MCNC: 0.88 MG/DL (ref 0.7–1.3)
EGFRCR SERPLBLD CKD-EPI 2021: 100 ML/MIN/1.73M*2
EOSINOPHIL # BLD AUTO: 0.2 10*3/UL
EOSINOPHIL NFR BLD AUTO: 2.7 % (ref 0–3)
ERYTHROCYTE [DISTWIDTH] IN BLOOD BY AUTOMATED COUNT: 14.4 % (ref 11.5–15)
GLUCOSE SERPL-MCNC: 88 MG/DL (ref 70–105)
HCT VFR BLD AUTO: 49.2 % (ref 38–50)
HGB BLD-MCNC: 16.8 G/DL (ref 13.2–17.2)
LYMPHOCYTES # BLD AUTO: 2.1 10*3/UL
LYMPHOCYTES NFR BLD AUTO: 32 % (ref 15–47)
MAGNESIUM SERPL-MCNC: 2.2 MG/DL (ref 1.8–2.4)
MCH RBC QN AUTO: 31.3 PG (ref 29–34)
MCHC RBC AUTO-ENTMCNC: 34.1 G/DL (ref 32–36)
MCV RBC AUTO: 91.8 FL (ref 82–97)
MONOCYTES # BLD AUTO: 0.8 10*3/UL
MONOCYTES NFR BLD AUTO: 11.6 % (ref 5–13)
NEUTROPHILS # BLD AUTO: 3.5 10*3/UL
NEUTROPHILS NFR BLD AUTO: 52.8 % (ref 46–70)
PLATELET # BLD AUTO: 196 10*3/UL (ref 130–350)
PMV BLD AUTO: 8.4 FL (ref 6.9–10.8)
POTASSIUM SERPL-SCNC: 4.3 MMOL/L (ref 3.5–5.1)
RBC # BLD AUTO: 5.36 10*6/ΜL (ref 4.1–5.8)
SODIUM SERPL-SCNC: 136 MMOL/L (ref 135–145)
WBC # BLD AUTO: 6.6 10*3/UL (ref 3.7–9.6)

## 2023-12-03 PROCEDURE — 99231 SBSQ HOSP IP/OBS SF/LOW 25: CPT | Performed by: INTERNAL MEDICINE

## 2023-12-03 PROCEDURE — 93010 ELECTROCARDIOGRAM REPORT: CPT | Performed by: INTERNAL MEDICINE

## 2023-12-03 PROCEDURE — 85025 COMPLETE CBC W/AUTO DIFF WBC: CPT | Performed by: INTERNAL MEDICINE

## 2023-12-03 PROCEDURE — 36415 COLL VENOUS BLD VENIPUNCTURE: CPT | Performed by: INTERNAL MEDICINE

## 2023-12-03 PROCEDURE — 80048 BASIC METABOLIC PNL TOTAL CA: CPT | Performed by: INTERNAL MEDICINE

## 2023-12-03 PROCEDURE — 83735 ASSAY OF MAGNESIUM: CPT | Performed by: INTERNAL MEDICINE

## 2023-12-03 PROCEDURE — (BLANK) HC ROOM ICU INTERMEDIATE

## 2023-12-03 PROCEDURE — 93005 ELECTROCARDIOGRAM TRACING: CPT | Performed by: INTERNAL MEDICINE

## 2023-12-03 PROCEDURE — 6370000100 HC RX 637 (ALT 250 FOR IP): Performed by: INTERNAL MEDICINE

## 2023-12-03 PROCEDURE — 6370000100 HC RX 637 (ALT 250 FOR IP): Performed by: NURSE PRACTITIONER

## 2023-12-03 RX ADMIN — MEXILETINE HYDROCHLORIDE 200 MG: 200 CAPSULE ORAL at 13:34

## 2023-12-03 RX ADMIN — SACUBITRIL AND VALSARTAN 1 TABLET: 24; 26 TABLET, FILM COATED ORAL at 20:51

## 2023-12-03 RX ADMIN — SACUBITRIL AND VALSARTAN 1 TABLET: 24; 26 TABLET, FILM COATED ORAL at 10:56

## 2023-12-03 RX ADMIN — POTASSIUM CHLORIDE 30 MEQ: 750 TABLET, FILM COATED, EXTENDED RELEASE ORAL at 10:56

## 2023-12-03 RX ADMIN — ROSUVASTATIN CALCIUM 40 MG: 20 TABLET, FILM COATED ORAL at 20:51

## 2023-12-03 RX ADMIN — SPIRONOLACTONE 50 MG: 50 TABLET ORAL at 10:57

## 2023-12-03 RX ADMIN — Medication 2000 UNITS: at 10:56

## 2023-12-03 RX ADMIN — LANSOPRAZOLE 30 MG: 30 CAPSULE, DELAYED RELEASE ORAL at 16:34

## 2023-12-03 RX ADMIN — MAGNESIUM OXIDE 400 MG: 400 TABLET ORAL at 10:55

## 2023-12-03 RX ADMIN — MEXILETINE HYDROCHLORIDE 200 MG: 200 CAPSULE ORAL at 20:51

## 2023-12-03 RX ADMIN — EMPAGLIFLOZIN 10 MG: 10 TABLET, FILM COATED ORAL at 10:56

## 2023-12-03 RX ADMIN — CARVEDILOL 50 MG: 25 TABLET, FILM COATED ORAL at 09:59

## 2023-12-03 RX ADMIN — CARVEDILOL 50 MG: 25 TABLET, FILM COATED ORAL at 18:15

## 2023-12-03 RX ADMIN — SOTALOL HYDROCHLORIDE 180 MG: 120 TABLET ORAL at 20:51

## 2023-12-03 RX ADMIN — RIVAROXABAN 20 MG: 20 TABLET, FILM COATED ORAL at 18:16

## 2023-12-03 RX ADMIN — TAMSULOSIN HYDROCHLORIDE 0.4 MG: 0.4 CAPSULE ORAL at 20:51

## 2023-12-03 RX ADMIN — MEXILETINE HYDROCHLORIDE 200 MG: 200 CAPSULE ORAL at 06:27

## 2023-12-03 RX ADMIN — ASPIRIN 81 MG: 81 TABLET ORAL at 10:00

## 2023-12-03 RX ADMIN — FUROSEMIDE 60 MG: 40 TABLET ORAL at 10:56

## 2023-12-03 RX ADMIN — SOTALOL HYDROCHLORIDE 180 MG: 120 TABLET ORAL at 10:14

## 2023-12-03 NOTE — NURSING NOTE
8119 [facility] called to advise this RN pt in VT. This RN entered patient room to see him laying in bed feels palpitations only. Advised patient to bare down, unsuccessful.   Segundo Cotton made aware as well as Dr. Montana. Dr. Montana on his way to bedside  1000 pt hooked up to cart 5 lead EKG, continuous VS, stable.  1027 Dr. Montana at bedside  1033 pacing by Dr. Montana  1034 NSR  Patient tired otherwise stable see VS.

## 2023-12-03 NOTE — PROGRESS NOTES
No problems updated.                                                  Electrophysiology Inpatient Progress Note    Pete Trejo is a 58 y.o. male whom we are seeing today for the further evaluation of VT.    This morning received meds late (by about 2 hours) - had sustained slow VT at 118 bpm (below the rate cutoff).  Mildly symptomatic.  ATP at 73% (370ms) successfully reverted to SR.  Device reprogrammed to upper tracking and sensor at 100 bpm.  VT Zone 1 reduced to 110 bpm w/ 72% ATP programmed in for 8 attempts X 2.      Problem List Items Addressed This Visit          Cardiac and Vasculature    Chronic combined systolic and diastolic CHF, NYHA class 2 and ACC/AHA stage C (CMS/HCC) - Primary    Relevant Medications    sotaloL (BETAPACE) tablet 180 mg    aspirin EC tablet 81 mg    mexiletine (MEXITIL) capsule 200 mg    nitroglycerin (NITROSTAT) SL tablet 0.4 mg    rosuvastatin (CRESTOR) tablet 40 mg    sacubitriL-valsartan (ENTRESTO) 24-26 mg per tablet 1 tablet    furosemide (LASIX) tablet 60 mg    carvediloL (COREG) tablet 50 mg    lidocaine bolus from infusion 100 mg    Other Relevant Orders    Ambulatory referral to CHF Clinic    History of ventricular tachycardia    Relevant Orders    Ambulatory referral to CHF Clinic        Events overnight:    Chief Complaint: No chief complaint on file.      Current Medications:  Current Facility-Administered Medications   Medication Dose Route Frequency Provider Last Rate Last Admin    lidocaine bolus from infusion 100 mg  100 mg intravenous Once Segundo Cotton PA        carvediloL (COREG) tablet 50 mg  50 mg oral 2x daily with meals Wilfredo Montana MD   50 mg at 12/03/23 0959    furosemide (LASIX) tablet 60 mg  60 mg oral Daily ProveBriseyda hymanelyn, CNP   60 mg at 12/03/23 1056    sotaloL (BETAPACE) tablet 180 mg  180 mg oral q12h KULDEEP ProveBriseyda hymanelyn CNP   180 mg at 12/03/23 1014    sodium chloride flush 3 mL  3 mL intravenous PRN Briseyda Newtonelysonal CNP        sodium  chloride (OCEAN) 0.65 % nasal spray 1 spray  1 spray Each Nostril PRN ProveMerced hyman CNP        sodium chloride-aloe vera (AYR) nasal gel 1 Application  1 Application Each Nostril PRN ProveMerced hyman CNP        acetaminophen (TYLENOL) tablet 500 mg  500 mg oral q6h PRN ProveMerced hyman CNP        traZODone (DESYREL) tablet 25 mg  25 mg oral Nightly PRN ProveMerced hyman CNP        magnesium hydroxide (MILK OF MAGNESIA) 400 mg/5 mL oral suspension 30 mL  30 mL oral Daily PRN ProveMerced hyman CNP        alum-mag hydroxide-simeth (MAALOX ADVANCED) suspension 30 mL  30 mL oral 3x daily PRN ProveMerced hyman CNP        aspirin EC tablet 81 mg  81 mg oral Daily Proveyenni, Merced, CNP   81 mg at 12/03/23 1000    albuterol HFA 90 mcg/actuation inhaler 2 puff  2 puff inhalation q6h PRN Merced Newton CNP        cholecalciferol (vitamin D3) 1,000 unit (25mcg) tab/cap 2,000 Units  2,000 Units oral Daily ProveMerced hyman CNP   2,000 Units at 12/03/23 1056    empagliflozin (JARDIANCE) tablet 10 mg  10 mg oral Daily ProveMerced hyman CNP   10 mg at 12/03/23 1056    magnesium oxide (MAG-OX) tablet 400 mg  400 mg oral Daily ProveMerced hyman, CNP   400 mg at 12/03/23 1055    mexiletine (MEXITIL) capsule 200 mg  200 mg oral q8h KULDEEP ProveIsha hymann CNP   200 mg at 12/03/23 1334    nitroglycerin (NITROSTAT) SL tablet 0.4 mg  0.4 mg sublingual q5 min PRN ProveMerced hyman CNP        lansoprazole (PREVACID) capsule 30 mg  30 mg oral Daily at 1600 ProveMerced hyman, CNP   30 mg at 12/02/23 1638    potassium chloride (KLOR-CON) CR tablet 30 mEq  30 mEq oral Daily ProveIsha hymann, CNP   30 mEq at 12/03/23 1056    rivaroxaban (XARELTO) tablet 20 mg  20 mg oral Daily with dinner Provell, Merced, CNP   20 mg at 12/02/23 1746    rosuvastatin (CRESTOR) tablet 40 mg  40 mg oral Nightly Merced Newton CNP   40 mg at 12/02/23 2038    sacubitriL-valsartan (ENTRESTO) 24-26 mg per tablet 1 tablet  1 tablet oral 2x daily Merced Newton CNP   1 tablet  at 12/03/23 1056    spironolactone (ALDACTONE) tablet 50 mg  50 mg oral Daily Provell, Merced, CNP   50 mg at 12/03/23 1057    tamsulosin (FLOMAX) 24 hr capsule 0.4 mg  0.4 mg oral Nightly Provell, Merced, CNP   0.4 mg at 12/02/23 2038    LORazepam (ATIVAN) tablet 1 mg  1 mg oral 2x daily PRN Provell, Merced, CNP              Objective     Vital signs in last 24 hours:   Temp:  [36.4 °C (97.6 °F)-36.7 °C (98.1 °F)] 36.6 °C (97.9 °F)  Heart Rate:  [] 59  Resp:  [14-18] 18  SpO2:  [90 %-95 %] 92 %  BP: (111-131)/(61-96) 118/70  Weight: 119.7 kg (264 lb)          Lab Results   Component Value Date    WBC 6.6 12/03/2023    HGB 16.8 12/03/2023    HCT 49.2 12/03/2023     12/03/2023    CHOL 106 07/28/2022    TRIG 154 (H) 07/28/2022    HDL 25 (L) 07/28/2022    ALT 29 11/30/2023    AST 17 11/30/2023     12/03/2023    K 4.3 12/03/2023     12/03/2023    CREATININE 0.88 12/03/2023    BUN 17 12/03/2023    CO2 21 12/03/2023    TSH 2.833 10/19/2023    PSA 0.29 09/20/2018    INR 1.9 (H) 10/06/2021    HGBA1C 6.2 (H) 09/22/2023    MICROALBUR <7.0 09/22/2023         Data Review:   Imaging       Result Date: 11/30/2023  Exam: Single view chest x-ray 11/30/2023 . Clinical History:   heart failure with reduced ejection fraction Comparison(s): Available Findings: 3-lead pacemaker. Cardiomegaly. No acute infiltrate or effusion..     IMPRESSION:  1.  No acute abnormalities.        Patient Active Problem List   Diagnosis    Ischemic cardiomyopathy    Hypertension    Hyperlipidemia    Automatic implantable cardioverter-defibrillator in situ    Presence of stent in coronary artery    Chronic combined systolic and diastolic CHF, NYHA class 2 and ACC/AHA stage C (CMS/HCC)    Sustained ventricular tachycardia (CMS/HCC)    Chronic anticoagulation    History of ventricular tachycardia    Paroxysmal atrial fibrillation (CMS/HCC)    Long term current use of antiarrhythmic drug    AICD discharge    Gastroesophageal reflux  disease without esophagitis    Benign prostatic hyperplasia without lower urinary tract symptoms    Diabetes mellitus type 2 in obese (CMS/HCC)    Moderate obesity    Elevated troponin    Lymphocytosis (symptomatic)    VT (ventricular tachycardia) (CMS/HCC)     Qtc acceptable on 12 lead EKG    Assessment/Plan      Active Problems:    VT (ventricular tachycardia) (CMS/HCC)    Device reprogrammed to upper tracking and sensor at 100 bpm.  VT Zone 1 reduced to 110 bpm w/ 72% ATP programmed in for 8 attempts X 2.  Observe on telemetry 24 hours on current med regimen  Check lytes  Follow-up QTc    Electronically signed by: Wilfredo Montana MD  12/3/2023  2:31 PM

## 2023-12-03 NOTE — NURSING NOTE
Pt sustained VTACH with rate 112-118 from 0948 until 1034. -  now pacing (Vpaced at a rate of 63.  Dr Montana spoke to pt to advise him of situation and what is expected from this point.  PT remained stable throughout.

## 2023-12-04 LAB
ALBUMIN SERPL-MCNC: 4.5 G/DL (ref 3.5–5.3)
ALP SERPL-CCNC: 66 U/L (ref 45–115)
ALT SERPL-CCNC: 28 U/L (ref 7–52)
ANION GAP SERPL CALC-SCNC: 11 MMOL/L (ref 3–11)
AST SERPL-CCNC: 18 U/L
BILIRUB SERPL-MCNC: 1.74 MG/DL (ref 0.2–1.4)
BUN SERPL-MCNC: 20 MG/DL (ref 7–25)
CALCIUM ALBUM COR SERPL-MCNC: 9.1 MG/DL (ref 8.6–10.3)
CALCIUM SERPL-MCNC: 9.5 MG/DL (ref 8.6–10.3)
CHLORIDE SERPL-SCNC: 103 MMOL/L (ref 98–107)
CO2 SERPL-SCNC: 23 MMOL/L (ref 21–32)
CREAT SERPL-MCNC: 1.06 MG/DL (ref 0.7–1.3)
EGFRCR SERPLBLD CKD-EPI 2021: 82 ML/MIN/1.73M*2
GLUCOSE SERPL-MCNC: 120 MG/DL (ref 70–105)
HGB BLD-MCNC: 16.6 G/DL (ref 13.2–17.2)
MAGNESIUM SERPL-MCNC: 2.1 MG/DL (ref 1.8–2.4)
POTASSIUM SERPL-SCNC: 4.4 MMOL/L (ref 3.5–5.1)
PROT SERPL-MCNC: 7.4 G/DL (ref 6–8.3)
SODIUM SERPL-SCNC: 137 MMOL/L (ref 135–145)

## 2023-12-04 PROCEDURE — 6370000100 HC RX 637 (ALT 250 FOR IP): Performed by: INTERNAL MEDICINE

## 2023-12-04 PROCEDURE — (BLANK) HC ROOM ICU INTERMEDIATE

## 2023-12-04 PROCEDURE — 4A023N8 MEASUREMENT OF CARDIAC SAMPLING AND PRESSURE, BILATERAL, PERCUTANEOUS APPROACH: ICD-10-PCS | Performed by: INTERNAL MEDICINE

## 2023-12-04 PROCEDURE — 80053 COMPREHEN METABOLIC PANEL: CPT | Performed by: NURSE PRACTITIONER

## 2023-12-04 PROCEDURE — B2111ZZ FLUOROSCOPY OF MULTIPLE CORONARY ARTERIES USING LOW OSMOLAR CONTRAST: ICD-10-PCS | Performed by: INTERNAL MEDICINE

## 2023-12-04 PROCEDURE — 85018 HEMOGLOBIN: CPT | Performed by: INTERNAL MEDICINE

## 2023-12-04 PROCEDURE — 83735 ASSAY OF MAGNESIUM: CPT | Performed by: NURSE PRACTITIONER

## 2023-12-04 PROCEDURE — 36415 COLL VENOUS BLD VENIPUNCTURE: CPT | Performed by: INTERNAL MEDICINE

## 2023-12-04 PROCEDURE — 93010 ELECTROCARDIOGRAM REPORT: CPT | Performed by: INTERNAL MEDICINE

## 2023-12-04 PROCEDURE — 6360000200 HC RX 636 W HCPCS (ALT 250 FOR IP): Performed by: INTERNAL MEDICINE

## 2023-12-04 PROCEDURE — 99222 1ST HOSP IP/OBS MODERATE 55: CPT | Mod: FS | Performed by: NURSE PRACTITIONER

## 2023-12-04 PROCEDURE — 93005 ELECTROCARDIOGRAM TRACING: CPT | Performed by: INTERNAL MEDICINE

## 2023-12-04 PROCEDURE — 6370000100 HC RX 637 (ALT 250 FOR IP): Performed by: NURSE PRACTITIONER

## 2023-12-04 PROCEDURE — 02713ZZ DILATION OF CORONARY ARTERY, TWO ARTERIES, PERCUTANEOUS APPROACH: ICD-10-PCS | Performed by: INTERNAL MEDICINE

## 2023-12-04 PROCEDURE — 94660 CPAP INITIATION&MGMT: CPT

## 2023-12-04 RX ORDER — SACUBITRIL AND VALSARTAN 49; 51 MG/1; MG/1
1 TABLET, FILM COATED ORAL 2 TIMES DAILY
Status: DISCONTINUED | OUTPATIENT
Start: 2023-12-04 | End: 2023-12-07

## 2023-12-04 RX ORDER — LANOLIN ALCOHOL/MO/W.PET/CERES
400 CREAM (GRAM) TOPICAL 2 TIMES DAILY
Status: DISCONTINUED | OUTPATIENT
Start: 2023-12-04 | End: 2023-12-07 | Stop reason: HOSPADM

## 2023-12-04 RX ORDER — ENOXAPARIN SODIUM 150 MG/ML
1 INJECTION SUBCUTANEOUS EVERY 12 HOURS SCHEDULED
Status: DISCONTINUED | OUTPATIENT
Start: 2023-12-04 | End: 2023-12-06

## 2023-12-04 RX ORDER — SODIUM CHLORIDE 9 MG/ML
500 INJECTION, SOLUTION INTRAVENOUS ONCE
Status: COMPLETED | OUTPATIENT
Start: 2023-12-04 | End: 2023-12-05

## 2023-12-04 RX ADMIN — MEXILETINE HYDROCHLORIDE 200 MG: 200 CAPSULE ORAL at 06:19

## 2023-12-04 RX ADMIN — LANSOPRAZOLE 30 MG: 30 CAPSULE, DELAYED RELEASE ORAL at 15:37

## 2023-12-04 RX ADMIN — SACUBITRIL AND VALSARTAN 1 TABLET: 49; 51 TABLET, FILM COATED ORAL at 21:03

## 2023-12-04 RX ADMIN — SOTALOL HYDROCHLORIDE 180 MG: 120 TABLET ORAL at 08:19

## 2023-12-04 RX ADMIN — CARVEDILOL 50 MG: 25 TABLET, FILM COATED ORAL at 08:18

## 2023-12-04 RX ADMIN — ROSUVASTATIN CALCIUM 40 MG: 20 TABLET, FILM COATED ORAL at 21:03

## 2023-12-04 RX ADMIN — EMPAGLIFLOZIN 10 MG: 10 TABLET, FILM COATED ORAL at 08:17

## 2023-12-04 RX ADMIN — FUROSEMIDE 60 MG: 40 TABLET ORAL at 08:17

## 2023-12-04 RX ADMIN — SPIRONOLACTONE 50 MG: 50 TABLET ORAL at 08:26

## 2023-12-04 RX ADMIN — TAMSULOSIN HYDROCHLORIDE 0.4 MG: 0.4 CAPSULE ORAL at 21:03

## 2023-12-04 RX ADMIN — SACUBITRIL AND VALSARTAN 1 TABLET: 24; 26 TABLET, FILM COATED ORAL at 08:18

## 2023-12-04 RX ADMIN — ENOXAPARIN SODIUM 120 MG: 150 INJECTION SUBCUTANEOUS at 21:02

## 2023-12-04 RX ADMIN — SOTALOL HYDROCHLORIDE 180 MG: 120 TABLET ORAL at 21:03

## 2023-12-04 RX ADMIN — MAGNESIUM OXIDE 400 MG: 400 TABLET ORAL at 21:03

## 2023-12-04 RX ADMIN — CARVEDILOL 50 MG: 25 TABLET, FILM COATED ORAL at 17:26

## 2023-12-04 RX ADMIN — ASPIRIN 81 MG: 81 TABLET ORAL at 08:17

## 2023-12-04 RX ADMIN — Medication 2000 UNITS: at 08:17

## 2023-12-04 RX ADMIN — MEXILETINE HYDROCHLORIDE 200 MG: 200 CAPSULE ORAL at 21:02

## 2023-12-04 RX ADMIN — MAGNESIUM OXIDE 400 MG: 400 TABLET ORAL at 08:17

## 2023-12-04 RX ADMIN — MEXILETINE HYDROCHLORIDE 200 MG: 200 CAPSULE ORAL at 14:22

## 2023-12-04 RX ADMIN — POTASSIUM CHLORIDE 30 MEQ: 750 TABLET, FILM COATED, EXTENDED RELEASE ORAL at 08:18

## 2023-12-04 ASSESSMENT — ENCOUNTER SYMPTOMS
NEAR-SYNCOPE: 0
PND: 0
ORTHOPNEA: 0

## 2023-12-04 NOTE — PROGRESS NOTES
[address] [address]                                                    Electrophysiology Inpatient Progress Note    Subjective    Patient ID: Pete Trejo is a 58 y.o. male.    Chief Complaint: No chief complaint on file.       LOS: 4 days     HPI:  Patient admitted for increasing dose of sotalol to 180 mg twice daily.  He is also on mexiletine 200 mg every 8 hours.  On carvedilol 50 mg twice daily.  He is on GDMT for his ischemic cardiomyopathy/CHF with Entresto, Jardiance, carvedilol and spironolactone.  Saturday a.m. around 10 AM he had sustained run of VT terminated with ATP.  The rate of his VT around 118 to 120 bpm below the rate cut off of his device.  He was mildly symptomatic.  Dr. Montana did reprogram the device yesterday upper tracking and sensor rate of 100.  VT zone 1 reduced to 110 bpm with 72% ATP programmed in for 8 attempts x 2.  Magnesium level was 2.2 yesterday.  1.9 the day before.Potassium yesterday was 4.3.  He is on both supplemental potassium and magnesium.    He had more VT during the night terminated with ATP each time at 1205 and 1:31 AM.  This did not appear to be a proarrhythmic effect of sotalol on review of telemetry.  QTc at 3:52 AM today was 474 ms paced.  .  Rate of 62 bpm.  Frequent PVCs.    Patient seen and examined.  Discussed with Keyonna Pablo CNP for Dr. Leiva.  They plan to do cardiac cath tomorrow to look for any reversible causes for his VT.  Landy denies chest discomfort.  He does have shortness of breath with exertion.  He states he has sleep apnea though has not been compliant with his CPAP recently.  He was aware of the episode last night.  He denies PND or orthopnea or lower extremity swelling.  No lightheadedness dizziness syncopal or near syncopal events.  He is afebrile.  Heart rates 60 bpm range.                  Allergies as of 11/30/2023 - Reviewed 11/30/2023   Allergen Reaction Noted    Morphine   07/30/2017    Tramadol  07/30/2017       Current Facility-Administered Medications:     lidocaine bolus from infusion 100 mg, 100 mg, intravenous, Once, Segundo Cotton PA    carvediloL (COREG) tablet 50 mg, 50 mg, oral, 2x daily with meals, Wilfredo Montana MD, 50 mg at 12/04/23 0818    furosemide (LASIX) tablet 60 mg, 60 mg, oral, Daily, Provell, Merced, CNP, 60 mg at 12/04/23 0817    sotaloL (BETAPACE) tablet 180 mg, 180 mg, oral, q12h KULDEEP, Provell, Merced, CNP, 180 mg at 12/04/23 0819    Maintain IV access, , , Until discontinued **AND** Saline lock IV, , , Once **AND** sodium chloride flush 3 mL, 3 mL, intravenous, PRN, Provell, Merced, CNP    sodium chloride (OCEAN) 0.65 % nasal spray 1 spray, 1 spray, Each Nostril, PRN, Provell, Merced, CNP    sodium chloride-aloe vera (AYR) nasal gel 1 Application, 1 Application, Each Nostril, PRN, Provell, Merced, CNP    acetaminophen (TYLENOL) tablet 500 mg, 500 mg, oral, q6h PRN, Provell, Merced, CNP    traZODone (DESYREL) tablet 25 mg, 25 mg, oral, Nightly PRN, Provell, Merced, CNP    magnesium hydroxide (MILK OF MAGNESIA) 400 mg/5 mL oral suspension 30 mL, 30 mL, oral, Daily PRN, Provell, Merced, CNP    alum-mag hydroxide-simeth (MAALOX ADVANCED) suspension 30 mL, 30 mL, oral, 3x daily PRN, Provell, Merced, CNP    aspirin EC tablet 81 mg, 81 mg, oral, Daily, Provell, Merced, CNP, 81 mg at 12/04/23 0817    albuterol HFA 90 mcg/actuation inhaler 2 puff, 2 puff, inhalation, q6h PRN, Provell, Merced, CNP    cholecalciferol (vitamin D3) 1,000 unit (25mcg) tab/cap 2,000 Units, 2,000 Units, oral, Daily, Provell, Merced, CNP, 2,000 Units at 12/04/23 0817    empagliflozin (JARDIANCE) tablet 10 mg, 10 mg, oral, Daily, Proveyenni, Merced, CNP, 10 mg at 12/04/23 0817    magnesium oxide (MAG-OX) tablet 400 mg, 400 mg, oral, Daily, Proveyenni, Merced, CNP, 400 mg at 12/04/23 0817    mexiletine (MEXITIL) capsule 200 mg, 200 mg, oral, q8h KULDEEP, Provell, Merced, CNP, 200 mg at 12/04/23  0619    nitroglycerin (NITROSTAT) SL tablet 0.4 mg, 0.4 mg, sublingual, q5 min PRN, Provell, Merced, CNP    lansoprazole (PREVACID) capsule 30 mg, 30 mg, oral, Daily at 1600, Provell, Merced, CNP, 30 mg at 12/03/23 1634    potassium chloride (KLOR-CON) CR tablet 30 mEq, 30 mEq, oral, Daily, Provell, Merced, CNP, 30 mEq at 12/04/23 0818    rivaroxaban (XARELTO) tablet 20 mg, 20 mg, oral, Daily with dinner, Provell, Merced, CNP, 20 mg at 12/03/23 1816    rosuvastatin (CRESTOR) tablet 40 mg, 40 mg, oral, Nightly, Provell, Merced, CNP, 40 mg at 12/03/23 2051    sacubitriL-valsartan (ENTRESTO) 24-26 mg per tablet 1 tablet, 1 tablet, oral, 2x daily, Provell, Merced, CNP, 1 tablet at 12/04/23 0818    spironolactone (ALDACTONE) tablet 50 mg, 50 mg, oral, Daily, Provell, Merced, CNP, 50 mg at 12/04/23 0826    tamsulosin (FLOMAX) 24 hr capsule 0.4 mg, 0.4 mg, oral, Nightly, Provell, Merced, CNP, 0.4 mg at 12/03/23 2051    LORazepam (ATIVAN) tablet 1 mg, 1 mg, oral, 2x daily PRN, Provell, Merced, CNP    Objective     Vital signs in last 24 hours:   Temp:  [36.6 °C (97.9 °F)-37 °C (98.6 °F)] 37 °C (98.6 °F)  Heart Rate:  [] 60  Resp:  [18] 18  SpO2:  [92 %-95 %] 94 %  BP: (110-131)/(54-96) 113/74  Weight: 118.8 kg (262 lb)    Intake/Output this shift:  No intake/output data recorded.    Intake/Output Summary (Last 24 hours) at 12/4/2023 0827  Last data filed at 12/4/2023 0407  Gross per 24 hour   Intake 590 ml   Output 1000 ml   Net -410 ml     Cumulative I&O:    Physical Exam  Vitals reviewed.   Constitutional:       Appearance: He is well-developed. He is obese.   HENT:      Head: Normocephalic and atraumatic.   Eyes:      General: No scleral icterus.     Comments: Pupils equal   Neck:      Thyroid: No thyromegaly.      Vascular: No carotid bruit or JVD.   Cardiovascular:      Rate and Rhythm: Normal rate and regular rhythm.      Heart sounds: Normal heart sounds. No murmur heard.     No friction rub. No gallop.       "Comments:   ICD left upper chest without erosion or infection  Pulmonary:      Effort: Pulmonary effort is normal.      Breath sounds: Normal breath sounds. No wheezing, rhonchi or rales.   Abdominal:      General: Abdomen is flat. Bowel sounds are normal.      Palpations: Abdomen is soft.      Tenderness: There is no abdominal tenderness.   Musculoskeletal:         General: Normal range of motion.      Cervical back: Normal range of motion and neck supple.      Right lower leg: No edema.      Left lower leg: No edema.   Lymphadenopathy:      Cervical: No cervical adenopathy.   Skin:     General: Skin is warm and dry.      Findings: No rash.   Neurological:      General: No focal deficit present.      Mental Status: He is alert and oriented to person, place, and time.   Psychiatric:         Mood and Affect: Mood normal.         Behavior: Behavior normal.         Thought Content: Thought content normal.         Judgment: Judgment normal.         Data Review:   BMP:  Lab Results   Component Value Date     12/03/2023    K 4.3 12/03/2023     12/03/2023    CO2 21 12/03/2023    BUN 17 12/03/2023    CREATININE 0.88 12/03/2023    GLUCOSE 88 12/03/2023    CALCIUM 9.5 12/03/2023     CBC:   Lab Results   Component Value Date    WBC 6.6 12/03/2023    RBC 5.36 12/03/2023    HGB 16.6 12/04/2023    HCT 49.2 12/03/2023     12/03/2023     CMP:  Lab Results   Component Value Date     12/03/2023    K 4.3 12/03/2023     12/03/2023    CO2 21 12/03/2023    GLUCOSE 88 12/03/2023    CREATININE 0.88 12/03/2023    CALCIUM 9.5 12/03/2023    BUN 17 12/03/2023    ANIONGAP 11 12/03/2023     No results found for: \"CKTOTAL\", \"CKMBINDEX\", \"TROPONINI\", \"BNP\", \"POCBNP\"  Lipid: No results found for: \"CHOL\", \"HDL\", \"TRIG\", \"LDLCALC\"  TSH:  No results found for: \"TSH\"  Magnesium:  Lab Results   Component Value Date    MG 2.2 12/03/2023     PT/INR:   No results found for: \"PT\", \"INR\"    Tests:  XR chest portable 1 " view    Result Date: 11/30/2023  Narrative: Exam: Single view chest x-ray 11/30/2023 . Clinical History:   heart failure with reduced ejection fraction Comparison(s): Available Findings: 3-lead pacemaker. Cardiomegaly. No acute infiltrate or effusion..     Impression: IMPRESSION:  1.  No acute abnormalities.      Assessment/Plan   Patient Active Problem List    Diagnosis Date Noted    VT (ventricular tachycardia) (CMS/[facility]) 11/30/2023    Gastroesophageal reflux disease without esophagitis 10/19/2023    Benign prostatic hyperplasia without lower urinary tract symptoms 10/19/2023    Diabetes mellitus type 2 in obese (CMS/[facility]) 10/19/2023    Moderate obesity 10/19/2023    Elevated troponin 10/19/2023    Lymphocytosis (symptomatic) 10/19/2023    AICD discharge 10/18/2023    Long term current use of antiarrhythmic drug 03/04/2022    Sustained ventricular tachycardia (CMS/[facility]) 09/19/2021    Chronic anticoagulation 09/19/2021    History of ventricular tachycardia 09/19/2021    Paroxysmal atrial fibrillation (CMS/[facility]) 09/19/2021    Chronic combined systolic and diastolic CHF, NYHA class 2 and ACC/AHA stage C (CMS/[facility]) 07/11/2021    Automatic implantable cardioverter-defibrillator in situ 06/10/2020    Presence of stent in coronary artery 06/10/2020    Ischemic cardiomyopathy 10/30/2017    Hypertension 10/30/2017    Hyperlipidemia 10/30/2017     Assessment:  1.  Recurrent ventricular tachycardia on sotalol and mexiletine with ATP delivered by ICD.  2.  Prior VT ablations [place] and [facility]  3.  Severe ischemic cardiomyopathy EF 25%  4.  Biventricular ICD approximately 70% BiV paced due to PVCs.  5.  Combined systolic diastolic congestive heart failure NYHA class II-III.  On GDMT.  6.  OSAS has not been compliant with CPAP  7.  Diabetes mellitus type 2  8.  CAD/PCI  9.  Hypertension  10.  Dyslipidemia         Plan:  Pete had more VT during the night which was terminated with ATP in spite of higher dose of sotalol 180 mg  every 12 hours, mexiletine 200 mg 3 times daily and increase in the carvedilol to 50 mg twice daily..  Potassium this morning 4.4, magnesium 2.1.  Creatinine 1.06.  Total bili 1.74.  Continue to monitor for proarrhythmia.  Dr. Leiva has consulted and plan is to do cardiac cath tomorrow.  If no reversible causes found for his ongoing VT may be referred for transplant workup/LVAD.  May need to resort to switching to amiodarone.  He was on that prior to his last VT ablation.  Both the mexiletine and amiodarone were discontinued post ablation.  He did well for 1 to 2 years thereafter.  Discussed with Dr. Wu.  He would like to see how he does on the increased dose of sotalol in terms of trending for his episodes of VT and decrease in his ATP before switching to amiodarone.  We need to get him back in touch with [facility] regarding potential redo ablation versus amiodarone versus transplant.  Discussed with patient that he needs to be compliant with CPAP.  Will have RT help him with this tonight.    Plan discussed in conjunction with Dr. Wu.    Electronically signed by: Merced Newton CNP  12/4/2023  8:27 AM      A voice recognition program was used to aid in documentation of this record.  Sometimes words are not printed exactly as they were spoken.  While efforts were made to carefully edit and correct any inaccuracies, some errors may be present; please take these into context.  Please contact the provider if errors are identified.

## 2023-12-04 NOTE — PLAN OF CARE
Problem: Cardiovascular - Adult  Goal: Maintains optimal cardiac output and hemodynamic stability  Description: INTERVENTIONS:  1. Monitor vital signs and rhythm  2. Monitor for hypotension and other signs of decreased cardiac output  3. Administer and titrate ordered vasoactive medications to optimize hemodynamic stability  4. Monitor for fluid overload/dehydration, weight gain, shortness of breath and activity intolerance  5. Monitor arterial and/or venous puncture sites for bleeding and/or hematoma  6. Assess quality of pulses, capillary refill, edema, sensation, skin color and temperature  7. Assess for signs of decreased coronary artery perfusion - ex. angina  Outcome: Progressing  Flowsheets (Taken 12/3/2023 1700)  Maintain optimal cardiac output and hemodynamic stability:   Monitor vital signs and rhythm   Monitor for hypotension and other signs of decreased cardiac output   Administer and titrate ordered vasoactive medications to optimize hemodynamic stability   Monitor for fluid overload/dehydration, weight gain, shortness of breath and activity intolerance   Monitor arterial and/or venous puncture sites for bleeding and/or hematoma   Assess quality of pulses, capillary refill, edema, sensation, skin color and temperature   Assess for signs of decreased coronary artery perfusion - ex. angina  Goal: Absence of cardiac dysrhythmias or at baseline  Description: INTERVENTIONS:  1. Continuous cardiac monitoring, monitor vital signs, obtain 12 lead EKG if indicated  2. Administer antiarrhythmic and heart rate control medications as ordered  3. Initiate emergency measures for life threatening arrhythmias  4. Monitor electrolytes and administer replacement therapy as ordered  Outcome: Progressing  Flowsheets (Taken 12/3/2023 1700)  Absence of cardiac dysrhythmias or at baseline:   Continuous cardiac monitoring, monitor vital signs, obtain 12 lead EKG if indicated   Administer antiarrhythmic and heart rate control  medications as ordered   Monitor electrolytes and administer replacement therapy as ordered     Problem: Pain - Adult  Goal: Verbalizes/displays adequate comfort level or baseline comfort level  Description: INTERVENTIONS:  1. Encourage patient to monitor pain and request interventions  2. Assess pain using the appropriate pain scale  3. Administer analgesics based on type and severity of pain and evaluate response  4. Educate/Implement non-pharmacological measures as appropriate and evaluate response  5. Consider cultural, developmental and social influences on pain and pain management  6. Notify Provider if interventions unsuccessful or patient reports new pain  Outcome: Adequate for Discharge     Problem: Infection - Adult  Goal: Absence of infection during hospitalization  Description: INTERVENTIONS:  1. Assess and monitor for signs and symptoms of infection  2. Monitor lab/diagnostic results  3. Monitor all insertion sites/wounds/incisions  4. Monitor secretions for changes in amount and color  5. Administer medications as ordered  6. Educate and encourage patient and family to use good hand hygiene technique  7. Identify and educate in appropriate isolation precautions for identified infection/condition  Outcome: Completed     Problem: Safety Adult  Goal: Patient will remain safe during hospitalization  Description: INTERVENTIONS    1. Assess patient for fall risk and implement interventions if needed  2. Use safe transport techniques  3. Assess patient using the appropriate Jean Marie skin assessment scale  4. Assess patient for risk of aspiration  5. Assess patient for risk of elopement  6. Assess patient for risk of suicide  Outcome: Adequate for Discharge     Problem: Daily Care  Goal: Daily care needs are met  Description: INTERVENTIONS:   1. Assess and monitor skin integrity  2. Identify patients at risk for skin breakdown on admission and per policy  3. Assess and monitor ability to perform self care and  identify potential discharge needs  4. Assess skin integrity/risk for skin breakdown  5. Assist patient with activities of daily living as needed  6. Encourage independent activity per ability   7. Provide mouth care   8. Include patient/family/caregiver in decisions related to daily care   Outcome: Adequate for Discharge     Problem: Knowledge Deficit  Goal: Patient/family/caregiver demonstrates understanding of disease process, treatment plan, medications, and discharge instructions  Description: INTERVENTIONS:   1. Complete learning assessment and assess knowledge base  2. Provide teaching at level of understanding   3. Provide teaching via preferred learning methods  Outcome: Progressing  Flowsheets (Taken 12/3/2023 1700)  Patient/family/caregiver demonstrates understanding of disease process, treatment plan, medications, and discharge instructions:   Complete learning assessment and assess knowledge base   Provide teaching via preferred learning methods   Provide teaching at level of understanding     Problem: Discharge Barriers  Goal: Patient's discharge needs are met  Description: INTERVENTIONS:  1. Assess patient for self-management skills  2. Encourage participation in management  3. Identify potential discharge barriers on admission and throughout hospital stay  4. Involve patient/family/caregiver in discharge planning process  5. Collaborate with case management/ for discharge needs  Outcome: Adequate for Discharge

## 2023-12-04 NOTE — NURSING END OF SHIFT
"Nursing End of Shift Summary:    Patient: Pete Trejo  MRN: [id number]  : 1965, Age: 58 y.o.    Location: [address] [place] [address]    Nursing Goals  Clinical Goals for the Shift: maintain safety; monitor vs, tele; ambulate    Narrative Summary of Progress Toward Clinical Goals:  Refer to previous nursing notes for this morning.  The patient received his cardiac meds a little over an hour late from due time (other meds were given around 2 hours late) due to other patient needing more immediate care from RN. Pt had 2 episodes shortly after Dr Montana tended to the patient - non sustained vtach - both were bursts that started at around 118 bpm and jumped to 150s, but did not last for much time.  The patient was aware of both incidences.  Other than that, the patient remained A/AV Paced without issues. Pt a little \"worn out\" from this morning's episode but is doing well.      Barriers to Goals/Nursing Concerns:  No    New Patient or Family Concerns/Issues:  No    Shift Summary:   Significant Events & Communications to Providers (last 12 hours)       Last 5 Values    No documentation.                 Oxygen Usage (last 12 hours)       Last 5 Values    No documentation.                 Mobility (last 12 hours)       Last 5 Values       Row Name 23 0746 23 1604 23 1800             Mobility    Activity -- -- Ambulate in room      Length of Time in Chair (min) -- -- 0      Patient Position Supine Supine --      Turning Turns self -- Turns self                    Urethral Catheter       Active Urethral Catheter       None                  Active Lines       Active Central venous catheter / Peripherally inserted central catheter / Implantable Port / Hemodialysis catheter / Midline Catheter       None                  Infusing Medications   Medication Dose Last Rate     PRN Medications   Medication Dose Last Admin    sodium chloride  3 mL      sodium chloride  1 spray      sodium chloride-aloe vera  1 Application   "    acetaminophen  500 mg      traZODone  25 mg      magnesium hydroxide  30 mL      alum-mag hydroxide-simeth  30 mL      albuterol HFA  2 puff      nitroglycerin  0.4 mg      LORazepam  1 mg

## 2023-12-04 NOTE — CONSULTATION
"    [address] [place] [address]     CARDIOLOGY INPATIENT CONSULT NOTE     Primary cardiologist: Dr. Edwards    Subjective    Patient ID: Pete Trejo is a 58 y.o. male referred for consultation by Dougie Payan DO for systolic heart failure with recent ventricular tachycardia with ICD shock.    Chief Complaint: Palpitations with shortness of breath    At time of assessment today Pete states doing ok today. His wife is at the bedside.  He reports multiple calls from device clinic for heart rate \"ticking off\".  Reports he was admitted for medication adjustment, increasing sotalol secondary to events of nonsustained VT with ATP.    HPI    Patient was most recently seen  11/22/2023 by Gerardo Sun CNP  In the clinic.  An echo 10/19/2020  Revealed severe LV systolic dysfunction with an EF of 25%  And large anteroapical aneurysm.  Only the basal  Left ventricular segments had preserved contractility.  No LV thrombus noted.  He had grade 2 moderate  LV diastolic abnormality.  LA VI 53 mL/m².  His arrhythmia logbook  On the ICD download was reviewed  Revealing multiple episodes of nonsustained VT with ATP    Though no shocks  In the 3 days prior.  He was noted to be 59%  Atrial paced  And  Around 70%  BiV paced.  He was symptomatic  With  Palpitations and anxiety  Correlating with  Nonsustained VT and ATP.  Carvedilol was increased to 37.5 mg twice daily.  He is also followed by Dr. Leiva in the clinic.  Yesterday  Patient called into the clinic saying  He was  Having more symptoms  Such as when he received ATP  Previously.  He noted multiple ATP episodes every day for the past 7 days or so  With palpitations and shortness of breath.  Dr. Wu reviewed the information and recommended patient be admitted for increasing sotalol to 180 mg twice daily.  " He presents today  For admission.     Pete is a 59 yo M  with complex cardiac history significant for CAD complicated by prior MI (PCI to LAD and diag), ischemic cardiomyopathy, chronic HFrEF with severely reduced LVEF, ,TIA/CVA in 2010 in context of LV thrombus, and recurrent VT s/p initial ablation 6/2020 ([facility]) with recurrent VT requiring repeat ablation 11/2021 at [facility]. He was evaluated at the [facility] for heart transplantation/advanced therapies but was denied due to having a cardiac index above 2.2. Antiarrhythmic therapy of mexiletine and sotalol. Admission 10/18/2023 for recurrent VT. CHF consulted for establishment of care for chronic HFrEF.      Cardiac problem list:      Past Medical History:   Diagnosis Date    BPH (benign prostatic hyperplasia)     CAD (coronary artery disease)     Status post stent    CHF (congestive heart failure) (CMS/[facility])     Chronic combined systolic and diastolic CHF (congestive heart failure) (CMS/[facility])     LVEF 25% with grade 1 diastolic dysfunction as per echo on 1/26/2022.    CVA (cerebral vascular accident) (CMS/[facility]) 2010    Without residual deficit.    Diabetes mellitus type 2 in obese (CMS/[facility])     Dyslipidemia     GERD (gastroesophageal reflux disease)     Heart attack (CMS/[facility])     x3    Hypertension     Ischemic cardiomyopathy     Morbid obesity with BMI of 40.0-44.9, adult (CMS/[facility])     Paroxysmal atrial fibrillation (CMS/[facility])     On Xarelto    V-tach (CMS/[facility])     AICD in place       Past Surgical History:   Procedure Laterality Date    ABLATION VT N/A 06/09/2020    Procedure: Ablation VT;  Surgeon: Wilfredo Montana MD;  Location: [facility] EP Lab;  Service: Electrophysiology;  Laterality: N/A;  Check with Dr. Montana in the a.m. regarding scheduling    APPENDECTOMY      COLONOSCOPY N/A 7/25/2023    Procedure: COLONOSCOPY with Polypectomy;  Surgeon: Hortencia Carson MD;  Location: [facility];  Service: Endoscopy;  Laterality: N/A;    CORONARY ARTERY  STENTING  2009    2.5 X 24-mm Taxus DIMAS to first diagonal. 3.0 X 8-mm Taxus stent to proximal LAD just proximal to diagonal.    CORONARY ARTERY STENTING  2010    3.5 X 15-mm Promus DIMAS to totally occluded LAD    CORONARY ARTERY STENTING  2011    2.25 X 30-mm Resolute DIMAS to 2nd diagonal.  Balloon angioplasty through strut to improve blood flow to distal LAD    CORONARY ARTERY STENTING  07/28/2017    second diagonal branch LAD Resolute 2.25 x 30-mm DIMAS    ICD DC GEN CHANGE N/A 6/20/2022    Procedure: BI-V ICD Gen Change;  Surgeon: Wilfredo Montana MD;  Location: [facility] EP Lab;  Service: Cardiovascular;  Laterality: N/A;    ICD SC NEW  2011    [employer] Cognis Model #N119, Serial #[id number]. [employer] Reliance Model #0185, Serial #[id number]. LV lead Acuity Model #45+91, Serial #[id number]. Atrial lead Model #4469, Serial #[id number]       Allergies as of 11/30/2023 - Reviewed 11/30/2023   Allergen Reaction Noted    Morphine  07/30/2017    Tramadol  07/30/2017         Inpatient medications:  magnesium oxide, 400 mg, oral, 2x daily  lidocaine, 100 mg, intravenous, Once  carvediloL, 50 mg, oral, 2x daily with meals  furosemide, 60 mg, oral, Daily  sotaloL, 180 mg, oral, q12h KULDEEP  aspirin, 81 mg, oral, Daily  cholecalciferol (vitamin D3), 2,000 Units, oral, Daily  empagliflozin, 10 mg, oral, Daily  mexiletine, 200 mg, oral, q8h KULDEEP  lansoprazole, 30 mg, oral, Daily at 1600  potassium chloride, 30 mEq, oral, Daily  rivaroxaban, 20 mg, oral, Daily with dinner  rosuvastatin, 40 mg, oral, Nightly  sacubitriL-valsartan, 1 tablet, oral, 2x daily  spironolactone, 50 mg, oral, Daily  tamsulosin, 0.4 mg, oral, Nightly           Family History   Problem Relation Age of Onset    Heart attack Mother 62    Other Mother         Abdominal Aortic Aneurysm    Lung cancer Mother     Colon cancer Father         Cause of death    Diabetes Brother         Non-Insulin Dependent    Heart attack Brother 51        w/ Stents    Heart disease Brother      Other Son         Well       Social History     Tobacco Use    Smoking status: Former     Packs/day: 0.75     Years: 30.00     Additional pack years: 0.00     Total pack years: 22.50     Types: Cigarettes     Quit date: 6/7/2020     Years since quitting: 3.4    Smokeless tobacco: Never   Vaping Use    Vaping Use: Never used   Substance Use Topics    Alcohol use: Not Currently     Comment: Quit drinking in 2007    Drug use: Not Currently       Review of Systems   Cardiovascular:  Positive for dyspnea on exertion. Negative for chest pain, leg swelling/pain, near-syncope, orthopnea and PND.   All other systems reviewed and are negative.        Objective     Vital signs in last 24 hours:   Temp:  [36.6 °C (97.9 °F)-37 °C (98.6 °F)] 37 °C (98.6 °F)  Heart Rate:  [47-60] 60  Resp:  [18] 18  SpO2:  [92 %-94 %] 94 %  BP: (110-124)/(54-75) 113/74  Weight: 118.8 kg (262 lb)  Intake/Output last 3 shifts:  I/O last 3 completed shifts:  In: 1305 [P.O.:1305]  Out: 1000 [Urine:1000]  Intake/Output this shift:  No intake/output data recorded.    Physical Exam  Vitals reviewed.   Constitutional:       General: He is not in acute distress.     Appearance: He is obese. He is not ill-appearing.   HENT:      Head: Normocephalic and atraumatic.      Nose: Nose normal.   Eyes:      General: No scleral icterus.  Cardiovascular:      Rate and Rhythm: Normal rate and regular rhythm.      Heart sounds: No murmur heard.  Pulmonary:      Effort: Pulmonary effort is normal.      Breath sounds: Normal breath sounds.   Abdominal:      General: Abdomen is flat. Bowel sounds are normal.      Palpations: Abdomen is soft.   Musculoskeletal:         General: Normal range of motion.      Cervical back: Normal range of motion.   Skin:     General: Skin is warm.      Capillary Refill: Capillary refill takes less than 2 seconds.      Comments: ICD L upper chest; site WNL   Neurological:      Mental Status: He is alert. Mental status is at baseline.  "  Psychiatric:         Mood and Affect: Mood normal.       Data Review:   Lab Results   Component Value Date     12/04/2023    K 4.4 12/04/2023     12/04/2023    CO2 23 12/04/2023    BUN 20 12/04/2023    CREATININE 1.06 12/04/2023    GLUCOSE 120 (H) 12/04/2023    CALCIUM 9.5 12/04/2023     No results found for: \"CKTOTAL\", \"CKMB\", \"CKMBINDEX\", \"TROPONINI\", \"BNP\", \"POCBNP\"  Lipids:    Lab Results   Component Value Date    CHOL 106 07/28/2022    CHOL 119 07/11/2021    CHOL 127 06/08/2020     Lab Results   Component Value Date    HDL 25 (L) 07/28/2022    HDL 35 (L) 07/11/2021    HDL 29 (L) 06/08/2020     Lab Results   Component Value Date    LDLCALC 50 07/28/2022    LDLCALC 60 07/11/2021    LDLCALC 76 06/08/2020     Lab Results   Component Value Date    TRIG 154 (H) 07/28/2022    TRIG 120 07/11/2021    TRIG 112 06/08/2020        TSH:   Lab Results   Component Value Date    TSH 2.833 10/19/2023     Magnesium:   Lab Results   Component Value Date    MG 2.1 12/04/2023     Protime-INR:   Lab Results   Component Value Date    PT 22.7 (H) 10/06/2021    INR 1.9 (H) 10/06/2021       Telemetry:  V. tach during the night that terminated with ATP each time at 12:05 and 1:31 AM    Radiology:  XR chest portable 1 view    Result Date: 11/30/2023  Narrative: Exam: Single view chest x-ray 11/30/2023 . Clinical History:   heart failure with reduced ejection fraction Comparison(s): Available Findings: 3-lead pacemaker. Cardiomegaly. No acute infiltrate or effusion..     Impression: IMPRESSION:  1.  No acute abnormalities.      Assessment/Plan   Active Problems:    VT (ventricular tachycardia) (CMS/HCC)      Plan  Chronic systolic heart failure  Ischemic cardiomyopathy:  NYHA II-III stage C  Echocardiogram 10/19/2023 with LVEF 25%, severe LV systolic dysfunction.  Large anterior apical aneurysm.  No LV mural thrombi.  Grade 2 diastolic abnormality; no significant change from prior 1/2022.  Device: CRT-D implanted " 6/20/2022  Device check 10/20/2023, battery life 10.5 years, ATP therapy x 3, shock: 1, suggestive of ventricular tachycardia.  RV pacing 96%, LV pacing 95%, AT/AF burden 0%, heart logic score 3  GDMT:  Beta-blocker: Carvedilol 50 mg twice daily  SGLT2i: Empagliflozin 10 mg daily  ACE/ARB/ARNI: Increase Entresto 49/51 mg twice daily; from 24/26 mg  Aldosterone antagonist: Spironolactone 50 mg daily  Plan bilateral cardiac catheterization tomorrow (12/5) assess for underlying ischemia  NPO after midnight  Normal saline 500 mL bolus prior to heart catheterization on 12/5  Consider advanced therapies including heart transplant for recent ventricular tachycardia requiring shock delivery    Recurrent ventricular tachycardia:  Per electrophysiology:  Sotalol 180 mg every 12 hours  Mexiletine 200 mg 3 times daily  Carvedilol 50 mg twice daily  Discussed with Merced Newton CNP who is waiting to confer with Dr. Wu; may need to switch to amiodarone.    Assessment and plan in conjunction with Dr. Leiva.    Electronically signed by: Keyonna Pablo CNP  12/4/2023  10:38 AM

## 2023-12-04 NOTE — PLAN OF CARE
Problem: Cardiovascular - Adult  Goal: Maintains optimal cardiac output and hemodynamic stability  Description: INTERVENTIONS:  1. Monitor vital signs and rhythm  2. Monitor for hypotension and other signs of decreased cardiac output  3. Administer and titrate ordered vasoactive medications to optimize hemodynamic stability  4. Monitor for fluid overload/dehydration, weight gain, shortness of breath and activity intolerance  5. Monitor arterial and/or venous puncture sites for bleeding and/or hematoma  6. Assess quality of pulses, capillary refill, edema, sensation, skin color and temperature  7. Assess for signs of decreased coronary artery perfusion - ex. angina  Outcome: Progressing  Goal: Absence of cardiac dysrhythmias or at baseline  Description: INTERVENTIONS:  1. Continuous cardiac monitoring, monitor vital signs, obtain 12 lead EKG if indicated  2. Administer antiarrhythmic and heart rate control medications as ordered  3. Initiate emergency measures for life threatening arrhythmias  4. Monitor electrolytes and administer replacement therapy as ordered  Outcome: Progressing     Problem: Knowledge Deficit  Goal: Patient/family/caregiver demonstrates understanding of disease process, treatment plan, medications, and discharge instructions  Description: INTERVENTIONS:   1. Complete learning assessment and assess knowledge base  2. Provide teaching at level of understanding   3. Provide teaching via preferred learning methods  Outcome: Progressing

## 2023-12-04 NOTE — PLAN OF CARE
Problem: Cardiovascular - Adult  Goal: Maintains optimal cardiac output and hemodynamic stability  Description: INTERVENTIONS:  1. Monitor vital signs and rhythm  2. Monitor for hypotension and other signs of decreased cardiac output  3. Administer and titrate ordered vasoactive medications to optimize hemodynamic stability  4. Monitor for fluid overload/dehydration, weight gain, shortness of breath and activity intolerance  5. Monitor arterial and/or venous puncture sites for bleeding and/or hematoma  6. Assess quality of pulses, capillary refill, edema, sensation, skin color and temperature  7. Assess for signs of decreased coronary artery perfusion - ex. angina  Outcome: Progressing  Flowsheets (Taken 12/4/2023 1258)  Maintain optimal cardiac output and hemodynamic stability:   Monitor vital signs and rhythm   Monitor for hypotension and other signs of decreased cardiac output   Administer and titrate ordered vasoactive medications to optimize hemodynamic stability   Monitor for fluid overload/dehydration, weight gain, shortness of breath and activity intolerance   Monitor arterial and/or venous puncture sites for bleeding and/or hematoma   Assess quality of pulses, capillary refill, edema, sensation, skin color and temperature  Goal: Absence of cardiac dysrhythmias or at baseline  Description: INTERVENTIONS:  1. Continuous cardiac monitoring, monitor vital signs, obtain 12 lead EKG if indicated  2. Administer antiarrhythmic and heart rate control medications as ordered  3. Initiate emergency measures for life threatening arrhythmias  4. Monitor electrolytes and administer replacement therapy as ordered  Outcome: Progressing  Flowsheets (Taken 12/4/2023 1258)  Absence of cardiac dysrhythmias or at baseline:   Continuous cardiac monitoring, monitor vital signs, obtain 12 lead EKG if indicated   Administer antiarrhythmic and heart rate control medications as ordered   Initiate emergency measures for life  threatening arrhythmias   Monitor electrolytes and administer replacement therapy as ordered     Problem: Knowledge Deficit  Goal: Patient/family/caregiver demonstrates understanding of disease process, treatment plan, medications, and discharge instructions  Description: INTERVENTIONS:   1. Complete learning assessment and assess knowledge base  2. Provide teaching at level of understanding   3. Provide teaching via preferred learning methods  Outcome: Progressing  Flowsheets (Taken 12/4/2023 8768)  Patient/family/caregiver demonstrates understanding of disease process, treatment plan, medications, and discharge instructions:   Complete learning assessment and assess knowledge base   Provide teaching at level of understanding   Provide teaching via preferred learning methods

## 2023-12-04 NOTE — INTERDISCIPLINARY/THERAPY
Case Management Progress Note    Phone # [phone]    Reason for Admission: VT    Plan of Care:  Chart reviewed. Pt needs discussed in MDR. Pt to have bilateral heart cath tomorrow. No discharge needs assessed. CM will continue to follow for discharge planning.    Discussed Discharge Needs/Topics: N/A    Disposition: HO

## 2023-12-05 LAB
ANION GAP SERPL CALC-SCNC: 7 MMOL/L (ref 3–11)
BASE EXCESS BLDA CALC-SCNC: -4 MMOL/L (ref -2–2)
BASE EXCESS BLDV CALC-SCNC: -3 MMOL/L (ref -2–2)
BUN SERPL-MCNC: 15 MG/DL (ref 7–25)
CA-I BLD-SCNC: 1.23 MMOL/L (ref 1.12–1.32)
CA-I BLD-SCNC: 1.24 MMOL/L (ref 1.12–1.32)
CALCIUM SERPL-MCNC: 6.2 MG/DL (ref 8.6–10.3)
CHLORIDE SERPL-SCNC: 114 MMOL/L (ref 98–107)
CO2 BLDA-SCNC: 22 MMOL/L (ref 19–24)
CO2 BLDV-SCNC: 24 MMOL/L (ref 19–24)
CO2 SERPL-SCNC: 20 MMOL/L (ref 21–32)
CREAT SERPL-MCNC: 0.66 MG/DL (ref 0.7–1.3)
EGFRCR SERPLBLD CKD-EPI 2021: 109 ML/MIN/1.73M*2
GLUCOSE BLDA-MCNC: 110 MG/DL (ref 70–105)
GLUCOSE BLDV-MCNC: 111 MG/DL (ref 70–105)
GLUCOSE SERPL-MCNC: 86 MG/DL (ref 70–105)
HCO3 BLDA-SCNC: 21.1 MMOL/L (ref 23–29)
HCO3 BLDV-SCNC: 22.6 MMOL/L (ref 23–29)
HCT VFR BLD CALC: 49 % (ref 38–50)
HCT VFR BLDA CALC: 46 % (ref 38–50)
HGB BLDV-MCNC: 16.7 G/DL (ref 13.2–17.2)
MAGNESIUM SERPL-MCNC: 1.5 MG/DL (ref 1.8–2.4)
PCO2 BLDA: 38 MMHG (ref 35–45)
PCO2 BLDV: 38.2 MMHG (ref 39–51)
PH BLDA: 7.35 PH (ref 7.35–7.45)
PH BLDV: 7.38 PH (ref 7.35–7.45)
PO2 BLDA: 65 MMHG (ref 60–80)
PO2 BLDV: 34 MMHG (ref 30–50)
POCT ACTIVATED CLOTTING TIME: 179 SEC (ref 74–137)
POCT ACTIVATED CLOTTING TIME: 325 SEC (ref 74–137)
POCT ACTIVATED CLOTTING TIME: 698 SEC (ref 74–137)
POCT HEMOGLOBIN (CALCULATED), ARTERIAL: 15.6 G/DL (ref 13.2–17.2)
POCT SO2, ARTERIAL: 91 % (ref 94–97)
POTASSIUM BLDA-SCNC: 4.9 MMOL/L (ref 3.5–5.1)
POTASSIUM BLDV-SCNC: 5 MMOL/L (ref 3.5–5.1)
POTASSIUM SERPL-SCNC: 3 MMOL/L (ref 3.5–5.1)
POTASSIUM SERPL-SCNC: 4.9 MMOL/L (ref 3.5–5.1)
SAO2 % BLDV: 65 % (ref 40–70)
SODIUM BLDA-SCNC: 137 MMOL/L (ref 135–145)
SODIUM BLDV-SCNC: 140 MMOL/L (ref 135–145)
SODIUM SERPL-SCNC: 141 MMOL/L (ref 135–145)

## 2023-12-05 PROCEDURE — 82330 ASSAY OF CALCIUM: CPT

## 2023-12-05 PROCEDURE — 84132 ASSAY OF SERUM POTASSIUM: CPT

## 2023-12-05 PROCEDURE — 80047 BASIC METABLC PNL IONIZED CA: CPT | Performed by: NURSE PRACTITIONER

## 2023-12-05 PROCEDURE — 93010 ELECTROCARDIOGRAM REPORT: CPT | Mod: 59 | Performed by: INTERNAL MEDICINE

## 2023-12-05 PROCEDURE — 6370000100 HC RX 637 (ALT 250 FOR IP): Performed by: NURSE PRACTITIONER

## 2023-12-05 PROCEDURE — 99153 MOD SED SAME PHYS/QHP EA: CPT | Performed by: INTERNAL MEDICINE

## 2023-12-05 PROCEDURE — 6370000100 HC RX 637 (ALT 250 FOR IP): Performed by: INTERNAL MEDICINE

## 2023-12-05 PROCEDURE — 6360000200 HC RX 636 W HCPCS (ALT 250 FOR IP): Performed by: NURSE PRACTITIONER

## 2023-12-05 PROCEDURE — 83735 ASSAY OF MAGNESIUM: CPT | Performed by: INTERNAL MEDICINE

## 2023-12-05 PROCEDURE — 2580000300 HC RX 258: Performed by: INTERNAL MEDICINE

## 2023-12-05 PROCEDURE — 92920 PRQ TRLUML C ANGIOP 1ART&/BR: CPT | Mod: LD | Performed by: INTERNAL MEDICINE

## 2023-12-05 PROCEDURE — 93005 ELECTROCARDIOGRAM TRACING: CPT | Performed by: NURSE PRACTITIONER

## 2023-12-05 PROCEDURE — C1894 INTRO/SHEATH, NON-LASER: HCPCS | Performed by: INTERNAL MEDICINE

## 2023-12-05 PROCEDURE — 99152 MOD SED SAME PHYS/QHP 5/>YRS: CPT | Performed by: INTERNAL MEDICINE

## 2023-12-05 PROCEDURE — 2550000100 HC RX 255: Mod: JZ | Performed by: INTERNAL MEDICINE

## 2023-12-05 PROCEDURE — 36415 COLL VENOUS BLD VENIPUNCTURE: CPT | Performed by: NURSE PRACTITIONER

## 2023-12-05 PROCEDURE — 93460 R&L HRT ART/VENTRICLE ANGIO: CPT | Mod: 26,51,59 | Performed by: INTERNAL MEDICINE

## 2023-12-05 PROCEDURE — (BLANK) HC ROOM ICU INTERMEDIATE

## 2023-12-05 PROCEDURE — 99232 SBSQ HOSP IP/OBS MODERATE 35: CPT | Mod: FS,25 | Performed by: NURSE PRACTITIONER

## 2023-12-05 PROCEDURE — 2500000200 HC RX 250 WO HCPCS: Performed by: INTERNAL MEDICINE

## 2023-12-05 PROCEDURE — C1726 CATH, BAL DIL, NON-VASCULAR: HCPCS | Performed by: INTERNAL MEDICINE

## 2023-12-05 PROCEDURE — (BLANK) HC CATH LAB LEVEL 2 EACH ADDITIONAL MIN: Performed by: INTERNAL MEDICINE

## 2023-12-05 PROCEDURE — (BLANK) HC CATH LAB LEVEL 2 FIRST 15 MIN: Performed by: INTERNAL MEDICINE

## 2023-12-05 PROCEDURE — 84132 ASSAY OF SERUM POTASSIUM: CPT | Performed by: INTERNAL MEDICINE

## 2023-12-05 PROCEDURE — 2580000300 HC RX 258: Performed by: NURSE PRACTITIONER

## 2023-12-05 PROCEDURE — C1887 CATHETER, GUIDING: HCPCS | Performed by: INTERNAL MEDICINE

## 2023-12-05 PROCEDURE — 6360000200 HC RX 636 W HCPCS (ALT 250 FOR IP): Performed by: INTERNAL MEDICINE

## 2023-12-05 PROCEDURE — 2720000000 HC SUPP 272 WO HCPCS: Performed by: INTERNAL MEDICINE

## 2023-12-05 PROCEDURE — C1769 GUIDE WIRE: HCPCS | Performed by: INTERNAL MEDICINE

## 2023-12-05 PROCEDURE — 85347 COAGULATION TIME ACTIVATED: CPT | Performed by: INTERNAL MEDICINE

## 2023-12-05 PROCEDURE — 93005 ELECTROCARDIOGRAM TRACING: CPT | Performed by: INTERNAL MEDICINE

## 2023-12-05 RX ORDER — POTASSIUM CHLORIDE 7.45 MG/ML
10 INJECTION INTRAVENOUS ONCE
Status: DISCONTINUED | OUTPATIENT
Start: 2023-12-05 | End: 2023-12-05

## 2023-12-05 RX ORDER — HEPARIN SODIUM 1000 [USP'U]/ML
INJECTION, SOLUTION INTRAVENOUS; SUBCUTANEOUS CODE/TRAUMA/SEDATION MEDICATION
Status: DISCONTINUED | OUTPATIENT
Start: 2023-12-05 | End: 2023-12-05 | Stop reason: HOSPADM

## 2023-12-05 RX ORDER — FUROSEMIDE 40 MG/1
40 TABLET ORAL DAILY
Status: DISCONTINUED | OUTPATIENT
Start: 2023-12-06 | End: 2023-12-05

## 2023-12-05 RX ORDER — MAGNESIUM SULFATE HEPTAHYDRATE 40 MG/ML
2 INJECTION, SOLUTION INTRAVENOUS ONCE
Status: DISCONTINUED | OUTPATIENT
Start: 2023-12-05 | End: 2023-12-05 | Stop reason: SDUPTHER

## 2023-12-05 RX ORDER — SODIUM CHLORIDE 9 MG/ML
75 INJECTION, SOLUTION INTRAVENOUS CONTINUOUS
Status: ACTIVE | OUTPATIENT
Start: 2023-12-05 | End: 2023-12-06

## 2023-12-05 RX ORDER — FENTANYL CITRATE/PF 50 MCG/ML
PLASTIC BAG, INJECTION (ML) INTRAVENOUS CODE/TRAUMA/SEDATION MEDICATION
Status: DISCONTINUED | OUTPATIENT
Start: 2023-12-05 | End: 2023-12-05 | Stop reason: HOSPADM

## 2023-12-05 RX ORDER — MAGNESIUM SULFATE HEPTAHYDRATE 40 MG/ML
4 INJECTION, SOLUTION INTRAVENOUS ONCE
Status: COMPLETED | OUTPATIENT
Start: 2023-12-05 | End: 2023-12-05

## 2023-12-05 RX ORDER — CLOPIDOGREL BISULFATE 300 MG/1
TABLET, FILM COATED ORAL CODE/TRAUMA/SEDATION MEDICATION
Status: DISCONTINUED | OUTPATIENT
Start: 2023-12-05 | End: 2023-12-05 | Stop reason: HOSPADM

## 2023-12-05 RX ORDER — MIDAZOLAM HYDROCHLORIDE 1 MG/ML
INJECTION INTRAMUSCULAR; INTRAVENOUS CODE/TRAUMA/SEDATION MEDICATION
Status: DISCONTINUED | OUTPATIENT
Start: 2023-12-05 | End: 2023-12-05 | Stop reason: HOSPADM

## 2023-12-05 RX ORDER — POTASSIUM CHLORIDE 750 MG/1
40 TABLET, FILM COATED, EXTENDED RELEASE ORAL
Status: COMPLETED | OUTPATIENT
Start: 2023-12-05 | End: 2023-12-05

## 2023-12-05 RX ORDER — CLOPIDOGREL BISULFATE 75 MG/1
75 TABLET ORAL DAILY
Status: DISCONTINUED | OUTPATIENT
Start: 2023-12-06 | End: 2023-12-06

## 2023-12-05 RX ORDER — SODIUM CHLORIDE 9 MG/ML
25-50 INJECTION, SOLUTION INTRAVENOUS AS NEEDED
Status: DISCONTINUED | OUTPATIENT
Start: 2023-12-05 | End: 2023-12-07 | Stop reason: HOSPADM

## 2023-12-05 RX ORDER — LIDOCAINE HYDROCHLORIDE 10 MG/ML
INJECTION, SOLUTION EPIDURAL; INFILTRATION; INTRACAUDAL; PERINEURAL CODE/TRAUMA/SEDATION MEDICATION
Status: DISCONTINUED | OUTPATIENT
Start: 2023-12-05 | End: 2023-12-05 | Stop reason: HOSPADM

## 2023-12-05 RX ADMIN — CARVEDILOL 50 MG: 25 TABLET, FILM COATED ORAL at 08:04

## 2023-12-05 RX ADMIN — POTASSIUM CHLORIDE 40 MEQ: 750 TABLET, FILM COATED, EXTENDED RELEASE ORAL at 10:39

## 2023-12-05 RX ADMIN — SACUBITRIL AND VALSARTAN 1 TABLET: 49; 51 TABLET, FILM COATED ORAL at 21:07

## 2023-12-05 RX ADMIN — SOTALOL HYDROCHLORIDE 180 MG: 120 TABLET ORAL at 08:02

## 2023-12-05 RX ADMIN — MAGNESIUM SULFATE HEPTAHYDRATE 4 G: 4 INJECTION, SOLUTION INTRAVENOUS at 10:53

## 2023-12-05 RX ADMIN — SOTALOL HYDROCHLORIDE 180 MG: 120 TABLET ORAL at 21:07

## 2023-12-05 RX ADMIN — POTASSIUM CHLORIDE 40 MEQ: 750 TABLET, FILM COATED, EXTENDED RELEASE ORAL at 17:41

## 2023-12-05 RX ADMIN — MAGNESIUM OXIDE 400 MG: 400 TABLET ORAL at 21:07

## 2023-12-05 RX ADMIN — SODIUM CHLORIDE 75 ML/HR: 9 INJECTION, SOLUTION INTRAVENOUS at 17:10

## 2023-12-05 RX ADMIN — ASPIRIN 81 MG: 81 TABLET ORAL at 08:04

## 2023-12-05 RX ADMIN — POTASSIUM CHLORIDE 30 MEQ: 750 TABLET, FILM COATED, EXTENDED RELEASE ORAL at 09:22

## 2023-12-05 RX ADMIN — Medication 2000 UNITS: at 08:03

## 2023-12-05 RX ADMIN — SODIUM CHLORIDE 25 ML: 9 INJECTION, SOLUTION INTRAVENOUS at 10:52

## 2023-12-05 RX ADMIN — SACUBITRIL AND VALSARTAN 1 TABLET: 49; 51 TABLET, FILM COATED ORAL at 08:04

## 2023-12-05 RX ADMIN — MEXILETINE HYDROCHLORIDE 200 MG: 200 CAPSULE ORAL at 05:55

## 2023-12-05 RX ADMIN — LANSOPRAZOLE 30 MG: 30 CAPSULE, DELAYED RELEASE ORAL at 17:19

## 2023-12-05 RX ADMIN — ENOXAPARIN SODIUM 120 MG: 150 INJECTION SUBCUTANEOUS at 21:08

## 2023-12-05 RX ADMIN — SODIUM CHLORIDE 500 ML: 9 INJECTION, SOLUTION INTRAVENOUS at 05:59

## 2023-12-05 RX ADMIN — ENOXAPARIN SODIUM 120 MG: 150 INJECTION SUBCUTANEOUS at 09:21

## 2023-12-05 RX ADMIN — POTASSIUM CHLORIDE 40 MEQ: 750 TABLET, FILM COATED, EXTENDED RELEASE ORAL at 14:00

## 2023-12-05 RX ADMIN — MEXILETINE HYDROCHLORIDE 200 MG: 200 CAPSULE ORAL at 21:08

## 2023-12-05 RX ADMIN — MAGNESIUM OXIDE 400 MG: 400 TABLET ORAL at 09:22

## 2023-12-05 RX ADMIN — MEXILETINE HYDROCHLORIDE 200 MG: 200 CAPSULE ORAL at 14:00

## 2023-12-05 RX ADMIN — CARVEDILOL 50 MG: 25 TABLET, FILM COATED ORAL at 17:41

## 2023-12-05 RX ADMIN — ROSUVASTATIN CALCIUM 40 MG: 20 TABLET, FILM COATED ORAL at 21:07

## 2023-12-05 RX ADMIN — EMPAGLIFLOZIN 10 MG: 10 TABLET, FILM COATED ORAL at 08:05

## 2023-12-05 RX ADMIN — FUROSEMIDE 60 MG: 40 TABLET ORAL at 08:06

## 2023-12-05 RX ADMIN — SPIRONOLACTONE 50 MG: 50 TABLET ORAL at 08:05

## 2023-12-05 RX ADMIN — TAMSULOSIN HYDROCHLORIDE 0.4 MG: 0.4 CAPSULE ORAL at 21:07

## 2023-12-05 ASSESSMENT — ENCOUNTER SYMPTOMS
PALPITATIONS: 0
PND: 0
ORTHOPNEA: 0

## 2023-12-05 NOTE — NURSING END OF SHIFT
Nursing End of Shift Summary:    Patient: Pete Trejo  MRN: [id number]  : 1965, Age: 58 y.o.    Location: [address] [place] [address]    Nursing Goals  Clinical Goals for the Shift: maintain comfort and safety, monitor VS, pain, I&O    Narrative Summary of Progress Toward Clinical Goals:  No overnight events    Barriers to Goals/Nursing Concerns:  No    New Patient or Family Concerns/Issues:  No    Shift Summary:   Significant Events & Communications to Providers (last 12 hours)       Last 5 Values    No documentation.                 Oxygen Usage (last 12 hours)       Last 5 Values       Row Name 23                   Room Air or Baseline Oxygen Trial by Nursing    Is Patient on Room Air OR on the Same Amount of O2 as at Home? Yes                      Mobility (last 12 hours)       Last 5 Values       Row Name 237 23 0349          Mobility    Activity -- Bathroom privileges;Up ad ezekiel -- --     Level of Assistance -- Independent -- --     Patient Position Supine -- Supine Supine                   Urethral Catheter       Active Urethral Catheter       None                  Active Lines       Active Central venous catheter / Peripherally inserted central catheter / Implantable Port / Hemodialysis catheter / Midline Catheter       None                  Infusing Medications   Medication Dose Last Rate     PRN Medications   Medication Dose Last Admin    sodium chloride  3 mL      sodium chloride  1 spray      sodium chloride-aloe vera  1 Application      acetaminophen  500 mg      traZODone  25 mg      magnesium hydroxide  30 mL      alum-mag hydroxide-simeth  30 mL      albuterol HFA  2 puff      nitroglycerin  0.4 mg      LORazepam  1 mg

## 2023-12-05 NOTE — INTERDISCIPLINARY/THERAPY
HEART FAILURE EDUCATOR:  [phone]      Patient is known to me from the HF Cardiologist report for CHF, EF 25%.     Patient has a new patient appointment with Dr Leiva on 12/14/2023 at 1530.  This will be on AVS.    Patient has history of CHF, and has previously done daily weights, sodium and fluid restrictions.    I reviewed the information included in the Heart Failure Guide book, including sodium and fluid restriction, and gave patient the Heart Failure Zone daily plan of care and symptom monitoring page as a “safety net”, including the warning signs of heart failure decompensation.  Patient voiced understanding and agreement.  I provided a direct phone number to CHF Clinic Nurse Line.        Nurse was not available for review.

## 2023-12-05 NOTE — NURSING END OF SHIFT
Nursing End of Shift Summary:    Patient: Pete Trejo  MRN: [id number]  : 1965, Age: 58 y.o.    Location: [address] [place] [address]    Nursing Goals  Clinical Goals for the Shift: Monitor VS, tele, labs, remain stable    Narrative Summary of Progress Toward Clinical Goals:  VS have been stable, labs reviewed, telemetry has been reviewed and less dysrhythmia has been noted    Barriers to Goals/Nursing Concerns:  No    New Patient or Family Concerns/Issues:  No    Shift Summary:   Significant Events & Communications to Providers (last 12 hours)       Last 5 Values    No documentation.                 Oxygen Usage (last 12 hours)       Last 5 Values       Row Name 23 0800                   Room Air or Baseline Oxygen Trial by Nursing    Is Patient on Room Air OR on the Same Amount of O2 as at Home? Yes                      Mobility (last 12 hours)       Last 5 Values       Row Name 23 0735 23 0900 23 1126 23 1532          Mobility    Activity -- Dangle -- --     Level of Assistance -- Independent -- --     Patient Position Sitting Sitting Supine Supine     Turning -- Turns self -- Turns self                   Urethral Catheter       Active Urethral Catheter       None                  Active Lines       Active Central venous catheter / Peripherally inserted central catheter / Implantable Port / Hemodialysis catheter / Midline Catheter       None                  Infusing Medications   Medication Dose Last Rate     PRN Medications   Medication Dose Last Admin    sodium chloride  3 mL      sodium chloride  1 spray      sodium chloride-aloe vera  1 Application      acetaminophen  500 mg      traZODone  25 mg      magnesium hydroxide  30 mL      alum-mag hydroxide-simeth  30 mL      albuterol HFA  2 puff      nitroglycerin  0.4 mg      LORazepam  1 mg

## 2023-12-05 NOTE — POST-PROCEDURE NOTE
Post Procedure Note:    Pete Trejo  12/5/2023    Pre-op Diagnosis  1.  Ischemic cardiomyopathy with refractory ventricular tachycardia       Post-Op Diagnosis:  1.  Ischemic cardiomyopathy with refractory ventricular tachycardia     Procedure(s) Performed:  1.  Selective coronary angiography  2.  Left heart catheterization  3.  Right heart catheterization  4.  Stand-alone balloon angioplasty, ostial first diagonal coronary artery using a 2.5 x 15 mm Angiosculpt balloon  5.  Stand-alone balloon angioplasty, inferior branch of the first diagonal using a 2.5 x 15 mm Trek bernard noncompliant balloon     Indication for procedure:  The patient is a 58-year-old male with nonischemic cardiomyopathy and severely impaired LV function.  He has had refractory ventricular tachycardia, which has been slowly improving over the last few days following adjustment of his medical therapy.  To assess for an ischemic etiology, coronary angiography was recommended.    Anesthesia:  The patient was brought to the cardiac catheterization laboratory. Conscious sedation was achieved with Versed and fentanyl administered intravenously.  This was performed by an independent trained observer who is a Cath Lab registered nurse and done under my direct supervision with continuous hemodynamic monitoring. I monitored the patient for further administration of agents as needed including continuous monitoring of oxygen saturation, heart rate and blood pressure.  A moderate level of sedation was maintained throughout the case.      Total Sedation time: 48 minutes    Sedation Medications and Contrast use:   12/05/2023 1610 MST fentaNYL citrate (PF) 50 mcg/mL injection 25 mcg     12/05/2023 1552 MST fentaNYL citrate (PF) 50 mcg/mL injection 50 mcg     12/05/2023 1610 MST midazolam (VERSED) injection 0.5 mg intravenous     12/05/2023 1552 MST midazolam (VERSED) injection 1 mg intravenous Given     Total Air Kerma (mGy) 432.000; Fluoro Time (min) = 11.5 AG      Informed consent:  Prior to proceeding with the procedure, the patient was seen and examined in room 2. Rationale, risks, benefits and alternatives of cardiac catheterization and percutaneous coronary intervention with or without stents is explained to the patient including, but not limited to, possible risks of infection, bleeding requiring blood transfusion, damage to the blood vessel requiring repair vascular surgery, allergic reactions to the x-ray dye, acute kidney injury including need for possible hemodialysis, significant cardiac arrhythmias, myocardial infarction, stroke, and threat to life.     Operative procedure/Access:  The right wrist was prepped and draped in usual sterile manner.  Using 1% Xylocaine for local anesthesia, a 5-6 Faroese Terumo Slender sheath was inserted into the right radial artery followed by administration of standard radial cocktail.  Diagnostic coronary angiography was performed with Mejia catheters.  The antecubital fossa was prepped and draped in the usual sterile manner.  An 18-gauge peripheral IV was inserted into the cephalic vein.  Over guidewire, the IV was exchanged for a 7 Faroese sheath.  Right heart catheterization was performed via the venous sheath in the right antecubital fossa.     Diagnostic coronary angiogram:  Selective engagement of the right coronary artery was performed with a JR4 catheter.  Selective engagement of the left main coronary artery was performed with a JL 3.5 catheter.  Selective injections were performed.  The patient has a right dominant coronary system.        Left Main: Left main coronary artery is normal.  No significant disease        Left Anterior Descending: The left anterior descending arises from the left main and courses along the anterior interventricular groove.  The proximal LAD stent is patent.  The remainder the LAD is patent although quite small and threadlike.  Flow is SAMANTA grade III.  The first diagonal branch is a large  vessel that bifurcates into a superior and inferior branch.  There is a stent that extends from the ostium of the vessel into the superior branch.  A stent also extends into the inferior branch.  This is a bifurcation type of stent arrangement.  50% stenosis is noted at the ostium of the first diagonal.  The remainder of the proximal portion is patent.  The superior branch, including the stent is widely patent with normal flow.  The inferior branch demonstrates severe in-stent restenosis in the proximal segment, 85-90% with SAMANTA grade I distal flow.  The remaining diagonal branches are patent       Left Circumflex: The left circumflex is a moderate-sized artery.  The proximal vessel is normal.  Mild irregularities are seen in the midsegment.  The distal vessel is patent.  The first obtuse marginal is patent without significant disease.  There is a moderate-sized second obtuse marginal which is widely patent.       Right Coronary Artery: Right coronary artery is a large, dominant artery.  Mild irregularities are seen in the proximal and mid segment.  Mild irregularities are noted distally.  The PDA and KRISTEN branches are widely patent.  This is similar in appearance to the patient's previous angiogram     Left Heart Catheterization:  Left ventricular end-diastolic pressure measures 8 mmHg.  There is no gradient across the aortic valve on catheter pullback     Right Heart Catheterization:    PA Pressure: 24/10 mmHg, mean 15 mmHg (PA saturation 65%)    PCWP: 8 mmHg    RV Pressure: 31/1 mmHg    RVEDP: 10 mmHg    RA Pressure: 5 mmHg    Cardiac Output: 5.2 L/min    Cardiac Index: 2.1 L/min/m surface squared    Coronary intervention:  In addition to the radial cocktail, 7000 units of heparin was given intravenously.  Additional dosing was guided by ACT times.  A 0.014 inch  50 wire was advanced into the distal portion of the inferior first diagonal branch.  A 2.0 x 20 mm noncompliant balloon was used to dilate the  entire ostial through proximal portion of the first diagonal.  Inflations to 10 he of pressure for 2 minutes were performed.  The balloon catheter was withdrawn.  The ostium of the first diagonal was dilated with a 2.5 x 15 mm AngioSculpt scoring balloon with inflation to 6 he of pressure for 1 minute.  A 2.5 x 15 mm noncompliant trek balloon was then used to dilate the in-stent restenotic segment.  Inflations to 12 he for 2 minutes were performed.  200 mcg of intracoronary nitroglycerin was administered.  Final angiogram demonstrates restoration of antegrade flow, SAMANTA grade III.  This is improved from baseline.  Similar to all of the vessels in the LAD distribution, the distal portion of the inferior first diagonal branch is very small.  All catheters and wires were removed.  The radial sheath was removed and hemostasis achieved by placement of the TR band.  The antecubital sheath was secured in place.  The patient was transferred to the floor in stable condition.     Estimated Blood Loss:  None     Conclusions:  1.  Normal right heart pressures  2.  Pulmonary capillary wedge pressure 8 mmHg  3.  Patent proximal LAD stent  4.  50% in-stent restenosis ostial first diagonal with patent superior branch stent.  The inferior branch of the first diagonal has severe 80-90% in-stent restenosis.  Status post 2.5 mm Angiosculpt scoring balloon angioplasty at the ostium and 2.5 x 15 mm trek neononcompliant balloon angioplasty in the in-stent restenotic segment  5.  Mild irregularities right coronary artery and left circumflex coronary artery.  Similar to previous    Staff:   Circulator: Juan Anotnio Quiñonez RN; Yissel Leal RN  Documenter: Odilia Rizvi RN; Juan Diaz RN  Invasive Staff: Vaibhav Scott, RT; Angelita Gandhi    Estimated Blood Loss:  No blood loss documented.    Implants:  Nothing was implanted during the procedure     Complications:  none    Date: 12/5/2023  Time: 4:44 PM

## 2023-12-05 NOTE — PROGRESS NOTES
[facility]                                                               [address] [place] [address]     CARDIOLOGY INPATIENT PROGRESS NOTE       Subjective    Patient ID: Pete Trejo is a 58 y.o. male.    At time of assessment Pete resting in bed, states he feels well. He denies any complaints.     HPI  Patient was most recently seen  11/22/2023 by Gerardo Sun CNP  In the clinic.  An echo 10/19/2020  Revealed severe LV systolic dysfunction with an EF of 25%  And large anteroapical aneurysm.  Only the basal  Left ventricular segments had preserved contractility.  No LV thrombus noted.  He had grade 2 moderate  LV diastolic abnormality.  LA VI 53 mL/m².  His arrhythmia logbook  On the ICD download was reviewed  Revealing multiple episodes of nonsustained VT with ATP    Though no shocks  In the 3 days prior.  He was noted to be 59%  Atrial paced  And  Around 70%  BiV paced.  He was symptomatic  With  Palpitations and anxiety  Correlating with  Nonsustained VT and ATP.  Carvedilol was increased to 37.5 mg twice daily.  He is also followed by Dr. Leiva in the clinic.  Yesterday  Patient called into the clinic saying  He was  Having more symptoms  Such as when he received ATP  Previously.  He noted multiple ATP episodes every day for the past 7 days or so  With palpitations and shortness of breath.  Dr. Wu reviewed the information and recommended patient be admitted for increasing sotalol to 180 mg twice daily.  He presents today  For admission.      Pete is a 59 yo M  with complex cardiac history significant for CAD complicated by prior MI (PCI to LAD and diag), ischemic cardiomyopathy, chronic HFrEF with severely reduced LVEF, ,TIA/CVA in 2010 in context of LV thrombus, and recurrent VT s/p initial ablation 6/2020 ([facility]) with recurrent VT requiring repeat ablation 11/[facility]. He was evaluated at the  [facility] for heart transplantation/advanced therapies but was denied due to having a cardiac index above 2.2. Antiarrhythmic therapy of mexiletine and sotalol. Admission 10/18/2023 for recurrent VT. CHF consulted for establishment of care for chronic HFrEF.      Review of Systems   Cardiovascular:  Negative for chest pain, dyspnea on exertion, leg swelling/pain, orthopnea, palpitations and PND.   All other systems reviewed and are negative.         LOS: 5 days     Allergies as of 11/30/2023 - Reviewed 11/30/2023   Allergen Reaction Noted    Morphine  07/30/2017    Tramadol  07/30/2017     magnesium oxide, 400 mg, oral, 2x daily  sacubitriL-valsartan, 1 tablet, oral, 2x daily  enoxaparin, 1 mg/kg, subcutaneous, q12h KULDEEP  lidocaine, 100 mg, intravenous, Once  carvediloL, 50 mg, oral, 2x daily with meals  furosemide, 60 mg, oral, Daily  sotaloL, 180 mg, oral, q12h KULDEEP  aspirin, 81 mg, oral, Daily  cholecalciferol (vitamin D3), 2,000 Units, oral, Daily  empagliflozin, 10 mg, oral, Daily  mexiletine, 200 mg, oral, q8h KULDEEP  lansoprazole, 30 mg, oral, Daily at 1600  potassium chloride, 30 mEq, oral, Daily  [Held by provider] rivaroxaban, 20 mg, oral, Daily with dinner  rosuvastatin, 40 mg, oral, Nightly  spironolactone, 50 mg, oral, Daily  tamsulosin, 0.4 mg, oral, Nightly           Objective     Vital signs in last 24 hours:   Temp:  [36.4 °C (97.5 °F)-37.1 °C (98.8 °F)] 36.8 °C (98.2 °F)  Heart Rate:  [55-60] 59  Resp:  [16-20] 16  SpO2:  [92 %-95 %] 95 %  BP: (109-121)/(68-74) 115/68    Physical Exam  Vitals reviewed.   Constitutional:       General: He is not in acute distress.     Appearance: He is obese. He is not ill-appearing.   HENT:      Head: Normocephalic and atraumatic.      Nose: Nose normal.   Eyes:      General: No scleral icterus.  Cardiovascular:      Rate and Rhythm: Normal rate and regular rhythm.      Heart sounds: No murmur heard.  Pulmonary:      Effort: Pulmonary effort is normal.       "Breath sounds: Normal breath sounds.   Abdominal:      General: Abdomen is flat. Bowel sounds are normal.      Palpations: Abdomen is soft.   Musculoskeletal:         General: Normal range of motion.      Cervical back: Normal range of motion.   Skin:     General: Skin is warm.      Capillary Refill: Capillary refill takes less than 2 seconds.   Neurological:      Mental Status: He is alert. Mental status is at baseline.   Psychiatric:         Mood and Affect: Mood normal.          Intake/Output this shift:  No intake/output data recorded.    Intake/Output Summary (Last 24 hours) at 12/5/2023 0705  Last data filed at 12/5/2023 0500  Gross per 24 hour   Intake 1460 ml   Output --   Net 1460 ml     Cumulative I&O:  Weight: 118.5 kg (261 lb 3.9 oz)  Weights (Current Encounter Only) (last 180 days)       Date/Time Weight    12/05/23 0349 118.5 kg (261 lb 3.9 oz)    12/04/23 0407 118.8 kg (262 lb)    12/03/23 0300 119.7 kg (264 lb)    12/02/23 0403 120.8 kg (266 lb 4.8 oz)    12/01/23 0431 123.2 kg (271 lb 9.6 oz)           ED Triage Vitals [12/01/23 0431]   Weight 123.2 kg (271 lb 9.6 oz)            Labs:  BMP:  Lab Results   Component Value Date     12/04/2023    K 4.4 12/04/2023     12/04/2023    CO2 23 12/04/2023    BUN 20 12/04/2023    CREATININE 1.06 12/04/2023    GLUCOSE 120 (H) 12/04/2023    CALCIUM 9.5 12/04/2023     CBC:   Lab Results   Component Value Date    WBC 6.6 12/03/2023    RBC 5.36 12/03/2023    HGB 16.6 12/04/2023    HCT 49.2 12/03/2023     12/03/2023     CMP:  Lab Results   Component Value Date     12/04/2023    K 4.4 12/04/2023     12/04/2023    CO2 23 12/04/2023    GLUCOSE 120 (H) 12/04/2023    CREATININE 1.06 12/04/2023    CALCIUM 9.5 12/04/2023    ALBUMIN 4.5 12/04/2023    ALKPHOS 66 12/04/2023    BILITOT 1.74 (H) 12/04/2023    ALT 28 12/04/2023    AST 18 12/04/2023    BUN 20 12/04/2023    ANIONGAP 11 12/04/2023     No results found for: \"CKTOTAL\", \"CKMBINDEX\", " "\"TROPONINI\", \"BNP\", \"POCBNP\"  Lipids:    Lab Results   Component Value Date    CHOL 106 07/28/2022    CHOL 119 07/11/2021    CHOL 127 06/08/2020     Lab Results   Component Value Date    HDL 25 (L) 07/28/2022    HDL 35 (L) 07/11/2021    HDL 29 (L) 06/08/2020     Lab Results   Component Value Date    LDLCALC 50 07/28/2022    LDLCALC 60 07/11/2021    LDLCALC 76 06/08/2020     Lab Results   Component Value Date    TRIG 154 (H) 07/28/2022    TRIG 120 07/11/2021    TRIG 112 06/08/2020        TSH:  No results found for: \"TSH\"  Magnesium:  Lab Results   Component Value Date    MG 2.1 12/04/2023     PT/INR:   No results found for: \"PT\", \"INR\"    Radiology:  XR chest portable 1 view    Result Date: 11/30/2023  Narrative: Exam: Single view chest x-ray 11/30/2023 . Clinical History:   heart failure with reduced ejection fraction Comparison(s): Available Findings: 3-lead pacemaker. Cardiomegaly. No acute infiltrate or effusion..     Impression: IMPRESSION:  1.  No acute abnormalities.      Assessment/Plan   Principal Problem:    Presence of stent in coronary artery  Active Problems:    VT (ventricular tachycardia) (CMS/[facility])      Plan  Chronic systolic heart failure  Ischemic cardiomyopathy:  Hypokalemia/hypomagnesemia  NYHA II-III stage C  Echocardiogram 10/19/2023 with LVEF 25%, severe LV systolic dysfunction.  Large anterior apical aneurysm.  No LV mural thrombi.  Grade 2 diastolic abnormality; no significant change from prior 1/2022.  Device: CRT-D implanted 6/20/2022  Device check 10/20/2023, battery life 10.5 years, ATP therapy x 3, shock: 1, suggestive of ventricular tachycardia.  RV pacing 96%, LV pacing 95%, AT/AF burden 0%, heart logic score 3  GDMT:  Beta-blocker: Carvedilol 50 mg twice daily  SGLT2i: Empagliflozin 10 mg daily  ACE/ARB/ARNI: Entresto 49/51 mg twice daily; from 24/26 mg  Aldosterone antagonist: Spironolactone 50 mg daily  Euvolemic, discontinue lasix  Plan bilateral cardiac catheterization today (12/5) " assess for underlying ischemia  NPO   Normal saline 500 mL bolus prior to heart catheterization on 12/5  Consider advanced therapies including heart transplant for recent ventricular tachycardia requiring shock delivery  Potassium and magnesium repletion today     Recurrent ventricular tachycardia:  Antiarrhythmics per electrophysiology  Sotalol 180 mg every 12 hours  Mexiletine 200 mg 3 times daily  Carvedilol 50 mg twice daily  Discussed with Merced Newton CNP (12/4) who is waiting to confer with Dr. Wu; may need to switch to amiodarone.     Assessment and plan in conjunction with Dr. Leiva.        Electronically signed by: Keyonna Pablo CNP  12/5/2023  7:05 AM

## 2023-12-05 NOTE — PROGRESS NOTES
[address] [address]                                                    Electrophysiology Inpatient Progress Note    Subjective    Patient ID: Pete Trejo is a 58 y.o. male.    Chief Complaint: No chief complaint on file.       LOS: 5 days     HPI:  Patient seen and examined.  Vital signs are satisfactory.  Potassium 3.0 and magnesium 1.5 which are getting replaced.  He had an episode of VT treated with ATP yesterday evening at approximately 6:20 PM.  Episode was not triggered by pro arrhythmia from sotalol.  No further episodes of ventricular arrhythmias.  He reports he is feeling well today.  He denies chest pain or dyspnea.  He did feel his episode yesterday evening.      Allergies as of 11/30/2023 - Reviewed 11/30/2023   Allergen Reaction Noted    Morphine  07/30/2017    Tramadol  07/30/2017       Current Facility-Administered Medications:     potassium chloride (KLOR-CON) CR tablet 40 mEq, 40 mEq, oral, q3h, BensonkerdyKeyonna dobson, CNP, 40 mEq at 12/05/23 1039    magnesium sulfate 4 g in SWFI 100 mL IVPB (premix), 4 g, intravenous, Once, Keyonna Pablo, CNP    magnesium oxide (MAG-OX) tablet 400 mg, 400 mg, oral, 2x daily, ProveMerced hyman CNP, 400 mg at 12/05/23 0922    sacubitriL-valsartan (ENTRESTO) 49-51 mg, 1 tablet, oral, 2x daily, BickerdyKeyonna dobson, CNP, 1 tablet at 12/05/23 0804    enoxaparin (LOVENOX) syringe 120 mg, 1 mg/kg, subcutaneous, q12h Cali LIGHT Luis, MD, 120 mg at 12/05/23 0921    lidocaine bolus from infusion 100 mg, 100 mg, intravenous, Once, Segundo Cotton PA    carvediloL (COREG) tablet 50 mg, 50 mg, oral, 2x daily with meals, Wilfredo Montana MD, 50 mg at 12/05/23 0804    sotaloL (BETAPACE) tablet 180 mg, 180 mg, oral, q12h Lamar LIGHT Evelyn, CNP, 180 mg at 12/05/23 0802    Maintain IV access, , , Until discontinued **AND** Saline lock IV, , , Once **AND** sodium chloride flush 3 mL, 3 mL, intravenous, PRN, Provell, Merced,  CNP    sodium chloride (OCEAN) 0.65 % nasal spray 1 spray, 1 spray, Each Nostril, PRN, Provell, Merced, CNP    sodium chloride-aloe vera (AYR) nasal gel 1 Application, 1 Application, Each Nostril, PRN, Provell, Merced, CNP    acetaminophen (TYLENOL) tablet 500 mg, 500 mg, oral, q6h PRN, Provell, Merced, CNP    traZODone (DESYREL) tablet 25 mg, 25 mg, oral, Nightly PRN, Provell, Merced, CNP    magnesium hydroxide (MILK OF MAGNESIA) 400 mg/5 mL oral suspension 30 mL, 30 mL, oral, Daily PRN, Provell, Merced, CNP    alum-mag hydroxide-simeth (MAALOX ADVANCED) suspension 30 mL, 30 mL, oral, 3x daily PRN, Provell, Merced, CNP    aspirin EC tablet 81 mg, 81 mg, oral, Daily, Provell, Merced, CNP, 81 mg at 12/05/23 0804    albuterol HFA 90 mcg/actuation inhaler 2 puff, 2 puff, inhalation, q6h PRN, Provell, Merced, CNP    cholecalciferol (vitamin D3) 1,000 unit (25mcg) tab/cap 2,000 Units, 2,000 Units, oral, Daily, Provell, Merced, CNP, 2,000 Units at 12/05/23 0803    empagliflozin (JARDIANCE) tablet 10 mg, 10 mg, oral, Daily, Provell, Merced, CNP, 10 mg at 12/05/23 0805    mexiletine (MEXITIL) capsule 200 mg, 200 mg, oral, q8h KULDEEP, Provell, Merced, CNP, 200 mg at 12/05/23 0555    nitroglycerin (NITROSTAT) SL tablet 0.4 mg, 0.4 mg, sublingual, q5 min PRN, Provell, Merced, CNP    lansoprazole (PREVACID) capsule 30 mg, 30 mg, oral, Daily at 1600, Provell, Merced, CNP, 30 mg at 12/04/23 1537    potassium chloride (KLOR-CON) CR tablet 30 mEq, 30 mEq, oral, Daily, Provell, Merced, CNP, 30 mEq at 12/05/23 0922    [Held by provider] rivaroxaban (XARELTO) tablet 20 mg, 20 mg, oral, Daily with dinner, Merced Newton CNP, 20 mg at 12/03/23 1816    rosuvastatin (CRESTOR) tablet 40 mg, 40 mg, oral, Nightly, ProveMerced hyman CNP, 40 mg at 12/04/23 2103    spironolactone (ALDACTONE) tablet 50 mg, 50 mg, oral, Daily, Provell, Merced, CNP, 50 mg at 12/05/23 0805    tamsulosin (FLOMAX) 24 hr capsule 0.4 mg, 0.4 mg, oral, Nightly, Lamar  Merced, CNP, 0.4 mg at 12/04/23 2103    LORazepam (ATIVAN) tablet 1 mg, 1 mg, oral, 2x daily PRN, Merced Newton CNP    Objective     Vital signs in last 24 hours:   Temp:  [36.4 °C (97.5 °F)-37.1 °C (98.8 °F)] 36.8 °C (98.2 °F)  Heart Rate:  [53-60] 60  Resp:  [16-20] 16  SpO2:  [92 %-95 %] 95 %  BP: (109-144)/(68-76) 144/76  Weight: 118.5 kg (261 lb 3.9 oz)    Intake/Output this shift:  I/O this shift:  In: -   Out: 1200 [Urine:1200]    Intake/Output Summary (Last 24 hours) at 12/5/2023 1003  Last data filed at 12/5/2023 0900  Gross per 24 hour   Intake 1160 ml   Output 1200 ml   Net -40 ml       Physical Exam  Vitals and nursing note reviewed.   Constitutional:       General: He is not in acute distress.     Appearance: He is well-developed. He is not diaphoretic.   HENT:      Head: Normocephalic.   Neck:      Vascular: No carotid bruit or JVD.   Cardiovascular:      Rate and Rhythm: Normal rate and regular rhythm.      Pulses: Intact distal pulses.      Heart sounds: Normal heart sounds. No murmur heard.     No friction rub. No gallop.      Comments: Left upper chest device site is without signs of infection or erosion  Pulmonary:      Effort: Pulmonary effort is normal.      Breath sounds: Normal breath sounds. No wheezing or rales.   Abdominal:      General: Bowel sounds are normal.      Palpations: Abdomen is soft.   Musculoskeletal:      Cervical back: Normal range of motion.      Right lower leg: No edema.      Left lower leg: No edema.   Skin:     General: Skin is warm and dry.   Neurological:      Mental Status: He is alert and oriented to person, place, and time.   Psychiatric:         Behavior: Behavior normal.         Data Review:   BMP:  Lab Results   Component Value Date     12/05/2023    K 3.0 (L) 12/05/2023     (H) 12/05/2023    CO2 20 (L) 12/05/2023    BUN 15 12/05/2023    CREATININE 0.66 (L) 12/05/2023    GLUCOSE 86 12/05/2023    CALCIUM 6.2 (L) 12/05/2023     CBC:   Lab Results  "  Component Value Date    WBC 6.6 12/03/2023    RBC 5.36 12/03/2023    HGB 16.6 12/04/2023    HCT 49.2 12/03/2023     12/03/2023     CMP:  Lab Results   Component Value Date     12/05/2023    K 3.0 (L) 12/05/2023     (H) 12/05/2023    CO2 20 (L) 12/05/2023    GLUCOSE 86 12/05/2023    CREATININE 0.66 (L) 12/05/2023    CALCIUM 6.2 (L) 12/05/2023    ALBUMIN 4.5 12/04/2023    ALKPHOS 66 12/04/2023    BILITOT 1.74 (H) 12/04/2023    ALT 28 12/04/2023    AST 18 12/04/2023    BUN 15 12/05/2023    ANIONGAP 7 12/05/2023     No results found for: \"CKTOTAL\", \"CKMBINDEX\", \"TROPONINI\", \"BNP\", \"POCBNP\"  Lipid: No results found for: \"CHOL\", \"HDL\", \"TRIG\", \"LDLCALC\"  TSH:  No results found for: \"TSH\"  Magnesium:  Lab Results   Component Value Date    MG 1.5 (L) 12/05/2023     PT/INR:   No results found for: \"PT\", \"INR\"    Tests:  XR chest portable 1 view    Result Date: 11/30/2023  Narrative: Exam: Single view chest x-ray 11/30/2023 . Clinical History:   heart failure with reduced ejection fraction Comparison(s): Available Findings: 3-lead pacemaker. Cardiomegaly. No acute infiltrate or effusion..     Impression: IMPRESSION:  1.  No acute abnormalities.                    Assessment/Plan   Patient Active Problem List    Diagnosis Date Noted    VT (ventricular tachycardia) (CMS/[facility]) 11/30/2023    Gastroesophageal reflux disease without esophagitis 10/19/2023    Benign prostatic hyperplasia without lower urinary tract symptoms 10/19/2023    Diabetes mellitus type 2 in obese (CMS/HCC) 10/19/2023    Moderate obesity 10/19/2023    Elevated troponin 10/19/2023    Lymphocytosis (symptomatic) 10/19/2023    AICD discharge 10/18/2023    Long term current use of antiarrhythmic drug 03/04/2022    Sustained ventricular tachycardia (CMS/HCC) 09/19/2021    Chronic anticoagulation 09/19/2021    History of ventricular tachycardia 09/19/2021    Paroxysmal atrial fibrillation (CMS/HCC) 09/19/2021    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  History of VT ablations here and at [facility].  His sotalol was increased to 180 mg twice daily this admission.  Has received a total of 10 doses thus far.  He was continued on mexiletine 200 mg 3 times daily and carvedilol was increased to 50 mg twice daily.  Potassium 3.0 and magnesium 1.5 which are getting replaced to keep potassium greater than 4.0 and magnesium greater than 2.0.  He had 1 episode of VT treated with ATP yesterday evening but nothing overnight or this morning.  Based on telemetry review, does not appear to be triggered by proarrhythmia from sotalol.  EKG daily.  QTc satisfactory today.  Patient is scheduled for bilateral heart catheterization today.  EF 25%.  NYHA class II.  Has CRT-D which was implanted in 2022.  If he continues to have recurrent VT, we may need to switch back to amiodarone which he was on in the past.  Based on clinical course, we may need to refer him back to [facility] regarding redo VT ablation versus consideration for transplant/VAD.  Heart Failure team is following.  On GDMT.      Plan discussed in conjunction with Dr. Wu.    Electronically signed by: Donya Parra CNP  12/5/2023  10:03 AM      A voice recognition program was used to aid in documentation of this record.  Sometimes words are not printed exactly as they were spoken.  While efforts were made to carefully edit and correct any inaccuracies, some errors may be present; please take these into context.  Please contact the provider if errors are identified.

## 2023-12-05 NOTE — PLAN OF CARE
Problem: Cardiovascular - Adult  Goal: Maintains optimal cardiac output and hemodynamic stability  Description: INTERVENTIONS:  1. Monitor vital signs and rhythm  2. Monitor for hypotension and other signs of decreased cardiac output  3. Administer and titrate ordered vasoactive medications to optimize hemodynamic stability  4. Monitor for fluid overload/dehydration, weight gain, shortness of breath and activity intolerance  5. Monitor arterial and/or venous puncture sites for bleeding and/or hematoma  6. Assess quality of pulses, capillary refill, edema, sensation, skin color and temperature  7. Assess for signs of decreased coronary artery perfusion - ex. angina  Outcome: Progressing  Goal: Absence of cardiac dysrhythmias or at baseline  Description: INTERVENTIONS:  1. Continuous cardiac monitoring, monitor vital signs, obtain 12 lead EKG if indicated  2. Administer antiarrhythmic and heart rate control medications as ordered  3. Initiate emergency measures for life threatening arrhythmias  4. Monitor electrolytes and administer replacement therapy as ordered  Outcome: Progressing     Problem: Knowledge Deficit  Goal: Patient/family/caregiver demonstrates understanding of disease process, treatment plan, medications, and discharge instructions  Description: INTERVENTIONS:   1. Complete learning assessment and assess knowledge base  2. Provide teaching at level of understanding   3. Provide teaching via preferred learning methods  Outcome: Progressing     Problem: Respiratory - Adult  Goal: Achieves optimal ventilation and oxygenation with noninvasive CPAP/BiLEVEL support  Description: INTERVENTIONS:  1. Provide education to patient/family about rationale and expected outcomes associated with therapy  2. Position patient to facilitate optimal ventilation/oxygenation status and minimize respiratory effort  3. Position patient to reduce aspiration risk, elevate head of bed at least 35 degrees or higher, as  applicable  4. Assess effectiveness of therapy on ventilation/oxygenation status based on oxygen saturation and/or arterial blood gases, as indicated  5. Assess patient for changes in respiratory and physiological status  6. Auscultate breath sounds and assess chest excursion, as indicated  7. Assess patient for changes in mentation and behavior  8. Routinely monitor equipment for proper performance and settings  9. Assess and monitor skin condition, in relationship to the respiratory interface  10. Assure equipment alarm volume is adequate for the patient's environment  11. Immediately respond to equipment alarm, to assess patient and/or cause for alarm  12. Follow universal infection control/hospital policy(ies)/standards  Outcome: Progressing

## 2023-12-06 ENCOUNTER — APPOINTMENT (OUTPATIENT)
Dept: CARDIAC REHAB | Facility: HOSPITAL | Age: 58
DRG: 251 | End: 2023-12-06
Payer: MEDICARE

## 2023-12-06 LAB
ALBUMIN SERPL-MCNC: 4.1 G/DL (ref 3.5–5.3)
ALP SERPL-CCNC: 60 U/L (ref 45–115)
ALT SERPL-CCNC: 24 U/L (ref 7–52)
ANION GAP SERPL CALC-SCNC: 13 MMOL/L (ref 3–11)
AST SERPL-CCNC: 19 U/L
BASOPHILS # BLD AUTO: 0 10*3/UL
BASOPHILS NFR BLD AUTO: 0.3 % (ref 0–2)
BILIRUB SERPL-MCNC: 1.82 MG/DL (ref 0.2–1.4)
BUN SERPL-MCNC: 18 MG/DL (ref 7–25)
CALCIUM ALBUM COR SERPL-MCNC: 9.1 MG/DL (ref 8.6–10.3)
CALCIUM SERPL-MCNC: 9.2 MG/DL (ref 8.6–10.3)
CHLORIDE SERPL-SCNC: 103 MMOL/L (ref 98–107)
CO2 SERPL-SCNC: 17 MMOL/L (ref 21–32)
CREAT SERPL-MCNC: 0.78 MG/DL (ref 0.7–1.3)
EGFRCR SERPLBLD CKD-EPI 2021: 104 ML/MIN/1.73M*2
EOSINOPHIL # BLD AUTO: 0.1 10*3/UL
EOSINOPHIL NFR BLD AUTO: 2 % (ref 0–3)
ERYTHROCYTE [DISTWIDTH] IN BLOOD BY AUTOMATED COUNT: 14.4 % (ref 11.5–15)
GLUCOSE SERPL-MCNC: 95 MG/DL (ref 70–105)
HCT VFR BLD AUTO: 46.4 % (ref 38–50)
HGB BLD-MCNC: 16.3 G/DL (ref 13.2–17.2)
LYMPHOCYTES # BLD AUTO: 2 10*3/UL
LYMPHOCYTES NFR BLD AUTO: 33.3 % (ref 15–47)
MAGNESIUM SERPL-MCNC: 2.5 MG/DL (ref 1.8–2.4)
MCH RBC QN AUTO: 32.4 PG (ref 29–34)
MCHC RBC AUTO-ENTMCNC: 35.1 G/DL (ref 32–36)
MCV RBC AUTO: 92.5 FL (ref 82–97)
MONOCYTES # BLD AUTO: 0.6 10*3/UL
MONOCYTES NFR BLD AUTO: 9.4 % (ref 5–13)
NEUTROPHILS # BLD AUTO: 3.3 10*3/UL
NEUTROPHILS NFR BLD AUTO: 55 % (ref 46–70)
PLATELET # BLD AUTO: 238 10*3/UL (ref 130–350)
PMV BLD AUTO: 8.4 FL (ref 6.9–10.8)
POTASSIUM SERPL-SCNC: 4.4 MMOL/L (ref 3.5–5.1)
PROT SERPL-MCNC: 6.6 G/DL (ref 6–8.3)
RBC # BLD AUTO: 5.01 10*6/ΜL (ref 4.1–5.8)
SODIUM SERPL-SCNC: 133 MMOL/L (ref 135–145)
WBC # BLD AUTO: 6 10*3/UL (ref 3.7–9.6)

## 2023-12-06 PROCEDURE — 6360000200 HC RX 636 W HCPCS (ALT 250 FOR IP): Performed by: INTERNAL MEDICINE

## 2023-12-06 PROCEDURE — 93010 ELECTROCARDIOGRAM REPORT: CPT | Mod: 59 | Performed by: INTERNAL MEDICINE

## 2023-12-06 PROCEDURE — 93005 ELECTROCARDIOGRAM TRACING: CPT | Performed by: NURSE PRACTITIONER

## 2023-12-06 PROCEDURE — 98960 EDU&TRN PT SELF-MGMT NQHP 1: CPT

## 2023-12-06 PROCEDURE — 93798 PHYS/QHP OP CAR RHAB W/ECG: CPT

## 2023-12-06 PROCEDURE — 6370000100 HC RX 637 (ALT 250 FOR IP): Performed by: NURSE PRACTITIONER

## 2023-12-06 PROCEDURE — (BLANK) HC ROOM ICU INTERMEDIATE

## 2023-12-06 PROCEDURE — 80053 COMPREHEN METABOLIC PANEL: CPT | Performed by: NURSE PRACTITIONER

## 2023-12-06 PROCEDURE — 83735 ASSAY OF MAGNESIUM: CPT | Performed by: NURSE PRACTITIONER

## 2023-12-06 PROCEDURE — 6370000100 HC RX 637 (ALT 250 FOR IP): Performed by: INTERNAL MEDICINE

## 2023-12-06 PROCEDURE — 36415 COLL VENOUS BLD VENIPUNCTURE: CPT | Performed by: NURSE PRACTITIONER

## 2023-12-06 PROCEDURE — 99232 SBSQ HOSP IP/OBS MODERATE 35: CPT | Mod: FS | Performed by: NURSE PRACTITIONER

## 2023-12-06 PROCEDURE — 85025 COMPLETE CBC W/AUTO DIFF WBC: CPT | Performed by: INTERNAL MEDICINE

## 2023-12-06 RX ADMIN — SPIRONOLACTONE 50 MG: 50 TABLET ORAL at 08:53

## 2023-12-06 RX ADMIN — POTASSIUM CHLORIDE 30 MEQ: 750 TABLET, FILM COATED, EXTENDED RELEASE ORAL at 08:57

## 2023-12-06 RX ADMIN — CLOPIDOGREL BISULFATE 75 MG: 75 TABLET ORAL at 08:53

## 2023-12-06 RX ADMIN — RIVAROXABAN 20 MG: 20 TABLET, FILM COATED ORAL at 18:07

## 2023-12-06 RX ADMIN — SACUBITRIL AND VALSARTAN 1 TABLET: 49; 51 TABLET, FILM COATED ORAL at 20:38

## 2023-12-06 RX ADMIN — SOTALOL HYDROCHLORIDE 180 MG: 120 TABLET ORAL at 08:53

## 2023-12-06 RX ADMIN — MAGNESIUM OXIDE 400 MG: 400 TABLET ORAL at 20:38

## 2023-12-06 RX ADMIN — SOTALOL HYDROCHLORIDE 180 MG: 120 TABLET ORAL at 20:38

## 2023-12-06 RX ADMIN — ROSUVASTATIN CALCIUM 40 MG: 20 TABLET, FILM COATED ORAL at 20:38

## 2023-12-06 RX ADMIN — Medication 2000 UNITS: at 08:53

## 2023-12-06 RX ADMIN — MEXILETINE HYDROCHLORIDE 200 MG: 200 CAPSULE ORAL at 20:40

## 2023-12-06 RX ADMIN — ASPIRIN 81 MG: 81 TABLET ORAL at 08:53

## 2023-12-06 RX ADMIN — SACUBITRIL AND VALSARTAN 1 TABLET: 49; 51 TABLET, FILM COATED ORAL at 08:53

## 2023-12-06 RX ADMIN — EMPAGLIFLOZIN 10 MG: 10 TABLET, FILM COATED ORAL at 08:53

## 2023-12-06 RX ADMIN — TAMSULOSIN HYDROCHLORIDE 0.4 MG: 0.4 CAPSULE ORAL at 20:38

## 2023-12-06 RX ADMIN — MEXILETINE HYDROCHLORIDE 200 MG: 200 CAPSULE ORAL at 06:06

## 2023-12-06 RX ADMIN — LANSOPRAZOLE 30 MG: 30 CAPSULE, DELAYED RELEASE ORAL at 16:15

## 2023-12-06 RX ADMIN — CARVEDILOL 50 MG: 25 TABLET, FILM COATED ORAL at 08:53

## 2023-12-06 RX ADMIN — MEXILETINE HYDROCHLORIDE 200 MG: 200 CAPSULE ORAL at 14:35

## 2023-12-06 RX ADMIN — CARVEDILOL 50 MG: 25 TABLET, FILM COATED ORAL at 18:07

## 2023-12-06 RX ADMIN — ENOXAPARIN SODIUM 120 MG: 150 INJECTION SUBCUTANEOUS at 08:56

## 2023-12-06 ASSESSMENT — ENCOUNTER SYMPTOMS
ORTHOPNEA: 0
PALPITATIONS: 0
PND: 0

## 2023-12-06 NOTE — INTERDISCIPLINARY/THERAPY
12/06/23 0900   Phase I Resting    Heart Rate (bpm) 61 bpm   Blood Pressure 120/74   O2 Flow Rate 0 L/min   Comment RN ok'd amb/ed.   Phase I Exercise   Heart Rate (bpm) 67 bpm   Blood Pressure 128/75   O2 Flow Rate 0 L/min   Ambulation Distance (meters) 120 meters   Tolerance Well   Assistive Device Gait belt   Amount of Assistance Required Stand-by assist   Comment Pt had no c/o during amb in hallway.   Phase I Recovery   Heart Rate (bpm) 60 bpm   Blood Pressure 118/71   O2 Flow Rate 0 L/min   Comment Pt resting in bed with call light in reach at the end of session.   Cardiac Rehab Phase I   Amount of Time Spent with Patient (mins) 30 minutes   Phase 2 Referral Site [facility]   Comment Heart disease education completed w/ pt. All questions and concerns were answered at this time. Pt will be referred to [facility] Cardiac Rehab in [facility].

## 2023-12-06 NOTE — INTERDISCIPLINARY/THERAPY
Case Management Progress Note    Phone # [phone]    Reason for Admission: V. Tach    Plan of Care:  Chart reviewed. Pt needs discussed in MDR. Bilateral heart cath completed yesterday. Awaiting EP plan. CM to pt bedside. Pt denies discharge questions or concerns. No discharge needs assessed at this time. CM will continue to follow for discahrge planning.    Discussed Discharge Needs/Topics: N/A    Disposition: HO

## 2023-12-06 NOTE — NURSING END OF SHIFT
Nursing End of Shift Summary:    Patient: Pete Trejo  MRN: [id number]  : 1965, Age: 58 y.o.    Location: [address] [place] [address]    Nursing Goals  Clinical Goals for the Shift: RHC, LHC; monitor tele; safety    Narrative Summary of Progress Toward Clinical Goals:  Cardiac rhythm stable on telemetry, V paced to AV paced this shift; QTC stable on Sotalol loading; RHC and LHC today with balloon to Diagonal coronary artery; pt denies chest pain. Electrolytes replaced this shift, on K+ protocol.     Barriers to Goals/Nursing Concerns:  No    New Patient or Family Concerns/Issues:  No    Shift Summary:   Significant Events & Communications to Providers (last 12 hours)       Last 5 Values       Row Name 23 8711                   Provider Notification    Reason for Communication Other (Comment)  brachial venous sheath;   -HH        Provider Name Dr. Post  -                  User Key  (r) = Recorded By, (t) = Taken By, (c) = Cosigned By      Initials Name     Vidhi Cotton, RN                  Oxygen Usage (last 12 hours)       Last 5 Values    No documentation.                 Mobility (last 12 hours)       Last 5 Values       Row Name 23 [id number] 23 0800 23 1153 23 1200          Mobility    Activity -- Ambulate in room;Bathroom privileges -- Bathroom privileges     Length of Time in Chair (min) -- 0 -- 0     Distance Ambulated (feet) -- 10 Feet -- 10 Feet     Distance Ambulated (Meters Calculated) -- 3.05 Meters -- 3.05 Meters     Patient Position Other (Comment) -- Sitting --     Turning -- Turns self -- Turns self     Distance Ambulated (Meters Calculated)(Do Not Use) -- 3.05 Feet -- 3.05 Feet                   Urethral Catheter       Active Urethral Catheter       None                  Active Lines       Active Central venous catheter / Peripherally inserted central catheter / Implantable Port / Hemodialysis catheter / Midline Catheter       None                  Infusing Medications    Medication Dose Last Rate    sodium chloride 0.9 %  75 mL/hr 75 mL/hr (12/05/23 1710)     PRN Medications   Medication Dose Last Admin    sodium chloride 0.9% (NS)  25-50 mL Stopped at 12/05/23 1053    sodium chloride  3 mL      sodium chloride  1 spray      sodium chloride-aloe vera  1 Application      acetaminophen  500 mg      traZODone  25 mg      magnesium hydroxide  30 mL      alum-mag hydroxide-simeth  30 mL      albuterol HFA  2 puff      nitroglycerin  0.4 mg      LORazepam  1 mg

## 2023-12-06 NOTE — PLAN OF CARE
Problem: Cardiovascular - Adult  Goal: Maintains optimal cardiac output and hemodynamic stability  Description: INTERVENTIONS:  1. Monitor vital signs and rhythm  2. Monitor for hypotension and other signs of decreased cardiac output  3. Administer and titrate ordered vasoactive medications to optimize hemodynamic stability  4. Monitor for fluid overload/dehydration, weight gain, shortness of breath and activity intolerance  5. Monitor arterial and/or venous puncture sites for bleeding and/or hematoma  6. Assess quality of pulses, capillary refill, edema, sensation, skin color and temperature  7. Assess for signs of decreased coronary artery perfusion - ex. angina  Outcome: Progressing  Goal: Absence of cardiac dysrhythmias or at baseline  Description: INTERVENTIONS:  1. Continuous cardiac monitoring, monitor vital signs, obtain 12 lead EKG if indicated  2. Administer antiarrhythmic and heart rate control medications as ordered  3. Initiate emergency measures for life threatening arrhythmias  4. Monitor electrolytes and administer replacement therapy as ordered  Outcome: Progressing  Flowsheets (Taken 12/5/2023 7154)  Absence of cardiac dysrhythmias or at baseline:   Continuous cardiac monitoring, monitor vital signs, obtain 12 lead EKG if indicated   Administer antiarrhythmic and heart rate control medications as ordered   Monitor electrolytes and administer replacement therapy as ordered  Note: V Paced to AV Paced this shift with PVCs. VSS. Sotalol loading. Electrolyte replacement     Problem: Knowledge Deficit  Goal: Patient/family/caregiver demonstrates understanding of disease process, treatment plan, medications, and discharge instructions  Description: INTERVENTIONS:   1. Complete learning assessment and assess knowledge base  2. Provide teaching at level of understanding   3. Provide teaching via preferred learning methods  Outcome: Progressing     Problem: Respiratory - Adult  Goal: Achieves optimal  ventilation and oxygenation with noninvasive CPAP/BiLEVEL support  Description: INTERVENTIONS:  1. Provide education to patient/family about rationale and expected outcomes associated with therapy  2. Position patient to facilitate optimal ventilation/oxygenation status and minimize respiratory effort  3. Position patient to reduce aspiration risk, elevate head of bed at least 35 degrees or higher, as applicable  4. Assess effectiveness of therapy on ventilation/oxygenation status based on oxygen saturation and/or arterial blood gases, as indicated  5. Assess patient for changes in respiratory and physiological status  6. Auscultate breath sounds and assess chest excursion, as indicated  7. Assess patient for changes in mentation and behavior  8. Routinely monitor equipment for proper performance and settings  9. Assess and monitor skin condition, in relationship to the respiratory interface  10. Assure equipment alarm volume is adequate for the patient's environment  11. Immediately respond to equipment alarm, to assess patient and/or cause for alarm  12. Follow universal infection control/hospital policy(ies)/standards  Outcome: Progressing

## 2023-12-06 NOTE — PROGRESS NOTES
[address] [address]                                                    Electrophysiology Inpatient Progress Note    Subjective    Patient ID: Pete Trejo is a 58 y.o. male.    Chief Complaint: No chief complaint on file.       LOS: 6 days     HPI:  Patient seen and examined.  He underwent bilateral heart catheterization yesterday which showed normal right heart pressures, patent proximal LAD stent, but he did have in-stent restenosis which required balloon angioplasty x2.  He had a couple of episodes of VT this morning requiring ATP.  Patient felt those episodes he has palpitations when he receives ATP.  He denies chest pain, dyspnea, lightheadedness, dizziness, presyncopal or syncopal episodes, or edema.  No signs or symptoms of bleeding.  Vital signs are satisfactory.  No significant electrolyte abnormalities.  QTc is satisfactory on sotalol.      Allergies as of 11/30/2023 - Reviewed 11/30/2023   Allergen Reaction Noted    Morphine  07/30/2017    Tramadol  07/30/2017       Current Facility-Administered Medications:     sodium chloride 0.9% (NS) carrier flush 25-50 mL, 25-50 mL, intravenous, PRN, Wu, Dougie, DO, Stopped at 12/05/23 1053    magnesium oxide (MAG-OX) tablet 400 mg, 400 mg, oral, 2x daily, Provell, Merced, CNP, 400 mg at 12/05/23 2107    sacubitriL-valsartan (ENTRESTO) 49-51 mg, 1 tablet, oral, 2x daily, Keyonna Pablo, CNP, 1 tablet at 12/06/23 0853    lidocaine bolus from infusion 100 mg, 100 mg, intravenous, Once, Segundo Cotton PA    carvediloL (COREG) tablet 50 mg, 50 mg, oral, 2x daily with meals, Wilfredo Montana MD, 50 mg at 12/06/23 0853    sotaloL (BETAPACE) tablet 180 mg, 180 mg, oral, q12h KULDEEP, Proveyenni, Merced, CNP, 180 mg at 12/06/23 0853    Maintain IV access, , , Until discontinued **AND** Saline lock IV, , , Once **AND** sodium chloride flush 3 mL, 3 mL, intravenous, PRN, Provell, Merced, CNP    sodium chloride (OCEAN) 0.65  % nasal spray 1 spray, 1 spray, Each Nostril, PRN, Provell, Merced, CNP    sodium chloride-aloe vera (AYR) nasal gel 1 Application, 1 Application, Each Nostril, PRN, Provell, Merced, CNP    acetaminophen (TYLENOL) tablet 500 mg, 500 mg, oral, q6h PRN, Provell, Merced, CNP    traZODone (DESYREL) tablet 25 mg, 25 mg, oral, Nightly PRN, Provell, Merced, CNP    magnesium hydroxide (MILK OF MAGNESIA) 400 mg/5 mL oral suspension 30 mL, 30 mL, oral, Daily PRN, Provell, Merced, CNP    alum-mag hydroxide-simeth (MAALOX ADVANCED) suspension 30 mL, 30 mL, oral, 3x daily PRN, Provell, Merced, CNP    aspirin EC tablet 81 mg, 81 mg, oral, Daily, Provell, Merced, CNP, 81 mg at 12/06/23 0853    albuterol HFA 90 mcg/actuation inhaler 2 puff, 2 puff, inhalation, q6h PRN, Provell, Merced, CNP    cholecalciferol (vitamin D3) 1,000 unit (25mcg) tab/cap 2,000 Units, 2,000 Units, oral, Daily, Provell, Merced, CNP, 2,000 Units at 12/06/23 0853    empagliflozin (JARDIANCE) tablet 10 mg, 10 mg, oral, Daily, Provell, Merced, CNP, 10 mg at 12/06/23 0853    mexiletine (MEXITIL) capsule 200 mg, 200 mg, oral, q8h KULDEEP, Provell, Merced, CNP, 200 mg at 12/06/23 0606    nitroglycerin (NITROSTAT) SL tablet 0.4 mg, 0.4 mg, sublingual, q5 min PRN, Provell, Merced, CNP    lansoprazole (PREVACID) capsule 30 mg, 30 mg, oral, Daily at 1600, Provell, Merced, CNP, 30 mg at 12/05/23 1719    potassium chloride (KLOR-CON) CR tablet 30 mEq, 30 mEq, oral, Daily, Provell, Merced, CNP, 30 mEq at 12/06/23 0857    rivaroxaban (XARELTO) tablet 20 mg, 20 mg, oral, Daily with dinner, Provell, Merced, CNP, 20 mg at 12/03/23 1816    rosuvastatin (CRESTOR) tablet 40 mg, 40 mg, oral, Nightly, Provell, Merced, CNP, 40 mg at 12/05/23 2107    spironolactone (ALDACTONE) tablet 50 mg, 50 mg, oral, Daily, Provell, Merced, CNP, 50 mg at 12/06/23 0853    tamsulosin (FLOMAX) 24 hr capsule 0.4 mg, 0.4 mg, oral, Nightly, Provell, Merced, CNP, 0.4 mg at 12/05/23 2107    LORazepam  (ATIVAN) tablet 1 mg, 1 mg, oral, 2x daily PRN, Provell, Merced, CNP    Objective     Vital signs in last 24 hours:   Temp:  [36.5 °C (97.7 °F)-37.1 °C (98.8 °F)] 36.5 °C (97.7 °F)  Heart Rate:  [58-65] 62  Resp:  [16-18] 18  SpO2:  [91 %-97 %] 95 %  BP: ()/(39-82) 119/67  Weight: 118.8 kg (261 lb 12.8 oz)    Intake/Output this shift:  I/O this shift:  In: 363.3 [P.O.:240; I.V.:123.3]  Out: -     Intake/Output Summary (Last 24 hours) at 12/6/2023 1132  Last data filed at 12/6/2023 0800  Gross per 24 hour   Intake 1817.7 ml   Output 400 ml   Net 1417.7 ml       Physical Exam  Vitals and nursing note reviewed.   Constitutional:       General: He is not in acute distress.     Appearance: He is well-developed. He is not diaphoretic.   HENT:      Head: Normocephalic.   Neck:      Vascular: No carotid bruit or JVD.   Cardiovascular:      Rate and Rhythm: Normal rate and regular rhythm.      Pulses: Intact distal pulses.      Heart sounds: Normal heart sounds. No murmur heard.     No friction rub. No gallop.      Comments: Left upper chest device site is without signs of infection or erosion  Pulmonary:      Effort: Pulmonary effort is normal.      Breath sounds: Normal breath sounds. No wheezing or rales.   Abdominal:      General: Bowel sounds are normal.      Palpations: Abdomen is soft.   Musculoskeletal:      Cervical back: Normal range of motion.      Right lower leg: No edema.      Left lower leg: No edema.   Skin:     General: Skin is warm and dry.   Neurological:      Mental Status: He is alert and oriented to person, place, and time.   Psychiatric:         Behavior: Behavior normal.         Data Review:   BMP:  Lab Results   Component Value Date     (L) 12/06/2023    K 4.4 12/06/2023     12/06/2023    CO2 17 (L) 12/06/2023    BUN 18 12/06/2023    CREATININE 0.78 12/06/2023    GLUCOSE 95 12/06/2023    CALCIUM 9.2 12/06/2023     CBC:   Lab Results   Component Value Date    WBC 6.0 12/06/2023     "RBC 5.01 12/06/2023    HGB 16.3 12/06/2023    HCT 46.4 12/06/2023     12/06/2023     CMP:  Lab Results   Component Value Date     (L) 12/06/2023    K 4.4 12/06/2023     12/06/2023    CO2 17 (L) 12/06/2023    GLUCOSE 95 12/06/2023    CREATININE 0.78 12/06/2023    CALCIUM 9.2 12/06/2023    ALBUMIN 4.1 12/06/2023    ALKPHOS 60 12/06/2023    BILITOT 1.82 (H) 12/06/2023    ALT 24 12/06/2023    AST 19 12/06/2023    BUN 18 12/06/2023    ANIONGAP 13 (H) 12/06/2023     No results found for: \"CKTOTAL\", \"CKMBINDEX\", \"TROPONINI\", \"BNP\", \"POCBNP\"  Lipid: No results found for: \"CHOL\", \"HDL\", \"TRIG\", \"LDLCALC\"  TSH:  No results found for: \"TSH\"  Magnesium:  Lab Results   Component Value Date    MG 2.5 (H) 12/06/2023     PT/INR:   No results found for: \"PT\", \"INR\"    Tests:  Cardiac catheterization, OXIMETRY RUN    Result Date: 12/5/2023  Narrative: Diagnostic coronary angiogram: Selective engagement of the right coronary artery was performed with a JR4 catheter.  Selective engagement of the left main coronary artery was performed with a JL 3.5 catheter.  Selective injections were performed.  The patient has a right dominant coronary system.     Left Main: Left main coronary artery is normal.  No significant disease     Left Anterior Descending: The left anterior descending arises from the left main and courses along the anterior interventricular groove.  The proximal LAD stent is patent.  The remainder the LAD is patent although quite small and threadlike.  Flow is SAMANTA grade III.  The first diagonal branch is a large vessel that bifurcates into a superior and inferior branch.  There is a stent that extends from the ostium of the vessel into the superior branch.  A stent also extends into the inferior branch.  This is a bifurcation type of stent arrangement.  50% stenosis is noted at the ostium of the first diagonal.  The remainder of the proximal portion is patent.  The superior branch, including the stent is " widely patent with normal flow.  The inferior branch demonstrates severe in-stent restenosis in the proximal segment, 85-90% with SAMANTA grade I distal flow.  The remaining diagonal branches are patent    Left Circumflex: The left circumflex is a moderate-sized artery.  The proximal vessel is normal.  Mild irregularities are seen in the midsegment.  The distal vessel is patent.  The first obtuse marginal is patent without significant disease.  There is a moderate-sized second obtuse marginal which is widely patent.    Right Coronary Artery: Right coronary artery is a large, dominant artery.  Mild irregularities are seen in the proximal and mid segment.  Mild irregularities are noted distally.  The PDA and KRISTEN branches are widely patent.  This is similar in appearance to the patient's previous angiogram  Left Heart Catheterization: Left ventricular end-diastolic pressure measures 8 mmHg.  There is no gradient across the aortic valve on catheter pullback  Right Heart Catheterization:   PA Pressure: 24/10 mmHg, mean 15 mmHg (PA saturation 65%)   PCWP: 8 mmHg   RV Pressure: 31/1 mmHg   RVEDP: 10 mmHg   RA Pressure: 5 mmHg   Cardiac Output: 5.2 L/min   Cardiac Index: 2.1 L/min/m surface squared Coronary intervention: In addition to the radial cocktail, 7000 units of heparin was given intravenously.  Additional dosing was guided by ACT times.  A 0.014 inch  50 wire was advanced into the distal portion of the inferior first diagonal branch.  A 2.0 x 20 mm noncompliant balloon was used to dilate the entire ostial through proximal portion of the first diagonal.  Inflations to 10 he of pressure for 2 minutes were performed.  The balloon catheter was withdrawn.  The ostium of the first diagonal was dilated with a 2.5 x 15 mm AngioSculpt scoring balloon with inflation to 6 he of pressure for 1 minute.  A 2.5 x 15 mm noncompliant trek balloon was then used to dilate the in-stent restenotic segment.  Inflations to 12 he for  2 minutes were performed.  200 mcg of intracoronary nitroglycerin was administered.  Final angiogram demonstrates restoration of antegrade flow, SAMANTA grade III.  This is improved from baseline.  Similar to all of the vessels in the LAD distribution, the distal portion of the inferior first diagonal branch is very small.  All catheters and wires were removed.  The radial sheath was removed and hemostasis achieved by placement of the TR band.  The antecubital sheath was secured in place.  The patient was transferred to the floor in stable condition.  Estimated Blood Loss: None  Conclusions: 1.  Normal right heart pressures 2.  Pulmonary capillary wedge pressure 8 mmHg 3.  Patent proximal LAD stent 4.  50% in-stent restenosis ostial first diagonal with patent superior branch stent.  The inferior branch of the first diagonal has severe 80-90% in-stent restenosis.  Status post 2.5 mm Angiosculpt scoring balloon angioplasty at the ostium and 2.5 x 15 mm trek neononcompliant balloon angioplasty in the in-stent restenotic segment 5.  Mild irregularities right coronary artery and left circumflex coronary artery.  Similar to previous     XR chest portable 1 view    Result Date: 11/30/2023  Narrative: Exam: Single view chest x-ray 11/30/2023 . Clinical History:   heart failure with reduced ejection fraction Comparison(s): Available Findings: 3-lead pacemaker. Cardiomegaly. No acute infiltrate or effusion..     Impression: IMPRESSION:  1.  No acute abnormalities.      Assessment/Plan   Patient Active Problem List    Diagnosis Date Noted    VT (ventricular tachycardia) (CMS/[facility]) 11/30/2023    Gastroesophageal reflux disease without esophagitis 10/19/2023    Benign prostatic hyperplasia without lower urinary tract symptoms 10/19/2023    Diabetes mellitus type 2 in obese (CMS/HCC) 10/19/2023    Moderate obesity 10/19/2023    Elevated troponin 10/19/2023    Lymphocytosis (symptomatic) 10/19/2023    AICD discharge 10/18/2023    Long  term current use of antiarrhythmic drug 03/04/2022    Sustained ventricular tachycardia (CMS/HCC) 09/19/2021    Chronic anticoagulation 09/19/2021    History of ventricular tachycardia 09/19/2021    Paroxysmal atrial fibrillation (CMS/HCC) 09/19/2021    Chronic combined systolic and diastolic CHF, NYHA class 2 and ACC/AHA stage C (CMS/HCC) 07/11/2021    Automatic implantable cardioverter-defibrillator in situ 06/10/2020    Presence of stent in coronary artery 06/10/2020    Ischemic cardiomyopathy 10/30/2017    Hypertension 10/30/2017    Hyperlipidemia 10/30/2017       Plan:  Recurrent ventricular tachycardia: Treated with ATP and shock therapy.  History of VT ablations here and at [facility].  His sotalol was increased to 180 mg twice daily this admission.  Has received a total of 12 doses thus far.  He was continued on mexiletine 200 mg 3 times daily and carvedilol was increased to 50 mg twice daily.  Potassium 4.4 and magnesium 2.5. Recommend keeping potassium greater than 4.0 and magnesium greater than 2.0.  He had a couple of episodes of VT treated with ATP this morning.  Based on telemetry review, does not appear to be triggered by proarrhythmia from sotalol.  EKG daily.  QTc satisfactory today.  Patient underwent bilateral heart catheterization yesterday which showed normal right heart pressures but he did require balloon angioplasty x2 for in-stent restenosis.  EF 25%.  NYHA class II.  Has CRT-D which was implanted in 2022.  If he continues to have recurrent VT, we may need to switch back to amiodarone which he was on in the past.  Based on clinical course, we may need to refer him back to [facility] or to [place] regarding redo VT ablation versus consideration for transplant.  Heart Failure team is following.  On GDMT.       Plan discussed in conjunction with Dr. Wu.    Electronically signed by: Donya Parra CNP  12/6/2023  11:32 AM      A voice recognition program was used  to aid in documentation of this record.  Sometimes words are not printed exactly as they were spoken.  While efforts were made to carefully edit and correct any inaccuracies, some errors may be present; please take these into context.  Please contact the provider if errors are identified.

## 2023-12-06 NOTE — PROGRESS NOTES
[facility]                                                               [address] [place] [address]     CARDIOLOGY INPATIENT PROGRESS NOTE       Subjective    Patient ID: Pete Trejo is a 58 y.o. male.    At time of assessment Pete feels fine, fast heart rate this morning pacer took over 2 times this morning. He states he otherwise feels well.     HPI  Patient was most recently seen  11/22/2023 by Gerardo Sun CNP  In the clinic.  An echo 10/19/2020  Revealed severe LV systolic dysfunction with an EF of 25%  And large anteroapical aneurysm.  Only the basal  Left ventricular segments had preserved contractility.  No LV thrombus noted.  He had grade 2 moderate  LV diastolic abnormality.  LA VI 53 mL/m².  His arrhythmia logbook  On the ICD download was reviewed  Revealing multiple episodes of nonsustained VT with ATP    Though no shocks  In the 3 days prior.  He was noted to be 59%  Atrial paced  And  Around 70%  BiV paced.  He was symptomatic  With  Palpitations and anxiety  Correlating with  Nonsustained VT and ATP.  Carvedilol was increased to 37.5 mg twice daily.  He is also followed by Dr. Leiva in the clinic.  Yesterday  Patient called into the clinic saying  He was  Having more symptoms  Such as when he received ATP  Previously.  He noted multiple ATP episodes every day for the past 7 days or so  With palpitations and shortness of breath.  Dr. Wu reviewed the information and recommended patient be admitted for increasing sotalol to 180 mg twice daily.  He presents today  For admission.      Pete is a 57 yo M  with complex cardiac history significant for CAD complicated by prior MI (PCI to LAD and diag), ischemic cardiomyopathy, chronic HFrEF with severely reduced LVEF, ,TIA/CVA in 2010 in context of LV thrombus, and recurrent VT s/p initial ablation 6/2020 ([facility]) with recurrent VT requiring repeat  ablation 11/2021 at [facility]. He was evaluated at the [facility] for heart transplantation/advanced therapies but was denied due to having a cardiac index above 2.2. Antiarrhythmic therapy of mexiletine and sotalol. Admission 10/18/2023 for recurrent VT. CHF consulted for establishment of care for chronic HFrEF.      Review of Systems   Cardiovascular:  Negative for chest pain, dyspnea on exertion, leg swelling/pain, orthopnea, palpitations and PND.   All other systems reviewed and are negative.         LOS: 6 days     Allergies as of 11/30/2023 - Reviewed 11/30/2023   Allergen Reaction Noted    Morphine  07/30/2017    Tramadol  07/30/2017     clopidogreL, 75 mg, oral, Daily  magnesium oxide, 400 mg, oral, 2x daily  sacubitriL-valsartan, 1 tablet, oral, 2x daily  enoxaparin, 1 mg/kg, subcutaneous, q12h KULDEEP  lidocaine, 100 mg, intravenous, Once  carvediloL, 50 mg, oral, 2x daily with meals  sotaloL, 180 mg, oral, q12h KULDEEP  aspirin, 81 mg, oral, Daily  cholecalciferol (vitamin D3), 2,000 Units, oral, Daily  empagliflozin, 10 mg, oral, Daily  mexiletine, 200 mg, oral, q8h KULDEEP  lansoprazole, 30 mg, oral, Daily at 1600  potassium chloride, 30 mEq, oral, Daily  [Held by provider] rivaroxaban, 20 mg, oral, Daily with dinner  rosuvastatin, 40 mg, oral, Nightly  spironolactone, 50 mg, oral, Daily  tamsulosin, 0.4 mg, oral, Nightly           Objective     Vital signs in last 24 hours:   Temp:  [36.7 °C (98.1 °F)-37.1 °C (98.8 °F)] 36.7 °C (98.1 °F)  Heart Rate:  [53-65] 60  Resp:  [16-18] 16  SpO2:  [91 %-97 %] 91 %  BP: ()/(39-82) 104/74    Physical Exam  Vitals reviewed.   Constitutional:       General: He is not in acute distress.     Appearance: He is obese. He is not ill-appearing.   HENT:      Head: Normocephalic and atraumatic.      Nose: Nose normal.   Eyes:      General: No scleral icterus.  Cardiovascular:      Rate and Rhythm: Normal rate and regular rhythm.      Heart sounds: No murmur  heard.  Pulmonary:      Effort: Pulmonary effort is normal.      Breath sounds: Normal breath sounds.   Abdominal:      General: Abdomen is flat. Bowel sounds are normal.      Palpations: Abdomen is soft.   Musculoskeletal:         General: Normal range of motion.      Cervical back: Normal range of motion.   Skin:     General: Skin is warm.      Capillary Refill: Capillary refill takes less than 2 seconds.   Neurological:      Mental Status: He is alert. Mental status is at baseline.   Psychiatric:         Mood and Affect: Mood normal.          Intake/Output this shift:  I/O this shift:  In: 123.3 [I.V.:123.3]  Out: -     Intake/Output Summary (Last 24 hours) at 12/6/2023 0719  Last data filed at 12/6/2023 0632  Gross per 24 hour   Intake 1577.7 ml   Output 2400 ml   Net -822.3 ml       Cumulative I&O:  Weight: 118.8 kg (261 lb 12.8 oz)  Weights (Current Encounter Only) (last 180 days)       Date/Time Weight    12/06/23 0400 118.8 kg (261 lb 12.8 oz)    12/05/23 0349 118.5 kg (261 lb 3.9 oz)    12/04/23 0407 118.8 kg (262 lb)    12/03/23 0300 119.7 kg (264 lb)    12/02/23 0403 120.8 kg (266 lb 4.8 oz)    12/01/23 0431 123.2 kg (271 lb 9.6 oz)           ED Triage Vitals [12/01/23 0431]   Weight 123.2 kg (271 lb 9.6 oz)            Labs:  BMP:  Lab Results   Component Value Date     (L) 12/06/2023    K 4.4 12/06/2023     12/06/2023    CO2 17 (L) 12/06/2023    BUN 18 12/06/2023    CREATININE 0.78 12/06/2023    GLUCOSE 95 12/06/2023    CALCIUM 9.2 12/06/2023     CBC:   Lab Results   Component Value Date    WBC 6.0 12/06/2023    RBC 5.01 12/06/2023    HGB 16.3 12/06/2023    HCT 46.4 12/06/2023     12/06/2023     CMP:  Lab Results   Component Value Date     (L) 12/06/2023    K 4.4 12/06/2023     12/06/2023    CO2 17 (L) 12/06/2023    GLUCOSE 95 12/06/2023    CREATININE 0.78 12/06/2023    CALCIUM 9.2 12/06/2023    ALBUMIN 4.1 12/06/2023    ALKPHOS 60 12/06/2023    BILITOT 1.82 (H) 12/06/2023  "   ALT 24 12/06/2023    AST 19 12/06/2023    BUN 18 12/06/2023    ANIONGAP 13 (H) 12/06/2023     No results found for: \"CKTOTAL\", \"CKMBINDEX\", \"TROPONINI\", \"BNP\", \"POCBNP\"  Lipids:    Lab Results   Component Value Date    CHOL 106 07/28/2022    CHOL 119 07/11/2021    CHOL 127 06/08/2020     Lab Results   Component Value Date    HDL 25 (L) 07/28/2022    HDL 35 (L) 07/11/2021    HDL 29 (L) 06/08/2020     Lab Results   Component Value Date    LDLCALC 50 07/28/2022    LDLCALC 60 07/11/2021    LDLCALC 76 06/08/2020     Lab Results   Component Value Date    TRIG 154 (H) 07/28/2022    TRIG 120 07/11/2021    TRIG 112 06/08/2020        TSH:  No results found for: \"TSH\"  Magnesium:  Lab Results   Component Value Date    MG 2.5 (H) 12/06/2023     PT/INR:   No results found for: \"PT\", \"INR\"    Radiology:  XR chest portable 1 view    Result Date: 11/30/2023  Narrative: Exam: Single view chest x-ray 11/30/2023 . Clinical History:   heart failure with reduced ejection fraction Comparison(s): Available Findings: 3-lead pacemaker. Cardiomegaly. No acute infiltrate or effusion..     Impression: IMPRESSION:  1.  No acute abnormalities.      Assessment/Plan   Principal Problem:    Presence of stent in coronary artery  Active Problems:    VT (ventricular tachycardia) (CMS/[facility])      Plan  Chronic systolic heart failure  Ischemic cardiomyopathy:  Hypokalemia/hypomagnesemia  NYHA II-III stage C  Echocardiogram 10/19/2023 with LVEF 25%, severe LV systolic dysfunction.  Large anterior apical aneurysm.  No LV mural thrombi.  Grade 2 diastolic abnormality; no significant change from prior 1/2022.  Device: CRT-D implanted 6/20/2022  Device check 10/20/2023, battery life 10.5 years, ATP therapy x 3, shock: 1, suggestive of ventricular tachycardia.  RV pacing 96%, LV pacing 95%, AT/AF burden 0%, heart logic score 3  GDMT:  Beta-blocker: Carvedilol 50 mg twice daily  SGLT2i: Empagliflozin 10 mg daily  ACE/ARB/ARNI: Entresto 49/51 mg twice daily; " from 24/26 mg  Aldosterone antagonist: Spironolactone 50 mg daily  Euvolemic, discontinue lasix  Cath findings:  1.  Normal right heart pressures  2.  Pulmonary capillary wedge pressure 8 mmHg; cardiac output 5.2/CI 2.1, SVR 1200  3.  Patent proximal LAD stent  4.  50% in-stent restenosis ostial first diagonal with patent superior branch stent.  The inferior branch of the first diagonal has severe 80-90% in-stent restenosis.  Status post 2.5 mm Angiosculpt scoring balloon angioplasty at the ostium and 2.5 x 15 mm trek neononcompliant balloon angioplasty in the in-stent restenotic segment  5.  Mild irregularities right coronary artery and left circumflex coronary artery.  Similar to previous  Resume xarelto 20 mg daily  Discontinue lovenox and plavix  Consider advanced therapies including heart transplant for recent ventricular tachycardia requiring shock delivery  Continue current regimen today       Recurrent ventricular tachycardia:  Antiarrhythmics per electrophysiology  Sotalol 180 mg every 12 hours  Mexiletine 200 mg 3 times daily  Carvedilol 50 mg twice daily  Discussed with Merced Newton CNP (12/4) who is waiting to confer with Dr. Wu; may need to switch to amiodarone.     Assessment and plan in conjunction with Dr. Leiva.        Electronically signed by: Keyonna Pablo CNP  12/6/2023  7:19 AM

## 2023-12-06 NOTE — INTERDISCIPLINARY/THERAPY
12/06/23 1411   Phase I Resting    Heart Rate (bpm) 62 bpm   Blood Pressure 117/78   O2 Flow Rate 0 L/min   Comment RN ok'd amb.   Phase I Exercise   Heart Rate (bpm) 67 bpm   Blood Pressure 134/86   O2 Flow Rate 0 L/min   Ambulation Distance (meters) 180 meters   Tolerance Well   Assistive Device Gait belt   Amount of Assistance Required Stand-by assist   Comment Pt c/o of bl hip pain during amb in hallway.   Phase I Recovery   Heart Rate (bpm) 68 bpm   Blood Pressure 124/71   O2 Flow Rate 0 L/min   Comment Pt resting in bed with call light in reach at the end of session.   Cardiac Rehab Phase I   Amount of Time Spent with Patient (mins) 12 minutes

## 2023-12-07 VITALS
DIASTOLIC BLOOD PRESSURE: 67 MMHG | HEART RATE: 60 BPM | WEIGHT: 261 LBS | TEMPERATURE: 97.9 F | SYSTOLIC BLOOD PRESSURE: 97 MMHG | RESPIRATION RATE: 18 BRPM | OXYGEN SATURATION: 95 % | BODY MASS INDEX: 36.54 KG/M2 | HEIGHT: 71 IN

## 2023-12-07 LAB
MAGNESIUM SERPL-MCNC: 2 MG/DL (ref 1.8–2.4)
POTASSIUM SERPL-SCNC: 4.4 MMOL/L (ref 3.5–5.1)

## 2023-12-07 PROCEDURE — 36415 COLL VENOUS BLD VENIPUNCTURE: CPT

## 2023-12-07 PROCEDURE — 6370000100 HC RX 637 (ALT 250 FOR IP): Performed by: INTERNAL MEDICINE

## 2023-12-07 PROCEDURE — 99232 SBSQ HOSP IP/OBS MODERATE 35: CPT | Mod: FS | Performed by: NURSE PRACTITIONER

## 2023-12-07 PROCEDURE — 6370000100 HC RX 637 (ALT 250 FOR IP): Performed by: NURSE PRACTITIONER

## 2023-12-07 PROCEDURE — 93010 ELECTROCARDIOGRAM REPORT: CPT | Performed by: INTERNAL MEDICINE

## 2023-12-07 PROCEDURE — 93005 ELECTROCARDIOGRAM TRACING: CPT | Performed by: NURSE PRACTITIONER

## 2023-12-07 PROCEDURE — 84132 ASSAY OF SERUM POTASSIUM: CPT

## 2023-12-07 PROCEDURE — 83735 ASSAY OF MAGNESIUM: CPT | Performed by: NURSE PRACTITIONER

## 2023-12-07 RX ORDER — SOTALOL HYDROCHLORIDE 120 MG/1
180 TABLET ORAL EVERY 12 HOURS SCHEDULED
Qty: 270 TABLET | Refills: 2 | Status: SHIPPED | OUTPATIENT
Start: 2023-12-07

## 2023-12-07 RX ORDER — SACUBITRIL AND VALSARTAN 97; 103 MG/1; MG/1
1 TABLET, FILM COATED ORAL 2 TIMES DAILY
Status: DISCONTINUED | OUTPATIENT
Start: 2023-12-07 | End: 2023-12-07 | Stop reason: HOSPADM

## 2023-12-07 RX ORDER — SACUBITRIL AND VALSARTAN 49; 51 MG/1; MG/1
1 TABLET, FILM COATED ORAL ONCE
Status: COMPLETED | OUTPATIENT
Start: 2023-12-07 | End: 2023-12-07

## 2023-12-07 RX ORDER — CARVEDILOL 25 MG/1
50 TABLET ORAL 2 TIMES DAILY WITH MEALS
Qty: 180 TABLET | Refills: 3 | Status: SHIPPED | OUTPATIENT
Start: 2023-12-07

## 2023-12-07 RX ORDER — LANOLIN ALCOHOL/MO/W.PET/CERES
400 CREAM (GRAM) TOPICAL 2 TIMES DAILY
Qty: 180 TABLET | Refills: 3 | Status: SHIPPED | OUTPATIENT
Start: 2023-12-07 | End: 2024-12-05 | Stop reason: SDUPTHER

## 2023-12-07 RX ORDER — SACUBITRIL AND VALSARTAN 97; 103 MG/1; MG/1
1 TABLET, FILM COATED ORAL 2 TIMES DAILY
Qty: 60 TABLET | Refills: 1 | Status: SHIPPED | OUTPATIENT
Start: 2023-12-07 | End: 2024-11-11 | Stop reason: SDUPTHER

## 2023-12-07 RX ADMIN — SPIRONOLACTONE 50 MG: 50 TABLET ORAL at 08:15

## 2023-12-07 RX ADMIN — ASPIRIN 81 MG: 81 TABLET ORAL at 08:14

## 2023-12-07 RX ADMIN — EMPAGLIFLOZIN 10 MG: 10 TABLET, FILM COATED ORAL at 08:15

## 2023-12-07 RX ADMIN — SACUBITRIL AND VALSARTAN 1 TABLET: 49; 51 TABLET, FILM COATED ORAL at 08:14

## 2023-12-07 RX ADMIN — CARVEDILOL 50 MG: 25 TABLET, FILM COATED ORAL at 08:14

## 2023-12-07 RX ADMIN — POTASSIUM CHLORIDE 30 MEQ: 750 TABLET, FILM COATED, EXTENDED RELEASE ORAL at 08:14

## 2023-12-07 RX ADMIN — SOTALOL HYDROCHLORIDE 180 MG: 120 TABLET ORAL at 08:14

## 2023-12-07 RX ADMIN — MAGNESIUM OXIDE 400 MG: 400 TABLET ORAL at 08:16

## 2023-12-07 RX ADMIN — SACUBITRIL AND VALSARTAN 1 TABLET: 49; 51 TABLET, FILM COATED ORAL at 10:18

## 2023-12-07 RX ADMIN — Medication 2000 UNITS: at 08:14

## 2023-12-07 RX ADMIN — MEXILETINE HYDROCHLORIDE 200 MG: 200 CAPSULE ORAL at 06:13

## 2023-12-07 ASSESSMENT — ENCOUNTER SYMPTOMS
SHORTNESS OF BREATH: 0
PALPITATIONS: 0
PND: 0
ORTHOPNEA: 0

## 2023-12-07 NOTE — PLAN OF CARE
Problem: Cardiovascular - Adult  Goal: Maintains optimal cardiac output and hemodynamic stability  Description: INTERVENTIONS:  1. Monitor vital signs and rhythm  2. Monitor for hypotension and other signs of decreased cardiac output  3. Administer and titrate ordered vasoactive medications to optimize hemodynamic stability  4. Monitor for fluid overload/dehydration, weight gain, shortness of breath and activity intolerance  5. Monitor arterial and/or venous puncture sites for bleeding and/or hematoma  6. Assess quality of pulses, capillary refill, edema, sensation, skin color and temperature  7. Assess for signs of decreased coronary artery perfusion - ex. angina  12/7/2023 1215 by Yajaira Cross RN  Outcome: Adequate for Discharge  12/7/2023 1002 by Yajaira Cross RN  Outcome: Progressing  Goal: Absence of cardiac dysrhythmias or at baseline  Description: INTERVENTIONS:  1. Continuous cardiac monitoring, monitor vital signs, obtain 12 lead EKG if indicated  2. Administer antiarrhythmic and heart rate control medications as ordered  3. Initiate emergency measures for life threatening arrhythmias  4. Monitor electrolytes and administer replacement therapy as ordered  12/7/2023 1215 by Yajaira Cross RN  Outcome: Adequate for Discharge  12/7/2023 1002 by Yajaira Cross RN  Outcome: Progressing     Problem: Knowledge Deficit  Goal: Patient/family/caregiver demonstrates understanding of disease process, treatment plan, medications, and discharge instructions  Description: INTERVENTIONS:   1. Complete learning assessment and assess knowledge base  2. Provide teaching at level of understanding   3. Provide teaching via preferred learning methods  12/7/2023 1215 by Yajaira Cross RN  Outcome: Adequate for Discharge  12/7/2023 1002 by Yajaira Cross RN  Outcome: Progressing

## 2023-12-07 NOTE — DISCHARGE SUMMARY
Admitting Provider: Dougie Wu DO  Discharge Provider: Dougie Wu DO  Primary Care Physician at Discharge: Nikhil Smith [phone]   Primary Cardiologist/Provider: Dr. Montana and Dr. Leiva    Admission Date: 11/30/2023     Discharge Date: 12/7/2023    Primary Discharge Diagnosis  1. Chronic combined systolic and diastolic CHF, NYHA class 2 and ACC/AHA stage C (CMS/HCC)    2. History of ventricular tachycardia    3. Presence of stent in coronary artery    4. VT (ventricular tachycardia) (CMS/[facility])        Other Diagnosis  1.  Hypertension  2.  BiV ICD       Discharge medication list        START taking these medications        Instructions   magnesium oxide 400 mg (241.3 mg magnesium) tablet  Commonly known as: MAG-OX  Replaces: magnesium chloride 64 mg tablet,delayed release (DR/EC)   Take 1 tablet (400 mg total) by mouth 2 (two) times a day     sacubitriL-valsartan  mg tablet  Commonly known as: ENTRESTO  Replaces: sacubitriL-valsartan 24-26 mg tablet   Take 1 tablet by mouth 2 (two) times a day     sotaloL 120 mg tablet  Commonly known as: BETAPACE   Take 1.5 tablets (180 mg total) by mouth every 12 (twelve) hours            CHANGE how you take these medications        Instructions   carvediloL 25 mg tablet  Commonly known as: Coreg  What changed: how much to take   Take 2 tablets (50 mg total) by mouth 2 (two) times a day with meals            CONTINUE taking these medications        Instructions   albuterol HFA 90 mcg/actuation inhaler   Inhale 2 puffs every 6 (six) hours as needed for wheezing or shortness of breath     aspirin 81 mg EC tablet   Take 1 tablet (81 mg total) by mouth daily     cholecalciferol (vitamin D3) 25 mcg (1,000 unit) tablet      empagliflozin 10 mg tablet tablet  Commonly known as: JARDIANCE   Take 10 mg by mouth daily     furosemide 40 mg tablet  Commonly known as: LASIX      mexiletine 200 mg capsule  Commonly known as: MEXITIL   Take 1 capsule (200 mg total)  by mouth every 8 (eight) hours     nitroglycerin 0.4 mg SL tablet  Commonly known as: NITROSTAT      pantoprazole 40 mg EC tablet  Commonly known as: PROTONIX      potassium chloride 10 mEq CR tablet   Take 3 tablets (30 mEq total) by mouth daily     rivaroxaban 20 mg tablet  Commonly known as: Xarelto   Take 1 tablet (20 mg total) by mouth daily     rosuvastatin 40 mg tablet  Commonly known as: Crestor   Take 1 tablet (40 mg total) by mouth nightly     spironolactone 25 mg tablet  Commonly known as: ALDACTONE      tamsulosin 0.4 mg capsule  Commonly known as: FLOMAX             STOP taking these medications      magnesium chloride 64 mg tablet,delayed release (DR/EC)  Commonly known as: SLOW-MAG  Replaced by: magnesium oxide 400 mg (241.3 mg magnesium) tablet     sacubitriL-valsartan 24-26 mg tablet  Commonly known as: ENTRESTO  Replaced by: sacubitriL-valsartan  mg tablet               Where to Get Your Medications        These medications were sent to [address]  [address] [facility] [id number], [facility] [address]      Phone: [phone]   carvediloL 25 mg tablet  magnesium oxide 400 mg (241.3 mg magnesium) tablet  sacubitriL-valsartan  mg tablet  sotaloL 120 mg tablet         Discharge Condition: good    Discharge Disposition  01 - Home or Self-Care    Full Code    Outpatient Follow-Up    1.  Follow-up in the EP clinic in 1 month.  2.  Follow-up in the heart failure clinic in 1-2 weeks.        DETAILS OF HOSPITAL STAY    Hospital Course    Patient is a 58-year-old male with past medical history significant for ischemic cardiomyopathy status post BiV ICD, hypertension, hyperlipidemia, VT status post ablation here and at  of , CAD status post PCI, sleep apnea, and diabetes.  In June 2020, patient was seen by EP for recurrent VT.  He was taken to the lab by Dr. Montana for VT ablation.  He was only able to partially ablate along the border zone areas  of the scar.  He had been on amiodarone and continue to have breakthrough VT with ICD shocks.  He was then initiated on mexiletine and referred to [facility] for further evaluation and treatment.  He was evaluated for VAD but did not qualify at that time because medical therapy was working.  He continued to have episodes of VT and underwent EP study and substrate modification at [facility] with Dr. Lema in 2021.  Amiodarone and mexiletine were discontinued and he was switched to sotalol.  He did well until approximately 1 month ago when he developed VT treated with ICD shock.  He was admitted at that time and mexiletine was increased to 200 mg 3 times daily.    He was recently seen in the EP clinic and it was noted he was having frequent episodes of VT treated with ATP.  Carvedilol was increased to 37.5 mg twice daily.  He also follows with Dr. Leiva in clinic.  He called the clinic recently to report more symptoms when receiving ATP, so was recommended he be admitted for increasing sotalol to 180 mg twice daily.  He presented for that admission on 11/30/2023.  During this admission, his carvedilol was increased to 50 mg twice daily.  He was noted to have slow VT at 118 bpm which was below the rate cut off.  His device was reprogrammed to upper tracking and sensor at 100 bpm, VT zone 1 was reduced to 110 bpm with 72% ATP programmed for 8 attempts x 2.    He underwent bilateral heart catheterization on 12/5/2023 which showed normal right heart pressures but he did require balloon angioplasty x 2 for in-stent restenosis.  EF 25%.  NYHA class II.  On GDMT and followed by the heart failure team.  Dr. Leiva talked to the [facility] regarding consideration for heart transplantation.  He will be referred as an outpatient.    Patient has continued to have NSVT treated with ATP but only having a few episodes a day and not requiring shock therapy.  QTc is satisfactory on the higher dose of  sotalol.  No significant electrolyte abnormalities.  Based on telemetry review, his episodes of NSVT do not appear to be triggered by proarrhythmia from sotalol.  If he continues to have recurrent VT requiring shock therapy, we may need to switch back to amiodarone and/or consider another VT ablation.    Patient was seen and examined.  He is resting comfortably in bed.  He reports he is feeling well today and denies chest pain or dyspnea.  He does have palpitations when he has VT treated with ATP.  No edema.  Denies signs or symptoms of bleeding.    He received discharge teaching from Keyonna Pablo CNP regarding his heart failure regimen and post balloon angioplasty care.  I reviewed medications, plan, and appointments from an electrophysiology standpoint.  He may be discharged and follow-up as an outpatient.  He verbalized an understanding of the plan and all of his questions were answered.      Procedures:  Pertinent Test Results:     Cardiac catheterization, OXIMETRY RUN    Result Date: 12/5/2023  Narrative: Diagnostic coronary angiogram: Selective engagement of the right coronary artery was performed with a JR4 catheter.  Selective engagement of the left main coronary artery was performed with a JL 3.5 catheter.  Selective injections were performed.  The patient has a right dominant coronary system.     Left Main: Left main coronary artery is normal.  No significant disease     Left Anterior Descending: The left anterior descending arises from the left main and courses along the anterior interventricular groove.  The proximal LAD stent is patent.  The remainder the LAD is patent although quite small and threadlike.  Flow is SAMANTA grade III.  The first diagonal branch is a large vessel that bifurcates into a superior and inferior branch.  There is a stent that extends from the ostium of the vessel into the superior branch.  A stent also extends into the inferior branch.  This is a bifurcation type of stent  arrangement.  50% stenosis is noted at the ostium of the first diagonal.  The remainder of the proximal portion is patent.  The superior branch, including the stent is widely patent with normal flow.  The inferior branch demonstrates severe in-stent restenosis in the proximal segment, 85-90% with SAMANTA grade I distal flow.  The remaining diagonal branches are patent    Left Circumflex: The left circumflex is a moderate-sized artery.  The proximal vessel is normal.  Mild irregularities are seen in the midsegment.  The distal vessel is patent.  The first obtuse marginal is patent without significant disease.  There is a moderate-sized second obtuse marginal which is widely patent.    Right Coronary Artery: Right coronary artery is a large, dominant artery.  Mild irregularities are seen in the proximal and mid segment.  Mild irregularities are noted distally.  The PDA and KRISTEN branches are widely patent.  This is similar in appearance to the patient's previous angiogram  Left Heart Catheterization: Left ventricular end-diastolic pressure measures 8 mmHg.  There is no gradient across the aortic valve on catheter pullback  Right Heart Catheterization:   PA Pressure: 24/10 mmHg, mean 15 mmHg (PA saturation 65%)   PCWP: 8 mmHg   RV Pressure: 31/1 mmHg   RVEDP: 10 mmHg   RA Pressure: 5 mmHg   Cardiac Output: 5.2 L/min   Cardiac Index: 2.1 L/min/m surface squared Coronary intervention: In addition to the radial cocktail, 7000 units of heparin was given intravenously.  Additional dosing was guided by ACT times.  A 0.014 inch  50 wire was advanced into the distal portion of the inferior first diagonal branch.  A 2.0 x 20 mm noncompliant balloon was used to dilate the entire ostial through proximal portion of the first diagonal.  Inflations to 10 he of pressure for 2 minutes were performed.  The balloon catheter was withdrawn.  The ostium of the first diagonal was dilated with a 2.5 x 15 mm AngioSculpt scoring balloon with  inflation to 6 he of pressure for 1 minute.  A 2.5 x 15 mm noncompliant trek balloon was then used to dilate the in-stent restenotic segment.  Inflations to 12 he for 2 minutes were performed.  200 mcg of intracoronary nitroglycerin was administered.  Final angiogram demonstrates restoration of antegrade flow, SAMANTA grade III.  This is improved from baseline.  Similar to all of the vessels in the LAD distribution, the distal portion of the inferior first diagonal branch is very small.  All catheters and wires were removed.  The radial sheath was removed and hemostasis achieved by placement of the TR band.  The antecubital sheath was secured in place.  The patient was transferred to the floor in stable condition.  Estimated Blood Loss: None  Conclusions: 1.  Normal right heart pressures 2.  Pulmonary capillary wedge pressure 8 mmHg 3.  Patent proximal LAD stent 4.  50% in-stent restenosis ostial first diagonal with patent superior branch stent.  The inferior branch of the first diagonal has severe 80-90% in-stent restenosis.  Status post 2.5 mm Angiosculpt scoring balloon angioplasty at the ostium and 2.5 x 15 mm trek neononcompliant balloon angioplasty in the in-stent restenotic segment 5.  Mild irregularities right coronary artery and left circumflex coronary artery.  Similar to previous     XR chest portable 1 view    Result Date: 11/30/2023  Narrative: Exam: Single view chest x-ray 11/30/2023 . Clinical History:   heart failure with reduced ejection fraction Comparison(s): Available Findings: 3-lead pacemaker. Cardiomegaly. No acute infiltrate or effusion..     Impression: IMPRESSION:  1.  No acute abnormalities.      Inpatient Labs:  BMP:  Lab Results   Component Value Date     (L) 12/06/2023    K 4.4 12/07/2023     12/06/2023    CO2 17 (L) 12/06/2023    BUN 18 12/06/2023    CREATININE 0.78 12/06/2023    GLUCOSE 95 12/06/2023    CALCIUM 9.2 12/06/2023     CBC:   Lab Results   Component Value Date     "WBC 6.0 12/06/2023    RBC 5.01 12/06/2023    HGB 16.3 12/06/2023    HCT 46.4 12/06/2023     12/06/2023     CMP:  Lab Results   Component Value Date     (L) 12/06/2023    K 4.4 12/07/2023     12/06/2023    CO2 17 (L) 12/06/2023    GLUCOSE 95 12/06/2023    CREATININE 0.78 12/06/2023    CALCIUM 9.2 12/06/2023    ALBUMIN 4.1 12/06/2023    ALKPHOS 60 12/06/2023    BILITOT 1.82 (H) 12/06/2023    ALT 24 12/06/2023    AST 19 12/06/2023    BUN 18 12/06/2023    ANIONGAP 13 (H) 12/06/2023     No results found for: \"CKTOTAL\", \"CKMBINDEX\", \"TROPONINI\", \"BNP\", \"POCBNP\"  Lipid: No results found for: \"CHOL\", \"HDL\", \"TRIG\", \"LDLCALC\"  TSH:  No results found for: \"TSH\"  Magnesium:  Lab Results   Component Value Date    MG 2.0 12/07/2023     PT/INR:   No results found for: \"PT\", \"INR\"    Telemetry  Paced rhythm with episodes of NSVT treated with ATP.  No sustained arrhythmias or shocks.    Physical Exam at Discharge  Heart Rate: 60  Resp: 17  BP: 112/61  Temp: 36.6 °C (97.9 °F)  Weight: 118.4 kg (261 lb)    Physical Exam  Vitals and nursing note reviewed.   Constitutional:       General: He is not in acute distress.     Appearance: He is well-developed. He is not diaphoretic.   HENT:      Head: Normocephalic.   Neck:      Vascular: No carotid bruit or JVD.   Cardiovascular:      Rate and Rhythm: Normal rate and regular rhythm.      Pulses: Intact distal pulses.      Heart sounds: Normal heart sounds. No murmur heard.     No friction rub. No gallop.      Comments: Left upper chest device site is without signs of infection or erosion  Pulmonary:      Effort: Pulmonary effort is normal.      Breath sounds: Normal breath sounds. No wheezing or rales.   Abdominal:      General: Bowel sounds are normal.      Palpations: Abdomen is soft.   Musculoskeletal:      Cervical back: Normal range of motion.      Right lower leg: No edema.      Left lower leg: No edema.   Skin:     General: Skin is warm and dry.   Neurological:     "  Mental Status: He is alert and oriented to person, place, and time.   Psychiatric:         Behavior: Behavior normal.         Time spent coordinating discharge: 60 minutes    Plan developed in coordination with Dr. Montana and Dr. Leiva.    Electronically signed by: Donya Parra CNP  12/7/2023  10:50 AM    A voice recognition program was used to aid in documentation of this record.  Sometimes words are not printed exactly as they were spoken.  While efforts were made to carefully edit and correct any inaccuracies, some errors may be present; please take these into context.  Please contact the provider if errors are identified.

## 2023-12-07 NOTE — PROGRESS NOTES
[facility]                                                               [address] [place] [address]     CARDIOLOGY INPATIENT PROGRESS NOTE       Subjective    Patient ID: Pete Trejo is a 58 y.o. male.    At time of assessment Pete sitting up at bedside, states he is feeling very well, ready to go home.     HPI  Patient was most recently seen  11/22/2023 by Gerardo Sun CNP  In the clinic.  An echo 10/19/2020  Revealed severe LV systolic dysfunction with an EF of 25%  And large anteroapical aneurysm.  Only the basal  Left ventricular segments had preserved contractility.  No LV thrombus noted.  He had grade 2 moderate  LV diastolic abnormality.  LA VI 53 mL/m².  His arrhythmia logbook  On the ICD download was reviewed  Revealing multiple episodes of nonsustained VT with ATP    Though no shocks  In the 3 days prior.  He was noted to be 59%  Atrial paced  And  Around 70%  BiV paced.  He was symptomatic  With  Palpitations and anxiety  Correlating with  Nonsustained VT and ATP.  Carvedilol was increased to 37.5 mg twice daily.  He is also followed by Dr. Leiva in the clinic.  Yesterday  Patient called into the clinic saying  He was  Having more symptoms  Such as when he received ATP  Previously.  He noted multiple ATP episodes every day for the past 7 days or so  With palpitations and shortness of breath.  Dr. Wu reviewed the information and recommended patient be admitted for increasing sotalol to 180 mg twice daily.  He presents today  For admission.      Pete is a 57 yo M  with complex cardiac history significant for CAD complicated by prior MI (PCI to LAD and diag), ischemic cardiomyopathy, chronic HFrEF with severely reduced LVEF, ,TIA/CVA in 2010 in context of LV thrombus, and recurrent VT s/p initial ablation 6/2020 ([place]) with recurrent VT requiring repeat ablation 11/2021 at [facility]. He was evaluated  at the [facility] for heart transplantation/advanced therapies but was denied due to having a cardiac index above 2.2. Antiarrhythmic therapy of mexiletine and sotalol. Admission 10/18/2023 for recurrent VT. CHF consulted for establishment of care for chronic HFrEF.      Review of Systems   Cardiovascular:  Negative for chest pain, dyspnea on exertion, leg swelling/pain, orthopnea, palpitations and PND.   Respiratory:  Negative for shortness of breath.    All other systems reviewed and are negative.         LOS: 7 days     Allergies as of 11/30/2023 - Reviewed 11/30/2023   Allergen Reaction Noted    Morphine  07/30/2017    Tramadol  07/30/2017     magnesium oxide, 400 mg, oral, 2x daily  sacubitriL-valsartan, 1 tablet, oral, 2x daily  lidocaine, 100 mg, intravenous, Once  carvediloL, 50 mg, oral, 2x daily with meals  sotaloL, 180 mg, oral, q12h KULDEEP  aspirin, 81 mg, oral, Daily  cholecalciferol (vitamin D3), 2,000 Units, oral, Daily  empagliflozin, 10 mg, oral, Daily  mexiletine, 200 mg, oral, q8h KULDEEP  lansoprazole, 30 mg, oral, Daily at 1600  potassium chloride, 30 mEq, oral, Daily  rivaroxaban, 20 mg, oral, Daily with dinner  rosuvastatin, 40 mg, oral, Nightly  spironolactone, 50 mg, oral, Daily  tamsulosin, 0.4 mg, oral, Nightly           Objective     Vital signs in last 24 hours:   Temp:  [36.2 °C (97.2 °F)-36.7 °C (98.1 °F)] 36.2 °C (97.2 °F)  Heart Rate:  [55-66] 66  Resp:  [14-22] 14  SpO2:  [91 %-95 %] 91 %  BP: (111-122)/(65-73) 111/73    Physical Exam  Vitals reviewed.   Constitutional:       General: He is not in acute distress.     Appearance: He is obese. He is not ill-appearing.   HENT:      Head: Normocephalic and atraumatic.      Nose: Nose normal.   Eyes:      General: No scleral icterus.  Cardiovascular:      Rate and Rhythm: Normal rate and regular rhythm.      Heart sounds: No murmur heard.  Pulmonary:      Effort: Pulmonary effort is normal.      Breath sounds: Normal breath sounds.    Abdominal:      General: Abdomen is flat. Bowel sounds are normal.      Palpations: Abdomen is soft.   Musculoskeletal:         General: Normal range of motion.      Cervical back: Normal range of motion.   Skin:     General: Skin is warm.      Capillary Refill: Capillary refill takes less than 2 seconds.   Neurological:      Mental Status: He is alert. Mental status is at baseline.   Psychiatric:         Mood and Affect: Mood normal.          Intake/Output this shift:  No intake/output data recorded.    Intake/Output Summary (Last 24 hours) at 12/7/2023 0709  Last data filed at 12/7/2023 0616  Gross per 24 hour   Intake 1310 ml   Output 2000 ml   Net -690 ml       Cumulative I&O:  Weight: 118.4 kg (261 lb)  Weights (Current Encounter Only) (last 180 days)       Date/Time Weight    12/07/23 0535 118.4 kg (261 lb)    12/06/23 0400 118.8 kg (261 lb 12.8 oz)    12/05/23 0349 118.5 kg (261 lb 3.9 oz)    12/04/23 0407 118.8 kg (262 lb)    12/03/23 0300 119.7 kg (264 lb)    12/02/23 0403 120.8 kg (266 lb 4.8 oz)    12/01/23 0431 123.2 kg (271 lb 9.6 oz)           ED Triage Vitals [12/01/23 0431]   Weight 123.2 kg (271 lb 9.6 oz)            Labs:  BMP:  Lab Results   Component Value Date     (L) 12/06/2023    K 4.4 12/06/2023     12/06/2023    CO2 17 (L) 12/06/2023    BUN 18 12/06/2023    CREATININE 0.78 12/06/2023    GLUCOSE 95 12/06/2023    CALCIUM 9.2 12/06/2023     CBC:   Lab Results   Component Value Date    WBC 6.0 12/06/2023    RBC 5.01 12/06/2023    HGB 16.3 12/06/2023    HCT 46.4 12/06/2023     12/06/2023     CMP:  Lab Results   Component Value Date     (L) 12/06/2023    K 4.4 12/06/2023     12/06/2023    CO2 17 (L) 12/06/2023    GLUCOSE 95 12/06/2023    CREATININE 0.78 12/06/2023    CALCIUM 9.2 12/06/2023    ALBUMIN 4.1 12/06/2023    ALKPHOS 60 12/06/2023    BILITOT 1.82 (H) 12/06/2023    ALT 24 12/06/2023    AST 19 12/06/2023    BUN 18 12/06/2023    ANIONGAP 13 (H) 12/06/2023  "    No results found for: \"CKTOTAL\", \"CKMBINDEX\", \"TROPONINI\", \"BNP\", \"POCBNP\"  Lipids:    Lab Results   Component Value Date    CHOL 106 07/28/2022    CHOL 119 07/11/2021    CHOL 127 06/08/2020     Lab Results   Component Value Date    HDL 25 (L) 07/28/2022    HDL 35 (L) 07/11/2021    HDL 29 (L) 06/08/2020     Lab Results   Component Value Date    LDLCALC 50 07/28/2022    LDLCALC 60 07/11/2021    LDLCALC 76 06/08/2020     Lab Results   Component Value Date    TRIG 154 (H) 07/28/2022    TRIG 120 07/11/2021    TRIG 112 06/08/2020        TSH:  No results found for: \"TSH\"  Magnesium:  Lab Results   Component Value Date    MG 2.5 (H) 12/06/2023     PT/INR:   No results found for: \"PT\", \"INR\"    Radiology:  XR chest portable 1 view    Result Date: 11/30/2023  Narrative: Exam: Single view chest x-ray 11/30/2023 . Clinical History:   heart failure with reduced ejection fraction Comparison(s): Available Findings: 3-lead pacemaker. Cardiomegaly. No acute infiltrate or effusion..     Impression: IMPRESSION:  1.  No acute abnormalities.      Assessment/Plan   Principal Problem:    Presence of stent in coronary artery  Active Problems:    VT (ventricular tachycardia) (CMS/HCC)      Plan  Chronic systolic heart failure  Ischemic cardiomyopathy:  Hypokalemia/hypomagnesemia  NYHA II-III stage C  Echocardiogram 10/19/2023 with LVEF 25%, severe LV systolic dysfunction.  Large anterior apical aneurysm.  No LV mural thrombi.  Grade 2 diastolic abnormality; no significant change from prior 1/2022.  Device: CRT-D implanted 6/20/2022  Device check 10/20/2023, battery life 10.5 years, ATP therapy x 3, shock: 1, suggestive of ventricular tachycardia.  RV pacing 96%, LV pacing 95%, AT/AF burden 0%, heart logic score 3  GDMT:  Beta-blocker: Carvedilol 50 mg twice daily  SGLT2i: Empagliflozin 10 mg daily  ACE/ARB/ARNI: increase entresto to 97/103 one tab twice daily  Aldosterone antagonist: Spironolactone 50 mg daily  Euvolemic  Lasix 40 mg " daily PRN for weight gain 3 pounds overnight  Cath findings:  1.  Normal right heart pressures  2.  Pulmonary capillary wedge pressure 8 mmHg; cardiac output 5.2/CI 2.1, SVR 1200  3.  Patent proximal LAD stent  4.  50% in-stent restenosis ostial first diagonal with patent superior branch stent.  The inferior branch of the first diagonal has severe 80-90% in-stent restenosis.  Status post 2.5 mm Angiosculpt scoring balloon angioplasty at the ostium and 2.5 x 15 mm trek neononcompliant balloon angioplasty in the in-stent restenotic segment  5.  Mild irregularities right coronary artery and left circumflex coronary artery.  Similar to previous  Continue xarelto 20 mg daily  Continue ASA EC 81 mg daily  Discontinue lovenox and plavix  Consider advanced therapies including heart transplant for recent ventricular tachycardia requiring shock delivery  Discuss with [facility] for high risk ablation and possible heart transplant  Will need Cardiac MRI as outpatient  OK to go home from a heart failure standpoint  Continue current medical management  BMP one week from discharge  Follow up in heart failure clinic in 1-2 weeks         Recurrent ventricular tachycardia:  Antiarrhythmics per electrophysiology  Sotalol 180 mg every 12 hours  Mexiletine 200 mg 3 times daily  Carvedilol 50 mg twice daily  Discussed with Merced Newton CNP (12/4) who is waiting to confer with Dr. Wu; may need to switch to amiodarone.     Assessment and plan in conjunction with Dr. Leiva.        Electronically signed by: Keyonna Pablo CNP  12/7/2023  7:09 AM

## 2023-12-07 NOTE — NURSING END OF SHIFT
Nursing End of Shift Summary:    Patient: Pete Trejo  MRN: [id number]  : 1965, Age: 58 y.o.    Location: [address] [place] [address]    Nursing Goals  Clinical Goals for the Shift: monitor for Vtach on tele, admin Sotalol as ordered    Narrative Summary of Progress Toward Clinical Goals:  RN noted three runs of Vtach greater than 10 beats on tele overnight. Patient reports palpations with these runs, but is otherwise asymptomatic. Vtach runs saved in chart. Sotalol was administered as ordered. Patient denied complains overnight. This morning he noted a firm area, which appears like a hematoma measuring [id number] at his right radial post cath site. Pulses remain palpable and patient denies changes in sensation in hand. Good cap refill and fingers warm. RN applied a pressure dressing and passed this on to day shift.     Barriers to Goals/Nursing Concerns:  Yes - V-tach runs noted around 18:30, 19:30, and 4:30. Monitor hematoma.     New Patient or Family Concerns/Issues:  No    Shift Summary:   Significant Events & Communications to Providers (last 12 hours)       Last 5 Values    No documentation.                 Oxygen Usage (last 12 hours)       Last 5 Values       Row Name 23 0000 23 0400             Room Air or Baseline Oxygen Trial by Nursing    Is Patient on Room Air OR on the Same Amount of O2 as at Home? Yes Yes Yes                    Mobility (last 12 hours)       Last 5 Values       Row Name 23 1940 23 2335 23 0346          Mobility    Activity -- Bathroom privileges -- --     Level of Assistance -- Independent -- --     Distance Ambulated (feet) -- 10 Feet -- --     Distance Ambulated (Meters Calculated) -- 3.05 Meters -- --     Patient Position Supine Sitting Supine Supine     Turning -- Turns self Turns self --     Distance Ambulated (Meters Calculated)(Do Not Use) -- 3.05 Feet -- --                   Urethral Catheter       Active Urethral Catheter        None                  Active Lines       Active Central venous catheter / Peripherally inserted central catheter / Implantable Port / Hemodialysis catheter / Midline Catheter       None                  Infusing Medications   Medication Dose Last Rate     PRN Medications   Medication Dose Last Admin    sodium chloride 0.9% (NS)  25-50 mL Stopped at 12/05/23 1053    sodium chloride  3 mL      sodium chloride  1 spray      sodium chloride-aloe vera  1 Application      acetaminophen  500 mg      traZODone  25 mg      magnesium hydroxide  30 mL      alum-mag hydroxide-simeth  30 mL      albuterol HFA  2 puff      nitroglycerin  0.4 mg      LORazepam  1 mg

## 2023-12-07 NOTE — PLAN OF CARE
Problem: Cardiovascular - Adult  Goal: Maintains optimal cardiac output and hemodynamic stability  Description: INTERVENTIONS:  1. Monitor vital signs and rhythm  2. Monitor for hypotension and other signs of decreased cardiac output  3. Administer and titrate ordered vasoactive medications to optimize hemodynamic stability  4. Monitor for fluid overload/dehydration, weight gain, shortness of breath and activity intolerance  5. Monitor arterial and/or venous puncture sites for bleeding and/or hematoma  6. Assess quality of pulses, capillary refill, edema, sensation, skin color and temperature  7. Assess for signs of decreased coronary artery perfusion - ex. angina  Outcome: Progressing  Flowsheets (Taken 12/6/2023 2326)  Maintain optimal cardiac output and hemodynamic stability:   Monitor vital signs and rhythm   Monitor for hypotension and other signs of decreased cardiac output   Monitor for fluid overload/dehydration, weight gain, shortness of breath and activity intolerance   Monitor arterial and/or venous puncture sites for bleeding and/or hematoma   Assess quality of pulses, capillary refill, edema, sensation, skin color and temperature   Assess for signs of decreased coronary artery perfusion - ex. angina     Problem: Knowledge Deficit  Goal: Patient/family/caregiver demonstrates understanding of disease process, treatment plan, medications, and discharge instructions  Description: INTERVENTIONS:   1. Complete learning assessment and assess knowledge base  2. Provide teaching at level of understanding   3. Provide teaching via preferred learning methods  Outcome: Progressing  Flowsheets (Taken 12/6/2023 2326)  Patient/family/caregiver demonstrates understanding of disease process, treatment plan, medications, and discharge instructions:   Complete learning assessment and assess knowledge base   Provide teaching via preferred learning methods   Provide teaching at level of understanding     Problem:  Cardiovascular - Adult  Goal: Absence of cardiac dysrhythmias or at baseline  Description: INTERVENTIONS:  1. Continuous cardiac monitoring, monitor vital signs, obtain 12 lead EKG if indicated  2. Administer antiarrhythmic and heart rate control medications as ordered  3. Initiate emergency measures for life threatening arrhythmias  4. Monitor electrolytes and administer replacement therapy as ordered  Outcome: Not Progressing  Flowsheets (Taken 12/6/2023 5676)  Absence of cardiac dysrhythmias or at baseline:   Continuous cardiac monitoring, monitor vital signs, obtain 12 lead EKG if indicated   Administer antiarrhythmic and heart rate control medications as ordered   Monitor electrolytes and administer replacement therapy as ordered  Note: Patient has had two runs of v-tach noted so far this shift. Qtc was 0.49 and Sotalol was administered as ordered. Patient encouraged to report any changes in cardiac symptoms.      Problem: Respiratory - Adult  Goal: Achieves optimal ventilation and oxygenation with noninvasive CPAP/BiLEVEL support  Description: INTERVENTIONS:  1. Provide education to patient/family about rationale and expected outcomes associated with therapy  2. Position patient to facilitate optimal ventilation/oxygenation status and minimize respiratory effort  3. Position patient to reduce aspiration risk, elevate head of bed at least 35 degrees or higher, as applicable  4. Assess effectiveness of therapy on ventilation/oxygenation status based on oxygen saturation and/or arterial blood gases, as indicated  5. Assess patient for changes in respiratory and physiological status  6. Auscultate breath sounds and assess chest excursion, as indicated  7. Assess patient for changes in mentation and behavior  8. Routinely monitor equipment for proper performance and settings  9. Assess and monitor skin condition, in relationship to the respiratory interface  10. Assure equipment alarm volume is adequate for the  patient's environment  11. Immediately respond to equipment alarm, to assess patient and/or cause for alarm  12. Follow universal infection control/hospital policy(ies)/standards  Outcome: Completed

## 2023-12-07 NOTE — INTERDISCIPLINARY/THERAPY
Case Management Discharge Note    Phone # [phone]    Discharge Disposition: HO     Needs transportation assistance at DC: No    Specialty Referrals:          Active Ambulatory Referrals   (From admission, onward)                 Start     Ordered    12/07/23 0000  Ambulatory referral to CHF Clinic        Comments: 1-2 week post hospital follow up    12/07/23 0959    12/07/23 0000  Ambulatory referral to Cardiac Electrophysiology         12/07/23 1042    11/30/23 0000  Ambulatory referral to CHF Clinic         11/30/23 1216                    Support System Notified: Pt to notify    Narrative: Chart reviewed. Pt needs discussed in MDR. Pt with discharge orders. No discharge needs assessed. Follow up appointment scheduled.

## 2023-12-07 NOTE — NURSING END OF SHIFT
Nursing End of Shift Summary:    Patient: Pete Trejo  MRN: [id number]  : 1965, Age: 58 y.o.    Location: [address] [place] [address]    Nursing Goals  Clinical Goals for the Shift: monitor telemetry;    Narrative Summary of Progress Toward Clinical Goals:  Patient had 3 runs of Vtach this AM, 13-14 beats of VT each time; pt denies pain with VT, but is able to tell when it is happening. Pt denies other complaints and will discharge to home when medically stable.     Barriers to Goals/Nursing Concerns:  No    New Patient or Family Concerns/Issues:  No    Shift Summary:   Significant Events & Communications to Providers (last 12 hours)       Last 5 Values       Row Name 23 0940                   Provider Notification    Reason for Communication Dysrhythmia  3 runs of Vtach  -HH        Provider Name Zahraa WEINSTEIN                  User Key  (r) = Recorded By, (t) = Taken By, (c) = Cosigned By      Initials Name    Vidhi Alston, RN                  Oxygen Usage (last 12 hours)       Last 5 Values    No documentation.                 Mobility (last 12 hours)       Last 5 Values       Row Name 23 0733 23 0800 23 1200 23 1205 23 1546       Mobility    Activity -- Bathroom privileges;Ambulate in room Ambulate in room;Bathroom privileges -- --    Patient Position Supine -- -- Supine Supine    Turning -- Turns self Turns self -- --      Row Name 23 1625                   Mobility    Activity Bathroom privileges;Ambulate in room        Level of Assistance Independent                      Urethral Catheter       Active Urethral Catheter       None                  Active Lines       Active Central venous catheter / Peripherally inserted central catheter / Implantable Port / Hemodialysis catheter / Midline Catheter       None                  Infusing Medications   Medication Dose Last Rate     PRN Medications   Medication Dose Last Admin    sodium chloride 0.9% (NS)  25-50 mL Stopped at  12/05/23 1053    sodium chloride  3 mL      sodium chloride  1 spray      sodium chloride-aloe vera  1 Application      acetaminophen  500 mg      traZODone  25 mg      magnesium hydroxide  30 mL      alum-mag hydroxide-simeth  30 mL      albuterol HFA  2 puff      nitroglycerin  0.4 mg      LORazepam  1 mg

## 2023-12-08 ENCOUNTER — PATIENT OUTREACH (OUTPATIENT)
Dept: FAMILY MEDICINE | Facility: HOSPITAL | Age: 58
End: 2023-12-08
Payer: MEDICARE

## 2023-12-08 ENCOUNTER — TELEPHONE (OUTPATIENT)
Dept: CARDIOLOGY | Facility: CLINIC | Age: 58
End: 2023-12-08
Payer: MEDICARE

## 2023-12-08 NOTE — TELEPHONE ENCOUNTER
Sending through general FYI as I see Pete was recently admitted with medication adjustments.    It appears he was discharged yesterday, 12/7/23 ~1420. Message received from [facility] for 2 episodes of VT since that time both less than 20s in duration with successful ATP delivered x1.     ** Note: Donya I saw your note about therapy zones being adjusted during this stay. I do not see those changes reflected in his most recent hospital device check, nor in the remote received this morning. -Please let me know if you'd like any adjustments to his therapy zones. Thanks! **

## 2023-12-08 NOTE — Clinical Note
Transitional Care Management (TCM) call completed. Patient has a TCM appointment with Dr Bobby scheduled on 12/13/23.

## 2023-12-08 NOTE — PROGRESS NOTES
Pete Trejo was contacted following recent hospitalization at [facility]. The patient was discharged from the hospital on 12/7/2023 .The Discharge Summary and After Visit Summary were reviewed. The patient's main diagnosis during the hospitalization was Presence of Stent in Coronary Artery.  Followup appointment: 12/13/23 with Dr Bobby. Any additional testing and labs will be discussed at the patient's upcoming post-hospital follow up appointment.    Transitional Care Management Follow Up (most recent)       Transitional Care Management - 12/08/23 0953          OTHER    Date of post hospital outreach 12/08/23     Contact Status Contact done     Speaking with the Patient or Patient's Caregiver? Patient     Is the patient on the Diabetes registry? Y     Is the patient comfortable being contacted by a Diabetes Care Specialist?  N     Were patient's home medications changed or did they have any new medications added during the hospitalization? Y     Did someone go over the discharge summary with the patient or the patient's caregiver and discuss the medications listed on it? Y     Does the patient or patient's caregiver have any questions about the medication changes? N     Patient verbalized understanding of when to contact health care provider or when to get help right away? Y     Discharge instructions reviewed with patient or patient's caregiver and all questions answered? Y     Is there a Home Health/DME Plan being enacted? Please note name of HH agency, DME vendor, contacted/received N     Does patient have psychosocial issues that might impact their health status? None     Does patient have financial barriers to care? None                      Triny Looney RN  December 8, 2023 9:54 AM

## 2023-12-11 ENCOUNTER — TELEPHONE (OUTPATIENT)
Dept: CARDIOLOGY | Facility: CLINIC | Age: 58
End: 2023-12-11
Payer: MEDICARE

## 2023-12-11 NOTE — TELEPHONE ENCOUNTER
Device recorded multiple, appropriate,  successful treated VT episodes 12/8/2023-12/10/2023. No shocks delivered. Total of 8 new successfully treated events, each treated with ATP X 1. 25 VT events total, 22 VT Events with successful ATP since last reset 12/1/2023. Docket sent due to recent hospital admission and discharge 12/7/2023. Increasing tachy therapy. Message routed to EP nursing follow up.

## 2023-12-11 NOTE — TELEPHONE ENCOUNTER
Patient returned call and said he is able to feel the episodes of VT. He said during those episodes he mostly just feels palpitations and light headed. Patient was recently admitted on 12/7/23 for sotalol increase. Gerardo Sun DNP and Dr. Wu were notified. They are asking where we are at with the referral to [facility] referral.

## 2023-12-12 ENCOUNTER — TELEPHONE (OUTPATIENT)
Dept: CARDIOLOGY | Facility: CLINIC | Age: 58
End: 2023-12-12
Payer: MEDICARE

## 2023-12-12 NOTE — TELEPHONE ENCOUNTER
Device recorded multiple, appropriate,  successful treated VT episodes 12/11/2023. No shocks delivered. Total of 3 new successfully treated events, each treated with ATP X 1.   Per Device Clinic Patient Management Protocol, Docket sent to provider for review. Increasing tachy therapy. Message routed to EP nursing follow up.

## 2023-12-12 NOTE — TELEPHONE ENCOUNTER
I called patient and informed him of the transmission we received. He said he is still only experiencing palpitations for a couple of minutes until his device gives ATP. Gerardo Sun DNP, was informed and she said there is not much we can do until he is seen at [facility] or if the patient develops more or worsening symptoms. Pete was told to call and go to the ED if his symptoms worsen per EP KARLA.

## 2023-12-12 NOTE — TELEPHONE ENCOUNTER
See telephone note from 12/112/2023. Patient is only having mild palpitations during episodes. EP KARLA aware.

## 2023-12-13 ENCOUNTER — OFFICE VISIT (OUTPATIENT)
Dept: FAMILY MEDICINE | Facility: CLINIC | Age: 58
End: 2023-12-13
Payer: MEDICARE

## 2023-12-13 VITALS
BODY MASS INDEX: 36.54 KG/M2 | DIASTOLIC BLOOD PRESSURE: 60 MMHG | SYSTOLIC BLOOD PRESSURE: 102 MMHG | WEIGHT: 262 LBS | OXYGEN SATURATION: 96 % | TEMPERATURE: 96.9 F | HEART RATE: 66 BPM

## 2023-12-13 DIAGNOSIS — I47.20 SUSTAINED VENTRICULAR TACHYCARDIA (CMS/HCC): ICD-10-CM

## 2023-12-13 DIAGNOSIS — I47.20 VT (VENTRICULAR TACHYCARDIA) (CMS/HCC): ICD-10-CM

## 2023-12-13 DIAGNOSIS — I10 PRIMARY HYPERTENSION: ICD-10-CM

## 2023-12-13 DIAGNOSIS — I50.42 CHRONIC COMBINED SYSTOLIC AND DIASTOLIC CHF, NYHA CLASS 2 AND ACC/AHA STAGE C (CMS/HCC): Primary | ICD-10-CM

## 2023-12-13 DIAGNOSIS — I25.5 ISCHEMIC CARDIOMYOPATHY: ICD-10-CM

## 2023-12-13 DIAGNOSIS — Z95.810 AUTOMATIC IMPLANTABLE CARDIOVERTER-DEFIBRILLATOR IN SITU: ICD-10-CM

## 2023-12-13 DIAGNOSIS — Z95.5 PRESENCE OF STENT IN CORONARY ARTERY: ICD-10-CM

## 2023-12-13 PROCEDURE — 99495 TRANSJ CARE MGMT MOD F2F 14D: CPT | Performed by: FAMILY MEDICINE

## 2023-12-13 PROCEDURE — 99495 TRANSJ CARE MGMT MOD F2F 14D: CPT | Mod: PO | Performed by: FAMILY MEDICINE

## 2023-12-13 ASSESSMENT — PAIN SCALES - GENERAL: PAINLEVEL: 0-NO PAIN

## 2023-12-13 NOTE — PROGRESS NOTES
Subjective          Chief Complaint   Patient presents with    Hospital Follow Up         Start time: 10:05 AM      End time:     PCP: Nikhil Smith MD Rodcolten Trejo is a 58 y.o. old male, here for an evaluation.   This is an established patient.  Seen in cross coverage today.  here for transitional care management appointment.    Accompanied by self / alone.     Here for post-hospitalization follow up / transitional care management.   Date of admission: 11/30/23  Date of discharge: 12/7/23   Date of contact to arrange follow up: 12/8/     Diagnoses:   Encounter Diagnoses   Name Primary?    Chronic combined systolic and diastolic CHF, NYHA class 2 and ACC/AHA stage C (CMS/HCC) Yes    Presence of stent in coronary artery     Automatic implantable cardioverter-defibrillator in situ     Ischemic cardiomyopathy     Primary hypertension     VT (ventricular tachycardia) (CMS/HCC)     Sustained ventricular tachycardia (CMS/HCC)         Issues pending / needed follow up at time of discharge:   none      Summary of health issues:   58-year-old patient with known cardiac disease, including ischemic cardiomyopathy, BiV ICD, recurrent VT, congestive heart failure NYHA class II, coronary artery disease with stent placement, hypertension, hyperlipidemia, chronic anticoagulation, paroxysmal atrial fibrillation.  Patient is followed by EP and CHF clinic and has consulted with other medical facilities.  Patient was admitted to cardiology service for recurrent VT with ICD discharge and for device and med adjustments.    Underwent bilateral cardiac catheterization and underwent balloon angioplasty x 2 for stent restenosis.  Ejection fraction at 25%.    Patient's medications were adjusted and monitored during hospital stay.  Patient's device is monitored and reported daily for VT with ATP.     He feels well. Still having some runs without chest pain or dyspnea. Device company calling with reports.           Review of systems:  see HPI     Review of Systems       Objective      Vitals:    12/13/23 0953   BP: 102/60   BP Location: Left arm   Patient Position: Sitting   Cuff Size: Regular Adult   Pulse: 66   Temp: 36.1 °C (96.9 °F)   TempSrc: Temporal   SpO2: 96%   Weight: 118.8 kg (262 lb)         Body mass index is 36.54 kg/m².         Physical Exam  Vitals and nursing note reviewed.   Constitutional:       General: He is not in acute distress.  Cardiovascular:      Rate and Rhythm: Normal rate and regular rhythm.   Pulmonary:      Effort: No respiratory distress.      Breath sounds: No wheezing, rhonchi or rales.   Neurological:      Mental Status: He is alert.         Ortho Exam      Recent Labs/Imaging          Assessment and Plan   Encounter Diagnoses   Name Primary?    Chronic combined systolic and diastolic CHF, NYHA class 2 and ACC/AHA stage C (CMS/HCC) Yes    Presence of stent in coronary artery     Automatic implantable cardioverter-defibrillator in situ     Ischemic cardiomyopathy     Primary hypertension     VT (ventricular tachycardia) (CMS/HCC)     Sustained ventricular tachycardia (CMS/HCC)      Hospital discharge summary, consult notes, imaging reports and lab results were reviewed today.  Any test results that were pending at time of discharge were reviewed with the patient today.  Medication list, including any new changes was verified and patient verbalized understanding of new changes. Refills provided, if needed.   Complexity of this visit was: low/moderate  Follow up with cardiology teams as scheduled.      Histories       Active problem list, medications, allergies, medical history and social determinants of health were reviewed today and updated as needed.        This note has been partially dictated using [employer] voice recognition software. It was reviewed for major content, but may contain minor mistakes due to the limitations of voice recognition software.    Electronically signed by Maile Bobby MD on  12/13/2023 at 7:23 AM

## 2023-12-13 NOTE — PATIENT INSTRUCTIONS
Here is a summary of today's visit.       Thank you for allowing us to participate in your healthcare. You are the reason we are here, and I hope that we provided you with the excellent service you deserve. Please let us know before you leave if there is anything else we can do for you today. Our goal is to partner with you and your primary care team to meet your health care needs today.         Other Instructions:   Please check out with the  before leaving today.      If you have any questions that arise after your visit today, please follow up with your primary care or relevant specialty care team.  If you have any new or urgent concerns, we are happy to see you again!     Be well,    Maile Bobby MD     We believe in information transparency, and we believe you deserve to see your information as soon as it is available.  We believe this builds trust and better relationships.    We release ALL lab & imaging results to you via [employer] as soon as they are available. Therefore, you may see some  results even before we do. Please give us 2 business days to review and let you know our thoughts. We look at every  result. We will contact you with any results that concern us.    If your results are not concerning, we will mail a letter, or send an online message about the results. If your results are  concerning, we may reach out by phone or schedule a follow-up visit. However, if you have an immediate concern, you  can send us a message or call our clinic. Otherwise, we prefer that you wait 2 business days for us to contact you or that  we discuss the results at your next appointment.

## 2023-12-14 ENCOUNTER — LAB (OUTPATIENT)
Dept: LAB | Facility: CLINIC | Age: 58
End: 2023-12-14
Payer: MEDICARE

## 2023-12-14 ENCOUNTER — OFFICE VISIT (OUTPATIENT)
Dept: CARDIOLOGY | Facility: CLINIC | Age: 58
End: 2023-12-14
Payer: MEDICARE

## 2023-12-14 VITALS
DIASTOLIC BLOOD PRESSURE: 66 MMHG | SYSTOLIC BLOOD PRESSURE: 102 MMHG | BODY MASS INDEX: 36.68 KG/M2 | OXYGEN SATURATION: 95 % | HEART RATE: 62 BPM | HEIGHT: 71 IN | WEIGHT: 262 LBS

## 2023-12-14 DIAGNOSIS — Z86.79 HISTORY OF VENTRICULAR TACHYCARDIA: ICD-10-CM

## 2023-12-14 DIAGNOSIS — I25.5 ISCHEMIC CARDIOMYOPATHY: Primary | ICD-10-CM

## 2023-12-14 DIAGNOSIS — I50.42 CHRONIC COMBINED SYSTOLIC AND DIASTOLIC CHF, NYHA CLASS 2 AND ACC/AHA STAGE C (CMS/HCC): ICD-10-CM

## 2023-12-14 DIAGNOSIS — E78.2 MIXED HYPERLIPIDEMIA: ICD-10-CM

## 2023-12-14 DIAGNOSIS — Z95.5 PRESENCE OF STENT IN CORONARY ARTERY: ICD-10-CM

## 2023-12-14 DIAGNOSIS — I10 PRIMARY HYPERTENSION: ICD-10-CM

## 2023-12-14 LAB
ANION GAP SERPL CALC-SCNC: 8 MMOL/L (ref 3–11)
BUN SERPL-MCNC: 14 MG/DL (ref 7–25)
CALCIUM SERPL-MCNC: 9.1 MG/DL (ref 8.6–10.3)
CHLORIDE SERPL-SCNC: 104 MMOL/L (ref 98–107)
CO2 SERPL-SCNC: 23 MMOL/L (ref 21–32)
CREAT SERPL-MCNC: 0.97 MG/DL (ref 0.7–1.3)
EGFRCR SERPLBLD CKD-EPI 2021: 91 ML/MIN/1.73M*2
GLUCOSE SERPL-MCNC: 101 MG/DL (ref 70–105)
NT-PROBNP SERPL-MCNC: 221 NG/L
POTASSIUM SERPL-SCNC: 4.5 MMOL/L (ref 3.5–5.1)
SODIUM SERPL-SCNC: 135 MMOL/L (ref 135–145)

## 2023-12-14 PROCEDURE — G0463 HOSPITAL OUTPT CLINIC VISIT: HCPCS | Performed by: INTERNAL MEDICINE

## 2023-12-14 PROCEDURE — 99215 OFFICE O/P EST HI 40 MIN: CPT | Performed by: INTERNAL MEDICINE

## 2023-12-14 PROCEDURE — 80048 BASIC METABOLIC PNL TOTAL CA: CPT

## 2023-12-14 PROCEDURE — 36415 COLL VENOUS BLD VENIPUNCTURE: CPT

## 2023-12-14 PROCEDURE — 83880 ASSAY OF NATRIURETIC PEPTIDE: CPT

## 2023-12-14 ASSESSMENT — ENCOUNTER SYMPTOMS
IRREGULAR HEARTBEAT: 0
SHORTNESS OF BREATH: 0
DOUBLE VISION: 0
VOMITING: 0
NEAR-SYNCOPE: 0
NAUSEA: 0
LIGHT-HEADEDNESS: 0
DIZZINESS: 0
HALLUCINATIONS: 0
DIARRHEA: 0
FEVER: 0
ORTHOPNEA: 0
DECREASED APPETITE: 0
DYSURIA: 0
NERVOUS/ANXIOUS: 0
FALLS: 0
PALPITATIONS: 0
BRUISES/BLEEDS EASILY: 0

## 2023-12-14 NOTE — TELEPHONE ENCOUNTER
Device recorded multiple, appropriate,  successful treated VT episodes 12/12/2023 and 12/13/2023. No HV Therapy Delivered.     Total of 6 new successfully treated events, each treated with ATP X 1.   12/12/2023: 2 VT Episodes with Successful ATPx1  12/13/2023: 4 VT Episodes with Successful ATPx1  31 total VT Events with successful ATP since last reset 12/1/2023.     Increasing tachy therapy. Message routed to EP nursing follow up.

## 2023-12-14 NOTE — PATIENT INSTRUCTIONS
We will schedule him for a cardiac MRI   Advised him to continue with the same dose of medication.  We will do blood work today.  Less than 2 g salt restricted diet and less than 2 L fluid restriction.  Counseled patient to continue weighing himself every day and maintaining a log, he/she should contact the clinic if he gains more than 3 to 4 pounds over the course of 1 to 2 days or 5 pounds in a week.  Check your blood pressure ideally twice a day and write a log.  Keep moving and activity as tolerated  Please call us with any question or concerns [phone]

## 2023-12-14 NOTE — PROGRESS NOTES
CHF Clinic PROGRESS NOTE    REFERRING PHYSICIAN:  No ref. provider found     CHIEF COMPLAINT:  Chronic heart failure.    HPI:  Pete Trejo is a 58 y.o. male who was seen for a follow up visit in the Heart Failure clinic at [facility]. My final recommendations will be communicated back to the referring physician by way of shared medical records or letter via US mail.    PERTINENT CARDIAC HISTORY:   has a past medical history of BPH (benign prostatic hyperplasia), CAD (coronary artery disease), CHF (congestive heart failure) (CMS/formerly [facility]), Chronic combined systolic and diastolic CHF (congestive heart failure) (CMS/formerly [facility]), CVA (cerebral vascular accident) (CMS/formerly [facility]) (2010), Diabetes mellitus type 2 in obese (CMS/HCC), Dyslipidemia, GERD (gastroesophageal reflux disease), Heart attack (CMS/formerly [facility]), Hypertension, Ischemic cardiomyopathy, Morbid obesity with BMI of 40.0-44.9, adult (CMS/HCC), Paroxysmal atrial fibrillation (CMS/HCC), and V-tach (CMS/HCC).     The patient was admitted to the hospital on 11/30/23 due to having symptoms when receiving ATP. During this admission, his carvedilol was increased to 50 mg twice daily.  He was noted to have slow VT at 118 bpm which was below the rate cut off.  His device was reprogrammed to upper tracking and sensor at 100 bpm, VT zone 1 was reduced to 110 bpm with 72% ATP programmed for 8 attempts x 2. He did require balloon angioplasty x 2 for in-stent restenosis. Patient has continued to have NSVT treated with ATP but only having a few episodes a day and not requiring shock therapy.  QTc is satisfactory on the higher dose of sotalol.  No significant electrolyte abnormalities.  Based on telemetry review, his episodes of NSVT do not appear to be triggered by proarrhythmia from sotalol. He was discharged on 12/07/23.    He states that his breathing ability and energy level, in general, are better since he was seen by us. Overall, his energy level is 7/10. He can walk without noticing  shortness of breath or tiredness. He does not exercise regularly.     The patient states that he is able to do all his activities at home independently. He has a history of extra beats. The patient mentioned he had an MRI done 4 years ago when he was suspected to have a stroke. He is up to date with his colonoscopy. He used to be a smoker but now he quit smoking. He denies having any prostate issues. He denies having any blood transfusions in the past.     Orthopnea:  no  PND: no  Dyspnea with bathing, dressing or grooming himself:  no  Anorexia:  no  Early satiety: no  Nausea/vomiting:  no  Abdominal bloating: no  Leg edema: no  Weight gain: {no  Home weight range: Stable.  Lightheadedness, palpitations, near-syncope or syncope: no  ICD shocks: yes  Compliant with medications: yes  Adverse effects from any medication: no  Compliant with sodium and fluid diet restriction: yes    Medication reconciliation completed and documented in the EMR.    PAST MEDICAL HISTORY:    Past Medical History:   Diagnosis Date    BPH (benign prostatic hyperplasia)     CAD (coronary artery disease)     Status post stent    CHF (congestive heart failure) (CMS/Formerly [facility])     Chronic combined systolic and diastolic CHF (congestive heart failure) (CMS/HCC)     LVEF 25% with grade 1 diastolic dysfunction as per echo on 1/26/2022.    CVA (cerebral vascular accident) (CMS/Formerly [facility]) 2010    Without residual deficit.    Diabetes mellitus type 2 in obese (CMS/HCC)     Dyslipidemia     GERD (gastroesophageal reflux disease)     Heart attack (CMS/HCC)     x3    Hypertension     Ischemic cardiomyopathy     Morbid obesity with BMI of 40.0-44.9, adult (CMS/[facility])     Paroxysmal atrial fibrillation (CMS/HCC)     On Xarelto    V-tach (CMS/Formerly [facility])     AICD in place       PAST SURGICAL HISTORY:    Past Surgical History:   Procedure Laterality Date    ABLATION VT N/A 06/09/2020    Procedure: Ablation VT;  Surgeon: Wilfredo Montana MD;  Location: [facility] EP Lab;  Service:  Electrophysiology;  Laterality: N/A;  Check with Dr. Montana in the a.m. regarding scheduling    APPENDECTOMY      BILATERAL HEART CATH N/A 12/5/2023    Procedure: Bilateral heart cath;  Surgeon: Jitendra Post MD;  Location: [facility] Cath Lab;  Service: Cardiovascular;  Laterality: N/A;    COLONOSCOPY N/A 7/25/2023    Procedure: COLONOSCOPY with Polypectomy;  Surgeon: Hortencia Carson MD;  Location: [facility] Endoscopy;  Service: Endoscopy;  Laterality: N/A;    CORONARY ARTERY STENTING  2009    2.5 X 24-mm Taxus DIMAS to first diagonal. 3.0 X 8-mm Taxus stent to proximal LAD just proximal to diagonal.    CORONARY ARTERY STENTING  2010    3.5 X 15-mm Promus DIMAS to totally occluded LAD    CORONARY ARTERY STENTING  2011    2.25 X 30-mm Resolute DIMAS to 2nd diagonal.  Balloon angioplasty through strut to improve blood flow to distal LAD    CORONARY ARTERY STENTING  07/28/2017    second diagonal branch LAD Resolute 2.25 x 30-mm DIMAS    ICD DC GEN CHANGE N/A 6/20/2022    Procedure: BI-V ICD Gen Change;  Surgeon: Wilfredo Montana MD;  Location: [facility] EP Lab;  Service: Cardiovascular;  Laterality: N/A;    ICD SC NEW  2011    [employer] Cognis Model #N119, Serial #[id number]. [employer] Reliance Model #0185, Serial #[id number]. LV lead Acuity Model #45+91, Serial #[id number]. Atrial lead Model #4469, Serial #[id number]    OXIMETRY RUN N/A 12/5/2023    Procedure: OXIMETRY RUN;  Surgeon: Jitendra Post MD;  Location: [facility] Cath Lab;  Service: Cardiovascular;  Laterality: N/A;       FAMILY HISTORY:    Family History   Problem Relation Age of Onset    Heart attack Mother 62    Other Mother         Abdominal Aortic Aneurysm    Lung cancer Mother     Colon cancer Father         Cause of death    Diabetes Brother         Non-Insulin Dependent    Heart attack Brother 51        w/ Stents    Heart disease Brother     Other Son         Well       SOCIAL HISTORY:    Social History     Socioeconomic History    Marital status: Single   Tobacco Use    Smoking status:  Former     Packs/day: 0.75     Years: 30.00     Additional pack years: 0.00     Total pack years: 22.50     Types: Cigarettes     Quit date: 6/7/2020     Years since quitting: 3.5    Smokeless tobacco: Never   Vaping Use    Vaping Use: Never used   Substance and Sexual Activity    Alcohol use: Not Currently     Comment: Quit drinking in 2007    Drug use: Not Currently    Sexual activity: Yes     Partners: Female     Social Determinants of Health     Tobacco Use: Medium Risk (12/14/2023)    Patient History     Smoking Tobacco Use: Former     Smokeless Tobacco Use: Never   Financial Resource Strain: Unknown (7/8/2020)    Overall Financial Resource Strain (CARDIA)     Difficulty of Paying Living Expenses: Patient refused   Food Insecurity: No Food Insecurity (11/30/2023)    Hunger Vital Sign     Worried About Running Out of Food in the Last Year: Never true     Ran Out of Food in the Last Year: Never true   Transportation Needs: No Transportation Needs (11/30/2023)    PRAPARE - Transportation     Lack of Transportation (Medical): No     Lack of Transportation (Non-Medical): No   Housing Stability: Low Risk  (11/30/2023)    Housing Stability Vital Sign     Unable to Pay for Housing in the Last Year: No     Number of Places Lived in the Last Year: 1     Unstable Housing in the Last Year: No   Utilities: Not At Risk (11/30/2023)    [facility] Utilities     Threatened with loss of utilities: No        MEDICATIONS:   Current Outpatient Medications   Medication Sig Dispense Refill    carvediloL (Coreg) 25 mg tablet Take 2 tablets (50 mg total) by mouth 2 (two) times a day with meals 180 tablet 3    magnesium oxide (MAG-OX) 400 mg (241.3 mg magnesium) tablet Take 1 tablet (400 mg total) by mouth 2 (two) times a day 180 tablet 3    sacubitriL-valsartan (ENTRESTO)  mg tablet Take 1 tablet by mouth 2 (two) times a day 60 tablet 1    sotaloL (BETAPACE) 120 mg tablet Take 1.5 tablets (180 mg total) by mouth every 12 (twelve) hours  270 tablet 2    spironolactone (ALDACTONE) 25 mg tablet Take 2 tablets (50 mg total) by mouth daily      rivaroxaban (Xarelto) 20 mg tablet Take 1 tablet (20 mg total) by mouth daily 90 tablet 3    aspirin 81 mg EC tablet Take 1 tablet (81 mg total) by mouth daily 90 tablet 3    potassium chloride 10 mEq CR tablet Take 3 tablets (30 mEq total) by mouth daily 270 tablet 0    mexiletine (MEXITIL) 200 mg capsule Take 1 capsule (200 mg total) by mouth every 8 (eight) hours 90 capsule 11    empagliflozin (JARDIANCE) 10 mg tablet tablet Take 10 mg by mouth daily 30 each 11    tamsulosin (FLOMAX) 0.4 mg capsule Take 1 capsule (0.4 mg total) by mouth daily      rosuvastatin (Crestor) 40 mg tablet Take 1 tablet (40 mg total) by mouth nightly 90 tablet 0    cholecalciferol, vitamin D3, 25 mcg (1,000 unit) tablet Take 1 tablet (1,000 Units total) by mouth daily      pantoprazole (PROTONIX) 40 mg EC tablet Take 1 tablet (40 mg total) by mouth daily      furosemide (LASIX) 40 mg tablet Take 1 tablet (40 mg total) by mouth daily If weight goes up 3 pounds overnight take an extra dose that day.      albuterol HFA (PROVENTIL HFA;VENTOLIN HFA) 90 mcg/actuation inhaler Inhale 2 puffs every 6 (six) hours as needed for wheezing or shortness of breath (Patient not taking: Reported on 12/14/2023) 1 Inhaler 1    nitroglycerin (NITROSTAT) 0.4 mg SL tablet Place 1 tablet (0.4 mg total) under the tongue every 5 (five) minutes as needed for chest pain       No current facility-administered medications for this visit.        ALLERGIES:   Allergies   Allergen Reactions    Morphine      ANXIETY    Tramadol      ANXIETY        REVIEW OF SYSTEMS:  Review of Systems   Constitutional: Negative for decreased appetite and fever.   HENT:  Negative for congestion.    Eyes:  Negative for double vision.   Cardiovascular:  Negative for chest pain, dyspnea on exertion, irregular heartbeat, leg swelling/pain, near-syncope, orthopnea and palpitations.  "  Respiratory:  Negative for shortness of breath and sleep apnea.    Hematologic/Lymphatic: Does not bruise/bleed easily.   Musculoskeletal:  Negative for falls.   Gastrointestinal:  Negative for diarrhea, nausea and vomiting.   Genitourinary:  Negative for dysuria.   Neurological:  Negative for dizziness and light-headedness.   Psychiatric/Behavioral:  Negative for hallucinations. The patient is not nervous/anxious.         PHYSICAL EXAMINATION:      /66 (Patient Position: Sitting)   Pulse 62   Ht 1.803 m (5' 11\")   Wt 118.8 kg (262 lb)   SpO2 95%   BMI 36.54 kg/m²     Physical Exam  Constitutional:       General: He is not in acute distress.     Appearance: Normal appearance. He is not ill-appearing.   HENT:      Head: Normocephalic and atraumatic.      Nose: Nose normal.      Mouth/Throat:      Mouth: Mucous membranes are moist.   Eyes:      General: No scleral icterus.  Cardiovascular:      Rate and Rhythm: Normal rate and regular rhythm.      Pulses: Normal pulses.      Heart sounds: No murmur heard.  Pulmonary:      Effort: Pulmonary effort is normal. No respiratory distress.      Breath sounds: Normal breath sounds. No wheezing or rales.   Chest:      Chest wall: No tenderness.   Abdominal:      General: Abdomen is flat. Bowel sounds are normal.      Palpations: Abdomen is soft.   Musculoskeletal:         General: No swelling or tenderness. Normal range of motion.      Right lower leg: No edema.      Left lower leg: No edema.   Skin:     General: Skin is warm.      Capillary Refill: Capillary refill takes less than 2 seconds.   Neurological:      General: No focal deficit present.      Mental Status: He is alert and oriented to person, place, and time.      Motor: No weakness.      Gait: Gait normal.   Psychiatric:         Mood and Affect: Mood normal.          Data Review:   Sodium   Date Value Ref Range Status   12/14/2023 135 135 - 145 mmol/L Final     Potassium   Date Value Ref Range Status "   12/14/2023 4.5 3.5 - 5.1 MMOL/L Final     Chloride   Date Value Ref Range Status   12/14/2023 104 98 - 107 mmol/L Final     CO2   Date Value Ref Range Status   12/14/2023 23 21 - 32 mmol/L Final     BUN   Date Value Ref Range Status   12/14/2023 14 7 - 25 mg/dL Final     Creatinine   Date Value Ref Range Status   12/14/2023 0.97 0.70 - 1.30 mg/dL Final     Glucose   Date Value Ref Range Status   12/14/2023 101 70 - 105 mg/dL Final     Calcium   Date Value Ref Range Status   12/14/2023 9.1 8.6 - 10.3 mg/dL Final     Total CK   Date Value Ref Range Status   03/09/2015 83 39 - 308 IU/L      Troponin I   Date Value Ref Range Status   07/28/2017 <0.030 0.000 - 0.030 ng/mL      BNP   Date Value Ref Range Status   12/01/2023 151 (H) 0 - 100 pg/mL Final     Lipids:    Lab Results   Component Value Date    CHOL 106 07/28/2022    CHOL 119 07/11/2021    CHOL 127 06/08/2020     Lab Results   Component Value Date    HDL 25 (L) 07/28/2022    HDL 35 (L) 07/11/2021    HDL 29 (L) 06/08/2020     Lab Results   Component Value Date    LDLCALC 50 07/28/2022    LDLCALC 60 07/11/2021    LDLCALC 76 06/08/2020     Lab Results   Component Value Date    TRIG 154 (H) 07/28/2022    TRIG 120 07/11/2021    TRIG 112 06/08/2020        TSH:   Lab Results   Component Value Date    TSH 2.833 10/19/2023     Magnesium:   Lab Results   Component Value Date    MG 2.0 12/07/2023     Protime-INR:   Lab Results   Component Value Date    PT 22.7 (H) 10/06/2021    INR 1.9 (H) 10/06/2021     A1c:   Lab Results   Component Value Date    HGBA1C 6.2 (H) 09/22/2023       Reviewed and interpreted by me:      Electrocardiogram  (12/07/2023):  A-V dual-paced rhythm with some inhibition  QRS: 159  QT: 473  HR: 65      Echocardiogram:  Results for orders placed during the hospital encounter of 10/18/23    US Echo complete (10/19/2023):    Interpretation Summary    Normal left ventricular wall thickness.    Biplane ejection fraction is 25%.    Severe left ventricular  systolic dysfunction.  Large anteroapical aneurysm.  Only the basilar left ventricular segments have preserved contractility.  No LV mural thrombi.    Grade II (moderate) left ventricular diastolic abnormality.    Elevated left ventricular filling pressure.    The left atrium is severely dilated.    The right atrium is moderately dilated.    Normal central venous pressure (0-5 mmHg).    No pericardial effusion.    Inadequate TR spectral Doppler to accurately assess right ventricular systolic pressure.    Comment: No obvious changes from the January 2022 echo.       Stress Test  ( 07/11/2021):  NM Lexiscan cardiolite complete     Interpretation Summary  ·Technically difficult study to perform.  Gated images could not able to obtain hence cannot comment on left ventricle systolic function and contractility.  ·There is a large size, severe intensity fixed perfusion defect in apex, apical inferior, apical anterior to mid anterior, apical septum and apical lateral.  There is no reversible ischemia.  Unable to obtain gated images because left ventricle is severely dilated.  Cannot comment the left ventricle systolic function and wall motion.       Cardiac Catheterization (12/05/2023):  OXIMETRY RUN    Left Main: Left main coronary artery is normal.  No significant disease     Left Anterior Descending: The left anterior descending arises from the left main and courses along the anterior interventricular groove.  The proximal LAD stent is patent.  The remainder the LAD is patent although quite small and threadlike.  Flow is SAMANTA grade III.  The first diagonal branch is a large vessel that bifurcates into a superior and inferior branch.  There is a stent that extends from the ostium of the vessel into the superior branch.  A stent also extends into the inferior branch.  This is a bifurcation type of stent arrangement.  50% stenosis is noted at the ostium of the first diagonal.  The remainder of the proximal portion is  patent.  The superior branch, including the stent is widely patent with normal flow.  The inferior branch demonstrates severe in-stent restenosis in the proximal segment, 85-90% with SAMANTA grade I distal flow.  The remaining diagonal branches are patent    Left Circumflex: The left circumflex is a moderate-sized artery.  The proximal vessel is normal.  Mild irregularities are seen in the midsegment.  The distal vessel is patent.  The first obtuse marginal is patent without significant disease.  There is a moderate-sized second obtuse marginal which is widely patent.        Right Coronary Artery: Right coronary artery is a large, dominant artery.  Mild irregularities are seen in the proximal and mid segment.  Mild irregularities are noted distally.  The PDA and KRISTEN branches are widely patent.  This is similar in appearance to the patient's previous angiogram     Left Heart Catheterization:  Left ventricular end-diastolic pressure measures 8 mmHg.  There is no gradient across the aortic valve on catheter pullback     Right Heart Catheterization:    PA Pressure: 24/10 mmHg, mean 15 mmHg (PA saturation 65%)    PCWP: 8 mmHg    RV Pressure: 31/1 mmHg    RVEDP: 10 mmHg    RA Pressure: 5 mmHg    Cardiac Output: 5.2 L/min    Cardiac Index: 2.1 L/min/m surface squared    Conclusions:  1.  Normal right heart pressures  2.  Pulmonary capillary wedge pressure 8 mmHg  3.  Patent proximal LAD stent  4.  50% in-stent restenosis ostial first diagonal with patent superior branch stent.  The inferior branch of the first diagonal has severe 80-90% in-stent restenosis.  Status post 2.5 mm Angiosculpt scoring balloon angioplasty at the ostium and 2.5 x 15 mm trek neononcompliant balloon angioplasty in the in-stent restenotic segment  5.  Mild irregularities right coronary artery and left circumflex coronary artery.  Similar to previous      DEVICE CHECK - REMOTE (12/11/2023):    Impressions:  Non-sustained Ventricular Tachycardia  -Stored  EGMs are consistent with or suggestive of Non-sustained VT  -Total episodes: 2  -Longest episode shows NSVT wandering in and out of detection at ~110 bpm and lasting at  least 16 seconds with no offset seen; episode appears to be treated and resolved a  consecutive ATP episode.    Appropriate VT Therapy: Successful  Stored EGMs are consistent with or suggestive of Ventricular Tachycardia  Total episodes: 8  Number of ATP therapy: 8  Number of shocks delivered: 0    Additional Notes:  EGMs shows VT at V rates of 110-116 bpm, each resolved with 1 burst of ATP. No HV therapy  delivered.    Remote Check:  Device Interrogation Reviewed by technical staff  Battery: Battery is at 100%, 8.00 yrs  Sensing, impedance and thresholds reviewed  Programmed parameters reviewed  Presenting rhythm reviewed: AP/BP at 60 bpm with occasional, isolated and couplet PVCs  Heart Rate Histograms reviewed    Additional Notes:  Device functioning properly as programmed.    Suboptimal Bi-V Pacing:  Suboptimal Biventricular Pacing (<90%) confirmed  Percentage of time Bi-V paced: 88%      Device check - hospital (12/01/2023):  Impressions:    Hospital Check:    -Patient was seen in hospital  -Reason: Check for new VT since started on Medicine  -Presenting rhythm AP/BiV paced  -Heart Rate Histograms reviewed  -Device Function and programmed parameters reviewed    Additional Notes:  No new episodes since 11/29/23 @ 03:12    Appropriate VT Therapy: Successful  -Stored EGMs are consistent with or suggestive of Ventricular -Tachycardia  -Total episodes: 44  -Number of ATP therapy: 44  -Number of shocks delivered: VT pace terminated or slowed down w/ ATP    Non-sustained Ventricular Tachycardia:  -Stored EGMs are consistent with or suggestive of Non-sustained VT  -Total episodes: 4 NSVT under rate cut off for VT1      XR chest (11/30/2023):    IMPRESSION:  1.  No acute abnormalities.      CT chest lung cancer low dose screening  (10/26/2023):  FINDINGS:    Chest CT:  Lung Cancer Screening: 3 mm pulmonary nodule in the right upper lobe (series 3, image 197). There are no groundglass or mixed density nodules.There are no visible airway nodules. There are no suspicious atypical pulmonary cysts. Focal scarring in the medial segment of the right middle lobe. Mild scarring in the lingula. Small bilateral calcified granulomas, compatible with antecedent granulomatous disease.     Airways and Lungs: Trachea and large airways are patent.  No focal pulmonary consolidation. No pleural effusion. No pneumothorax. There is no bronchiectasis.      Mediastinum: Cardiomegaly. No pericardial effusion. No mediastinal mass. Thoracic aorta is normal in caliber. No evidence of aneurysm. Conventional three-vessel branching anatomy of the great vessels from the aortic arch. Coronary artery stents. Otherwise mild coronary calcifications.     Other: Few small scattered mediastinal lymph nodes, none pathologically enlarged. No hilar or axillary lymphadenopathy. Normal thyroid gland. Left chest AICD with leads in the right atrium, right ventricle, and coronary sinus.     Limited upper abdomen:  The visualized upper abdomen is unremarkable.     Bones:  T12 (L1 not included on imaging): Bone density (HU) 131 HU, within normal limits.  No significant bone abnormalities are seen.      IMPRESSION:  1.  3 mm solid nodule in the right upper lobe. This is considered benign by Lung-RADS criteria. Lung-RADS 2.  2.  Cardiomegaly      Available previous notes ([facility] and outside) reviewed.  Available EKG, echocardiogram and cardiac catheterization reports and images independently reviewed.     Assessment/Plan   Presents for Chronic systolic heart failure. EF 25%.  NYHA Functional Class:  III  Stage:  C heart failure  TSH (10/19/23): 2.833  TSAT (10/20/23): 25  Ferritin  (10/20/23): 34.5  Hgb (12/06/23): 16.3  NT-Pro BNP  (10/20/23): 126  Ischemic cardiomyopathy  Presence  of stent in coronary artery   History of VT (ventricular tachycardia): Currently on sotalol and high dose of mexiletine due to several episode of nonsustained VT terminated by ATP and shocks.  I believe his VT is most likely secondary to scar  BiV ICD  Hypokalemia/hypomagnesemia  Hypertension   Sleep apnea  Diabetes, type 2  BMI 36.54      Plan  The patient states that he is able to do all his activities at home independently. I had a long discussion with patient's about the future and his advanced heart failure.  Due to his incessant and recurrent VT he is only will be a candidate for transplantation not an LVAD candidate.  I spoke with Dr. Domingo from [facility] [place] for consideration of high risk repeat VT ablation and cardiac consultation workup as a backup.  I already sent the official referral to  awaiting for them to contact the patient.  He had a prior MRI and we will need to cardiac MRI to evaluate the burden of LV scar.  No history of cancer. He is up to date with his colonoscopy. He used to be a smoker but now he quit smoking.  He does have a small nodule found on his CT scan for lung screening, will need to follow-up on recent 12-month.  We will need to get a blood type.  He denies having any prostate issues. He denies having any blood transfusions in the past. We will schedule him for a cardiac MRI. I  advised him to continue with the same dose of medication. We will do blood work today.    Problem List Items Addressed This Visit          Cardiac and Vasculature    Ischemic cardiomyopathy - Primary    Hypertension    Hyperlipidemia    Presence of stent in coronary artery    Chronic combined systolic and diastolic CHF, NYHA class 2 and ACC/AHA stage C (CMS/HCC)    Relevant Orders    MR cardiac morphology with and without contrast    Pro B-Type Natriuretic Peptide Blood, Venous (Completed)    Basic metabolic panel Blood, Venous    Type and screen panel    History of ventricular tachycardia         Plan and patient instructions:     We will schedule him for a cardiac MRI   Advised him to continue with the same dose of medication.  We will do blood work today.  Less than 2 g salt restricted diet and less than 2 L fluid restriction.  Counseled patient to continue weighing himself every day and maintaining a log, he/she should contact the clinic if he gains more than 3 to 4 pounds over the course of 1 to 2 days or 5 pounds in a week.  Check your blood pressure ideally twice a day and write a log.  Keep moving and activity as tolerated  Please call us with any question or concerns [phone]         COUNSELING:   We discussed the following non-pharmacological measures during this visit:  Smoking and alcohol abstinence/cessation, if applicable.  Dietary and medication compliance (in particular, salt restriction)  Monitoring daily weights and blood pressures  Exercise regimen (walking)    Heart Failure Education Booklet:  Given previously  I spent 40 minutes in this visit, with more than 50% of the time devoted to patient counseling.    Follow-up appointment with  in 4-8 weeks or sooner if needed.       A voice recognition program was used to aid in medical record documentation. Sometimes words are printed not exactly as they were spoken. While efforts were made to carefully edit and correct any inaccuracies, some errors may be present. Errors should be taken within the context of the discussion.  Please contact our office if you need assistance interpreting this medical record or notice any mistakes.       Carl Leiva MD  12/14/2023  5:10 PM

## 2023-12-15 NOTE — TELEPHONE ENCOUNTER
Patient was called and informed of recent device transmission. He stated understanding and said he is still experiencing palpitations during the VT episodes just as he was before. He said no new or worsening symptoms. He was reminded to go the to the ED if he has any new or worsening symptoms. He stated understanding.

## 2023-12-19 ENCOUNTER — TELEPHONE (OUTPATIENT)
Dept: CARDIOLOGY | Facility: CLINIC | Age: 58
End: 2023-12-19
Payer: MEDICARE

## 2023-12-19 NOTE — TELEPHONE ENCOUNTER
Pete was called and informed of VT with ATP. He said he was able to feel it, but is still only experiencing palpitations when he has the VT. He said he is not having any new or worsening symptoms with it and that this episode was shorter than the others. He said he will call if he has any new or worsening symptoms.

## 2023-12-19 NOTE — TELEPHONE ENCOUNTER
1 Treated VT episode. EGM shows VT at an average V rate of 127 bpm, successfully resolved with 1 burst of ATP. No HV therapy delivered.     Encounter routed to EP Nursing for patient follow up.     Docket sent to provider for review.

## 2023-12-28 PROCEDURE — 93295 DEV INTERROG REMOTE 1/2/MLT: CPT | Performed by: INTERNAL MEDICINE

## 2024-01-02 ENCOUNTER — TELEPHONE (OUTPATIENT)
Dept: CARDIAC REHAB | Facility: HOSPITAL | Age: 59
End: 2024-01-02
Payer: MEDICARE

## 2024-01-04 ENCOUNTER — OFFICE VISIT (OUTPATIENT)
Dept: CARDIOLOGY | Facility: CLINIC | Age: 59
End: 2024-01-04
Payer: MEDICARE

## 2024-01-04 VITALS
BODY MASS INDEX: 36.96 KG/M2 | HEART RATE: 59 BPM | WEIGHT: 264 LBS | HEIGHT: 71 IN | OXYGEN SATURATION: 95 % | RESPIRATION RATE: 18 BRPM | DIASTOLIC BLOOD PRESSURE: 64 MMHG | SYSTOLIC BLOOD PRESSURE: 98 MMHG

## 2024-01-04 DIAGNOSIS — I25.5 ISCHEMIC CARDIOMYOPATHY: ICD-10-CM

## 2024-01-04 DIAGNOSIS — I50.42 CHRONIC COMBINED SYSTOLIC AND DIASTOLIC CHF, NYHA CLASS 2 AND ACC/AHA STAGE C (CMS/HCC): ICD-10-CM

## 2024-01-04 DIAGNOSIS — Z86.59 HISTORY OF CLAUSTROPHOBIA: ICD-10-CM

## 2024-01-04 DIAGNOSIS — Z45.02 AICD DISCHARGE: Primary | ICD-10-CM

## 2024-01-04 DIAGNOSIS — E78.2 MIXED HYPERLIPIDEMIA: ICD-10-CM

## 2024-01-04 DIAGNOSIS — I10 PRIMARY HYPERTENSION: ICD-10-CM

## 2024-01-04 DIAGNOSIS — Z95.810 AUTOMATIC IMPLANTABLE CARDIOVERTER-DEFIBRILLATOR IN SITU: ICD-10-CM

## 2024-01-04 DIAGNOSIS — Z79.899 LONG TERM CURRENT USE OF ANTIARRHYTHMIC DRUG: ICD-10-CM

## 2024-01-04 PROCEDURE — 99214 OFFICE O/P EST MOD 30 MIN: CPT | Performed by: INTERNAL MEDICINE

## 2024-01-04 PROCEDURE — G0463 HOSPITAL OUTPT CLINIC VISIT: HCPCS | Performed by: INTERNAL MEDICINE

## 2024-01-04 RX ORDER — ALPRAZOLAM 1 MG/1
1 TABLET ORAL ONCE
Qty: 1 TABLET | Refills: 0 | Status: SHIPPED | OUTPATIENT
Start: 2024-01-04 | End: 2024-07-16 | Stop reason: ALTCHOICE

## 2024-01-04 ASSESSMENT — ENCOUNTER SYMPTOMS
NEAR-SYNCOPE: 0
WEIGHT GAIN: 0
LIGHT-HEADEDNESS: 0
IRREGULAR HEARTBEAT: 1
MYALGIAS: 0
SYNCOPE: 0
SHORTNESS OF BREATH: 0
CLAUDICATION: 0
PALPITATIONS: 0
WEIGHT LOSS: 0
PND: 0
DIZZINESS: 0
ORTHOPNEA: 0
HEMATURIA: 0
HEMATOCHEZIA: 0
SNORING: 0

## 2024-01-04 ASSESSMENT — PAIN SCALES - GENERAL: PAINLEVEL: 0-NO PAIN

## 2024-01-04 NOTE — PROGRESS NOTES
Cardiology Outpatient Follow-up Note    Subjective    Patient ID: Pete Trejo is a 58 y.o. male.    Chief Complaint:   Chief Complaint   Patient presents with    Cardiomyopathy    Ventricular Tachycardia    Anteroseptal Myocaridal Infarction   .    HPI  The patient is seen in follow-up from the standpoint of coronary artery disease.  Patient's history dates back many years ago with an anterior wall MI with left him with severe LV dysfunction.  He has had a large scar and has had multiple episodes of recurrent ventricular tachycardia.    He has had multiple attempts at ablation which have not been satisfactory.  He has had worsening of his ventricular arrhythmias recently and was recently hospitalized for uptitration of his sotalol therapy and increased carvedilol.  Seems to be doing little bit better but continues to have symptomatic tachycardia.  He is having multiple ATPs.  He has been seen and evaluated by advanced heart failure.  He is scheduled for an MRI next week.  The plan with the following MRI to get him to the [facility] ER for consideration for cardiac transplantation.    He had recent hospitalization.  He had undergone cardiac catheterization and had a intervention on small relatively nonsignificant branch during his hospitalization.  LAD stent was patent.  No other major obstructive disease and a large left circumflex and right coronary artery.    Specialty Problems          Cardiology Problems    Hyperlipidemia        Hypertension        Ischemic cardiomyopathy        Automatic implantable cardioverter-defibrillator in situ        Presence of stent in coronary artery        Chronic combined systolic and diastolic CHF, NYHA class 2 and ACC/AHA stage C (CMS/HCC)        Chronic anticoagulation        History of ventricular tachycardia        Paroxysmal atrial fibrillation (CMS/HCC)        Sustained ventricular tachycardia (CMS/HCC)        Long  term current use of antiarrhythmic drug        AICD discharge        Elevated troponin        VT (ventricular tachycardia) (CMS/[facility])           Past Medical History:   Diagnosis Date    BPH (benign prostatic hyperplasia)     CAD (coronary artery disease)     Status post stent    CHF (congestive heart failure) (CMS/[facility])     Chronic combined systolic and diastolic CHF (congestive heart failure) (CMS/[facility])     LVEF 25% with grade 1 diastolic dysfunction as per echo on 1/26/2022.    CVA (cerebral vascular accident) (CMS/HCC) 2010    Without residual deficit.    Diabetes mellitus type 2 in obese (CMS/[facility])     Dyslipidemia     GERD (gastroesophageal reflux disease)     Heart attack (CMS/[facility])     x3    Hypertension     Ischemic cardiomyopathy     Morbid obesity with BMI of 40.0-44.9, adult (CMS/HCC)     Paroxysmal atrial fibrillation (CMS/HCC)     On Xarelto    V-tach (CMS/HCC)     AICD in place       Past Surgical History:   Procedure Laterality Date    ABLATION VT N/A 06/09/2020    Procedure: Ablation VT;  Surgeon: Wilfredo Montana MD;  Location: [facility] EP Lab;  Service: Electrophysiology;  Laterality: N/A;  Check with Dr. Montana in the a.m. regarding scheduling    APPENDECTOMY      BILATERAL HEART CATH N/A 12/5/2023    Procedure: Bilateral heart cath;  Surgeon: Jitendra Post MD;  Location: [facility] Cath Lab;  Service: Cardiovascular;  Laterality: N/A;    COLONOSCOPY N/A 7/25/2023    Procedure: COLONOSCOPY with Polypectomy;  Surgeon: Hortencia Carson MD;  Location: [facility] Endoscopy;  Service: Endoscopy;  Laterality: N/A;    CORONARY ARTERY STENTING  2009    2.5 X 24-mm Taxus DIMAS to first diagonal. 3.0 X 8-mm Taxus stent to proximal LAD just proximal to diagonal.    CORONARY ARTERY STENTING  2010    3.5 X 15-mm Promus DIMAS to totally occluded LAD    CORONARY ARTERY STENTING  2011    2.25 X 30-mm Resolute DIMAS to 2nd diagonal.  Balloon angioplasty through strut to improve blood flow to distal LAD    CORONARY ARTERY STENTING  07/28/2017     second diagonal branch LAD Resolute 2.25 x 30-mm DIMAS    ICD DC GEN CHANGE N/A 6/20/2022    Procedure: BI-V ICD Gen Change;  Surgeon: Wilfredo Montana MD;  Location: [facility] EP Lab;  Service: Cardiovascular;  Laterality: N/A;    ICD SC NEW  2011    [place] Cognis Model #N119, Serial #[id number]. [place] Reliance Model #0185, Serial #[id number]. LV lead Acuity Model #45+91, Serial #[id number]. Atrial lead Model #4469, Serial #[id number]    OXIMETRY RUN N/A 12/5/2023    Procedure: OXIMETRY RUN;  Surgeon: Jitendra Post MD;  Location: [facility] Cath Lab;  Service: Cardiovascular;  Laterality: N/A;       Allergies as of 01/04/2024 - Reviewed 01/04/2024   Allergen Reaction Noted    Morphine  07/30/2017    Tramadol  07/30/2017       Current Outpatient Medications   Medication Sig Dispense Refill    carvediloL (Coreg) 25 mg tablet Take 2 tablets (50 mg total) by mouth 2 (two) times a day with meals 180 tablet 3    magnesium oxide (MAG-OX) 400 mg (241.3 mg magnesium) tablet Take 1 tablet (400 mg total) by mouth 2 (two) times a day 180 tablet 3    sacubitriL-valsartan (ENTRESTO)  mg tablet Take 1 tablet by mouth 2 (two) times a day 60 tablet 1    sotaloL (BETAPACE) 120 mg tablet Take 1.5 tablets (180 mg total) by mouth every 12 (twelve) hours 270 tablet 2    spironolactone (ALDACTONE) 25 mg tablet Take 2 tablets (50 mg total) by mouth daily      rivaroxaban (Xarelto) 20 mg tablet Take 1 tablet (20 mg total) by mouth daily 90 tablet 3    aspirin 81 mg EC tablet Take 1 tablet (81 mg total) by mouth daily 90 tablet 3    potassium chloride 10 mEq CR tablet Take 3 tablets (30 mEq total) by mouth daily 270 tablet 0    mexiletine (MEXITIL) 200 mg capsule Take 1 capsule (200 mg total) by mouth every 8 (eight) hours 90 capsule 11    empagliflozin (JARDIANCE) 10 mg tablet tablet Take 10 mg by mouth daily 30 each 11    tamsulosin (FLOMAX) 0.4 mg capsule Take 1 capsule (0.4 mg total) by mouth daily      furosemide (LASIX) 40 mg tablet Take 1  tablet (40 mg total) by mouth daily If weight goes up 3 pounds overnight take an extra dose that day.      rosuvastatin (Crestor) 40 mg tablet Take 1 tablet (40 mg total) by mouth nightly 90 tablet 0    cholecalciferol, vitamin D3, 25 mcg (1,000 unit) tablet Take 1 tablet (1,000 Units total) by mouth daily      nitroglycerin (NITROSTAT) 0.4 mg SL tablet Place 1 tablet (0.4 mg total) under the tongue every 5 (five) minutes as needed for chest pain      pantoprazole (PROTONIX) 40 mg EC tablet Take 1 tablet (40 mg total) by mouth daily      ALPRAZolam (Xanax) 1 mg tablet Take 1 tablet (1 mg total) by mouth once for 1 dose Take 30 - 60 minutes before MRI Max Daily Amount: 1 mg 1 tablet 0    albuterol HFA (PROVENTIL HFA;VENTOLIN HFA) 90 mcg/actuation inhaler Inhale 2 puffs every 6 (six) hours as needed for wheezing or shortness of breath (Patient not taking: Reported on 12/14/2023) 1 Inhaler 1     No current facility-administered medications for this visit.       Family History   Problem Relation Age of Onset    Heart attack Mother 62    Other Mother         Abdominal Aortic Aneurysm    Lung cancer Mother     Colon cancer Father         Cause of death    Diabetes Brother         Non-Insulin Dependent    Heart attack Brother 51        w/ Stents    Heart disease Brother     Other Son         Well       Social History     Tobacco Use    Smoking status: Former     Packs/day: 0.75     Years: 30.00     Additional pack years: 0.00     Total pack years: 22.50     Types: Cigarettes     Quit date: 6/7/2020     Years since quitting: 3.5    Smokeless tobacco: Never   Vaping Use    Vaping Use: Never used   Substance Use Topics    Alcohol use: Not Currently     Comment: Quit drinking in 2007    Drug use: Not Currently       Review of Systems  Review of Systems   Constitutional: Negative for malaise/fatigue, weight gain and weight loss.   HENT:  Negative for nosebleeds.    Cardiovascular:  Positive for dyspnea on exertion and irregular  heartbeat. Negative for chest pain, claudication, cyanosis, leg swelling/pain, near-syncope, orthopnea, palpitations, PND and syncope/fainting.   Respiratory:  Positive for sleep apnea. Negative for shortness of breath and snoring.    Musculoskeletal:  Negative for muscle weakness and myalgias/muscle aches.   Gastrointestinal:  Negative for hematochezia.   Genitourinary:  Negative for hematuria.   Neurological:  Negative for dizziness and light-headedness.     Objective     Vitals:    01/04/24 1044   BP: 98/64   Pulse: 59   Resp: 18   SpO2: 95%     Weight: 119.7 kg (264 lb)  Physical Exam  Constitutional:       Appearance: He is well-developed.   HENT:      Head: Normocephalic.   Neck:      Vascular: Normal carotid pulses. No carotid bruit, hepatojugular reflux or JVD.   Cardiovascular:      Rate and Rhythm: Normal rate and regular rhythm.      Pulses:           Carotid pulses are 2+ on the right side and 2+ on the left side.       Radial pulses are 2+ on the right side and 2+ on the left side.        Dorsalis pedis pulses are 2+ on the right side and 2+ on the left side.        Posterior tibial pulses are 2+ on the right side and 2+ on the left side.      Heart sounds: No murmur heard.     No friction rub. No gallop.   Pulmonary:      Effort: No accessory muscle usage or respiratory distress.      Breath sounds: No decreased breath sounds, wheezing, rhonchi or rales.   Chest:       Musculoskeletal:      Right lower leg: No edema.      Left lower leg: No edema.   Skin:     General: Skin is warm and dry.   Neurological:      Mental Status: He is alert and oriented to person, place, and time.   Psychiatric:         Mood and Affect: Mood normal.         Speech: Speech normal.         Behavior: Behavior normal.         Thought Content: Thought content normal.         Judgment: Judgment normal.         Data Review:   Sodium   Date Value Ref Range Status   12/14/2023 135 135 - 145 mmol/L Final   12/06/2023 133 (L) 135 -  145 mmol/L Final   12/05/2023 141 135 - 145 mmol/L Final     Potassium   Date Value Ref Range Status   12/14/2023 4.5 3.5 - 5.1 MMOL/L Final   12/07/2023 4.4 3.5 - 5.1 MMOL/L Final   12/06/2023 4.4 3.5 - 5.1 MMOL/L Final     CO2   Date Value Ref Range Status   12/14/2023 23 21 - 32 mmol/L Final   12/06/2023 17 (L) 21 - 32 mmol/L Final   12/05/2023 20 (L) 21 - 32 mmol/L Final     BUN   Date Value Ref Range Status   12/14/2023 14 7 - 25 mg/dL Final   12/06/2023 18 7 - 25 mg/dL Final   12/05/2023 15 7 - 25 mg/dL Final     Creatinine   Date Value Ref Range Status   12/14/2023 0.97 0.70 - 1.30 mg/dL Final   12/06/2023 0.78 0.70 - 1.30 mg/dL Final   12/05/2023 0.66 (L) 0.70 - 1.30 mg/dL Final     Glucose   Date Value Ref Range Status   12/14/2023 101 70 - 105 mg/dL Final   12/06/2023 95 70 - 105 mg/dL Final   12/05/2023 86 70 - 105 mg/dL Final     Calcium   Date Value Ref Range Status   12/14/2023 9.1 8.6 - 10.3 mg/dL Final   12/06/2023 9.2 8.6 - 10.3 mg/dL Final   12/05/2023 6.2 (L) 8.6 - 10.3 mg/dL Final           Assessment/Plan   Problem List Items Addressed This Visit          Cardiac and Vasculature    AICD discharge - Primary  Continues with multiple episodes of ATP with prolonged episodes of slow ventricular tachycardia below the rate limit of his device.  He is on intense dose antiarrhythmic therapy with continued arrhythmias although the patient feels this is improved somewhat he has been out of the hospital for a while.    Automatic implantable cardioverter-defibrillator in situ    Chronic combined systolic and diastolic CHF, NYHA class 2 and ACC/AHA stage C (CMS/HCC)  On guideline directed medical therapy.    Hyperlipidemia  Treated on intense dose statin therapy.    Hypertension  Well-controlled blood pressures typically running 90s to 100 mmHg.    Ischemic cardiomyopathy    Long term current use of antiarrhythmic drug  Patient is on sotalol and mexiletine.     Other Visit Diagnoses       History of  claustrophobia      He is to get an MRI next week and I ordered some alprazolam ahead of time.  If that does not suffice he might need sedation for his MRI.  Otherwise we will wait and see how he does.  He will follow-up with EP, Dr. Leiva and will assign his primary cardiology care to Dr. Shon Hester.    Relevant Medications    ALPRAZolam (Xanax) 1 mg tablet              Electronically signed by: BRENDON SHAW MD  1/4/2024  11:19 AM

## 2024-01-11 ENCOUNTER — HOSPITAL ENCOUNTER (OUTPATIENT)
Dept: CARDIOLOGY | Facility: HOSPITAL | Age: 59
Discharge: HOME | End: 2024-01-11
Payer: MEDICARE

## 2024-01-11 ENCOUNTER — TELEPHONE (OUTPATIENT)
Dept: CARDIOLOGY | Facility: CLINIC | Age: 59
End: 2024-01-11
Payer: MEDICARE

## 2024-01-11 ENCOUNTER — DOCUMENTATION (OUTPATIENT)
Dept: CARDIOLOGY | Facility: CLINIC | Age: 59
End: 2024-01-11
Payer: MEDICARE

## 2024-01-11 ENCOUNTER — HOSPITAL ENCOUNTER (OUTPATIENT)
Dept: MRI IMAGING | Facility: HOSPITAL | Age: 59
Discharge: HOME | End: 2024-01-11
Payer: MEDICARE

## 2024-01-11 ENCOUNTER — HOSPITAL ENCOUNTER (OUTPATIENT)
Dept: RADIOLOGY | Facility: HOSPITAL | Age: 59
Discharge: HOME | End: 2024-01-11
Payer: MEDICARE

## 2024-01-11 ENCOUNTER — CARE COORDINATION (OUTPATIENT)
Dept: CARDIOLOGY | Facility: CLINIC | Age: 59
End: 2024-01-11
Payer: MEDICARE

## 2024-01-11 VITALS — OXYGEN SATURATION: 95 % | HEART RATE: 115 BPM

## 2024-01-11 DIAGNOSIS — Z95.0 PACEMAKER: ICD-10-CM

## 2024-01-11 DIAGNOSIS — I50.42 CHRONIC COMBINED SYSTOLIC AND DIASTOLIC CHF, NYHA CLASS 2 AND ACC/AHA STAGE C (CMS/HCC): ICD-10-CM

## 2024-01-11 PROCEDURE — 93288 INTERROG EVL PM/LDLS PM IP: CPT

## 2024-01-11 PROCEDURE — A9577 INJ MULTIHANCE: HCPCS | Mod: JW | Performed by: INTERNAL MEDICINE

## 2024-01-11 PROCEDURE — 93287 PERI-PX DEVICE EVAL & PRGR: CPT | Mod: 26 | Performed by: INTERNAL MEDICINE

## 2024-01-11 PROCEDURE — 2550000100 HC RX 255: Mod: JW | Performed by: INTERNAL MEDICINE

## 2024-01-11 PROCEDURE — 75561 CARDIAC MRI FOR MORPH W/DYE: CPT

## 2024-01-11 RX ADMIN — GADOBENATE DIMEGLUMINE 23 ML: 529 INJECTION, SOLUTION INTRAVENOUS at 10:23

## 2024-01-11 NOTE — PROGRESS NOTES
Patient was noted to be in VT on his device check prior to his cardiac MRI this morning.  Dr. Wu attempted burst pacing which was unsuccessful.  Patient took his morning medications.  Instructed to go to the ER if he did not convert and/or if he did not feel well.  Patient currently on sotalol and mexiletine.  In the past, there was discussion about switching sotalol to amiodarone if needed.  I called patient this afternoon to check on him and he reports he converted and is feeling significantly better.  I again reminded him to come to our ER if his symptoms worsen again.  We can attempt to expedite his transfer to [place] if needed.  Dr. Leiva from the heart failure team is also aware.

## 2024-01-11 NOTE — PROGRESS NOTES
New Heart Failure Referral     Demographics:  [address] [facility] [address]   Home Phone [phone]   Mobile [phone]        Referred By:   Dr. Ledesma [facility]     Background:    Sending to Heart Failure RN to Review.    Plan:   Set up initial virtual visit. Saved spot on Feb 7th with Caity Felix.   January 12, 2024 - Called no answer, unable to leave    January 16, 2024 - No answer, unable to leave   Patient contacted and a virtual appointment made with Dr. Felix on 2/7/24

## 2024-01-11 NOTE — TELEPHONE ENCOUNTER
Remote transmission received.     Device detected 30 appropriate treated VT episodes at rates of approximately 110s-120s with ATP x38 on Justin 10, 2024 over a 24 MINUTE period of time. No HV therapy delivered.     Docket sent to provider for review.   Encounter routed to EP Nursing and EP APPs for follow up as applicable.

## 2024-01-15 ENCOUNTER — TELEPHONE (OUTPATIENT)
Dept: CARDIOLOGY | Facility: CLINIC | Age: 59
End: 2024-01-15
Payer: MEDICARE

## 2024-01-15 NOTE — TELEPHONE ENCOUNTER
Remote transmission received.  Device detected Appropriate VT Episodes with ATPx47 since last docket. Jan 13 transmission showing VT in progress and presenting EGM on Jan 14 showing an AP/BP rhythm.    Sent to provider for review  Encounter routed to EP APPs as DIMA

## 2024-01-17 PROBLEM — I21.09 ANTERIOR MYOCARDIAL INFARCTION (H): Status: ACTIVE | Noted: 2017-10-30

## 2024-01-17 PROBLEM — I63.9 CVA (CEREBRAL VASCULAR ACCIDENT) (H): Status: ACTIVE | Noted: 2017-10-30

## 2024-01-17 PROBLEM — E66.9 MODERATE OBESITY: Status: ACTIVE | Noted: 2023-10-19

## 2024-01-17 PROBLEM — Z79.01 CHRONIC ANTICOAGULATION: Status: ACTIVE | Noted: 2021-09-19

## 2024-01-17 PROBLEM — I72.9: Status: ACTIVE | Noted: 2017-10-30

## 2024-01-17 PROBLEM — Z86.79 HISTORY OF VENTRICULAR TACHYCARDIA: Status: ACTIVE | Noted: 2021-09-19

## 2024-01-17 PROBLEM — I25.10 CORONARY ATHEROSCLEROSIS: Status: ACTIVE | Noted: 2017-10-30

## 2024-01-17 PROBLEM — I10 HYPERTENSION: Status: ACTIVE | Noted: 2017-10-30

## 2024-01-17 PROBLEM — I48.0 PAROXYSMAL ATRIAL FIBRILLATION (H): Status: ACTIVE | Noted: 2021-09-19

## 2024-01-17 PROBLEM — I25.5 ISCHEMIC CARDIOMYOPATHY: Status: ACTIVE | Noted: 2017-10-30

## 2024-01-17 PROBLEM — N40.0 BENIGN PROSTATIC HYPERPLASIA WITHOUT LOWER URINARY TRACT SYMPTOMS: Status: ACTIVE | Noted: 2023-10-19

## 2024-01-17 PROBLEM — E11.69 DIABETES MELLITUS TYPE 2 IN OBESE: Status: ACTIVE | Noted: 2023-10-19

## 2024-01-17 PROBLEM — I50.42: Status: ACTIVE | Noted: 2021-07-11

## 2024-01-17 PROBLEM — D72.820 LYMPHOCYTOSIS (SYMPTOMATIC): Status: ACTIVE | Noted: 2023-10-19

## 2024-01-17 PROBLEM — E66.9 DIABETES MELLITUS TYPE 2 IN OBESE: Status: ACTIVE | Noted: 2023-10-19

## 2024-01-17 PROBLEM — Z95.810 AUTOMATIC IMPLANTABLE CARDIOVERTER-DEFIBRILLATOR IN SITU: Status: ACTIVE | Noted: 2020-06-10

## 2024-01-17 PROBLEM — K21.9 GASTROESOPHAGEAL REFLUX DISEASE WITHOUT ESOPHAGITIS: Status: ACTIVE | Noted: 2023-10-19

## 2024-01-17 PROBLEM — E78.5 HYPERLIPIDEMIA: Status: ACTIVE | Noted: 2017-10-30

## 2024-01-17 RX ORDER — ROSUVASTATIN CALCIUM 40 MG/1
40 TABLET, COATED ORAL AT BEDTIME
Status: ON HOLD | COMMUNITY
Start: 2023-03-09 | End: 2024-07-02

## 2024-01-17 RX ORDER — NITROGLYCERIN 0.4 MG/1
1 TABLET SUBLINGUAL EVERY 5 MIN PRN
COMMUNITY

## 2024-01-17 RX ORDER — POTASSIUM CHLORIDE 750 MG/1
30 TABLET, EXTENDED RELEASE ORAL DAILY
COMMUNITY
Start: 2023-10-25

## 2024-01-17 RX ORDER — SOTALOL HYDROCHLORIDE 120 MG/1
180 TABLET ORAL EVERY 12 HOURS
COMMUNITY
Start: 2023-04-24

## 2024-01-17 RX ORDER — PANTOPRAZOLE SODIUM 40 MG/1
40 TABLET, DELAYED RELEASE ORAL DAILY
COMMUNITY

## 2024-01-17 RX ORDER — TAMSULOSIN HYDROCHLORIDE 0.4 MG/1
0.4 CAPSULE ORAL DAILY
COMMUNITY
Start: 2023-08-23

## 2024-01-17 RX ORDER — MEXILETINE HYDROCHLORIDE 250 MG/1
250 CAPSULE ORAL 3 TIMES DAILY
COMMUNITY
Start: 2022-11-16 | End: 2024-10-20

## 2024-01-17 RX ORDER — VITAMIN B COMPLEX
1000 TABLET ORAL DAILY
COMMUNITY

## 2024-01-17 RX ORDER — CARVEDILOL 25 MG/1
50 TABLET ORAL 2 TIMES DAILY
COMMUNITY
Start: 2023-12-07

## 2024-01-17 RX ORDER — MAGNESIUM OXIDE 400 MG/1
400 TABLET ORAL 2 TIMES DAILY
COMMUNITY
Start: 2023-12-07

## 2024-01-17 RX ORDER — ASPIRIN 81 MG/1
1 TABLET ORAL DAILY
COMMUNITY
Start: 2023-11-22

## 2024-01-17 RX ORDER — SPIRONOLACTONE 25 MG/1
50 TABLET ORAL DAILY
COMMUNITY
Start: 2023-11-24

## 2024-01-17 RX ORDER — FUROSEMIDE 40 MG
40 TABLET ORAL DAILY PRN
COMMUNITY
Start: 2023-08-09

## 2024-01-17 NOTE — PROGRESS NOTES
Patient is referred from formerly [facility].  Records in care everywhere.  Will request  obtain imaging prior to appt.

## 2024-01-18 ENCOUNTER — DOCUMENTATION ONLY (OUTPATIENT)
Dept: CARDIOLOGY | Facility: CLINIC | Age: 59
End: 2024-01-18
Payer: MEDICARE

## 2024-01-18 NOTE — PROGRESS NOTES
Action    Action Taken [facility] Imaging Requested:   Tracking #  [id number] Cardiac MRI Images  Coronary Angio Images  ECHO Images 1-11-24  12-5-23  10-19-23       Images resolved in PACS

## 2024-01-18 NOTE — TELEPHONE ENCOUNTER
Remote transmission received.   Device detected an appropriate treated VT episode with ATP X 1, no HV therapy delivered.      Docket sent to provider for review.   Encounter routed to EP APPs as FYI.

## 2024-01-19 NOTE — TELEPHONE ENCOUNTER
Pete was contacted and given [facility]'s message. Say he had episodes of tachycardia off and on today for 20 mins. He was advised if a treatment is delivered from his device the weekend to come to the ED for an evaluation. Will send to [facility] for review.

## 2024-01-23 ENCOUNTER — TELEPHONE (OUTPATIENT)
Dept: CARDIOLOGY | Facility: CLINIC | Age: 59
End: 2024-01-23
Payer: MEDICARE

## 2024-01-23 NOTE — TELEPHONE ENCOUNTER
Remote transmission received.  Please see telephone encounter from 1/15/2024 for recent medication changes.     In total patient has had 73 events since last docket.   Device detected 46 Appropriate VT Episodes with ATPx72 since last docket.      Presenting Rhythm from 1/21/2024 shows AP/BIVP @ 60bpm w/ frequent PVCs.     Sent to provider for review  Encounter routed to EP APPs as DIMA

## 2024-02-05 ENCOUNTER — TELEPHONE (OUTPATIENT)
Dept: CARDIOLOGY | Facility: CLINIC | Age: 59
End: 2024-02-05
Payer: MEDICARE

## 2024-02-05 NOTE — TELEPHONE ENCOUNTER
Remote transmission received.    Device detected 2 appropriate, treated VT episodes with ATPx6. The most recent episode was detected on 2/3/2024 and showed VT unsuccessfully resolved with ATP X5 and lasting ~20 minutes, termination of event not observed on EGM. Presenting EGM from 2/4/24 appears to show a regular AP/BiVP rhythm @ 60bpm with occasional PVCs.     Docket sent to Dr. Montana for review.     Encounter routed to EP APPs for awareness.

## 2024-02-07 ENCOUNTER — VIRTUAL VISIT (OUTPATIENT)
Dept: CARDIOLOGY | Facility: CLINIC | Age: 59
End: 2024-02-07
Attending: STUDENT IN AN ORGANIZED HEALTH CARE EDUCATION/TRAINING PROGRAM
Payer: MEDICARE

## 2024-02-07 VITALS
BODY MASS INDEX: 36.4 KG/M2 | HEIGHT: 71 IN | DIASTOLIC BLOOD PRESSURE: 73 MMHG | HEART RATE: 60 BPM | SYSTOLIC BLOOD PRESSURE: 136 MMHG | WEIGHT: 260 LBS

## 2024-02-07 DIAGNOSIS — I25.10 ATHEROSCLEROSIS OF NATIVE CORONARY ARTERY OF NATIVE HEART WITHOUT ANGINA PECTORIS: ICD-10-CM

## 2024-02-07 DIAGNOSIS — I47.20 VENTRICULAR TACHYCARDIA (H): ICD-10-CM

## 2024-02-07 DIAGNOSIS — I50.22 CHRONIC HFREF (HEART FAILURE WITH REDUCED EJECTION FRACTION) (H): Primary | ICD-10-CM

## 2024-02-07 DIAGNOSIS — I25.5 ISCHEMIC CARDIOMYOPATHY: ICD-10-CM

## 2024-02-07 DIAGNOSIS — E66.812 CLASS 2 SEVERE OBESITY DUE TO EXCESS CALORIES WITH SERIOUS COMORBIDITY AND BODY MASS INDEX (BMI) OF 35.0 TO 35.9 IN ADULT (H): ICD-10-CM

## 2024-02-07 DIAGNOSIS — E66.01 CLASS 2 SEVERE OBESITY DUE TO EXCESS CALORIES WITH SERIOUS COMORBIDITY AND BODY MASS INDEX (BMI) OF 35.0 TO 35.9 IN ADULT (H): ICD-10-CM

## 2024-02-07 PROCEDURE — 99205 OFFICE O/P NEW HI 60 MIN: CPT | Mod: 95 | Performed by: STUDENT IN AN ORGANIZED HEALTH CARE EDUCATION/TRAINING PROGRAM

## 2024-02-07 ASSESSMENT — PAIN SCALES - GENERAL: PAINLEVEL: NO PAIN (0)

## 2024-02-07 NOTE — PROGRESS NOTES
Virtual Visit Details     Type of service:  Video Visit   Video Start Time: 3:35 PM  Video End Time: 4:10 PM    Originating Location (pt. Location): Home  Distant Location (provider location):  On-site  Platform used for Video Visit: Vonda    Advanced Heart Failure/Transplant Clinic Note    HPI  Dear colleagues,     I had the pleasure of seeing Mr. Leo Russell in the Cardiology clinic.  As you know, Mr. Leo Russell is a pleasant 58 year old male with a past medical history of CAD s/p PCI c/b ICM with chronic HFrEF s/p CRT-D, recurrent VT s/p prior ablations, PAF, HTN, HLD, DM Type II, and obesity who presents as new referral for HFrEF.    Patient has had longstanding ICM and HFrEF and previously underwent an advanced therapy evaluation after he underwent VT ablations (June '20 at [facility] and Nov '21 through the [facility]). It was thought better to focus on further rhythm control before pursing advanced therapies. He had been on longstanding amiodarone, but it was thought he started getting some liver and lung fibrosis changes, so this was stopped and mexiletine and sotalol have been continued. Sotalol was initiation at [facility] in March '23. Patient was admitted to [facility] in Nov '23 for slow VT and had been getting multiple ATP therapies for this. Sotalol dose was increased. He has continued to have episodes of slow VT despite this and still having episodes of ATP. Patient underwent coronary angiogram that showed stable native disease with 80-90% in-stent restenosis of inferior branch of D1 s/p Angiosculpt POBA and RHC showed low cardiac index with normal filling pressures. Given his persistent rhythm issues, the patient was referred to [facility] for consideration of advanced therapies.    Patient reports he can walk 2-3 blocks without needing to stop and rest. Flights of stairs given him some difficulty. He denies recent presycnope, syncope, chest pain, palpitations,  orthopnea, PND, abdominal pain, nausea, emesis, LE edema or recent weight gain. Patient has been trying to lose weight by eating better. He reports compliance with his medications, monitoring his salt and fluid intake and monitoring his weights daily.    ROS:  A complete 12-point ROS was negative except as above.    PAST MEDICAL HISTORY:  Patient Active Problem List   Diagnosis    Anterior myocardial infarction (H)    Arterial aneurysm (H24)    Automatic implantable cardioverter-defibrillator in situ    Benign prostatic hyperplasia without lower urinary tract symptoms    Chronic anticoagulation    Chronic combined systolic and diastolic CHF, NYHA class 2 and ACC/AHA stage C (H)    Coronary atherosclerosis    CVA (cerebral vascular accident) (H)    Diabetes mellitus type 2 in obese (H)    Gastroesophageal reflux disease without esophagitis    History of ventricular tachycardia    Hyperlipidemia    Hypertension    Ischemic cardiomyopathy    Lymphocytosis (symptomatic)    Moderate obesity    Paroxysmal atrial fibrillation (H)        FAMILY HISTORY:  Multiple family members with CAD and HTN    SOCIAL HISTORY:  Social History     Tobacco Use    Smoking status: Former     Packs/day: 1.00     Years: 20.00     Additional pack years: 0.00     Total pack years: 20.00     Types: Cigarettes     Quit date: 2017     Years since quittin.1     Passive exposure: Never    Smokeless tobacco: Never   Substance Use Topics    Alcohol use: Never    Drug use: Never    Patient denies any tobacco use since 2017, no ETOH or illicit substance use. Lives in [place], [place]. Believes his girlfriend and adult son could be his caregivers    ALLERGIES:  Allergies   Allergen Reactions    Morphine      ANXIETY  ANXIETY      Tramadol      ANXIETY  ANXIETY         CURRENT MEDICATIONS:  Current Outpatient Medications   Medication Sig Dispense Refill    aspirin 81 MG EC tablet Take 1 tablet by mouth daily      carvedilol (COREG) 25 MG tablet Take 50  "mg by mouth 2 times daily      empagliflozin (JARDIANCE) 10 MG TABS tablet Take 10 mg by mouth daily      furosemide (LASIX) 40 MG tablet Take 40 mg by mouth daily as needed      magnesium oxide (MAG-OX) 400 MG tablet Take 400 mg by mouth 2 times daily      mexiletine (MEXITIL) 200 MG capsule Take 200 mg by mouth every 8 hours      nitroGLYcerin (NITROSTAT) 0.4 MG sublingual tablet Place 1 tablet under the tongue every 5 minutes as needed      pantoprazole (PROTONIX) 40 MG EC tablet Take 40 mg by mouth daily      potassium chloride ER (K-TAB/KLOR-CON) 10 MEQ CR tablet Take 30 mEq by mouth daily      rivaroxaban ANTICOAGULANT (XARELTO) 20 MG TABS tablet Take 20 mg by mouth daily      rosuvastatin (CRESTOR) 40 MG tablet Take 40 mg by mouth at bedtime      sacubitril-valsartan (ENTRESTO)  MG per tablet Take 1 tablet by mouth 2 times daily      sotalol (BETAPACE) 120 MG tablet Take 180 mg by mouth every 12 hours      spironolactone (ALDACTONE) 25 MG tablet Take 50 mg by mouth daily      tamsulosin (FLOMAX) 0.4 MG capsule Take 0.4 mg by mouth daily      Vitamin D3 (VITAMIN D-1000 MAX ST) 25 mcg (1000 units) tablet Take 1,000 Units by mouth daily         EXAM:  /73   Pulse 60   Ht 1.803 m (5' 11\")   Wt 117.9 kg (260 lb)   BMI 36.26 kg/m   (virtual)    General: appears comfortable, alert and interactive, in no acute distress  Head: normocephalic, atraumatic  Eyes: anicteric sclera, EOMI  Mouth: MMM  Resp: nonlabored respirations  Neurological: alert and oriented, no focal deficits  Psych: normal mood and affect  Derm: no rashes on exposed surfaces    Weight  Wt Readings from Last 10 Encounters:   02/08/24 117 kg (258 lb)   02/07/24 117.9 kg (260 lb)       I personally reviewed recent labs and data as below and discussed the results with the patient in clinic today.  Labs:  BMP 12/14/23    K 4.5  Cl 104  CO2 23  BUN 14  Cr 0.97  NTproBNP 221    Testing/Procedures:  I personally visualized and " interpreted:  Echocardiogram 10/19/23    Normal left ventricular wall thickness.     Biplane ejection fraction is 25%.     Severe left ventricular systolic dysfunction.  Large anteroapical   aneurysm.  Only the basilar left ventricular segments have preserved   contractility.  No LV mural thrombi.     Grade II (moderate) left ventricular diastolic abnormality.     Elevated left ventricular filling pressure.     The left atrium is severely dilated.     The right atrium is moderately dilated.     Normal central venous pressure (0-5 mmHg).     No pericardial effusion.     Inadequate TR spectral Doppler to accurately assess right ventricular   systolic pressure.   Comment: No obvious changes from the January 2022 echo.     Coronary Angiogram/RHC 12/5/23  Diagnostic coronary angiogram:   Selective engagement of the right coronary artery was performed with a JR4 catheter.  Selective engagement of the left main coronary artery was performed with a JL 3.5 catheter.  Selective injections were performed. The patient has a right dominant coronary system.   Left Main: Left main coronary artery is normal.  No significant disease   Left Anterior Descending: The left anterior descending arises from the   left main and courses along the anterior interventricular groove.  The   proximal LAD stent is patent.  The remainder the LAD is patent although quite small and threadlike.  Flow is THEE grade III.  The first diagonal branch is a large vessel that bifurcates into a superior and inferior branch.  There is a stent that extends from the ostium of the vessel into the superior branch.  A stent also extends into the inferior branch.  This is a bifurcation type of stent arrangement.  50% stenosis is noted at the ostium of the first diagonal.  The remainder of the proximal portion is patent.  The superior branch, including the stent is widely patent with normal flow.  The inferior branch demonstrates severe in-stent restenosis in the  proximal segment, 85-90% with THEE grade I distal flow.  The remaining diagonal branches are patent   Left Circumflex: The left circumflex is a moderate-sized artery.  The   proximal vessel is normal.  Mild irregularities are seen in the   midsegment.  The distal vessel is patent.  The first obtuse marginal is   patent without significant disease.  There is a moderate-sized second   obtuse marginal which is widely patent.   Right Coronary Artery: Right coronary artery is a large, dominant   artery.  Mild irregularities are seen in the proximal and mid segment.    Mild irregularities are noted distally.  The PDA and KANIKA branches are   widely patent.  This is similar in appearance to the patient's previous   angiogram   Left Heart Catheterization:   Left ventricular end-diastolic pressure measures 8 mmHg.  There is no   gradient across the aortic valve on catheter pullback   Right Heart Catheterization:     PA Pressure: 24/10 mmHg, mean 15 mmHg (PA saturation 65%)     PCWP: 8 mmHg     RV Pressure: 31/1 mmHg     RVEDP: 10 mmHg     RA Pressure: 5 mmHg     Cardiac Output: 5.2 L/min     Cardiac Index: 2.1 L/min/m surface squared     cMRI 1/11/24  Impression:   Infarcted tissue at the left anterior descending and left circumflex artery territories involving the mid, basal and apical segments, with 70% total scar burden. The only residual viable myocardium is at the inferior left ventricle in the right coronary artery distribution. Dilated left ventricle and the left ventricular ejection fraction is markedly compromised, however the quantification is not well accomplished on this study secondary to artifact.   Mildly hypokinetic right ventricular wall. No right ventricular dilatation.   Biatrial cardiac dilatation.   Mild mitral valve regurgitation.     Device Interrogation 2/3/24 shows BiV pacing 75% of time, still having regular episodes of slow VT with unsuccessful ATP therapies, but below rate level for  defibrillations    Outside results of note:  Outside records from [facility] and [facility] were obtained, and relevant results/notes have been incorporated into HPI.    Assessment and Plan:     In summary, 58 year old male with a past medical history of CAD s/p PCI c/b ICM with chronic HFrEF s/p CRT-D, recurrent VT s/p prior ablations, PAF, HTN, HLD, DM Type II, and obesity who presents as new referral for HFrEF.    Chronic systolic heart failure/HFrEF (EF 25%) secondary to ischemic cardiomyopathy  NYHA Symptom Class III  Stage C  Primary Cardiologist: Dr. Ledesma  ACE-I/ARB/ARNi: Continue Entresto  mg BID  BB: Continue carvedilol 50 mg BID  Aldosterone antagonist: Continue spironolactone 50 mg daily  SGLT2i: Continue empagliflozin 10 mg daily  SCD prophylaxis: s/p CRT-D  %BiV pacin%  Fluid status: euvolemic by history on lasix 40 mg daily PRN  Cardiac Rehab: Previously completed  Sleep Apnea Evaluation: Previously referred  Remote PA Pressure Monitoring (CardioMems): Not currently indicated given only on diuretics PRN  - Given refractory VT despite prior ablations and on two antiarrhythmics, will start advanced therapy evaluation, discussed with patient overview of the testing and consults needed, requirements of 24 30-day caregiver support and staying local in the area for the first month after discharge from the hospital. Brief overview of the difference between LVAD and transplant, and typical wait-times for heart transplant  - Continue heart healthy diet, regular aerobic exercise and monitoring Na, fluid intake and weights regularly    VT  PAF  Has been having numerous episodes of slow VT requiring ATP therapy recently. Two prior VT ablations in  and .  - Will have patient meet with Dr. Wills when he comes down for his advanced therapy testing to ensure nothing else can be done for his rhythm issues  - Continue coreg as above  - Continue sotalolol 180 mg BID and mexilitine 200 mg  BID  - Continue rivaroxaban 20 mg daily    CAD  HLD  Had POBA in Dec '23 for in-stent thrombosis of prior D1 stent. Still having episodes of slow VT despite this. No recent angina.  - Continue ASA 81 mg daily  - Continue rosuvastatin 40 mg daily  - Continue coreg as above  - Has nitroglycerin PRN available    DM Type II  Last A1c 6.2  - Managed by PCP    HTN  Well controlled at home recently.  - Continue GDMT as above    Obesity  Body mass index is 36.26 kg/m .   - Recommend heart healthy diet and regular aerobic exercise as able    To Do:  - No change to medications today  - Start financial approval for advanced therapy evaluation, will coordinate EP referral and my follow up for when he comes to [facility] for all his evaluation testing (discussed the case with Dr. Wills)    The patient states understanding and is agreeable with plan.   Feel free to contact myself regarding questions or concerns. It was a pleasure to see this patient today.    A total of 60 minutes was spent on the day of the visit, which includes preparation for the visit (reviewing previous medical records, laboratories and investigations), in conjunction with the actual clinic visit with the patient, which includes obtaining a history and physical exam, creating and reviewing the care plan, patient education (and family if present), counseling, documenting clinical information in the electronic health record and care coordination.     Caity Felix MD   of Medicine, [facility]  Advanced Heart Failure and Transplant Cardiology     CC  Suman Nagel

## 2024-02-07 NOTE — NURSING NOTE
Diet: Patient instructed regarding a heart failure healthy diet, including discussion of reduced fat and 2000 mg daily sodium restriction, daily weights, medication purpose and compliance, fluid restrictions and resources for patient and family to access for assistance with heart failure management.       Labs: Patient was given results of the laboratory testing obtained today and patient was instructed about when to return for the next laboratory testing.     Med Reconcile: Reviewed and verified all current medications with the patient. The updated medication list was printed and given to the patient. No changes     Return Appointment: Patient given instructions regarding scheduling next clinic visit. Begin advanced therapy work up. Referral to EP    Patient stated he understood all health information given and agreed to call with further questions or concerns.     Rolanda Felder RN

## 2024-02-07 NOTE — LETTER
2/7/2024      RE: Leo Russell  [address] [facility] [place] [address]       Dear Colleague,    Thank you for the opportunity to participate in the care of your patient, Leo Russell, at the [facility] [place] [facility]. Please see a copy of my visit note below.        Advanced Heart Failure/Transplant Clinic Note    HPI  Dear colleagues,     I had the pleasure of seeing Mr. Leo Russell in the Cardiology clinic.  As you know, Mr. Leo Russell is a pleasant 58 year old male with a past medical history of CAD s/p PCI c/b ICM with chronic HFrEF s/p CRT-D, recurrent VT s/p prior ablations, PAF, HTN, HLD, DM Type II, and obesity who presents as new referral for HFrEF.    Patient has had longstanding ICM and HFrEF and previously underwent an advanced therapy evaluation after he underwent VT ablations (June '20 at [facility] and Nov '21 through the [facility]). It was thought better to focus on further rhythm control before pursing advanced therapies. He had been on longstanding amiodarone, but it was thought he started getting some liver and lung fibrosis changes, so this was stopped and mexiletine and sotalol have been continued. Sotalol was initiation at [facility] in March '23. Patient was admitted to [facility] in Nov '23 for slow VT and had been getting multiple ATP therapies for this. Sotalol dose was increased. He has continued to have episodes of slow VT despite this and still having episodes of ATP. Patient underwent coronary angiogram that showed stable native disease with 80-90% in-stent restenosis of inferior branch of D1 s/p Angiosculpt POBA and RHC showed low cardiac index with normal filling pressures. Given his persistent rhythm issues, the patient was referred to [facility] for consideration of advanced therapies.    Patient reports he can walk 2-3 blocks without needing to stop and rest. Flights of stairs  given him some difficulty. He denies recent presycnope, syncope, chest pain, palpitations, orthopnea, PND, abdominal pain, nausea, emesis, LE edema or recent weight gain. Patient has been trying to lose weight by eating better. He reports compliance with his medications, monitoring his salt and fluid intake and monitoring his weights daily.    ROS:  A complete 12-point ROS was negative except as above.    PAST MEDICAL HISTORY:  Patient Active Problem List   Diagnosis    Anterior myocardial infarction (H)    Arterial aneurysm (H24)    Automatic implantable cardioverter-defibrillator in situ    Benign prostatic hyperplasia without lower urinary tract symptoms    Chronic anticoagulation    Chronic combined systolic and diastolic CHF, NYHA class 2 and ACC/AHA stage C (H)    Coronary atherosclerosis    CVA (cerebral vascular accident) (H)    Diabetes mellitus type 2 in obese (H)    Gastroesophageal reflux disease without esophagitis    History of ventricular tachycardia    Hyperlipidemia    Hypertension    Ischemic cardiomyopathy    Lymphocytosis (symptomatic)    Moderate obesity    Paroxysmal atrial fibrillation (H)        FAMILY HISTORY:  Multiple family members with CAD and HTN    SOCIAL HISTORY:  Social History     Tobacco Use    Smoking status: Former     Packs/day: 1.00     Years: 20.00     Additional pack years: 0.00     Total pack years: 20.00     Types: Cigarettes     Quit date:      Years since quittin.1     Passive exposure: Never    Smokeless tobacco: Never   Substance Use Topics    Alcohol use: Never    Drug use: Never    Patient denies any tobacco use since 2017, no ETOH or illicit substance use. Lives in [place], [place]. Believes his girlfriend and adult son could be his caregivers    ALLERGIES:  Allergies   Allergen Reactions    Morphine      ANXIETY  ANXIETY      Tramadol      ANXIETY  ANXIETY         CURRENT MEDICATIONS:  Current Outpatient Medications   Medication Sig Dispense Refill    aspirin  "81 MG EC tablet Take 1 tablet by mouth daily      carvedilol (COREG) 25 MG tablet Take 50 mg by mouth 2 times daily      empagliflozin (JARDIANCE) 10 MG TABS tablet Take 10 mg by mouth daily      furosemide (LASIX) 40 MG tablet Take 40 mg by mouth daily as needed      magnesium oxide (MAG-OX) 400 MG tablet Take 400 mg by mouth 2 times daily      mexiletine (MEXITIL) 200 MG capsule Take 200 mg by mouth every 8 hours      nitroGLYcerin (NITROSTAT) 0.4 MG sublingual tablet Place 1 tablet under the tongue every 5 minutes as needed      pantoprazole (PROTONIX) 40 MG EC tablet Take 40 mg by mouth daily      potassium chloride ER (K-TAB/KLOR-CON) 10 MEQ CR tablet Take 30 mEq by mouth daily      rivaroxaban ANTICOAGULANT (XARELTO) 20 MG TABS tablet Take 20 mg by mouth daily      rosuvastatin (CRESTOR) 40 MG tablet Take 40 mg by mouth at bedtime      sacubitril-valsartan (ENTRESTO)  MG per tablet Take 1 tablet by mouth 2 times daily      sotalol (BETAPACE) 120 MG tablet Take 180 mg by mouth every 12 hours      spironolactone (ALDACTONE) 25 MG tablet Take 50 mg by mouth daily      tamsulosin (FLOMAX) 0.4 MG capsule Take 0.4 mg by mouth daily      Vitamin D3 (VITAMIN D-1000 MAX ST) 25 mcg (1000 units) tablet Take 1,000 Units by mouth daily         EXAM:  /73   Pulse 60   Ht 1.803 m (5' 11\")   Wt 117.9 kg (260 lb)   BMI 36.26 kg/m   (virtual)    General: appears comfortable, alert and interactive, in no acute distress  Head: normocephalic, atraumatic  Eyes: anicteric sclera, EOMI  Mouth: MMM  Resp: nonlabored respirations  Neurological: alert and oriented, no focal deficits  Psych: normal mood and affect  Derm: no rashes on exposed surfaces    Weight  Wt Readings from Last 10 Encounters:   02/08/24 117 kg (258 lb)   02/07/24 117.9 kg (260 lb)       I personally reviewed recent labs and data as below and discussed the results with the patient in clinic today.  Labs:  BMP 12/14/23    K 4.5  Cl 104  CO2 " 23  BUN 14  Cr 0.97  NTproBNP 221    Testing/Procedures:  I personally visualized and interpreted:  Echocardiogram 10/19/23    Normal left ventricular wall thickness.     Biplane ejection fraction is 25%.     Severe left ventricular systolic dysfunction.  Large anteroapical   aneurysm.  Only the basilar left ventricular segments have preserved   contractility.  No LV mural thrombi.     Grade II (moderate) left ventricular diastolic abnormality.     Elevated left ventricular filling pressure.     The left atrium is severely dilated.     The right atrium is moderately dilated.     Normal central venous pressure (0-5 mmHg).     No pericardial effusion.     Inadequate TR spectral Doppler to accurately assess right ventricular   systolic pressure.   Comment: No obvious changes from the January 2022 echo.     Coronary Angiogram/RHC 12/5/23  Diagnostic coronary angiogram:   Selective engagement of the right coronary artery was performed with a JR4 catheter.  Selective engagement of the left main coronary artery was performed with a JL 3.5 catheter.  Selective injections were performed. The patient has a right dominant coronary system.   Left Main: Left main coronary artery is normal.  No significant disease   Left Anterior Descending: The left anterior descending arises from the   left main and courses along the anterior interventricular groove.  The   proximal LAD stent is patent.  The remainder the LAD is patent although quite small and threadlike.  Flow is THEE grade III.  The first diagonal branch is a large vessel that bifurcates into a superior and inferior branch.  There is a stent that extends from the ostium of the vessel into the superior branch.  A stent also extends into the inferior branch.  This is a bifurcation type of stent arrangement.  50% stenosis is noted at the ostium of the first diagonal.  The remainder of the proximal portion is patent.  The superior branch, including the stent is widely patent with  normal flow.  The inferior branch demonstrates severe in-stent restenosis in the proximal segment, 85-90% with THEE grade I distal flow.  The remaining diagonal branches are patent   Left Circumflex: The left circumflex is a moderate-sized artery.  The   proximal vessel is normal.  Mild irregularities are seen in the   midsegment.  The distal vessel is patent.  The first obtuse marginal is   patent without significant disease.  There is a moderate-sized second   obtuse marginal which is widely patent.   Right Coronary Artery: Right coronary artery is a large, dominant   artery.  Mild irregularities are seen in the proximal and mid segment.    Mild irregularities are noted distally.  The PDA and KANIKA branches are   widely patent.  This is similar in appearance to the patient's previous   angiogram   Left Heart Catheterization:   Left ventricular end-diastolic pressure measures 8 mmHg.  There is no   gradient across the aortic valve on catheter pullback   Right Heart Catheterization:     PA Pressure: 24/10 mmHg, mean 15 mmHg (PA saturation 65%)     PCWP: 8 mmHg     RV Pressure: 31/1 mmHg     RVEDP: 10 mmHg     RA Pressure: 5 mmHg     Cardiac Output: 5.2 L/min     Cardiac Index: 2.1 L/min/m surface squared     cMRI 1/11/24  Impression:   Infarcted tissue at the left anterior descending and left circumflex artery territories involving the mid, basal and apical segments, with 70% total scar burden. The only residual viable myocardium is at the inferior left ventricle in the right coronary artery distribution. Dilated left ventricle and the left ventricular ejection fraction is markedly compromised, however the quantification is not well accomplished on this study secondary to artifact.   Mildly hypokinetic right ventricular wall. No right ventricular dilatation.   Biatrial cardiac dilatation.   Mild mitral valve regurgitation.     Device Interrogation 2/3/24 shows BiV pacing 75% of time, still having regular episodes of  slow VT with unsuccessful ATP therapies, but below rate level for defibrillations    Outside results of note:  [facility] and [facility] were obtained, and relevant results/notes have been incorporated into HPI.    Assessment and Plan:     In summary, 58 year old male with a past medical history of CAD s/p PCI c/b ICM with chronic HFrEF s/p CRT-D, recurrent VT s/p prior ablations, PAF, HTN, HLD, DM Type II, and obesity who presents as new referral for HFrEF.    Chronic systolic heart failure/HFrEF (EF 25%) secondary to ischemic cardiomyopathy  NYHA Symptom Class III  Stage C  Primary Cardiologist: Dr. Ledesma  ACE-I/ARB/ARNi: Continue Entresto  mg BID  BB: Continue carvedilol 50 mg BID  Aldosterone antagonist: Continue spironolactone 50 mg daily  SGLT2i: Continue empagliflozin 10 mg daily  SCD prophylaxis: s/p CRT-D  %BiV pacin%  Fluid status: euvolemic by history on lasix 40 mg daily PRN  Cardiac Rehab: Previously completed  Sleep Apnea Evaluation: Previously referred  Remote PA Pressure Monitoring (CardioMems): Not currently indicated given only on diuretics PRN  - Given refractory VT despite prior ablations and on two antiarrhythmics, will start advanced therapy evaluation, discussed with patient overview of the testing and consults needed, requirements of  30-day caregiver support and staying local in the area for the first month after discharge from the hospital. Brief overview of the difference between LVAD and transplant, and typical wait-times for heart transplant  - Continue heart healthy diet, regular aerobic exercise and monitoring Na, fluid intake and weights regularly    VT  PAF  Has been having numerous episodes of slow VT requiring ATP therapy recently. Two prior VT ablations in  and .  - Will have patient meet with Dr. Wills when he comes down for his advanced therapy testing to ensure nothing else can be done for his rhythm issues  - Continue coreg  as above  - Continue sotalolol 180 mg BID and mexilitine 200 mg BID  - Continue rivaroxaban 20 mg daily    CAD  HLD  Had POBA in Dec '23 for in-stent thrombosis of prior D1 stent. Still having episodes of slow VT despite this. No recent angina.  - Continue ASA 81 mg daily  - Continue rosuvastatin 40 mg daily  - Continue coreg as above  - Has nitroglycerin PRN available    DM Type II  Last A1c 6.2  - Managed by PCP    HTN  Well controlled at home recently.  - Continue GDMT as above    Obesity  Body mass index is 36.26 kg/m .   - Recommend heart healthy diet and regular aerobic exercise as able    To Do:  - No change to medications today  - Start financial approval for advanced therapy evaluation, will coordinate EP referral and my follow up for when he comes to [facility] for all his evaluation testing (discussed the case with Dr. Wills)    The patient states understanding and is agreeable with plan.   Feel free to contact myself regarding questions or concerns. It was a pleasure to see this patient today.    A total of 60 minutes was spent on the day of the visit, which includes preparation for the visit (reviewing previous medical records, laboratories and investigations), in conjunction with the actual clinic visit with the patient, which includes obtaining a history and physical exam, creating and reviewing the care plan, patient education (and family if present), counseling, documenting clinical information in the electronic health record and care coordination.     Caity Felix MD   of Medicine, [facility]  Advanced Heart Failure and Transplant Cardiology       Suman Nagel         Please do not hesitate to contact me if you have any questions/concerns.     Sincerely,     Caity Felix MD

## 2024-02-07 NOTE — PATIENT INSTRUCTIONS
Cardiology Providers you saw during your visit:  Dr. Felix     Medication changes:  1- no changes        Follow up:  1- Start evaluation for advanced heart failure therapies  2- Referral to EP at [facility]       Please call if you have:  1. Weight gain of more than 2 pounds in a day or 5 pounds in a week  2. Increased shortness of breath, swelling or bloating  3. Dizziness, lightheadedness   4. Any questions or concerns.      Heart Failure Support Group  Support group is held virtually. Please reach out if you would like to attend and we can get you the information you need to log in.   2024 dates for support group meetings:    Monday, February 5th , 1-2pm     Monday, March 4th , 1-2pm     Monday, April 1st, 1-2pm     Monday, May 6th, 1-2pm     Monday, June 3rd, 1-2pm     Monday, July 1st, 1-2pm     Monday, August 5th, 1-2pm     Monday, September 9th, 1-2pm     Monday, October 7th, 1-2pm     Monday, November 4th, 1-2pm     Monday, December 2nd, 1-2pm     Follow the American Heart Association Diet and Lifestyle recommendations:  Limit saturated fat, trans fat, sodium, red meat, sweets and sugar-sweetened beverages. If you choose to eat red meat, compare labels and select the leanest cuts available.  Aim for at least 150 minutes of moderate physical activity or 75 minutes of vigorous physical activity - or an equal combination of both - each week.     During business hours: [phone], press option # 1 to schedule an appointment or send a message to your care team     After hours, weekends or holidays: On Call Cardiologist- [phone]   option #4 and ask to speak to the on-call Cardiologist. Inform them you are a CORE/heart failure patient at the [facility].     Rolanda Felder RN BSN  Cardiology Nurse Care Coordinator (Heart Failure / C.O.R.E.)

## 2024-02-07 NOTE — NURSING NOTE
Is the patient currently in the state of [place]? NO Pt is in [place] per pt    Visit mode:VIDEO    If the visit is dropped, the patient can be reconnected by: VIDEO VISIT: Text to cell phone:   Telephone Information:   Mobile [phone]       Will anyone else be joining the visit? NO  (If patient encounters technical issues they should call [phone]    How would you like to obtain your AVS? Mail a copy    Are changes needed to the allergy or medication list? No    Reason for visit: Consult    NO other vitals to report per pt    Janae CHONG

## 2024-02-08 ENCOUNTER — REFERRAL (OUTPATIENT)
Dept: TRANSPLANT | Facility: CLINIC | Age: 59
End: 2024-02-08

## 2024-02-08 VITALS — WEIGHT: 258 LBS | BODY MASS INDEX: 36.12 KG/M2 | HEIGHT: 71 IN

## 2024-02-08 NOTE — TELEPHONE ENCOUNTER
SOT HEART INTAKE    February 8, 2024    [facility]    Referring Provider: Caity Felix MD  Primary Care Provider: Suman Ledesma MD  Specialist: Cardiology  Source/Facility: [facility]  Diagnosis: CHF, VT    Critical History     Smoking/nicotine use history: No longer smokes. Quit in 2017.  Alcohol use history: None  Drug use history: None (tried marijuana a few times as a kid)  Cancer history: None  BMI: 35.98  Dialysis: no    BMI: 35.98    Patient states he has had 2 ablations, stenting and has a pacemaker done at [place]; 1 of his ablations was done at [facility].    Comments: Verbal permission given by patient to access medical records outside of [facility]-  Yes    Referral intake process completed.  Patient is aware that after financial approval is received, medical records will be requested.   Patient confirmed for a callback from transplant coordinator (within 1 week)      Confirmed coordinator will discuss evaluation process in more detail at the time of their call.   Patient is aware of the need to arrange age appropriate cancer screening, vaccinations, and dental care.  Reminded patient to complete questionnaire, complete medical records release, and review packet prior to evaluation visit .  Assessed patient for special needs (ie--wheelchair, assistance, guardian, and ):    Patient instructed to call [phone] with questions.

## 2024-02-08 NOTE — LETTER
2/8/24      Leo Russell  [address] [address]      Dear Leo,    Thank you for your interest in the Transplant Center at [facility]. We look forward to being a part of your care team and assisting you through the transplant process.    As we discussed, your transplant coordinator is Bushra Zacarias (Heart).  You may call her with questions or concerns at [phone].  Please complete the following.    Fill out and return the enclosed forms  Authorization for Electronic Communication  Authorization to Discuss Protected Health Information  Authorization for Release of Protected Health Information  Authorization for Care Everywhere Release of Information  Sign up for:  [employer] ID[employer], access to your electronic medical record (see enclosed pamphlet)  [email or web address], a transplant education website     My Transplant Place   You can use these tools to learn more about your transplant, communicate with your care team, and track your medical details  Sincerely,  Solid Organ Transplant  [facility]  cc: Care Team

## 2024-02-11 PROBLEM — E66.812 CLASS 2 SEVERE OBESITY DUE TO EXCESS CALORIES WITH SERIOUS COMORBIDITY IN ADULT (H): Status: ACTIVE | Noted: 2024-02-11

## 2024-02-11 PROBLEM — E66.01 CLASS 2 SEVERE OBESITY DUE TO EXCESS CALORIES WITH SERIOUS COMORBIDITY IN ADULT (H): Status: ACTIVE | Noted: 2024-02-11

## 2024-02-12 ENCOUNTER — CARE COORDINATION (OUTPATIENT)
Dept: CARDIOLOGY | Facility: CLINIC | Age: 59
End: 2024-02-12
Payer: MEDICARE

## 2024-02-12 NOTE — PROGRESS NOTES
Called Leo to provide contact information should he need to reach us. Gave him phone number. He stated understanding, has no questions at this time.

## 2024-02-16 ENCOUNTER — TELEPHONE (OUTPATIENT)
Dept: CARDIOLOGY | Facility: CLINIC | Age: 59
End: 2024-02-16
Payer: MEDICARE

## 2024-02-16 ENCOUNTER — LAB (OUTPATIENT)
Dept: LAB | Facility: CLINIC | Age: 59
End: 2024-02-16
Payer: MEDICARE

## 2024-02-16 DIAGNOSIS — E11.69 DIABETES MELLITUS TYPE 2 IN OBESE (CMS/HCC): ICD-10-CM

## 2024-02-16 DIAGNOSIS — I25.5 ISCHEMIC CARDIOMYOPATHY: ICD-10-CM

## 2024-02-16 DIAGNOSIS — I47.20 VT (VENTRICULAR TACHYCARDIA) (CMS/HCC): ICD-10-CM

## 2024-02-16 DIAGNOSIS — E66.9 DIABETES MELLITUS TYPE 2 IN OBESE (CMS/HCC): ICD-10-CM

## 2024-02-16 DIAGNOSIS — I50.42 CHRONIC COMBINED SYSTOLIC AND DIASTOLIC CHF, NYHA CLASS 2 AND ACC/AHA STAGE C (CMS/HCC): ICD-10-CM

## 2024-02-16 DIAGNOSIS — I47.20 SUSTAINED VENTRICULAR TACHYCARDIA (CMS/HCC): ICD-10-CM

## 2024-02-16 DIAGNOSIS — I10 PRIMARY HYPERTENSION: ICD-10-CM

## 2024-02-16 LAB
ALBUMIN SERPL-MCNC: 4.1 G/DL (ref 3.5–5.3)
ALP SERPL-CCNC: 76 U/L (ref 45–115)
ALT SERPL-CCNC: 36 U/L (ref 7–52)
ANION GAP SERPL CALC-SCNC: 10 MMOL/L (ref 3–11)
AST SERPL-CCNC: 22 U/L
BILIRUB SERPL-MCNC: 1.07 MG/DL (ref 0.2–1.4)
BNP SERPL-MCNC: 82 PG/ML (ref 0–100)
BUN SERPL-MCNC: 16 MG/DL (ref 7–25)
CALCIUM ALBUM COR SERPL-MCNC: 9.4 MG/DL (ref 8.6–10.3)
CALCIUM SERPL-MCNC: 9.5 MG/DL (ref 8.6–10.3)
CHLORIDE SERPL-SCNC: 104 MMOL/L (ref 98–107)
CO2 SERPL-SCNC: 23 MMOL/L (ref 21–32)
CREAT SERPL-MCNC: 1.3 MG/DL (ref 0.7–1.3)
EGFRCR SERPLBLD CKD-EPI 2021: 64 ML/MIN/1.73M*2
GLUCOSE SERPL-MCNC: 94 MG/DL (ref 70–105)
MAGNESIUM SERPL-MCNC: 2.1 MG/DL (ref 1.8–2.4)
POTASSIUM SERPL-SCNC: 4.7 MMOL/L (ref 3.5–5.1)
PROT SERPL-MCNC: 6.7 G/DL (ref 6–8.3)
SODIUM SERPL-SCNC: 137 MMOL/L (ref 135–145)

## 2024-02-16 PROCEDURE — 83880 ASSAY OF NATRIURETIC PEPTIDE: CPT

## 2024-02-16 PROCEDURE — 36415 COLL VENOUS BLD VENIPUNCTURE: CPT

## 2024-02-16 PROCEDURE — 80053 COMPREHEN METABOLIC PANEL: CPT

## 2024-02-16 PROCEDURE — 83735 ASSAY OF MAGNESIUM: CPT

## 2024-02-16 NOTE — TELEPHONE ENCOUNTER
Remote transmissions received on 2/13/24 and 2/15/24.     Presenting EGM 2/16/2024: AP/BiVP @ 60 bpm     2/13/24: Device detected 3 VT events with therapy. 2 events noted between 9585-0607 unsuccessfully with ATP x9. 1 event noted at 1133 successfully terminated with ATP x1.      2/15/2024: Device detected 5 VT events with unsuccessful therapy, ATP x10 during events. Events occurred in succession from onset at 1914, the last device detected event recorded at 1917. VT event did not terminate with therapy attempts. Termination unknown due to rate drop below detection zone.     Docket sent to Dr. Watters for review.     Encounter routed to Return Path for awareness.

## 2024-02-16 NOTE — TELEPHONE ENCOUNTER
Patient was called and informed that he needs lab work. I offered to fax to wherever he would like. He said he will come into [facility] to get them done in 30 mins.

## 2024-02-23 ENCOUNTER — TELEPHONE (OUTPATIENT)
Dept: CARDIOLOGY | Facility: CLINIC | Age: 59
End: 2024-02-23
Payer: MEDICARE

## 2024-02-23 NOTE — TELEPHONE ENCOUNTER
Remote transmission received.     Presenting EGM 2/23/2024: AP/BiVP @ ~63 bpm     Device detected 3 episodes of VT. EGMs appear to show Ventricular Tachycardia with unsuccessful therapy with ATP x16.     2/16/24: VT-1 with ATP x6. Duration 4 hours 6 min. Average V rate: 118.     2/21/24: VT-1 with ATP x5. Duration 1 hour 2 min.      2/22/24: VT-1 with ATP x5. Duration 8 min 28 sec     Docket sent to Dr. Watters for review.      Encounter routed to EP Nursing and APPs.

## 2024-02-26 NOTE — TELEPHONE ENCOUNTER
Patient was called and informed of device transmission.  Patient stated understanding and said he felt his usual symptoms during that time which is palpitations.  Patient denied having any new or worsening symptoms with his VT.  Patient said he has been talking to male regarding possible heart transplant versus VT ablation.  He said they have been going back and forth and he is working on getting funding.  He said he has a phone call appointment planned with them tomorrow and should know more later.  Patient was told to call with any updates and any new or worsening symptoms.

## 2024-03-12 ENCOUNTER — CARE COORDINATION (OUTPATIENT)
Dept: TRANSPLANT | Facility: CLINIC | Age: 59
End: 2024-03-12
Payer: MEDICARE

## 2024-03-12 DIAGNOSIS — I50.42 CHRONIC COMBINED SYSTOLIC AND DIASTOLIC CHF, NYHA CLASS 2 AND ACC/AHA STAGE C (H): Primary | ICD-10-CM

## 2024-03-12 DIAGNOSIS — E13.59 OTHER SPECIFIED DIABETES MELLITUS WITH OTHER CIRCULATORY COMPLICATIONS (H): ICD-10-CM

## 2024-03-12 DIAGNOSIS — R09.89 OTHER SPECIFIED SYMPTOMS AND SIGNS INVOLVING THE CIRCULATORY AND RESPIRATORY SYSTEMS: ICD-10-CM

## 2024-03-12 DIAGNOSIS — Z72.89 OTHER PROBLEMS RELATED TO LIFESTYLE: ICD-10-CM

## 2024-03-12 DIAGNOSIS — Z11.59 ENCOUNTER FOR SCREENING FOR OTHER VIRAL DISEASES: ICD-10-CM

## 2024-03-12 DIAGNOSIS — I47.29 OTHER VENTRICULAR TACHYCARDIA (H): ICD-10-CM

## 2024-03-12 DIAGNOSIS — Z01.810 ENCOUNTER FOR PREPROCEDURAL CARDIOVASCULAR EXAMINATION: ICD-10-CM

## 2024-03-12 NOTE — PROGRESS NOTES
Heart Transplant Intake Call  Date:  03/12/24    Pt referred for outpatient Heart Transplant evaluation. Called patient and introduced self as pre-heart transplant coordinator and discussed the evaluation process for transplant.    Discussed need for caregiver support during evaluation process, and post transplant and/or VAD implant. Pt's 30-day caregiver will be his wife. (VAD ONLY) Pt's long term caregiver support will be his wife     Discussed importance of avoiding nicotine, cannabis, illegal drugs, and using alcohol only minimally or none at all as decided upon between patient and advanced heart failure cardiologist. Discussed age and BMI requirements for transplant listing.    Patient was encouraged to review and sign the Heart Transplant Packet prior to the Heart Transplant Education class. Pt states he received the packet in the mail and sent the papers back, signed. They were encouraged to bring a caregiver to this appointments    Plan: Patient verbalized understanding and in agreement to move forward with evaluation. Was engaged and forthcoming with information. Asked appropriate questions in regard to this process. Patient denied any further transplant related questions and/or concerns.  Patient given the contact information to the transplant office and understands to contact coordinator with any further questions or concerns.

## 2024-03-18 ENCOUNTER — CARE COORDINATION (OUTPATIENT)
Dept: TRANSPLANT | Facility: CLINIC | Age: 59
End: 2024-03-18
Payer: MEDICARE

## 2024-03-18 DIAGNOSIS — I50.42 CHRONIC COMBINED SYSTOLIC AND DIASTOLIC CHF, NYHA CLASS 2 AND ACC/AHA STAGE C (H): Primary | ICD-10-CM

## 2024-03-18 RX ORDER — LIDOCAINE 40 MG/G
CREAM TOPICAL
Status: CANCELLED | OUTPATIENT
Start: 2024-03-18

## 2024-03-20 ENCOUNTER — CARE COORDINATION (OUTPATIENT)
Dept: TRANSPLANT | Facility: CLINIC | Age: 59
End: 2024-03-20
Payer: MEDICARE

## 2024-03-20 DIAGNOSIS — I50.42 CHRONIC COMBINED SYSTOLIC AND DIASTOLIC CHF, NYHA CLASS 2 AND ACC/AHA STAGE C (H): Primary | ICD-10-CM

## 2024-03-21 ENCOUNTER — OFFICE VISIT (OUTPATIENT)
Dept: CARDIOLOGY | Facility: CLINIC | Age: 59
End: 2024-03-21
Payer: MEDICARE

## 2024-03-21 ENCOUNTER — TELEPHONE (OUTPATIENT)
Dept: TRANSPLANT | Facility: CLINIC | Age: 59
End: 2024-03-21
Payer: MEDICARE

## 2024-03-21 ENCOUNTER — LAB (OUTPATIENT)
Dept: LAB | Facility: CLINIC | Age: 59
End: 2024-03-21
Payer: MEDICARE

## 2024-03-21 VITALS
DIASTOLIC BLOOD PRESSURE: 66 MMHG | WEIGHT: 268 LBS | HEIGHT: 71 IN | SYSTOLIC BLOOD PRESSURE: 120 MMHG | HEART RATE: 66 BPM | OXYGEN SATURATION: 96 % | BODY MASS INDEX: 37.52 KG/M2

## 2024-03-21 DIAGNOSIS — I50.22 CHRONIC SYSTOLIC HEART FAILURE (CMS/HCC): ICD-10-CM

## 2024-03-21 DIAGNOSIS — I25.5 ISCHEMIC CARDIOMYOPATHY: ICD-10-CM

## 2024-03-21 DIAGNOSIS — I48.0 PAROXYSMAL ATRIAL FIBRILLATION (CMS/HCC): Primary | ICD-10-CM

## 2024-03-21 DIAGNOSIS — Z95.810 AUTOMATIC IMPLANTABLE CARDIOVERTER-DEFIBRILLATOR IN SITU: ICD-10-CM

## 2024-03-21 DIAGNOSIS — I10 PRIMARY HYPERTENSION: ICD-10-CM

## 2024-03-21 DIAGNOSIS — Z86.79 HISTORY OF VENTRICULAR TACHYCARDIA: ICD-10-CM

## 2024-03-21 DIAGNOSIS — E78.2 MIXED HYPERLIPIDEMIA: ICD-10-CM

## 2024-03-21 LAB
ALBUMIN SERPL-MCNC: 4.4 G/DL (ref 3.5–5.3)
ALP SERPL-CCNC: 69 U/L (ref 45–115)
ALT SERPL-CCNC: 26 U/L (ref 7–52)
ANION GAP SERPL CALC-SCNC: 10 MMOL/L (ref 3–11)
AST SERPL-CCNC: 15 U/L
BILIRUB SERPL-MCNC: 0.89 MG/DL (ref 0.2–1.4)
BUN SERPL-MCNC: 12 MG/DL (ref 7–25)
CALCIUM ALBUM COR SERPL-MCNC: 9 MG/DL (ref 8.6–10.3)
CALCIUM SERPL-MCNC: 9.3 MG/DL (ref 8.6–10.3)
CHLORIDE SERPL-SCNC: 103 MMOL/L (ref 98–107)
CO2 SERPL-SCNC: 23 MMOL/L (ref 21–32)
CREAT SERPL-MCNC: 0.97 MG/DL (ref 0.7–1.3)
EGFRCR SERPLBLD CKD-EPI 2021: 91 ML/MIN/1.73M*2
GLUCOSE SERPL-MCNC: 97 MG/DL (ref 70–105)
NT-PROBNP SERPL-MCNC: 78 NG/L
POTASSIUM SERPL-SCNC: 4.2 MMOL/L (ref 3.5–5.1)
PROT SERPL-MCNC: 6.9 G/DL (ref 6–8.3)
SODIUM SERPL-SCNC: 136 MMOL/L (ref 135–145)

## 2024-03-21 PROCEDURE — G0463 HOSPITAL OUTPT CLINIC VISIT: HCPCS | Performed by: INTERNAL MEDICINE

## 2024-03-21 PROCEDURE — 83880 ASSAY OF NATRIURETIC PEPTIDE: CPT

## 2024-03-21 PROCEDURE — 36415 COLL VENOUS BLD VENIPUNCTURE: CPT

## 2024-03-21 PROCEDURE — G2211 COMPLEX E/M VISIT ADD ON: HCPCS | Performed by: INTERNAL MEDICINE

## 2024-03-21 PROCEDURE — 93005 ELECTROCARDIOGRAM TRACING: CPT | Performed by: INTERNAL MEDICINE

## 2024-03-21 PROCEDURE — 80053 COMPREHEN METABOLIC PANEL: CPT

## 2024-03-21 PROCEDURE — 99214 OFFICE O/P EST MOD 30 MIN: CPT | Performed by: INTERNAL MEDICINE

## 2024-03-21 ASSESSMENT — ENCOUNTER SYMPTOMS
FEVER: 0
DOUBLE VISION: 0
SHORTNESS OF BREATH: 0
BRUISES/BLEEDS EASILY: 0
HALLUCINATIONS: 0
DYSURIA: 0
NAUSEA: 0
NEAR-SYNCOPE: 0
LIGHT-HEADEDNESS: 0
WEIGHT GAIN: 1
VOMITING: 0
ORTHOPNEA: 0
DIZZINESS: 0
DIARRHEA: 0
DECREASED APPETITE: 0
NERVOUS/ANXIOUS: 0
PALPITATIONS: 0
FALLS: 0
IRREGULAR HEARTBEAT: 0

## 2024-03-21 NOTE — PATIENT INSTRUCTIONS
I advised the patient to continue with the same dose of medications as prescribed  We will do blood work today  I counseled the patient to try to lose weight  Less than 2 g salt restricted diet and less than 2 L fluid restriction.  Counseled patient to continue weighing himself every day and maintaining a log, he/she should contact the clinic if he gains more than 3 to 4 pounds over the course of 1 to 2 days or 5 pounds in a week.  Check your blood pressure ideally twice a day and write a log.  Keep moving and activity as tolerated  Please call us with any question or concerns [phone]

## 2024-03-21 NOTE — PROGRESS NOTES
CHF Clinic PROGRESS NOTE    REFERRING PHYSICIAN:  No ref. provider found     CHIEF COMPLAINT:  Chronic heart failure.    HPI:  Pete Trejo is a 58 y.o. male who was seen for a follow up visit in the Heart Failure clinic at [facility]. My final recommendations will be communicated back to the referring physician by way of shared medical records or letter via US mail.    PERTINENT CARDIAC HISTORY:   has a past medical history of BPH (benign prostatic hyperplasia), CAD (coronary artery disease), CHF (congestive heart failure) (CMS/[facility]), Chronic combined systolic and diastolic CHF (congestive heart failure) (CMS/[facility]), CVA (cerebral vascular accident) (CMS/[facility]) (2010), Diabetes mellitus type 2 in obese (CMS/[facility]), Dyslipidemia, GERD (gastroesophageal reflux disease), Heart attack (CMS/[facility]), Hypertension, Ischemic cardiomyopathy, Morbid obesity with BMI of 40.0-44.9, adult (CMS/[facility]), Paroxysmal atrial fibrillation (CMS/[facility]), and V-tach (CMS/[facility]).     Pete denies any ED visits or hospitalizations for acute-on-chronic heart failure since his last visit with our office. He states that his breathing ability and energy level, in general, are better since he was seen by us. Overall, his energy level is has improved. He can walk without noticing shortness of breath or tiredness. He does not exercise regularly.     The patient presents today for a follow-up visit. He states that he has been doing better since the last visit. He states that he has been trying to control his diet but he has not been able to lose weight.  He already has his appointment with [facility], approved for transplant workup and he will be seeing them next month.      Orthopnea:  no  PND: no  Dyspnea with bathing, dressing or grooming himself:  no  Anorexia:  no  Early satiety: no  Nausea/vomiting:  no  Abdominal bloating: no  Leg edema: no  Weight gain: yes  Home weight range: Positive weight gain  Lightheadedness, palpitations, near-syncope  or syncope: no  ICD shocks: no  Compliant with medications: yes  Adverse effects from any medication: no  Compliant with sodium and fluid diet restriction: yes    Medication reconciliation completed and documented in the EMR.    PAST MEDICAL HISTORY:    Past Medical History:   Diagnosis Date    BPH (benign prostatic hyperplasia)     CAD (coronary artery disease)     Status post stent    CHF (congestive heart failure) (CMS/[facility])     Chronic combined systolic and diastolic CHF (congestive heart failure) (CMS/[facility])     LVEF 25% with grade 1 diastolic dysfunction as per echo on 1/26/2022.    CVA (cerebral vascular accident) (CMS/[facility]) 2010    Without residual deficit.    Diabetes mellitus type 2 in obese (CMS/[facility])     Dyslipidemia     GERD (gastroesophageal reflux disease)     Heart attack (CMS/[facility])     x3    Hypertension     Ischemic cardiomyopathy     Morbid obesity with BMI of 40.0-44.9, adult (CMS/HCC)     Paroxysmal atrial fibrillation (CMS/HCC)     On Xarelto    V-tach (CMS/HCC)     AICD in place       PAST SURGICAL HISTORY:    Past Surgical History:   Procedure Laterality Date    ABLATION VT N/A 06/09/2020    Procedure: Ablation VT;  Surgeon: Wilfredo Montana MD;  Location: [facility] EP Lab;  Service: Electrophysiology;  Laterality: N/A;  Check with Dr. Montana in the a.m. regarding scheduling    APPENDECTOMY      BILATERAL HEART CATH N/A 12/5/2023    Procedure: Bilateral heart cath;  Surgeon: Jitendra Post MD;  Location: [facility] Cath Lab;  Service: Cardiovascular;  Laterality: N/A;    COLONOSCOPY N/A 7/25/2023    Procedure: COLONOSCOPY with Polypectomy;  Surgeon: Hortencia Carson MD;  Location: [facility] Endoscopy;  Service: Endoscopy;  Laterality: N/A;    CORONARY ARTERY STENTING  2009    2.5 X 24-mm Taxus DIMAS to first diagonal. 3.0 X 8-mm Taxus stent to proximal LAD just proximal to diagonal.    CORONARY ARTERY STENTING  2010    3.5 X 15-mm Promus DIMAS to totally occluded LAD    CORONARY ARTERY STENTING  2011    2.25 X 30-mm  Resolute DIMAS to 2nd diagonal.  Balloon angioplasty through strut to improve blood flow to distal LAD    CORONARY ARTERY STENTING  07/28/2017    second diagonal branch LAD Resolute 2.25 x 30-mm DIMAS    ICD DC GEN CHANGE N/A 6/20/2022    Procedure: BI-V ICD Gen Change;  Surgeon: Wilfredo Montana MD;  Location: [facility] EP Lab;  Service: Cardiovascular;  Laterality: N/A;    ICD SC NEW  2011    [employer] Cognis Model #N119, Serial #[id number]. [employer] Reliance Model #0185, Serial #[id number]. LV lead Acuity Model #45+91, Serial #[id number]. Atrial lead Model #4469, Serial #[id number]    OXIMETRY RUN N/A 12/5/2023    Procedure: OXIMETRY RUN;  Surgeon: Jitendra Post MD;  Location: [facility] Cath Lab;  Service: Cardiovascular;  Laterality: N/A;       FAMILY HISTORY:    Family History   Problem Relation Age of Onset    Heart attack Mother 62    Other Mother         Abdominal Aortic Aneurysm    Lung cancer Mother     Colon cancer Father         Cause of death    Diabetes Brother         Non-Insulin Dependent    Heart attack Brother 51        w/ Stents    Heart disease Brother     Other Son         Well       SOCIAL HISTORY:    Social History     Socioeconomic History    Marital status: Single   Tobacco Use    Smoking status: Former     Packs/day: 0.75     Years: 30.00     Additional pack years: 0.00     Total pack years: 22.50     Types: Cigarettes     Quit date: 6/7/2020     Years since quitting: 3.7    Smokeless tobacco: Never   Vaping Use    Vaping Use: Never used   Substance and Sexual Activity    Alcohol use: Not Currently     Comment: Quit drinking in 2007    Drug use: Not Currently    Sexual activity: Yes     Partners: Female     Social Determinants of Health     Tobacco Use: Medium Risk (3/21/2024)    Patient History     Smoking Tobacco Use: Former     Smokeless Tobacco Use: Never   Financial Resource Strain: Unknown (7/8/2020)    Overall Financial Resource Strain (CARDIA)     Difficulty of Paying Living Expenses: Patient declined    Food Insecurity: No Food Insecurity (11/30/2023)    Hunger Vital Sign     Worried About Running Out of Food in the Last Year: Never true     Ran Out of Food in the Last Year: Never true   Transportation Needs: No Transportation Needs (11/30/2023)    PRAPARE - Transportation     Lack of Transportation (Medical): No     Lack of Transportation (Non-Medical): No   Housing Stability: Low Risk  (11/30/2023)    Housing Stability Vital Sign     Unable to Pay for Housing in the Last Year: No     Number of Places Lived in the Last Year: 1     Unstable Housing in the Last Year: No   Utilities: Not At Risk (11/30/2023)    [facility] Utilities     Threatened with loss of utilities: No        MEDICATIONS:   Current Outpatient Medications   Medication Sig Dispense Refill    mexiletine (MEXITIL) 250 mg capsule Take 1 capsule (250 mg total) by mouth every 8 (eight) hours 90 capsule 11    ALPRAZolam (Xanax) 1 mg tablet Take 1 tablet (1 mg total) by mouth once for 1 dose Take 30 - 60 minutes before MRI Max Daily Amount: 1 mg 1 tablet 0    carvediloL (Coreg) 25 mg tablet Take 2 tablets (50 mg total) by mouth 2 (two) times a day with meals 180 tablet 3    magnesium oxide (MAG-OX) 400 mg (241.3 mg magnesium) tablet Take 1 tablet (400 mg total) by mouth 2 (two) times a day 180 tablet 3    sacubitriL-valsartan (ENTRESTO)  mg tablet Take 1 tablet by mouth 2 (two) times a day 60 tablet 1    sotaloL (BETAPACE) 120 mg tablet Take 1.5 tablets (180 mg total) by mouth every 12 (twelve) hours 270 tablet 2    spironolactone (ALDACTONE) 25 mg tablet Take 2 tablets (50 mg total) by mouth daily      rivaroxaban (Xarelto) 20 mg tablet Take 1 tablet (20 mg total) by mouth daily 90 tablet 3    aspirin 81 mg EC tablet Take 1 tablet (81 mg total) by mouth daily 90 tablet 3    potassium chloride 10 mEq CR tablet Take 3 tablets (30 mEq total) by mouth daily 270 tablet 0    empagliflozin (JARDIANCE) 10 mg tablet tablet Take 10 mg by mouth daily 30 each 11     "tamsulosin (FLOMAX) 0.4 mg capsule Take 1 capsule (0.4 mg total) by mouth daily      furosemide (LASIX) 40 mg tablet Take 1 tablet (40 mg total) by mouth daily If weight goes up 3 pounds overnight take an extra dose that day.      rosuvastatin (Crestor) 40 mg tablet Take 1 tablet (40 mg total) by mouth nightly 90 tablet 0    cholecalciferol, vitamin D3, 25 mcg (1,000 unit) tablet Take 1 tablet (1,000 Units total) by mouth daily      albuterol HFA (PROVENTIL HFA;VENTOLIN HFA) 90 mcg/actuation inhaler Inhale 2 puffs every 6 (six) hours as needed for wheezing or shortness of breath 1 Inhaler 1    nitroglycerin (NITROSTAT) 0.4 mg SL tablet Place 1 tablet (0.4 mg total) under the tongue every 5 (five) minutes as needed for chest pain      pantoprazole (PROTONIX) 40 mg EC tablet Take 1 tablet (40 mg total) by mouth daily       No current facility-administered medications for this visit.        ALLERGIES:   Allergies   Allergen Reactions    Morphine      ANXIETY    Tramadol      ANXIETY        REVIEW OF SYSTEMS:  Review of Systems   Constitutional: Positive for weight gain. Negative for decreased appetite and fever.   HENT:  Negative for congestion.    Eyes:  Negative for double vision.   Cardiovascular:  Negative for chest pain, dyspnea on exertion, irregular heartbeat, leg swelling/pain, near-syncope, orthopnea and palpitations.   Respiratory:  Negative for shortness of breath and sleep apnea.    Hematologic/Lymphatic: Does not bruise/bleed easily.   Musculoskeletal:  Negative for falls.   Gastrointestinal:  Negative for diarrhea, nausea and vomiting.   Genitourinary:  Negative for dysuria.   Neurological:  Negative for dizziness and light-headedness.   Psychiatric/Behavioral:  Negative for hallucinations. The patient is not nervous/anxious.         PHYSICAL EXAMINATION:      /66 (Patient Position: Sitting)   Pulse 66   Ht 1.803 m (5' 11\")   Wt 121.6 kg (268 lb)   SpO2 96%   BMI 37.38 kg/m²     Physical " Exam  Constitutional:       General: He is not in acute distress.     Appearance: Normal appearance. He is not ill-appearing.   HENT:      Head: Normocephalic and atraumatic.      Nose: Nose normal.      Mouth/Throat:      Mouth: Mucous membranes are moist.   Eyes:      General: No scleral icterus.  Cardiovascular:      Rate and Rhythm: Normal rate and regular rhythm.      Pulses: Normal pulses.      Heart sounds: No murmur heard.  Pulmonary:      Effort: Pulmonary effort is normal. No respiratory distress.      Breath sounds: Normal breath sounds. No wheezing or rales.   Chest:      Chest wall: No tenderness.   Abdominal:      General: Abdomen is flat. Bowel sounds are normal.      Palpations: Abdomen is soft.   Musculoskeletal:         General: No swelling or tenderness. Normal range of motion.      Right lower leg: No edema.      Left lower leg: No edema.   Skin:     General: Skin is warm.      Capillary Refill: Capillary refill takes less than 2 seconds.   Neurological:      General: No focal deficit present.      Mental Status: He is alert and oriented to person, place, and time.      Motor: No weakness.      Gait: Gait normal.   Psychiatric:         Mood and Affect: Mood normal.        Data Review:   Sodium   Date Value Ref Range Status   03/21/2024 136 135 - 145 mmol/L Final     Potassium   Date Value Ref Range Status   03/21/2024 4.2 3.5 - 5.1 MMOL/L Final     Chloride   Date Value Ref Range Status   03/21/2024 103 98 - 107 mmol/L Final     CO2   Date Value Ref Range Status   03/21/2024 23 21 - 32 mmol/L Final     BUN   Date Value Ref Range Status   03/21/2024 12 7 - 25 mg/dL Final     Creatinine   Date Value Ref Range Status   03/21/2024 0.97 0.70 - 1.30 mg/dL Final     Glucose   Date Value Ref Range Status   03/21/2024 97 70 - 105 mg/dL Final     Calcium   Date Value Ref Range Status   03/21/2024 9.3 8.6 - 10.3 mg/dL Final     Total CK   Date Value Ref Range Status   03/09/2015 83 39 - 308 IU/L       Troponin I   Date Value Ref Range Status   07/28/2017 <0.030 0.000 - 0.030 ng/mL      BNP   Date Value Ref Range Status   02/16/2024 82 0 - 100 pg/mL Final     Lipids:    Lab Results   Component Value Date    CHOL 106 07/28/2022    CHOL 119 07/11/2021    CHOL 127 06/08/2020     Lab Results   Component Value Date    HDL 25 (L) 07/28/2022    HDL 35 (L) 07/11/2021    HDL 29 (L) 06/08/2020     Lab Results   Component Value Date    LDLCALC 50 07/28/2022    LDLCALC 60 07/11/2021    LDLCALC 76 06/08/2020     Lab Results   Component Value Date    TRIG 154 (H) 07/28/2022    TRIG 120 07/11/2021    TRIG 112 06/08/2020        TSH:   Lab Results   Component Value Date    TSH 2.833 10/19/2023     Magnesium:   Lab Results   Component Value Date    MG 2.1 02/16/2024     Protime-INR:   Lab Results   Component Value Date    PT 22.7 (H) 10/06/2021    INR 1.9 (H) 10/06/2021     A1c:   Lab Results   Component Value Date    HGBA1C 6.2 (H) 09/22/2023       Reviewed and interpreted by me:      Electrocardiogram (03/21/2024)    A-V dual-paced rhythm with some inhibition  QRSD: 122  QRS: 151  QT: 467  HR: 60    Echocardiogram:  Results for orders placed during the hospital encounter of 10/18/23    US Echo complete (10/19/2023):    Interpretation Summary    Normal left ventricular wall thickness.    Biplane ejection fraction is 25%.    Severe left ventricular systolic dysfunction.  Large anteroapical aneurysm.  Only the basilar left ventricular segments have preserved contractility.  No LV mural thrombi.    Grade II (moderate) left ventricular diastolic abnormality.    Elevated left ventricular filling pressure.    The left atrium is severely dilated.    The right atrium is moderately dilated.    Normal central venous pressure (0-5 mmHg).    No pericardial effusion.    Inadequate TR spectral Doppler to accurately assess right ventricular systolic pressure.    Comment: No obvious changes from the January 2022 echo.       Stress Test:    NM  Lexiscan cardiolite complete (07/11/2021)     Interpretation Summary  ·Technically difficult study to perform.  Gated images could not able to obtain hence cannot comment on left ventricle systolic function and contractility.  ·There is a large size, severe intensity fixed perfusion defect in apex, apical inferior, apical anterior to mid anterior, apical septum and apical lateral.  There is no reversible ischemia.  Unable to obtain gated images because left ventricle is severely dilated.  Cannot comment the left ventricle systolic function and wall motion.       Cardiac Catheterization (12/05/2023):    OXIMETRY RUN     Left Main: Left main coronary artery is normal.  No significant disease     Left Anterior Descending: The left anterior descending arises from the left main and courses along the anterior interventricular groove.  The proximal LAD stent is patent.  The remainder the LAD is patent although quite small and threadlike.  Flow is SAMANTA grade III.  The first diagonal branch is a large vessel that bifurcates into a superior and inferior branch.  There is a stent that extends from the ostium of the vessel into the superior branch.  A stent also extends into the inferior branch.  This is a bifurcation type of stent arrangement.  50% stenosis is noted at the ostium of the first diagonal.  The remainder of the proximal portion is patent.  The superior branch, including the stent is widely patent with normal flow.  The inferior branch demonstrates severe in-stent restenosis in the proximal segment, 85-90% with SAMANTA grade I distal flow.  The remaining diagonal branches are patent     Left Circumflex: The left circumflex is a moderate-sized artery.  The proximal vessel is normal.  Mild irregularities are seen in the midsegment.  The distal vessel is patent.  The first obtuse marginal is patent without significant disease.  There is a moderate-sized second obtuse marginal which is widely patent.     Right Coronary  Artery: Right coronary artery is a large, dominant artery.  Mild irregularities are seen in the proximal and mid segment.  Mild irregularities are noted distally.  The PDA and KRISTEN branches are widely patent.  This is similar in appearance to the patient's previous angiogram     Left Heart Catheterization:  Left ventricular end-diastolic pressure measures 8 mmHg.  There is no gradient across the aortic valve on catheter pullback     Right Heart Catheterization:    PA Pressure: 24/10 mmHg, mean 15 mmHg (PA saturation 65%)    PCWP: 8 mmHg    RV Pressure: 31/1 mmHg    RVEDP: 10 mmHg    RA Pressure: 5 mmHg    Cardiac Output: 5.2 L/min    Cardiac Index: 2.1 L/min/m surface squared     Conclusions:  1.  Normal right heart pressures  2.  Pulmonary capillary wedge pressure 8 mmHg  3.  Patent proximal LAD stent  4.  50% in-stent restenosis ostial first diagonal with patent superior branch stent.  The inferior branch of the first diagonal has severe 80-90% in-stent restenosis.  Status post 2.5 mm Angiosculpt scoring balloon angioplasty at the ostium and 2.5 x 15 mm trek neononcompliant balloon angioplasty in the in-stent restenotic segment  5.  Mild irregularities right coronary artery and left circumflex coronary artery.  Similar to previous        DEVICE CHECK - REMOTE (12/11/2023):     Impressions:  Non-sustained Ventricular Tachycardia  -Stored EGMs are consistent with or suggestive of Non-sustained VT  -Total episodes: 2  -Longest episode shows NSVT wandering in and out of detection at ~110 bpm and lasting at  least 16 seconds with no offset seen; episode appears to be treated and resolved a  consecutive ATP episode.     Appropriate VT Therapy: Successful  Stored EGMs are consistent with or suggestive of Ventricular Tachycardia  Total episodes: 8  Number of ATP therapy: 8  Number of shocks delivered: 0     Additional Notes:  EGMs shows VT at V rates of 110-116 bpm, each resolved with 1 burst of ATP. No HV  therapy  delivered.     Remote Check:  Device Interrogation Reviewed by technical staff  Battery: Battery is at 100%, 8.00 yrs  Sensing, impedance and thresholds reviewed  Programmed parameters reviewed  Presenting rhythm reviewed: AP/BP at 60 bpm with occasional, isolated and couplet PVCs  Heart Rate Histograms reviewed     Additional Notes:  Device functioning properly as programmed.     Suboptimal Bi-V Pacing:  Suboptimal Biventricular Pacing (<90%) confirmed  Percentage of time Bi-V paced: 88%        Device check - hospital (12/01/2023):    Impressions:  Hospital Check:  -Patient was seen in hospital  -Reason: Check for new VT since started on Medicine  -Presenting rhythm AP/BiV paced  -Heart Rate Histograms reviewed  -Device Function and programmed parameters reviewed     Additional Notes:  No new episodes since 11/29/23 @ 03:12     Appropriate VT Therapy: Successful  -Stored EGMs are consistent with or suggestive of Ventricular -Tachycardia  -Total episodes: 44  -Number of ATP therapy: 44  -Number of shocks delivered: VT pace terminated or slowed down w/ ATP     Non-sustained Ventricular Tachycardia:  -Stored EGMs are consistent with or suggestive of Non-sustained VT  -Total episodes: 4 NSVT under rate cut off for VT1         CT chest lung cancer low dose screening (10/26/2023):    FINDINGS:  Chest CT:  Lung Cancer Screening: 3 mm pulmonary nodule in the right upper lobe (series 3, image 197). There are no groundglass or mixed density nodules.There are no visible airway nodules. There are no suspicious atypical pulmonary cysts. Focal scarring in the medial segment of the right middle lobe. Mild scarring in the lingula. Small bilateral calcified granulomas, compatible with antecedent granulomatous disease.     Airways and Lungs: Trachea and large airways are patent.  No focal pulmonary consolidation. No pleural effusion. No pneumothorax. There is no bronchiectasis.      Mediastinum: Cardiomegaly. No pericardial  effusion. No mediastinal mass. Thoracic aorta is normal in caliber. No evidence of aneurysm. Conventional three-vessel branching anatomy of the great vessels from the aortic arch. Coronary artery stents. Otherwise mild coronary calcifications.     Other: Few small scattered mediastinal lymph nodes, none pathologically enlarged. No hilar or axillary lymphadenopathy. Normal thyroid gland. Left chest AICD with leads in the right atrium, right ventricle, and coronary sinus.     Limited upper abdomen:  The visualized upper abdomen is unremarkable.     Bones:  T12 (L1 not included on imaging): Bone density (HU) 131 HU, within normal limits.  No significant bone abnormalities are seen.      IMPRESSION:  1.  3 mm solid nodule in the right upper lobe. This is considered benign by Lung-RADS criteria. Lung-RADS 2.  2.  Cardiomegaly      XR chest (11/30/2023):     IMPRESSION:  1.  No acute abnormalities.       Available previous notes ([facility] and outside) reviewed.  Available EKG, echocardiogram and cardiac catheterization reports and images independently reviewed.     Assessment/Plan   Presents for Chronic systolic heart failure. EF 25%.  NYHA Functional Class:  III  Stage:  C heart failure  TSH (10/19/23): 2.833  TSAT (10/20/23): 25  Ferritin  (10/20/23): 34.5  Hgb (12/06/23): 16.3  NT-Pro BNP (03/21/24): 78  Ischemic cardiomyopathy  Presence of stent in coronary artery   History of VT (ventricular tachycardia): Currently on sotalol and high dose of mexiletine due to several episode of nonsustained VT terminated by ATP and shocks.  I believe his VT is most likely secondary to scar  BiV ICD  Hypokalemia/hypomagnesemia  Hypertension   Sleep apnea  Diabetes, type 2  BMI 36.54     Plan  The patient presents today for a follow-up visit. He states that he has been doing better since the last visit. He states that he has been trying to control his diet but he has not been able to lose weight. At the last visit, I had a long  discussion with patient's about the future and his advanced heart failure.  Due to his incessant and recurrent VT he is only will be a candidate for transplantation not an LVAD candidate.  I spoken previously with Dr. Domingo from [facility] [place] for consideration of high risk repeat VT ablation and cardiac consultation workup as a backup.  He already saw Dr. Casi Samuel at  and he was approved for advanced therapies evaluation and his first appointment will be next month.  He underwent cardiac MRI showing a high burden of scar.  No history of cancer. He is up to date with his colonoscopy. He used to be a smoker but now he quit smoking.  He does have a small nodule found on his CT scan for lung screening, will need to follow-up on recent 12-month. He denies having any prostate issues. He denies having any blood transfusions in the past.  It does appear that everything is stable, he does have multiple episode of ATP but no more ICD shocks recently.  I agree to complete his evaluation as an outpatient.  I did talk about the importance of keeping a low BMI.  He is not doing much activities because he feels that that triggers his VT.  Today, I advised the patient to continue with the same dose of medications as prescribed. We will do blood work today. I counseled the patient to try to lose weight.      Problem List Items Addressed This Visit          Cardiac and Vasculature    Ischemic cardiomyopathy    Hypertension    Hyperlipidemia    Automatic implantable cardioverter-defibrillator in situ    History of ventricular tachycardia    Paroxysmal atrial fibrillation (CMS/HCC) - Primary    Relevant Orders    ECG 12 lead -Normal, Today (Completed)     Other Visit Diagnoses       Chronic systolic heart failure (CMS/HCC)        Relevant Orders    Comprehensive metabolic panel Blood, Venous (Completed)    Pro B-Type Natriuretic Peptide Blood, Venous (Completed)             Plan and patient instructions:     I advised  the patient to continue with the same dose of medications as prescribed  We will do blood work today  I counseled the patient to try to lose weight  Less than 2 g salt restricted diet and less than 2 L fluid restriction.  Counseled patient to continue weighing himself every day and maintaining a log, he/she should contact the clinic if he gains more than 3 to 4 pounds over the course of 1 to 2 days or 5 pounds in a week.  Check your blood pressure ideally twice a day and write a log.  Keep moving and activity as tolerated  Please call us with any question or concerns [phone]        COUNSELING:   We discussed the following non-pharmacological measures during this visit:  Smoking and alcohol abstinence/cessation, if applicable.  Dietary and medication compliance (in particular, salt restriction)  Monitoring daily weights and blood pressures  Exercise regimen (walking)    Heart Failure Education Booklet:  Given previously  I spent 40 minutes in this visit, with more than 50% of the time devoted to patient counseling.    Follow-up appointment with in 2-3 months or sooner if needed.       A voice recognition program was used to aid in medical record documentation. Sometimes words are printed not exactly as they were spoken. While efforts were made to carefully edit and correct any inaccuracies, some errors may be present. Errors should be taken within the context of the discussion.  Please contact our office if you need assistance interpreting this medical record or notice any mistakes.       Carl Leiva MD  3/22/2024  1:44 PM

## 2024-03-21 NOTE — TELEPHONE ENCOUNTER
General  Route to LPN    Reason for call: Leo went to his dentist this morning and they never received any orders there. [facility] fax number is [phone]    Call back needed? Yes    Return Call Needed  Same as documented in contacts section  When to return call?: Same day: Route [facility]

## 2024-03-22 DIAGNOSIS — Z95.810 IMPLANTABLE CARDIOVERTER-DEFIBRILLATOR (ICD) IN SITU: Primary | ICD-10-CM

## 2024-03-28 PROCEDURE — 93295 DEV INTERROG REMOTE 1/2/MLT: CPT | Performed by: INTERNAL MEDICINE

## 2024-04-02 NOTE — TELEPHONE ENCOUNTER
RECORDS RECEIVED FROM:    DATE RECEIVED:    NOTES STATUS DETAILS   OFFICE NOTE from referring provider  Internal 03/13/24 - Elvira Carolina MD [facility]    OFFICE NOTE from other cardiologists  Internal 02/07/24 - Camelia Carolina MD [facility]    RECORDS from hospital/ED N/A    MEDICATION LIST Internal    GENERAL CARDIO RECORDS   (ALL APPOINTMENT TYPES)     LABS (CBC,BMP,CMP, TSH) Care Everywhere 12/06/23 [facility]    EKG (STRIPS & REPORTS) Internal 12/07/23   MONITORS (STRIPS & REPORTS) Care Everywhere 03/22/24 - [facility]   03/13/24 - [facility]    ECHOS (IMAGES AND REPORTS) Care Everywhere 10/19/23 - [facility]    STRESS TESTS (IMAGES AND REPORTS) In process 03/20/24   Cardiac cath (IMAGES AND REPORTS) In process 04/26/24 12/05/23 - [facility]   NEW EP     ICD/PACEMAKER IMPLANT Yes    CARDIOVERSION N/A    TILT TABLE STUDIES N/A      Action 04/02/24   Requested images from [facility]   Fax # [phone]   Action Taken Cardiac Cath (Images and Reports) -12/05/23  Echo (Images and reports) - 10/19/23     Images resolved in PACS 04/09/24

## 2024-04-05 ENCOUNTER — DOCUMENTATION ONLY (OUTPATIENT)
Dept: TRANSPLANT | Facility: CLINIC | Age: 59
End: 2024-04-05
Payer: MEDICARE

## 2024-04-10 PROCEDURE — 93010 ELECTROCARDIOGRAM REPORT: CPT | Performed by: INTERNAL MEDICINE

## 2024-04-11 ENCOUNTER — TELEPHONE (OUTPATIENT)
Dept: TRANSPLANT | Facility: CLINIC | Age: 59
End: 2024-04-11
Payer: MEDICARE

## 2024-04-11 NOTE — TELEPHONE ENCOUNTER
General  Route to [facility]    Reason for call: Jacquie called on behalf of her father in law, Leo. She was calling to see if there is any accomodation that we can provide or recommendations for hotels, etc.     Call back needed? Yes    Return Call Needed  Same as documented in contacts section  When to return call?: Same day: Route [facility]

## 2024-04-15 ENCOUNTER — TELEPHONE (OUTPATIENT)
Dept: TRANSPLANT | Facility: CLINIC | Age: 59
End: 2024-04-15
Payer: MEDICARE

## 2024-04-15 ENCOUNTER — TELEPHONE (OUTPATIENT)
Dept: CARE COORDINATION | Facility: CLINIC | Age: 59
End: 2024-04-15
Payer: MEDICARE

## 2024-04-15 NOTE — PROGRESS NOTES
Pre-Transplant/LVAD Social Work Services Progress Note    Received call from pt's dtr, Elvira, inquiring about temporary relocation costs. Writer had spoken to pt on Friday, but Elvira stated pt didn't know what to ask. Explained to Elvira that we are unable to pay for accommodations for eval because we will need to access available funds after transplant or LVAD. Explained that pt will need to temporarily relocate to the [facility] for eval and would have some funds to offset those costs. Inquired about getting assistance through the [place] and that writer can write a letter. Elvira reports this could be an option for pt. Writer wrote a letter and also attached pt's upcoming clinic schedule and emailed it to [email or web address]. Elvira's phone number is [phone].

## 2024-04-21 DIAGNOSIS — Z86.79 HISTORY OF VENTRICULAR TACHYCARDIA: Primary | ICD-10-CM

## 2024-04-22 ENCOUNTER — ANCILLARY PROCEDURE (OUTPATIENT)
Dept: CT IMAGING | Facility: CLINIC | Age: 59
End: 2024-04-22
Attending: STUDENT IN AN ORGANIZED HEALTH CARE EDUCATION/TRAINING PROGRAM
Payer: MEDICARE

## 2024-04-22 ENCOUNTER — OFFICE VISIT (OUTPATIENT)
Dept: CARDIOLOGY | Facility: CLINIC | Age: 59
End: 2024-04-22
Attending: STUDENT IN AN ORGANIZED HEALTH CARE EDUCATION/TRAINING PROGRAM
Payer: MEDICARE

## 2024-04-22 ENCOUNTER — HOSPITAL ENCOUNTER (INPATIENT)
Dept: CARDIOLOGY | Facility: CLINIC | Age: 59
Discharge: HOME | End: 2024-04-22
Attending: NURSE PRACTITIONER
Payer: MEDICARE

## 2024-04-22 ENCOUNTER — ANCILLARY PROCEDURE (OUTPATIENT)
Dept: ULTRASOUND IMAGING | Facility: CLINIC | Age: 59
End: 2024-04-22
Attending: STUDENT IN AN ORGANIZED HEALTH CARE EDUCATION/TRAINING PROGRAM
Payer: MEDICARE

## 2024-04-22 ENCOUNTER — ANCILLARY PROCEDURE (OUTPATIENT)
Dept: GENERAL RADIOLOGY | Facility: CLINIC | Age: 59
End: 2024-04-22
Attending: STUDENT IN AN ORGANIZED HEALTH CARE EDUCATION/TRAINING PROGRAM
Payer: MEDICARE

## 2024-04-22 VITALS — HEART RATE: 60 BPM | SYSTOLIC BLOOD PRESSURE: 109 MMHG | OXYGEN SATURATION: 94 % | DIASTOLIC BLOOD PRESSURE: 69 MMHG

## 2024-04-22 DIAGNOSIS — Z01.810 ENCOUNTER FOR PREPROCEDURAL CARDIOVASCULAR EXAMINATION: ICD-10-CM

## 2024-04-22 DIAGNOSIS — Z11.59 ENCOUNTER FOR SCREENING FOR OTHER VIRAL DISEASES: ICD-10-CM

## 2024-04-22 DIAGNOSIS — R09.89 OTHER SPECIFIED SYMPTOMS AND SIGNS INVOLVING THE CIRCULATORY AND RESPIRATORY SYSTEMS: ICD-10-CM

## 2024-04-22 DIAGNOSIS — I50.42 CHRONIC COMBINED SYSTOLIC AND DIASTOLIC CHF, NYHA CLASS 2 AND ACC/AHA STAGE C (H): ICD-10-CM

## 2024-04-22 DIAGNOSIS — Z86.79 HISTORY OF VENTRICULAR TACHYCARDIA: ICD-10-CM

## 2024-04-22 PROCEDURE — 99204 OFFICE O/P NEW MOD 45 MIN: CPT | Performed by: SURGERY

## 2024-04-22 PROCEDURE — 70450 CT HEAD/BRAIN W/O DYE: CPT | Mod: MG | Performed by: RADIOLOGY

## 2024-04-22 PROCEDURE — 75561 CARDIAC MRI FOR MORPH W/DYE: CPT | Mod: 26 | Performed by: INTERNAL MEDICINE

## 2024-04-22 PROCEDURE — G1010 CDSM STANSON: HCPCS | Mod: GC | Performed by: RADIOLOGY

## 2024-04-22 PROCEDURE — 93000 ELECTROCARDIOGRAM COMPLETE: CPT | Performed by: INTERNAL MEDICINE

## 2024-04-22 PROCEDURE — 999N000122 CARD MRI OUTSIDE READ

## 2024-04-22 PROCEDURE — 74176 CT ABD & PELVIS W/O CONTRAST: CPT | Mod: MG | Performed by: RADIOLOGY

## 2024-04-22 PROCEDURE — 93880 EXTRACRANIAL BILAT STUDY: CPT | Performed by: RADIOLOGY

## 2024-04-22 PROCEDURE — 2894A US LOWER EXTREMITY VENOUS DUPLEX BILATERAL: CPT | Mod: XS | Performed by: RADIOLOGY

## 2024-04-22 PROCEDURE — 93970 EXTREMITY STUDY: CPT | Mod: GC | Performed by: RADIOLOGY

## 2024-04-22 PROCEDURE — 93925 LOWER EXTREMITY STUDY: CPT | Mod: GC | Performed by: RADIOLOGY

## 2024-04-22 PROCEDURE — G0463 HOSPITAL OUTPT CLINIC VISIT: HCPCS | Performed by: SURGERY

## 2024-04-22 PROCEDURE — 93930 UPPER EXTREMITY STUDY: CPT | Performed by: RADIOLOGY

## 2024-04-22 PROCEDURE — 71250 CT THORAX DX C-: CPT | Mod: MG | Performed by: RADIOLOGY

## 2024-04-22 PROCEDURE — G1010 CDSM STANSON: HCPCS | Performed by: RADIOLOGY

## 2024-04-22 PROCEDURE — 71046 X-RAY EXAM CHEST 2 VIEWS: CPT | Mod: GC | Performed by: RADIOLOGY

## 2024-04-22 ASSESSMENT — PAIN SCALES - GENERAL: PAINLEVEL: NO PAIN (0)

## 2024-04-22 NOTE — PROGRESS NOTES
ELECTROPHYSIOLOGY CLINIC VISIT    Assessment/Recommendations   Assessment/Plan:    Mr. Russell is a 58 year old male who has a medical history significant for anterior wall MI, CAD s/p PCIs LAD, D1, D2 2009, 2010, 2011, & 2017, ICM LVEF 25%, s/p CRTD 2011 s/p gen change 6/2022, VT s/p prior ablations 6/2020 & 11/2021, PAF (CHADSVASC 6 on Xarelto), HTN, HLD, prior LV thrombus, DM2, CVA, BPH, and obesity.      CAD s/p multiple PCIs LAD, D2, D2 2009, 2010, 2011, 2017  ICM LVEF 25%, NYHA III  Ventricular Tachycardia:  1. ACEi/ARB/ARNi: Continue Entresto.  2. BB: Continue Coreg.  3. Aldosterone antagonist: Continue Spironolactone.  4. SGLT2i: Continue Jardiance.  5. Antiplatlet: Continue ASA.  6. Statin: Continue Crestor.  7.  SCD prophylaxis: s/p CRTD BVP % decreased likely due to PVCs and VT episodes.   8. Fluid status: Continue Lasix.   9. Nitroglycerin 0.4 mg PRN for chest pain. Place 1 tablet (0.4 mg) under the tongue every 5 minutes as needed for chest pain. Maximum of 3 doses in 15 minutes.  10. Lifestyle: Avoid tobacco, maintain a healthy weight, limit alcohol, cardiac diet, and exercise.  11. VT: He has had VT with ICD therapies despite AAT. He was originally on amiodarone and mexiletine but had breakthroughs. Amiodarone was later stopped to avoid possible long term toxicities and he was alternatively placed on Sotalol. He has had 2 VT ablation at OSH and found to have numerous VTs arising from anterior scar area (lateral/septal walls) and inferior apical aneurysm. He has continued to have recurrent arrhyhmias. We discussed considering going back on amiodarone vs ablation. He is undergoing advanced therapies evaluation here as well, but his main limiting factor is VT. We discussed ablation and its indications, risks, and benefits. Complications include but are not limited to vascular injury, excessive bleeding requiring blood transfusion, groin hematoma, aortic injury at the time of transseptal puncture,  bleeding/injury to abdominal structures at time of epicardial access, cardiac tamponade requiring pericardiocentesis or open heart surgery, and thromboembolic complications or strokes. We also discussed that VT ablation can be limited by inducibility of VT at the time of EPS/Abaltion and/or the origin of VT. Efficacy of ablation also deceases if multiple foci are found. After an extensive discussion, the patient would like to pursue ablation in attempts to improve VT burden and symptoms. We agreed that we will await upcoming RHC results to help determine if hemodynamic support would be required.          Follow up 3 months post ablation.        History of Present Illness/Subjective    Mr. eLo Russell is a 58 year old male who comes in today for EP consultation of VT.    Mr. Russell is a 58 year old male who has a medical history significant for anterior wall MI, CAD s/p PCIs LAD, D1, D2 2009, 2010, 2011, & 2017, ICM LVEF 25%, s/p CRTD 2011 s/p gen change 6/2022, VT s/p prior ablations 6/2020 & 11/2021, PAF (CHADSVASC 6 on Xarelto), HTN, HLD, prior LV thrombus, DM2, CVA, BPH, and obesity.    He has a longstanding history of CAD for which he had prior PCIs to pLAD and D1 in 2009, PCI to totally occluded LAD in 2010, PCI to D2 and Balloon angioplasty through strut to improve blood flow to distal LAD in 2011. Further D2 branch LAD PCI in 2017, and then Angiosculpt scoring balloon angioplasty to ISR of D1 stent in 2023. He has had subsequent ICM with LVEF 25% range most recently. He has been on GDMT but continued to have reduced LVEF so had a CRTD implanted in 2011 with last generator change in 6/2022. He has also had VT with ICD therapies. He had been on amiodarone. He has followed with [facility], [facility] and felt not a good candidate for advanced HF therapies due to uncontrolled VT. This lead to an ablation in 6/2020 where he was found to have recurrent numerous VTs as a result on an extensive anterior wall scar  involving much of lateral and septal walls. Ablation was felt partially successful of border zones scar areas especially inferior apex. He continued to have ICD therapies despite this and amiodarone and mexiletine. Thus,he underwent a repeat ablation VT at apical portion of the scar at [facility] in 11/2021. Amiodarone was decreased to 200 mg daily and mexiletine discontinued. Unfortunately presented to the emergency room 1/25/2022 with sensation of racing in the heart and chest fullness. He was found to be in VT at 130 bpm. He did convert spontaneously. He had felt well for 5 or 6 weeks post ablation in [place] however began experiencing recurrent symptoms with ATP for VT at 124 bpm on 1/11/2022 and then ATP x8 for VT at 139 bpm. Mexiletine was added back. He was discharged on amiodarone 200 mg daily and mexiletine 200 mg twice daily. He was seen at [facility] on 1/31/2022 and mexiletine increased to 3 times daily and he was continued on amiodarone 200 mg daily. Amiodarone is not favored for long-term use given his young age and risk for toxicities with long-term use, thus after he had 6 months free of VT, amiodarone was stopped and he was admitted in 3/2023 for Sotalol loading. He has since been having numerous episodes of slow VT requiring ATP therapy recently. He was admitted in 11/2023 to OSH for slow VT and multiple ATPs. Sotalol was increased. He had a repeat coronary angiogram in 12/2023 that showed stable native disease with 80-90% in-stent restenosis of inferior branch of D1 s/p Angiosculpt POBA and RHC showed low cardiac index with normal filling pressures. Given his persistent rhythm issues, the patient was referred to [facility] for consideration of advanced therapies. He saw Dr. Felix here who started advanced therapies work up. He was referred to EP here for an opinion about his VT management/treatment options.    He reports he feels the VT episodes with chest pressure and some dizziness, no LOC or  pre-syncope. He denies abdominal fullness/bloating or peripheral edema, shortness of breath, paroxysmal nocturnal dyspnea, orthopnea, pre-syncope, or syncope. Presenting 12 lead ECG shows  Vent Rate 60 bpm,  ms,  ms, QTc 474 ms. Device interrogation shows normal device function, stable lead parameters, and AP 86%, RVP 87%, LVP 86%. Current cardiac medications include: Coreg, Entresto, Jardiance, Lasix, Crestor, Spironolactone, Sotalol, Mexiletine, ASA, and Xarelto.         I have reviewed and updated the patient's Past Medical History, Social History, Family History and Medication List.     Cardiographics (Personally Reviewed) :   10/19/23Echo (OSH Report):   Normal left ventricular wall thickness.     Biplane ejection fraction is 25%.     Severe left ventricular systolic dysfunction.  Large anteroapical   aneurysm.  Only the basilar left ventricular segments have preserved   contractility.  No LV mural thrombi.     Grade II (moderate) left ventricular diastolic abnormality.     Elevated left ventricular filling pressure.     The left atrium is severely dilated.     The right atrium is moderately dilated.     Normal central venous pressure (0-5 mmHg).     No pericardial effusion.     Inadequate TR spectral Doppler to accurately assess right ventricular   systolic pressure.     12/5/23 Coronary Angiogram (OSH Report):  Conclusions:   1.  Normal right heart pressures   2.  Pulmonary capillary wedge pressure 8 mmHg   3.  Patent proximal LAD stent   4.  50% in-stent restenosis ostial first diagonal with patent superior   branch stent.  The inferior branch of the first diagonal has severe 80-90%   in-stent restenosis.  Status post 2.5 mm Angiosculpt scoring balloon   angioplasty at the ostium and 2.5 x 15 mm trek neononcompliant balloon   angioplasty in the in-stent restenotic segment   5.  Mild irregularities right coronary artery and left circumflex coronary   artery.  Similar to previous    1/11/24 CMR  [facility] Overread:  1. The LV is severely dilated in cavity size. The global systolic function is severely decreased. The LVEF  is 13%. There is akinesis of all the septal and anterior segments. There is thinning of the apical and true  apical segments.     2. The RV is normal in cavity size. The global systolic function is moderately decreased. The RVEF is 38%.      3. There is moderate enlargement of both atria.     4. Valvular function could not be reliably assessed because of device-related artifacts.     5. Late gadolinium enhancement imaging is suboptimal due to device-related artifacts (wide-band LGE imaging  was not performed); however, there is evidence of a large myocardial infarction involving virtually the  entire LAD territory. The MI is near transmural for a brief portion at the mid level and is transmural at  the apical and true apical levels. This is suggestive of a late presentation MI. There is practically no  residual viability in the LAD territory. The RCA and LCx territories appear viable.     6. There is no pericardial effusion or thickening.     7. There is no intracardiac thrombus.     CONCLUSIONS: Severe ischemic cardiomyopathy with adverse LV remodeling, LVEF of 13% and RVEF of 38%, with a  large MI involving virtually the entire LAD territory.          Physical Examination   /71   Pulse 60   Wt 122.4 kg (269 lb 13.5 oz)   SpO2 95%   BMI 37.64 kg/m    Wt Readings from Last 3 Encounters:   02/08/24 117 kg (258 lb)   02/07/24 117.9 kg (260 lb)     General Appearance:   Alert, well-appearing and in no acute distress.   HEENT: Atraumatic, normocephalic. PERRL.  MMM.   Chest/Lungs:   Respirations unlabored.  Lungs are clear to auscultation.   Cardiovascular:   Regular rate and rhythm.  S1/S2. No murmur.    Abdomen:  Soft, nontender, nondistended.   Extremities: No cyanosis or clubbing. No edema.    Musculoskeletal: Moves all extremities.  Gait normal.   Skin: Warm, dry, intact.     Neurologic: Mood and affect are appropriate.  Alert and oriented to person, place, time, and situation.          Medications  Allergies   Current Outpatient Medications   Medication Sig Dispense Refill    aspirin 81 MG EC tablet Take 1 tablet by mouth daily      carvedilol (COREG) 25 MG tablet Take 50 mg by mouth 2 times daily      empagliflozin (JARDIANCE) 10 MG TABS tablet Take 10 mg by mouth daily      furosemide (LASIX) 40 MG tablet Take 40 mg by mouth daily as needed      magnesium oxide (MAG-OX) 400 MG tablet Take 400 mg by mouth 2 times daily      mexiletine (MEXITIL) 200 MG capsule Take 200 mg by mouth every 8 hours      nitroGLYcerin (NITROSTAT) 0.4 MG sublingual tablet Place 1 tablet under the tongue every 5 minutes as needed      pantoprazole (PROTONIX) 40 MG EC tablet Take 40 mg by mouth daily      potassium chloride ER (K-TAB/KLOR-CON) 10 MEQ CR tablet Take 30 mEq by mouth daily      rivaroxaban ANTICOAGULANT (XARELTO) 20 MG TABS tablet Take 20 mg by mouth daily (Patient not taking: Reported on 4/24/2024)      rosuvastatin (CRESTOR) 40 MG tablet Take 40 mg by mouth at bedtime      sacubitril-valsartan (ENTRESTO)  MG per tablet Take 1 tablet by mouth 2 times daily      sotalol (BETAPACE) 120 MG tablet Take 180 mg by mouth every 12 hours      spironolactone (ALDACTONE) 25 MG tablet Take 50 mg by mouth daily      tamsulosin (FLOMAX) 0.4 MG capsule Take 0.4 mg by mouth daily      Vitamin D3 (VITAMIN D-1000 MAX ST) 25 mcg (1000 units) tablet Take 1,000 Units by mouth daily      Allergies   Allergen Reactions    Morphine      ANXIETY  ANXIETY      Tramadol      ANXIETY  ANXIETY           Lab Results (Personally Reviewed)    Chemistry/lipid CBC Cardiac Enzymes/BNP/TSH/INR   Lab Results   Component Value Date    BUN 15.8 04/24/2024     04/24/2024    CO2 22 04/24/2024     Creatinine   Date Value Ref Range Status   04/24/2024 1.01 0.67 - 1.17 mg/dL Final       Lab Results   Component Value Date     CHOL 112 04/24/2024    HDL 30 (L) 04/24/2024    LDL 51 04/24/2024      Lab Results   Component Value Date    WBC 4.6 04/24/2024    HGB 15.4 04/24/2024    HCT 45.7 04/24/2024    MCV 95 04/24/2024     04/24/2024    Lab Results   Component Value Date    TSH 1.63 04/24/2024    INR 1.33 (H) 04/24/2024        The patient states understanding and is agreeable with the plan.   Esteban Wills MD [facility]  Cardiology - Electrophysiology      Total time spent on patient visit, reviewing notes, imaging, labs, orders, and completing necessary documentation: 60 minutes.     Addendum:  RHC results showed compensated HF and no cardiogenic shock or pre-shock physiology. Will proceed with VT ablation.    Esteban Wills MD [facility]  Cardiology - Electrophysiology

## 2024-04-22 NOTE — NURSING NOTE
Chief Complaint   Patient presents with    Follow Up     New CV surgery - pre heart tx       Vitals were taken, medications reviewed.    Annmarie Awad, EMT   3:14 PM

## 2024-04-22 NOTE — LETTER
4/22/2024      RE: Leo Russell  [address] [facility] [address]       Dear Colleague,    Thank you for the opportunity to participate in the care of your patient, Leo Russell, at the [facility] at [facility]. Please see a copy of my visit note below.      HISTORY:     Mr. Leo Russell is a pleasant 58 year old male with a past medical history of CAD s/p PCI c/b ICM with chronic HFrEF s/p CRT-D, recurrent VT s/p prior ablations, PAF, HTN, HLD, DM Type II, and obesity who presents as follow up for HFrEF for evaluation for heart transplantation.  Patient has had longstanding ICM and HFrEF and previously underwent an advanced therapy evaluation after he underwent VT ablations (June '20 at [facility] and Nov '21 through the [facility]). It was thought better to focus on further rhythm control before pursing advanced therapies. He had been on longstanding amiodarone, but it was thought he started getting some liver and lung fibrosis changes, so this was stopped and mexiletine and sotalol have been continued. Sotalol was initiation at [facility] in March '23. Patient was admitted to [facility] in Nov '23 for slow VT and had been getting multiple ATP therapies for this. Sotalol dose was increased. He has continued to have episodes of slow VT despite this and still having episodes of ATP. Patient underwent coronary angiogram that showed stable native disease with 80-90% in-stent restenosis of inferior branch of D1 s/p Angiosculpt POBA and RHC showed low cardiac index with normal filling pressures. Given his persistent rhythm issues, the patient was referred to [facility] for consideration of advanced therapies.     Patient still feeling like he is having regular episodes of ATP from his device. Patient reports he can walk 2-3 blocks without needing to stop and rest. Flights of stairs given him some  difficulty. He denies recent presycnope, syncope, chest pain, palpitations, orthopnea, PND, abdominal pain, nausea, emesis, LE edema or recent weight gain. Patient has been trying to lose weight by eating better. He reports compliance with his medications, monitoring his salt and fluid intake and monitoring his weights daily.     ROS:  A complete 12-point ROS was negative except as above.     PAST MEDICAL HISTORY:      Patient Active Problem List   Diagnosis     Anterior myocardial infarction (H)     Arterial aneurysm (H24)     Automatic implantable cardioverter-defibrillator in situ     Benign prostatic hyperplasia without lower urinary tract symptoms     Chronic anticoagulation     Chronic combined systolic and diastolic CHF, NYHA class 2 and ACC/AHA stage C (H)     Coronary atherosclerosis     CVA (cerebral vascular accident) (H)     Type 2 diabetes mellitus with obesity (H)     Gastroesophageal reflux disease without esophagitis     History of ventricular tachycardia     Hyperlipidemia     Hypertension     Ischemic cardiomyopathy     Lymphocytosis (symptomatic)     Moderate obesity     Paroxysmal atrial fibrillation (H)     Class 2 severe obesity due to excess calories with serious comorbidity in adult (H)         FAMILY HISTORY:  Multiple family members with CAD and HTN     SOCIAL HISTORY:  Social History               Tobacco Use     Smoking status: Former       Current packs/day: 0.00       Average packs/day: 1 pack/day for 20.0 years (20.0 ttl pk-yrs)       Types: Cigarettes       Start date:        Quit date: 2017       Years since quittin.3       Passive exposure: Never     Smokeless tobacco: Never   Substance Use Topics     Alcohol use: Never     Drug use: Never       Patient denies any tobacco use since 2017, no ETOH or illicit substance use. Lives in [place], [place]. Believes his girlfriend and adult son could be his caregivers     ALLERGIES:  Allergies         Allergies   Allergen Reactions      Morphine         ANXIETY  ANXIETY        Tramadol         ANXIETY  ANXIETY               CURRENT MEDICATIONS:  Current Outpatient Prescriptions          Current Outpatient Medications   Medication Sig Dispense Refill     aspirin 81 MG EC tablet Take 1 tablet by mouth daily         carvedilol (COREG) 25 MG tablet Take 50 mg by mouth 2 times daily         empagliflozin (JARDIANCE) 10 MG TABS tablet Take 10 mg by mouth daily         furosemide (LASIX) 40 MG tablet Take 40 mg by mouth daily as needed         magnesium oxide (MAG-OX) 400 MG tablet Take 400 mg by mouth 2 times daily         mexiletine (MEXITIL) 200 MG capsule Take 200 mg by mouth every 8 hours         pantoprazole (PROTONIX) 40 MG EC tablet Take 40 mg by mouth daily         potassium chloride ER (K-TAB/KLOR-CON) 10 MEQ CR tablet Take 30 mEq by mouth daily         sacubitril-valsartan (ENTRESTO)  MG per tablet Take 1 tablet by mouth 2 times daily         sotalol (BETAPACE) 120 MG tablet Take 180 mg by mouth every 12 hours         spironolactone (ALDACTONE) 25 MG tablet Take 50 mg by mouth daily         tamsulosin (FLOMAX) 0.4 MG capsule Take 0.4 mg by mouth daily         Vitamin D3 (VITAMIN D-1000 MAX ST) 25 mcg (1000 units) tablet Take 1,000 Units by mouth daily         nitroGLYcerin (NITROSTAT) 0.4 MG sublingual tablet Place 1 tablet under the tongue every 5 minutes as needed         rivaroxaban ANTICOAGULANT (XARELTO) 20 MG TABS tablet Take 20 mg by mouth daily (Patient not taking: Reported on 4/24/2024)         rosuvastatin (CRESTOR) 40 MG tablet Take 40 mg by mouth at bedtime                EXAM:  /71 (BP Location: Right arm, Patient Position: Chair, Cuff Size: Adult Large)   Pulse 60   Wt 122.4 kg (269 lb 12.8 oz)   SpO2 95%   BMI 37.63 kg/m       General: appears comfortable, alert and interactive, in no acute distress  Head: normocephalic, atraumatic  Eyes: anicteric sclera, EOMI  Mouth: MMM  CV: RRR, soft holosystolic murmur along  apex, JVP ~7 cm  Resp: CTAB, no wheezes or crackles  GI: soft, NT, ND  Neurological: alert and oriented, no focal deficits  Psych: normal mood and affect  Derm: no rashes on exposed surfaces     Weight      Wt Readings from Last 10 Encounters:   04/26/24 120.7 kg (266 lb 1.5 oz)   04/25/24 121.2 kg (267 lb 3.2 oz)   04/24/24 122.4 kg (269 lb 13.5 oz)   04/24/24 122.4 kg (269 lb 12.8 oz)   02/08/24 117 kg (258 lb)   02/07/24 117.9 kg (260 lb)         I personally reviewed recent labs and data as below and discussed the results with the patient in clinic today.  Labs:  BMP 12/14/23    K 4.5  Cl 104  CO2 23  BUN 14  Cr 0.97  NTproBNP 221     Last Comprehensive Metabolic Panel:          Sodium   Date Value Ref Range Status   04/24/2024 137 135 - 145 mmol/L Final       Comment:       Reference intervals for this test were updated on 09/26/2023 to more accurately reflect our healthy population. There may be differences in the flagging of prior results with similar values performed with this method. Interpretation of those prior results can be made in the context of the updated reference intervals.             Potassium   Date Value Ref Range Status   04/24/2024 4.3 3.4 - 5.3 mmol/L Final            Chloride   Date Value Ref Range Status   04/24/2024 103 98 - 107 mmol/L Final            Carbon Dioxide (CO2)   Date Value Ref Range Status   04/24/2024 22 22 - 29 mmol/L Final            Anion Gap   Date Value Ref Range Status   04/24/2024 12 7 - 15 mmol/L Final            Glucose   Date Value Ref Range Status   04/24/2024 198 (H) 70 - 99 mg/dL Final            Urea Nitrogen   Date Value Ref Range Status   04/24/2024 15.8 6.0 - 20.0 mg/dL Final            Creatinine   Date Value Ref Range Status   04/24/2024 1.01 0.67 - 1.17 mg/dL Final            GFR Estimate   Date Value Ref Range Status   04/24/2024 87 >60 mL/min/1.73m2 Final            Calcium   Date Value Ref Range Status   04/24/2024 9.1 8.6 - 10.0 mg/dL Final             Bilirubin Total   Date Value Ref Range Status   04/24/2024 0.7 <=1.2 mg/dL Final              Alkaline Phosphatase   Date Value Ref Range Status   04/24/2024 68 40 - 150 U/L Final       Comment:       Reference intervals for this test were updated on 11/14/2023 to more accurately reflect our healthy population. There may be differences in the flagging of prior results with similar values performed with this method. Interpretation of those prior results can be made in the context of the updated reference intervals.              ALT   Date Value Ref Range Status   04/24/2024 24 0 - 70 U/L Final       Comment:       Reference intervals for this test were updated on 6/12/2023 to more accurately reflect our healthy population. There may be differences in the flagging of prior results with similar values performed with this method. Interpretation of those prior results can be made in the context of the updated reference intervals.                AST   Date Value Ref Range Status   04/24/2024 16 0 - 45 U/L Final       Comment:       Reference intervals for this test were updated on 6/12/2023 to more accurately reflect our healthy population. There may be differences in the flagging of prior results with similar values performed with this method. Interpretation of those prior results can be made in the context of the updated reference intervals.         Testing/Procedures:  I personally visualized and interpreted:  Echocardiogram 10/19/23     Normal left ventricular wall thickness.      Biplane ejection fraction is 25%.      Severe left ventricular systolic dysfunction.  Large anteroapical   aneurysm.  Only the basilar left ventricular segments have preserved   contractility.  No LV mural thrombi.      Grade II (moderate) left ventricular diastolic abnormality.      Elevated left ventricular filling pressure.      The left atrium is severely dilated.      The right atrium is moderately dilated.      Normal central  venous pressure (0-5 mmHg).      No pericardial effusion.      Inadequate TR spectral Doppler to accurately assess right ventricular   systolic pressure.   Comment: No obvious changes from the January 2022 echo.      Coronary Angiogram/RHC 12/5/23  Diagnostic coronary angiogram:   Selective engagement of the right coronary artery was performed with a JR4 catheter.  Selective engagement of the left main coronary artery was performed with a JL 3.5 catheter.  Selective injections were performed. The patient has a right dominant coronary system.   Left Main: Left main coronary artery is normal.  No significant disease   Left Anterior Descending: The left anterior descending arises from the   left main and courses along the anterior interventricular groove.  The   proximal LAD stent is patent.  The remainder the LAD is patent although quite small and threadlike.  Flow is THEE grade III.  The first diagonal branch is a large vessel that bifurcates into a superior and inferior branch.  There is a stent that extends from the ostium of the vessel into the superior branch.  A stent also extends into the inferior branch.  This is a bifurcation type of stent arrangement.  50% stenosis is noted at the ostium of the first diagonal.  The remainder of the proximal portion is patent.  The superior branch, including the stent is widely patent with normal flow.  The inferior branch demonstrates severe in-stent restenosis in the proximal segment, 85-90% with THEE grade I distal flow.  The remaining diagonal branches are patent   Left Circumflex: The left circumflex is a moderate-sized artery.  The   proximal vessel is normal.  Mild irregularities are seen in the   midsegment.  The distal vessel is patent.  The first obtuse marginal is   patent without significant disease.  There is a moderate-sized second   obtuse marginal which is widely patent.   Right Coronary Artery: Right coronary artery is a large, dominant   artery.  Mild  irregularities are seen in the proximal and mid segment.    Mild irregularities are noted distally.  The PDA and KANIKA branches are   widely patent.  This is similar in appearance to the patient's previous   angiogram   Left Heart Catheterization:   Left ventricular end-diastolic pressure measures 8 mmHg.  There is no   gradient across the aortic valve on catheter pullback   Right Heart Catheterization:     PA Pressure: 24/10 mmHg, mean 15 mmHg (PA saturation 65%)     PCWP: 8 mmHg     RV Pressure: 31/1 mmHg     RVEDP: 10 mmHg     RA Pressure: 5 mmHg     Cardiac Output: 5.2 L/min     Cardiac Index: 2.1 L/min/m surface squared      cMRI 1/11/24  Impression:   Infarcted tissue at the left anterior descending and left circumflex artery territories involving the mid, basal and apical segments, with 70% total scar burden. The only residual viable myocardium is at the inferior left ventricle in the right coronary artery distribution. Dilated left ventricle and the left ventricular ejection fraction is markedly compromised, however the quantification is not well accomplished on this study secondary to artifact.   Mildly hypokinetic right ventricular wall. No right ventricular dilatation.   Biatrial cardiac dilatation.   Mild mitral valve regurgitation.      Reviewed recent CT chest, abdomen, pelvis, head. US arterial and venous upper and lower extremities.          Assessment and Plan:      In summary, 58 year old male with a past medical history of CAD s/p PCI c/b ICM with chronic HFrEF s/p CRT-D, recurrent VT s/p prior ablations, PAF, HTN, HLD, DM Type II, and obesity who presents for heart transplant evaluation.    I discussed the risks and benefits of heart transplantation including the risks of death, bleeding, stroke, infection, renal failure and arrhythmias. He understands and is willing to proceed with this.    Please do not hesitate to contact me if you have any questions/concerns.     Sincerely,     Charlie Coulter  MD

## 2024-04-23 ENCOUNTER — ALLIED HEALTH/NURSE VISIT (OUTPATIENT)
Dept: CARDIOLOGY | Facility: CLINIC | Age: 59
End: 2024-04-23
Attending: STUDENT IN AN ORGANIZED HEALTH CARE EDUCATION/TRAINING PROGRAM
Payer: MEDICARE

## 2024-04-23 ENCOUNTER — ALLIED HEALTH/NURSE VISIT (OUTPATIENT)
Dept: TRANSPLANT | Facility: CLINIC | Age: 59
End: 2024-04-23
Attending: STUDENT IN AN ORGANIZED HEALTH CARE EDUCATION/TRAINING PROGRAM
Payer: MEDICARE

## 2024-04-23 DIAGNOSIS — I50.42 CHRONIC COMBINED SYSTOLIC AND DIASTOLIC CHF, NYHA CLASS 2 AND ACC/AHA STAGE C (H): ICD-10-CM

## 2024-04-23 LAB
ABO/RH(D): NORMAL
ANTIBODY SCREEN: NEGATIVE
SPECIMEN EXPIRATION DATE: NORMAL

## 2024-04-23 NOTE — NURSING NOTE
"Met with patient and son, Michael, to present the HM3 as a possible treatment option.      Discussed patient and caregiver responsibilities before and after VAD implant. Clarified that only 2 caregivers may be trained. Clarified the need for a caregiver to be present for education and to assist patient for at least first 30 days after a patient returns home.  Patient's designated caregiver is Michael.     Discussed basic overview of VAD equipment, on going management, need for anticoagulation, regular dressing change, grounded three-pronged outlet and functioning telephone. Also discussed frequency of follow-up clinic appointments and the need to stay locally for at least 2-4 weeks.  Reviewed restrictions of having a VAD such as no swimming, bathing, boats, MRI's, or arc welding.     Provided and discussed the VAD educational brochure, information regarding the VAD and transplant support groups, and \"VAD What You Should Know\" with additional details. Patient and Michael signed \"VAD What You Should Know\" document (or agreed to have VAD Coordinator sign on their behalf). VAD coordinator contact information provided.  Encouraged patient and son to call with questions. Learners verbalized understanding of the above information.    "

## 2024-04-23 NOTE — PROGRESS NOTES
Patient of Dr. Felix seen in clinic for psychosocial assessment.   60 minutes spent with patient and his son, Michael. 100% of visit consisted of counseling related to issues surrounding heart transplant and LVAD.    Psychosocial Assessment   Name: Leo Russell     MRN:  [id number]        Patient Name / Age / Race: Leo Russell 58 year old     Source of Information: Patient and son, Michael   : Cyndy Arvizu, [facility]   Interview Date: April 22, 2024   Reason for Referral: Heart Transplant and Mechanical Circulatory Support     Current Living Situation   Location ([facility]/State): [address] [place] [id number]   With Whom: Living arrangements - the patient  lives with SO, Angy Campos ([phone] .   Type of Home: single family-2 level with all needs met on first level, 5 steps into home and also has a ramp   Working Phone? Yes    [employer] Outlet? Yes    Distance from Hospital: 8hrs   Need to Relocate Post Surgery? Yes    Discussed? Yes Discussed local hotels as well as [facility] apartments. Sent resources to [email or web address], per pt request.      Vocational/Employment/Financial   Employment: Disabled-has not been able to work for 3yrs   Occupation: Prior to becoming disabled, worked for 25yrs in construction   Education: GED   [place]? No   Income: SSD and SO's income/salary   Insurance: Medicare and IHS   Name of Private Insurance: None    Medication Coverage: Yes-covered through IHS    In current situation, pt. can afford medication and supply costs:  Yes    Other Financial Concerns/Issues: Pt and son have normal financial concerns with temporary relocation. Pt has financial support through [employer] ([facility]) and we could access grants as needed. Used [facility] financial benefits to come for eval--writer wrote medical letter.     Family/Social Support   Marital Status:  x2 and living with significant other   Partner Name: Angy    Length of Time  in Relationship: 14   Partner s Employment: Works FT as a manager of store   Relationship: stable/supportive   Children: 7  adult children from 2 previous marriages:   Diego (40), Elroy Dodson (28), Michael (27), Darian (26), Christopher (25), Luis (22) and Chris (20). He also has a stepson Jordan (25)  Pt estranged from oldest dtr-Diego   Parents:    Siblings: 2 brothers-1 lives in [place] and the other in [place]--not close to either brothers   Other Family or Friends Close by: Friends and extended family   Support System: available, helpful   Primary Support Person: Anticipate a shared caregiver plan between pt's son, Michael, and SO, Angy, whom is not here.    Issues: Provided education on caregiver expectations including need to temporarily relocate with a 24hr caregiver, attend LVAD education prior to pt discharge and care expectations.   Son reports he needs to confirm with his work if he has FMLA benefits. Explained to Michael that FMLA is not paid leave.      Activities/Functional Ability   Current Level: ambulatory and independent with ADL's   Daily Activities: Manages household tasks, works on cars and going to cardiac rehab   Transportation: self      Medical Status   Patient s understanding of need for surgical intervention: Good understanding: Previously evaluated for Heart Transplant and LVAD evaluation 2021 in [place], but then got better.    Advanced Directive? No-emailed copy to [email or web address], per pt request     Details: w/o HCD, pt informed his 7 children would have equal decision making    Adherence History: Good    Ability to Adhere to Complex Medical Regime: Good      Behavioral   Chemical Dependency Issues: No Pt reports hx of heavier alcohol, but quit 7 yrs ago. Pt has hx of 3 DUIs, last one many years ago. Pt with hx of attending CD treatment program over 15yrs ago. Pt denies any hx of drug use. PEth is negative. Drug screen is negative.     Audit C: 0   Smoker? No: Quit  cigarette smoking in 2020. Pt reports no hx of vaping or smokeless tobacco products. Pt is not currently on smoking cessation products. Nicotine and Cotinine labs are negative.   Psychiatric impairment: No Pt does not have formal mental health diagnoses', but reports some depression due to decline in health and and not being able to work. Pt completed PHQ-9 and CHETAN-7 indicating mild depression and low moderate anxiety. Pt indicates that his depression and anxiety do not make it difficult at all to manage every day tasks. Pt denies any hx of psychiatric hospitalizations, participating in psychotherapy or taking medication for his mental health.     PHQ-9 Score: 3  CHETAN-7: 6   Coping Style/Strategies: Talking with family and likes to keep busy   Recent Legal History: No   Teaching Completed During Assessment Related To Transplant and Mechanical Circulatory Support: Housing and relocation needs post surgery.  Caregiver needs post surgery.  Financial issues related to surgery.  Risks of alcohol use post surgery.  Common psychosocial stressors pre/post surgery.  Support group availability.   Psychosocial Risks Reviewed Related To Transplant and Mechanical Circulatory Support: Increased stress related to your emotional, family, social, employment, or financial situation.  Affect on work and/or disability benefits.  Affect on future health and life insurance.  Outcome expectations may not be met.  Mental Health risks: anxiety, depression, PTSD, guilt, grief and chronic fatigue.     Observed Behavior   Well informed? Yes  Angry? No   Process info well? Yes  Hostile? No   Evasive? No Oriented X3? Yes    Cautious/Suspicious? No Motivated? Yes    Appropriate Behavior? Yes  Depressed? No   Appropriate Affect? Yes  Anxious? Yes    Interview Observations: Pt and son pleasant and engaged in assessment. Pt familiar with evaluation process from prior evaluation in [place].      Selection Criteria Met   Plan for Support No   Chemical  Dependence Yes    Smoking Yes    Mental Health Yes    Adequate Finances Yes      Risk Issues:    -Pt needs to confirm caregiver plan with family    Final Evaluation/Assessment:     Social Work evaluation as part of heart transplant and LVAD evaluation. Pt's son, Michael, present for assessment. Pt lives in [place], [place] w/ his SO, Angy. Pt has previously been evaluated for advanced therapies in [place], but ended having some recovery and not needing advanced therapies at that time. Pt is independent w/ ADLs, IADLs, ambulation and driving. Pt has been deemed disabled due to his heart failure and has been unable to work for the past 3-4yrs. Pt is a member of the [employer] [place].     Discussed caregiver requirements including need for caregiver to temporarily relocate with pt after heart transplant or LVAD. Pt's son, Michael, thinks he and pt's SO, will be able to provide caregiver support, but need to confirm with their respective employers. Discussed FMLA and that this would be unpaid. Discussed that pt's SO likely would not qualify for FMLA as they are not . Michael will discuss with pt's SO and get back to writer to confirm caregiver plan.     Writer has no concerns in areas of mental health, chemical dependency or smoking to preclude pt from heart transplant or LVAD.     Pt has adequate insurance and finances for heart transplant or LVAD. Will need to utilize financial resources through S and grants for temporary relocation.     From a psychosocial perspective, pt is a good candidate for heart transplant or LVAD pending confirmation of caregiver plan.     Patient understands risks and benefits of Heart Transplant and Mechanical Circulatory Support: Yes     Recommendation:    Good-pending confirmation of caregiver plan.     Signature: Cyndy Arvizu [facility]     Title: Licensed Independent Clinical                Audit C Alcohol Screening Tool  AUDIT   Questions 0 1 2 3 4 Score   How often  do you have a drink  containing alcohol? Never Monthly or less 2 to 4  times a  month 2 to 3  times a  week 4 or more  times a  week 0   How many drinks containing alcohol  do you have on a typical day when you are drinking? 1 or 2 3 or 4 5 or 6 7 to 9 10 or more    How often do you have more than five  or more drinks on one occasion? Never Less  than  monthly Monthly Weekly Daily or  almost  daily 0     Scoring: A score of 4 for adult men or 3 for adult women is an indication of hazardous drinking (risk for  physical or physiological harm). A score of 5 or more for adult men or 4 or more for adult women is an  indication of an alcohol use disorder.     CHETAN-7  0= Not at all  1=Several Days   2=Over half the days  3=Nearly every day    Feeling nervous, anxious, or on edge [ 1 ]  Not able to stop or control worrying [ 1 ]  Worrying too much about different things [ 2]  Trouble relaxing [ 1]  Being so restless that it's hard to sit still [  0]  Becoming easily annoyed or irritable [0 ]  Feeling afraid as if something awful might happen [ 1]    Total Score: 6    If you checked off any problems, how difficult have these made it for you to do your work, take care of things at home, or get along with other people?    Not difficult at all__x______  Somewhat difficult_______  Very difficult_______  Extremely difficult_______    Scoring  Scores of 5, 10, and 15 are taken as the cut-off points for mild, moderate and severe anxiety, respectively. When using as a screening tool, further evaluation is recommended when the score is 10 or greater.       Source: Jonathan BELCHER, Lenny K, Aj MURPHYW, Shira B. A brief measure for assessing generalized anxiety  disorder. Arch [facility] Med. 2006;166:7379-9514.    PATIENT HEALTH QUESTIONNAIRE-9 (PHQ - 9)      Over the last two weeks, how often have you been bothered by any the following symptoms?  Please use the following scale;  0 = Not at all  1 = Several days but less than half  2 = More  than half of the days  3 = Nearly every day    1) Little interest or pleasure in doing things [  0  ]  2) Feeling down, depressed, or hopeless.[  1  ]  3) Trouble falling or staying asleep, or sleeping too much [  1  ]  4) Feeling tired or having little energy.[  0  ]  5) Poor appetite or overeating [ 1  ]  6) Feeling bad about yourself - or that you are a failure or have let yourself or your family down [  0 ]  7) Trouble concentrating on things, such as reading the newspaper or watching television [  0  ]  8) Moving or speaking so slowly that other people could have noticed. Or the opposite-being fidgety or restless that you have been moving around a lot more than usual [  0  ]  9) Thoughts that you would be better off dead, or of hurting yourself in some way [  0  ]    Finally, how difficult have these problems made it for you to do your work, take care of things at home, or get along with other people?  Not difficult at all, somewhat difficult, very difficult or extremely difficult?     Total Score: 3      Interpreting PHQ-9 Scores Actions Based on PH9 Score   Score Action  Minimal depression 0-4          < 4         The score suggests the patient may not need depression treatment   Mild depression 5-9                > 5 - 14  Physician uses clinical judgment about treatment, based on patient's duration of symptoms and functional impairment  Moderate depression 10-14   Moderately severe depression 15-19   > 15      Warrants treatment for depression, using antidepressant, psychotherapy and/or a combination of treatment.  Severe depression 20-27       * PHQ-9 is described in more detail at the [place] [facility] Depression [facility] Primary [facility] website   [email or web address]      Interview Date: April 22, 2024

## 2024-04-24 ENCOUNTER — OFFICE VISIT (OUTPATIENT)
Dept: PULMONOLOGY | Facility: CLINIC | Age: 59
End: 2024-04-24
Attending: STUDENT IN AN ORGANIZED HEALTH CARE EDUCATION/TRAINING PROGRAM
Payer: MEDICARE

## 2024-04-24 ENCOUNTER — OFFICE VISIT (OUTPATIENT)
Dept: CARDIOLOGY | Facility: CLINIC | Age: 59
End: 2024-04-24
Attending: INTERNAL MEDICINE
Payer: MEDICARE

## 2024-04-24 ENCOUNTER — LAB (OUTPATIENT)
Dept: LAB | Facility: CLINIC | Age: 59
End: 2024-04-24
Attending: STUDENT IN AN ORGANIZED HEALTH CARE EDUCATION/TRAINING PROGRAM
Payer: MEDICARE

## 2024-04-24 ENCOUNTER — ANCILLARY PROCEDURE (OUTPATIENT)
Dept: ULTRASOUND IMAGING | Facility: CLINIC | Age: 59
End: 2024-04-24
Attending: STUDENT IN AN ORGANIZED HEALTH CARE EDUCATION/TRAINING PROGRAM
Payer: MEDICARE

## 2024-04-24 ENCOUNTER — PRE VISIT (OUTPATIENT)
Dept: CARDIOLOGY | Facility: CLINIC | Age: 59
End: 2024-04-24

## 2024-04-24 ENCOUNTER — OFFICE VISIT (OUTPATIENT)
Dept: CARDIOLOGY | Facility: CLINIC | Age: 59
End: 2024-04-24
Attending: STUDENT IN AN ORGANIZED HEALTH CARE EDUCATION/TRAINING PROGRAM
Payer: MEDICARE

## 2024-04-24 VITALS
OXYGEN SATURATION: 95 % | BODY MASS INDEX: 37.63 KG/M2 | DIASTOLIC BLOOD PRESSURE: 71 MMHG | HEART RATE: 60 BPM | WEIGHT: 269.8 LBS | SYSTOLIC BLOOD PRESSURE: 104 MMHG

## 2024-04-24 VITALS
DIASTOLIC BLOOD PRESSURE: 71 MMHG | HEART RATE: 60 BPM | WEIGHT: 269.84 LBS | OXYGEN SATURATION: 95 % | BODY MASS INDEX: 37.64 KG/M2 | SYSTOLIC BLOOD PRESSURE: 104 MMHG

## 2024-04-24 DIAGNOSIS — I50.42 CHRONIC COMBINED SYSTOLIC AND DIASTOLIC CHF, NYHA CLASS 2 AND ACC/AHA STAGE C (H): ICD-10-CM

## 2024-04-24 DIAGNOSIS — I25.5 ISCHEMIC CARDIOMYOPATHY: ICD-10-CM

## 2024-04-24 DIAGNOSIS — R09.89 OTHER SPECIFIED SYMPTOMS AND SIGNS INVOLVING THE CIRCULATORY AND RESPIRATORY SYSTEMS: ICD-10-CM

## 2024-04-24 DIAGNOSIS — Z95.810 IMPLANTABLE CARDIOVERTER-DEFIBRILLATOR (ICD) IN SITU: ICD-10-CM

## 2024-04-24 DIAGNOSIS — Z11.59 ENCOUNTER FOR SCREENING FOR OTHER VIRAL DISEASES: ICD-10-CM

## 2024-04-24 DIAGNOSIS — I50.22 CHRONIC HFREF (HEART FAILURE WITH REDUCED EJECTION FRACTION) (H): Primary | ICD-10-CM

## 2024-04-24 DIAGNOSIS — I25.10 ATHEROSCLEROSIS OF NATIVE CORONARY ARTERY OF NATIVE HEART WITHOUT ANGINA PECTORIS: ICD-10-CM

## 2024-04-24 DIAGNOSIS — I47.20 VENTRICULAR TACHYCARDIA (H): ICD-10-CM

## 2024-04-24 DIAGNOSIS — Z12.5 ENCOUNTER FOR SCREENING FOR MALIGNANT NEOPLASM OF PROSTATE: ICD-10-CM

## 2024-04-24 DIAGNOSIS — I47.29 OTHER VENTRICULAR TACHYCARDIA (H): ICD-10-CM

## 2024-04-24 DIAGNOSIS — Z72.89 OTHER PROBLEMS RELATED TO LIFESTYLE: ICD-10-CM

## 2024-04-24 LAB
6 MIN WALK (FT): 1275 FT
6 MIN WALK (M): 389 M
ABO/RH(D): NORMAL
ALBUMIN SERPL BCG-MCNC: 4.3 G/DL (ref 3.5–5.2)
ALBUMIN UR-MCNC: NEGATIVE MG/DL
ALP SERPL-CCNC: 68 U/L (ref 40–150)
ALT SERPL W P-5'-P-CCNC: 24 U/L (ref 0–70)
ANION GAP SERPL CALCULATED.3IONS-SCNC: 12 MMOL/L (ref 7–15)
APPEARANCE UR: CLEAR
APTT PPP: 34 SECONDS (ref 22–38)
AST SERPL W P-5'-P-CCNC: 16 U/L (ref 0–45)
ATRIAL RATE - MUSE: 60 BPM
BASOPHILS # BLD AUTO: 0 10E3/UL (ref 0–0.2)
BASOPHILS NFR BLD AUTO: 1 %
BILIRUB SERPL-MCNC: 0.7 MG/DL
BILIRUB UR QL STRIP: NEGATIVE
BUN SERPL-MCNC: 15.8 MG/DL (ref 6–20)
CALCIUM SERPL-MCNC: 9.1 MG/DL (ref 8.6–10)
CHLORIDE SERPL-SCNC: 103 MMOL/L (ref 98–107)
CHOLEST SERPL-MCNC: 112 MG/DL
CMV IGG SERPL IA-ACNC: 5.1 U/ML
CMV IGG SERPL IA-ACNC: ABNORMAL
COLOR UR AUTO: ABNORMAL
CREAT SERPL-MCNC: 1.01 MG/DL (ref 0.67–1.17)
CYSTATIN C (ROCHE): 1.1 MG/L (ref 0.6–1)
DEPRECATED HCO3 PLAS-SCNC: 22 MMOL/L (ref 22–29)
DIASTOLIC BLOOD PRESSURE - MUSE: NORMAL MMHG
EBV VCA IGG SER IA-ACNC: 329 U/ML
EBV VCA IGG SER IA-ACNC: POSITIVE
EGFRCR SERPLBLD CKD-EPI 2021: 87 ML/MIN/1.73M2
EOSINOPHIL # BLD AUTO: 0.1 10E3/UL (ref 0–0.7)
EOSINOPHIL NFR BLD AUTO: 3 %
ERYTHROCYTE [DISTWIDTH] IN BLOOD BY AUTOMATED COUNT: 14 % (ref 10–15)
FASTING STATUS PATIENT QL REPORTED: NO
FERRITIN SERPL-MCNC: 55 NG/ML (ref 31–409)
GFR SERPL CREATININE-BSD FRML MDRD: 69 ML/MIN/1.73M2
GLUCOSE SERPL-MCNC: 198 MG/DL (ref 70–99)
GLUCOSE UR STRIP-MCNC: >=1000 MG/DL
HAV AB SER QL IA: REACTIVE
HBV CORE AB SERPL QL IA: NONREACTIVE
HBV SURFACE AB SERPL IA-ACNC: <3.5 M[IU]/ML
HBV SURFACE AB SERPL IA-ACNC: NONREACTIVE M[IU]/ML
HBV SURFACE AG SERPL QL IA: NONREACTIVE
HCT VFR BLD AUTO: 45.7 % (ref 40–53)
HCV AB SERPL QL IA: NONREACTIVE
HDLC SERPL-MCNC: 30 MG/DL
HGB BLD-MCNC: 15.4 G/DL (ref 13.3–17.7)
HGB UR QL STRIP: NEGATIVE
HIV 1+2 AB+HIV1 P24 AG SERPL QL IA: NONREACTIVE
HSV1 IGG SERPL QL IA: 28.5 INDEX
HSV1 IGG SERPL QL IA: ABNORMAL
HSV2 IGG SERPL QL IA: 2.22 INDEX
HSV2 IGG SERPL QL IA: ABNORMAL
IMM GRANULOCYTES # BLD: 0 10E3/UL
IMM GRANULOCYTES NFR BLD: 0 %
INR PPP: 1.33 (ref 0.85–1.15)
INTERPRETATION ECG - MUSE: NORMAL
IRON BINDING CAPACITY (ROCHE): 335 UG/DL (ref 240–430)
IRON SATN MFR SERPL: 33 % (ref 15–46)
IRON SERPL-MCNC: 111 UG/DL (ref 61–157)
KETONES UR STRIP-MCNC: NEGATIVE MG/DL
LDLC SERPL CALC-MCNC: 51 MG/DL
LEUKOCYTE ESTERASE UR QL STRIP: NEGATIVE
LYMPHOCYTES # BLD AUTO: 1.7 10E3/UL (ref 0.8–5.3)
LYMPHOCYTES NFR BLD AUTO: 37 %
MAGNESIUM SERPL-MCNC: 2.1 MG/DL (ref 1.7–2.3)
MCH RBC QN AUTO: 32 PG (ref 26.5–33)
MCHC RBC AUTO-ENTMCNC: 33.7 G/DL (ref 31.5–36.5)
MCV RBC AUTO: 95 FL (ref 78–100)
MONOCYTES # BLD AUTO: 0.3 10E3/UL (ref 0–1.3)
MONOCYTES NFR BLD AUTO: 6 %
NEUTROPHILS # BLD AUTO: 2.5 10E3/UL (ref 1.6–8.3)
NEUTROPHILS NFR BLD AUTO: 53 %
NITRATE UR QL: NEGATIVE
NONHDLC SERPL-MCNC: 82 MG/DL
NRBC # BLD AUTO: 0 10E3/UL
NRBC BLD AUTO-RTO: 0 /100
NT-PROBNP SERPL-MCNC: 107 PG/ML (ref 0–900)
P AXIS - MUSE: NORMAL DEGREES
PH UR STRIP: 5.5 [PH] (ref 5–7)
PHOSPHATE SERPL-MCNC: 3.1 MG/DL (ref 2.5–4.5)
PLATELET # BLD AUTO: 199 10E3/UL (ref 150–450)
POTASSIUM SERPL-SCNC: 4.3 MMOL/L (ref 3.4–5.3)
PR INTERVAL - MUSE: 92 MS
PREALB SERPL-MCNC: 24.3 MG/DL (ref 20–40)
PROT SERPL-MCNC: 6.9 G/DL (ref 6.4–8.3)
QRS DURATION - MUSE: 162 MS
QT - MUSE: 504 MS
QTC - MUSE: 504 MS
R AXIS - MUSE: 149 DEGREES
RBC # BLD AUTO: 4.82 10E6/UL (ref 4.4–5.9)
RBC URINE: <1 /HPF
SODIUM SERPL-SCNC: 137 MMOL/L (ref 135–145)
SP GR UR STRIP: 1.02 (ref 1–1.03)
SPECIMEN EXPIRATION DATE: NORMAL
SQUAMOUS EPITHELIAL: <1 /HPF
SYSTOLIC BLOOD PRESSURE - MUSE: NORMAL MMHG
T AXIS - MUSE: -23 DEGREES
T PALLIDUM AB SER QL: NONREACTIVE
TRANSFERRIN SERPL-MCNC: 286 MG/DL (ref 200–360)
TRIGL SERPL-MCNC: 154 MG/DL
TSH SERPL DL<=0.005 MIU/L-ACNC: 1.63 UIU/ML (ref 0.3–4.2)
UROBILINOGEN UR STRIP-MCNC: NORMAL MG/DL
VENTRICULAR RATE- MUSE: 60 BPM
VZV IGG SER QL IA: >4000 INDEX
VZV IGG SER QL IA: POSITIVE
WBC # BLD AUTO: 4.6 10E3/UL (ref 4–11)
WBC URINE: 1 /HPF

## 2024-04-24 PROCEDURE — 86481 TB AG RESPONSE T-CELL SUSP: CPT | Performed by: STUDENT IN AN ORGANIZED HEALTH CARE EDUCATION/TRAINING PROGRAM

## 2024-04-24 PROCEDURE — 86803 HEPATITIS C AB TEST: CPT | Performed by: STUDENT IN AN ORGANIZED HEALTH CARE EDUCATION/TRAINING PROGRAM

## 2024-04-24 PROCEDURE — 99000 SPECIMEN HANDLING OFFICE-LAB: CPT | Performed by: PATHOLOGY

## 2024-04-24 PROCEDURE — 76700 US EXAM ABDOM COMPLETE: CPT | Mod: GC | Performed by: RADIOLOGY

## 2024-04-24 PROCEDURE — 80307 DRUG TEST PRSMV CHEM ANLYZR: CPT | Mod: 90 | Performed by: PATHOLOGY

## 2024-04-24 PROCEDURE — G0463 HOSPITAL OUTPT CLINIC VISIT: HCPCS | Mod: 27 | Performed by: INTERNAL MEDICINE

## 2024-04-24 PROCEDURE — 84466 ASSAY OF TRANSFERRIN: CPT | Performed by: STUDENT IN AN ORGANIZED HEALTH CARE EDUCATION/TRAINING PROGRAM

## 2024-04-24 PROCEDURE — 86787 VARICELLA-ZOSTER ANTIBODY: CPT | Performed by: STUDENT IN AN ORGANIZED HEALTH CARE EDUCATION/TRAINING PROGRAM

## 2024-04-24 PROCEDURE — 83540 ASSAY OF IRON: CPT | Performed by: PATHOLOGY

## 2024-04-24 PROCEDURE — 86900 BLOOD TYPING SEROLOGIC ABO: CPT | Performed by: STUDENT IN AN ORGANIZED HEALTH CARE EDUCATION/TRAINING PROGRAM

## 2024-04-24 PROCEDURE — 85610 PROTHROMBIN TIME: CPT | Performed by: PATHOLOGY

## 2024-04-24 PROCEDURE — 86704 HEP B CORE ANTIBODY TOTAL: CPT | Performed by: STUDENT IN AN ORGANIZED HEALTH CARE EDUCATION/TRAINING PROGRAM

## 2024-04-24 PROCEDURE — 86833 HLA CLASS II HIGH DEFIN QUAL: CPT | Performed by: STUDENT IN AN ORGANIZED HEALTH CARE EDUCATION/TRAINING PROGRAM

## 2024-04-24 PROCEDURE — 86644 CMV ANTIBODY: CPT | Performed by: STUDENT IN AN ORGANIZED HEALTH CARE EDUCATION/TRAINING PROGRAM

## 2024-04-24 PROCEDURE — 83735 ASSAY OF MAGNESIUM: CPT | Performed by: PATHOLOGY

## 2024-04-24 PROCEDURE — 86901 BLOOD TYPING SEROLOGIC RH(D): CPT | Performed by: STUDENT IN AN ORGANIZED HEALTH CARE EDUCATION/TRAINING PROGRAM

## 2024-04-24 PROCEDURE — 99215 OFFICE O/P EST HI 40 MIN: CPT | Mod: 25 | Performed by: STUDENT IN AN ORGANIZED HEALTH CARE EDUCATION/TRAINING PROGRAM

## 2024-04-24 PROCEDURE — 86665 EPSTEIN-BARR CAPSID VCA: CPT | Performed by: STUDENT IN AN ORGANIZED HEALTH CARE EDUCATION/TRAINING PROGRAM

## 2024-04-24 PROCEDURE — 87340 HEPATITIS B SURFACE AG IA: CPT | Performed by: STUDENT IN AN ORGANIZED HEALTH CARE EDUCATION/TRAINING PROGRAM

## 2024-04-24 PROCEDURE — 80053 COMPREHEN METABOLIC PANEL: CPT | Performed by: PATHOLOGY

## 2024-04-24 PROCEDURE — 93005 ELECTROCARDIOGRAM TRACING: CPT

## 2024-04-24 PROCEDURE — 82610 CYSTATIN C: CPT | Performed by: STUDENT IN AN ORGANIZED HEALTH CARE EDUCATION/TRAINING PROGRAM

## 2024-04-24 PROCEDURE — 85025 COMPLETE CBC W/AUTO DIFF WBC: CPT | Performed by: PATHOLOGY

## 2024-04-24 PROCEDURE — 86850 RBC ANTIBODY SCREEN: CPT | Performed by: STUDENT IN AN ORGANIZED HEALTH CARE EDUCATION/TRAINING PROGRAM

## 2024-04-24 PROCEDURE — 84443 ASSAY THYROID STIM HORMONE: CPT | Performed by: PATHOLOGY

## 2024-04-24 PROCEDURE — 84100 ASSAY OF PHOSPHORUS: CPT | Performed by: PATHOLOGY

## 2024-04-24 PROCEDURE — 93922 UPR/L XTREMITY ART 2 LEVELS: CPT | Performed by: RADIOLOGY

## 2024-04-24 PROCEDURE — 81001 URINALYSIS AUTO W/SCOPE: CPT | Performed by: PATHOLOGY

## 2024-04-24 PROCEDURE — G0463 HOSPITAL OUTPT CLINIC VISIT: HCPCS | Performed by: STUDENT IN AN ORGANIZED HEALTH CARE EDUCATION/TRAINING PROGRAM

## 2024-04-24 PROCEDURE — 86832 HLA CLASS I HIGH DEFIN QUAL: CPT | Performed by: STUDENT IN AN ORGANIZED HEALTH CARE EDUCATION/TRAINING PROGRAM

## 2024-04-24 PROCEDURE — 85730 THROMBOPLASTIN TIME PARTIAL: CPT | Performed by: PATHOLOGY

## 2024-04-24 PROCEDURE — 86696 HERPES SIMPLEX TYPE 2 TEST: CPT | Performed by: STUDENT IN AN ORGANIZED HEALTH CARE EDUCATION/TRAINING PROGRAM

## 2024-04-24 PROCEDURE — 86706 HEP B SURFACE ANTIBODY: CPT | Performed by: STUDENT IN AN ORGANIZED HEALTH CARE EDUCATION/TRAINING PROGRAM

## 2024-04-24 PROCEDURE — 86708 HEPATITIS A ANTIBODY: CPT | Performed by: STUDENT IN AN ORGANIZED HEALTH CARE EDUCATION/TRAINING PROGRAM

## 2024-04-24 PROCEDURE — 99215 OFFICE O/P EST HI 40 MIN: CPT | Mod: 25 | Performed by: INTERNAL MEDICINE

## 2024-04-24 PROCEDURE — 86777 TOXOPLASMA ANTIBODY: CPT | Performed by: STUDENT IN AN ORGANIZED HEALTH CARE EDUCATION/TRAINING PROGRAM

## 2024-04-24 PROCEDURE — 80061 LIPID PANEL: CPT | Performed by: PATHOLOGY

## 2024-04-24 PROCEDURE — 83550 IRON BINDING TEST: CPT | Performed by: PATHOLOGY

## 2024-04-24 PROCEDURE — 94618 PULMONARY STRESS TESTING: CPT | Performed by: INTERNAL MEDICINE

## 2024-04-24 PROCEDURE — 93010 ELECTROCARDIOGRAM REPORT: CPT | Mod: XE | Performed by: INTERNAL MEDICINE

## 2024-04-24 PROCEDURE — G0480 DRUG TEST DEF 1-7 CLASSES: HCPCS | Mod: 90 | Performed by: PATHOLOGY

## 2024-04-24 PROCEDURE — 36415 COLL VENOUS BLD VENIPUNCTURE: CPT | Performed by: PATHOLOGY

## 2024-04-24 PROCEDURE — 84134 ASSAY OF PREALBUMIN: CPT | Performed by: STUDENT IN AN ORGANIZED HEALTH CARE EDUCATION/TRAINING PROGRAM

## 2024-04-24 PROCEDURE — 86780 TREPONEMA PALLIDUM: CPT | Performed by: STUDENT IN AN ORGANIZED HEALTH CARE EDUCATION/TRAINING PROGRAM

## 2024-04-24 PROCEDURE — 93975 VASCULAR STUDY: CPT | Mod: GC | Performed by: RADIOLOGY

## 2024-04-24 PROCEDURE — 82728 ASSAY OF FERRITIN: CPT | Performed by: PATHOLOGY

## 2024-04-24 PROCEDURE — 93284 PRGRMG EVAL IMPLANTABLE DFB: CPT | Performed by: INTERNAL MEDICINE

## 2024-04-24 PROCEDURE — 83880 ASSAY OF NATRIURETIC PEPTIDE: CPT | Performed by: PATHOLOGY

## 2024-04-24 ASSESSMENT — PAIN SCALES - GENERAL
PAINLEVEL: NO PAIN (0)
PAINLEVEL: NO PAIN (0)

## 2024-04-24 NOTE — PROGRESS NOTES
Advanced Heart Failure/Transplant Clinic Note    HPI  Dear colleagues,     I had the pleasure of seeing Mr. Leo Russell in the Cardiology clinic.  As you know, Mr. Leo Russell is a pleasant 58 year old male with a past medical history of CAD s/p PCI c/b ICM with chronic HFrEF s/p CRT-D, recurrent VT s/p prior ablations, PAF, HTN, HLD, DM Type II, and obesity who presents as follow up for HFrEF.    Patient has had longstanding ICM and HFrEF and previously underwent an advanced therapy evaluation after he underwent VT ablations (June '20 at [facility] and Nov '21 through the [facility], [place]). It was thought better to focus on further rhythm control before pursing advanced therapies. He had been on longstanding amiodarone, but it was thought he started getting some liver and lung fibrosis changes, so this was stopped and mexiletine and sotalol have been continued. Sotalol was initiation at [facility] [place] in March '23. Patient was admitted to [facility] in Nov '23 for slow VT and had been getting multiple ATP therapies for this. Sotalol dose was increased. He has continued to have episodes of slow VT despite this and still having episodes of ATP. Patient underwent coronary angiogram that showed stable native disease with 80-90% in-stent restenosis of inferior branch of D1 s/p Angiosculpt POBA and RHC showed low cardiac index with normal filling pressures. Given his persistent rhythm issues, the patient was referred to [facility] for consideration of advanced therapies.    Patient still feeling like he is having regular episodes of ATP from his device. Patient reports he can walk 2-3 blocks without needing to stop and rest. Flights of stairs given him some difficulty. He denies recent presycnope, syncope, chest pain, palpitations, orthopnea, PND, abdominal pain, nausea, emesis, LE edema or recent weight gain. Patient has been trying to lose weight by eating better. He reports compliance with his  medications, monitoring his salt and fluid intake and monitoring his weights daily.    ROS:  A complete 12-point ROS was negative except as above.    PAST MEDICAL HISTORY:  Patient Active Problem List   Diagnosis    Anterior myocardial infarction (H)    Arterial aneurysm (H24)    Automatic implantable cardioverter-defibrillator in situ    Benign prostatic hyperplasia without lower urinary tract symptoms    Chronic anticoagulation    Chronic combined systolic and diastolic CHF, NYHA class 2 and ACC/AHA stage C (H)    Coronary atherosclerosis    CVA (cerebral vascular accident) (H)    Type 2 diabetes mellitus with obesity (H)    Gastroesophageal reflux disease without esophagitis    History of ventricular tachycardia    Hyperlipidemia    Hypertension    Ischemic cardiomyopathy    Lymphocytosis (symptomatic)    Moderate obesity    Paroxysmal atrial fibrillation (H)    Class 2 severe obesity due to excess calories with serious comorbidity in adult (H)        FAMILY HISTORY:  Multiple family members with CAD and HTN    SOCIAL HISTORY:  Social History     Tobacco Use    Smoking status: Former     Current packs/day: 0.00     Average packs/day: 1 pack/day for 20.0 years (20.0 ttl pk-yrs)     Types: Cigarettes     Start date:      Quit date:      Years since quittin.3     Passive exposure: Never    Smokeless tobacco: Never   Substance Use Topics    Alcohol use: Never    Drug use: Never    Patient denies any tobacco use since 2017, no ETOH or illicit substance use. Lives in [place], [place]. Believes his girlfriend and adult son could be his caregivers    ALLERGIES:  Allergies   Allergen Reactions    Morphine      ANXIETY  ANXIETY      Tramadol      ANXIETY  ANXIETY         CURRENT MEDICATIONS:  Current Outpatient Medications   Medication Sig Dispense Refill    aspirin 81 MG EC tablet Take 1 tablet by mouth daily      carvedilol (COREG) 25 MG tablet Take 50 mg by mouth 2 times daily      empagliflozin (JARDIANCE) 10  MG TABS tablet Take 10 mg by mouth daily      furosemide (LASIX) 40 MG tablet Take 40 mg by mouth daily as needed      magnesium oxide (MAG-OX) 400 MG tablet Take 400 mg by mouth 2 times daily      mexiletine (MEXITIL) 200 MG capsule Take 200 mg by mouth every 8 hours      pantoprazole (PROTONIX) 40 MG EC tablet Take 40 mg by mouth daily      potassium chloride ER (K-TAB/KLOR-CON) 10 MEQ CR tablet Take 30 mEq by mouth daily      sacubitril-valsartan (ENTRESTO)  MG per tablet Take 1 tablet by mouth 2 times daily      sotalol (BETAPACE) 120 MG tablet Take 180 mg by mouth every 12 hours      spironolactone (ALDACTONE) 25 MG tablet Take 50 mg by mouth daily      tamsulosin (FLOMAX) 0.4 MG capsule Take 0.4 mg by mouth daily      Vitamin D3 (VITAMIN D-1000 MAX ST) 25 mcg (1000 units) tablet Take 1,000 Units by mouth daily      nitroGLYcerin (NITROSTAT) 0.4 MG sublingual tablet Place 1 tablet under the tongue every 5 minutes as needed      rivaroxaban ANTICOAGULANT (XARELTO) 20 MG TABS tablet Take 20 mg by mouth daily (Patient not taking: Reported on 4/24/2024)      rosuvastatin (CRESTOR) 40 MG tablet Take 40 mg by mouth at bedtime         EXAM:  /71 (BP Location: Right arm, Patient Position: Chair, Cuff Size: Adult Large)   Pulse 60   Wt 122.4 kg (269 lb 12.8 oz)   SpO2 95%   BMI 37.63 kg/m      General: appears comfortable, alert and interactive, in no acute distress  Head: normocephalic, atraumatic  Eyes: anicteric sclera, EOMI  Mouth: MMM  CV: RRR, soft holosystolic murmur along apex, JVP ~7 cm  Resp: CTAB, no wheezes or crackles  GI: soft, NT, ND  Neurological: alert and oriented, no focal deficits  Psych: normal mood and affect  Derm: no rashes on exposed surfaces    Weight  Wt Readings from Last 10 Encounters:   04/26/24 120.7 kg (266 lb 1.5 oz)   04/25/24 121.2 kg (267 lb 3.2 oz)   04/24/24 122.4 kg (269 lb 13.5 oz)   04/24/24 122.4 kg (269 lb 12.8 oz)   02/08/24 117 kg (258 lb)   02/07/24 117.9 kg  (260 lb)       I personally reviewed recent labs and data as below and discussed the results with the patient in clinic today.  Labs:  BMP 12/14/23    K 4.5  Cl 104  CO2 23  BUN 14  Cr 0.97  NTproBNP 221    Last Comprehensive Metabolic Panel:  Sodium   Date Value Ref Range Status   04/24/2024 137 135 - 145 mmol/L Final     Comment:     Reference intervals for this test were updated on 09/26/2023 to more accurately reflect our healthy population. There may be differences in the flagging of prior results with similar values performed with this method. Interpretation of those prior results can be made in the context of the updated reference intervals.      Potassium   Date Value Ref Range Status   04/24/2024 4.3 3.4 - 5.3 mmol/L Final     Chloride   Date Value Ref Range Status   04/24/2024 103 98 - 107 mmol/L Final     Carbon Dioxide (CO2)   Date Value Ref Range Status   04/24/2024 22 22 - 29 mmol/L Final     Anion Gap   Date Value Ref Range Status   04/24/2024 12 7 - 15 mmol/L Final     Glucose   Date Value Ref Range Status   04/24/2024 198 (H) 70 - 99 mg/dL Final     Urea Nitrogen   Date Value Ref Range Status   04/24/2024 15.8 6.0 - 20.0 mg/dL Final     Creatinine   Date Value Ref Range Status   04/24/2024 1.01 0.67 - 1.17 mg/dL Final     GFR Estimate   Date Value Ref Range Status   04/24/2024 87 >60 mL/min/1.73m2 Final     Calcium   Date Value Ref Range Status   04/24/2024 9.1 8.6 - 10.0 mg/dL Final     Bilirubin Total   Date Value Ref Range Status   04/24/2024 0.7 <=1.2 mg/dL Final     Alkaline Phosphatase   Date Value Ref Range Status   04/24/2024 68 40 - 150 U/L Final     Comment:     Reference intervals for this test were updated on 11/14/2023 to more accurately reflect our healthy population. There may be differences in the flagging of prior results with similar values performed with this method. Interpretation of those prior results can be made in the context of the updated reference intervals.     ALT    Date Value Ref Range Status   04/24/2024 24 0 - 70 U/L Final     Comment:     Reference intervals for this test were updated on 6/12/2023 to more accurately reflect our healthy population. There may be differences in the flagging of prior results with similar values performed with this method. Interpretation of those prior results can be made in the context of the updated reference intervals.       AST   Date Value Ref Range Status   04/24/2024 16 0 - 45 U/L Final     Comment:     Reference intervals for this test were updated on 6/12/2023 to more accurately reflect our healthy population. There may be differences in the flagging of prior results with similar values performed with this method. Interpretation of those prior results can be made in the context of the updated reference intervals.       Testing/Procedures:  I personally visualized and interpreted:  Echocardiogram 10/19/23    Normal left ventricular wall thickness.     Biplane ejection fraction is 25%.     Severe left ventricular systolic dysfunction.  Large anteroapical   aneurysm.  Only the basilar left ventricular segments have preserved   contractility.  No LV mural thrombi.     Grade II (moderate) left ventricular diastolic abnormality.     Elevated left ventricular filling pressure.     The left atrium is severely dilated.     The right atrium is moderately dilated.     Normal central venous pressure (0-5 mmHg).     No pericardial effusion.     Inadequate TR spectral Doppler to accurately assess right ventricular   systolic pressure.   Comment: No obvious changes from the January 2022 echo.     Coronary Angiogram/RHC 12/5/23  Diagnostic coronary angiogram:   Selective engagement of the right coronary artery was performed with a JR4 catheter.  Selective engagement of the left main coronary artery was performed with a JL 3.5 catheter.  Selective injections were performed. The patient has a right dominant coronary system.   Left Main: Left main  coronary artery is normal.  No significant disease   Left Anterior Descending: The left anterior descending arises from the   left main and courses along the anterior interventricular groove.  The   proximal LAD stent is patent.  The remainder the LAD is patent although quite small and threadlike.  Flow is THEE grade III.  The first diagonal branch is a large vessel that bifurcates into a superior and inferior branch.  There is a stent that extends from the ostium of the vessel into the superior branch.  A stent also extends into the inferior branch.  This is a bifurcation type of stent arrangement.  50% stenosis is noted at the ostium of the first diagonal.  The remainder of the proximal portion is patent.  The superior branch, including the stent is widely patent with normal flow.  The inferior branch demonstrates severe in-stent restenosis in the proximal segment, 85-90% with THEE grade I distal flow.  The remaining diagonal branches are patent   Left Circumflex: The left circumflex is a moderate-sized artery.  The   proximal vessel is normal.  Mild irregularities are seen in the   midsegment.  The distal vessel is patent.  The first obtuse marginal is   patent without significant disease.  There is a moderate-sized second   obtuse marginal which is widely patent.   Right Coronary Artery: Right coronary artery is a large, dominant   artery.  Mild irregularities are seen in the proximal and mid segment.    Mild irregularities are noted distally.  The PDA and KANIKA branches are   widely patent.  This is similar in appearance to the patient's previous   angiogram   Left Heart Catheterization:   Left ventricular end-diastolic pressure measures 8 mmHg.  There is no   gradient across the aortic valve on catheter pullback   Right Heart Catheterization:     PA Pressure: 24/10 mmHg, mean 15 mmHg (PA saturation 65%)     PCWP: 8 mmHg     RV Pressure: 31/1 mmHg     RVEDP: 10 mmHg     RA Pressure: 5 mmHg     Cardiac Output: 5.2  L/min     Cardiac Index: 2.1 L/min/m surface squared     cMRI 24  Impression:   Infarcted tissue at the left anterior descending and left circumflex artery territories involving the mid, basal and apical segments, with 70% total scar burden. The only residual viable myocardium is at the inferior left ventricle in the right coronary artery distribution. Dilated left ventricle and the left ventricular ejection fraction is markedly compromised, however the quantification is not well accomplished on this study secondary to artifact.   Mildly hypokinetic right ventricular wall. No right ventricular dilatation.   Biatrial cardiac dilatation.   Mild mitral valve regurgitation.     Reviewed recent CT chest, abdomen, pelvis, head. US arterial and venous upper and lower extremities.    Device Interrogation 24  Device: Echo it G125 MOMENTUM CRT-D  Normal device function  Mode: DDDR  bpm  AP: 86%  RVP: 87%  LVP: 86%  Intrinsic rhythm: SB 45 bpm  Lead Trends Appear Stable: Yes  Estimated battery longevity to RRT = 9.5 years.  Atrial Arrhythmia: 0  AF [place]: 0%  Anticoagulant: Xarelto  Ventricular Arrhythmia: 0  Setting Changes: None    Outside results of note:  Outside records were obtained and relevant results/notes have been incorporated into HPI.    Assessment and Plan:     In summary, 58 year old male with a past medical history of CAD s/p PCI c/b ICM with chronic HFrEF s/p CRT-D, recurrent VT s/p prior ablations, PAF, HTN, HLD, DM Type II, and obesity who presents as follow up for HFrEF.    Chronic systolic heart failure/HFrEF (EF 25%) secondary to ischemic cardiomyopathy  NYHA Symptom Class III  Stage C  Primary Cardiologist: Dr. Ledesma  ACE-I/ARB/ARNi: Continue Entresto  mg BID  BB: Continue carvedilol 50 mg BID  Aldosterone antagonist: Continue spironolactone 50 mg daily  SGLT2i: Continue empagliflozin 10 mg daily  SCD prophylaxis: s/p CRT-D  %BiV pacin%  Fluid status: euvolemic  on lasix 40 mg daily PRN  Cardiac Rehab: Previously completed  Sleep Apnea Evaluation: Previously referred  Remote PA Pressure Monitoring (CardioMems): Not currently indicated given only on diuretics PRN  - Given refractory VT despite prior ablations and on two antiarrhythmics, will start advanced therapy evaluation, discussed with patient overview of the testing and consults needed, requirements of 24/7 30-day caregiver support and staying local in the area for the first month after discharge from the hospital. Brief overview of the difference between LVAD and transplant, and typical wait-times for heart transplant. Will have patient see Dr. Wills today, and if considered a candidate for VT ablation and RHC on 4/26/24 shows stable hemodynamics, then will proceed with ablation, otherwise will present for advanced therapy eval  - Continue heart healthy diet, regular aerobic exercise and monitoring Na, fluid intake and weights regularly    VT  PAF  Has been having numerous episodes of slow VT requiring ATP therapy recently. Two prior VT ablations in 2020 and 2021.  - Will have patient meet with Dr. Wills as above  - Continue coreg as above  - Continue sotalolol 180 mg BID and mexilitine 200 mg BID  - Continue rivaroxaban 20 mg daily    CAD  HLD  Had POBA in Dec '23 for in-stent thrombosis of prior D1 stent. Still having episodes of slow VT despite this. No recent angina.  - Continue ASA 81 mg daily  - Continue rosuvastatin 40 mg daily  - Continue coreg as above  - Has nitroglycerin PRN available    DM Type II  Last A1c 6.2  - Managed by PCP    HTN  Well controlled at home recently.  - Continue GDMT as above    Obesity  Body mass index is 37.63 kg/m .   - Recommend heart healthy diet and regular aerobic exercise as able    Optimal Vascular Metrics    Blood Pressure   BP < 140/90 Yes    On Aspirin  Yes    On Statin  Yes    Tobacco use  No     To Do:  - No change to medications today  - Follow up with Dr. Wills later  today to see if an ablation candidate  - Complete rest of advanced therapy testing  - Follow up with me pending results of rest of advanced therapy testing and decision about possibility for ablation    The patient states understanding and is agreeable with plan.   Feel free to contact myself regarding questions or concerns. It was a pleasure to see this patient today.    A total of 49 minutes was spent on the day of the visit, which includes preparation for the visit (reviewing previous medical records, laboratories and investigations), in conjunction with the actual clinic visit with the patient, which includes obtaining a history and physical exam, creating and reviewing the care plan, patient education (and family if present), counseling, documenting clinical information in the electronic health record and care coordination.     Caity Felix MD   of Medicine, [facility]  Advanced Heart Failure and Transplant Cardiology     CC  Suman Nagel

## 2024-04-24 NOTE — PATIENT INSTRUCTIONS
Cardiology Providers you saw during your visit:  Dr. Felix     Medication changes:  1-   No changes      Follow up:  1- Follow up with the rest of your appointments as scheduled         Please call if you have:  1. Weight gain of more than 2 pounds in a day or 5 pounds in a week  2. Increased shortness of breath, swelling or bloating  3. Dizziness, lightheadedness   4. Any questions or concerns.      Heart Failure Support Group  Support group is held virtually. Please reach out if you would like to attend and we can get you the information you need to log in.   2024 dates for support group meetings:    Monday, May 6th, 1-2pm     Monday, June 3rd, 1-2pm     Monday, July 1st, 1-2pm     Monday, August 5th, 1-2pm     Monday, September 9th, 1-2pm     Monday, October 7th, 1-2pm     Monday, November 4th, 1-2pm     Monday, December 2nd, 1-2pm     Follow the American Heart Association Diet and Lifestyle recommendations:  Limit saturated fat, trans fat, sodium, red meat, sweets and sugar-sweetened beverages. If you choose to eat red meat, compare labels and select the leanest cuts available.  Aim for at least 150 minutes of moderate physical activity or 75 minutes of vigorous physical activity - or an equal combination of both - each week.     During business hours: [phone], press option # 1 to schedule an appointment or send a message to your care team     After hours, weekends or holidays: On Call Cardiologist- [phone]   option #4 and ask to speak to the on-call Cardiologist. Inform them you are a CORE/heart failure patient at the [facility].     Rolanda Felder, RN BSN  Cardiology Nurse Care Coordinator (Heart Failure / C.O.R.E.)

## 2024-04-24 NOTE — PROGRESS NOTES
"Optimal Vascular Metrics    Blood Pressure   {BP < 140/90:3608916::\"BP < 140/90 Yes\"}    On Aspirin  {On Aspirin Yes/No:3164530::\"Yes\"}    On Statin  {On Statin Yes/No:1202329::\"Yes\"}    Tobacco use  {Tobacco use Yes/No:8542277::\"No\"}    "

## 2024-04-24 NOTE — LETTER
4/24/2024      RE: Leo Russell  [address] [facility] [address]       Dear Colleague,    Thank you for the opportunity to participate in the care of your patient, Leo Russell, at the [facility] [place] [facility]. Please see a copy of my visit note below.        ELECTROPHYSIOLOGY CLINIC VISIT    Assessment/Recommendations   Assessment/Plan:    Mr. Russell is a 58 year old male who has a medical history significant for anterior wall MI, CAD s/p PCIs LAD, D1, D2 2009, 2010, 2011, & 2017, ICM LVEF 25%, s/p CRTD 2011 s/p gen change 6/2022, VT s/p prior ablations 6/2020 & 11/2021, PAF (CHADSVASC 6 on Xarelto), HTN, HLD, prior LV thrombus, DM2, CVA, BPH, and obesity.      CAD s/p multiple PCIs LAD, D2, D2 2009, 2010, 2011, 2017  ICM LVEF 25%, NYHA III  Ventricular Tachycardia:  1. ACEi/ARB/ARNi: Continue Entresto.  2. BB: Continue Coreg.  3. Aldosterone antagonist: Continue Spironolactone.  4. SGLT2i: Continue Jardiance.  5. Antiplatlet: Continue ASA.  6. Statin: Continue Crestor.  7.  SCD prophylaxis: s/p CRTD BVP % decreased likely due to PVCs and VT episodes.   8. Fluid status: Continue Lasix.   9. Nitroglycerin 0.4 mg PRN for chest pain. Place 1 tablet (0.4 mg) under the tongue every 5 minutes as needed for chest pain. Maximum of 3 doses in 15 minutes.  10. Lifestyle: Avoid tobacco, maintain a healthy weight, limit alcohol, cardiac diet, and exercise.  11. VT: He has had VT with ICD therapies despite AAT. He was originally on amiodarone and mexiletine but had breakthroughs. Amiodarone was later stopped to avoid possible long term toxicities and he was alternatively placed on Sotalol. He has had 2 VT ablation at OSH and found to have numerous VTs arising from anterior scar area (lateral/septal walls) and inferior apical aneurysm. He has continued to have recurrent arrhyhmias. We discussed considering going back on amiodarone vs  ablation. He is undergoing advanced therapies evaluation here as well, but his main limiting factor is VT. We discussed ablation and its indications, risks, and benefits. Complications include but are not limited to vascular injury, excessive bleeding requiring blood transfusion, groin hematoma, aortic injury at the time of transseptal puncture, bleeding/injury to abdominal structures at time of epicardial access, cardiac tamponade requiring pericardiocentesis or open heart surgery, and thromboembolic complications or strokes. We also discussed that VT ablation can be limited by inducibility of VT at the time of EPS/Abaltion and/or the origin of VT. Efficacy of ablation also deceases if multiple foci are found. After an extensive discussion, the patient would like to pursue ablation in attempts to improve VT burden and symptoms. We agreed that we will await upcoming RHC results to help determine if hemodynamic support would be required.          Follow up 3 months post ablation.        History of Present Illness/Subjective    Mr. Leo Russell is a 58 year old male who comes in today for EP consultation of VT.    Mr. Russell is a 58 year old male who has a medical history significant for anterior wall MI, CAD s/p PCIs LAD, D1, D2 2009, 2010, 2011, & 2017, ICM LVEF 25%, s/p CRTD 2011 s/p gen change 6/2022, VT s/p prior ablations 6/2020 & 11/2021, PAF (CHADSVASC 6 on Xarelto), HTN, HLD, prior LV thrombus, DM2, CVA, BPH, and obesity.    He has a longstanding history of CAD for which he had prior PCIs to pLAD and D1 in 2009, PCI to totally occluded LAD in 2010, PCI to D2 and Balloon angioplasty through strut to improve blood flow to distal LAD in 2011. Further D2 branch LAD PCI in 2017, and then Angiosculpt scoring balloon angioplasty to ISR of D1 stent in 2023. He has had subsequent ICM with LVEF 25% range most recently. He has been on GDMT but continued to have reduced LVEF so had a CRTD implanted in 2011 with  last generator change in 6/2022. He has also had VT with ICD therapies. He had been on amiodarone. He has followed with [facility] and felt not a good candidate for advanced HF therapies due to uncontrolled VT. This lead to an ablation in 6/2020 where he was found to have recurrent numerous VTs as a result on an extensive anterior wall scar involving much of lateral and septal walls. Ablation was felt partially successful of border zones scar areas especially inferior apex. He continued to have ICD therapies despite this and amiodarone and mexiletine. Thus,he underwent a repeat ablation VT at apical portion of the scar at [facility] in 11/2021. Amiodarone was decreased to 200 mg daily and mexiletine discontinued. Unfortunately presented to the emergency room 1/25/2022 with sensation of racing in the heart and chest fullness. He was found to be in VT at 130 bpm. He did convert spontaneously. He had felt well for 5 or 6 weeks post ablation in [place] however began experiencing recurrent symptoms with ATP for VT at 124 bpm on 1/11/2022 and then ATP x8 for VT at 139 bpm. Mexiletine was added back. He was discharged on amiodarone 200 mg daily and mexiletine 200 mg twice daily. He was seen at [facility] on 1/[facility]/2022 and mexiletine increased to 3 times daily and he was continued on amiodarone 200 mg daily. Amiodarone is not favored for long-term use given his young age and risk for toxicities with long-term use, thus after he had 6 months free of VT, amiodarone was stopped and he was admitted in 3/2023 for Sotalol loading. He has since been having numerous episodes of slow VT requiring ATP therapy recently. He was admitted in 11/2023 to OS for slow VT and multiple ATPs. Sotalol was increased. He had a repeat coronary angiogram in 12/2023 that showed stable native disease with 80-90% in-stent restenosis of inferior branch of D1 s/p Angiosculpt POBA and RHC showed low cardiac index with normal filling pressures.  Given his persistent rhythm issues, the patient was referred to [facility] for consideration of advanced therapies. He saw Dr. Felix here who started advanced therapies work up. He was referred to EP here for an opinion about his VT management/treatment options.    He reports he feels the VT episodes with chest pressure and some dizziness, no LOC or pre-syncope. He denies abdominal fullness/bloating or peripheral edema, shortness of breath, paroxysmal nocturnal dyspnea, orthopnea, pre-syncope, or syncope. Presenting 12 lead ECG shows  Vent Rate 60 bpm,  ms,  ms, QTc 474 ms. Device interrogation shows normal device function, stable lead parameters, and AP 86%, RVP 87%, LVP 86%. Current cardiac medications include: Coreg, Entresto, Jardiance, Lasix, Crestor, Spironolactone, Sotalol, Mexiletine, ASA, and Xarelto.         I have reviewed and updated the patient's Past Medical History, Social History, Family History and Medication List.     Cardiographics (Personally Reviewed) :   10/19/23Echo (OS Report):   Normal left ventricular wall thickness.      Biplane ejection fraction is 25%.      Severe left ventricular systolic dysfunction.  Large anteroapical   aneurysm.  Only the basilar left ventricular segments have preserved   contractility.  No LV mural thrombi.      Grade II (moderate) left ventricular diastolic abnormality.      Elevated left ventricular filling pressure.      The left atrium is severely dilated.      The right atrium is moderately dilated.      Normal central venous pressure (0-5 mmHg).      No pericardial effusion.      Inadequate TR spectral Doppler to accurately assess right ventricular   systolic pressure.     12/5/23 Coronary Angiogram (OSH Report):  Conclusions:   1.  Normal right heart pressures   2.  Pulmonary capillary wedge pressure 8 mmHg   3.  Patent proximal LAD stent   4.  50% in-stent restenosis ostial first diagonal with patent superior   branch stent.  The inferior  branch of the first diagonal has severe 80-90%   in-stent restenosis.  Status post 2.5 mm Angiosculpt scoring balloon   angioplasty at the ostium and 2.5 x 15 mm trek neononcompliant balloon   angioplasty in the in-stent restenotic segment   5.  Mild irregularities right coronary artery and left circumflex coronary   artery.  Similar to previous    1/11/24 CMR [facility] Overread:  1. The LV is severely dilated in cavity size. The global systolic function is severely decreased. The LVEF  is 13%. There is akinesis of all the septal and anterior segments. There is thinning of the apical and true  apical segments.     2. The RV is normal in cavity size. The global systolic function is moderately decreased. The RVEF is 38%.      3. There is moderate enlargement of both atria.     4. Valvular function could not be reliably assessed because of device-related artifacts.     5. Late gadolinium enhancement imaging is suboptimal due to device-related artifacts (wide-band LGE imaging  was not performed); however, there is evidence of a large myocardial infarction involving virtually the  entire LAD territory. The MI is near transmural for a brief portion at the mid level and is transmural at  the apical and true apical levels. This is suggestive of a late presentation MI. There is practically no  residual viability in the LAD territory. The RCA and LCx territories appear viable.     6. There is no pericardial effusion or thickening.     7. There is no intracardiac thrombus.     CONCLUSIONS: Severe ischemic cardiomyopathy with adverse LV remodeling, LVEF of 13% and RVEF of 38%, with a  large MI involving virtually the entire LAD territory.          Physical Examination   /71   Pulse 60   Wt 122.4 kg (269 lb 13.5 oz)   SpO2 95%   BMI 37.64 kg/m    Wt Readings from Last 3 Encounters:   02/08/24 117 kg (258 lb)   02/07/24 117.9 kg (260 lb)     General Appearance:   Alert, well-appearing and in no acute distress.   HEENT:  Atraumatic, normocephalic. PERRL.  MMM.   Chest/Lungs:   Respirations unlabored.  Lungs are clear to auscultation.   Cardiovascular:   Regular rate and rhythm.  S1/S2. No murmur.    Abdomen:  Soft, nontender, nondistended.   Extremities: No cyanosis or clubbing. No edema.    Musculoskeletal: Moves all extremities.  Gait normal.   Skin: Warm, dry, intact.    Neurologic: Mood and affect are appropriate.  Alert and oriented to person, place, time, and situation.          Medications  Allergies   Current Outpatient Medications   Medication Sig Dispense Refill     aspirin 81 MG EC tablet Take 1 tablet by mouth daily       carvedilol (COREG) 25 MG tablet Take 50 mg by mouth 2 times daily       empagliflozin (JARDIANCE) 10 MG TABS tablet Take 10 mg by mouth daily       furosemide (LASIX) 40 MG tablet Take 40 mg by mouth daily as needed       magnesium oxide (MAG-OX) 400 MG tablet Take 400 mg by mouth 2 times daily       mexiletine (MEXITIL) 200 MG capsule Take 200 mg by mouth every 8 hours       nitroGLYcerin (NITROSTAT) 0.4 MG sublingual tablet Place 1 tablet under the tongue every 5 minutes as needed       pantoprazole (PROTONIX) 40 MG EC tablet Take 40 mg by mouth daily       potassium chloride ER (K-TAB/KLOR-CON) 10 MEQ CR tablet Take 30 mEq by mouth daily       rivaroxaban ANTICOAGULANT (XARELTO) 20 MG TABS tablet Take 20 mg by mouth daily (Patient not taking: Reported on 4/24/2024)       rosuvastatin (CRESTOR) 40 MG tablet Take 40 mg by mouth at bedtime       sacubitril-valsartan (ENTRESTO)  MG per tablet Take 1 tablet by mouth 2 times daily       sotalol (BETAPACE) 120 MG tablet Take 180 mg by mouth every 12 hours       spironolactone (ALDACTONE) 25 MG tablet Take 50 mg by mouth daily       tamsulosin (FLOMAX) 0.4 MG capsule Take 0.4 mg by mouth daily       Vitamin D3 (VITAMIN D-1000 MAX ST) 25 mcg (1000 units) tablet Take 1,000 Units by mouth daily      Allergies   Allergen Reactions     Morphine       ANXIETY  ANXIETY       Tramadol      ANXIETY  ANXIETY           Lab Results (Personally Reviewed)    Chemistry/lipid CBC Cardiac Enzymes/BNP/TSH/INR   Lab Results   Component Value Date    BUN 15.8 04/24/2024     04/24/2024    CO2 22 04/24/2024     Creatinine   Date Value Ref Range Status   04/24/2024 1.01 0.67 - 1.17 mg/dL Final       Lab Results   Component Value Date    CHOL 112 04/24/2024    HDL 30 (L) 04/24/2024    LDL 51 04/24/2024      Lab Results   Component Value Date    WBC 4.6 04/24/2024    HGB 15.4 04/24/2024    HCT 45.7 04/24/2024    MCV 95 04/24/2024     04/24/2024    Lab Results   Component Value Date    TSH 1.63 04/24/2024    INR 1.33 (H) 04/24/2024        The patient states understanding and is agreeable with the plan.   Esteban Wills MD [facility]  Cardiology - Electrophysiology      Total time spent on patient visit, reviewing notes, imaging, labs, orders, and completing necessary documentation: 60 minutes.     Addendum:  RHC results showed compensated HF and no cardiogenic shock or pre-shock physiology. Will proceed with VT ablation.    Esteban Wills MD [facility]  Cardiology - Electrophysiology                 Please do not hesitate to contact me if you have any questions/concerns.     Sincerely,     Esteban Wills MD

## 2024-04-24 NOTE — LETTER
4/24/2024      RE: Leo Russell  [address] [facility] [place] [address]       Dear Colleague,    Thank you for the opportunity to participate in the care of your patient, Leo Russell, at the [facility] [place] [facility]. Please see a copy of my visit note below.    Advanced Heart Failure/Transplant Clinic Note    HPI  Dear colleagues,     I had the pleasure of seeing Mr. Leo Russell in the Cardiology clinic.  As you know, Mr. Leo Russell is a pleasant 58 year old male with a past medical history of CAD s/p PCI c/b ICM with chronic HFrEF s/p CRT-D, recurrent VT s/p prior ablations, PAF, HTN, HLD, DM Type II, and obesity who presents as follow up for HFrEF.    Patient has had longstanding ICM and HFrEF and previously underwent an advanced therapy evaluation after he underwent VT ablations (June '20 at [facility] and Nov '21 through the [facility]). It was thought better to focus on further rhythm control before pursing advanced therapies. He had been on longstanding amiodarone, but it was thought he started getting some liver and lung fibrosis changes, so this was stopped and mexiletine and sotalol have been continued. Sotalol was initiation at [facility] in March '23. Patient was admitted to [facility] in Nov '23 for slow VT and had been getting multiple ATP therapies for this. Sotalol dose was increased. He has continued to have episodes of slow VT despite this and still having episodes of ATP. Patient underwent coronary angiogram that showed stable native disease with 80-90% in-stent restenosis of inferior branch of D1 s/p Angiosculpt POBA and RHC showed low cardiac index with normal filling pressures. Given his persistent rhythm issues, the patient was referred to [facility] for consideration of advanced therapies.    Patient still feeling like he is having regular episodes of ATP from his device.  Patient reports he can walk 2-3 blocks without needing to stop and rest. Flights of stairs given him some difficulty. He denies recent presycnope, syncope, chest pain, palpitations, orthopnea, PND, abdominal pain, nausea, emesis, LE edema or recent weight gain. Patient has been trying to lose weight by eating better. He reports compliance with his medications, monitoring his salt and fluid intake and monitoring his weights daily.    ROS:  A complete 12-point ROS was negative except as above.    PAST MEDICAL HISTORY:  Patient Active Problem List   Diagnosis     Anterior myocardial infarction (H)     Arterial aneurysm (H24)     Automatic implantable cardioverter-defibrillator in situ     Benign prostatic hyperplasia without lower urinary tract symptoms     Chronic anticoagulation     Chronic combined systolic and diastolic CHF, NYHA class 2 and ACC/AHA stage C (H)     Coronary atherosclerosis     CVA (cerebral vascular accident) (H)     Type 2 diabetes mellitus with obesity (H)     Gastroesophageal reflux disease without esophagitis     History of ventricular tachycardia     Hyperlipidemia     Hypertension     Ischemic cardiomyopathy     Lymphocytosis (symptomatic)     Moderate obesity     Paroxysmal atrial fibrillation (H)     Class 2 severe obesity due to excess calories with serious comorbidity in adult (H)        FAMILY HISTORY:  Multiple family members with CAD and HTN    SOCIAL HISTORY:  Social History     Tobacco Use     Smoking status: Former     Current packs/day: 0.00     Average packs/day: 1 pack/day for 20.0 years (20.0 ttl pk-yrs)     Types: Cigarettes     Start date:      Quit date: 2017     Years since quittin.3     Passive exposure: Never     Smokeless tobacco: Never   Substance Use Topics     Alcohol use: Never     Drug use: Never    Patient denies any tobacco use since 2017, no ETOH or illicit substance use. Lives in [place], [place]. Believes his girlfriend and adult son could be his  caregivers    ALLERGIES:  Allergies   Allergen Reactions     Morphine      ANXIETY  ANXIETY       Tramadol      ANXIETY  ANXIETY         CURRENT MEDICATIONS:  Current Outpatient Medications   Medication Sig Dispense Refill     aspirin 81 MG EC tablet Take 1 tablet by mouth daily       carvedilol (COREG) 25 MG tablet Take 50 mg by mouth 2 times daily       empagliflozin (JARDIANCE) 10 MG TABS tablet Take 10 mg by mouth daily       furosemide (LASIX) 40 MG tablet Take 40 mg by mouth daily as needed       magnesium oxide (MAG-OX) 400 MG tablet Take 400 mg by mouth 2 times daily       mexiletine (MEXITIL) 200 MG capsule Take 200 mg by mouth every 8 hours       pantoprazole (PROTONIX) 40 MG EC tablet Take 40 mg by mouth daily       potassium chloride ER (K-TAB/KLOR-CON) 10 MEQ CR tablet Take 30 mEq by mouth daily       sacubitril-valsartan (ENTRESTO)  MG per tablet Take 1 tablet by mouth 2 times daily       sotalol (BETAPACE) 120 MG tablet Take 180 mg by mouth every 12 hours       spironolactone (ALDACTONE) 25 MG tablet Take 50 mg by mouth daily       tamsulosin (FLOMAX) 0.4 MG capsule Take 0.4 mg by mouth daily       Vitamin D3 (VITAMIN D-1000 MAX ST) 25 mcg (1000 units) tablet Take 1,000 Units by mouth daily       nitroGLYcerin (NITROSTAT) 0.4 MG sublingual tablet Place 1 tablet under the tongue every 5 minutes as needed       rivaroxaban ANTICOAGULANT (XARELTO) 20 MG TABS tablet Take 20 mg by mouth daily (Patient not taking: Reported on 4/24/2024)       rosuvastatin (CRESTOR) 40 MG tablet Take 40 mg by mouth at bedtime         EXAM:  /71 (BP Location: Right arm, Patient Position: Chair, Cuff Size: Adult Large)   Pulse 60   Wt 122.4 kg (269 lb 12.8 oz)   SpO2 95%   BMI 37.63 kg/m      General: appears comfortable, alert and interactive, in no acute distress  Head: normocephalic, atraumatic  Eyes: anicteric sclera, EOMI  Mouth: MMM  CV: RRR, soft holosystolic murmur along apex, JVP ~7 cm  Resp: CTAB, no  wheezes or crackles  GI: soft, NT, ND  Neurological: alert and oriented, no focal deficits  Psych: normal mood and affect  Derm: no rashes on exposed surfaces    Weight  Wt Readings from Last 10 Encounters:   04/26/24 120.7 kg (266 lb 1.5 oz)   04/25/24 121.2 kg (267 lb 3.2 oz)   04/24/24 122.4 kg (269 lb 13.5 oz)   04/24/24 122.4 kg (269 lb 12.8 oz)   02/08/24 117 kg (258 lb)   02/07/24 117.9 kg (260 lb)       I personally reviewed recent labs and data as below and discussed the results with the patient in clinic today.  Labs:  BMP 12/14/23    K 4.5  Cl 104  CO2 23  BUN 14  Cr 0.97  NTproBNP 221    Last Comprehensive Metabolic Panel:  Sodium   Date Value Ref Range Status   04/24/2024 137 135 - 145 mmol/L Final     Comment:     Reference intervals for this test were updated on 09/26/2023 to more accurately reflect our healthy population. There may be differences in the flagging of prior results with similar values performed with this method. Interpretation of those prior results can be made in the context of the updated reference intervals.      Potassium   Date Value Ref Range Status   04/24/2024 4.3 3.4 - 5.3 mmol/L Final     Chloride   Date Value Ref Range Status   04/24/2024 103 98 - 107 mmol/L Final     Carbon Dioxide (CO2)   Date Value Ref Range Status   04/24/2024 22 22 - 29 mmol/L Final     Anion Gap   Date Value Ref Range Status   04/24/2024 12 7 - 15 mmol/L Final     Glucose   Date Value Ref Range Status   04/24/2024 198 (H) 70 - 99 mg/dL Final     Urea Nitrogen   Date Value Ref Range Status   04/24/2024 15.8 6.0 - 20.0 mg/dL Final     Creatinine   Date Value Ref Range Status   04/24/2024 1.01 0.67 - 1.17 mg/dL Final     GFR Estimate   Date Value Ref Range Status   04/24/2024 87 >60 mL/min/1.73m2 Final     Calcium   Date Value Ref Range Status   04/24/2024 9.1 8.6 - 10.0 mg/dL Final     Bilirubin Total   Date Value Ref Range Status   04/24/2024 0.7 <=1.2 mg/dL Final     Alkaline Phosphatase   Date  Value Ref Range Status   04/24/2024 68 40 - 150 U/L Final     Comment:     Reference intervals for this test were updated on 11/14/2023 to more accurately reflect our healthy population. There may be differences in the flagging of prior results with similar values performed with this method. Interpretation of those prior results can be made in the context of the updated reference intervals.     ALT   Date Value Ref Range Status   04/24/2024 24 0 - 70 U/L Final     Comment:     Reference intervals for this test were updated on 6/12/2023 to more accurately reflect our healthy population. There may be differences in the flagging of prior results with similar values performed with this method. Interpretation of those prior results can be made in the context of the updated reference intervals.       AST   Date Value Ref Range Status   04/24/2024 16 0 - 45 U/L Final     Comment:     Reference intervals for this test were updated on 6/12/2023 to more accurately reflect our healthy population. There may be differences in the flagging of prior results with similar values performed with this method. Interpretation of those prior results can be made in the context of the updated reference intervals.       Testing/Procedures:  I personally visualized and interpreted:  Echocardiogram 10/19/23     Normal left ventricular wall thickness.      Biplane ejection fraction is 25%.      Severe left ventricular systolic dysfunction.  Large anteroapical   aneurysm.  Only the basilar left ventricular segments have preserved   contractility.  No LV mural thrombi.      Grade II (moderate) left ventricular diastolic abnormality.      Elevated left ventricular filling pressure.      The left atrium is severely dilated.      The right atrium is moderately dilated.      Normal central venous pressure (0-5 mmHg).      No pericardial effusion.      Inadequate TR spectral Doppler to accurately assess right ventricular   systolic pressure.    Comment: No obvious changes from the January 2022 echo.     Coronary Angiogram/RHC 12/5/23  Diagnostic coronary angiogram:   Selective engagement of the right coronary artery was performed with a JR4 catheter.  Selective engagement of the left main coronary artery was performed with a JL 3.5 catheter.  Selective injections were performed. The patient has a right dominant coronary system.   Left Main: Left main coronary artery is normal.  No significant disease   Left Anterior Descending: The left anterior descending arises from the   left main and courses along the anterior interventricular groove.  The   proximal LAD stent is patent.  The remainder the LAD is patent although quite small and threadlike.  Flow is THEE grade III.  The first diagonal branch is a large vessel that bifurcates into a superior and inferior branch.  There is a stent that extends from the ostium of the vessel into the superior branch.  A stent also extends into the inferior branch.  This is a bifurcation type of stent arrangement.  50% stenosis is noted at the ostium of the first diagonal.  The remainder of the proximal portion is patent.  The superior branch, including the stent is widely patent with normal flow.  The inferior branch demonstrates severe in-stent restenosis in the proximal segment, 85-90% with THEE grade I distal flow.  The remaining diagonal branches are patent   Left Circumflex: The left circumflex is a moderate-sized artery.  The   proximal vessel is normal.  Mild irregularities are seen in the   midsegment.  The distal vessel is patent.  The first obtuse marginal is   patent without significant disease.  There is a moderate-sized second   obtuse marginal which is widely patent.   Right Coronary Artery: Right coronary artery is a large, dominant   artery.  Mild irregularities are seen in the proximal and mid segment.    Mild irregularities are noted distally.  The PDA and KANIKA branches are   widely patent.  This is  similar in appearance to the patient's previous   angiogram   Left Heart Catheterization:   Left ventricular end-diastolic pressure measures 8 mmHg.  There is no   gradient across the aortic valve on catheter pullback   Right Heart Catheterization:     PA Pressure: 24/10 mmHg, mean 15 mmHg (PA saturation 65%)     PCWP: 8 mmHg     RV Pressure: 31/1 mmHg     RVEDP: 10 mmHg     RA Pressure: 5 mmHg     Cardiac Output: 5.2 L/min     Cardiac Index: 2.1 L/min/m surface squared     cMRI 1/11/24  Impression:   Infarcted tissue at the left anterior descending and left circumflex artery territories involving the mid, basal and apical segments, with 70% total scar burden. The only residual viable myocardium is at the inferior left ventricle in the right coronary artery distribution. Dilated left ventricle and the left ventricular ejection fraction is markedly compromised, however the quantification is not well accomplished on this study secondary to artifact.   Mildly hypokinetic right ventricular wall. No right ventricular dilatation.   Biatrial cardiac dilatation.   Mild mitral valve regurgitation.     Reviewed recent CT chest, abdomen, pelvis, head. US arterial and venous upper and lower extremities.    Device Interrogation 4/24/24  Device: [employer] G125 MOMENTUM CRT-D  Normal device function  Mode: DDDR  bpm  AP: 86%  RVP: 87%  LVP: 86%  Intrinsic rhythm: SB 45 bpm  Lead Trends Appear Stable: Yes  Estimated battery longevity to RRT = 9.5 years.  Atrial Arrhythmia: 0  AF [place]: 0%  Anticoagulant: Xarelto  Ventricular Arrhythmia: 0  Setting Changes: None    Outside results of note:  Outside records were obtained and relevant results/notes have been incorporated into HPI.    Assessment and Plan:     In summary, 58 year old male with a past medical history of CAD s/p PCI c/b ICM with chronic HFrEF s/p CRT-D, recurrent VT s/p prior ablations, PAF, HTN, HLD, DM Type II, and obesity who presents as follow up for  HFrEF.    Chronic systolic heart failure/HFrEF (EF 25%) secondary to ischemic cardiomyopathy  NYHA Symptom Class III  Stage C  Primary Cardiologist: Dr. Ledesma  ACE-I/ARB/ARNi: Continue Entresto  mg BID  BB: Continue carvedilol 50 mg BID  Aldosterone antagonist: Continue spironolactone 50 mg daily  SGLT2i: Continue empagliflozin 10 mg daily  SCD prophylaxis: s/p CRT-D  %BiV pacin%  Fluid status: euvolemic on lasix 40 mg daily PRN  Cardiac Rehab: Previously completed  Sleep Apnea Evaluation: Previously referred  Remote PA Pressure Monitoring (CardioMems): Not currently indicated given only on diuretics PRN  - Given refractory VT despite prior ablations and on two antiarrhythmics, will start advanced therapy evaluation, discussed with patient overview of the testing and consults needed, requirements of  30-day caregiver support and staying local in the area for the first month after discharge from the hospital. Brief overview of the difference between LVAD and transplant, and typical wait-times for heart transplant. Will have patient see Dr. Wills today, and if considered a candidate for VT ablation and RHC on 24 shows stable hemodynamics, then will proceed with ablation, otherwise will present for advanced therapy eval  - Continue heart healthy diet, regular aerobic exercise and monitoring Na, fluid intake and weights regularly    VT  PAF  Has been having numerous episodes of slow VT requiring ATP therapy recently. Two prior VT ablations in  and .  - Will have patient meet with Dr. Wills as above  - Continue coreg as above  - Continue sotalolol 180 mg BID and mexilitine 200 mg BID  - Continue rivaroxaban 20 mg daily    CAD  HLD  Had POBA in Dec '23 for in-stent thrombosis of prior D1 stent. Still having episodes of slow VT despite this. No recent angina.  - Continue ASA 81 mg daily  - Continue rosuvastatin 40 mg daily  - Continue coreg as above  - Has nitroglycerin PRN available    DM  Type II  Last A1c 6.2  - Managed by PCP    HTN  Well controlled at home recently.  - Continue GDMT as above    Obesity  Body mass index is 37.63 kg/m .   - Recommend heart healthy diet and regular aerobic exercise as able    Optimal Vascular Metrics    Blood Pressure   BP < 140/90 Yes    On Aspirin  Yes    On Statin  Yes    Tobacco use  No     To Do:  - No change to medications today  - Follow up with Dr. Wills later today to see if an ablation candidate  - Complete rest of advanced therapy testing  - Follow up with me pending results of rest of advanced therapy testing and decision about possibility for ablation    The patient states understanding and is agreeable with plan.   Feel free to contact myself regarding questions or concerns. It was a pleasure to see this patient today.    A total of 49 minutes was spent on the day of the visit, which includes preparation for the visit (reviewing previous medical records, laboratories and investigations), in conjunction with the actual clinic visit with the patient, which includes obtaining a history and physical exam, creating and reviewing the care plan, patient education (and family if present), counseling, documenting clinical information in the electronic health record and care coordination.     Caity Felix MD   of Medicine, [facility]  Advanced Heart Failure and Transplant Cardiology     CC  Suman Nagel         Please do not hesitate to contact me if you have any questions/concerns.     Sincerely,     Caity Felix MD

## 2024-04-24 NOTE — PATIENT INSTRUCTIONS
Plan:    Possible VT ablation. Will wait for RHC to be done on 4/26 prior to proceeding.      Your Care Team:  EP Cardiology   Telephone Number     Donna Tran RN [phone]    After business hours: [phone], ask for cardiologist on-call   Non-procedure scheduling:    Radha HENSLEY   [phone]   Procedure scheduling:    Darby Randhawa   [phone]   Device Clinic (Pacemakers, ICDs, Loop Recorders)    During business hours: [phone]  After business hours:   [phone]- select option 4 and ask for job code [id number].       Cardiovascular Clinic:   [address] [place] [address]. [place] [address]      As always, thank you for trusting us with your health care needs!

## 2024-04-24 NOTE — PROGRESS NOTES
Transplant Teaching 04/23/24    Writer met with patient and son, Michael, in person,  to complete heart transplant teaching. We reviewed the candidate selection process, insurance prior authorization, UNOS priority statuses, the waiting period, donor issues, and the pre-, brenda-, and post-op periods. We also reviewed the major risks of transplantation including rejection, infection and transplant vasculopathy. We  discussed patient and caregiver responsibilities before and after transplant including the need for patient to identify a caregiver to be present for education and to assist patient post discharge. The patient was informed of the weight and chemical cessation policy and agrees to these requirements.     Discussed DCD/OCS with patient and son, including risks and benefits of receiving DCD donor organs, and option to decline such offers. Will send DCD information sheet with post teaching packet. Pt and son verbalized understanding. Pt has decided to further think about DCD donor organ offers.      Discussed the possibility of accepting a donor heart that has known Hepatitis C infection (past or current).  The risks of receiving a donor organ which may be infected with hepatitis C have been discussed with pt and son.  The risk in accepting this organ (including the possibility of all an infection with hepatitis C) have been discussed. Treatment options have also been discussed with this patient and he/she has been informed that there is no guarantee for a cure with these treatments. The option of declining this organ has also been discussed. Patient was given the opportunity to ask questions and understands the risks and/or benefits associated with accepting this type of organ.  Patient was encouraged to discussed the option for these donors with their cardiologist. Pt would like to think about this further and discuss with Dr. Felix before making a decision    Throughout the session, the patient and  son were attentive, eager to learn, and asked appropriate questions.  Patient  was given a copy of the UNOS brochure on Multiple Waitlisting, the  Heart Transplant brochure and current program statistics.  Patient was also encouraged to visit the informational transplant education website for further information and video classes ([email or web address]).      Gave pt and son the transplant office number and encouraged to call with future questions or concerns.    In follow up, the patient was instructed on completing the following items for his/her transplant evaluation:    Continue evaluation, scheduled for this week. So far, there is no further follow-up needed. Will await RHC results on Friday and discuss plan with Dr. Felix once we know more about his hemodynamics.

## 2024-04-24 NOTE — NURSING NOTE
Diet: Patient instructed regarding a heart failure healthy diet, including discussion of reduced fat and 2000 mg daily sodium restriction, daily weights, medication purpose and compliance, fluid restrictions and resources for patient and family to access for assistance with heart failure management.       Labs: Patient was given results of the laboratory testing obtained today and patient was instructed about when to return for the next laboratory testing.     Med Reconcile: Reviewed and verified all current medications with the patient. The updated medication list was printed and given to the patient. No changes.     Return Appointment: Patient given instructions regarding scheduling next clinic visit. FOllow up pending after eval completed    Patient stated he understood all health information given and agreed to call with further questions or concerns.       Rolanda Felder RN

## 2024-04-24 NOTE — PATIENT INSTRUCTIONS
It was a pleasure to see you in clinic today.  Please do not hesitate to call with any questions or concerns.      Tawny Nunez, RN, MS, CCRN  Electrophysiology Nurse Clinician  [facility]    During Business Hours Please Call:  [phone]  After Hours Please Call:  [phone] - select option #4 and ask for job code [phone]

## 2024-04-24 NOTE — NURSING NOTE
Chief Complaint   Patient presents with    Follow Up     RTN HF: 58 year old male presents with ICM, systolic heart failure for follow up with labs and testing prior       Vitals were taken, medications reconciled.    Shahrzad Simpson, Facilitator   8:45 AM

## 2024-04-25 ENCOUNTER — HOSPITAL ENCOUNTER (OUTPATIENT)
Dept: CARDIOLOGY | Facility: CLINIC | Age: 59
Discharge: HOME | End: 2024-04-25
Attending: STUDENT IN AN ORGANIZED HEALTH CARE EDUCATION/TRAINING PROGRAM
Payer: MEDICARE

## 2024-04-25 ENCOUNTER — VIRTUAL VISIT (OUTPATIENT)
Dept: TRANSPLANT | Facility: CLINIC | Age: 59
End: 2024-04-25
Attending: STUDENT IN AN ORGANIZED HEALTH CARE EDUCATION/TRAINING PROGRAM
Payer: MEDICARE

## 2024-04-25 VITALS
DIASTOLIC BLOOD PRESSURE: 66 MMHG | HEART RATE: 60 BPM | BODY MASS INDEX: 37.41 KG/M2 | HEIGHT: 71 IN | OXYGEN SATURATION: 99 % | WEIGHT: 267.2 LBS | SYSTOLIC BLOOD PRESSURE: 104 MMHG

## 2024-04-25 DIAGNOSIS — I50.42 CHRONIC COMBINED SYSTOLIC AND DIASTOLIC CHF, NYHA CLASS 2 AND ACC/AHA STAGE C (H): ICD-10-CM

## 2024-04-25 DIAGNOSIS — Z11.59 ENCOUNTER FOR SCREENING FOR OTHER VIRAL DISEASES: ICD-10-CM

## 2024-04-25 DIAGNOSIS — R09.89 OTHER SPECIFIED SYMPTOMS AND SIGNS INVOLVING THE CIRCULATORY AND RESPIRATORY SYSTEMS: ICD-10-CM

## 2024-04-25 DIAGNOSIS — E13.59 OTHER SPECIFIED DIABETES MELLITUS WITH OTHER CIRCULATORY COMPLICATIONS (H): ICD-10-CM

## 2024-04-25 LAB
ATRIAL RATE - MUSE: 60 BPM
DIASTOLIC BLOOD PRESSURE - MUSE: NORMAL MMHG
FIO2-PRE: 21 %
GAMMA INTERFERON BACKGROUND BLD IA-ACNC: 0.26 IU/ML
INTERPRETATION ECG - MUSE: NORMAL
M TB IFN-G BLD-IMP: POSITIVE
M TB IFN-G CD4+ BCKGRND COR BLD-ACNC: 9.74 IU/ML
MDC_IDC_LEAD_CONNECTION_STATUS: NORMAL
MDC_IDC_LEAD_IMPLANT_DT: NORMAL
MDC_IDC_LEAD_LOCATION: NORMAL
MDC_IDC_LEAD_LOCATION_DETAIL_1: NORMAL
MDC_IDC_LEAD_MFG: NORMAL
MDC_IDC_LEAD_MODEL: NORMAL
MDC_IDC_LEAD_POLARITY_TYPE: NORMAL
MDC_IDC_LEAD_SERIAL: NORMAL
MDC_IDC_MSMT_BATTERY_DTM: NORMAL
MDC_IDC_MSMT_BATTERY_REMAINING_LONGEVITY: 114 MO
MDC_IDC_MSMT_BATTERY_REMAINING_PERCENTAGE: 100 %
MDC_IDC_MSMT_BATTERY_STATUS: NORMAL
MDC_IDC_MSMT_CAP_CHARGE_DTM: NORMAL
MDC_IDC_MSMT_CAP_CHARGE_DTM: NORMAL
MDC_IDC_MSMT_CAP_CHARGE_ENERGY: 41 J
MDC_IDC_MSMT_CAP_CHARGE_TIME: 10 S
MDC_IDC_MSMT_CAP_CHARGE_TIME: 9.9 S
MDC_IDC_MSMT_CAP_CHARGE_TYPE: NORMAL
MDC_IDC_MSMT_CAP_CHARGE_TYPE: NORMAL
MDC_IDC_MSMT_LEADCHNL_LV_IMPEDANCE_VALUE: 964 OHM
MDC_IDC_MSMT_LEADCHNL_LV_PACING_THRESHOLD_AMPLITUDE: 1 V
MDC_IDC_MSMT_LEADCHNL_LV_PACING_THRESHOLD_PULSEWIDTH: 0.4 MS
MDC_IDC_MSMT_LEADCHNL_RA_IMPEDANCE_VALUE: 423 OHM
MDC_IDC_MSMT_LEADCHNL_RA_PACING_THRESHOLD_AMPLITUDE: 0.4 V
MDC_IDC_MSMT_LEADCHNL_RA_PACING_THRESHOLD_PULSEWIDTH: 0.4 MS
MDC_IDC_MSMT_LEADCHNL_RV_IMPEDANCE_VALUE: 717 OHM
MDC_IDC_MSMT_LEADCHNL_RV_PACING_THRESHOLD_AMPLITUDE: 0.9 V
MDC_IDC_MSMT_LEADCHNL_RV_PACING_THRESHOLD_PULSEWIDTH: 0.4 MS
MDC_IDC_PG_IMPLANT_DTM: NORMAL
MDC_IDC_PG_MFG: NORMAL
MDC_IDC_PG_MODEL: NORMAL
MDC_IDC_PG_SERIAL: NORMAL
MDC_IDC_PG_TYPE: NORMAL
MDC_IDC_SESS_CLINIC_NAME: NORMAL
MDC_IDC_SESS_DTM: NORMAL
MDC_IDC_SESS_TYPE: NORMAL
MDC_IDC_SET_BRADY_AT_MODE_SWITCH_MODE: NORMAL
MDC_IDC_SET_BRADY_AT_MODE_SWITCH_RATE: 170 {BEATS}/MIN
MDC_IDC_SET_BRADY_LOWRATE: 60 {BEATS}/MIN
MDC_IDC_SET_BRADY_MAX_SENSOR_RATE: 100 {BEATS}/MIN
MDC_IDC_SET_BRADY_MAX_TRACKING_RATE: 100 {BEATS}/MIN
MDC_IDC_SET_BRADY_MODE: NORMAL
MDC_IDC_SET_BRADY_PAV_DELAY_LOW: 130 MS
MDC_IDC_SET_BRADY_SAV_DELAY_LOW: 110 MS
MDC_IDC_SET_CRT_LVRV_DELAY: 20 MS
MDC_IDC_SET_CRT_PACED_CHAMBERS: NORMAL
MDC_IDC_SET_LEADCHNL_LV_PACING_AMPLITUDE: 2 V
MDC_IDC_SET_LEADCHNL_LV_PACING_ANODE_LOCATION_1: NORMAL
MDC_IDC_SET_LEADCHNL_LV_PACING_CAPTURE_MODE: NORMAL
MDC_IDC_SET_LEADCHNL_LV_PACING_CATHODE_ELECTRODE_1: NORMAL
MDC_IDC_SET_LEADCHNL_LV_PACING_CATHODE_LOCATION_1: NORMAL
MDC_IDC_SET_LEADCHNL_LV_PACING_PULSEWIDTH: 0.4 MS
MDC_IDC_SET_LEADCHNL_LV_SENSING_ADAPTATION_MODE: NORMAL
MDC_IDC_SET_LEADCHNL_LV_SENSING_ANODE_LOCATION_1: NORMAL
MDC_IDC_SET_LEADCHNL_LV_SENSING_CATHODE_ELECTRODE_1: NORMAL
MDC_IDC_SET_LEADCHNL_LV_SENSING_CATHODE_LOCATION_1: NORMAL
MDC_IDC_SET_LEADCHNL_LV_SENSING_SENSITIVITY: 1 MV
MDC_IDC_SET_LEADCHNL_RA_PACING_AMPLITUDE: 2 V
MDC_IDC_SET_LEADCHNL_RA_PACING_CAPTURE_MODE: NORMAL
MDC_IDC_SET_LEADCHNL_RA_PACING_POLARITY: NORMAL
MDC_IDC_SET_LEADCHNL_RA_PACING_PULSEWIDTH: 0.4 MS
MDC_IDC_SET_LEADCHNL_RA_SENSING_ADAPTATION_MODE: NORMAL
MDC_IDC_SET_LEADCHNL_RA_SENSING_POLARITY: NORMAL
MDC_IDC_SET_LEADCHNL_RA_SENSING_SENSITIVITY: 0.25 MV
MDC_IDC_SET_LEADCHNL_RV_PACING_AMPLITUDE: 2.5 V
MDC_IDC_SET_LEADCHNL_RV_PACING_CAPTURE_MODE: NORMAL
MDC_IDC_SET_LEADCHNL_RV_PACING_POLARITY: NORMAL
MDC_IDC_SET_LEADCHNL_RV_PACING_PULSEWIDTH: 0.4 MS
MDC_IDC_SET_LEADCHNL_RV_SENSING_ADAPTATION_MODE: NORMAL
MDC_IDC_SET_LEADCHNL_RV_SENSING_POLARITY: NORMAL
MDC_IDC_SET_LEADCHNL_RV_SENSING_SENSITIVITY: 0.6 MV
MDC_IDC_SET_ZONE_DETECTION_INTERVAL: 286 MS
MDC_IDC_SET_ZONE_DETECTION_INTERVAL: 375 MS
MDC_IDC_SET_ZONE_DETECTION_INTERVAL: 545 MS
MDC_IDC_SET_ZONE_STATUS: NORMAL
MDC_IDC_SET_ZONE_TYPE: NORMAL
MDC_IDC_SET_ZONE_VENDOR_TYPE: NORMAL
MDC_IDC_STAT_AT_BURDEN_PERCENT: 0 %
MDC_IDC_STAT_AT_DTM_END: NORMAL
MDC_IDC_STAT_AT_DTM_START: NORMAL
MDC_IDC_STAT_BRADY_DTM_END: NORMAL
MDC_IDC_STAT_BRADY_DTM_START: NORMAL
MDC_IDC_STAT_BRADY_RA_PERCENT_PACED: 86 %
MDC_IDC_STAT_BRADY_RV_PERCENT_PACED: 87 %
MDC_IDC_STAT_CRT_DTM_END: NORMAL
MDC_IDC_STAT_CRT_DTM_START: NORMAL
MDC_IDC_STAT_CRT_LV_PERCENT_PACED: 86 %
MDC_IDC_STAT_EPISODE_RECENT_COUNT: 0
MDC_IDC_STAT_EPISODE_RECENT_COUNT: 16
MDC_IDC_STAT_EPISODE_RECENT_COUNT: 8
MDC_IDC_STAT_EPISODE_RECENT_COUNT_DTM_END: NORMAL
MDC_IDC_STAT_EPISODE_RECENT_COUNT_DTM_START: NORMAL
MDC_IDC_STAT_EPISODE_TYPE: NORMAL
MDC_IDC_STAT_EPISODE_VENDOR_TYPE: NORMAL
MDC_IDC_STAT_TACHYTHERAPY_ATP_DELIVERED_RECENT: 235
MDC_IDC_STAT_TACHYTHERAPY_ATP_DELIVERED_TOTAL: 284
MDC_IDC_STAT_TACHYTHERAPY_RECENT_DTM_END: NORMAL
MDC_IDC_STAT_TACHYTHERAPY_RECENT_DTM_START: NORMAL
MDC_IDC_STAT_TACHYTHERAPY_SHOCKS_ABORTED_RECENT: 0
MDC_IDC_STAT_TACHYTHERAPY_SHOCKS_ABORTED_TOTAL: 0
MDC_IDC_STAT_TACHYTHERAPY_SHOCKS_DELIVERED_RECENT: 0
MDC_IDC_STAT_TACHYTHERAPY_SHOCKS_DELIVERED_TOTAL: 1
MDC_IDC_STAT_TACHYTHERAPY_TOTAL_DTM_END: NORMAL
MITOGEN IGNF BCKGRD COR BLD-ACNC: 0.26 IU/ML
MITOGEN IGNF BCKGRD COR BLD-ACNC: 0.48 IU/ML
P AXIS - MUSE: 93 DEGREES
PR INTERVAL - MUSE: 118 MS
QRS DURATION - MUSE: 148 MS
QT - MUSE: 474 MS
QTC - MUSE: 474 MS
QUANTIFERON MITOGEN: 10 IU/ML
QUANTIFERON NIL TUBE: 0.26 IU/ML
QUANTIFERON TB1 TUBE: 0.74 IU/ML
QUANTIFERON TB2 TUBE: 0.52
R AXIS - MUSE: 137 DEGREES
SYSTOLIC BLOOD PRESSURE - MUSE: NORMAL MMHG
T AXIS - MUSE: 59 DEGREES
T GONDII IGG SER QL: <3 IU/ML (ref 0–7.1)
VENTRICULAR RATE- MUSE: 60 BPM

## 2024-04-25 PROCEDURE — 94621 CARDIOPULM EXERCISE TESTING: CPT | Mod: 26 | Performed by: INTERNAL MEDICINE

## 2024-04-25 PROCEDURE — 94621 CARDIOPULM EXERCISE TESTING: CPT

## 2024-04-25 NOTE — LETTER
"    4/25/2024         RE: Leo Russell  [address] [address]        Dear Colleague,    Thank you for referring your patient, Leo Russell, to the [facility] TRANSPLANT [facility]. Please see a copy of my visit note below.    Pt evaluated via billable telephone visit. Time spent: 15 min  Provider location: onsite ([facility] [place])   **[place] is in [place] at time of phone visit    [facility] SOLID ORGAN TRANSPLANT  OUTPATIENT MNT: HEART TRANSPLANT/VAD EVALUATION    Current BMI: 37.2 (HT 71 in,  lbs/121 kg)  BMI is within recommendation of <38 for heart transplant  Counseled patient on weight loss      TIME SPENT: 15 minutes  VISIT TYPE: Initial   REFERRING PHYSICIAN: Caity Felix   PT ACCOMPANIED BY: self     History of previous txp: none     NUTRITION ASSESSMENT  H/o DM II, last A1c 6.2 (9/2023). Pt reports only having borderline BG and is not on medication for DM, but of note is on Jardiance.   He follows a lower sodium diet and uses black pepper mostly.     - Meal prep & grocery shopping: pt does   - Previous RD education: not asked   - Appetite: \"too good\"   - Food allergies/intolerances: none   - Issues chewing or swallowing: none   - N/V/D/C: none   - Food access concerns: not asked     Vitamins, Supplements, Pertinent Meds: none   Herbal Medicines/Supplements: none   Protein Supplements: none     Weight hx // fluid retention:   - no reported edema (rare if anything)  - pt reports he was down as far as 254 lb, now back up again  Wt Readings from Last 10 Encounters:   04/25/24 121.2 kg (267 lb 3.2 oz)   04/24/24 122.4 kg (269 lb 13.5 oz)   04/24/24 122.4 kg (269 lb 12.8 oz)   02/08/24 117 kg (258 lb)   02/07/24 117.9 kg (260 lb)     PHYSICAL ACTIVITY   Walking at the mall- 3-4x/week 1/2 mile distance  ADLs go ok and does not complain of low energy  Cardiac rehab- no, but plans to start     DIET RECALL   Breakfast Toast or a donut    Lunch S/w (meat)- made at home    Dinner Meat " (ground beef, steak, pork, chicken) + potato + veggie    Snacks Crackers, chips    Beverages Water/flavored water, apple juice (12 oz/day), caffeine free regular pop (3x/week), chocolate milk (not daily), Gatorade (not daily)    Alcohol None    Dining out 2x/week      NUTRITION DIAGNOSIS  No nutrition diagnosis identified at this time.    NUTRITION INTERVENTION  Nutrition education provided:  Discussed sodium intake (low sodium foods and drinks, seasoning food without salt and tips for low sodium diet).  Reviewed BMI within listing criteria for heart txp, but discussed benefits of weight loss. Pt is interested and motivated to work on weight loss.   - Include protein at breakfast at least 50% of the time- eggs, Greek yogurt, string cheese, peanut butter, portion of protein drink/bar  - Focus on non-starchy veggies  - Eliminate caloric beverages since they add up over time- apple juice, chocolate milk, etc   - Can refer to weight management either at [facility] or local if no progress in 1 month or per pt request     Reviewed post txp diet guidelines in brief (will review in further detail post txp):  (1) Review of proper food safety measures d/t immunosuppressant therapy post-op and increased risk for food-borne illness    (2) Avoid the following post txp d/t risk for rejection, unknown effects on the organs, and/or potential interactions with immunosuppressants:  - Herbal, Chinese, holistic, chiropractic, natural, alternative medicines and supplements  - Detoxes and cleanses  - Weight loss pills  - Protein powders or other products with extracts or herbs (ie green tea extract)    (3) Med regimen and possible side effects    Reviewed post VAD nutrition:  High likelihood of early satiety postop with placement of VAD, requiring small/frequent meals. Discussed need for high protein intake for healing and how adequate protein intake may be impacted by early satiety.     Patient Understanding: Pt verbalized understanding of  education provided.  Expected Engagement: Good  Follow-Up Plans: PRN     NUTRITION GOALS  No nutrition goals identified at this time     Yessy Mendoza RD, LD, CCTD                                  Again, thank you for allowing me to participate in the care of your patient.        Sincerely,        Yessy Mendoza RD

## 2024-04-25 NOTE — PROGRESS NOTES
"Pt evaluated via billable telephone visit. Time spent: 15 min  Provider location: onsite ([facility] [place])   **[place] at time of [place] visit    [facility] [place] [facility]  OUTPATIENT MNT: HEART TRANSPLANT/VAD EVALUATION    Current BMI: 37.2 (HT 71 in,  lbs/121 kg)  BMI is within recommendation of <38 for heart transplant  Counseled patient on weight loss      TIME SPENT: 15 minutes  VISIT TYPE: Initial   REFERRING PHYSICIAN: Caity Felix   PT ACCOMPANIED BY: self     History of previous txp: none     NUTRITION ASSESSMENT  H/o DM II, last A1c 6.2 (9/2023). Pt reports only having borderline BG and is not on medication for DM, but of note is on Jardiance.   He follows a lower sodium diet and uses black pepper mostly.     - Meal prep & grocery shopping: pt does   - Previous RD education: not asked   - Appetite: \"too good\"   - Food allergies/intolerances: none   - Issues chewing or swallowing: none   - N/V/D/C: none   - Food access concerns: not asked     Vitamins, Supplements, Pertinent Meds: none   Herbal Medicines/Supplements: none   Protein Supplements: none     Weight hx // fluid retention:   - no reported edema (rare if anything)  - pt reports he was down as far as 254 lb, now back up again  Wt Readings from Last 10 Encounters:   04/25/24 121.2 kg (267 lb 3.2 oz)   04/24/24 122.4 kg (269 lb 13.5 oz)   04/24/24 122.4 kg (269 lb 12.8 oz)   02/08/24 117 kg (258 lb)   02/07/24 117.9 kg (260 lb)     PHYSICAL ACTIVITY   Walking at the mall- 3-4x/week 1/2 mile distance  ADLs go ok and does not complain of low energy  Cardiac rehab- no, but plans to start     DIET RECALL   Breakfast Toast or a donut    Lunch S/w (meat)- made at home    Dinner Meat (ground beef, steak, pork, chicken) + potato + veggie    Snacks Crackers, chips    Beverages Water/flavored water, apple juice (12 oz/day), caffeine free regular pop (3x/week), chocolate milk (not daily), Gatorade (not daily)    Alcohol None    Dining out 2x/week  "     NUTRITION DIAGNOSIS  No nutrition diagnosis identified at this time.    NUTRITION INTERVENTION  Nutrition education provided:  Discussed sodium intake (low sodium foods and drinks, seasoning food without salt and tips for low sodium diet).  Reviewed BMI within listing criteria for heart txp, but discussed benefits of weight loss. Pt is interested and motivated to work on weight loss.   - Include protein at breakfast at least 50% of the time- eggs, Greek yogurt, string cheese, peanut butter, portion of protein drink/bar  - Focus on non-starchy veggies  - Eliminate caloric beverages since they add up over time- apple juice, chocolate milk, etc   - Can refer to weight management either at [facility] or local if no progress in 1 month or per pt request     Reviewed post txp diet guidelines in brief (will review in further detail post txp):  (1) Review of proper food safety measures d/t immunosuppressant therapy post-op and increased risk for food-borne illness    (2) Avoid the following post txp d/t risk for rejection, unknown effects on the organs, and/or potential interactions with immunosuppressants:  - Herbal, Chinese, holistic, chiropractic, natural, alternative medicines and supplements  - Detoxes and cleanses  - Weight loss pills  - Protein powders or other products with extracts or herbs (ie green tea extract)    (3) Med regimen and possible side effects    Reviewed post VAD nutrition:  High likelihood of early satiety postop with placement of VAD, requiring small/frequent meals. Discussed need for high protein intake for healing and how adequate protein intake may be impacted by early satiety.     Patient Understanding: Pt verbalized understanding of education provided.  Expected Engagement: Good  Follow-Up Plans: PRN     NUTRITION GOALS  No nutrition goals identified at this time     Yessy Mendoza, RD, LD, CCTD

## 2024-04-26 ENCOUNTER — APPOINTMENT (OUTPATIENT)
Dept: MEDSURG UNIT | Facility: CLINIC | Age: 59
End: 2024-04-26
Attending: INTERNAL MEDICINE
Payer: MEDICARE

## 2024-04-26 ENCOUNTER — HOSPITAL ENCOUNTER (OUTPATIENT)
Facility: CLINIC | Age: 59
Discharge: HOME | End: 2024-04-26
Attending: INTERNAL MEDICINE | Admitting: INTERNAL MEDICINE
Payer: MEDICARE

## 2024-04-26 ENCOUNTER — OFFICE VISIT (OUTPATIENT)
Dept: PALLIATIVE CARE | Facility: CLINIC | Age: 59
End: 2024-04-26
Attending: STUDENT IN AN ORGANIZED HEALTH CARE EDUCATION/TRAINING PROGRAM
Payer: MEDICARE

## 2024-04-26 VITALS
OXYGEN SATURATION: 96 % | SYSTOLIC BLOOD PRESSURE: 112 MMHG | BODY MASS INDEX: 37.13 KG/M2 | DIASTOLIC BLOOD PRESSURE: 70 MMHG | RESPIRATION RATE: 16 BRPM | WEIGHT: 266.1 LBS | HEART RATE: 59 BPM | TEMPERATURE: 98.6 F

## 2024-04-26 DIAGNOSIS — I50.42 CHRONIC COMBINED SYSTOLIC AND DIASTOLIC CHF, NYHA CLASS 2 AND ACC/AHA STAGE C (H): ICD-10-CM

## 2024-04-26 DIAGNOSIS — Z86.79 HISTORY OF VENTRICULAR TACHYCARDIA: ICD-10-CM

## 2024-04-26 DIAGNOSIS — Z01.818 ENCOUNTER FOR PRE-TRANSPLANT EVALUATION FOR HEART TRANSPLANT: ICD-10-CM

## 2024-04-26 DIAGNOSIS — I50.42 CHRONIC COMBINED SYSTOLIC AND DIASTOLIC CHF, NYHA CLASS 2 AND ACC/AHA STAGE C (H): Primary | ICD-10-CM

## 2024-04-26 DIAGNOSIS — Z71.89 ADVANCE CARE PLANNING: ICD-10-CM

## 2024-04-26 LAB
A*: NORMAL
A*LOCUS SEROLOGIC EQUIVALENT: 2
A*LOCUS: NORMAL
A*SEROLOGIC EQUIVALENT: 26
ABTEST METHOD: NORMAL
AMPHETAMINES SERPL QL SCN: NEGATIVE NG/ML
ANNOTATION COMMENT IMP: NORMAL
B*: NORMAL
B*LOCUS SEROLOGIC EQUIVALENT: 8
B*LOCUS: NORMAL
B*SEROLOGIC EQUIVALENT: 64
BARBITURATES SERPL QL SCN: NEGATIVE NG/ML
BENZODIAZ SERPL QL SCN: NEGATIVE NG/ML
BUPRENORPHINE SERPL-MCNC: NEGATIVE NG/ML
BW-1: NORMAL
C*: NORMAL
C*LOCUS SEROLOGIC EQUIVALENT: 7
C*LOCUS: NORMAL
C*SEROLOGIC EQUIVALENT: 8
CANNABINOIDS SERPL QL SCN: NEGATIVE NG/ML
CARDIOPULMONARY ANAEROBIC THRESHOLD PREDICTED PEAK: 50 %
CARDIOPULMONARY ANAEROBIC THRESHOLD VO2: 15.9 ML/KG/MIN
CARDIOPULMONARY BLOOD PRESSURE REST: NORMAL MMHG
CARDIOPULMONARY BREATHING RESERVE REST: 90.6
CARDIOPULMONARY BREATHING RESERVE V02MAX: 47
CARDIOPULMONARY CO2 OUTPUT REST: 329 ML/MIN
CARDIOPULMONARY CO2 OUTPUT VO2MAX: 2425 ML/MIN
CARDIOPULMONARY FEV 1.0 (L) ACTUAL: 3.97
CARDIOPULMONARY FEV 1.0 (L) PRECENT: 103 %
CARDIOPULMONARY FEV 1.0 (L) PREDICTED: 3.84
CARDIOPULMONARY FEV 1.0 FVC (%) ACTUAL: 81
CARDIOPULMONARY FEV 1.0 FVC (%) PERCENT: 106 %
CARDIOPULMONARY FEV 1.0 FVC (%) PREDICTED: 76.3
CARDIOPULMONARY FUNCTIONAL CAPACITY MAX ML/KG/MIN: 17.5 ML/KG/MIN
CARDIOPULMONARY FUNCTIONAL CAPACITY PERCENT: 55 %
CARDIOPULMONARY FUNCTIONAL CAPACITY PREDICTED: 31.9 ML/KG/MIN
CARDIOPULMONARY FVC (L) ACTUAL: 4.9
CARDIOPULMONARY FVC (L) PERCENT: 97 %
CARDIOPULMONARY FVC (L) PREDICTED: 5.03
CARDIOPULMONARY HEART RATE REST: 62 BPM
CARDIOPULMONARY MET'S REST: 0.9
CARDIOPULMONARY MINUTE VENTILATION REST: 13.1 L/MIN
CARDIOPULMONARY MINUTE VENTILATION VO2MAX: 73.5 L/MIN
CARDIOPULMONARY MYOCARDIAC O2 DEMAND MAX: 9794
CARDIOPULMONARY OXYGEN CONSUMPTION REST: 3.2 ML/KG/MIN
CARDIOPULMONARY OXYGEN CONSUMPTION VO2MAX: 17.5 ML/KG/MIN
CARDIOPULMONARY OXYGEN PULSE REST: 6 ML/BEAT
CARDIOPULMONARY OXYGEN PULSE VO2MAX: 25.6 ML/BEAT
CARDIOPULMONARY OXYGEN SATURATION- OXIMETRY REST: 99 %
CARDIOPULMONARY OXYGEN SATURATION- OXIMETRY VO2MAX: 98 %
CARDIOPULMONARY PET C02 REST: 34
CARDIOPULMONARY PET C02 VO2MAX: 37
CARDIOPULMONARY PET02 REST: 108
CARDIOPULMONARY PET02 V02 MAX: 111
CARDIOPULMONARY RER: 1.09
CARDIOPULMONARY RESPIRALORY EXCHANGE RATIO VO2MAX: 1.09
CARDIOPULMONARY RESPIRALORY EXCHANGE RATIO: 0.86
CARDIOPULMONARY RESPIRATORY RATE REST: 22 BR/MIN
CARDIOPULMONARY RESPIRATORY RATE VO2MAX: 29 BR/MIN
CARDIOPULMONARY STRESS BASE 1 BP MMHG: NORMAL MMHG
CARDIOPULMONARY STRESS BASE 1 BPA: 71 BPM
CARDIOPULMONARY STRESS BASE 1 SPO2: 99 % SPO2
CARDIOPULMONARY STRESS BASE 1 TIME SEC: 0 SEC
CARDIOPULMONARY STRESS BASE 1 TIME: 1 MINS
CARDIOPULMONARY STRESS BASE 2 BP MMHG: NORMAL MMHG
CARDIOPULMONARY STRESS BASE 2 BPA: 66 BPM
CARDIOPULMONARY STRESS BASE 2 SPO2: 99 % SPO2
CARDIOPULMONARY STRESS BASE 2 TIME SEC: 0 SEC
CARDIOPULMONARY STRESS BASE 2 TIME: 3 MINS
CARDIOPULMONARY STRESS BASE 3 BP MMHG: NORMAL MMHG
CARDIOPULMONARY STRESS BASE 3 BPA: 61 BPM
CARDIOPULMONARY STRESS BASE 3 SPO2: 99 % SPO2
CARDIOPULMONARY STRESS BASE 3 TIME SEC: 0 SEC
CARDIOPULMONARY STRESS BASE 3 TIME: 5 MINS
CARDIOPULMONARY STRESS PHASE 1 BP MMHG: NORMAL MMHG
CARDIOPULMONARY STRESS PHASE 1 BPM: 60 BPM
CARDIOPULMONARY STRESS PHASE 1 SPO2: 98 % SPO2
CARDIOPULMONARY STRESS PHASE 1 TIME SEC: 0 SEC
CARDIOPULMONARY STRESS PHASE 1 TIME: 2 MINS
CARDIOPULMONARY STRESS PHASE 2 BP MMHG: NORMAL MMHG
CARDIOPULMONARY STRESS PHASE 2 BPM: 62 BPM
CARDIOPULMONARY STRESS PHASE 2 SPO2: 99 % SPO2
CARDIOPULMONARY STRESS PHASE 2 TIME SEC: 0 SEC
CARDIOPULMONARY STRESS PHASE 2 TIME: 4 MINS
CARDIOPULMONARY STRESS PHASE 3 BP MMHG: NORMAL MMHG
CARDIOPULMONARY STRESS PHASE 3 BPM: 70 BPM
CARDIOPULMONARY STRESS PHASE 3 SPO2: 99 % SPO2
CARDIOPULMONARY STRESS PHASE 3 TIME SEC: 0 SEC
CARDIOPULMONARY STRESS PHASE 3 TIME: 6 MINS
CARDIOPULMONARY STRESS PHASE 4 BP MMHG: NORMAL MMHG
CARDIOPULMONARY STRESS PHASE 4 BPM: 75 BPM
CARDIOPULMONARY STRESS PHASE 4 SPO2: 99 % SPO2
CARDIOPULMONARY STRESS PHASE 4 TIME SEC: 0 SEC
CARDIOPULMONARY STRESS PHASE 4 TIME: 8 MINS
CARDIOPULMONARY STRESS PHASE 5 BP MMHG: NORMAL MMHG
CARDIOPULMONARY STRESS PHASE 5 BPM: 81 BPM
CARDIOPULMONARY STRESS PHASE 5 SPO2: 98 % SPO2
CARDIOPULMONARY STRESS PHASE 5 TIME SEC: 0 SEC
CARDIOPULMONARY STRESS PHASE 5 TIME: 10 MINS
CARDIOPULMONARY STRESS PHASE 6 BPA: 83 BPM
CARDIOPULMONARY STRESS PHASE 6 TIME SEC: 11 SEC
CARDIOPULMONARY STRESS PHASE 6 TIME: 10 MINS
CARDIOPULMONARY SVC (L) ACTUAL: 4.62
CARDIOPULMONARY SVC (L) PERCENT: 92 %
CARDIOPULMONARY SVC (L) PREDICTED: 5.03
CARDIOPULMONARY TIDAL VOLUME REST: 588 ML
CARDIOPULMONARY TIDAL VOLUME VO2MAX: 2553 ML
CARDIOPULMONARY VE/VCO2 SLOPE: 28.96
CARDIOPULMONARY VENTILATORY EQUIVALENT 02 REST: 34
CARDIOPULMONARY VENTILATORY EQUIVALENT 02 V02: 33
CARDIOPULMONARY VENTILATORY EQUIVALENT C02 REST: 40
CARDIOPULMONARY VENTILATORY EQUIVALENT C02 SLOPE VO2MAX: 28.96
CARDIOPULMONARY VENTILATORY EQUIVALENT C02 VO2MAX: 30
COCAINE SERPL QL SCN: NEGATIVE NG/ML
CV STRESS MAX HR HE: 83
DPA1*: NORMAL
DPB1*: NORMAL
DPB1*LOCUS NMDP: NORMAL
DPB1*LOCUS: NORMAL
DPB1*NMDP: NORMAL
DQA1*: NORMAL
DQA1*LOCUS: NORMAL
DQB1*: NORMAL
DQB1*LOCUS SEROLOGIC EQUIVALENT: 2
DQB1*LOCUS: NORMAL
DQB1*SEROLOGIC EQUIVALENT: 8
DRB1*: NORMAL
DRB1*LOCUS SEROLOGIC EQUIVALENT: 4
DRB1*LOCUS: NORMAL
DRB1*SEROLOGIC EQUIVALENT: 7
DRB4*: NORMAL
DRB4*LOCUS SEROLOGIC EQUIVALENT: 53
DRB4*LOCUS: NORMAL
DRB4*SEROLOGIC EQUIVALENT: 53
DRSSO TEST METHOD: NORMAL
METHADONE SERPL QL SCN: NEGATIVE NG/ML
METHAMPHET SERPL QL: NEGATIVE NG/ML
OPIATES SERPL QL SCN: NEGATIVE NG/ML
OXYCODONE SERPL QL: NEGATIVE NG/ML
PCP SERPL QL SCN: NEGATIVE NG/ML
PREDICTED VO2MAX: 31.9
STRESS ANGINA INDEX: 0
STRESS ECHO BASELINE BP: NORMAL MMHG
STRESS ECHO BASELINE HR: 60 BPM
STRESS ECHO CALCULATED PERCENT HR: 51 %
STRESS ECHO LAST STRESS BP: NORMAL MMHG
STRESS ECHO POST ESTIMATED WORKLOAD: 5 METS
STRESS ECHO POST EXERCISE DUR MIN: 10 MIN
STRESS ECHO POST EXERCISE DUR SEC: 11 SEC
STRESS ECHO TARGET HR: 163

## 2024-04-26 PROCEDURE — 99204 OFFICE O/P NEW MOD 45 MIN: CPT | Mod: 25 | Performed by: INTERNAL MEDICINE

## 2024-04-26 PROCEDURE — 93451 RIGHT HEART CATH: CPT | Performed by: INTERNAL MEDICINE

## 2024-04-26 PROCEDURE — 93451 RIGHT HEART CATH: CPT | Mod: 26 | Performed by: INTERNAL MEDICINE

## 2024-04-26 PROCEDURE — C1751 CATH, INF, PER/CENT/MIDLINE: HCPCS | Performed by: INTERNAL MEDICINE

## 2024-04-26 PROCEDURE — G0463 HOSPITAL OUTPT CLINIC VISIT: HCPCS | Mod: 25 | Performed by: INTERNAL MEDICINE

## 2024-04-26 PROCEDURE — 999N000142 HC STATISTIC PROCEDURE PREP ONLY

## 2024-04-26 PROCEDURE — 250N000009 HC RX 250: Performed by: STUDENT IN AN ORGANIZED HEALTH CARE EDUCATION/TRAINING PROGRAM

## 2024-04-26 PROCEDURE — 99497 ADVNCD CARE PLAN 30 MIN: CPT | Mod: 25 | Performed by: INTERNAL MEDICINE

## 2024-04-26 PROCEDURE — 272N000001 HC OR GENERAL SUPPLY STERILE: Performed by: INTERNAL MEDICINE

## 2024-04-26 PROCEDURE — G0463 HOSPITAL OUTPT CLINIC VISIT: HCPCS | Performed by: INTERNAL MEDICINE

## 2024-04-26 PROCEDURE — 250N000009 HC RX 250: Performed by: INTERNAL MEDICINE

## 2024-04-26 PROCEDURE — 999N000132 HC STATISTIC PP CARE STAGE 1

## 2024-04-26 RX ORDER — LIDOCAINE 40 MG/G
CREAM TOPICAL
Status: COMPLETED | OUTPATIENT
Start: 2024-04-26 | End: 2024-04-26

## 2024-04-26 RX ADMIN — LIDOCAINE: 40 CREAM TOPICAL at 08:31

## 2024-04-26 ASSESSMENT — ACTIVITIES OF DAILY LIVING (ADL)
ADLS_ACUITY_SCORE: 35
ADLS_ACUITY_SCORE: 35

## 2024-04-26 NOTE — PATIENT INSTRUCTIONS
Dg Bailey  Today we talked about:  - Include protein at breakfast at least 50% of the time- eggs, Greek yogurt, string cheese, peanut butter, portion of protein drink/bar  - Focus on non-starchy veggies (this is most veggies with the exception of potato, corn, peas)  - Eliminate caloric beverages since they add up over time- apple juice, chocolate milk, etc   - Can consider weight loss clinic referral if you'd like    Yessy Mendoza  Transplant Dietitian     WEIGHT MAINTENANCE STRATEGIES    According to the National Weight Control Registry there are several things that people who have lost weight and kept it off have in common. Some of them are...    1. 3 MEALS A DAY: Make sure you are eating 3 meals each day. No skipping meals. 80% of people who skip meals are overweight or obese. Missing meals slows the metabolism, making it harder to maintain a healthy weight.    2. FOOD DIARY: Although calorie counting may not be ideal, keep a food and exercise diary to help bring awareness to how often you eat/snack, portion sizes, liquid intake, etc. Consider  reverse measuring  foods you eat. For example, plate up a typical meal and put certain food items back into a measuring cup. If you think you only eat 1 cup of rice at a meal, you may be eating more than you realize, even 2-3 cups. Portion size can be deceiving. Consider paying close attention to the portion size of coffee creamers, honey, nuts/nut butters, salad dressings, condiments, etc.      4. HIGH FIBER/LOW FAT: Lean sources of protein (skim milk, skinless, baked or broiled chicken breast, fish, etc.) will help you meet your protein needs while fruits, vegetables, and whole grains will help you get the fiber that your body needs. This is heart healthy eating and helps to keep calorie levels in balance.  **Some lean proteins: chicken, turkey, tuna, salmon, crab, fish, shrimp, scallops, lobster, lean cuts of beef and pork, luncheon meats, veggie burgers, beans  "(black, lima, garbanzo, marcum, kidney, refried), chile, cottage cheese, string cheese, other cheese, eggs, tofu, peanut butter, nuts, vegan crumbles, greek yogurt    5. 8,000 STEPS PER DAY: This is a \"weight maintenance dose. \" It is essential to get your steps in every day, 7 days a week. You don't have to \"work out \" 7 days a week, but throughout the day, getting 8000 steps will help you maintain the weight you have lost. Parking far away, taking the stairs instead of the elevator, and pacing while on the phone are some ways to help achieve this goal.    6. Eat at home 90% OF THE TIME: People who maintain a healthy weight eat at home, or meal prepared at home, 90% of the time. Studies show that people consume an average of 770 mark when eating out at a restaurant and 440 mark when eating a meal prepared at home. This equates to almost 35 pounds of excess weight for a person who eats out once a day for 1 year.    OTHER HELPFUL HABITS    -Minimize liquid calories. Stick to water, flavored water/carbonated water, black coffee, and unsweetened tea. Soda, juice, and sports drinks are not healthy choices.   -Avoiding \"mindless \"eating, i.e., eating at the TV, in the car, in front of the computer, etc.  -Protect your sleep and Manage your stress. It is more than just  the food . Poor sleep and high stress levels impact us greatly, including weight maintenance.   -Limit restaurant, cafeteria, take out, and/or drive thru to 2 times/week or less.     OPTIMIZING YOUR METABOLISM FOR LIFE    1. MUSCLE MAINTENANCE: Muscle burns calories up to 70% better than fat does. As we age our body composition changes and we lose muscle mass. Weight training can help us keep and even build muscle mass. Dumbbells, pushups, resistance band training, and weight machines are all examples of ways to keep and/or build muscle mass.    2. MOVE: 8000 steps daily has been shown to be a weight maintenance dose. Aim for 10,000 steps each day. Helpful " habits include taking the stairs instead of the elevator when possible, parking at the far end of the parking lot, pacing while on the phone, and taking the dog for a walk. Of course using a treadmill, stair climber, elliptical , and bicycle are all ways of getting 10,000 steps.    3. 3 MEALS EACH DAY: Make sure to get your 3 meals each day. Skipping breakfast, working through your lunch, or not eating dinner will lead to a slowing of your metabolism. Studies show that 80% of people who skip meals are overweight or obese.    4. ADEQUATE PROTEIN INTAKE: Getting adequate protein is beneficial for a number of reasons: to aid the healing process, to blunt cravings immediately after eating and for a period of time after eating, to help keep blood sugars level, and to help you maintain your muscle mass. See #1 above.

## 2024-04-26 NOTE — NURSING NOTE
"Oncology Rooming Note    April 26, 2024 2:31 PM   Leo Russell is a 58 year old male who presents for:    Chief Complaint   Patient presents with    Oncology Clinic Visit     New al for Palliative Care for Pre Heart Transplant     Initial Vitals: There were no vitals taken for this visit. Estimated body mass index is 37.13 kg/m  as calculated from the following:    Height as of 4/25/24: 1.803 m (5' 10.98\").    Weight as of an earlier encounter on 4/26/24: 120.7 kg (266 lb 1.5 oz). There is no height or weight on file to calculate BSA.  Data Unavailable Comment: Data Unavailable   No LMP for male patient.  Allergies reviewed: Yes  Medications reviewed: Yes    Medications: Medication refills not needed today.  Pharmacy name entered into EPIC: Data Unavailable    Frailty Screening:   Is the patient here for a new oncology consult visit in cancer care? 2. No      Clinical concerns: none       Mary Barker MA             "

## 2024-04-26 NOTE — DISCHARGE INSTRUCTIONS
[facility]                        Interventional Cardiology  Discharge Instructions   Post Right Heart Cath and/or Heart Biopsy      AFTER YOU GO HOME:  DO drink plenty of fluids  DO resume your regular diet and medications unless otherwise instructed by your Primary Physician  Do Not scrub the procedure site vigorously  No lotion or powder to the puncture site for 3 days    CALL YOUR PRIMARY PHYSICIAN IF: You may resume all normal activity.  Monitor neck site for bleeding, swelling, or voice changes. If you notice bleeding or swelling immediately apply pressure to the site and call number below to speak with Cardiology Fellow.  If you experience any changes in your breathing you should call your doctor immediately or come to the closest Emergency Department.  Do not drive yourself.    ADDITIONAL INSTRUCTIONS: Medications: You are to resume all home medications including anticoagulation therapy unless otherwise advised by your primary cardiologist or nurse coordinator.    Follow Up: Per your primary cardiology team    If you have any questions or concerns regarding your procedure site please call [phone] at anytime and ask for Cardiology Fellow on call.  They are available 24 hours a day.  You may also contact the Cardiology Clinic after hours number at [phone].                                                       Telephone Numbers [phone] Monday-Friday 8:00 am to 4:30 pm    [phone] After 4:30 pm Monday-Friday, Weekends & Holidays  Ask for Interventional Cardiologist on call. Someone is on call 24 hours/day   [facility] toll free number [phone] Monday-Friday 8:00 am to 4:30 pm   [facility] Emergency Dept [phone]

## 2024-04-26 NOTE — PROGRESS NOTES
Pt arrived via cart with RN from [facility] s/p RHC. VSS. RIJ site CDI, no hematoma. Patient denies pain.      Condition is stable s/p RHC. Vital Signs are stable/WNL. RIJ site clean, dry and intact, cms intact. Discharge instructions reviewed with patient and questions answered. Patient verbalizes understanding.Pain under control. Patient is ambulating and voiding spontaneously. Patient is tolerating regular diet and denies any N/V. Patient to be discharged to home/clinic appt via self. Patient has all belongings

## 2024-04-26 NOTE — LETTER
4/26/2024       RE: Leo Russell  [address]  [place] [address]       Dear Colleague,    [facility] you [facility] Russell[facility]. Please see a copy of my visit note below.    Palliative Care Outpatient Clinic      Patient ID:  Medical - He has CAD s/p multiple PCIs; ischemic cardiomyopathy/HFrEF; s/p CRT-D; refractory VT s/p ablations; PAF on anticoagulation; hx of LV thrombus; hx of stroke; DM2.   Spring 2024 heart transplant eval for ICM and refractory VT.    Social - lives in [place]. 5 kids. Disabled. Worked construction    Care Planning - No HCD. Discussed 4/204      History:  History gathered today from: patient, family/loved ones, medical chart  Son Michael is with him    I reviewed the patient's medical history in the chart and confirmed key details today with them; summarized above.  I reviewed with them the various roles of our palliative care program; qol support, sx management, mood/coping/counseling, and care planning.  The patient was referred with a focus on care planning    He's here for a routine pre-heart txp care planning visit. Discussed this with him.    LVAD or Transplant preparedness plan; I discussed this in the specific context of transplant, LVAD, or both depending on what is being considered for this patient.    Patient s legally designated health care agent: None. He would want his girlfriend Angy & his son Michael as decision makers. Reviewed HCD form today and I emphasized the particular importance he complete one since he wants someone who's not a legal relative to be able to make medical decisions for him.      Patient s personal goals/hopes for receiving the transplant/LVAD:  to live. He understands he has refractory VT and while uncertain probably won't live long without transplantation.    Patient s worries/concerns when considering  receiving the LVAD/transplant: infections    We had a detailed discussion about some of the rare, but devastating complications that can occur around the time of transplant/LVAD implantation and afterwards, with a particualr focus on situation in which it's more likely that a surrogate decision-maker is asked to make decisions for the patient due the patient being incapactiated. Eg stroke, chronic critical illness, chronic mechanical ventilation, prolonged institutionalization. We discussed what a 'full recovery' generally looks like and the patients wishes if they had something less than a full recovery including the patient s thoughts about what health conditions would be unacceptable (situations the patient would want her/his doctors and loved ones to know that she/he would not want life prolonged); what is 'essential' for an acceptable quality of life, etc:   He asked good Qs about this and also observed he'd never thought about these matters as they pertain to him and needs time to think them through. Sounds like a while back after a prior MI and VT crisis he had a family meeting in which he updated his children and close family; he imagines doing that again soon in preparation hopefully for transplantation and I encouraged him to share whatever guidance he has about these matters with them, but especially Michael and Angy.    .     PE: There were no vitals taken for this visit.   Wt Readings from Last 3 Encounters:   04/26/24 120.7 kg (266 lb 1.5 oz)   04/25/24 121.2 kg (267 lb 3.2 oz)   04/24/24 122.4 kg (269 lb 13.5 oz)     Alert NAD  Clear sensorium      Data reviewed:  I reviewed recent labs and imaging, my comments:  RHC    Right sided filling pressures are mildly elevated.    Left sided filling pressures are normal.    Normal PA pressures.    Normal cardiac output level.      Outside Echo Oct EF 25%, severe LV systolic dysfunction    Outside card MRI 1/2024  Impression:     Infarcted tissue at the left  anterior descending and left circumflex artery territories involving the mid, basal and apical segments, with 70% total scar burden. The only residual viable myocardium is at the inferior left ventricle in the right coronary artery distribution. Dilated left ventricle and the left ventricular ejection fraction is markedly compromised, however the quantification is not well accomplished on this study secondary to artifact.     Mildly hypokinetic right ventricular wall. No right ventricular dilatation.     Biatrial cardiac dilatation.     Mild mitral valve regurgitation.       Impression & Recommendations:    57 yo with CAD, refractory VT, ischemic cardiomyopathy who's being evaluated for heart transplantation.    Reviewed the roles of our program.    ----------  In addition to the clinical activities described above in this note, with the patient's permission I discussed with him and Michael care planning.  We discussed care planning matters as above.  Over 20 min today were spent in care planning discussions today.   ----------      Thank you for involving us in the patient's care.   Alen Raymond MD / Palliative Medicine / Text me via [employer]  This note may have been composed with voice recognition software and there may be mistranscriptions.        Again, thank you for allowing me to participate in the care of your patient.      Sincerely,    Alen Raymond MD

## 2024-04-26 NOTE — PROGRESS NOTES
Palliative [facility] Outpatient [facility]      Patient ID:  Medical - He has CAD s/p multiple PCIs; ischemic cardiomyopathy/HFrEF; s/p CRT-D; refractory VT s/p ablations; PAF on anticoagulation; hx of LV thrombus; hx of stroke; DM2.   Spring 2024 heart transplant eval for ICM and refractory VT.    Social - lives in [place]. 5 kids. Disabled. Worked construction    Care Planning - No HCD. Discussed 4/204      History:  History gathered today from: patient, family/loved ones, medical chart  Son Michael is with him    I reviewed the patient's medical history in the chart and confirmed key details today with them; summarized above.  I reviewed with them the various roles of our palliative care program; qol support, sx management, mood/coping/counseling, and care planning.  The patient was referred with a focus on care planning    He's here for a routine pre-heart txp care planning visit. Discussed this with him.    LVAD or Transplant preparedness plan; I discussed this in the specific context of transplant, LVAD, or both depending on what is being considered for this patient.    Patient s legally designated health care agent: None. He would want his girlfriend Angy & his son Michael as decision makers. Reviewed HCD form today and I emphasized the particular importance he complete one since he wants someone who's not a legal relative to be able to make medical decisions for him.      Patient s personal goals/hopes for receiving the transplant/LVAD:  to live. He understands he has refractory VT and while uncertain probably won't live long without transplantation.    Patient s worries/concerns when considering receiving the LVAD/transplant: infections    We had a detailed discussion about some of the rare, but devastating complications that can occur around the time of transplant/LVAD implantation and afterwards, with a particualr focus on situation in which it's more likely that a surrogate decision-maker is asked to make  decisions for the patient due the patient being incapactiated. Eg stroke, chronic critical illness, chronic mechanical ventilation, prolonged institutionalization. We discussed what a 'full recovery' generally looks like and the patients wishes if they had something less than a full recovery including the patient s thoughts about what health conditions would be unacceptable (situations the patient would want her/his doctors and loved ones to know that she/he would not want life prolonged); what is 'essential' for an acceptable quality of life, etc:   He asked good Qs about this and also observed he'd never thought about these matters as they pertain to him and needs time to think them through. Sounds like a while back after a prior MI and VT crisis he had a family meeting in which he updated his children and close family; he imagines doing that again soon in preparation hopefully for transplantation and I encouraged him to share whatever guidance he has about these matters with them, but especially Michael and Angy.    .     PE: There were no vitals taken for this visit.   Wt Readings from Last 3 Encounters:   04/26/24 120.7 kg (266 lb 1.5 oz)   04/25/24 121.2 kg (267 lb 3.2 oz)   04/24/24 122.4 kg (269 lb 13.5 oz)     Alert NAD  Clear sensorium      Data reviewed:  I reviewed recent labs and imaging, my comments:  RHC    Right sided filling pressures are mildly elevated.    Left sided filling pressures are normal.    Normal PA pressures.    Normal cardiac output level.      Outside Echo Oct EF 25%, severe LV systolic dysfunction    Outside card MRI 1/2024  Impression:     Infarcted tissue at the left anterior descending and left circumflex artery territories involving the mid, basal and apical segments, with 70% total scar burden. The only residual viable myocardium is at the inferior left ventricle in the right coronary artery distribution. Dilated left ventricle and the left ventricular ejection fraction is  markedly compromised, however the quantification is not well accomplished on this study secondary to artifact.     Mildly hypokinetic right ventricular wall. No right ventricular dilatation.     Biatrial cardiac dilatation.     Mild mitral valve regurgitation.       Impression & Recommendations:    57 yo with CAD, refractory VT, ischemic cardiomyopathy who's being evaluated for heart transplantation.    Reviewed the roles of our program.    ----------  In addition to the clinical activities described above in this note, with the patient's permission I discussed with him and Michael care planning.  We discussed care planning matters as above.  Over 20 min today were spent in care planning discussions today.   ----------      Thank you for involving us in the patient's care.   Alen Raymond MD / Palliative Medicine / Text me via [employer]  This note may have been composed with voice recognition software and there may be mistranscriptions.

## 2024-04-26 NOTE — Clinical Note
dry, intact, no bleeding and no hematoma. 7 Fr venous sheath removed from RIJV; manual pressure held until hemostasis. No bleeding, oozing, or hematoma.

## 2024-04-27 LAB
COTININE SERPL-MCNC: <5 NG/ML
LABORATORY REPORT: NORMAL
NICOTINE SERPL-MCNC: <5 NG/ML
PETH INTERPRETATION: NORMAL
PLPETH BLD-MCNC: <10 NG/ML
POPETH BLD-MCNC: <10 NG/ML

## 2024-04-29 ENCOUNTER — CARE COORDINATION (OUTPATIENT)
Dept: TRANSPLANT | Facility: CLINIC | Age: 59
End: 2024-04-29
Payer: MEDICARE

## 2024-04-29 DIAGNOSIS — I50.42 CHRONIC COMBINED SYSTOLIC AND DIASTOLIC CHF, NYHA CLASS 2 AND ACC/AHA STAGE C (H): Primary | ICD-10-CM

## 2024-04-29 DIAGNOSIS — Z12.5 ENCOUNTER FOR SCREENING FOR MALIGNANT NEOPLASM OF PROSTATE: ICD-10-CM

## 2024-04-30 ENCOUNTER — CARE COORDINATION (OUTPATIENT)
Dept: CARDIOLOGY | Facility: CLINIC | Age: 59
End: 2024-04-30
Payer: MEDICARE

## 2024-04-30 DIAGNOSIS — I47.20 VENTRICULAR TACHYCARDIA (H): Primary | ICD-10-CM

## 2024-04-30 RX ORDER — LIDOCAINE 40 MG/G
CREAM TOPICAL
Status: CANCELLED | OUTPATIENT
Start: 2024-04-30

## 2024-04-30 RX ORDER — SODIUM CHLORIDE 9 MG/ML
INJECTION, SOLUTION INTRAVENOUS CONTINUOUS
Status: CANCELLED | OUTPATIENT
Start: 2024-04-30

## 2024-04-30 NOTE — PROGRESS NOTES
[employer] message sent to patient.  Orders placed, letter started. Routing to EP  to arrange.     Message  Received: 3 days ago  Esteban Wills MD Kelling, Maria, JARAD; Angy Real APRN CNP; Lilly Sellers, JARAD; Yuly Randhawa  [facility] results came back Friday and we will proceed with VT ablation.  H

## 2024-05-02 LAB
SA 1  COMMENTS: NORMAL
SA 1 CELL: NORMAL
SA 1 TEST METHOD: NORMAL
SA 2 CELL: NORMAL
SA 2 COMMENTS: NORMAL
SA 2 TEST METHOD: NORMAL
SA1 HI RISK ABY: NORMAL
SA1 MOD RISK ABY: NORMAL
SA2 HI RISK ABY: NORMAL
SA2 MOD RISK ABY: NORMAL
UNACCEPTABLE ANTIGENS: NORMAL
UNOS CPRA: 0

## 2024-05-06 NOTE — PROGRESS NOTES
EP  called the patient and left a message informing that EP  will call back once the provider's schedule has been released.     Darby Randhawa  Peri Electrophysiology   [phone]

## 2024-05-08 ENCOUNTER — ANCILLARY PROCEDURE (OUTPATIENT)
Dept: CARDIOLOGY | Facility: CLINIC | Age: 59
End: 2024-05-08
Payer: MEDICARE

## 2024-05-08 DIAGNOSIS — Z45.02 ENCOUNTER FOR INTERROGATION OF CARDIAC DEFIBRILLATOR: ICD-10-CM

## 2024-05-08 DIAGNOSIS — Z95.810 AUTOMATIC IMPLANTABLE CARDIOVERTER-DEFIBRILLATOR IN SITU: Primary | ICD-10-CM

## 2024-05-08 PROCEDURE — 93284 PRGRMG EVAL IMPLANTABLE DFB: CPT

## 2024-05-08 PROCEDURE — 93284 PRGRMG EVAL IMPLANTABLE DFB: CPT | Mod: 26 | Performed by: INTERNAL MEDICINE

## 2024-05-19 ENCOUNTER — HEALTH MAINTENANCE LETTER (OUTPATIENT)
Age: 59
End: 2024-05-19

## 2024-05-20 NOTE — PROGRESS NOTES
HISTORY:     Mr. Leo Russell is a pleasant 58 year old male with a past medical history of CAD s/p PCI c/b ICM with chronic HFrEF s/p CRT-D, recurrent VT s/p prior ablations, PAF, HTN, HLD, DM Type II, and obesity who presents as follow up for HFrEF for evaluation for heart transplantation.  Patient has had longstanding ICM and HFrEF and previously underwent an advanced therapy evaluation after he underwent VT ablations (June '20 at [facility] and Nov '21 through the [facility]). It was thought better to focus on further rhythm control before pursing advanced therapies. He had been on longstanding amiodarone, but it was thought he started getting some liver and lung fibrosis changes, so this was stopped and mexiletine and sotalol have been continued. Sotalol was initiation at [facility] in March '23. Patient was admitted to [facility] in Nov '23 for slow VT and had been getting multiple ATP therapies for this. Sotalol dose was increased. He has continued to have episodes of slow VT despite this and still having episodes of ATP. Patient underwent coronary angiogram that showed stable native disease with 80-90% in-stent restenosis of inferior branch of D1 s/p Angiosculpt POBA and RHC showed low cardiac index with normal filling pressures. Given his persistent rhythm issues, the patient was referred to [facility] for consideration of advanced therapies.     Patient still feeling like he is having regular episodes of ATP from his device. Patient reports he can walk 2-3 blocks without needing to stop and rest. Flights of stairs given him some difficulty. He denies recent presycnope, syncope, chest pain, palpitations, orthopnea, PND, abdominal pain, nausea, emesis, LE edema or recent weight gain. Patient has been trying to lose weight by eating better. He reports compliance with his medications, monitoring his salt and fluid intake and monitoring his weights daily.     ROS:  A complete  12-point ROS was negative except as above.     PAST MEDICAL HISTORY:      Patient Active Problem List   Diagnosis    Anterior myocardial infarction (H)    Arterial aneurysm (H24)    Automatic implantable cardioverter-defibrillator in situ    Benign prostatic hyperplasia without lower urinary tract symptoms    Chronic anticoagulation    Chronic combined systolic and diastolic CHF, NYHA class 2 and ACC/AHA stage C (H)    Coronary atherosclerosis    CVA (cerebral vascular accident) (H)    Type 2 diabetes mellitus with obesity (H)    Gastroesophageal reflux disease without esophagitis    History of ventricular tachycardia    Hyperlipidemia    Hypertension    Ischemic cardiomyopathy    Lymphocytosis (symptomatic)    Moderate obesity    Paroxysmal atrial fibrillation (H)    Class 2 severe obesity due to excess calories with serious comorbidity in adult (H)         FAMILY HISTORY:  Multiple family members with CAD and HTN     SOCIAL HISTORY:  Social History               Tobacco Use    Smoking status: Former       Current packs/day: 0.00       Average packs/day: 1 pack/day for 20.0 years (20.0 ttl pk-yrs)       Types: Cigarettes       Start date:        Quit date:        Years since quittin.3       Passive exposure: Never    Smokeless tobacco: Never   Substance Use Topics    Alcohol use: Never    Drug use: Never       Patient denies any tobacco use since 2017, no ETOH or illicit substance use. Lives in [place], [place]. Believes his girlfriend and adult son could be his caregivers     ALLERGIES:  Allergies         Allergies   Allergen Reactions    Morphine         ANXIETY  ANXIETY       Tramadol         ANXIETY  ANXIETY               CURRENT MEDICATIONS:  Current Outpatient Prescriptions          Current Outpatient Medications   Medication Sig Dispense Refill    aspirin 81 MG EC tablet Take 1 tablet by mouth daily        carvedilol (COREG) 25 MG tablet Take 50 mg by mouth 2 times daily        empagliflozin  (JARDIANCE) 10 MG TABS tablet Take 10 mg by mouth daily        furosemide (LASIX) 40 MG tablet Take 40 mg by mouth daily as needed        magnesium oxide (MAG-OX) 400 MG tablet Take 400 mg by mouth 2 times daily        mexiletine (MEXITIL) 200 MG capsule Take 200 mg by mouth every 8 hours        pantoprazole (PROTONIX) 40 MG EC tablet Take 40 mg by mouth daily        potassium chloride ER (K-TAB/KLOR-CON) 10 MEQ CR tablet Take 30 mEq by mouth daily        sacubitril-valsartan (ENTRESTO)  MG per tablet Take 1 tablet by mouth 2 times daily        sotalol (BETAPACE) 120 MG tablet Take 180 mg by mouth every 12 hours        spironolactone (ALDACTONE) 25 MG tablet Take 50 mg by mouth daily        tamsulosin (FLOMAX) 0.4 MG capsule Take 0.4 mg by mouth daily        Vitamin D3 (VITAMIN D-1000 MAX ST) 25 mcg (1000 units) tablet Take 1,000 Units by mouth daily        nitroGLYcerin (NITROSTAT) 0.4 MG sublingual tablet Place 1 tablet under the tongue every 5 minutes as needed        rivaroxaban ANTICOAGULANT (XARELTO) 20 MG TABS tablet Take 20 mg by mouth daily (Patient not taking: Reported on 4/24/2024)        rosuvastatin (CRESTOR) 40 MG tablet Take 40 mg by mouth at bedtime                EXAM:  /71 (BP Location: Right arm, Patient Position: Chair, Cuff Size: Adult Large)   Pulse 60   Wt 122.4 kg (269 lb 12.8 oz)   SpO2 95%   BMI 37.63 kg/m       General: appears comfortable, alert and interactive, in no acute distress  Head: normocephalic, atraumatic  Eyes: anicteric sclera, EOMI  Mouth: MMM  CV: RRR, soft holosystolic murmur along apex, JVP ~7 cm  Resp: CTAB, no wheezes or crackles  GI: soft, NT, ND  Neurological: alert and oriented, no focal deficits  Psych: normal mood and affect  Derm: no rashes on exposed surfaces     Weight      Wt Readings from Last 10 Encounters:   04/26/24 120.7 kg (266 lb 1.5 oz)   04/25/24 121.2 kg (267 lb 3.2 oz)   04/24/24 122.4 kg (269 lb 13.5 oz)   04/24/24 122.4 kg (269 lb  12.8 oz)   02/08/24 117 kg (258 lb)   02/07/24 117.9 kg (260 lb)         I personally reviewed recent labs and data as below and discussed the results with the patient in clinic today.  Labs:  BMP 12/14/23    K 4.5  Cl 104  CO2 23  BUN 14  Cr 0.97  NTproBNP 221     Last Comprehensive Metabolic Panel:          Sodium   Date Value Ref Range Status   04/24/2024 137 135 - 145 mmol/L Final       Comment:       Reference intervals for this test were updated on 09/26/2023 to more accurately reflect our healthy population. There may be differences in the flagging of prior results with similar values performed with this method. Interpretation of those prior results can be made in the context of the updated reference intervals.             Potassium   Date Value Ref Range Status   04/24/2024 4.3 3.4 - 5.3 mmol/L Final            Chloride   Date Value Ref Range Status   04/24/2024 103 98 - 107 mmol/L Final            Carbon Dioxide (CO2)   Date Value Ref Range Status   04/24/2024 22 22 - 29 mmol/L Final            Anion Gap   Date Value Ref Range Status   04/24/2024 12 7 - 15 mmol/L Final            Glucose   Date Value Ref Range Status   04/24/2024 198 (H) 70 - 99 mg/dL Final            Urea Nitrogen   Date Value Ref Range Status   04/24/2024 15.8 6.0 - 20.0 mg/dL Final            Creatinine   Date Value Ref Range Status   04/24/2024 1.01 0.67 - 1.17 mg/dL Final            GFR Estimate   Date Value Ref Range Status   04/24/2024 87 >60 mL/min/1.73m2 Final            Calcium   Date Value Ref Range Status   04/24/2024 9.1 8.6 - 10.0 mg/dL Final            Bilirubin Total   Date Value Ref Range Status   04/24/2024 0.7 <=1.2 mg/dL Final              Alkaline Phosphatase   Date Value Ref Range Status   04/24/2024 68 40 - 150 U/L Final       Comment:       Reference intervals for this test were updated on 11/14/2023 to more accurately reflect our healthy population. There may be differences in the flagging of prior results  with similar values performed with this method. Interpretation of those prior results can be made in the context of the updated reference intervals.              ALT   Date Value Ref Range Status   04/24/2024 24 0 - 70 U/L Final       Comment:       Reference intervals for this test were updated on 6/12/2023 to more accurately reflect our healthy population. There may be differences in the flagging of prior results with similar values performed with this method. Interpretation of those prior results can be made in the context of the updated reference intervals.                AST   Date Value Ref Range Status   04/24/2024 16 0 - 45 U/L Final       Comment:       Reference intervals for this test were updated on 6/12/2023 to more accurately reflect our healthy population. There may be differences in the flagging of prior results with similar values performed with this method. Interpretation of those prior results can be made in the context of the updated reference intervals.         Testing/Procedures:  I personally visualized and interpreted:  Echocardiogram 10/19/23    Normal left ventricular wall thickness.     Biplane ejection fraction is 25%.     Severe left ventricular systolic dysfunction.  Large anteroapical   aneurysm.  Only the basilar left ventricular segments have preserved   contractility.  No LV mural thrombi.     Grade II (moderate) left ventricular diastolic abnormality.     Elevated left ventricular filling pressure.     The left atrium is severely dilated.     The right atrium is moderately dilated.     Normal central venous pressure (0-5 mmHg).     No pericardial effusion.     Inadequate TR spectral Doppler to accurately assess right ventricular   systolic pressure.   Comment: No obvious changes from the January 2022 echo.      Coronary Angiogram/RHC 12/5/23  Diagnostic coronary angiogram:   Selective engagement of the right coronary artery was performed with a JR4 catheter.  Selective engagement  of the left main coronary artery was performed with a JL 3.5 catheter.  Selective injections were performed. The patient has a right dominant coronary system.   Left Main: Left main coronary artery is normal.  No significant disease   Left Anterior Descending: The left anterior descending arises from the   left main and courses along the anterior interventricular groove.  The   proximal LAD stent is patent.  The remainder the LAD is patent although quite small and threadlike.  Flow is THEE grade III.  The first diagonal branch is a large vessel that bifurcates into a superior and inferior branch.  There is a stent that extends from the ostium of the vessel into the superior branch.  A stent also extends into the inferior branch.  This is a bifurcation type of stent arrangement.  50% stenosis is noted at the ostium of the first diagonal.  The remainder of the proximal portion is patent.  The superior branch, including the stent is widely patent with normal flow.  The inferior branch demonstrates severe in-stent restenosis in the proximal segment, 85-90% with THEE grade I distal flow.  The remaining diagonal branches are patent   Left Circumflex: The left circumflex is a moderate-sized artery.  The   proximal vessel is normal.  Mild irregularities are seen in the   midsegment.  The distal vessel is patent.  The first obtuse marginal is   patent without significant disease.  There is a moderate-sized second   obtuse marginal which is widely patent.   Right Coronary Artery: Right coronary artery is a large, dominant   artery.  Mild irregularities are seen in the proximal and mid segment.    Mild irregularities are noted distally.  The PDA and KANIKA branches are   widely patent.  This is similar in appearance to the patient's previous   angiogram   Left Heart Catheterization:   Left ventricular end-diastolic pressure measures 8 mmHg.  There is no   gradient across the aortic valve on catheter pullback   Right Heart  Catheterization:     PA Pressure: 24/10 mmHg, mean 15 mmHg (PA saturation 65%)     PCWP: 8 mmHg     RV Pressure: 31/1 mmHg     RVEDP: 10 mmHg     RA Pressure: 5 mmHg     Cardiac Output: 5.2 L/min     Cardiac Index: 2.1 L/min/m surface squared      cMRI 1/11/24  Impression:   Infarcted tissue at the left anterior descending and left circumflex artery territories involving the mid, basal and apical segments, with 70% total scar burden. The only residual viable myocardium is at the inferior left ventricle in the right coronary artery distribution. Dilated left ventricle and the left ventricular ejection fraction is markedly compromised, however the quantification is not well accomplished on this study secondary to artifact.   Mildly hypokinetic right ventricular wall. No right ventricular dilatation.   Biatrial cardiac dilatation.   Mild mitral valve regurgitation.      Reviewed recent CT chest, abdomen, pelvis, head. US arterial and venous upper and lower extremities.          Assessment and Plan:      In summary, 58 year old male with a past medical history of CAD s/p PCI c/b ICM with chronic HFrEF s/p CRT-D, recurrent VT s/p prior ablations, PAF, HTN, HLD, DM Type II, and obesity who presents for heart transplant evaluation.    I discussed the risks and benefits of heart transplantation including the risks of death, bleeding, stroke, infection, renal failure and arrhythmias. He understands and is willing to proceed with this.

## 2024-05-23 ENCOUNTER — DOCUMENTATION ONLY (OUTPATIENT)
Dept: TRANSPLANT | Facility: CLINIC | Age: 59
End: 2024-05-23
Payer: MEDICARE

## 2024-05-23 ENCOUNTER — CARE COORDINATION (OUTPATIENT)
Dept: TRANSPLANT | Facility: CLINIC | Age: 59
End: 2024-05-23
Payer: MEDICARE

## 2024-05-23 NOTE — PROGRESS NOTES
Sent pt [employer] message checking in on current weight.  Our initial eval visit 1 mo ago put BMI ~37.

## 2024-05-23 NOTE — PROGRESS NOTES
D: Attempted to call pt for well check and to follow-up on heart transplant evaluation, especially positive quantiferon. Quantiferon was just able the cut off and should be rechecked, per ID team. Pt did not answer. Left message asking him to call back.   P: Will attempt to call pt back if we do not hear from him.

## 2024-05-30 ENCOUNTER — CARE COORDINATION (OUTPATIENT)
Dept: TRANSPLANT | Facility: CLINIC | Age: 59
End: 2024-05-30
Payer: MEDICARE

## 2024-05-30 DIAGNOSIS — I25.5 ISCHEMIC CARDIOMYOPATHY: Primary | ICD-10-CM

## 2024-05-30 NOTE — PROGRESS NOTES
D: Pt with recently evaluated for heart transplant. His TB test came back just above the cut off for positive. Per ID there is a likelihood that this is a false positive and should be retested.   I: Called to discuss with pt. Offered support and reassurance. Instructed him to get a new test at his local lab.   A: Pt verbalized understanding. He reports no known exposures to TB. He states he has had this test in the past and it was negative - pt was evaluated for heart transplant several years ago at the [facility].   P: Will plan to send order for new quantiferon test to pt's lab at [facility]

## 2024-06-07 ENCOUNTER — TELEPHONE (OUTPATIENT)
Dept: TRANSPLANT | Facility: CLINIC | Age: 59
End: 2024-06-07

## 2024-06-07 NOTE — TELEPHONE ENCOUNTER
Patient called to give a fax number to were a lab order was to be faxed to at [facility] in [phone] patient is there.

## 2024-06-10 ENCOUNTER — LAB (OUTPATIENT)
Dept: LAB | Facility: HOSPITAL | Age: 59
End: 2024-06-10
Payer: MEDICARE

## 2024-06-10 DIAGNOSIS — I25.5 ISCHEMIC CARDIOMYOPATHY: ICD-10-CM

## 2024-06-10 PROCEDURE — 36415 COLL VENOUS BLD VENIPUNCTURE: CPT

## 2024-06-10 PROCEDURE — 86481 TB AG RESPONSE T-CELL SUSP: CPT

## 2024-06-11 LAB
M TB TUBERC IFN-G BLD QL: NEGATIVE
TBGOLD MITO: 9.96 IU/ML
TBGOLD NIL: 0.04 IU/ML
TBGOLD TB1-NIL: 0.05 IU/ML
TBGOLD TB2-NIL: 0.05 IU/ML

## 2024-06-26 ENCOUNTER — PATIENT OUTREACH (OUTPATIENT)
Dept: CARDIOLOGY | Facility: CLINIC | Age: 59
End: 2024-06-26
Payer: MEDICARE

## 2024-06-26 NOTE — PROGRESS NOTES
Called and spoke to patient to review pre-procedure instructions for upcoming VT ablatino procedure on 7/1/24. Writer reviewed medication holds in detail. Patient received letter and does not have any questions at this time.

## 2024-06-27 ENCOUNTER — TELEPHONE (OUTPATIENT)
Dept: TRANSPLANT | Facility: CLINIC | Age: 59
End: 2024-06-27
Payer: MEDICARE

## 2024-06-27 PROCEDURE — 93295 DEV INTERROG REMOTE 1/2/MLT: CPT | Performed by: INTERNAL MEDICINE

## 2024-06-27 NOTE — TELEPHONE ENCOUNTER
Please call Leo;  He did not get the [employer] message for instructions on his upcoming procedure on 07/01/2024 Ablation.

## 2024-06-28 ENCOUNTER — TELEPHONE (OUTPATIENT)
Dept: CARDIOLOGY | Facility: CLINIC | Age: 59
End: 2024-06-28
Payer: MEDICARE

## 2024-06-30 NOTE — H&P
Electrophysiology Pre-Procedure History and Physical    Leo Russell MRN# [id number]   Age: 58 year old YOB: 1965      Date of Procedure: 7/1/2024 [facility]      Date of Exam 7/1/2024  Facility (Same day)       HPI:  Mr. Russell is a 58 year old male who has a medical history significant for anterior wall MI, CAD s/p PCIs LAD, D1, D2 2009, 2010, 2011, & 2017, ICM LVEF 25%, s/p CRTD 2011 s/p gen change 6/2022, VT s/p prior ablations 6/2020 & 11/2021, PAF (CHADSVASC 6 on Xarelto), HTN, HLD, prior LV thrombus, DM2, CVA, BPH, and obesity. He has had VT with ICD therapies despite AAT. He was originally on amiodarone and mexiletine but had breakthroughs. Amiodarone was later stopped to avoid possible long term toxicities and he was alternatively placed on Sotalol. He has had 2 VT ablation at OSH and found to have numerous VTs arising from anterior scar area (lateral/septal walls) and inferior apical aneurysm. He has continued to have recurrent arrhyhmias. We discussed considering going back on amiodarone vs ablation. He is undergoing advanced therapies evaluation here as well, but his main limiting factor is VT. We discussed ablation and its indications, risks, and benefits.   He elected to pursue VT ablation for which he presents today.   Further, RHC results showed compensated HF and no cardiogenic shock or pre-shock physiology.        Active problem list:     Patient Active Problem List    Diagnosis Date Noted    Class 2 severe obesity due to excess calories with serious comorbidity in adult (H) 02/11/2024     Priority: Medium    Benign prostatic hyperplasia without lower urinary tract symptoms 10/19/2023     Priority: Medium    Type 2 diabetes mellitus with obesity (H) 10/19/2023     Priority: Medium    Gastroesophageal reflux disease without esophagitis 10/19/2023     Priority: Medium    Lymphocytosis (symptomatic) 10/19/2023     Priority: Medium     Moderate obesity 10/19/2023     Priority: Medium    Chronic anticoagulation 09/19/2021     Priority: Medium    History of ventricular tachycardia 09/19/2021     Priority: Medium    Paroxysmal atrial fibrillation (H) 09/19/2021     Priority: Medium    Chronic combined systolic and diastolic CHF, NYHA class 2 and ACC/AHA stage C (H) 07/11/2021     Priority: Medium    Automatic implantable cardioverter-defibrillator in situ 06/10/2020     Priority: Medium    Anterior myocardial infarction (H) 10/30/2017     Priority: Medium     Formatting of this note might be different from the original.  Sm 07/25/2009 PCI. 2.25 X 24mm Taxus drug eluting stent to diagonal. 3.0 X 8-mm Taxus stent to proximal LAD  12/03/2010 PCI. 3.5 X 15-mm Promus drug eluting stent to totally occluded LAD      Arterial aneurysm (H24) 10/30/2017     Priority: Medium     Formatting of this note might be different from the original.  Anteroapical Akinetic      Coronary atherosclerosis 10/30/2017     Priority: Medium     Formatting of this note might be different from the original.  Progressive, involving LAD/Diagonal, Stent      CVA (cerebral vascular accident) (H) 10/30/2017     Priority: Medium     Formatting of this note might be different from the original.  12/2010      Hyperlipidemia 10/30/2017     Priority: Medium    Hypertension 10/30/2017     Priority: Medium    Ischemic cardiomyopathy 10/30/2017     Priority: Medium     Formatting of this note might be different from the original.  Severe 2011 BiVentricular ICD. [employer] Cognis HE Model #N119, Serial #[id number]. RV lead Guidant Model #0185, Serial#[id number]. LV lead BS Model #4591, Serial [id number]. RA lead Guidant Model #4469, Serial #[id number]  Formatting of this note might be different from the original.  Severe 2011 BiVentricular ICD. [employer] Cognis HE Model #N119, Serial #[id number]. RV lead Guidant Model #0185, Serial#[id number]. LV lead BS Model #4591, Serial [id number]. RA lead Guidant Model  #4928, Serial #[id number]              Medications (include herbals and vitamins):      No current facility-administered medications for this encounter.     Current Outpatient Medications   Medication Sig Dispense Refill    aspirin 81 MG EC tablet Take 1 tablet by mouth daily      carvedilol (COREG) 25 MG tablet Take 50 mg by mouth 2 times daily      empagliflozin (JARDIANCE) 10 MG TABS tablet Take 10 mg by mouth daily      furosemide (LASIX) 40 MG tablet Take 40 mg by mouth daily as needed      magnesium oxide (MAG-OX) 400 MG tablet Take 400 mg by mouth 2 times daily      mexiletine (MEXITIL) 200 MG capsule Take 200 mg by mouth every 8 hours      nitroGLYcerin (NITROSTAT) 0.4 MG sublingual tablet Place 1 tablet under the tongue every 5 minutes as needed      pantoprazole (PROTONIX) 40 MG EC tablet Take 40 mg by mouth daily      potassium chloride ER (K-TAB/KLOR-CON) 10 MEQ CR tablet Take 30 mEq by mouth daily      rivaroxaban ANTICOAGULANT (XARELTO) 20 MG TABS tablet Take 20 mg by mouth daily (Patient not taking: Reported on 4/24/2024)      rosuvastatin (CRESTOR) 40 MG tablet Take 40 mg by mouth at bedtime      sacubitril-valsartan (ENTRESTO)  MG per tablet Take 1 tablet by mouth 2 times daily      sotalol (BETAPACE) 120 MG tablet Take 180 mg by mouth every 12 hours      spironolactone (ALDACTONE) 25 MG tablet Take 50 mg by mouth daily      tamsulosin (FLOMAX) 0.4 MG capsule Take 0.4 mg by mouth daily      Vitamin D3 (VITAMIN D-1000 MAX ST) 25 mcg (1000 units) tablet Take 1,000 Units by mouth daily             Medication List         Notice    Cannot display discharge medications because the patient has not yet been admitted.              Allergies:      Allergies   Allergen Reactions    Morphine      ANXIETY  ANXIETY      Tramadol      ANXIETY  ANXIETY               Social History:     Social History     Tobacco Use    Smoking status: Former     Current packs/day: 0.00     Average packs/day: 1 pack/day for 20.0  years (20.0 ttl pk-yrs)     Types: Cigarettes     Start date:      Quit date:      Years since quittin.4     Passive exposure: Never    Smokeless tobacco: Never   Substance Use Topics    Alcohol use: Never            Physical Exam:   All vitals have been reviewed  No data found.  No intake/output data recorded.  Airway assessment:   Patient is able to open mouth wide  Patient is able to stick out tongue}      ENT:   Normocephalic, without obvious abnormality, atraumatic, sinuses nontender on palpation, external ears without lesions, oral pharynx with moist mucous membranes, tonsils without erythema or exudates, gums normal and good dentition.     Neck:   Supple, symmetrical, trachea midline, no adenopathy, thyroid symmetric, not enlarged and no tenderness, skin normal     Lungs:   No increased work of breathing, good air exchange, clear to auscultation bilaterally, no crackles or wheezing     Cardiovascular:   Normal apical impulse, regular rate and rhythm, normal S1 and S2, no S3 or S4, and no murmur noted             Lab / Radiology Results:     Lab Results   Component Value Date    WBC 4.6 2024    RBC 4.82 2024    HGB 15.4 2024    HCT 45.7 2024    MCV 95 2024    RDW 14.0 2024     2024      Lab Results   Component Value Date    WBC 4.6 2024     Lab Results   Component Value Date     2024     Lab Results   Component Value Date    HGB 15.4 2024    HCT 45.7 2024     Lab Results   Component Value Date     2024    CO2 22 2024    BUN 15.8 2024     Lab Results   Component Value Date     2024    CO2 22 2024    BUN 15.8 2024       Lab Results   Component Value Date    TSH 1.63 2024             Plan:   Patient's active problems diagnostically and therapeutically optimized for the planned procedure. Patient here for VT ablation. Procedure explained in detail to patient including  indications, risks, and benefits. We also discussed ablation and its indications, risks, and benefits. Complications include but are not limited to vascular injury, excessive bleeding requiring blood transfusion, groin hematoma, aortic injury at the time of transseptal puncture, bleeding/injury to abdominal structures at time of epicardial access, cardiac tamponade requiring pericardiocentesis or open heart surgery, and thromboembolic complications or strokes. We also discussed that VT ablation can be limited by inducibility of VT at the time of EPS/Abaltion and/or the origin of VT. Efficacy of ablation also deceases if multiple foci are found. After an extensive discussion, the patient would like to pursue ablation in attempts to improve VT burden and symptoms.   Patient states understanding and wishes to procced.     GARLAND Wright CNP  Electrophysiology Consult Service  Securely message with [email or web address]   Text page via [facility] Paging/Directory

## 2024-07-01 ENCOUNTER — ANESTHESIA EVENT (OUTPATIENT)
Dept: CARDIOLOGY | Facility: CLINIC | Age: 59
End: 2024-07-01
Payer: MEDICARE

## 2024-07-01 ENCOUNTER — ANESTHESIA (OUTPATIENT)
Dept: CARDIOLOGY | Facility: CLINIC | Age: 59
End: 2024-07-01
Payer: MEDICARE

## 2024-07-01 ENCOUNTER — ANCILLARY PROCEDURE (OUTPATIENT)
Dept: CARDIOLOGY | Facility: CLINIC | Age: 59
End: 2024-07-01
Attending: INTERNAL MEDICINE
Payer: MEDICARE

## 2024-07-01 ENCOUNTER — HOSPITAL ENCOUNTER (OUTPATIENT)
Facility: CLINIC | Age: 59
Setting detail: OBSERVATION
LOS: 1 days | Discharge: HOME | End: 2024-07-02
Attending: INTERNAL MEDICINE | Admitting: INTERNAL MEDICINE
Payer: MEDICARE

## 2024-07-01 DIAGNOSIS — Z45.02 FITTING AND ADJUSTMENT OF AUTOMATIC IMPLANTABLE CARDIOVERTER-DEFIBRILLATOR: ICD-10-CM

## 2024-07-01 DIAGNOSIS — Z45.02 FITTING AND ADJUSTMENT OF AUTOMATIC IMPLANTABLE CARDIOVERTER-DEFIBRILLATOR: Primary | ICD-10-CM

## 2024-07-01 DIAGNOSIS — I47.20 VENTRICULAR TACHYCARDIA (H): ICD-10-CM

## 2024-07-01 LAB
ABO/RH(D): NORMAL
ACT BLD: 165 SECONDS (ref 74–150)
ACT BLD: 165 SECONDS (ref 74–150)
ACT BLD: 295 SECONDS (ref 74–150)
ACT BLD: 300 SECONDS (ref 74–150)
ACT BLD: 308 SECONDS (ref 74–150)
ACT BLD: 337 SECONDS (ref 74–150)
ACT BLD: 350 SECONDS (ref 74–150)
ACT BLD: 388 SECONDS (ref 74–150)
ALBUMIN SERPL BCG-MCNC: 3.6 G/DL (ref 3.5–5.2)
ALP SERPL-CCNC: 52 U/L (ref 40–150)
ALT SERPL W P-5'-P-CCNC: 15 U/L (ref 0–70)
ANION GAP SERPL CALCULATED.3IONS-SCNC: 10 MMOL/L (ref 7–15)
ANION GAP SERPL CALCULATED.3IONS-SCNC: 10 MMOL/L (ref 7–15)
ANTIBODY SCREEN: NEGATIVE
AST SERPL W P-5'-P-CCNC: 18 U/L (ref 0–45)
BILIRUB SERPL-MCNC: 0.9 MG/DL
BUN SERPL-MCNC: 12.4 MG/DL (ref 6–20)
BUN SERPL-MCNC: 14.7 MG/DL (ref 6–20)
CALCIUM SERPL-MCNC: 8.4 MG/DL (ref 8.6–10)
CALCIUM SERPL-MCNC: 9.1 MG/DL (ref 8.6–10)
CHLORIDE SERPL-SCNC: 105 MMOL/L (ref 98–107)
CHLORIDE SERPL-SCNC: 106 MMOL/L (ref 98–107)
CREAT SERPL-MCNC: 0.88 MG/DL (ref 0.67–1.17)
CREAT SERPL-MCNC: 0.94 MG/DL (ref 0.67–1.17)
DEPRECATED HCO3 PLAS-SCNC: 20 MMOL/L (ref 22–29)
DEPRECATED HCO3 PLAS-SCNC: 22 MMOL/L (ref 22–29)
EGFRCR SERPLBLD CKD-EPI 2021: >90 ML/MIN/1.73M2
EGFRCR SERPLBLD CKD-EPI 2021: >90 ML/MIN/1.73M2
ERYTHROCYTE [DISTWIDTH] IN BLOOD BY AUTOMATED COUNT: 13.7 % (ref 10–15)
ERYTHROCYTE [DISTWIDTH] IN BLOOD BY AUTOMATED COUNT: 13.7 % (ref 10–15)
GLUCOSE BLDC GLUCOMTR-MCNC: 123 MG/DL (ref 70–99)
GLUCOSE BLDC GLUCOMTR-MCNC: 134 MG/DL (ref 70–99)
GLUCOSE BLDC GLUCOMTR-MCNC: 140 MG/DL (ref 70–99)
GLUCOSE SERPL-MCNC: 130 MG/DL (ref 70–99)
GLUCOSE SERPL-MCNC: 194 MG/DL (ref 70–99)
HCT VFR BLD AUTO: 40.9 % (ref 40–53)
HCT VFR BLD AUTO: 42.5 % (ref 40–53)
HGB BLD-MCNC: 14 G/DL (ref 13.3–17.7)
HGB BLD-MCNC: 14.8 G/DL (ref 13.3–17.7)
MAGNESIUM SERPL-MCNC: 1.9 MG/DL (ref 1.7–2.3)
MCH RBC QN AUTO: 32 PG (ref 26.5–33)
MCH RBC QN AUTO: 32.3 PG (ref 26.5–33)
MCHC RBC AUTO-ENTMCNC: 34.2 G/DL (ref 31.5–36.5)
MCHC RBC AUTO-ENTMCNC: 34.8 G/DL (ref 31.5–36.5)
MCV RBC AUTO: 92 FL (ref 78–100)
MCV RBC AUTO: 95 FL (ref 78–100)
PLATELET # BLD AUTO: 162 10E3/UL (ref 150–450)
PLATELET # BLD AUTO: 180 10E3/UL (ref 150–450)
POTASSIUM SERPL-SCNC: 4.3 MMOL/L (ref 3.4–5.3)
POTASSIUM SERPL-SCNC: 4.9 MMOL/L (ref 3.4–5.3)
PROT SERPL-MCNC: 5.6 G/DL (ref 6.4–8.3)
RBC # BLD AUTO: 4.33 10E6/UL (ref 4.4–5.9)
RBC # BLD AUTO: 4.62 10E6/UL (ref 4.4–5.9)
SODIUM SERPL-SCNC: 136 MMOL/L (ref 135–145)
SODIUM SERPL-SCNC: 137 MMOL/L (ref 135–145)
SPECIMEN EXPIRATION DATE: NORMAL
WBC # BLD AUTO: 5.5 10E3/UL (ref 4–11)
WBC # BLD AUTO: 8.2 10E3/UL (ref 4–11)

## 2024-07-01 PROCEDURE — 250N000009 HC RX 250: Performed by: NURSE ANESTHETIST, CERTIFIED REGISTERED

## 2024-07-01 PROCEDURE — 93654 COMPRE EP EVAL TX VT: CPT | Performed by: ANESTHESIOLOGY

## 2024-07-01 PROCEDURE — 93005 ELECTROCARDIOGRAM TRACING: CPT

## 2024-07-01 PROCEDURE — 250N000013 HC RX MED GY IP 250 OP 250 PS 637: Performed by: NURSE PRACTITIONER

## 2024-07-01 PROCEDURE — 93662 INTRACARDIAC ECG (ICE): CPT | Performed by: INTERNAL MEDICINE

## 2024-07-01 PROCEDURE — 93654 COMPRE EP EVAL TX VT: CPT | Performed by: NURSE ANESTHETIST, CERTIFIED REGISTERED

## 2024-07-01 PROCEDURE — 250N000011 HC RX IP 250 OP 636: Performed by: NURSE ANESTHETIST, CERTIFIED REGISTERED

## 2024-07-01 PROCEDURE — 85347 COAGULATION TIME ACTIVATED: CPT

## 2024-07-01 PROCEDURE — 83735 ASSAY OF MAGNESIUM: CPT | Performed by: PHYSICIAN ASSISTANT

## 2024-07-01 PROCEDURE — 93662 INTRACARDIAC ECG (ICE): CPT | Mod: 26 | Performed by: INTERNAL MEDICINE

## 2024-07-01 PROCEDURE — 82962 GLUCOSE BLOOD TEST: CPT

## 2024-07-01 PROCEDURE — 99231 SBSQ HOSP IP/OBS SF/LOW 25: CPT | Performed by: PHYSICIAN ASSISTANT

## 2024-07-01 PROCEDURE — C1759 CATH, INTRA ECHOCARDIOGRAPHY: HCPCS | Performed by: INTERNAL MEDICINE

## 2024-07-01 PROCEDURE — C1894 INTRO/SHEATH, NON-LASER: HCPCS | Performed by: INTERNAL MEDICINE

## 2024-07-01 PROCEDURE — 250N000024 HC ISOFLURANE, PER MIN: Performed by: INTERNAL MEDICINE

## 2024-07-01 PROCEDURE — 85027 COMPLETE CBC AUTOMATED: CPT | Mod: 91 | Performed by: PHYSICIAN ASSISTANT

## 2024-07-01 PROCEDURE — 36415 COLL VENOUS BLD VENIPUNCTURE: CPT | Performed by: PHYSICIAN ASSISTANT

## 2024-07-01 PROCEDURE — 80048 BASIC METABOLIC PNL TOTAL CA: CPT | Performed by: INTERNAL MEDICINE

## 2024-07-01 PROCEDURE — 93462 L HRT CATH TRNSPTL PUNCTURE: CPT | Mod: GC | Performed by: INTERNAL MEDICINE

## 2024-07-01 PROCEDURE — 999N000054 HC STATISTIC EKG NON-CHARGEABLE

## 2024-07-01 PROCEDURE — 250N000011 HC RX IP 250 OP 636: Performed by: ANESTHESIOLOGY

## 2024-07-01 PROCEDURE — G0378 HOSPITAL OBSERVATION PER HR: HCPCS

## 2024-07-01 PROCEDURE — 93654 COMPRE EP EVAL TX VT: CPT | Mod: GC | Performed by: INTERNAL MEDICINE

## 2024-07-01 PROCEDURE — 272N000001 HC OR GENERAL SUPPLY STERILE: Performed by: INTERNAL MEDICINE

## 2024-07-01 PROCEDURE — C1887 CATHETER, GUIDING: HCPCS | Performed by: INTERNAL MEDICINE

## 2024-07-01 PROCEDURE — 250N000013 HC RX MED GY IP 250 OP 250 PS 637: Performed by: INTERNAL MEDICINE

## 2024-07-01 PROCEDURE — 999N000064 CARDIAC DEVICE CHECK - INPATIENT

## 2024-07-01 PROCEDURE — C1732 CATH, EP, DIAG/ABL, 3D/VECT: HCPCS | Performed by: INTERNAL MEDICINE

## 2024-07-01 PROCEDURE — 258N000003 HC RX IP 258 OP 636: Performed by: NURSE ANESTHETIST, CERTIFIED REGISTERED

## 2024-07-01 PROCEDURE — C1733 CATH, EP, OTHR THAN COOL-TIP: HCPCS | Performed by: INTERNAL MEDICINE

## 2024-07-01 PROCEDURE — 86900 BLOOD TYPING SEROLOGIC ABO: CPT | Performed by: INTERNAL MEDICINE

## 2024-07-01 PROCEDURE — 80053 COMPREHEN METABOLIC PANEL: CPT | Performed by: INTERNAL MEDICINE

## 2024-07-01 PROCEDURE — 93010 ELECTROCARDIOGRAM REPORT: CPT | Performed by: INTERNAL MEDICINE

## 2024-07-01 PROCEDURE — 2894A CARDIAC DEVICE CHECK - INPATIENT: CPT | Mod: 26 | Performed by: INTERNAL MEDICINE

## 2024-07-01 PROCEDURE — 36415 COLL VENOUS BLD VENIPUNCTURE: CPT | Performed by: INTERNAL MEDICINE

## 2024-07-01 PROCEDURE — 85049 AUTOMATED PLATELET COUNT: CPT | Performed by: INTERNAL MEDICINE

## 2024-07-01 PROCEDURE — 370N000017 HC ANESTHESIA TECHNICAL FEE, PER MIN: Performed by: INTERNAL MEDICINE

## 2024-07-01 PROCEDURE — 93462 L HRT CATH TRNSPTL PUNCTURE: CPT | Performed by: INTERNAL MEDICINE

## 2024-07-01 PROCEDURE — 93654 COMPRE EP EVAL TX VT: CPT | Performed by: INTERNAL MEDICINE

## 2024-07-01 PROCEDURE — 250N000009 HC RX 250: Performed by: NURSE PRACTITIONER

## 2024-07-01 RX ORDER — NALOXONE HYDROCHLORIDE 0.4 MG/ML
0.2 INJECTION, SOLUTION INTRAMUSCULAR; INTRAVENOUS; SUBCUTANEOUS
Status: DISCONTINUED | OUTPATIENT
Start: 2024-07-01 | End: 2024-07-02 | Stop reason: HOSPADM

## 2024-07-01 RX ORDER — ONDANSETRON 4 MG/1
4 TABLET, ORALLY DISINTEGRATING ORAL EVERY 30 MIN PRN
Status: DISCONTINUED | OUTPATIENT
Start: 2024-07-01 | End: 2024-07-01 | Stop reason: HOSPADM

## 2024-07-01 RX ORDER — SODIUM CHLORIDE 9 MG/ML
INJECTION, SOLUTION INTRAVENOUS CONTINUOUS
Status: DISCONTINUED | OUTPATIENT
Start: 2024-07-01 | End: 2024-07-01 | Stop reason: HOSPADM

## 2024-07-01 RX ORDER — TAMSULOSIN HYDROCHLORIDE 0.4 MG/1
0.4 CAPSULE ORAL DAILY
Status: DISCONTINUED | OUTPATIENT
Start: 2024-07-01 | End: 2024-07-02 | Stop reason: HOSPADM

## 2024-07-01 RX ORDER — ONDANSETRON 2 MG/ML
4 INJECTION INTRAMUSCULAR; INTRAVENOUS EVERY 30 MIN PRN
Status: DISCONTINUED | OUTPATIENT
Start: 2024-07-01 | End: 2024-07-01 | Stop reason: HOSPADM

## 2024-07-01 RX ORDER — PANTOPRAZOLE SODIUM 40 MG/1
40 TABLET, DELAYED RELEASE ORAL DAILY
Status: DISCONTINUED | OUTPATIENT
Start: 2024-07-02 | End: 2024-07-02 | Stop reason: HOSPADM

## 2024-07-01 RX ORDER — MAGNESIUM OXIDE 400 MG/1
400 TABLET ORAL 2 TIMES DAILY
Status: DISCONTINUED | OUTPATIENT
Start: 2024-07-01 | End: 2024-07-02 | Stop reason: HOSPADM

## 2024-07-01 RX ORDER — SODIUM CHLORIDE, SODIUM LACTATE, POTASSIUM CHLORIDE, CALCIUM CHLORIDE 600; 310; 30; 20 MG/100ML; MG/100ML; MG/100ML; MG/100ML
INJECTION, SOLUTION INTRAVENOUS CONTINUOUS
Status: DISCONTINUED | OUTPATIENT
Start: 2024-07-01 | End: 2024-07-01 | Stop reason: HOSPADM

## 2024-07-01 RX ORDER — HYDROMORPHONE HCL IN WATER/PF 6 MG/30 ML
0.4 PATIENT CONTROLLED ANALGESIA SYRINGE INTRAVENOUS EVERY 5 MIN PRN
Status: DISCONTINUED | OUTPATIENT
Start: 2024-07-01 | End: 2024-07-01 | Stop reason: HOSPADM

## 2024-07-01 RX ORDER — PROPOFOL 10 MG/ML
INJECTION, EMULSION INTRAVENOUS PRN
Status: DISCONTINUED | OUTPATIENT
Start: 2024-07-01 | End: 2024-07-01

## 2024-07-01 RX ORDER — ONDANSETRON 4 MG/1
4 TABLET, ORALLY DISINTEGRATING ORAL EVERY 30 MIN PRN
Status: CANCELLED | OUTPATIENT
Start: 2024-07-01

## 2024-07-01 RX ORDER — SPIRONOLACTONE 25 MG/1
50 TABLET ORAL DAILY
Status: DISCONTINUED | OUTPATIENT
Start: 2024-07-01 | End: 2024-07-02 | Stop reason: HOSPADM

## 2024-07-01 RX ORDER — DOPAMINE HYDROCHLORIDE 160 MG/100ML
INJECTION, SOLUTION INTRAVENOUS CONTINUOUS PRN
Status: DISCONTINUED | OUTPATIENT
Start: 2024-07-01 | End: 2024-07-01

## 2024-07-01 RX ORDER — FUROSEMIDE 40 MG
40 TABLET ORAL DAILY PRN
Status: DISCONTINUED | OUTPATIENT
Start: 2024-07-01 | End: 2024-07-01

## 2024-07-01 RX ORDER — DEXAMETHASONE SODIUM PHOSPHATE 4 MG/ML
4 INJECTION, SOLUTION INTRA-ARTICULAR; INTRALESIONAL; INTRAMUSCULAR; INTRAVENOUS; SOFT TISSUE
Status: DISCONTINUED | OUTPATIENT
Start: 2024-07-01 | End: 2024-07-01 | Stop reason: HOSPADM

## 2024-07-01 RX ORDER — CEFAZOLIN SODIUM 1 G/3ML
INJECTION, POWDER, FOR SOLUTION INTRAMUSCULAR; INTRAVENOUS PRN
Status: DISCONTINUED | OUTPATIENT
Start: 2024-07-01 | End: 2024-07-01

## 2024-07-01 RX ORDER — OXYCODONE HYDROCHLORIDE 10 MG/1
10 TABLET ORAL
Status: CANCELLED | OUTPATIENT
Start: 2024-07-01

## 2024-07-01 RX ORDER — PROTAMINE SULFATE 10 MG/ML
INJECTION, SOLUTION INTRAVENOUS PRN
Status: DISCONTINUED | OUTPATIENT
Start: 2024-07-01 | End: 2024-07-01

## 2024-07-01 RX ORDER — FENTANYL CITRATE 50 UG/ML
25 INJECTION, SOLUTION INTRAMUSCULAR; INTRAVENOUS EVERY 5 MIN PRN
Status: DISCONTINUED | OUTPATIENT
Start: 2024-07-01 | End: 2024-07-01 | Stop reason: HOSPADM

## 2024-07-01 RX ORDER — NALOXONE HYDROCHLORIDE 0.4 MG/ML
0.1 INJECTION, SOLUTION INTRAMUSCULAR; INTRAVENOUS; SUBCUTANEOUS
Status: DISCONTINUED | OUTPATIENT
Start: 2024-07-01 | End: 2024-07-01 | Stop reason: HOSPADM

## 2024-07-01 RX ORDER — ONDANSETRON 2 MG/ML
INJECTION INTRAMUSCULAR; INTRAVENOUS PRN
Status: DISCONTINUED | OUTPATIENT
Start: 2024-07-01 | End: 2024-07-01

## 2024-07-01 RX ORDER — OXYCODONE HYDROCHLORIDE 5 MG/1
5 TABLET ORAL
Status: CANCELLED | OUTPATIENT
Start: 2024-07-01

## 2024-07-01 RX ORDER — SODIUM CHLORIDE, SODIUM LACTATE, POTASSIUM CHLORIDE, CALCIUM CHLORIDE 600; 310; 30; 20 MG/100ML; MG/100ML; MG/100ML; MG/100ML
INJECTION, SOLUTION INTRAVENOUS CONTINUOUS PRN
Status: DISCONTINUED | OUTPATIENT
Start: 2024-07-01 | End: 2024-07-01

## 2024-07-01 RX ORDER — MEXILETINE HYDROCHLORIDE 250 MG/1
250 CAPSULE ORAL EVERY 8 HOURS
Status: DISCONTINUED | OUTPATIENT
Start: 2024-07-01 | End: 2024-07-02 | Stop reason: HOSPADM

## 2024-07-01 RX ORDER — ONDANSETRON 2 MG/ML
4 INJECTION INTRAMUSCULAR; INTRAVENOUS EVERY 30 MIN PRN
Status: CANCELLED | OUTPATIENT
Start: 2024-07-01

## 2024-07-01 RX ORDER — METOPROLOL TARTRATE 1 MG/ML
5 INJECTION, SOLUTION INTRAVENOUS ONCE
Status: COMPLETED | OUTPATIENT
Start: 2024-07-01 | End: 2024-07-01

## 2024-07-01 RX ORDER — CARVEDILOL 25 MG/1
50 TABLET ORAL 2 TIMES DAILY
Status: DISCONTINUED | OUTPATIENT
Start: 2024-07-01 | End: 2024-07-02 | Stop reason: HOSPADM

## 2024-07-01 RX ORDER — ROSUVASTATIN CALCIUM 40 MG/1
40 TABLET, COATED ORAL AT BEDTIME
Status: DISCONTINUED | OUTPATIENT
Start: 2024-07-01 | End: 2024-07-02 | Stop reason: HOSPADM

## 2024-07-01 RX ORDER — FENTANYL CITRATE 50 UG/ML
50 INJECTION, SOLUTION INTRAMUSCULAR; INTRAVENOUS EVERY 5 MIN PRN
Status: DISCONTINUED | OUTPATIENT
Start: 2024-07-01 | End: 2024-07-01 | Stop reason: HOSPADM

## 2024-07-01 RX ORDER — OXYCODONE AND ACETAMINOPHEN 5; 325 MG/1; MG/1
1 TABLET ORAL EVERY 4 HOURS PRN
Status: DISCONTINUED | OUTPATIENT
Start: 2024-07-01 | End: 2024-07-02 | Stop reason: HOSPADM

## 2024-07-01 RX ORDER — NALOXONE HYDROCHLORIDE 0.4 MG/ML
0.4 INJECTION, SOLUTION INTRAMUSCULAR; INTRAVENOUS; SUBCUTANEOUS
Status: DISCONTINUED | OUTPATIENT
Start: 2024-07-01 | End: 2024-07-02 | Stop reason: HOSPADM

## 2024-07-01 RX ORDER — DEXAMETHASONE SODIUM PHOSPHATE 4 MG/ML
4 INJECTION, SOLUTION INTRA-ARTICULAR; INTRALESIONAL; INTRAMUSCULAR; INTRAVENOUS; SOFT TISSUE
Status: CANCELLED | OUTPATIENT
Start: 2024-07-01

## 2024-07-01 RX ORDER — NALOXONE HYDROCHLORIDE 0.4 MG/ML
0.1 INJECTION, SOLUTION INTRAMUSCULAR; INTRAVENOUS; SUBCUTANEOUS
Status: CANCELLED | OUTPATIENT
Start: 2024-07-01

## 2024-07-01 RX ORDER — HEPARIN SODIUM 1000 [USP'U]/ML
INJECTION, SOLUTION INTRAVENOUS; SUBCUTANEOUS PRN
Status: DISCONTINUED | OUTPATIENT
Start: 2024-07-01 | End: 2024-07-01

## 2024-07-01 RX ORDER — POTASSIUM CHLORIDE 750 MG/1
30 TABLET, EXTENDED RELEASE ORAL DAILY
Status: DISCONTINUED | OUTPATIENT
Start: 2024-07-01 | End: 2024-07-02 | Stop reason: HOSPADM

## 2024-07-01 RX ORDER — FENTANYL CITRATE 50 UG/ML
INJECTION, SOLUTION INTRAMUSCULAR; INTRAVENOUS PRN
Status: DISCONTINUED | OUTPATIENT
Start: 2024-07-01 | End: 2024-07-01

## 2024-07-01 RX ORDER — HYDROMORPHONE HCL IN WATER/PF 6 MG/30 ML
0.2 PATIENT CONTROLLED ANALGESIA SYRINGE INTRAVENOUS EVERY 5 MIN PRN
Status: DISCONTINUED | OUTPATIENT
Start: 2024-07-01 | End: 2024-07-01 | Stop reason: HOSPADM

## 2024-07-01 RX ORDER — LIDOCAINE 40 MG/G
CREAM TOPICAL
Status: DISCONTINUED | OUTPATIENT
Start: 2024-07-01 | End: 2024-07-01 | Stop reason: HOSPADM

## 2024-07-01 RX ORDER — VASOPRESSIN IN 0.9 % NACL 2 UNIT/2ML
SYRINGE (ML) INTRAVENOUS PRN
Status: DISCONTINUED | OUTPATIENT
Start: 2024-07-01 | End: 2024-07-01

## 2024-07-01 RX ORDER — FUROSEMIDE 40 MG
40 TABLET ORAL DAILY
Status: DISCONTINUED | OUTPATIENT
Start: 2024-07-01 | End: 2024-07-02 | Stop reason: HOSPADM

## 2024-07-01 RX ADMIN — ROCURONIUM BROMIDE 50 MG: 10 INJECTION, SOLUTION INTRAVENOUS at 08:36

## 2024-07-01 RX ADMIN — EMPAGLIFLOZIN 10 MG: 10 TABLET, FILM COATED ORAL at 20:57

## 2024-07-01 RX ADMIN — TAMSULOSIN HYDROCHLORIDE 0.4 MG: 0.4 CAPSULE ORAL at 20:56

## 2024-07-01 RX ADMIN — Medication 1 UNITS: at 12:10

## 2024-07-01 RX ADMIN — Medication 3000 UNITS: at 10:28

## 2024-07-01 RX ADMIN — NOREPINEPHRINE BITARTRATE 12.8 MCG: 1 INJECTION, SOLUTION, CONCENTRATE INTRAVENOUS at 10:38

## 2024-07-01 RX ADMIN — PHENYLEPHRINE HYDROCHLORIDE 200 MCG: 10 INJECTION INTRAVENOUS at 08:50

## 2024-07-01 RX ADMIN — FENTANYL CITRATE 50 MCG: 50 INJECTION, SOLUTION INTRAMUSCULAR; INTRAVENOUS at 15:22

## 2024-07-01 RX ADMIN — SODIUM CHLORIDE, POTASSIUM CHLORIDE, SODIUM LACTATE AND CALCIUM CHLORIDE: 600; 310; 30; 20 INJECTION, SOLUTION INTRAVENOUS at 11:57

## 2024-07-01 RX ADMIN — PROTAMINE SULFATE 10 MG: 10 INJECTION, SOLUTION INTRAVENOUS at 12:16

## 2024-07-01 RX ADMIN — Medication 4000 UNITS: at 11:30

## 2024-07-01 RX ADMIN — FENTANYL CITRATE 50 MCG: 50 INJECTION, SOLUTION INTRAMUSCULAR; INTRAVENOUS at 13:12

## 2024-07-01 RX ADMIN — Medication 14000 UNITS: at 09:41

## 2024-07-01 RX ADMIN — ROCURONIUM BROMIDE 20 MG: 10 INJECTION, SOLUTION INTRAVENOUS at 11:24

## 2024-07-01 RX ADMIN — MIDAZOLAM 1 MG: 1 INJECTION INTRAMUSCULAR; INTRAVENOUS at 08:03

## 2024-07-01 RX ADMIN — NOREPINEPHRINE BITARTRATE 12.8 MCG: 1 INJECTION, SOLUTION, CONCENTRATE INTRAVENOUS at 11:46

## 2024-07-01 RX ADMIN — ROSUVASTATIN CALCIUM 40 MG: 40 TABLET, COATED ORAL at 21:12

## 2024-07-01 RX ADMIN — FENTANYL CITRATE 50 MCG: 50 INJECTION, SOLUTION INTRAMUSCULAR; INTRAVENOUS at 13:56

## 2024-07-01 RX ADMIN — Medication 1 UNITS: at 10:07

## 2024-07-01 RX ADMIN — PROTAMINE SULFATE 40 MG: 10 INJECTION, SOLUTION INTRAVENOUS at 12:18

## 2024-07-01 RX ADMIN — NOREPINEPHRINE BITARTRATE 6.4 MCG: 1 INJECTION, SOLUTION, CONCENTRATE INTRAVENOUS at 11:15

## 2024-07-01 RX ADMIN — PHENYLEPHRINE HYDROCHLORIDE 150 MCG: 10 INJECTION INTRAVENOUS at 08:20

## 2024-07-01 RX ADMIN — METOPROLOL TARTRATE 5 MG: 5 INJECTION INTRAVENOUS at 14:45

## 2024-07-01 RX ADMIN — Medication 1 UNITS: at 11:55

## 2024-07-01 RX ADMIN — NOREPINEPHRINE BITARTRATE 12.8 MCG: 1 INJECTION, SOLUTION, CONCENTRATE INTRAVENOUS at 08:58

## 2024-07-01 RX ADMIN — FENTANYL CITRATE 50 MCG: 50 INJECTION INTRAMUSCULAR; INTRAVENOUS at 12:20

## 2024-07-01 RX ADMIN — ROCURONIUM BROMIDE 20 MG: 10 INJECTION, SOLUTION INTRAVENOUS at 09:42

## 2024-07-01 RX ADMIN — Medication 200 MG: at 12:31

## 2024-07-01 RX ADMIN — PROPOFOL 60 MG: 10 INJECTION, EMULSION INTRAVENOUS at 08:11

## 2024-07-01 RX ADMIN — ROCURONIUM BROMIDE 30 MG: 10 INJECTION, SOLUTION INTRAVENOUS at 10:19

## 2024-07-01 RX ADMIN — NOREPINEPHRINE BITARTRATE 12.8 MCG: 1 INJECTION, SOLUTION, CONCENTRATE INTRAVENOUS at 09:53

## 2024-07-01 RX ADMIN — MEXILETINE HYDROCHLORIDE 250 MG: 250 CAPSULE ORAL at 20:56

## 2024-07-01 RX ADMIN — PHENYLEPHRINE HYDROCHLORIDE 200 MCG: 10 INJECTION INTRAVENOUS at 08:35

## 2024-07-01 RX ADMIN — FENTANYL CITRATE 50 MCG: 50 INJECTION, SOLUTION INTRAMUSCULAR; INTRAVENOUS at 14:58

## 2024-07-01 RX ADMIN — ONDANSETRON 4 MG: 2 INJECTION INTRAMUSCULAR; INTRAVENOUS at 12:23

## 2024-07-01 RX ADMIN — FENTANYL CITRATE 100 MCG: 50 INJECTION INTRAMUSCULAR; INTRAVENOUS at 08:11

## 2024-07-01 RX ADMIN — NOREPINEPHRINE BITARTRATE 12.8 MCG: 1 INJECTION, SOLUTION, CONCENTRATE INTRAVENOUS at 11:52

## 2024-07-01 RX ADMIN — Medication 1 UNITS: at 08:56

## 2024-07-01 RX ADMIN — NOREPINEPHRINE BITARTRATE 6.4 MCG: 1 INJECTION, SOLUTION, CONCENTRATE INTRAVENOUS at 10:52

## 2024-07-01 RX ADMIN — ROCURONIUM BROMIDE 30 MG: 10 INJECTION, SOLUTION INTRAVENOUS at 09:18

## 2024-07-01 RX ADMIN — ROCURONIUM BROMIDE 20 MG: 10 INJECTION, SOLUTION INTRAVENOUS at 10:53

## 2024-07-01 RX ADMIN — NOREPINEPHRINE BITARTRATE 6.4 MCG: 1 INJECTION, SOLUTION, CONCENTRATE INTRAVENOUS at 12:31

## 2024-07-01 RX ADMIN — Medication 1 UNITS: at 10:03

## 2024-07-01 RX ADMIN — CEFAZOLIN 2 G: 1 INJECTION, POWDER, FOR SOLUTION INTRAMUSCULAR; INTRAVENOUS at 08:40

## 2024-07-01 RX ADMIN — NOREPINEPHRINE BITARTRATE 12.8 MCG: 1 INJECTION, SOLUTION, CONCENTRATE INTRAVENOUS at 08:35

## 2024-07-01 RX ADMIN — ROCURONIUM BROMIDE 30 MG: 10 INJECTION, SOLUTION INTRAVENOUS at 11:38

## 2024-07-01 RX ADMIN — NOREPINEPHRINE BITARTRATE 12.8 MCG: 1 INJECTION, SOLUTION, CONCENTRATE INTRAVENOUS at 09:43

## 2024-07-01 RX ADMIN — MAGNESIUM OXIDE TAB 400 MG (241.3 MG ELEMENTAL MG) 400 MG: 400 (241.3 MG) TAB at 20:56

## 2024-07-01 RX ADMIN — MIDAZOLAM 1 MG: 1 INJECTION INTRAMUSCULAR; INTRAVENOUS at 07:54

## 2024-07-01 RX ADMIN — POTASSIUM CHLORIDE 30 MEQ: 750 TABLET, EXTENDED RELEASE ORAL at 20:56

## 2024-07-01 RX ADMIN — PHENYLEPHRINE HYDROCHLORIDE 150 MCG: 10 INJECTION INTRAVENOUS at 08:11

## 2024-07-01 RX ADMIN — DOPAMINE HYDROCHLORIDE 5 MCG/KG/MIN: 160 INJECTION, SOLUTION INTRAVENOUS at 09:03

## 2024-07-01 RX ADMIN — SODIUM CHLORIDE, POTASSIUM CHLORIDE, SODIUM LACTATE AND CALCIUM CHLORIDE: 600; 310; 30; 20 INJECTION, SOLUTION INTRAVENOUS at 08:03

## 2024-07-01 RX ADMIN — FENTANYL CITRATE 50 MCG: 50 INJECTION INTRAMUSCULAR; INTRAVENOUS at 11:38

## 2024-07-01 RX ADMIN — ROCURONIUM BROMIDE 50 MG: 10 INJECTION, SOLUTION INTRAVENOUS at 08:11

## 2024-07-01 RX ADMIN — NOREPINEPHRINE BITARTRATE 12.8 MCG: 1 INJECTION, SOLUTION, CONCENTRATE INTRAVENOUS at 08:48

## 2024-07-01 RX ADMIN — Medication 1 UNITS: at 08:58

## 2024-07-01 ASSESSMENT — ACTIVITIES OF DAILY LIVING (ADL)
ADLS_ACUITY_SCORE: 22
ADLS_ACUITY_SCORE: 28
ADLS_ACUITY_SCORE: 22
ADLS_ACUITY_SCORE: 27
ADLS_ACUITY_SCORE: 28
ADLS_ACUITY_SCORE: 22
ADLS_ACUITY_SCORE: 22
ADLS_ACUITY_SCORE: 28
ADLS_ACUITY_SCORE: 26
ADLS_ACUITY_SCORE: 22
ADLS_ACUITY_SCORE: 28
ADLS_ACUITY_SCORE: 22
ADLS_ACUITY_SCORE: 26

## 2024-07-01 ASSESSMENT — VISUAL ACUITY: OU: BASELINE

## 2024-07-01 NOTE — OR NURSING
Run of [facility] Dr Lamont chung along with Angy Clemons.  No new orders at this time.  Dr Angy Clemons has reviewed post procedure EKG.

## 2024-07-01 NOTE — ANESTHESIA POSTPROCEDURE EVALUATION
Patient: Leo Russell    Procedure: Procedure(s):  Ablation Ventricular Tachycardia       Anesthesia Type:  General    Note:  Disposition: Outpatient   Postop Pain Control: Uneventful            Sign Out: Well controlled pain   PONV: No   Neuro/Psych: Uneventful            Sign Out: Acceptable/Baseline neuro status   Airway/Respiratory: Uneventful            Sign Out: Acceptable/Baseline resp. status   CV/Hemodynamics: Uneventful            Sign Out: Acceptable CV status; No obvious hypovolemia; No obvious fluid overload   Other NRE: NONE   DID A NON-ROUTINE EVENT OCCUR? No    Event details/Postop Comments:  No complications.           Last vitals:  Vitals Value Taken Time   BP     Temp     Pulse 80 07/01/24 1247   Resp     SpO2 93 % 07/01/24 1248   Vitals shown include unfiled device data.    Electronically Signed By: Aric Garcia MD  July 1, 2024  12:49 PM

## 2024-07-01 NOTE — ANESTHESIA CARE TRANSFER NOTE
Patient: Leo Russell    Procedure: Procedure(s):  Ablation Ventricular Tachycardia       Diagnosis: Ventricular tachycardia (H) [I47.20]  Diagnosis Additional Information: No value filed.    Anesthesia Type:   General     Note:    Oropharynx: oropharynx clear of all foreign objects and spontaneously breathing  Level of Consciousness: awake  Oxygen Supplementation: face mask  Level of Supplemental Oxygen (L/min / FiO2): 6  Independent Airway: airway patency satisfactory and stable  Dentition: dentition unchanged  Vital Signs Stable: post-procedure vital signs reviewed and stable  Report to RN Given: handoff report given  Patient transferred to: PACU    Handoff Report: Identifed the Patient, Identified the Reponsible Provider, Reviewed the pertinent medical history, Discussed the surgical course, Reviewed Intra-OP anesthesia mangement and issues during anesthesia, Set expectations for post-procedure period and Allowed opportunity for questions and acknowledgement of understanding      Vitals:  Vitals Value Taken Time   BP     Temp     Pulse 62 07/01/24 1251   Resp 17 07/01/24 1251   SpO2 95 % 07/01/24 1251   Vitals shown include unfiled device data.    Electronically Signed By: GARLAND Figueroa CRNA  July 1, 2024  12:51 PM

## 2024-07-01 NOTE — Clinical Note
All vital sign monitoring, airway/line management, and medication administration completed by Georgiana CRNA in room and MDA (Dr Garcia).

## 2024-07-01 NOTE — PROGRESS NOTES
Patient admitted to: 6C  Admitted from: PACU post ablation  Arrived by: ariella  Reason for admission: post ablation observation  Patient accompanied by: son  Belongings: remain with patient  Teaching: Oriented to unit, pain assessment plan, treatment plan  Skin double check completed by: CARMELO ABRAHAM and LUIS ALBERTO ABRAHAM  Findings  1.mepelex on coccyx- preventive, skin WNL beneath mepelex  2. R groin with sutures WNL  3. L groin with sutures WNL  4. Scab on R forearm  5.Andres cath in place   Interventions: encourage mobility once bedrest has ended, Pending removal of anders, monitor groin sites for signs of bleeding.

## 2024-07-01 NOTE — OR NURSING
Spoke to Kayla, device RN. She stated nothing needs to be done for the patient's ICD in pre-op, she will work on it intra-op.

## 2024-07-01 NOTE — Clinical Note
dry, intact, no bleeding and no hematoma. All sheaths removed by EP fellow. Sites closed with figure 8 suture. Manual pressure applied. Hemostasis achieved. No dressings applied.

## 2024-07-01 NOTE — Clinical Note
Potential access sites were evaluated for patency using ultrasound.   The right femoral artery, left femoral artery, and right femoral vein were selected. Access was obtained under with Sonosite and Fluoroscopic guidance using a micropuncture 21 gauge needle with direct visualization of needle entry.

## 2024-07-01 NOTE — ANESTHESIA PROCEDURE NOTES
Airway       Patient location during procedure: OR       Procedure Start/Stop Times: 7/1/2024 8:15 AM  Staff -        CRNA: Georgiana Gonzales APRN CRNA       Performed By: CRNA  Consent for Airway        Urgency: elective  Indications and Patient Condition       Indications for airway management: brenda-procedural       Induction type:intravenous       Mask difficulty assessment: 3 - difficult mask (inadequate, unstable, or two providers) +/- NMBA    Final Airway Details       Final airway type: endotracheal airway       Successful airway: ETT - single  Endotracheal Airway Details        ETT size (mm): 8.0       Cuffed: yes       Successful intubation technique: video laryngoscopy       VL Blade Size: Glidescope 4       Grade View of Cords: 1       Adjucts: stylet       Position: Right       Measured from: lips       Secured at (cm): 23       Bite block used: None    Post intubation assessment        Placement verified by: capnometry, equal breath sounds and chest rise        Number of attempts at approach: 1       Number of other approaches attempted: 0       Secured with: pink tape       Ease of procedure: easy       Dentition: Intact and Unchanged    Medication(s) Administered   Medication Administration Time: 7/1/2024 8:15 AM

## 2024-07-01 NOTE — ANESTHESIA PREPROCEDURE EVALUATION
Anesthesia Pre-Procedure Evaluation    Patient: Leo Russell   MRN: [id number] : 1965        Procedure : Procedure(s):  Ablation Ventricular Tachycardia          Past Medical History:   Diagnosis Date    Congestive heart failure (H)     Hypertension       Past Surgical History:   Procedure Laterality Date    APPENDECTOMY      CORONARY ANGIOGRAPHY ADULT ORDER      CV RIGHT HEART CATH MEASUREMENTS RECORDED N/A 2024    Procedure: Heart Cath Right Heart Cath;  Surgeon: Ruben Mcmullen MD;  Location:  HEART [facility] CATH LAB    HERNIA REPAIR      IMPLANT PACEMAKER        Allergies   Allergen Reactions    Morphine      ANXIETY  ANXIETY      Tramadol      ANXIETY  ANXIETY        Social History     Tobacco Use    Smoking status: Former     Current packs/day: 0.00     Average packs/day: 1 pack/day for 20.0 years (20.0 ttl pk-yrs)     Types: Cigarettes     Start date:      Quit date:      Years since quittin.5     Passive exposure: Never    Smokeless tobacco: Never   Substance Use Topics    Alcohol use: Never      Wt Readings from Last 1 Encounters:   24 121.4 kg (267 lb 10.2 oz)        Anesthesia Evaluation   Pt has had prior anesthetic. Type: MAC and General.        ROS/MED HX  ENT/Pulmonary:       Neurologic:     (+)          CVA,    TIA,                  Cardiovascular:     (+)  hypertension- -  CAD -  - -      CHF                                METS/Exercise Tolerance:     Hematologic:  - neg hematologic  ROS     Musculoskeletal:  - neg musculoskeletal ROS     GI/Hepatic:     (+) GERD,                   Renal/Genitourinary:       Endo:     (+)  type II DM,             Obesity,       Psychiatric/Substance Use:  - neg psychiatric ROS     Infectious Disease:       Malignancy:       Other:            Physical Exam    Airway  airway exam normal      Mallampati: II   TM distance: > 3 FB   Neck ROM: full   Mouth opening: > 3 cm    Respiratory Devices and Support         Dental     "   (+) Minor Abnormalities - some fillings, tiny chips      Cardiovascular   cardiovascular exam normal       Rhythm and rate: regular and normal     Pulmonary   pulmonary exam normal        breath sounds clear to auscultation           OUTSIDE LABS:  CBC:   Lab Results   Component Value Date    WBC 4.6 04/24/2024    HGB 15.4 04/24/2024    HCT 45.7 04/24/2024     04/24/2024     BMP:   Lab Results   Component Value Date     04/24/2024    POTASSIUM 4.3 04/24/2024    CHLORIDE 103 04/24/2024    CO2 22 04/24/2024    BUN 15.8 04/24/2024    CR 1.01 04/24/2024     (H) 07/01/2024     (H) 04/24/2024     COAGS:   Lab Results   Component Value Date    PTT 34 04/24/2024    INR 1.33 (H) 04/24/2024     POC: No results found for: \"BGM\", \"HCG\", \"HCGS\"  HEPATIC:   Lab Results   Component Value Date    ALBUMIN 4.3 04/24/2024    PROTTOTAL 6.9 04/24/2024    ALT 24 04/24/2024    AST 16 04/24/2024    ALKPHOS 68 04/24/2024    BILITOTAL 0.7 04/24/2024     OTHER:   Lab Results   Component Value Date    FE 9.1 04/24/2024    PHOS 3.1 04/24/2024    MAG 2.1 04/24/2024    TSH 1.63 04/24/2024       Anesthesia Plan    ASA Status:  3    NPO Status:  NPO Appropriate    Anesthesia Type: General.     - Airway: ETT   Induction: Intravenous.   Maintenance: Inhalation.   Techniques and Equipment:     - Lines/Monitors: 2nd IV     Consents    Anesthesia Plan(s) and associated risks, benefits, and realistic alternatives discussed. Questions answered and patient/representative(s) expressed understanding.     - Discussed: Risks, Benefits and Alternatives for BOTH SEDATION and the PROCEDURE were discussed     - Discussed with:  Patient      - Extended Intubation/Ventilatory Support Discussed: Yes.      - Patient is DNR/DNI Status: No     Use of blood products discussed: Yes.     - Discussed with: Patient.     Postoperative Care    Pain management: IV analgesics.   PONV prophylaxis: Ondansetron (or other 5HT-3), Dexamethasone or " "Solumedrol     Comments:    Other Comments: The material risks, benefits, and alternatives were discussed in detail.  The patient agrees to proceed.  The patient has no other complaints at this time.           Aric Garcia MD    I have reviewed the pertinent notes and labs in the chart from the past 30 days and (re)examined the patient.  Any updates or changes from those notes are reflected in this note.            # Drug Induced Coagulation Defect: home medication list includes an anticoagulant medication  # Drug Induced Platelet Defect: home medication list includes an antiplatelet medication  # Obesity: Estimated body mass index is 38.4 kg/m  as calculated from the following:    Height as of this encounter: 1.778 m (5' 10\").    Weight as of this encounter: 121.4 kg (267 lb 10.2 oz).      "

## 2024-07-01 NOTE — PROGRESS NOTES
EP BRIEF PROCEDURE NOTE  ?  Procedures:  1. Ventricular tachycardia ablation (Endocardial)  2. Intracardiac Echocardiography.  3. Invasive Hemodynamic Monitoring.  4. 3D Mapping using Carto3.  5. General anesthesia.  6. Trans-septal puncture  7. Cardioversion  8. PVC Ablation (AMC)  ?  Electrophysiologist: Esteban Wills MD  EP Fellow: Omkar Miguel MD PhD  ?  Pre-operative Diagnosis: Ischemic/ Non-ischemic VT, EF 10 - 15%. PVCs  Post-operative diagnosis: Successful ablation of Ischemic VT; Successful AMC PVC ablation  Complications: None.  Fluoroscopy time/dose: See Procedure Log    Clinical Profile:   Mr. Russell is a 58 year old male who has a medical history significant for anterior wall MI, CAD s/p PCIs LAD, D1, D2 2009, 2010, 2011, & 2017, ICM LVEF 25%, s/p CRTD 2011 s/p gen change 6/2022, VT s/p prior ablations 6/2020 & 11/2021, PAF (CHADSVASC 6 on Xarelto), HTN, HLD, prior LV thrombus, DM2, CVA, BPH, and obesity. He has had VT with ICD therapies despite AAT. He was originally on amiodarone and mexiletine but had breakthroughs. Amiodarone was later stopped to avoid possible long term toxicities and he was alternatively placed on Sotalol. He has had 2 VT ablation at OSH and found to have numerous VTs arising from anterior scar area (lateral/septal walls) and inferior apical aneurysm. He has continued to have recurrent arrhyhmias. We discussed considering going back on amiodarone vs ablation. He is undergoing advanced therapies evaluation here as well, but his main limiting factor is VT. We discussed ablation and its indications, risks, and benefits.   He elected to pursue VT ablation for which he presents today.   Further, RHC results showed compensated HF and no cardiogenic shock or pre-shock physiology.   ?  PROCEDURE  The risks and benefits of the procedure were explained to the patient in full. The risks include, but are not limited to: pain, bleeding,blood transfusion reactions, arrhythmia, dissection of  vessels,cardiac perforation, pericardial effusion, stroke, and death. Informed Consent was obtained. The patient was brought to the EP lab in a fasting and hemodynamically stable condition. The patient was prepped and draped in a sterile fashion.    Sheaths and Catheters:  After local anesthesia with 2% lidocaine, vascular access was obtained using the modified Seldinger technique for the following access points:    Right Femoral Vein:  - 8Fr Locking Sheath: A decanav catheter was positioned into the coronary sinus and RV.  - 8.5Fr Sheath: Exchanged for an Agilis steerable sheath through which a pentaray catheter was placed into the LA/LV. This was then used for ablation catheter as well.    Left Femoral Vein:  - 9 Fr Sheath - an ICE catheter was placed into the RA / RV.  ?  Right Femoral Artery:  - 4Fr Sheath was placed for hemodynamic monitoring.  ?  EP study results (in milliseconds):  Pre-ablation: In Sinus rhythm, AA= 979, VV= 979, SC= 110, XQP=095, QT= 433.  Post-ablation: AA=VV= 795, SC= 114, QRS= 133, QT= 421.  ?  Procedure Description:  After the above sheaths were in place, the catheters were positioned under fluoroscopic guidance. The ICE catheter was placed into the RA. Baseline survey of the heart with the ICE did not reveal the presence of any significant pericardial effusion.     Induction:  Pacing was perfomed form : Lateral LV [place]  VT was induced best from Lateral LV [place] at  ms    VT/Arrhythmias induced:  - VT1: clinical, hemodynamically stable/unstable, TCL 445ms, EKG description: RBBB, Negative in I, negative in II, positive in III, isoelectric in AVF. Suggesting location at LV mid anterior/lateral wall.   ?  Anatomic Mapping:   Using the ICE catheter and PayTango/CARTO3 sound, we created a anatomic map of the LV geometry. Points of interest including the papillary muscles were marked.  ?  Transeptal Access:   We then performed the trans-septal access. The guide wire was advanced into the SVC.  The LAMP trans-septal sheath and dilator were advanced over the guide wire into the SVC and directed medially. The guide wire was removed, the dilator was aspirated and the BRK-1 needle was advanced. Using both the JUDGE and Telugu projections, the trans-septal system was then dragged down and the Fossa Ovalis was engaged. Tenting of the interatrial septum was observed with ICE. The needle was advanced into the LA and the location confirmed using needle pressure monitoring and ICE. While fixing the needle, the dilator was slightly advanced across the septum. The trans-septal needle was then withdrawn from the dilator. Then the sheath were advanced though the septum into the LA. The dilator was removed and the sheath was then flushed. A Heparin bolus and drip were started, with serial monitoring of ACTs to keep ACT around 300.  ?  Voltage Mapping:   We started with voltage mapping. A voltage map of the LV showed an extensive scar on the anterior surface of the LV that had patchy areas of detectable electrograms. We initially mapped this area with the pentarray catheter while pacing from the patients RV lead only. There was an area at the apical area of the LV towards the lateral area that had a patchy scar area. When we mapped here we would intermittently induce VT1, pacing at 500 ms would always induce the arrhythmia. We mapped areas of late activation when pacing from the RV. We were able to observe late activation potentials suggesting a possible isthmus or exit site of the VT. We found a few other areas in the mid and basal anterior wall that demonstrated similar properties. We then decided to target all of these lesions. We did a series of ablations at 45 whitney for 30 seconds at all these sites. After this, the arrhythmia was non-inducible. We did find an area near the basal anterior wall that if we mapped there with the pentarray catheter we would induce a slow VT with similar morphology so we performed a series of  ablations at this site as well. Finally, we did a substrate modification of the scar--for this we paced in the scar and areas where we identified capture we ablated as well. After this, we were unable to iduce the clinical VT while pacing at 500 ms in the scar, or areas of patchy scarring.    PVC Ablation:  We did identify a PVC that was seen frequently. The PVC had a RB pattern, and was positive in II, II, and aVF, and negative in I. The precordial leads were all positive. We mapped it to the AMC area and identified an area of early activation (38 ms prior to surface ECG). We did a series of 45 watt lesions in this site suppressing the PVC.  ?  The sheaths were pulled out of the left atrium into the RA. Heparin was stopped and protamine was given, after a test dose revealed no reaction. The catheters were withdrawn, and the sheaths were pulled. Manual pressure was applied to both groins. The patient was then transported to PACU for extubation then to a monitored bed.    ASSESSMENT:  1. Sustained monomorphic VT refractory to AAT.  2. Successful catheter ablation of ischemic VT with scar modification  3. Successful PVC ablation.  ?  PLAN:  1. 2 hours of bedrest, anticipate discharge later today.  2. Follow up in clinic in 3 months; continue current mediations. Restart anticoagulation tonight.  3. If patient has recurrent arrhythmia would plan on bringing him back for epicardial ablation.    Procedure was supervised by Dr. Wills.    Omkar Miguel MD PhD  Cardiac Electrophysiology Fellow  P: Text Page

## 2024-07-01 NOTE — Clinical Note
Called to PACU RN. All questions answered. Chart sent with patient. Patient transported to PACU via stretcher with CRNA.

## 2024-07-01 NOTE — OR NURSING
Paged Dr. Wills at 0612: Leo Russell in pre-op bay 26 does not have an H & P. Can someone from the team please complete this? Thank you, JARAD Chris [phone]

## 2024-07-01 NOTE — OR NURSING
Dr Wills and Dr Garcia aware of more frequent VTach runs and tachyarrhythmias.  No ICD firing as of yet.

## 2024-07-01 NOTE — PROVIDER NOTIFICATION
"Rapid response called for patient due to MAP of 52 and no signed in primary team. Pt reported intermittent \"woozy\" feeling. New orders acknowledged. MAP now greater than 65. Primary team identified as EP. Omkar Miguel MD notified, states pt's baseline SBP is 90s and PVC bigemy is common after recent ablation. Will continue to monitor.  "

## 2024-07-02 VITALS
HEART RATE: 68 BPM | SYSTOLIC BLOOD PRESSURE: 92 MMHG | OXYGEN SATURATION: 93 % | HEIGHT: 70 IN | WEIGHT: 269.4 LBS | TEMPERATURE: 98.4 F | RESPIRATION RATE: 18 BRPM | BODY MASS INDEX: 38.57 KG/M2 | DIASTOLIC BLOOD PRESSURE: 49 MMHG

## 2024-07-02 LAB — GLUCOSE BLDC GLUCOMTR-MCNC: 98 MG/DL (ref 70–99)

## 2024-07-02 PROCEDURE — 99239 HOSP IP/OBS DSCHRG MGMT >30: CPT | Mod: FS | Performed by: NURSE PRACTITIONER

## 2024-07-02 PROCEDURE — 250N000013 HC RX MED GY IP 250 OP 250 PS 637: Performed by: NURSE PRACTITIONER

## 2024-07-02 PROCEDURE — G0378 HOSPITAL OBSERVATION PER HR: HCPCS

## 2024-07-02 PROCEDURE — 250N000013 HC RX MED GY IP 250 OP 250 PS 637: Performed by: INTERNAL MEDICINE

## 2024-07-02 RX ORDER — ROSUVASTATIN CALCIUM 40 MG/1
40 TABLET, COATED ORAL EVERY EVENING
COMMUNITY

## 2024-07-02 RX ORDER — FUROSEMIDE 40 MG
40 TABLET ORAL DAILY
COMMUNITY

## 2024-07-02 RX ADMIN — POTASSIUM CHLORIDE 30 MEQ: 750 TABLET, EXTENDED RELEASE ORAL at 08:58

## 2024-07-02 RX ADMIN — SOTALOL HYDROCHLORIDE 180 MG: 120 TABLET ORAL at 09:00

## 2024-07-02 RX ADMIN — EMPAGLIFLOZIN 10 MG: 10 TABLET, FILM COATED ORAL at 08:59

## 2024-07-02 RX ADMIN — MAGNESIUM OXIDE TAB 400 MG (241.3 MG ELEMENTAL MG) 400 MG: 400 (241.3 MG) TAB at 08:58

## 2024-07-02 RX ADMIN — CARVEDILOL 50 MG: 25 TABLET, FILM COATED ORAL at 08:59

## 2024-07-02 RX ADMIN — SACUBITRIL AND VALSARTAN 1 TABLET: 97; 103 TABLET, FILM COATED ORAL at 09:00

## 2024-07-02 RX ADMIN — PANTOPRAZOLE SODIUM 40 MG: 40 TABLET, DELAYED RELEASE ORAL at 08:59

## 2024-07-02 RX ADMIN — FUROSEMIDE 40 MG: 40 TABLET ORAL at 08:59

## 2024-07-02 RX ADMIN — MEXILETINE HYDROCHLORIDE 250 MG: 250 CAPSULE ORAL at 03:58

## 2024-07-02 RX ADMIN — MEXILETINE HYDROCHLORIDE 250 MG: 250 CAPSULE ORAL at 10:51

## 2024-07-02 RX ADMIN — SPIRONOLACTONE 50 MG: 25 TABLET, FILM COATED ORAL at 08:59

## 2024-07-02 RX ADMIN — RIVAROXABAN 20 MG: 20 TABLET, FILM COATED ORAL at 08:58

## 2024-07-02 ASSESSMENT — ACTIVITIES OF DAILY LIVING (ADL)
ADLS_ACUITY_SCORE: 26
ADLS_ACUITY_SCORE: 27
ADLS_ACUITY_SCORE: 26
ADLS_ACUITY_SCORE: 27
ADLS_ACUITY_SCORE: 26

## 2024-07-02 NOTE — DISCHARGE INSTRUCTIONS
Post-Ablation Discharge Instructions    Care of groin site:        Remove the Band-Aid after 24 hours. If there is minor oozing, apply another Band-aid and remove it after 12 hours.         Do NOT take a bath, use a hot tub, pool, or submerse in water for at least 3 days You may shower.         It is normal to have a small bruise or lump at the site.        Do not scrub the site.        Do not use lotion or powder near the puncture site for 3 days.        For the first 2 days: Do not stoop or squat. When you cough, sneeze or move your bowels, hold your hand over the puncture site and press gently.        Do not lift more than 10 pounds or exertional activity for 10 days.      If you start bleeding from the site in your groin:  Lie down flat and press firmly on the site.  Call your physician immediately, or, come to the emergency room.    Call 911 right away if you have bleeding that is heavy or does not stop.     Call your doctor/provider if:        You have a large or growing hard lump around the site.        The site is red, swollen, hot or tender.        Blood or fluid is draining from the site.        You have chills or a fever greater than 101 F (38 C).        Your leg or arm turns bluish, feels numb or cool.        You have hives, a rash or unusual itching.     Cardiovascular Clinic:   [address] [place] [address]. [place] [address]    Your Care Team:  EP Cardiology   Telephone Number     Nurses:   Donna MALIN [phone]    After business hours: [phone], select option 4, and ask for EP Fellow on-call to be paged.   Non-procedure scheduling:    Radha HENSLEY   [phone]   Procedure scheduling:    Darby Randhawa   [phone]   Device Clinic (Pacemakers, ICDs, Loop Recorders)    During business hours: [phone]  After business hours:   [phone]- select option 4 and ask for job code [id number].       As always, Thank you for trusting us with your health care needs

## 2024-07-02 NOTE — PROGRESS NOTES
DISCHARGE                         No discharge date for patient encounter.  ----------------------------------------------------------------------------  Discharged to: Home  Via: Automobile  Accompanied by: Family  Discharge Instructions: diet, activity, medications, follow up appointments, when to call the MD, aftercare instructions, and what to watchout for (i.e. s/s of infection, increasing SOB, palpitations, chest pain,)  Prescriptions: To be filled by [facility]  pharmacy per pt's request; medication list reviewed & sent with pt  Follow Up Appointments: arranged; information given  Belongings: All sent with pt  IV: out  Telemetry: off  Pt exhibits understanding of above discharge instructions; all questions answered.    Discharge Paperwork: Signed, copied, and sent home with patient.

## 2024-07-02 NOTE — PROGRESS NOTES
Care Management Discharge Note    Discharge Date: 07/02/2024       Discharge Disposition:  home    Discharge Services:  none new    Discharge DME:  none    Discharge Transportation:  per family/friend    Private pay costs discussed: Not applicable    Does the patient's insurance plan have a 3 day qualifying hospital stay waiver?  No    PAS Confirmation Code:  n/a  Patient/family educated on Medicare website which has current facility and service quality ratings:  no    Education Provided on the Discharge Plan:  yes  Persons Notified of Discharge Plans: patient  Patient/Family in Agreement with the Plan:  yes    Handoff Referral Completed: Yes    Additional Information:  Patient discharge back home after hospital stay with joe. Patient met with bedside, provided MOON form, went over form, left pt extra copy, received signature on copy and faxed it to [facility] ([phone]). Patient does not endorsed discharge planning needs. RNCC to cease following after discharge.    Benjy Braun BSN, BA, RN, CMSRN, RNCC  [facility]  Covering Units 6C Beds 8545-1525/OBS  Phone: [phone]  Available on Vocera search 6C 6502-14 RNCC or OBS RNCC  After Hours [phone]     6C Beds 7354-7961 SW Ph: [phone]     6B/CRNCC Weekend/holiday on Vocera or [phone]     [facility] RNCC ED/5 Ortho/5 Med/Surg [phone]     [facility] RNCC 6 Med/Surg 8A, [phone]     Observation SW and weekend/after hours phone: [phone]

## 2024-07-02 NOTE — PROGRESS NOTES
~Discharge patient after patient is tolerating liquids and foods, ambulating, Met  ~Urinating, puncture sites are stable (no bleeding and no hematoma) and patient has a .  Met  ~IF Vital Signs are within the patient's normal limits, or systolic blood pressure greater than 90 mmHg and less than 160 mmHg. Progressing  ~Patient is alert and tolerates oral fluids and food. Met  - IF diabetic, blood glucose is in patient's usual normal range.  Met

## 2024-07-02 NOTE — PLAN OF CARE
Goal Outcome Evaluation:    - Discharge patient after patient is tolerating liquids and foods, ambulating: Met  - Urinating, puncture sites are stable (no bleeding and no hematoma) and patient has a : Met  - IF Vital Signs are within the patient's normal limits, or systolic blood pressure greater than 90 mmHg and less than 160 mmHg: Progressing; continuing to monitor  - Patient is alert and tolerates oral fluids and food: Met   - IF diabetic, blood glucose is in patient's usual normal range: Met

## 2024-07-02 NOTE — PROGRESS NOTES
~Discharge patient after patient is tolerating liquids and foods, ambulating, Met  ~Urinating, puncture sites are stable (no bleeding and no hematoma) and patient has a .  Met  ~IF Vital Signs are within the patient's normal limits, or systolic blood pressure greater than 90 mmHg and less than 160 mmHg. Met  ~Patient is alert and tolerates oral fluids and food. Met  - IF diabetic, blood glucose is in patient's usual normal range.  Met

## 2024-07-02 NOTE — CODE/RAPID RESPONSE
"   07/01/24 1900   Call Information   Date of Call 07/01/24   Time of Call 1834   Name of person requesting the team Abigail SAEZ   Title of person requesting team RN   RRT Arrival time 1837   Time RRT ended 1900   Reason for call   Type of RRT Adult   Primary reason for call Cardiovascular   Cardiovascular EKG changes   Was patient transferred from the ED, ICU, or PACU within last 24 hours prior to RRT call? Yes   SBAR   Situation Post Ablation bigeminy   Background Per provider H&P, \"Mr. Russell is a 58 year old male who has a medical history significant for anterior wall MI, CAD s/p PCIs LAD, D1, D2 2009, 2010, 2011, & 2017, ICM LVEF 25%, s/p CRTD 2011 s/p gen change 6/2022, VT s/p prior ablations 6/2020 & 11/2021, PAF (CHADSVASC 6 on Xarelto), HTN, HLD, prior LV thrombus, DM2, CVA, BPH, and obesity. He has had VT with ICD therapies despite AAT. He was originally on amiodarone and mexiletine but had breakthroughs. Amiodarone was later stopped to avoid possible long term toxicities and he was alternatively placed on Sotalol. He has had 2 VT ablation at OSH and found to have numerous VTs arising from anterior scar area (lateral/septal walls) and inferior apical aneurysm. He has continued to have recurrent arrhyhmias. We discussed considering going back on amiodarone vs ablation. He is undergoing advanced therapies evaluation here as well, but his main limiting factor is VT. We discussed ablation and its indications, risks, and benefits.   He elected to pursue VT ablation for which he presents today.   Further, RHC results showed compensated HF and no cardiogenic shock or pre-shock physiology.\"   Notable History/Conditions Cardiac;Diabetes   Assessment Pt sitting up in bed AO+4, denies SOB and chest pain. BP WNL, 100% paced with frequent PVCs, sating mid 90s on RA. Eating and drinking a regular diet with no difficulty.   Interventions ECG;Labs   Patient Outcome   Patient Outcome Stabilized on unit   RRT Team "   Attending/Primary/Covering Physician EP   Date Attending Physician notified 07/01/24   Time Attending Physician notified 1840   Physician(s) OSMANI Verma   Lead RN Natalie SAEZ, RN   RN Abigail SAEZ, RN   RT N/A   Other staff Erica SILVERIO RN   Post RRT Intervention Assessment   Post RRT Assessment Stable/Improved   Date Follow Up Done 07/01/24   Time Follow Up Done 2245   Comments Resting comfortably. Up in room ad cyn. Denies discomfort or issues.

## 2024-07-02 NOTE — PLAN OF CARE
Neuro: A&Ox4.   Cardiac: AV (dual).paced rhythm VSS. HR: 60s - 70s No dysrhythmic events overnight  Respiratory: Sating 90s on RA; on 2L oxymask when sleeping  GI/: Adequate urine output. No BM during shift    Activity:  Standby Assist  Pain: Denies  Skin: No new deficits noted. Bilateral Groin sites soft, no hematoma, no bleeding  LDA's: 2 PIVs    Plan: Continue with POC. Notify primary team with changes. Following through observation protocol.

## 2024-07-02 NOTE — PROVIDER NOTIFICATION
2120: RN notified overnight provider (Rl Valadez, Cardiac resident) about patients blood pressures and nightly medications. SBP 90's and MAPs in 60's; see flowsheets for details. MD ordered to hold nightly doses of Spironolactone, Coreg, Sotalol, Lasix and Entresto.

## 2024-07-02 NOTE — CODE/RAPID RESPONSE
Rapid Response Team Note    Assessment   A rapid response was called on Leo Russell due to  arrhythmia . This presentation is likely due to known underlying Vtach s/p ablation today. Pt with significant cardiac hx who presented for planned Ablation for Vtach. Postoperatively noted to have episodes of NSVT. Patient reports he feels a bit light headed with episodes. No CP, sob, HA, abdominal pain, vision change noted. Primary team aware and no change in current POC.     Plan   -  CMP, CBC, Mg  - Continue to closely monitor   -  The Cardiology primary team was able to be reached and they are in agreement with the above plan.  -  Disposition: The patient will remain on the current unit. We will continue to monitor this patient closely.  -  Reassessment and plan follow-up will be performed by the primary team    Jing Dorman PA-C  Rapid Response Team [facility]  Securely message with [employer]     Medical Decision Making       30 MINUTES SPENT BY ME on the date of service doing chart review, history, exam, documentation & further activities per the note.          Hospital Course   Brief Summary of events leading to rapid response:   RRT called for intermittent episode of NSVT    Physical Exam   Vital Signs: Temp: 97.9  F (36.6  C) Temp src: Oral BP: 103/56 Pulse: 71   Resp: 16 SpO2: 95 % O2 Device: None (Room air) Oxygen Delivery: 2 LPM  Weight: 267 lbs 10.22 oz      Exam:     Physical Exam   Constitutional:   Well nourished, well developed, resting comfortably   HEENT:   Head: Normocephalic and atraumatic.   Eyes: Conjunctivae are normal. Pupils are equal, round, and reactive to light.  Pharynx has no erythema or exudate, mucous membranes are moist  Neck:   No adenopathy, no bony tenderness  Cardiovascular: Regular rate and rhythm without murmurs or gallops  Pulmonary/Chest: Clear to auscultation bilaterally, with no wheezes or retractions. No respiratory distress.  GI: Soft with good bowel sounds.  Non-tender,  non-distended, with no guarding, no rebound, no peritoneal signs.   Back:  No bony or CVA tenderness   Musculoskeletal:  No edema or clubbing   Skin: Skin is warm and dry. No rash noted.   Neurological: Alert and oriented to person, place, and time. Nonfocal exam  Psychiatric:  Normal mood and affect.            Sepsis Evaluation   The patient is not known to have an infection.    NO EVIDENCE OF SEPSIS at this time.  Vital sign, physical exam, and lab findings are due to S?p ablation for Vtach.

## 2024-07-02 NOTE — DISCHARGE SUMMARY
Electrophysiology Discharge Summary  Date of Procedure: 7/1/24  DOS: 7/2/2024   Pre-operative Diagnosis: Ischemic/ Non-ischemic VT, EF 10 - 15%. PVCs  Post-operative diagnosis: Successful ablation of Ischemic VT; Successful [facility] PVC ablation    Hospital Course:  Mr. Russell is a 58 year old male who has a medical history significant for anterior wall MI, CAD s/p PCIs LAD, D1, D2 2009, 2010, 2011, & 2017, ICM LVEF 25%, s/p CRTD 2011 s/p gen change 6/2022, VT s/p prior ablations 6/2020 & 11/2021, PAF (CHADSVASC 6 on Xarelto), HTN, HLD, prior LV thrombus, DM2, CVA, BPH, and obesity. He has had VT with ICD therapies despite AAT. He was originally on amiodarone and mexiletine but had breakthroughs. Amiodarone was later stopped to avoid possible long term toxicities and he was alternatively placed on Sotalol. He has had 2 VT ablation at OSH and found to have numerous VTs arising from anterior scar area (lateral/septal walls) and inferior apical aneurysm. He has continued to have recurrent arrhyhmias. We discussed considering going back on amiodarone vs ablation. He is undergoing advanced therapies evaluation here as well, but his main limiting factor is VT. We discussed ablation and its indications, risks, and benefits.  Further, RHC results showed compensated HF and no cardiogenic shock or pre-shock physiology.   Patient underwent a successful VT ablation yesterday. In immediate post operative period he had about 5 episodes terminated by ATP. This calmed without further intervention. He was kept for observation. He had an uneventful overnight stay. Telemetry reviewed showing Paced with some PVCs. Groin sites are soft, CDI, no bruit, no bleeding, and no hematoma. Patient is tolerating oral intake, ambulating at baseline, and voiding without difficulties. Patient remains hemodynamically stable.     ROS:   10 point ROS neg other than the symptoms noted above.   Exam:  /56 (BP Location: Right arm)   Pulse 66   Temp  "97.9  F (36.6  C) (Oral)   Resp 16   Ht 1.778 m (5' 10\")   Wt 122.2 kg (269 lb 6.4 oz)   SpO2 100%   BMI 38.65 kg/m      Constitutional: alert and no distress  Head: Normocephalic.   Neck: Neck supple.   ENT: ENT exam normal, no neck nodes or sinus tenderness  Cardiovascular: RRR. No murmurs, clicks gallops or rub  Respiratory: Good diaphragmatic excursion. Lungs clear  Gastrointestinal: Abdomen soft, non-tender. BS normal.  : Deferred  Musculoskeletal: extremities normal- no gross deformities noted, gait normal, and normal muscle tone  Skin: no suspicious lesions or rashes  Neurologic: Gait normal. Reflexes normal and symmetric. Sensation grossly WNL.  Psychiatric: mentation appears normal and affect normal/bright    Discharge Medications:     Review of your medicines        UNREVIEWED medicines. Ask your doctor about these medicines        Dose / Directions   aspirin 81 MG EC tablet      Dose: 1 tablet  Take 1 tablet by mouth daily  Refills: 0     carvedilol 25 MG tablet  Commonly known as: COREG      Dose: 50 mg  Take 50 mg by mouth 2 times daily  Refills: 0     empagliflozin 10 MG Tabs tablet  Commonly known as: JARDIANCE      Dose: 10 mg  Take 10 mg by mouth daily  Refills: 0     furosemide 40 MG tablet  Commonly known as: LASIX      Dose: 40 mg  Take 40 mg by mouth daily as needed  Refills: 0     magnesium oxide 400 MG tablet  Commonly known as: MAG-OX      Dose: 400 mg  Take 400 mg by mouth 2 times daily  Refills: 0     mexiletine 200 MG capsule  Commonly known as: MEXITIL      Dose: 200 mg  Take 200 mg by mouth every 8 hours  Refills: 0     nitroGLYcerin 0.4 MG sublingual tablet  Commonly known as: NITROSTAT      Dose: 1 tablet  Place 1 tablet under the tongue every 5 minutes as needed  Refills: 0     pantoprazole 40 MG EC tablet  Commonly known as: PROTONIX      Dose: 40 mg  Take 40 mg by mouth daily  Refills: 0     potassium chloride ER 10 MEQ CR tablet  Commonly known as: K-TAB/KLOR-CON      Dose: " 30 mEq  Take 30 mEq by mouth daily  Refills: 0     rivaroxaban ANTICOAGULANT 20 MG Tabs tablet  Commonly known as: XARELTO      Dose: 20 mg  Take 20 mg by mouth daily  Refills: 0     rosuvastatin 40 MG tablet  Commonly known as: CRESTOR      Dose: 40 mg  Take 40 mg by mouth at bedtime  Refills: 0     sacubitril-valsartan  MG per tablet  Commonly known as: ENTRESTO      Dose: 1 tablet  Take 1 tablet by mouth 2 times daily  Refills: 0     sotalol 120 MG tablet  Commonly known as: BETAPACE      Dose: 180 mg  Take 180 mg by mouth every 12 hours  Refills: 0     spironolactone 25 MG tablet  Commonly known as: ALDACTONE      Dose: 50 mg  Take 50 mg by mouth daily  Refills: 0     tamsulosin 0.4 MG capsule  Commonly known as: FLOMAX      Dose: 0.4 mg  Take 0.4 mg by mouth daily  Refills: 0     Vitamin D-1000 Max St 25 mcg (1000 units) tablet  Generic drug: Vitamin D3      Dose: 1,000 Units  Take 1,000 Units by mouth daily  Refills: 0              Patient Instructions:    Care of groin site:        Remove the Band-Aid after 24 hours. If there is minor oozing, apply another Band-aid and remove it after 12 hours.         Do NOT take a bath, use a hot tub, pool, or submerse in water for at least 3 days You may shower.         It is normal to have a small bruise or lump at the site.        Do not scrub the site.        Do not use lotion or powder near the puncture site for 3 days.        For the first 2 days: Do not stoop or squat. When you cough, sneeze or move your bowels, hold your hand over the puncture site and press gently.        Do not lift more than 10 pounds or exertional activity for 10 days.      If you start bleeding from the site in your groin:  Lie down flat and press firmly on the site.  Call your physician immediately, or, come to the emergency room.    Call 911 right away if you have bleeding that is heavy or does not stop.     Call your doctor/provider if:        You have a large or growing hard lump  around the site.        The site is red, swollen, hot or tender.        Blood or fluid is draining from the site.        You have chills or a fever greater than 101 F (38 C).        Your leg or arm turns bluish, feels numb or cool.        You have hives, a rash or unusual itching.     Plan & Follow up:  CAD s/p multiple PCIs LAD, D2, D2 2009, 2010, 2011, 2017  ICM LVEF 25%, NYHA III  Ventricular Tachycardia s/p ablation 7/1/24:  1. ACEi/ARB/ARNi: Continue Entresto.  2. BB: Continue Coreg.  3. Aldosterone antagonist: Continue Spironolactone.  4. SGLT2i: Continue Jardiance.  5. Antiplatlet: Continue ASA.  6. Statin: Continue Crestor.  7.  SCD prophylaxis: s/p CRTD will follow BVP percentages post ablation   8. Fluid status: Continue Lasix.   9. Nitroglycerin 0.4 mg PRN for chest pain. Place 1 tablet (0.4 mg) under the tongue every 5 minutes as needed for chest pain. Maximum of 3 doses in 15 minutes.  10. Lifestyle: Avoid tobacco, maintain a healthy weight, limit alcohol, cardiac diet, and exercise.  11. VT: He has had VT with ICD therapies despite AAT. He was originally on amiodarone and mexiletine but had breakthroughs. Amiodarone was later stopped to avoid possible long term toxicities and he was alternatively placed on Sotalol. He has had 2 VT ablation at OSH and found to have numerous VTs arising from anterior scar area (lateral/septal walls) and inferior apical aneurysm. He has continued to have recurrent arrhyhmias. We discussed considering going back on amiodarone vs ablation. He is undergoing advanced therapies evaluation here as well, but his main limiting factor is VT. He underwent a VT ablation 7/1/24.   Follow up in 1-2 months with EP.     GARLAND Wright CNP  Electrophysiology Consult Service  Securely message with [employer]   Text page via [employer]     SHANTEL Total time spent on patient visit, reviewing notes, imaging, labs, orders, and completing necessary documentation: 45 minutes.  >50% of  visit spent on counseling patient and/or coordination of care.    EP STAFF NOTE  I have seen and examined the patient as part of a shared visit with Angy Cash EP NP.  I agree with the note above. I reviewed today's vital signs and medications. I have reviewed and discussed with the advanced practice provider their physical examination, assessment, and plan   Briefly, s/p VT ablation, no complications  My key history/exam findings are: RRR.   The key management decisions made by me: follow up as outpatient.  Total time spent on patient visit, reviewing notes, imaging, labs, orders, and completing necessary documentation: 45 minutes.  >50% of visit spent on counseling patient and/or coordination of care.     Esteban Wills MD [facility]  Cardiology - Electrophysiology

## 2024-07-02 NOTE — PHARMACY-ADMISSION MEDICATION HISTORY
Pharmacist Admission Medication History    Admission medication history is complete. The information provided in this note is only as accurate as the sources available at the time of the update.    Information Source(s): Patient    Pertinent Information:   -Pt manages his own meds and is a reliable historian; pt keeps photos on his phone of his med list, which appeared to be a recent discharge summary  -pt's home pharmacy is [facility] in [place], [place] - unfortunately this pharmacy is not searchable within [facility]:  [facility] Main Building  [address] [place] [address]   [place] [address]  Phone: [phone]      Changes made to PTA medication list:  Added:   Furosemide 40 mg daily - in addition to the 40 mg PRN entry  Rosuvastatin - previous entry had   Albuterol - pt reported very infrequent use  Deleted: None  Changed:   Mexilitine 200 mg TID > 250 mg TID (confirmed with pt's pharmacy)    Allergies reviewed with patient and updates made in EHR: yes, updated morphine and tramadol entries to reflect pt has previously tolerated oxycodone and hydromorphone without issue    Medication History Completed By:   Jean Fermin, PharmD, BCPS     PTA Med List   Medication Sig Note Last Dose    aspirin 81 MG EC tablet Take 1 tablet by mouth daily  2024    carvedilol (COREG) 25 MG tablet Take 50 mg by mouth 2 times daily  2024    empagliflozin (JARDIANCE) 10 MG TABS tablet Take 10 mg by mouth daily  2024    furosemide (LASIX) 40 MG tablet Take 40 mg by mouth daily  2024    furosemide (LASIX) 40 MG tablet Take 40 mg by mouth daily as needed (for weight gain >3 lbs overnight)  Past Week    magnesium oxide (MAG-OX) 400 MG tablet Take 400 mg by mouth 2 times daily  2024    mexiletine (MEXITIL) 250 MG capsule Take 250 mg by mouth 3 times daily  Past Week    nitroGLYcerin (NITROSTAT) 0.4 MG sublingual tablet Place 1 tablet under the tongue every 5 minutes as needed  More than a month     pantoprazole (PROTONIX) 40 MG EC tablet Take 40 mg by mouth daily  7/1/2024    potassium chloride ER (K-TAB/KLOR-CON) 10 MEQ CR tablet Take 30 mEq by mouth daily  7/1/2024    rivaroxaban ANTICOAGULANT (XARELTO) 20 MG TABS tablet Take 20 mg by mouth daily  7/1/2024    rosuvastatin (CRESTOR) 40 MG tablet Take 40 mg by mouth every evening  Past Week    sacubitril-valsartan (ENTRESTO)  MG per tablet Take 1 tablet by mouth 2 times daily  6/30/2024    sotalol (BETAPACE) 120 MG tablet Take 180 mg by mouth every 12 hours 7/2/2024: 1.5 tabs (120 mg/tab) BID 6/27/2024    spironolactone (ALDACTONE) 25 MG tablet Take 50 mg by mouth daily 7/2/2024: Two tabs (25 mg/tab) 6/30/2024    tamsulosin (FLOMAX) 0.4 MG capsule Take 0.4 mg by mouth daily  More than a month    Vitamin D3 (VITAMIN D-1000 MAX ST) 25 mcg (1000 units) tablet Take 1,000 Units by mouth daily  7/1/2024

## 2024-07-03 ENCOUNTER — PATIENT OUTREACH (OUTPATIENT)
Dept: CARE COORDINATION | Facility: CLINIC | Age: 59
End: 2024-07-03
Payer: MEDICARE

## 2024-07-03 LAB
ATRIAL RATE - MUSE: 182 BPM
ATRIAL RATE - MUSE: 59 BPM
DIASTOLIC BLOOD PRESSURE - MUSE: NORMAL MMHG
DIASTOLIC BLOOD PRESSURE - MUSE: NORMAL MMHG
INTERPRETATION ECG - MUSE: NORMAL
INTERPRETATION ECG - MUSE: NORMAL
P AXIS - MUSE: 52 DEGREES
P AXIS - MUSE: NORMAL DEGREES
PR INTERVAL - MUSE: 100 MS
PR INTERVAL - MUSE: 128 MS
QRS DURATION - MUSE: 160 MS
QRS DURATION - MUSE: 198 MS
QT - MUSE: 210 MS
QT - MUSE: 466 MS
QTC - MUSE: 273 MS
QTC - MUSE: 510 MS
R AXIS - MUSE: -33 DEGREES
R AXIS - MUSE: 176 DEGREES
SYSTOLIC BLOOD PRESSURE - MUSE: NORMAL MMHG
SYSTOLIC BLOOD PRESSURE - MUSE: NORMAL MMHG
T AXIS - MUSE: 0 DEGREES
T AXIS - MUSE: 145 DEGREES
VENTRICULAR RATE- MUSE: 102 BPM
VENTRICULAR RATE- MUSE: 72 BPM

## 2024-07-03 NOTE — PROGRESS NOTES
[facility]    Background: Transitional Care Management program auto-identified and prompting a chart review by [facility] team.    Assessment: Upon chart review, [facility] Team member will Enroll this episode of Transitional Care Management program due to reason below:    Upon chart review, patient is followed by [facility] team who follow their patients closely. [facility] will not conduct outreach to avoid duplication of outreach to patient.     Plan: Transitional Care Management episode enrolled per reason above.      *Connected Care Resource Team does NOT follow patient ongoing. Referrals are identified based on internal discharge reports and the outreach is to ensure patient has an understanding of their discharge instructions.

## 2024-07-05 ENCOUNTER — TELEPHONE (OUTPATIENT)
Dept: CARDIOLOGY | Facility: CLINIC | Age: 59
End: 2024-07-05
Payer: MEDICARE

## 2024-07-05 DIAGNOSIS — I50.22 CHRONIC SYSTOLIC HEART FAILURE (CMS/HCC): ICD-10-CM

## 2024-07-05 DIAGNOSIS — I48.0 PAROXYSMAL ATRIAL FIBRILLATION (CMS/HCC): Primary | ICD-10-CM

## 2024-07-05 LAB
ATRIAL RATE - MUSE: 63 BPM
DIASTOLIC BLOOD PRESSURE - MUSE: NORMAL MMHG
INTERPRETATION ECG - MUSE: NORMAL
P AXIS - MUSE: 112 DEGREES
PR INTERVAL - MUSE: 108 MS
QRS DURATION - MUSE: 132 MS
QT - MUSE: 432 MS
QTC - MUSE: 442 MS
R AXIS - MUSE: 196 DEGREES
SYSTOLIC BLOOD PRESSURE - MUSE: NORMAL MMHG
T AXIS - MUSE: 104 DEGREES
VENTRICULAR RATE- MUSE: 63 BPM

## 2024-07-05 NOTE — TELEPHONE ENCOUNTER
Device detected RV and LV lead oversensing due to PHILLIP, with no therapy delivered. 07/01/2024 11:51am.     Additionally, 16 ATP episodes were detected due to SVT. ATP episodes occurred on 07/01/2024 between 2:00-2:35 pm.     [employer] CRT-D  Last IOC: 05/08/2024

## 2024-07-08 LAB
ATRIAL RATE - MUSE: 89 BPM
DIASTOLIC BLOOD PRESSURE - MUSE: NORMAL MMHG
INTERPRETATION ECG - MUSE: NORMAL
P AXIS - MUSE: NORMAL DEGREES
PR INTERVAL - MUSE: 106 MS
QRS DURATION - MUSE: 134 MS
QT - MUSE: 422 MS
QTC - MUSE: 471 MS
R AXIS - MUSE: 131 DEGREES
SYSTOLIC BLOOD PRESSURE - MUSE: NORMAL MMHG
T AXIS - MUSE: 134 DEGREES
VENTRICULAR RATE- MUSE: 75 BPM

## 2024-07-09 NOTE — TELEPHONE ENCOUNTER
Spoke with patient who states that he was symptomatic during this time.  His symptoms included racing heart rate along with palpitations.  Patient denies any chest pain or shortness of breath.  He states that because he became symptomatic that is why he pressed to send in a remote transmission.  He is still taking his medications as prescribed and states that he has not been having any issues getting his medications.  He has not had any recent labs done.  Advised patient that he is due for an appointment he verbally understood and voiced appreciation for the reminder.  Will reach out to scheduling to have him set up for this appointment.  Would you like to order a BMP and magnesium?

## 2024-07-16 ENCOUNTER — OFFICE VISIT (OUTPATIENT)
Dept: CARDIOLOGY | Facility: CLINIC | Age: 59
End: 2024-07-16
Payer: MEDICARE

## 2024-07-16 VITALS
WEIGHT: 268.4 LBS | SYSTOLIC BLOOD PRESSURE: 98 MMHG | HEART RATE: 65 BPM | BODY MASS INDEX: 37.43 KG/M2 | DIASTOLIC BLOOD PRESSURE: 56 MMHG | OXYGEN SATURATION: 97 %

## 2024-07-16 DIAGNOSIS — I50.22 CHRONIC SYSTOLIC HEART FAILURE (CMS/HCC): ICD-10-CM

## 2024-07-16 DIAGNOSIS — I47.20 VT (VENTRICULAR TACHYCARDIA) (CMS/HCC): ICD-10-CM

## 2024-07-16 DIAGNOSIS — I48.0 PAROXYSMAL ATRIAL FIBRILLATION (CMS/HCC): ICD-10-CM

## 2024-07-16 DIAGNOSIS — I25.5 ISCHEMIC CARDIOMYOPATHY: ICD-10-CM

## 2024-07-16 DIAGNOSIS — I25.10 CORONARY ARTERY DISEASE INVOLVING NATIVE CORONARY ARTERY OF NATIVE HEART WITHOUT ANGINA PECTORIS: Primary | ICD-10-CM

## 2024-07-16 PROCEDURE — G0463 HOSPITAL OUTPT CLINIC VISIT: HCPCS | Performed by: NURSE PRACTITIONER

## 2024-07-16 PROCEDURE — 99214 OFFICE O/P EST MOD 30 MIN: CPT | Performed by: NURSE PRACTITIONER

## 2024-07-16 RX ORDER — NITROGLYCERIN 0.4 MG/1
0.4 TABLET SUBLINGUAL EVERY 5 MIN PRN
Qty: 26 TABLET | Refills: 1 | Status: SHIPPED | OUTPATIENT
Start: 2024-07-16

## 2024-07-16 RX ORDER — FUROSEMIDE 20 MG/1
20 TABLET ORAL DAILY
Qty: 30 TABLET | Refills: 11 | Status: SHIPPED | OUTPATIENT
Start: 2024-07-16 | End: 2024-08-02 | Stop reason: DRUGHIGH

## 2024-07-16 RX ORDER — ROSUVASTATIN CALCIUM 40 MG/1
40 TABLET, COATED ORAL NIGHTLY
Qty: 30 TABLET | Refills: 1 | Status: SHIPPED | OUTPATIENT
Start: 2024-07-16 | End: 2024-12-05

## 2024-07-16 RX ORDER — POTASSIUM CHLORIDE 750 MG/1
20 TABLET, EXTENDED RELEASE ORAL DAILY
Qty: 60 TABLET | Refills: 11 | Status: SHIPPED | OUTPATIENT
Start: 2024-07-16 | End: 2024-08-02 | Stop reason: DRUGHIGH

## 2024-07-16 ASSESSMENT — PAIN SCALES - GENERAL: PAINLEVEL: 0-NO PAIN

## 2024-07-16 ASSESSMENT — ENCOUNTER SYMPTOMS
PALPITATIONS: 1
WEIGHT GAIN: 0
ORTHOPNEA: 0
SLEEP DISTURBANCES DUE TO BREATHING: 1
SPUTUM PRODUCTION: 0
DIZZINESS: 0
SHORTNESS OF BREATH: 0
BRUISES/BLEEDS EASILY: 0
WEAKNESS: 0
DYSPNEA ON EXERTION: 1
DECREASED APPETITE: 0
NEAR-SYNCOPE: 0
BLOATING: 0
PND: 0
WEIGHT LOSS: 0
IRREGULAR HEARTBEAT: 0
LIGHT-HEADEDNESS: 1
COUGH: 0
SYNCOPE: 0

## 2024-07-16 NOTE — PROGRESS NOTES
"    [facility]     CARDIOLOGY HEART FAILURE OUTPATIENT PROGRESS NOTE       Subjective     Patient ID: Pete Trejo is a 58 y.o. patient, who presents to the heart failure clinic for a follow-up visit.  He has a history of coronary artery disease (PCI to the second diagonal branch LAD 7/2017, PCI to the LAD 12/3/2010, PCI to diagonal 7/2009), ischemic cardiomyopathy s/p CRT-D implant 2011 with generator change 6/2022, systolic heart failure, ventricular tachycardia (partial VT ablation 6/2020, extensive substrate modification/VT ablation at [facility] 11/2021), paroxysmal atrial fibrillation anticoagulated with Xarelto, hypertension, dyslipidemia, diabetes mellitus type 2, sleep apnea    Pete was last seen in the heart failure clinic by Dr. Leiva on 3/21/2024.  He was stable at that time from a heart failure standpoint.  They discussed referral to [facility] for VT ablation and advanced heart failure therapies.  Dr. Leiva advised Pete continue to work on weight loss.    Pete was seen by Dr. Casi Alan at the [facility] for advanced heart failure therapies on 4/24/2024 - 4/26/2024. He underwent right heart catheterization at that time.  47 mmHg, RV 32/7, PA 32/15/20, PCWP 12 mmHg, CO (Becki) 6.4 L/min, CI (Becki) 2.7 L/min/m², CO (thermo) 5.8 L/min, CI (Thermo) 2.4 L/min/m².  Cardiopulmonary stress test revealed severely impaired exercise capacity    Pete underwent successful catheter ablation of ischemic VT with scar modification and success PVC ablation at the [facility] on 7/1/2024    Pete states that overall he has been feeling pretty good since his ablation a couple of weeks ago.  He states that his groin sites have healed well and he has no bruising.  He states that he does note that his heart \"takes off\"/races still on occasion.  He states it is not as often.  Pete denies any shocks from his defibrillator " "recently.  Pete feels his breathing is okay.  He states that he can \"go a ways\" before he has to stop and catch his breath.  If he engages in moderate exercise it does not take him long to get short of breath.  Pete denies PND and orthopnea.  He wears CPAP at night.  He denies any lower extremity edema.  Pete reports a good appetite and denies abdominal bloating or fullness.  He states his weight has remained stable.  He feels like he is getting dehydrated and cannot get enough to drink.  He is taking his furosemide 40 mg daily, and wonders if he can cut this down.  Pete denies chest pain or discomfort.  He has occasional lightheadedness if he gets up too quickly.    HPI    Past Medical History:   Diagnosis Date    BPH (benign prostatic hyperplasia)     CAD (coronary artery disease)     Status post stent    CHF (congestive heart failure) (CMS/[facility])     Chronic combined systolic and diastolic CHF (congestive heart failure) (CMS/[facility])     LVEF 25% with grade 1 diastolic dysfunction as per echo on 1/26/2022.    CVA (cerebral vascular accident) (CMS/[facility]) 2010    Without residual deficit.    Diabetes mellitus type 2 in obese (CMS/[facility])     Dyslipidemia     GERD (gastroesophageal reflux disease)     Heart attack (CMS/[facility])     x3    Hypertension     Ischemic cardiomyopathy     Morbid obesity with BMI of 40.0-44.9, adult (CMS/[facility])     Paroxysmal atrial fibrillation (CMS/[facility])     On Xarelto    V-tach (CMS/[facility])     AICD in place       Past Surgical History:   Procedure Laterality Date    ABLATION VT N/A 06/09/2020    Procedure: Ablation VT;  Surgeon: Wilfredo Montana MD;  Location: [facility] [place] EP Lab;  Service: Electrophysiology;  Laterality: N/A;  Check with Dr. Montana in the a.m. regarding scheduling    APPENDECTOMY      BILATERAL HEART CATH N/A 12/5/2023    Procedure: Bilateral heart cath;  Surgeon: Jitendra Post MD;  Location: [facility] [place] Cath Lab;  Service: Cardiovascular;  Laterality: N/A;    COLONOSCOPY N/A 7/25/2023    " Procedure: COLONOSCOPY with Polypectomy;  Surgeon: Hortencia Carson MD;  Location: [facility] Endoscopy;  Service: Endoscopy;  Laterality: N/A;    CORONARY ARTERY STENTING  2009    2.5 X 24-mm Taxus DIMAS to first diagonal. 3.0 X 8-mm Taxus stent to proximal LAD just proximal to diagonal.    CORONARY ARTERY STENTING  2010    3.5 X 15-mm Promus DIAMS to totally occluded LAD    CORONARY ARTERY STENTING  2011    2.25 X 30-mm Resolute DIMAS to 2nd diagonal.  Balloon angioplasty through strut to improve blood flow to distal LAD    CORONARY ARTERY STENTING  07/28/2017    second diagonal branch LAD Resolute 2.25 x 30-mm DIMAS    ICD DC GEN CHANGE N/A 6/20/2022    Procedure: BI-V ICD Gen Change;  Surgeon: Wilfredo Montana MD;  Location: [facility] EP Lab;  Service: Cardiovascular;  Laterality: N/A;    ICD SC NEW  2011    [employer] Cognis Model #N119, Serial #[id number]. [employer] Reliance Model #0185, Serial #[id number]. LV lead Acuity Model #45+91, Serial #[id number]. Atrial lead Model #4469, Serial #[id number]    OXIMETRY RUN N/A 12/5/2023    Procedure: OXIMETRY RUN;  Surgeon: Jitendra Post MD;  Location: [facility] Cath Lab;  Service: Cardiovascular;  Laterality: N/A;       Allergies   Allergen Reactions    Morphine      ANXIETY    Tramadol      ANXIETY         Current Outpatient Medications:     mexiletine (MEXITIL) 250 mg capsule, Take 1 capsule (250 mg total) by mouth every 8 (eight) hours, Disp: 90 capsule, Rfl: 11    carvediloL (Coreg) 25 mg tablet, Take 2 tablets (50 mg total) by mouth 2 (two) times a day with meals, Disp: 180 tablet, Rfl: 3    magnesium oxide (MAG-OX) 400 mg (241.3 mg magnesium) tablet, Take 1 tablet (400 mg total) by mouth 2 (two) times a day, Disp: 180 tablet, Rfl: 3    sacubitriL-valsartan (ENTRESTO)  mg tablet, Take 1 tablet by mouth 2 (two) times a day, Disp: 60 tablet, Rfl: 1    sotaloL (BETAPACE) 120 mg tablet, Take 1.5 tablets (180 mg total) by mouth every 12 (twelve) hours, Disp: 270 tablet, Rfl: 2    spironolactone  (ALDACTONE) 25 mg tablet, Take 2 tablets (50 mg total) by mouth daily, Disp: , Rfl:     rivaroxaban (Xarelto) 20 mg tablet, Take 1 tablet (20 mg total) by mouth daily, Disp: 90 tablet, Rfl: 3    aspirin 81 mg EC tablet, Take 1 tablet (81 mg total) by mouth daily, Disp: 90 tablet, Rfl: 3    empagliflozin (JARDIANCE) 10 mg tablet tablet, Take 10 mg by mouth daily, Disp: 30 each, Rfl: 11    tamsulosin (FLOMAX) 0.4 mg capsule, Take 1 capsule (0.4 mg total) by mouth daily, Disp: , Rfl:     cholecalciferol, vitamin D3, 25 mcg (1,000 unit) tablet, Take 1 tablet (1,000 Units total) by mouth daily, Disp: , Rfl:     albuterol HFA (PROVENTIL HFA;VENTOLIN HFA) 90 mcg/actuation inhaler, Inhale 2 puffs every 6 (six) hours as needed for wheezing or shortness of breath, Disp: 1 Inhaler, Rfl: 1    pantoprazole (PROTONIX) 40 mg EC tablet, Take 1 tablet (40 mg total) by mouth daily, Disp: , Rfl:     furosemide (LASIX) 20 mg tablet, Take 1 tablet (20 mg total) by mouth daily, Disp: 30 tablet, Rfl: 11    potassium chloride 10 mEq CR tablet, Take 2 tablets (20 mEq total) by mouth daily, Disp: 60 tablet, Rfl: 11    rosuvastatin (Crestor) 40 mg tablet, Take 1 tablet (40 mg total) by mouth nightly, Disp: 30 tablet, Rfl: 1    nitroglycerin (NITROSTAT) 0.4 mg SL tablet, Place 1 tablet (0.4 mg total) under the tongue every 5 (five) minutes as needed for chest pain, Disp: 26 tablet, Rfl: 1    Family History   Problem Relation Age of Onset    Heart attack Mother 62    Other Mother         Abdominal Aortic Aneurysm    Lung cancer Mother     Colon cancer Father         Cause of death    Diabetes Brother         Non-Insulin Dependent    Heart attack Brother 51        w/ Stents    Heart disease Brother     Other Son         Well       Social History     Socioeconomic History    Marital status: Single     Spouse name: Not on file    Number of children: Not on file    Years of education: Not on file    Highest education level: Not on file    Occupational History    Not on file   Tobacco Use    Smoking status: Former     Current packs/day: 0.00     Average packs/day: 0.8 packs/day for 30.0 years (22.5 ttl pk-yrs)     Types: Cigarettes     Start date: 1990     Quit date: 2020     Years since quittin.1    Smokeless tobacco: Never   Vaping Use    Vaping status: Never Used   Substance and Sexual Activity    Alcohol use: Not Currently     Comment: Quit drinking in     Drug use: Not Currently    Sexual activity: Yes     Partners: Female   Other Topics Concern    Not on file   Social History Narrative    Not on file     Social Determinants of Health     Financial Resource Strain: Unknown (2020)    Overall Financial Resource Strain (CARDIA)     Difficulty of Paying Living Expenses: Patient declined   Food Insecurity: No Food Insecurity (2023)    Hunger Vital Sign     Worried About Running Out of Food in the Last Year: Never true     Ran Out of Food in the Last Year: Never true   Transportation Needs: No Transportation Needs (2023)    PRAPARE - Transportation     Lack of Transportation (Medical): No     Lack of Transportation (Non-Medical): No   Physical Activity: Not on file   Stress: Not on file   Social Connections: Not on file   Intimate Partner Violence: Not At Risk (2023)    Humiliation, Afraid, Rape, and Kick questionnaire     Fear of Current or Ex-Partner: No     Emotionally Abused: No     Physically Abused: No     Sexually Abused: No   Housing Stability: Low Risk  (2023)    Housing Stability Vital Sign     Unable to Pay for Housing in the Last Year: No     Number of Places Lived in the Last Year: 1     Unstable Housing in the Last Year: No       Review of Systems   Constitutional: Negative for decreased appetite, malaise/fatigue, weight gain and weight loss.   Cardiovascular:  Positive for dyspnea on exertion and palpitations. Negative for chest pain, irregular heartbeat, leg swelling, near-syncope,  orthopnea, paroxysmal nocturnal dyspnea and syncope.   Respiratory:  Positive for sleep disturbances due to breathing. Negative for cough, shortness of breath and sputum production.    Hematologic/Lymphatic: Does not bruise/bleed easily.   Gastrointestinal:  Negative for bloating.   Neurological:  Positive for light-headedness. Negative for dizziness and weakness.       Objective     Vitals:    07/16/24 0833   BP: 98/56   Pulse: 65   SpO2: 97%  Comment: RA   Weight: 121.7 kg (268 lb 6.4 oz)  Comment: Doesn't wt at home   PainSc: 0-No pain   Patient Position: Sitting       Sodium   Date Value Ref Range Status   03/21/2024 136 135 - 145 mmol/L Final     Potassium   Date Value Ref Range Status   03/21/2024 4.2 3.5 - 5.1 MMOL/L Final     Chloride   Date Value Ref Range Status   03/21/2024 103 98 - 107 mmol/L Final     CO2   Date Value Ref Range Status   03/21/2024 23 21 - 32 mmol/L Final     BUN   Date Value Ref Range Status   03/21/2024 12 7 - 25 mg/dL Final     Creatinine   Date Value Ref Range Status   03/21/2024 0.97 0.70 - 1.30 mg/dL Final     Glucose   Date Value Ref Range Status   03/21/2024 97 70 - 105 mg/dL Final     Calcium   Date Value Ref Range Status   03/21/2024 9.3 8.6 - 10.3 mg/dL Final       Physical Exam  Constitutional:       Appearance: He is well-developed.   HENT:      Head: Normocephalic.      Mouth/Throat:      Mouth: Mucous membranes are moist.   Neck:      Vascular: No JVD.   Cardiovascular:      Rate and Rhythm: Normal rate and regular rhythm.      Pulses: Intact distal pulses.      Heart sounds: Normal heart sounds. No murmur heard.  Pulmonary:      Effort: Pulmonary effort is normal. No respiratory distress.      Breath sounds: No decreased breath sounds, wheezing, rhonchi or rales.   Abdominal:      General: Bowel sounds are normal. There is no distension.      Palpations: Abdomen is soft.   Musculoskeletal:         General: Normal range of motion.      Right lower leg: No edema.      Left  lower leg: No edema.   Skin:     General: Skin is warm and dry.   Neurological:      Mental Status: He is alert and oriented to person, place, and time.   Psychiatric:         Behavior: Behavior normal.         Assessment/Plan     Pete was seen today for cardiomyopathy.    Diagnoses and all orders for this visit:    Coronary artery disease involving native coronary artery of native heart without angina pectoris  -     rosuvastatin (Crestor) 40 mg tablet; Take 1 tablet (40 mg total) by mouth nightly  -     nitroglycerin (NITROSTAT) 0.4 mg SL tablet; Place 1 tablet (0.4 mg total) under the tongue every 5 (five) minutes as needed for chest pain    Chronic systolic heart failure (CMS/HCC)  -     furosemide (LASIX) 20 mg tablet; Take 1 tablet (20 mg total) by mouth daily  -     potassium chloride 10 mEq CR tablet; Take 2 tablets (20 mEq total) by mouth daily    Ischemic cardiomyopathy    VT (ventricular tachycardia) (CMS/HCC)    Paroxysmal atrial fibrillation (CMS/HCC)        Ischemic cardiomyopathy  CAD status post multiple stents  Paroxysmal atrial fibrillation, anticoagulated with Xarelto  Echocardiogram on 10/19/2023 revealed an ejection fraction of 25%.  Large anteroapical aneurysm.  No LV mural thrombi.  Grade 2 left-ventricular diastolic abnormality.  Normal RV size and function.  Patient has history of CAD s/p multiple stent  Patient has history of paroxysmal atrial fibrillation.  [id number] score 6.  Continue anticoagulation with Xarelto 20 mg daily  Device: CRT-D implanted in 2011 with generator change in 2020.  Remote device check 7/3/2024 revealed 92% CRT pacing.  Episodes of NSVT/SVT terminated with ATP  GDMT  ACEI/ARB/ARNI: Entresto  mg twice daily  Beta-Blocker: Carvedilol 50 mg twice daily  Aldosterone antagonist: Spironolactone 50 mg daily  SGLT2 inhibitor: Jardiance 10 mg daily  Advise routine cardiac exercise  Continue CPAP nightly   Keep follow-up as scheduled with general cardiology on  8/6/2024  Continue to follow with the [facility] as scheduled    Chronic systolic heart failure  NYHA Class: III  Heart failure stage: C  Volume status today: Euvolemic on exam  Diuretic strategy: Decrease furosemide to 20 mg daily.  Will see how he does on the decreased dose and potentially use on an as-needed basis  Advise daily weights.  Contact our clinic with a 3 pound weight gain overnight or 5 pounds in a week  Follow a low-sodium (2000mg), heart healthy diet and 2 L fluid restriction  Follow-up in the heart failure clinic in 6-8 weeks, or sooner if needed  Advise patient contact the clinic with increasing symptoms or concerns    Refractory ventricular tachycardia  Status post partial VT ablation at [facility] in 2020. Extensive repeat VT ablation at the [facility] in 2021.  VT and PVC ablations at [facility] 7/1/2024  Continue sotalol 180 mg twice daily and mexiletine 250 mg 3 times daily  Keep follow-up as scheduled with Dr. Wu on 8/15/2024      Return in about 8 weeks (around 9/10/2024).            A voice recognition program was used to aid in documentation of this record. Sometimes words are not printed exactly as they were spoken.  While efforts were made to carefully edit and correct any inaccuracies, some errors may be present; please take these into context.  Please contact the provider if errors are identified.

## 2024-07-16 NOTE — PATIENT INSTRUCTIONS
- decrease furosemide (lasix) to a half tablet (20mg) daily. When you get your new prescription, you will take 1 tablet (20mg) daily as I sent in a different pill size    - decrease potassium to 2 tablets (20mEq) daily

## 2024-07-18 LAB
MDC_IDC_LEAD_CONNECTION_STATUS: NORMAL
MDC_IDC_LEAD_IMPLANT_DT: NORMAL
MDC_IDC_LEAD_LOCATION: NORMAL
MDC_IDC_LEAD_LOCATION_DETAIL_1: NORMAL
MDC_IDC_LEAD_MFG: NORMAL
MDC_IDC_LEAD_MODEL: NORMAL
MDC_IDC_LEAD_POLARITY_TYPE: NORMAL
MDC_IDC_LEAD_SERIAL: NORMAL
MDC_IDC_MSMT_BATTERY_DTM: NORMAL
MDC_IDC_MSMT_BATTERY_DTM: NORMAL
MDC_IDC_MSMT_BATTERY_REMAINING_LONGEVITY: 114 MO
MDC_IDC_MSMT_BATTERY_REMAINING_LONGEVITY: 126 MO
MDC_IDC_MSMT_BATTERY_REMAINING_PERCENTAGE: 100 %
MDC_IDC_MSMT_BATTERY_REMAINING_PERCENTAGE: 100 %
MDC_IDC_MSMT_BATTERY_STATUS: NORMAL
MDC_IDC_MSMT_BATTERY_STATUS: NORMAL
MDC_IDC_MSMT_CAP_CHARGE_DTM: NORMAL
MDC_IDC_MSMT_CAP_CHARGE_ENERGY: 41 J
MDC_IDC_MSMT_CAP_CHARGE_ENERGY: 41 J
MDC_IDC_MSMT_CAP_CHARGE_TIME: 10 S
MDC_IDC_MSMT_CAP_CHARGE_TIME: 10 S
MDC_IDC_MSMT_CAP_CHARGE_TIME: 9.9 S
MDC_IDC_MSMT_CAP_CHARGE_TIME: 9.9 S
MDC_IDC_MSMT_CAP_CHARGE_TYPE: NORMAL
MDC_IDC_MSMT_LEADCHNL_LV_IMPEDANCE_VALUE: 915 OHM
MDC_IDC_MSMT_LEADCHNL_LV_IMPEDANCE_VALUE: 985 OHM
MDC_IDC_MSMT_LEADCHNL_LV_PACING_THRESHOLD_AMPLITUDE: 0.8 V
MDC_IDC_MSMT_LEADCHNL_LV_PACING_THRESHOLD_PULSEWIDTH: 0.4 MS
MDC_IDC_MSMT_LEADCHNL_RA_IMPEDANCE_VALUE: 371 OHM
MDC_IDC_MSMT_LEADCHNL_RA_IMPEDANCE_VALUE: 406 OHM
MDC_IDC_MSMT_LEADCHNL_RA_PACING_THRESHOLD_AMPLITUDE: 0.4 V
MDC_IDC_MSMT_LEADCHNL_RA_PACING_THRESHOLD_PULSEWIDTH: 0.4 MS
MDC_IDC_MSMT_LEADCHNL_RV_IMPEDANCE_VALUE: 644 OHM
MDC_IDC_MSMT_LEADCHNL_RV_IMPEDANCE_VALUE: 674 OHM
MDC_IDC_MSMT_LEADCHNL_RV_PACING_THRESHOLD_AMPLITUDE: 1.1 V
MDC_IDC_MSMT_LEADCHNL_RV_PACING_THRESHOLD_PULSEWIDTH: 0.4 MS
MDC_IDC_PG_IMPLANT_DTM: NORMAL
MDC_IDC_PG_IMPLANT_DTM: NORMAL
MDC_IDC_PG_MFG: NORMAL
MDC_IDC_PG_MFG: NORMAL
MDC_IDC_PG_MODEL: NORMAL
MDC_IDC_PG_MODEL: NORMAL
MDC_IDC_PG_SERIAL: NORMAL
MDC_IDC_PG_SERIAL: NORMAL
MDC_IDC_PG_TYPE: NORMAL
MDC_IDC_PG_TYPE: NORMAL
MDC_IDC_SESS_CLINIC_NAME: NORMAL
MDC_IDC_SESS_CLINIC_NAME: NORMAL
MDC_IDC_SESS_DTM: NORMAL
MDC_IDC_SESS_DTM: NORMAL
MDC_IDC_SESS_TYPE: NORMAL
MDC_IDC_SESS_TYPE: NORMAL
MDC_IDC_SET_BRADY_AT_MODE_SWITCH_MODE: NORMAL
MDC_IDC_SET_BRADY_AT_MODE_SWITCH_MODE: NORMAL
MDC_IDC_SET_BRADY_AT_MODE_SWITCH_RATE: 170 {BEATS}/MIN
MDC_IDC_SET_BRADY_AT_MODE_SWITCH_RATE: 170 {BEATS}/MIN
MDC_IDC_SET_BRADY_LOWRATE: 60 {BEATS}/MIN
MDC_IDC_SET_BRADY_LOWRATE: 60 {BEATS}/MIN
MDC_IDC_SET_BRADY_MAX_SENSOR_RATE: 100 {BEATS}/MIN
MDC_IDC_SET_BRADY_MAX_TRACKING_RATE: 100 {BEATS}/MIN
MDC_IDC_SET_BRADY_MAX_TRACKING_RATE: 100 {BEATS}/MIN
MDC_IDC_SET_BRADY_MODE: NORMAL
MDC_IDC_SET_BRADY_MODE: NORMAL
MDC_IDC_SET_BRADY_PAV_DELAY_LOW: 130 MS
MDC_IDC_SET_BRADY_PAV_DELAY_LOW: 130 MS
MDC_IDC_SET_BRADY_SAV_DELAY_LOW: 110 MS
MDC_IDC_SET_BRADY_SAV_DELAY_LOW: 110 MS
MDC_IDC_SET_CRT_LVRV_DELAY: 20 MS
MDC_IDC_SET_CRT_PACED_CHAMBERS: NORMAL
MDC_IDC_SET_CRT_PACED_CHAMBERS: NORMAL
MDC_IDC_SET_LEADCHNL_LV_PACING_AMPLITUDE: 2 V
MDC_IDC_SET_LEADCHNL_LV_PACING_AMPLITUDE: 2 V
MDC_IDC_SET_LEADCHNL_LV_PACING_ANODE_LOCATION_1: NORMAL
MDC_IDC_SET_LEADCHNL_LV_PACING_CAPTURE_MODE: NORMAL
MDC_IDC_SET_LEADCHNL_LV_PACING_CAPTURE_MODE: NORMAL
MDC_IDC_SET_LEADCHNL_LV_PACING_CATHODE_ELECTRODE_1: NORMAL
MDC_IDC_SET_LEADCHNL_LV_PACING_CATHODE_LOCATION_1: NORMAL
MDC_IDC_SET_LEADCHNL_LV_PACING_PULSEWIDTH: 0.4 MS
MDC_IDC_SET_LEADCHNL_LV_PACING_PULSEWIDTH: 0.4 MS
MDC_IDC_SET_LEADCHNL_LV_SENSING_ADAPTATION_MODE: NORMAL
MDC_IDC_SET_LEADCHNL_LV_SENSING_ADAPTATION_MODE: NORMAL
MDC_IDC_SET_LEADCHNL_LV_SENSING_ANODE_LOCATION_1: NORMAL
MDC_IDC_SET_LEADCHNL_LV_SENSING_ANODE_LOCATION_1: NORMAL
MDC_IDC_SET_LEADCHNL_LV_SENSING_CATHODE_ELECTRODE_1: NORMAL
MDC_IDC_SET_LEADCHNL_LV_SENSING_CATHODE_ELECTRODE_1: NORMAL
MDC_IDC_SET_LEADCHNL_LV_SENSING_CATHODE_LOCATION_1: NORMAL
MDC_IDC_SET_LEADCHNL_LV_SENSING_CATHODE_LOCATION_1: NORMAL
MDC_IDC_SET_LEADCHNL_LV_SENSING_SENSITIVITY: 1 MV
MDC_IDC_SET_LEADCHNL_LV_SENSING_SENSITIVITY: 1 MV
MDC_IDC_SET_LEADCHNL_RA_PACING_AMPLITUDE: 2 V
MDC_IDC_SET_LEADCHNL_RA_PACING_AMPLITUDE: 2 V
MDC_IDC_SET_LEADCHNL_RA_PACING_CAPTURE_MODE: NORMAL
MDC_IDC_SET_LEADCHNL_RA_PACING_CAPTURE_MODE: NORMAL
MDC_IDC_SET_LEADCHNL_RA_PACING_POLARITY: NORMAL
MDC_IDC_SET_LEADCHNL_RA_PACING_POLARITY: NORMAL
MDC_IDC_SET_LEADCHNL_RA_PACING_PULSEWIDTH: 0.4 MS
MDC_IDC_SET_LEADCHNL_RA_PACING_PULSEWIDTH: 0.4 MS
MDC_IDC_SET_LEADCHNL_RA_SENSING_ADAPTATION_MODE: NORMAL
MDC_IDC_SET_LEADCHNL_RA_SENSING_ADAPTATION_MODE: NORMAL
MDC_IDC_SET_LEADCHNL_RA_SENSING_POLARITY: NORMAL
MDC_IDC_SET_LEADCHNL_RA_SENSING_POLARITY: NORMAL
MDC_IDC_SET_LEADCHNL_RA_SENSING_SENSITIVITY: 0.25 MV
MDC_IDC_SET_LEADCHNL_RA_SENSING_SENSITIVITY: 0.25 MV
MDC_IDC_SET_LEADCHNL_RV_PACING_AMPLITUDE: 2.5 V
MDC_IDC_SET_LEADCHNL_RV_PACING_AMPLITUDE: 2.5 V
MDC_IDC_SET_LEADCHNL_RV_PACING_CAPTURE_MODE: NORMAL
MDC_IDC_SET_LEADCHNL_RV_PACING_CAPTURE_MODE: NORMAL
MDC_IDC_SET_LEADCHNL_RV_PACING_POLARITY: NORMAL
MDC_IDC_SET_LEADCHNL_RV_PACING_POLARITY: NORMAL
MDC_IDC_SET_LEADCHNL_RV_PACING_PULSEWIDTH: 0.4 MS
MDC_IDC_SET_LEADCHNL_RV_PACING_PULSEWIDTH: 0.4 MS
MDC_IDC_SET_LEADCHNL_RV_SENSING_ADAPTATION_MODE: NORMAL
MDC_IDC_SET_LEADCHNL_RV_SENSING_ADAPTATION_MODE: NORMAL
MDC_IDC_SET_LEADCHNL_RV_SENSING_POLARITY: NORMAL
MDC_IDC_SET_LEADCHNL_RV_SENSING_POLARITY: NORMAL
MDC_IDC_SET_LEADCHNL_RV_SENSING_SENSITIVITY: 0.6 MV
MDC_IDC_SET_LEADCHNL_RV_SENSING_SENSITIVITY: 0.6 MV
MDC_IDC_SET_ZONE_DETECTION_INTERVAL: 286 MS
MDC_IDC_SET_ZONE_DETECTION_INTERVAL: 286 MS
MDC_IDC_SET_ZONE_DETECTION_INTERVAL: 375 MS
MDC_IDC_SET_ZONE_DETECTION_INTERVAL: 375 MS
MDC_IDC_SET_ZONE_DETECTION_INTERVAL: 545 MS
MDC_IDC_SET_ZONE_DETECTION_INTERVAL: 545 MS
MDC_IDC_SET_ZONE_STATUS: NORMAL
MDC_IDC_SET_ZONE_TYPE: NORMAL
MDC_IDC_SET_ZONE_VENDOR_TYPE: NORMAL
MDC_IDC_STAT_AT_BURDEN_PERCENT: 0 %
MDC_IDC_STAT_AT_BURDEN_PERCENT: 0 %
MDC_IDC_STAT_AT_DTM_END: NORMAL
MDC_IDC_STAT_AT_DTM_END: NORMAL
MDC_IDC_STAT_AT_DTM_START: NORMAL
MDC_IDC_STAT_AT_DTM_START: NORMAL
MDC_IDC_STAT_BRADY_DTM_END: NORMAL
MDC_IDC_STAT_BRADY_DTM_END: NORMAL
MDC_IDC_STAT_BRADY_DTM_START: NORMAL
MDC_IDC_STAT_BRADY_DTM_START: NORMAL
MDC_IDC_STAT_BRADY_RA_PERCENT_PACED: 90 %
MDC_IDC_STAT_BRADY_RA_PERCENT_PACED: 91 %
MDC_IDC_STAT_BRADY_RV_PERCENT_PACED: 93 %
MDC_IDC_STAT_BRADY_RV_PERCENT_PACED: 93 %
MDC_IDC_STAT_CRT_DTM_END: NORMAL
MDC_IDC_STAT_CRT_DTM_END: NORMAL
MDC_IDC_STAT_CRT_DTM_START: NORMAL
MDC_IDC_STAT_CRT_DTM_START: NORMAL
MDC_IDC_STAT_CRT_LV_PERCENT_PACED: 92 %
MDC_IDC_STAT_CRT_LV_PERCENT_PACED: 92 %
MDC_IDC_STAT_EPISODE_RECENT_COUNT: 0
MDC_IDC_STAT_EPISODE_RECENT_COUNT_DTM_END: NORMAL
MDC_IDC_STAT_EPISODE_RECENT_COUNT_DTM_START: NORMAL
MDC_IDC_STAT_EPISODE_TYPE: NORMAL
MDC_IDC_STAT_EPISODE_VENDOR_TYPE: NORMAL
MDC_IDC_STAT_TACHYTHERAPY_ATP_DELIVERED_RECENT: 0
MDC_IDC_STAT_TACHYTHERAPY_ATP_DELIVERED_RECENT: 0
MDC_IDC_STAT_TACHYTHERAPY_ATP_DELIVERED_TOTAL: 284
MDC_IDC_STAT_TACHYTHERAPY_ATP_DELIVERED_TOTAL: 284
MDC_IDC_STAT_TACHYTHERAPY_RECENT_DTM_END: NORMAL
MDC_IDC_STAT_TACHYTHERAPY_RECENT_DTM_END: NORMAL
MDC_IDC_STAT_TACHYTHERAPY_RECENT_DTM_START: NORMAL
MDC_IDC_STAT_TACHYTHERAPY_RECENT_DTM_START: NORMAL
MDC_IDC_STAT_TACHYTHERAPY_SHOCKS_ABORTED_RECENT: 0
MDC_IDC_STAT_TACHYTHERAPY_SHOCKS_ABORTED_RECENT: 0
MDC_IDC_STAT_TACHYTHERAPY_SHOCKS_ABORTED_TOTAL: 0
MDC_IDC_STAT_TACHYTHERAPY_SHOCKS_ABORTED_TOTAL: 0
MDC_IDC_STAT_TACHYTHERAPY_SHOCKS_DELIVERED_RECENT: 0
MDC_IDC_STAT_TACHYTHERAPY_SHOCKS_DELIVERED_RECENT: 0
MDC_IDC_STAT_TACHYTHERAPY_SHOCKS_DELIVERED_TOTAL: 1
MDC_IDC_STAT_TACHYTHERAPY_SHOCKS_DELIVERED_TOTAL: 1
MDC_IDC_STAT_TACHYTHERAPY_TOTAL_DTM_END: NORMAL
MDC_IDC_STAT_TACHYTHERAPY_TOTAL_DTM_END: NORMAL

## 2024-07-18 NOTE — PROGRESS NOTES
Cardiology Outpatient Note                                            [facility] [place] Notes:     Chief Complaint   Patient presents with    Cardiomyopathy    Hypertension    Hyperlipidemia    Coronary Artery Disease    Atrial Fibrillation       Assessment/ Plan:    Coronary artery disease    History of coronary angiography on 7/25/2009, 12/3/2010 and 12/5/2023:    2.25 x 24 mm Taxus DIMAS to the diagonal (7/25/2009)  3.0 x 8 mm Taxus DIMAS to proximal LAD (7/25/2009)  3.5 x 15 mm Promus DIMAS to totally occluded LAD (12/3/2010)  2.5 x 15 mm trek noncompliant balloon angioplasty to in-stent restenotic first diagonal (12/5/2023)    ECG today demonstrates ventricular paced rhythm heart rate 60 bpm.  Currently denies any signs or symptoms of angina.  He does have chronic shortness of breath when exerting himself however this is stable and not worsening.    GDMT: Aspirin, Crestor, carvedilol, Entresto    Continue work towards cardiac heart healthy diet with low sodium 2 g less per day.  Encourage regular routine exercise with goal of 150 minutes of moderate intensity exercise per week.  Follow-up with Dr. Pappas or Hodan Kauffman CNP in 1 year to reestablish care (former Dr. Edwards patient)    Chronic HFrEF  Ischemic cardiomyopathy  History of VT  BiV ICD    NYHA class II/C.  Euvolemic on exam.  BiV ICD implantation in 2011.  Generator change on 6/20/2022.  Successful VT ablation at [facility] on 7/1/2024.  Currently maintained on sotalol.    Echocardiogram performed on 10/19/2023 demonstrates:   EF 25%  Grade 2 diastolic dysfunction  No obvious changes compared to prior echocardiogram 1/2022    Device check on 7/9/2024:  9.5-year battery longevity  89% atrial pacing  92% BiV pacing  1% AT/AF burden  Episodes of ventricular tachycardia effectively terminated with antitachycardia pacing  No high-energy therapy delivered    GDMT: Carvedilol, Entresto, spironolactone, Jardiance.      Lasix 40 mg daily for diuretic  therapy.  On potassium supplementation  Did previously try to utilize Lasix 20 mg daily and consideration for as needed however began to retain fluid and noticed weight gain  Has since gone back on his regular regimen and feels much better    Continue to limit sodium intake 2 g less per day.  Encourage daily weights and to contact our clinic with 3 pound weight gain overnight or 5 pounds in a week.    Paroxysmal atrial fibrillation    [id number] score 6, currently on Xarelto.     Device check 7/9/2024:  1% AT/AF burden    ECG demonstrates ventricular paced rhythm.  QTc 434.  CMP pending.  Mag pending.    Continue with carvedilol, Xarelto, sotalol.  No bleeding concerns reported.    Hypertension    Blood pressure 96/60.  Currently on Entresto, Lasix, carvedilol, spironolactone.  Does experience some lightheadedness and likely suspect some orthostatic blood pressure  Does report that he was previously running 120s systolic before his VT ablation, however ever since his ablation blood pressures at home have been in the upper 90s systolic  Education provided on changing positions slowly and to continue monitoring symptoms.  If symptoms become more frequent or worsen, may need to consider reducing some of his antihypertensives    Continue to limit sodium intake 2 g less per day.  Continue current medication regimen.    Dyslipidemia    Last lipid panel on 7/28/2022 demonstrated:   total cholesterol 106  triglycerides 154  HDL 25  LDL 50  Non-HDL 81    Goal:   CAD:   LDL <60 as this demonstrates plaque regression   Triglycerides <150  Non-HDL <100    Currently on rosuvastatin 40 mg.   Repeat lipid panel for annual surveillance    Continue to work on lifestyle modification with diet and exercise.             HPI:  Petecolten Andersonis is a 58 y.o. male being seen 08/06/24   for Cardiomyopathy, Hypertension, Hyperlipidemia, Coronary Artery Disease, and Atrial Fibrillation     Pete is a 58-year-old male who previously followed  "with Dr. Edwards.  Past medical history significant for coronary artery disease, HFrEF, ischemic cardiomyopathy, biventricular ICD, hypertension, dyslipidemia.  He had very recently followed up with heart failure clinic on 6/16/2024 for follow-up.  He had been working with the [facility] for the advanced heart failure therapies and had also undergone successful catheter ablation for ischemic VT on 7/1/2024 at the [facility].  At that clinic visit he was feeling good overall since his ablation.  He would note that his heart would \"take off\" or race on occasion.  No associated shortness of breath.  Recommendation from that visit was to continue current medication regimen and follow-up in 8 weeks with heart failure clinic.    Today he tells me he is doing very well from a cardiac standpoint.  He did try to utilize his Lasix therapy as needed, however he began retaining fluid and had noticed weight gain.  He then went back on his normal dosing and began to feel much better.  Since going back onto his normal regimen his weights have been stable and he denies any lower extremity edema.  Currently no orthopnea or PND experienced.  He does experience chronic shortness of breath when exerting himself such as going up stairs however this is stable and has not worsened.  No chest pain or chest tightness when exerting himself.  He does notice some lightheadedness and blood pressure is noted to be soft today.  Suspect he may have some orthostasis, however he denies any syncope or near syncope.  He has not had any falls or trauma.  Does experience some occasional palpitations but they last for a few seconds and then spontaneously resolve.  No bleeding concerns while on aspirin and Xarelto.  No other acute concerns or complaints today      CARDIAC PROBLEM LIST:  Last updated by: 9/13/2022 Merced CAMERON CNP  Primary Cardiologist: Jerry   Electrophysiology: Dr. Montana  Problem list:  CAD:   Anterior MI " [PCI. 3.5 X 15-mm Promus drug eluting stent to totally occluded LAD] - 12/3/2010   Sm Anteroseptal MI [PCI. 2.25 X 24-mm Taxus drug eluting stent to diagonal. 3.0 X 8-mm Taxus stent to proximal LAD] - 7/25/2009   Progressive CAD ivolving LAD/Diagonal [Stent]     CHF/CM:   CHF   EF 31% - 2013   Severe ischemic cardionyopathy [BiVentricular ICD. [place] [employer] Cognis HE Model #N119,Serial #[id number]. RV lead Guidant Model #0185,Serial #[id number]. LV lead BS Model #4591,Serial #[id number]. RA lead Guidant Model #4469, Serial #[id number]] - 2011     DeviceICD changed out 6/20/2022 [employer] model G125          ARRHYTHMIA:   Ventricular Tachycardia [AICD]   2021 recurrent NS-VT, slow VT  2020 Hx of VT ablation per Dr. Montana - 06/09/2020 VT-Partially successful ablation of border zone areas of the scar especially the inferior apex in an attempt to substrate modify his arrhythmia burden    Recurrent palpitations 1/26/2021 with admission [place] slow monomorphic  bpm with failed ATP x8 and no shocks delivered. Spontaneously converted out of the VT while in the ER. Mexiletine resumed at 200 mg twice daily in addition to amiodarone. ATP programmed more aggressively.      Extensive repeat VT ablation [facility] 11/29/2021, mexiletine discontinued    Recurrent VT with failed ATP x8 1/25/2022 spontaneously converted.  Mexiletine 200 mg twice daily plus amiodarone 200 mg daily, mexiletine increased to 3 times daily. 1/31/2022    Device interrogation [facility] 1/31/22: 96% atrial paced, 99% BiV paced, recurrent episode nonsustained  bpm 1/29/2022 not sustained 28 seconds.      PVD:   CVA - 12/2010 - at time of MI, no residual    OTHER:   LV Mural Thrombus - resolved  Anteroapical akinetic aneurysm     RISK FACTORS:   Hypertension   Dyslipidemia [Hyperlipidemia]       CARDIOVASCULAR PROCEDURES:     PCI (PTCA: LAD bifurcation   Stent: D2 2.25x30 (RESOLUTE))   PCI (%   Stent: LAD (PROMUS) 3.5x15mm) - 12/3/2010   PCI  (Stent: D1 (TAXUS) 2.5x24mm, LAD prox (TAXUS) 30x8mm) - 7/25/2009   PCI 07/28/2017 second diagonal branch LAD Resolute 2.25 x 30-mm DIMAS       Prior Imaging/Testing History     EKG - 10/8/2015   EKG - 08/14/2020 AV paced 60 bpm     Devices (Bi-Ventricular ICD ([place]-Fwtfmigk-Iycbtl N119), Serial# [id number]) - 10/10/2011   Devices (ICD Interrogation: 1 mode-switch, 1 Nonsustained VT)     Echo - 06/08/2020 · Severe LV systolic dysfunction, Severe ischemic CM, EF 25%,   LA moderately dilated; RA mildly dilated, Mild TR, Grade I LV diastolic abnormality, Minimally elevated LV end-diastolic pressure.     Limited echo 1/26/2022 revealing EF 20 to 25% with grade 1 diastolic dysfunction and elevated filling pressures. Diffuse global hypokinesis with hyperdynamic anterolateral wall. [place] aneurysmatic. No LV thrombus. Mildly dilated LA 33 mL/m². No significant valvular disease.    MPI (Mild rayray-infarct anterior wall ischemia; Lexiscan)   Lexiscan - 07/27/2017 -Large scar noted in the LAD territory associated with akinesis   secondary to prior MI, small amount of rayray-infarct ischemia noted, LV systolic function is severely depressed: EF 14%   Lexiscan - 06/08/2020 No changes since 2017 study    Stress test with a VO2 5/5/2021 [facility]: Nondiagnostic for ischemia as patient did not reach 85% of maximum heart rate. Isolated PVCs, single PVC pair. On beta-blocker. Moderately decreased functional capacity. Maintained 93% or greater saturation.    Right heart cath 5/6/2021: You see him. RV 35/06. Mean PW 15, EDP 14, A wave 11. PAP 36/15 mean 22. PA saturation 66, pulmonary vein 93, PA 66, PA 66, AO 93%. Becki cardiac output 5.88.    ZIO monitor 3/21: 14-day monitor, rare ectopy, 4 beats nonsustained VT, triggered events corresponded to sinus rhythm. Minimum heart rate 60 maximum 140 average 63 bpm.     Review of Systems:  The following system(s) were reviewed and negative.  Pertinent positive and negative findings are noted in the HPI.     [x]                     Const                                   [x]                      Eyes   [x]                     ENT                                     [x]                      Resp                                       [x]                     CV                                       [x]                      GI   [x]                                                            [x]                      Neuro   [x]                     Musc                                    [x]                      Skin   [x]                     Psych                                  [x]                      Endo   [x]                     Allergy                                 [x]                      Heme/Lymph.    Histories:  Past Medical History:   Diagnosis Date    BPH (benign prostatic hyperplasia)     CAD (coronary artery disease)     Status post stent    CHF (congestive heart failure) (CMS/[facility])     Chronic combined systolic and diastolic CHF (congestive heart failure) (CMS/Formerly [facility])     LVEF 25% with grade 1 diastolic dysfunction as per echo on 1/26/2022.    CVA (cerebral vascular accident) (CMS/Formerly [facility]) 2010    Without residual deficit.    Diabetes mellitus type 2 in obese (CMS/[facility])     Dyslipidemia     GERD (gastroesophageal reflux disease)     Heart attack (CMS/[facility])     x3    Hypertension     Ischemic cardiomyopathy     Morbid obesity with BMI of 40.0-44.9, adult (CMS/[facility])     Paroxysmal atrial fibrillation (CMS/[facility])     On Xarelto    V-tach (CMS/HCC)     AICD in place     Past Surgical History:   Procedure Laterality Date    ABLATION VT N/A 06/09/2020    Procedure: Ablation VT;  Surgeon: Wilfredo Montana MD;  Location: [facility] EP Lab;  Service: Electrophysiology;  Laterality: N/A;  Check with Dr. Montana in the a.m. regarding scheduling    APPENDECTOMY      BILATERAL HEART CATH N/A 12/5/2023    Procedure: Bilateral heart cath;  Surgeon: Jitendra Post MD;  Location: [facility] Cath Lab;  Service: Cardiovascular;  Laterality: N/A;     COLONOSCOPY N/A 2023    Procedure: COLONOSCOPY with Polypectomy;  Surgeon: Hortencia Carson MD;  Location: [facility] Endoscopy;  Service: Endoscopy;  Laterality: N/A;    CORONARY ARTERY STENTING      2.5 X 24-mm Taxus DIMAS to first diagonal. 3.0 X 8-mm Taxus stent to proximal LAD just proximal to diagonal.    CORONARY ARTERY STENTING      3.5 X 15-mm Promus DIMAS to totally occluded LAD    CORONARY ARTERY STENTING      2.25 X 30-mm Resolute DIMAS to 2nd diagonal.  Balloon angioplasty through strut to improve blood flow to distal LAD    CORONARY ARTERY STENTING  2017    second diagonal branch LAD Resolute 2.25 x 30-mm DIMAS    ICD DC GEN CHANGE N/A 2022    Procedure: BI-V ICD Gen Change;  Surgeon: Wilfredo Montana MD;  Location: [facility] EP Lab;  Service: Cardiovascular;  Laterality: N/A;    ICD SC NEW      [place] [employer] Cognis Model #N119, Serial #[id number]. [place] [employer] Model #0185, Serial #[id number]. LV lead Acuity Model #45+91, Serial #[id number]. Atrial lead Model #4469, Serial #[id number]    OXIMETRY RUN N/A 2023    Procedure: OXIMETRY RUN;  Surgeon: Jitendra Post MD;  Location: [facility] Cath Lab;  Service: Cardiovascular;  Laterality: N/A;     Family History   Problem Relation Age of Onset    Heart attack Mother 62    Other Mother         Abdominal Aortic Aneurysm    Lung cancer Mother     Colon cancer Father         Cause of death    Diabetes Brother         Non-Insulin Dependent    Heart attack Brother 51        w/ Stents    Heart disease Brother     Other Son         Well     Social History     Socioeconomic History    Marital status: Single   Tobacco Use    Smoking status: Former     Current packs/day: 0.00     Average packs/day: 0.8 packs/day for 30.0 years (22.5 ttl pk-yrs)     Types: Cigarettes     Start date: 1990     Quit date: 2020     Years since quittin.1    Smokeless tobacco: Never   Vaping Use    Vaping status: Never Used   Substance and Sexual Activity    Alcohol use: Not  Currently     Comment: Quit drinking in     Drug use: Not Currently    Sexual activity: Yes     Partners: Female     Social Determinants of Health     Tobacco Use: Medium Risk (2024)    Patient History     Smoking Tobacco Use: Former     Smokeless Tobacco Use: Never   Financial Resource Strain: Unknown (2020)    Overall Financial Resource Strain (CARDIA)     Difficulty of Paying Living Expenses: Patient declined   Food Insecurity: No Food Insecurity (2023)    Hunger Vital Sign     Worried About Running Out of Food in the Last Year: Never true     Ran Out of Food in the Last Year: Never true   Transportation Needs: No Transportation Needs (2023)    PRAPARE - Transportation     Lack of Transportation (Medical): No     Lack of Transportation (Non-Medical): No   Depression: Not at risk (2024)    Received from [facility]    PHQ-2     PHQ-2 Score: 0   Housing Stability: Low Risk  (2023)    Housing Stability Vital Sign     Unable to Pay for Housing in the Last Year: No     Number of Places Lived in the Last Year: 1     Unstable Housing in the Last Year: No   Utilities: Not At Risk (2023)    [facility] Utilities     Threatened with loss of utilities: No     Social History     Tobacco Use   Smoking Status Former    Current packs/day: 0.00    Average packs/day: 0.8 packs/day for 30.0 years (22.5 ttl pk-yrs)    Types: Cigarettes    Start date: 1990    Quit date: 2020    Years since quittin.1   Smokeless Tobacco Never     Social History     Substance and Sexual Activity   Drug Use Not Currently       Medications:   Current Outpatient Medications   Medication Sig Dispense Refill    potassium chloride 10 mEq CR tablet Take 3 tablets (30 mEq total) by mouth daily 90 tablet 11    furosemide (LASIX) 40 mg tablet Take 1 tablet (40 mg total) by mouth daily 30 tablet 11    rosuvastatin (Crestor) 40 mg tablet Take 1 tablet (40 mg total) by mouth nightly 30 tablet 1    nitroglycerin  "(NITROSTAT) 0.4 mg SL tablet Place 1 tablet (0.4 mg total) under the tongue every 5 (five) minutes as needed for chest pain 26 tablet 1    mexiletine (MEXITIL) 250 mg capsule Take 1 capsule (250 mg total) by mouth every 8 (eight) hours 90 capsule 11    carvediloL (Coreg) 25 mg tablet Take 2 tablets (50 mg total) by mouth 2 (two) times a day with meals 180 tablet 3    magnesium oxide (MAG-OX) 400 mg (241.3 mg magnesium) tablet Take 1 tablet (400 mg total) by mouth 2 (two) times a day 180 tablet 3    sacubitriL-valsartan (ENTRESTO)  mg tablet Take 1 tablet by mouth 2 (two) times a day 60 tablet 1    sotaloL (BETAPACE) 120 mg tablet Take 1.5 tablets (180 mg total) by mouth every 12 (twelve) hours 270 tablet 2    spironolactone (ALDACTONE) 25 mg tablet Take 2 tablets (50 mg total) by mouth daily      rivaroxaban (Xarelto) 20 mg tablet Take 1 tablet (20 mg total) by mouth daily 90 tablet 3    aspirin 81 mg EC tablet Take 1 tablet (81 mg total) by mouth daily 90 tablet 3    empagliflozin (JARDIANCE) 10 mg tablet tablet Take 10 mg by mouth daily 30 each 11    tamsulosin (FLOMAX) 0.4 mg capsule Take 1 capsule (0.4 mg total) by mouth daily      cholecalciferol, vitamin D3, 25 mcg (1,000 unit) tablet Take 1 tablet (1,000 Units total) by mouth daily      albuterol HFA (PROVENTIL HFA;VENTOLIN HFA) 90 mcg/actuation inhaler Inhale 2 puffs every 6 (six) hours as needed for wheezing or shortness of breath 1 Inhaler 1    pantoprazole (PROTONIX) 40 mg EC tablet Take 1 tablet (40 mg total) by mouth daily       No current facility-administered medications for this visit.       Allergies:  Allergies   Allergen Reactions    Morphine      ANXIETY    Tramadol      ANXIETY     I have reviewed and confirmed Pete Trejo's   allergies this visit.     Objective:    Vitals:    08/06/24 0801   BP: 96/60   Pulse: 60   Resp: 20   SpO2: 95%   Height: 1.803 m (5' 11\")   Weight: 93.9 kg (207 lb)   PainSc: 0-No pain   Patient Position: Sitting "        General: Awake, alert, and oriented x3. NAD  Head:  Normocephalic, atraumatic.  Neck:  Supple.  No JVD  Eyes:  Anicteric  Cardiovascular: Regular rate and rhythm.  S1-S2 heard.  No murmur  Respiratory:  No stridor, crackles, wheeze  Abdominal:  Appears nondistended.    Extremities:  No clubbing, cyanosis.  No edema.    Skin:  No rashes noted.  Neurological:  CN 2 - 12 intact.  No gross sensory or motor deficits noted.  Musculoskeletal:  Able to move all 4 extremities spontaneously.    Data Review:   Sodium   Date Value Ref Range Status   03/21/2024 136 135 - 145 mmol/L Final     Potassium   Date Value Ref Range Status   03/21/2024 4.2 3.5 - 5.1 MMOL/L Final     Chloride   Date Value Ref Range Status   03/21/2024 103 98 - 107 mmol/L Final     CO2   Date Value Ref Range Status   03/21/2024 23 21 - 32 mmol/L Final     BUN   Date Value Ref Range Status   03/21/2024 12 7 - 25 mg/dL Final     Creatinine   Date Value Ref Range Status   03/21/2024 0.97 0.70 - 1.30 mg/dL Final     Glucose   Date Value Ref Range Status   03/21/2024 97 70 - 105 mg/dL Final     Calcium   Date Value Ref Range Status   03/21/2024 9.3 8.6 - 10.3 mg/dL Final     Total CK   Date Value Ref Range Status   03/09/2015 83 39 - 308 IU/L      Troponin I   Date Value Ref Range Status   07/28/2017 <0.030 0.000 - 0.030 ng/mL      BNP   Date Value Ref Range Status   02/16/2024 82 0 - 100 pg/mL Final     Lipids:    Lab Results   Component Value Date    CHOL 106 07/28/2022    CHOL 119 07/11/2021    CHOL 127 06/08/2020     Lab Results   Component Value Date    HDL 25 (L) 07/28/2022    HDL 35 (L) 07/11/2021    HDL 29 (L) 06/08/2020     Lab Results   Component Value Date    LDLCALC 50 07/28/2022    LDLCALC 60 07/11/2021    LDLCALC 76 06/08/2020     Lab Results   Component Value Date    TRIG 154 (H) 07/28/2022    TRIG 120 07/11/2021    TRIG 112 06/08/2020        TSH:   Lab Results   Component Value Date    TSH 2.833 10/19/2023     Magnesium:   Lab Results    Component Value Date    MG 2.1 02/16/2024     Protime-INR:   Lab Results   Component Value Date    PT 22.7 (H) 10/06/2021    INR 1.9 (H) 10/06/2021     A1c:   Lab Results   Component Value Date    HGBA1C 6.2 (H) 09/22/2023       Patient Instructions   Overall you are doing well from a heart standpoint  Continue your current medications. No changes today.   Continue to monitor your weight and symptoms  Contact our clinic with any questions or concners  Follow up with Dr. Pappas or Hodan Kauffman CNP in 1 year         30 minutes used for chart review, formulating plan with patient, and provide education  Next follow up in 1 year    Sincerely,    Gui Machado CNP  08/06/24

## 2024-08-01 ENCOUNTER — TELEPHONE (OUTPATIENT)
Dept: CARDIOLOGY | Facility: CLINIC | Age: 59
End: 2024-08-01
Payer: MEDICARE

## 2024-08-01 NOTE — TELEPHONE ENCOUNTER
Called pt to shuffle 9/9 appt to 9/11 as JT will not be in on that Monday. Pt agreed and said that change will be okay

## 2024-08-02 ENCOUNTER — TELEPHONE (OUTPATIENT)
Dept: CARDIOLOGY | Facility: CLINIC | Age: 59
End: 2024-08-02
Payer: MEDICARE

## 2024-08-02 DIAGNOSIS — I50.22 CHRONIC SYSTOLIC HEART FAILURE (CMS/HCC): Primary | ICD-10-CM

## 2024-08-02 RX ORDER — FUROSEMIDE 40 MG/1
40 TABLET ORAL DAILY
Qty: 30 TABLET | Refills: 11 | Status: SHIPPED | OUTPATIENT
Start: 2024-08-02

## 2024-08-02 RX ORDER — POTASSIUM CHLORIDE 750 MG/1
30 TABLET, EXTENDED RELEASE ORAL DAILY
Qty: 90 TABLET | Refills: 11 | Status: SHIPPED | OUTPATIENT
Start: 2024-08-02 | End: 2024-11-11 | Stop reason: DRUGHIGH

## 2024-08-02 RX ORDER — POTASSIUM CHLORIDE 750 MG/1
30 TABLET, EXTENDED RELEASE ORAL DAILY
COMMUNITY
End: 2024-08-02 | Stop reason: SDUPTHER

## 2024-08-02 RX ORDER — FUROSEMIDE 40 MG/1
40 TABLET ORAL DAILY
COMMUNITY
End: 2024-08-02 | Stop reason: SDUPTHER

## 2024-08-02 NOTE — TELEPHONE ENCOUNTER
----- Message from CASI DUARTE sent at 8/2/2024  3:24 PM MDT -----  Yes that is fine that he resumed the furosemide. Thanks for updating the MAR and pharmacy!  Casi  ----- Message -----  From: Tammy Cook RN  Sent: 8/2/2024  11:00 AM MDT  To: Casi Duarte CNP    Called Pete and he states on the lower dose of the Lasix that he started to retain fluid and went back to his usual dose.  He states he gained about 4 pounds when he tried to decrease the Lasix.  He confirms he is currently taking Lasix 40 mg daily and went back to 3 tabs of potassium.  If this is okay with you, I will update his MAR and the pharmacy.  Thx!  Tammy  ----- Message -----  From: aCsi Duarte CNP  Sent: 7/23/2024   3:17 PM MDT  To: Tammy Cook RN    Can you please call Pete and see how he is feeling after I decreased his furosemide.  He was feeling dehydrated when I saw him last week.  If his weight has been stable and he has not noticed any signs or symptoms of fluid overload, he may take his furosemide on an as-needed basis.  Advised closely for any volume overload.  Thanks!  Casi

## 2024-08-05 NOTE — PROGRESS NOTES
CARDIOLOGY OUTPATIENT FOLLOW-UP NOTE                                       Chief Complaint: No chief complaint on file.      Subjective      Pete Trejo is a 58 y.o. male    HPI:    *** is a pleasant ***with a cardiac history significant for coronary artery disease with a drug-eluting stent placed in 2010 as well as 2 drug-eluting stents placed in 2009, HFrEF with severe ischemic cardiomyopathy with use of biventricular ICD.  He also has history of ventricular tachycardia with recurrent palpitations, multiple extensive VT ablations 1 in 2021 and another in 2022, history of CVA in 2010, a left ventricular mural thrombus which were resolved and risk factors such as hypertension and dyslipidemia his primary cardiologist is Dr. Edwards.  He is here today for routine follow-up.    Engage in regular exercise***, active and can walk***, smoke***, alcohol***, drugs***, family history***.    O ***  L ***  D ***  C ***  A ***  R ***  T ***  S ***    Recent hospitalization ***and outcome    CARDIOLOGY PROBLEM LIST:  Last updated by: 9/13/2022 Merced CAMERON CNP  Primary Cardiologist: Jerry   Electrophysiology: Dr. [place]  Problem list:  CAD:   Anterior MI [PCI. 3.5 X 15-mm Promus drug eluting stent to totally occluded LAD] - 12/3/2010   Sm Anteroseptal MI [PCI. 2.25 X 24-mm Taxus drug eluting stent to diagonal. 3.0 X 8-mm Taxus stent to proximal LAD] - 7/25/2009   Progressive CAD ivolving LAD/Diagonal [Stent]     CHF/CM:   CHF   EF 31% - 2013   Severe ischemic cardionyopathy [BiVentricular ICD. [employer] Cognis HE Model #N119,Serial #[id number]. RV lead Guidant Model #0185,Serial #[id number]. LV lead BS Model #4591,Serial #[id number]. RA lead Guidant Model #4469, Serial #[id number]] - 2011     DeviceICD changed out 6/20/2022 [employer] model G125          ARRHYTHMIA:   Ventricular Tachycardia [AICD]   2021 recurrent NS-VT, slow VT  2020 Hx of VT ablation per Dr. [place] - 06/09/2020 VT-Partially successful ablation of  border zone areas of the scar especially the inferior apex in an attempt to substrate modify his arrhythmia burden    Recurrent palpitations 1/26/2021 with admission [place] slow monomorphic  bpm with failed ATP x8 and no shocks delivered. Spontaneously converted out of the VT while in the ER. Mexiletine resumed at 200 mg twice daily in addition to amiodarone. ATP programmed more aggressively.      Extensive repeat VT ablation [facility] 11/29/2021, mexiletine discontinued    Recurrent VT with failed ATP x8 1/25/2022 spontaneously converted.  Mexiletine 200 mg twice daily plus amiodarone 200 mg daily, mexiletine increased to 3 times daily. 1/31/2022    Device interrogation [facility] 1/31/22: 96% atrial paced, 99% BiV paced, recurrent episode nonsustained  bpm 1/29/2022 not sustained 28 seconds.      PVD:   CVA - 12/2010 - at time of MI, no residual    OTHER:   LV Mural Thrombus - resolved  Anteroapical akinetic aneurysm     RISK FACTORS:   Hypertension   Dyslipidemia [Hyperlipidemia]       CARDIOVASCULAR PROCEDURES:     PCI (PTCA: LAD bifurcation   Stent: D2 2.25x30 (RESOLUTE))   PCI (%   Stent: LAD (PROMUS) 3.5x15mm) - 12/3/2010   PCI (Stent: D1 (TAXUS) 2.5x24mm, LAD prox (TAXUS) 30x8mm) - 7/25/2009   PCI 07/28/2017 second diagonal branch LAD Resolute 2.25 x 30-mm DIMAS       Prior Imaging/Testing History     EKG - 10/8/2015   EKG - 08/14/2020 AV paced 60 bpm     Devices (Bi-Ventricular ICD ([employer]fehav N119), Serial# [id number]) - 10/10/2011   Devices (ICD Interrogation: 1 mode-switch, 1 Nonsustained VT)     Echo - 06/08/2020 · Severe LV systolic dysfunction, Severe ischemic CM, EF 25%,   LA moderately dilated; RA mildly dilated, Mild TR, Grade I LV diastolic abnormality, Minimally elevated LV end-diastolic pressure.     Limited echo 1/26/2022 revealing EF 20 to 25% with grade 1 diastolic dysfunction and elevated filling pressures. Diffuse global hypokinesis with hyperdynamic anterolateral wall.  [place] aneurysmatic. No LV thrombus. Mildly dilated LA 33 mL/m². No significant valvular disease.    MPI (Mild rayray-infarct anterior wall ischemia; Lexiscan)   Lexiscan - 07/27/2017 -Large scar noted in the LAD territory associated with akinesis   secondary to prior MI, small amount of rayray-infarct ischemia noted, LV systolic function is severely depressed: EF 14%   Lexiscan - 06/08/2020 No changes since 2017 study    Stress test with a VO2 5/5/2021 [facility]: Nondiagnostic for ischemia as patient did not reach 85% of maximum heart rate. Isolated PVCs, single PVC pair. On beta-blocker. Moderately decreased functional capacity. Maintained 93% or greater saturation.    Right heart cath 5/6/2021: You see him. RV 35/06. Mean PW 15, EDP 14, A wave 11. PAP 36/15 mean 22. PA saturation 66, pulmonary vein 93, PA 66, PA 66, AO 93%. Becki cardiac output 5.88.    ZIO monitor 3/21: 14-day monitor, rare ectopy, 4 beats nonsustained VT, triggered events corresponded to sinus rhythm. Minimum heart rate 60 maximum 140 average 63 bpm.     Past Medical History:   Diagnosis Date    BPH (benign prostatic hyperplasia)     CAD (coronary artery disease)     Status post stent    CHF (congestive heart failure) (CMS/[facility])     Chronic combined systolic and diastolic CHF (congestive heart failure) (CMS/[facility])     LVEF 25% with grade 1 diastolic dysfunction as per echo on 1/26/2022.    CVA (cerebral vascular accident) (CMS/[facility]) 2010    Without residual deficit.    Diabetes mellitus type 2 in obese (CMS/[facility])     Dyslipidemia     GERD (gastroesophageal reflux disease)     Heart attack (CMS/[facility])     x3    Hypertension     Ischemic cardiomyopathy     Morbid obesity with BMI of 40.0-44.9, adult (CMS/[facility])     Paroxysmal atrial fibrillation (CMS/[facility])     On Xarelto    V-tach (CMS/[facility])     AICD in place       Allergies as of 08/06/2024 - Reviewed 03/21/2024   Allergen Reaction Noted    Morphine  07/30/2017    Tramadol  07/30/2017       Current Outpatient  Medications:     potassium chloride 10 mEq CR tablet, Take 3 tablets (30 mEq total) by mouth daily, Disp: 90 tablet, Rfl: 11    furosemide (LASIX) 40 mg tablet, Take 1 tablet (40 mg total) by mouth daily, Disp: 30 tablet, Rfl: 11    rosuvastatin (Crestor) 40 mg tablet, Take 1 tablet (40 mg total) by mouth nightly, Disp: 30 tablet, Rfl: 1    nitroglycerin (NITROSTAT) 0.4 mg SL tablet, Place 1 tablet (0.4 mg total) under the tongue every 5 (five) minutes as needed for chest pain, Disp: 26 tablet, Rfl: 1    mexiletine (MEXITIL) 250 mg capsule, Take 1 capsule (250 mg total) by mouth every 8 (eight) hours, Disp: 90 capsule, Rfl: 11    carvediloL (Coreg) 25 mg tablet, Take 2 tablets (50 mg total) by mouth 2 (two) times a day with meals, Disp: 180 tablet, Rfl: 3    magnesium oxide (MAG-OX) 400 mg (241.3 mg magnesium) tablet, Take 1 tablet (400 mg total) by mouth 2 (two) times a day, Disp: 180 tablet, Rfl: 3    sacubitriL-valsartan (ENTRESTO)  mg tablet, Take 1 tablet by mouth 2 (two) times a day, Disp: 60 tablet, Rfl: 1    sotaloL (BETAPACE) 120 mg tablet, Take 1.5 tablets (180 mg total) by mouth every 12 (twelve) hours, Disp: 270 tablet, Rfl: 2    spironolactone (ALDACTONE) 25 mg tablet, Take 2 tablets (50 mg total) by mouth daily, Disp: , Rfl:     rivaroxaban (Xarelto) 20 mg tablet, Take 1 tablet (20 mg total) by mouth daily, Disp: 90 tablet, Rfl: 3    aspirin 81 mg EC tablet, Take 1 tablet (81 mg total) by mouth daily, Disp: 90 tablet, Rfl: 3    empagliflozin (JARDIANCE) 10 mg tablet tablet, Take 10 mg by mouth daily, Disp: 30 each, Rfl: 11    tamsulosin (FLOMAX) 0.4 mg capsule, Take 1 capsule (0.4 mg total) by mouth daily, Disp: , Rfl:     cholecalciferol, vitamin D3, 25 mcg (1,000 unit) tablet, Take 1 tablet (1,000 Units total) by mouth daily, Disp: , Rfl:     albuterol HFA (PROVENTIL HFA;VENTOLIN HFA) 90 mcg/actuation inhaler, Inhale 2 puffs every 6 (six) hours as needed for wheezing or shortness of breath,  Disp: 1 Inhaler, Rfl: 1    pantoprazole (PROTONIX) 40 mg EC tablet, Take 1 tablet (40 mg total) by mouth daily, Disp: , Rfl:     Social History     Tobacco Use    Smoking status: Former     Current packs/day: 0.00     Average packs/day: 0.8 packs/day for 30.0 years (22.5 ttl pk-yrs)     Types: Cigarettes     Start date: 1990     Quit date: 2020     Years since quittin.1    Smokeless tobacco: Never   Vaping Use    Vaping status: Never Used   Substance Use Topics    Alcohol use: Not Currently     Comment: Quit drinking in     Drug use: Not Currently       ROS    Objective   There were no vitals filed for this visit.  Weight:      Physical Exam    Physical Exam    No results found for this or any previous visit.       No results found for this or any previous visit.       No results found for this or any previous visit.          Results for orders placed during the hospital encounter of 10/18/23    US Echo complete    Interpretation Summary    Normal left ventricular wall thickness.    Biplane ejection fraction is 25%.    Severe left ventricular systolic dysfunction.  Large anteroapical aneurysm.  Only the basilar left ventricular segments have preserved contractility.  No LV mural thrombi.    Grade II (moderate) left ventricular diastolic abnormality.    Elevated left ventricular filling pressure.    The left atrium is severely dilated.    The right atrium is moderately dilated.    Normal central venous pressure (0-5 mmHg).    No pericardial effusion.    Inadequate TR spectral Doppler to accurately assess right ventricular systolic pressure.    Comment: No obvious changes from the 2022 echo.      Electrocardiogram:   ***    Data Review:   Sodium   Date Value Ref Range Status   2024 136 135 - 145 mmol/L Final     Potassium   Date Value Ref Range Status   2024 4.2 3.5 - 5.1 MMOL/L Final     Chloride   Date Value Ref Range Status   2024 103 98 - 107 mmol/L Final     CO2   Date  Value Ref Range Status   03/21/2024 23 21 - 32 mmol/L Final     BUN   Date Value Ref Range Status   03/21/2024 12 7 - 25 mg/dL Final     Creatinine   Date Value Ref Range Status   03/21/2024 0.97 0.70 - 1.30 mg/dL Final     Glucose   Date Value Ref Range Status   03/21/2024 97 70 - 105 mg/dL Final     Calcium   Date Value Ref Range Status   03/21/2024 9.3 8.6 - 10.3 mg/dL Final     Total CK   Date Value Ref Range Status   03/09/2015 83 39 - 308 IU/L      Troponin I   Date Value Ref Range Status   07/28/2017 <0.030 0.000 - 0.030 ng/mL      BNP   Date Value Ref Range Status   02/16/2024 82 0 - 100 pg/mL Final       Lipids:    Lab Results   Component Value Date    CHOL 106 07/28/2022    CHOL 119 07/11/2021    CHOL 127 06/08/2020     Lab Results   Component Value Date    HDL 25 (L) 07/28/2022    HDL 35 (L) 07/11/2021    HDL 29 (L) 06/08/2020     Lab Results   Component Value Date    LDLCALC 50 07/28/2022    LDLCALC 60 07/11/2021    LDLCALC 76 06/08/2020     Lab Results   Component Value Date    TRIG 154 (H) 07/28/2022    TRIG 120 07/11/2021    TRIG 112 06/08/2020        TSH:   Lab Results   Component Value Date    TSH 2.833 10/19/2023     Magnesium:   Lab Results   Component Value Date    MG 2.1 02/16/2024     Protime-INR:   Lab Results   Component Value Date    PT 22.7 (H) 10/06/2021    INR 1.9 (H) 10/06/2021     A1c:   Lab Results   Component Value Date    HGBA1C 6.2 (H) 09/22/2023       Medications reviewed: ***    Coronary Risk Factors     1. Smoking:   ***  2. Hyperlipidemia:***  3. Hypertension:***  4. Diabetes mellitus: ***  5. Sedentary lifestyle:***  6. Family history of early coronary artery disease:***    Assessment/Plan     1. Coronary artery disease involving native coronary artery of native heart without angina pectoris    2. Chronic HFrEF (heart failure with reduced ejection fraction) (CMS/HCC)    3. Ischemic cardiomyopathy    4. Hx of ventricular tachycardia    5. PAF (paroxysmal atrial fibrillation)  (CMS/[facility])    6. Primary hypertension    7. Dyslipidemia    8. Automatic implantable cardioverter-defibrillator in situ        Coronary artery disease    ECG today demonstrates***.  Echocardiogram***.  Stress test***.  Cardiac cath***.  CABG***.      Continues to do well from a cardiac standpoint and has not had any recurrent angina or anginal equivalents***.      GDMT: Aspirin, antiplatelet agent***, beta-blocker***, ACEI/ARB***, statin***.    Plan:  Continue current GDMT***  Continue work towards cardiac heart healthy diet with low sodium 2 g less per day.    Encourage regular routine exercise with goal of 150 minutes of moderate intensity exercise per week.      Chronic systolic heart failure  HFrEF    NYHA class***.  Euvolemic on exam***.  Echocardiogram performed on***demonstrates EF***.    GDMT: Beta-blocker***, ACEI/ARB***, MRA***, SGLT2 inhibitor***.  ***diuretic therapy***.    Plan:  Continue current GDMT  Limit dietary sodium intake 2 g less per day.    Encourage daily weights and to contact our clinic with 3 pound weight gain overnight or 5 pounds in a week.      Hypertension    Blood pressure well controlled today at***.  Goal blood pressure for this patient is***.    Currently on***.    Plan:  Continue current medication regimen***  Continue to limit sodium intake 2 g less per day.      Dyslipidemia    Last lipid panel on***demonstrated:   total cholesterol***   triglycerides***   HDL***  LDL***   Non-HDL***    Goal:   CAD:   LDL <60 as this demonstrates plaque regression   Triglycerides <150  Non-HDL <100  ASCVD risk (high): LDL <70  Triglycerides <150  Non-HDL <100  ASCVD risk (low): LDL <100    Currently on***.     Repeat lipid panel***. Continue to work on lifestyle modification with diet and exercise.    Obesity    BMI***.    Continue to encourage lifestyle modification to include diet and exercise.  May benefit from referral to nutritionist***.  Consideration for SGLT2 inhibitor for weight loss and  further diabetic control***.    There are no Patient Instructions on file for this visit.    The patient verbalized correct understanding and agreement to today's instructions and plan.  The patient verbalized that all questions have been addressed.  Jaimie Day CNP    I will plan to follow up in ***.      Sincerely,    Electronically signed by: Jaimie Day CNP  8/5/2024  2:50 PM      On this date of service *** minutes of total time was spent in evaluation and management on this encounter.  On this date of service *** minutes were spent in other reportable services.        A voice recognition program was used to aid in documentation of this record. Sometimes words are not printed exactly as they were spoken. While efforts were made to carefully edit and correct any inaccuracies, some errors may be present; please take these into context. Please contact the provider if errors are identified.

## 2024-08-06 ENCOUNTER — LAB (OUTPATIENT)
Dept: LAB | Facility: CLINIC | Age: 59
End: 2024-08-06
Payer: MEDICARE

## 2024-08-06 ENCOUNTER — OFFICE VISIT (OUTPATIENT)
Dept: CARDIOLOGY | Facility: CLINIC | Age: 59
End: 2024-08-06
Payer: MEDICARE

## 2024-08-06 VITALS
DIASTOLIC BLOOD PRESSURE: 60 MMHG | RESPIRATION RATE: 20 BRPM | HEIGHT: 71 IN | WEIGHT: 207 LBS | SYSTOLIC BLOOD PRESSURE: 96 MMHG | BODY MASS INDEX: 28.98 KG/M2 | HEART RATE: 60 BPM | OXYGEN SATURATION: 95 %

## 2024-08-06 DIAGNOSIS — Z86.79 HX OF VENTRICULAR TACHYCARDIA: ICD-10-CM

## 2024-08-06 DIAGNOSIS — I50.22 CHRONIC HFREF (HEART FAILURE WITH REDUCED EJECTION FRACTION) (CMS/HCC): ICD-10-CM

## 2024-08-06 DIAGNOSIS — I25.10 CORONARY ARTERY DISEASE INVOLVING NATIVE CORONARY ARTERY OF NATIVE HEART WITHOUT ANGINA PECTORIS: Primary | ICD-10-CM

## 2024-08-06 DIAGNOSIS — I10 PRIMARY HYPERTENSION: ICD-10-CM

## 2024-08-06 DIAGNOSIS — I50.22 CHRONIC SYSTOLIC HEART FAILURE (CMS/HCC): ICD-10-CM

## 2024-08-06 DIAGNOSIS — E78.5 DYSLIPIDEMIA: ICD-10-CM

## 2024-08-06 DIAGNOSIS — I48.0 PAF (PAROXYSMAL ATRIAL FIBRILLATION) (CMS/HCC): ICD-10-CM

## 2024-08-06 DIAGNOSIS — I25.5 ISCHEMIC CARDIOMYOPATHY: ICD-10-CM

## 2024-08-06 DIAGNOSIS — Z95.810 AUTOMATIC IMPLANTABLE CARDIOVERTER-DEFIBRILLATOR IN SITU: ICD-10-CM

## 2024-08-06 LAB
CHOLEST SERPL-MCNC: 116 MG/DL (ref 0–199)
FASTING STATUS PATIENT QL REPORTED: YES
HDLC SERPL-MCNC: 31 MG/DL
LDLC SERPL CALC-MCNC: 52 MG/DL (ref 20–99)
TRIGL SERPL-MCNC: 164 MG/DL

## 2024-08-06 PROCEDURE — 36415 COLL VENOUS BLD VENIPUNCTURE: CPT

## 2024-08-06 PROCEDURE — 93005 ELECTROCARDIOGRAM TRACING: CPT | Performed by: NURSE PRACTITIONER

## 2024-08-06 PROCEDURE — 80061 LIPID PANEL: CPT

## 2024-08-06 PROCEDURE — G0463 HOSPITAL OUTPT CLINIC VISIT: HCPCS | Performed by: NURSE PRACTITIONER

## 2024-08-06 PROCEDURE — 99214 OFFICE O/P EST MOD 30 MIN: CPT | Performed by: NURSE PRACTITIONER

## 2024-08-06 ASSESSMENT — PAIN SCALES - GENERAL: PAINLEVEL: 0-NO PAIN

## 2024-08-06 NOTE — PATIENT INSTRUCTIONS
Overall you are doing well from a heart standpoint  Continue your current medications. No changes today.   Continue to monitor your weight and symptoms  Contact our clinic with any questions or concners  Follow up with Dr. Pappas or Hodan Kauffman CNP in 1 year

## 2024-08-21 PROCEDURE — 93010 ELECTROCARDIOGRAM REPORT: CPT | Performed by: INTERNAL MEDICINE

## 2024-08-26 ENCOUNTER — TELEPHONE (OUTPATIENT)
Dept: CARDIOLOGY | Facility: CLINIC | Age: 59
End: 2024-08-26
Payer: MEDICARE

## 2024-08-26 NOTE — TELEPHONE ENCOUNTER
Pete has a [employer] ICD.    Transmission received for ATP.   Stored EGMs are consistent with or suggestive of inappropriate therapy delivered due to Supraventricular Tachycardia  Total episodes: 1  ATP therapy: 1  On Aug 24, 2024 at 9:57 PM, due to SVT lasting 6 seconds at an average V rate of 150 bpm, successfully resolved with 1 burst of ATP; resulting rhythm is AP/BP at 60 bpm  Docket sent to [place] ([place])

## 2024-09-05 ASSESSMENT — ENCOUNTER SYMPTOMS
SLEEP DISTURBANCES DUE TO BREATHING: 1
WEIGHT GAIN: 0
PALPITATIONS: 1
LIGHT-HEADEDNESS: 1
COUGH: 0
SPUTUM PRODUCTION: 0
DYSPNEA ON EXERTION: 1
ORTHOPNEA: 0
BLOATING: 0
WEAKNESS: 0
NEAR-SYNCOPE: 0
PND: 0
WEIGHT LOSS: 0
DECREASED APPETITE: 0
BRUISES/BLEEDS EASILY: 0
DIZZINESS: 0
SYNCOPE: 0
SHORTNESS OF BREATH: 0
IRREGULAR HEARTBEAT: 0

## 2024-09-05 NOTE — PROGRESS NOTES
"    [facility]     CARDIOLOGY HEART FAILURE OUTPATIENT PROGRESS NOTE       Subjective     Patient ID: Pete Trejo is a 58 y.o. patient, who presents to the heart failure clinic for a follow-up visit.  He has a history of coronary artery disease (PCI to the second diagonal branch LAD 7/2017, PCI to the LAD 12/3/2010, PCI to diagonal 7/2009), ischemic cardiomyopathy s/p CRT-D implant 2011 with generator change 6/2022, systolic heart failure, ventricular tachycardia (partial VT ablation 6/2020, extensive substrate modification/VT ablation at [facility] 11/2021), paroxysmal atrial fibrillation anticoagulated with Xarelto, hypertension, dyslipidemia, diabetes mellitus type 2, sleep apnea    Pete was seen by Dr. Casi Alan at the [facility] for advanced heart failure therapies on 4/24/2024 - 4/26/2024. He underwent right heart catheterization at that time.  47 mmHg, RV 32/7, PA 32/15/20, PCWP 12 mmHg, CO (Becki) 6.4 L/min, CI (Becki) 2.7 L/min/m², CO (thermo) 5.8 L/min, CI (Thermo) 2.4 L/min/m².  Cardiopulmonary stress test revealed severely impaired exercise capacity    Pete underwent successful catheter ablation of ischemic VT with scar modification and success PVC ablation at the [facility] on 7/1/2024    I saw Pete in the heart failure clinic for a follow-up visit on 7/16/2024.  He was feeling dehydrated and I decreased his furosemide to 20 mg daily.  When he was contacted on 8/2/2024 to see how he was doing on the decreased furosemide dose.  He stated that he started to retain fluid and he returned to furosemide 40 mg daily.    Pete had follow-up with Daniel Machado CNP on 8/6/2024.  He was stable from a cardiovascular standpoint and no changes were made.    Pete states that overall he has been feeling pretty good.  The heat bothers him so he has had to be careful in the last few days being outside.  He reports that his heart \"takes " "off\"/races still.  This happens daily and only lasts a couple of seconds.  We reviewed his recent device check and his 1 episode of SVT resolving with ATP.  Pete denies any shocks from his defibrillator.  He feels his breathing is okay and at baseline.  If he engages in moderate exercise it does not take him long to get short of breath.  Pete denies PND and orthopnea.  He wears CPAP nightly.  He denies any lower extremity edema.  Pete reports a good appetite and denies abdominal bloating or fullness.  He states his weight has remained stable. Pete denies chest pain or discomfort.  He has occasional lightheadedness if he gets up too quickly.    HPI    Past Medical History:   Diagnosis Date    BPH (benign prostatic hyperplasia)     CAD (coronary artery disease)     Status post stent    CHF (congestive heart failure) (CMS/[facility])     Chronic combined systolic and diastolic CHF (congestive heart failure) (CMS/[facility])     LVEF 25% with grade 1 diastolic dysfunction as per echo on 1/26/2022.    CVA (cerebral vascular accident) (CMS/[facility]) 2010    Without residual deficit.    Diabetes mellitus type 2 in obese (CMS/[facility])     Dyslipidemia     GERD (gastroesophageal reflux disease)     Heart attack (CMS/[facility])     x3    Hypertension     Ischemic cardiomyopathy     Morbid obesity with BMI of 40.0-44.9, adult (CMS/[facility])     Paroxysmal atrial fibrillation (CMS/[facility])     On Xarelto    V-tach (CMS/HCC)     AICD in place       Past Surgical History:   Procedure Laterality Date    ABLATION VT N/A 06/09/2020    Procedure: Ablation VT;  Surgeon: Wilfredo Montana MD;  Location: [facility] EP Lab;  Service: Electrophysiology;  Laterality: N/A;  Check with Dr. Montana in the a.m. regarding scheduling    APPENDECTOMY      BILATERAL HEART CATH N/A 12/5/2023    Procedure: Bilateral heart cath;  Surgeon: Jitendra Post MD;  Location: [facility] Cath Lab;  Service: Cardiovascular;  Laterality: N/A;    COLONOSCOPY N/A 7/25/2023    Procedure: COLONOSCOPY with " Polypectomy;  Surgeon: Hortencia Carson MD;  Location: [facility] Endoscopy;  Service: Endoscopy;  Laterality: N/A;    CORONARY ARTERY STENTING  2009    2.5 X 24-mm Taxus DIMAS to first diagonal. 3.0 X 8-mm Taxus stent to proximal LAD just proximal to diagonal.    CORONARY ARTERY STENTING  2010    3.5 X 15-mm Promus DIMAS to totally occluded LAD    CORONARY ARTERY STENTING  2011    2.25 X 30-mm Resolute DIMAS to 2nd diagonal.  Balloon angioplasty through strut to improve blood flow to distal LAD    CORONARY ARTERY STENTING  07/28/2017    second diagonal branch LAD Resolute 2.25 x 30-mm DIMAS    ICD DC GEN CHANGE N/A 6/20/2022    Procedure: BI-V ICD Gen Change;  Surgeon: Wilfredo Montana MD;  Location: [facility] EP Lab;  Service: Cardiovascular;  Laterality: N/A;    ICD SC NEW  2011    [employer] Cognis Model #N119, Serial #[id number]. [employer] Reliance Model #0185, Serial #[id number]. LV lead Acuity Model #45+91, Serial #[id number]. Atrial lead Model #4469, Serial #[id number]    OXIMETRY RUN N/A 12/5/2023    Procedure: OXIMETRY RUN;  Surgeon: Jitendra Post MD;  Location: [facility] Cath Lab;  Service: Cardiovascular;  Laterality: N/A;       Allergies   Allergen Reactions    Morphine      ANXIETY    Tramadol      ANXIETY         Current Outpatient Medications:     potassium chloride 10 mEq CR tablet, Take 3 tablets (30 mEq total) by mouth daily, Disp: 90 tablet, Rfl: 11    furosemide (LASIX) 40 mg tablet, Take 1 tablet (40 mg total) by mouth daily, Disp: 30 tablet, Rfl: 11    mexiletine (MEXITIL) 250 mg capsule, Take 1 capsule (250 mg total) by mouth every 8 (eight) hours, Disp: 90 capsule, Rfl: 11    carvediloL (Coreg) 25 mg tablet, Take 2 tablets (50 mg total) by mouth 2 (two) times a day with meals, Disp: 180 tablet, Rfl: 3    sacubitriL-valsartan (ENTRESTO)  mg tablet, Take 1 tablet by mouth 2 (two) times a day, Disp: 60 tablet, Rfl: 1    sotaloL (BETAPACE) 120 mg tablet, Take 1.5 tablets (180 mg total) by mouth every 12 (twelve) hours, Disp:  270 tablet, Rfl: 2    spironolactone (ALDACTONE) 25 mg tablet, Take 2 tablets (50 mg total) by mouth daily, Disp: , Rfl:     rivaroxaban (Xarelto) 20 mg tablet, Take 1 tablet (20 mg total) by mouth daily, Disp: 90 tablet, Rfl: 3    empagliflozin (JARDIANCE) 10 mg tablet tablet, Take 10 mg by mouth daily, Disp: 30 each, Rfl: 11    rosuvastatin (Crestor) 40 mg tablet, Take 1 tablet (40 mg total) by mouth nightly, Disp: 30 tablet, Rfl: 1    nitroglycerin (NITROSTAT) 0.4 mg SL tablet, Place 1 tablet (0.4 mg total) under the tongue every 5 (five) minutes as needed for chest pain, Disp: 26 tablet, Rfl: 1    magnesium oxide (MAG-OX) 400 mg (241.3 mg magnesium) tablet, Take 1 tablet (400 mg total) by mouth 2 (two) times a day, Disp: 180 tablet, Rfl: 3    aspirin 81 mg EC tablet, Take 1 tablet (81 mg total) by mouth daily, Disp: 90 tablet, Rfl: 3    tamsulosin (FLOMAX) 0.4 mg capsule, Take 1 capsule (0.4 mg total) by mouth daily, Disp: , Rfl:     cholecalciferol, vitamin D3, 25 mcg (1,000 unit) tablet, Take 1 tablet (1,000 Units total) by mouth daily, Disp: , Rfl:     albuterol HFA (PROVENTIL HFA;VENTOLIN HFA) 90 mcg/actuation inhaler, Inhale 2 puffs every 6 (six) hours as needed for wheezing or shortness of breath, Disp: 1 Inhaler, Rfl: 1    pantoprazole (PROTONIX) 40 mg EC tablet, Take 1 tablet (40 mg total) by mouth daily, Disp: , Rfl:     Family History   Problem Relation Age of Onset    Heart attack Mother 62    Other Mother         Abdominal Aortic Aneurysm    Lung cancer Mother     Colon cancer Father         Cause of death    Diabetes Brother         Non-Insulin Dependent    Heart attack Brother 51        w/ Stents    Heart disease Brother     Other Son         Well       Social History     Socioeconomic History    Marital status: Single     Spouse name: Not on file    Number of children: Not on file    Years of education: Not on file    Highest education level: Not on file   Occupational History    Not on file    Tobacco Use    Smoking status: Former     Current packs/day: 0.00     Average packs/day: 0.8 packs/day for 30.0 years (22.5 ttl pk-yrs)     Types: Cigarettes     Start date: 1990     Quit date: 2020     Years since quittin.2    Smokeless tobacco: Never   Vaping Use    Vaping status: Never Used   Substance and Sexual Activity    Alcohol use: Not Currently     Comment: Quit drinking in     Drug use: Not Currently    Sexual activity: Yes     Partners: Female   Other Topics Concern    Not on file   Social History Narrative    Not on file     Social Determinants of Health     Financial Resource Strain: Unknown (2020)    Overall Financial Resource Strain (CARDIA)     Difficulty of Paying Living Expenses: Patient declined   Food Insecurity: No Food Insecurity (2023)    Hunger Vital Sign     Worried About Running Out of Food in the Last Year: Never true     Ran Out of Food in the Last Year: Never true   Transportation Needs: No Transportation Needs (2023)    PRAPARE - Transportation     Lack of Transportation (Medical): No     Lack of Transportation (Non-Medical): No   Physical Activity: Not on file   Stress: Not on file   Social Connections: Not on file   Intimate Partner Violence: Not At Risk (2023)    Humiliation, Afraid, Rape, and Kick questionnaire     Fear of Current or Ex-Partner: No     Emotionally Abused: No     Physically Abused: No     Sexually Abused: No   Housing Stability: Low Risk  (2023)    Housing Stability Vital Sign     Unable to Pay for Housing in the Last Year: No     Number of Places Lived in the Last Year: 1     Unstable Housing in the Last Year: No       Review of Systems   Constitutional: Negative for decreased appetite, malaise/fatigue, weight gain and weight loss.   Cardiovascular:  Positive for dyspnea on exertion and palpitations. Negative for chest pain, irregular heartbeat, leg swelling, near-syncope, orthopnea, paroxysmal nocturnal dyspnea and  syncope.   Respiratory:  Positive for sleep disturbances due to breathing. Negative for cough, shortness of breath and sputum production.    Hematologic/Lymphatic: Does not bruise/bleed easily.   Gastrointestinal:  Negative for bloating.   Neurological:  Positive for light-headedness. Negative for dizziness and weakness.       Objective     Vitals:    09/11/24 0833   BP: 122/80   Pulse: 62   SpO2: 95%  Comment: RA   Weight: 122.3 kg (269 lb 9.6 oz)  Comment: States home wt is similar         Sodium   Date Value Ref Range Status   03/21/2024 136 135 - 145 mmol/L Final     Potassium   Date Value Ref Range Status   03/21/2024 4.2 3.5 - 5.1 MMOL/L Final     Chloride   Date Value Ref Range Status   03/21/2024 103 98 - 107 mmol/L Final     CO2   Date Value Ref Range Status   03/21/2024 23 21 - 32 mmol/L Final     BUN   Date Value Ref Range Status   03/21/2024 12 7 - 25 mg/dL Final     Creatinine   Date Value Ref Range Status   03/21/2024 0.97 0.70 - 1.30 mg/dL Final     Glucose   Date Value Ref Range Status   03/21/2024 97 70 - 105 mg/dL Final     Calcium   Date Value Ref Range Status   03/21/2024 9.3 8.6 - 10.3 mg/dL Final       Physical Exam  Constitutional:       Appearance: He is well-developed.   HENT:      Head: Normocephalic.      Mouth/Throat:      Mouth: Mucous membranes are moist.   Neck:      Vascular: No JVD.   Cardiovascular:      Rate and Rhythm: Normal rate and regular rhythm.      Pulses: Intact distal pulses.      Heart sounds: Normal heart sounds. No murmur heard.  Pulmonary:      Effort: Pulmonary effort is normal. No respiratory distress.      Breath sounds: No decreased breath sounds, wheezing, rhonchi or rales.   Abdominal:      General: Bowel sounds are normal. There is no distension.      Palpations: Abdomen is soft.   Musculoskeletal:         General: Normal range of motion.      Right lower leg: No edema.      Left lower leg: No edema.   Skin:     General: Skin is warm and dry.   Neurological:       Mental Status: He is alert and oriented to person, place, and time.   Psychiatric:         Behavior: Behavior normal.         Assessment/Plan     Pete was seen today for congestive heart failure.    Diagnoses and all orders for this visit:    Chronic systolic heart failure (CMS/HCC)  -     Comprehensive metabolic panel Blood, Venous  -     Pro B-Type Natriuretic Peptide Blood, Venous  -     Magnesium Blood, Venous    Coronary artery disease involving native coronary artery of native heart without angina pectoris    Ischemic cardiomyopathy    VT (ventricular tachycardia) (CMS/[facility])          Ischemic cardiomyopathy  CAD status post multiple stents  Paroxysmal atrial fibrillation, anticoagulated with Xarelto  Echocardiogram on 10/19/2023 revealed an ejection fraction of 25%.  Large anteroapical aneurysm.  No LV mural thrombi.  Grade 2 left-ventricular diastolic abnormality.  Normal RV size and function.  Patient has history of CAD s/p multiple stent  Patient has history of paroxysmal atrial fibrillation.  [id number] score 6.  Continue anticoagulation with Xarelto 20 mg daily  Device: CRT-D implanted in 2011 with generator change in 2020.  Remote device check 8/30/2024 revealed 95% CRT pacing.  1 episodes of NSVT/SVT on 8/24/2024 terminated with ATP  GDMT  ACEI/ARB/ARNI: Entresto  mg twice daily  Beta-Blocker: Carvedilol 50 mg twice daily  Aldosterone antagonist: Spironolactone 50 mg daily  SGLT2 inhibitor: Jardiance 10 mg daily  Advise routine cardiac exercise  Continue CPAP nightly  Cardiac rehab: Previously referred  Continue to follow with the [facility] as scheduled    Chronic systolic heart failure  NYHA Class: II-III  Heart failure stage: C  Volume status today: Euvolemic on exam  Diuretic strategy: Continue furosemide 40 mg daily  CMP, proBNP, magnesium today  Continue to weigh daily and contact our clinic with a 3 pound weight gain overnight or 5 pounds in a week  Follow a low-sodium  (2000mg), heart healthy diet and 2 L fluid restriction  Follow-up in the heart failure clinic in 2-months, or sooner if needed  Advise patient contact the clinic with increasing symptoms or concerns    Refractory ventricular tachycardia  Status post partial VT ablation at [facility] in 2020. Extensive repeat VT ablation at the [facility] in 2021.  VT and PVC ablations [facility] 7/1/2024  Continue sotalol 180 mg twice daily and mexiletine 250 mg 3 times daily  Follow-up with EP as scheduled  Patient is due for follow-up at the [facility] October 2024      Return in about 9 weeks (around 11/13/2024).            A voice recognition program was used to aid in documentation of this record. Sometimes words are not printed exactly as they were spoken.  While efforts were made to carefully edit and correct any inaccuracies, some errors may be present; please take these into context.  Please contact the provider if errors are identified.

## 2024-09-11 ENCOUNTER — LAB (OUTPATIENT)
Dept: LAB | Facility: CLINIC | Age: 59
End: 2024-09-11
Payer: MEDICARE

## 2024-09-11 ENCOUNTER — OFFICE VISIT (OUTPATIENT)
Dept: CARDIOLOGY | Facility: CLINIC | Age: 59
End: 2024-09-11
Payer: MEDICARE

## 2024-09-11 VITALS
SYSTOLIC BLOOD PRESSURE: 122 MMHG | DIASTOLIC BLOOD PRESSURE: 80 MMHG | WEIGHT: 269.6 LBS | HEART RATE: 62 BPM | OXYGEN SATURATION: 95 % | BODY MASS INDEX: 37.6 KG/M2

## 2024-09-11 DIAGNOSIS — I25.10 CORONARY ARTERY DISEASE INVOLVING NATIVE CORONARY ARTERY OF NATIVE HEART WITHOUT ANGINA PECTORIS: ICD-10-CM

## 2024-09-11 DIAGNOSIS — I47.20 VT (VENTRICULAR TACHYCARDIA) (CMS/HCC): ICD-10-CM

## 2024-09-11 DIAGNOSIS — I25.5 ISCHEMIC CARDIOMYOPATHY: ICD-10-CM

## 2024-09-11 DIAGNOSIS — I50.22 CHRONIC SYSTOLIC HEART FAILURE (CMS/HCC): Primary | ICD-10-CM

## 2024-09-11 LAB
ALBUMIN SERPL-MCNC: 4.1 G/DL (ref 3.5–5.3)
ALP SERPL-CCNC: 70 U/L (ref 45–115)
ALT SERPL-CCNC: 23 U/L (ref 7–52)
ANION GAP SERPL CALC-SCNC: 8 MMOL/L (ref 3–11)
AST SERPL-CCNC: 17 U/L
BILIRUB SERPL-MCNC: 1.14 MG/DL (ref 0.2–1.4)
BUN SERPL-MCNC: 14 MG/DL (ref 7–25)
CALCIUM ALBUM COR SERPL-MCNC: 9.8 MG/DL (ref 8.6–10.3)
CALCIUM SERPL-MCNC: 9.9 MG/DL (ref 8.6–10.3)
CHLORIDE SERPL-SCNC: 106 MMOL/L (ref 98–107)
CO2 SERPL-SCNC: 22 MMOL/L (ref 21–32)
CREAT SERPL-MCNC: 1.02 MG/DL (ref 0.7–1.3)
EGFRCR SERPLBLD CKD-EPI 2021: 86 ML/MIN/1.73M*2
GLUCOSE SERPL-MCNC: 104 MG/DL (ref 70–105)
MAGNESIUM SERPL-MCNC: 2 MG/DL (ref 1.8–2.4)
NT-PROBNP SERPL-MCNC: 87 NG/L
POTASSIUM SERPL-SCNC: 4.6 MMOL/L (ref 3.5–5.1)
PROT SERPL-MCNC: 7.4 G/DL (ref 6–8.3)
SODIUM SERPL-SCNC: 136 MMOL/L (ref 135–145)

## 2024-09-11 PROCEDURE — G0463 HOSPITAL OUTPT CLINIC VISIT: HCPCS | Performed by: NURSE PRACTITIONER

## 2024-09-11 PROCEDURE — 80053 COMPREHEN METABOLIC PANEL: CPT | Performed by: NURSE PRACTITIONER

## 2024-09-11 PROCEDURE — 83880 ASSAY OF NATRIURETIC PEPTIDE: CPT | Performed by: NURSE PRACTITIONER

## 2024-09-11 PROCEDURE — 83735 ASSAY OF MAGNESIUM: CPT | Performed by: NURSE PRACTITIONER

## 2024-09-11 PROCEDURE — 99213 OFFICE O/P EST LOW 20 MIN: CPT | Performed by: NURSE PRACTITIONER

## 2024-09-11 PROCEDURE — 36415 COLL VENOUS BLD VENIPUNCTURE: CPT | Performed by: NURSE PRACTITIONER

## 2024-09-26 PROCEDURE — 93295 DEV INTERROG REMOTE 1/2/MLT: CPT | Performed by: INTERNAL MEDICINE

## 2024-09-27 NOTE — PROGRESS NOTES
ELECTROPHYSIOLOGY CLINIC VIRTUAL VISIT    Assessment/Recommendations   Assessment/Plan:    Mr. Russell is a 58 year old male who has a medical history significant for anterior wall MI, CAD s/p PCIs LAD, D1, D2 2009, 2010, 2011, & 2017, ICM LVEF 25%, s/p CRTD 2011 s/p gen change 6/2022, VT s/p prior ablations 6/2020 & 11/2021 & 7/1/2024, PAF (CHADSVASC 6 on Xarelto), HTN, HLD, prior LV thrombus, DM2, CVA, BPH, and obesity.      CAD s/p multiple PCIs LAD, D2, D2 2009, 2010, 2011, 2017  ICM LVEF 25%, NYHA III  Ventricular Tachycardia:  1. ACEi/ARB/ARNi: Continue Entresto.  2. BB: Continue Coreg.  3. Aldosterone antagonist: Continue Spironolactone.  4. SGLT2i: Continue Jardiance.  5. Antiplatlet: Continue ASA.  6. Statin: Continue Crestor.  7.  SCD prophylaxis: s/p CRTD BVP % decreased likely due to PVCs and VT episodes. BiV pacing improved to 96% post VT ablation in 7/1/2024.   8. Fluid status: Continue Lasix.   9. Nitroglycerin 0.4 mg PRN for chest pain. Place 1 tablet (0.4 mg) under the tongue every 5 minutes as needed for chest pain. Maximum of 3 doses in 15 minutes.  10. Lifestyle: Avoid tobacco, maintain a healthy weight, limit alcohol, cardiac diet, and exercise.  11. VT: He has had VT with ICD therapies despite AAT. He was originally on amiodarone and mexiletine but had breakthroughs. Amiodarone was later stopped to avoid possible long term toxicities and he was alternatively placed on Sotalol. He has had 2 VT ablation at OSH and found to have numerous VTs arising from anterior scar area (lateral/septal walls) and inferior apical aneurysm. He has continued to have recurrent arrhyhmias. He underwent another VT ablation (mid and basal anterior wall) and [facility] PVC ablation on 7/1/2024. ICD interrogation showed significant suppression of VT events and improvement of BiV pacing to 96%. Will continue sotalol and mexiletine for now and will consider discontinuing mexiletine next visit based on VT suppression.      Follow up in 6 month     History of Present Illness/Subjective    Mr. Leo Russell is a 58 year old male who comes in today for EP consultation of VT.    Mr. Russell is a 58 year old male who has a medical history significant for anterior wall MI, CAD s/p PCIs LAD, D1, D2 2009, 2010, 2011, & 2017, ICM LVEF 25%, s/p CRTD 2011 s/p gen change 6/2022, VT s/p prior ablations 6/2020 & 11/2021, PAF (CHADSVASC 6 on Xarelto), HTN, HLD, prior LV thrombus, DM2, CVA, BPH, and obesity.    He has a longstanding history of CAD for which he had prior PCIs to pLAD and D1 in 2009, PCI to totally occluded LAD in 2010, PCI to D2 and Balloon angioplasty through strut to improve blood flow to distal LAD in 2011. Further D2 branch LAD PCI in 2017, and then Angiosculpt scoring balloon angioplasty to ISR of D1 stent in 2023. He has had subsequent ICM with LVEF 25% range most recently. He has been on GDMT but continued to have reduced LVEF so had a CRTD implanted in 2011 with last generator change in 6/2022. He has also had VT with ICD therapies. He had been on amiodarone. He has followed with [facility] and felt not a good candidate for advanced HF therapies due to uncontrolled VT. This lead to an ablation in 6/2020 where he was found to have recurrent numerous VTs as a result on an extensive anterior wall scar involving much of lateral and septal walls. Ablation was felt partially successful of border zones scar areas especially inferior apex. He continued to have ICD therapies despite this and amiodarone and mexiletine. Thus,he underwent a repeat ablation VT at apical portion of the scar at [facility] in 11/2021. Amiodarone was decreased to 200 mg daily and mexiletine discontinued. Unfortunately presented to the emergency room 1/25/2022 with sensation of racing in the heart and chest fullness. He was found to be in VT at 130 bpm. He did convert spontaneously. He had felt well for 5 or 6 weeks post ablation in  [place] however began experiencing recurrent symptoms with ATP for VT at 124 bpm on 1/11/2022 and then ATP x8 for VT at 139 bpm. Mexiletine was added back. He was discharged on amiodarone 200 mg daily and mexiletine 200 mg twice daily. He was seen at [facility] on 1/31/2022 and mexiletine increased to 3 times daily and he was continued on amiodarone 200 mg daily. Amiodarone is not favored for long-term use given his young age and risk for toxicities with long-term use, thus after he had 6 months free of VT, amiodarone was stopped and he was admitted in 3/2023 for Sotalol loading. He has since been having numerous episodes of slow VT requiring ATP therapy recently. He was admitted in 11/2023 to OS for slow VT and multiple ATPs. Sotalol was increased. He had a repeat coronary angiogram in 12/2023 that showed stable native disease with 80-90% in-stent restenosis of inferior branch of D1 s/p Angiosculpt POBA and RHC showed low cardiac index with normal filling pressures. Given his persistent rhythm issues, the patient was referred to [facility] for consideration of advanced therapies. He saw Dr. Felix here who started advanced therapies work up. He was referred to EP here for an opinion about his VT management/treatment options.    EP visit 4/24/2024: He reports he feels the VT episodes with chest pressure and some dizziness, no LOC or pre-syncope. He denies abdominal fullness/bloating or peripheral edema, shortness of breath, paroxysmal nocturnal dyspnea, orthopnea, pre-syncope, or syncope. Presenting 12 lead ECG shows  Vent Rate 60 bpm,  ms,  ms, QTc 474 ms. Device interrogation shows normal device function, stable lead parameters, and AP 86%, RVP 87%, LVP 86%. Current cardiac medications include: Coreg, Entresto, Jardiance, Lasix, Crestor, Spironolactone, Sotalol, Mexiletine, ASA, and Xarelto.     EP visit 10/2/2024: Patient underwent VT ablation (mid and basal anterior wall) and [facility]C PVC ablation on 7/1/2024.  Device interrogation 9/30 showed 2 short NSVT runs in late July and one short VT episode in Aug 24 that required ATP but no further events after. Prior to July 1st (day of ablation) he was having frequent episodes. His BiV pacing has improved now to 96%. Patient endorses feeling slightly weak the first 2 weeks after the procedure but endorses significant improvement after. He is currently on sotalol and mexiletine.      I have reviewed and updated the patient's Past Medical History, Social History, Family History and Medication List.     Cardiographics (Personally Reviewed) :   10/19/23Echo (OS Report):   Normal left ventricular wall thickness.     Biplane ejection fraction is 25%.     Severe left ventricular systolic dysfunction.  Large anteroapical   aneurysm.  Only the basilar left ventricular segments have preserved   contractility.  No LV mural thrombi.     Grade II (moderate) left ventricular diastolic abnormality.     Elevated left ventricular filling pressure.     The left atrium is severely dilated.     The right atrium is moderately dilated.     Normal central venous pressure (0-5 mmHg).     No pericardial effusion.     Inadequate TR spectral Doppler to accurately assess right ventricular   systolic pressure.     12/5/23 Coronary Angiogram (OS Report):  Conclusions:   1.  Normal right heart pressures   2.  Pulmonary capillary wedge pressure 8 mmHg   3.  Patent proximal LAD stent   4.  50% in-stent restenosis ostial first diagonal with patent superior   branch stent.  The inferior branch of the first diagonal has severe 80-90%   in-stent restenosis.  Status post 2.5 mm Angiosculpt scoring balloon   angioplasty at the ostium and 2.5 x 15 mm trek neononcompliant balloon   angioplasty in the in-stent restenotic segment   5.  Mild irregularities right coronary artery and left circumflex coronary   artery.  Similar to previous    1/11/24 CMR [facility] Overread:  1. The LV is severely dilated in cavity size. The  global systolic function is severely decreased. The LVEF  is 13%. There is akinesis of all the septal and anterior segments. There is thinning of the apical and true  apical segments.     2. The RV is normal in cavity size. The global systolic function is moderately decreased. The RVEF is 38%.      3. There is moderate enlargement of both atria.     4. Valvular function could not be reliably assessed because of device-related artifacts.     5. Late gadolinium enhancement imaging is suboptimal due to device-related artifacts (wide-band LGE imaging  was not performed); however, there is evidence of a large myocardial infarction involving virtually the  entire LAD territory. The MI is near transmural for a brief portion at the mid level and is transmural at  the apical and true apical levels. This is suggestive of a late presentation MI. There is practically no  residual viability in the LAD territory. The RCA and LCx territories appear viable.     6. There is no pericardial effusion or thickening.     7. There is no intracardiac thrombus.     CONCLUSIONS: Severe ischemic cardiomyopathy with adverse LV remodeling, LVEF of 13% and RVEF of 38%, with a  large MI involving virtually the entire LAD territory.          Physical Examination   There were no vitals taken for this visit.  Wt Readings from Last 3 Encounters:   07/02/24 122.2 kg (269 lb 6.4 oz)   04/26/24 120.7 kg (266 lb 1.5 oz)   04/25/24 121.2 kg (267 lb 3.2 oz)     Physical exam NA on video visit         Medications  Allergies   Current Outpatient Medications   Medication Sig Dispense Refill    aspirin 81 MG EC tablet Take 1 tablet by mouth daily      carvedilol (COREG) 25 MG tablet Take 50 mg by mouth 2 times daily      empagliflozin (JARDIANCE) 10 MG TABS tablet Take 10 mg by mouth daily      furosemide (LASIX) 40 MG tablet Take 40 mg by mouth daily      furosemide (LASIX) 40 MG tablet Take 40 mg by mouth daily as needed (for weight gain >3 lbs overnight)       magnesium oxide (MAG-OX) 400 MG tablet Take 400 mg by mouth 2 times daily      mexiletine (MEXITIL) 250 MG capsule Take 250 mg by mouth 3 times daily      nitroGLYcerin (NITROSTAT) 0.4 MG sublingual tablet Place 1 tablet under the tongue every 5 minutes as needed      pantoprazole (PROTONIX) 40 MG EC tablet Take 40 mg by mouth daily      potassium chloride ER (K-TAB/KLOR-CON) 10 MEQ CR tablet Take 30 mEq by mouth daily      rivaroxaban ANTICOAGULANT (XARELTO) 20 MG TABS tablet Take 20 mg by mouth daily      rosuvastatin (CRESTOR) 40 MG tablet Take 40 mg by mouth every evening      sacubitril-valsartan (ENTRESTO)  MG per tablet Take 1 tablet by mouth 2 times daily      sotalol (BETAPACE) 120 MG tablet Take 180 mg by mouth every 12 hours      spironolactone (ALDACTONE) 25 MG tablet Take 50 mg by mouth daily      tamsulosin (FLOMAX) 0.4 MG capsule Take 0.4 mg by mouth daily      Vitamin D3 (VITAMIN D-1000 MAX ST) 25 mcg (1000 units) tablet Take 1,000 Units by mouth daily      Allergies   Allergen Reactions    Morphine Anxiety     previously tolerated oxycodone and hydromorphone without issue      Tramadol Anxiety     previously tolerated oxycodone and hydromorphone without issue           Lab Results (Personally Reviewed)    Chemistry/lipid CBC Cardiac Enzymes/BNP/TSH/INR   Lab Results   Component Value Date    BUN 12.4 07/01/2024     07/01/2024    CO2 20 (L) 07/01/2024     Creatinine   Date Value Ref Range Status   07/01/2024 0.88 0.67 - 1.17 mg/dL Final       Lab Results   Component Value Date    CHOL 112 04/24/2024    HDL 30 (L) 04/24/2024    LDL 51 04/24/2024      Lab Results   Component Value Date    WBC 8.2 07/01/2024    HGB 14.0 07/01/2024    HCT 40.9 07/01/2024    MCV 95 07/01/2024     07/01/2024    Lab Results   Component Value Date    TSH 1.63 04/24/2024    INR 1.33 (H) 04/24/2024        The patient states understanding and is agreeable with the plan.   Patient seen and examined with   Elma Phan  PGY-8, EP    EP STAFF NOTE  Patient seen and examined and management plan personally reviewed with the patient. I agree with the note above by the CV/EP fellow.  Esteban Wills MD [facility]  Cardiology - Electrophysiology  Total time spent on patient visit, reviewing notes, imaging, labs, orders, and completing necessary documentation: 30 minutes.  >50% of visit spent on counseling patient and/or coordination of care.

## 2024-10-02 ENCOUNTER — VIRTUAL VISIT (OUTPATIENT)
Dept: CARDIOLOGY | Facility: CLINIC | Age: 59
End: 2024-10-02
Attending: INTERNAL MEDICINE
Payer: MEDICARE

## 2024-10-02 VITALS
SYSTOLIC BLOOD PRESSURE: 120 MMHG | HEIGHT: 71 IN | WEIGHT: 276 LBS | BODY MASS INDEX: 38.64 KG/M2 | DIASTOLIC BLOOD PRESSURE: 68 MMHG

## 2024-10-02 DIAGNOSIS — I47.20 VENTRICULAR TACHYCARDIA (H): Primary | ICD-10-CM

## 2024-10-02 PROCEDURE — 99214 OFFICE O/P EST MOD 30 MIN: CPT | Mod: 95 | Performed by: INTERNAL MEDICINE

## 2024-10-02 ASSESSMENT — PAIN SCALES - GENERAL: PAINLEVEL: NO PAIN (0)

## 2024-10-02 NOTE — PROGRESS NOTES
Virtual Visit Details    Type of service:  Video Visit   Video Start Time: 8:13 AM  Video End Time:8:30 AM    Originating Location (pt. Location): Home    Distant Location (provider location):  On-site  Platform used for Video Visit: Vonda

## 2024-10-02 NOTE — LETTER
10/2/2024      RE: Leo Russell  [address] [facility] [address]       Dear Colleague,    Thank you for the opportunity to participate in the care of your patient, Leo Russell, at the [facility] [place] [facility]. Please see a copy of my visit note below.        ELECTROPHYSIOLOGY CLINIC VIRTUAL VISIT    Assessment/Recommendations   Assessment/Plan:    Mr. Russell is a 58 year old male who has a medical history significant for anterior wall MI, CAD s/p PCIs LAD, D1, D2 2009, 2010, 2011, & 2017, ICM LVEF 25%, s/p CRTD 2011 s/p gen change 6/2022, VT s/p prior ablations 6/2020 & 11/2021 & 7/1/2024, PAF (CHADSVASC 6 on Xarelto), HTN, HLD, prior LV thrombus, DM2, CVA, BPH, and obesity.      CAD s/p multiple PCIs LAD, D2, D2 2009, 2010, 2011, 2017  ICM LVEF 25%, NYHA III  Ventricular Tachycardia:  1. ACEi/ARB/ARNi: Continue Entresto.  2. BB: Continue Coreg.  3. Aldosterone antagonist: Continue Spironolactone.  4. SGLT2i: Continue Jardiance.  5. Antiplatlet: Continue ASA.  6. Statin: Continue Crestor.  7.  SCD prophylaxis: s/p CRTD BVP % decreased likely due to PVCs and VT episodes. BiV pacing improved to 96% post VT ablation in 7/1/2024.   8. Fluid status: Continue Lasix.   9. Nitroglycerin 0.4 mg PRN for chest pain. Place 1 tablet (0.4 mg) under the tongue every 5 minutes as needed for chest pain. Maximum of 3 doses in 15 minutes.  10. Lifestyle: Avoid tobacco, maintain a healthy weight, limit alcohol, cardiac diet, and exercise.  11. VT: He has had VT with ICD therapies despite AAT. He was originally on amiodarone and mexiletine but had breakthroughs. Amiodarone was later stopped to avoid possible long term toxicities and he was alternatively placed on Sotalol. He has had 2 VT ablation at OSH and found to have numerous VTs arising from anterior scar area (lateral/septal walls) and inferior apical aneurysm. He has continued to have recurrent  angelia. He underwent another VT ablation (mid and basal anterior wall) and [facility] PVC ablation on 7/1/2024. ICD interrogation showed significant suppression of VT events and improvement of BiV pacing to 96%. Will continue sotalol and mexiletine for now and will consider discontinuing mexiletine next visit based on VT suppression.     Follow up in 6 month     History of Present Illness/Subjective    Mr. Leo Russell is a 58 year old male who comes in today for EP consultation of VT.    Mr. Russell is a 58 year old male who has a medical history significant for anterior wall MI, CAD s/p PCIs LAD, D1, D2 2009, 2010, 2011, & 2017, ICM LVEF 25%, s/p CRTD 2011 s/p gen change 6/2022, VT s/p prior ablations 6/2020 & 11/2021, PAF (CHADSVASC 6 on Xarelto), HTN, HLD, prior LV thrombus, DM2, CVA, BPH, and obesity.    He has a longstanding history of CAD for which he had prior PCIs to pLAD and D1 in 2009, PCI to totally occluded LAD in 2010, PCI to D2 and Balloon angioplasty through strut to improve blood flow to distal LAD in 2011. Further D2 branch LAD PCI in 2017, and then Angiosculpt scoring balloon angioplasty to ISR of D1 stent in 2023. He has had subsequent ICM with LVEF 25% range most recently. He has been on GDMT but continued to have reduced LVEF so had a CRTD implanted in 2011 with last generator change in 6/2022. He has also had VT with ICD therapies. He had been on amiodarone. He has followed with [facility] and felt not a good candidate for advanced HF therapies due to uncontrolled VT. This lead to an ablation in 6/2020 where he was found to have recurrent numerous VTs as a result on an extensive anterior wall scar involving much of lateral and septal walls. Ablation was felt partially successful of border zones scar areas especially inferior apex. He continued to have ICD therapies despite this and amiodarone and mexiletine. Thus,he underwent a repeat ablation VT at apical portion of the scar at  [facility] in 11/2021. Amiodarone was decreased to 200 mg daily and mexiletine discontinued. Unfortunately presented to the emergency room 1/25/2022 with sensation of racing in the heart and chest fullness. He was found to be in VT at 130 bpm. He did convert spontaneously. He had felt well for 5 or 6 weeks post ablation in [place] however began experiencing recurrent symptoms with ATP for VT at 124 bpm on 1/11/2022 and then ATP x8 for VT at 139 bpm. Mexiletine was added back. He was discharged on amiodarone 200 mg daily and mexiletine 200 mg twice daily. He was seen at [facility] on 1/31/2022 and mexiletine increased to 3 times daily and he was continued on amiodarone 200 mg daily. Amiodarone is not favored for long-term use given his young age and risk for toxicities with long-term use, thus after he had 6 months free of VT, amiodarone was stopped and he was admitted in 3/2023 for Sotalol loading. He has since been having numerous episodes of slow VT requiring ATP therapy recently. He was admitted in 11/2023 to OSH for slow VT and multiple ATPs. Sotalol was increased. He had a repeat coronary angiogram in 12/2023 that showed stable native disease with 80-90% in-stent restenosis of inferior branch of D1 s/p Angiosculpt POBA and RHC showed low cardiac index with normal filling pressures. Given his persistent rhythm issues, the patient was referred to [facility] for consideration of advanced therapies. He saw Dr. Felix here who started advanced therapies work up. He was referred to EP here for an opinion about his VT management/treatment options.    EP visit 4/24/2024: He reports he feels the VT episodes with chest pressure and some dizziness, no LOC or pre-syncope. He denies abdominal fullness/bloating or peripheral edema, shortness of breath, paroxysmal nocturnal dyspnea, orthopnea, pre-syncope, or syncope. Presenting 12 lead ECG shows  Vent Rate 60 bpm,  ms,  ms, QTc 474 ms. Device interrogation  shows normal device function, stable lead parameters, and AP 86%, RVP 87%, LVP 86%. Current cardiac medications include: Coreg, Entresto, Jardiance, Lasix, Crestor, Spironolactone, Sotalol, Mexiletine, ASA, and Xarelto.     EP visit 10/2/2024: Patient underwent VT ablation (mid and basal anterior wall) and [facility] PVC ablation on 7/1/2024. Device interrogation 9/30 showed 2 short NSVT runs in late July and one short VT episode in Aug 24 that required ATP but no further events after. Prior to July 1st (day of ablation) he was having frequent episodes. His BiV pacing has improved now to 96%. Patient endorses feeling slightly weak the first 2 weeks after the procedure but endorses significant improvement after. He is currently on sotalol and mexiletine.      I have reviewed and updated the patient's Past Medical History, Social History, Family History and Medication List.     Cardiographics (Personally Reviewed) :   10/19/23Echo (OS Report):   Normal left ventricular wall thickness.      Biplane ejection fraction is 25%.      Severe left ventricular systolic dysfunction.  Large anteroapical   aneurysm.  Only the basilar left ventricular segments have preserved   contractility.  No LV mural thrombi.      Grade II (moderate) left ventricular diastolic abnormality.      Elevated left ventricular filling pressure.      The left atrium is severely dilated.      The right atrium is moderately dilated.      Normal central venous pressure (0-5 mmHg).      No pericardial effusion.      Inadequate TR spectral Doppler to accurately assess right ventricular   systolic pressure.     12/5/23 Coronary Angiogram (OS Report):  Conclusions:   1.  Normal right heart pressures   2.  Pulmonary capillary wedge pressure 8 mmHg   3.  Patent proximal LAD stent   4.  50% in-stent restenosis ostial first diagonal with patent superior   branch stent.  The inferior branch of the first diagonal has severe 80-90%   in-stent restenosis.  Status post  2.5 mm Angiosculpt scoring balloon   angioplasty at the ostium and 2.5 x 15 mm trek neononcompliant balloon   angioplasty in the in-stent restenotic segment   5.  Mild irregularities right coronary artery and left circumflex coronary   artery.  Similar to previous    1/11/24 CMR [facility] Overread:  1. The LV is severely dilated in cavity size. The global systolic function is severely decreased. The LVEF  is 13%. There is akinesis of all the septal and anterior segments. There is thinning of the apical and true  apical segments.     2. The RV is normal in cavity size. The global systolic function is moderately decreased. The RVEF is 38%.      3. There is moderate enlargement of both atria.     4. Valvular function could not be reliably assessed because of device-related artifacts.     5. Late gadolinium enhancement imaging is suboptimal due to device-related artifacts (wide-band LGE imaging  was not performed); however, there is evidence of a large myocardial infarction involving virtually the  entire LAD territory. The MI is near transmural for a brief portion at the mid level and is transmural at  the apical and true apical levels. This is suggestive of a late presentation MI. There is practically no  residual viability in the LAD territory. The RCA and LCx territories appear viable.     6. There is no pericardial effusion or thickening.     7. There is no intracardiac thrombus.     CONCLUSIONS: Severe ischemic cardiomyopathy with adverse LV remodeling, LVEF of 13% and RVEF of 38%, with a  large MI involving virtually the entire LAD territory.          Physical Examination   There were no vitals taken for this visit.  Wt Readings from Last 3 Encounters:   07/02/24 122.2 kg (269 lb 6.4 oz)   04/26/24 120.7 kg (266 lb 1.5 oz)   04/25/24 121.2 kg (267 lb 3.2 oz)     Physical exam NA on video visit         Medications  Allergies   Current Outpatient Medications   Medication Sig Dispense Refill     aspirin 81 MG EC tablet  Take 1 tablet by mouth daily       carvedilol (COREG) 25 MG tablet Take 50 mg by mouth 2 times daily       empagliflozin (JARDIANCE) 10 MG TABS tablet Take 10 mg by mouth daily       furosemide (LASIX) 40 MG tablet Take 40 mg by mouth daily       furosemide (LASIX) 40 MG tablet Take 40 mg by mouth daily as needed (for weight gain >3 lbs overnight)       magnesium oxide (MAG-OX) 400 MG tablet Take 400 mg by mouth 2 times daily       mexiletine (MEXITIL) 250 MG capsule Take 250 mg by mouth 3 times daily       nitroGLYcerin (NITROSTAT) 0.4 MG sublingual tablet Place 1 tablet under the tongue every 5 minutes as needed       pantoprazole (PROTONIX) 40 MG EC tablet Take 40 mg by mouth daily       potassium chloride ER (K-TAB/KLOR-CON) 10 MEQ CR tablet Take 30 mEq by mouth daily       rivaroxaban ANTICOAGULANT (XARELTO) 20 MG TABS tablet Take 20 mg by mouth daily       rosuvastatin (CRESTOR) 40 MG tablet Take 40 mg by mouth every evening       sacubitril-valsartan (ENTRESTO)  MG per tablet Take 1 tablet by mouth 2 times daily       sotalol (BETAPACE) 120 MG tablet Take 180 mg by mouth every 12 hours       spironolactone (ALDACTONE) 25 MG tablet Take 50 mg by mouth daily       tamsulosin (FLOMAX) 0.4 MG capsule Take 0.4 mg by mouth daily       Vitamin D3 (VITAMIN D-1000 MAX ST) 25 mcg (1000 units) tablet Take 1,000 Units by mouth daily      Allergies   Allergen Reactions     Morphine Anxiety     previously tolerated oxycodone and hydromorphone without issue       Tramadol Anxiety     previously tolerated oxycodone and hydromorphone without issue           Lab Results (Personally Reviewed)    Chemistry/lipid CBC Cardiac Enzymes/BNP/TSH/INR   Lab Results   Component Value Date    BUN 12.4 07/01/2024     07/01/2024    CO2 20 (L) 07/01/2024     Creatinine   Date Value Ref Range Status   07/01/2024 0.88 0.67 - 1.17 mg/dL Final       Lab Results   Component Value Date    CHOL 112 04/24/2024    HDL 30 (L) 04/24/2024     LDL 51 04/24/2024      Lab Results   Component Value Date    WBC 8.2 07/01/2024    HGB 14.0 07/01/2024    HCT 40.9 07/01/2024    MCV 95 07/01/2024     07/01/2024    Lab Results   Component Value Date    TSH 1.63 04/24/2024    INR 1.33 (H) 04/24/2024        The patient states understanding and is agreeable with the plan.   Patient seen and examined with Dr. Elma Phan  PGY-8, EP    EP STAFF NOTE  Patient seen and examined and management plan personally reviewed with the patient. I agree with the note above by the CV/EP fellow.  Esteban Wills MD [facility]  Cardiology - Electrophysiology  Total time spent on patient visit, reviewing notes, imaging, labs, orders, and completing necessary documentation: 30 minutes.  >50% of visit spent on counseling patient and/or coordination of care.               Virtual Visit Details    Type of service:  Video Visit   Video Start Time: 8:13 AM  Video End Time:8:30 AM    Originating Location (pt. Location): Home    Distant Location (provider location):  On-site  Platform used for Video Visit: Xanga      Please do not hesitate to contact me if you have any questions/concerns.     Sincerely,     Esteban Wills MD

## 2024-10-02 NOTE — NURSING NOTE
Unable to find pts pharmacy via [facility] pharmacy ([facility])    Current patient location: [address] [place] [address]   [place] [address]    Is the patient currently in the state of [place]? NO    Visit mode:VIDEO    If the visit is dropped, the patient can be reconnected by: VIDEO VISIT: Text to cell phone:   Telephone Information:   Mobile [phone]    and VIDEO VISIT: Send to e-mail at: [email or web address]    Will anyone else be joining the visit? NO  (If patient encounters technical issues they should call [phone])    Are changes needed to the allergy or medication list? No    Patient denies any changes since echeck-in completion and states all information entered during echeck-in remains accurate.    Are refills needed on medications prescribed by this physician? NO    Rooming Documentation:  Questionnaire(s) completed    Reason for visit: RECHECK    Jeff Cat MA VVF

## 2024-10-02 NOTE — PATIENT INSTRUCTIONS
Plan:    Follow-up in 6 months virtually with local device check prior      Your Care Team:  EP Cardiology   Telephone Number     Donna Tran RN [phone]    After business hours: [phone], ask for cardiologist on-call   Non-procedure scheduling:    Radha HENSLEY   [phone]   Procedure scheduling:    Darby Randhawa   [phone]   Device Clinic (Pacemakers, ICDs, Loop Recorders)    During business hours: [phone]  After business hours:   [phone]- select option 4 and ask for job code [id number].       Cardiovascular Clinic:   [address] [place] [address]. [place] [address]      As always, thank you for trusting us with your health care needs!

## 2024-10-06 ENCOUNTER — HEALTH MAINTENANCE LETTER (OUTPATIENT)
Age: 59
End: 2024-10-06

## 2024-10-07 ENCOUNTER — ANCILLARY PROCEDURE (OUTPATIENT)
Dept: CARDIOLOGY | Facility: CLINIC | Age: 59
End: 2024-10-07
Payer: MEDICARE

## 2024-10-07 DIAGNOSIS — Z45.02 ENCOUNTER FOR INTERROGATION OF CARDIAC DEFIBRILLATOR: Primary | ICD-10-CM

## 2024-10-07 DIAGNOSIS — Z45.02 ENCOUNTER FOR INTERROGATION OF CARDIAC DEFIBRILLATOR: ICD-10-CM

## 2024-11-01 ENCOUNTER — TELEPHONE (OUTPATIENT)
Dept: CARDIOLOGY | Facility: CLINIC | Age: 59
End: 2024-11-01
Payer: MEDICARE

## 2024-11-01 NOTE — TELEPHONE ENCOUNTER
"Pete has a [place] CRT-D.    Transmission received on 11/1/24 for ATP therapy for VT.  6 episodes that occurred between Oct 31, 2024 at 11:57 PM and Nov 1, 2024 at 1:25 AM.   All events were terminated with 1 burst of ATP due to VT lasting 6 seconds at 115-116 bpm.   Noting retrograde conduction and trigger pacing at onset of several episodes. No HV therapy delivered.     CRMS nursing called patient:  Patient states he has felt \"different\" in the past week he states \"great deal of personal stress and feels a bit \"off\".   Denies Chest pain/pressure. no syncope.   states after a shower this am felt heart beating fast, a bit dizzy, mild shortness of breath that resolved with no other issues, with no correlating arrhythmia detected by device.   States he did miss his afternoon dose of Mexitil,took his evening dose.    Docket was created and sent to Nan/Jazmin.  Message sent to EP KARLA pool for further evaluation if needed.   "

## 2024-11-03 ENCOUNTER — TRANSFERRED RECORDS (OUTPATIENT)
Dept: HEALTH INFORMATION MANAGEMENT | Facility: CLINIC | Age: 59
End: 2024-11-03
Payer: MEDICARE

## 2024-11-04 ENCOUNTER — TELEPHONE (OUTPATIENT)
Dept: FAMILY MEDICINE | Facility: CLINIC | Age: 59
End: 2024-11-04
Payer: MEDICARE

## 2024-11-04 ASSESSMENT — ENCOUNTER SYMPTOMS
LIGHT-HEADEDNESS: 1
DECREASED APPETITE: 0
PND: 0
SPUTUM PRODUCTION: 0
SYNCOPE: 0
PALPITATIONS: 1
SHORTNESS OF BREATH: 0
WEIGHT LOSS: 0
WEIGHT GAIN: 0
SLEEP DISTURBANCES DUE TO BREATHING: 1
ORTHOPNEA: 0
BRUISES/BLEEDS EASILY: 0
BLOATING: 0
DYSPNEA ON EXERTION: 1
WEAKNESS: 0
NEAR-SYNCOPE: 0
COUGH: 0

## 2024-11-04 NOTE — TELEPHONE ENCOUNTER
S-(situation):   Patient transferred as priority redline having heart issues with pacemaker. Patient is in [place]    B-(background):   Had ablation in July with Caitie, Cardiologist is with [facility]      A-(assessment):       R-(recommendations):    Patient was transferred to primary care line    RN gave patient number to cardiology that was on patients last office visit.      JARAD Allison  [facility]  [phone]    [facility]   Monday  - Thursday 7 AM - 6 PM    Friday  7 AM - 5 PM     -Please call your clinic for assistance from a nurse after hours.

## 2024-11-04 NOTE — TELEPHONE ENCOUNTER
Called patient with recommendations from Nia Parra CNP:    Donya Parra CNP  Patient is following with EP at the [facility].  He had a VT ablation in July and just saw their EP clinic last month.  If he is not feeling well related to his VT, I recommend he contact them.  They should be aware that his VT has not fully been suppressed, just so they don't stop his antiarrhythmics.     Pete agreed to call the [facility] and voiced no other concerns.    TOMÁS Valladares

## 2024-11-04 NOTE — TELEPHONE ENCOUNTER
Patient was called today and he states that his device clinic at [facility] in [place], is seeing some episodes of VT.  As of now we are not able to access those records in care everywhere.   We will call [facility] to try to locate those records.  Patient states that he feeling fine, he states that he had a recent video visit with Dr Wills.  Patient was informed that we will notify Dr Wills of the VT once we have those records.  .    Angy Lucia RN on 11/4/2024 at 4:19 PM

## 2024-11-04 NOTE — TELEPHONE ENCOUNTER
Health Call Center    Phone Message    May a detailed message be left on voicemail: yes     Reason for Call: Other: pt is calling about his pacemaker and there are issues being reported from team in [place]. [facility] device team does not follow pt for device. Please call pt back to discuss.      Action Taken: Other: cardiology     Travel Screening: Not Applicable      Thank you!  [facility]        Date of Service:

## 2024-11-04 NOTE — PROGRESS NOTES
[facility]     CARDIOLOGY HEART FAILURE OUTPATIENT PROGRESS NOTE       Subjective     Patient ID: Pete Trejo is a 59 y.o. patient, who presents to the heart failure clinic for a follow-up visit.  He has a history of coronary artery disease (PCI to the second diagonal branch LAD 7/2017, PCI to the LAD 12/3/2010, PCI to diagonal 7/2009), ischemic cardiomyopathy s/p CRT-D implant 2011 with generator change 6/2022, systolic heart failure, ventricular tachycardia (partial VT ablation 6/2020, extensive substrate modification/VT ablation at [facility] ([place], [place]) 11/2021), paroxysmal atrial fibrillation anticoagulated with Xarelto, hypertension, dyslipidemia, diabetes mellitus type 2, sleep apnea. He is accompanied by his son.     Pete was seen by Dr. Casi Alan at the [facility] for advanced heart failure therapies on 4/24/2024 - 4/26/2024. He underwent right heart catheterization at that time.  47 mmHg, RV 32/7, PA 32/15/20, PCWP 12 mmHg, CO (Becki) 6.4 L/min, CI (Becki) 2.7 L/min/m², CO (thermo) 5.8 L/min, CI (Thermo) 2.4 L/min/m².  Cardiopulmonary stress test revealed severely impaired exercise capacity    Pete underwent successful catheter ablation of ischemic VT with scar modification and success PVC ablation at the [facility] on 7/1/2024    I saw Pete in the heart failure clinic for a follow-up visit on 7/16/2024.  He was feeling dehydrated and I decreased his furosemide to 20 mg daily.  When he was contacted on 8/2/2024 to see how he was doing on the decreased furosemide dose.  He stated that he started to retain fluid and he returned to furosemide 40 mg daily.    Pete had follow-up with Daniel Machado CNP on 8/6/2024.  He was stable from a cardiovascular standpoint and no changes were made.    I saw Pete in the heart failure clinic for a follow-up visit on 9/11/2024. He was stable from a heart failure standpoint and no changes were made.      The device clinic received transmission where Pete received 6 episodes of ATP for VT from 11:57pm 10/31/24 through 1:25am on 11/1/24. EP advised he contact [facility]. Pete's device detected 10 additional episodes of ATP from 12:26am 11/1/2024 until 12:05pm on 11/1/2024.     Pete states that he has been under a lot of stress for the last couple of weeks.  He feels that when his rhythm issues started again.  He states that he had moved in with his son so things should hopefully start improving.  He questions if the stress triggered his rhythm issues.  Pete denies any shocks from his defibrillator.  He feels his breathing is at baseline.  If he engages in moderate exercise it does not take him long to get short of breath.  Pete denies PND and orthopnea.  He wears CPAP nightly.  He denies any lower extremity edema.  Pete reports a good appetite and denies abdominal bloating or fullness.  He reports that his weight has remained stable. Pete denies chest pain or discomfort.  He gets dizzy if he gets up too quickly.    HPI    Past Medical History:   Diagnosis Date    BPH (benign prostatic hyperplasia)     CAD (coronary artery disease)     Status post stent    CHF (congestive heart failure) (CMS/[facility])     Chronic combined systolic and diastolic CHF (congestive heart failure) (CMS/HCC)     LVEF 25% with grade 1 diastolic dysfunction as per echo on 1/26/2022.    CVA (cerebral vascular accident) (CMS/[facility]) 2010    Without residual deficit.    Diabetes mellitus type 2 in obese (CMS/[facility])     Dyslipidemia     GERD (gastroesophageal reflux disease)     Heart attack (CMS/HCC)     x3    Hypertension     Ischemic cardiomyopathy     Morbid obesity with BMI of 40.0-44.9, adult (CMS/[facility])     Paroxysmal atrial fibrillation (CMS/HCC)     On Xarelto    V-tach (CMS/[facility])     AICD in place       Past Surgical History:   Procedure Laterality Date    ABLATION VT N/A 06/09/2020    Procedure: Ablation VT;  Surgeon:  Wilfredo Montana MD;  Location: [facility] EP Lab;  Service: Electrophysiology;  Laterality: N/A;  Check with Dr. Montana in the a.m. regarding scheduling    APPENDECTOMY      BILATERAL HEART CATH N/A 12/5/2023    Procedure: Bilateral heart cath;  Surgeon: Jitendra Post MD;  Location: [facility] Cath Lab;  Service: Cardiovascular;  Laterality: N/A;    COLONOSCOPY N/A 7/25/2023    Procedure: COLONOSCOPY with Polypectomy;  Surgeon: Hortencia Carson MD;  Location: [facility] Endoscopy;  Service: Endoscopy;  Laterality: N/A;    CORONARY ARTERY STENTING  2009    2.5 X 24-mm Taxus DIMAS to first diagonal. 3.0 X 8-mm Taxus stent to proximal LAD just proximal to diagonal.    CORONARY ARTERY STENTING  2010    3.5 X 15-mm Promus DIMAS to totally occluded LAD    CORONARY ARTERY STENTING  2011    2.25 X 30-mm Resolute DIMAS to 2nd diagonal.  Balloon angioplasty through strut to improve blood flow to distal LAD    CORONARY ARTERY STENTING  07/28/2017    second diagonal branch LAD Resolute 2.25 x 30-mm DIMAS    ICD DC GEN CHANGE N/A 6/20/2022    Procedure: BI-V ICD Gen Change;  Surgeon: Wilfredo Montana MD;  Location: [facility] EP Lab;  Service: Cardiovascular;  Laterality: N/A;    ICD SC NEW  2011    [employer] Cognis Model #N119, Serial #[id number]. [employer] Reliance Model #0185, Serial #[id number]. LV lead Acuity Model #45+91, Serial #[id number]. Atrial lead Model #4469, Serial #[id number]    OXIMETRY RUN N/A 12/5/2023    Procedure: OXIMETRY RUN;  Surgeon: Jitendra Post MD;  Location: [facility] Cath Lab;  Service: Cardiovascular;  Laterality: N/A;       Allergies   Allergen Reactions    Morphine      ANXIETY    Tramadol      ANXIETY         Current Outpatient Medications:     potassium chloride 10 mEq CR tablet, Take 3 tablets (30 mEq total) by mouth daily, Disp: 90 tablet, Rfl: 11    furosemide (LASIX) 40 mg tablet, Take 1 tablet (40 mg total) by mouth daily, Disp: 30 tablet, Rfl: 11    mexiletine (MEXITIL) 250 mg capsule, Take 1 capsule (250 mg total) by mouth every 8  (eight) hours, Disp: 90 capsule, Rfl: 11    sotaloL (BETAPACE) 120 mg tablet, Take 1.5 tablets (180 mg total) by mouth every 12 (twelve) hours, Disp: 270 tablet, Rfl: 2    rivaroxaban (Xarelto) 20 mg tablet, Take 1 tablet (20 mg total) by mouth daily, Disp: 90 tablet, Rfl: 3    empagliflozin (JARDIANCE) 10 mg tablet, Take 1 tablet (10 mg total) by mouth daily, Disp: 90 tablet, Rfl: 3    sacubitriL-valsartan (ENTRESTO)  mg tablet, Take 1 tablet by mouth 2 (two) times a day, Disp: 180 tablet, Rfl: 3    spironolactone (ALDACTONE) 50 mg tablet, Take 1 tablet (50 mg total) by mouth daily, Disp: 90 tablet, Rfl: 3    rosuvastatin (Crestor) 40 mg tablet, Take 1 tablet (40 mg total) by mouth nightly, Disp: 30 tablet, Rfl: 1    nitroglycerin (NITROSTAT) 0.4 mg SL tablet, Place 1 tablet (0.4 mg total) under the tongue every 5 (five) minutes as needed for chest pain, Disp: 26 tablet, Rfl: 1    carvediloL (Coreg) 25 mg tablet, Take 2 tablets (50 mg total) by mouth 2 (two) times a day with meals, Disp: 180 tablet, Rfl: 3    magnesium oxide (MAG-OX) 400 mg (241.3 mg magnesium) tablet, Take 1 tablet (400 mg total) by mouth 2 (two) times a day, Disp: 180 tablet, Rfl: 3    aspirin 81 mg EC tablet, Take 1 tablet (81 mg total) by mouth daily, Disp: 90 tablet, Rfl: 3    tamsulosin (FLOMAX) 0.4 mg capsule, Take 1 capsule (0.4 mg total) by mouth daily, Disp: , Rfl:     cholecalciferol, vitamin D3, 25 mcg (1,000 unit) tablet, Take 1 tablet (1,000 Units total) by mouth daily, Disp: , Rfl:     albuterol HFA (PROVENTIL HFA;VENTOLIN HFA) 90 mcg/actuation inhaler, Inhale 2 puffs every 6 (six) hours as needed for wheezing or shortness of breath, Disp: 1 Inhaler, Rfl: 1    pantoprazole (PROTONIX) 40 mg EC tablet, Take 1 tablet (40 mg total) by mouth daily, Disp: , Rfl:     Family History   Problem Relation Age of Onset    Heart attack Mother 62    Other Mother         Abdominal Aortic Aneurysm    Lung cancer Mother     Colon cancer Father          Cause of death    Diabetes Brother         Non-Insulin Dependent    Heart attack Brother 51        w/ Stents    Heart disease Brother     Other Son         Well       Social History     Socioeconomic History    Marital status: Single     Spouse name: Not on file    Number of children: Not on file    Years of education: Not on file    Highest education level: Not on file   Occupational History    Not on file   Tobacco Use    Smoking status: Former     Current packs/day: 0.00     Average packs/day: 0.8 packs/day for 30.0 years (22.5 ttl pk-yrs)     Types: Cigarettes     Start date: 1990     Quit date: 2020     Years since quittin.4    Smokeless tobacco: Never   Vaping Use    Vaping status: Never Used   Substance and Sexual Activity    Alcohol use: Not Currently     Comment: Quit drinking in     Drug use: Not Currently    Sexual activity: Yes     Partners: Female   Other Topics Concern    Not on file   Social History Narrative    Not on file     Social Determinants of Health     Financial Resource Strain: Unknown (2020)    Overall Financial Resource Strain (CARDIA)     Difficulty of Paying Living Expenses: Patient declined   Food Insecurity: No Food Insecurity (2023)    Hunger Vital Sign     Worried About Running Out of Food in the Last Year: Never true     Ran Out of Food in the Last Year: Never true   Transportation Needs: No Transportation Needs (2023)    PRAPARE - Transportation     Lack of Transportation (Medical): No     Lack of Transportation (Non-Medical): No   Physical Activity: Not on file   Stress: Not on file   Social Connections: Not on file   Intimate Partner Violence: Not At Risk (2023)    Humiliation, Afraid, Rape, and Kick questionnaire     Fear of Current or Ex-Partner: No     Emotionally Abused: No     Physically Abused: No     Sexually Abused: No   Housing Stability: Low Risk  (2023)    Housing Stability Vital Sign     Unable to Pay for Housing in  "the Last Year: No     Number of Places Lived in the Last Year: 1     Unstable Housing in the Last Year: No       Review of Systems   Constitutional: Negative for decreased appetite, malaise/fatigue, weight gain and weight loss.   Cardiovascular:  Positive for dyspnea on exertion, irregular heartbeat and palpitations. Negative for chest pain, leg swelling, near-syncope, orthopnea, paroxysmal nocturnal dyspnea and syncope.   Respiratory:  Positive for sleep disturbances due to breathing. Negative for cough, shortness of breath and sputum production.    Hematologic/Lymphatic: Does not bruise/bleed easily.   Gastrointestinal:  Negative for bloating.   Neurological:  Positive for dizziness and light-headedness. Negative for weakness.       Objective     Vitals:    11/11/24 0849   BP: 114/62   Pulse: 56  Comment: 56 on EKG, 37 on pulse ox, 42 when palpated.   SpO2: 96%  Comment: RA   Weight: 122.2 kg (269 lb 6.4 oz)  Comment: Home wt \"about the same\"   Patient Position: Sitting           Sodium   Date Value Ref Range Status   09/11/2024 136 135 - 145 mmol/L Final     Potassium   Date Value Ref Range Status   09/11/2024 4.6 3.5 - 5.1 MMOL/L Final     Chloride   Date Value Ref Range Status   09/11/2024 106 98 - 107 mmol/L Final     CO2   Date Value Ref Range Status   09/11/2024 22 21 - 32 mmol/L Final     BUN   Date Value Ref Range Status   09/11/2024 14 7 - 25 mg/dL Final     Creatinine   Date Value Ref Range Status   09/11/2024 1.02 0.70 - 1.30 mg/dL Final     Glucose   Date Value Ref Range Status   09/11/2024 104 70 - 105 mg/dL Final     Calcium   Date Value Ref Range Status   09/11/2024 9.9 8.6 - 10.3 mg/dL Final       Physical Exam  Constitutional:       Appearance: He is well-developed.   HENT:      Head: Normocephalic.      Mouth/Throat:      Mouth: Mucous membranes are moist.   Neck:      Vascular: No JVD.   Cardiovascular:      Rate and Rhythm: Normal rate and regular rhythm.      Pulses: Intact distal pulses.      " Heart sounds: Normal heart sounds. No murmur heard.  Pulmonary:      Effort: Pulmonary effort is normal. No respiratory distress.      Breath sounds: No decreased breath sounds, wheezing, rhonchi or rales.   Abdominal:      General: Bowel sounds are normal. There is no distension.      Palpations: Abdomen is soft.   Musculoskeletal:         General: Normal range of motion.      Right lower leg: No edema.      Left lower leg: No edema.   Skin:     General: Skin is warm and dry.   Neurological:      Mental Status: He is alert and oriented to person, place, and time.   Psychiatric:         Behavior: Behavior normal.         Assessment/Plan     Pete was seen today for congestive heart failure.    Diagnoses and all orders for this visit:    Chronic systolic heart failure (CMS/HCC)  -     ECG 12 lead  -     Basic metabolic panel Blood, Venous  -     Pro B-Type Natriuretic Peptide Blood, Venous  -     Magnesium Blood, Venous  -     empagliflozin (JARDIANCE) 10 mg tablet; Take 1 tablet (10 mg total) by mouth daily  -     sacubitriL-valsartan (ENTRESTO)  mg tablet; Take 1 tablet by mouth 2 (two) times a day  -     spironolactone (ALDACTONE) 50 mg tablet; Take 1 tablet (50 mg total) by mouth daily    Coronary artery disease involving native coronary artery of native heart without angina pectoris    Ischemic cardiomyopathy  -     empagliflozin (JARDIANCE) 10 mg tablet; Take 1 tablet (10 mg total) by mouth daily  -     sacubitriL-valsartan (ENTRESTO)  mg tablet; Take 1 tablet by mouth 2 (two) times a day  -     spironolactone (ALDACTONE) 50 mg tablet; Take 1 tablet (50 mg total) by mouth daily    VT (ventricular tachycardia) (CMS/[facility])  -     ECG 12 lead    Paroxysmal atrial fibrillation (CMS/[facility])  -     ECG 12 lead            Ischemic cardiomyopathy  CAD status post multiple stents  Paroxysmal atrial fibrillation, anticoagulated with Xarelto  Echocardiogram on 10/19/2023 revealed an ejection fraction of 25%.   Large anteroapical aneurysm.  No LV mural thrombi.  Grade 2 left-ventricular diastolic abnormality.  Normal RV size and function.  Patient has history of CAD s/p multiple stent  Patient has history of paroxysmal atrial fibrillation.  [id number] score 6.  Continue anticoagulation with Xarelto 20 mg daily  Patient has history of ventricular tachycardia s/p ablations.   EKG today reveals AV paced complexes with PVCs, rate 56 bpm.  Device: CRT-D implanted in 2011 with generator change in 2020.  Device check 11/3/2024 revealed 93% a pacing, 96% CRT pacing.  10 episodes suggestive of VT resolved with ATP.  GDMT  ACEI/ARB/ARNI: Continue Entresto  mg twice daily  Beta-Blocker: Continue carvedilol 50 mg twice daily  Aldosterone antagonist: Continue spironolactone 50 mg daily  SGLT2 inhibitor: Continue Jardiance 10 mg daily  Advise routine cardiac exercise  Continue CPAP nightly  Cardiac rehab: Previously referred  Continue to follow with the [facility] as scheduled    Chronic systolic heart failure  NYHA Class: II-III  Heart failure stage: C  Volume status today: Euvolemic on exam  Diuretic strategy: Continue furosemide 40 mg daily  Continue to weigh daily and contact our clinic with a 3 pound weight gain overnight or 5 pounds in a week  Follow a low-sodium (2000mg), heart healthy diet and 2 L fluid restriction  Follow-up in the heart failure clinic in 3 months, or sooner if needed  Advise patient contact the clinic with increasing symptoms or concerns    Refractory ventricular tachycardia  Status post partial VT ablation at [facility] in 2020. Extensive repeat VT ablation at the [facility] in 2021.  VT and PVC ablations at [facility] 7/1/2024  Device detected several episodes of ATP from VT from 10/31/24 until 11/1/24.   Continue sotalol 180 mg twice daily and mexiletine 250 mg 3 times daily per /[facility]  Continue to follow with [facility] as  scheduled  Patient is due to see Dr. Wu with EP in for a device check   And we will get him scheduled for those today        Return in about 3 months (around 2/11/2025).            A voice recognition program was used to aid in documentation of this record. Sometimes words are not printed exactly as they were spoken.  While efforts were made to carefully edit and correct any inaccuracies, some errors may be present; please take these into context.  Please contact the provider if errors are identified.

## 2024-11-06 ENCOUNTER — TRANSFERRED RECORDS (OUTPATIENT)
Dept: HEALTH INFORMATION MANAGEMENT | Facility: CLINIC | Age: 59
End: 2024-11-06
Payer: MEDICARE

## 2024-11-06 ENCOUNTER — TELEPHONE (OUTPATIENT)
Dept: CARDIOLOGY | Facility: CLINIC | Age: 59
End: 2024-11-06
Payer: MEDICARE

## 2024-11-06 NOTE — TELEPHONE ENCOUNTER
"-Pete has a Dinosaur ICD.  Transmission received 11/6/24. Yellow alert - ATP therapy for VT. Total Episodes: 3. Most recent event on 11/05/24 at 1306 show a sudden onset of what appears to be VT lasting ~6s with Avg. V-Rates ~117 bpm. Event concludes with 1 single burst of ATP. Returning rhythm to AS-BiVP.     Status post partial VT ablation at [facility] in 2020. Extensive repeat VT ablation at the [facility] in 2021. VT and PVC ablations at [facility] 7/1/2024. Transmission from 11/1 and 11/3 also noting VT episodes with ATP - previously reported.   CRMS nursing contacted patient:  Spoke with patient who reports noticing his \"heart racing\" 3 different times on 11/5. He states it lasts for a few minutes then goes away. Denies any chest pain or dizziness. He is followed at [facility] by EP he states he did call them on 11/4, he was told by them they would be reaching out to [facility] for the transmissions from his device. He has not heard back from them yet. Patient states he has been going through a stressful time over the past couple weeks and has been trying to remain calm. Medications reviewed with pt he has been compliant with all of them. Taking mexiletine q8h. Denies missing any doses.  Denies any current symptoms at the time of this call. Pt instructed to follow up with [facility] if he does not hear back from them. Advised pt to monitor for any worsening symptoms.    Docket was created and messages sent to EP [place]. Received call from [place] [facility] [place] today and faxed off events and last [employer] report.  Faxed to [phone].  "

## 2024-11-07 NOTE — TELEPHONE ENCOUNTER
11/12/24  Left voicemail for patient with below recommendations.    Routing to EP  to arrange follow-up visit, route recommendations to local device clinic, and request those tracings in 2 weeks.        11/7/24  Sent tracings (email) to Dr Wills and Angy Cash NP to review and advise.

## 2024-11-11 ENCOUNTER — OFFICE VISIT (OUTPATIENT)
Dept: CARDIOLOGY | Facility: CLINIC | Age: 59
End: 2024-11-11
Payer: MEDICARE

## 2024-11-11 ENCOUNTER — TELEPHONE (OUTPATIENT)
Dept: CARDIOLOGY | Facility: CLINIC | Age: 59
End: 2024-11-11
Payer: MEDICARE

## 2024-11-11 ENCOUNTER — LAB (OUTPATIENT)
Dept: LAB | Facility: CLINIC | Age: 59
End: 2024-11-11
Payer: MEDICARE

## 2024-11-11 VITALS
WEIGHT: 269.4 LBS | BODY MASS INDEX: 37.57 KG/M2 | OXYGEN SATURATION: 96 % | SYSTOLIC BLOOD PRESSURE: 114 MMHG | DIASTOLIC BLOOD PRESSURE: 62 MMHG | HEART RATE: 56 BPM

## 2024-11-11 DIAGNOSIS — I50.22 CHRONIC SYSTOLIC HEART FAILURE (CMS/HCC): Primary | ICD-10-CM

## 2024-11-11 DIAGNOSIS — I25.10 CORONARY ARTERY DISEASE INVOLVING NATIVE CORONARY ARTERY OF NATIVE HEART WITHOUT ANGINA PECTORIS: ICD-10-CM

## 2024-11-11 DIAGNOSIS — I25.5 ISCHEMIC CARDIOMYOPATHY: ICD-10-CM

## 2024-11-11 DIAGNOSIS — I47.20 VT (VENTRICULAR TACHYCARDIA) (CMS/HCC): ICD-10-CM

## 2024-11-11 DIAGNOSIS — I48.0 PAROXYSMAL ATRIAL FIBRILLATION (CMS/HCC): ICD-10-CM

## 2024-11-11 LAB
ANION GAP SERPL CALC-SCNC: 6 MMOL/L (ref 3–11)
BUN SERPL-MCNC: 14 MG/DL (ref 7–25)
CALCIUM SERPL-MCNC: 9.8 MG/DL (ref 8.6–10.3)
CHLORIDE SERPL-SCNC: 104 MMOL/L (ref 98–107)
CO2 SERPL-SCNC: 25 MMOL/L (ref 21–32)
CREAT SERPL-MCNC: 1.03 MG/DL (ref 0.7–1.3)
EGFRCR SERPLBLD CKD-EPI 2021: 84 ML/MIN/1.73M*2
GLUCOSE SERPL-MCNC: 112 MG/DL (ref 70–105)
MAGNESIUM SERPL-MCNC: 2.3 MG/DL (ref 1.8–2.4)
NT-PROBNP SERPL-MCNC: 98 NG/L
POTASSIUM SERPL-SCNC: 4.9 MMOL/L (ref 3.5–5.1)
SODIUM SERPL-SCNC: 135 MMOL/L (ref 135–145)

## 2024-11-11 PROCEDURE — 83880 ASSAY OF NATRIURETIC PEPTIDE: CPT | Performed by: NURSE PRACTITIONER

## 2024-11-11 PROCEDURE — 36415 COLL VENOUS BLD VENIPUNCTURE: CPT | Performed by: NURSE PRACTITIONER

## 2024-11-11 PROCEDURE — 80048 BASIC METABOLIC PNL TOTAL CA: CPT | Performed by: NURSE PRACTITIONER

## 2024-11-11 PROCEDURE — 93005 ELECTROCARDIOGRAM TRACING: CPT | Performed by: NURSE PRACTITIONER

## 2024-11-11 PROCEDURE — 83735 ASSAY OF MAGNESIUM: CPT | Performed by: NURSE PRACTITIONER

## 2024-11-11 RX ORDER — POTASSIUM CHLORIDE 750 MG/1
20 TABLET, EXTENDED RELEASE ORAL DAILY
COMMUNITY
End: 2024-11-11 | Stop reason: SDUPTHER

## 2024-11-11 RX ORDER — SPIRONOLACTONE 50 MG/1
50 TABLET, FILM COATED ORAL DAILY
Qty: 90 TABLET | Refills: 3 | Status: SHIPPED | OUTPATIENT
Start: 2024-11-11 | End: 2025-11-11

## 2024-11-11 RX ORDER — SACUBITRIL AND VALSARTAN 97; 103 MG/1; MG/1
1 TABLET, FILM COATED ORAL 2 TIMES DAILY
Qty: 180 TABLET | Refills: 3 | Status: SHIPPED | OUTPATIENT
Start: 2024-11-11 | End: 2025-11-11

## 2024-11-11 RX ORDER — POTASSIUM CHLORIDE 750 MG/1
20 TABLET, EXTENDED RELEASE ORAL DAILY
Qty: 180 TABLET | Refills: 3 | Status: SHIPPED | OUTPATIENT
Start: 2024-11-11

## 2024-11-11 ASSESSMENT — ENCOUNTER SYMPTOMS
DIZZINESS: 1
IRREGULAR HEARTBEAT: 1

## 2024-11-11 NOTE — TELEPHONE ENCOUNTER
Called patient but got generic voicemail, so left generic message to call back.   When he calls back we will inform him to decrease his potassium from 30 mEq daily down to 20 mEq daily.  I have already faxed a new prescription to [facility].

## 2024-11-11 NOTE — TELEPHONE ENCOUNTER
----- Message from JULIO MURILLO sent at 11/11/2024  9:55 AM MST -----  Pete's renal function is normal.  Potassium is 4.9.  He may decrease his oral potassium to 20 mEq daily.  Magnesium is normal.

## 2024-11-14 ENCOUNTER — TELEPHONE (OUTPATIENT)
Dept: CARDIOLOGY | Facility: CLINIC | Age: 59
End: 2024-11-14
Payer: MEDICARE

## 2024-11-14 NOTE — TELEPHONE ENCOUNTER
"Successful ATP Therapy for VT. Total episodes: 2. Number of ATP therapy: 2. Most recent event on 11/14/24 at 0158 lasting ~7s before being terminated with 1 burst of ATP returning to AP-BiVP.     Spoke with patient who reports noticing his \"heart racing\" a couple times over the past week. He states it lasts for a few minutes then goes away. Denies any chest pain. Denies any SOB. Reports dizziness when changing positions. Patient states he has been going through a stressful time over the past couple weeks but has recently moved in with his son so he is hoping the stress will subside soon. He does note the \"episodes\" he experiences have been happening less than at the beginning of the month. Medications reviewed with pt he has been compliant with all of them. Taking mexiletine q8h. Denies missing any doses. He was seen by HF clinic on 11/11. His PO potassium was just decreased due to his levels come back at 4.9 on 11/11. Complaint with CPAP. He is followed at [facility] by EP he states he did call them on 11/4, he was told by them they would be reaching out to [facility] to receive the transmissions from his device. Per EHR they were faxed. He has not heard back from them yet.     Denies any current symptoms at the time of this call. Advised pt to follow up with [facility] as it has been a bit since he has heard back from them. Pt has appt with Dr. Wu 12/5. Advised pt to contact clinic with any worsening symptoms or if any concerns arise. -[facility]: CRT-D  Implanted: 06/20/2022  Indication: VT/VF  Folllowing: Dr. Wu      Sent to EP APPs  "

## 2024-12-03 DIAGNOSIS — I48.0 PAROXYSMAL ATRIAL FIBRILLATION (CMS/HCC): ICD-10-CM

## 2024-12-03 RX ORDER — ASPIRIN 81 MG/1
81 TABLET ORAL DAILY
Qty: 90 TABLET | Refills: 2 | Status: SHIPPED | OUTPATIENT
Start: 2024-12-03

## 2024-12-05 ENCOUNTER — OFFICE VISIT (OUTPATIENT)
Dept: CARDIOLOGY | Facility: CLINIC | Age: 59
End: 2024-12-05
Payer: MEDICARE

## 2024-12-05 ENCOUNTER — TELEPHONE (OUTPATIENT)
Dept: CARDIOLOGY | Facility: CLINIC | Age: 59
End: 2024-12-05
Payer: MEDICARE

## 2024-12-05 ENCOUNTER — TRANSFERRED RECORDS (OUTPATIENT)
Dept: HEALTH INFORMATION MANAGEMENT | Facility: CLINIC | Age: 59
End: 2024-12-05
Payer: MEDICARE

## 2024-12-05 VITALS
SYSTOLIC BLOOD PRESSURE: 100 MMHG | BODY MASS INDEX: 38.11 KG/M2 | HEIGHT: 71 IN | HEART RATE: 60 BPM | DIASTOLIC BLOOD PRESSURE: 60 MMHG | OXYGEN SATURATION: 94 % | WEIGHT: 272.2 LBS

## 2024-12-05 DIAGNOSIS — I47.20 VT (VENTRICULAR TACHYCARDIA) (CMS/HCC): Primary | ICD-10-CM

## 2024-12-05 PROCEDURE — 93010 ELECTROCARDIOGRAM REPORT: CPT | Performed by: INTERNAL MEDICINE

## 2024-12-05 PROCEDURE — 93005 ELECTROCARDIOGRAM TRACING: CPT | Performed by: INTERNAL MEDICINE

## 2024-12-05 PROCEDURE — G0463 HOSPITAL OUTPT CLINIC VISIT: HCPCS | Performed by: INTERNAL MEDICINE

## 2024-12-05 PROCEDURE — 99213 OFFICE O/P EST LOW 20 MIN: CPT | Mod: 25 | Performed by: INTERNAL MEDICINE

## 2024-12-05 RX ORDER — LANOLIN ALCOHOL/MO/W.PET/CERES
400 CREAM (GRAM) TOPICAL 2 TIMES DAILY
Qty: 180 TABLET | Refills: 3 | Status: SHIPPED | OUTPATIENT
Start: 2024-12-05 | End: 2025-12-05

## 2024-12-05 ASSESSMENT — PAIN SCALES - GENERAL: PAINLEVEL_OUTOF10: 0-NO PAIN

## 2024-12-05 NOTE — TELEPHONE ENCOUNTER
Just received this alert on this pt:    Yellow alert for successful ATP therapy. Stored EGMs are consistent with or suggestive of Ventricular Tachycardia. Number of ATP therapy: 3. Most recent event on 12/04/24 at 1107. Event shows a sudden onset of V>A with a V-Rate ~117 bpm lasting ~7s. Treated with single burst of ATP. Rhythm converts back to AP-BiVP.    He has been contacted by [facility] team in the past - most recently on 11/14 for VT with ATP therapy.   He has an appt today with Dr. Wu at 9:00am.

## 2024-12-05 NOTE — PROGRESS NOTES
ELECTROPHYSIOLOGY CONSULT    Chief Complaint:    The encounter diagnosis was VT (ventricular tachycardia) (CMS/HCC)..    HPI:    Pete Trejo is a very pleasant 59 y.o. y/o male who presents with a PMH significant for   Patient Active Problem List   Diagnosis    Ischemic cardiomyopathy    Hypertension    Hyperlipidemia    Automatic implantable cardioverter-defibrillator in situ    Presence of stent in coronary artery    Coronary artery disease involving native coronary artery of native heart without angina pectoris    Chronic systolic heart failure (CMS/HCC)    Sustained ventricular tachycardia (CMS/HCC)    Chronic anticoagulation    History of ventricular tachycardia    Paroxysmal atrial fibrillation (CMS/HCC)    Long term current use of antiarrhythmic drug    AICD discharge    Gastroesophageal reflux disease without esophagitis    Benign prostatic hyperplasia without lower urinary tract symptoms    Diabetes mellitus type 2 in obese (CMS/HCC)    Moderate obesity    Elevated troponin    Lymphocytosis (symptomatic)    VT (ventricular tachycardia) (CMS/HCC)         Pete  presents today in follow-up for his ischemic cardiomyopathy sustained ventricular arrhythmias.  He has a history of chronic systolic heart failure and scar driven ventricular tachycardia.  He has been given a biventricular defibrillator which has been functioning normally.  He has had several attempts at VT ablation including 1 at our institution, 1 at the [facility] and most recently had ablation performed at the [facility] where they targeted both scar modification and a distinct outflow tract PVC which was seen with significant frequency.    Pete have been doing generally well since his ablation which occurred in July of this year.  In November he began to have more frequent bouts of VT which has been successfully treated with ATP, no shocks have been delivered.  He does not recollect missing any doses of his  medications but has noted that around the time of the increase in his ventricular ectopy he was going through a break-up of a longtime relationship which caused him significant psychological stress.  He notes that he is able to feel the ATP but has not had syncope or presyncope.  He denies symptoms of worsening heart failure.    Patient history items personally reviewed:     Tobacco  Allergies  Meds  Problems  Med Hx  Surg Hx  Fam Hx           Medications:    Current Outpatient Medications   Medication Sig Dispense Refill    aspirin 81 mg EC tablet Take 1 tablet (81 mg total) by mouth daily 90 tablet 2    empagliflozin (JARDIANCE) 10 mg tablet Take 1 tablet (10 mg total) by mouth daily 90 tablet 3    sacubitriL-valsartan (ENTRESTO)  mg tablet Take 1 tablet by mouth 2 (two) times a day 180 tablet 3    spironolactone (ALDACTONE) 50 mg tablet Take 1 tablet (50 mg total) by mouth daily 90 tablet 3    potassium chloride 10 mEq CR tablet Take 2 tablets (20 mEq total) by mouth daily (Patient taking differently: Take 3 tablets (30 mEq total) by mouth daily) 180 tablet 3    furosemide (LASIX) 40 mg tablet Take 1 tablet (40 mg total) by mouth daily 30 tablet 11    rosuvastatin (Crestor) 40 mg tablet Take 1 tablet (40 mg total) by mouth nightly 30 tablet 1    nitroglycerin (NITROSTAT) 0.4 mg SL tablet Place 1 tablet (0.4 mg total) under the tongue every 5 (five) minutes as needed for chest pain 26 tablet 1    mexiletine (MEXITIL) 250 mg capsule Take 1 capsule (250 mg total) by mouth every 8 (eight) hours 90 capsule 11    carvediloL (Coreg) 25 mg tablet Take 2 tablets (50 mg total) by mouth 2 (two) times a day with meals 180 tablet 3    sotaloL (BETAPACE) 120 mg tablet Take 1.5 tablets (180 mg total) by mouth every 12 (twelve) hours 270 tablet 2    rivaroxaban (Xarelto) 20 mg tablet Take 1 tablet (20 mg total) by mouth daily 90 tablet 3    tamsulosin (FLOMAX) 0.4 mg capsule Take 1 capsule (0.4 mg total) by mouth  daily      cholecalciferol, vitamin D3, 25 mcg (1,000 unit) tablet Take 1 tablet (1,000 Units total) by mouth daily      albuterol HFA (PROVENTIL HFA;VENTOLIN HFA) 90 mcg/actuation inhaler Inhale 2 puffs every 6 (six) hours as needed for wheezing or shortness of breath 1 Inhaler 1    pantoprazole (PROTONIX) 40 mg EC tablet Take 1 tablet (40 mg total) by mouth daily      magnesium oxide (MAG-OX) 400 mg (241.3 mg magnesium) tablet Take 1 tablet (400 mg total) by mouth 2 (two) times a day 180 tablet 3     No current facility-administered medications for this visit.          ROS:    Non contributory except as noted in the HPI    Physical Exam:    Vitals:    Vitals:    12/05/24 0850   BP: 100/60   Pulse: 60   SpO2: 94%       General:  Well developed, well nourished male in no apparent distress.  HEENT:  NC/AT, sclerae anicteric, no redness  Cardiac:  RRR, no M/R/G  Lungs:  Clear, good air entry, no wheezes or rales  Ext: radial pulses +2/4     Testing Personally Reviewed:    Echo Results    Echo Interpretation Summary    Results for orders placed during the hospital encounter of 10/18/23    US Echo complete    Interpretation Summary    Normal left ventricular wall thickness.    Biplane ejection fraction is 25%.    Severe left ventricular systolic dysfunction.  Large anteroapical aneurysm.  Only the basilar left ventricular segments have preserved contractility.  No LV mural thrombi.    Grade II (moderate) left ventricular diastolic abnormality.    Elevated left ventricular filling pressure.    The left atrium is severely dilated.    The right atrium is moderately dilated.    Normal central venous pressure (0-5 mmHg).    No pericardial effusion.    Inadequate TR spectral Doppler to accurately assess right ventricular systolic pressure.    Comment: No obvious changes from the January 2022 echo.       Lab Results   Component Value Date    EF 25 10/19/2023    LASIZE 4.46 09/19/2021    LAVOL 126 10/19/2023        ECG: The  tracing performed in the office today shows biventricular pacing at a rate of 60 bpm.  There is a single PVC which has a left bundle morphology positive in the 2 negative in lead III and isoelectric in aVF, precordial transition is at  V4    VT/PVC ablation: 7/2024 [place]  ASSESSMENT:  1. Sustained monomorphic VT refractory to AAT.  2. Successful catheter ablation of ischemic VT with scar modification  3. Successful PVC ablation.    PLAN:  1. 2 hours of bedrest, anticipate discharge later today.  2. Follow up in clinic in 3 months; continue current mediations. Restart anticoagulation tonight.  3. If patient has recurrent arrhythmia would plan on bringing him back for epicardial ablation.     Cardiac Monitors:    Stress:   Lab Results   Component Value Date    SUMMEDDIFFER 1 07/11/2021    TID 1.04 07/11/2021    NUCEF 16 06/08/2020        Assessment and Plan:    Pete is a very pleasant 59 y.o. male with:      Diagnoses and all orders for this visit:    VT (ventricular tachycardia) (CMS/HCC)  -     ECG 12 lead -Normal, Today  -     magnesium oxide (MAG-OX) 400 mg (241.3 mg magnesium) tablet; Take 1 tablet (400 mg total) by mouth 2 (two) times a day    He is doing generally well but is continuing to have relatively slow runs of VT.  Up to this point he has been successfully treated with ATP and no shocks have been required.  It may well be that the increased emotional stress that he has recently been under is the  for his arrhythmia.  If this is the case his arrhythmias hopefully will quiet down in the coming weeks.  If he continues to deal with frequent bouts of ventricular arrhythmia we will need to discuss alterations to his antiarrhythmic therapy or consideration of repeat catheter ablation.    His repolarization today is within acceptable limits on the current dose of antiarrhythmic therapy.  I have recommended we follow-up with him in 6 months to reassess GFR and QTc on sotalol therapy.    Thank you for  allowing our participation in the care of this very pleasant patient.  Please do not hesitate to contact us at [phone] with any questions or concerns that you may have.    Dougie Wu, DO     Please note that portions of this document were generated with speech recognition software.  Reasonable efforts have been made to correct any transcriptional errors however some typographical errors may remain.

## 2024-12-12 DIAGNOSIS — I47.20 VENTRICULAR TACHYARRHYTHMIA (CMS/HCC): ICD-10-CM

## 2024-12-16 ENCOUNTER — TRANSFERRED RECORDS (OUTPATIENT)
Dept: HEALTH INFORMATION MANAGEMENT | Facility: CLINIC | Age: 59
End: 2024-12-16
Payer: MEDICARE

## 2024-12-16 ENCOUNTER — TELEPHONE (OUTPATIENT)
Dept: CARDIOLOGY | Facility: CLINIC | Age: 59
End: 2024-12-16
Payer: MEDICARE

## 2024-12-16 NOTE — TELEPHONE ENCOUNTER
"Pete has a [employer] CRT-D.    Transmission received today for Yellow alert for ATP therapy for VT. Occurred on Dec 5, 2024 at 4:14 PM, lasting 6 seconds at an average V rate of 114 bpm, resolved with 1 burst of ATP.   Pt has hx of VT Status post partial VT ablation at [facility] in 2020. Extensive repeat VT ablation at the [facility] in 2021. VT and PVC ablations at [facility] 7/1/2024.   [facility] nursing contacted patient:   Spoke with pt who does not recall a specific episode on 12/5 but states he has been feeling a lot better since November when he had multiple episodes of VT with ATP therapy.   He reports he was under a lot of stress and now that things have settled down he is hoping his episodes will start to decrease.   He saw Dr. Wu in office 12/5 who stated \" If he continues to deal with frequent bouts of ventricular arrhythmia we will need to discuss alterations to his antiarrhythmic therapy or consideration of repeat catheter ablation.\"   Medications reviewed with pt, he reports compliance with all of them.   Denies any current symptoms at the time of this call.   Pt reported that he has an virtual appt with [facility] on 12/18 and they requested a recent transmission from his device to be fax to them for the appt.   Message relayed to clinic. (Fax was sent 12/16/24 @~ 1200)  Signs/symptoms to monitor for reviewed with pt. Advised pt to call clinic with any questions or concerns.    Docket was created and sent to [employer].  Telephone message saved into patient's chart.  "

## 2024-12-16 NOTE — PROGRESS NOTES
ELECTROPHYSIOLOGY CLINIC VIRTUAL VISIT    Assessment/Recommendations   Assessment/Plan:    Mr. Russell is a 58 year old male who has a medical history significant for anterior wall MI, CAD s/p PCIs LAD, D1, D2 2009, 2010, 2011, & 2017, ICM LVEF 25%, s/p CRTD 2011 s/p gen change 6/2022, VT s/p prior ablations 6/2020 & 11/2021 & 7/1/2024, PAF (CHADSVASC 6 on Xarelto), HTN, HLD, prior LV thrombus, DM2, CVA, BPH, and obesity.      CAD s/p multiple PCIs LAD, D2, D2 2009, 2010, 2011, 2017  ICM LVEF 25%, NYHA III  Ventricular Tachycardia:  1. ACEi/ARB/ARNi: Continue Entresto.  2. BB: Continue Coreg.  3. Aldosterone antagonist: Continue Spironolactone.  4. SGLT2i: Continue Jardiance.  5. Antiplatlet: Continue ASA.  6. Statin: Continue Crestor.  7.  SCD prophylaxis: s/p CRTD BVP % decreased likely due to PVCs and VT episodes. BiV pacing improved to 99% post VT and PVC ablation in 7/1/2024.   8. Fluid status: Continue Lasix.   9. Nitroglycerin 0.4 mg PRN for chest pain. Place 1 tablet (0.4 mg) under the tongue every 5 minutes as needed for chest pain. Maximum of 3 doses in 15 minutes.  10. Lifestyle: Avoid tobacco, maintain a healthy weight, limit alcohol, cardiac diet, and exercise.  11. VT: He has had VT with ICD therapies despite AAT. He was originally on amiodarone and mexiletine but had breakthroughs. Amiodarone was later stopped to avoid possible long term toxicities and he was alternatively placed on Sotalol. He has had 2 VT ablation at OSH and found to have numerous VTs arising from anterior scar area (lateral/septal walls) and inferior apical aneurysm. He had continued to have recurrent arrhyhmias. He underwent another VT ablation ( msec, mid and basal anterior wall) and [facility] PVC ablation on 7/1/2024. ICD interrogation showed recurrent slow and short lived VT with avg VR of 115 bpm (V:A ratio 1:1 with some VA Wenckebach) that would terminate with ATP (VT therapy programmed at 110 bpm). Most of these episodes  occurred on Nov 1st when he was moving houses and was under stress. There is a suspicion that these episodes can be sinus tachycardia or atrial tachycardia with the 1:1 AV ratio and slow rate in the setting of stress. Will continue sotalol for now and will consider discontinuing mexiletine next visit based on VT suppression. Patient remains at baseline symptoms with similar SOB at moderate exertion. PVC burden is significantly suppressed post ablation with improved BiV pacing. If persistent VT episodes in the next 3 months, will plan for another VT ablation targeting the slow VT.     Follow up in 3 months     History of Present Illness/Subjective    Mr. Leo Russell is a 58 year old male who comes in today for EP consultation of VT.    Mr. Russell is a 58 year old male who has a medical history significant for anterior wall MI, CAD s/p PCIs LAD, D1, D2 2009, 2010, 2011, & 2017, ICM LVEF 25%, s/p CRTD 2011 s/p gen change 6/2022, VT s/p prior ablations 6/2020 & 11/2021, PAF (CHADSVASC 6 on Xarelto), HTN, HLD, prior LV thrombus, DM2, CVA, BPH, and obesity.    He has a longstanding history of CAD for which he had prior PCIs to pLAD and D1 in 2009, PCI to totally occluded LAD in 2010, PCI to D2 and Balloon angioplasty through strut to improve blood flow to distal LAD in 2011. Further D2 branch LAD PCI in 2017, and then Angiosculpt scoring balloon angioplasty to ISR of D1 stent in 2023. He has had subsequent ICM with LVEF 25% range most recently. He has been on GDMT but continued to have reduced LVEF so had a CRTD implanted in 2011 with last generator change in 6/2022. He has also had VT with ICD therapies. He had been on amiodarone. He has followed with [facility] and felt not a good candidate for advanced HF therapies due to uncontrolled VT. This lead to an ablation in 6/2020 where he was found to have recurrent numerous VTs as a result on an extensive anterior wall scar involving much of lateral and septal  walls. Ablation was felt partially successful of border zones scar areas especially inferior apex. He continued to have ICD therapies despite this and amiodarone and mexiletine. Thus,he underwent a repeat ablation VT at apical portion of the scar at [facility] in 11/2021. Amiodarone was decreased to 200 mg daily and mexiletine discontinued. Unfortunately presented to the emergency room 1/25/2022 with sensation of racing in the heart and chest fullness. He was found to be in VT at 130 bpm. He did convert spontaneously. He had felt well for 5 or 6 weeks post ablation in [place] however began experiencing recurrent symptoms with ATP for VT at 124 bpm on 1/11/2022 and then ATP x8 for VT at 139 bpm. Mexiletine was added back. He was discharged on amiodarone 200 mg daily and mexiletine 200 mg twice daily. He was seen at [facility] on 1/31/2022 and mexiletine increased to 3 times daily and he was continued on amiodarone 200 mg daily. Amiodarone is not favored for long-term use given his young age and risk for toxicities with long-term use, thus after he had 6 months free of VT, amiodarone was stopped and he was admitted in 3/2023 for Sotalol loading. He has since been having numerous episodes of slow VT requiring ATP therapy recently. He was admitted in 11/2023 to OSH for slow VT and multiple ATPs. Sotalol was increased. He had a repeat coronary angiogram in 12/2023 that showed stable native disease with 80-90% in-stent restenosis of inferior branch of D1 s/p Angiosculpt POBA and RHC showed low cardiac index with normal filling pressures. Given his persistent rhythm issues, the patient was referred to [facility] for consideration of advanced therapies. He saw Dr. Felix here who started advanced therapies work up. He was referred to EP here for an opinion about his VT management/treatment options.    EP visit 4/24/2024: He reports he feels the VT episodes with chest pressure and some dizziness, no LOC or pre-syncope. He  denies abdominal fullness/bloating or peripheral edema, shortness of breath, paroxysmal nocturnal dyspnea, orthopnea, pre-syncope, or syncope. Presenting 12 lead ECG shows  Vent Rate 60 bpm,  ms,  ms, QTc 474 ms. Device interrogation shows normal device function, stable lead parameters, and AP 86%, RVP 87%, LVP 86%. Current cardiac medications include: Coreg, Entresto, Jardiance, Lasix, Crestor, Spironolactone, Sotalol, Mexiletine, ASA, and Xarelto.     EP visit 10/2/2024: Patient underwent VT ablation (mid and basal anterior wall) and [facility] PVC ablation on 7/1/2024. Device interrogation 9/30 showed 2 short NSVT runs in late July and one short VT episode in Aug 24 that required ATP but no further events after. Prior to July 1st (day of ablation) he was having frequent episodes. His BiV pacing has improved now to 96%. Patient endorses feeling slightly weak the first 2 weeks after the procedure but endorses significant improvement after. He is currently on sotalol and mexiletine.      EP visit 12/18/2024: Patient presents for follow up. Device interrogation showed multiple slow and short VT episodes with average V rate of 115 bpm with 1:1 VA ratio that would terminate with ATP in late October to early November, mainly on Nov 1st when he was moving to a new house and he did feel some of these episodes. Low PVC burden compared to before and BiV pacing at 99%. Patient states that he has been feeling well with occasional SOB on exertion similar to baseline.     I have reviewed and updated the patient's Past Medical History, Social History, Family History and Medication List.     Cardiographics (Personally Reviewed) :   10/19/23Echo (OS Report):   Normal left ventricular wall thickness.     Biplane ejection fraction is 25%.     Severe left ventricular systolic dysfunction.  Large anteroapical   aneurysm.  Only the basilar left ventricular segments have preserved   contractility.  No LV mural thrombi.      Grade II (moderate) left ventricular diastolic abnormality.     Elevated left ventricular filling pressure.     The left atrium is severely dilated.     The right atrium is moderately dilated.     Normal central venous pressure (0-5 mmHg).     No pericardial effusion.     Inadequate TR spectral Doppler to accurately assess right ventricular   systolic pressure.     12/5/23 Coronary Angiogram (OS Report):  Conclusions:   1.  Normal right heart pressures   2.  Pulmonary capillary wedge pressure 8 mmHg   3.  Patent proximal LAD stent   4.  50% in-stent restenosis ostial first diagonal with patent superior   branch stent.  The inferior branch of the first diagonal has severe 80-90%   in-stent restenosis.  Status post 2.5 mm Angiosculpt scoring balloon   angioplasty at the ostium and 2.5 x 15 mm trek neononcompliant balloon   angioplasty in the in-stent restenotic segment   5.  Mild irregularities right coronary artery and left circumflex coronary   artery.  Similar to previous    1/11/24 CMR [facility] Overread:  1. The LV is severely dilated in cavity size. The global systolic function is severely decreased. The LVEF  is 13%. There is akinesis of all the septal and anterior segments. There is thinning of the apical and true  apical segments.     2. The RV is normal in cavity size. The global systolic function is moderately decreased. The RVEF is 38%.      3. There is moderate enlargement of both atria.     4. Valvular function could not be reliably assessed because of device-related artifacts.     5. Late gadolinium enhancement imaging is suboptimal due to device-related artifacts (wide-band LGE imaging  was not performed); however, there is evidence of a large myocardial infarction involving virtually the  entire LAD territory. The MI is near transmural for a brief portion at the mid level and is transmural at  the apical and true apical levels. This is suggestive of a late presentation MI. There is practically  no  residual viability in the LAD territory. The RCA and LCx territories appear viable.     6. There is no pericardial effusion or thickening.     7. There is no intracardiac thrombus.     CONCLUSIONS: Severe ischemic cardiomyopathy with adverse LV remodeling, LVEF of 13% and RVEF of 38%, with a  large MI involving virtually the entire LAD territory.     EGM Nov 2024         Physical Examination   There were no vitals taken for this visit.  Wt Readings from Last 3 Encounters:   10/02/24 125.2 kg (276 lb)   07/02/24 122.2 kg (269 lb 6.4 oz)   04/26/24 120.7 kg (266 lb 1.5 oz)     Physical exam NA on video visit         Medications  Allergies   Current Outpatient Medications   Medication Sig Dispense Refill    aspirin 81 MG EC tablet Take 1 tablet by mouth daily      carvedilol (COREG) 25 MG tablet Take 50 mg by mouth 2 times daily      furosemide (LASIX) 40 MG tablet Take 40 mg by mouth daily      furosemide (LASIX) 40 MG tablet Take 40 mg by mouth daily as needed (for weight gain >3 lbs overnight)      magnesium oxide (MAG-OX) 400 MG tablet Take 400 mg by mouth 2 times daily      mexiletine (MEXITIL) 250 MG capsule Take 250 mg by mouth 3 times daily      nitroGLYcerin (NITROSTAT) 0.4 MG sublingual tablet Place 1 tablet under the tongue every 5 minutes as needed      pantoprazole (PROTONIX) 40 MG EC tablet Take 40 mg by mouth daily      potassium chloride ER (K-TAB/KLOR-CON) 10 MEQ CR tablet Take 30 mEq by mouth daily      rivaroxaban ANTICOAGULANT (XARELTO) 20 MG TABS tablet Take 20 mg by mouth daily      rosuvastatin (CRESTOR) 40 MG tablet Take 40 mg by mouth every evening      sacubitril-valsartan (ENTRESTO)  MG per tablet Take 1 tablet by mouth 2 times daily      sotalol (BETAPACE) 120 MG tablet Take 180 mg by mouth every 12 hours      spironolactone (ALDACTONE) 25 MG tablet Take 50 mg by mouth daily      tamsulosin (FLOMAX) 0.4 MG capsule Take 0.4 mg by mouth daily      Vitamin D3 (VITAMIN D-1000 MAX ST)  25 mcg (1000 units) tablet Take 1,000 Units by mouth daily      Allergies   Allergen Reactions    Morphine Anxiety     previously tolerated oxycodone and hydromorphone without issue      Tramadol Anxiety     previously tolerated oxycodone and hydromorphone without issue           Lab Results (Personally Reviewed)    Chemistry/lipid CBC Cardiac Enzymes/BNP/TSH/INR   Lab Results   Component Value Date    BUN 12.4 07/01/2024     07/01/2024    CO2 20 (L) 07/01/2024     Creatinine   Date Value Ref Range Status   07/01/2024 0.88 0.67 - 1.17 mg/dL Final       Lab Results   Component Value Date    CHOL 112 04/24/2024    HDL 30 (L) 04/24/2024    LDL 51 04/24/2024      Lab Results   Component Value Date    WBC 8.2 07/01/2024    HGB 14.0 07/01/2024    HCT 40.9 07/01/2024    MCV 95 07/01/2024     07/01/2024    Lab Results   Component Value Date    TSH 1.63 04/24/2024    INR 1.33 (H) 04/24/2024        The patient states understanding and is agreeable with the plan.   Patient seen and examined with Dr. Elma Phan  PGY-8, EP    EP STAFF NOTE  Patient seen and examined and management plan personally reviewed with the patient. I agree with the note above by the CV/EP fellow.  Esteban Wills MD [facility]  Cardiology - Electrophysiology  Total time spent on patient visit, reviewing notes, imaging, labs, orders, and completing necessary documentation: 45 minutes.  >50% of visit spent on counseling patient and/or coordination of care.

## 2024-12-18 ENCOUNTER — VIRTUAL VISIT (OUTPATIENT)
Dept: CARDIOLOGY | Facility: CLINIC | Age: 59
End: 2024-12-18
Attending: INTERNAL MEDICINE
Payer: MEDICARE

## 2024-12-18 VITALS — WEIGHT: 262 LBS | BODY MASS INDEX: 36.68 KG/M2 | HEIGHT: 71 IN

## 2024-12-18 DIAGNOSIS — I47.20 VENTRICULAR TACHYCARDIA (H): ICD-10-CM

## 2024-12-18 PROCEDURE — 99215 OFFICE O/P EST HI 40 MIN: CPT | Mod: GT | Performed by: INTERNAL MEDICINE

## 2024-12-18 ASSESSMENT — PAIN SCALES - GENERAL: PAINLEVEL_OUTOF10: NO PAIN (0)

## 2024-12-18 NOTE — PATIENT INSTRUCTIONS
Plan:    Follow-up in 3-4 months virtually with local device check prior      Your Care Team:  EP Cardiology   Telephone Number     Donna Tran RN [phone]    After business hours: [phone], ask for cardiologist on-call   Non-procedure scheduling:    Radha HENSLEY   [phone]   Procedure scheduling:    Darby Randhawa   [phone]   Device Clinic (Pacemakers, ICDs, Loop Recorders)    During business hours: [phone]  After business hours:   [phone]- select option 4 and ask for job code [phone].       Cardiovascular Clinic:   [address] [place] [address]. [place] [address]      As always, thank you for trusting us with your health care needs!

## 2024-12-18 NOTE — NURSING NOTE
[facility]         Current patient location: [address] [facility] [place] [place] [address]    Is the patient currently in the state of [place]? NO, in [place] where patient lives    Visit mode:VIDEO    If the visit is dropped, the patient can be reconnected by:VIDEO VISIT: Text to cell phone:   Telephone Information:   Mobile [phone]    and VIDEO VISIT: Send to e-mail at: [email or web address]    Will anyone else be joining the visit? [employer] Son   (If patient encounters technical issues they should call [phone]    Are changes needed to the allergy or medication list? No    Are refills needed on medications prescribed by this physician? NO    Rooming Documentation:  Questionnaire(s) completed    Reason for visit: RECHECK (Went through a hard time in November and noticed heart was acting up but now seems to be a lot better. )      Caity CHONG

## 2024-12-18 NOTE — PROGRESS NOTES
Virtual Visit Details    Type of service:  Video Visit   Video Start Time: 8:24 AM  Video End Time:9:10 AM    Originating Location (pt. Location): Home    Distant Location (provider location):  On-site  Platform used for Video Visit: Zoom (Telehealth)

## 2024-12-18 NOTE — LETTER
12/18/2024      RE: Leo Russell  [address] [facility] [place] [address]       Dear Colleague,    Thank you for the opportunity to participate in the care of your patient, Leo Russell, at the [facility] [place] [facility]. Please see a copy of my visit note below.        ELECTROPHYSIOLOGY CLINIC VIRTUAL VISIT    Assessment/Recommendations   Assessment/Plan:    Mr. Russell is a 58 year old male who has a medical history significant for anterior wall MI, CAD s/p PCIs LAD, D1, D2 2009, 2010, 2011, & 2017, ICM LVEF 25%, s/p CRTD 2011 s/p gen change 6/2022, VT s/p prior ablations 6/2020 & 11/2021 & 7/1/2024, PAF (CHADSVASC 6 on Xarelto), HTN, HLD, prior LV thrombus, DM2, CVA, BPH, and obesity.      CAD s/p multiple PCIs LAD, D2, D2 2009, 2010, 2011, 2017  ICM LVEF 25%, NYHA III  Ventricular Tachycardia:  1. ACEi/ARB/ARNi: Continue Entresto.  2. BB: Continue Coreg.  3. Aldosterone antagonist: Continue Spironolactone.  4. SGLT2i: Continue Jardiance.  5. Antiplatlet: Continue ASA.  6. Statin: Continue Crestor.  7.  SCD prophylaxis: s/p CRTD BVP % decreased likely due to PVCs and VT episodes. BiV pacing improved to 99% post VT and PVC ablation in 7/1/2024.   8. Fluid status: Continue Lasix.   9. Nitroglycerin 0.4 mg PRN for chest pain. Place 1 tablet (0.4 mg) under the tongue every 5 minutes as needed for chest pain. Maximum of 3 doses in 15 minutes.  10. Lifestyle: Avoid tobacco, maintain a healthy weight, limit alcohol, cardiac diet, and exercise.  11. VT: He has had VT with ICD therapies despite AAT. He was originally on amiodarone and mexiletine but had breakthroughs. Amiodarone was later stopped to avoid possible long term toxicities and he was alternatively placed on Sotalol. He has had 2 VT ablation at OSH and found to have numerous VTs arising from anterior scar area (lateral/septal walls) and inferior apical aneurysm. He had continued to have recurrent  angelia. He underwent another VT ablation ( msec, mid and basal anterior wall) and [facility] PVC ablation on 7/1/2024. ICD interrogation showed recurrent slow and short lived VT with avg VR of 115 bpm (V:A ratio 1:1 with some VA Wenckebach) that would terminate with ATP (VT therapy programmed at 110 bpm). Most of these episodes occurred on Nov 1st when he was moving houses and was under stress. There is a suspicion that these episodes can be sinus tachycardia or atrial tachycardia with the 1:1 AV ratio and slow rate in the setting of stress. Will continue sotalol for now and will consider discontinuing mexiletine next visit based on VT suppression. Patient remains at baseline symptoms with similar SOB at moderate exertion. PVC burden is significantly suppressed post ablation with improved BiV pacing. If persistent VT episodes in the next 3 months, will plan for another VT ablation targeting the slow VT.     Follow up in 3 months     History of Present Illness/Subjective    Mr. Leo Russell is a 58 year old male who comes in today for EP consultation of VT.    Mr. Russell is a 58 year old male who has a medical history significant for anterior wall MI, CAD s/p PCIs LAD, D1, D2 2009, 2010, 2011, & 2017, ICM LVEF 25%, s/p CRTD 2011 s/p gen change 6/2022, VT s/p prior ablations 6/2020 & 11/2021, PAF (CHADSVASC 6 on Xarelto), HTN, HLD, prior LV thrombus, DM2, CVA, BPH, and obesity.    He has a longstanding history of CAD for which he had prior PCIs to pLAD and D1 in 2009, PCI to totally occluded LAD in 2010, PCI to D2 and Balloon angioplasty through strut to improve blood flow to distal LAD in 2011. Further D2 branch LAD PCI in 2017, and then Angiosculpt scoring balloon angioplasty to ISR of D1 stent in 2023. He has had subsequent ICM with LVEF 25% range most recently. He has been on GDMT but continued to have reduced LVEF so had a CRTD implanted in 2011 with last generator change in 6/2022. He has also had VT  with ICD therapies. He had been on amiodarone. He has followed with [facility] and felt not a good candidate for advanced HF therapies due to uncontrolled VT. This lead to an ablation in 6/2020 where he was found to have recurrent numerous VTs as a result on an extensive anterior wall scar involving much of lateral and septal walls. Ablation was felt partially successful of border zones scar areas especially inferior apex. He continued to have ICD therapies despite this and amiodarone and mexiletine. Thus,he underwent a repeat ablation VT at apical portion of the scar at [facility] in 11/2021. Amiodarone was decreased to 200 mg daily and mexiletine discontinued. Unfortunately presented to the emergency room 1/25/2022 with sensation of racing in the heart and chest fullness. He was found to be in VT at 130 bpm. He did convert spontaneously. He had felt well for 5 or 6 weeks post ablation in [place] however began experiencing recurrent symptoms with ATP for VT at 124 bpm on 1/11/2022 and then ATP x8 for VT at 139 bpm. Mexiletine was added back. He was discharged on amiodarone 200 mg daily and mexiletine 200 mg twice daily. He was seen at [facility] on 1/31/2022 and mexiletine increased to 3 times daily and he was continued on amiodarone 200 mg daily. Amiodarone is not favored for long-term use given his young age and risk for toxicities with long-term use, thus after he had 6 months free of VT, amiodarone was stopped and he was admitted in 3/2023 for Sotalol loading. He has since been having numerous episodes of slow VT requiring ATP therapy recently. He was admitted in 11/2023 to OS for slow VT and multiple ATPs. Sotalol was increased. He had a repeat coronary angiogram in 12/2023 that showed stable native disease with 80-90% in-stent restenosis of inferior branch of D1 s/p Angiosculpt POBA and RHC showed low cardiac index with normal filling pressures. Given his persistent rhythm issues, the patient was  referred to [facility] for consideration of advanced therapies. He saw Dr. Felix here who started advanced therapies work up. He was referred to EP here for an opinion about his VT management/treatment options.    EP visit 4/24/2024: He reports he feels the VT episodes with chest pressure and some dizziness, no LOC or pre-syncope. He denies abdominal fullness/bloating or peripheral edema, shortness of breath, paroxysmal nocturnal dyspnea, orthopnea, pre-syncope, or syncope. Presenting 12 lead ECG shows  Vent Rate 60 bpm,  ms,  ms, QTc 474 ms. Device interrogation shows normal device function, stable lead parameters, and AP 86%, RVP 87%, LVP 86%. Current cardiac medications include: Coreg, Entresto, Jardiance, Lasix, Crestor, Spironolactone, Sotalol, Mexiletine, ASA, and Xarelto.     EP visit 10/2/2024: Patient underwent VT ablation (mid and basal anterior wall) and [facility] PVC ablation on 7/1/2024. Device interrogation 9/30 showed 2 short NSVT runs in late July and one short VT episode in Aug 24 that required ATP but no further events after. Prior to July 1st (day of ablation) he was having frequent episodes. His BiV pacing has improved now to 96%. Patient endorses feeling slightly weak the first 2 weeks after the procedure but endorses significant improvement after. He is currently on sotalol and mexiletine.      EP visit 12/18/2024: Patient presents for follow up. Device interrogation showed multiple slow and short VT episodes with average V rate of 115 bpm with 1:1 VA ratio that would terminate with ATP in late October to early November, mainly on Nov 1st when he was moving to a new house and he did feel some of these episodes. Low PVC burden compared to before and BiV pacing at 99%. Patient states that he has been feeling well with occasional SOB on exertion similar to baseline.     I have reviewed and updated the patient's Past Medical History, Social History, Family History and Medication List.      Cardiographics (Personally Reviewed) :   10/19/23Echo (OS Report):   Normal left ventricular wall thickness.      Biplane ejection fraction is 25%.      Severe left ventricular systolic dysfunction.  Large anteroapical   aneurysm.  Only the basilar left ventricular segments have preserved   contractility.  No LV mural thrombi.      Grade II (moderate) left ventricular diastolic abnormality.      Elevated left ventricular filling pressure.      The left atrium is severely dilated.      The right atrium is moderately dilated.      Normal central venous pressure (0-5 mmHg).      No pericardial effusion.      Inadequate TR spectral Doppler to accurately assess right ventricular   systolic pressure.     12/5/23 Coronary Angiogram (OS Report):  Conclusions:   1.  Normal right heart pressures   2.  Pulmonary capillary wedge pressure 8 mmHg   3.  Patent proximal LAD stent   4.  50% in-stent restenosis ostial first diagonal with patent superior   branch stent.  The inferior branch of the first diagonal has severe 80-90%   in-stent restenosis.  Status post 2.5 mm Angiosculpt scoring balloon   angioplasty at the ostium and 2.5 x 15 mm trek neononcompliant balloon   angioplasty in the in-stent restenotic segment   5.  Mild irregularities right coronary artery and left circumflex coronary   artery.  Similar to previous    1/11/24 CMR [facility] Overread:  1. The LV is severely dilated in cavity size. The global systolic function is severely decreased. The LVEF  is 13%. There is akinesis of all the septal and anterior segments. There is thinning of the apical and true  apical segments.     2. The RV is normal in cavity size. The global systolic function is moderately decreased. The RVEF is 38%.      3. There is moderate enlargement of both atria.     4. Valvular function could not be reliably assessed because of device-related artifacts.     5. Late gadolinium enhancement imaging is suboptimal due to device-related artifacts  (wide-band LGE imaging  was not performed); however, there is evidence of a large myocardial infarction involving virtually the  entire LAD territory. The MI is near transmural for a brief portion at the mid level and is transmural at  the apical and true apical levels. This is suggestive of a late presentation MI. There is practically no  residual viability in the LAD territory. The RCA and LCx territories appear viable.     6. There is no pericardial effusion or thickening.     7. There is no intracardiac thrombus.     CONCLUSIONS: Severe ischemic cardiomyopathy with adverse LV remodeling, LVEF of 13% and RVEF of 38%, with a  large MI involving virtually the entire LAD territory.     [facility] Nov 2024         Physical Examination   There were no vitals taken for this visit.  Wt Readings from Last 3 Encounters:   10/02/24 125.2 kg (276 lb)   07/02/24 122.2 kg (269 lb 6.4 oz)   04/26/24 120.7 kg (266 lb 1.5 oz)     Physical exam NA on video visit         Medications  Allergies   Current Outpatient Medications   Medication Sig Dispense Refill     aspirin 81 MG EC tablet Take 1 tablet by mouth daily       carvedilol (COREG) 25 MG tablet Take 50 mg by mouth 2 times daily       furosemide (LASIX) 40 MG tablet Take 40 mg by mouth daily       furosemide (LASIX) 40 MG tablet Take 40 mg by mouth daily as needed (for weight gain >3 lbs overnight)       magnesium oxide (MAG-OX) 400 MG tablet Take 400 mg by mouth 2 times daily       mexiletine (MEXITIL) 250 MG capsule Take 250 mg by mouth 3 times daily       nitroGLYcerin (NITROSTAT) 0.4 MG sublingual tablet Place 1 tablet under the tongue every 5 minutes as needed       pantoprazole (PROTONIX) 40 MG EC tablet Take 40 mg by mouth daily       potassium chloride ER (K-TAB/KLOR-CON) 10 MEQ CR tablet Take 30 mEq by mouth daily       rivaroxaban ANTICOAGULANT (XARELTO) 20 MG TABS tablet Take 20 mg by mouth daily       rosuvastatin (CRESTOR) 40 MG tablet Take 40 mg by mouth every evening        sacubitril-valsartan (ENTRESTO)  MG per tablet Take 1 tablet by mouth 2 times daily       sotalol (BETAPACE) 120 MG tablet Take 180 mg by mouth every 12 hours       spironolactone (ALDACTONE) 25 MG tablet Take 50 mg by mouth daily       tamsulosin (FLOMAX) 0.4 MG capsule Take 0.4 mg by mouth daily       Vitamin D3 (VITAMIN D-1000 MAX ST) 25 mcg (1000 units) tablet Take 1,000 Units by mouth daily      Allergies   Allergen Reactions     Morphine Anxiety     previously tolerated oxycodone and hydromorphone without issue       Tramadol Anxiety     previously tolerated oxycodone and hydromorphone without issue           Lab Results (Personally Reviewed)    Chemistry/lipid CBC Cardiac Enzymes/BNP/TSH/INR   Lab Results   Component Value Date    BUN 12.4 07/01/2024     07/01/2024    CO2 20 (L) 07/01/2024     Creatinine   Date Value Ref Range Status   07/01/2024 0.88 0.67 - 1.17 mg/dL Final       Lab Results   Component Value Date    CHOL 112 04/24/2024    HDL 30 (L) 04/24/2024    LDL 51 04/24/2024      Lab Results   Component Value Date    WBC 8.2 07/01/2024    HGB 14.0 07/01/2024    HCT 40.9 07/01/2024    MCV 95 07/01/2024     07/01/2024    Lab Results   Component Value Date    TSH 1.63 04/24/2024    INR 1.33 (H) 04/24/2024        The patient states understanding and is agreeable with the plan.   Patient seen and examined with Dr. Elma Phan  PGY-8, EP    EP STAFF NOTE  Patient seen and examined and management plan personally reviewed with the patient. I agree with the note above by the CV/EP fellow.  Esteban Wills MD [facility]  Cardiology - Electrophysiology  Total time spent on patient visit, reviewing notes, imaging, labs, orders, and completing necessary documentation: 45 minutes.  >50% of visit spent on counseling patient and/or coordination of care.               Virtual Visit Details    Type of service:  Video Visit   Video Start Time: 8:24 AM  Video End Time:9:10  AM    Originating Location (pt. Location): Home    Distant Location (provider location):  On-site  Platform used for Video Visit: Zoom (Telehealth)      Please do not hesitate to contact me if you have any questions/concerns.     Sincerely,     Esteban Wills MD

## 2024-12-19 ENCOUNTER — TELEPHONE (OUTPATIENT)
Dept: CARDIOLOGY | Facility: CLINIC | Age: 59
End: 2024-12-19
Payer: MEDICARE

## 2024-12-26 PROCEDURE — 93295 DEV INTERROG REMOTE 1/2/MLT: CPT | Performed by: INTERNAL MEDICINE

## 2025-01-09 DIAGNOSIS — I47.20 SUSTAINED VENTRICULAR TACHYCARDIA (CMS/HCC): ICD-10-CM

## 2025-01-09 RX ORDER — MEXILETINE HYDROCHLORIDE 250 MG/1
250 CAPSULE ORAL EVERY 8 HOURS SCHEDULED
Qty: 270 CAPSULE | Refills: 0 | Status: SHIPPED | OUTPATIENT
Start: 2025-01-09 | End: 2026-01-09

## 2025-01-19 ENCOUNTER — HEALTH MAINTENANCE LETTER (OUTPATIENT)
Age: 60
End: 2025-01-19

## 2025-01-28 ENCOUNTER — TELEPHONE (OUTPATIENT)
Dept: CARDIOLOGY | Facility: CLINIC | Age: 60
End: 2025-01-28
Payer: MEDICARE

## 2025-01-28 NOTE — TELEPHONE ENCOUNTER
Left a voicemail for pt in regards to scheduling a device check. Pt currently has an appt with Casi Duarte on 2/11/25 at 8:30am. Was attempting to coordinate the device check to be done either 8:00am or 9:30am for the same day.

## 2025-01-29 ENCOUNTER — MYC MEDICAL ADVICE (OUTPATIENT)
Dept: CARDIOLOGY | Facility: CLINIC | Age: 60
End: 2025-01-29

## 2025-02-10 ASSESSMENT — ENCOUNTER SYMPTOMS
BLOATING: 0
SPUTUM PRODUCTION: 0
COUGH: 0
LIGHT-HEADEDNESS: 1
WEIGHT LOSS: 0
DIZZINESS: 1
SLEEP DISTURBANCES DUE TO BREATHING: 1
WEIGHT GAIN: 0
NEAR-SYNCOPE: 0
PND: 0
BRUISES/BLEEDS EASILY: 0
ORTHOPNEA: 0
DECREASED APPETITE: 0
IRREGULAR HEARTBEAT: 1
PALPITATIONS: 1
WEAKNESS: 0
SYNCOPE: 0
SHORTNESS OF BREATH: 0

## 2025-02-10 NOTE — PROGRESS NOTES
[facility]     CARDIOLOGY HEART FAILURE OUTPATIENT PROGRESS NOTE       Subjective     Patient ID: Pete Trejo is a 59 y.o. patient, who presents to the heart failure clinic for a follow-up visit.  He has a history of coronary artery disease (PCI to the second diagonal branch LAD 7/2017, PCI to the LAD 12/3/2010, PCI to diagonal 7/2009), ischemic cardiomyopathy s/p CRT-D implant 2011 with generator change 6/2022, systolic heart failure, ventricular tachycardia (partial VT ablation 6/2020, extensive substrate modification/VT ablation at [facility] 11/2021), paroxysmal atrial fibrillation anticoagulated with Xarelto, hypertension, dyslipidemia, diabetes mellitus type 2, sleep apnea. He is accompanied by his son.     Pete was seen by Dr. Casi Alan at the [facility] for advanced heart failure therapies on 4/24/2024 - 4/26/2024. He underwent right heart catheterization at that time.  47 mmHg, RV 32/7, PA 32/15/20, PCWP 12 mmHg, CO (Becki) 6.4 L/min, CI (Becki) 2.7 L/min/m², CO (thermo) 5.8 L/min, CI (Thermo) 2.4 L/min/m².  Cardiopulmonary stress test revealed severely impaired exercise capacity    Pete underwent successful catheter ablation of ischemic VT with scar modification and success PVC ablation at the [facility] on 7/1/2024    I saw Pete in the heart failure clinic for a follow-up visit on 7/16/2024.  He was feeling dehydrated and I decreased his furosemide to 20 mg daily.  When he was contacted on 8/2/2024 to see how he was doing on the decreased furosemide dose.  He stated that he started to retain fluid and he returned to furosemide 40 mg daily.    The device clinic received transmission where Pete received 6 episodes of ATP for VT from 11:57pm 10/31/24 through 1:25am on 11/1/24. EP advised he contact [facility]. Pete's device detected 10 additional episodes of ATP from 12:26am 11/1/2024 until 12:05pm on 11/1/2024.      I saw Pete in the heart failure clinic for a follow-up visit on 11/11/2024.  He was stable from a heart failure standpoint and no changes were made.  Pete was seen Dr. Wu in the EP clinic on 12/5/2024.  He was stable from an electrophysiology standpoint.    Pete had an e-visit with electrophysiology at the [facility] on 12/18/2024.  His PVC burden was significantly suppressed.  They advise he continue sotalol and mexiletine.    Pete states that overall he has been feeling pretty good.  He reports occasional episodes of his heart taking off, but states it is better than previous.  He denies dyspnea, PND, and orthopnea.  He has not noticed any lower extremity edema.  Pete reports a good appetite and only feels bloated if he drinks too much fluid.  He states that his home weight has been stable.  Pete denies chest pain or discomfort.  He gets dizzy if he stands up too quickly.    HPI    Past Medical History:   Diagnosis Date    BPH (benign prostatic hyperplasia)     CAD (coronary artery disease)     Status post stent    CHF (congestive heart failure) (CMS/[facility])     Chronic combined systolic and diastolic CHF (congestive heart failure) (CMS/[facility])     LVEF 25% with grade 1 diastolic dysfunction as per echo on 1/26/2022.    CVA (cerebral vascular accident) (CMS/[facility]) 2010    Without residual deficit.    Diabetes mellitus type 2 in obese (CMS/[facility])     Dyslipidemia     GERD (gastroesophageal reflux disease)     Heart attack (CMS/HCC)     x3    Hypertension     Ischemic cardiomyopathy     Morbid obesity with BMI of 40.0-44.9, adult (CMS/[facility])     Paroxysmal atrial fibrillation (CMS/HCC)     On Xarelto    V-tach (CMS/[facility])     AICD in place       Past Surgical History:   Procedure Laterality Date    ABLATION VT N/A 06/09/2020    Procedure: Ablation VT;  Surgeon: Wilfredo Montana MD;  Location: [facility] EP Lab;  Service: Electrophysiology;  Laterality: N/A;  Check with Dr. Montana in the a.m. regarding  scheduling    APPENDECTOMY      BILATERAL HEART CATH N/A 12/5/2023    Procedure: Bilateral heart cath;  Surgeon: Jitendra Post MD;  Location: [facility] Cath Lab;  Service: Cardiovascular;  Laterality: N/A;    COLONOSCOPY N/A 7/25/2023    Procedure: COLONOSCOPY with Polypectomy;  Surgeon: Hortencia Carson MD;  Location: [facility] Endoscopy;  Service: Endoscopy;  Laterality: N/A;    CORONARY ARTERY STENTING  2009    2.5 X 24-mm Taxus DIMAS to first diagonal. 3.0 X 8-mm Taxus stent to proximal LAD just proximal to diagonal.    CORONARY ARTERY STENTING  2010    3.5 X 15-mm Promus DIMAS to totally occluded LAD    CORONARY ARTERY STENTING  2011    2.25 X 30-mm Resolute DIMAS to 2nd diagonal.  Balloon angioplasty through strut to improve blood flow to distal LAD    CORONARY ARTERY STENTING  07/28/2017    second diagonal branch LAD Resolute 2.25 x 30-mm DIMAS    ICD DC GEN CHANGE N/A 6/20/2022    Procedure: BI-V ICD Gen Change;  Surgeon: Wilfredo Montana MD;  Location: [facility] EP Lab;  Service: Cardiovascular;  Laterality: N/A;    ICD SC NEW  2011    [employer] Cognis Model #N119, Serial #[id number]. [employer] Reliance Model #0185, Serial #[id number]. LV lead Acuity Model #45+91, Serial #[id number]. Atrial lead Model #4469, Serial #[id number]    OXIMETRY RUN N/A 12/5/2023    Procedure: OXIMETRY RUN;  Surgeon: Jitendra Post MD;  Location: [facility] Cath Lab;  Service: Cardiovascular;  Laterality: N/A;       Allergies   Allergen Reactions    Morphine      ANXIETY    Tramadol      ANXIETY         Current Outpatient Medications:     mexiletine (MEXITIL) 250 mg capsule, Take 1 capsule (250 mg total) by mouth every 8 (eight) hours, Disp: 270 capsule, Rfl: 0    rivaroxaban (Xarelto) 20 mg tablet, Take 1 tablet (20 mg total) by mouth daily, Disp: 90 tablet, Rfl: 1    magnesium oxide (MAG-OX) 400 mg (241.3 mg magnesium) tablet, Take 1 tablet (400 mg total) by mouth 2 (two) times a day, Disp: 180 tablet, Rfl: 3    aspirin 81 mg EC tablet, Take 1 tablet (81 mg total) by  mouth daily, Disp: 90 tablet, Rfl: 2    empagliflozin (JARDIANCE) 10 mg tablet, Take 1 tablet (10 mg total) by mouth daily, Disp: 90 tablet, Rfl: 3    sacubitriL-valsartan (ENTRESTO)  mg tablet, Take 1 tablet by mouth 2 (two) times a day, Disp: 180 tablet, Rfl: 3    spironolactone (ALDACTONE) 50 mg tablet, Take 1 tablet (50 mg total) by mouth daily, Disp: 90 tablet, Rfl: 3    potassium chloride 10 mEq CR tablet, Take 2 tablets (20 mEq total) by mouth daily (Patient taking differently: Take 2 tablets (20 mEq total) by mouth 2 (two) times a day), Disp: 180 tablet, Rfl: 3    furosemide (LASIX) 40 mg tablet, Take 1 tablet (40 mg total) by mouth daily, Disp: 30 tablet, Rfl: 11    sotaloL (BETAPACE) 120 mg tablet, Take 1.5 tablets (180 mg total) by mouth every 12 (twelve) hours, Disp: 270 tablet, Rfl: 2    tamsulosin (FLOMAX) 0.4 mg capsule, Take 1 capsule (0.4 mg total) by mouth daily, Disp: , Rfl:     cholecalciferol, vitamin D3, 25 mcg (1,000 unit) tablet, Take 1 tablet (1,000 Units total) by mouth daily, Disp: , Rfl:     pantoprazole (PROTONIX) 40 mg EC tablet, Take 1 tablet (40 mg total) by mouth daily, Disp: , Rfl:     carvediloL (Coreg) 25 mg tablet, Take 2 tablets (50 mg total) by mouth 2 (two) times a day with meals, Disp: 120 tablet, Rfl: 11    rosuvastatin (Crestor) 40 mg tablet, Take 1 tablet (40 mg total) by mouth nightly, Disp: 30 tablet, Rfl: 1    nitroglycerin (NITROSTAT) 0.4 mg SL tablet, Place 1 tablet (0.4 mg total) under the tongue every 5 (five) minutes as needed for chest pain, Disp: 26 tablet, Rfl: 1    albuterol HFA (PROVENTIL HFA;VENTOLIN HFA) 90 mcg/actuation inhaler, Inhale 2 puffs every 6 (six) hours as needed for wheezing or shortness of breath, Disp: 1 Inhaler, Rfl: 1    Family History   Problem Relation Age of Onset    Heart attack Mother 62    Other Mother         Abdominal Aortic Aneurysm    Lung cancer Mother     Colon cancer Father         Cause of death    Diabetes Brother          Non-Insulin Dependent    Heart attack Brother 51        w/ Stents    Heart disease Brother     Other Son         Well       Social History     Socioeconomic History    Marital status: Single     Spouse name: Not on file    Number of children: Not on file    Years of education: Not on file    Highest education level: Not on file   Occupational History    Not on file   Tobacco Use    Smoking status: Former     Current packs/day: 0.00     Average packs/day: 0.8 packs/day for 30.0 years (22.5 ttl pk-yrs)     Types: Cigarettes     Start date: 1990     Quit date: 2020     Years since quittin.6    Smokeless tobacco: Never   Vaping Use    Vaping status: Never Used   Substance and Sexual Activity    Alcohol use: Not Currently     Comment: Quit drinking in     Drug use: Not Currently    Sexual activity: Yes     Partners: Female   Other Topics Concern    Not on file   Social History Narrative    Not on file     Social Drivers of Health     Financial Resource Strain: Unknown (2020)    Overall Financial Resource Strain (CARDIA)     Difficulty of Paying Living Expenses: Patient declined   Food Insecurity: No Food Insecurity (2023)    Hunger Vital Sign     Worried About Running Out of Food in the Last Year: Never true     Ran Out of Food in the Last Year: Never true   Transportation Needs: No Transportation Needs (2023)    PRAPARE - Transportation     Lack of Transportation (Medical): No     Lack of Transportation (Non-Medical): No   Physical Activity: Not on file   Stress: Not on file   Social Connections: Not on file   Intimate Partner Violence: Not At Risk (2023)    Humiliation, Afraid, Rape, and Kick questionnaire     Fear of Current or Ex-Partner: No     Emotionally Abused: No     Physically Abused: No     Sexually Abused: No   Housing Stability: Low Risk  (2023)    Housing Stability Vital Sign     Unable to Pay for Housing in the Last Year: No     Number of Places Lived in the Last  Year: 1     Unstable Housing in the Last Year: No       Review of Systems   Constitutional: Negative for decreased appetite, malaise/fatigue, weight gain and weight loss.   Cardiovascular:  Positive for irregular heartbeat and palpitations. Negative for chest pain, dyspnea on exertion, leg swelling, near-syncope, orthopnea, paroxysmal nocturnal dyspnea and syncope.   Respiratory:  Positive for sleep disturbances due to breathing. Negative for cough, shortness of breath and sputum production.    Hematologic/Lymphatic: Does not bruise/bleed easily.   Gastrointestinal:  Negative for bloating.   Neurological:  Positive for dizziness and light-headedness. Negative for weakness.       Objective     Vitals:    02/11/25 0824   BP: 110/66   Pulse: 60   SpO2: 95%   Weight: 123.2 kg (271 lb 9.6 oz)   PainSc: 0-No pain   Patient Position: Sitting             Sodium   Date Value Ref Range Status   11/11/2024 135 135 - 145 mmol/L Final     Potassium   Date Value Ref Range Status   11/11/2024 4.9 3.5 - 5.1 MMOL/L Final     Chloride   Date Value Ref Range Status   11/11/2024 104 98 - 107 mmol/L Final     CO2   Date Value Ref Range Status   11/11/2024 25 21 - 32 mmol/L Final     BUN   Date Value Ref Range Status   11/11/2024 14 7 - 25 mg/dL Final     Creatinine   Date Value Ref Range Status   11/11/2024 1.03 0.70 - 1.30 mg/dL Final     Glucose   Date Value Ref Range Status   11/11/2024 112 (H) 70 - 105 mg/dL Final     Calcium   Date Value Ref Range Status   11/11/2024 9.8 8.6 - 10.3 mg/dL Final       Physical Exam  Constitutional:       Appearance: He is well-developed.   HENT:      Head: Normocephalic.      Mouth/Throat:      Mouth: Mucous membranes are moist.   Neck:      Vascular: No JVD.   Cardiovascular:      Rate and Rhythm: Normal rate and regular rhythm.      Pulses: Intact distal pulses.      Heart sounds: Normal heart sounds. No murmur heard.  Pulmonary:      Effort: Pulmonary effort is normal. No respiratory distress.       Breath sounds: No decreased breath sounds, wheezing, rhonchi or rales.   Abdominal:      General: Bowel sounds are normal. There is no distension.      Palpations: Abdomen is soft.   Musculoskeletal:         General: Normal range of motion.      Right lower leg: No edema.      Left lower leg: No edema.   Skin:     General: Skin is warm and dry.   Neurological:      Mental Status: He is alert and oriented to person, place, and time.   Psychiatric:         Behavior: Behavior normal.         Assessment/Plan     Diagnoses and all orders for this visit:    Chronic systolic heart failure (CMS/HCC)  -     Basic metabolic panel Blood, Venous; Future  -     Magnesium Blood, Venous; Future  -     carvediloL (Coreg) 25 mg tablet; Take 2 tablets (50 mg total) by mouth 2 (two) times a day with meals    Ischemic cardiomyopathy    VT (ventricular tachycardia) (CMS/[facility])  -     carvediloL (Coreg) 25 mg tablet; Take 2 tablets (50 mg total) by mouth 2 (two) times a day with meals    Chronic combined systolic and diastolic CHF, NYHA class 2 and ACC/AHA stage C (CMS/[facility])          Ischemic cardiomyopathy  CAD status post multiple stents  Paroxysmal atrial fibrillation, anticoagulated with Xarelto  Echocardiogram on 10/19/2023 revealed an ejection fraction of 25%.  Large anteroapical aneurysm.  No LV mural thrombi.  Grade 2 left-ventricular diastolic abnormality.  Normal RV size and function.  Patient has history of CAD s/p multiple stent  Patient has history of paroxysmal atrial fibrillation.  [id number] score 6.  Continue anticoagulation with Xarelto 20 mg daily  Patient has history of ventricular tachycardia s/p ablations.  Continue mexiletine and sotalol per EP  EKG today reveals AV paced complexes with PVCs, rate 56 bpm.  Device: CRT-D implanted in 2011 with generator change in 2020.  Device check 11/3/2024 revealed 93% a pacing, 96% CRT pacing.  10 episodes suggestive of VT resolved with ATP.  GDMT  ACEI/ARB/ARNI: Continue  Entresto  mg twice daily  Beta-Blocker: Continue carvedilol 50 mg twice daily  Aldosterone antagonist: Continue spironolactone 50 mg daily  SGLT2 inhibitor: Continue Jardiance 10 mg daily  Advise routine cardiac exercise  Continue CPAP nightly  Cardiac rehab: Previously referred  Continue to follow with the [facility] as scheduled    Chronic systolic heart failure  NYHA Class: II  Heart failure stage: C  Volume status today: Euvolemic on exam  Diuretic strategy: Continue furosemide 40 mg daily  BMP and magnesium today  Continue to weigh daily and contact our clinic with a 3 pound weight gain overnight or 5 pounds in a week  Follow a low-sodium (2000mg), heart healthy diet and 2 L fluid restriction  Follow-up in the heart failure clinic in 4 months, or sooner if needed  Advise patient contact the clinic with increasing symptoms or concerns    Refractory ventricular tachycardia  Status post partial VT ablation at [facility] in 2020. Extensive repeat VT ablation at the [facility] in 2021.  VT and PVC ablations at [facility] 7/1/2024  Continue sotalol 180 mg twice daily and mexiletine 250 mg 3 times daily per EP/[facility]  Continue to follow with [facility] as scheduled  Continue to follow-up with our electrophysiology team as scheduled        Return in about 4 months (around 6/11/2025).            A voice recognition program was used to aid in documentation of this record. Sometimes words are not printed exactly as they were spoken.  While efforts were made to carefully edit and correct any inaccuracies, some errors may be present; please take these into context.  Please contact the provider if errors are identified.

## 2025-02-11 ENCOUNTER — LAB (OUTPATIENT)
Dept: LAB | Facility: CLINIC | Age: 60
End: 2025-02-11
Payer: MEDICARE

## 2025-02-11 ENCOUNTER — OFFICE VISIT (OUTPATIENT)
Dept: CARDIOLOGY | Facility: CLINIC | Age: 60
End: 2025-02-11
Payer: MEDICARE

## 2025-02-11 VITALS
HEART RATE: 60 BPM | OXYGEN SATURATION: 95 % | SYSTOLIC BLOOD PRESSURE: 110 MMHG | BODY MASS INDEX: 37.88 KG/M2 | WEIGHT: 271.6 LBS | DIASTOLIC BLOOD PRESSURE: 66 MMHG

## 2025-02-11 DIAGNOSIS — I50.42 CHRONIC COMBINED SYSTOLIC AND DIASTOLIC CHF, NYHA CLASS 2 AND ACC/AHA STAGE C (CMS/HCC): ICD-10-CM

## 2025-02-11 DIAGNOSIS — I47.20 VT (VENTRICULAR TACHYCARDIA) (CMS/HCC): ICD-10-CM

## 2025-02-11 DIAGNOSIS — I25.5 ISCHEMIC CARDIOMYOPATHY: ICD-10-CM

## 2025-02-11 DIAGNOSIS — I50.22 CHRONIC SYSTOLIC HEART FAILURE (CMS/HCC): Primary | ICD-10-CM

## 2025-02-11 DIAGNOSIS — I50.22 CHRONIC SYSTOLIC HEART FAILURE (CMS/HCC): ICD-10-CM

## 2025-02-11 LAB
ANION GAP SERPL CALC-SCNC: 6 MMOL/L (ref 3–11)
BUN SERPL-MCNC: 11 MG/DL (ref 7–25)
CALCIUM SERPL-MCNC: 9.5 MG/DL (ref 8.6–10.3)
CHLORIDE SERPL-SCNC: 105 MMOL/L (ref 98–107)
CO2 SERPL-SCNC: 25 MMOL/L (ref 21–32)
CREAT SERPL-MCNC: 1.02 MG/DL (ref 0.7–1.3)
EGFRCR SERPLBLD CKD-EPI 2021: 85 ML/MIN/1.73M*2
GLUCOSE SERPL-MCNC: 133 MG/DL (ref 70–105)
MAGNESIUM SERPL-MCNC: 2.1 MG/DL (ref 1.8–2.4)
POTASSIUM SERPL-SCNC: 4.3 MMOL/L (ref 3.5–5.1)
SODIUM SERPL-SCNC: 136 MMOL/L (ref 135–145)

## 2025-02-11 PROCEDURE — 36415 COLL VENOUS BLD VENIPUNCTURE: CPT

## 2025-02-11 PROCEDURE — G0463 HOSPITAL OUTPT CLINIC VISIT: HCPCS | Performed by: NURSE PRACTITIONER

## 2025-02-11 PROCEDURE — 80048 BASIC METABOLIC PNL TOTAL CA: CPT

## 2025-02-11 PROCEDURE — 99214 OFFICE O/P EST MOD 30 MIN: CPT | Performed by: NURSE PRACTITIONER

## 2025-02-11 PROCEDURE — 83735 ASSAY OF MAGNESIUM: CPT

## 2025-02-11 RX ORDER — CARVEDILOL 25 MG/1
50 TABLET ORAL 2 TIMES DAILY WITH MEALS
Qty: 120 TABLET | Refills: 11 | Status: SHIPPED | OUTPATIENT
Start: 2025-02-11 | End: 2026-02-11

## 2025-02-11 ASSESSMENT — ENCOUNTER SYMPTOMS: DYSPNEA ON EXERTION: 0

## 2025-02-11 ASSESSMENT — PAIN SCALES - GENERAL: PAINLEVEL_OUTOF10: 0-NO PAIN

## 2025-02-12 ENCOUNTER — RESULTS FOLLOW-UP (OUTPATIENT)
Dept: CARDIOLOGY | Facility: CLINIC | Age: 60
End: 2025-02-12

## 2025-02-14 DIAGNOSIS — I47.20 VT (VENTRICULAR TACHYCARDIA) (CMS/HCC): ICD-10-CM

## 2025-02-14 RX ORDER — SOTALOL HYDROCHLORIDE 120 MG/1
180 TABLET ORAL EVERY 12 HOURS SCHEDULED
Qty: 270 TABLET | Refills: 0 | Status: SHIPPED | OUTPATIENT
Start: 2025-02-14

## 2025-02-17 ENCOUNTER — CARE COORDINATION (OUTPATIENT)
Dept: CARDIOLOGY | Facility: CLINIC | Age: 60
End: 2025-02-17
Payer: MEDICARE

## 2025-02-17 NOTE — PROGRESS NOTES
Leo Russell was referred for AHF Eval.  Per Dr. Felix's instructions, eval has been put  due to pt following at [facility] at this time.   AHF episode has been closed.

## 2025-02-26 NOTE — TELEPHONE ENCOUNTER
----- Message from Donna CHILDERS sent at 12/18/2024  8:55 AM CST -----  Regarding: reminder to request local check prior to fu marlene Wills saw today:   Follow-up in 3-4 months virtually with local device check prior    Thx  MK at

## 2025-03-14 ENCOUNTER — APPOINTMENT (OUTPATIENT)
Dept: RADIOLOGY | Facility: HOSPITAL | Age: 60
DRG: 309 | End: 2025-03-14
Payer: MEDICARE

## 2025-03-14 ENCOUNTER — APPOINTMENT (OUTPATIENT)
Dept: CARDIOLOGY | Facility: HOSPITAL | Age: 60
DRG: 309 | End: 2025-03-14
Payer: MEDICARE

## 2025-03-14 ENCOUNTER — ANCILLARY PROCEDURE (OUTPATIENT)
Dept: CARDIOLOGY | Facility: CLINIC | Age: 60
DRG: 309 | End: 2025-03-14
Payer: MEDICARE

## 2025-03-14 ENCOUNTER — HOSPITAL ENCOUNTER (INPATIENT)
Facility: HOSPITAL | Age: 60
DRG: 309 | End: 2025-03-14
Attending: STUDENT IN AN ORGANIZED HEALTH CARE EDUCATION/TRAINING PROGRAM | Admitting: INTERNAL MEDICINE
Payer: MEDICARE

## 2025-03-14 DIAGNOSIS — Z76.89 SLEEP CONCERN: ICD-10-CM

## 2025-03-14 DIAGNOSIS — I50.22 CHRONIC SYSTOLIC HEART FAILURE (CMS/HCC): ICD-10-CM

## 2025-03-14 DIAGNOSIS — Z45.02 ENCOUNTER FOR INTERROGATION OF CARDIAC DEFIBRILLATOR: Primary | ICD-10-CM

## 2025-03-14 DIAGNOSIS — Z45.02 AICD DISCHARGE: ICD-10-CM

## 2025-03-14 DIAGNOSIS — I47.20 VT (VENTRICULAR TACHYCARDIA) (CMS/HCC): ICD-10-CM

## 2025-03-14 DIAGNOSIS — I47.20 VENTRICULAR TACHYCARDIA (CMS/HCC): Primary | ICD-10-CM

## 2025-03-14 LAB
ALBUMIN SERPL-MCNC: 4 G/DL (ref 3.5–5.3)
ALP SERPL-CCNC: 52 U/L (ref 45–115)
ALT SERPL-CCNC: 15 U/L (ref 7–52)
ANION GAP SERPL CALC-SCNC: 11 MMOL/L (ref 3–11)
ASCENDING AORTA: 3.54 CM
AST SERPL-CCNC: 10 U/L
AV LVOT PEAK GRADIENT: 2.9 MMHG
AV MEAN GRADIENT: 4.46 MMHG
AV PEAK GRADIENT: 8.41 MMHG
BASOPHILS # BLD AUTO: 0.1 10*3/UL
BASOPHILS NFR BLD AUTO: 0.8 % (ref 0–2)
BILIRUB SERPL-MCNC: 1.86 MG/DL (ref 0.2–1.4)
BSA FOR ECHO PROCEDURE: 2.5 M2
BUN SERPL-MCNC: 19 MG/DL (ref 7–25)
CALCIUM ALBUM COR SERPL-MCNC: 9 MG/DL (ref 8.6–10.3)
CALCIUM SERPL-MCNC: 9 MG/DL (ref 8.6–10.3)
CHLORIDE SERPL-SCNC: 100 MMOL/L (ref 98–107)
CO2 SERPL-SCNC: 22 MMOL/L (ref 21–32)
CREAT SERPL-MCNC: 1.34 MG/DL (ref 0.7–1.3)
DOP CALC AO PEAK VEL: 1.45 M/S
DOP CALC AO VTI: 28.5 CM
DOP CALC LVOT DIAMETER: 2.61 CM
DOP CALC LVOT STROKE VOLUME: 87 CM3
DOP CALC MV VTI: 24.1 CM
DOP CALC RVOT PEAK VEL: 0.8 M/S
DOP CALCLVOT PEAK VEL VTI: 16.3 CM
E/A RATIO: 0.7
E/E' RATIO (AVERAGE): 9.4
E/E' RATIO: 11
ECHO EF ESTIMATED: 20 %
EGFRCR SERPLBLD CKD-EPI 2021: 61 ML/MIN/1.73M*2
EJECTION FRACTION: 25 %
EOSINOPHIL # BLD AUTO: 0 10*3/UL
EOSINOPHIL NFR BLD AUTO: 0.1 % (ref 0–3)
ERAP: 5 MMHG
ERYTHROCYTE [DISTWIDTH] IN BLOOD BY AUTOMATED COUNT: 14.2 % (ref 11.5–15)
GLUCOSE BLDC GLUCOMTR-MCNC: 110 MG/DL (ref 70–105)
GLUCOSE BLDC GLUCOMTR-MCNC: 150 MG/DL (ref 70–105)
GLUCOSE SERPL-MCNC: 131 MG/DL (ref 70–105)
HCT VFR BLD AUTO: 46.2 % (ref 38–50)
HGB BLD-MCNC: 15.5 G/DL (ref 13.2–17.2)
INTERVENTRICULAR SEPTUM: 1.1 CM (ref 0.6–1.1)
IVC PROX: 1.64 CM
LA AREA A4C SYSTOLE: 42.9 CM3
LA AREA A4C SYSTOLE: 58.6 CM3
LA AREA A4C SYSTOLE: 99.3 CM3
LEFT ATRIUM SIZE: 4.73 CM
LEFT ATRIUM VOLUME INDEX: 43 ML/M2
LEFT ATRIUM VOLUME: 103 CM3
LEFT INTERNAL DIMENSION IN SYSTOLE: 6.4 CM (ref 2.1–4)
LEFT VENTRICLE DIASTOLIC VOLUME: 164 ML/M2
LEFT VENTRICLE SYSTOLIC VOLUME: 124 ML/M2
LEFT VENTRICULAR INTERNAL DIMENSION IN DIASTOLE: 7.4 CM (ref 3.5–6)
LVAD-AP2: 67.9 CM2
LYMPHOCYTES # BLD AUTO: 1.3 10*3/UL
LYMPHOCYTES NFR BLD AUTO: 9.7 % (ref 15–47)
MAGNESIUM SERPL-MCNC: 1.9 MG/DL (ref 1.8–2.4)
MCH RBC QN AUTO: 31.4 PG (ref 29–34)
MCHC RBC AUTO-ENTMCNC: 33.5 G/DL (ref 32–36)
MCV RBC AUTO: 93.6 FL (ref 82–97)
ML OF DILUTED DEFINITY INJECTED: 2 ML
MONOCYTES # BLD AUTO: 1.5 10*3/UL
MONOCYTES NFR BLD AUTO: 10.8 % (ref 5–13)
MV DEC SLOPE: 2540 MM/S2
MV DT: 278 MS
MV MEAN GRADIENT: 1 MMHG
MV PEAK A VEL: 71.1 CM/S
MV PEAK E VEL: 53 CM/S
MV PEAK GRADIENT: 4 MMHG
MV STENOSIS PRESSURE HALF TIME: 64 MS
MV VALVE AREA P 1/2 METHOD: 3.44 CM2
MV VMAX: 106 CM/S
MVA (VTI): 3.62 CM2
NEUTROPHILS # BLD AUTO: 10.7 10*3/UL
NEUTROPHILS NFR BLD AUTO: 78.6 % (ref 46–70)
PLATELET # BLD AUTO: 156 10*3/UL (ref 130–350)
PMV BLD AUTO: 8.2 FL (ref 6.9–10.8)
POSTERIOR WALL: 1.2 CM (ref 0.6–1.1)
POTASSIUM SERPL-SCNC: 4.3 MMOL/L (ref 3.5–5.1)
PROT SERPL-MCNC: 6.8 G/DL (ref 6–8.3)
PULM VEIN S/D RATIO: 1.3
PV PEAK D VEL: 32 CM/S
PV PEAK GRADIENT: 6.86 MMHG
PV PEAK S VEL: 43 CM/S
PV VMAX: 1.31 M/S
RA AREA: 17.5 CM2
RBC # BLD AUTO: 4.93 10*6/UL (ref 4.1–5.8)
RH ABDOMINAL AORTA: 2.62 CM
RH CV ECHO AV VALVE AREA VEL: 3.2 CM2
RH CV ECHO AV VALVE AREA VTI: 3.1 CM2
RH LVOT PEAK VELOCITY REST: 0.9 M/S
RIGHT VENTRICULAR INTERNAL DIMENSION IN DIASTOLE: 4 CM
RV AP4 BASE: 3.9 CM
S': 11 CM/S
SODIUM SERPL-SCNC: 133 MMOL/L (ref 135–145)
TDI: 4.8 CM/S
TDILATERAL: 6.9 CM/S
TRICUSPID ANNULAR PLANE SYSTOLIC EXCURSION: 2.1 CM
TSH SERPL DL<=0.05 MIU/L-ACNC: 1.78 UIU/ML (ref 0.34–4.82)
WBC # BLD AUTO: 13.7 10*3/UL (ref 3.7–9.6)

## 2025-03-14 PROCEDURE — 83735 ASSAY OF MAGNESIUM: CPT | Performed by: STUDENT IN AN ORGANIZED HEALTH CARE EDUCATION/TRAINING PROGRAM

## 2025-03-14 PROCEDURE — 93288 INTERROG EVL PM/LDLS PM IP: CPT

## 2025-03-14 PROCEDURE — 93284 PRGRMG EVAL IMPLANTABLE DFB: CPT

## 2025-03-14 PROCEDURE — 93306 TTE W/DOPPLER COMPLETE: CPT | Mod: 26 | Performed by: INTERNAL MEDICINE

## 2025-03-14 PROCEDURE — 36415 COLL VENOUS BLD VENIPUNCTURE: CPT | Performed by: STUDENT IN AN ORGANIZED HEALTH CARE EDUCATION/TRAINING PROGRAM

## 2025-03-14 PROCEDURE — 71045 X-RAY EXAM CHEST 1 VIEW: CPT

## 2025-03-14 PROCEDURE — 93284 PRGRMG EVAL IMPLANTABLE DFB: CPT | Mod: 26 | Performed by: INTERNAL MEDICINE

## 2025-03-14 PROCEDURE — 6360000200 HC RX 636 W HCPCS (ALT 250 FOR IP): Performed by: STUDENT IN AN ORGANIZED HEALTH CARE EDUCATION/TRAINING PROGRAM

## 2025-03-14 PROCEDURE — 2580000300 HC RX 258: Performed by: STUDENT IN AN ORGANIZED HEALTH CARE EDUCATION/TRAINING PROGRAM

## 2025-03-14 PROCEDURE — C8929 TTE W OR WO FOL WCON,DOPPLER: HCPCS

## 2025-03-14 PROCEDURE — 84443 ASSAY THYROID STIM HORMONE: CPT | Performed by: STUDENT IN AN ORGANIZED HEALTH CARE EDUCATION/TRAINING PROGRAM

## 2025-03-14 PROCEDURE — 82947 ASSAY GLUCOSE BLOOD QUANT: CPT | Mod: QW

## 2025-03-14 PROCEDURE — 2550000100 HC RX 255: Performed by: INTERNAL MEDICINE

## 2025-03-14 PROCEDURE — 85025 COMPLETE CBC W/AUTO DIFF WBC: CPT | Performed by: STUDENT IN AN ORGANIZED HEALTH CARE EDUCATION/TRAINING PROGRAM

## 2025-03-14 PROCEDURE — 96374 THER/PROPH/DIAG INJ IV PUSH: CPT

## 2025-03-14 PROCEDURE — 96361 HYDRATE IV INFUSION ADD-ON: CPT

## 2025-03-14 PROCEDURE — (BLANK) HC ROOM ICU INTERMEDIATE

## 2025-03-14 PROCEDURE — 99285 EMERGENCY DEPT VISIT HI MDM: CPT | Performed by: STUDENT IN AN ORGANIZED HEALTH CARE EDUCATION/TRAINING PROGRAM

## 2025-03-14 PROCEDURE — 93005 ELECTROCARDIOGRAM TRACING: CPT

## 2025-03-14 PROCEDURE — 6370000100 HC RX 637 (ALT 250 FOR IP): Performed by: INTERNAL MEDICINE

## 2025-03-14 PROCEDURE — 93306 TTE W/DOPPLER COMPLETE: CPT

## 2025-03-14 PROCEDURE — 80053 COMPREHEN METABOLIC PANEL: CPT | Performed by: STUDENT IN AN ORGANIZED HEALTH CARE EDUCATION/TRAINING PROGRAM

## 2025-03-14 PROCEDURE — 99222 1ST HOSP IP/OBS MODERATE 55: CPT | Mod: AI | Performed by: INTERNAL MEDICINE

## 2025-03-14 RX ORDER — TALC
3 POWDER (GRAM) TOPICAL NIGHTLY PRN
Status: DISCONTINUED | OUTPATIENT
Start: 2025-03-14 | End: 2025-03-18 | Stop reason: HOSPADM

## 2025-03-14 RX ORDER — ONDANSETRON HYDROCHLORIDE 2 MG/ML
4 INJECTION, SOLUTION INTRAVENOUS EVERY 6 HOURS PRN
Status: DISCONTINUED | OUTPATIENT
Start: 2025-03-14 | End: 2025-03-18 | Stop reason: HOSPADM

## 2025-03-14 RX ORDER — LIDOCAINE HCL/PF 100 MG/5ML
100 SYRINGE (ML) INTRAVENOUS ONCE
Status: COMPLETED | OUTPATIENT
Start: 2025-03-14 | End: 2025-03-14

## 2025-03-14 RX ORDER — LIDOCAINE HYDROCHLORIDE ANHYDROUS AND DEXTROSE MONOHYDRATE .4; 5 G/100ML; G/100ML
1 INJECTION, SOLUTION INTRAVENOUS CONTINUOUS
Status: DISCONTINUED | OUTPATIENT
Start: 2025-03-14 | End: 2025-03-15

## 2025-03-14 RX ORDER — SODIUM CHLORIDE 0.9 % (FLUSH) 0.9 %
3 SYRINGE (ML) INJECTION ONCE
Status: COMPLETED | OUTPATIENT
Start: 2025-03-14 | End: 2025-03-14

## 2025-03-14 RX ORDER — SODIUM CHLORIDE 9 MG/ML
250 INJECTION, SOLUTION INTRAVENOUS ONCE
Status: COMPLETED | OUTPATIENT
Start: 2025-03-14 | End: 2025-03-14

## 2025-03-14 RX ORDER — SODIUM CHLORIDE 0.9 % (FLUSH) 0.9 %
3 SYRINGE (ML) INJECTION AS NEEDED
Status: DISCONTINUED | OUTPATIENT
Start: 2025-03-14 | End: 2025-03-18 | Stop reason: HOSPADM

## 2025-03-14 RX ORDER — AMOXICILLIN 250 MG
1 CAPSULE ORAL NIGHTLY PRN
Status: DISCONTINUED | OUTPATIENT
Start: 2025-03-14 | End: 2025-03-18 | Stop reason: HOSPADM

## 2025-03-14 RX ORDER — ROSUVASTATIN CALCIUM 40 MG/1
40 TABLET, COATED ORAL DAILY
COMMUNITY

## 2025-03-14 RX ORDER — CARVEDILOL 25 MG/1
50 TABLET ORAL 2 TIMES DAILY WITH MEALS
Status: DISCONTINUED | OUTPATIENT
Start: 2025-03-14 | End: 2025-03-18 | Stop reason: HOSPADM

## 2025-03-14 RX ORDER — ONDANSETRON 4 MG/1
4 TABLET, FILM COATED ORAL EVERY 6 HOURS PRN
Status: DISCONTINUED | OUTPATIENT
Start: 2025-03-14 | End: 2025-03-18 | Stop reason: HOSPADM

## 2025-03-14 RX ORDER — ASPIRIN 81 MG/1
81 TABLET ORAL DAILY
Status: DISCONTINUED | OUTPATIENT
Start: 2025-03-14 | End: 2025-03-18 | Stop reason: HOSPADM

## 2025-03-14 RX ORDER — LANOLIN ALCOHOL/MO/W.PET/CERES
400 CREAM (GRAM) TOPICAL 2 TIMES DAILY
Status: DISCONTINUED | OUTPATIENT
Start: 2025-03-14 | End: 2025-03-18 | Stop reason: HOSPADM

## 2025-03-14 RX ADMIN — SACUBITRIL AND VALSARTAN 1 TABLET: 49; 51 TABLET, FILM COATED ORAL at 19:59

## 2025-03-14 RX ADMIN — RIVAROXABAN 20 MG: 20 TABLET, FILM COATED ORAL at 10:18

## 2025-03-14 RX ADMIN — MAGNESIUM OXIDE 400 MG: 400 TABLET ORAL at 10:18

## 2025-03-14 RX ADMIN — ASPIRIN 81 MG: 81 TABLET ORAL at 10:18

## 2025-03-14 RX ADMIN — MAGNESIUM OXIDE 400 MG: 400 TABLET ORAL at 19:59

## 2025-03-14 RX ADMIN — PERFLUTREN 2 ML: 6.52 INJECTION, SUSPENSION INTRAVENOUS at 12:13

## 2025-03-14 RX ADMIN — Medication 3 MG: at 23:38

## 2025-03-14 RX ADMIN — LIDOCAINE HYDROCHLORIDE 1 MG/MIN: 4 INJECTION, SOLUTION INTRAVENOUS at 07:19

## 2025-03-14 RX ADMIN — LIDOCAINE HYDROCHLORIDE 100 MG: 20 INJECTION INTRAVENOUS at 07:08

## 2025-03-14 RX ADMIN — CARVEDILOL 50 MG: 25 TABLET, FILM COATED ORAL at 18:20

## 2025-03-14 RX ADMIN — SODIUM CHLORIDE 250 ML: 9 INJECTION, SOLUTION INTRAVENOUS at 05:25

## 2025-03-14 RX ADMIN — Medication 3 ML: at 12:18

## 2025-03-14 ASSESSMENT — ACTIVITIES OF DAILY LIVING (ADL)
BEDSIDE_CLEANING: NO
AMBULATION_ASSISTANCE: INDEPENDENT
TOILETING: INDEPENDENT

## 2025-03-14 NOTE — MEDICATION HISTORY SPECIALIST NOTES
[facility] ED-209    CSN: [id number]  : [id number]    Information sources:  EPIC  [facility]    Allergies verified.    Patient interviewed in person who provided all history. Patient verified medications and provided last doses. [facility]    **High alert medications**  Rivaroxaban (Xarelto)      See  for medication resources.

## 2025-03-14 NOTE — ED PROCEDURE NOTE
Procedure  ECG 12 lead, Shortness of breath    Date/Time: 3/14/2025 1:51 AM    Performed by: Daquan Parson MD  Authorized by: Daquan Parson MD    Previous ECG:     Previous ECG:  Compared to current    Comparison ECG info:  11/11/2024  Comments:      Dual paced rhythm.  Slight right axis deviation.  Rate 60.  CO interval 115.  .  QTc 406.  No ST segment changes.  Stable T wave inversions in 1 and aVL.  No signs of ischemia per Sgarbossa criteria.  Impression: dual paced rhythm.               Daquan Parson MD  03/14/25 0152

## 2025-03-14 NOTE — ED PROVIDER NOTES
Chief Complaint   Patient presents with    Pacemaker Problem     Pt endorsing his pacemaker fired around 1630 and has had increased SOB. Denies CP         HPI    Pete Trejo is a 59 y.o. male who presents to the emergency department today for concerns of pacemaker problem. He states that he started getting sick a couple days ago with body aches, chills, and shortness of breath. He states that today his symptoms felt largely improved and he was no longer short of breath. He states that last night at approximately 1630 (~9 hours ago) he woke up and went to his living room and sat down when his cardioverter-defibrillator delivered a shock to him. He states that he was not experiencing any palpitations or any other symptoms at the time. He reports that it did not feel like a full strength shock. The last time his cardioverter-defibrillator delivered a shock to him was >1 year ago and he was experiencing palpitations at that time. He denies any chest pain or shortness of breath. He does not wear oxygen at home.      HISTORY:  No current facility-administered medications for this encounter.    Current Outpatient Medications:     sotaloL (BETAPACE) 120 mg tablet, Take 1.5 tablets (180 mg total) by mouth every 12 (twelve) hours, Disp: 270 tablet, Rfl: 0    carvediloL (Coreg) 25 mg tablet, Take 2 tablets (50 mg total) by mouth 2 (two) times a day with meals, Disp: 120 tablet, Rfl: 11    mexiletine (MEXITIL) 250 mg capsule, Take 1 capsule (250 mg total) by mouth every 8 (eight) hours, Disp: 270 capsule, Rfl: 0    rivaroxaban (Xarelto) 20 mg tablet, Take 1 tablet (20 mg total) by mouth daily, Disp: 90 tablet, Rfl: 1    magnesium oxide (MAG-OX) 400 mg (241.3 mg magnesium) tablet, Take 1 tablet (400 mg total) by mouth 2 (two) times a day, Disp: 180 tablet, Rfl: 3    aspirin 81 mg EC tablet, Take 1 tablet (81 mg total) by mouth daily, Disp: 90 tablet, Rfl: 2    empagliflozin (JARDIANCE) 10 mg tablet, Take 1 tablet (10 mg  total) by mouth daily, Disp: 90 tablet, Rfl: 3    sacubitriL-valsartan (ENTRESTO)  mg tablet, Take 1 tablet by mouth 2 (two) times a day, Disp: 180 tablet, Rfl: 3    spironolactone (ALDACTONE) 50 mg tablet, Take 1 tablet (50 mg total) by mouth daily, Disp: 90 tablet, Rfl: 3    potassium chloride 10 mEq CR tablet, Take 2 tablets (20 mEq total) by mouth daily (Patient taking differently: Take 2 tablets (20 mEq total) by mouth 2 (two) times a day), Disp: 180 tablet, Rfl: 3    furosemide (LASIX) 40 mg tablet, Take 1 tablet (40 mg total) by mouth daily, Disp: 30 tablet, Rfl: 11    rosuvastatin (Crestor) 40 mg tablet, Take 1 tablet (40 mg total) by mouth nightly, Disp: 30 tablet, Rfl: 1    nitroglycerin (NITROSTAT) 0.4 mg SL tablet, Place 1 tablet (0.4 mg total) under the tongue every 5 (five) minutes as needed for chest pain, Disp: 26 tablet, Rfl: 1    tamsulosin (FLOMAX) 0.4 mg capsule, Take 1 capsule (0.4 mg total) by mouth daily, Disp: , Rfl:     cholecalciferol, vitamin D3, 25 mcg (1,000 unit) tablet, Take 1 tablet (1,000 Units total) by mouth daily, Disp: , Rfl:     albuterol HFA (PROVENTIL HFA;VENTOLIN HFA) 90 mcg/actuation inhaler, Inhale 2 puffs every 6 (six) hours as needed for wheezing or shortness of breath, Disp: 1 Inhaler, Rfl: 1    pantoprazole (PROTONIX) 40 mg EC tablet, Take 1 tablet (40 mg total) by mouth daily, Disp: , Rfl:     Past Medical History:   Diagnosis Date    BPH (benign prostatic hyperplasia)     CAD (coronary artery disease)     Status post stent    CHF (congestive heart failure) (CMS/[facility])     Chronic combined systolic and diastolic CHF (congestive heart failure) (CMS/HCC)     LVEF 25% with grade 1 diastolic dysfunction as per echo on 1/26/2022.    CVA (cerebral vascular accident) (CMS/[facility]) 2010    Without residual deficit.    Diabetes mellitus type 2 in obese (CMS/[facility])     Dyslipidemia     GERD (gastroesophageal reflux disease)     Heart attack (CMS/[facility])     x3    Hypertension      Ischemic cardiomyopathy     Morbid obesity with BMI of 40.0-44.9, adult (CMS/HCC)     Paroxysmal atrial fibrillation (CMS/HCC)     On Xarelto    V-tach (CMS/HCC)     AICD in place       Past Surgical History:   Procedure Laterality Date    ABLATION VT N/A 06/09/2020    Procedure: Ablation VT;  Surgeon: Wilfredo Montana MD;  Location: [facility];  Service: Electrophysiology;  Laterality: N/A;  Check with Dr. Montana in the a.m. regarding scheduling    APPENDECTOMY      BILATERAL HEART CATH N/A 12/5/2023    Procedure: Bilateral heart cath;  Surgeon: Jitendra Post MD;  Location: [facility] Cath Lab;  Service: Cardiovascular;  Laterality: N/A;    COLONOSCOPY N/A 7/25/2023    Procedure: COLONOSCOPY with Polypectomy;  Surgeon: Hortencia Carson MD;  Location: [facility];  Service: Endoscopy;  Laterality: N/A;    CORONARY ARTERY STENTING  2009    2.5 X 24-mm Taxus DIMAS to first diagonal. 3.0 X 8-mm Taxus stent to proximal LAD just proximal to diagonal.    CORONARY ARTERY STENTING  2010    3.5 X 15-mm Promus DIMAS to totally occluded LAD    CORONARY ARTERY STENTING  2011    2.25 X 30-mm Resolute DIMAS to 2nd diagonal.  Balloon angioplasty through strut to improve blood flow to distal LAD    CORONARY ARTERY STENTING  07/28/2017    second diagonal branch LAD Resolute 2.25 x 30-mm DIMAS    ICD DC GEN CHANGE N/A 6/20/2022    Procedure: BI-V ICD Gen Change;  Surgeon: Wilfredo Montana MD;  Location: [facility];  Service: Cardiovascular;  Laterality: N/A;    ICD SC NEW  2011    [employer] Cognis Model #N119, Serial #[id number]. [employer] Reliance Model #0185, Serial #[id number]. LV lead Acuity Model #45+91, Serial #[id number]. Atrial lead Model #4469, Serial #[id number]    OXIMETRY RUN N/A 12/5/2023    Procedure: OXIMETRY RUN;  Surgeon: Jitendra Post MD;  Location: [facility];  Service: Cardiovascular;  Laterality: N/A;       Family History   Problem Relation Age of Onset    Heart attack Mother 62    Other Mother         Abdominal Aortic Aneurysm     Lung cancer Mother     Colon cancer Father         Cause of death    Diabetes Brother         Non-Insulin Dependent    Heart attack Brother 51        w/ Stents    Heart disease Brother     Other Son         Well       Social History     Tobacco Use    Smoking status: Former     Current packs/day: 0.00     Average packs/day: 0.8 packs/day for 30.0 years (22.5 ttl pk-yrs)     Types: Cigarettes     Start date: 1990     Quit date: 2020     Years since quittin.7    Smokeless tobacco: Never   Vaping Use    Vaping status: Never Used   Substance Use Topics    Alcohol use: Not Currently     Comment: Quit drinking in     Drug use: Not Currently       ROS:  Review of Systems  Performed appropriate review of systems with pertinent positives and negatives as noted in HPI.    PE:  ED Triage Vitals   Temp Heart Rate Resp BP SpO2   25 0136 25 0129 25 0136 25 0136 25 0129   36.8 °C (98.2 °F) 64 18 103/63 96 %      Mean BP (mmHg) Temp Source Heart Rate Source Patient Position BP Location   25 0200 25 0136 -- 25 0515 25 0515   66 Oral  Sitting Right arm      FiO2 (%)       --                  Physical Exam    Nursing notes reviewed. Vitals reviewed.   General: Alert. In no obvious distress. Resting comfortably in bed. Nontoxic in appearance. Appears somewhat chronically ill and older than stated age  HEENT: Normocephalic, atraumatic, mucous membranes pink and moist  Eyes: EOMI, conjunctiva normal  Chest/Respiratory: No pain on palpation over the chest wall, lungs clear to auscultation bilaterally, breath sounds equal bilaterally  Cardiovascular: RRR, Normal S1/S2, no murmur/rubs, radial pulses 2+ and equal bilaterally, DP pulses 2+ and equal bilaterally, capillary refill <3 seconds, no edema, no unilateral leg swelling/erythema/warmth, no tenderness on palpation over the posterior lower extremities or popliteal fossa bilaterally, negative Homans' sign  Abdomen:  Nondistended, soft, nontender, no rebound or guarding  Musculoskeletal: No deformities.   Skin: No rash, no erythema, no pallor.  Psych: Normal mood and affect.  Neuro: Moves all extremities, sensation grossly intact in all 4 extremities.      ED LABS:  Labs Reviewed   CBC WITH AUTO DIFFERENTIAL - Abnormal       Result Value    WBC 13.7 (*)     RBC 4.93      Hemoglobin 15.5      Hematocrit 46.2      MCV 93.6      MCH 31.4      MCHC 33.5      RDW 14.2      Platelets 156      MPV 8.2      Neutrophils% 78.6 (*)     Lymphocytes% 9.7 (*)     Monocytes% 10.8      Eosinophils% 0.1      Basophils% 0.8      ANC (auto diff) 10.70      Lymphocytes Absolute 1.30      Monocytes Absolute 1.50      Eosinophils Absolute 0.00      Basophils Absolute 0.10     COMPREHENSIVE METABOLIC PANEL - Abnormal    Sodium 133 (*)     Potassium 4.3      Chloride 100      CO2 22      Anion Gap 11      BUN 19      Creatinine 1.34 (*)     Glucose 131 (*)     Calcium 9.0      AST 10      ALT (SGPT) 15      Alkaline Phosphatase 52      Total Protein 6.8      Albumin 4.0      Total Bilirubin 1.86 (*)     Corrected Calcium 9.0      eGFR 61      Narrative:     Calculation based on the 2021 Chronic Kidney Disease Epidemiology Collaboration (CKD-EPI) equation refit without adjustment for race.   TSH - Normal    TSH 1.778     MAGNESIUM - Normal    Magnesium 1.9           ED IMAGES:  XR chest portable 1 view   ED Interpretation   No acute cardiopulmonary abnormalities.  No obvious pacemaker lead fracture/abnormalities.          ED PROCEDURES:  Procedures      ED COURSE:  ED Course as of 03/14/25 0627   Fri Mar 14, 2025   0514 Spoke with Dr. Yeung of electrophysiology who will review patient's past medical history and pacemaker interrogation today and provide further recommendations. [AB]      ED Course User Index  [AB] Daquan Parson MD       Sepsis Quality Bundle       MDM     Amount and/or Complexity of Data Reviewed  Independent visualization of images,  tracings, or specimens: yes        Pete Trejo is a 59 y.o. male with a past medical history of v-tach, ischemic cardiomyopathy, CAD, CHF, CVA, MI, a-fib, hypertension, dyslipidemia, BPH, diabetes, and obesity who presented to the emergency department today for concerns of pacemaker problem.  Medical records reviewed from external source.  SpO2 in the mid to high 90s on 2 L and was able to be titrated back to room air with oxygen saturations in the low 90s.  Initial vital signs otherwise within normal limits.  Physical exam as noted above.  Concern for arrhythmia versus electrolyte abnormality versus pacemaker/AICD dysfunction.    CBC with slight leukocytosis of 13.7 with no other acute abnormalities.  CMP with mild elevation in creatinine at 1.34 and slight hyponatremia 133 with no other acute abnormalities.  Magnesium normal.  TSH within normal limits.  Chest x-ray with no acute cardiopulmonary abnormalities on my independent interpretation/wet read.    No acute abnormalities on workup today.  Pacemaker/AICD was interrogated and notably did have AICD discharge today as well as overdrive pacing for approximately 15 hours.  Did therefore speak with the on-call cardiologist initially, Dr. Amador.  She requested the electrophysiologist to be consulted instead.  Did therefore speak with Dr. Yeung, electrophysiology.  He reviewed patient's presentation today and did review the interrogation as well.  He did feel patient would benefit from hospitalization and he will admit patient for continued monitoring and treatment.  Shortness of my discussion with Dr. Yeung, patient did become slightly hypotensive and therefore given very small 250 cc IV fluid bolus as he does have quite significant heart failure history.  Blood pressure did respond well to the IV fluid bolus.  Patient care transferred at that time.          Final diagnoses:   [I47.20] Ventricular tachycardia (CMS/[facility])   [Z45.02] AICD discharge   By signing my  name, I, Armin Angulo attest that this documentation has been prepared under the direction and in the presence of Dr. Parson, 3/14/2025, 1:54 AM.     Daquan Parson MD  03/14/25 0642

## 2025-03-14 NOTE — H&P
"                   Cardiac Electrophysiology Outpatient Consultation      Patient Demographic:  Pete Trejo  : 1965     Reason for Admit:  Accelerating VT and high energy ICD therapy      History of Present Illness:  60 yo male with a complex past cardiac history of ischemic CM s/p remote anterior MI, multiple PCI's, chronic Class 2-3 systolic HF and drug/ablation refractory VT presents with accelerating VT.  The patient is mutually followed here at [place] by Dr Wu and most recently in [place] by Dr Jv Stevens who last performed substrate based ablation of the mid-basal anterior wall and [facility] PVC ablation in 2024.  He continues to have VT since ablation on sotalol 180 mg BID and mexitil 250 mg TID (and GDMT with carvedilol 50 mg BID).  He has a Williamson CRT-D and he presented early this AM to the ER after receiving a single shock from his device without prodrome.    Since his last ablation he hasn't been aware of VT that has been increasing in burden, sometimes spontaneously terminating or with ATP.  He believes he is sometimes aware of ATP when it occurs.  Ablation therapy has made his clinical VT less symptomatic compared with previously when \"I always knew when I was going to get a shock.\"  This is his first shock since his  ablation.  He had previously had ablation at the [facility] in 2021 and his first ablation was in 2020.  He has been on amiodarone/mexitil in the past stopped d/t to breakthrough VT but also with concern of side effect profile given his young age.  Amiodarone hasn't been used since he has subsequently had two additional ablations.  He denies having had any known complications related to it's use.    One confounder is that he has been ill with a febrile illness for the past week and his PO intake has been poor.  He was just starting to feel better yesterday.  He hasn't had progressive angina or HF prodrome.  His last angiogram was 23 at " which time he had POBA of an inferior branch of his LADD1 and POBA of the ostial/proximal LADD1.  His last echo was 10/23.    He is in a paced atrial rhythm at 60 bpm with LV based pacing and QRSd 120 ms with a QTC in the 400 ms range.  His ER device interrogation was reviewed and actual interrogation will be repeated later today for lead function.  His VT last night was in the 130 bpm range and rounds of ATP led to acceleration to the 160 bpm range terminated by an appropriate 41J shock.  He had over 15 hours of VT yesterday starting in the morning before his shock.  His VT burden started to accelerate starting in November of last year with <30 second episodes in the VT-1 zone (110 bpm) sometime either spontaneously terminating or presumably dropping below detect of treated with a single ATP.    I have placed a call to his EP in [place] for coordination of his care and Dr Wu will return on Monday.      Past Medical, Surgical, Family and Social History:    Past Medical History:   Diagnosis Date    BPH (benign prostatic hyperplasia)     CAD (coronary artery disease)     Status post stent    CHF (congestive heart failure) (CMS/[facility])     Chronic combined systolic and diastolic CHF (congestive heart failure) (CMS/[facility])     LVEF 25% with grade 1 diastolic dysfunction as per echo on 1/26/2022.    CVA (cerebral vascular accident) (CMS/[facility]) 2010    Without residual deficit.    Diabetes mellitus type 2 in obese (CMS/[facility])     Dyslipidemia     GERD (gastroesophageal reflux disease)     Heart attack (CMS/HCC)     x3    Hypertension     Ischemic cardiomyopathy     Morbid obesity with BMI of 40.0-44.9, adult (CMS/[facility])     Paroxysmal atrial fibrillation (CMS/HCC)     On Xarelto    V-tach (CMS/[facility])     AICD in place     Past Surgical History:   Procedure Laterality Date    ABLATION VT N/A 06/09/2020    Procedure: Ablation VT;  Surgeon: Wilfredo Montana MD;  Location: [facility] EP Lab;  Service: Electrophysiology;  Laterality: N/A;   Check with Dr. Montana in the a.m. regarding scheduling    APPENDECTOMY      BILATERAL HEART CATH N/A 12/5/2023    Procedure: Bilateral heart cath;  Surgeon: Jitendra Post MD;  Location: [facility] Cath Lab;  Service: Cardiovascular;  Laterality: N/A;    COLONOSCOPY N/A 7/25/2023    Procedure: COLONOSCOPY with Polypectomy;  Surgeon: Hortencia Carson MD;  Location: [facility] Endoscopy;  Service: Endoscopy;  Laterality: N/A;    CORONARY ARTERY STENTING  2009    2.5 X 24-mm Taxus DIMAS to first diagonal. 3.0 X 8-mm Taxus stent to proximal LAD just proximal to diagonal.    CORONARY ARTERY STENTING  2010    3.5 X 15-mm Promus DIMAS to totally occluded LAD    CORONARY ARTERY STENTING  2011    2.25 X 30-mm Resolute DIMAS to 2nd diagonal.  Balloon angioplasty through strut to improve blood flow to distal LAD    CORONARY ARTERY STENTING  07/28/2017    second diagonal branch LAD Resolute 2.25 x 30-mm DIMAS    ICD DC GEN CHANGE N/A 6/20/2022    Procedure: BI-V ICD Gen Change;  Surgeon: Wilfredo Montana MD;  Location: [facility] EP Lab;  Service: Cardiovascular;  Laterality: N/A;    ICD SC NEW  2011    [employer] Cognis Model #N119, Serial #[id number]. [employer] Reliance Model #0185, Serial #[id number]. LV lead Acuity Model #45+91, Serial #[id number]. Atrial lead Model #4469, Serial #[id number]    OXIMETRY RUN N/A 12/5/2023    Procedure: OXIMETRY RUN;  Surgeon: Jitendra Post MD;  Location: [facility] Cath Lab;  Service: Cardiovascular;  Laterality: N/A;     Family History   Problem Relation Age of Onset    Heart attack Mother 62    Other Mother         Abdominal Aortic Aneurysm    Lung cancer Mother     Colon cancer Father         Cause of death    Diabetes Brother         Non-Insulin Dependent    Heart attack Brother 51        w/ Stents    Heart disease Brother     Other Son         Well     Social History     Tobacco Use    Smoking status: Former     Current packs/day: 0.00     Average packs/day: 0.8 packs/day for 30.0 years (22.5 ttl pk-yrs)     Types: Cigarettes      Start date: 1990     Quit date: 2020     Years since quittin.7    Smokeless tobacco: Never   Vaping Use    Vaping status: Never Used   Substance Use Topics    Alcohol use: Not Currently     Comment: Quit drinking in     Drug use: Not Currently       Allergies as of 2025 - Reviewed 2025   Allergen Reaction Noted    Morphine  2017    Tramadol  2017       Current Facility-Administered Medications   Medication Dose Route Frequency Provider Last Rate Last Admin    lidocaine (PF) 2,000 mg in D5W 500 mL infusion  1 mg/min intravenous Continuous Daquan Parson MD 15 mL/hr at 25 0719 1 mg/min at 25 0719    sodium chloride flush 3 mL  3 mL intravenous PRN Juan Yeung MD        melatonin tablet 3 mg  3 mg oral Nightly PRN Juan Yeung MD        ondansetron (ZOFRAN) tablet 4 mg  4 mg oral q6h PRN Juan Yeung MD        Or    ondansetron (ZOFRAN) injection 4 mg  4 mg intravenous q6h PRN Juan Yeung MD        sennosides-docusate sodium (SENNA PLUS) 8.6-50 mg 1 tablet  1 tablet oral Nightly PRN Juan Yeung MD        carvediloL (COREG) tablet 50 mg  50 mg oral 2x daily with meals Juan Yenug MD        rivaroxaban (XARELTO) tablet 20 mg  20 mg oral Daily Juan Yeung MD   20 mg at 25 1018    aspirin EC tablet 81 mg  81 mg oral Daily Juan Yeung MD   81 mg at 25 1018    magnesium oxide (MAG-OX) tablet 400 mg  400 mg oral 2x daily Juan Yeung MD   400 mg at 25 1018     Current Outpatient Medications   Medication Sig Dispense Refill    rosuvastatin (CRESTOR) 40 mg tablet Take 1 tablet (40 mg total) by mouth daily      sotaloL (BETAPACE) 120 mg tablet Take 1.5 tablets (180 mg total) by mouth every 12 (twelve) hours 270 tablet 0    carvediloL (Coreg) 25 mg tablet Take 2 tablets (50 mg total) by mouth 2 (two) times a day with meals 120 tablet 11    mexiletine (MEXITIL) 250 mg capsule Take 1 capsule (250 mg total) by mouth every 8 (eight) hours 270  capsule 0    rivaroxaban (Xarelto) 20 mg tablet Take 1 tablet (20 mg total) by mouth daily 90 tablet 1    magnesium oxide (MAG-OX) 400 mg (241.3 mg magnesium) tablet Take 1 tablet (400 mg total) by mouth 2 (two) times a day 180 tablet 3    aspirin 81 mg EC tablet Take 1 tablet (81 mg total) by mouth daily 90 tablet 2    empagliflozin (JARDIANCE) 10 mg tablet Take 1 tablet (10 mg total) by mouth daily 90 tablet 3    sacubitriL-valsartan (ENTRESTO)  mg tablet Take 1 tablet by mouth 2 (two) times a day 180 tablet 3    spironolactone (ALDACTONE) 50 mg tablet Take 1 tablet (50 mg total) by mouth daily 90 tablet 3    potassium chloride 10 mEq CR tablet Take 2 tablets (20 mEq total) by mouth daily 180 tablet 3    furosemide (LASIX) 40 mg tablet Take 1 tablet (40 mg total) by mouth daily 30 tablet 11    tamsulosin (FLOMAX) 0.4 mg capsule Take 1 capsule (0.4 mg total) by mouth daily      cholecalciferol, vitamin D3, 25 mcg (1,000 unit) tablet Take 1 tablet (1,000 Units total) by mouth daily      pantoprazole (PROTONIX) 40 mg EC tablet Take 1 tablet (40 mg total) by mouth daily      nitroglycerin (NITROSTAT) 0.4 mg SL tablet Place 1 tablet (0.4 mg total) under the tongue every 5 (five) minutes as needed for chest pain 26 tablet 1    albuterol HFA (PROVENTIL HFA;VENTOLIN HFA) 90 mcg/actuation inhaler Inhale 2 puffs every 6 (six) hours as needed for wheezing or shortness of breath 1 Inhaler 1         Review of Systems:  Non-contributory except per HPI       Physical Examination:  Vitals:    03/14/25 0900   BP: 99/55   Pulse: 65   Resp:    Temp:    SpO2:      Vitals:    03/14/25 0745 03/14/25 0800 03/14/25 0815 03/14/25 0900   BP: 117/53 103/48 (!) 89/53 99/55   Temp:       TempSrc:       Pulse: 60 65 63 65   Resp: (!) 27  (!) 28    SpO2: 92% 93% 92%    Height:       Weight:         Physical Exam  Vitals and nursing note reviewed.   HENT:      Head: Normocephalic.   Eyes:      General: No scleral icterus.  Neck:       Vascular: JVD present. No carotid bruit.   Cardiovascular:      Rate and Rhythm: Normal rate.      Pulses: Normal pulses.      Heart sounds: No murmur heard.  Pulmonary:      Breath sounds: Normal breath sounds. No wheezing, rhonchi or rales.   Abdominal:      Palpations: Abdomen is soft.      Tenderness: There is no abdominal tenderness.   Musculoskeletal:         General: No swelling.   Skin:     General: Skin is warm.   Neurological:      General: No focal deficit present.      Mental Status: He is alert and oriented to person, place, and time.   Psychiatric:         Mood and Affect: Mood normal.         Behavior: Behavior normal.        Diagnostics:    Last 3 weights:  Wt Readings from Last 3 Encounters:   03/14/25 125 kg (275 lb 9.2 oz)   02/11/25 123.2 kg (271 lb 9.6 oz)   12/05/24 123.5 kg (272 lb 3.2 oz)        Orthostatic Blood Pressure:   There were no vitals filed for this visit.     Echo Interpretation Summary:  Results for orders placed during the hospital encounter of 10/18/23    US Echo complete    Interpretation Summary    Normal left ventricular wall thickness.    Biplane ejection fraction is 25%.    Severe left ventricular systolic dysfunction.  Large anteroapical aneurysm.  Only the basilar left ventricular segments have preserved contractility.  No LV mural thrombi.    Grade II (moderate) left ventricular diastolic abnormality.    Elevated left ventricular filling pressure.    The left atrium is severely dilated.    The right atrium is moderately dilated.    Normal central venous pressure (0-5 mmHg).    No pericardial effusion.    Inadequate TR spectral Doppler to accurately assess right ventricular systolic pressure.    Comment: No obvious changes from the January 2022 echo.       Coronary Angiography:   No results found for this or any previous visit from the past 365 days.        CBC:  Lab Results   Component Value Date    WBC 13.7 (H) 03/14/2025    HGB 15.5 03/14/2025    HCT 46.2 03/14/2025  "   MCV 93.6 03/14/2025    MCH 31.4 03/14/2025    MCHC 33.5 03/14/2025    RDW 14.2 03/14/2025     03/14/2025    MPV 8.2 03/14/2025      BMP:  Lab Results   Component Value Date    GLUCOSE 131 (H) 03/14/2025    CALCIUM 9.0 03/14/2025     (L) 03/14/2025    K 4.3 03/14/2025    CO2 22 03/14/2025     03/14/2025    BUN 19 03/14/2025    CREATININE 1.34 (H) 03/14/2025    ANIONGAP 11 03/14/2025     LFT:  Lab Results   Component Value Date    ALKPHOS 52 03/14/2025    AST 10 03/14/2025    ALT 15 03/14/2025     Ferritin:  Lab Results   Component Value Date    FERRITIN 34.5 10/20/2023      Magnesium:   Lab Results   Component Value Date    MG 1.9 03/14/2025      TSH:  Lab Results   Component Value Date    TSH 1.778 03/14/2025     BNP:  Lab Results   Component Value Date    BNP 82 02/16/2024     B12:  No results found for: \"FHJEVQAM30\"  Cortisol:  No results found for: \"CORTISOL\"      Assessment/Plan:   Accelerating VT-  Even predating this febrile illness he has had an increasing burden of VT since November.  I will transition him to IV lidocaine and allow sotalol to wash out before starting amiodarone.  I think it worthwhile to test whether amiodarone may be more effective now having had multiple ablation procedures.  I verbalized that this is unlikely to be a long term solution and will let his primary team decide when it is appropriate to repeat ablation suspecting that he may benefit from epicardial mapping.  Low threshold to repeat coronary angiography given ischemic VT substrate but no ongoing ischemic symptoms or HF exacerbation to expedite this currently  Chronic systolic HF-  Compensated likely in part due to dehydration from his illness.  Will prioritize available BP for carvedilol and then add back vasodilators/SGLT-2 therapy as BP increases and last add back diuretic therapy    The plan was discussed in detail with the patient including the rationale for hospital admission and all questions were " answered.    Thank you for allowing me to participate in the care of this patient.  If you have any questions, comments or concerns please contact me @ [phone]  Electronically signed by: Juan Yeung MD

## 2025-03-15 LAB
ALBUMIN SERPL-MCNC: 4 G/DL (ref 3.5–5.3)
ALP SERPL-CCNC: 58 U/L (ref 45–115)
ALT SERPL-CCNC: 32 U/L (ref 7–52)
ANION GAP SERPL CALC-SCNC: 9 MMOL/L (ref 3–11)
AST SERPL-CCNC: 24 U/L
BILIRUB SERPL-MCNC: 0.88 MG/DL (ref 0.2–1.4)
BNP SERPL-MCNC: 128 PG/ML (ref 0–100)
BUN SERPL-MCNC: 16 MG/DL (ref 7–25)
CALCIUM ALBUM COR SERPL-MCNC: 8.9 MG/DL (ref 8.6–10.3)
CALCIUM SERPL-MCNC: 8.9 MG/DL (ref 8.6–10.3)
CHLORIDE SERPL-SCNC: 102 MMOL/L (ref 98–107)
CO2 SERPL-SCNC: 22 MMOL/L (ref 21–32)
CREAT SERPL-MCNC: 0.87 MG/DL (ref 0.7–1.3)
EGFRCR SERPLBLD CKD-EPI 2021: 100 ML/MIN/1.73M*2
GLUCOSE SERPL-MCNC: 127 MG/DL (ref 70–105)
MAGNESIUM SERPL-MCNC: 2.1 MG/DL (ref 1.8–2.4)
POTASSIUM SERPL-SCNC: 4.1 MMOL/L (ref 3.5–5.1)
PROT SERPL-MCNC: 7.1 G/DL (ref 6–8.3)
SODIUM SERPL-SCNC: 133 MMOL/L (ref 135–145)

## 2025-03-15 PROCEDURE — 36415 COLL VENOUS BLD VENIPUNCTURE: CPT | Performed by: INTERNAL MEDICINE

## 2025-03-15 PROCEDURE — 6370000100 HC RX 637 (ALT 250 FOR IP): Performed by: INTERNAL MEDICINE

## 2025-03-15 PROCEDURE — 99232 SBSQ HOSP IP/OBS MODERATE 35: CPT | Performed by: INTERNAL MEDICINE

## 2025-03-15 PROCEDURE — 80053 COMPREHEN METABOLIC PANEL: CPT | Performed by: INTERNAL MEDICINE

## 2025-03-15 PROCEDURE — 83735 ASSAY OF MAGNESIUM: CPT | Performed by: INTERNAL MEDICINE

## 2025-03-15 PROCEDURE — (BLANK) HC ROOM ICU INTERMEDIATE

## 2025-03-15 PROCEDURE — 83880 ASSAY OF NATRIURETIC PEPTIDE: CPT | Performed by: INTERNAL MEDICINE

## 2025-03-15 RX ORDER — AMIODARONE HYDROCHLORIDE 200 MG/1
400 TABLET ORAL 3 TIMES DAILY
Status: DISCONTINUED | OUTPATIENT
Start: 2025-03-15 | End: 2025-03-18 | Stop reason: HOSPADM

## 2025-03-15 RX ORDER — ACETAMINOPHEN 325 MG/1
650 TABLET ORAL EVERY 6 HOURS PRN
Status: DISCONTINUED | OUTPATIENT
Start: 2025-03-15 | End: 2025-03-18 | Stop reason: HOSPADM

## 2025-03-15 RX ADMIN — ASPIRIN 81 MG: 81 TABLET ORAL at 08:11

## 2025-03-15 RX ADMIN — AMIODARONE HYDROCHLORIDE 400 MG: 200 TABLET ORAL at 14:50

## 2025-03-15 RX ADMIN — SACUBITRIL AND VALSARTAN 1 TABLET: 97; 103 TABLET, FILM COATED ORAL at 20:09

## 2025-03-15 RX ADMIN — ACETAMINOPHEN 650 MG: 325 TABLET ORAL at 10:25

## 2025-03-15 RX ADMIN — RIVAROXABAN 20 MG: 20 TABLET, FILM COATED ORAL at 08:11

## 2025-03-15 RX ADMIN — AMIODARONE HYDROCHLORIDE 400 MG: 200 TABLET ORAL at 08:11

## 2025-03-15 RX ADMIN — AMIODARONE HYDROCHLORIDE 400 MG: 200 TABLET ORAL at 20:09

## 2025-03-15 RX ADMIN — MAGNESIUM OXIDE 400 MG: 400 TABLET ORAL at 20:09

## 2025-03-15 RX ADMIN — MAGNESIUM OXIDE 400 MG: 400 TABLET ORAL at 08:11

## 2025-03-15 RX ADMIN — CARVEDILOL 50 MG: 25 TABLET, FILM COATED ORAL at 17:25

## 2025-03-15 RX ADMIN — CARVEDILOL 50 MG: 25 TABLET, FILM COATED ORAL at 08:11

## 2025-03-15 RX ADMIN — SACUBITRIL AND VALSARTAN 1 TABLET: 49; 51 TABLET, FILM COATED ORAL at 08:11

## 2025-03-15 NOTE — PLAN OF CARE
Problem: Neurosensory - Adult  Goal: Achieves maximal functionality and self care  Outcome: Progressing  Flowsheets (Taken 3/15/2025 0506)  Achieves maximal functionality and self care: With patient fatigue and changes in neurological status, monitor swallowing and airway patency     Problem: Cardiovascular - Adult  Goal: Maintains optimal cardiac output and hemodynamic stability  Outcome: Progressing  Flowsheets (Taken 3/15/2025 0506)  Maintain optimal cardiac output and hemodynamic stability:   Monitor heart rate, blood pressure, and cardiac rhythm   Monitor for hypotension and other signs of decreased cardiac output   Administer or titrate ordered vasoactive medications to optimize hemodynamic stability   Monitor for fluid overload/dehydration, weight gain, shortness of breath and activity intolerance   Assess quality of pulses, capillary refill, edema, sensation, skin color and temperature   Assess for signs of decreased coronary artery perfusion - ex. angina  Goal: Absence of cardiac dysrhythmias or at baseline  Outcome: Progressing  Flowsheets (Taken 3/15/2025 0506)  Absence of cardiac dysrhythmias or at baseline: Continuous cardiac monitoring, monitor vital signs (see flowsheet documentation)     Problem: Hematologic - Adult  Goal: Maintains hematologic stability  Outcome: Progressing  Flowsheets (Taken 3/15/2025 0506)  Maintains hematologic stability:   Assess for signs and symptoms of bleeding or hemorrhage   Monitor labs   Initiate bleeding precautions as indicated   Administer medications as ordered     Problem: Musculoskeletal - Adult  Goal: Return mobility to safest level of function  Outcome: Progressing  Flowsheets (Taken 3/15/2025 0506)  Return mobility to safest level of function:   Assess patient ADL status, stability and activity tolerance providing assistive devices as needed   Assist with transfers and ambulation using safe practices   Instruct patient/support person on ordered activity level  and self care needs      clear

## 2025-03-15 NOTE — PLAN OF CARE
Problem: Neurosensory - Adult  Goal: Achieves maximal functionality and self care  Outcome: Progressing  Flowsheets (Taken 3/14/2025 1812 by Gardenia Laboy, RN)  Achieves maximal functionality and self care:   With patient fatigue and changes in neurological status, monitor swallowing and airway patency   Encourage and assist patient to increase activity and self care with guidance from PT/OT   Encourage visually impaired, hearing impaired and aphasic patients to use assistive/communication devices (Pended)     Problem: Cardiovascular - Adult  Goal: Maintains optimal cardiac output and hemodynamic stability  Outcome: Progressing  Flowsheets (Taken 3/14/2025 1815 by Gardenia Laboy, RN)  Maintain optimal cardiac output and hemodynamic stability:   Monitor heart rate, blood pressure, and cardiac rhythm   Monitor for hypotension and other signs of decreased cardiac output   Administer or titrate ordered vasoactive medications to optimize hemodynamic stability   Monitor for fluid overload/dehydration, weight gain, shortness of breath and activity intolerance (Pended)  Goal: Absence of cardiac dysrhythmias or at baseline  Outcome: Progressing  Flowsheets (Taken 3/14/2025 1818)  Absence of cardiac dysrhythmias or at baseline:   Continuous cardiac monitoring, monitor vital signs (see flowsheet documentation)   Obtain 12 lead EKG if indicated   Administer antiarrhythmic and heart rate control medications as ordered   Initiate emergency measures for life threatening arrhythmias   Monitor electrolytes and administer replacement therapy as ordered     Problem: Respiratory - Adult  Goal: Achieves optimal ventilation and oxygenation  Outcome: Progressing  Flowsheets (Taken 3/14/2025 1818)  Achieves optimal ventilation and oxygenation:   Assess and report changes in respiratory status   Assess and report changes in mentation and behavior   Position to facilitate oxygenation and minimize respiratory effort   Assess patient's  ability to cough effectively   Oxygen supplementation based on oxygen saturation or arterial blood gases   Encourage broncho-pulmonary hygiene including cough, deep breathe   Assess the need for suctioning   Collaborate with Respiratory Therapy as indicated, including medications and treatment   Instruct patient/support person to report shortness of breath or any respiratory difficulty     Problem: Gastrointestinal - Adult  Goal: Nutrient intake appropriate for improving, restoring or maintaining nutritional needs  Outcome: Progressing  Flowsheets (Taken 3/14/2025 1818)  Nutrient intake appropriate for improving, restoring or maintaining nutritional needs:   Monitor percentage of each meal consumed   Assist with meals as needed   Obtain nutritional consult as indicated   Provide patient/support person specific nutrition education as appropriate   Identify factors contributing to decreased intake, treat as appropriate   Monitor I&O, weight and lab values   Administer alternative nutrition interventions as ordered  Goal: Maintains or returns to baseline digestive function  Outcome: Progressing  Flowsheets (Taken 3/14/2025 1818)  Maintains or returns to baseline bowel function:   Assess abdomen and bowel status and report changes in gastrointestinal function   Encourage mobilization and activity   Assess hydration and nutritional status   Monitor for metabolic panel imbalances   Administer ordered medications as needed   Nutrition consult as indicated   Assess characteristics and frequency of stool   Assess for treatment effectiveness  Goal: Maintains Optimal Tissue Perfusion  Outcome: Progressing  Flowsheets (Taken 3/14/2025 1818)  Maintains Optimal Tissue Perfusion:   Monitor for increased abdominal girth, firmness or intra-abdominal pressure   Monitor nutritional intake, intake/output, and weight   Assess blood pressure, heart rate, and oxygen saturation   Assess skin color, capillary refill and edema   Monitor  metabolic panels, blood count panels, and coagulations panels as ordered   Assess and report changes in bowel function   Assess for blood in emesis and stool     Problem: Genitourinary - Adult  Goal: Absence of urinary retention  Outcome: Progressing  Flowsheets (Taken 3/14/2025 1818)  Absence of urinary retention:   Assess patient’s ability to void and empty bladder   Administer medications to alleviate retention as needed   Monitor intake/output and perform bladder scan if indicated   If urinary catheter present, initiate and maintain CAUTI bundle  Goal: Absence of Urinary Infection  Outcome: Progressing  Flowsheets (Taken 3/14/2025 1818)  Absence of Urinary Infection:   Assess urinary status for burning, urgency, frequency, pain and urine characteristics   Obtain urinalysis as ordered   Monitor intake/output   Assess for neurological status changes   Monitor lab values     Problem: Metabolic/Fluid and Electrolytes - Adult  Goal: Electrolytes maintained within normal limits  Outcome: Progressing  Flowsheets (Taken 3/14/2025 1818)  Electrolytes maintained within normal limits:   Monitor labs and assess patient for signs and symptoms of electrolyte imbalances   Administer and monitor response to electrolyte replacements   Initiate and instruct patient/support person on fluid and nutrition restrictions as ordered  Goal: Maintain Optimal Renal Function and Hemodynamic Stability  Outcome: Progressing  Flowsheets (Taken 3/14/2025 1818)  Maintain optimal renal function and hemodynaimc stability:   Monitor labs and assess for signs and symptoms of volume excess or deficit   Monitor intake, output and patient weight   Monitor response to interventions for patient's volume status, including labs, urine output, blood pressure (other measures as available)   Instruct patient on fluid and nutrition restrictions as appropriate   Encourage oral intake as appropriate     Problem: Skin/Tissue Integrity - Adult  Goal: Skin  integrity remains intact  Outcome: Progressing  Flowsheets (Taken 3/14/2025 1818)  Skin integrity remains intact:   Assess and document risk factors for pressure ulcer development   Monitor for areas of redness and/or skin breakdown   Assess and document skin integrity   Initiate pressure ulcer prevention measures as indicated     Problem: Hematologic - Adult  Goal: Maintains hematologic stability  Outcome: Progressing  Flowsheets (Taken 3/14/2025 1818)  Maintains hematologic stability:   Assess for signs and symptoms of bleeding or hemorrhage   Initiate bleeding precautions as indicated   Administer medications as ordered   Monitor labs   Administer supportive blood products/factors as ordered   Educate patient/support person to report signs/symptoms of bleeding     Problem: Musculoskeletal - Adult  Goal: Return mobility to safest level of function  Outcome: Progressing  Flowsheets (Taken 3/14/2025 1818)  Return mobility to safest level of function:   Assess patient ADL status, stability and activity tolerance providing assistive devices as needed   Assist with transfers and ambulation using safe practices   Obtain PT/OT consults as needed   Instruct patient/support person on ordered activity level and self care needs

## 2025-03-15 NOTE — INTERDISCIPLINARY/THERAPY
Case Management Admission Note    Phone # [phone]    Living Situation: Family members Private residence            ADLs: Independent  Stairs: Yes 6  HME/CPAP: CPAP      Oxygen: No      Home Health:No     Current Resources: None      Diabetes/supplies: Do you have Diabetes?: No  PCP: Lynnette Amezquita, CNP  Funding: Medicaid, [facility]  Pharmacy:[facility] / [facility] [address]       Support Person: Primary Emergency Contact: Pete Trejo Jr, Mobile Phone: [phone], Relation: Son  Advance Directives (For Healthcare)  Advance Directive: Patient does not have advance directive  No Advance Directive: Patient would like information  Information Provided on Healthcare Directives: Yes  Transportation: When discharged, who will be providing your transportation?: Family  Discharge Transport Person's Name: Jerald Freeman Jr.  Discharge Transport Person's Phone Number: [phone]     Admission Diagnosis: Ventricular tachycardia     Narrative: MSW reviewed chart, met with pt and son at bedside. Explained role of MSW CM.     Pt lives with his two sons, has about 6 stairs. IADLS. CPAP. No HH/HME/O2/DM. Provided education and information on POA and Living will. Confirmed pt receives medications from [facility]. Updated emergency contacts, adding sons Pete and Syd. Confirmed PCP.   Son to transport at DC.       MSW to follow for dc needs.     Dispo: ST[facility]AN

## 2025-03-15 NOTE — PLAN OF CARE
Problem: Neurosensory - Adult  Goal: Achieves maximal functionality and self care  Outcome: Progressing     Problem: Cardiovascular - Adult  Goal: Maintains optimal cardiac output and hemodynamic stability  Outcome: Progressing  Goal: Absence of cardiac dysrhythmias or at baseline  Outcome: Progressing     Problem: Respiratory - Adult  Goal: Achieves optimal ventilation and oxygenation  Outcome: Progressing     Problem: Gastrointestinal - Adult  Goal: Nutrient intake appropriate for improving, restoring or maintaining nutritional needs  Outcome: Progressing  Goal: Maintains or returns to baseline digestive function  Outcome: Progressing  Goal: Maintains Optimal Tissue Perfusion  Outcome: Progressing     Problem: Genitourinary - Adult  Goal: Absence of urinary retention  Outcome: Progressing  Goal: Absence of Urinary Infection  Outcome: Progressing     Problem: Metabolic/Fluid and Electrolytes - Adult  Goal: Electrolytes maintained within normal limits  Outcome: Progressing  Goal: Maintain Optimal Renal Function and Hemodynamic Stability  Outcome: Progressing     Problem: Skin/Tissue Integrity - Adult  Goal: Skin integrity remains intact  Outcome: Progressing     Problem: Hematologic - Adult  Goal: Maintains hematologic stability  Outcome: Progressing     Problem: Musculoskeletal - Adult  Goal: Return mobility to safest level of function  Outcome: Progressing     Problem: Pain - Adult  Goal: Verbalizes/displays adequate comfort level or baseline comfort level  Outcome: Progressing

## 2025-03-15 NOTE — NURSING END OF SHIFT
Nursing End of Shift Summary:     Patient: Pete Trejo  MRN: [id number]  : 1965, Age: 59 y.o.    Location: [place] [address]    Nursing Goals  Clinical Goals for the Shift: Monitor VS, tele, maintain comfort and safety    Narrative Summary of Progress Toward Clinical Goals:  Patient remained stable throughout my shift, no events to note.     Barriers to Goals/Nursing Concerns:  No    New Patient or Family Concerns/Issues:  No    Shift Summary:   Significant Events & Communications to Providers (Last 12 Hours)       Last 5 Values    No documentation.                 Oxygen Usage (Last 12 Hours)       Last 5 Values       Row Name 25 0800 25 1622                Room Air or Baseline Oxygen Trial by Nursing    Is Patient on Room Air OR on the Same Amount of O2 as at Home? Yes Yes                     Mobility (Last 12 Hours)       Last 5 Values    No documentation.                 Urethral Catheter       Active Urethral Catheter       None                  Active Lines       Active Central venous catheter / Peripherally inserted central catheter / Implantable Port / Hemodialysis catheter / Midline Catheter       None                  Infusing Medications   Medication Dose Last Rate    lidocaine (PF)  1 mg/min 1 mg/min (25 0719)     PRN Medications   Medication Dose Last Admin    sodium chloride  3 mL      melatonin  3 mg      ondansetron  4 mg      Or    ondansetron  4 mg      sennosides-docusate sodium  1 tablet

## 2025-03-15 NOTE — PROGRESS NOTES
"Electrophysiology Attending  3/15/2025    Mild HA last night and again this AM.  He has been diagnosed with LAVON in the past and is variably adherent with CPAP.  During these few days in the hospital I suggested that he bring his equipment in and use while her as a trial    No tele events except isolated, unifocal PVC's    BP 92/52   Pulse 74   Temp 36.5 °C (97.7 °F) (Oral)   Resp 20   Ht 1.803 m (5' 11\")   Wt 118.6 kg (261 lb 6.4 oz)   SpO2 92%   BMI 36.46 kg/m²   Anicteric, OP dry  Chest clear  Regular without significant murmur  Abdomen soft, non tender  No edema    Labs stable, total bili back to normal (will start amiodarone)    Sustained, slow VT-  Stopped lidocaine this AM, will start PO amiodarone loading and if breakthrough will supplement with IV.  Anticipate monitoring 72 hours in house during initial loading.  May add mexitil back tomorrow or AM  -Will need ICD reprogramming prior to d/c with the addition of at least one high energy therapy in his VT zone given VT duration having been >15 hours    Chronic systolic HF-  Will resume full dose Entresto tonight.  I think he is on too much diuretic with high dose aldactone, furosemide, and SGLT-2.  Will resume SGLT2 tomorrow, BNP today is (as always) compensated.  Anticipate using furosemide PRN but will confirm this decision is ok with HF team    LAVON-  Will get equipment at home and try and increase adherence there but use while hospitalized.  It may be that RT has some suggestions to improve adherence if he is having technical issues    Juan Yeung MD  Cardiac Electrophysiology  "

## 2025-03-16 VITALS
SYSTOLIC BLOOD PRESSURE: 99 MMHG | HEIGHT: 71 IN | TEMPERATURE: 98.3 F | WEIGHT: 259.6 LBS | HEART RATE: 70 BPM | OXYGEN SATURATION: 93 % | BODY MASS INDEX: 36.34 KG/M2 | RESPIRATION RATE: 18 BRPM | DIASTOLIC BLOOD PRESSURE: 58 MMHG

## 2025-03-16 PROCEDURE — 93005 ELECTROCARDIOGRAM TRACING: CPT | Performed by: INTERNAL MEDICINE

## 2025-03-16 PROCEDURE — 99232 SBSQ HOSP IP/OBS MODERATE 35: CPT | Performed by: INTERNAL MEDICINE

## 2025-03-16 PROCEDURE — 6370000100 HC RX 637 (ALT 250 FOR IP): Performed by: INTERNAL MEDICINE

## 2025-03-16 PROCEDURE — (BLANK) HC ROOM ICU INTERMEDIATE

## 2025-03-16 PROCEDURE — 93010 ELECTROCARDIOGRAM REPORT: CPT | Performed by: INTERNAL MEDICINE

## 2025-03-16 RX ORDER — MEXILETINE HYDROCHLORIDE 150 MG/1
300 CAPSULE ORAL
Status: DISCONTINUED | OUTPATIENT
Start: 2025-03-16 | End: 2025-03-18 | Stop reason: HOSPADM

## 2025-03-16 RX ADMIN — CARVEDILOL 50 MG: 25 TABLET, FILM COATED ORAL at 17:03

## 2025-03-16 RX ADMIN — ASPIRIN 81 MG: 81 TABLET ORAL at 08:56

## 2025-03-16 RX ADMIN — MAGNESIUM OXIDE 400 MG: 400 TABLET ORAL at 08:56

## 2025-03-16 RX ADMIN — AMIODARONE HYDROCHLORIDE 400 MG: 200 TABLET ORAL at 20:41

## 2025-03-16 RX ADMIN — SACUBITRIL AND VALSARTAN 1 TABLET: 97; 103 TABLET, FILM COATED ORAL at 08:56

## 2025-03-16 RX ADMIN — RIVAROXABAN 20 MG: 20 TABLET, FILM COATED ORAL at 08:56

## 2025-03-16 RX ADMIN — AMIODARONE HYDROCHLORIDE 400 MG: 200 TABLET ORAL at 14:18

## 2025-03-16 RX ADMIN — MEXILETINE HYDROCHLORIDE 300 MG: 150 CAPSULE ORAL at 14:18

## 2025-03-16 RX ADMIN — MEXILETINE HYDROCHLORIDE 300 MG: 150 CAPSULE ORAL at 17:03

## 2025-03-16 RX ADMIN — SACUBITRIL AND VALSARTAN 1 TABLET: 97; 103 TABLET, FILM COATED ORAL at 20:41

## 2025-03-16 RX ADMIN — EMPAGLIFLOZIN 10 MG: 10 TABLET, FILM COATED ORAL at 11:11

## 2025-03-16 RX ADMIN — CARVEDILOL 50 MG: 25 TABLET, FILM COATED ORAL at 08:56

## 2025-03-16 RX ADMIN — AMIODARONE HYDROCHLORIDE 400 MG: 200 TABLET ORAL at 08:56

## 2025-03-16 RX ADMIN — MAGNESIUM OXIDE 400 MG: 400 TABLET ORAL at 20:41

## 2025-03-16 NOTE — NURSING END OF SHIFT
Nursing End of Shift Summary:     Patient: Pete Trejo  MRN: [id number]  : 1965, Age: 59 y.o.    Location: [place] [address]    Nursing Goals  Clinical Goals for the Shift: monitor VS and tele; provide cares and safety measures    Narrative Summary of Progress Toward Clinical Goals:  VS stable. Tele AV paced. Lidocaine gtt D/C. PO amio started. Px controlled with PRN medication. No falls or injuries this shift. Comfort and safety maintained.     Barriers to Goals/Nursing Concerns:  No    New Patient or Family Concerns/Issues:  No    Shift Summary:   Significant Events & Communications to Providers (Last 12 Hours)       Last 5 Values    No documentation.                 Oxygen Usage (Last 12 Hours)       Last 5 Values    No documentation.                 Mobility (Last 12 Hours)       Last 5 Values       Row Name 03/15/25 0758 03/15/25 1152 03/15/25 1158 03/15/25 1600 03/15/25 1738       Mobility    Activity -- -- Ambulate in room;Bathroom privileges -- Up ad ezekiel    Length of Time in Chair (min) -- -- 0 -- 0    Distance Ambulated (feet) -- -- 30 Feet -- 50 Feet    Distance Ambulated (Meters Calculated) -- -- 9.14 Meters -- 15.24 Meters    Patient Position In bed In bed -- In bed --    Turning/Repositioning -- -- Turns self -- Turns self    Distance Ambulated (Meters Calculated)(Do Not Use) -- -- 9.14 Feet -- 15.24 Feet                  Urethral Catheter       Active Urethral Catheter       None                  Active Lines       Active Central venous catheter / Peripherally inserted central catheter / Implantable Port / Hemodialysis catheter / Midline Catheter       None                  Infusing Medications   Medication Dose Last Rate     PRN Medications   Medication Dose Last Admin    acetaminophen  650 mg 650 mg at 03/15/25 1025    sodium chloride  3 mL      melatonin  3 mg 3 mg at 25 2338    ondansetron  4 mg      Or    ondansetron  4 mg      sennosides-docusate sodium  1 tablet

## 2025-03-16 NOTE — PROGRESS NOTES
"Electrophysiology Attending  3/16/2025    No complaints.  Feeling well overall.      No tele events except isolated, unifocal PVC's    /67 (BP Location: Right arm, Patient Position: In bed, Cuff Size: Long Adult)   Pulse 67   Temp 36.9 °C (98.4 °F) (Oral)   Resp 20   Ht 1.803 m (5' 11\")   Wt 117.8 kg (259 lb 9.6 oz)   SpO2 91%   BMI 36.21 kg/m²   Anicteric, OP dry  Chest clear  Regular without significant murmur  Abdomen soft, non tender  No edema    Will obtain labs in AM    Sustained, slow VT-  Tolerating high dose PO loading, QTC ok.  Will continue through Monday anticipating d/c Tuesday.  May restart mexitil at d/c or see how he does with amiodarone therapy alone  -Will need ICD reprogramming prior to d/c with the addition of at least one high energy therapy in his VT zone given VT duration having been >15 hours    Chronic systolic HF-  Will start SGLT-2 today.  1/2 of his previous dose of aldactone tomorrow.  Anticipate using furosemide PRN but will confirm this decision is ok with HF team    LAVON-  Will get equipment from home and try and increase adherence there but use while hospitalized.  It may be that RT has some suggestions to improve adherence if he is having technical issues    Juan Yeung MD  Cardiac Electrophysiology  "

## 2025-03-16 NOTE — PLAN OF CARE
Problem: Neurosensory - Adult  Goal: Achieves maximal functionality and self care  Outcome: Progressing  Flowsheets (Taken 3/16/2025 0413)  Achieves maximal functionality and self care: With patient fatigue and changes in neurological status, monitor swallowing and airway patency     Problem: Cardiovascular - Adult  Goal: Maintains optimal cardiac output and hemodynamic stability  Outcome: Progressing  Flowsheets (Taken 3/16/2025 0413)  Maintain optimal cardiac output and hemodynamic stability:   Monitor heart rate, blood pressure, and cardiac rhythm   Monitor for hypotension and other signs of decreased cardiac output  Goal: Absence of cardiac dysrhythmias or at baseline  Outcome: Progressing  Flowsheets (Taken 3/16/2025 0413)  Absence of cardiac dysrhythmias or at baseline: Continuous cardiac monitoring, monitor vital signs (see flowsheet documentation)     Problem: Pain - Adult  Goal: Verbalizes/displays adequate comfort level or baseline comfort level  Outcome: Progressing  Flowsheets (Taken 3/16/2025 0413)  Verbalizes/displays adequate comfort level or baseline comfort level:   Encourage patient to monitor pain and request interventions   Assess pain using the appropriate pain scale   Administer analgesics based on type and severity of pain and evaluate response

## 2025-03-17 ENCOUNTER — APPOINTMENT (OUTPATIENT)
Dept: CARDIOLOGY | Facility: HOSPITAL | Age: 60
DRG: 309 | End: 2025-03-17
Payer: MEDICARE

## 2025-03-17 LAB
ALBUMIN SERPL-MCNC: 3.6 G/DL (ref 3.5–5.3)
ALP SERPL-CCNC: 63 U/L (ref 45–115)
ALT SERPL-CCNC: 74 U/L (ref 7–52)
ANION GAP SERPL CALC-SCNC: 7 MMOL/L (ref 3–11)
AST SERPL-CCNC: 35 U/L
BILIRUB SERPL-MCNC: 0.64 MG/DL (ref 0.2–1.4)
BSA FOR ECHO PROCEDURE: 2.5 M2
BUN SERPL-MCNC: 11 MG/DL (ref 7–25)
CALCIUM ALBUM COR SERPL-MCNC: 9.1 MG/DL (ref 8.6–10.3)
CALCIUM SERPL-MCNC: 8.8 MG/DL (ref 8.6–10.3)
CHLORIDE SERPL-SCNC: 104 MMOL/L (ref 98–107)
CO2 SERPL-SCNC: 23 MMOL/L (ref 21–32)
CREAT SERPL-MCNC: 0.77 MG/DL (ref 0.7–1.3)
EGFRCR SERPLBLD CKD-EPI 2021: 104 ML/MIN/1.73M*2
GLUCOSE SERPL-MCNC: 109 MG/DL (ref 70–105)
HGB BLD-MCNC: 16 G/DL (ref 13.2–17.2)
MAGNESIUM SERPL-MCNC: 2.1 MG/DL (ref 1.8–2.4)
POTASSIUM SERPL-SCNC: 4.2 MMOL/L (ref 3.5–5.1)
PROT SERPL-MCNC: 6.6 G/DL (ref 6–8.3)
SODIUM SERPL-SCNC: 134 MMOL/L (ref 135–145)

## 2025-03-17 PROCEDURE — 93010 ELECTROCARDIOGRAM REPORT: CPT | Mod: 59 | Performed by: INTERNAL MEDICINE

## 2025-03-17 PROCEDURE — (BLANK) HC ROOM ICU INTERMEDIATE

## 2025-03-17 PROCEDURE — 80053 COMPREHEN METABOLIC PANEL: CPT | Performed by: INTERNAL MEDICINE

## 2025-03-17 PROCEDURE — 83735 ASSAY OF MAGNESIUM: CPT | Performed by: NURSE PRACTITIONER

## 2025-03-17 PROCEDURE — 36415 COLL VENOUS BLD VENIPUNCTURE: CPT | Performed by: INTERNAL MEDICINE

## 2025-03-17 PROCEDURE — 93288 INTERROG EVL PM/LDLS PM IP: CPT

## 2025-03-17 PROCEDURE — 93005 ELECTROCARDIOGRAM TRACING: CPT | Performed by: INTERNAL MEDICINE

## 2025-03-17 PROCEDURE — 85018 HEMOGLOBIN: CPT | Performed by: INTERNAL MEDICINE

## 2025-03-17 PROCEDURE — 6370000100 HC RX 637 (ALT 250 FOR IP): Performed by: INTERNAL MEDICINE

## 2025-03-17 PROCEDURE — 99232 SBSQ HOSP IP/OBS MODERATE 35: CPT | Mod: FS | Performed by: NURSE PRACTITIONER

## 2025-03-17 RX ORDER — SPIRONOLACTONE 25 MG/1
50 TABLET ORAL DAILY
Status: DISCONTINUED | OUTPATIENT
Start: 2025-03-18 | End: 2025-03-18 | Stop reason: HOSPADM

## 2025-03-17 RX ADMIN — ASPIRIN 81 MG: 81 TABLET ORAL at 08:28

## 2025-03-17 RX ADMIN — SACUBITRIL AND VALSARTAN 1 TABLET: 97; 103 TABLET, FILM COATED ORAL at 20:33

## 2025-03-17 RX ADMIN — CARVEDILOL 50 MG: 25 TABLET, FILM COATED ORAL at 08:28

## 2025-03-17 RX ADMIN — EMPAGLIFLOZIN 10 MG: 10 TABLET, FILM COATED ORAL at 08:28

## 2025-03-17 RX ADMIN — SACUBITRIL AND VALSARTAN 1 TABLET: 97; 103 TABLET, FILM COATED ORAL at 08:28

## 2025-03-17 RX ADMIN — MAGNESIUM OXIDE 400 MG: 400 TABLET ORAL at 08:28

## 2025-03-17 RX ADMIN — MEXILETINE HYDROCHLORIDE 300 MG: 150 CAPSULE ORAL at 08:27

## 2025-03-17 RX ADMIN — MEXILETINE HYDROCHLORIDE 300 MG: 150 CAPSULE ORAL at 12:13

## 2025-03-17 RX ADMIN — CARVEDILOL 50 MG: 25 TABLET, FILM COATED ORAL at 16:53

## 2025-03-17 RX ADMIN — MAGNESIUM OXIDE 400 MG: 400 TABLET ORAL at 20:33

## 2025-03-17 RX ADMIN — AMIODARONE HYDROCHLORIDE 400 MG: 200 TABLET ORAL at 15:51

## 2025-03-17 RX ADMIN — AMIODARONE HYDROCHLORIDE 400 MG: 200 TABLET ORAL at 08:28

## 2025-03-17 RX ADMIN — RIVAROXABAN 20 MG: 20 TABLET, FILM COATED ORAL at 08:28

## 2025-03-17 RX ADMIN — MEXILETINE HYDROCHLORIDE 300 MG: 150 CAPSULE ORAL at 17:34

## 2025-03-17 RX ADMIN — AMIODARONE HYDROCHLORIDE 400 MG: 200 TABLET ORAL at 20:33

## 2025-03-17 NOTE — NURSING END OF SHIFT
Nursing End of Shift Summary:     Patient: Pete Trejo  MRN: [id number]  : 1965, Age: 59 y.o.    Location: [place] [address]    Nursing Goals  Clinical Goals for the Shift: monitor, tele, labs, and vs    Narrative Summary of Progress Toward Clinical Goals:  Patient had a long run of V-Tach this shift. Patient started back on Mexitil. Patient was symptomatic with this episode. Patient resting well this shift.     Barriers to Goals/Nursing Concerns:  No    New Patient or Family Concerns/Issues:  No    Shift Summary:   Significant Events & Communications to Providers (Last 12 Hours)       Last 5 Values       Row Name 25 1338                   Provider Notification    Reason for Communication Dysrhythmia  -BOB        Provider Name Dr. Juan Yeung  -BOB                  User Key  (r) = Recorded By, (t) = Taken By, (c) = Cosigned By      Initials Name    Tashia Candelario, JASON                  Oxygen Usage (Last 12 Hours)       Last 5 Values       Row Name 25 0856                   Room Air or Baseline Oxygen Trial by Nursing    Is Patient on Room Air OR on the Same Amount of O2 as at Home? Yes                      Mobility (Last 12 Hours)       Last 5 Values       Row Name 25 0800 25 0856 25 1200 25 1550          Mobility    Activity -- Up ad ezekiel Up ad ezekiel --     Level of Assistance -- Independent -- --     Length of Time in Chair (min) -- -- 0 --     Distance Ambulated (feet) -- -- 0 Feet --     Distance Ambulated (Meters Calculated) -- -- 0 Meters --     Patient Position In bed In bed In bed In bed     Turning/Repositioning -- Turns self Turns self --     Distance Ambulated (Meters Calculated)(Do Not Use) -- -- 0 Feet --                   Urethral Catheter       Active Urethral Catheter       None                  Active Lines       Active Central venous catheter / Peripherally inserted central catheter / Implantable Port / Hemodialysis catheter / Midline Catheter       None                   Infusing Medications   Medication Dose Last Rate     PRN Medications   Medication Dose Last Admin    acetaminophen  650 mg 650 mg at 03/15/25 1025    sodium chloride  3 mL      melatonin  3 mg 3 mg at 03/14/25 2338    ondansetron  4 mg      Or    ondansetron  4 mg      sennosides-docusate sodium  1 tablet

## 2025-03-17 NOTE — NURSING END OF SHIFT
Nursing End of Shift Summary:     Patient: Pete Trejo  MRN: [id number]  : 1965, Age: 59 y.o.    Location: [place] [address]    Nursing Goals  Clinical Goals for the Shift: monitor vs, tele, labs, pain, safety    Narrative Summary of Progress Toward Clinical Goals:  Pt rested thru shift, no complaints voiced. Care continues.     Barriers to Goals/Nursing Concerns:  No    New Patient or Family Concerns/Issues:  No    Shift Summary:   Significant Events & Communications to Providers (Last 12 Hours)       Last 5 Values    No documentation.                 Oxygen Usage (Last 12 Hours)       Last 5 Values    No documentation.                 Mobility (Last 12 Hours)       Last 5 Values       Row Name 25 2044 25 2045 25 0023 25 0344          Mobility    Activity -- Up ad ezekile -- --     Length of Time in Chair (min) -- 0 -- --     Distance Ambulated (feet) -- 20 Feet -- --     Distance Ambulated (Meters Calculated) -- 6.1 Meters -- --     Patient Position In bed -- In bed In bed     Turning/Repositioning Turns self Turns self -- --     Distance Ambulated (Meters Calculated)(Do Not Use) -- 6.1 Feet -- --                   Urethral Catheter       Active Urethral Catheter       None                  Active Lines       Active Central venous catheter / Peripherally inserted central catheter / Implantable Port / Hemodialysis catheter / Midline Catheter       None                  Infusing Medications   Medication Dose Last Rate     PRN Medications   Medication Dose Last Admin    acetaminophen  650 mg 650 mg at 03/15/25 1025    sodium chloride  3 mL      melatonin  3 mg 3 mg at 25 9928    ondansetron  4 mg      Or    ondansetron  4 mg      sennosides-docusate sodium  1 tablet

## 2025-03-17 NOTE — PROGRESS NOTES
[address] [address]                                                    Electrophysiology Inpatient Progress Note    Subjective    Patient ID: Pete Trejo is a 59 y.o. male.    Chief Complaint:   Chief Complaint   Patient presents with    Pacemaker Problem     Pt endorsing his pacemaker fired around 1630 and has had increased SOB. Denies CP        LOS: 3 days     HPI:  Patient admitted after ICD therapy.  Has sustained slow VT.  He was in VT for 15 hours prior to receiving his shock for VT.  Loading amiodarone.  Adjustments made to the VT zones to provide therapy more quickly.  Started on SGLT2 yesterday.  Had a run of VT yesterday afternoon with 4 bursts of ATP delivered.  Converted to sinus rhythm with AV pacing on the fourth burst.  QTc 430 ms.  Potassium 4.2, magnesium 2.1 (3/15).              Allergies as of 03/14/2025 - Reviewed 03/14/2025   Allergen Reaction Noted    Morphine  07/30/2017    Tramadol  07/30/2017       Current Facility-Administered Medications:     empagliflozin (JARDIANCE) tablet 10 mg, 10 mg, oral, Daily, Juan Yeung MD, 10 mg at 03/17/25 0828    mexiletine (MEXITIL) capsule 300 mg, 300 mg, oral, 3x daily after meals, Juan Yeung MD, 300 mg at 03/17/25 1213    amiodarone (PACERONE) tablet 400 mg, 400 mg, oral, 3x daily, Juan Yeung MD, 400 mg at 03/17/25 0828    acetaminophen (TYLENOL) tablet 650 mg, 650 mg, oral, q6h PRN, Juan Yeung MD, 650 mg at 03/15/25 1025    sacubitriL-valsartan (ENTRESTO)  mg 1 tablet, 1 tablet, oral, 2x daily, Juan Yeung MD, 1 tablet at 03/17/25 0828    Maintain IV access, , , Until discontinued **AND** Saline lock IV, , , Once **AND** sodium chloride flush 3 mL, 3 mL, intravenous, PRN, Juan Yeung MD    melatonin tablet 3 mg, 3 mg, oral, Nightly PRN, Juan Yeung MD, 3 mg at 03/14/25 4308    ondansetron (ZOFRAN) tablet 4 mg, 4 mg, oral, q6h PRN **OR** ondansetron (ZOFRAN) injection 4 mg, 4 mg,  intravenous, q6h PRN, Juan Yeung MD    sennosides-docusate sodium (SENNA PLUS) 8.6-50 mg 1 tablet, 1 tablet, oral, Nightly PRN, Juan Yeung MD    carvediloL (COREG) tablet 50 mg, 50 mg, oral, 2x daily with meals, Juan Yeung MD, 50 mg at 03/17/25 0828    rivaroxaban (XARELTO) tablet 20 mg, 20 mg, oral, Daily, Juan Yeung MD, 20 mg at 03/17/25 0828    aspirin EC tablet 81 mg, 81 mg, oral, Daily, Juan Yeung MD, 81 mg at 03/17/25 0828    magnesium oxide (MAG-OX) tablet 400 mg, 400 mg, oral, 2x daily, Juan Yeung MD, 400 mg at 03/17/25 0828    Objective     Vital signs in last 24 hours:   Temp:  [36.7 °C (98 °F)-36.9 °C (98.4 °F)] 36.7 °C (98.1 °F)  Heart Rate:  [70-71] 70  Resp:  [18-20] 18  SpO2:  [87 %-93 %] 90 %  BP: ()/(54-71) 95/64  Weight: 117.8 kg (259 lb 9.6 oz)    Intake/Output this shift:  I/O this shift:  In: 1180 [P.O.:1180]  Out: -     Intake/Output Summary (Last 24 hours) at 3/17/2025 1446  Last data filed at 3/17/2025 1400  Gross per 24 hour   Intake 1780 ml   Output --   Net 1780 ml     Cumulative I&O:    Physical Exam  Vitals reviewed.   Constitutional:       Appearance: Normal appearance. He is well-developed.   HENT:      Head: Normocephalic and atraumatic.   Eyes:      General: No scleral icterus.  Neck:      Thyroid: No thyromegaly.      Vascular: No carotid bruit or JVD.   Cardiovascular:      Rate and Rhythm: Normal rate and regular rhythm.      Heart sounds: Normal heart sounds. No murmur heard.     No friction rub. No gallop.      Comments: ICD left upper chest without erosion or infection  Pulmonary:      Effort: Pulmonary effort is normal.      Breath sounds: No wheezing, rhonchi or rales.      Comments: Breath sounds are diminished bilaterally.  Abdominal:      General: Bowel sounds are normal.      Palpations: Abdomen is soft.      Tenderness: There is no abdominal tenderness.   Musculoskeletal:      Cervical back: Normal range of motion and neck supple.      Right lower  "leg: No edema.      Left lower leg: No edema.   Lymphadenopathy:      Cervical: No cervical adenopathy.   Skin:     General: Skin is warm and dry.      Findings: No rash.   Neurological:      General: No focal deficit present.      Mental Status: He is alert and oriented to person, place, and time.   Psychiatric:         Mood and Affect: Mood normal.         Behavior: Behavior normal.         Data Review:   BMP:  Lab Results   Component Value Date     (L) 03/17/2025    K 4.2 03/17/2025     03/17/2025    CO2 23 03/17/2025    BUN 11 03/17/2025    CREATININE 0.77 03/17/2025    GLUCOSE 109 (H) 03/17/2025    CALCIUM 8.8 03/17/2025     CBC:   Lab Results   Component Value Date    HGB 16.0 03/17/2025     CMP:  Lab Results   Component Value Date     (L) 03/17/2025    K 4.2 03/17/2025     03/17/2025    CO2 23 03/17/2025    GLUCOSE 109 (H) 03/17/2025    CREATININE 0.77 03/17/2025    CALCIUM 8.8 03/17/2025    ALBUMIN 3.6 03/17/2025    ALKPHOS 63 03/17/2025    BILITOT 0.64 03/17/2025    ALT 74 (H) 03/17/2025    AST 35 03/17/2025    BUN 11 03/17/2025    ANIONGAP 7 03/17/2025     Lab Results   Component Value Date     (H) 03/15/2025     Lipid: No results found for: \"CHOL\", \"HDL\", \"TRIG\", \"LDLCALC\"  TSH:  No results found for: \"TSH\"  Magnesium:  Lab Results   Component Value Date    MG 2.1 03/17/2025     PT/INR:   No results found for: \"PT\", \"INR\"                       Tests:  US Echo complete  Result Date: 3/14/2025  Narrative:   Severe left ventricular systolic dysfunction.   Normal left ventricular wall thickness.   Left ventricle is severely dilated, LVIDD of 7.3 cm.  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   Indeterminate left ventricular diastolic function.   Normal left ventricular filling pressure.   The left atrium is moderately dilated.   The right atrium is " normal in size.   No obvious valvular pathologies   Inadequate TR spectral Doppler to accurately assess right ventricular systolic pressure.   Normal central venous pressure (0-5 mmHg).   No pericardial effusion.   Definitive was used to delineate the walls of the LV without any obvious LV thrombus.   This echo was compared with prior echo done on 10/19/2023.  No major changes     XR chest portable 1 view  Result Date: 3/14/2025  Narrative: XR CHEST 1 VIEW 03/14/2025 HISTORY: Shortness of breath TECHNIQUE:  Chest, 1 view. COMPARISON: 1/11/2020 FINDINGS: The lungs are clear. No pleural effusions. No pneumothorax. Cardiomegaly. Left chest pacer defibrillator. The mediastinal contour is normal.     Impression: IMPRESSION:  1.  Cardiomegaly without evidence of an acute pulmonary process.      Assessment/Plan   Patient Active Problem List    Diagnosis Date Noted    Ventricular tachycardia (CMS/[facility]) 03/14/2025    VT (ventricular tachycardia) (CMS/[facility]) 11/30/2023    Gastroesophageal reflux disease without esophagitis 10/19/2023    Benign prostatic hyperplasia without lower urinary tract symptoms 10/19/2023    Diabetes mellitus type 2 in obese (CMS/[facility]) 10/19/2023    Moderate obesity 10/19/2023    Elevated troponin 10/19/2023    Lymphocytosis (symptomatic) 10/19/2023    AICD discharge 10/18/2023    Long term current use of antiarrhythmic drug 03/04/2022    LAVON (obstructive sleep apnea) 10/01/2021    Sustained ventricular tachycardia (CMS/HCC) 09/19/2021    Chronic anticoagulation 09/19/2021    History of ventricular tachycardia 09/19/2021    Paroxysmal atrial fibrillation (CMS/[facility]) 09/19/2021    Chronic systolic HF (heart failure) (CMS/[facility]) 07/11/2021    Automatic implantable cardioverter-defibrillator in situ 06/10/2020    Presence of stent in coronary artery 06/10/2020    Coronary artery disease involving native coronary artery of native heart without angina pectoris 06/10/2020    Ischemic cardiomyopathy 10/30/2017     Hypertension 10/30/2017    Hyperlipidemia 10/30/2017       Assessment/plan:  Ventricular tachycardia/ICD therapy: Had been in sustained VT for 15 hours prior to it accelerating and receiving a shock.  Had a GI illness for for 5 days earlier in the week.  Loading amiodarone for interval runs 3 times daily and mexiletine 300 mg 3 times daily..  Last VT episode was yesterday afternoon.  He received 4 ATPs and converted with the fourth burst.  Probable home tomorrow if stable overnight.  Maintain potassium 4 or above and magnesium 2 or above.  Follows with [facility], on transplant list.  EF 25%.  Known large anteroapical aneurysm.  Basal, anterolateral and inferior lateral wall have normal contractility.    Plan discussed in conjunction with Dr. Wu.    Electronically signed by: Merced Newton CNP  3/17/2025  2:46 PM      A voice recognition program was used to aid in documentation of this record.  Sometimes words are not printed exactly as they were spoken.  While efforts were made to carefully edit and correct any inaccuracies, some errors may be present; please take these into context.  Please contact the provider if errors are identified.

## 2025-03-17 NOTE — PLAN OF CARE
Problem: Cardiovascular - Adult  Goal: Maintains optimal cardiac output and hemodynamic stability  Outcome: Progressing  Goal: Absence of cardiac dysrhythmias or at baseline  Outcome: Progressing     Problem: Respiratory - Adult  Goal: Achieves optimal ventilation and oxygenation  Outcome: Progressing     Problem: Metabolic/Fluid and Electrolytes - Adult  Goal: Electrolytes maintained within normal limits  Outcome: Progressing  Goal: Maintain Optimal Renal Function and Hemodynamic Stability  Outcome: Progressing     Problem: Musculoskeletal - Adult  Goal: Return mobility to safest level of function  Outcome: Progressing     Problem: Pain - Adult  Goal: Verbalizes/displays adequate comfort level or baseline comfort level  Outcome: Progressing     Problem: Neurosensory - Adult  Goal: Achieves maximal functionality and self care  Outcome: Completed     Problem: Gastrointestinal - Adult  Goal: Nutrient intake appropriate for improving, restoring or maintaining nutritional needs  Outcome: Completed  Goal: Maintains or returns to baseline digestive function  Outcome: Completed  Goal: Maintains Optimal Tissue Perfusion  Outcome: Completed     Problem: Genitourinary - Adult  Goal: Absence of urinary retention  Outcome: Completed  Goal: Absence of Urinary Infection  Outcome: Completed     Problem: Skin/Tissue Integrity - Adult  Goal: Skin integrity remains intact  Outcome: Completed     Problem: Hematologic - Adult  Goal: Maintains hematologic stability  Outcome: Completed

## 2025-03-17 NOTE — PLAN OF CARE
Problem: Cardiovascular - Adult  Goal: Maintains optimal cardiac output and hemodynamic stability  Outcome: Progressing  Goal: Absence of cardiac dysrhythmias or at baseline  Outcome: Progressing     Problem: Respiratory - Adult  Goal: Achieves optimal ventilation and oxygenation  Outcome: Progressing     Problem: Metabolic/Fluid and Electrolytes - Adult  Goal: Electrolytes maintained within normal limits  Outcome: Progressing  Goal: Maintain Optimal Renal Function and Hemodynamic Stability  Outcome: Progressing     Problem: Musculoskeletal - Adult  Goal: Return mobility to safest level of function  Outcome: Progressing     Problem: Pain - Adult  Goal: Verbalizes/displays adequate comfort level or baseline comfort level  Outcome: Progressing

## 2025-03-18 ENCOUNTER — APPOINTMENT (OUTPATIENT)
Dept: CARDIOLOGY | Facility: HOSPITAL | Age: 60
DRG: 309 | End: 2025-03-18
Payer: MEDICARE

## 2025-03-18 VITALS
WEIGHT: 257.94 LBS | BODY MASS INDEX: 36.11 KG/M2 | DIASTOLIC BLOOD PRESSURE: 48 MMHG | HEIGHT: 71 IN | HEART RATE: 70 BPM | TEMPERATURE: 98 F | RESPIRATION RATE: 18 BRPM | SYSTOLIC BLOOD PRESSURE: 97 MMHG | OXYGEN SATURATION: 98 %

## 2025-03-18 DIAGNOSIS — I47.20 VT (VENTRICULAR TACHYCARDIA) (CMS/HCC): ICD-10-CM

## 2025-03-18 LAB
BASOPHILS # BLD AUTO: 0.1 10*3/UL
BASOPHILS NFR BLD AUTO: 0.7 % (ref 0–2)
BSA FOR ECHO PROCEDURE: 2.5 M2
EOSINOPHIL # BLD AUTO: 0.2 10*3/UL
EOSINOPHIL NFR BLD AUTO: 2 % (ref 0–3)
ERYTHROCYTE [DISTWIDTH] IN BLOOD BY AUTOMATED COUNT: 14.3 % (ref 11.5–15)
HCT VFR BLD AUTO: 46.5 % (ref 38–50)
HGB BLD-MCNC: 15.7 G/DL (ref 13.2–17.2)
LYMPHOCYTES # BLD AUTO: 1.5 10*3/UL
LYMPHOCYTES NFR BLD AUTO: 16.7 % (ref 15–47)
MCH RBC QN AUTO: 31.8 PG (ref 29–34)
MCHC RBC AUTO-ENTMCNC: 33.7 G/DL (ref 32–36)
MCV RBC AUTO: 94.6 FL (ref 82–97)
MONOCYTES # BLD AUTO: 0.7 10*3/UL
MONOCYTES NFR BLD AUTO: 8.2 % (ref 5–13)
NEUTROPHILS # BLD AUTO: 6.5 10*3/UL
NEUTROPHILS NFR BLD AUTO: 72.4 % (ref 46–70)
PLATELET # BLD AUTO: 188 10*3/UL (ref 130–350)
PMV BLD AUTO: 7.9 FL (ref 6.9–10.8)
RBC # BLD AUTO: 4.92 10*6/UL (ref 4.1–5.8)
WBC # BLD AUTO: 9 10*3/UL (ref 3.7–9.6)

## 2025-03-18 PROCEDURE — 93288 INTERROG EVL PM/LDLS PM IP: CPT

## 2025-03-18 PROCEDURE — 36415 COLL VENOUS BLD VENIPUNCTURE: CPT | Performed by: NURSE PRACTITIONER

## 2025-03-18 PROCEDURE — 6370000100 HC RX 637 (ALT 250 FOR IP): Performed by: INTERNAL MEDICINE

## 2025-03-18 PROCEDURE — 93005 ELECTROCARDIOGRAM TRACING: CPT | Performed by: INTERNAL MEDICINE

## 2025-03-18 PROCEDURE — 93010 ELECTROCARDIOGRAM REPORT: CPT | Performed by: INTERNAL MEDICINE

## 2025-03-18 PROCEDURE — 85025 COMPLETE CBC W/AUTO DIFF WBC: CPT | Performed by: NURSE PRACTITIONER

## 2025-03-18 RX ORDER — MEXILETINE HYDROCHLORIDE 150 MG/1
300 CAPSULE ORAL 3 TIMES DAILY
Qty: 540 CAPSULE | Refills: 3 | Status: SHIPPED | OUTPATIENT
Start: 2025-03-18 | End: 2025-03-18 | Stop reason: SDUPTHER

## 2025-03-18 RX ORDER — ACETAMINOPHEN 325 MG/1
650 TABLET ORAL EVERY 6 HOURS PRN
Qty: 60 TABLET | Refills: 1 | Status: SHIPPED | OUTPATIENT
Start: 2025-03-18 | End: 2026-03-18

## 2025-03-18 RX ORDER — MEXILETINE HYDROCHLORIDE 150 MG/1
300 CAPSULE ORAL 3 TIMES DAILY
Qty: 18 CAPSULE | Refills: 0 | Status: ON HOLD | OUTPATIENT
Start: 2025-03-18 | End: 2025-03-21

## 2025-03-18 RX ORDER — ACETAMINOPHEN 500 MG
5 TABLET ORAL NIGHTLY PRN
Qty: 39 TABLET | Refills: 1 | Status: SHIPPED | OUTPATIENT
Start: 2025-03-18 | End: 2026-03-18

## 2025-03-18 RX ORDER — AMIODARONE HYDROCHLORIDE 200 MG/1
200 TABLET ORAL 3 TIMES DAILY
Qty: 270 TABLET | Refills: 3 | Status: SHIPPED | OUTPATIENT
Start: 2025-03-18 | End: 2026-03-18

## 2025-03-18 RX ORDER — CARVEDILOL 25 MG/1
50 TABLET ORAL 2 TIMES DAILY WITH MEALS
Qty: 360 TABLET | Refills: 3 | Status: SHIPPED | OUTPATIENT
Start: 2025-03-18 | End: 2026-03-18

## 2025-03-18 RX ADMIN — EMPAGLIFLOZIN 10 MG: 10 TABLET, FILM COATED ORAL at 08:03

## 2025-03-18 RX ADMIN — AMIODARONE HYDROCHLORIDE 400 MG: 200 TABLET ORAL at 08:03

## 2025-03-18 RX ADMIN — SACUBITRIL AND VALSARTAN 1 TABLET: 97; 103 TABLET, FILM COATED ORAL at 08:02

## 2025-03-18 RX ADMIN — RIVAROXABAN 20 MG: 20 TABLET, FILM COATED ORAL at 08:03

## 2025-03-18 RX ADMIN — CARVEDILOL 50 MG: 25 TABLET, FILM COATED ORAL at 08:02

## 2025-03-18 RX ADMIN — MEXILETINE HYDROCHLORIDE 300 MG: 150 CAPSULE ORAL at 08:13

## 2025-03-18 RX ADMIN — ASPIRIN 81 MG: 81 TABLET ORAL at 08:03

## 2025-03-18 RX ADMIN — MAGNESIUM OXIDE 400 MG: 400 TABLET ORAL at 08:03

## 2025-03-18 RX ADMIN — SPIRONOLACTONE 50 MG: 25 TABLET ORAL at 08:03

## 2025-03-18 NOTE — PLAN OF CARE
Problem: Cardiovascular - Adult  Goal: Maintains optimal cardiac output and hemodynamic stability  Outcome: Progressing  Flowsheets (Taken 3/17/2025 1954)  Maintain optimal cardiac output and hemodynamic stability:   Monitor heart rate, blood pressure, and cardiac rhythm   Monitor for hypotension and other signs of decreased cardiac output   Assess for signs of decreased coronary artery perfusion - ex. angina  Goal: Absence of cardiac dysrhythmias or at baseline  Outcome: Progressing     Problem: Respiratory - Adult  Goal: Achieves optimal ventilation and oxygenation  Outcome: Progressing     Problem: Metabolic/Fluid and Electrolytes - Adult  Goal: Electrolytes maintained within normal limits  Outcome: Progressing  Goal: Maintain Optimal Renal Function and Hemodynamic Stability  Outcome: Progressing     Problem: Musculoskeletal - Adult  Goal: Return mobility to safest level of function  Outcome: Progressing     Problem: Pain - Adult  Goal: Verbalizes/displays adequate comfort level or baseline comfort level  Outcome: Progressing     Problem: Cardiovascular - Adult  Goal: Maintains optimal cardiac output and hemodynamic stability  Outcome: Progressing  Flowsheets (Taken 3/17/2025 1954)  Maintain optimal cardiac output and hemodynamic stability:   Monitor heart rate, blood pressure, and cardiac rhythm   Monitor for hypotension and other signs of decreased cardiac output   Assess for signs of decreased coronary artery perfusion - ex. angina

## 2025-03-18 NOTE — DISCHARGE INSTRUCTIONS
Please continue taking your amiodarone and blood thinner as prescribed.  Should another healthcare provider wish to discontinue either of these medications, please have them contact your Electrophysiology Team prior to discontinuation.     While taking amiodarone, please follow-up in our Electrophysiology Clinic every 6 months for monitoring.  Each visit, we will perform an EKG to check your heart rhythm and draw blood work to check your kidney function, thyroid function, and liver function.  It is recommended you see your eye doctor annually.  In addition, we will check a chest x-ray and pulmonary function testing annually.  It is recommended you take amiodarone with meals.  Amiodarone might make you sun burn more easily, so it is recommended to wear high factor sunscreen 50 or above and protective clothing.

## 2025-03-18 NOTE — INTERDISCIPLINARY/THERAPY
Case Management Discharge Note    Phone # [phone]    Discharge Disposition:      Transportation: [facility]: no    New DME provided: no    Specialty Referrals: no         Active Ambulatory Referrals   (From admission, onward)                None            Support System Notified: per patient    RN notified: yes

## 2025-03-18 NOTE — DISCHARGE SUMMARY
Cardiology Discharge Summary      Admitting Provider: Juan Yeung MD  Discharge Provider: Juan Yeung MD  Primary Care Physician at Discharge: Lynnette Amezquita, [phone]   Primary Electrophysiologist: Jazmin [phone]  Primary cardiologist: Cali [phone]    Admission Date: 3/14/2025     Discharge Date: 3/18/2025    Primary Discharge Diagnosis  Sustained VT x 15 hours on sotalol and lower dose of mexiletine  ICD therapy shock delivered    Secondary Discharge Diagnosis  Sustained VT x 15 hours on sotalol and lower dose of mexiletine  ICD therapy shock delivered  Sotalol discontinued  Continued on mexiletine increased dose  Addition of amiodarone       Discharge medication list        START taking these medications        Instructions   acetaminophen 325 mg tablet  Commonly known as: TYLENOL   Take 2 tablets (650 mg total) by mouth every 6 (six) hours as needed for pain scale 1-3/10     amiodarone 200 mg tablet  Commonly known as: PACERONE   Take 1 tablet (200 mg total) by mouth 3 (three) times a day TID for one week. then BID x 2     melatonin 5 mg tablet   Take 1 tablet (5 mg total) by mouth nightly as needed for sleep            CHANGE how you take these medications        Instructions   mexiletine 150 mg capsule  Commonly known as: MEXITIL  What changed:   medication strength  how much to take  when to take this   Take 2 capsules (300 mg total) by mouth 3 (three) times a day            CONTINUE taking these medications        Instructions   albuterol HFA 90 mcg/actuation inhaler   Inhale 2 puffs every 6 (six) hours as needed for wheezing or shortness of breath     aspirin 81 mg EC tablet   Take 1 tablet (81 mg total) by mouth daily     carvediloL 25 mg tablet  Commonly known as: Coreg   Take 2 tablets (50 mg total) by mouth 2 (two) times a day with meals     cholecalciferol (vitamin D3) 25 mcg (1,000 unit) tablet      empagliflozin 10 mg tablet  Commonly known as: JARDIANCE   Take  1 tablet (10 mg total) by mouth daily     furosemide 40 mg tablet  Commonly known as: LASIX   Take 1 tablet (40 mg total) by mouth daily     magnesium oxide 400 mg (241.3 mg magnesium) tablet  Commonly known as: MAG-OX   Take 1 tablet (400 mg total) by mouth 2 (two) times a day     nitroglycerin 0.4 mg SL tablet  Commonly known as: NITROSTAT   Place 1 tablet (0.4 mg total) under the tongue every 5 (five) minutes as needed for chest pain     pantoprazole 40 mg EC tablet  Commonly known as: PROTONIX      potassium chloride 10 mEq CR tablet   Take 2 tablets (20 mEq total) by mouth daily     rivaroxaban 20 mg tablet  Commonly known as: Xarelto   Take 1 tablet (20 mg total) by mouth daily     rosuvastatin 40 mg tablet  Commonly known as: CRESTOR      sacubitriL-valsartan  mg tablet  Commonly known as: ENTRESTO   Take 1 tablet by mouth 2 (two) times a day     spironolactone 50 mg tablet  Commonly known as: ALDACTONE   Take 1 tablet (50 mg total) by mouth daily     tamsulosin 0.4 mg capsule  Commonly known as: FLOMAX             STOP taking these medications      sotaloL 120 mg tablet  Commonly known as: BETAPACE               Where to Get Your Medications        These medications were sent to [address], [facility] [address]      Phone: [phone]   acetaminophen 325 mg tablet  amiodarone 200 mg tablet  carvediloL 25 mg tablet  melatonin 5 mg tablet  mexiletine 150 mg capsule         Discharge Condition: stable    Discharge Disposition  01 - Home or Self-Care  Code Status at Discharge: Full code    Outpatient Follow-Up  Future Appointments   Date Time Provider Department Center   4/18/2025 12:30 PM Bozena Carty CNP [facility]   6/17/2025  8:30 AM Casi Duarte CNP [facility]             Pending Labs       Order Current Status    CBC w/auto differential Blood, Venous In process    Extra Tubes, Blood Blood, Venous In  process    Light Green Top Blood, Venous In process            DETAILS OF HOSPITAL STAY    Presenting Problem/History of Present Illness  Ventricular tachycardia (CMS/[facility]) [I47.20]  AICD discharge [Z45.02]  Pete is a pleasant 59-year-old male who presented to the emergency room 3/14/2025 after a shock from his ICD.  He had been sick for several days prior to this occurrence with bodyaches chills and shortness of breath.  He had felt largely improved and was no longer short of breath.  He actually woke up around 1630 and went to his living room and sat down and his ICD then delivered a shock.  He had not noted any palpitations or other symptoms at the time.  His last ICD shock was greater than 1 year prior and he was noting palpitations at that time.  He had been taking sotalol 120 mg 1-1/2 tablets every 12 hours and mexiletine 250 mg every 8 hours.  In addition he was also on carvedilol 25 mg twice daily.  He had not missed any doses of his medication.  He has been anticoagulated with Xarelto.  He has been on GDMT for heart failure.  He is followed by the heart failure clinic and Dr. Leiva.  He was seen by Dr. Yeung on admission    He has a history of coronary artery disease (PCI to the second diagonal branch LAD 7/2017, PCI to the LAD 12/3/2010, PCI to diagonal 7/2009), ischemic cardiomyopathy s/p CRT-D implant 2011 with generator change 6/2022, systolic heart failure, ventricular tachycardia (partial VT ablation 6/2020, extensive substrate modification/VT ablation at [facility] 11/2021), paroxysmal atrial fibrillation anticoagulated with Xarelto, hypertension, dyslipidemia, diabetes mellitus type 2, sleep apnea.     He has had prior VT ablations the last one approximately a year ago 7/24 and has been on sotalol 180 mg twice daily, Mexitil 250 mg 3 times daily and carvedilol 50 mg twice daily.  Prior VT ablations 6/20 and 11/21 at the [facility].  He is on transplant list through the  [facility] and has scheduled follow-up later this month.  He has occasionally been aware of ATP occurring.  Since his ablation he has not been quite as aware of his VT.  He had not been having any chest pain or discomfort.  He did had a respiratory illness but though was beginning to feel better.  He had not been eating or drinking properly.  His creatinine on admission was 1.34 which was up from his most recent BMP 2-11-25 where it was 1.02.  By the day of discharge his creatinine had improved to 0.77.  May reflect some dehydration.  He is still coughing up some sputum though is afebrile and his white count on today's CBC has returned to normal..  His device was interrogated in the ER and reviewed by Dr. Yeung.  His EKG showed AV paced rhythm with QRS duration 120 ms and QTc in the 400 ms range.  His VT was at 230 bpm, runs of ATP lead to acceleration to the 160 bpm range terminated by appropriate 41 J shock.  He had over 15 hours of VT the day prior to admission starting in the morning.  His VT burden started to accelerate in November 2024 with less than 32nd episodes in the VT 1 zone 110 bpm.  Sometimes it would either spontaneously terminate or presumably drop below the detect rate with a single ATP.  [facility] EP was contacted.  Decision was made to discontinue sotalol.  He was transition to IV lidocaine to allow the sotalol to washout.  He was then started on amiodarone.    [id number] - II  NYHA Cl - C    Hospital Course  Patient remained stable throughout his hospital course. His last VT terminated with ATP was 3/16/2025.  The fourth burst of ATP terminated the arrhythmia.  His device was optimized prior to discharge.  His VT 1 zone is set at 410 bpm with ATP programmed and a single 41 J shock.  VT zone 160 bpm with similar ATP scheme and 9 shocks programmed.  VF zone 200 bpm with ATP plus shock therapy.    Landy is feeling improved.  His rhythm is stable.  His blood pressures are  running in the high 90s to low 100s.  He is afebrile.  Heart rates 70s.  Potassium yesterday was 4.2 and has been stable.  Creatinine 0.77 stable.  Magnesium 2.1.  His BNP on 3/15/2025 was 128.  His chest x-ray did not reveal any CHF changes.  White count on admission was 13.7.  Electrolytes were stable on admission for with a potassium of 4.3 and magnesium 1.9.  His weight is down 4 pounds since admission.  Pete has not had any further VT since 3/16/2025 on current medication regimen.  Will cut his amiodarone down to 200 mg 3 times daily with meals for a week then 200 mg twice daily.  He will continue with mexiletine increased dose to 300 mg 3 times daily with meals.  He will continue carvedilol 25 mg 2 tablets twice daily.  He is on GDMT for his congestive heart failure.  Medication changes were discussed with the patient, questions answered.  He has been ambulating around his room without issue.  Will go ahead and discharge him.  Will plan on seeing him back in the clinic in a month for follow-up.  Will recheck labs at that time.  He is expecting a call from [place] for follow-up appointment later this month.  Prescriptions were sent to [facility].    Operative Procedures Performed  [unfilled]    Pertinent Test Results:   Echocardiogram: 3/14/2025  Interpretation Summary  Show Result Comparison     Severe left ventricular systolic dysfunction.    Normal left ventricular wall thickness.    Left ventricle is severely dilated, LVIDD of 7.3 cm.  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    Indeterminate left ventricular diastolic function.    Normal left ventricular filling pressure.    The left atrium is moderately dilated.    The right atrium is normal in size.    No obvious valvular pathologies    Inadequate TR spectral Doppler to accurately assess right ventricular systolic pressure.    " Normal central venous pressure (0-5 mmHg).    No pericardial effusion.    Definitive was used to delineate the walls of the LV without any obvious LV thrombus.    This echo was compared with prior echo done on 10/19/2023.  No major changes  LA VI 4.73 mL/m².  Left atrial size 43 cm.    Stress test: Last ischemic evaluation 7/2021. large size, severe intensity fixed perfusion defect in apex, apical inferior, apical anterior to mid anterior, apical septum and apical lateral. There is no reversible ischemia. Unable to obtain gated images because left ventricle is severely dilated. Cannot comment the left ventricle systolic function and wall motion.     Cardiac cath: 12/23:  Conclusions:  1.  Normal right heart pressures  2.  Pulmonary capillary wedge pressure 8 mmHg  3.  Patent proximal LAD stent  4.  50% in-stent restenosis ostial first diagonal with patent superior branch stent.  The inferior branch of the first diagonal has severe 80-90% in-stent restenosis.  Status post 2.5 mm Angiosculpt scoring balloon angioplasty at the ostium and 2.5 x 15 mm trek neononcompliant balloon angioplasty in the in-stent restenotic segment  5.  Mild irregularities right coronary artery and left circumflex coronary artery.  Similar to previous     EKG:      Radiology: Chest x-ray 3/14/2025     FINDINGS:   The lungs are clear. No pleural effusions. No pneumothorax. Cardiomegaly. Left chest pacer defibrillator. The mediastinal contour is normal.      IMPRESSION:     1.  Cardiomegaly without evidence of an acute pulmonary process.    Lab work:      Physical Exam at Discharge  Discharge Condition: stable  Heart Rate: 70  Resp: 18  BP: 107/60  Temp: 36.5 °C (97.7 °F)  Weight: 117 kg (257 lb 15 oz)  /60 (BP Location: Left arm, Patient Position: In bed, Cuff Size: Long Adult)   Pulse 70   Temp 36.5 °C (97.7 °F) (Oral)   Resp 18   Ht 1.803 m (5' 11\")   Wt 117 kg (257 lb 15 oz)   SpO2 91%   BMI 35.98 kg/m²   General appearance: " alert, appears stated age, cooperative, and no distress  Head: normocephalic, without obvious abnormality, atraumatic  Neck: no JVD and supple, symmetrical, trachea midline  Lungs: diminished breath sounds  Heart: regular rate and rhythm, S1, S2 normal, no murmur, click, rub or gallop  Abdomen: soft, non-tender; bowel sounds normal; no masses, no organomegaly  Extremities: extremities normal, warm and well-perfused; no cyanosis, clubbing, or edema  Neurologic: Grossly normal  ICD left upper chest without erosion or infection    If you have any questions or concerns please do not hesitate to call.  Patient seen in conjunction with Dr. Yeung.    Time spent coordinating discharge: Greater than 30 minutes was spent on this discharge including examination of the patient, review of tests, discussion of follow-up.  1 family member was present and all patient and family members questions were asked and answered.  Included in this is the time for preparation of discharge summary.      Electronically signed by: Merced Newton CNP  3/18/2025  11:03 AM

## 2025-03-18 NOTE — NURSING END OF SHIFT
Nursing End of Shift Summary:     Patient: Pete Trejo  MRN: [id number]  : 1965, Age: 59 y.o.    Location: [place] [address]    Nursing Goals  Clinical Goals for the Shift: monitor vs, tele, labs, safety, pain,    Narrative Summary of Progress Toward Clinical Goals:  Pt rested thru shift.  No complaints voiced. Care continues.     Barriers to Goals/Nursing Concerns:  No    New Patient or Family Concerns/Issues:  No    Shift Summary:   Significant Events & Communications to Providers (Last 12 Hours)       Last 5 Values    No documentation.                 Oxygen Usage (Last 12 Hours)       Last 5 Values    No documentation.                 Mobility (Last 12 Hours)       Last 5 Values       Row Name 25 [phone] 25 [phone]             Mobility    Patient Position In bed In bed In bed                    Urethral Catheter       Active Urethral Catheter       None                  Active Lines       Active Central venous catheter / Peripherally inserted central catheter / Implantable Port / Hemodialysis catheter / Midline Catheter       None                  Infusing Medications   Medication Dose Last Rate     PRN Medications   Medication Dose Last Admin    acetaminophen  650 mg 650 mg at 03/15/25 1025    sodium chloride  3 mL      melatonin  3 mg 3 mg at 25 2338    ondansetron  4 mg      Or    ondansetron  4 mg      sennosides-docusate sodium  1 tablet

## 2025-03-18 NOTE — INTERDISCIPLINARY/THERAPY
Case Management Progress Note    Phone # [phone]    Diagnosis: AICD discharge, VT    Plan of Care:  Chart reviewed, EP is attending, amio loading, monitoring, RA    Discussed Discharge Topics/Narrative: CM to continue to follow for d/c needs, none anticipated at this time.    Family updated: per patient    RN updated: yes    Disposition: HO

## 2025-03-18 NOTE — PLAN OF CARE
Problem: Cardiovascular - Adult  Goal: Maintains optimal cardiac output and hemodynamic stability  Outcome: Progressing  Flowsheets (Taken 3/17/2025 1954 by Myla Davis, RN)  Maintain optimal cardiac output and hemodynamic stability:   Monitor heart rate, blood pressure, and cardiac rhythm   Monitor for hypotension and other signs of decreased cardiac output   Assess for signs of decreased coronary artery perfusion - ex. angina  Goal: Absence of cardiac dysrhythmias or at baseline  Outcome: Progressing  Flowsheets (Taken 3/17/2025 0828 by Tashia Dao, RN)  Absence of cardiac dysrhythmias or at baseline: Continuous cardiac monitoring, monitor vital signs (see flowsheet documentation)     Problem: Respiratory - Adult  Goal: Achieves optimal ventilation and oxygenation  Outcome: Progressing  Flowsheets (Taken 3/16/2025 0856 by Tashia Dao, RN)  Achieves optimal ventilation and oxygenation: Assess and report changes in respiratory status

## 2025-03-18 NOTE — TELEPHONE ENCOUNTER
Patient's son called, he was d/c's from the hospital today. Mexiletine is on order at [facility], will not be in until tomorrow. They can  a few days at [facility]. He is now over due for his 1200 dose, needs ASAP.  Sent 3 days to [facility]. Son notified this was done.

## 2025-03-19 ENCOUNTER — PATIENT OUTREACH (OUTPATIENT)
Dept: FAMILY MEDICINE | Facility: HOSPITAL | Age: 60
End: 2025-03-19
Payer: MEDICARE

## 2025-03-20 NOTE — PROGRESS NOTES
Pete Trejo was contacted following recent hospitalization at [facility]. The patient was discharged from the hospital on 3/18/2025 .The Discharge Summary and After Visit Summary were reviewed. The patient's main diagnosis during the hospitalization was ICD (implantable cardioverter-defibrillator) discharge.  Followup appointment: 4/18 with Bozena Carty. Any additional testing and labs will be discussed at the patient's upcoming post-hospital follow up appointment.    Transitional Care Management Follow Up (most recent)       Transitional Care Management - 03/20/25 1041          OTHER    Date of post hospital outreach 03/20/25     Contact Status Contact done     Speaking with the Patient or Patient's Caregiver? Patient     Is the patient on the Diabetes registry? --     Were patient's home medications changed or did they have any new medications added during the hospitalization? Y     Did someone go over the discharge summary with the patient or the patient's caregiver and discuss the medications listed on it? Y     Does the patient or patient's caregiver have any questions about the medication changes? N     Patient verbalized understanding of when to contact health care provider or when to get help right away? Y     Discharge instructions reviewed with patient or patient's caregiver and all questions answered? Y     Is there a Home Health/DME Plan being enacted? Please note name of HH agency, DME vendor, contacted/received N     Does patient have psychosocial issues that might impact their health status? None     Does patient have financial barriers to care? None                      Isabella Zhong RN  March 20, 2025 10:46 AM

## 2025-03-21 ENCOUNTER — APPOINTMENT (OUTPATIENT)
Dept: RADIOLOGY | Facility: HOSPITAL | Age: 60
End: 2025-03-21
Payer: MEDICARE

## 2025-03-21 ENCOUNTER — HOSPITAL ENCOUNTER (INPATIENT)
Facility: HOSPITAL | Age: 60
End: 2025-03-21
Attending: STUDENT IN AN ORGANIZED HEALTH CARE EDUCATION/TRAINING PROGRAM | Admitting: INTERNAL MEDICINE
Payer: MEDICARE

## 2025-03-21 ENCOUNTER — APPOINTMENT (OUTPATIENT)
Dept: CARDIOLOGY | Facility: HOSPITAL | Age: 60
End: 2025-03-21
Payer: MEDICARE

## 2025-03-21 DIAGNOSIS — I25.10 CORONARY ARTERY DISEASE INVOLVING NATIVE CORONARY ARTERY OF NATIVE HEART WITHOUT ANGINA PECTORIS: ICD-10-CM

## 2025-03-21 DIAGNOSIS — I47.20 SUSTAINED VENTRICULAR TACHYCARDIA (CMS/HCC): ICD-10-CM

## 2025-03-21 DIAGNOSIS — I47.20 VT (VENTRICULAR TACHYCARDIA) (CMS/HCC): ICD-10-CM

## 2025-03-21 DIAGNOSIS — Z45.02 AICD DISCHARGE: Primary | ICD-10-CM

## 2025-03-21 DIAGNOSIS — I50.22 CHRONIC SYSTOLIC HF (HEART FAILURE) (CMS/HCC): ICD-10-CM

## 2025-03-21 DIAGNOSIS — Z95.5 PRESENCE OF STENT IN CORONARY ARTERY: ICD-10-CM

## 2025-03-21 LAB
ALBUMIN SERPL-MCNC: 4 G/DL (ref 3.5–5.3)
ALP SERPL-CCNC: 85 U/L (ref 45–115)
ALT SERPL-CCNC: 59 U/L (ref 7–52)
ANION GAP SERPL CALC-SCNC: 7 MMOL/L (ref 3–11)
AST SERPL-CCNC: 20 U/L
BASOPHILS # BLD AUTO: 0 10*3/UL
BASOPHILS NFR BLD AUTO: 0.5 % (ref 0–2)
BILIRUB SERPL-MCNC: 0.56 MG/DL (ref 0.2–1.4)
BSA FOR ECHO PROCEDURE: 2.46 M2
BUN SERPL-MCNC: 11 MG/DL (ref 7–25)
CALCIUM ALBUM COR SERPL-MCNC: 9.1 MG/DL (ref 8.6–10.3)
CALCIUM SERPL-MCNC: 9.1 MG/DL (ref 8.6–10.3)
CHLORIDE SERPL-SCNC: 103 MMOL/L (ref 98–107)
CO2 SERPL-SCNC: 27 MMOL/L (ref 21–32)
CREAT SERPL-MCNC: 1 MG/DL (ref 0.7–1.3)
EGFRCR SERPLBLD CKD-EPI 2021: 87 ML/MIN/1.73M*2
EOSINOPHIL # BLD AUTO: 0.2 10*3/UL
EOSINOPHIL NFR BLD AUTO: 3.4 % (ref 0–3)
ERYTHROCYTE [DISTWIDTH] IN BLOOD BY AUTOMATED COUNT: 14 % (ref 11.5–15)
EST. AVERAGE GLUCOSE BLD GHB EST-MCNC: 151.3 MG/DL
GLUCOSE SERPL-MCNC: 97 MG/DL (ref 70–105)
HBA1C MFR BLD: 6.9 % (ref 4–6)
HCT VFR BLD AUTO: 46.8 % (ref 38–50)
HGB BLD-MCNC: 15.9 G/DL (ref 13.2–17.2)
LYMPHOCYTES # BLD AUTO: 1.6 10*3/UL
LYMPHOCYTES NFR BLD AUTO: 23.5 % (ref 15–47)
MAGNESIUM SERPL-MCNC: 2.1 MG/DL (ref 1.8–2.4)
MCH RBC QN AUTO: 32 PG (ref 29–34)
MCHC RBC AUTO-ENTMCNC: 33.9 G/DL (ref 32–36)
MCV RBC AUTO: 94.5 FL (ref 82–97)
MONOCYTES # BLD AUTO: 0.6 10*3/UL
MONOCYTES NFR BLD AUTO: 9 % (ref 5–13)
NEUTROPHILS # BLD AUTO: 4.4 10*3/UL
NEUTROPHILS NFR BLD AUTO: 63.6 % (ref 46–70)
PLATELET # BLD AUTO: 256 10*3/UL (ref 130–350)
PMV BLD AUTO: 7.8 FL (ref 6.9–10.8)
POTASSIUM SERPL-SCNC: 4.2 MMOL/L (ref 3.5–5.1)
PROT SERPL-MCNC: 7.4 G/DL (ref 6–8.3)
RBC # BLD AUTO: 4.96 10*6/UL (ref 4.1–5.8)
SODIUM SERPL-SCNC: 137 MMOL/L (ref 135–145)
WBC # BLD AUTO: 6.9 10*3/UL (ref 3.7–9.6)

## 2025-03-21 PROCEDURE — 83735 ASSAY OF MAGNESIUM: CPT | Performed by: STUDENT IN AN ORGANIZED HEALTH CARE EDUCATION/TRAINING PROGRAM

## 2025-03-21 PROCEDURE — 80053 COMPREHEN METABOLIC PANEL: CPT | Performed by: STUDENT IN AN ORGANIZED HEALTH CARE EDUCATION/TRAINING PROGRAM

## 2025-03-21 PROCEDURE — 83036 HEMOGLOBIN GLYCOSYLATED A1C: CPT | Performed by: INTERNAL MEDICINE

## 2025-03-21 PROCEDURE — 85025 COMPLETE CBC W/AUTO DIFF WBC: CPT | Performed by: STUDENT IN AN ORGANIZED HEALTH CARE EDUCATION/TRAINING PROGRAM

## 2025-03-21 PROCEDURE — 71045 X-RAY EXAM CHEST 1 VIEW: CPT

## 2025-03-21 PROCEDURE — (BLANK) HC ROOM PRIVATE

## 2025-03-21 PROCEDURE — 1110000100 HC ROOM PRIVATE W TELE

## 2025-03-21 PROCEDURE — 99223 1ST HOSP IP/OBS HIGH 75: CPT | Mod: FS,AI | Performed by: NURSE PRACTITIONER

## 2025-03-21 PROCEDURE — 36415 COLL VENOUS BLD VENIPUNCTURE: CPT | Performed by: STUDENT IN AN ORGANIZED HEALTH CARE EDUCATION/TRAINING PROGRAM

## 2025-03-21 PROCEDURE — 6360000200 HC RX 636 W HCPCS (ALT 250 FOR IP): Performed by: NURSE PRACTITIONER

## 2025-03-21 PROCEDURE — 6370000100 HC RX 637 (ALT 250 FOR IP): Performed by: NURSE PRACTITIONER

## 2025-03-21 PROCEDURE — 93005 ELECTROCARDIOGRAM TRACING: CPT

## 2025-03-21 PROCEDURE — (BLANK) HC ROOM ICU INTERMEDIATE

## 2025-03-21 PROCEDURE — 99285 EMERGENCY DEPT VISIT HI MDM: CPT | Performed by: STUDENT IN AN ORGANIZED HEALTH CARE EDUCATION/TRAINING PROGRAM

## 2025-03-21 PROCEDURE — 93288 INTERROG EVL PM/LDLS PM IP: CPT

## 2025-03-21 RX ORDER — LANSOPRAZOLE 30 MG/1
30 CAPSULE, DELAYED RELEASE ORAL
Status: DISCONTINUED | OUTPATIENT
Start: 2025-03-22 | End: 2025-03-25 | Stop reason: HOSPADM

## 2025-03-21 RX ORDER — ROSUVASTATIN CALCIUM 20 MG/1
40 TABLET, COATED ORAL DAILY
Status: DISCONTINUED | OUTPATIENT
Start: 2025-03-21 | End: 2025-03-25 | Stop reason: HOSPADM

## 2025-03-21 RX ORDER — ONDANSETRON 4 MG/1
4 TABLET, FILM COATED ORAL EVERY 6 HOURS PRN
Status: DISCONTINUED | OUTPATIENT
Start: 2025-03-21 | End: 2025-03-25 | Stop reason: HOSPADM

## 2025-03-21 RX ORDER — TALC
3 POWDER (GRAM) TOPICAL NIGHTLY PRN
Status: DISCONTINUED | OUTPATIENT
Start: 2025-03-21 | End: 2025-03-25 | Stop reason: HOSPADM

## 2025-03-21 RX ORDER — ACETAMINOPHEN 325 MG/1
650 TABLET ORAL EVERY 6 HOURS PRN
Status: DISCONTINUED | OUTPATIENT
Start: 2025-03-21 | End: 2025-03-21

## 2025-03-21 RX ORDER — SPIRONOLACTONE 25 MG/1
50 TABLET ORAL DAILY
Status: DISCONTINUED | OUTPATIENT
Start: 2025-03-22 | End: 2025-03-25 | Stop reason: HOSPADM

## 2025-03-21 RX ORDER — SODIUM CHLORIDE 0.9 % (FLUSH) 0.9 %
3 SYRINGE (ML) INJECTION AS NEEDED
Status: DISCONTINUED | OUTPATIENT
Start: 2025-03-21 | End: 2025-03-25 | Stop reason: HOSPADM

## 2025-03-21 RX ORDER — AMIODARONE HYDROCHLORIDE 200 MG/1
200 TABLET ORAL 3 TIMES DAILY
Status: DISCONTINUED | OUTPATIENT
Start: 2025-03-21 | End: 2025-03-24

## 2025-03-21 RX ORDER — ASPIRIN 81 MG/1
81 TABLET ORAL DAILY
Status: DISCONTINUED | OUTPATIENT
Start: 2025-03-22 | End: 2025-03-25

## 2025-03-21 RX ORDER — ALBUTEROL SULFATE 90 UG/1
2 INHALANT RESPIRATORY (INHALATION) EVERY 6 HOURS PRN
Status: DISCONTINUED | OUTPATIENT
Start: 2025-03-21 | End: 2025-03-25 | Stop reason: HOSPADM

## 2025-03-21 RX ORDER — FUROSEMIDE 40 MG/1
40 TABLET ORAL DAILY
Status: DISCONTINUED | OUTPATIENT
Start: 2025-03-22 | End: 2025-03-25 | Stop reason: HOSPADM

## 2025-03-21 RX ORDER — MAGNESIUM SULFATE HEPTAHYDRATE 40 MG/ML
2 INJECTION, SOLUTION INTRAVENOUS ONCE
Status: COMPLETED | OUTPATIENT
Start: 2025-03-21 | End: 2025-03-21

## 2025-03-21 RX ORDER — ALUMINUM HYDROXIDE, MAGNESIUM HYDROXIDE, AND SIMETHICONE 1200; 120; 1200 MG/30ML; MG/30ML; MG/30ML
30 SUSPENSION ORAL 3 TIMES DAILY PRN
Status: DISCONTINUED | OUTPATIENT
Start: 2025-03-21 | End: 2025-03-25 | Stop reason: HOSPADM

## 2025-03-21 RX ORDER — MEXILETINE HYDROCHLORIDE 150 MG/1
300 CAPSULE ORAL EVERY 8 HOURS SCHEDULED
Status: DISCONTINUED | OUTPATIENT
Start: 2025-03-21 | End: 2025-03-21

## 2025-03-21 RX ORDER — MEXILETINE HYDROCHLORIDE 150 MG/1
300 CAPSULE ORAL 3 TIMES DAILY
Status: DISCONTINUED | OUTPATIENT
Start: 2025-03-21 | End: 2025-03-25 | Stop reason: HOSPADM

## 2025-03-21 RX ORDER — SPIRONOLACTONE 25 MG/1
50 TABLET ORAL DAILY
COMMUNITY

## 2025-03-21 RX ORDER — POTASSIUM CHLORIDE 750 MG/1
40 TABLET, FILM COATED, EXTENDED RELEASE ORAL ONCE
Status: COMPLETED | OUTPATIENT
Start: 2025-03-21 | End: 2025-03-21

## 2025-03-21 RX ORDER — ONDANSETRON HYDROCHLORIDE 2 MG/ML
4 INJECTION, SOLUTION INTRAVENOUS EVERY 6 HOURS PRN
Status: DISCONTINUED | OUTPATIENT
Start: 2025-03-21 | End: 2025-03-25 | Stop reason: HOSPADM

## 2025-03-21 RX ORDER — CYCLOBENZAPRINE HCL 10 MG
10 TABLET ORAL 3 TIMES DAILY PRN
Status: DISCONTINUED | OUTPATIENT
Start: 2025-03-21 | End: 2025-03-21

## 2025-03-21 RX ORDER — AMOXICILLIN 250 MG
2 CAPSULE ORAL NIGHTLY PRN
Status: DISCONTINUED | OUTPATIENT
Start: 2025-03-21 | End: 2025-03-25 | Stop reason: HOSPADM

## 2025-03-21 RX ORDER — CHOLECALCIFEROL (VITAMIN D3) 25 MCG
2000 TABLET ORAL DAILY
Status: DISCONTINUED | OUTPATIENT
Start: 2025-03-22 | End: 2025-03-25 | Stop reason: HOSPADM

## 2025-03-21 RX ORDER — TAMSULOSIN HYDROCHLORIDE 0.4 MG/1
0.4 CAPSULE ORAL EVERY EVENING
Status: DISCONTINUED | OUTPATIENT
Start: 2025-03-21 | End: 2025-03-25 | Stop reason: HOSPADM

## 2025-03-21 RX ORDER — CARVEDILOL 25 MG/1
50 TABLET ORAL 2 TIMES DAILY WITH MEALS
Status: DISCONTINUED | OUTPATIENT
Start: 2025-03-21 | End: 2025-03-25 | Stop reason: HOSPADM

## 2025-03-21 RX ORDER — ENOXAPARIN SODIUM 100 MG/ML
40 INJECTION SUBCUTANEOUS
Status: DISCONTINUED | OUTPATIENT
Start: 2025-03-21 | End: 2025-03-21

## 2025-03-21 RX ORDER — FUROSEMIDE 10 MG/ML
40 INJECTION INTRAMUSCULAR; INTRAVENOUS ONCE
Status: COMPLETED | OUTPATIENT
Start: 2025-03-21 | End: 2025-03-21

## 2025-03-21 RX ORDER — LANOLIN ALCOHOL/MO/W.PET/CERES
400 CREAM (GRAM) TOPICAL 2 TIMES DAILY
Status: DISCONTINUED | OUTPATIENT
Start: 2025-03-21 | End: 2025-03-25 | Stop reason: HOSPADM

## 2025-03-21 RX ORDER — CYCLOBENZAPRINE HCL 10 MG
10 TABLET ORAL 3 TIMES DAILY PRN
COMMUNITY

## 2025-03-21 RX ORDER — POTASSIUM CHLORIDE 750 MG/1
20 TABLET, FILM COATED, EXTENDED RELEASE ORAL DAILY
Status: DISCONTINUED | OUTPATIENT
Start: 2025-03-22 | End: 2025-03-25 | Stop reason: HOSPADM

## 2025-03-21 RX ORDER — NITROGLYCERIN 0.4 MG/1
0.4 TABLET SUBLINGUAL EVERY 5 MIN PRN
Status: DISCONTINUED | OUTPATIENT
Start: 2025-03-21 | End: 2025-03-25 | Stop reason: HOSPADM

## 2025-03-21 RX ORDER — ACETAMINOPHEN 325 MG/1
650 TABLET ORAL EVERY 6 HOURS PRN
Status: DISCONTINUED | OUTPATIENT
Start: 2025-03-21 | End: 2025-03-25 | Stop reason: HOSPADM

## 2025-03-21 RX ADMIN — SACUBITRIL AND VALSARTAN 1 TABLET: 97; 103 TABLET, FILM COATED ORAL at 20:02

## 2025-03-21 RX ADMIN — TAMSULOSIN HYDROCHLORIDE 0.4 MG: 0.4 CAPSULE ORAL at 20:02

## 2025-03-21 RX ADMIN — CARVEDILOL 50 MG: 25 TABLET, FILM COATED ORAL at 20:01

## 2025-03-21 RX ADMIN — AMIODARONE HYDROCHLORIDE 0.5 MG/MIN: 1.8 INJECTION, SOLUTION INTRAVENOUS at 20:06

## 2025-03-21 RX ADMIN — AMIODARONE HYDROCHLORIDE 1 MG/MIN: 1.8 INJECTION, SOLUTION INTRAVENOUS at 13:51

## 2025-03-21 RX ADMIN — MAGNESIUM OXIDE 400 MG: 400 TABLET ORAL at 20:02

## 2025-03-21 RX ADMIN — MEXILETINE HYDROCHLORIDE 300 MG: 150 CAPSULE ORAL at 20:01

## 2025-03-21 RX ADMIN — MEXILETINE HYDROCHLORIDE 300 MG: 150 CAPSULE ORAL at 14:31

## 2025-03-21 RX ADMIN — ROSUVASTATIN CALCIUM 40 MG: 20 TABLET, FILM COATED ORAL at 20:01

## 2025-03-21 RX ADMIN — POTASSIUM CHLORIDE 40 MEQ: 750 TABLET, EXTENDED RELEASE ORAL at 18:00

## 2025-03-21 RX ADMIN — AMIODARONE HYDROCHLORIDE 150 MG: 1.5 INJECTION, SOLUTION INTRAVENOUS at 13:34

## 2025-03-21 RX ADMIN — MAGNESIUM SULFATE HEPTAHYDRATE 2 G: 40 INJECTION, SOLUTION INTRAVENOUS at 17:51

## 2025-03-21 RX ADMIN — FUROSEMIDE 40 MG: 10 INJECTION, SOLUTION INTRAMUSCULAR; INTRAVENOUS at 17:13

## 2025-03-21 ASSESSMENT — ENCOUNTER SYMPTOMS
GASTROINTESTINAL NEGATIVE: 1
LIGHT-HEADEDNESS: 1
MUSCULOSKELETAL NEGATIVE: 1
CONSTITUTIONAL NEGATIVE: 1
HEMATOLOGIC/LYMPHATIC NEGATIVE: 1
RESPIRATORY NEGATIVE: 1
CHEST TIGHTNESS: 0
PALPITATIONS: 1
ENDOCRINE NEGATIVE: 1
PSYCHIATRIC NEGATIVE: 1
EYES NEGATIVE: 1

## 2025-03-21 NOTE — MEDICATION HISTORY SPECIALIST NOTES
[facility] ED-218    CSN: [id number]  : [id number]    Information sources:  EPIC  Complete Dispense Report    Allergies verified.    Patient interviewed in person who provided all history. Patient verified medications and provided last doses. Verified with complete dispense report.     Profile checked for  medications.     **High alert medications**  Rivaroxaban (Xarelto)

## 2025-03-21 NOTE — Clinical Note
[facility] Note    Chief Complaint      Chief Complaint   Patient presents with    Annual Exam       History of Present Illness      Vipul Urbano is a 44 y.o. male who presents today for   Chief Complaint   Patient presents with    Annual Exam   .  Patient comes to appointment here for annual preventative visit . He is currently feeling well . Is getting regular exercise and is eating healthy diet. He declines flu vaccine .     HPI    No problem-specific Assessment & Plan notes found for this encounter.       Problem List Items Addressed This Visit          Renal/    Prostate cancer screening    Overview     Check psa             Other    Annual physical exam - Primary    Overview     pe documented needs full screening labs              Past Medical History:  History reviewed. No pertinent past medical history.    Past Surgical History:  Past Surgical History:   Procedure Laterality Date    EYE SURGERY  2009    VASECTOMY  2021       Family History:  family history includes Cancer in his maternal aunt, maternal grandfather, and paternal aunt; No Known Problems in his father and mother.     Social History:  Social History     Socioeconomic History    Marital status:    Tobacco Use    Smoking status: Never    Smokeless tobacco: Never   Substance and Sexual Activity    Alcohol use: Yes     Alcohol/week: 2.0 standard drinks of alcohol     Types: 2 Cans of beer per week    Drug use: Not Currently    Sexual activity: Yes     Partners: Female     Birth control/protection: Partner-Vasectomy       Review of Systems:   Review of Systems   Constitutional:  Negative for fever and weight loss.   HENT:  Negative for congestion, hearing loss and sore throat.    Eyes:  Negative for blurred vision.   Respiratory:  Negative for cough and shortness of breath.    Cardiovascular:  Negative for chest pain, palpitations, claudication and leg swelling.   Gastrointestinal:  Negative for abdominal  The right coronary artery was selectively engaged, injected, and visualized in multiple views. "pain, constipation, diarrhea and heartburn.   Genitourinary:  Negative for dysuria.   Musculoskeletal:  Negative for back pain and myalgias.   Skin:  Negative for rash.   Neurological:  Negative for focal weakness and headaches.   Psychiatric/Behavioral:  Negative for depression and suicidal ideas. The patient is not nervous/anxious.         Medications:  Outpatient Encounter Medications as of 11/16/2023   Medication Sig Dispense Refill    azelastine (ASTELIN) 137 mcg (0.1 %) nasal spray 1 spray 2 (two) times daily.      [DISCONTINUED] buPROPion (WELLBUTRIN SR) 200 MG SR12 Take 200 mg by mouth 2 (two) times daily.      [DISCONTINUED] busPIRone (BUSPAR) 10 MG tablet Take 20 mg by mouth every morning.       No facility-administered encounter medications on file as of 11/16/2023.       Allergies:  Review of patient's allergies indicates:   Allergen Reactions    Penicillins          Physical Exam      Vitals:    11/16/23 1527   BP: 110/74   Pulse: (!) 58   Resp: 18   Temp: 98 °F (36.7 °C)        Vital Signs  Temp: 98 °F (36.7 °C)  Temp Source: Oral  Pulse: (!) 58  Resp: 18  SpO2: 98 %  BP: 110/74  BP Location: Right arm  Patient Position: Sitting  Pain Score: 0-No pain  Height and Weight  Height: 5' 8" (172.7 cm)  Weight: 86.5 kg (190 lb 11.2 oz)  BSA (Calculated - sq m): 2.04 sq meters  BMI (Calculated): 29  Weight in (lb) to have BMI = 25: 164.1]     Body mass index is 29 kg/m².    Physical Exam  Constitutional:       Appearance: He is well-developed.   HENT:      Head: Normocephalic.   Eyes:      Pupils: Pupils are equal, round, and reactive to light.   Neck:      Thyroid: No thyromegaly.   Cardiovascular:      Rate and Rhythm: Normal rate and regular rhythm.      Heart sounds: No murmur heard.     No friction rub. No gallop.   Pulmonary:      Effort: Pulmonary effort is normal.      Breath sounds: Normal breath sounds.   Abdominal:      General: Bowel sounds are normal.      Palpations: Abdomen is soft. " "  Musculoskeletal:         General: Normal range of motion.      Cervical back: Normal range of motion.   Skin:     General: Skin is warm and dry.   Neurological:      Mental Status: He is alert and oriented to person, place, and time.      Sensory: No sensory deficit.   Psychiatric:         Behavior: Behavior normal.          Laboratory:  CBC:  No results for input(s): "WBC", "RBC", "HGB", "HCT", "PLT", "MCV", "MCH", "MCHC" in the last 2160 hours.  CMP:  No results for input(s): "GLU", "CALCIUM", "ALBUMIN", "PROT", "NA", "K", "CO2", "CL", "BUN", "ALKPHOS", "ALT", "AST", "BILITOT" in the last 2160 hours.    Invalid input(s): "CREATININ"  URINALYSIS:  No results for input(s): "COLORU", "CLARITYU", "SPECGRAV", "PHUR", "PROTEINUA", "GLUCOSEU", "BILIRUBINCON", "BLOODU", "WBCU", "RBCU", "BACTERIA", "MUCUS", "NITRITE", "LEUKOCYTESUR", "UROBILINOGEN", "HYALINECASTS" in the last 2160 hours.   LIPIDS:  No results for input(s): "TSH", "HDL", "CHOL", "TRIG", "LDLCALC", "CHOLHDL", "NONHDLCHOL", "TOTALCHOLEST" in the last 2160 hours.  TSH:  No results for input(s): "TSH" in the last 2160 hours.  A1C:  No results for input(s): "HGBA1C" in the last 2160 hours.    Radiology:        Assessment:     Vipul Urbano is a 44 y.o.male with:    Annual physical exam  -     CBC Auto Differential; Future; Expected date: 11/16/2023  -     Comprehensive Metabolic Panel; Future; Expected date: 11/16/2023  -     Lipid Panel; Future; Expected date: 11/16/2023  -     TESTOSTERONE; Future; Expected date: 11/16/2023    Prostate cancer screening  -     PSA, Screening; Future; Expected date: 11/16/2023                Plan:     Problem List Items Addressed This Visit          Renal/    Prostate cancer screening    Overview     Check psa             Other    Annual physical exam - Primary    Overview     pe documented needs full screening labs             As above, continue current medications and maintain follow up with specialists.  Return to " clinic in 6 months.      Frederick W Dantagnan [facility]

## 2025-03-21 NOTE — Clinical Note
Sheath(s) removed from the right radial artery. Closure device used: Radial Band. O2 saturation is 95%. Total cc's of air in band = 13. Hemostasis achieved.

## 2025-03-21 NOTE — ED PROVIDER NOTES
"  Chief Complaint   Patient presents with    Chest Pain     Pt arrives to triage with chest pain. Pt reports that his device went off.            Chest Pain      Pete Trejo is a 59 y.o. male who presents to the emergency department today for concerns of AICD discharge. He states that today he was feeling began to feel \"funny\" so he sat down in his chair and was shocked by his AICD. He reports that the same thing happened last week and he was seen at this hospital at that time. He had his medications changed and his AICD settings adjusted and has been feeling good since that time. He reports that he is feeling like his normal self at this time.      HISTORY:    Current Facility-Administered Medications:     enoxaparin (LOVENOX) syringe 40 mg, 40 mg, subcutaneous, Daily at 1400, Juan Yeung MD, 40 mg at 03/22/25 1454    amiodarone (PACERONE) tablet 400 mg, 400 mg, oral, 2x daily, Juan Yeung MD, 400 mg at 03/22/25 2010    Maintain IV access, , , Until discontinued **AND** Saline lock IV, , , Once **AND** sodium chloride flush 3 mL, 3 mL, intravenous, PRN, Proveyenni, Merced, CNP    melatonin tablet 3 mg, 3 mg, oral, Nightly PRN, ProveIsha hymann, CNP    sennosides-docusate sodium (SENNA PLUS) 8.6-50 mg 2 tablet, 2 tablet, oral, Nightly PRN, Provell, Merced, CNP    ondansetron (ZOFRAN) tablet 4 mg, 4 mg, oral, q6h PRN **OR** ondansetron (ZOFRAN) injection 4 mg, 4 mg, intravenous, q6h PRN, Provell, Merced, CNP    alum-mag hydroxide-simeth (MAALOX ADVANCED) suspension 30 mL, 30 mL, oral, 3x daily PRN, Provell, Merced, CNP    acetaminophen (TYLENOL) tablet 650 mg, 650 mg, oral, q6h PRN, Provell, Merced, CNP    albuterol HFA 90 mcg/actuation inhaler 2 puff, 2 puff, inhalation, q6h PRN, ProveMerced hyman, CNP    [Held by provider] amiodarone (PACERONE) tablet 200 mg, 200 mg, oral, 3x daily, Merced Newton CNP    aspirin EC tablet 81 mg, 81 mg, oral, Daily, Merced Newton CNP, 81 mg at 03/22/25 2866    carvediloL " (COREG) tablet 50 mg, 50 mg, oral, 2x daily with meals, Provell, Merced, CNP, 50 mg at 03/22/25 1753    cholecalciferol (vitamin D3) 1,000 unit (25mcg) tab/cap 2,000 Units, 2,000 Units, oral, Daily, Provell, Merced, CNP, 2,000 Units at 03/22/25 0829    empagliflozin (JARDIANCE) tablet 10 mg, 10 mg, oral, Daily, Provell, Merced, CNP, 10 mg at 03/22/25 0829    furosemide (LASIX) tablet 40 mg, 40 mg, oral, Daily, Provell, Merced, CNP, 40 mg at 03/22/25 0829    magnesium oxide (MAG-OX) tablet 400 mg, 400 mg, oral, 2x daily, Provell, Merced, CNP, 400 mg at 03/22/25 2011    mexiletine (MEXITIL) capsule 300 mg, 300 mg, oral, 3x daily, Provell, Merced, CNP, 300 mg at 03/22/25 2010    nitroglycerin (NITROSTAT) SL tablet 0.4 mg, 0.4 mg, sublingual, q5 min PRN, Provell, Merced, CNP    lansoprazole (PREVACID) capsule 30 mg, 30 mg, oral, Daily at 1600, Provell, Merced, CNP, 30 mg at 03/22/25 1454    potassium chloride (KLOR-CON) CR tablet 20 mEq, 20 mEq, oral, Daily, Provell, Merced, CNP, 20 mEq at 03/22/25 0829    rosuvastatin (CRESTOR) tablet 40 mg, 40 mg, oral, Daily, Provell, Merced, CNP, 40 mg at 03/22/25 1753    sacubitriL-valsartan (ENTRESTO)  mg 1 tablet, 1 tablet, oral, 2x daily, Provell, Merced, CNP, 1 tablet at 03/22/25 2011    spironolactone (ALDACTONE) tablet 50 mg, 50 mg, oral, Daily, Provell, Merced, CNP, 50 mg at 03/22/25 0829    tamsulosin (FLOMAX) 24 hr capsule 0.4 mg, 0.4 mg, oral, q PM, Provell, Merced, CNP, 0.4 mg at 03/22/25 7486    Past Medical History:   Diagnosis Date    BPH (benign prostatic hyperplasia)     CAD (coronary artery disease)     Status post stent    CHF (congestive heart failure) (CMS/[facility])     Chronic combined systolic and diastolic CHF (congestive heart failure) (CMS/[facility])     LVEF 25% with grade 1 diastolic dysfunction as per echo on 1/26/2022.    CVA (cerebral vascular accident) (CMS/[facility]) 2010    Without residual deficit.    Diabetes mellitus type 2 in obese (CMS/[facility])      Dyslipidemia     GERD (gastroesophageal reflux disease)     Heart attack (CMS/[facility])     x3    Hypertension     Ischemic cardiomyopathy     Morbid obesity with BMI of 40.0-44.9, adult (CMS/[facility])     Paroxysmal atrial fibrillation (CMS/[facility])     On Xarelto    V-tach (CMS/[facility])     AICD in place       Past Surgical History:   Procedure Laterality Date    ABLATION VT N/A 06/09/2020    Procedure: Ablation VT;  Surgeon: Wilfredo Montana MD;  Location: [facility] EP Lab;  Service: Electrophysiology;  Laterality: N/A;  Check with Dr. Montana in the a.m. regarding scheduling    APPENDECTOMY      BILATERAL HEART CATH N/A 12/5/2023    Procedure: Bilateral heart cath;  Surgeon: Jitendra Post MD;  Location: [facility] Cath Lab;  Service: Cardiovascular;  Laterality: N/A;    COLONOSCOPY N/A 7/25/2023    Procedure: COLONOSCOPY with Polypectomy;  Surgeon: Hortencia Carson MD;  Location: [facility] Endoscopy;  Service: Endoscopy;  Laterality: N/A;    CORONARY ARTERY STENTING  2009    2.5 X 24-mm Taxus DIMAS to first diagonal. 3.0 X 8-mm Taxus stent to proximal LAD just proximal to diagonal.    CORONARY ARTERY STENTING  2010    3.5 X 15-mm Promus DIMAS to totally occluded LAD    CORONARY ARTERY STENTING  2011    2.25 X 30-mm Resolute DIMAS to 2nd diagonal.  Balloon angioplasty through strut to improve blood flow to distal LAD    CORONARY ARTERY STENTING  07/28/2017    second diagonal branch LAD Resolute 2.25 x 30-mm DIMAS    ICD DC GEN CHANGE N/A 6/20/2022    Procedure: BI-V ICD Gen Change;  Surgeon: Wilfredo Montana MD;  Location: [facility] EP Lab;  Service: Cardiovascular;  Laterality: N/A;    ICD SC NEW  2011    [employer] Cognis Model #N119, Serial #[id number]. [place] Reliance Model #0185, Serial #[id number]. LV lead Acuity Model #45+91, Serial #[id number]. Atrial lead Model #4469, Serial #[id number]    OXIMETRY RUN N/A 12/5/2023    Procedure: OXIMETRY RUN;  Surgeon: Jitendra Post MD;  Location: [facility] Cath Lab;  Service: Cardiovascular;  Laterality: N/A;       Family History    Problem Relation Age of Onset    Heart attack Mother 62    Other Mother         Abdominal Aortic Aneurysm    Lung cancer Mother     Colon cancer Father         Cause of death    Diabetes Brother         Non-Insulin Dependent    Heart attack Brother 51        w/ Stents    Heart disease Brother     Other Son         Well       Social History     Tobacco Use    Smoking status: Former     Current packs/day: 0.00     Average packs/day: 0.8 packs/day for 30.0 years (22.5 ttl pk-yrs)     Types: Cigarettes     Start date: 1990     Quit date: 2020     Years since quittin.7    Smokeless tobacco: Never   Vaping Use    Vaping status: Never Used   Substance Use Topics    Alcohol use: Not Currently     Comment: Quit drinking in     Drug use: Not Currently       ROS:  Review of Systems   Cardiovascular:  Positive for chest pain.     Performed appropriate review of systems with pertinent positives and negatives as noted in HPI.    PE:  ED Triage Vitals   Temp Heart Rate Resp BP SpO2   25 1029 25 1025 25 1029 25 1029 25 1025   36.5 °C (97.7 °F) 70 18 136/72 98 %      Mean BP (mmHg) Temp Source Heart Rate Source Patient Position BP Location   25 1029 25 1029 25 1707 25 1029 25 1029   95 Oral Monitor Sitting Left arm      FiO2 (%)       --                  Physical Exam    Nursing notes reviewed. Vitals reviewed.   General: Alert. In no obvious distress. Resting comfortably in bed. Nontoxic in appearance.   HEENT: Normocephalic, atraumatic, mucous membranes pink and moist  Eyes: EOMI, conjunctiva normal  Chest/Respiratory: No pain on palpation over the chest wall, lungs clear to auscultation bilaterally, breath sounds equal bilaterally  Cardiovascular: RRR, Normal S1/S2, no murmur/rubs, radial pulses 2+ and equal bilaterally, DP pulses 2+ and equal bilaterally, capillary refill <3 seconds, no edema, no unilateral leg swelling/erythema/warmth, no tenderness  on palpation over the posterior lower extremities or popliteal fossa bilaterally, negative Homans' sign  Abdomen: Nondistended, soft, nontender, no rebound or guarding  Musculoskeletal: No deformities.   Skin: No rash, no erythema, no pallor.  Psych: Normal mood and affect.  Neuro: Moves all extremities, sensation grossly intact in all 4 extremities.      ED LABS:  Labs Reviewed   CBC WITH AUTO DIFFERENTIAL - Abnormal       Result Value    WBC 6.9      RBC 4.96      Hemoglobin 15.9      Hematocrit 46.8      MCV 94.5      MCH 32.0      MCHC 33.9      RDW 14.0      Platelets 256      MPV 7.8      Neutrophils% 63.6      Lymphocytes% 23.5      Monocytes% 9.0      Eosinophils% 3.4 (*)     Basophils% 0.5      ANC (auto diff) 4.40      Lymphocytes Absolute 1.60      Monocytes Absolute 0.60      Eosinophils Absolute 0.20      Basophils Absolute 0.00     COMPREHENSIVE METABOLIC PANEL - Abnormal    Sodium 137      Potassium 4.2      Chloride 103      CO2 27      Anion Gap 7      BUN 11      Creatinine 1.00      Glucose 97      Calcium 9.1      AST 20      ALT (SGPT) 59 (*)     Alkaline Phosphatase 85      Total Protein 7.4      Albumin 4.0      Total Bilirubin 0.56      Corrected Calcium 9.1      eGFR 87      Narrative:     Calculation based on the 2021 Chronic Kidney Disease Epidemiology Collaboration (CKD-EPI) equation refit without adjustment for race.   MAGNESIUM - Normal    Magnesium 2.1           ED IMAGES:  XR chest portable 1 view   Final Result   IMPRESSION:   Stable cardiomegaly          ED PROCEDURES:  ECG 12 lead - Chest pain    Date/Time: 3/21/2025 11:30 AM    Performed by: Daquan Parson MD  Authorized by: Daquan Parson MD    Previous ECG:     Previous ECG:  Compared to current    Comparison ECG info:  3/18/25  Comments:      Paced rhythm. right axis deviation. Rate 70. ME interval 145. . QTc 445. No ST segment Changes. No T-wave inversion/abnormalities. Impression: Paced rhythm.        ED  COURSE:       Sepsis Quality Bundle       MDM      Pete Trejo is a 59 y.o. male with a past medical history of v-tach with AICD in place, MI, CAD, CHF, CVA, a-fib, hypertension, BPH, diabetes, and obesity who presented to the emergency department today for concerns of AICD discharge.  Medical records reviewed from external source.  Vital signs within normal limits.  Physical exam as above. Exam and history consistent with AICD discharge. Labs obtained as well as EKG. Also interrogated pacemaker.  EKG without evidence of arrhythmia or ischemia.  No arrhythmia noted on telemetry today.  Labs overall reassuring.  AICD/pacemaker interrogated and notably did have overdrive pacing x 15 followed by AICD discharge.  [employer] rep recommended pacemaker/AICD adjustment and therefore did reach out to electrophysiology.  Spoke with Dr. Newton of electrophysiology and they will hospitalize patient for further evaluation/treatment. Patient care transferred at that time.       Final diagnoses:   [Z45.02] AICD discharge   By signing my name, I, Armin Angulo attest that this documentation has been prepared under the direction and in the presence of Dr. Parson, 3/21/2025, 11:27 AM.     Daquan Parson MD  03/23/25 0126
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
## 2025-03-21 NOTE — Clinical Note
RT Ventilator Management Note  Ninoska Barrios 62 y o  male MRN: [id number]  Unit/Bed#: ICU 06 Encounter: [id number]      Daily Screen       5/6/2018 1604 5/7/2018 0733          Patient safety screen outcome[de-identified] Passed Passed      Spont breathing trial % for 30 min: - Yes      RSBI: 55 -              Physical Exam:   Assessment Type: Assess only  General Appearance: Sleeping  Respiratory Pattern: Assisted  Chest Assessment: Chest expansion symmetrical  Bilateral Breath Sounds: Clear, Diminished  O2 Device: vent  Subjective Data: n/a      Resp Comments: pt stable on cpap and ps all night   will continue to monitor pt Verified procedural consent signed and present. Verified blood consent signed and present. Verified complete H&P in chart. Verified pre-sedation documentation signed and present.

## 2025-03-21 NOTE — Clinical Note
This site was marked.Prepped: groin and right radial. The site was clipped. Prepped with: ChloraPrep. The patient was draped  in the usual sterile fashion.

## 2025-03-22 PROBLEM — Z95.810 CARDIAC RESYNCHRONIZATION THERAPY DEFIBRILLATOR (CRT-D) IN PLACE: Status: ACTIVE | Noted: 2025-03-22

## 2025-03-22 LAB
ANION GAP SERPL CALC-SCNC: 9 MMOL/L (ref 3–11)
BUN SERPL-MCNC: 11 MG/DL (ref 7–25)
CALCIUM SERPL-MCNC: 8.9 MG/DL (ref 8.6–10.3)
CHLORIDE SERPL-SCNC: 102 MMOL/L (ref 98–107)
CO2 SERPL-SCNC: 24 MMOL/L (ref 21–32)
CREAT SERPL-MCNC: 0.97 MG/DL (ref 0.7–1.3)
EGFRCR SERPLBLD CKD-EPI 2021: 90 ML/MIN/1.73M*2
GLUCOSE SERPL-MCNC: 118 MG/DL (ref 70–105)
MAGNESIUM SERPL-MCNC: 2.3 MG/DL (ref 1.8–2.4)
POTASSIUM SERPL-SCNC: 4.2 MMOL/L (ref 3.5–5.1)
SODIUM SERPL-SCNC: 135 MMOL/L (ref 135–145)

## 2025-03-22 PROCEDURE — (BLANK) HC ROOM PRIVATE

## 2025-03-22 PROCEDURE — 83735 ASSAY OF MAGNESIUM: CPT | Performed by: NURSE PRACTITIONER

## 2025-03-22 PROCEDURE — 80048 BASIC METABOLIC PNL TOTAL CA: CPT | Performed by: NURSE PRACTITIONER

## 2025-03-22 PROCEDURE — 36415 COLL VENOUS BLD VENIPUNCTURE: CPT | Performed by: NURSE PRACTITIONER

## 2025-03-22 PROCEDURE — 99232 SBSQ HOSP IP/OBS MODERATE 35: CPT | Performed by: INTERNAL MEDICINE

## 2025-03-22 PROCEDURE — (BLANK) HC ROOM ICU INTERMEDIATE

## 2025-03-22 PROCEDURE — 6360000200 HC RX 636 W HCPCS (ALT 250 FOR IP): Performed by: INTERNAL MEDICINE

## 2025-03-22 PROCEDURE — 6370000100 HC RX 637 (ALT 250 FOR IP): Performed by: NURSE PRACTITIONER

## 2025-03-22 PROCEDURE — 6370000100 HC RX 637 (ALT 250 FOR IP): Performed by: INTERNAL MEDICINE

## 2025-03-22 PROCEDURE — 6360000200 HC RX 636 W HCPCS (ALT 250 FOR IP): Performed by: NURSE PRACTITIONER

## 2025-03-22 PROCEDURE — 1110000100 HC ROOM PRIVATE W TELE

## 2025-03-22 RX ORDER — ENOXAPARIN SODIUM 100 MG/ML
40 INJECTION SUBCUTANEOUS
Status: DISCONTINUED | OUTPATIENT
Start: 2025-03-22 | End: 2025-03-24

## 2025-03-22 RX ORDER — AMIODARONE HYDROCHLORIDE 200 MG/1
400 TABLET ORAL 2 TIMES DAILY
Status: DISCONTINUED | OUTPATIENT
Start: 2025-03-22 | End: 2025-03-24

## 2025-03-22 RX ADMIN — CARVEDILOL 50 MG: 25 TABLET, FILM COATED ORAL at 17:53

## 2025-03-22 RX ADMIN — MEXILETINE HYDROCHLORIDE 300 MG: 150 CAPSULE ORAL at 20:10

## 2025-03-22 RX ADMIN — SACUBITRIL AND VALSARTAN 1 TABLET: 97; 103 TABLET, FILM COATED ORAL at 20:11

## 2025-03-22 RX ADMIN — AMIODARONE HYDROCHLORIDE 400 MG: 200 TABLET ORAL at 14:54

## 2025-03-22 RX ADMIN — AMIODARONE HYDROCHLORIDE 0.5 MG/MIN: 1.8 INJECTION, SOLUTION INTRAVENOUS at 09:39

## 2025-03-22 RX ADMIN — TAMSULOSIN HYDROCHLORIDE 0.4 MG: 0.4 CAPSULE ORAL at 17:53

## 2025-03-22 RX ADMIN — LANSOPRAZOLE 30 MG: 30 CAPSULE, DELAYED RELEASE ORAL at 14:54

## 2025-03-22 RX ADMIN — POTASSIUM CHLORIDE 20 MEQ: 750 TABLET, FILM COATED, EXTENDED RELEASE ORAL at 08:29

## 2025-03-22 RX ADMIN — FUROSEMIDE 40 MG: 40 TABLET ORAL at 08:29

## 2025-03-22 RX ADMIN — SPIRONOLACTONE 50 MG: 25 TABLET ORAL at 08:29

## 2025-03-22 RX ADMIN — EMPAGLIFLOZIN 10 MG: 10 TABLET, FILM COATED ORAL at 08:29

## 2025-03-22 RX ADMIN — ASPIRIN 81 MG: 81 TABLET ORAL at 08:29

## 2025-03-22 RX ADMIN — MAGNESIUM OXIDE 400 MG: 400 TABLET ORAL at 20:11

## 2025-03-22 RX ADMIN — AMIODARONE HYDROCHLORIDE 400 MG: 200 TABLET ORAL at 20:10

## 2025-03-22 RX ADMIN — SACUBITRIL AND VALSARTAN 1 TABLET: 97; 103 TABLET, FILM COATED ORAL at 08:29

## 2025-03-22 RX ADMIN — MEXILETINE HYDROCHLORIDE 300 MG: 150 CAPSULE ORAL at 08:29

## 2025-03-22 RX ADMIN — ROSUVASTATIN CALCIUM 40 MG: 20 TABLET, FILM COATED ORAL at 17:53

## 2025-03-22 RX ADMIN — Medication 2000 UNITS: at 08:29

## 2025-03-22 RX ADMIN — MAGNESIUM OXIDE 400 MG: 400 TABLET ORAL at 08:29

## 2025-03-22 RX ADMIN — CARVEDILOL 50 MG: 25 TABLET, FILM COATED ORAL at 08:29

## 2025-03-22 RX ADMIN — ENOXAPARIN SODIUM 40 MG: 100 INJECTION SUBCUTANEOUS at 14:54

## 2025-03-22 RX ADMIN — MEXILETINE HYDROCHLORIDE 300 MG: 150 CAPSULE ORAL at 14:54

## 2025-03-22 NOTE — PLAN OF CARE
Problem: Pain - Adult  Goal: Verbalizes/displays adequate comfort level or baseline comfort level  Outcome: Progressing  Flowsheets (Taken 3/22/2025 1012)  Verbalizes/displays adequate comfort level or baseline comfort level:   Encourage patient to monitor pain and request interventions   Assess pain using the appropriate pain scale     Problem: Cardiovascular - Adult  Goal: Maintains optimal cardiac output and hemodynamic stability  Outcome: Progressing  Flowsheets (Taken 3/22/2025 0825)  Maintain optimal cardiac output and hemodynamic stability:   Monitor heart rate, blood pressure, and cardiac rhythm   Monitor for hypotension and other signs of decreased cardiac output   Assess quality of pulses, capillary refill, edema, sensation, skin color and temperature   Assess for signs of decreased coronary artery perfusion - ex. angina  Goal: Absence of cardiac dysrhythmias or at baseline  Flowsheets (Taken 3/22/2025 0825)  Absence of cardiac dysrhythmias or at baseline: Continuous cardiac monitoring, monitor vital signs (see flowsheet documentation)

## 2025-03-22 NOTE — INTERDISCIPLINARY/THERAPY
Case Management Admission Note    Phone # [phone]    Living Situation: Family members Private residence            ADLs: Independent  Stairs: Yes 6  HME/CPAP: CPAP      Oxygen: No      Home Health:No     Current Resources: None      Diabetes/supplies: Do you have Diabetes?: No  PCP: Lynnette Amezquita, CNP  Funding: [facility]  Pharmacy:[address]    Source of Information: Chart Review  Reason for Chart Review: Patient Unavailable (Comment)  Support Person: Primary Emergency Contact: Pete Trejo Jr, Mobile Phone: [phone], Relation: Son     Transportation: When discharged, who will be providing your transportation?: Family  Discharge Transport Person's Name: son Jr.     Admission Diagnosis: sustained VT and failed ATP    Narrative: Chart reviewed. IV amio. RA. Likely need for another ablation attempt.  Dr. Yeung communicating with Dr. Stevens at [facility].  Dispo: HO vs HLOC

## 2025-03-22 NOTE — NURSING END OF SHIFT
Nursing End of Shift Summary:     Patient: Pete Trejo  MRN: [id number]  : 1965, Age: 59 y.o.    Location: [place] [address]    Nursing Goals  Clinical Goals for the Shift: monitor labs, tele, vs, maintain safety and comfort    Narrative Summary of Progress Toward Clinical Goals:  Transitioning back to oral Amio, drip to be shut off at 1800. No events on tele. Remains safe and comfortable.    Barriers to Goals/Nursing Concerns:  No    New Patient or Family Concerns/Issues:  No    Shift Summary:   Significant Events & Communications to Providers (Last 12 Hours)       Last 5 Values    No documentation.                 Oxygen Usage (Last 12 Hours)       Last 5 Values    No documentation.                 Mobility (Last 12 Hours)       Last 5 Values       Row Name 25 0417 25 0757 25 0825 25 1000 25 1200       Mobility    Activity -- -- Bathroom privileges Ambulate in room;Bathroom privileges Ambulate in room    Level of Assistance -- -- Independent after set-up Independent --    Patient Position In bed In bed In bed -- In bed    Turning/Repositioning -- -- Turns self Turns self Turns self      Row Name 25 1432                   Mobility    Activity Ambulate in room;Chair;Bathroom privileges        Level of Assistance Independent        Turning/Repositioning Turns self                      Urethral Catheter       Active Urethral Catheter       None                  Active Lines       Active Central venous catheter / Peripherally inserted central catheter / Implantable Port / Hemodialysis catheter / Midline Catheter       None                  Infusing Medications   Medication Dose Last Rate    amiodarone  0.5 mg/min 0.5 mg/min (25 1607)     PRN Medications   Medication Dose Last Admin    sodium chloride  3 mL      melatonin  3 mg      sennosides-docusate sodium  2 tablet      ondansetron  4 mg      Or    ondansetron  4 mg      alum-mag hydroxide-simeth  30 mL      acetaminophen   650 mg      albuterol HFA  2 puff      nitroglycerin  0.4 mg

## 2025-03-22 NOTE — PLAN OF CARE
Problem: Pain - Adult  Goal: Verbalizes/displays adequate comfort level or baseline comfort level  Outcome: Progressing  Flowsheets (Taken 3/21/2025 2247)  Verbalizes/displays adequate comfort level or baseline comfort level:   Encourage patient to monitor pain and request interventions   Assess pain using the appropriate pain scale   Administer analgesics based on type and severity of pain and evaluate response   Educate/Implement non-pharmacological measures as appropriate and evaluate response   Consider cultural, developmental and social influences on pain and pain management   Notify Provider if interventions unsuccessful or patient reports new pain     Problem: Cardiovascular - Adult  Goal: Maintains optimal cardiac output and hemodynamic stability  Outcome: Progressing  Flowsheets (Taken 3/21/2025 2247)  Maintain optimal cardiac output and hemodynamic stability:   Monitor heart rate, blood pressure, and cardiac rhythm   Monitor for hypotension and other signs of decreased cardiac output   Monitor for fluid overload/dehydration, weight gain, shortness of breath and activity intolerance   Administer or titrate ordered vasoactive medications to optimize hemodynamic stability   Monitor arterial and/or venous puncture sites for bleeding and/or hematoma   Assess quality of pulses, capillary refill, edema, sensation, skin color and temperature   Assess for signs of decreased coronary artery perfusion - ex. angina  Goal: Absence of cardiac dysrhythmias or at baseline  Outcome: Progressing  Flowsheets (Taken 3/21/2025 2247)  Absence of cardiac dysrhythmias or at baseline:   Initiate emergency measures for life threatening arrhythmias   Monitor electrolytes and administer replacement therapy as ordered   Administer antiarrhythmic and heart rate control medications as ordered   Obtain 12 lead EKG if indicated   Continuous cardiac monitoring, monitor vital signs (see flowsheet documentation)

## 2025-03-22 NOTE — PROGRESS NOTES
"Electrophysiology Attending  3/22/2025    No complaints    No tele events back on IV amiodarone    BP 91/58 (BP Location: Left arm, Patient Position: In bed)   Pulse 70   Temp 36.9 °C (98.4 °F) (Oral)   Resp 18   Ht 1.778 m (5' 10\")   Wt 120.7 kg (266 lb 3.2 oz)   SpO2 90%   BMI 38.20 kg/m²   Chest clear  Regular, no significant murmur, ICD site well healed  Abdomen soft, non tender  Ext warm without edema    Labs reviewed    Refractory VT  -Difficult case, he is likely headed back to another ablation attempt.  The EP team will reach out to Dr Stevens at [facility] who is already aware of his recent admission  -I reviewed last angiogram from 2023, I believe that he has an intermediate sized diagonal that has a tight ostial lesion that wasn't addressed.  Technically the risk/benefit may not be appropriate but can ask interventional cardiology to review films and comment.  In the meantime will hold rivaroxaban, DVT dose of enoxaparin only at this time  -  Seems compensated from a volume standpoint after VT and empiric lasix yesterday, will check BNP to compare with last admit.  Will not update echo at this time  -  Transition from IV back to PO amiodarone later this afternoon/early evening    Call with issues    Juan Yeung MD  Cardiac Electrophysiology  "

## 2025-03-23 LAB
ALBUMIN SERPL-MCNC: 3.7 G/DL (ref 3.5–5.3)
ALP SERPL-CCNC: 72 U/L (ref 45–115)
ALT SERPL-CCNC: 40 U/L (ref 7–52)
ANION GAP SERPL CALC-SCNC: 9 MMOL/L (ref 3–11)
AST SERPL-CCNC: 14 U/L
BILIRUB SERPL-MCNC: 0.78 MG/DL (ref 0.2–1.4)
BNP SERPL-MCNC: 49 PG/ML (ref 0–100)
BUN SERPL-MCNC: 14 MG/DL (ref 7–25)
CALCIUM ALBUM COR SERPL-MCNC: 9.3 MG/DL (ref 8.6–10.3)
CALCIUM SERPL-MCNC: 9.1 MG/DL (ref 8.6–10.3)
CHLORIDE SERPL-SCNC: 103 MMOL/L (ref 98–107)
CO2 SERPL-SCNC: 25 MMOL/L (ref 21–32)
CREAT SERPL-MCNC: 0.97 MG/DL (ref 0.7–1.3)
EGFRCR SERPLBLD CKD-EPI 2021: 90 ML/MIN/1.73M*2
GLUCOSE SERPL-MCNC: 104 MG/DL (ref 70–105)
POTASSIUM SERPL-SCNC: 4.4 MMOL/L (ref 3.5–5.1)
PROT SERPL-MCNC: 7 G/DL (ref 6–8.3)
SODIUM SERPL-SCNC: 137 MMOL/L (ref 135–145)

## 2025-03-23 PROCEDURE — 1110000100 HC ROOM PRIVATE W TELE

## 2025-03-23 PROCEDURE — 80053 COMPREHEN METABOLIC PANEL: CPT | Performed by: INTERNAL MEDICINE

## 2025-03-23 PROCEDURE — 93005 ELECTROCARDIOGRAM TRACING: CPT | Performed by: INTERNAL MEDICINE

## 2025-03-23 PROCEDURE — 83880 ASSAY OF NATRIURETIC PEPTIDE: CPT | Performed by: INTERNAL MEDICINE

## 2025-03-23 PROCEDURE — 6370000100 HC RX 637 (ALT 250 FOR IP): Performed by: INTERNAL MEDICINE

## 2025-03-23 PROCEDURE — 93010 ELECTROCARDIOGRAM REPORT: CPT | Performed by: INTERNAL MEDICINE

## 2025-03-23 PROCEDURE — (BLANK) HC ROOM ICU INTERMEDIATE

## 2025-03-23 PROCEDURE — 6370000100 HC RX 637 (ALT 250 FOR IP): Performed by: NURSE PRACTITIONER

## 2025-03-23 PROCEDURE — 99232 SBSQ HOSP IP/OBS MODERATE 35: CPT | Performed by: INTERNAL MEDICINE

## 2025-03-23 PROCEDURE — 36415 COLL VENOUS BLD VENIPUNCTURE: CPT | Performed by: INTERNAL MEDICINE

## 2025-03-23 PROCEDURE — 6360000200 HC RX 636 W HCPCS (ALT 250 FOR IP): Performed by: INTERNAL MEDICINE

## 2025-03-23 RX ADMIN — MEXILETINE HYDROCHLORIDE 300 MG: 150 CAPSULE ORAL at 14:35

## 2025-03-23 RX ADMIN — CARVEDILOL 50 MG: 25 TABLET, FILM COATED ORAL at 17:33

## 2025-03-23 RX ADMIN — FUROSEMIDE 40 MG: 40 TABLET ORAL at 07:48

## 2025-03-23 RX ADMIN — TAMSULOSIN HYDROCHLORIDE 0.4 MG: 0.4 CAPSULE ORAL at 17:33

## 2025-03-23 RX ADMIN — CARVEDILOL 50 MG: 25 TABLET, FILM COATED ORAL at 07:48

## 2025-03-23 RX ADMIN — SACUBITRIL AND VALSARTAN 1 TABLET: 97; 103 TABLET, FILM COATED ORAL at 20:42

## 2025-03-23 RX ADMIN — AMIODARONE HYDROCHLORIDE 400 MG: 200 TABLET ORAL at 07:48

## 2025-03-23 RX ADMIN — SACUBITRIL AND VALSARTAN 1 TABLET: 97; 103 TABLET, FILM COATED ORAL at 08:49

## 2025-03-23 RX ADMIN — EMPAGLIFLOZIN 10 MG: 10 TABLET, FILM COATED ORAL at 07:48

## 2025-03-23 RX ADMIN — LANSOPRAZOLE 30 MG: 30 CAPSULE, DELAYED RELEASE ORAL at 14:35

## 2025-03-23 RX ADMIN — AMIODARONE HYDROCHLORIDE 400 MG: 200 TABLET ORAL at 20:42

## 2025-03-23 RX ADMIN — ROSUVASTATIN CALCIUM 40 MG: 20 TABLET, FILM COATED ORAL at 17:33

## 2025-03-23 RX ADMIN — ENOXAPARIN SODIUM 40 MG: 100 INJECTION SUBCUTANEOUS at 14:35

## 2025-03-23 RX ADMIN — Medication 2000 UNITS: at 07:48

## 2025-03-23 RX ADMIN — SPIRONOLACTONE 50 MG: 25 TABLET ORAL at 07:48

## 2025-03-23 RX ADMIN — MEXILETINE HYDROCHLORIDE 300 MG: 150 CAPSULE ORAL at 20:42

## 2025-03-23 RX ADMIN — ASPIRIN 81 MG: 81 TABLET ORAL at 07:48

## 2025-03-23 RX ADMIN — MEXILETINE HYDROCHLORIDE 300 MG: 150 CAPSULE ORAL at 07:48

## 2025-03-23 RX ADMIN — MAGNESIUM OXIDE 400 MG: 400 TABLET ORAL at 07:48

## 2025-03-23 RX ADMIN — MAGNESIUM OXIDE 400 MG: 400 TABLET ORAL at 20:42

## 2025-03-23 RX ADMIN — POTASSIUM CHLORIDE 20 MEQ: 750 TABLET, FILM COATED, EXTENDED RELEASE ORAL at 07:48

## 2025-03-23 NOTE — PLAN OF CARE
Problem: Cardiovascular - Adult  Goal: Maintains optimal cardiac output and hemodynamic stability  Outcome: Progressing  Flowsheets (Taken 3/23/2025 0725)  Maintain optimal cardiac output and hemodynamic stability:   Monitor heart rate, blood pressure, and cardiac rhythm   Monitor for hypotension and other signs of decreased cardiac output   Assess for signs of decreased coronary artery perfusion - ex. angina   Assess quality of pulses, capillary refill, edema, sensation, skin color and temperature  Goal: Absence of cardiac dysrhythmias or at baseline  Outcome: Progressing  Flowsheets (Taken 3/23/2025 0725)  Absence of cardiac dysrhythmias or at baseline: Continuous cardiac monitoring, monitor vital signs (see flowsheet documentation)     Problem: Metabolic/Fluid and Electrolytes - Adult  Goal: Electrolytes maintained within normal limits  Outcome: Progressing  Flowsheets (Taken 3/23/2025 0733)  Electrolytes maintained within normal limits: Monitor labs and assess patient for signs and symptoms of electrolyte imbalances

## 2025-03-23 NOTE — PLAN OF CARE
Problem: Pain - Adult  Goal: Verbalizes/displays adequate comfort level or baseline comfort level  Outcome: Progressing  Flowsheets (Taken 3/23/2025 0022)  Verbalizes/displays adequate comfort level or baseline comfort level:   Encourage patient to monitor pain and request interventions   Assess pain using the appropriate pain scale   Educate/Implement non-pharmacological measures as appropriate and evaluate response   Administer analgesics based on type and severity of pain and evaluate response   Consider cultural, developmental and social influences on pain and pain management   Notify Provider if interventions unsuccessful or patient reports new pain     Problem: Cardiovascular - Adult  Goal: Maintains optimal cardiac output and hemodynamic stability  Outcome: Progressing  Flowsheets (Taken 3/23/2025 0022)  Maintain optimal cardiac output and hemodynamic stability:   Monitor heart rate, blood pressure, and cardiac rhythm   Monitor for hypotension and other signs of decreased cardiac output   Monitor for fluid overload/dehydration, weight gain, shortness of breath and activity intolerance   Administer or titrate ordered vasoactive medications to optimize hemodynamic stability   Assess quality of pulses, capillary refill, edema, sensation, skin color and temperature   Assess for signs of decreased coronary artery perfusion - ex. angina  Goal: Absence of cardiac dysrhythmias or at baseline  Outcome: Progressing  Flowsheets (Taken 3/23/2025 0022)  Absence of cardiac dysrhythmias or at baseline:   Continuous cardiac monitoring, monitor vital signs (see flowsheet documentation)   Initiate emergency measures for life threatening arrhythmias   Administer antiarrhythmic and heart rate control medications as ordered   Obtain 12 lead EKG if indicated   Monitor electrolytes and administer replacement therapy as ordered     Problem: Respiratory - Adult  Goal: Achieves optimal ventilation and oxygenation  Outcome:  Progressing  Flowsheets (Taken 3/23/2025 0022)  Achieves optimal ventilation and oxygenation:   Assess and report changes in respiratory status   Assess and report changes in mentation and behavior   Position to facilitate oxygenation and minimize respiratory effort   Oxygen supplementation based on oxygen saturation or arterial blood gases   Assess patient's ability to cough effectively   Assess the need for suctioning   Encourage broncho-pulmonary hygiene including cough, deep breathe   Instruct patient/support person to report shortness of breath or any respiratory difficulty   Collaborate with Respiratory Therapy as indicated, including medications and treatment     Problem: Metabolic/Fluid and Electrolytes - Adult  Goal: Electrolytes maintained within normal limits  Outcome: Progressing  Flowsheets (Taken 3/23/2025 0022)  Electrolytes maintained within normal limits:   Administer and monitor response to electrolyte replacements   Monitor labs and assess patient for signs and symptoms of electrolyte imbalances   Initiate and instruct patient/support person on fluid and nutrition restrictions as ordered

## 2025-03-23 NOTE — NURSING END OF SHIFT
Nursing End of Shift Summary:     Patient: Pete Trejo  MRN: [id number]  : 1965, Age: 59 y.o.    Location: [place] [address]    Nursing Goals  Clinical Goals for the Shift: monitor labs, vs, tele. maintain safety and comfort    Narrative Summary of Progress Toward Clinical Goals:  No new complaints. NPO at MN for cards consult regarding angiogram. VSS. Requires 1-2L of O2 while asleep. Remains safe and comfortable.     Barriers to Goals/Nursing Concerns:  No    New Patient or Family Concerns/Issues:  No    Shift Summary:   Significant Events & Communications to Providers (Last 12 Hours)       Last 5 Values    No documentation.                 Oxygen Usage (Last 12 Hours)       Last 5 Values    No documentation.                 Mobility (Last 12 Hours)       Last 5 Values       Row Name 25 0725 25 0742 25 1200 25 1210 25 1543       Mobility    Activity Ambulate in room -- Ambulate in room -- --    Level of Assistance Independent -- -- -- --    Patient Position In bed In bed -- -- In bed    Turning/Repositioning Turns self -- Turns self Shifting weight in chair --                  Urethral Catheter       Active Urethral Catheter       None                  Active Lines       Active Central venous catheter / Peripherally inserted central catheter / Implantable Port / Hemodialysis catheter / Midline Catheter       None                  Infusing Medications   Medication Dose Last Rate     PRN Medications   Medication Dose Last Admin    sodium chloride  3 mL      melatonin  3 mg      sennosides-docusate sodium  2 tablet      ondansetron  4 mg      Or    ondansetron  4 mg      alum-mag hydroxide-simeth  30 mL      acetaminophen  650 mg      albuterol HFA  2 puff      nitroglycerin  0.4 mg

## 2025-03-23 NOTE — PROGRESS NOTES
"Electrophysiology Attending  3/23/2025    No complaints    No tele events having transitioned back to PO amiodarone yesterday afternoon    /62 (BP Location: Right arm, Cuff Size: Regular Adult)   Pulse 71   Temp 37 °C (98.6 °F) (Oral)   Resp 18   Ht 1.778 m (5' 10\")   Wt 119.1 kg (262 lb 9.1 oz)   SpO2 90%   BMI 37.67 kg/m²   Chest clear  Regular, no significant murmur, ICD site well healed  Abdomen soft, non tender  Ext warm without edema    Labs reviewed    Refractory VT  -Difficult case, he is likely headed back to another ablation attempt.  We will reach out to Dr Stevens at the [facility] on Monday to discuss a plan for expediting an evaluation.  He and I discussed his case last week so that he is aware of a change in VT burden  -I reviewed last angiogram from 2023, I believe that he has an intermediate sized diagonal that has a tight ostial lesion that wasn't addressed.  Technically the risk/benefit may not be appropriate but can ask interventional cardiology to review films and comment.  In the meantime will hold rivaroxaban, DVT dose of enoxaparin only at this time.  Will reach out to IC on Monday to review past films to make sure that PCI is even a viable option based on previous studies before asking to do a relook angiogram  -  Compensated from a volume standpoint after VT and empiric lasix yesterday, will check BNP to compare with last admit.  Will not update echo at this time.  Continue his GDMT    Will make NPO except for meds after midnight until decision made regarding proceeding with coronary angiography        Call with issues    Juan Yeung MD  Cardiac Electrophysiology  "

## 2025-03-24 VITALS
TEMPERATURE: 98.7 F | BODY MASS INDEX: 37.59 KG/M2 | DIASTOLIC BLOOD PRESSURE: 69 MMHG | HEART RATE: 70 BPM | SYSTOLIC BLOOD PRESSURE: 95 MMHG | HEIGHT: 70 IN | OXYGEN SATURATION: 92 % | WEIGHT: 262.57 LBS | RESPIRATION RATE: 16 BRPM

## 2025-03-24 LAB
BASOPHILS # BLD AUTO: 0 10*3/UL
BASOPHILS NFR BLD AUTO: 0.7 % (ref 0–2)
EOSINOPHIL # BLD AUTO: 0.1 10*3/UL
EOSINOPHIL NFR BLD AUTO: 1.2 % (ref 0–3)
ERYTHROCYTE [DISTWIDTH] IN BLOOD BY AUTOMATED COUNT: 14.1 % (ref 11.5–15)
HCT VFR BLD AUTO: 48.4 % (ref 38–50)
HGB BLD-MCNC: 16.4 G/DL (ref 13.2–17.2)
LYMPHOCYTES # BLD AUTO: 1 10*3/UL
LYMPHOCYTES NFR BLD AUTO: 14.4 % (ref 15–47)
MCH RBC QN AUTO: 31.4 PG (ref 29–34)
MCHC RBC AUTO-ENTMCNC: 33.9 G/DL (ref 32–36)
MCV RBC AUTO: 92.6 FL (ref 82–97)
MONOCYTES # BLD AUTO: 0.5 10*3/UL
MONOCYTES NFR BLD AUTO: 7 % (ref 5–13)
NEUTROPHILS # BLD AUTO: 5.5 10*3/UL
NEUTROPHILS NFR BLD AUTO: 76.7 % (ref 46–70)
PLATELET # BLD AUTO: 231 10*3/UL (ref 130–350)
PMV BLD AUTO: 7.5 FL (ref 6.9–10.8)
RBC # BLD AUTO: 5.23 10*6/UL (ref 4.1–5.8)
WBC # BLD AUTO: 7.1 10*3/UL (ref 3.7–9.6)

## 2025-03-24 PROCEDURE — (BLANK) HC CATH LAB LEVEL 2 FIRST 15 MIN: Performed by: INTERNAL MEDICINE

## 2025-03-24 PROCEDURE — 99152 MOD SED SAME PHYS/QHP 5/>YRS: CPT | Performed by: INTERNAL MEDICINE

## 2025-03-24 PROCEDURE — C1876 STENT, NON-COA/NON-COV W/DEL: HCPCS | Performed by: INTERNAL MEDICINE

## 2025-03-24 PROCEDURE — (BLANK) HC ROOM ICU INTERMEDIATE

## 2025-03-24 PROCEDURE — 93005 ELECTROCARDIOGRAM TRACING: CPT | Performed by: INTERNAL MEDICINE

## 2025-03-24 PROCEDURE — C1769 GUIDE WIRE: HCPCS | Performed by: INTERNAL MEDICINE

## 2025-03-24 PROCEDURE — 2500000200 HC RX 250 WO HCPCS: Performed by: INTERNAL MEDICINE

## 2025-03-24 PROCEDURE — 93454 CORONARY ARTERY ANGIO S&I: CPT | Mod: 26,51,59 | Performed by: INTERNAL MEDICINE

## 2025-03-24 PROCEDURE — 6370000100 HC RX 637 (ALT 250 FOR IP): Performed by: INTERNAL MEDICINE

## 2025-03-24 PROCEDURE — 92928 PRQ TCAT PLMT NTRAC ST 1 LES: CPT | Mod: LD | Performed by: INTERNAL MEDICINE

## 2025-03-24 PROCEDURE — (BLANK) HC CATH LAB LEVEL 2 EACH ADDITIONAL MIN: Performed by: INTERNAL MEDICINE

## 2025-03-24 PROCEDURE — 027034Z DILATION OF CORONARY ARTERY, ONE ARTERY WITH DRUG-ELUTING INTRALUMINAL DEVICE, PERCUTANEOUS APPROACH: ICD-10-PCS | Performed by: INTERNAL MEDICINE

## 2025-03-24 PROCEDURE — 2550000100 HC RX 255: Performed by: INTERNAL MEDICINE

## 2025-03-24 PROCEDURE — C1887 CATHETER, GUIDING: HCPCS | Performed by: INTERNAL MEDICINE

## 2025-03-24 PROCEDURE — C1726 CATH, BAL DIL, NON-VASCULAR: HCPCS | Performed by: INTERNAL MEDICINE

## 2025-03-24 PROCEDURE — C1894 INTRO/SHEATH, NON-LASER: HCPCS | Performed by: INTERNAL MEDICINE

## 2025-03-24 PROCEDURE — 99153 MOD SED SAME PHYS/QHP EA: CPT | Performed by: INTERNAL MEDICINE

## 2025-03-24 PROCEDURE — 6360000200 HC RX 636 W HCPCS (ALT 250 FOR IP): Performed by: INTERNAL MEDICINE

## 2025-03-24 PROCEDURE — 93010 ELECTROCARDIOGRAM REPORT: CPT | Mod: NCNR | Performed by: INTERNAL MEDICINE

## 2025-03-24 PROCEDURE — 36415 COLL VENOUS BLD VENIPUNCTURE: CPT | Performed by: NURSE PRACTITIONER

## 2025-03-24 PROCEDURE — 2720000000 HC SUPP 272 WO HCPCS: Performed by: INTERNAL MEDICINE

## 2025-03-24 PROCEDURE — 85025 COMPLETE CBC W/AUTO DIFF WBC: CPT | Performed by: NURSE PRACTITIONER

## 2025-03-24 PROCEDURE — 93010 ELECTROCARDIOGRAM REPORT: CPT | Mod: 59 | Performed by: INTERNAL MEDICINE

## 2025-03-24 PROCEDURE — 2580000300 HC RX 258: Performed by: INTERNAL MEDICINE

## 2025-03-24 PROCEDURE — 6370000100 HC RX 637 (ALT 250 FOR IP): Performed by: NURSE PRACTITIONER

## 2025-03-24 DEVICE — XIENCE SKYPOINT™ EVEROLIMUS ELUTING CORONARY STENT SYSTEM 2.50 MM X 08 MM / RAPID-EXCHANGE
Type: IMPLANTABLE DEVICE | Status: FUNCTIONAL
Brand: XIENCE SKYPOINT™

## 2025-03-24 RX ORDER — SODIUM CHLORIDE 9 MG/ML
50 INJECTION, SOLUTION INTRAVENOUS CONTINUOUS
Status: ACTIVE | OUTPATIENT
Start: 2025-03-24 | End: 2025-03-24

## 2025-03-24 RX ORDER — LIDOCAINE HYDROCHLORIDE 10 MG/ML
INJECTION, SOLUTION EPIDURAL; INFILTRATION; INTRACAUDAL; PERINEURAL CODE/TRAUMA/SEDATION MEDICATION
Status: DISCONTINUED | OUTPATIENT
Start: 2025-03-24 | End: 2025-03-24 | Stop reason: HOSPADM

## 2025-03-24 RX ORDER — FENTANYL CITRATE/PF 50 MCG/ML
PLASTIC BAG, INJECTION (ML) INTRAVENOUS CODE/TRAUMA/SEDATION MEDICATION
Status: DISCONTINUED | OUTPATIENT
Start: 2025-03-24 | End: 2025-03-24 | Stop reason: HOSPADM

## 2025-03-24 RX ORDER — PRASUGREL 10 MG/1
10 TABLET, FILM COATED ORAL DAILY
Status: DISCONTINUED | OUTPATIENT
Start: 2025-03-25 | End: 2025-03-25 | Stop reason: HOSPADM

## 2025-03-24 RX ORDER — AMIODARONE HYDROCHLORIDE 200 MG/1
400 TABLET ORAL
Status: DISCONTINUED | OUTPATIENT
Start: 2025-03-24 | End: 2025-03-25 | Stop reason: HOSPADM

## 2025-03-24 RX ORDER — ENOXAPARIN SODIUM 100 MG/ML
40 INJECTION SUBCUTANEOUS EVERY 24 HOURS
Status: DISCONTINUED | OUTPATIENT
Start: 2025-03-25 | End: 2025-03-25 | Stop reason: HOSPADM

## 2025-03-24 RX ORDER — PRASUGREL 10 MG/1
TABLET, FILM COATED ORAL CODE/TRAUMA/SEDATION MEDICATION
Status: DISCONTINUED | OUTPATIENT
Start: 2025-03-24 | End: 2025-03-24 | Stop reason: HOSPADM

## 2025-03-24 RX ORDER — ATROPINE SULFATE 0.1 MG/ML
.5-1 INJECTION INTRAVENOUS ONCE AS NEEDED
Status: DISCONTINUED | OUTPATIENT
Start: 2025-03-24 | End: 2025-03-25 | Stop reason: HOSPADM

## 2025-03-24 RX ORDER — MIDAZOLAM HYDROCHLORIDE 1 MG/ML
INJECTION INTRAMUSCULAR; INTRAVENOUS CODE/TRAUMA/SEDATION MEDICATION
Status: DISCONTINUED | OUTPATIENT
Start: 2025-03-24 | End: 2025-03-24 | Stop reason: HOSPADM

## 2025-03-24 RX ORDER — SODIUM CHLORIDE 450 MG/100ML
100 INJECTION, SOLUTION INTRAVENOUS CONTINUOUS
Status: CANCELLED | OUTPATIENT
Start: 2025-03-24

## 2025-03-24 RX ADMIN — AMIODARONE HYDROCHLORIDE 400 MG: 200 TABLET ORAL at 13:14

## 2025-03-24 RX ADMIN — MAGNESIUM OXIDE 400 MG: 400 TABLET ORAL at 08:31

## 2025-03-24 RX ADMIN — CARVEDILOL 50 MG: 25 TABLET, FILM COATED ORAL at 08:31

## 2025-03-24 RX ADMIN — CARVEDILOL 50 MG: 25 TABLET, FILM COATED ORAL at 17:41

## 2025-03-24 RX ADMIN — MEXILETINE HYDROCHLORIDE 300 MG: 150 CAPSULE ORAL at 20:18

## 2025-03-24 RX ADMIN — ROSUVASTATIN CALCIUM 40 MG: 20 TABLET, FILM COATED ORAL at 20:16

## 2025-03-24 RX ADMIN — AMIODARONE HYDROCHLORIDE 400 MG: 200 TABLET ORAL at 08:31

## 2025-03-24 RX ADMIN — AMIODARONE HYDROCHLORIDE 400 MG: 200 TABLET ORAL at 17:41

## 2025-03-24 RX ADMIN — SPIRONOLACTONE 50 MG: 25 TABLET ORAL at 08:31

## 2025-03-24 RX ADMIN — MAGNESIUM OXIDE 400 MG: 400 TABLET ORAL at 20:16

## 2025-03-24 RX ADMIN — ENOXAPARIN SODIUM 40 MG: 100 INJECTION SUBCUTANEOUS at 13:14

## 2025-03-24 RX ADMIN — LANSOPRAZOLE 30 MG: 30 CAPSULE, DELAYED RELEASE ORAL at 15:04

## 2025-03-24 RX ADMIN — TAMSULOSIN HYDROCHLORIDE 0.4 MG: 0.4 CAPSULE ORAL at 20:16

## 2025-03-24 RX ADMIN — SACUBITRIL AND VALSARTAN 1 TABLET: 97; 103 TABLET, FILM COATED ORAL at 09:03

## 2025-03-24 RX ADMIN — MEXILETINE HYDROCHLORIDE 300 MG: 150 CAPSULE ORAL at 15:04

## 2025-03-24 RX ADMIN — SACUBITRIL AND VALSARTAN 1 TABLET: 97; 103 TABLET, FILM COATED ORAL at 20:16

## 2025-03-24 RX ADMIN — POTASSIUM CHLORIDE 20 MEQ: 750 TABLET, FILM COATED, EXTENDED RELEASE ORAL at 08:31

## 2025-03-24 RX ADMIN — MEXILETINE HYDROCHLORIDE 300 MG: 150 CAPSULE ORAL at 09:03

## 2025-03-24 RX ADMIN — EMPAGLIFLOZIN 10 MG: 10 TABLET, FILM COATED ORAL at 08:31

## 2025-03-24 RX ADMIN — FUROSEMIDE 40 MG: 40 TABLET ORAL at 08:31

## 2025-03-24 RX ADMIN — SODIUM CHLORIDE 50 ML/HR: 9 INJECTION, SOLUTION INTRAVENOUS at 17:48

## 2025-03-24 RX ADMIN — ASPIRIN 81 MG: 81 TABLET ORAL at 08:31

## 2025-03-24 RX ADMIN — Medication 2000 UNITS: at 09:00

## 2025-03-24 NOTE — INTERDISCIPLINARY/THERAPY
Spiritual Care Services:     03/24/25 1300   Clinical Encounter Type   Referral By: Rounds   Visited With Patient   Visit Type Initial   Jehovah's witness Affilation   Affiliated with Yazidism/[employer] Group Yes   Current Yazidism/[employer] Group Affiliation Scientologist   Spiritual/Emotional Distress   Level Low   Plan of Care   Spiritual Care Plan Initiated Provide spiritual support as needed   Spiritual Assessment   Completed by Spiritual Care    Relationships Child(luisa) are a support   Spiritual Interventions   Spiritual/Jehovah's witness Interventions Prayer provided     Visited with pt and heard his concerns.  Provided pastoral support and had prayer with pt.   Service will remain available.

## 2025-03-24 NOTE — PROGRESS NOTES
[address] [address]                                                    Electrophysiology Inpatient Progress Note    Subjective    Patient ID: Pete Trejo is a 59 y.o. male.    Chief Complaint:   Chief Complaint   Patient presents with    Chest Pain     Pt arrives to triage with chest pain. Pt reports that his device went off.         LOS: 3 days     HPI:  Pete is a pleasant 59-year-old male admitted 3/14/2025 through 3/18/2025 with sustained VT x 15 hours while on sotalol 180 mg twice daily and mexiletine 250 mg 3 times daily as well as carvedilol 50 mg twice daily.  Sotalol was discontinued and he was initiated on amiodarone.  The mexiletine was increased to 300 mg 3 times daily.  He was discharged on amiodarone 200 mg 3 times daily with meals for a week then to cut it down to twice daily.  He was discharged on 3/18/2025.  Unfortunately on 3/21/2025 he returned with sustained VT x 15 hours and finally terminated with a 41 J shock.  There was no particular trigger for this event.  He has not been doing anything strenuous.  He had just been standing around then all of a sudden did not feel well and sat down in the truck and his device fired.  Total duration lasted 3 minutes 28 seconds.  He was noted to have ongoing ectopy in the ER and was given IV amiodarone bolus and drip.  He was given some empiric Lasix IV.  Yesterday his BNP was 49.  He was followed over the weekend by Dr. Yeung.  Yesterday he was transitioned back to p.o. amiodarone as he had not had any recurrent events.  He is n.p.o. this morning for possible cath to update coronary anatomy.    Patient seen and examined.  He is feeling well this morning.  Has not had any further arrhythmias over the weekend.  EKG showing sinus rhythm with BiV pacing.  QRS duration 148 ms.  QTc 488 ms.  Potassium 4.4, magnesium yesterday was 2.3.  Will recheck.  He is afebrile.  Heart rates 70s.  Blood pressures 90s to 120s.   Potassium 4.4, creatinine 0.97, magnesium 2.3 yesterday we will recheck.  He is down  4.5 kg since admission, negative fluid balance approximately 2 L.          Allergies as of 03/21/2025 - Reviewed 03/21/2025   Allergen Reaction Noted    Morphine  07/30/2017    Tramadol  07/30/2017       Current Facility-Administered Medications:     enoxaparin (LOVENOX) syringe 40 mg, 40 mg, subcutaneous, Daily at 1400, Juan Yeung MD, 40 mg at 03/23/25 1435    amiodarone (PACERONE) tablet 400 mg, 400 mg, oral, 2x daily, Juan Yeung MD, 400 mg at 03/24/25 0831    Maintain IV access, , , Until discontinued **AND** Saline lock IV, , , Once **AND** sodium chloride flush 3 mL, 3 mL, intravenous, PRN, Merced Newton, CNP    melatonin tablet 3 mg, 3 mg, oral, Nightly PRN, Merced Newton CNP    sennosides-docusate sodium (SENNA PLUS) 8.6-50 mg 2 tablet, 2 tablet, oral, Nightly PRN, Merced Newton CNP    ondansetron (ZOFRAN) tablet 4 mg, 4 mg, oral, q6h PRN **OR** ondansetron (ZOFRAN) injection 4 mg, 4 mg, intravenous, q6h PRN, Merced Newton CNP    alum-mag hydroxide-simeth (MAALOX ADVANCED) suspension 30 mL, 30 mL, oral, 3x daily PRN, Merced Newton CNP    acetaminophen (TYLENOL) tablet 650 mg, 650 mg, oral, q6h PRN, Merced Newton CNP    albuterol HFA 90 mcg/actuation inhaler 2 puff, 2 puff, inhalation, q6h PRN, Merced Newton CNP    [Held by provider] amiodarone (PACERONE) tablet 200 mg, 200 mg, oral, 3x daily, Merced Newton CNP    aspirin EC tablet 81 mg, 81 mg, oral, Daily, Provell, Merced, CNP, 81 mg at 03/24/25 0831    carvediloL (COREG) tablet 50 mg, 50 mg, oral, 2x daily with meals, Provell, Merced, CNP, 50 mg at 03/24/25 0831    cholecalciferol (vitamin D3) 1,000 unit (25mcg) tab/cap 2,000 Units, 2,000 Units, oral, Daily, Provell, Merced, CNP, 2,000 Units at 03/24/25 0900    empagliflozin (JARDIANCE) tablet 10 mg, 10 mg, oral, Daily, Provell, Merced, CNP, 10 mg at 03/24/25 0831    furosemide (LASIX) tablet  40 mg, 40 mg, oral, Daily, Provell, Merced, CNP, 40 mg at 03/24/25 0831    magnesium oxide (MAG-OX) tablet 400 mg, 400 mg, oral, 2x daily, Provell, Merced, CNP, 400 mg at 03/24/25 0831    mexiletine (MEXITIL) capsule 300 mg, 300 mg, oral, 3x daily, Provell, Merced, CNP, 300 mg at 03/24/25 0903    nitroglycerin (NITROSTAT) SL tablet 0.4 mg, 0.4 mg, sublingual, q5 min PRN, Provell, Merced, CNP    lansoprazole (PREVACID) capsule 30 mg, 30 mg, oral, Daily at 1600, Provell, Merced, CNP, 30 mg at 03/23/25 1435    potassium chloride (KLOR-CON) CR tablet 20 mEq, 20 mEq, oral, Daily, Provell, Merced, CNP, 20 mEq at 03/24/25 0831    rosuvastatin (CRESTOR) tablet 40 mg, 40 mg, oral, Daily, Provell, Merced, CNP, 40 mg at 03/23/25 1733    sacubitriL-valsartan (ENTRESTO)  mg 1 tablet, 1 tablet, oral, 2x daily, Provell, Merced, CNP, 1 tablet at 03/24/25 0903    spironolactone (ALDACTONE) tablet 50 mg, 50 mg, oral, Daily, Provell, Merced, CNP, 50 mg at 03/24/25 0831    tamsulosin (FLOMAX) 24 hr capsule 0.4 mg, 0.4 mg, oral, q PM, Provell, Merced, CNP, 0.4 mg at 03/23/25 1733    Objective     Vital signs in last 24 hours:   Temp:  [36.6 °C (97.8 °F)-37.1 °C (98.7 °F)] 36.9 °C (98.4 °F)  Heart Rate:  [70-71] 70  Resp:  [16-18] 16  SpO2:  [90 %-96 %] 91 %  BP: ()/(62-75) 122/75  SpO2/FiO2 Ratio Using Approximate FiO2 (%):  [328.6-455] 455  Estimated P/F Ratio Using Approximate FiO2 (%):  [284.4-386.8] 386.8  Weight: 118.5 kg (261 lb 3.9 oz)    Intake/Output this shift:  I/O this shift:  In: -   Out: 375 [Urine:375]    Intake/Output Summary (Last 24 hours) at 3/24/2025 1027  Last data filed at 3/24/2025 0800  Gross per 24 hour   Intake 1555 ml   Output 2050 ml   Net -495 ml     Cumulative I&O:    Physical Exam    Data Review:   BMP:  Lab Results   Component Value Date     03/23/2025    K 4.4 03/23/2025     03/23/2025    CO2 25 03/23/2025    BUN 14 03/23/2025    CREATININE 0.97 03/23/2025    GLUCOSE 104  "03/23/2025    CALCIUM 9.1 03/23/2025     CBC:   Lab Results   Component Value Date    WBC 6.9 03/21/2025    RBC 4.96 03/21/2025    HGB 15.9 03/21/2025    HCT 46.8 03/21/2025     03/21/2025     CMP:  Lab Results   Component Value Date     03/23/2025    K 4.4 03/23/2025     03/23/2025    CO2 25 03/23/2025    GLUCOSE 104 03/23/2025    CREATININE 0.97 03/23/2025    CALCIUM 9.1 03/23/2025    ALBUMIN 3.7 03/23/2025    ALKPHOS 72 03/23/2025    BILITOT 0.78 03/23/2025    ALT 40 03/23/2025    AST 14 03/23/2025    BUN 14 03/23/2025    ANIONGAP 9 03/23/2025     Lab Results   Component Value Date    BNP 49 03/23/2025     Lipid: No results found for: \"CHOL\", \"HDL\", \"TRIG\", \"LDLCALC\"  TSH:  No results found for: \"TSH\"  Magnesium:  Lab Results   Component Value Date    MG 2.3 03/22/2025     PT/INR:   No results found for: \"PT\", \"INR\"                       Tests:  XR chest portable 1 view  Result Date: 3/21/2025  Narrative: EXAM: DX CHEST PORTABLE 1 VW DATE: 3/21/2025 12:09 PM INDICATION:  aicd discharge COMPARISON: 3/14/2025 TECHNIQUE: 1 view FINDINGS: Left chest cardiac device. Stable cardiomegaly. Lungs are clear. No pleural effusion or pneumothorax.     Impression: IMPRESSION: Stable cardiomegaly    US Echo complete  Result Date: 3/14/2025  Narrative:   Severe left ventricular systolic dysfunction.   Normal left ventricular wall thickness.   Left ventricle is severely dilated, LVIDD of 7.3 cm.  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   Indeterminate left ventricular diastolic function.   Normal left ventricular filling pressure.   The left atrium is moderately dilated.   The right atrium is normal in size.   No obvious valvular pathologies   Inadequate TR spectral Doppler to accurately assess right ventricular systolic pressure.   Normal central venous pressure (0-5 mmHg).   No " pericardial effusion.   Definitive was used to delineate the walls of the LV without any obvious LV thrombus.   This echo was compared with prior echo done on 10/19/2023.  No major changes     XR chest portable 1 view  Result Date: 3/14/2025  Narrative: XR CHEST 1 VIEW 03/14/2025 HISTORY: Shortness of breath TECHNIQUE:  Chest, 1 view. COMPARISON: 1/11/2020 FINDINGS: The lungs are clear. No pleural effusions. No pneumothorax. Cardiomegaly. Left chest pacer defibrillator. The mediastinal contour is normal.     Impression: IMPRESSION:  1.  Cardiomegaly without evidence of an acute pulmonary process.      Assessment/Plan   Patient Active Problem List    Diagnosis Date Noted    Cardiac resynchronization therapy defibrillator (CRT-D) in place 03/22/2025    Sustained VT (ventricular tachycardia) (CMS/[facility]) 03/14/2025    VT (ventricular tachycardia) (CMS/[facility]) 11/30/2023    Gastroesophageal reflux disease without esophagitis 10/19/2023    Benign prostatic hyperplasia without lower urinary tract symptoms 10/19/2023    Diabetes mellitus type 2 in obese (CMS/[facility]) 10/19/2023    Moderate obesity 10/19/2023    Elevated troponin 10/19/2023    Lymphocytosis (symptomatic) 10/19/2023    AICD discharge 10/18/2023    Long term current use of antiarrhythmic drug 03/04/2022    LAVON (obstructive sleep apnea) 10/01/2021    Sustained ventricular tachycardia (CMS/HCC) 09/19/2021    Chronic anticoagulation 09/19/2021    History of ventricular tachycardia 09/19/2021    Paroxysmal atrial fibrillation (CMS/[facility]) 09/19/2021    Chronic systolic HF (heart failure) (CMS/[facility]) 07/11/2021    Automatic implantable cardioverter-defibrillator in situ 06/10/2020    Presence of stent in coronary artery 06/10/2020    Coronary artery disease involving native coronary artery of native heart without angina pectoris 06/10/2020    Ischemic cardiomyopathy 10/30/2017    Hypertension 10/30/2017    Hyperlipidemia 10/30/2017       Plan:  Continue loading p.o. amiodarone.   Continue mexiletine.  Discuss with interventional cardiology regarding possible Cath Today with potential intervention on the diagonal lesion.  Early follow-up with [facility].  Dr. Yeung did contact [facility] to discuss plan for expediting evaluation.  Dr. Stevens.  BNP is now normal 49 down from 128 last admission.  Compensated from volume standpoint.  On GDMT with carvedilol 50 mg twice daily, Entresto, Jardiance, spironolactone.  For CAD he is on rosuvastatin 40, aspirin.  Continue mexiletine 300 mg 3 times daily with meals, amiodarone 400 mg 3 times daily meals.  Rhythm has been stable since admission.    Plan discussed in conjunction with Dr. Wu.    Electronically signed by: Merced Newton CNP  3/24/2025  10:27 AM      A voice recognition program was used to aid in documentation of this record.  Sometimes words are not printed exactly as they were spoken.  While efforts were made to carefully edit and correct any inaccuracies, some errors may be present; please take these into context.  Please contact the provider if errors are identified.   show

## 2025-03-24 NOTE — PLAN OF CARE
Problem: Pain - Adult  Goal: Verbalizes/displays adequate comfort level or baseline comfort level  Outcome: Progressing  Flowsheets (Taken 3/23/2025 0022 by Doroteo Pena, RN)  Verbalizes/displays adequate comfort level or baseline comfort level:   Encourage patient to monitor pain and request interventions   Assess pain using the appropriate pain scale   Educate/Implement non-pharmacological measures as appropriate and evaluate response   Administer analgesics based on type and severity of pain and evaluate response   Consider cultural, developmental and social influences on pain and pain management   Notify Provider if interventions unsuccessful or patient reports new pain     Problem: Cardiovascular - Adult  Goal: Maintains optimal cardiac output and hemodynamic stability  Outcome: Progressing  Flowsheets (Taken 3/23/2025 0725 by Opitz, Bri, RN)  Maintain optimal cardiac output and hemodynamic stability:   Monitor heart rate, blood pressure, and cardiac rhythm   Monitor for hypotension and other signs of decreased cardiac output   Assess for signs of decreased coronary artery perfusion - ex. angina   Assess quality of pulses, capillary refill, edema, sensation, skin color and temperature  Goal: Absence of cardiac dysrhythmias or at baseline  Outcome: Progressing  Flowsheets (Taken 3/23/2025 2155 by Doroteo Pena, RN)  Absence of cardiac dysrhythmias or at baseline:   Obtain 12 lead EKG if indicated   Continuous cardiac monitoring, monitor vital signs (see flowsheet documentation)   Administer antiarrhythmic and heart rate control medications as ordered   Initiate emergency measures for life threatening arrhythmias   Monitor electrolytes and administer replacement therapy as ordered     Problem: Respiratory - Adult  Goal: Achieves optimal ventilation and oxygenation  Outcome: Progressing  Flowsheets (Taken 3/23/2025 0022 by Doroteo Pena, RN)  Achieves optimal ventilation and oxygenation:    Assess and report changes in respiratory status   Assess and report changes in mentation and behavior   Position to facilitate oxygenation and minimize respiratory effort   Oxygen supplementation based on oxygen saturation or arterial blood gases   Assess patient's ability to cough effectively   Assess the need for suctioning   Encourage broncho-pulmonary hygiene including cough, deep breathe   Instruct patient/support person to report shortness of breath or any respiratory difficulty   Collaborate with Respiratory Therapy as indicated, including medications and treatment     Problem: Metabolic/Fluid and Electrolytes - Adult  Goal: Electrolytes maintained within normal limits  Outcome: Progressing  Flowsheets (Taken 3/23/2025 2155 by Doroteo Pena, RN)  Electrolytes maintained within normal limits:   Administer and monitor response to electrolyte replacements   Monitor labs and assess patient for signs and symptoms of electrolyte imbalances

## 2025-03-24 NOTE — PLAN OF CARE
Problem: Pain - Adult  Goal: Verbalizes/displays adequate comfort level or baseline comfort level  Outcome: Progressing  Flowsheets (Taken 3/23/2025 0022)  Verbalizes/displays adequate comfort level or baseline comfort level:   Encourage patient to monitor pain and request interventions   Assess pain using the appropriate pain scale   Educate/Implement non-pharmacological measures as appropriate and evaluate response   Administer analgesics based on type and severity of pain and evaluate response   Consider cultural, developmental and social influences on pain and pain management   Notify Provider if interventions unsuccessful or patient reports new pain     Problem: Cardiovascular - Adult  Goal: Maintains optimal cardiac output and hemodynamic stability  Outcome: Progressing  Flowsheets (Taken 3/23/2025 0763 by Opitz, Bri, RN)  Maintain optimal cardiac output and hemodynamic stability:   Monitor heart rate, blood pressure, and cardiac rhythm   Monitor for hypotension and other signs of decreased cardiac output   Assess for signs of decreased coronary artery perfusion - ex. angina   Assess quality of pulses, capillary refill, edema, sensation, skin color and temperature  Goal: Absence of cardiac dysrhythmias or at baseline  Outcome: Progressing  Flowsheets (Taken 3/23/2025 2155)  Absence of cardiac dysrhythmias or at baseline:   Obtain 12 lead EKG if indicated   Continuous cardiac monitoring, monitor vital signs (see flowsheet documentation)   Administer antiarrhythmic and heart rate control medications as ordered   Initiate emergency measures for life threatening arrhythmias   Monitor electrolytes and administer replacement therapy as ordered     Problem: Respiratory - Adult  Goal: Achieves optimal ventilation and oxygenation  Outcome: Progressing  Flowsheets (Taken 3/23/2025 0022)  Achieves optimal ventilation and oxygenation:   Assess and report changes in respiratory status   Assess and report changes in  mentation and behavior   Position to facilitate oxygenation and minimize respiratory effort   Oxygen supplementation based on oxygen saturation or arterial blood gases   Assess patient's ability to cough effectively   Assess the need for suctioning   Encourage broncho-pulmonary hygiene including cough, deep breathe   Instruct patient/support person to report shortness of breath or any respiratory difficulty   Collaborate with Respiratory Therapy as indicated, including medications and treatment     Problem: Metabolic/Fluid and Electrolytes - Adult  Goal: Electrolytes maintained within normal limits  Outcome: Progressing  Flowsheets (Taken 3/23/2025 2155)  Electrolytes maintained within normal limits:   Administer and monitor response to electrolyte replacements   Monitor labs and assess patient for signs and symptoms of electrolyte imbalances

## 2025-03-25 ENCOUNTER — APPOINTMENT (OUTPATIENT)
Dept: CARDIAC REHAB | Facility: HOSPITAL | Age: 60
End: 2025-03-25
Payer: MEDICARE

## 2025-03-25 VITALS
SYSTOLIC BLOOD PRESSURE: 107 MMHG | WEIGHT: 263.67 LBS | RESPIRATION RATE: 18 BRPM | BODY MASS INDEX: 37.75 KG/M2 | HEIGHT: 70 IN | HEART RATE: 70 BPM | DIASTOLIC BLOOD PRESSURE: 59 MMHG | TEMPERATURE: 98.2 F | OXYGEN SATURATION: 92 %

## 2025-03-25 LAB
ANION GAP SERPL CALC-SCNC: 9 MMOL/L (ref 3–11)
BASOPHILS # BLD AUTO: 0 10*3/UL
BASOPHILS NFR BLD AUTO: 0.7 % (ref 0–2)
BUN SERPL-MCNC: 18 MG/DL (ref 7–25)
CALCIUM SERPL-MCNC: 9.1 MG/DL (ref 8.6–10.3)
CHLORIDE SERPL-SCNC: 101 MMOL/L (ref 98–107)
CO2 SERPL-SCNC: 24 MMOL/L (ref 21–32)
CREAT SERPL-MCNC: 1.02 MG/DL (ref 0.7–1.3)
EGFRCR SERPLBLD CKD-EPI 2021: 85 ML/MIN/1.73M*2
EOSINOPHIL # BLD AUTO: 0.1 10*3/UL
EOSINOPHIL NFR BLD AUTO: 1.4 % (ref 0–3)
ERYTHROCYTE [DISTWIDTH] IN BLOOD BY AUTOMATED COUNT: 14.1 % (ref 11.5–15)
GLUCOSE SERPL-MCNC: 107 MG/DL (ref 70–105)
HCT VFR BLD AUTO: 46.7 % (ref 38–50)
HGB BLD-MCNC: 15.6 G/DL (ref 13.2–17.2)
LYMPHOCYTES # BLD AUTO: 1 10*3/UL
LYMPHOCYTES NFR BLD AUTO: 16 % (ref 15–47)
MCH RBC QN AUTO: 31.4 PG (ref 29–34)
MCHC RBC AUTO-ENTMCNC: 33.5 G/DL (ref 32–36)
MCV RBC AUTO: 93.8 FL (ref 82–97)
MONOCYTES # BLD AUTO: 0.5 10*3/UL
MONOCYTES NFR BLD AUTO: 8.5 % (ref 5–13)
NEUTROPHILS # BLD AUTO: 4.7 10*3/UL
NEUTROPHILS NFR BLD AUTO: 73.4 % (ref 46–70)
PLATELET # BLD AUTO: 214 10*3/UL (ref 130–350)
PMV BLD AUTO: 7.3 FL (ref 6.9–10.8)
POTASSIUM SERPL-SCNC: 4.3 MMOL/L (ref 3.5–5.1)
RBC # BLD AUTO: 4.98 10*6/UL (ref 4.1–5.8)
SODIUM SERPL-SCNC: 134 MMOL/L (ref 135–145)
WBC # BLD AUTO: 6.4 10*3/UL (ref 3.7–9.6)

## 2025-03-25 PROCEDURE — 36415 COLL VENOUS BLD VENIPUNCTURE: CPT | Performed by: INTERNAL MEDICINE

## 2025-03-25 PROCEDURE — 93005 ELECTROCARDIOGRAM TRACING: CPT | Performed by: INTERNAL MEDICINE

## 2025-03-25 PROCEDURE — 80048 BASIC METABOLIC PNL TOTAL CA: CPT | Performed by: INTERNAL MEDICINE

## 2025-03-25 PROCEDURE — 85025 COMPLETE CBC W/AUTO DIFF WBC: CPT | Performed by: INTERNAL MEDICINE

## 2025-03-25 PROCEDURE — 6370000100 HC RX 637 (ALT 250 FOR IP): Performed by: INTERNAL MEDICINE

## 2025-03-25 PROCEDURE — 6370000100 HC RX 637 (ALT 250 FOR IP): Performed by: NURSE PRACTITIONER

## 2025-03-25 PROCEDURE — 6360000200 HC RX 636 W HCPCS (ALT 250 FOR IP): Performed by: INTERNAL MEDICINE

## 2025-03-25 PROCEDURE — 93010 ELECTROCARDIOGRAM REPORT: CPT | Mod: NCNR | Performed by: INTERNAL MEDICINE

## 2025-03-25 RX ORDER — PRASUGREL 10 MG/1
10 TABLET, FILM COATED ORAL DAILY
Qty: 30 TABLET | Refills: 1 | Status: SHIPPED | OUTPATIENT
Start: 2025-03-26

## 2025-03-25 RX ADMIN — MEXILETINE HYDROCHLORIDE 300 MG: 150 CAPSULE ORAL at 09:35

## 2025-03-25 RX ADMIN — PRASUGREL 10 MG: 10 TABLET, FILM COATED ORAL at 09:34

## 2025-03-25 RX ADMIN — EMPAGLIFLOZIN 10 MG: 10 TABLET, FILM COATED ORAL at 09:35

## 2025-03-25 RX ADMIN — CARVEDILOL 50 MG: 25 TABLET, FILM COATED ORAL at 09:35

## 2025-03-25 RX ADMIN — POTASSIUM CHLORIDE 20 MEQ: 750 TABLET, FILM COATED, EXTENDED RELEASE ORAL at 09:34

## 2025-03-25 RX ADMIN — FUROSEMIDE 40 MG: 40 TABLET ORAL at 09:34

## 2025-03-25 RX ADMIN — SPIRONOLACTONE 50 MG: 25 TABLET ORAL at 09:34

## 2025-03-25 RX ADMIN — SACUBITRIL AND VALSARTAN 1 TABLET: 97; 103 TABLET, FILM COATED ORAL at 09:35

## 2025-03-25 RX ADMIN — AMIODARONE HYDROCHLORIDE 400 MG: 200 TABLET ORAL at 09:34

## 2025-03-25 RX ADMIN — ASPIRIN 81 MG: 81 TABLET ORAL at 09:34

## 2025-03-25 RX ADMIN — AMIODARONE HYDROCHLORIDE 400 MG: 200 TABLET ORAL at 13:47

## 2025-03-25 RX ADMIN — Medication 2000 UNITS: at 09:34

## 2025-03-25 RX ADMIN — MEXILETINE HYDROCHLORIDE 300 MG: 150 CAPSULE ORAL at 15:13

## 2025-03-25 RX ADMIN — MAGNESIUM OXIDE 400 MG: 400 TABLET ORAL at 09:34

## 2025-03-25 RX ADMIN — ENOXAPARIN SODIUM 40 MG: 100 INJECTION SUBCUTANEOUS at 15:13

## 2025-03-25 RX ADMIN — LANSOPRAZOLE 30 MG: 30 CAPSULE, DELAYED RELEASE ORAL at 15:13

## 2025-03-25 NOTE — PLAN OF CARE
Problem: Pain - Adult  Goal: Verbalizes/displays adequate comfort level or baseline comfort level  Outcome: Progressing     Problem: Cardiovascular - Adult  Goal: Maintains optimal cardiac output and hemodynamic stability  Outcome: Progressing  Goal: Absence of cardiac dysrhythmias or at baseline  Outcome: Progressing     Problem: Respiratory - Adult  Goal: Achieves optimal ventilation and oxygenation  Outcome: Progressing     Problem: Metabolic/Fluid and Electrolytes - Adult  Goal: Electrolytes maintained within normal limits  Outcome: Progressing

## 2025-03-25 NOTE — DISCHARGE SUMMARY
Admitting Provider: Juan Yeung MD  Discharge Provider: Juan Yeung MD  Primary Care Physician at Discharge: Lynnette Amezquita, [phone]   Primary Cardiologist/Provider: Dr. Wu    Admission Date: 3/21/2025     Discharge Date: 3/25/2025    Primary Discharge Diagnosis  1. AICD discharge    2. VT (ventricular tachycardia) (CMS/formerly [facility])    3. Chronic systolic HF (heart failure) (CMS/formerly [facility])    4. Coronary artery disease involving native coronary artery of native heart without angina pectoris    5. Presence of stent in coronary artery    6. Sustained ventricular tachycardia (CMS/HCC) [I47.20]        Other Diagnosis  N/A       Discharge medication list        ASK your doctor about these medications        Instructions   acetaminophen 325 mg tablet  Commonly known as: TYLENOL   Take 2 tablets (650 mg total) by mouth every 6 (six) hours as needed for pain scale 1-3/10     albuterol HFA 90 mcg/actuation inhaler   Inhale 2 puffs every 6 (six) hours as needed for wheezing or shortness of breath     amiodarone 200 mg tablet  Commonly known as: PACERONE   Take 1 tablet (200 mg total) by mouth 3 (three) times a day TID for one week. then BID x 2     aspirin 81 mg EC tablet   Take 1 tablet (81 mg total) by mouth daily     carvediloL 25 mg tablet  Commonly known as: Coreg   Take 2 tablets (50 mg total) by mouth 2 (two) times a day with meals     cholecalciferol (vitamin D3) 25 mcg (1,000 unit) tablet      cyclobenzaprine 10 mg tablet  Commonly known as: FLEXERIL      empagliflozin 10 mg tablet  Commonly known as: JARDIANCE   Take 1 tablet (10 mg total) by mouth daily     furosemide 40 mg tablet  Commonly known as: LASIX   Take 1 tablet (40 mg total) by mouth daily     magnesium oxide 400 mg (241.3 mg magnesium) tablet  Commonly known as: MAG-OX   Take 1 tablet (400 mg total) by mouth 2 (two) times a day     melatonin 5 mg tablet   Take 1 tablet (5 mg total) by mouth nightly as needed for sleep     mexiletine 150 mg  capsule  Commonly known as: MEXITIL   Take 2 capsules (300 mg total) by mouth 3 (three) times a day for 3 days     nitroglycerin 0.4 mg SL tablet  Commonly known as: NITROSTAT   Place 1 tablet (0.4 mg total) under the tongue every 5 (five) minutes as needed for chest pain     pantoprazole 40 mg EC tablet  Commonly known as: PROTONIX      potassium chloride 10 mEq CR tablet   Take 2 tablets (20 mEq total) by mouth daily     rivaroxaban 20 mg tablet  Commonly known as: Xarelto   Take 1 tablet (20 mg total) by mouth daily     rosuvastatin 40 mg tablet  Commonly known as: CRESTOR      sacubitriL-valsartan  mg tablet  Commonly known as: ENTRESTO   Take 1 tablet by mouth 2 (two) times a day     spironolactone 25 mg tablet  Commonly known as: ALDACTONE  Ask about: Which instructions should I use?      tamsulosin 0.4 mg capsule  Commonly known as: FLOMAX               Discharge Condition: good    Discharge Disposition      Full Code    Outpatient Follow-Up    1.  Follow-up with [facility] as previously scheduled.  2.  Follow-up in the EP clinic within 1 month.  3.  Follow-up with Dr. Pappas in 1 month.        DETAILS OF HOSPITAL STAY    Hospital Course    Patient is a very pleasant 59-year-old male with past medical history significant for ventricular tachycardia, biventricular ICD, VT ablations x 3 (1 by Dr. Nan rawls, 1 at the [facility], and the most recent 1 in July 2024 at [facility]), he was previously on sotalol and mexiletine but had VT storm, so this was recently switched to amiodarone.  He is followed closely by the heart failure service for his ischemic cardiomyopathy.  CAD, hypertension, paroxysmal atrial fibrillation.    .  He was readmitted on 3/21/2025 with refractory VT.  Oral amiodarone was transitioned back to infusion.  He underwent cardiac catheterization yesterday status post stenting of diagonal.  No VT in the past 24 hours.  Radial access site is without  complications.  No significant lab abnormalities.  Vital signs have been satisfactory.    Patient seen and examined.  He is resting comfortably in bed.  He reports he is feeling well and is okay being discharged.  He denies chest pain, dyspnea, lightheadedness, dizziness, presyncopal or syncopal episodes, or edema.  Denies signs or symptoms of bleeding.  NYHA class II.    He has follow-up appointments with [facility] and in our clinic.  Discussed with general cardiology who recommend Effient and Xarelto (to avoid triple therapy) and follow-up with Dr. Pappas in 1 month.  He is already on high intensity statin.  Per patient request, his prescriptions were sent to [facility] and he will be able to pick them up today.      He also has a past medical history significant for  Procedures:  Pertinent Test Results:     Cardiac catheterization  Cardiac catheterization, Coronary Angiography  Result Date: 3/25/2025  Narrative: DATE OF PROCEDURE: 3/24/2025  PREOPERATIVE DIAGNOSIS:  Pre-op Diagnosis    * AICD discharge [Z45.02]    * VT (ventricular tachycardia) (CMS/[facility]) [I47.20]    * Chronic systolic HF (heart failure) (CMS/[facility]) [I50.22]    * Coronary artery disease involving native coronary artery of native heart without angina pectoris [I25.10]    * Presence of stent in coronary artery [Z95.5] Final Impression: 1.  Mild mid left main stenosis of 20%. 2.  Moderate size left circumflex coronary artery with minimal mid disease of 20%.  The major obtuse marginal branch without any significant disease. 3.  Proximal LAD stent widely patent.  The remainder of the LAD without significant disease so it is a fairly small caliber vessel. 4.  80% ostial first diagonal stenosis.  Diffuse 60% proximal disease.  The vessel then bifurcates into a smaller inferior branch that is subtotally occluded within previously placed stent.  Superior diagonal branch with 40% ostial stenosis. 5.  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  Remainder of the vessel with minimal luminal irregularities including large PDA and KRISTEN. 6.  PTCA of the inferior subtotal occluded diagonal branch with 2.5 mm balloon.  Stenting of the ostial and proximal first diagonal branch up to the bifurcation with 2.5 x 8 mm DIMAS postdilatation 2.5 mm noncompliant balloon to 20 he.  Good flow in both branches. Rationale, risks, benefits and alternatives of cardiac catheterization and percutaneous coronary intervention with or without stents is explained to the patient including, but not limited to, possible risks of infection, bleeding requiring blood transfusion, damage to the blood vessel requiring repair vascular surgery, allergic reactions to the x-ray dye, acute kidney injury including need for possible hemodialysis, significant cardiac arrhythmias, myocardial infarction, stroke, and threat to life. SCAI Shock Stage: A ASA score 3 1 Patie score 3 Procedure: Coronary angiogram, PTCA of inferior first diagonal branch, stenting of ostial/proximal first diagonal with 2.5 x 8 mm DIMAS, postdilatation high-pressure 2.5 mm noncompliant balloon.  Details of the procedure: Informed consent is obtained. After sterile prep and drape 1% lidocaine is infiltrated over the right wrist overlying right radial artery. Access was obtained into right radial artery with the 5-6 Italian Slender Glide sheath via modified Seldinger technique. Standard vasodilatory radial cocktail was given via the side-port of the glide sheath which included 3000 international units of heparin, 2 mg of verapamil and 100 mcg of nitroglycerin. JL3.5 and JR4 catheters were used over a Blackburn wire for left and right coronary engagement and angiogram, which was performed in the standard fashion. The catheter was then withdrawn over a wire. No complications were noted during the diagnostic procedure. Patent hemostasis was obtained with TR band after removal of the glide sheath. Patient was initiated on  bivalirudin.  60 mg of Effient was given.  A CLS 3.0, 6 French guide was placed in the ostium of left main coronary.  A 180 cm Fielder J-tip 0.014 coronary wire was advanced into the inferior branch of the diagonal.  PTCA was performed of the occluded stent in the diagonal inferior branch with a 2 mm balloon.  Further distal the diagonal branch was also dilated with a 2 mm balloon with no residual visual stenosis.  The proximal and ostial diagonal branch was also dilated with a 2 mm balloon then stented with a 2.5 x 8 mm DIMAS with care not to place a stent within the smaller inferior diagonal branch.  This was postdilated with a 2.5 mm noncompliant balloon to high-pressure with good result.  All catheters and wires were removed. Anesthesia: 50 minutes 26 seconds of moderate sedation was administered was administered using Fentanyl and Versed under my supervision. I monitored the patient for further administration of agents as needed including continuous monitoring of oxygen saturation, heart rate and blood pressure.  The RN administered the medicine under my direct supervision order, and an independent trained observer was present. Antiplatelet agent: Effient Recommended duration of dual antiplatelet therapy: 1 year Total Sedation time: 50 minutes 26 seconds Sedation Medications and Contrast use:   03/24/2025 1613 MDT fentaNYL citrate (PF) 50 mcg/mL injection 25 mcg   03/24/2025 1610 MDT fentaNYL citrate (PF) 50 mcg/mL injection 25 mcg   03/24/2025 1603 MDT fentaNYL citrate (PF) 50 mcg/mL injection 50 mcg   03/24/2025 1652 MDT iopamidoL (ISOVUE-370) 76 % injection 190 mL   03/24/2025 1613 MDT midazolam (VERSED) injection 0.5 mg intravenous   03/24/2025 1611 MDT midazolam (VERSED) injection 0.5 mg intravenous   03/24/2025 1603 MDT midazolam (VERSED) injection 1 mg intravenous Given Total Air Kerma (mGy) 403.000; Fluoro Time (min) = 12.8 BW      XR chest portable 1 view  Result Date: 3/21/2025  Narrative: EXAM: DX  CHEST PORTABLE 1 VW DATE: 3/21/2025 12:09 PM INDICATION:  aicd discharge COMPARISON: 3/14/2025 TECHNIQUE: 1 view FINDINGS: Left chest cardiac device. Stable cardiomegaly. Lungs are clear. No pleural effusion or pneumothorax.     Impression: IMPRESSION: Stable cardiomegaly    US Echo complete  Result Date: 3/14/2025  Narrative:   Severe left ventricular systolic dysfunction.   Normal left ventricular wall thickness.   Left ventricle is severely dilated, LVIDD of 7.3 cm.  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   Indeterminate left ventricular diastolic function.   Normal left ventricular filling pressure.   The left atrium is moderately dilated.   The right atrium is normal in size.   No obvious valvular pathologies   Inadequate TR spectral Doppler to accurately assess right ventricular systolic pressure.   Normal central venous pressure (0-5 mmHg).   No pericardial effusion.   Definitive was used to delineate the walls of the LV without any obvious LV thrombus.   This echo was compared with prior echo done on 10/19/2023.  No major changes     XR chest portable 1 view  Result Date: 3/14/2025  Narrative: XR CHEST 1 VIEW 03/14/2025 HISTORY: Shortness of breath TECHNIQUE:  Chest, 1 view. COMPARISON: 1/11/2020 FINDINGS: The lungs are clear. No pleural effusions. No pneumothorax. Cardiomegaly. Left chest pacer defibrillator. The mediastinal contour is normal.     Impression: IMPRESSION:  1.  Cardiomegaly without evidence of an acute pulmonary process.      Inpatient Labs:  BMP:  Lab Results   Component Value Date     (L) 03/25/2025    K 4.3 03/25/2025     03/25/2025    CO2 24 03/25/2025    BUN 18 03/25/2025    CREATININE 1.02 03/25/2025    GLUCOSE 107 (H) 03/25/2025    CALCIUM 9.1 03/25/2025     CBC:   Lab Results   Component Value Date    WBC 6.4 03/25/2025    RBC 4.98 03/25/2025     "HGB 15.6 03/25/2025    HCT 46.7 03/25/2025     03/25/2025     CMP:  Lab Results   Component Value Date     (L) 03/25/2025    K 4.3 03/25/2025     03/25/2025    CO2 24 03/25/2025    GLUCOSE 107 (H) 03/25/2025    CREATININE 1.02 03/25/2025    CALCIUM 9.1 03/25/2025    ALBUMIN 3.7 03/23/2025    ALKPHOS 72 03/23/2025    BILITOT 0.78 03/23/2025    ALT 40 03/23/2025    AST 14 03/23/2025    BUN 18 03/25/2025    ANIONGAP 9 03/25/2025     Lab Results   Component Value Date    BNP 49 03/23/2025     Lipid: No results found for: \"CHOL\", \"HDL\", \"TRIG\", \"LDLCALC\"  TSH:  No results found for: \"TSH\"  Magnesium:  No results found for: \"MG\"  PT/INR:   No results found for: \"PT\", \"INR\"    Telemetry  Normal sinus rhythm    Physical Exam at Discharge  Heart Rate: 70  Resp: 18  BP: 107/59  Temp: 36.8 °C (98.2 °F)  Weight: 119.6 kg (263 lb 10.7 oz)    Physical Exam  Vitals and nursing note reviewed.   Constitutional:       General: He is not in acute distress.     Appearance: He is well-developed. He is not diaphoretic.   HENT:      Head: Normocephalic.   Neck:      Vascular: No carotid bruit or JVD.   Cardiovascular:      Rate and Rhythm: Normal rate and regular rhythm.      Pulses: Intact distal pulses.      Heart sounds: Normal heart sounds. No murmur heard.     No friction rub. No gallop.      Comments: Left upper chest ICD site is without signs of infection or erosion.  Right radial access site is without complications.  Pulmonary:      Effort: Pulmonary effort is normal.      Breath sounds: Normal breath sounds. No wheezing or rales.   Abdominal:      General: Bowel sounds are normal.      Palpations: Abdomen is soft.   Musculoskeletal:      Cervical back: Normal range of motion.      Right lower leg: No edema.      Left lower leg: No edema.   Skin:     General: Skin is warm and dry.   Neurological:      Mental Status: He is alert and oriented to person, place, and time.   Psychiatric:         Behavior: Behavior " normal.                            Time spent coordinating discharge: 30 minutes.    Plan developed in conjunction with Dr. Wu.    Electronically signed by: Donya Parra CNP  3/25/2025  2:25 PM    A voice recognition program was used to aid in documentation of this record.  Sometimes words are not printed exactly as they were spoken.  While efforts were made to carefully edit and correct any inaccuracies, some errors may be present; please take these into context.  Please contact the provider if errors are identified.

## 2025-03-25 NOTE — INTERDISCIPLINARY/THERAPY
03/25/25 6496   Phase I Resting    Comment RN okay'd amb and education.   Phase I Exercise   Comment Pt declined amb x2. He reports that he is still letting everything that has happened sink in and does not want to walk at this time. He reported that he would like to walk later today. Will amb later as able.   Cardiac Rehab Phase I   Amount of Time Spent with Patient (mins) 20 minutes   Phase 2 Referral Site [facility]   Comment Heart disease education completed with pt. All questions and concerns were answered at this time. Pt will be referred to Phase 2 Cardiac Rehab in [place].

## 2025-03-25 NOTE — NURSING END OF SHIFT
Nursing End of Shift Summary:     Patient: Pete Trejo  MRN: [id number]  : 1965, Age: 59 y.o.    Location: [place] [address]    Nursing Goals  Clinical Goals for the Shift: monitor VS, tele, maintain comfort and safety    Narrative Summary of Progress Toward Clinical Goals:  Patient remained stable throughout my shift, underwent heart cath today, right radial TR band in place with no complications.    Barriers to Goals/Nursing Concerns:  No    New Patient or Family Concerns/Issues:  No    Shift Summary:   Significant Events & Communications to Providers (Last 12 Hours)       Last 5 Values    No documentation.                 Oxygen Usage (Last 12 Hours)       Last 5 Values       Row Name 25 0831                   Room Air or Baseline Oxygen Trial by Nursing    Is Patient on Room Air OR on the Same Amount of O2 as at Home? Yes                      Mobility (Last 12 Hours)       Last 5 Values       Row Name 25 0800 25 1158 25 1200             Mobility    Activity Up ad ezekiel -- Up ad ezekiel      Length of Time in Chair (min) 0 -- 0      Distance Ambulated (feet) 25 Feet -- 25 Feet      Distance Ambulated (Meters Calculated) 7.62 Meters -- 7.62 Meters      Patient Position In bed In bed --      Turning/Repositioning Turns self -- Turns self      Distance Ambulated (Meters Calculated)(Do Not Use) 7.62 Feet -- 7.62 Feet                    Urethral Catheter       Active Urethral Catheter       None                  Active Lines       Active Central venous catheter / Peripherally inserted central catheter / Implantable Port / Hemodialysis catheter / Midline Catheter       None                  Infusing Medications   Medication Dose Last Rate    sodium chloride 0.9 %  50 mL/hr 50 mL/hr (25 5745)     PRN Medications   Medication Dose Last Admin    atropine  0.5-1 mg      sodium chloride  3 mL      melatonin  3 mg      sennosides-docusate sodium  2 tablet      ondansetron  4 mg      Or     ondansetron  4 mg      alum-mag hydroxide-simeth  30 mL      acetaminophen  650 mg      albuterol HFA  2 puff      nitroglycerin  0.4 mg

## 2025-03-25 NOTE — INTERDISCIPLINARY/THERAPY
Case Management Discharge Note    Phone # [phone]    Discharge Disposition: HO     Transportation: Family    Home Health: NA    New DME provided: NA    Specialty Referrals:          Active Ambulatory Referrals   (From admission, onward)                 Start     Ordered    03/25/25 0000  Ambulatory referral to Cardiology        Question:  Reason for referral  Answer:  followup/established    03/25/25 8943                    Support System Notified: Per pt     RN notified: yes

## 2025-03-25 NOTE — INTERDISCIPLINARY/THERAPY
03/25/25 1500   Phase I Resting    Comment RN okay'd amb.   Cardiac Rehab Phase I   Comment Pt politely declined amb due to getting ready to be discharged.

## 2025-03-25 NOTE — DISCHARGE INSTRUCTIONS
Coronary Angioplasty, After-Care     Medications   You will be discharged on Effient that helps prevent clotting.  This medication will be continued for 12 months.  You will continue this in combination with your blood thinner medication, Xarelto.  These medications should NOT be stopped without approval of your cardiologist.   Do not take any additional nonsteroidal anti-inflammatory agents, such as ibuprofen or naproxen, unless directed by a physician.  This can increase bleeding risk.   If you are on Metformin, restart this medication 48 hours after procedure.     General Instructions   Do not lift greater than 10 lbs (4.5 kg) for 5 days after your procedure.   Do not drive for 24 hours since you were given a sedative during your procedure.   If you had a radial site, do no push or pull heavy objects with the affected arm or operate machinery or power tools for 5 days post-procedure.   If you had a femoral site, avoid climbing stairs as much as possible for the first 2-3 days after your procedure.  Do not squat for 3 days post-procedure.   If you have not already been set up at discharge, you will be contacted by cardiac rehab to complete outpatient cardiac therapy in approximately one week.   Follow-up with your primary cardiologist or [facility] in approximately 4-6 weeks. This may be scheduled for you at the time of discharge.     Bathing   You may shower 24-48 hours after the procedure.   Do not take baths, swim, or use a hot tub for one week.     Insertion Site Care   Gently wash the site with plain soap and water. Use a clean towel to pat the area dry.  Do not rub the site, because this may cause bleeding.  Do not apply powder or lotion to the site.   Check your insertion site every day for signs of infection. Check for more redness, swelling, or pain, more fluid or blood, warmth, and/or pus or a bad smell.   It is common to have bruising and tenderness at the catheter insertion area. This usually will fade in 1-2  weeks.      Contact [facility] at [phone] if:   You have a fever (greater than 100.4° F) or chills.   You have increased bleeding from the insertion site. Hold pressure on the site.     Get help right away if:   You develop chest pain or shortness of breath, feel faint, or pass out.   You have unusual pain at the insertion site.   You have redness, warmth, or swelling at the insertion site.   You have drainage (other than a small amount of blood on the dressing) from the insertion site.   The insertion site is bleeding, and the bleeding does not stop after 30 minutes of holding steady pressure on the site.   You develop bleeding from any other place, such as from the rectum. There may be bright red blood in your urine or stool, or it may appear as black, tarry stool.      [facility] can be reached at [phone]. We had a pleasure taking care of you during your stay at [facility]!

## 2025-03-25 NOTE — PLAN OF CARE
Problem: Pain - Adult  Goal: Verbalizes/displays adequate comfort level or baseline comfort level  Outcome: Progressing  Flowsheets (Taken 3/23/2025 0022 by Doroteo Pena, RN)  Verbalizes/displays adequate comfort level or baseline comfort level:   Encourage patient to monitor pain and request interventions   Assess pain using the appropriate pain scale   Educate/Implement non-pharmacological measures as appropriate and evaluate response   Administer analgesics based on type and severity of pain and evaluate response   Consider cultural, developmental and social influences on pain and pain management   Notify Provider if interventions unsuccessful or patient reports new pain     Problem: Cardiovascular - Adult  Goal: Maintains optimal cardiac output and hemodynamic stability  Outcome: Progressing  Flowsheets (Taken 3/23/2025 0725 by Opitz, Bri, RN)  Maintain optimal cardiac output and hemodynamic stability:   Monitor heart rate, blood pressure, and cardiac rhythm   Monitor for hypotension and other signs of decreased cardiac output   Assess for signs of decreased coronary artery perfusion - ex. angina   Assess quality of pulses, capillary refill, edema, sensation, skin color and temperature  Goal: Absence of cardiac dysrhythmias or at baseline  Outcome: Progressing  Flowsheets (Taken 3/23/2025 2155 by Doroteo Pena, RN)  Absence of cardiac dysrhythmias or at baseline:   Obtain 12 lead EKG if indicated   Continuous cardiac monitoring, monitor vital signs (see flowsheet documentation)   Administer antiarrhythmic and heart rate control medications as ordered   Initiate emergency measures for life threatening arrhythmias   Monitor electrolytes and administer replacement therapy as ordered     Problem: Respiratory - Adult  Goal: Achieves optimal ventilation and oxygenation  Outcome: Progressing  Flowsheets (Taken 3/23/2025 0022 by Doroteo Pena, RN)  Achieves optimal ventilation and oxygenation:    Assess and report changes in respiratory status   Assess and report changes in mentation and behavior   Position to facilitate oxygenation and minimize respiratory effort   Oxygen supplementation based on oxygen saturation or arterial blood gases   Assess patient's ability to cough effectively   Assess the need for suctioning   Encourage broncho-pulmonary hygiene including cough, deep breathe   Instruct patient/support person to report shortness of breath or any respiratory difficulty   Collaborate with Respiratory Therapy as indicated, including medications and treatment     Problem: Metabolic/Fluid and Electrolytes - Adult  Goal: Electrolytes maintained within normal limits  Outcome: Progressing  Flowsheets (Taken 3/23/2025 2155 by Doroteo Pena, RN)  Electrolytes maintained within normal limits:   Administer and monitor response to electrolyte replacements   Monitor labs and assess patient for signs and symptoms of electrolyte imbalances     Problem: Pain - Adult  Goal: Verbalizes/displays adequate comfort level or baseline comfort level  Outcome: Progressing  Flowsheets (Taken 3/23/2025 0022 by Doroteo Pena, RN)  Verbalizes/displays adequate comfort level or baseline comfort level:   Encourage patient to monitor pain and request interventions   Assess pain using the appropriate pain scale   Educate/Implement non-pharmacological measures as appropriate and evaluate response   Administer analgesics based on type and severity of pain and evaluate response   Consider cultural, developmental and social influences on pain and pain management   Notify Provider if interventions unsuccessful or patient reports new pain     Problem: Cardiovascular - Adult  Goal: Maintains optimal cardiac output and hemodynamic stability  Outcome: Progressing  Flowsheets (Taken 3/23/2025 0725 by Opitz, Bri, RN)  Maintain optimal cardiac output and hemodynamic stability:   Monitor heart rate, blood pressure, and cardiac  rhythm   Monitor for hypotension and other signs of decreased cardiac output   Assess for signs of decreased coronary artery perfusion - ex. angina   Assess quality of pulses, capillary refill, edema, sensation, skin color and temperature  Goal: Absence of cardiac dysrhythmias or at baseline  Outcome: Progressing  Flowsheets (Taken 3/23/2025 2155 by Doroteo Pena, RN)  Absence of cardiac dysrhythmias or at baseline:   Obtain 12 lead EKG if indicated   Continuous cardiac monitoring, monitor vital signs (see flowsheet documentation)   Administer antiarrhythmic and heart rate control medications as ordered   Initiate emergency measures for life threatening arrhythmias   Monitor electrolytes and administer replacement therapy as ordered     Problem: Respiratory - Adult  Goal: Achieves optimal ventilation and oxygenation  Outcome: Progressing  Flowsheets (Taken 3/23/2025 0022 by Doroteo Pena, RN)  Achieves optimal ventilation and oxygenation:   Assess and report changes in respiratory status   Assess and report changes in mentation and behavior   Position to facilitate oxygenation and minimize respiratory effort   Oxygen supplementation based on oxygen saturation or arterial blood gases   Assess patient's ability to cough effectively   Assess the need for suctioning   Encourage broncho-pulmonary hygiene including cough, deep breathe   Instruct patient/support person to report shortness of breath or any respiratory difficulty   Collaborate with Respiratory Therapy as indicated, including medications and treatment     Problem: Metabolic/Fluid and Electrolytes - Adult  Goal: Electrolytes maintained within normal limits  Outcome: Progressing  Flowsheets (Taken 3/23/2025 2155 by Doroteo Pena, RN)  Electrolytes maintained within normal limits:   Administer and monitor response to electrolyte replacements   Monitor labs and assess patient for signs and symptoms of electrolyte imbalances

## 2025-03-25 NOTE — PLAN OF CARE
Problem: Pain - Adult  Goal: Verbalizes/displays adequate comfort level or baseline comfort level  3/25/2025 1449 by Eda Summers RN  Outcome: Adequate for Discharge  3/25/2025 1107 by Eda Summers RN  Outcome: Progressing     Problem: Cardiovascular - Adult  Goal: Maintains optimal cardiac output and hemodynamic stability  3/25/2025 1449 by Eda Summers RN  Outcome: Adequate for Discharge  3/25/2025 1107 by Eda Summers RN  Outcome: Progressing  Goal: Absence of cardiac dysrhythmias or at baseline  3/25/2025 1449 by Eda Summers RN  Outcome: Adequate for Discharge  3/25/2025 1107 by Eda Summers RN  Outcome: Progressing     Problem: Respiratory - Adult  Goal: Achieves optimal ventilation and oxygenation  3/25/2025 1449 by Eda Summers RN  Outcome: Adequate for Discharge  3/25/2025 1107 by Eda Summers RN  Outcome: Progressing     Problem: Metabolic/Fluid and Electrolytes - Adult  Goal: Electrolytes maintained within normal limits  3/25/2025 1449 by Eda Summers RN  Outcome: Adequate for Discharge  3/25/2025 1107 by Eda Summers RN  Outcome: Progressing

## 2025-03-26 ENCOUNTER — PATIENT OUTREACH (OUTPATIENT)
Dept: FAMILY MEDICINE | Facility: HOSPITAL | Age: 60
End: 2025-03-26
Payer: MEDICARE

## 2025-03-26 NOTE — PROGRESS NOTES
Pete NATY Trejo was contacted following recent hospitalization at [facility]. The patient was discharged from the hospital on 3/25/2025 .The Discharge Summary and After Visit Summary were reviewed. The patient's main diagnosis during the hospitalization was AICD [id number].  Followup appointment: 4/18/25 with Cardiology. Any additional testing and labs will be discussed at the patient's upcoming post-hospital follow up appointment.    Transitional Care Management Follow Up (most recent)       Transitional Care Management - 03/26/25 1023          OTHER    Date of post hospital outreach 03/26/25     Contact Status Contact done     Speaking with the Patient or Patient's Caregiver? Patient     Is the patient on the Diabetes registry? N     Were patient's home medications changed or did they have any new medications added during the hospitalization? Y     Did someone go over the discharge summary with the patient or the patient's caregiver and discuss the medications listed on it? Y     Does the patient or patient's caregiver have any questions about the medication changes? N     Patient verbalized understanding of when to contact health care provider or when to get help right away? Y     Discharge instructions reviewed with patient or patient's caregiver and all questions answered? Y     Is there a Home Health/DME Plan being enacted? Please note name of HH agency, DME vendor, contacted/received N     Does patient have psychosocial issues that might impact their health status? None     Does patient have financial barriers to care? None                      Castillo Bailey RN  March 26, 2025 10:30 AM

## 2025-03-31 ENCOUNTER — TELEPHONE (OUTPATIENT)
Dept: CARDIAC REHAB | Facility: HOSPITAL | Age: 60
End: 2025-03-31
Payer: MEDICARE

## 2025-04-07 ENCOUNTER — TELEPHONE (OUTPATIENT)
Dept: CARDIOLOGY | Facility: CLINIC | Age: 60
End: 2025-04-07
Payer: MEDICARE

## 2025-04-07 NOTE — TELEPHONE ENCOUNTER
"Pete has a [place] CRT-D.    Transmission received today for ATP therapy delivered for VT.  Ventricular Tachycardia 1 Episode occurred on Apr 6, 2025 at 10:35 AM.   EGM shows at least 13 seconds of VT at 111 bpm,   resolved with 1 burst of ATP, no HV therapy delivered.   Episode's onset not visualized, true duration of VT unknown.  Non-sustained VT episodes: 1 Occurred on Apr 6, 2025 at 10:34 AM,   lasting 15 seconds at an 111 bpm,   no available EGM for review    [facility] nursing reached out to pt:  Call placed to patient for further symptom assessment of VT event occurred 4/6/25.   States he felt brief nausea with diaphoresis. He sat down and symptoms resolved.   He had been feeling \"good\" since hospitalization.   Patient states  he had no dyspnea changes, chest pain/pressure, palpitations, Heart racing, lightheadedness/dizziness, near-syncope/syncope.   Did not require NTG SL.    Patient had questions on his Amiodarone and Mexitil doses.   Discussed compliancy with his CPAP, he will resume using it.    Cardiology follow up on 4/18/25  "

## 2025-04-08 DIAGNOSIS — I47.20 VT (VENTRICULAR TACHYCARDIA) (CMS/HCC): ICD-10-CM

## 2025-04-08 RX ORDER — MEXILETINE HYDROCHLORIDE 150 MG/1
300 CAPSULE ORAL 3 TIMES DAILY
Qty: 180 CAPSULE | Refills: 11 | Status: SHIPPED | OUTPATIENT
Start: 2025-04-08 | End: 2026-04-08

## 2025-04-08 RX ORDER — AMIODARONE HYDROCHLORIDE 200 MG/1
200 TABLET ORAL 2 TIMES DAILY
Qty: 60 TABLET | Refills: 11 | Status: SHIPPED | OUTPATIENT
Start: 2025-04-08 | End: 2025-04-23 | Stop reason: HOSPADM

## 2025-04-08 NOTE — TELEPHONE ENCOUNTER
Patient was called and informed of recommendations. He said he will take the medications as directed.

## 2025-04-16 ENCOUNTER — TELEPHONE (OUTPATIENT)
Dept: CARDIOLOGY | Facility: CLINIC | Age: 60
End: 2025-04-16
Payer: MEDICARE

## 2025-04-16 NOTE — TELEPHONE ENCOUNTER
Nia pt called asking to confirm the doses and how many times a day on his Amiodarone and Mexiletine.  I told him the     Is the Amiodarone is 200 mg 2 times a day  And Mexiletine 300 mg 3 times a day?  Please advise.    Thank you, Melissa WILLIAM LPN

## 2025-04-17 NOTE — TELEPHONE ENCOUNTER
I called and informed pt that Bozena Carty CNP will discuss medication and dosages at appointment tomorrow 4/18/25 at 12;15 pm.  Pt voiced understanding.  AMBER, LPN

## 2025-04-18 ENCOUNTER — OFFICE VISIT (OUTPATIENT)
Dept: CARDIOLOGY | Facility: CLINIC | Age: 60
End: 2025-04-18
Payer: MEDICARE

## 2025-04-18 ENCOUNTER — LAB (OUTPATIENT)
Dept: LAB | Facility: CLINIC | Age: 60
End: 2025-04-18
Payer: MEDICARE

## 2025-04-18 VITALS
WEIGHT: 266 LBS | DIASTOLIC BLOOD PRESSURE: 60 MMHG | HEIGHT: 70 IN | SYSTOLIC BLOOD PRESSURE: 124 MMHG | RESPIRATION RATE: 18 BRPM | OXYGEN SATURATION: 93 % | HEART RATE: 70 BPM | BODY MASS INDEX: 38.08 KG/M2

## 2025-04-18 DIAGNOSIS — I50.22 CHRONIC SYSTOLIC (CONGESTIVE) HEART FAILURE (CMS/HCC): ICD-10-CM

## 2025-04-18 DIAGNOSIS — Z79.01 CHRONIC ANTICOAGULATION: ICD-10-CM

## 2025-04-18 DIAGNOSIS — E66.9 DIABETES MELLITUS TYPE 2 IN OBESE (CMS/HCC): ICD-10-CM

## 2025-04-18 DIAGNOSIS — I25.10 CORONARY ARTERY DISEASE INVOLVING NATIVE CORONARY ARTERY OF NATIVE HEART WITHOUT ANGINA PECTORIS: ICD-10-CM

## 2025-04-18 DIAGNOSIS — I25.5 ISCHEMIC CARDIOMYOPATHY: ICD-10-CM

## 2025-04-18 DIAGNOSIS — E11.69 DIABETES MELLITUS TYPE 2 IN OBESE (CMS/HCC): ICD-10-CM

## 2025-04-18 DIAGNOSIS — I47.20 SUSTAINED VENTRICULAR TACHYCARDIA (CMS/HCC): Primary | ICD-10-CM

## 2025-04-18 DIAGNOSIS — I47.20 SUSTAINED VENTRICULAR TACHYCARDIA (CMS/HCC): ICD-10-CM

## 2025-04-18 DIAGNOSIS — Z95.810 CARDIAC RESYNCHRONIZATION THERAPY DEFIBRILLATOR (CRT-D) IN PLACE: ICD-10-CM

## 2025-04-18 PROBLEM — Z86.79 HISTORY OF VENTRICULAR TACHYCARDIA: Status: RESOLVED | Noted: 2021-09-19 | Resolved: 2025-04-18

## 2025-04-18 LAB
ALBUMIN SERPL-MCNC: 4.3 G/DL (ref 3.5–5.3)
ALP SERPL-CCNC: 68 U/L (ref 45–115)
ALT SERPL-CCNC: 35 U/L (ref 7–52)
ANION GAP SERPL CALC-SCNC: 9 MMOL/L (ref 3–11)
AST SERPL-CCNC: 19 U/L
BASOPHILS # BLD AUTO: 0 10*3/UL
BASOPHILS NFR BLD AUTO: 0.8 % (ref 0–2)
BILIRUB SERPL-MCNC: 0.77 MG/DL (ref 0.2–1.4)
BUN SERPL-MCNC: 14 MG/DL (ref 7–25)
CALCIUM ALBUM COR SERPL-MCNC: 8.7 MG/DL (ref 8.6–10.3)
CALCIUM SERPL-MCNC: 8.9 MG/DL (ref 8.6–10.3)
CHLORIDE SERPL-SCNC: 104 MMOL/L (ref 98–107)
CO2 SERPL-SCNC: 21 MMOL/L (ref 21–32)
CREAT SERPL-MCNC: 0.96 MG/DL (ref 0.7–1.3)
EGFRCR SERPLBLD CKD-EPI 2021: 92 ML/MIN/1.73M*2
EOSINOPHIL # BLD AUTO: 0.2 10*3/UL
EOSINOPHIL NFR BLD AUTO: 3.9 % (ref 0–3)
ERYTHROCYTE [DISTWIDTH] IN BLOOD BY AUTOMATED COUNT: 15 % (ref 11.5–15)
GLUCOSE SERPL-MCNC: 108 MG/DL (ref 70–105)
HCT VFR BLD AUTO: 45.8 % (ref 38–50)
HGB BLD-MCNC: 15.6 G/DL (ref 13.2–17.2)
LYMPHOCYTES # BLD AUTO: 1.7 10*3/UL
LYMPHOCYTES NFR BLD AUTO: 36.5 % (ref 15–47)
MAGNESIUM SERPL-MCNC: 2.1 MG/DL (ref 1.8–2.4)
MCH RBC QN AUTO: 32.1 PG (ref 29–34)
MCHC RBC AUTO-ENTMCNC: 33.9 G/DL (ref 32–36)
MCV RBC AUTO: 94.6 FL (ref 82–97)
MONOCYTES # BLD AUTO: 0.5 10*3/UL
MONOCYTES NFR BLD AUTO: 11 % (ref 5–13)
NEUTROPHILS # BLD AUTO: 2.2 10*3/UL
NEUTROPHILS NFR BLD AUTO: 47.8 % (ref 46–70)
PLATELET # BLD AUTO: 187 10*3/UL (ref 130–350)
PMV BLD AUTO: 8.2 FL (ref 6.9–10.8)
POTASSIUM SERPL-SCNC: 4.3 MMOL/L (ref 3.5–5.1)
PROT SERPL-MCNC: 7.4 G/DL (ref 6–8.3)
RBC # BLD AUTO: 4.85 10*6/UL (ref 4.1–5.8)
SODIUM SERPL-SCNC: 134 MMOL/L (ref 135–145)
WBC # BLD AUTO: 4.6 10*3/UL (ref 3.7–9.6)

## 2025-04-18 PROCEDURE — 36415 COLL VENOUS BLD VENIPUNCTURE: CPT

## 2025-04-18 PROCEDURE — 99214 OFFICE O/P EST MOD 30 MIN: CPT | Performed by: NURSE PRACTITIONER

## 2025-04-18 PROCEDURE — 80053 COMPREHEN METABOLIC PANEL: CPT

## 2025-04-18 PROCEDURE — G0463 HOSPITAL OUTPT CLINIC VISIT: HCPCS | Performed by: NURSE PRACTITIONER

## 2025-04-18 PROCEDURE — 93005 ELECTROCARDIOGRAM TRACING: CPT | Performed by: NURSE PRACTITIONER

## 2025-04-18 PROCEDURE — 85025 COMPLETE CBC W/AUTO DIFF WBC: CPT

## 2025-04-18 PROCEDURE — 83735 ASSAY OF MAGNESIUM: CPT

## 2025-04-18 ASSESSMENT — PAIN SCALES - GENERAL: PAINLEVEL_OUTOF10: 0-NO PAIN

## 2025-04-18 NOTE — PROGRESS NOTES
Cardiac Electrophysiology Outpatient Follow-up Visit      Patient Demographic:  Pete Trejo  : 1965   Gender: male      Chief Complaint   Patient presents with    Ventricular Tachycardia     Post hospital 3/21/25 to 3/25/25.  Pt hasn't been feeling the best, he states shortness of breath, light headed, dizzy, racing heart, and low energy level off and on.    Chronic systolic heart failure       History of Present Illness:  Pete presents for hospital follow-up. He was admitted 3/14/25-3/18/25 and 3/21/25-3/25/25 due to recurrent VT with ICD shocks. He is followed primarily by EP at the [facility] and locally by Dr. Wu.     During 3/14-3/18 hospitalization for VT storm with ICD shocks, he was transitioned off sotalol and onto amiodarone with discharge instructions of 200mg TID for 7 days then 200mg daily. Mexilitine was incresaed to 300mg 3 times daily. Carvedilol was continued at 25mg BID.     During 3/21/25-3/25/25 hospitalization with refractory VT with ICD shocks, he was placed on IV amiodarone. Cardiac catheterization was performed with PTCA/PCI of the Diagonal and started on Effient. He was continued on his previous medication regimen.    Device transmission 25 demonstrates a slow VT  that was successfully terminated by ATP on .    Pete reports there are times when he feels well and sometimes he will suddenly feel unwell. Last night, he felt his heart wasn't beating right and felt he might get shocked around 10pm. He feels his heart is going fast at times. He did notice the elevated heart rate on  but symptoms were not too bad. He reports feeling this way for about thirty minutes or so and then will get better. He has not gotten shocked. He will feel unsteady. He does feel lightheaded with position changes. He does have some blood when he blows his nose sometimes but no nosebleeds. He does have occasional hand tremors. He does feel thirsty all the  time. He is drinking about 3-5 bottles of water a day.     Specialty Comments:  Last updated by: 4/18/25  Primary Cardiologist: Mian   Electrophysiology: Dr. Wu    Problem list:  CAD:   Anterior MI [PCI. 3.5 X 15-mm Promus drug eluting stent to totally occluded LAD] - 12/3/2010   Sm Anteroseptal MI [PCI. 2.25 X 24-mm Taxus drug eluting stent to diagonal. 3.0 X 8-mm Taxus stent to proximal LAD] - 7/25/2009   Progressive CAD ivolving LAD/Diagonal [Stent]   3/24/25  PTCA/PCI Diagonal - started on Effient    CHF/CM:   CHF   EF 31% - 2013   Severe ischemic cardionyopathy [BiVentricular ICD. [employer] Cognis HE Model #N119,Serial #[id number]. RV lead Guidant Model #0185,Serial #[id number]. LV lead BS Model #4591,Serial #[id number]. RA lead Guidant Model #4469, Serial #[id number]] - 2011 6/20/2022 Generator change - [employer] model G125      ARRHYTHMIA:   Ventricular Tachycardia [AICD]   2021 recurrent NS-VT, slow VT  2020 Hx of VT ablation per Dr. Montana - 06/09/2020 VT-Partially successful ablation of border zone areas of the scar especially the inferior apex in an attempt to substrate modify his arrhythmia burden    Extensive repeat VT ablation [facility] 11/29/2021, mexiletine discontinued    Recurrent VT with failed ATP x8 1/25/2022 spontaneously converted.  Mexiletine 200 mg twice daily plus amiodarone 200 mg daily, mexiletine increased to 3 times daily. 1/31/2022    Recurrent VT with ICD shocks 3/2025  - Amiodarone load, increased mexiletine      PVD:   CVA - 12/2010 - at time of MI, no residual    OTHER:   LV Mural Thrombus - resolved  Anteroapical akinetic aneurysm     RISK FACTORS:   Hypertension   Dyslipidemia [Hyperlipidemia]   DM    CARDIOVASCULAR PROCEDURES:     PCI (PTCA: LAD bifurcation   Stent: D2 2.25x30 (RESOLUTE))   PCI (%   Stent: LAD (PROMUS) 3.5x15mm) - 12/3/2010   PCI (Stent: D1 (TAXUS) 2.5x24mm, LAD prox (TAXUS) 30x8mm) - 7/25/2009   PCI 07/28/2017 second diagonal branch LAD  Resolute 2.25 x 30-mm DIMAS       Prior Imaging/Testing History   Echocardiogram 3/14/25   Severe left ventricular systolic dysfunction.    Normal left ventricular wall thickness.    Left ventricle is severely dilated, LVIDD of 7.3 cm.  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    Indeterminate left ventricular diastolic function.    Normal left ventricular filling pressure.    The left atrium is moderately dilated.    The right atrium is normal in size.    No obvious valvular pathologies    Inadequate TR spectral Doppler to accurately assess right ventricular systolic pressure.    Normal central venous pressure (0-5 mmHg).    No pericardial effusion.    Definitive was used to delineate the walls of the LV without any obvious LV thrombus.    This echo was compared with prior echo done on 10/19/2023.  No major changes    Stress test with a VO2 5/5/2021 [facility]: Nondiagnostic for ischemia as patient did not reach 85% of maximum heart rate. Isolated PVCs, single PVC pair. On beta-blocker. Moderately decreased functional capacity. Maintained 93% or greater saturation.    Lexiscan 7/2021  Technically difficult study to perform.  Gated images could not able to obtain hence cannot comment on left ventricle systolic function and contractility.  There is a large size, severe intensity fixed perfusion defect in apex, apical inferior, apical anterior to mid anterior, apical septum and apical lateral.  There is no reversible ischemia.  Unable to obtain gated images because left ventricle is severely dilated.  Cannot comment the left ventricle systolic function and wall motion.        Problem List          Cardiac and Vasculature    Sustained ventricular tachycardia (CMS/HCC) - Primary    Relevant Orders    ECG 12 lead -Normal, Today         Past Medical, Surgical, Family and Social History:    Past Medical  History:   Diagnosis Date    BPH (benign prostatic hyperplasia)     CAD (coronary artery disease)     Status post stent    CHF (congestive heart failure) (CMS/[facility])     Chronic combined systolic and diastolic CHF (congestive heart failure) (CMS/[facility])     LVEF 25% with grade 1 diastolic dysfunction as per echo on 1/26/2022.    CVA (cerebral vascular accident) (CMS/HCC) 2010    Without residual deficit.    Diabetes mellitus type 2 in obese (CMS/[facility])     Dyslipidemia     GERD (gastroesophageal reflux disease)     Heart attack (CMS/[facility])     x3    Hypertension     Ischemic cardiomyopathy     Morbid obesity with BMI of 40.0-44.9, adult (CMS/[facility])     Paroxysmal atrial fibrillation (CMS/[facility])     On Xarelto    V-tach (CMS/HCC)     AICD in place     Past Surgical History:   Procedure Laterality Date    ABLATION VT N/A 06/09/2020    Procedure: Ablation VT;  Surgeon: Wilfredo Montana MD;  Location: [facility] EP Lab;  Service: Electrophysiology;  Laterality: N/A;  Check with Dr. Montana in the a.m. regarding scheduling    APPENDECTOMY      BILATERAL HEART CATH N/A 12/5/2023    Procedure: Bilateral heart cath;  Surgeon: Jitendra Post MD;  Location: [facility] Cath Lab;  Service: Cardiovascular;  Laterality: N/A;    COLONOSCOPY N/A 7/25/2023    Procedure: COLONOSCOPY with Polypectomy;  Surgeon: Hortencia Carson MD;  Location: [facility] Endoscopy;  Service: Endoscopy;  Laterality: N/A;    CORONARY ANGIOGRAPHY N/A 3/24/2025    Procedure: Coronary Angiography;  Surgeon: Vaibhav Mcclure MD;  Location: [facility] Cath Lab;  Service: Cardiovascular;  Laterality: N/A;    CORONARY ARTERY STENTING  2009    2.5 X 24-mm Taxus DIMAS to first diagonal. 3.0 X 8-mm Taxus stent to proximal LAD just proximal to diagonal.    CORONARY ARTERY STENTING  2010    3.5 X 15-mm Promus DIMAS to totally occluded LAD    CORONARY ARTERY STENTING  2011    2.25 X 30-mm Resolute DIMAS to 2nd diagonal.  Balloon angioplasty through strut to improve blood flow to distal LAD    CORONARY ARTERY  STENTING  2017    second diagonal branch LAD Resolute 2.25 x 30-mm DIMAS    CORONARY ARTERY STENTING N/A 3/24/2025    Procedure: Coronary artery/graft stenting;  Surgeon: Vaibhav Mcclure MD;  Location: [facility] Cath Lab;  Service: Cardiovascular;  Laterality: N/A;    ICD DC GEN CHANGE N/A 2022    Procedure: BI-V ICD Gen Change;  Surgeon: Wilfredo Montana MD;  Location: [facility] EP Lab;  Service: Cardiovascular;  Laterality: N/A;    ICD SC NEW      [employer] Cognis Model #N119, Serial #[id number]. [place] [employer] Model #0185, Serial #[id number]. LV lead Acuity Model #45+91, Serial #[id number]. Atrial lead Model #4469, Serial #[id number]    OXIMETRY RUN N/A 2023    Procedure: OXIMETRY RUN;  Surgeon: Jitendra Post MD;  Location: [facility] Cath Lab;  Service: Cardiovascular;  Laterality: N/A;     Family History   Problem Relation Age of Onset    Heart attack Mother 62    Other Mother         Abdominal Aortic Aneurysm    Lung cancer Mother     Colon cancer Father         Cause of death    Diabetes Brother         Non-Insulin Dependent    Heart attack Brother 51        w/ Stents    Heart disease Brother     Other Son         Well     Social History     Tobacco Use    Smoking status: Former     Current packs/day: 0.00     Average packs/day: 0.8 packs/day for 30.0 years (22.5 ttl pk-yrs)     Types: Cigarettes     Start date: 1990     Quit date: 2020     Years since quittin.8    Smokeless tobacco: Never   Vaping Use    Vaping status: Never Used   Substance Use Topics    Alcohol use: Not Currently     Comment: Quit drinking in     Drug use: Not Currently       Allergies as of 2025 - Reviewed 2025   Allergen Reaction Noted    Morphine  2017    Tramadol  2017       Current Outpatient Medications   Medication Sig Dispense Refill    mexiletine (MEXITIL) 150 mg capsule Take 2 capsules (300 mg total) by mouth 3 (three) times a day 180 capsule 11    amiodarone (PACERONE) 200 mg tablet Take 1  tablet (200 mg total) by mouth 2 (two) times a day 60 tablet 11    prasugreL HCl (EFFIENT) 10 mg tablet Take 1 tablet (10 mg total) by mouth daily 30 tablet 1    spironolactone (ALDACTONE) 25 mg tablet Take 2 tablets (50 mg total) by mouth daily      cyclobenzaprine (FLEXERIL) 10 mg tablet Take 1 tablet (10 mg total) by mouth 3 (three) times a day as needed for muscle spasms      carvediloL (Coreg) 25 mg tablet Take 2 tablets (50 mg total) by mouth 2 (two) times a day with meals 360 tablet 3    melatonin 5 mg tablet Take 1 tablet (5 mg total) by mouth nightly as needed for sleep 39 tablet 1    acetaminophen (TYLENOL) 325 mg tablet Take 2 tablets (650 mg total) by mouth every 6 (six) hours as needed for pain scale 1-3/10 60 tablet 1    rosuvastatin (CRESTOR) 40 mg tablet Take 1 tablet (40 mg total) by mouth daily      rivaroxaban (Xarelto) 20 mg tablet Take 1 tablet (20 mg total) by mouth daily 90 tablet 1    magnesium oxide (MAG-OX) 400 mg (241.3 mg magnesium) tablet Take 1 tablet (400 mg total) by mouth 2 (two) times a day 180 tablet 3    empagliflozin (JARDIANCE) 10 mg tablet Take 1 tablet (10 mg total) by mouth daily 90 tablet 3    sacubitriL-valsartan (ENTRESTO)  mg tablet Take 1 tablet by mouth 2 (two) times a day 180 tablet 3    potassium chloride 10 mEq CR tablet Take 2 tablets (20 mEq total) by mouth daily 180 tablet 3    furosemide (LASIX) 40 mg tablet Take 1 tablet (40 mg total) by mouth daily 30 tablet 11    nitroglycerin (NITROSTAT) 0.4 mg SL tablet Place 1 tablet (0.4 mg total) under the tongue every 5 (five) minutes as needed for chest pain 26 tablet 1    tamsulosin (FLOMAX) 0.4 mg capsule Take 1 capsule (0.4 mg total) by mouth daily      cholecalciferol, vitamin D3, 25 mcg (1,000 unit) tablet Take 1 tablet (1,000 Units total) by mouth daily      albuterol HFA (PROVENTIL HFA;VENTOLIN HFA) 90 mcg/actuation inhaler Inhale 2 puffs every 6 (six) hours as needed for wheezing or shortness of breath 1  "Inhaler 1    pantoprazole (PROTONIX) 40 mg EC tablet Take 1 tablet (40 mg total) by mouth daily       No current facility-administered medications for this visit.         Review of Systems:  Negative except per HPI    Physical Examination:  Vitals:    04/18/25 1228   BP: 124/60   Pulse: 70   Resp: 18   SpO2: 93%     Vitals:    04/18/25 1228   BP: 124/60   Pulse: 70   Resp: 18   SpO2: 93%   Height: 1.778 m (5' 10\")   Weight: 120.7 kg (266 lb)   PainSc: 0-No pain     Physical Exam  Constitutional:       General: He is not in acute distress.     Appearance: He is well-developed.   HENT:      Head: Normocephalic and atraumatic.   Neck:      Vascular: No JVD.   Cardiovascular:      Rate and Rhythm: Normal rate and regular rhythm.      Pulses: Intact distal pulses.      Heart sounds: Normal heart sounds.   Pulmonary:      Effort: Pulmonary effort is normal. No respiratory distress.      Breath sounds: Normal breath sounds.   Abdominal:      Palpations: Abdomen is soft.   Musculoskeletal:         General: No swelling. Normal range of motion.      Cervical back: Normal range of motion and neck supple.   Skin:     General: Skin is warm and dry.   Neurological:      Mental Status: He is alert and oriented to person, place, and time.   Psychiatric:         Behavior: Behavior normal.         Thought Content: Thought content normal.         Judgment: Judgment normal.         Data Personally Reviewed:    12 Lead EKG:  AV paced HR 70     Pete is a very pleasant 59 y.o. male with:      Diagnoses and all orders for this visit:    Sustained ventricular tachycardia (CMS/HCC)    Cardiac resynchronization therapy defibrillator (CRT-D) in place    Chronic anticoagulation    Patient presents for follow-up of recent hospitalizations related to recurrent sustained ventricular tachycardia/VT storm with multiple ICD discharges.  He was discharged on amiodarone 200 mg 3 times daily for a week followed by 200 mg twice daily.  He was " also discharged on mexiletine 300 mg 3 times daily which is an increase from previous dosing.  There is a recent transmission from April 6 which demonstrates slow VT heart rate 110 with successful ATP.  Patient was symptomatic with this episode.    Patient does endorse hand tremors and unsteady gait/generalized unsteadiness.  He has periods of feeling well and periods of not feeling well.  He does report feeling like his defibrillator was going to fire last night.    Interrogation of patient's device today demonstrates 99% BiV pacing.  There have been no further episodes of arrhythmias since April 6.    Ischemic cardiomyopathy    Most recent EF 25% March 2025.  LV is severely dilated with a known large anterior apical aneurysm.  NYHA class I-II.  Denies any signs or symptoms of congestive heart failure.  He does follow closely with the CHF clinic and was last seen by them February 2025.  He is maintained on Entresto, carvedilol, spironolactone, and Jardiance.    Coronary artery disease involving native coronary artery of native heart without angina pectoris    Recently underwent PTCA and PCI of the diagonal branch and was initiated on Effient.  He does have some blood noticeable when he blows his nose but denies any overt bleeding since starting Effient.  Denies any recurrent chest pain.  He does see general cardiology for follow-up at the end of the month.    Diabetes mellitus type 2 in obese (CMS/[facility])    Most recent A1c 6.9 March 2025.  He has had elevated A1c's looking back several years.  His primary care providers recently recommended metformin which he has not yet started.    PLAN:     Decrease amiodarone to 200 mg daily -monitor for improvement in ataxia/tremors    Continue mexiletine 300 mg 3 times daily until seen by [facility] EP  April 30th    Patient will send transmission 4/28/2025 in preparation for his upcoming [facility] EP appointment    Will repeat CMP, CBC, magnesium today -sodium slightly low at  discharge in March    Patient will monitor blood pressure and pulse at home, especially when not feeling well    He will otherwise continue on all his current medications    Follow-up with Dr. Wu in 3 months, sooner if needed    Thank you for allowing me to participate in the care of this patient.  If you have any questions, comments or concerns please contact me @ [phone]  Electronically signed by: Bozena Carty CNP      Please note that portions of this document were generated with speech recognition software.  Reasonable efforts have been made to correct any transcriptional errors however some typographical errors may remain.

## 2025-04-18 NOTE — PATIENT INSTRUCTIONS
Decrease amiodarone to 200mg once a day  Continue mexilitine 300mg three times a day  Send the pacemaker transmission on 4/28 - we will make sure it is available for EP doc on [place]  [facility] today  Monitor Blood pressure and pulse at home

## 2025-04-21 ENCOUNTER — HOSPITAL ENCOUNTER (INPATIENT)
Facility: HOSPITAL | Age: 60
LOS: 1 days | Discharge: HOME | End: 2025-04-23
Attending: EMERGENCY MEDICINE | Admitting: INTERNAL MEDICINE
Payer: MEDICARE

## 2025-04-21 ENCOUNTER — TRANSFERRED RECORDS (OUTPATIENT)
Dept: HEALTH INFORMATION MANAGEMENT | Facility: CLINIC | Age: 60
End: 2025-04-21
Payer: MEDICARE

## 2025-04-21 ENCOUNTER — APPOINTMENT (OUTPATIENT)
Dept: CARDIOLOGY | Facility: HOSPITAL | Age: 60
End: 2025-04-21
Payer: MEDICARE

## 2025-04-21 ENCOUNTER — TELEPHONE (OUTPATIENT)
Dept: CARDIOLOGY | Facility: CLINIC | Age: 60
End: 2025-04-21
Payer: MEDICARE

## 2025-04-21 ENCOUNTER — APPOINTMENT (OUTPATIENT)
Dept: RADIOLOGY | Facility: HOSPITAL | Age: 60
End: 2025-04-21
Payer: MEDICARE

## 2025-04-21 DIAGNOSIS — I47.20 VT (VENTRICULAR TACHYCARDIA) (CMS/HCC): ICD-10-CM

## 2025-04-21 DIAGNOSIS — I47.20 VENTRICULAR TACHYCARDIA (CMS/HCC): Primary | ICD-10-CM

## 2025-04-21 DIAGNOSIS — E78.2 MIXED HYPERLIPIDEMIA: ICD-10-CM

## 2025-04-21 DIAGNOSIS — I50.22 CHRONIC SYSTOLIC HEART FAILURE (CMS/HCC): ICD-10-CM

## 2025-04-21 DIAGNOSIS — I50.22 CHRONIC SYSTOLIC HF (HEART FAILURE) (CMS/HCC): ICD-10-CM

## 2025-04-21 DIAGNOSIS — I10 PRIMARY HYPERTENSION: ICD-10-CM

## 2025-04-21 DIAGNOSIS — Z95.810 HISTORY OF AUTOMATIC INTERNAL CARDIAC DEFIBRILLATOR (AICD): ICD-10-CM

## 2025-04-21 DIAGNOSIS — I25.5 ISCHEMIC CARDIOMYOPATHY: ICD-10-CM

## 2025-04-21 LAB
ALBUMIN SERPL-MCNC: 4.1 G/DL (ref 3.5–5.3)
ALP SERPL-CCNC: 75 U/L (ref 45–115)
ALT SERPL-CCNC: 37 U/L (ref 7–52)
ANION GAP SERPL CALC-SCNC: 9 MMOL/L (ref 3–11)
APTT PPP: 31.9 SECONDS (ref 25.1–36.5)
AST SERPL-CCNC: 19 U/L
BASOPHILS # BLD AUTO: 0 10*3/UL
BASOPHILS NFR BLD AUTO: 0.8 % (ref 0–2)
BILIRUB SERPL-MCNC: 0.67 MG/DL (ref 0.2–1.4)
BNP SERPL-MCNC: 34 PG/ML (ref 0–100)
BUN SERPL-MCNC: 14 MG/DL (ref 7–25)
CALCIUM ALBUM COR SERPL-MCNC: 9.3 MG/DL (ref 8.6–10.3)
CALCIUM SERPL-MCNC: 9.4 MG/DL (ref 8.6–10.3)
CHLORIDE SERPL-SCNC: 105 MMOL/L (ref 98–107)
CO2 SERPL-SCNC: 23 MMOL/L (ref 21–32)
CREAT SERPL-MCNC: 1.03 MG/DL (ref 0.7–1.3)
DELTA HIGH SENSITIVITY TROPONIN I, 1 HOUR: 2.5 (ref -15–15)
EGFRCR SERPLBLD CKD-EPI 2021: 84 ML/MIN/1.73M*2
EOSINOPHIL # BLD AUTO: 0.2 10*3/UL
EOSINOPHIL NFR BLD AUTO: 3.9 % (ref 0–3)
ERYTHROCYTE [DISTWIDTH] IN BLOOD BY AUTOMATED COUNT: 14.9 % (ref 11.5–15)
FIBRIN D-DIMER (NG/ML) IN PLATELET POOR PLASMA: <215 NG/MLFEU
GLUCOSE SERPL-MCNC: 132 MG/DL (ref 70–105)
HCT VFR BLD AUTO: 43.8 % (ref 38–50)
HGB BLD-MCNC: 15.4 G/DL (ref 13.2–17.2)
INR BLD: 1.3
LYMPHOCYTES # BLD AUTO: 2 10*3/UL
LYMPHOCYTES NFR BLD AUTO: 38.1 % (ref 15–47)
MAGNESIUM SERPL-MCNC: 2 MG/DL (ref 1.8–2.4)
MCH RBC QN AUTO: 32.6 PG (ref 29–34)
MCHC RBC AUTO-ENTMCNC: 35.1 G/DL (ref 32–36)
MCV RBC AUTO: 93 FL (ref 82–97)
MONOCYTES # BLD AUTO: 0.4 10*3/UL
MONOCYTES NFR BLD AUTO: 8.5 % (ref 5–13)
NEUTROPHILS # BLD AUTO: 2.6 10*3/UL
NEUTROPHILS NFR BLD AUTO: 48.7 % (ref 46–70)
PHOSPHATE SERPL-MCNC: 3.9 MG/DL (ref 2.5–4.9)
PLATELET # BLD AUTO: 185 10*3/UL (ref 130–350)
PMV BLD AUTO: 8.1 FL (ref 6.9–10.8)
POTASSIUM SERPL-SCNC: 3.5 MMOL/L (ref 3.5–5.1)
PROT SERPL-MCNC: 7 G/DL (ref 6–8.3)
PROTHROMBIN TIME: 14.5 SECONDS (ref 9.4–12.5)
RBC # BLD AUTO: 4.71 10*6/UL (ref 4.1–5.8)
SODIUM SERPL-SCNC: 137 MMOL/L (ref 135–145)
TROPONIN I SERPL-MCNC: 13 PG/ML
TROPONIN I SERPL-MCNC: 15.5 PG/ML
TSH SERPL DL<=0.05 MIU/L-ACNC: 4.41 UIU/ML (ref 0.34–4.82)
WBC # BLD AUTO: 5.3 10*3/UL (ref 3.7–9.6)

## 2025-04-21 PROCEDURE — 85379 FIBRIN DEGRADATION QUANT: CPT | Performed by: EMERGENCY MEDICINE

## 2025-04-21 PROCEDURE — 93005 ELECTROCARDIOGRAM TRACING: CPT

## 2025-04-21 PROCEDURE — 83735 ASSAY OF MAGNESIUM: CPT | Performed by: EMERGENCY MEDICINE

## 2025-04-21 PROCEDURE — 6360000200 HC RX 636 W HCPCS (ALT 250 FOR IP): Performed by: EMERGENCY MEDICINE

## 2025-04-21 PROCEDURE — 71045 X-RAY EXAM CHEST 1 VIEW: CPT

## 2025-04-21 PROCEDURE — 80053 COMPREHEN METABOLIC PANEL: CPT | Performed by: EMERGENCY MEDICINE

## 2025-04-21 PROCEDURE — 84484 ASSAY OF TROPONIN QUANT: CPT | Performed by: EMERGENCY MEDICINE

## 2025-04-21 PROCEDURE — 83880 ASSAY OF NATRIURETIC PEPTIDE: CPT | Performed by: EMERGENCY MEDICINE

## 2025-04-21 PROCEDURE — 84100 ASSAY OF PHOSPHORUS: CPT | Performed by: EMERGENCY MEDICINE

## 2025-04-21 PROCEDURE — 99285 EMERGENCY DEPT VISIT HI MDM: CPT | Performed by: EMERGENCY MEDICINE

## 2025-04-21 PROCEDURE — 85730 THROMBOPLASTIN TIME PARTIAL: CPT | Performed by: EMERGENCY MEDICINE

## 2025-04-21 PROCEDURE — 85025 COMPLETE CBC W/AUTO DIFF WBC: CPT | Performed by: EMERGENCY MEDICINE

## 2025-04-21 PROCEDURE — 85610 PROTHROMBIN TIME: CPT | Performed by: EMERGENCY MEDICINE

## 2025-04-21 PROCEDURE — 36415 COLL VENOUS BLD VENIPUNCTURE: CPT | Performed by: EMERGENCY MEDICINE

## 2025-04-21 PROCEDURE — 93288 INTERROG EVL PM/LDLS PM IP: CPT

## 2025-04-21 PROCEDURE — 84443 ASSAY THYROID STIM HORMONE: CPT | Performed by: EMERGENCY MEDICINE

## 2025-04-21 RX ADMIN — AMIODARONE HYDROCHLORIDE 150 MG: 1.5 INJECTION, SOLUTION INTRAVENOUS at 22:44

## 2025-04-21 RX ADMIN — AMIODARONE HYDROCHLORIDE 1 MG/MIN: 1.8 INJECTION, SOLUTION INTRAVENOUS at 22:59

## 2025-04-21 NOTE — Clinical Note
Sheath(s) removed from the right internal jugular vein. Closure pressure manually applied. Hemostasis achieved.

## 2025-04-21 NOTE — Clinical Note
Prepped: groin and right neck. The site was clipped. Prepped with: ChloraPrep. The patient was draped  in the usual sterile fashion.

## 2025-04-21 NOTE — TELEPHONE ENCOUNTER
VT w/Therapy    Stored EGMs are consistent with Ventricular Tachycardia   -Total episodes: 3   -Number of ATP therapy: 22   -Number of shocks delivered: 0 Longest episode: occurred on Apr 18, 2025 at 5:08 PM EGM shows VT lasting at an average V rate of 111 bpm, treated and resolved with ATP X 10. Total duration: 2 minutes and 40 seconds.   -Most recent occurred on Apr 18, 2025 at 11:17 PM. EGM show VT at an average V rate of 111 bpm, treated and resolved with ATP X10. Total duration of 2 minutes and 35 seconds. Additional episode: occurred on Apr 18, 2025 at 5:07 PM; EGM shows VT at an average V rate fo 110 bpm, resolved with ATP X2. Total duration of episode: 40 seconds.     Patient states he just left seeing CNP on 4/18, as he was driving home, felt onset of nausea which is his what he gets with heart racing, he was aware he was having events. Lasted about 5 mins-10 mins, resolved once he got home. He continued to have racing heart and nausea over the weekend off and on. No chest pain syncope,lightheadedness. Today states he has no cardiac symptoms occurring.Medications reviewed:  Confirms he is now on Pacerone at 200 mg qd. He started the once a day on Saturday.       -[place]: CRT-D  -Implanted: 06/20/2022  -Indication: VT/VF  -Folllowing: Dr. Wu        Sent to EP APPs

## 2025-04-22 ENCOUNTER — APPOINTMENT (OUTPATIENT)
Dept: CARDIOLOGY | Facility: HOSPITAL | Age: 60
End: 2025-04-22
Payer: MEDICARE

## 2025-04-22 PROBLEM — I47.20 VENTRICULAR TACHYCARDIA (CMS/HCC): Status: ACTIVE | Noted: 2025-04-22

## 2025-04-22 LAB
BSA FOR ECHO PROCEDURE: 2.47 M2
DOP CALC MV VTI: 17.01 CM
E/A RATIO: 0.7
E/E' RATIO (AVERAGE): 15.6
E/E' RATIO: 8.2
ECHO EF ESTIMATED: 25 %
EJECTION FRACTION: 32 %
GLUCOSE BLDC GLUCOMTR-MCNC: 120 MG/DL (ref 70–105)
INTERVENTRICULAR SEPTUM: 0.9 CM (ref 0.6–1.1)
IVC PROX: 1.34 CM
LA AREA A4C SYSTOLE: 61.7 CM3
LEFT ATRIUM SIZE: 5.04 CM
LEFT ATRIUM VOLUME INDEX: 27 ML/M2
LEFT ATRIUM VOLUME: 65 CM3
LEFT INTERNAL DIMENSION IN SYSTOLE: 6 CM (ref 2.1–4)
LEFT VENTRICLE DIASTOLIC VOLUME INDEX: 195 ML/M2
LEFT VENTRICLE DIASTOLIC VOLUME: 467 CM3
LEFT VENTRICLE SYSTOLIC VOLUME INDEX: 132 ML/M2
LEFT VENTRICLE SYSTOLIC VOLUME: 316 CM3
LEFT VENTRICULAR INTERNAL DIMENSION IN DIASTOLE: 6.8 CM (ref 3.5–6)
LVAD-AP2: 77.8 CM2
ML OF DILUTED DEFINITY INJECTED: 3 ML
MV DEC SLOPE: 2340 MM/S2
MV DT: 167 MS
MV MEAN GRADIENT: 0.9 MMHG
MV PEAK A VEL: 71 CM/S
MV PEAK E VEL: 47.8 CM/S
MV PEAK GRADIENT: 2 MMHG
MV STENOSIS PRESSURE HALF TIME: 62 MS
MV VALVE AREA P 1/2 METHOD: 3.55 CM2
MV VMAX: 76 CM/S
POSTERIOR WALL: 1.2 CM (ref 0.6–1.1)
PULM VEIN S/D RATIO: 1.3
PV PEAK D VEL: 23 CM/S
PV PEAK S VEL: 30 CM/S
RA AREA: 19.1 CM2
RIGHT VENTRICULAR INTERNAL DIMENSION IN DIASTOLE: 3.1 CM
RV AP4 BASE: 4.1 CM
TDI: 5.8 CM/S
TDILATERAL: 2.1 CM/S
TRICUSPID ANNULAR PLANE SYSTOLIC EXCURSION: 1.7 CM

## 2025-04-22 PROCEDURE — 2550000100 HC RX 255: Performed by: STUDENT IN AN ORGANIZED HEALTH CARE EDUCATION/TRAINING PROGRAM

## 2025-04-22 PROCEDURE — 93308 TTE F-UP OR LMTD: CPT

## 2025-04-22 PROCEDURE — 93321 DOPPLER ECHO F-UP/LMTD STD: CPT

## 2025-04-22 PROCEDURE — C8924 2D TTE W OR W/O FOL W/CON,FU: HCPCS

## 2025-04-22 PROCEDURE — 93308 TTE F-UP OR LMTD: CPT | Mod: 26 | Performed by: INTERNAL MEDICINE

## 2025-04-22 PROCEDURE — 93325 DOPPLER ECHO COLOR FLOW MAPG: CPT

## 2025-04-22 PROCEDURE — 96366 THER/PROPH/DIAG IV INF ADDON: CPT

## 2025-04-22 PROCEDURE — (BLANK) HC ROOM PRIVATE

## 2025-04-22 PROCEDURE — 93288 INTERROG EVL PM/LDLS PM IP: CPT

## 2025-04-22 PROCEDURE — 6360000200 HC RX 636 W HCPCS (ALT 250 FOR IP): Performed by: EMERGENCY MEDICINE

## 2025-04-22 PROCEDURE — 82947 ASSAY GLUCOSE BLOOD QUANT: CPT | Mod: QW

## 2025-04-22 PROCEDURE — 6370000100 HC RX 637 (ALT 250 FOR IP): Performed by: STUDENT IN AN ORGANIZED HEALTH CARE EDUCATION/TRAINING PROGRAM

## 2025-04-22 PROCEDURE — 93321 DOPPLER ECHO F-UP/LMTD STD: CPT | Mod: 26 | Performed by: INTERNAL MEDICINE

## 2025-04-22 PROCEDURE — 96365 THER/PROPH/DIAG IV INF INIT: CPT

## 2025-04-22 PROCEDURE — 6370000100 HC RX 637 (ALT 250 FOR IP): Performed by: NURSE PRACTITIONER

## 2025-04-22 PROCEDURE — 6360000200 HC RX 636 W HCPCS (ALT 250 FOR IP): Performed by: NURSE PRACTITIONER

## 2025-04-22 PROCEDURE — 93325 DOPPLER ECHO COLOR FLOW MAPG: CPT | Mod: 26 | Performed by: INTERNAL MEDICINE

## 2025-04-22 RX ORDER — POTASSIUM CHLORIDE 750 MG/1
10 TABLET, FILM COATED, EXTENDED RELEASE ORAL 2 TIMES DAILY WITH MEALS
Status: DISCONTINUED | OUTPATIENT
Start: 2025-04-22 | End: 2025-04-22

## 2025-04-22 RX ORDER — PRASUGREL 10 MG/1
10 TABLET, FILM COATED ORAL DAILY
Status: DISCONTINUED | OUTPATIENT
Start: 2025-04-22 | End: 2025-04-23 | Stop reason: HOSPADM

## 2025-04-22 RX ORDER — POTASSIUM CHLORIDE 750 MG/1
20 TABLET, FILM COATED, EXTENDED RELEASE ORAL 2 TIMES DAILY WITH MEALS
Status: DISCONTINUED | OUTPATIENT
Start: 2025-04-22 | End: 2025-04-23 | Stop reason: HOSPADM

## 2025-04-22 RX ORDER — ETHYL ALCOHOL 62 ML/100ML
1 SWAB TOPICAL 2 TIMES DAILY
Status: DISCONTINUED | OUTPATIENT
Start: 2025-04-22 | End: 2025-04-23 | Stop reason: HOSPADM

## 2025-04-22 RX ORDER — ACETAMINOPHEN 325 MG/1
650 TABLET ORAL EVERY 6 HOURS PRN
Status: DISCONTINUED | OUTPATIENT
Start: 2025-04-22 | End: 2025-04-23 | Stop reason: HOSPADM

## 2025-04-22 RX ORDER — FUROSEMIDE 20 MG/1
40 TABLET ORAL DAILY
Status: DISCONTINUED | OUTPATIENT
Start: 2025-04-22 | End: 2025-04-23 | Stop reason: HOSPADM

## 2025-04-22 RX ORDER — CARVEDILOL 25 MG/1
25 TABLET ORAL 2 TIMES DAILY WITH MEALS
Status: DISCONTINUED | OUTPATIENT
Start: 2025-04-22 | End: 2025-04-22

## 2025-04-22 RX ORDER — AMIODARONE HYDROCHLORIDE 200 MG/1
400 TABLET ORAL 2 TIMES DAILY WITH MEALS
Status: DISCONTINUED | OUTPATIENT
Start: 2025-04-23 | End: 2025-04-23 | Stop reason: HOSPADM

## 2025-04-22 RX ORDER — ONDANSETRON HYDROCHLORIDE 2 MG/ML
4 INJECTION, SOLUTION INTRAVENOUS EVERY 6 HOURS PRN
Status: DISCONTINUED | OUTPATIENT
Start: 2025-04-22 | End: 2025-04-23 | Stop reason: HOSPADM

## 2025-04-22 RX ORDER — ENOXAPARIN SODIUM 100 MG/ML
40 INJECTION SUBCUTANEOUS
Status: DISCONTINUED | OUTPATIENT
Start: 2025-04-22 | End: 2025-04-22

## 2025-04-22 RX ORDER — SODIUM CHLORIDE 0.9 % (FLUSH) 0.9 %
3 SYRINGE (ML) INJECTION AS NEEDED
Status: DISCONTINUED | OUTPATIENT
Start: 2025-04-22 | End: 2025-04-23 | Stop reason: HOSPADM

## 2025-04-22 RX ORDER — CARVEDILOL 25 MG/1
50 TABLET ORAL 2 TIMES DAILY WITH MEALS
Status: DISCONTINUED | OUTPATIENT
Start: 2025-04-22 | End: 2025-04-23 | Stop reason: HOSPADM

## 2025-04-22 RX ORDER — POTASSIUM CHLORIDE 750 MG/1
40 TABLET, FILM COATED, EXTENDED RELEASE ORAL ONCE
Status: COMPLETED | OUTPATIENT
Start: 2025-04-22 | End: 2025-04-22

## 2025-04-22 RX ORDER — SPIRONOLACTONE 25 MG/1
25 TABLET ORAL DAILY
Status: DISCONTINUED | OUTPATIENT
Start: 2025-04-22 | End: 2025-04-23 | Stop reason: HOSPADM

## 2025-04-22 RX ORDER — ROSUVASTATIN CALCIUM 20 MG/1
40 TABLET, COATED ORAL NIGHTLY
Status: DISCONTINUED | OUTPATIENT
Start: 2025-04-22 | End: 2025-04-23 | Stop reason: HOSPADM

## 2025-04-22 RX ORDER — ONDANSETRON 4 MG/1
4 TABLET, FILM COATED ORAL EVERY 6 HOURS PRN
Status: DISCONTINUED | OUTPATIENT
Start: 2025-04-22 | End: 2025-04-23 | Stop reason: HOSPADM

## 2025-04-22 RX ORDER — MEXILETINE HYDROCHLORIDE 150 MG/1
300 CAPSULE ORAL EVERY 8 HOURS SCHEDULED
Status: DISCONTINUED | OUTPATIENT
Start: 2025-04-22 | End: 2025-04-23 | Stop reason: HOSPADM

## 2025-04-22 RX ORDER — BISACODYL 10 MG/1
10 SUPPOSITORY RECTAL DAILY PRN
Status: DISCONTINUED | OUTPATIENT
Start: 2025-04-22 | End: 2025-04-23 | Stop reason: HOSPADM

## 2025-04-22 RX ORDER — NITROGLYCERIN 0.4 MG/1
0.4 TABLET SUBLINGUAL EVERY 5 MIN PRN
Status: DISCONTINUED | OUTPATIENT
Start: 2025-04-22 | End: 2025-04-23 | Stop reason: HOSPADM

## 2025-04-22 RX ADMIN — FUROSEMIDE 40 MG: 20 TABLET ORAL at 08:46

## 2025-04-22 RX ADMIN — AMIODARONE HYDROCHLORIDE 0.5 MG/MIN: 1.8 INJECTION, SOLUTION INTRAVENOUS at 04:35

## 2025-04-22 RX ADMIN — MEXILETINE HYDROCHLORIDE 300 MG: 150 CAPSULE ORAL at 14:07

## 2025-04-22 RX ADMIN — POTASSIUM CHLORIDE 20 MEQ: 750 TABLET, FILM COATED, EXTENDED RELEASE ORAL at 18:22

## 2025-04-22 RX ADMIN — SACUBITRIL AND VALSARTAN 1 TABLET: 97; 103 TABLET, FILM COATED ORAL at 20:33

## 2025-04-22 RX ADMIN — ROSUVASTATIN CALCIUM 40 MG: 20 TABLET, FILM COATED ORAL at 20:33

## 2025-04-22 RX ADMIN — SPIRONOLACTONE 25 MG: 25 TABLET ORAL at 08:46

## 2025-04-22 RX ADMIN — MEXILETINE HYDROCHLORIDE 300 MG: 150 CAPSULE ORAL at 22:06

## 2025-04-22 RX ADMIN — CARVEDILOL 25 MG: 25 TABLET, FILM COATED ORAL at 08:46

## 2025-04-22 RX ADMIN — PERFLUTREN 3 ML: 6.52 INJECTION, SUSPENSION INTRAVENOUS at 08:42

## 2025-04-22 RX ADMIN — PRASUGREL 10 MG: 10 TABLET, FILM COATED ORAL at 08:48

## 2025-04-22 RX ADMIN — ETHYL ALCOHOL 1 APPLICATION: 62 SWAB TOPICAL at 04:03

## 2025-04-22 RX ADMIN — AMIODARONE HYDROCHLORIDE 0.5 MG/MIN: 1.8 INJECTION, SOLUTION INTRAVENOUS at 16:22

## 2025-04-22 RX ADMIN — EMPAGLIFLOZIN 10 MG: 10 TABLET, FILM COATED ORAL at 08:46

## 2025-04-22 RX ADMIN — RIVAROXABAN 20 MG: 20 TABLET, FILM COATED ORAL at 18:23

## 2025-04-22 RX ADMIN — ETHYL ALCOHOL 1 APPLICATION: 62 SWAB TOPICAL at 20:33

## 2025-04-22 RX ADMIN — CARVEDILOL 50 MG: 25 TABLET, FILM COATED ORAL at 18:22

## 2025-04-22 RX ADMIN — SACUBITRIL AND VALSARTAN 1 TABLET: 97; 103 TABLET, FILM COATED ORAL at 08:46

## 2025-04-22 RX ADMIN — ETHYL ALCOHOL 1 APPLICATION: 62 SWAB TOPICAL at 08:48

## 2025-04-22 RX ADMIN — POTASSIUM CHLORIDE 40 MEQ: 750 TABLET, FILM COATED, EXTENDED RELEASE ORAL at 08:46

## 2025-04-22 ASSESSMENT — ENCOUNTER SYMPTOMS
NEAR-SYNCOPE: 0
COUGH: 0
PALPITATIONS: 0
PALPITATIONS: 1
LIGHT-HEADEDNESS: 0
PSYCHIATRIC NEGATIVE: 1
DISTURBANCES IN COORDINATION: 1
MUSCLE CRAMPS: 0
GASTROINTESTINAL NEGATIVE: 1
SHORTNESS OF BREATH: 1
FEVER: 0
DIARRHEA: 0
IRREGULAR HEARTBEAT: 0
WEIGHT GAIN: 0
CHILLS: 0
DIZZINESS: 1
DYSPNEA ON EXERTION: 0
DOUBLE VISION: 0
BACK PAIN: 0
EYE PAIN: 0
WEAKNESS: 1
SYNCOPE: 0
HEMATOLOGIC/LYMPHATIC NEGATIVE: 1
FOCAL WEAKNESS: 0
DYSPNEA ON EXERTION: 1
PND: 0
POLYDIPSIA: 1
DIFFICULTY WITH CONCENTRATION: 0
COLOR CHANGE: 0
WEIGHT LOSS: 0
CLAUDICATION: 0
EYES NEGATIVE: 1
DIAPHORESIS: 0
JOINT SWELLING: 0
DISTURBANCES IN COORDINATION: 0
ALTERED MENTAL STATUS: 0
ORTHOPNEA: 0
LIGHT-HEADEDNESS: 1
NAUSEA: 1
SHORTNESS OF BREATH: 0
ANOREXIA: 0
FALLS: 0
POLYPHAGIA: 1
BLOATING: 0
SLEEP DISTURBANCES DUE TO BREATHING: 0
WEAKNESS: 0

## 2025-04-22 NOTE — ED PROVIDER NOTES
HPI:  Chief Complaint   Patient presents with    Palpitations     Pt arrives through triage and  c/o palpitations and also has a pacemaker and defribrillator but pt thinks it has not been going off       HPI  Patient is a 59-year-old male with history of type 2 diabetes, obesity, AICD, LAVON, paroxysmal A-fib, LAVON, ventricular tachycardia , CHF, hypertension, hyperlipidemia presenting with palpitations, chest tightness, shortness of breath, lightheadedness that started this evening shortly prior to coming in.  No known trauma triggering factors.  States it feels the same as when he has had previous episodes of rhythm problems.  Has been taking all of his medications consistently however has not had his evening medications yet.  States his amiodarone dose was decreased by half on Friday, 4 days ago.      HISTORY:  Past Medical History:   Diagnosis Date    BPH (benign prostatic hyperplasia)     CAD (coronary artery disease)     Status post stent    CHF (congestive heart failure) (CMS/[facility])     Chronic combined systolic and diastolic CHF (congestive heart failure) (CMS/[facility])     LVEF 25% with grade 1 diastolic dysfunction as per echo on 1/26/2022.    CVA (cerebral vascular accident) (CMS/[facility]) 2010    Without residual deficit.    Diabetes mellitus type 2 in obese (CMS/[facility])     Dyslipidemia     GERD (gastroesophageal reflux disease)     Heart attack (CMS/[facility])     x3    Hypertension     Ischemic cardiomyopathy     Morbid obesity with BMI of 40.0-44.9, adult (CMS/[facility])     Paroxysmal atrial fibrillation (CMS/[facility])     On Xarelto    V-tach (CMS/[facility])     AICD in place       Past Surgical History:   Procedure Laterality Date    ABLATION VT N/A 06/09/2020    Procedure: Ablation VT;  Surgeon: Wilfredo Montana MD;  Location: [facility] EP Lab;  Service: Electrophysiology;  Laterality: N/A;  Check with Dr. Montana in the a.m. regarding scheduling    APPENDECTOMY      BILATERAL HEART CATH N/A 12/5/2023    Procedure: Bilateral heart cath;   Surgeon: Jitendra Post MD;  Location: [facility] Cath Lab;  Service: Cardiovascular;  Laterality: N/A;    COLONOSCOPY N/A 7/25/2023    Procedure: COLONOSCOPY with Polypectomy;  Surgeon: Hortencia Carson MD;  Location: [facility] Endoscopy;  Service: Endoscopy;  Laterality: N/A;    CORONARY ANGIOGRAPHY N/A 3/24/2025    Procedure: Coronary Angiography;  Surgeon: Vaibhav Mcclure MD;  Location: [facility] Cath Lab;  Service: Cardiovascular;  Laterality: N/A;    CORONARY ARTERY STENTING  2009    2.5 X 24-mm Taxus DIMAS to first diagonal. 3.0 X 8-mm Taxus stent to proximal LAD just proximal to diagonal.    CORONARY ARTERY STENTING  2010    3.5 X 15-mm Promus DIMAS to totally occluded LAD    CORONARY ARTERY STENTING  2011    2.25 X 30-mm Resolute DIMAS to 2nd diagonal.  Balloon angioplasty through strut to improve blood flow to distal LAD    CORONARY ARTERY STENTING  07/28/2017    second diagonal branch LAD Resolute 2.25 x 30-mm DIMAS    CORONARY ARTERY STENTING N/A 3/24/2025    Procedure: Coronary artery/graft stenting;  Surgeon: Vaibhav Mcclure MD;  Location: [facility] Cath Lab;  Service: Cardiovascular;  Laterality: N/A;    ICD DC GEN CHANGE N/A 6/20/2022    Procedure: BI-V ICD Gen Change;  Surgeon: Wilfredo Montana MD;  Location: [facility] EP Lab;  Service: Cardiovascular;  Laterality: N/A;    ICD SC NEW  2011    [employer] Cognis Model #N119, Serial #[id number]. [place] Model #0185, Serial #[id number]. LV lead Acuity Model #45+91, Serial #[id number]. Atrial lead Model #4469, Serial #[id number]    OXIMETRY RUN N/A 12/5/2023    Procedure: OXIMETRY RUN;  Surgeon: Jitendra Post MD;  Location: [facility] Cath Lab;  Service: Cardiovascular;  Laterality: N/A;       Family History   Problem Relation Age of Onset    Heart attack Mother 62    Other Mother         Abdominal Aortic Aneurysm    Lung cancer Mother     Colon cancer Father         Cause of death    Diabetes Brother         Non-Insulin Dependent    Heart attack Brother 51        w/ Stents    Heart disease Brother      Other Son         Well       Social History     Tobacco Use    Smoking status: Former     Current packs/day: 0.00     Average packs/day: 0.8 packs/day for 30.0 years (22.5 ttl pk-yrs)     Types: Cigarettes     Start date: 1990     Quit date: 2020     Years since quittin.8    Smokeless tobacco: Never   Vaping Use    Vaping status: Never Used   Substance Use Topics    Alcohol use: Not Currently     Comment: Quit drinking in     Drug use: Not Currently         ROS:  Review of Systems   Constitutional:         ROS per HPI, otherwise noncontributory.       PE:  ED Triage Vitals   Temp Heart Rate Resp BP SpO2   25 2147 25 2145 25 2147 25 21425   36.9 °C (98.4 °F) 70 18 133/78 94 %      Mean BP (mmHg) Temp Source Heart Rate Source Patient Position BP Location   25 2145 25 -- 25 2315 --   104 Oral  In bed       FiO2 (%)       --                  Physical Exam  Vitals and nursing note reviewed.   Constitutional:       General: He is not in acute distress.     Appearance: He is not ill-appearing.   HENT:      Head: Normocephalic.      Right Ear: External ear normal.      Left Ear: External ear normal.      Nose: Nose normal.      Mouth/Throat:      Mouth: Mucous membranes are moist.   Cardiovascular:      Rate and Rhythm: Normal rate and regular rhythm.      Pulses: Normal pulses.   Pulmonary:      Effort: Pulmonary effort is normal.      Breath sounds: Normal breath sounds.   Chest:      Chest wall: No tenderness.   Abdominal:      Palpations: Abdomen is soft.      Tenderness: There is no abdominal tenderness.   Musculoskeletal:         General: No tenderness.   Skin:     General: Skin is warm.      Capillary Refill: Capillary refill takes less than 2 seconds.   Neurological:      Mental Status: He is alert and oriented to person, place, and time.   Psychiatric:         Mood and Affect: Mood normal.         ED LABS:  Labs Reviewed   COMPREHENSIVE  "METABOLIC PANEL - Abnormal       Result Value    Sodium 137      Potassium 3.5      Chloride 105      CO2 23      Anion Gap 9      BUN 14      Creatinine 1.03      Glucose 132 (*)     Calcium 9.4      AST 19      ALT (SGPT) 37      Alkaline Phosphatase 75      Total Protein 7.0      Albumin 4.1      Total Bilirubin 0.67      Corrected Calcium 9.3      eGFR 84      Narrative:     Calculation based on the 2021 Chronic Kidney Disease Epidemiology Collaboration (CKD-EPI) equation refit without adjustment for race.   CBC WITH AUTO DIFFERENTIAL - Abnormal    WBC 5.3      RBC 4.71      Hemoglobin 15.4      Hematocrit 43.8      MCV 93.0      MCH 32.6      MCHC 35.1      RDW 14.9      Platelets 185      MPV 8.1      Neutrophils% 48.7      Lymphocytes% 38.1      Monocytes% 8.5      Eosinophils% 3.9 (*)     Basophils% 0.8      ANC (auto diff) 2.60      Lymphocytes Absolute 2.00      Monocytes Absolute 0.40      Eosinophils Absolute 0.20      Basophils Absolute 0.00     PROTIME-INR - Abnormal    Protime 14.5 (*)     INR 1.3 (*)    PTT (ACTIVATED PARTIAL THROMBOPLASTIN TIME) - Normal    PTT 31.9     MAGNESIUM - Normal    Magnesium 2.0     PHOSPHORUS - Normal    Phosphorus 3.9     TSH - Normal    TSH 4.414     B-TYPE NATRIURETIC PEPTIDE (BNP) - Normal    BNP 34     D-DIMER, QUANTITATIVE - Normal    D-Dimer, Quant (ng/mL) <215      Narrative:     D-dimer values increase with age and this can make VTE exclusion of an older population difficult. To address this, The American College [employer], based on best available evidence and recent guidelines, recommends that clinicians use age-adjusted D-dimer thresholds in patients greater than 50 years of age with: a) a low probability of PE who do not meet all Pulmonary Embolism Rule Out Criteria, or b) in those with intermediate probability of PE. The formula for an age-adjusted D-dimer cut-off is \"ageX10 ng/mL\". For example, a 60 year old patient would have an age-adjusted cut-off of " 600 ng/mL FEU and an 80 year old would be 800 ng/mL FEU.  D-dimer values < or =500 ng/mL FEU may be used in conjunction with clinical pretest probability to exclude deep vein thrombosis (DVT) and pulmonary embolism (PE).   HIGH SENSITIVE TROPONIN I - Normal    hsTnI 0 Hour 13.0     HIGH SENSITIVE TROPONIN I, 1 HOUR - Normal    hsTnI 1 Hour 15.5      Delta from 0 Hour 2.5     HIGH SENSITIVE TROPONIN I PANEL (OHR, 1HR)    Narrative:     The following orders were created for panel order HS Troponin I Panel (0HR, 1HR) Blood, Venous.  Procedure                               Abnormality         Status                     ---------                               -----------         ------                     HS Troponin I[[id number]]                Normal              Final result               1HR High Sensitive Trop I[[id number]]    Normal              Final result                 Please view results for these tests on the individual orders.         ED IMAGES:  XR chest portable 1 view   Final Result   IMPRESSION:   No acute disease.       [facility]    (Results Pending)       ED PROCEDURES:  Procedures    ED COURSE:  ED Course as of 04/22/25 0208   Mon Apr 21, 2025   2210 EKG reviewed.  Shows paced rhythm, rate 70, no significant ST or T wave changes or other changes compared to previous rhythm which was also paced. [LB]   2252 No acute pathology on chest x-ray. [LB]   Tue Apr 22, 2025   0006 Patient doing well on reevaluation.  Asymptomatic. [LB]      ED Course User Index  [LB] Pamela Solomon DO          Sepsis Quality Bundle           MDM:  Medical Decision Making  I reviewed His admission from both 3/21/2025 and 3/14/2025.  Admitted both times for ventricular tachycardia.  Last admission did have AICD discharge.    Patient overall hemodynamically stable and nontoxic.  Vital signs initially within normal limits at time of triage.  However shortly after signing myself to patient I was called immediately to room as he had  a change in rhythm on the monitor that was concerning for ventricular tachycardia.  At that time when I evaluated patient he did have a wide-complex tachycardia with rate 140s and was symptomatic.  However return to paced rhythm close in the room and symptoms resolved.  Differential diagnosis includes but is not limited to arrhythmia, ACS, electrolyte or metabolic abnormality, thyroid abnormality, pacemaker malfunction.  Patient states he has not had any discharges from his AICD today.  I reviewed all labs, imaging, nurse notes.  Initial EKG that was done in triage showed paced rhythm, rate 70 with no acute changes.  Did review telemetry from episode when I was at bedside which was consistent with ventricular tachycardia.  Amiodarone bolus and infusion immediately initiated.  Pacemaker was interrogated.  Per interrogation report did have ventricular shock therapy delivered to convert an arrhythmia, antitachycardia pacing therapy delivered to convert arrhythmia, accelerated ventricular arrhythmia episode, nonsustained ventricular arrhythmia episode.  Discussed with [facility] technician.  They state that patient had 14 episodes of ventricular tachycardia.  No significant electrolyte or metabolic abnormalities.  Troponin x 2 within normal limits.  TSH within normal limits, no concerns for significant thyroid abnormality.  Negative D-dimer and very low suspicion for PE, no CTA indicated.  BNP within normal limits and no signs at this time concerning for acute CHF exacerbation.  No leukocytosis or anemia.  I independently reviewed chest x-ray which did not show any acute pathology.  I reviewed radiology report, agree with the read.  Will plan to hospitalize for further management of his recurrent ventricular tachycardia.  I discussed labs, imaging plan with cardiology and they can agree to hospitalize patient and agree with plan for continuation of amiodarone infusion.  Patient also agreeable to this plan and  remains comfortable on reevaluation.    Critical Care:  The high probability of sudden, clinically significant, or life-threatening deterioration required my full and direct attention, intervention, and personal management while the patient was critical.  I provided critical care services including those noted below.    46 minutes in addition to separately billable procedures.  Review of laboratory and radiographic studies obtained in the emergency department, initial and reevaluation's of the patient's physical condition, review of all old studies as available, time spent ruling out differential diagnosis and diagnostic consideration.      A voice recognition program was used to aid in medical record documentation. Some words may be printed not exactly as they were spoken. Efforts were made to carefully edit and correct any inaccuracies; however, some errors may be present.            Final diagnoses:   [I47.20] Ventricular tachycardia (CMS/HCC)   [I50.22] Chronic systolic HF (heart failure) (CMS/HCC)   [E78.2] Mixed hyperlipidemia   [I10] Primary hypertension   [Z95.810] History of automatic internal cardiac defibrillator (AICD)     4/22/2025 12:25 AM                                          Pamela Solomon DO  04/22/25 0208

## 2025-04-22 NOTE — PLAN OF CARE
Problem: Safety Adult  Goal: Patient will remain safe during hospitalization  Outcome: Progressing  Flowsheets (Taken 4/22/2025 1444)  Patient will remain safe durning hospitalization:   Assess patient for Fall Risk   Assess Patient for Aspirations   Use safe transport   Assess Patient for Risk of Elopement   Assess Patient using the appropriate Jean Marie scale     Problem: Knowledge Deficit  Goal: Patient/family/caregiver demonstrates understanding of disease process, treatment plan, medications, and discharge instructions  Outcome: Progressing  Flowsheets (Taken 4/22/2025 1444)  Patient/family/caregiver demonstrates understanding of disease process, treatment plan, medications, and discharge instructions:   Complete learning assessment and assess knowledge base   Provide teaching via preferred learning methods   Provide teaching at level of understanding     Problem: Safety Adult  Goal: Patient will remain safe during hospitalization  Outcome: Progressing  Flowsheets (Taken 4/22/2025 1444)  Patient will remain safe durning hospitalization:   Assess patient for Fall Risk   Assess Patient for Aspirations   Use safe transport   Assess Patient for Risk of Elopement   Assess Patient using the appropriate Jean Marie scale     Problem: Knowledge Deficit  Goal: Patient/family/caregiver demonstrates understanding of disease process, treatment plan, medications, and discharge instructions  Outcome: Progressing  Flowsheets (Taken 4/22/2025 1444)  Patient/family/caregiver demonstrates understanding of disease process, treatment plan, medications, and discharge instructions:   Complete learning assessment and assess knowledge base   Provide teaching via preferred learning methods   Provide teaching at level of understanding

## 2025-04-22 NOTE — NURSING END OF SHIFT
ICU Nursing Shift Summary:       Patient: Pete Trejo  MRN: [id number]  : 1965, Age: 59 y.o.    Location: [address] [place] [address]    Admit date: 2025  Primary Care Provider: Lynnette Amezquita CNP  Attending Provider: Tyrone Quezada MD    25    Nursing Goals    Clinical Goals for the Shift: Monitor hemodynamics and respiratory integrity. Round with consulted teams as to patient care plan. Promote safety and comfort while in ICU setting    Narrative Summary of Progress Towards Clinical Goals:  VSS, room air. A-V paced, rate 70. Amio gtt infusing. Pt. Denies any further palpitations, pain, or complaints. Pt. Is oriented x4, calm and cooperative. Voiding adequate amount of urine by urinal. Standby assist: up at bedside, steady on feet.    Nursing Concerns/Thoughts for MD:  No    New Patient or Family Concerns/Issues:  No    Shift Summary:    Major Event(s): None, as above.    Brief Narratives  Changes in VS and Rhythm: No   Changes in Exam: No     Significant Events & Communications to Providers (Last 12 Hours)       Last 5 Values    No documentation.                 Oxygen Usage (Last 12 Hours)       Last 5 Values       Row Name 25 2145 25 2147 25 2230 25 2245 25 2300       Oxygen Therapy    SpO2 94 % 94 % 94 % 93 % 94 %      Row Name 25 2315 25 2330 25 2345 25 0000 25 0015       Oxygen Therapy    SpO2 97 % 93 % 90 % 91 % 96 %    SpO2 Alarm On? -- -- Yes -- --      Row Name 25 0030 25 0100 25 0115 25 0130 25 0145       Oxygen Therapy    SpO2 92 % 92 % 91 % 91 % 90 %      Row Name 25 0200 25 0217 25 0230 25 0245 25 0300       Oxygen Therapy    SpO2 95 % 96 % 94 % 94 % 95 %    SpO2 Alarm On? -- -- Yes -- --      Row Name 25 0315 25 0330 25 0345             Oxygen Therapy    SpO2 91 % 90 % 90 %                    Mobility (Last 12 Hours)       Last 5 Values       Row Name 25  2315 04/22/25 0200 04/22/25 0217 04/22/25 0300          Mobility    Activity -- -- Ambulate in room --     Level of Assistance -- -- Standby assist, set-up cues, supervision of patient - no hands on --     Distance Ambulated (feet) -- -- 2 Feet --     Distance Ambulated (Meters Calculated) -- -- 0.61 Meters --     Patient Position In bed In bed -- In bed     Turning/Repositioning -- Turns self -- Turns self     Distance Ambulated (Meters Calculated)(Do Not Use) -- -- 0.61 Feet --                   Urethral Catheter       Active Urethral Catheter       None                  Active Lines       Active Central venous catheter / Peripherally inserted central catheter / Implantable Port / Hemodialysis catheter / Midline Catheter       None                  Infusing Medications   Medication Dose Last Rate    amiodarone  1 mg/min 1 mg/min (04/22/25 0410)    Followed by    amiodarone  0.5 mg/min         Current LOC: Intensive Care

## 2025-04-22 NOTE — INTERDISCIPLINARY/THERAPY
Case Management Admission Note    Phone # [phone]    Living Situation: Family members Private residence            ADLs: Independent  Stairs: Yes 6  HME/CPAP: CPAP, Glucometer      Oxygen: No      Home Health:No     Current Resources: None      Diabetes/supplies: Do you have Diabetes?: Yes  Do you have diabetic supplies?: Yes  PCP: Lynnette Amezquita, CNP  Funding: [facility]  Pharmacy:[address]    Source of Information: Chart Review  Reason for Chart Review: Patient Unavailable (Comment)  Support Person: Primary Emergency Contact: Pete Trejo Jr, Mobile Phone: [phone], Relation: Son     Transportation: When discharged, who will be providing your transportation?: Family  Discharge Transport Person's Name: son     Admission Diagnosis: VT, hx a fib, CHF, Htn, DM, LAVON, AICD, long hx of CAD w/ stents    Narrative: Chart reviewed. Cards/EP Cards. Amio gtt. Cardiac diet.  Dispo: HO

## 2025-04-22 NOTE — H&P
[address] [address]                                                    CardioVascular History and Physical Note    Patient name: Pete Trejo  MRN: [id number]  Admit date: 4/22/2025    Subjective    Patient ID: Pete Trejo is a 59 y.o. male.    Chief Complaint:   Chief Complaint   Patient presents with    Palpitations     Pt arrives through triage and  c/o palpitations and also has a pacemaker and defribrillator but pt thinks it has not been going off       HPI    This is a 59-year-old male with a past medical history consist of but not limited to paroxysmal atrial fibrillation, ventricular tachycardia, congestive heart failure, hypertension, hyperlipidemia, diabetes mellitus type 2, obstructive sleep apnea and AICD. Who presented to the ER due to fast heart rate with palpitations.  The patient presented to the ED due to fast heart rate and dizziness.  Patient was found to be in wide-complex ventricular tachycardia with a rate in 140s. Patient was started on amiodarone bolus followed by IV infusion.  He had an EKG which indicated a paced rhythm with a rate in 70s and tropes were minimal at 13 and 15.  The ED provider notified on-call cardiologist Dr. Quezada who then contact KARLA on duty to see the patient.    The patient was seen and examined in the ER.  He was sitting upright in his hospital bed and appeared asleep.  Patient is generally comfortable and hemodynamically stable not in any acute distress.  Patient stated that he was at home today when he noticed that his heart was beating really fast and felt dizzy.  He said that he tried to ignore it to see if it would go away but the palpitation continue for period of 10 minutes.  Mr. Pereira stated that he then decided to drove himself to the ER and he had another episode while in the ER parking lot.  Patient stated that he feel nauseated during this fast heart rate episode.  He denies any chest pain, chest  pressure or squeezing, shortness of breath but endorses dizziness.  Patient also reported that he does not smoke, drink alcohol or do any other drugs.  Mr. Trejo, mentioned that he has a significant family medical history of heart disease with a brother having a pacemaker and mom passing away from coronary artery disease in her 70s.    Patient is known to our EP team due to refractory VT. he was discharged on March 18, 2025 following hospitalization from March 14 to March 18, 2025 for VT. patient was also seen on March 21 due to VT and had to be shock to terminate.      Last updated by: 4/18/25  Primary Cardiologist: Mian   Electrophysiology: Dr. Wu    Problem list:  CAD:   Anterior MI [PCI. 3.5 X 15-mm Promus drug eluting stent to totally occluded LAD] - 12/3/2010   Sm Anteroseptal MI [PCI. 2.25 X 24-mm Taxus drug eluting stent to diagonal. 3.0 X 8-mm Taxus stent to proximal LAD] - 7/25/2009   Progressive CAD ivolving LAD/Diagonal [Stent]   3/24/25  PTCA/PCI Diagonal - started on Effient    CHF/CM:   CHF   EF 31% - 2013   Severe ischemic cardionyopathy [BiVentricular ICD. [employer] Cognis HE Model #N119,Serial #[id number]. RV lead Guidant Model #0185,Serial #[id number]. LV lead BS Model #4591,Serial #[id number]. RA lead Guidant Model #4469, Serial #[id number]] - 2011 6/20/2022 Generator change - [employer] model G125      ARRHYTHMIA:   Ventricular Tachycardia [AICD]   2021 recurrent NS-VT, slow VT  2020 Hx of VT ablation per Dr. Montana - 06/09/2020 VT-Partially successful ablation of border zone areas of the scar especially the inferior apex in an attempt to substrate modify his arrhythmia burden    Extensive repeat VT ablation [facility] 11/29/2021, mexiletine discontinued    Recurrent VT with failed ATP x8 1/25/2022 spontaneously converted.  Mexiletine 200 mg twice daily plus amiodarone 200 mg daily, mexiletine increased to 3 times daily. 1/31/2022    Recurrent VT with ICD shocks 3/2025  - Amiodarone  load, increased mexiletine      PVD:   CVA - 12/2010 - at time of MI, no residual    OTHER:   LV Mural Thrombus - resolved  Anteroapical akinetic aneurysm     RISK FACTORS:   Hypertension   Dyslipidemia [Hyperlipidemia]   DM    CARDIOVASCULAR PROCEDURES:     PCI (PTCA: LAD bifurcation   Stent: D2 2.25x30 (RESOLUTE))   PCI (%   Stent: LAD (PROMUS) 3.5x15mm) - 12/3/2010   PCI (Stent: D1 (TAXUS) 2.5x24mm, LAD prox (TAXUS) 30x8mm) - 7/25/2009   PCI 07/28/2017 second diagonal branch LAD Resolute 2.25 x 30-mm DIMAS       Prior Imaging/Testing History   Echocardiogram 3/14/25   Severe left ventricular systolic dysfunction.  ·  Normal left ventricular wall thickness.  ·  Left ventricle is severely dilated, LVIDD of 7.3 cm.  There is a known large anterior apical aneurysm, basal, anterolateral and inferior lateral wall appears to have normal contractility  ·  Biplane ejection fraction is 25%.  ·  Indeterminate left ventricular diastolic function.  ·  Normal left ventricular filling pressure.  ·  The left atrium is moderately dilated.  ·  The right atrium is normal in size.  ·  No obvious valvular pathologies  ·  Inadequate TR spectral Doppler to accurately assess right ventricular systolic pressure.  ·  Normal central venous pressure (0-5 mmHg).  ·  No pericardial effusion.  ·  Definitive was used to delineate the walls of the LV without any obvious LV thrombus.  ·  This echo was compared with prior echo done on 10/19/2023.  No major changes    Stress test with a VO2 5/5/2021 [facility]: Nondiagnostic for ischemia as patient did not reach 85% of maximum heart rate. Isolated PVCs, single PVC pair. On beta-blocker. Moderately decreased functional capacity. Maintained 93% or greater saturation.    Lexiscan 7/2021  · Technically difficult study to perform.  Gated images could not able to obtain hence cannot comment on left ventricle systolic function and contractility.  · There is a large size, severe intensity fixed perfusion  defect in apex, apical inferior, apical anterior to mid anterior, apical septum and apical lateral.  There is no reversible ischemia.  Unable to obtain gated images because left ventricle is severely dilated.  Cannot comment the left ventricle systolic function and wall motion.    Past Medical History:   Diagnosis Date    BPH (benign prostatic hyperplasia)     CAD (coronary artery disease)     Status post stent    CHF (congestive heart failure) (CMS/[facility])     Chronic combined systolic and diastolic CHF (congestive heart failure) (CMS/[facility])     LVEF 25% with grade 1 diastolic dysfunction as per echo on 1/26/2022.    CVA (cerebral vascular accident) (CMS/[facility]) 2010    Without residual deficit.    Diabetes mellitus type 2 in obese (CMS/[facility])     Dyslipidemia     GERD (gastroesophageal reflux disease)     Heart attack (CMS/[facility])     x3    Hypertension     Ischemic cardiomyopathy     Morbid obesity with BMI of 40.0-44.9, adult (CMS/[facility])     Paroxysmal atrial fibrillation (CMS/[facility])     On Xarelto    V-tach (CMS/[facility])     AICD in place       Past Surgical History:   Procedure Laterality Date    ABLATION VT N/A 06/09/2020    Procedure: Ablation VT;  Surgeon: Wilfredo Montana MD;  Location: [facility] EP Lab;  Service: Electrophysiology;  Laterality: N/A;  Check with Dr. Montana in the a.m. regarding scheduling    APPENDECTOMY      BILATERAL HEART CATH N/A 12/5/2023    Procedure: Bilateral heart cath;  Surgeon: Jitendra Post MD;  Location: [facility] Cath Lab;  Service: Cardiovascular;  Laterality: N/A;    COLONOSCOPY N/A 7/25/2023    Procedure: COLONOSCOPY with Polypectomy;  Surgeon: Hortencia Carson MD;  Location: [facility] Endoscopy;  Service: Endoscopy;  Laterality: N/A;    CORONARY ANGIOGRAPHY N/A 3/24/2025    Procedure: Coronary Angiography;  Surgeon: Vaibhav Mcclure MD;  Location: [facility] Cath Lab;  Service: Cardiovascular;  Laterality: N/A;    CORONARY ARTERY STENTING  2009    2.5 X 24-mm Taxus DIMAS to first diagonal. 3.0 X 8-mm Taxus stent to  proximal LAD just proximal to diagonal.    CORONARY ARTERY STENTING      3.5 X 15-mm Promus DIMAS to totally occluded LAD    CORONARY ARTERY STENTING      2.25 X 30-mm Resolute DIMAS to 2nd diagonal.  Balloon angioplasty through strut to improve blood flow to distal LAD    CORONARY ARTERY STENTING  2017    second diagonal branch LAD Resolute 2.25 x 30-mm DIMAS    CORONARY ARTERY STENTING N/A 3/24/2025    Procedure: Coronary artery/graft stenting;  Surgeon: Vaibhav Mcclure MD;  Location: [facility] Cath Lab;  Service: Cardiovascular;  Laterality: N/A;    ICD DC GEN CHANGE N/A 2022    Procedure: BI-V ICD Gen Change;  Surgeon: Wilfredo Montana MD;  Location: [facility] EP Lab;  Service: Cardiovascular;  Laterality: N/A;    ICD SC NEW      [employer] Cognis Model #N119, Serial #[id number]. [employer] Model #0185, Serial #[id number]. LV lead Acuity Model #45+91, Serial #[id number]. Atrial lead Model #4469, Serial #[id number]    OXIMETRY RUN N/A 2023    Procedure: OXIMETRY RUN;  Surgeon: Jitendra Post MD;  Location: [facility] Cath Lab;  Service: Cardiovascular;  Laterality: N/A;       Allergies as of 2025 - Reviewed 2025   Allergen Reaction Noted    Morphine  2017    Tramadol  2017       (Not in a hospital admission)      Family History   Problem Relation Age of Onset    Heart attack Mother 62    Other Mother         Abdominal Aortic Aneurysm    Lung cancer Mother     Colon cancer Father         Cause of death    Diabetes Brother         Non-Insulin Dependent    Heart attack Brother 51        w/ Stents    Heart disease Brother     Other Son         Well       Social History     Socioeconomic History    Marital status: Single   Tobacco Use    Smoking status: Former     Current packs/day: 0.00     Average packs/day: 0.8 packs/day for 30.0 years (22.5 ttl pk-yrs)     Types: Cigarettes     Start date: 1990     Quit date: 2020     Years since quittin.8    Smokeless tobacco: Never   Vaping  "Use    Vaping status: Never Used   Substance and Sexual Activity    Alcohol use: Not Currently     Comment: Quit drinking in 2007    Drug use: Not Currently    Sexual activity: Yes     Partners: Female       Review of Systems  Review of Systems   Constitutional: Negative for chills, diaphoresis and malaise/fatigue.   HENT:  Negative for congestion and nosebleeds.    Eyes:  Negative for double vision and pain.   Cardiovascular:  Negative for chest pain, claudication, cyanosis, dyspnea on exertion, irregular heartbeat, leg swelling, near-syncope, orthopnea, palpitations, paroxysmal nocturnal dyspnea and syncope.   Respiratory:  Negative for cough, shortness of breath and sleep disturbances due to breathing.    Skin:  Negative for color change, flushing and rash.   Musculoskeletal:  Negative for back pain, falls, joint swelling, muscle cramps and muscle weakness.   Gastrointestinal:  Positive for nausea. Negative for bloating, anorexia and diarrhea.   Neurological:  Positive for dizziness. Negative for difficulty with concentration, disturbances in coordination, focal weakness, light-headedness and weakness.   Psychiatric/Behavioral:  Negative for altered mental status.        Objective     /81   Pulse 70   Temp 36.9 °C (98.4 °F) (Oral)   Resp 14   Ht 1.803 m (5' 11\")   Wt 122 kg (268 lb 15.4 oz)   SpO2 96%   BMI 37.51 kg/m²   Weight: 122 kg (268 lb 15.4 oz)    Physical Exam  Constitutional:       General: He is not in acute distress.     Appearance: He is ill-appearing. He is not toxic-appearing or diaphoretic.   HENT:      Head: Normocephalic and atraumatic.      Nose: Nose normal. No congestion.      Mouth/Throat:      Mouth: Mucous membranes are moist.   Eyes:      Extraocular Movements: Extraocular movements intact.      Pupils: Pupils are equal, round, and reactive to light.   Neck:      Vascular: No carotid bruit.      Comments: No JVP  Cardiovascular:      Rate and Rhythm: Regular rhythm. " Tachycardia present.      Heart sounds: No murmur heard.     No friction rub. No gallop.      Comments: No orthopnea.  2+ pulsation upper and lower bilateral extremities  Pulmonary:      Effort: Pulmonary effort is normal. No respiratory distress.      Breath sounds: No stridor. No wheezing, rhonchi or rales.   Chest:      Chest wall: No tenderness.   Abdominal:      General: Abdomen is flat. There is no distension.      Palpations: There is no mass.   Musculoskeletal:         General: No swelling, tenderness, deformity or signs of injury.      Cervical back: Normal range of motion. No rigidity or tenderness.      Right lower leg: No edema.      Left lower leg: No edema.   Lymphadenopathy:      Cervical: No cervical adenopathy.   Skin:     Coloration: Skin is not jaundiced or pale.      Findings: No bruising, erythema, lesion or rash.   Neurological:      General: No focal deficit present.      Mental Status: He is alert and oriented to person, place, and time.   Psychiatric:         Mood and Affect: Mood normal.         Behavior: Behavior normal.         Thought Content: Thought content normal.         Judgment: Judgment normal.       Data Review:       EKG:       Telemetry            Echocardiogram:  Pending   XR chest portable 1 view  Result Date: 4/21/2025  Narrative: EXAM: DX CHEST PORTABLE 1 VW DATE: 4/21/2025 10:24 PM INDICATION:  chest pain COMPARISON: 3/21/2025 FINDINGS: The heart is not enlarged. Clear lungs. No pleural effusion or pneumothorax.  Left chest wall defibrillator with leads in expected locations.     Impression: IMPRESSION: No acute disease.     Cardiac catheterization  Cardiac catheterization, Coronary Angiography  Result Date: 3/25/2025  Narrative: DATE OF PROCEDURE: 3/24/2025  PREOPERATIVE DIAGNOSIS:  Pre-op Diagnosis    * AICD discharge [Z45.02]    * VT (ventricular tachycardia) (CMS/[facility]) [I47.20]    * Chronic systolic HF (heart failure) (CMS/[facility]) [I50.22]    * Coronary artery disease  involving native coronary artery of native heart without angina pectoris [I25.10]    * Presence of stent in coronary artery [Z95.5] Final Impression: 1.  Mild mid left main stenosis of 20%. 2.  Moderate size left circumflex coronary artery with minimal mid disease of 20%.  The major obtuse marginal branch without any significant disease. 3.  Proximal LAD stent widely patent.  The remainder of the LAD without significant disease so it is a fairly small caliber vessel. 4.  80% ostial first diagonal stenosis.  Diffuse 60% proximal disease.  The vessel then bifurcates into a smaller inferior branch that is subtotally occluded within previously placed stent.  Superior diagonal branch with 40% ostial stenosis. 5.  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  Remainder of the vessel with minimal luminal irregularities including large PDA and KRISTEN. 6.  PTCA of the inferior subtotal occluded diagonal branch with 2.5 mm balloon.  Stenting of the ostial and proximal first diagonal branch up to the bifurcation with 2.5 x 8 mm DIMAS postdilatation 2.5 mm noncompliant balloon to 20 he.  Good flow in both branches. Rationale, risks, benefits and alternatives of cardiac catheterization and percutaneous coronary intervention with or without stents is explained to the patient including, but not limited to, possible risks of infection, bleeding requiring blood transfusion, damage to the blood vessel requiring repair vascular surgery, allergic reactions to the x-ray dye, acute kidney injury including need for possible hemodialysis, significant cardiac arrhythmias, myocardial infarction, stroke, and threat to life. SCAI Shock Stage: A ASA score 3 1 Patie score 3 Procedure: Coronary angiogram, PTCA of inferior first diagonal branch, stenting of ostial/proximal first diagonal with 2.5 x 8 mm DIMAS, postdilatation high-pressure 2.5 mm noncompliant balloon.  Details of the procedure: Informed consent is obtained.  After sterile prep and drape 1% lidocaine is infiltrated over the right wrist overlying right radial artery. Access was obtained into right radial artery with the 5-6 French Slender Glide sheath via modified Seldinger technique. Standard vasodilatory radial cocktail was given via the side-port of the glide sheath which included 3000 international units of heparin, 2 mg of verapamil and 100 mcg of nitroglycerin. JL3.5 and JR4 catheters were used over a Blackburn wire for left and right coronary engagement and angiogram, which was performed in the standard fashion. The catheter was then withdrawn over a wire. No complications were noted during the diagnostic procedure. Patent hemostasis was obtained with TR band after removal of the glide sheath. Patient was initiated on bivalirudin.  60 mg of Effient was given.  A CLS 3.0, 6 French guide was placed in the ostium of left main coronary.  A 180 cm Fielder J-tip 0.014 coronary wire was advanced into the inferior branch of the diagonal.  PTCA was performed of the occluded stent in the diagonal inferior branch with a 2 mm balloon.  Further distal the diagonal branch was also dilated with a 2 mm balloon with no residual visual stenosis.  The proximal and ostial diagonal branch was also dilated with a 2 mm balloon then stented with a 2.5 x 8 mm DIMAS with care not to place a stent within the smaller inferior diagonal branch.  This was postdilated with a 2.5 mm noncompliant balloon to high-pressure with good result.  All catheters and wires were removed. Anesthesia: 50 minutes 26 seconds of moderate sedation was administered was administered using Fentanyl and Versed under my supervision. I monitored the patient for further administration of agents as needed including continuous monitoring of oxygen saturation, heart rate and blood pressure.  The RN administered the medicine under my direct supervision order, and an independent trained observer was present. Antiplatelet agent:  Effient Recommended duration of dual antiplatelet therapy: 1 year Total Sedation time: 50 minutes 26 seconds Sedation Medications and Contrast use:   03/24/2025 1613 MDT fentaNYL citrate (PF) 50 mcg/mL injection 25 mcg   03/24/2025 1610 MDT fentaNYL citrate (PF) 50 mcg/mL injection 25 mcg   03/24/2025 1603 MDT fentaNYL citrate (PF) 50 mcg/mL injection 50 mcg   03/24/2025 1652 MDT iopamidoL (ISOVUE-370) 76 % injection 190 mL   03/24/2025 1613 MDT midazolam (VERSED) injection 0.5 mg intravenous   03/24/2025 1611 MDT midazolam (VERSED) injection 0.5 mg intravenous   03/24/2025 1603 MDT midazolam (VERSED) injection 1 mg intravenous Given Total Air Kerma (mGy) 403.000; Fluoro Time (min) = 12.8 BW        Assessment/Plan   Ventricular tachycardia (VT)  Patient presented to the ER due to fast heart rate initially began today while he was at home  He has a history of ventricular tachycardia which he take amiodarone at home  Initial EKG indicate: Paced rhythm with a heart rate of 70 no other concerning changes  Initial troponin: 13, 15  Patient was noted to have a wide-complex ventricular tachycardia on telemetry in the ER with a rate in 140s  Given amiodarone bolus followed by infusion  Continue telemetry  Currently asymptomatic  paced rhythm with rate of 70  Echo pending in the morning  Will sign over patient to EP in the morning    Ischemic cardiomyopathy  CAD status post multiple stents  Patient has a long history of coronary artery disease with multiple stents  Initial EKG: Paced rhythm with heart rate of 70.  No ST or T wave changes  Initial troponin: 13, 15  Echo on 3/4/2025 indicate an EF of 25%  Severe left ventricular systolic dysfunction  Patient take the following med at home prasugrel, nitroglycerin, carvedilol and rosuvastatin's  Resume home med GDMT: Prasugrel 10 mg oral daily, carvedilol 25 mg oral twice daily and atorvastatin 40 mg oral nightly  Resumed nitroglycerin 0.4 mg as needed  Consider advancing GDMT  as patient tolerate    Paroxysmal atrial fibrillation  Patient has a history of atrial fibrillation  [id number] score 6   Take Xarelto 20 mg at home  Currently paced rhythm with heart rate of 70  Resume anticoagulation Xarelto 20 mg oral daily  Resume carvedilol 25 mg twice daily  We will continue to watch and monitor telemetry    Chronic systolic heart failure  NYHA class III  Echo on 3/4/2024 indicate EF 25%  Volume status at today: Euvolemic  Diuretic patient takes furosemide 40 mg daily at home  Resume home furosemide 40 mg oral daily   Echo pending in the morning  GDMT: Aldactone 25 mg oral daily, Entresto  mg oral twice daily, carvedilol 25 mg oral twice daily, Jardiance 10 mg oral daily  Continue daily weight, keep salt level less than 2 g and fluid at 2 L  Obtain morning lab BMP      Patient case is discussed with Wellington Moore CNP, Dr. Quezada and further care will be formulated with EP in the morning      Electronically signed by: Ana Fraser PA-C    A voice recognition program was used to aid in documentation of this record. Sometimes words are not printed exactly as they were spoken. While efforts were made to carefully edit and correct any inaccuracies, some errors may be present; please take these into context. Please contact the provider if errors are identified.

## 2025-04-22 NOTE — CONSULTATION
Cardiology Inpatient Consult Note    Subjective    Patient ID: Pete Trejo is a 59 y.o. male.    Chief Complaint:   Chief Complaint   Patient presents with    Palpitations     Pt arrives through triage and  c/o palpitations and also has a pacemaker and defribrillator but pt thinks it has not been going off         Pete is a pleasant 59-year-old male who presented to the emergency room last evening with tachycardic palpitations, chest tightness, shortness of breath and lightheadedness and dizziness.  Symptoms began Last evening shortly prior to coming in.  He noted these were similar to prior episodes of VT.  He is on both mexiletine and amiodarone and has not missed any doses of his medication.  4 days prior his amiodarone dose had been decreased to 200 mg daily due to ataxia like symptoms when evaluated by Bozena Francois CNP 3/24/2025.  He had been admitted 3/14/2025 to 3/18/2025 and again 3/21/2025 to 3/25/2025 due to recurrent VT storm with ICD shocks.  [facility] and locally by Dr. Wu.    During his 3/14/2025 through 3/18/25 hospitalization for VT storm with ICD shocks he was transitioned off sotalol and onto amiodarone with discharge instructions of 200 mg 3 times daily for a week then 200 mg twice daily.  Mexiletine had been increased to 300 mg 3 times daily.  He was also continued on carvedilol 25 mg 2 tablets twice daily.  Adjustments were made to his ICD settings by Dr. Yeung he had over 15 hours of VT the day prior to his admission starting in the morning. His VT 1 zone is set at 110 bpm with ATP programmed with scan, ramp/scan and a single 41 J shock.  VT zone 160 bpm with similar ATP scheme and 6 shocks programmed.  VF zone 200 bpm with ATP plus 8 shocks.    On his 3/21/2025 admission he began to feel funny and sat down in his chair and was shocked by his ICD.  He was readmitted and ICD was  interrogated documenting ATP x 15 followed by ICD shock.  He had additional IV and p.o.  amiodarone loading.  Again he had not missed any doses of his medication.  This time he did undergo repeat cardiac cath on 3/24/2025 with intervention/PTCI inferior subtotal occluded diagonal branch with 2.5 mm balloon.  Stenting of the ostial and proximal first diagonal branch up to the bifurcation with a 2.5 x 8 mm DIMAS with follow-up angiogram showing good flow in both branches.    He has past medical history of ischemic cardiomyopathy CRT-D implant 2011 with generator change 6/2022,, CAD with PCI to the second diagonal branch LAD 7/2017, PCI to the LAD 12/3/2010, PCI to diagonal 7/2009) , diabetes mellitus type 2, obesity, sleep apnea on CPAP, PAF, anticoagulated with Xarelto, [id number] score at least 4.  He had multiple admissions with ventricular tachycardia and ICD therapy, ventricular tachycardia (partial VT ablation 6/2020, extensive substrate modification/VT ablation at [facility] 11/2021), chronic systolic diastolic congestive heart failure on GDMT with carvedilol 50 mg twice daily, Jardiance 10 mg daily, spironolactone 25 mg daily and Entresto 97/103 mg twice daily., hypertension, hyperlipidemia.    Pete was seen and examined.  He has not been able to do the walking that he had for approximately the last 1 1 to 1-1/2 weeks.  He was usually walking his dogs going about 4 blocks daily.  He denies any chest discomfort.  His energy has been down and he tires out easily.  He feels like his arms and legs are weak.  He cannot stand for very long as he gets wobbly.  His symptoms seem to be worsening.  After his last discharge he was okay for a bit then began to notice recurrent palpitations which seem to worsen as the days went on.  He has developed a little tremor in his hand which he had previously on amiodarone.  He has a virtual appointment with the [facility] on April 30.  He is supposed to  send a transmission on the 28th.  He is using his CPAP most of the time.  He indicates his weight fluctuates around 260 to 262 pounds.  He has not noticed any lower extremity swelling.  He states he always feels dehydrated and goes by the color of his urine as to how much water he is drinking.  If it is darker than he drinks more water.  Radiology did place him on metformin though he was confused about whether or not he has diabetes.  He has not had syncopal or near syncopal events.    Echo 3/14/2025 revealed an EF of 25% with severely dilated LV, LVIDD 7.3 cm with large anterior apical aneurysm basal, anterolateral and inferior lateral wall appear to have normal contractility.  Moderately dilated LA, normal LV filling pressures, no obvious valvular pathology, no effusion, no LV thrombus with Definity, LA VI 43 mL/m² with an LA size of 4.73 cm.    Limited echo today revealing an EF of 32% with severe LV systolic dysfunction, grade 1 diastolic dysfunction, normal-sized left atrium, right atrium dilated, LA VI 27 mL/m² with an LA size of 5.04 cm.  RA 19.1 cm².    Device interrogation yesterday just after midnight showed VT and 112 bpm with ATP x 8, prior to that VT 1 zone at 111 bpm ATP x 1.  He is 96% atrial paced and 99% biventricular paced.  Bradycardia pacing DDD 70 with a max tracking rate of 100.    Cardiac cath 3/24/2025  Final Impression:  1.  Mild mid left main stenosis of 20%.  2.  Moderate size left circumflex coronary artery with minimal mid disease of 20%.  The major obtuse marginal branch without any significant disease.  3.  Proximal LAD stent widely patent.  The remainder of the LAD without significant disease so it is a fairly small caliber vessel.  4.  80% ostial first diagonal stenosis.  Diffuse 60% proximal disease.  The vessel then bifurcates into a smaller inferior branch that is subtotally occluded within previously placed stent.  Superior diagonal branch with 40% ostial stenosis.  5.  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  Remainder of the vessel with minimal luminal irregularities including large PDA and KRISTEN.  6.  PTCA of the inferior subtotal occluded diagonal branch with 2.5 mm balloon.  Stenting of the ostial and proximal first diagonal branch up to the bifurcation with 2.5 x 8 mm DIMAS postdilatation 2.5 mm noncompliant balloon to 20 he.  Good flow in both branches.      Palpitations   This is a recurrent problem. The current episode started yesterday. The problem occurs constantly. The problem has been rapidly worsening. On average, each episode lasts 10 hours. Nothing aggravates the symptoms. Associated symptoms include chest pain, dizziness, malaise/fatigue, shortness of breath and weakness. Pertinent negatives include no fever, near-syncope or syncope. He has tried nothing for the symptoms. Risk factors include diabetes mellitus, being male, dyslipidemia and obesity (Known CAD). His past medical history is significant for heart disease.       Echo 3/14/2025 revealed an EF of 25% with severely dilated LV, LVIDD 7.3 cm with large anterior apical aneurysm basal, anterolateral and inferior lateral wall appear to have normal contractility.  Moderately dilated LA, normal LV filling pressures, no obvious valvular pathology, no effusion, no LV thrombus with Definity, LA VI 43 mL/m² with an LA size of 4.73 cm.    Limited echo today revealing an EF of 32% with severe LV systolic dysfunction, grade 1 diastolic dysfunction, normal-sized left atrium, right atrium dilated, LA VI 27 mL/m² with an LA size of 5.04 cm.  RA 19.1 cm².    Device interrogation yesterday just after midnight showed VT and 112 bpm with ATP x 8, prior to that VT 1 zone at 111 bpm ATP x 1.  He is 96% atrial paced and 99% biventricular paced.  Bradycardia pacing DDD 70 with a max tracking rate of 100.    Specialty Problems          Cardiology Problems    Hyperlipidemia        Hypertension         Ischemic cardiomyopathy        Coronary artery disease involving native coronary artery of native heart without angina pectoris        Presence of stent in coronary artery        Chronic systolic HF (heart failure) (CMS/HCC)        Chronic anticoagulation        Paroxysmal atrial fibrillation (CMS/HCC)        Sustained ventricular tachycardia (CMS/HCC)        Long term current use of antiarrhythmic drug        AICD discharge        Elevated troponin        Cardiac resynchronization therapy defibrillator (CRT-D) in place        Ventricular tachycardia (CMS/HCC)             NYHA Cl:III    [id number] SCORE  <66 y/o (0)  Yes (1)  Yes (1)  Yes (1)  No (0)  Yes (1)  (4 points) Stroke risk was 4.8% per year and 6.7% risk of stroke/TIA/systemic embolism.    Past Surgical History:   Procedure Laterality Date    ABLATION VT N/A 06/09/2020    Procedure: Ablation VT;  Surgeon: Wilfredo Montana MD;  Location: [facility] EP Lab;  Service: Electrophysiology;  Laterality: N/A;  Check with Dr. Montana in the a.m. regarding scheduling    APPENDECTOMY      BILATERAL HEART CATH N/A 12/5/2023    Procedure: Bilateral heart cath;  Surgeon: Jitendra Post MD;  Location: [facility] Cath Lab;  Service: Cardiovascular;  Laterality: N/A;    COLONOSCOPY N/A 7/25/2023    Procedure: COLONOSCOPY with Polypectomy;  Surgeon: Hortencia Carson MD;  Location: [facility] End[facility];  Service: Endoscopy;  Laterality: N/A;    CORONARY ANGIOGRAPHY N/A 3/24/2025    Procedure: Coronary Angiography;  Surgeon: Vaibhav Mcclure MD;  Location: [facility] Cath Lab;  Service: Cardiovascular;  Laterality: N/A;    CORONARY ARTERY STENTING  2009    2.5 X 24-mm Taxus DIMAS to first diagonal. 3.0 X 8-mm Taxus stent to proximal LAD just proximal to diagonal.    CORONARY ARTERY STENTING  2010    3.5 X 15-mm Promus DIMAS to totally occluded LAD    CORONARY ARTERY STENTING  2011    2.25 X 30-mm Resolute DIMAS to 2nd diagonal.  Balloon angioplasty through strut to improve blood flow to distal LAD    CORONARY  ARTERY STENTING  07/28/2017    second diagonal branch LAD Resolute 2.25 x 30-mm DIMAS    CORONARY ARTERY STENTING N/A 3/24/2025    Procedure: Coronary artery/graft stenting;  Surgeon: Vaibhav Mcclure MD;  Location: [facility] Cath Lab;  Service: Cardiovascular;  Laterality: N/A;    ICD DC GEN CHANGE N/A 6/20/2022    Procedure: BI-V ICD Gen Change;  Surgeon: Wilfredo Montana MD;  Location: [facility] EP Lab;  Service: Cardiovascular;  Laterality: N/A;    ICD SC NEW  2011    [employer] Cognis Model #N119, Serial #[id number]. [employer] Reliance Model #0185, Serial #[id number]. LV lead Acuity Model #45+91, Serial #[id number]. Atrial lead Model #4469, Serial #[id number]    OXIMETRY RUN N/A 12/5/2023    Procedure: OXIMETRY RUN;  Surgeon: Jitendra Post MD;  Location: [facility] Cath Lab;  Service: Cardiovascular;  Laterality: N/A;       Allergies as of 04/21/2025 - Reviewed 04/21/2025   Allergen Reaction Noted    Morphine  07/30/2017    Tramadol  07/30/2017         Current Facility-Administered Medications:     Maintain IV access, , , Until discontinued **AND** Saline lock IV, , , Once **AND** sodium chloride flush 3 mL, 3 mL, intravenous, PRN, Deuceon, Ana, PA-C    acetaminophen (TYLENOL) tablet 650 mg, 650 mg, oral, q6h PRN, Bria, YONG Perez-C    bisacodyL (DULCOLAX) suppository 10 mg, 10 mg, rectal, Daily PRN, Ana Fraser PA-C    ondansetron (ZOFRAN) tablet 4 mg, 4 mg, oral, q6h PRN **OR** ondansetron (ZOFRAN) injection 4 mg, 4 mg, intravenous, q6h PRN, Ana Fraser PA-C    ethyl alcohoL (Nozin Nasal Antiseptic POPSwab) ampule 1 Application, 1 Application, nasal, 2x daily, Tyrone Quezada MD, 1 Application at 04/22/25 0848    prasugreL HCl (EFFIENT) tablet 10 mg, 10 mg, oral, Daily, Ana Fraser PA-JULIO C, 10 mg at 04/22/25 0848    nitroglycerin (NITROSTAT) SL tablet 0.4 mg, 0.4 mg, sublingual, q5 min PRN, Ana Fraser PA-C    carvediloL (COREG) tablet 25 mg, 25 mg, oral, 2x daily with meals, Ana Fraser PA-C, 25 mg at 04/22/25 0846    rivaroxaban (XARELTO) tablet  20 mg, 20 mg, oral, Daily with dinner, Thon, Arok, PA-C    rosuvastatin (CRESTOR) tablet 40 mg, 40 mg, oral, Nightly, Thon, Arok, PA-C    spironolactone (ALDACTONE) tablet 25 mg, 25 mg, oral, Daily, Thon, Arok, PA-C, 25 mg at 25 0846    empagliflozin (JARDIANCE) tablet 10 mg, 10 mg, oral, Daily, Thon, Arok, PA-C, 10 mg at 25 0846    sacubitriL-valsartan (ENTRESTO)  mg 1 tablet, 1 tablet, oral, 2x daily, Thon, Arok, PA-C, 1 tablet at 2546    furosemide (LASIX) tablet 40 mg, 40 mg, oral, Daily, Thon, Arok, PA-C, 40 mg at 25 0846    [COMPLETED] amiodarone 150 mg/100 mL (1.5 mg/mL) IVPB - LOADING DOSE (premix), 150 mg, intravenous, Once, Stopped at 25 **FOLLOWED BY** [] amiodarone (NEXTERONE) 360 mg/200 mL (1.8 mg/mL) infusion (premix), 1 mg/min, intravenous, Continuous, Stopped at 254 **FOLLOWED BY** amiodarone (NEXTERONE) 360 mg/200 mL (1.8 mg/mL) infusion (premix), 0.5 mg/min, intravenous, Continuous, Pamela Solomon DO, Last Rate: 16.67 mL/hr at 25, 0.5 mg/min at 25 and No current outpatient medications on file.    Family History   Problem Relation Age of Onset    Heart attack Mother 62    Other Mother         Abdominal Aortic Aneurysm    Lung cancer Mother     Colon cancer Father         Cause of death    Diabetes Brother         Non-Insulin Dependent    Heart attack Brother 51        w/ Stents    Heart disease Brother     Other Son         Well       Social History     Tobacco Use    Smoking status: Former     Current packs/day: 0.00     Average packs/day: 0.8 packs/day for 30.0 years (22.5 ttl pk-yrs)     Types: Cigarettes     Start date: 1990     Quit date: 2020     Years since quittin.8    Smokeless tobacco: Never   Vaping Use    Vaping status: Never Used   Substance Use Topics    Alcohol use: Not Currently     Comment: Quit drinking in     Drug use: Not Currently       Review of Systems  Review of Systems    Constitutional: Positive for malaise/fatigue. Negative for chills, fever, weight gain and weight loss.   HENT: Negative.     Eyes: Negative.    Cardiovascular:  Positive for chest pain, dyspnea on exertion and palpitations. Negative for leg swelling, near-syncope, orthopnea, paroxysmal nocturnal dyspnea and syncope.   Respiratory:  Positive for shortness of breath.         LAVON on CPAP   Endocrine: Positive for polydipsia and polyphagia.   Hematologic/Lymphatic: Negative.    Gastrointestinal: Negative.    Genitourinary: Negative.    Neurological:  Positive for disturbances in coordination, dizziness, light-headedness and weakness.   Psychiatric/Behavioral: Negative.         Objective     Vital signs in last 24 hours:   Temp:  [36.5 °C (97.7 °F)-36.9 °C (98.4 °F)] 36.5 °C (97.7 °F)  Heart Rate:  [] 70  Resp:  [11-21] 16  SpO2:  [90 %-97 %] 93 %  BP: ()/() 146/86  Weight: 121.7 kg (268 lb 4.8 oz)  Intake/Output last 3 shifts:  I/O last 3 completed shifts:  In: 362.8 [P.O.:100; I.V.:262.8]  Out: 700 [Urine:700]  Intake/Output this shift:  No intake/output data recorded.    Physical Exam  Vitals reviewed.   Constitutional:       Appearance: Normal appearance. He is well-developed. He is obese.   HENT:      Head: Normocephalic and atraumatic.   Eyes:      General: No scleral icterus.  Neck:      Thyroid: No thyromegaly.      Vascular: No carotid bruit or JVD.      Comments: ICD left upper chest without erosion or infection  Cardiovascular:      Rate and Rhythm: Normal rate and regular rhythm.      Pulses: Intact distal pulses.           Carotid pulses are 2+ on the right side and 2+ on the left side.       Dorsalis pedis pulses are 2+ on the right side and 2+ on the left side.        Posterior tibial pulses are 2+ on the right side and 2+ on the left side.      Heart sounds: Normal heart sounds. No murmur heard.     No friction rub. No gallop.      Comments: ICD left upper chest without erosion or  "infection  Pulmonary:      Effort: Pulmonary effort is normal.      Breath sounds: No wheezing, rhonchi or rales.   Abdominal:      General: Bowel sounds are normal.      Palpations: Abdomen is soft.      Tenderness: There is no abdominal tenderness.   Musculoskeletal:      Cervical back: Normal range of motion and neck supple.      Right lower leg: No edema.      Left lower leg: No edema.   Lymphadenopathy:      Cervical: No cervical adenopathy.   Skin:     General: Skin is warm and dry.      Findings: No rash.   Neurological:      General: No focal deficit present.      Mental Status: He is alert and oriented to person, place, and time.   Psychiatric:         Mood and Affect: Mood normal.         Behavior: Behavior normal.         Data Review:   Lab Results   Component Value Date     04/21/2025    K 3.5 04/21/2025    CO2 23 04/21/2025    BUN 14 04/21/2025    CREATININE 1.03 04/21/2025    GLUCOSE 132 (H) 04/21/2025    CALCIUM 9.4 04/21/2025     Lab Results   Component Value Date    TROPHS 13.0 04/21/2025    BNP 34 04/21/2025     Lab Results   Component Value Date    WBC 5.3 04/21/2025    HGB 15.4 04/21/2025    HCT 43.8 04/21/2025    MCV 93.0 04/21/2025     04/21/2025     No results found for: \"CHOL\", \"TRIG\", \"HDL\", \"LDLDIRECT\", \"LDLCALC\"    XR chest portable 1 view  Result Date: 4/21/2025  Narrative: EXAM: DX CHEST PORTABLE 1 VW DATE: 4/21/2025 10:24 PM INDICATION:  chest pain COMPARISON: 3/21/2025 FINDINGS: The heart is not enlarged. Clear lungs. No pleural effusion or pneumothorax.  Left chest wall defibrillator with leads in expected locations.     Impression: IMPRESSION: No acute disease.     Cardiac catheterization  Cardiac catheterization, Coronary Angiography  Result Date: 3/25/2025  Narrative: DATE OF PROCEDURE: 3/24/2025  PREOPERATIVE DIAGNOSIS:  Pre-op Diagnosis    * AICD discharge [Z45.02]    * VT (ventricular tachycardia) (CMS/HCC) [I47.20]    * Chronic systolic HF (heart failure) (CMS/HCC) " [I50.22]    * Coronary artery disease involving native coronary artery of native heart without angina pectoris [I25.10]    * Presence of stent in coronary artery [Z95.5] Final Impression: 1.  Mild mid left main stenosis of 20%. 2.  Moderate size left circumflex coronary artery with minimal mid disease of 20%.  The major obtuse marginal branch without any significant disease. 3.  Proximal LAD stent widely patent.  The remainder of the LAD without significant disease so it is a fairly small caliber vessel. 4.  80% ostial first diagonal stenosis.  Diffuse 60% proximal disease.  The vessel then bifurcates into a smaller inferior branch that is subtotally occluded within previously placed stent.  Superior diagonal branch with 40% ostial stenosis. 5.  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  Remainder of the vessel with minimal luminal irregularities including large PDA and KRISTEN. 6.  PTCA of the inferior subtotal occluded diagonal branch with 2.5 mm balloon.  Stenting of the ostial and proximal first diagonal branch up to the bifurcation with 2.5 x 8 mm DIMAS postdilatation 2.5 mm noncompliant balloon to 20 he.  Good flow in both branches. Rationale, risks, benefits and alternatives of cardiac catheterization and percutaneous coronary intervention with or without stents is explained to the patient including, but not limited to, possible risks of infection, bleeding requiring blood transfusion, damage to the blood vessel requiring repair vascular surgery, allergic reactions to the x-ray dye, acute kidney injury including need for possible hemodialysis, significant cardiac arrhythmias, myocardial infarction, stroke, and threat to life. SCAI Shock Stage: A ASA score 3 1 Patie score 3 Procedure: Coronary angiogram, PTCA of inferior first diagonal branch, stenting of ostial/proximal first diagonal with 2.5 x 8 mm DIMAS, postdilatation high-pressure 2.5 mm noncompliant balloon.  Details of the  procedure: Informed consent is obtained. After sterile prep and drape 1% lidocaine is infiltrated over the right wrist overlying right radial artery. Access was obtained into right radial artery with the 5-6 French Slender Glide sheath via modified Seldinger technique. Standard vasodilatory radial cocktail was given via the side-port of the glide sheath which included 3000 international units of heparin, 2 mg of verapamil and 100 mcg of nitroglycerin. JL3.5 and JR4 catheters were used over a Blackburn wire for left and right coronary engagement and angiogram, which was performed in the standard fashion. The catheter was then withdrawn over a wire. No complications were noted during the diagnostic procedure. Patent hemostasis was obtained with TR band after removal of the glide sheath. Patient was initiated on bivalirudin.  60 mg of Effient was given.  A CLS 3.0, 6 French guide was placed in the ostium of left main coronary.  A 180 cm Fielder J-tip 0.014 coronary wire was advanced into the inferior branch of the diagonal.  PTCA was performed of the occluded stent in the diagonal inferior branch with a 2 mm balloon.  Further distal the diagonal branch was also dilated with a 2 mm balloon with no residual visual stenosis.  The proximal and ostial diagonal branch was also dilated with a 2 mm balloon then stented with a 2.5 x 8 mm DIMAS with care not to place a stent within the smaller inferior diagonal branch.  This was postdilated with a 2.5 mm noncompliant balloon to high-pressure with good result.  All catheters and wires were removed. Anesthesia: 50 minutes 26 seconds of moderate sedation was administered was administered using Fentanyl and Versed under my supervision. I monitored the patient for further administration of agents as needed including continuous monitoring of oxygen saturation, heart rate and blood pressure.  The RN administered the medicine under my direct supervision order, and an independent trained  observer was present. Antiplatelet agent: Effient Recommended duration of dual antiplatelet therapy: 1 year Total Sedation time: 50 minutes 26 seconds Sedation Medications and Contrast use:   03/24/2025 1613 MDT fentaNYL citrate (PF) 50 mcg/mL injection 25 mcg   03/24/2025 1610 MDT fentaNYL citrate (PF) 50 mcg/mL injection 25 mcg   03/24/2025 1603 MDT fentaNYL citrate (PF) 50 mcg/mL injection 50 mcg   03/24/2025 1652 MDT iopamidoL (ISOVUE-370) 76 % injection 190 mL   03/24/2025 1613 MDT midazolam (VERSED) injection 0.5 mg intravenous   03/24/2025 1611 MDT midazolam (VERSED) injection 0.5 mg intravenous   03/24/2025 1603 MDT midazolam (VERSED) injection 1 mg intravenous Given Total Air Kerma (mGy) 403.000; Fluoro Time (min) = 12.8 BW        Assessment/Plan   Patient Active Problem List   Diagnosis    Ischemic cardiomyopathy    Hypertension    Hyperlipidemia    Presence of stent in coronary artery    Coronary artery disease involving native coronary artery of native heart without angina pectoris    Chronic systolic HF (heart failure) (CMS/HCC)    Sustained ventricular tachycardia (CMS/HCC)    Chronic anticoagulation    Paroxysmal atrial fibrillation (CMS/HCC)    LAVON (obstructive sleep apnea)    Long term current use of antiarrhythmic drug    AICD discharge    Gastroesophageal reflux disease without esophagitis    Benign prostatic hyperplasia without lower urinary tract symptoms    Diabetes mellitus type 2 in obese (CMS/HCC)    Moderate obesity    Elevated troponin    Lymphocytosis (symptomatic)    Cardiac resynchronization therapy defibrillator (CRT-D) in place    Ventricular tachycardia (CMS/HCC)       Plan/recommendation:  Recurrent ventricular tachycardia/VT storm:  Pete represented to the ER last evening with tachycardic palpitations, chest tightness, shortness of breath and lightheadedness.  Symptoms were consistent with prior admissions and he presented for evaluation.  His ICD was interrogated.  This revealed  ventricular tachycardia at 112 bpm with multiple bursts of ATP.  See arrhythmia log in the HPI.  He is 96% atrial paced and 99% biventricular paced.  His bradycardia pacing rate is DDD at 70 with MTR of 100.  Limited echo showed an EF of 32%.  LV severely enlarged  anterior apical aneurysm basal, anterolateral and inferior lateral wall appear to have normal contractility.  He has had multiple VT ablations.  He is reloading with IV amiodarone.  Unfortunately he is also noted side effects on the higher dose of amiodarone with tremor in the right hand which he had previously when he was on amiodarone and has had progressive decrease in his energy with weakness in his arms and legs.  He is no longer walking the 4-6 blocks with his dogs daily.  If he stands for too long he gets wobbly and has to sit down.  He has a virtual follow-up appointment with [facility] on 4/30/2025. For some reason his carvedilol was only being given 25 mg twice daily.  Will restart carvedilol 50 mg twice daily as his blood pressure is more than adequate.  Will restart his mexiletine 300 mg 3 times daily as well.  Will plan on restarting p.o. amiodarone after current bag is finished.  Discussed with Dr. Montana and Jaime from the device clinic.  Will turn the shock therapy off in the slow VT zone.     Congestive heart failure:   Discussed with Dr. Leiva.  He has not been officially consulted though he would like to do a right heart cath prior to patient being discharged to reassess his volume status.  He is on GDMT for CHF with carvedilol 50 mg twice daily, Jardiance 10 mg daily, spironolactone 25 mg daily and Entresto 97/103 mg twice daily.      Patient is seen in conjunction with Dr. Montana.      Electronically signed by: Merced Newton CNP  4/22/2025  8:56 AM

## 2025-04-22 NOTE — TELEPHONE ENCOUNTER
Patient was called and informed of recommendations.  He said he is currently in the hospital.  Patient is currently in the ICU.

## 2025-04-22 NOTE — PLAN OF CARE
Problem: Pain - Adult  Goal: Verbalizes/displays adequate comfort level or baseline comfort level  Outcome: Progressing  Flowsheets (Taken 4/22/2025 0227)  Verbalizes/displays adequate comfort level or baseline comfort level:   Encourage patient to monitor pain and request interventions   Assess pain using the appropriate pain scale   Administer analgesics based on type and severity of pain and evaluate response   Educate/Implement non-pharmacological measures as appropriate and evaluate response   Consider cultural, developmental and social influences on pain and pain management   Notify Provider if interventions unsuccessful or patient reports new pain     Problem: Infection - Adult  Goal: Absence of infection during hospitalization  Outcome: Progressing     Problem: Safety Adult  Goal: Patient will remain safe during hospitalization  Outcome: Progressing     Problem: Daily Care  Goal: Daily care needs are met  Outcome: Progressing     Problem: Knowledge Deficit  Goal: Patient/family/caregiver demonstrates understanding of disease process, treatment plan, medications, and discharge instructions  Outcome: Progressing     Problem: Discharge Barriers  Goal: Patient's discharge needs are met  Outcome: Progressing     Problem: Coping  Goal: Pt/support person able to verbalize concerns and demonstrate effective coping strategies  4/22/2025 0227 by Nurse Evelyn MCLAUGHLIN RN  Outcome: Progressing  4/22/2025 0226 by Nurse Evelyn MCLAUGHLIN RN  Outcome: Progressing     Problem: Decision Making  Goal: Pt/support person able to effectively weigh alternatives and participate in decision making related to treatment and care  4/22/2025 0227 by Nurse Evelyn MCLAUGHLIN RN  Outcome: Progressing  4/22/2025 0226 by Nurse Evelyn MCLAUGHLIN RN  Outcome: Progressing     Problem: Spiritual Care  Goal: Pt feels balance and connection with higher power that empowers the self during times of spiritual distress  4/22/2025 0227 by Nurse Evelyn MCLAUGHLIN RN  Outcome:  Progressing  4/22/2025 0226 by Nurse Evelyn MCLAUGHLIN RN  Outcome: Progressing     Problem: Pain - Adult  Goal: Verbalizes/displays adequate comfort level or baseline comfort level  Outcome: Progressing  Flowsheets (Taken 4/22/2025 0227)  Verbalizes/displays adequate comfort level or baseline comfort level:   Encourage patient to monitor pain and request interventions   Assess pain using the appropriate pain scale   Administer analgesics based on type and severity of pain and evaluate response   Educate/Implement non-pharmacological measures as appropriate and evaluate response   Consider cultural, developmental and social influences on pain and pain management   Notify Provider if interventions unsuccessful or patient reports new pain

## 2025-04-23 ENCOUNTER — HOSPITAL ENCOUNTER (INPATIENT)
Facility: HOSPITAL | Age: 60
DRG: 309 | End: 2025-04-23
Attending: EMERGENCY MEDICINE | Admitting: HOSPITALIST
Payer: MEDICARE

## 2025-04-23 ENCOUNTER — TRANSFERRED RECORDS (OUTPATIENT)
Dept: HEALTH INFORMATION MANAGEMENT | Facility: CLINIC | Age: 60
End: 2025-04-23
Payer: MEDICARE

## 2025-04-23 VITALS
WEIGHT: 271.4 LBS | OXYGEN SATURATION: 92 % | BODY MASS INDEX: 37.99 KG/M2 | HEIGHT: 71 IN | SYSTOLIC BLOOD PRESSURE: 134 MMHG | DIASTOLIC BLOOD PRESSURE: 84 MMHG | HEART RATE: 72 BPM | TEMPERATURE: 97.9 F | RESPIRATION RATE: 16 BRPM

## 2025-04-23 DIAGNOSIS — R00.2 PALPITATIONS: Primary | ICD-10-CM

## 2025-04-23 DIAGNOSIS — I47.29 NONSUSTAINED VENTRICULAR TACHYCARDIA (CMS/HCC): ICD-10-CM

## 2025-04-23 LAB
ANION GAP SERPL CALC-SCNC: 6 MMOL/L (ref 3–11)
ANION GAP SERPL CALC-SCNC: 8 MMOL/L (ref 3–11)
APTT PPP: 36.2 SECONDS (ref 25.1–36.5)
BASOPHILS # BLD AUTO: 0 10*3/UL
BASOPHILS # BLD AUTO: 0 10*3/UL
BASOPHILS NFR BLD AUTO: 0.4 % (ref 0–2)
BASOPHILS NFR BLD AUTO: 0.6 % (ref 0–2)
BNP SERPL-MCNC: 46 PG/ML (ref 0–100)
BSA FOR ECHO PROCEDURE: 2.48 M2
BSA FOR ECHO PROCEDURE: 2.48 M2
BUN SERPL-MCNC: 13 MG/DL (ref 7–25)
BUN SERPL-MCNC: 17 MG/DL (ref 7–25)
CALCIUM SERPL-MCNC: 9.1 MG/DL (ref 8.6–10.3)
CALCIUM SERPL-MCNC: 9.3 MG/DL (ref 8.6–10.3)
CHLORIDE SERPL-SCNC: 104 MMOL/L (ref 98–107)
CHLORIDE SERPL-SCNC: 105 MMOL/L (ref 98–107)
CO2 SERPL-SCNC: 25 MMOL/L (ref 21–32)
CO2 SERPL-SCNC: 26 MMOL/L (ref 21–32)
CREAT SERPL-MCNC: 0.92 MG/DL (ref 0.7–1.3)
CREAT SERPL-MCNC: 1.18 MG/DL (ref 0.7–1.3)
DELTA HIGH SENSITIVITY TROPONIN I, 1 HOUR: -1.1 (ref -15–15)
EGFRCR SERPLBLD CKD-EPI 2021: 72 ML/MIN/1.73M*2
EGFRCR SERPLBLD CKD-EPI 2021: 96 ML/MIN/1.73M*2
EOSINOPHIL # BLD AUTO: 0.1 10*3/UL
EOSINOPHIL # BLD AUTO: 0.2 10*3/UL
EOSINOPHIL NFR BLD AUTO: 2.1 % (ref 0–3)
EOSINOPHIL NFR BLD AUTO: 3 % (ref 0–3)
ERYTHROCYTE [DISTWIDTH] IN BLOOD BY AUTOMATED COUNT: 15 % (ref 11.5–15)
ERYTHROCYTE [DISTWIDTH] IN BLOOD BY AUTOMATED COUNT: 15 % (ref 11.5–15)
GLUCOSE SERPL-MCNC: 119 MG/DL (ref 70–105)
GLUCOSE SERPL-MCNC: 125 MG/DL (ref 70–105)
HCT VFR BLD AUTO: 45.1 % (ref 38–50)
HCT VFR BLD AUTO: 46.6 % (ref 38–50)
HGB BLD-MCNC: 15.2 G/DL (ref 13.2–17.2)
HGB BLD-MCNC: 15.9 G/DL (ref 13.2–17.2)
INR BLD: 1.1
LYMPHOCYTES # BLD AUTO: 1.8 10*3/UL
LYMPHOCYTES # BLD AUTO: 1.9 10*3/UL
LYMPHOCYTES NFR BLD AUTO: 23.3 % (ref 15–47)
LYMPHOCYTES NFR BLD AUTO: 37.9 % (ref 15–47)
MAGNESIUM SERPL-MCNC: 2 MG/DL (ref 1.8–2.4)
MAGNESIUM SERPL-MCNC: 2.1 MG/DL (ref 1.8–2.4)
MCH RBC QN AUTO: 31.6 PG (ref 29–34)
MCH RBC QN AUTO: 32.2 PG (ref 29–34)
MCHC RBC AUTO-ENTMCNC: 33.6 G/DL (ref 32–36)
MCHC RBC AUTO-ENTMCNC: 34.2 G/DL (ref 32–36)
MCV RBC AUTO: 93.8 FL (ref 82–97)
MCV RBC AUTO: 94.2 FL (ref 82–97)
MONOCYTES # BLD AUTO: 0.4 10*3/UL
MONOCYTES # BLD AUTO: 0.7 10*3/UL
MONOCYTES NFR BLD AUTO: 7.6 % (ref 5–13)
MONOCYTES NFR BLD AUTO: 9 % (ref 5–13)
NEUTROPHILS # BLD AUTO: 2.5 10*3/UL
NEUTROPHILS # BLD AUTO: 5 10*3/UL
NEUTROPHILS NFR BLD AUTO: 51.1 % (ref 46–70)
NEUTROPHILS NFR BLD AUTO: 65 % (ref 46–70)
PLATELET # BLD AUTO: 172 10*3/UL (ref 130–350)
PLATELET # BLD AUTO: 199 10*3/UL (ref 130–350)
PMV BLD AUTO: 7.8 FL (ref 6.9–10.8)
PMV BLD AUTO: 7.9 FL (ref 6.9–10.8)
POTASSIUM SERPL-SCNC: 4.1 MMOL/L (ref 3.5–5.1)
POTASSIUM SERPL-SCNC: 4.2 MMOL/L (ref 3.5–5.1)
PROTHROMBIN TIME: 12.8 SECONDS (ref 9.4–12.5)
RBC # BLD AUTO: 4.81 10*6/UL (ref 4.1–5.8)
RBC # BLD AUTO: 4.94 10*6/UL (ref 4.1–5.8)
SODIUM SERPL-SCNC: 136 MMOL/L (ref 135–145)
SODIUM SERPL-SCNC: 138 MMOL/L (ref 135–145)
TROPONIN I SERPL-MCNC: 12 PG/ML
TROPONIN I SERPL-MCNC: 12.6 PG/ML
TROPONIN I SERPL-MCNC: 13.1 PG/ML
WBC # BLD AUTO: 4.9 10*3/UL (ref 3.7–9.6)
WBC # BLD AUTO: 7.6 10*3/UL (ref 3.7–9.6)

## 2025-04-23 PROCEDURE — (BLANK) HC CATH LAB LEVEL 2 EACH ADDITIONAL MIN: Performed by: INTERNAL MEDICINE

## 2025-04-23 PROCEDURE — 99285 EMERGENCY DEPT VISIT HI MDM: CPT | Performed by: EMERGENCY MEDICINE

## 2025-04-23 PROCEDURE — 4A023N6 MEASUREMENT OF CARDIAC SAMPLING AND PRESSURE, RIGHT HEART, PERCUTANEOUS APPROACH: ICD-10-PCS | Performed by: INTERNAL MEDICINE

## 2025-04-23 PROCEDURE — 93451 RIGHT HEART CATH: CPT | Mod: 26 | Performed by: INTERNAL MEDICINE

## 2025-04-23 PROCEDURE — 96365 THER/PROPH/DIAG IV INF INIT: CPT

## 2025-04-23 PROCEDURE — C1887 CATHETER, GUIDING: HCPCS | Performed by: INTERNAL MEDICINE

## 2025-04-23 PROCEDURE — 6360000200 HC RX 636 W HCPCS (ALT 250 FOR IP): Performed by: INTERNAL MEDICINE

## 2025-04-23 PROCEDURE — 84484 ASSAY OF TROPONIN QUANT: CPT

## 2025-04-23 PROCEDURE — 99152 MOD SED SAME PHYS/QHP 5/>YRS: CPT | Performed by: INTERNAL MEDICINE

## 2025-04-23 PROCEDURE — 6370000100 HC RX 637 (ALT 250 FOR IP): Performed by: NURSE PRACTITIONER

## 2025-04-23 PROCEDURE — 6370000100 HC RX 637 (ALT 250 FOR IP): Performed by: STUDENT IN AN ORGANIZED HEALTH CARE EDUCATION/TRAINING PROGRAM

## 2025-04-23 PROCEDURE — 2720000000 HC SUPP 272 WO HCPCS: Performed by: INTERNAL MEDICINE

## 2025-04-23 PROCEDURE — 80048 BASIC METABOLIC PNL TOTAL CA: CPT

## 2025-04-23 PROCEDURE — 84484 ASSAY OF TROPONIN QUANT: CPT | Performed by: STUDENT IN AN ORGANIZED HEALTH CARE EDUCATION/TRAINING PROGRAM

## 2025-04-23 PROCEDURE — 6360000200 HC RX 636 W HCPCS (ALT 250 FOR IP)

## 2025-04-23 PROCEDURE — 83735 ASSAY OF MAGNESIUM: CPT

## 2025-04-23 PROCEDURE — 85025 COMPLETE CBC W/AUTO DIFF WBC: CPT

## 2025-04-23 PROCEDURE — (BLANK) HC CATH LAB LEVEL 2 FIRST 15 MIN: Performed by: INTERNAL MEDICINE

## 2025-04-23 PROCEDURE — 83735 ASSAY OF MAGNESIUM: CPT | Performed by: STUDENT IN AN ORGANIZED HEALTH CARE EDUCATION/TRAINING PROGRAM

## 2025-04-23 PROCEDURE — 85730 THROMBOPLASTIN TIME PARTIAL: CPT

## 2025-04-23 PROCEDURE — 36415 COLL VENOUS BLD VENIPUNCTURE: CPT | Performed by: STUDENT IN AN ORGANIZED HEALTH CARE EDUCATION/TRAINING PROGRAM

## 2025-04-23 PROCEDURE — 85610 PROTHROMBIN TIME: CPT

## 2025-04-23 PROCEDURE — 80048 BASIC METABOLIC PNL TOTAL CA: CPT | Performed by: STUDENT IN AN ORGANIZED HEALTH CARE EDUCATION/TRAINING PROGRAM

## 2025-04-23 PROCEDURE — 6360000200 HC RX 636 W HCPCS (ALT 250 FOR IP): Performed by: NURSE PRACTITIONER

## 2025-04-23 PROCEDURE — 99223 1ST HOSP IP/OBS HIGH 75: CPT | Mod: AI | Performed by: HOSPITALIST

## 2025-04-23 PROCEDURE — 99153 MOD SED SAME PHYS/QHP EA: CPT | Performed by: INTERNAL MEDICINE

## 2025-04-23 PROCEDURE — 99239 HOSP IP/OBS DSCHRG MGMT >30: CPT | Mod: NCP | Performed by: NURSE PRACTITIONER

## 2025-04-23 PROCEDURE — 83880 ASSAY OF NATRIURETIC PEPTIDE: CPT

## 2025-04-23 PROCEDURE — 85025 COMPLETE CBC W/AUTO DIFF WBC: CPT | Performed by: STUDENT IN AN ORGANIZED HEALTH CARE EDUCATION/TRAINING PROGRAM

## 2025-04-23 PROCEDURE — 93005 ELECTROCARDIOGRAM TRACING: CPT

## 2025-04-23 PROCEDURE — (BLANK) HC ROOM ICU INTERMEDIATE

## 2025-04-23 RX ORDER — CARVEDILOL 25 MG/1
50 TABLET ORAL 2 TIMES DAILY WITH MEALS
Qty: 360 TABLET | Refills: 1 | Status: ON HOLD | OUTPATIENT
Start: 2025-04-23

## 2025-04-23 RX ORDER — AMIODARONE HYDROCHLORIDE 200 MG/1
200 TABLET ORAL 2 TIMES DAILY WITH MEALS
Status: ON HOLD
Start: 2025-04-23

## 2025-04-23 RX ORDER — ATROPINE SULFATE 0.1 MG/ML
.5-1 INJECTION INTRAVENOUS ONCE AS NEEDED
Status: DISCONTINUED | OUTPATIENT
Start: 2025-04-23 | End: 2025-04-23 | Stop reason: HOSPADM

## 2025-04-23 RX ORDER — LIDOCAINE HYDROCHLORIDE 10 MG/ML
INJECTION, SOLUTION EPIDURAL; INFILTRATION; INTRACAUDAL; PERINEURAL CODE/TRAUMA/SEDATION MEDICATION
Status: DISCONTINUED | OUTPATIENT
Start: 2025-04-23 | End: 2025-04-23 | Stop reason: HOSPADM

## 2025-04-23 RX ORDER — MIDAZOLAM HYDROCHLORIDE 1 MG/ML
INJECTION INTRAMUSCULAR; INTRAVENOUS CODE/TRAUMA/SEDATION MEDICATION
Status: DISCONTINUED | OUTPATIENT
Start: 2025-04-23 | End: 2025-04-23 | Stop reason: HOSPADM

## 2025-04-23 RX ORDER — FENTANYL CITRATE/PF 50 MCG/ML
PLASTIC BAG, INJECTION (ML) INTRAVENOUS CODE/TRAUMA/SEDATION MEDICATION
Status: DISCONTINUED | OUTPATIENT
Start: 2025-04-23 | End: 2025-04-23 | Stop reason: HOSPADM

## 2025-04-23 RX ADMIN — CARVEDILOL 50 MG: 25 TABLET, FILM COATED ORAL at 09:01

## 2025-04-23 RX ADMIN — AMIODARONE HYDROCHLORIDE 400 MG: 200 TABLET ORAL at 09:09

## 2025-04-23 RX ADMIN — AMIODARONE HYDROCHLORIDE 400 MG: 200 TABLET ORAL at 17:01

## 2025-04-23 RX ADMIN — EMPAGLIFLOZIN 10 MG: 10 TABLET, FILM COATED ORAL at 09:08

## 2025-04-23 RX ADMIN — SACUBITRIL AND VALSARTAN 1 TABLET: 97; 103 TABLET, FILM COATED ORAL at 09:08

## 2025-04-23 RX ADMIN — SPIRONOLACTONE 25 MG: 25 TABLET ORAL at 09:09

## 2025-04-23 RX ADMIN — AMIODARONE HYDROCHLORIDE 150 MG: 1.5 INJECTION, SOLUTION INTRAVENOUS at 20:41

## 2025-04-23 RX ADMIN — MEXILETINE HYDROCHLORIDE 300 MG: 150 CAPSULE ORAL at 13:39

## 2025-04-23 RX ADMIN — PRASUGREL 10 MG: 10 TABLET, FILM COATED ORAL at 09:00

## 2025-04-23 RX ADMIN — ETHYL ALCOHOL 1 APPLICATION: 62 SWAB TOPICAL at 09:00

## 2025-04-23 RX ADMIN — CARVEDILOL 50 MG: 25 TABLET, FILM COATED ORAL at 17:01

## 2025-04-23 RX ADMIN — FUROSEMIDE 40 MG: 20 TABLET ORAL at 09:09

## 2025-04-23 RX ADMIN — POTASSIUM CHLORIDE 20 MEQ: 750 TABLET, FILM COATED, EXTENDED RELEASE ORAL at 17:01

## 2025-04-23 RX ADMIN — POTASSIUM CHLORIDE 20 MEQ: 750 TABLET, FILM COATED, EXTENDED RELEASE ORAL at 09:08

## 2025-04-23 RX ADMIN — AMIODARONE HYDROCHLORIDE 1 MG/MIN: 1.8 INJECTION, SOLUTION INTRAVENOUS at 20:58

## 2025-04-23 RX ADMIN — AMIODARONE HYDROCHLORIDE 0.5 MG/MIN: 1.8 INJECTION, SOLUTION INTRAVENOUS at 04:55

## 2025-04-23 RX ADMIN — MEXILETINE HYDROCHLORIDE 300 MG: 150 CAPSULE ORAL at 09:12

## 2025-04-23 NOTE — PROGRESS NOTES
[address] [address]                                                    Electrophysiology Inpatient Progress Note    Subjective    Patient ID: Pete Trejo is a 59 y.o. male who presented to the ER yesterday for ICD therapies including MULTIPLE ATP.     59-year-old male with extensive cardiac past medical history including ischemic cardiomyopathy with refractory VT despite multiple VT ablations.  Patient is well-known to the EP service.  He also follows with [facility] EP department as well.    Chief Complaint:   Chief Complaint   Patient presents with    Palpitations     Pt arrives through triage and  c/o palpitations and also has a pacemaker and defribrillator but pt thinks it has not been going off        LOS: 1 day     HPI:  No acute events overnight.    Tele reviewed. No more VT since reloading amio.     Patient seen in room 361. Denies complaints. Going for Right heart cath today. Son at bedside.       Allergies as of 04/21/2025 - Reviewed 04/21/2025   Allergen Reaction Noted    Morphine  07/30/2017    Tramadol  07/30/2017       Current Facility-Administered Medications:     Maintain IV access, , , Until discontinued **AND** Saline lock IV, , , Once **AND** sodium chloride flush 3 mL, 3 mL, intravenous, PRN, Thon, Arok, PA-C    acetaminophen (TYLENOL) tablet 650 mg, 650 mg, oral, q6h PRN, Thon, Arok, PA-C    bisacodyL (DULCOLAX) suppository 10 mg, 10 mg, rectal, Daily PRN, Thon, Arok, PA-C    ondansetron (ZOFRAN) tablet 4 mg, 4 mg, oral, q6h PRN **OR** ondansetron (ZOFRAN) injection 4 mg, 4 mg, intravenous, q6h PRN, Thon, Arok, PA-C    ethyl alcohoL (Nozin Nasal Antiseptic POPSwab) ampule 1 Application, 1 Application, nasal, 2x daily, Tyrone Quezada MD, 1 Application at 04/23/25 0900    prasugreL HCl (EFFIENT) tablet 10 mg, 10 mg, oral, Daily, Thon, Arok, PA-C, 10 mg at 04/23/25 0900    nitroglycerin (NITROSTAT) SL tablet 0.4 mg, 0.4 mg, sublingual, q5  min PRN, Ana Fraser PA-C    [Held by provider] rivaroxaban (XARELTO) tablet 20 mg, 20 mg, oral, Daily with dinner, Thon, Ana, PA-C, 20 mg at 04/22/25 1823    rosuvastatin (CRESTOR) tablet 40 mg, 40 mg, oral, Nightly, Thon, Arok, PA-C, 40 mg at 04/22/25 2033    spironolactone (ALDACTONE) tablet 25 mg, 25 mg, oral, Daily, Thon, Arok, PA-C, 25 mg at 04/23/25 0909    empagliflozin (JARDIANCE) tablet 10 mg, 10 mg, oral, Daily, Thon, Arok, PA-C, 10 mg at 04/23/25 0908    sacubitriL-valsartan (ENTRESTO)  mg 1 tablet, 1 tablet, oral, 2x daily, Thon, Brendak, PA-C, 1 tablet at 04/23/25 0908    furosemide (LASIX) tablet 40 mg, 40 mg, oral, Daily, Thon, Arok, PA-C, 40 mg at 04/23/25 0909    potassium chloride (KLOR-CON) CR tablet 20 mEq, 20 mEq, oral, 2x daily with meals, ProveIsha hymann, CNP, 20 mEq at 04/23/25 0908    carvediloL (COREG) tablet 50 mg, 50 mg, oral, 2x daily with meals, Proveyenni Merced, CNP, 50 mg at 04/23/25 0901    mexiletine (MEXITIL) capsule 300 mg, 300 mg, oral, q8h KULDEEP, Provell, Merced, CNP, 300 mg at 04/23/25 0912    amiodarone (PACERONE) tablet 400 mg, 400 mg, oral, 2x daily with meals, Proveyenni, Merced, CNP, 400 mg at 04/23/25 0909    Objective     Vital signs in last 24 hours:   Temp:  [36.4 °C (97.6 °F)-36.9 °C (98.4 °F)] 36.4 °C (97.6 °F)  Heart Rate:  [70-71] 70  Resp:  [11-21] 16  SpO2:  [90 %-93 %] 92 %  BP: ()/(63-92) 117/77  FiO2 (%):  [21 %] 21 %  SpO2/FiO2 Ratio Using Measured FiO2 (%):  [428.6] 428.6  Estimated P/F Ratio Using Measured FiO2 (%):  [365.4] 365.4  Weight: 123.1 kg (271 lb 6.4 oz)    Intake/Output this shift:  I/O this shift:  In: 41.6 [I.V.:41.6]  Out: -     Intake/Output Summary (Last 24 hours) at 4/23/2025 1126  Last data filed at 4/23/2025 0907  Gross per 24 hour   Intake 1239.23 ml   Output 1775 ml   Net -535.77 ml     ROS:  As noted in HPI    Physical Exam:  General Appearance: awake, alert, NAD  HEENT: EOM intact, sclera without icterus   Respiratory: Clear to  auscultation  Cardiac: S1-S2, regular   Abdomen: Soft, nontender, positive bowel sounds.  Extremities: No LE edema  Musck: No gross abn  Neuro: oriented x 4, no focal deficits  Skin: Warm    Data Review:   BMP:  Lab Results   Component Value Date     04/23/2025    K 4.2 04/23/2025     04/23/2025    CO2 26 04/23/2025    BUN 13 04/23/2025    CREATININE 0.92 04/23/2025    GLUCOSE 119 (H) 04/23/2025    CALCIUM 9.3 04/23/2025     CBC:   Lab Results   Component Value Date    WBC 4.9 04/23/2025    RBC 4.81 04/23/2025    HGB 15.2 04/23/2025    HCT 45.1 04/23/2025     04/23/2025     CMP:  Lab Results   Component Value Date     04/23/2025    K 4.2 04/23/2025     04/23/2025    CO2 26 04/23/2025    GLUCOSE 119 (H) 04/23/2025    CREATININE 0.92 04/23/2025    CALCIUM 9.3 04/23/2025    ALBUMIN 4.1 04/21/2025    ALKPHOS 75 04/21/2025    BILITOT 0.67 04/21/2025    ALT 37 04/21/2025    AST 19 04/21/2025    BUN 13 04/23/2025    ANIONGAP 6 04/23/2025     Lab Results   Component Value Date    BNP 34 04/21/2025     TSH:  Lab Results   Component Value Date    TSH 4.414 04/21/2025     Magnesium:  Lab Results   Component Value Date    MG 2.0 04/23/2025     PT/INR:   Lab Results   Component Value Date    PT 14.5 (H) 04/21/2025    INR 1.3 (H) 04/21/2025       Tests:  [facility]  Result Date: 4/22/2025  Narrative:   Normal left ventricular wall thickness.   Left ventricle is moderately dilated.  End-diastolic dimension 6.8 cm.   Biplane ejection fraction is 32%.  Visually looks much closer to 25%.  No apical thrombi.   Severe left ventricular systolic dysfunction.  Severe ischemic cardiomyopathy with RCA and LAD wall motion abnormalities.  See below.   Grade I (mild) left ventricular diastolic abnormality.   Normal left ventricular filling pressure.   Right ventricular systolic function is low normal.   The left atrium is normal in size.   The right atrium is mildly dilated.   Normal central venous pressure (0-5  mmHg).   No pericardial effusion.   Inadequate TR spectral Doppler to accurately assess right ventricular systolic pressure.   No significant valve disease.   No change from the March 2025 study.     XR chest portable 1 view  Result Date: 4/21/2025  IMPRESSION: No acute disease.     Cardiac catheterization  Cardiac catheterization, Coronary Angiography  Result Date: 3/25/2025  Narrative: DATE OF PROCEDURE: 3/24/2025  PREOPERATIVE DIAGNOSIS:  Pre-op Diagnosis    * AICD discharge [Z45.02]    * VT (ventricular tachycardia) (CMS/[facility]) [I47.20]    * Chronic systolic HF (heart failure) (CMS/[facility]) [I50.22]    * Coronary artery disease involving native coronary artery of native heart without angina pectoris [I25.10]    * Presence of stent in coronary artery [Z95.5] Final Impression: 1.  Mild mid left main stenosis of 20%. 2.  Moderate size left circumflex coronary artery with minimal mid disease of 20%.  The major obtuse marginal branch without any significant disease. 3.  Proximal LAD stent widely patent.  The remainder of the LAD without significant disease so it is a fairly small caliber vessel. 4.  80% ostial first diagonal stenosis.  Diffuse 60% proximal disease.  The vessel then bifurcates into a smaller inferior branch that is subtotally occluded within previously placed stent.  Superior diagonal branch with 40% ostial stenosis. 5.  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  Remainder of the vessel with minimal luminal irregularities including large PDA and KRISTEN. 6.  PTCA of the inferior subtotal occluded diagonal branch with 2.5 mm balloon.  Stenting of the ostial and proximal first diagonal branch up to the bifurcation with 2.5 x 8 mm DIMAS postdilatation 2.5 mm noncompliant balloon to 20 he.  Good flow in both branches. Rationale, risks, benefits and alternatives of cardiac catheterization and percutaneous coronary intervention with or without stents is explained to the patient  including, but not limited to, possible risks of infection, bleeding requiring blood transfusion, damage to the blood vessel requiring repair vascular surgery, allergic reactions to the x-ray dye, acute kidney injury including need for possible hemodialysis, significant cardiac arrhythmias, myocardial infarction, stroke, and threat to life. SCAI Shock Stage: A ASA score 3 1 Patie score 3 Procedure: Coronary angiogram, PTCA of inferior first diagonal branch, stenting of ostial/proximal first diagonal with 2.5 x 8 mm DIMAS, postdilatation high-pressure 2.5 mm noncompliant balloon.  Details of the procedure: Informed consent is obtained. After sterile prep and drape 1% lidocaine is infiltrated over the right wrist overlying right radial artery. Access was obtained into right radial artery with the 5-6 [place] Slender Glide sheath via modified Seldinger technique. Standard vasodilatory radial cocktail was given via the side-port of the glide sheath which included 3000 international units of heparin, 2 mg of verapamil and 100 mcg of nitroglycerin. JL3.5 and JR4 catheters were used over a Blackburn wire for left and right coronary engagement and angiogram, which was performed in the standard fashion. The catheter was then withdrawn over a wire. No complications were noted during the diagnostic procedure. Patent hemostasis was obtained with TR band after removal of the glide sheath. Patient was initiated on bivalirudin.  60 mg of Effient was given.  A CLS 3.0, 6 Ecu[place]orean guide was placed in the ostium of left main coronary.  A 180 cm Fielder J-tip 0.014 coronary wire was advanced into the inferior branch of the diagonal.  PTCA was performed of the occluded stent in the diagonal inferior branch with a 2 mm balloon.  Further distal the diagonal branch was also dilated with a 2 mm balloon with no residual visual stenosis.  The proximal and ostial diagonal branch was also dilated with a 2 mm balloon then stented with a 2.5 x 8 mm DIMAS  with care not to place a stent within the smaller inferior diagonal branch.  This was postdilated with a 2.5 mm noncompliant balloon to high-pressure with good result.  All catheters and wires were removed. Anesthesia: 50 minutes 26 seconds of moderate sedation was administered was administered using Fentanyl and Versed under my supervision. I monitored the patient for further administration of agents as needed including continuous monitoring of oxygen saturation, heart rate and blood pressure.  The RN administered the medicine under my direct supervision order, and an independent trained observer was present. Antiplatelet agent: Effient Recommended duration of dual antiplatelet therapy: 1 year Total Sedation time: 50 minutes 26 seconds Sedation Medications and Contrast use:   03/24/2025 1613 MDT fentaNYL citrate (PF) 50 mcg/mL injection 25 mcg   03/24/2025 1610 MDT fentaNYL citrate (PF) 50 mcg/mL injection 25 mcg   03/24/2025 1603 MDT fentaNYL citrate (PF) 50 mcg/mL injection 50 mcg   03/24/2025 1652 MDT iopamidoL (ISOVUE-370) 76 % injection 190 mL   03/24/2025 1613 MDT midazolam (VERSED) injection 0.5 mg intravenous   03/24/2025 1611 MDT midazolam (VERSED) injection 0.5 mg intravenous   03/24/2025 1603 MDT midazolam (VERSED) injection 1 mg intravenous Given Total Air Kerma (mGy) 403.000; Fluoro Time (min) = 12.8 BW        Assessment/Plan   Patient Active Problem List    Diagnosis Date Noted    Ventricular tachycardia (CMS/HCC) 04/22/2025    Cardiac resynchronization therapy defibrillator (CRT-D) in place 03/22/2025    Gastroesophageal reflux disease without esophagitis 10/19/2023    Benign prostatic hyperplasia without lower urinary tract symptoms 10/19/2023    Diabetes mellitus type 2 in obese (CMS/HCC) 10/19/2023    Moderate obesity 10/19/2023    Elevated troponin 10/19/2023    Lymphocytosis (symptomatic) 10/19/2023    AICD discharge 10/18/2023    Long term current use of antiarrhythmic drug 03/04/2022    LAVON  (obstructive sleep apnea) 10/01/2021    Sustained ventricular tachycardia (CMS/HCC) 09/19/2021    Chronic anticoagulation 09/19/2021    Paroxysmal atrial fibrillation (CMS/HCC) 09/19/2021    Chronic systolic HF (heart failure) (CMS/HCC) 07/11/2021    Presence of stent in coronary artery 06/10/2020    Coronary artery disease involving native coronary artery of native heart without angina pectoris 06/10/2020    Ischemic cardiomyopathy 10/30/2017    Hypertension 10/30/2017    Hyperlipidemia 10/30/2017       Telemetry:      EKG:      Assessment/Plan:    Recurrent ventricular tachycardia/VT storm:  Presented to the ER last evening with tachycardic palpitations, chest tightness, shortness of breath and lightheadedness.  Symptoms were consistent with prior admissions and he presented for evaluation.    ICD interrogation revealed ventricular tachycardia at 112 bpm with multiple bursts of ATP.  He is 96% atrial paced and 99% biventricular paced.  His bradycardia pacing rate is DDD at 70 with MTR of 100.    Limited echo showed an EF of 32%.  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    He has had multiple VT ablations ([facility] and [facility]).    Case discussed with Dr. Montana--recommendations were to reload with IV amiodarone.  Unfortunately he is also noted side effects on the higher dose of amiodarone with tremor in the right hand which he had previously when he was on amiodarone and has had progressive decrease in his energy with weakness in his arms and legs.  He is no longer walking the 4-6 blocks with his dogs daily.  If he stands for too long he gets wobbly and has to sit down.    He has a virtual follow-up appointment with [facility] recently and for some reason his carvedilol was only being given 25 mg twice daily.  Carvedilol dose increased to 50 mg twice daily as his blood pressure is more than adequate.  Will restart  his mexiletine 300 mg 3 times daily as well.      Discussed with Dr. Montana and Jaime from the device clinic and shock therapy was turned off in the slow VT zone.   Will plan on sending patient home on Amiodarone 200 mg BID.     PAF.   [id number] score: 6   Anticoagulated on Xarelto     Ischemic Cardiomyopathy  CAD S/P Multiple stents   Discussed with Dr. Leiva.     Plan is to do a right heart cath prior today to reassess his volume status.    He is on GDMT for CHF with carvedilol 50 mg twice daily, Jardiance 10 mg daily, spironolactone 25 mg daily and Entresto 97/103 mg twice daily.      Will await results of Right Heart Cath.   Ultimately patient needs a heart transplant.     Plan discussed in conjunction with Dr. Montana.    Electronically signed by: Jazmin Leo CNP  4/23/2025  11:26 AM

## 2025-04-23 NOTE — PLAN OF CARE
Pete was admitted on 4/21 for VT and episodes of ATP.   Recommended to proceed with right heart catheterization with possible [place] to stay in place depending on values due to concern of patient needing advanced therapy.      Right heart cath was completed. Given his pressures and cardiac output and index, patient can be discharged from our standpoint.     Right heart catheterization (4/23/2025)   Final Impression:  1.  Right atrial mean pressure: 5 mmHg.  O2 saturation 65.0%.  2.  Right ventricular pressure: 27/-1/2 mmHg.  O2 saturation 65.6%.  3.  Right main pulmonary artery pressure: 28/13/16 mmHg.  O2 saturation 65.4%.  4.  Pulmonary capillary wedge pressure: 8 mmHg.  5.  Cardiac output thermodilution technique 4.56 L/min.  Cardiac index 1.9 L/min/m².  6.  Cardiac output Becki technique: 5.7 L/min.  Cardiac index 2.3 L/min/m².

## 2025-04-23 NOTE — PLAN OF CARE
Problem: Pain - Adult  Goal: Verbalizes/displays adequate comfort level or baseline comfort level  Outcome: Progressing     Problem: Infection - Adult  Goal: Absence of infection during hospitalization  Outcome: Progressing     Problem: Safety Adult  Goal: Patient will remain safe during hospitalization  Outcome: Progressing     Problem: Daily Care  Goal: Daily care needs are met  Outcome: Progressing     Problem: Knowledge Deficit  Goal: Patient/family/caregiver demonstrates understanding of disease process, treatment plan, medications, and discharge instructions  Outcome: Progressing     Problem: Discharge Barriers  Goal: Patient's discharge needs are met  Outcome: Progressing     Problem: Coping  Goal: Pt/support person able to verbalize concerns and demonstrate effective coping strategies  Outcome: Progressing     Problem: Decision Making  Goal: Pt/support person able to effectively weigh alternatives and participate in decision making related to treatment and care  Outcome: Progressing     Problem: Spiritual Care  Goal: Pt feels balance and connection with higher power that empowers the self during times of spiritual distress  Outcome: Progressing

## 2025-04-23 NOTE — DISCHARGE SUMMARY
Admitting Provider: Wilfredo Montana MD  Discharge Provider: Wilfredo Montana MD  Primary Care Physician at Discharge: Lynnette Amezquita, [phone]     Admission Date: 4/21/2025     Discharge Date: 4/23/2025             Electrophysiology Discharge Summary    Primary Discharge Diagnosis  1. Ventricular tachycardia (CMS/HCC)    2. Chronic systolic HF (heart failure) (CMS/HCC)    3. Mixed hyperlipidemia    4. Primary hypertension    5. History of automatic internal cardiac defibrillator (AICD)    6. Ischemic cardiomyopathy    7. Chronic systolic heart failure (CMS/HCC)    8. VT (ventricular tachycardia) (CMS/[facility])         Discharge medication list        CHANGE how you take these medications        Instructions   amiodarone 200 mg tablet  Commonly known as: PACERONE  What changed: when to take this   Take 1 tablet (200 mg total) by mouth 2 (two) times a day with meals            CONTINUE taking these medications        Instructions   acetaminophen 325 mg tablet  Commonly known as: TYLENOL   Take 2 tablets (650 mg total) by mouth every 6 (six) hours as needed for pain scale 1-3/10     albuterol HFA 90 mcg/actuation inhaler   Inhale 2 puffs every 6 (six) hours as needed for wheezing or shortness of breath     carvediloL 25 mg tablet  Commonly known as: Coreg   Take 2 tablets (50 mg total) by mouth 2 (two) times a day with meals     cholecalciferol (vitamin D3) 25 mcg (1,000 unit) tablet      cyclobenzaprine 10 mg tablet  Commonly known as: FLEXERIL      empagliflozin 10 mg tablet  Commonly known as: JARDIANCE   Take 1 tablet (10 mg total) by mouth daily     furosemide 40 mg tablet  Commonly known as: LASIX   Take 1 tablet (40 mg total) by mouth daily     magnesium oxide 400 mg (241.3 mg magnesium) tablet  Commonly known as: MAG-OX   Take 1 tablet (400 mg total) by mouth 2 (two) times a day     melatonin 5 mg tablet   Take 1 tablet (5 mg total) by mouth nightly as needed for sleep     mexiletine 150 mg  capsule  Commonly known as: MEXITIL   Take 2 capsules (300 mg total) by mouth 3 (three) times a day     nitroglycerin 0.4 mg SL tablet  Commonly known as: NITROSTAT   Place 1 tablet (0.4 mg total) under the tongue every 5 (five) minutes as needed for chest pain     pantoprazole 40 mg EC tablet  Commonly known as: PROTONIX      potassium chloride 10 mEq CR tablet   Take 2 tablets (20 mEq total) by mouth daily     prasugreL HCl 10 mg tablet  Commonly known as: EFFIENT   Take 1 tablet (10 mg total) by mouth daily     rivaroxaban 20 mg tablet  Commonly known as: Xarelto   Take 1 tablet (20 mg total) by mouth daily     rosuvastatin 40 mg tablet  Commonly known as: CRESTOR      sacubitriL-valsartan  mg tablet  Commonly known as: ENTRESTO   Take 1 tablet by mouth 2 (two) times a day     spironolactone 25 mg tablet  Commonly known as: ALDACTONE      tamsulosin 0.4 mg capsule  Commonly known as: FLOMAX                Where to Get Your Medications        These medications were sent to [address]  [address] [facility] [address], [facility] [address]      Phone: [phone]   carvediloL 25 mg tablet       Information about where to get these medications is not yet available    Ask your nurse or doctor about these medications  amiodarone 200 mg tablet         Discharge Condition: stable    Discharge Disposition  01 - Home or Self-Care    Full Code    Outpatient Follow-Up  EP in 1 month   Already has appointments with CHF and General Cardiology        DETAILS OF HOSPITAL STAY    Hospital Course  59-year-old male well-known to the EP service with longstanding history of ischemic cardiomyopathy and recurrent VT despite multiple VT ablations in the past.  Patient also follows with [facility].  Patient presented to [facility] emergency department on 4/22 with palpitations and feeling like his defibrillator had been going off.  Patient had been seen in EP clinic  4 days prior and had reported symptoms of ataxia, hand tremors, decreased energy levels as well as other symptoms, therefore his amiodarone dose had been decreased to 200 mg once daily.      Patient was recently hospitalized 3/14 through 18 for VT storm with ICD shocks.  He was transitioned off sotalol and onto amiodarone and was discharged home with instructions for an amiodarone taper.  Unfortunately patient went back into VT and was admitted 3/21 through 25 with refractory VT and ICD shocks and placed on IV amiodarone.  Cardiac catheterization was performed with PTCA/PCI of the diagonal and he was started on Effient.  Device transmission on 4/6/2025 demonstrated a slow VT with a heart rate of 110 bpm that was successfully terminated by ATP.    Device interrogated which showed multiple episodes of VT with MULTIPLE ATP therapies.  Case was discussed with Dr. Montana who recommended reloading patient with IV amiodarone.  After IV amiodarone reload, patient has had no further runs of VT.  Recommendations were for patient to be discharged home on 200 mg twice daily of amiodarone as well as his mexiletine home dose of 300 mg 3 times daily.  Dr. Leiva did request that a right heart cath be performed to assess pressures.  This was done and all of his numbers were acceptable (please see his report for further data).     On date of discharge, patient is post right heart cath with right IJ access site.  He is not had any further VT since reloading on amiodarone.  Patient is given the option to go home or stay the night to continue to monitor him on telemetry.  He states that he is feeling pretty good and is willing to be discharged home.  The patient has an appointment with [facility] on 4/30, he will send a transmission on 4/28/2025 in preparation for the appointment.  He also has an appointment at [facility] with Dr. Pappas on 4/29.  Patient understands that he will be going home on 200 mg  twice daily of amiodarone and Coreg 50 mg twice daily.  All of his other home meds will be resumed as prior.  Questions and concerns answered.  Patient will follow-up with the EP service 1 month posthospitalization    Procedures:  Pertinent Test Results:     Cardiac catheterization  Cardiac catheterization, OXIMETRY RUN  Result Date: 4/23/2025  Narrative: DATE OF PROCEDURE: 4/23/2025  PREOPERATIVE DIAGNOSIS:  Pre-op Diagnosis    * Chronic systolic HF (heart failure) (CMS/HCC) [I50.22]    * Ischemic cardiomyopathy [I25.5] Final Impression: 1.  Right atrial mean pressure: 5 mmHg.  O2 saturation 65.0%. 2.  Right ventricular pressure: 27/-1/2 mmHg.  O2 saturation 65.6%. 3.  Right main pulmonary artery pressure: 28/13/16 mmHg.  O2 saturation 65.4%. 4.  Pulmonary capillary wedge pressure: 8 mmHg. 5.  Cardiac output thermodilution technique 4.56 L/min.  Cardiac index 1.9 L/min/m². 6.  Cardiac output Becki technique: 5.7 L/min.  Cardiac index 2.3 L/min/m². Rationale, risks, benefits and alternatives of cardiac catheterization and percutaneous coronary intervention with or without stents is explained to the patient including, but not limited to, possible risks of infection, bleeding requiring blood transfusion, damage to the blood vessel requiring repair vascular surgery, allergic reactions to the x-ray dye, acute kidney injury including need for possible hemodialysis, significant cardiac arrhythmias, myocardial infarction, stroke, and threat to life. SCAI Shock Stage: A ASA score 3 Mallampati score 3 Procedure: Right heart catheterization with thermodilution cardiac output and O2 saturation run.  Details of the procedure: Informed consent is obtained. After sterile prep and drape 1% lidocaine is infiltrated over the right IJ region.  Access was obtained into right IJ vein using micropuncture technique and limited ultrasound.  The 7 Danish sheath was placed after wire confirmation with fluoroscopy.  A 7 Danish balloontipped  [place] catheter was advanced into the right atrium with balloon inflated.  O2 saturations in pressures were obtained.  Balloon was then advanced into the right ventricle where pressures and O2 saturations obtained.  Catheter was then advanced into the right main pulmonary artery and the balloon was deflated.  Thermodilution cardiac outputs as well as pressures and saturations were obtained.  Balloon was reinflated and placed in the wedge position.  Pressures obtained.  Balloon deflated and pulled out of the body.  Sheath will be hand pulled with manual pressure.  Anesthesia: 26 minutes 34 seconds of moderate sedation was administered was administered using Fentanyl and Versed under my supervision. I monitored the patient for further administration of agents as needed including continuous monitoring of oxygen saturation, heart rate and blood pressure.  The RN administered the medicine under my direct supervision order, and an independent trained observer was present. Antiplatelet agent: Prasugrel Recommended duration of dual antiplatelet therapy: Not applicable Total Sedation time: 26 minutes 34 seconds Sedation Medications and Contrast use:   04/23/2025 1456 MDT fentaNYL citrate (PF) 50 mcg/mL injection 25 mcg   04/23/2025 1456 MDT midazolam (VERSED) injection 1 mg intravenous Given Total Air Kerma (mGy) 8.790; Fluoro Time (min) = 0.9 MD      EndoStim Echo SlickLogin  Result Date: 4/22/2025  Narrative:   Normal left ventricular wall thickness.   Left ventricle is moderately dilated.  End-diastolic dimension 6.8 cm.   Biplane ejection fraction is 32%.  Visually looks much closer to 25%.  No apical thrombi.   Severe left ventricular systolic dysfunction.  Severe ischemic cardiomyopathy with RCA and LAD wall motion abnormalities.  See below.   Grade I (mild) left ventricular diastolic abnormality.   Normal left ventricular filling pressure.   Right ventricular systolic function is low normal.   The left atrium is normal in size.    The right atrium is mildly dilated.   Normal central venous pressure (0-5 mmHg).   No pericardial effusion.   Inadequate TR spectral Doppler to accurately assess right ventricular systolic pressure.   No significant valve disease.   No change from the March 2025 study.     XR chest portable 1 view  Result Date: 4/21/2025  IMPRESSION: No acute disease.       Inpatient Labs:  BMP:  Lab Results   Component Value Date     04/23/2025    K 4.2 04/23/2025     04/23/2025    CO2 26 04/23/2025    BUN 13 04/23/2025    CREATININE 0.92 04/23/2025    GLUCOSE 119 (H) 04/23/2025    CALCIUM 9.3 04/23/2025     CBC:   Lab Results   Component Value Date    WBC 4.9 04/23/2025    RBC 4.81 04/23/2025    HGB 15.2 04/23/2025    HCT 45.1 04/23/2025     04/23/2025     CMP:  Lab Results   Component Value Date     04/23/2025    K 4.2 04/23/2025     04/23/2025    CO2 26 04/23/2025    GLUCOSE 119 (H) 04/23/2025    CREATININE 0.92 04/23/2025    CALCIUM 9.3 04/23/2025    ALBUMIN 4.1 04/21/2025    ALKPHOS 75 04/21/2025    BILITOT 0.67 04/21/2025    ALT 37 04/21/2025    AST 19 04/21/2025    BUN 13 04/23/2025    ANIONGAP 6 04/23/2025     Lab Results   Component Value Date    BNP 34 04/21/2025     TSH:  Lab Results   Component Value Date    TSH 4.414 04/21/2025     Magnesium:  Lab Results   Component Value Date    MG 2.0 04/23/2025     PT/INR:   Lab Results   Component Value Date    PT 14.5 (H) 04/21/2025    INR 1.3 (H) 04/21/2025     Physical Exam at Discharge  Heart Rate: 70  Resp: 16  BP: 119/83  Temp: 36.6 °C (97.9 °F)  Weight: 123.1 kg (271 lb 6.4 oz)    General Appearance: awake, alert, NAD  Respiratory: Clear to auscultation  Cardiac: S1-S2, regular   Abdomen: Soft, nontender, positive bowel sounds.  Extremities: No LE edema  Musck: No gross abn  Neuro: oriented x 4, no focal deficits  Skin: Warm    Time spent coordinating discharge: 45 minutes    Electronically signed by: Jazmin Leo CNP  4/23/2025  4:38  PM

## 2025-04-23 NOTE — NURSING END OF SHIFT
Nursing End of Shift Summary:     Patient: Pete Trejo  MRN: [id number]  : 1965, Age: 59 y.o.    Location: [address] [place] [address]    Nursing Goals  Clinical Goals for the Shift: maintain comfort and safety; monitor vitals, tele and labs    Narrative Summary of Progress Toward Clinical Goals:  Patient's vital signs remain stable throughout the shift.    Barriers to Goals/Nursing Concerns:  No    New Patient or Family Concerns/Issues:  No    Shift Summary:   Significant Events & Communications to Providers (Last 12 Hours)       Last 5 Values    No documentation.                 Oxygen Usage (Last 12 Hours)       Last 5 Values       Row Name 25 0000 25 0400             Room Air or Baseline Oxygen Trial by Nursing    Is Patient on Room Air OR on the Same Amount of O2 as at Home? Yes Yes Yes                    Mobility (Last 12 Hours)       Last 5 Values       Row Name 25 1808 25 2200 25 2352 25 0400       Mobility    Patient Position In bed In bed -- In bed In bed    Turning/Repositioning -- -- Turns self -- --                  Urethral Catheter       Active Urethral Catheter       None                  Active Lines       Active Central venous catheter / Peripherally inserted central catheter / Implantable Port / Hemodialysis catheter / Midline Catheter       None                  Infusing Medications   Medication Dose Last Rate    amiodarone  0.5 mg/min 0.5 mg/min (25 0535)     PRN Medications   Medication Dose Last Admin    sodium chloride  3 mL      acetaminophen  650 mg      bisacodyL  10 mg      ondansetron  4 mg      Or    ondansetron  4 mg      nitroglycerin  0.4 mg

## 2025-04-23 NOTE — INTERDISCIPLINARY/THERAPY
Case Management Progress Note    Phone # [phone]    Diagnosis: VT     Plan of Care:  Pete is scheduled for a right heart cath today     Discussed Discharge Topics/Narrative: Cm reviewed medical record for discharge readiness     Family updated: no     RN updated: yes     Disposition: home

## 2025-04-23 NOTE — PLAN OF CARE
Problem: Pain - Adult  Goal: Verbalizes/displays adequate comfort level or baseline comfort level  Outcome: Progressing  Flowsheets (Taken 4/22/2025 0227 by Nurse Evelyn MCLAUGHLIN RN)  Verbalizes/displays adequate comfort level or baseline comfort level:   Encourage patient to monitor pain and request interventions   Assess pain using the appropriate pain scale   Administer analgesics based on type and severity of pain and evaluate response   Educate/Implement non-pharmacological measures as appropriate and evaluate response   Consider cultural, developmental and social influences on pain and pain management   Notify Provider if interventions unsuccessful or patient reports new pain     Problem: Infection - Adult  Goal: Absence of infection during hospitalization  Outcome: Progressing  Flowsheets (Taken 4/23/2025 0124)  Absence of infection during hospitalization:   Monitor lab/diagnostic results   Assess and monitor for signs and symptoms of infection   Monitor all insertion sites/wounds/incisions   Monitor secretions for changes in amount and color   Administer medications as ordered   Educate and encourage patient and family to use good hand hygiene technique   Identify and educate in appropriate isolation precautions for identified infection/condition     Problem: Safety Adult  Goal: Patient will remain safe during hospitalization  Outcome: Progressing  Flowsheets (Taken 4/22/2025 1444 by Nurse Yadi MCMANUS RN)  Patient will remain safe durning hospitalization:   Assess patient for Fall Risk   Assess Patient for Aspirations   Use safe transport   Assess Patient for Risk of Elopement   Assess Patient using the appropriate Jean Marie scale     Problem: Daily Care  Goal: Daily care needs are met  Outcome: Progressing  Flowsheets (Taken 4/23/2025 0124)  Daily care needs are met:   Assess and monitor skin integrity   Identify patients at risk for skin breakdown on admission and per policy   Assess and monitor ability to  perform self care and identify potential discharge needs   Assess skin integrity/risk for skin breakdown   Assist patient with activities of daily living as needed   Encourage independent activity per ability   Include patient/family/caregiver in decisions related to daily care   Provide mouth care     Problem: Knowledge Deficit  Goal: Patient/family/caregiver demonstrates understanding of disease process, treatment plan, medications, and discharge instructions  Outcome: Progressing  Flowsheets (Taken 4/22/2025 1444 by Nurse Yadi MCMANUS RN)  Patient/family/caregiver demonstrates understanding of disease process, treatment plan, medications, and discharge instructions:   Complete learning assessment and assess knowledge base   Provide teaching via preferred learning methods   Provide teaching at level of understanding     Problem: Discharge Barriers  Goal: Patient's discharge needs are met  Outcome: Progressing  Flowsheets (Taken 4/23/2025 0124)  Patient discharge needs are met:   Assess patient for self-management skills   Encourage participation in management   Identify potential discharge barriers on admission and throughout hospital stay   Involve patient/family/caregiver in discharge planning process   Collaborate with case management/ for discharge needs     Problem: Coping  Goal: Pt/support person able to verbalize concerns and demonstrate effective coping strategies  Outcome: Progressing  Flowsheets (Taken 4/23/2025 0000)  Patient/Support person able to verbalize concerns and demonstrate effective coping strategies:   Assist patient/support person to identify coping skills, available support systems and cultural and spiritual values   Provide emotional support, including active listening and acknowledgement of concerns of patient and caregivers   Assess for spiritual needs and obtain spiritual consult as indicated     Problem: Decision Making  Goal: Pt/support person able to effectively weigh  alternatives and participate in decision making related to treatment and care  Outcome: Progressing  Flowsheets (Taken 4/23/2025 0000)  Patient/support person able to effectively weigh alternatives and participate in decision making related to treatment and care:   Facilitate patient and support person articulation of goals for care   Provide information as requested by patient/support person   Respect patient/support person right to receive or not to receive information     Problem: Spiritual Care  Goal: Pt feels balance and connection with higher power that empowers the self during times of spiritual distress  Outcome: Progressing  Flowsheets (Taken 4/23/2025 0000)  Patient feels balance and connection with higher power that empowers the self during times of spiritual distress:   Create safety for patient through empathic presence and non-judgmental listening   Encourage patient to explore his/her values, beliefs and/or spiritual images and practices   Encourage use of breath work, imagery, meditation, relaxation, to ease distress and provide healing     Problem: Pain - Adult  Goal: Verbalizes/displays adequate comfort level or baseline comfort level  Outcome: Progressing  Flowsheets (Taken 4/22/2025 0227 by Nurse Evelyn MCLAUGHLIN RN)  Verbalizes/displays adequate comfort level or baseline comfort level:   Encourage patient to monitor pain and request interventions   Assess pain using the appropriate pain scale   Administer analgesics based on type and severity of pain and evaluate response   Educate/Implement non-pharmacological measures as appropriate and evaluate response   Consider cultural, developmental and social influences on pain and pain management   Notify Provider if interventions unsuccessful or patient reports new pain     Problem: Infection - Adult  Goal: Absence of infection during hospitalization  Outcome: Progressing  Flowsheets (Taken 4/23/2025 0124)  Absence of infection during hospitalization:    Monitor lab/diagnostic results   Assess and monitor for signs and symptoms of infection   Monitor all insertion sites/wounds/incisions   Monitor secretions for changes in amount and color   Administer medications as ordered   Educate and encourage patient and family to use good hand hygiene technique   Identify and educate in appropriate isolation precautions for identified infection/condition     Problem: Safety Adult  Goal: Patient will remain safe during hospitalization  Outcome: Progressing  Flowsheets (Taken 4/22/2025 1444 by Nurse Ydai MCMANUS, RN)  Patient will remain safe durning hospitalization:   Assess patient for Fall Risk   Assess Patient for Aspirations   Use safe transport   Assess Patient for Risk of Elopement   Assess Patient using the appropriate Jean Marie scale     Problem: Daily Care  Goal: Daily care needs are met  Outcome: Progressing  Flowsheets (Taken 4/23/2025 0124)  Daily care needs are met:   Assess and monitor skin integrity   Identify patients at risk for skin breakdown on admission and per policy   Assess and monitor ability to perform self care and identify potential discharge needs   Assess skin integrity/risk for skin breakdown   Assist patient with activities of daily living as needed   Encourage independent activity per ability   Include patient/family/caregiver in decisions related to daily care   Provide mouth care     Problem: Knowledge Deficit  Goal: Patient/family/caregiver demonstrates understanding of disease process, treatment plan, medications, and discharge instructions  Outcome: Progressing  Flowsheets (Taken 4/22/2025 1444 by Nurse Yadi MCMANUS, RN)  Patient/family/caregiver demonstrates understanding of disease process, treatment plan, medications, and discharge instructions:   Complete learning assessment and assess knowledge base   Provide teaching via preferred learning methods   Provide teaching at level of understanding     Problem: Discharge Barriers  Goal: Patient's  discharge needs are met  Outcome: Progressing  Flowsheets (Taken 4/23/2025 0124)  Patient discharge needs are met:   Assess patient for self-management skills   Encourage participation in management   Identify potential discharge barriers on admission and throughout hospital stay   Involve patient/family/caregiver in discharge planning process   Collaborate with case management/ for discharge needs     Problem: Coping  Goal: Pt/support person able to verbalize concerns and demonstrate effective coping strategies  Outcome: Progressing  Flowsheets (Taken 4/23/2025 0000)  Patient/Support person able to verbalize concerns and demonstrate effective coping strategies:   Assist patient/support person to identify coping skills, available support systems and cultural and spiritual values   Provide emotional support, including active listening and acknowledgement of concerns of patient and caregivers   Assess for spiritual needs and obtain spiritual consult as indicated     Problem: Decision Making  Goal: Pt/support person able to effectively weigh alternatives and participate in decision making related to treatment and care  Outcome: Progressing  Flowsheets (Taken 4/23/2025 0000)  Patient/support person able to effectively weigh alternatives and participate in decision making related to treatment and care:   Facilitate patient and support person articulation of goals for care   Provide information as requested by patient/support person   Respect patient/support person right to receive or not to receive information     Problem: Spiritual Care  Goal: Pt feels balance and connection with higher power that empowers the self during times of spiritual distress  Outcome: Progressing  Flowsheets (Taken 4/23/2025 0000)  Patient feels balance and connection with higher power that empowers the self during times of spiritual distress:   Create safety for patient through empathic presence and non-judgmental listening    Encourage patient to explore his/her values, beliefs and/or spiritual images and practices   Encourage use of breath work, imagery, meditation, relaxation, to ease distress and provide healing

## 2025-04-24 ENCOUNTER — APPOINTMENT (OUTPATIENT)
Dept: CARDIOLOGY | Facility: HOSPITAL | Age: 60
DRG: 309 | End: 2025-04-24
Payer: MEDICARE

## 2025-04-24 ENCOUNTER — PATIENT OUTREACH (OUTPATIENT)
Dept: FAMILY MEDICINE | Facility: HOSPITAL | Age: 60
End: 2025-04-24
Payer: MEDICARE

## 2025-04-24 ENCOUNTER — TRANSFERRED RECORDS (OUTPATIENT)
Dept: HEALTH INFORMATION MANAGEMENT | Facility: CLINIC | Age: 60
End: 2025-04-24
Payer: MEDICARE

## 2025-04-24 LAB
ANION GAP SERPL CALC-SCNC: 7 MMOL/L (ref 3–11)
BASOPHILS # BLD AUTO: 0 10*3/UL
BASOPHILS NFR BLD AUTO: 0.7 % (ref 0–2)
BUN SERPL-MCNC: 14 MG/DL (ref 7–25)
CALCIUM SERPL-MCNC: 9.1 MG/DL (ref 8.6–10.3)
CHLORIDE SERPL-SCNC: 105 MMOL/L (ref 98–107)
CO2 SERPL-SCNC: 24 MMOL/L (ref 21–32)
CREAT SERPL-MCNC: 0.9 MG/DL (ref 0.7–1.3)
EGFRCR SERPLBLD CKD-EPI 2021: 99 ML/MIN/1.73M*2
EOSINOPHIL # BLD AUTO: 0.1 10*3/UL
EOSINOPHIL NFR BLD AUTO: 2.5 % (ref 0–3)
ERYTHROCYTE [DISTWIDTH] IN BLOOD BY AUTOMATED COUNT: 14.9 % (ref 11.5–15)
GLUCOSE SERPL-MCNC: 111 MG/DL (ref 70–105)
HCT VFR BLD AUTO: 45.2 % (ref 38–50)
HGB BLD-MCNC: 15.4 G/DL (ref 13.2–17.2)
LYMPHOCYTES # BLD AUTO: 1.7 10*3/UL
LYMPHOCYTES NFR BLD AUTO: 33.7 % (ref 15–47)
MAGNESIUM SERPL-MCNC: 1.9 MG/DL (ref 1.8–2.4)
MCH RBC QN AUTO: 31.9 PG (ref 29–34)
MCHC RBC AUTO-ENTMCNC: 34.1 G/DL (ref 32–36)
MCV RBC AUTO: 93.4 FL (ref 82–97)
MONOCYTES # BLD AUTO: 0.4 10*3/UL
MONOCYTES NFR BLD AUTO: 7.7 % (ref 5–13)
NEUTROPHILS # BLD AUTO: 2.8 10*3/UL
NEUTROPHILS NFR BLD AUTO: 55.4 % (ref 46–70)
PHOSPHATE SERPL-MCNC: 4 MG/DL (ref 2.5–4.9)
PLATELET # BLD AUTO: 165 10*3/UL (ref 130–350)
PMV BLD AUTO: 7.6 FL (ref 6.9–10.8)
POTASSIUM SERPL-SCNC: 4.2 MMOL/L (ref 3.5–5.1)
RBC # BLD AUTO: 4.84 10*6/UL (ref 4.1–5.8)
SODIUM SERPL-SCNC: 136 MMOL/L (ref 135–145)
WBC # BLD AUTO: 5.1 10*3/UL (ref 3.7–9.6)

## 2025-04-24 PROCEDURE — 96366 THER/PROPH/DIAG IV INF ADDON: CPT

## 2025-04-24 PROCEDURE — 83735 ASSAY OF MAGNESIUM: CPT | Performed by: HOSPITALIST

## 2025-04-24 PROCEDURE — 84100 ASSAY OF PHOSPHORUS: CPT | Performed by: HOSPITALIST

## 2025-04-24 PROCEDURE — 85025 COMPLETE CBC W/AUTO DIFF WBC: CPT | Performed by: HOSPITALIST

## 2025-04-24 PROCEDURE — 80048 BASIC METABOLIC PNL TOTAL CA: CPT | Performed by: HOSPITALIST

## 2025-04-24 PROCEDURE — 36415 COLL VENOUS BLD VENIPUNCTURE: CPT | Performed by: HOSPITALIST

## 2025-04-24 PROCEDURE — (BLANK) HC ROOM ICU INTERMEDIATE

## 2025-04-24 PROCEDURE — 6370000100 HC RX 637 (ALT 250 FOR IP): Performed by: HOSPITALIST

## 2025-04-24 PROCEDURE — 93288 INTERROG EVL PM/LDLS PM IP: CPT

## 2025-04-24 PROCEDURE — 99232 SBSQ HOSP IP/OBS MODERATE 35: CPT | Performed by: HOSPITALIST

## 2025-04-24 PROCEDURE — 6360000200 HC RX 636 W HCPCS (ALT 250 FOR IP)

## 2025-04-24 RX ORDER — PRASUGREL 10 MG/1
10 TABLET, FILM COATED ORAL DAILY
Status: DISCONTINUED | OUTPATIENT
Start: 2025-04-24 | End: 2025-04-28 | Stop reason: HOSPADM

## 2025-04-24 RX ORDER — ROSUVASTATIN CALCIUM 20 MG/1
40 TABLET, COATED ORAL NIGHTLY
Status: DISCONTINUED | OUTPATIENT
Start: 2025-04-24 | End: 2025-04-28 | Stop reason: HOSPADM

## 2025-04-24 RX ORDER — DEXTROSE 40 %
15 GEL (GRAM) ORAL
Status: DISCONTINUED | OUTPATIENT
Start: 2025-04-24 | End: 2025-04-28 | Stop reason: HOSPADM

## 2025-04-24 RX ORDER — GLUCAGON 1 MG/ML
1 KIT INJECTION
Status: DISCONTINUED | OUTPATIENT
Start: 2025-04-24 | End: 2025-04-28 | Stop reason: HOSPADM

## 2025-04-24 RX ORDER — BISACODYL 10 MG/1
10 SUPPOSITORY RECTAL DAILY PRN
Status: DISCONTINUED | OUTPATIENT
Start: 2025-04-24 | End: 2025-04-28 | Stop reason: HOSPADM

## 2025-04-24 RX ORDER — MEXILETINE HYDROCHLORIDE 150 MG/1
300 CAPSULE ORAL EVERY 8 HOURS SCHEDULED
Status: DISCONTINUED | OUTPATIENT
Start: 2025-04-24 | End: 2025-04-25

## 2025-04-24 RX ORDER — TAMSULOSIN HYDROCHLORIDE 0.4 MG/1
0.4 CAPSULE ORAL DAILY
Status: DISCONTINUED | OUTPATIENT
Start: 2025-04-24 | End: 2025-04-28 | Stop reason: HOSPADM

## 2025-04-24 RX ORDER — FENTANYL CITRATE/PF 50 MCG/ML
12.5 PLASTIC BAG, INJECTION (ML) INTRAVENOUS
Status: DISCONTINUED | OUTPATIENT
Start: 2025-04-24 | End: 2025-04-28 | Stop reason: HOSPADM

## 2025-04-24 RX ORDER — ACETAMINOPHEN 325 MG/1
650 TABLET ORAL EVERY 6 HOURS PRN
Status: DISCONTINUED | OUTPATIENT
Start: 2025-04-24 | End: 2025-04-28 | Stop reason: HOSPADM

## 2025-04-24 RX ORDER — BISACODYL 10 MG/1
10 SUPPOSITORY RECTAL DAILY PRN
Status: DISCONTINUED | OUTPATIENT
Start: 2025-04-24 | End: 2025-04-24

## 2025-04-24 RX ORDER — CALCIUM CARBONATE 200(500)MG
500 TABLET,CHEWABLE ORAL 3 TIMES DAILY PRN
Status: DISCONTINUED | OUTPATIENT
Start: 2025-04-24 | End: 2025-04-28 | Stop reason: HOSPADM

## 2025-04-24 RX ORDER — TRAZODONE HYDROCHLORIDE 50 MG/1
25 TABLET ORAL NIGHTLY PRN
Status: DISCONTINUED | OUTPATIENT
Start: 2025-04-24 | End: 2025-04-28 | Stop reason: HOSPADM

## 2025-04-24 RX ORDER — CARVEDILOL 25 MG/1
50 TABLET ORAL 2 TIMES DAILY WITH MEALS
Status: DISCONTINUED | OUTPATIENT
Start: 2025-04-24 | End: 2025-04-28 | Stop reason: HOSPADM

## 2025-04-24 RX ORDER — SPIRONOLACTONE 25 MG/1
25 TABLET ORAL DAILY
Status: DISCONTINUED | OUTPATIENT
Start: 2025-04-24 | End: 2025-04-28 | Stop reason: HOSPADM

## 2025-04-24 RX ORDER — ALUMINUM HYDROXIDE, MAGNESIUM HYDROXIDE, AND SIMETHICONE 1200; 120; 1200 MG/30ML; MG/30ML; MG/30ML
30 SUSPENSION ORAL 3 TIMES DAILY PRN
Status: DISCONTINUED | OUTPATIENT
Start: 2025-04-24 | End: 2025-04-28 | Stop reason: HOSPADM

## 2025-04-24 RX ORDER — METFORMIN HYDROCHLORIDE 500 MG/1
500 TABLET, EXTENDED RELEASE ORAL 2 TIMES DAILY
COMMUNITY

## 2025-04-24 RX ORDER — FENTANYL CITRATE/PF 50 MCG/ML
25 PLASTIC BAG, INJECTION (ML) INTRAVENOUS
Status: DISCONTINUED | OUTPATIENT
Start: 2025-04-24 | End: 2025-04-28 | Stop reason: HOSPADM

## 2025-04-24 RX ORDER — ONDANSETRON 4 MG/1
4 TABLET, FILM COATED ORAL EVERY 6 HOURS PRN
Status: DISCONTINUED | OUTPATIENT
Start: 2025-04-24 | End: 2025-04-28 | Stop reason: HOSPADM

## 2025-04-24 RX ORDER — DEXTROSE 50 % IN WATER (D50W) INTRAVENOUS SYRINGE
15-30
Status: DISCONTINUED | OUTPATIENT
Start: 2025-04-24 | End: 2025-04-28 | Stop reason: HOSPADM

## 2025-04-24 RX ORDER — SODIUM CHLORIDE 0.9 % (FLUSH) 0.9 %
3 SYRINGE (ML) INJECTION AS NEEDED
Status: DISCONTINUED | OUTPATIENT
Start: 2025-04-24 | End: 2025-04-28 | Stop reason: HOSPADM

## 2025-04-24 RX ORDER — NITROGLYCERIN 0.4 MG/1
0.4 TABLET SUBLINGUAL EVERY 5 MIN PRN
Status: DISCONTINUED | OUTPATIENT
Start: 2025-04-24 | End: 2025-04-28 | Stop reason: HOSPADM

## 2025-04-24 RX ORDER — FUROSEMIDE 40 MG/1
40 TABLET ORAL DAILY
Status: DISCONTINUED | OUTPATIENT
Start: 2025-04-24 | End: 2025-04-28 | Stop reason: HOSPADM

## 2025-04-24 RX ORDER — ONDANSETRON HYDROCHLORIDE 2 MG/ML
4 INJECTION, SOLUTION INTRAVENOUS EVERY 6 HOURS PRN
Status: DISCONTINUED | OUTPATIENT
Start: 2025-04-24 | End: 2025-04-28 | Stop reason: HOSPADM

## 2025-04-24 RX ORDER — TALC
5 POWDER (GRAM) TOPICAL NIGHTLY PRN
Status: DISCONTINUED | OUTPATIENT
Start: 2025-04-24 | End: 2025-04-28 | Stop reason: HOSPADM

## 2025-04-24 RX ORDER — PETROLATUM 420 MG/G
1 OINTMENT TOPICAL AS NEEDED
COMMUNITY

## 2025-04-24 RX ORDER — ASPIRIN 81 MG/1
81 TABLET ORAL DAILY
Status: ON HOLD | COMMUNITY
End: 2025-04-24 | Stop reason: ENTERED-IN-ERROR

## 2025-04-24 RX ORDER — LANOLIN ALCOHOL/MO/W.PET/CERES
1 CREAM (GRAM) TOPICAL AS NEEDED
COMMUNITY

## 2025-04-24 RX ADMIN — MEXILETINE HYDROCHLORIDE 300 MG: 150 CAPSULE ORAL at 20:53

## 2025-04-24 RX ADMIN — AMIODARONE HYDROCHLORIDE 0.5 MG/MIN: 1.8 INJECTION, SOLUTION INTRAVENOUS at 03:10

## 2025-04-24 RX ADMIN — TAMSULOSIN HYDROCHLORIDE 0.4 MG: 0.4 CAPSULE ORAL at 09:15

## 2025-04-24 RX ADMIN — EMPAGLIFLOZIN 10 MG: 10 TABLET, FILM COATED ORAL at 09:15

## 2025-04-24 RX ADMIN — RIVAROXABAN 20 MG: 20 TABLET, FILM COATED ORAL at 17:39

## 2025-04-24 RX ADMIN — CARVEDILOL 50 MG: 25 TABLET, FILM COATED ORAL at 09:15

## 2025-04-24 RX ADMIN — SACUBITRIL AND VALSARTAN 1 TABLET: 97; 103 TABLET, FILM COATED ORAL at 20:53

## 2025-04-24 RX ADMIN — MEXILETINE HYDROCHLORIDE 300 MG: 150 CAPSULE ORAL at 13:50

## 2025-04-24 RX ADMIN — FUROSEMIDE 40 MG: 40 TABLET ORAL at 09:15

## 2025-04-24 RX ADMIN — CARVEDILOL 50 MG: 25 TABLET, FILM COATED ORAL at 17:39

## 2025-04-24 RX ADMIN — SACUBITRIL AND VALSARTAN 1 TABLET: 97; 103 TABLET, FILM COATED ORAL at 09:15

## 2025-04-24 RX ADMIN — PRASUGREL 10 MG: 10 TABLET, FILM COATED ORAL at 09:15

## 2025-04-24 RX ADMIN — AMIODARONE HYDROCHLORIDE 0.5 MG/MIN: 1.8 INJECTION, SOLUTION INTRAVENOUS at 13:52

## 2025-04-24 RX ADMIN — MEXILETINE HYDROCHLORIDE 300 MG: 150 CAPSULE ORAL at 05:59

## 2025-04-24 RX ADMIN — ROSUVASTATIN CALCIUM 40 MG: 20 TABLET, FILM COATED ORAL at 20:53

## 2025-04-24 RX ADMIN — SPIRONOLACTONE 25 MG: 25 TABLET ORAL at 09:15

## 2025-04-24 ASSESSMENT — ENCOUNTER SYMPTOMS
ORTHOPNEA: 0
JOINT SWELLING: 0
NEAR-SYNCOPE: 0
NAUSEA: 0
SHORTNESS OF BREATH: 1
HEADACHES: 0
PND: 0
BLACKOUTS: 0
DIAPHORESIS: 0
FALLS: 0
DIZZINESS: 0
PALPITATIONS: 1
LIGHT-HEADEDNESS: 0
VISUAL CHANGE: 0
VOMITING: 0
CHILLS: 0
FOCAL WEAKNESS: 0
BACK PAIN: 0
IRREGULAR HEARTBEAT: 0
CLAUDICATION: 0
COLOR CHANGE: 0
SYNCOPE: 0
WEAKNESS: 0

## 2025-04-24 NOTE — PROGRESS NOTES
"Subjective    LOS: 1 day      Patient is no longer having any chest pain.  He denies any shortness of breath, palpitations, nausea or vomiting.  Objective   /82 (BP Location: Left arm, Patient Position: In bed, Cuff Size: Long Adult)   Pulse 71   Temp 36.6 °C (97.9 °F) (Oral)   Resp 16   Ht 1.803 m (5' 11\")   Wt 123.1 kg (271 lb 6.2 oz)   SpO2 95%   BMI 37.85 kg/m²     Intake/Output Summary (Last 24 hours) at 4/24/2025 1110  Last data filed at 4/24/2025 0800  Gross per 24 hour   Intake 501.01 ml   Output 450 ml   Net 51.01 ml      Physical Exam  General: Alert, no acute distress  Heart: Rate and rhythm are regular  Lungs: Clear to auscultation bilaterally  Abdomen: Soft, nondistended, nontender, normal bowel sounds  Extremities: No edema  Neuro: Alert, oriented x 3, nonfocal    Other                      Data Used For Medical Decision Making        Case discussed with nocturnal hospitalist  Cardiology EP note reviewed  Labs reviewed as below      Assessment/Plan     Assessment and Plan     # Recurrent ventricular tachycardia   - continue amio drip  - appreciate cardiology input  - await further EP input     # hypertension  - 24hr BP range: () / (58-84) (4/24/25)  - continue home: furosemide 40 mg po daily  - continue home: sacubitril-valsartan 97 mg-103 mg 1 tabs po BID  - continue home: spironolactone 25 mg po daily  - currently on: carvedilol 50 mg po BID     # atrial fibrillation  - [id number] score: 3 (4/24/25), moderate-high risk of stroke (3.2% per year)  - heart rate range:  (since 4/23/25 11:53)  - continue home: mexiletine 300 mg po TID  - continue home: rivaroxaban 20 mg po daily  - currently on: amiodarone 0.5 mg/min iv continuous  - currently on: carvedilol 50 mg po BID  - cardiac telemetry     # chronic systolic heart failure   - weight: 123.1 kg unchanged from 123.1 kg (4/23/25), est. baseline 118 kg  - LV ejection fraction: 32 % (4/22/25)  - continue home: empagliflozin 10 mg " po daily  - continue home: furosemide 40 mg po daily  - continue home: sacubitril-valsartan 97 mg-103 mg 1 tabs po BID  - continue home: spironolactone 25 mg po daily  - currently on: carvedilol 50 mg po BID     # diabetes mellitus  - hemoglobin A1c: 6.9 % (3/21/25) up from 6.2 % (9/22/23)  - continue home: empagliflozin 10 mg po daily  - glucose range: 111-125 (since 4/23/25)     # Coronary artery disease  - continue home: rosuvastatin 40 mg po daily  - currently on: carvedilol 50 mg po BID  - currently on: prasugrel 10 mg po daily     # Hyperlipidemia: with clinical ASCVD  - continue home: rosuvastatin 40 mg po daily     # Obesity, class II: severe  - BMI: 37.9 (4/23/25)  - complicated by atrial fibrillation, coronary artery disease, diabetes mellitus, heart failure, hyperlipidemia, hypertension, and Obstructive sleep apnea     # Tobacco use disorder    DVT Prophylaxis: Currently on therapeutic anticoagulation   Code Status: Full Code   Decision Making Capacity: Yes    Medically Ready for Discharge:Anticipated Tomorrow  Anticipated Disposition : Home or Self-Care
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
## 2025-04-24 NOTE — INTERDISCIPLINARY/THERAPY
04/24/25 0900   Cardiac Rehab Phase I   Comment CR order received and acknowledged. Pt d/c'd and readmitted same day. CR was unable to see pt prior to d/c. Pt just received heart disease education last month and is still on [facility] Phase 2 CR referral list. Nothing further needed per CR at this time.

## 2025-04-24 NOTE — MEDICATION HISTORY SPECIALIST NOTES
"    [facility] [id number]    CSN: [id number]  : [id number]    Information sources:  EPIC  Complete Dispense Report  [facility] - [facility]    Patient interviewed in person who provided all history. Patient verified medications and provided last doses. Verified with [facility] med list and Rx meds in Epic are \"house meds\" and have been e-prescribed accordingly.     Patient was admitted on 25 and discharged on 25 only to return a few hours later, [facility] did not review chart in that time span.     New medications added:  Metformin   Eurcerin  hydrophor    Discontinued medications:  Aspirin - not taking, filled in error at [facility], pt was to stop taking at discharge on 3/25/25  Cyclobenzaprine - not taking, d/c'd per [facility]    Discrepancies:  Metformin - has to started    **High alert medications**  Rivaroxaban (Xarelto)      Prasugrel (Effient)        "

## 2025-04-24 NOTE — PLAN OF CARE
Problem: Neurosensory - Adult  Goal: Achieves stable or improved neurological status  Outcome: Progressing  Flowsheets (Taken 4/24/2025 0040 by Nurse Ann MCMANUS RN)  Achieves stable or improved neurological status:   Initiate measures to prevent increased intracranial pressure. For example: decrease stimuli, maintain neutral head alignment, prevent shivering, etc.   Assess for and report changes in neurological status   Maintain blood pressure and fluid volume within ordered parameters to optimize cerebral perfusion and minimize risk of hemorrhage   Monitor temperature, glucose, and sodium.  Goal: Achieves maximal functionality and self care  Outcome: Progressing  Flowsheets (Taken 4/24/2025 0040 by Nurse Ann MCMANUS RN)  Achieves maximal functionality and self care:   Encourage and assist patient to increase activity and self care with guidance from PT/OT   With patient fatigue and changes in neurological status, monitor swallowing and airway patency   Encourage visually impaired, hearing impaired and aphasic patients to use assistive/communication devices  Goal: Will maintain current level or return to usual level of neurologic status, motor/sensory function and hemodynamic stability after a hematologic cerebral event such as stroke or TIA.  Outcome: Progressing  Flowsheets (Taken 4/24/2025 0040 by Nurse Ann MCMANUS RN)  Will maintain current level or return to usual level of neurologic status, motor/sensory function and hemodynamic stability after a hematologic cerebral event such as stroke or TIA:   Encourage and assist patient to increase activity and self-care with guidance from PT/OT/ST/RT   Encourage and educate visually impaired, hearing impaired and aphasic patients and their support person to use assistive/communication devices   Educate patient/support person on signs and symptoms of stroke, individual risk factors, and activation of Emergency Medical Services     Problem: Neurosensory - Adult  Goal: Achieves  stable or improved neurological status  Outcome: Progressing  Flowsheets (Taken 4/24/2025 0040 by Nurse Ann MCMANUS RN)  Achieves stable or improved neurological status:   Initiate measures to prevent increased intracranial pressure. For example: decrease stimuli, maintain neutral head alignment, prevent shivering, etc.   Assess for and report changes in neurological status   Maintain blood pressure and fluid volume within ordered parameters to optimize cerebral perfusion and minimize risk of hemorrhage   Monitor temperature, glucose, and sodium.  Goal: Achieves maximal functionality and self care  Outcome: Progressing  Flowsheets (Taken 4/24/2025 0040 by Nurse Ann MCMANUS RN)  Achieves maximal functionality and self care:   Encourage and assist patient to increase activity and self care with guidance from PT/OT   With patient fatigue and changes in neurological status, monitor swallowing and airway patency   Encourage visually impaired, hearing impaired and aphasic patients to use assistive/communication devices  Goal: Will maintain current level or return to usual level of neurologic status, motor/sensory function and hemodynamic stability after a hematologic cerebral event such as stroke or TIA.  Outcome: Progressing  Flowsheets (Taken 4/24/2025 0040 by Nurse Ann MCMANUS RN)  Will maintain current level or return to usual level of neurologic status, motor/sensory function and hemodynamic stability after a hematologic cerebral event such as stroke or TIA:   Encourage and assist patient to increase activity and self-care with guidance from PT/OT/ST/RT   Encourage and educate visually impaired, hearing impaired and aphasic patients and their support person to use assistive/communication devices   Educate patient/support person on signs and symptoms of stroke, individual risk factors, and activation of Emergency Medical Services     Problem: Cardiovascular - Adult  Goal: Maintains optimal cardiac output and hemodynamic  stability  Outcome: Progressing  Flowsheets (Taken 4/24/2025 0040 by Nurse Ann MCMANUS, RN)  Maintain optimal cardiac output and hemodynamic stability:   Administer or titrate ordered vasoactive medications to optimize hemodynamic stability   Monitor for hypotension and other signs of decreased cardiac output   Monitor heart rate, blood pressure, and cardiac rhythm   Monitor arterial and/or venous puncture sites for bleeding and/or hematoma   Monitor for fluid overload/dehydration, weight gain, shortness of breath and activity intolerance   Assess for signs of decreased coronary artery perfusion - ex. angina   Assess quality of pulses, capillary refill, edema, sensation, skin color and temperature  Goal: Absence of cardiac dysrhythmias or at baseline  Outcome: Progressing  Flowsheets (Taken 4/24/2025 0040 by Nurse Ann MCMANUS, RN)  Absence of cardiac dysrhythmias or at baseline:   Continuous cardiac monitoring, monitor vital signs (see flowsheet documentation)   Administer antiarrhythmic and heart rate control medications as ordered   Obtain 12 lead EKG if indicated   Initiate emergency measures for life threatening arrhythmias   Monitor electrolytes and administer replacement therapy as ordered     Problem: Respiratory - Adult  Goal: Achieves optimal ventilation and oxygenation  Outcome: Progressing  Flowsheets (Taken 4/24/2025 0040 by Nurse Ann MCMANUS, RN)  Achieves optimal ventilation and oxygenation:   Assess and report changes in respiratory status   Position to facilitate oxygenation and minimize respiratory effort   Assess and report changes in mentation and behavior   Oxygen supplementation based on oxygen saturation or arterial blood gases   Assess patient's ability to cough effectively   Collaborate with Respiratory Therapy as indicated, including medications and treatment  Goal: Achieves optimal ventilation and oxygenation with noninvasive CPAP/BiLEVEL support  Outcome: Progressing  Flowsheets (Taken 4/24/2025 0040  by Nurse Ann MCMANUS RN)  Achieves Optimal Oxygenation with Noninvasive CPAP/BiLEVEL Support:   Position patient to facilitate optimal ventilation/oxygenation status and minimize respiratory effort   Provide education to patient/support person about rationale and expected outcomes associated with therapy   Assess effectiveness of therapy on ventilation/oxygenation status based on oxygen saturation and/or arterial blood gases, as indicated   Position patient to reduce aspiration risk, elevate head of bed at least 35 degrees or higher, as applicable   Assess patient and report changes in respiratory and physiological status   Assess and monitor skin condition, in relationship to the respiratory interface   Routinely monitor equipment for proper performance and settings   Assess patient and report changes in mentation and behavior   Assure equipment alarm volume is adequate for the patient's environment   Immediately repsond to equipment alarm, to assess patient and/or cause for alarm     Problem: Gastrointestinal - Adult  Goal: Nutrient intake appropriate for improving, restoring or maintaining nutritional needs  Outcome: Progressing  Flowsheets (Taken 4/24/2025 0040 by Nurse Ann MCMANUS, RN)  Nutrient intake appropriate for improving, restoring or maintaining nutritional needs:   Assist with meals as needed   Monitor percentage of each meal consumed   Obtain nutritional consult as indicated   Provide patient/support person specific nutrition education as appropriate   Identify factors contributing to decreased intake, treat as appropriate   Monitor I&O, weight and lab values   Administer alternative nutrition interventions as ordered  Goal: Minimal or absence of nausea and vomiting  Outcome: Progressing  Flowsheets (Taken 4/24/2025 0040 by Nurse Ann MCMANUS, RN)  Minimal or absence of nausea and vomiting:   Assess patient for nausea/vomiting and report changes   Nutrition consult as indicated   Maintain NPO status as ordered    Administer ordered antiemetic medications as needed   Monitor intake and output to ensure adequate hydration   Advance diet as ordered   Provide nonpharmacologic comfort measures as appropriate   Nasogastric tube to suction as ordered  Goal: Maintains or returns to baseline digestive function  Outcome: Progressing  Flowsheets (Taken 4/24/2025 0040 by Nurse Ann MCMANUS, RN)  Maintains or returns to baseline bowel function:   Assess abdomen and bowel status and report changes in gastrointestinal function   Monitor for metabolic panel imbalances   Nutrition consult as indicated   Administer ordered medications as needed   Assess hydration and nutritional status   Assess for treatment effectiveness   Encourage mobilization and activity   Assess characteristics and frequency of stool  Goal: Establish and maintain optimal ostomy function  Outcome: Progressing  Flowsheets (Taken 4/24/2025 0040 by Nurse Ann MCMANUS, RN)  Establish and maintain optimal ostomy function:   Assess and report changes to ostomy stoma and surrounding skin   Assess and report changes bowel function   Provide ostomy care   Nutrition consult as indicated   Empty/Change ostomy pouch as needed   Encourage mobilization and activity   Consult Wound Care/Ostomy Nurse as indicated  Goal: Maintains Optimal Tissue Perfusion  Outcome: Progressing  Flowsheets (Taken 4/24/2025 0040 by Nurse Ann MCMANUS, RN)  Maintains Optimal Tissue Perfusion:   Monitor nutritional intake, intake/output, and weight   Monitor for increased abdominal girth, firmness or intra-abdominal pressure   Assess skin color, capillary refill and edema   Assess blood pressure, heart rate, and oxygen saturation   Monitor metabolic panels, blood count panels, and coagulations panels as ordered   Assess for blood in emesis and stool   Assess and report changes in bowel function     Problem: Genitourinary - Adult  Goal: Absence of urinary retention  Outcome: Progressing  Flowsheets (Taken 4/24/2025 0040 by  Nurse Ann MCMANUS, RN)  Absence of urinary retention:   Administer medications to alleviate retention as needed   Assess patient’s ability to void and empty bladder   Monitor intake/output and perform bladder scan if indicated   If urinary catheter present, initiate and maintain CAUTI bundle  Goal: Urinary catheter remains patent  Outcome: Progressing  Flowsheets (Taken 4/24/2025 0040 by Nurse Ann MCMANUS, RN)  Urinary catheter remains patent:   Irrigate catheter per order if indicated and notify provider if unable to irrigate   Assess patency of urinary catheter   Assess need for a larger catheter size or a 3-way catheter for continuous bladder irrigation  Goal: Absence of Urinary Infection  Outcome: Progressing  Flowsheets (Taken 4/24/2025 0040 by Nurse Ann MCMANUS, RN)  Absence of Urinary Infection:   Assess urinary status for burning, urgency, frequency, pain and urine characteristics   Obtain urinalysis as ordered   Monitor intake/output   Assess for neurological status changes   Monitor lab values     Problem: Metabolic/Fluid and Electrolytes - Adult  Goal: Electrolytes maintained within normal limits  Outcome: Progressing  Flowsheets (Taken 4/24/2025 0040 by Nurse Ann MCMANUS, RN)  Electrolytes maintained within normal limits:   Monitor labs and assess patient for signs and symptoms of electrolyte imbalances   Administer and monitor response to electrolyte replacements   Initiate and instruct patient/support person on fluid and nutrition restrictions as ordered  Goal: Maintain Optimal Renal Function and Hemodynamic Stability  Outcome: Progressing  Flowsheets (Taken 4/24/2025 0040 by Nurse Ann MCMANUS, RN)  Maintain optimal renal function and hemodynaimc stability:   Monitor labs and assess for signs and symptoms of volume excess or deficit   Monitor intake, output and patient weight   Monitor response to interventions for patient's volume status, including labs, urine output, blood pressure (other measures as available)    Encourage oral intake as appropriate   Instruct patient on fluid and nutrition restrictions as appropriate  Goal: Glucose maintained within prescribed range  Outcome: Progressing  Flowsheets (Taken 4/24/2025 0040 by Nurse Ann MCMANUS, RN)  Glucose maintained within prescribed range:   Assess for signs and symptoms of hyperglycemia and hypoglycemia   Monitor blood glucose as ordered   Administer ordered medications to maintain glucose within target range   Assess barriers to adequate nutritional intake and initiate nutrition consult as needed   Assess baseline knowledge and provide education as indicated   Monitor exercise as may reduce the requirements for insulin     Problem: Skin/Tissue Integrity - Adult  Goal: Skin integrity remains intact  Outcome: Progressing  Flowsheets (Taken 4/24/2025 0040 by Nurse Ann MCMANUS, RN)  Skin integrity remains intact:   Monitor for areas of redness and/or skin breakdown   Assess and document risk factors for pressure ulcer development   Initiate pressure ulcer prevention measures as indicated   Assess and document skin integrity  Goal: Incisions, wounds, or drain sites healing without S/S of infection  Outcome: Progressing  Flowsheets (Taken 4/24/2025 0040 by Nurse Ann MCMANUS, RN)  Incision(s), wound(s) or drain site(s) healing without S/S of infection:   Assess and document dressing/incision, wound bed, drain sites and surrounding tissue   Assess and document risk factors for skin breakdown   Assess and document skin integrity   Implement wound care per orders  Goal: Oral mucous membranes remain intact  Outcome: Progressing  Flowsheets (Taken 4/24/2025 1017)  Oral mucous membranes remain intact:   Assess oral mucosa and hygiene practices   Implement oral medicated treatments as ordered   Implement preventative oral hygiene regimen     Problem: Hematologic - Adult  Goal: Maintains hematologic stability  Outcome: Progressing  Flowsheets (Taken 4/24/2025 0040 by Nurse Ann MCMANUS, RN)  Maintains  hematologic stability:   Administer medications as ordered   Initiate bleeding precautions as indicated   Assess for signs and symptoms of bleeding or hemorrhage   Administer supportive blood products/factors as ordered   Monitor labs   Educate patient/support person to report signs/symptoms of bleeding     Problem: Musculoskeletal - Adult  Goal: Return mobility to safest level of function  Outcome: Progressing  Flowsheets (Taken 4/24/2025 0040 by Nurse Ann MCMANUS, RN)  Return mobility to safest level of function:   Assess patient ADL status, stability and activity tolerance providing assistive devices as needed   Assist with transfers and ambulation using safe practices   Instruct patient/support person on ordered activity level and self care needs   Obtain PT/OT consults as needed  Goal: Maintain proper alignment of affected body part  Outcome: Progressing  Flowsheets (Taken 4/24/2025 0040 by Nurse Ann MCMANUS, RN)  Maintain proper alignment of affected body part:   Educated patient/support person use of appropriate assistive device and precautions (e.g. spinal or hip dislocation precautions)   Support and protect limb and body alignment as ordered

## 2025-04-24 NOTE — ED ATTESTATION NOTE
I evaluated Pete Trejo and discussed his   management with, Mery Chen PA-C.  I personally approved the management plan for this patient and take responsibility for the patient management.    On my assessment of the patient, patient having runs of V. tach, often over 10 based on telemetry.  He was just discharged earlier today for the same problem.  Amiodarone bolus started.  Recheck of basic labs unremarkable.  EP consulted as well as cardiology and they have indicated that they will see the patient in the morning and had no further recommendations at this time.  Patient was admitted to hospital medicine.    ECG My Read: AV dual paced rhythm, no Sgarbossa criteria, no T wave changes, overall unchanged from prior ECGs    Critical Care  Performed by: Jaylon Wesley and Mery Chen PA-C  Authorized by: Jaylon Wesley     Critical care provider statement: The high probability of sudden, clinically significant, or life-threatening deterioration or loss of limb required my full and direct attention, intervention, and personal management while the patient was critical.    Critical care time (minutes):  36    Critical care time was exclusive of:  Separately billable procedures and treating other patients    Critical care was time spent personally by me on the following activities:  Blood draw for specimens, development of treatment plan with patient or surrogate, discussions with consultants, evaluation of patient's response to treatment, examination of patient, obtaining history from patient or surrogate, ordering and performing treatments and interventions, ordering and review of laboratory studies, ordering and review of radiographic studies, pulse oximetry, re-evaluation of patient's condition and review of old charts       Jaylno Wesley I, DO  04/23/25 0290

## 2025-04-24 NOTE — CONSULTATION
"[address] [place] [address]     CARDIOLOGY INPATIENT CONSULT NOTE         Subjective    Patient ID: Pete Trejo is a 59 y.o. male referred for consultation by Jaylon Coronado DO for palpitations    Chief Complaint:   Chief Complaint   Patient presents with    Palpitations     Pt reports to the ED after recently being DC with palpitations. Pt reports the palpitations have returned in the last hour.        HPI    Mr. Trejo is a pleasant 59-year-old male with a past medical history consist of but not limited to refractory ventricular tachycardia, congestive heart failure, hypertension, hyperlipidemia, diabetes mellitus type II, paroxysmal atrial fibrillation, obstructive sleep apnea and AICD. Patient presented to the ED following discharge today due to fast heart rate.    Patient returned to the ED within 2 hours following discharge after experiencing faster heart beats and shortness of breath.  He had troponin obtained in the ED which is negative and an EKG which indicate paced rhythm with heart rate 70 bpm.  Patient was placed on telemetry which indicates patient has multiple runs of VT.  He was given amiodarone bolus and started on amiodarone drip.  The ED provider consulted on-call cardiologist Dr. Franz who then notify the [facility] on-duty to see the patient.    Upon arrival at the patient rooms in the ER, he was laying down comfortably and appeared  generally well and hemodynamic stable, not in any acute distress.  Patient stated that he was feeling fine following discharge today.  He stated that on his way home he and his son stopped at [employer], while he was waiting for his son in the vehicle he started to experienced palpitations with shortness of breath.  Patient stated that his heart was \"pounding constantly\" so he told his son " "to drive him back to the ER. Mr. Trejo expressed that he continue to experience faster heart beats until he received amiodarone which is the time he notice the cessation of palpitation.  During our discussion, patient did not report any chest pain, chest pressure or squeezing, palpitation, shortness of breath lightheadedness or syncope.    Patient states that he took his medication as prescribed before he was discharged today.This patient was admitted to [facility] due to ventricular tachycardia on 4/22/2025 and discharged on the evening of 4/23/2025. The patient returned to the ED within 2 hours post discharged due to palpitation and short of breath.  Prior to discharge, patient was given the option to stay the night for additional monitoring but chose to go home expressing that he is \"feeling fine\"      Cardiac problem list:  Paroxysmal atrial fibrillation  Ventricular tachycardia  Congestive heart failure  Hypertension  Hyperlipidemia    Past Medical History:   Diagnosis Date    BPH (benign prostatic hyperplasia)     CAD (coronary artery disease)     Status post stent    CHF (congestive heart failure) (CMS/[facility])     Chronic combined systolic and diastolic CHF (congestive heart failure) (CMS/[facility])     LVEF 25% with grade 1 diastolic dysfunction as per echo on 1/26/2022.    CVA (cerebral vascular accident) (CMS/[facility]) 2010    Without residual deficit.    Diabetes mellitus type 2 in obese (CMS/[facility])     Dyslipidemia     GERD (gastroesophageal reflux disease)     Heart attack (CMS/[facility])     x3    Hypertension     Ischemic cardiomyopathy     Morbid obesity with BMI of 40.0-44.9, adult (CMS/[facility])     Paroxysmal atrial fibrillation (CMS/[facility])     On Xarelto    V-tach (CMS/[facility])     AICD in place       Past Surgical History:   Procedure Laterality Date    ABLATION VT N/A 06/09/2020    Procedure: Ablation VT;  Surgeon: Wilfredo Montana MD;  Location: [facility] EP Lab;  Service: Electrophysiology;  Laterality: N/A;  Check with  " Nan in the a.m. regarding scheduling    APPENDECTOMY      BILATERAL HEART CATH N/A 12/5/2023    Procedure: Bilateral heart cath;  Surgeon: Jitendra Post MD;  Location: [facility] Cath Lab;  Service: Cardiovascular;  Laterality: N/A;    COLONOSCOPY N/A 7/25/2023    Procedure: COLONOSCOPY with Polypectomy;  Surgeon: Hortencia Carson MD;  Location: [facility] Endoscopy;  Service: Endoscopy;  Laterality: N/A;    CORONARY ANGIOGRAPHY N/A 3/24/2025    Procedure: Coronary Angiography;  Surgeon: Vaibhav Mcclure MD;  Location: [facility] Cath Lab;  Service: Cardiovascular;  Laterality: N/A;    CORONARY ARTERY STENTING  2009    2.5 X 24-mm Taxus DIMAS to first diagonal. 3.0 X 8-mm Taxus stent to proximal LAD just proximal to diagonal.    CORONARY ARTERY STENTING  2010    3.5 X 15-mm Promus DIMAS to totally occluded LAD    CORONARY ARTERY STENTING  2011    2.25 X 30-mm Resolute DIMAS to 2nd diagonal.  Balloon angioplasty through strut to improve blood flow to distal LAD    CORONARY ARTERY STENTING  07/28/2017    second diagonal branch LAD Resolute 2.25 x 30-mm DIMAS    CORONARY ARTERY STENTING N/A 3/24/2025    Procedure: Coronary artery/graft stenting;  Surgeon: Vaibhav Mcclure MD;  Location: [facility] Cath Lab;  Service: Cardiovascular;  Laterality: N/A;    ICD DC GEN CHANGE N/A 6/20/2022    Procedure: BI-V ICD Gen Change;  Surgeon: Wilfredo Montana MD;  Location: [facility] EP Lab;  Service: Cardiovascular;  Laterality: N/A;    ICD SC NEW  2011    [employer] Cognis Model #N119, Serial #[id number]. [employer] Reliance Model #0185, Serial #[id number]. LV lead Acuity Model #45+91, Serial #[id number]. Atrial lead Model #4469, Serial #[id number]    OXIMETRY RUN N/A 12/5/2023    Procedure: OXIMETRY RUN;  Surgeon: Jitendra Post MD;  Location: [facility] Cath Lab;  Service: Cardiovascular;  Laterality: N/A;       Allergies as of 04/23/2025 - Reviewed 04/23/2025   Allergen Reaction Noted    Morphine  07/30/2017    Tramadol  07/30/2017     Inpatient medications:     amiodarone (NEXTERONE)  360 mg/200 mL (1.8 mg/mL) infusion (premix), 1 mg/min, Last Rate: 1 mg/min (25)   Followed by  [START ON 2025] amiodarone (NEXTERONE) 360 mg/200 mL (1.8 mg/mL) infusion (premix), 0.5 mg/min        Family History   Problem Relation Age of Onset    Heart attack Mother 62    Other Mother         Abdominal Aortic Aneurysm    Lung cancer Mother     Colon cancer Father         Cause of death    Diabetes Brother         Non-Insulin Dependent    Heart attack Brother 51        w/ Stents    Heart disease Brother     Other Son         Well       Social History     Tobacco Use    Smoking status: Former     Current packs/day: 0.00     Average packs/day: 0.8 packs/day for 30.0 years (22.5 ttl pk-yrs)     Types: Cigarettes     Start date: 1990     Quit date: 2020     Years since quittin.8    Smokeless tobacco: Never   Vaping Use    Vaping status: Never Used   Substance Use Topics    Alcohol use: Not Currently     Comment: Quit drinking in     Drug use: Not Currently       Review of Systems   Constitutional: Positive for malaise/fatigue. Negative for chills and diaphoresis.   HENT:  Negative for congestion and nosebleeds.    Eyes:  Negative for visual changes.   Cardiovascular:  Positive for palpitations. Negative for chest pain, claudication, cyanosis, dyspnea on exertion, irregular heartbeat, leg swelling/pain, near-syncope, orthopnea, PND and syncope/fainting.   Respiratory:  Positive for shortness of breath.    Skin:  Negative for color change and rash.   Musculoskeletal:  Negative for back pain, falls, joint pain and joint swelling.   Gastrointestinal:  Negative for nausea and vomiting.   Neurological:  Negative for dizziness, focal weakness, headaches, light-headedness, weakness and blackouts.         Objective     Vital signs in last 24 hours:   Temp:  [36.4 °C (97.5 °F)-36.8 °C (98.2 °F)] 36.4 °C (97.5 °F)  Heart Rate:  [] 70  Resp:  [12-21] 12  SpO2:  [91 %-96 %] 93 %  BP:  ()/(58-84) 117/68  Weight: 123.1 kg (271 lb 6.2 oz)  Intake/Output last 3 shifts:  No intake/output data recorded.  Intake/Output this shift:  I/O this shift:  In: 100 [I.V.:100]  Out: -     Physical Exam  Constitutional:       General: He is not in acute distress.     Appearance: He is ill-appearing. He is not toxic-appearing or diaphoretic.   HENT:      Head: Normocephalic and atraumatic.      Nose: Nose normal.      Mouth/Throat:      Mouth: Mucous membranes are moist.   Eyes:      Extraocular Movements: Extraocular movements intact.      Pupils: Pupils are equal, round, and reactive to light.   Neck:      Vascular: No carotid bruit.      Comments: No JVP.  Right neck IJ access site covered with gauze  Cardiovascular:      Rate and Rhythm: Normal rate.      Pulses: Normal pulses.      Heart sounds: Normal heart sounds. No murmur heard.     No friction rub. No gallop.      Comments: No orthopnea.  2+ pulsation bilateral upper and lower extremities  Pulmonary:      Effort: Pulmonary effort is normal. No respiratory distress.      Breath sounds: Normal breath sounds. No stridor. No wheezing, rhonchi or rales.   Chest:      Chest wall: No tenderness.   Abdominal:      General: There is no distension.      Palpations: There is no mass.   Musculoskeletal:         General: No swelling, tenderness, deformity or signs of injury.      Cervical back: No rigidity or tenderness.      Right lower leg: No edema.      Left lower leg: No edema.   Lymphadenopathy:      Cervical: No cervical adenopathy.   Skin:     General: Skin is warm and dry.      Coloration: Skin is not jaundiced or pale.      Findings: No bruising, erythema, lesion or rash.   Neurological:      General: No focal deficit present.      Mental Status: He is alert and oriented to person, place, and time.   Psychiatric:         Mood and Affect: Mood normal.         Behavior: Behavior normal.         Thought Content: Thought content normal.         Judgment:  Judgment normal.     Data Review:   Lab Results   Component Value Date     04/23/2025    K 4.1 04/23/2025     04/23/2025    CO2 25 04/23/2025    BUN 17 04/23/2025    CREATININE 1.18 04/23/2025    GLUCOSE 125 (H) 04/23/2025    CALCIUM 9.1 04/23/2025     Lab Results   Component Value Date    BNP 46 04/23/2025     Lipids:    Lab Results   Component Value Date    CHOL 116 08/06/2024    CHOL 106 07/28/2022    CHOL 119 07/11/2021     Lab Results   Component Value Date    HDL 31 (L) 08/06/2024    HDL 25 (L) 07/28/2022    HDL 35 (L) 07/11/2021     Lab Results   Component Value Date    LDLCALC 52 08/06/2024    LDLCALC 50 07/28/2022    LDLCALC 60 07/11/2021     Lab Results   Component Value Date    TRIG 164 (H) 08/06/2024    TRIG 154 (H) 07/28/2022    TRIG 120 07/11/2021        TSH:   Lab Results   Component Value Date    TSH 4.414 04/21/2025     Magnesium:   Lab Results   Component Value Date    MG 2.1 04/23/2025     Protime-INR:   Lab Results   Component Value Date    PT 12.8 (H) 04/23/2025    INR 1.1 04/23/2025       Electrocardiogram: 4/23/2025      Telemetry:        Radiology:  Cardiac catheterization  Cardiac catheterization, OXIMETRY RUN  Result Date: 4/23/2025  Narrative: DATE OF PROCEDURE: 4/23/2025  PREOPERATIVE DIAGNOSIS:  Pre-op Diagnosis    * Chronic systolic HF (heart failure) (CMS/HCC) [I50.22]    * Ischemic cardiomyopathy [I25.5] Final Impression: 1.  Right atrial mean pressure: 5 mmHg.  O2 saturation 65.0%. 2.  Right ventricular pressure: 27/-1/2 mmHg.  O2 saturation 65.6%. 3.  Right main pulmonary artery pressure: 28/13/16 mmHg.  O2 saturation 65.4%. 4.  Pulmonary capillary wedge pressure: 8 mmHg. 5.  Cardiac output thermodilution technique 4.56 L/min.  Cardiac index 1.9 L/min/m². 6.  Cardiac output Becki technique: 5.7 L/min.  Cardiac index 2.3 L/min/m². Rationale, risks, benefits and alternatives of cardiac catheterization and percutaneous coronary intervention with or without stents is  explained to the patient including, but not limited to, possible risks of infection, bleeding requiring blood transfusion, damage to the blood vessel requiring repair vascular surgery, allergic reactions to the x-ray dye, acute kidney injury including need for possible hemodialysis, significant cardiac arrhythmias, myocardial infarction, stroke, and threat to life. SCAI Shock Stage: A ASA score 3 Mallampati score 3 Procedure: Right heart catheterization with thermodilution cardiac output and O2 saturation run.  Details of the procedure: Informed consent is obtained. After sterile prep and drape 1% lidocaine is infiltrated over the right IJ region.  Access was obtained into right IJ vein using micropuncture technique and limited ultrasound.  The 7 French sheath was placed after wire confirmation with fluoroscopy.  A 7 French balloontipped [place]ril catheter was advanced into the right atrium with balloon inflated.  O2 saturations in pressures were obtained.  Balloon was then advanced into the right ventricle where pressures and O2 saturations obtained.  Catheter was then advanced into the right main pulmonary artery and the balloon was deflated.  Thermodilution cardiac outputs as well as pressures and saturations were obtained.  Balloon was reinflated and placed in the wedge position.  Pressures obtained.  Balloon deflated and pulled out of the body.  Sheath will be hand pulled with manual pressure.  Anesthesia: 26 minutes 34 seconds of moderate sedation was administered was administered using Fentanyl and Versed under my supervision. I monitored the patient for further administration of agents as needed including continuous monitoring of oxygen saturation, heart rate and blood pressure.  The RN administered the medicine under my direct supervision order, and an independent trained observer was present. Antiplatelet agent: Prasugrel Recommended duration of dual antiplatelet therapy: Not applicable Total Sedation  time: 26 minutes 34 seconds Sedation Medications and Contrast use:   04/23/2025 1456 MDT fentaNYL citrate (PF) 50 mcg/mL injection 25 mcg   04/23/2025 1456 MDT midazolam (VERSED) injection 1 mg intravenous Given Total Air Kerma (mGy) 8.790; Fluoro Time (min) = 0.9 MD      [facility]  Result Date: 4/22/2025  Narrative:   Normal left ventricular wall thickness.   Left ventricle is moderately dilated.  End-diastolic dimension 6.8 cm.   Biplane ejection fraction is 32%.  Visually looks much closer to 25%.  No apical thrombi.   Severe left ventricular systolic dysfunction.  Severe ischemic cardiomyopathy with RCA and LAD wall motion abnormalities.  See below.   Grade I (mild) left ventricular diastolic abnormality.   Normal left ventricular filling pressure.   Right ventricular systolic function is low normal.   The left atrium is normal in size.   The right atrium is mildly dilated.   Normal central venous pressure (0-5 mmHg).   No pericardial effusion.   Inadequate TR spectral Doppler to accurately assess right ventricular systolic pressure.   No significant valve disease.   No change from the March 2025 study.     XR chest portable 1 view  Result Date: 4/21/2025  Narrative: EXAM: DX CHEST PORTABLE 1 VW DATE: 4/21/2025 10:24 PM INDICATION:  chest pain COMPARISON: 3/21/2025 FINDINGS: The heart is not enlarged. Clear lungs. No pleural effusion or pneumothorax.  Left chest wall defibrillator with leads in expected locations.     Impression: IMPRESSION: No acute disease.       Assessment/Plan   Principal Problem:    Ventricular tachycardia (CMS/HCC)  Active Problems:    Ischemic cardiomyopathy    Automatic implantable cardioverter-defibrillator in situ    On amiodarone therapy    Coronary artery disease involving native coronary artery of native heart without angina pectoris    Chronic systolic HF (heart failure) (CMS/HCC)    Palpitations      Plan  Ventricular tachycardia  Patient presented to the ED after experiencing  fast heartbeats and shortness of breath  Unfortunately patient returns to the ED post discharge following admission for VT  Initial EKG: Paced rhythm with heart rate of 70. No evidence of ischemic changes  Initial troponin: Were negative 13.6>13.1<12.0  Telemetry in the ER showed patient with a multiple beats of V. Tach  Amiodarone bolus was initiated followed by amiodarone drip  Currently patient is asymptomatic  He is [facility] on 4/30/2025  We will continue to monitor telemetry  Possible sign off to EP in the morning    Paroxysmal atrial fibrillation  He has a previous history of atrial fibrillation  Paced rhythm with heart rate of 70 beats per minutes  [id number] score 6   Continue Xarelto 20 mg oral daily  Continue carvedilol 50 mg twice daily  Continue to monitor telemetry    Ischemic cardiomyopathy  Coronary disease status post multiple stents  History of coronary artery disease with coronary angiography on 7/2009, 12/2010, 12/2023, 4/2025  Echocardiogram on 3/2025 showed an EF of 25%  Severe left ventricular systolic dysfunction  Right heart cath on 4/23/2025  Right atrial mean pressure: 5 mmHg.  O2 saturation 65.0%.   Right ventricular pressure: 27/-1/2 mmHg.  O2 saturation 65.6%.   Right main pulmonary artery pressure: 28/13/16 mmHg.  O2 saturation 65.4%.   Pulmonary capillary wedge pressure: 8 mmHg.  Cardiac output thermodilution technique 4.56 L/min  Cardiac index 1.9 L/min/m².   Cardiac output Becki technique: 5.7 L/min   Cardiac index 2.3 L/min/m²  Likely may need cardiac transplant  GDMT: Carvedilol 50 mg oral twice daily, prasugrel 10 mg oral daily, rosuvastatin 40 mg oral nightly  Will adjust management as new data become available  Advance GDMT as tolerated    Chronic systolic heart failure    History of heart failure with reduced ejection     NYHA class III     Echo on  3/2025 showed an EF of 25%     Volume status: Euvolemic      Diuretic strategy: Furosemide 40 mg  oral daily      GDMT: Carvedilol 50 mg oral twice daily, Jardiance 10 mg oral daily       spironolactone 25 mg oral daily, Entresto  mg oral twice daily      Continue daily weight, less than 2 g of salt, 2 L of fluid      Maintain electrolytes K.4.0 and mg >2.0      Check BMP in the morning            Patient case will be discussed with cardiologist Dr. Menard in the morning      Electronically signed by: Ana Fraser PA-C  4/23/2025  10:59 PM    A voice recognition program was used to aid in documentation of this record. Sometimes words are not printed exactly as they were spoken. While efforts were made to carefully edit and correct any inaccuracies, some errors may be present; please take these into context. Please contact the provider if errors are identified.

## 2025-04-24 NOTE — PLAN OF CARE
Problem: Neurosensory - Adult  Goal: Achieves stable or improved neurological status  Outcome: Progressing  Flowsheets (Taken 4/24/2025 0040)  Achieves stable or improved neurological status:   Initiate measures to prevent increased intracranial pressure. For example: decrease stimuli, maintain neutral head alignment, prevent shivering, etc.   Assess for and report changes in neurological status   Maintain blood pressure and fluid volume within ordered parameters to optimize cerebral perfusion and minimize risk of hemorrhage   Monitor temperature, glucose, and sodium.  Goal: Achieves maximal functionality and self care  Outcome: Progressing  Flowsheets (Taken 4/24/2025 0040)  Achieves maximal functionality and self care:   Encourage and assist patient to increase activity and self care with guidance from PT/OT   With patient fatigue and changes in neurological status, monitor swallowing and airway patency   Encourage visually impaired, hearing impaired and aphasic patients to use assistive/communication devices  Goal: Will maintain current level or return to usual level of neurologic status, motor/sensory function and hemodynamic stability after a hematologic cerebral event such as stroke or TIA.  Outcome: Progressing  Flowsheets (Taken 4/24/2025 0040)  Will maintain current level or return to usual level of neurologic status, motor/sensory function and hemodynamic stability after a hematologic cerebral event such as stroke or TIA:   Encourage and assist patient to increase activity and self-care with guidance from PT/OT/ST/RT   Encourage and educate visually impaired, hearing impaired and aphasic patients and their support person to use assistive/communication devices   Educate patient/support person on signs and symptoms of stroke, individual risk factors, and activation of Emergency Medical Services     Problem: Cardiovascular - Adult  Goal: Maintains optimal cardiac output and hemodynamic stability  Outcome:  Progressing  Flowsheets (Taken 4/24/2025 0040)  Maintain optimal cardiac output and hemodynamic stability:   Administer or titrate ordered vasoactive medications to optimize hemodynamic stability   Monitor for hypotension and other signs of decreased cardiac output   Monitor heart rate, blood pressure, and cardiac rhythm   Monitor arterial and/or venous puncture sites for bleeding and/or hematoma   Monitor for fluid overload/dehydration, weight gain, shortness of breath and activity intolerance   Assess for signs of decreased coronary artery perfusion - ex. angina   Assess quality of pulses, capillary refill, edema, sensation, skin color and temperature  Goal: Absence of cardiac dysrhythmias or at baseline  Outcome: Progressing  Flowsheets (Taken 4/24/2025 0040)  Absence of cardiac dysrhythmias or at baseline:   Continuous cardiac monitoring, monitor vital signs (see flowsheet documentation)   Administer antiarrhythmic and heart rate control medications as ordered   Obtain 12 lead EKG if indicated   Initiate emergency measures for life threatening arrhythmias   Monitor electrolytes and administer replacement therapy as ordered  Goal: Cardiovascular Editable Patient Goal  Outcome: Progressing     Problem: Respiratory - Adult  Goal: Achieves optimal ventilation and oxygenation  Outcome: Progressing  Flowsheets (Taken 4/24/2025 0040)  Achieves optimal ventilation and oxygenation:   Assess and report changes in respiratory status   Position to facilitate oxygenation and minimize respiratory effort   Assess and report changes in mentation and behavior   Oxygen supplementation based on oxygen saturation or arterial blood gases   Assess patient's ability to cough effectively   Collaborate with Respiratory Therapy as indicated, including medications and treatment  Goal: Achieves optimal ventilation and oxygenation with noninvasive CPAP/BiLEVEL support  Outcome: Progressing  Flowsheets (Taken 4/24/2025 0040)  Achieves  Optimal Oxygenation with Noninvasive CPAP/BiLEVEL Support:   Position patient to facilitate optimal ventilation/oxygenation status and minimize respiratory effort   Provide education to patient/support person about rationale and expected outcomes associated with therapy   Assess effectiveness of therapy on ventilation/oxygenation status based on oxygen saturation and/or arterial blood gases, as indicated   Position patient to reduce aspiration risk, elevate head of bed at least 35 degrees or higher, as applicable   Assess patient and report changes in respiratory and physiological status   Assess and monitor skin condition, in relationship to the respiratory interface   Routinely monitor equipment for proper performance and settings   Assess patient and report changes in mentation and behavior   Assure equipment alarm volume is adequate for the patient's environment   Immediately repsond to equipment alarm, to assess patient and/or cause for alarm  Goal: Respiratory Editable Patient Goal  Outcome: Progressing     Problem: Gastrointestinal - Adult  Goal: Nutrient intake appropriate for improving, restoring or maintaining nutritional needs  Outcome: Progressing  Flowsheets (Taken 4/24/2025 0040)  Nutrient intake appropriate for improving, restoring or maintaining nutritional needs:   Assist with meals as needed   Monitor percentage of each meal consumed   Obtain nutritional consult as indicated   Provide patient/support person specific nutrition education as appropriate   Identify factors contributing to decreased intake, treat as appropriate   Monitor I&O, weight and lab values   Administer alternative nutrition interventions as ordered  Goal: Minimal or absence of nausea and vomiting  Outcome: Progressing  Flowsheets (Taken 4/24/2025 0040)  Minimal or absence of nausea and vomiting:   Assess patient for nausea/vomiting and report changes   Nutrition consult as indicated   Maintain NPO status as ordered   Administer  ordered antiemetic medications as needed   Monitor intake and output to ensure adequate hydration   Advance diet as ordered   Provide nonpharmacologic comfort measures as appropriate   Nasogastric tube to suction as ordered  Goal: Maintains or returns to baseline digestive function  Outcome: Progressing  Flowsheets (Taken 4/24/2025 0040)  Maintains or returns to baseline bowel function:   Assess abdomen and bowel status and report changes in gastrointestinal function   Monitor for metabolic panel imbalances   Nutrition consult as indicated   Administer ordered medications as needed   Assess hydration and nutritional status   Assess for treatment effectiveness   Encourage mobilization and activity   Assess characteristics and frequency of stool  Goal: Establish and maintain optimal ostomy function  Outcome: Progressing  Flowsheets (Taken 4/24/2025 0040)  Establish and maintain optimal ostomy function:   Assess and report changes to ostomy stoma and surrounding skin   Assess and report changes bowel function   Provide ostomy care   Nutrition consult as indicated   Empty/Change ostomy pouch as needed   Encourage mobilization and activity   Consult Wound Care/Ostomy Nurse as indicated  Goal: Maintains Optimal Tissue Perfusion  Outcome: Progressing  Flowsheets (Taken 4/24/2025 0040)  Maintains Optimal Tissue Perfusion:   Monitor nutritional intake, intake/output, and weight   Monitor for increased abdominal girth, firmness or intra-abdominal pressure   Assess skin color, capillary refill and edema   Assess blood pressure, heart rate, and oxygen saturation   Monitor metabolic panels, blood count panels, and coagulations panels as ordered   Assess for blood in emesis and stool   Assess and report changes in bowel function  Goal: Gastrointestinal Editable Patient Goal  Outcome: Progressing     Problem: Genitourinary - Adult  Goal: Absence of urinary retention  Outcome: Progressing  Flowsheets (Taken 4/24/2025 0040)  Absence  of urinary retention:   Administer medications to alleviate retention as needed   Assess patient’s ability to void and empty bladder   Monitor intake/output and perform bladder scan if indicated   If urinary catheter present, initiate and maintain CAUTI bundle  Goal: Urinary catheter remains patent  Outcome: Progressing  Flowsheets (Taken 4/24/2025 0040)  Urinary catheter remains patent:   Irrigate catheter per order if indicated and notify provider if unable to irrigate   Assess patency of urinary catheter   Assess need for a larger catheter size or a 3-way catheter for continuous bladder irrigation  Goal: Absence of Urinary Infection  Outcome: Progressing  Flowsheets (Taken 4/24/2025 0040)  Absence of Urinary Infection:   Assess urinary status for burning, urgency, frequency, pain and urine characteristics   Obtain urinalysis as ordered   Monitor intake/output   Assess for neurological status changes   Monitor lab values  Goal: Genitourinary Editable Patient Goal  Outcome: Progressing     Problem: Metabolic/Fluid and Electrolytes - Adult  Goal: Electrolytes maintained within normal limits  Outcome: Progressing  Flowsheets (Taken 4/24/2025 0040)  Electrolytes maintained within normal limits:   Monitor labs and assess patient for signs and symptoms of electrolyte imbalances   Administer and monitor response to electrolyte replacements   Initiate and instruct patient/support person on fluid and nutrition restrictions as ordered  Goal: Maintain Optimal Renal Function and Hemodynamic Stability  Outcome: Progressing  Flowsheets (Taken 4/24/2025 0040)  Maintain optimal renal function and hemodynaimc stability:   Monitor labs and assess for signs and symptoms of volume excess or deficit   Monitor intake, output and patient weight   Monitor response to interventions for patient's volume status, including labs, urine output, blood pressure (other measures as available)   Encourage oral intake as appropriate   Instruct  patient on fluid and nutrition restrictions as appropriate  Goal: Glucose maintained within prescribed range  Outcome: Progressing  Flowsheets (Taken 4/24/2025 0040)  Glucose maintained within prescribed range:   Assess for signs and symptoms of hyperglycemia and hypoglycemia   Monitor blood glucose as ordered   Administer ordered medications to maintain glucose within target range   Assess barriers to adequate nutritional intake and initiate nutrition consult as needed   Assess baseline knowledge and provide education as indicated   Monitor exercise as may reduce the requirements for insulin     Problem: Skin/Tissue Integrity - Adult  Goal: Skin integrity remains intact  Outcome: Progressing  Flowsheets (Taken 4/24/2025 0040)  Skin integrity remains intact:   Monitor for areas of redness and/or skin breakdown   Assess and document risk factors for pressure ulcer development   Initiate pressure ulcer prevention measures as indicated   Assess and document skin integrity  Goal: Incisions, wounds, or drain sites healing without S/S of infection  Outcome: Progressing  Flowsheets (Taken 4/24/2025 0040)  Incision(s), wound(s) or drain site(s) healing without S/S of infection:   Assess and document dressing/incision, wound bed, drain sites and surrounding tissue   Assess and document risk factors for skin breakdown   Assess and document skin integrity   Implement wound care per orders  Goal: Oral mucous membranes remain intact  Outcome: Progressing  Goal: Skin/Tissue Integrity Editable Patient Goal  Outcome: Progressing     Problem: Hematologic - Adult  Goal: Maintains hematologic stability  Outcome: Progressing  Flowsheets (Taken 4/24/2025 0040)  Maintains hematologic stability:   Administer medications as ordered   Initiate bleeding precautions as indicated   Assess for signs and symptoms of bleeding or hemorrhage   Administer supportive blood products/factors as ordered   Monitor labs   Educate patient/support person to  report signs/symptoms of bleeding     Problem: Musculoskeletal - Adult  Goal: Return mobility to safest level of function  Outcome: Progressing  Flowsheets (Taken 4/24/2025 0040)  Return mobility to safest level of function:   Assess patient ADL status, stability and activity tolerance providing assistive devices as needed   Assist with transfers and ambulation using safe practices   Instruct patient/support person on ordered activity level and self care needs   Obtain PT/OT consults as needed  Goal: Maintain proper alignment of affected body part  Outcome: Progressing  Flowsheets (Taken 4/24/2025 0040)  Maintain proper alignment of affected body part:   Educated patient/support person use of appropriate assistive device and precautions (e.g. spinal or hip dislocation precautions)   Support and protect limb and body alignment as ordered  Goal: Musculoskeletal Editable Patient Goal  Outcome: Progressing     Problem: Neurosensory - Adult  Goal: Achieves stable or improved neurological status  Outcome: Progressing  Flowsheets (Taken 4/24/2025 0040)  Achieves stable or improved neurological status:   Initiate measures to prevent increased intracranial pressure. For example: decrease stimuli, maintain neutral head alignment, prevent shivering, etc.   Assess for and report changes in neurological status   Maintain blood pressure and fluid volume within ordered parameters to optimize cerebral perfusion and minimize risk of hemorrhage   Monitor temperature, glucose, and sodium.  Goal: Achieves maximal functionality and self care  Outcome: Progressing  Flowsheets (Taken 4/24/2025 0040)  Achieves maximal functionality and self care:   Encourage and assist patient to increase activity and self care with guidance from PT/OT   With patient fatigue and changes in neurological status, monitor swallowing and airway patency   Encourage visually impaired, hearing impaired and aphasic patients to use assistive/communication  devices  Goal: Will maintain current level or return to usual level of neurologic status, motor/sensory function and hemodynamic stability after a hematologic cerebral event such as stroke or TIA.  Outcome: Progressing  Flowsheets (Taken 4/24/2025 0040)  Will maintain current level or return to usual level of neurologic status, motor/sensory function and hemodynamic stability after a hematologic cerebral event such as stroke or TIA:   Encourage and assist patient to increase activity and self-care with guidance from PT/OT/ST/RT   Encourage and educate visually impaired, hearing impaired and aphasic patients and their support person to use assistive/communication devices   Educate patient/support person on signs and symptoms of stroke, individual risk factors, and activation of Emergency Medical Services     Problem: Cardiovascular - Adult  Goal: Maintains optimal cardiac output and hemodynamic stability  Outcome: Progressing  Flowsheets (Taken 4/24/2025 0040)  Maintain optimal cardiac output and hemodynamic stability:   Administer or titrate ordered vasoactive medications to optimize hemodynamic stability   Monitor for hypotension and other signs of decreased cardiac output   Monitor heart rate, blood pressure, and cardiac rhythm   Monitor arterial and/or venous puncture sites for bleeding and/or hematoma   Monitor for fluid overload/dehydration, weight gain, shortness of breath and activity intolerance   Assess for signs of decreased coronary artery perfusion - ex. angina   Assess quality of pulses, capillary refill, edema, sensation, skin color and temperature  Goal: Absence of cardiac dysrhythmias or at baseline  Outcome: Progressing  Flowsheets (Taken 4/24/2025 0040)  Absence of cardiac dysrhythmias or at baseline:   Continuous cardiac monitoring, monitor vital signs (see flowsheet documentation)   Administer antiarrhythmic and heart rate control medications as ordered   Obtain 12 lead EKG if indicated    Initiate emergency measures for life threatening arrhythmias   Monitor electrolytes and administer replacement therapy as ordered     Problem: Respiratory - Adult  Goal: Achieves optimal ventilation and oxygenation  Outcome: Progressing  Flowsheets (Taken 4/24/2025 0040)  Achieves optimal ventilation and oxygenation:   Assess and report changes in respiratory status   Position to facilitate oxygenation and minimize respiratory effort   Assess and report changes in mentation and behavior   Oxygen supplementation based on oxygen saturation or arterial blood gases   Assess patient's ability to cough effectively   Collaborate with Respiratory Therapy as indicated, including medications and treatment  Goal: Achieves optimal ventilation and oxygenation with noninvasive CPAP/BiLEVEL support  Outcome: Progressing  Flowsheets (Taken 4/24/2025 0040)  Achieves Optimal Oxygenation with Noninvasive CPAP/BiLEVEL Support:   Position patient to facilitate optimal ventilation/oxygenation status and minimize respiratory effort   Provide education to patient/support person about rationale and expected outcomes associated with therapy   Assess effectiveness of therapy on ventilation/oxygenation status based on oxygen saturation and/or arterial blood gases, as indicated   Position patient to reduce aspiration risk, elevate head of bed at least 35 degrees or higher, as applicable   Assess patient and report changes in respiratory and physiological status   Assess and monitor skin condition, in relationship to the respiratory interface   Routinely monitor equipment for proper performance and settings   Assess patient and report changes in mentation and behavior   Assure equipment alarm volume is adequate for the patient's environment   Immediately repsond to equipment alarm, to assess patient and/or cause for alarm     Problem: Gastrointestinal - Adult  Goal: Nutrient intake appropriate for improving, restoring or maintaining nutritional  needs  Outcome: Progressing  Flowsheets (Taken 4/24/2025 0040)  Nutrient intake appropriate for improving, restoring or maintaining nutritional needs:   Assist with meals as needed   Monitor percentage of each meal consumed   Obtain nutritional consult as indicated   Provide patient/support person specific nutrition education as appropriate   Identify factors contributing to decreased intake, treat as appropriate   Monitor I&O, weight and lab values   Administer alternative nutrition interventions as ordered  Goal: Minimal or absence of nausea and vomiting  Outcome: Progressing  Flowsheets (Taken 4/24/2025 0040)  Minimal or absence of nausea and vomiting:   Assess patient for nausea/vomiting and report changes   Nutrition consult as indicated   Maintain NPO status as ordered   Administer ordered antiemetic medications as needed   Monitor intake and output to ensure adequate hydration   Advance diet as ordered   Provide nonpharmacologic comfort measures as appropriate   Nasogastric tube to suction as ordered  Goal: Maintains or returns to baseline digestive function  Outcome: Progressing  Flowsheets (Taken 4/24/2025 0040)  Maintains or returns to baseline bowel function:   Assess abdomen and bowel status and report changes in gastrointestinal function   Monitor for metabolic panel imbalances   Nutrition consult as indicated   Administer ordered medications as needed   Assess hydration and nutritional status   Assess for treatment effectiveness   Encourage mobilization and activity   Assess characteristics and frequency of stool  Goal: Establish and maintain optimal ostomy function  Outcome: Progressing  Flowsheets (Taken 4/24/2025 0040)  Establish and maintain optimal ostomy function:   Assess and report changes to ostomy stoma and surrounding skin   Assess and report changes bowel function   Provide ostomy care   Nutrition consult as indicated   Empty/Change ostomy pouch as needed   Encourage mobilization and  activity   Consult Wound Care/Ostomy Nurse as indicated  Goal: Maintains Optimal Tissue Perfusion  Outcome: Progressing  Flowsheets (Taken 4/24/2025 0040)  Maintains Optimal Tissue Perfusion:   Monitor nutritional intake, intake/output, and weight   Monitor for increased abdominal girth, firmness or intra-abdominal pressure   Assess skin color, capillary refill and edema   Assess blood pressure, heart rate, and oxygen saturation   Monitor metabolic panels, blood count panels, and coagulations panels as ordered   Assess for blood in emesis and stool   Assess and report changes in bowel function     Problem: Genitourinary - Adult  Goal: Absence of urinary retention  Outcome: Progressing  Flowsheets (Taken 4/24/2025 0040)  Absence of urinary retention:   Administer medications to alleviate retention as needed   Assess patient’s ability to void and empty bladder   Monitor intake/output and perform bladder scan if indicated   If urinary catheter present, initiate and maintain CAUTI bundle  Goal: Urinary catheter remains patent  Outcome: Progressing  Flowsheets (Taken 4/24/2025 0040)  Urinary catheter remains patent:   Irrigate catheter per order if indicated and notify provider if unable to irrigate   Assess patency of urinary catheter   Assess need for a larger catheter size or a 3-way catheter for continuous bladder irrigation  Goal: Absence of Urinary Infection  Outcome: Progressing  Flowsheets (Taken 4/24/2025 0040)  Absence of Urinary Infection:   Assess urinary status for burning, urgency, frequency, pain and urine characteristics   Obtain urinalysis as ordered   Monitor intake/output   Assess for neurological status changes   Monitor lab values     Problem: Metabolic/Fluid and Electrolytes - Adult  Goal: Electrolytes maintained within normal limits  Outcome: Progressing  Flowsheets (Taken 4/24/2025 0040)  Electrolytes maintained within normal limits:   Monitor labs and assess patient for signs and symptoms of  electrolyte imbalances   Administer and monitor response to electrolyte replacements   Initiate and instruct patient/support person on fluid and nutrition restrictions as ordered  Goal: Maintain Optimal Renal Function and Hemodynamic Stability  Outcome: Progressing  Flowsheets (Taken 4/24/2025 0040)  Maintain optimal renal function and [facility] stability:   Monitor labs and assess for signs and symptoms of volume excess or deficit   Monitor intake, output and patient weight   Monitor response to interventions for patient's volume status, including labs, urine output, blood pressure (other measures as available)   Encourage oral intake as appropriate   Instruct patient on fluid and nutrition restrictions as appropriate  Goal: Glucose maintained within prescribed range  Outcome: Progressing  Flowsheets (Taken 4/24/2025 0040)  Glucose maintained within prescribed range:   Assess for signs and symptoms of hyperglycemia and hypoglycemia   Monitor blood glucose as ordered   Administer ordered medications to maintain glucose within target range   Assess barriers to adequate nutritional intake and initiate nutrition consult as needed   Assess baseline knowledge and provide education as indicated   Monitor exercise as may reduce the requirements for insulin     Problem: Skin/Tissue Integrity - Adult  Goal: Skin integrity remains intact  Outcome: Progressing  Flowsheets (Taken 4/24/2025 0040)  Skin integrity remains intact:   Monitor for areas of redness and/or skin breakdown   Assess and document risk factors for pressure ulcer development   Initiate pressure ulcer prevention measures as indicated   Assess and document skin integrity  Goal: Incisions, wounds, or drain sites healing without S/S of infection  Outcome: Progressing  Flowsheets (Taken 4/24/2025 0040)  Incision(s), wound(s) or drain site(s) healing without S/S of infection:   Assess and document dressing/incision, wound bed, drain sites and surrounding tissue    Assess and document risk factors for skin breakdown   Assess and document skin integrity   Implement wound care per orders     Problem: Hematologic - Adult  Goal: Maintains hematologic stability  Outcome: Progressing  Flowsheets (Taken 4/24/2025 0040)  Maintains hematologic stability:   Administer medications as ordered   Initiate bleeding precautions as indicated   Assess for signs and symptoms of bleeding or hemorrhage   Administer supportive blood products/factors as ordered   Monitor labs   Educate patient/support person to report signs/symptoms of bleeding     Problem: Musculoskeletal - Adult  Goal: Return mobility to safest level of function  Outcome: Progressing  Flowsheets (Taken 4/24/2025 0040)  Return mobility to safest level of function:   Assess patient ADL status, stability and activity tolerance providing assistive devices as needed   Assist with transfers and ambulation using safe practices   Instruct patient/support person on ordered activity level and self care needs   Obtain PT/OT consults as needed  Goal: Maintain proper alignment of affected body part  Outcome: Progressing  Flowsheets (Taken 4/24/2025 0040)  Maintain proper alignment of affected body part:   Educated patient/support person use of appropriate assistive device and precautions (e.g. spinal or hip dislocation precautions)   Support and protect limb and body alignment as ordered

## 2025-04-24 NOTE — ED PROVIDER NOTES
HPI:  Chief Complaint   Patient presents with    Palpitations     Pt reports to the ED after recently being DC with palpitations. Pt reports the palpitations have returned in the last hour.      This is a 59-year-old male with past medical history of type 2 diabetes, obesity, AICD, LAVON proximal atrial fibrillation LAVON ventricular tachycardia CHF hypertension presents for evaluation of palpitations.  Patient states that he was released from our facility following hospitalization for cardiac complications.  He states that he left and had not been gone an hour before he developed palpitations and chest tightness.  Patient has been mildly short of breath.  Denies fevers chills nausea vomiting or abdominal pain.  Patient has been taking his medications as prescribed and is currently on blood thinners.  He denies any significant medication changes at time of discharge.  He denies any recent AICD discharge.     HISTORY:  Past Medical History:   Diagnosis Date    BPH (benign prostatic hyperplasia)     CAD (coronary artery disease)     Status post stent    CHF (congestive heart failure) (CMS/[facility])     Chronic combined systolic and diastolic CHF (congestive heart failure) (CMS/[facility])     LVEF 25% with grade 1 diastolic dysfunction as per echo on 1/26/2022.    CVA (cerebral vascular accident) (CMS/[facility]) 2010    Without residual deficit.    Diabetes mellitus type 2 in obese (CMS/[facility])     Dyslipidemia     GERD (gastroesophageal reflux disease)     Heart attack (CMS/[facility])     x3    Hypertension     Ischemic cardiomyopathy     Morbid obesity with BMI of 40.0-44.9, adult (CMS/[facility])     Paroxysmal atrial fibrillation (CMS/[facility])     On Xarelto    V-tach (CMS/[facility])     AICD in place       Past Surgical History:   Procedure Laterality Date    ABLATION VT N/A 06/09/2020    Procedure: Ablation VT;  Surgeon: Wilfredo Montana MD;  Location: [facility] EP Lab;  Service: Electrophysiology;  Laterality: N/A;  Check with Dr. Montana in the a.m. regarding  scheduling    APPENDECTOMY      BILATERAL HEART CATH N/A 12/5/2023    Procedure: Bilateral heart cath;  Surgeon: Jitendra Post MD;  Location: [facility] Cath Lab;  Service: Cardiovascular;  Laterality: N/A;    COLONOSCOPY N/A 7/25/2023    Procedure: COLONOSCOPY with Polypectomy;  Surgeon: Hortencia Carson MD;  Location: [facility] Endoscopy;  Service: Endoscopy;  Laterality: N/A;    CORONARY ANGIOGRAPHY N/A 3/24/2025    Procedure: Coronary Angiography;  Surgeon: Vaibhav Mcclure MD;  Location: [facility] Cath Lab;  Service: Cardiovascular;  Laterality: N/A;    CORONARY ARTERY STENTING  2009    2.5 X 24-mm Taxus DIMAS to first diagonal. 3.0 X 8-mm Taxus stent to proximal LAD just proximal to diagonal.    CORONARY ARTERY STENTING  2010    3.5 X 15-mm Promus DIMAS to totally occluded LAD    CORONARY ARTERY STENTING  2011    2.25 X 30-mm Resolute DIMAS to 2nd diagonal.  Balloon angioplasty through strut to improve blood flow to distal LAD    CORONARY ARTERY STENTING  07/28/2017    second diagonal branch LAD Resolute 2.25 x 30-mm DIMAS    CORONARY ARTERY STENTING N/A 3/24/2025    Procedure: Coronary artery/graft stenting;  Surgeon: Vaibhav Mcclure MD;  Location: [facility] Cath Lab;  Service: Cardiovascular;  Laterality: N/A;    ICD DC GEN CHANGE N/A 6/20/2022    Procedure: BI-V ICD Gen Change;  Surgeon: Wilfredo Montana MD;  Location: [facility] EP Lab;  Service: Cardiovascular;  Laterality: N/A;    ICD SC NEW  2011    [place] [employer] Cognis Model #N119, Serial #[id number]. [place] [employer] Model #0185, Serial #[id number]. LV lead Acuity Model #45+91, Serial #[id number]. Atrial lead Model #4469, Serial #[id number]    OXIMETRY RUN N/A 12/5/2023    Procedure: OXIMETRY RUN;  Surgeon: Jitendra Post MD;  Location: [facility] Cath Lab;  Service: Cardiovascular;  Laterality: N/A;       Family History   Problem Relation Age of Onset    Heart attack Mother 62    Other Mother         Abdominal Aortic Aneurysm    Lung cancer Mother     Colon cancer Father         Cause of death    Diabetes  Brother         Non-Insulin Dependent    Heart attack Brother 51        w/ Stents    Heart disease Brother     Other Son         Well       Social History     Tobacco Use    Smoking status: Former     Current packs/day: 0.00     Average packs/day: 0.8 packs/day for 30.0 years (22.5 ttl pk-yrs)     Types: Cigarettes     Start date: 1990     Quit date: 2020     Years since quittin.8    Smokeless tobacco: Never   Vaping Use    Vaping status: Never Used   Substance Use Topics    Alcohol use: Not Currently     Comment: Quit drinking in     Drug use: Not Currently       ROS:  As noted in HPI, otherwise noncontributory.    PHYSICAL EXAM:  ED Triage Vitals   Temp Heart Rate Resp BP SpO2   25 1905 25 1902 25 1930 25 1905 25 1902   36.4 °C (97.5 °F) 121 16 136/84 96 %      Mean BP (mmHg) Temp Source Heart Rate Source Patient Position BP Location   256 25 1905 25 0007 25 0007 25 0007   85 Oral Monitor Standing Left arm      FiO2 (%)       --                Nursing note and vitals reviewed.  Constitutional: Elevated BMI.  Alert oriented age-appropriate male appears well-developed.  Nontoxic-appearing.  No acute distress.  Head: Normocephalic and atraumatic.   Eyes: Pupils are equal, round. Extraocular movements intact.  Neck: Supple, full range of motion.  Cardiovascular: Tachycardic.  No murmur, rub, or gallop.  Normal pulses.  Normal peripheral perfusion. No peripheral edema  Pulmonary/Chest: No respiratory distress.  Clear to auscultation bilaterally.  No chest wall tenderness.  Abdominal: Soft and nontender. Nondistended.  No rebound or guarding.   Normal bowel sounds. No peritonitis  Back: No CVA tenderness.  No midline tenderness.  Full range of motion.  Musculoskeletal: No swelling or deformity.  Full range of motion of bilateral upper and lower extremities.  Neurological: Alert.  CN II-XII intact.  Strength 5/5 and equal bilaterally with  elbow flexion and extension, finger adduction, hip flexion, knee extension, foot plantar flexion, and foot dorsiflexion. Sensation intact and equal bilaterally in upper and lower extremities.     Skin: Skin is warm and dry. No rash noted.   Psychiatric: Anxious      Labs Reviewed   BASIC METABOLIC PANEL - Abnormal       Result Value    Sodium 138      Potassium 4.1      Chloride 105      CO2 25      BUN 17      Creatinine 1.18      Glucose 125 (*)     Calcium 9.1      Anion Gap 8      eGFR 72      Narrative:     Calculation based on the 2021 Chronic Kidney Disease Epidemiology Collaboration (CKD-EPI) equation refit without adjustment for race.   PROTIME-INR - Abnormal    Protime 12.8 (*)     INR 1.1     B-TYPE NATRIURETIC PEPTIDE (BNP) - Normal    BNP 46     MAGNESIUM - Normal    Magnesium 2.1     HIGH SENSITIVE TROPONIN I - Normal    hsTnI 0 Hour 13.1     PTT (ACTIVATED PARTIAL THROMBOPLASTIN TIME) - Normal    PTT 36.2     HIGH SENSITIVE TROPONIN I, 1 HOUR - Normal    hsTnI 1 Hour 12.0      Delta from 0 Hour -1.1     HIGH SENSITIVE TROPONIN I PANEL (OHR, 1HR)    Narrative:     The following orders were created for panel order HS Troponin I Panel (0HR, 1HR) Blood, Venous.  Procedure                               Abnormality         Status                     ---------                               -----------         ------                     HS Troponin I[[id number]]                Normal              Final result               1HR High Sensitive Trop I[[id number]]    Normal              Final result                 Please view results for these tests on the individual orders.   CBC WITH AUTO DIFFERENTIAL    WBC 7.6      RBC 4.94      Hemoglobin 15.9      Hematocrit 46.6      MCV 94.2      MCH 32.2      MCHC 34.2      RDW 15.0      Platelets 199      MPV 7.8      Neutrophils% 65.0      Lymphocytes% 23.3      Monocytes% 9.0      Eosinophils% 2.1      Basophils% 0.6      ANC (auto diff) 5.00      Lymphocytes Absolute  1.80      Monocytes Absolute 0.70      Eosinophils Absolute 0.20      Basophils Absolute 0.00         Medications   amiodarone 150 mg/100 mL (1.5 mg/mL) IVPB - LOADING DOSE (premix) (0 mg intravenous Stopped 4/23/25 2059)     Followed by   amiodarone (NEXTERONE) 360 mg/200 mL (1.8 mg/mL) infusion (premix) (1 mg/min intravenous $$ New Bag/New Syringe 4/23/25 2058)     Followed by   amiodarone (NEXTERONE) 360 mg/200 mL (1.8 mg/mL) infusion (premix) (has no administration in time range)   carvediloL (COREG) tablet 50 mg (has no administration in time range)   empagliflozin (JARDIANCE) tablet 10 mg (has no administration in time range)   furosemide (LASIX) tablet 40 mg (has no administration in time range)   mexiletine (MEXITIL) capsule 300 mg (has no administration in time range)   nitroglycerin (NITROSTAT) SL tablet 0.4 mg (has no administration in time range)   prasugreL HCl (EFFIENT) tablet 10 mg (has no administration in time range)   sacubitriL-valsartan (ENTRESTO)  mg 1 tablet (has no administration in time range)   spironolactone (ALDACTONE) tablet 25 mg (has no administration in time range)   bisacodyL (DULCOLAX) suppository 10 mg (has no administration in time range)   rivaroxaban (XARELTO) tablet 20 mg (has no administration in time range)   melatonin tablet 4.5 mg (has no administration in time range)   rosuvastatin (CRESTOR) tablet 40 mg (has no administration in time range)   tamsulosin (FLOMAX) 24 hr capsule 0.4 mg (has no administration in time range)   sodium chloride flush 3 mL (has no administration in time range)   bisacodyL (DULCOLAX) suppository 10 mg (has no administration in time range)   traZODone (DESYREL) tablet 25 mg (has no administration in time range)   acetaminophen (TYLENOL) tablet 650 mg (has no administration in time range)   fentaNYL citrate (PF) 50 mcg/mL injection 12.5 mcg (has no administration in time range)   fentaNYL citrate (PF) 50 mcg/mL injection 25 mcg (has no  administration in time range)   ondansetron (ZOFRAN) tablet 4 mg (has no administration in time range)     Or   ondansetron (ZOFRAN) injection 4 mg (has no administration in time range)   alum-mag hydroxide-simeth (MAALOX ADVANCED) suspension 30 mL (has no administration in time range)   calcium carbonate (TUMS) chewable tablet 500 mg (has no administration in time range)   dextrose 50 % in water (D50W) syringe 15-30 mL (has no administration in time range)   dextrose (GLUTOSE) 40 % gel 15.2 g (has no administration in time range)   glucagon HCL injection 1 mg (has no administration in time range)     Or   glucagon HCL injection 1 mg (has no administration in time range)       No orders to display       ED Medication Administration from 04/23/2025 1902 to 04/24/2025 0004         Date/Time Order Dose Route Action Action by     04/23/2025 2041 MDT amiodarone 150 mg/100 mL (1.5 mg/mL) IVPB - LOADING DOSE (premix) 150 mg intravenous $$ New Bag/New Syringe YAYO Mosher     04/23/2025 2059 MDT amiodarone 150 mg/100 mL (1.5 mg/mL) IVPB - LOADING DOSE (premix) 0 mg intravenous Stopped YAYO Mosher     04/23/2025 2058 MDT amiodarone (NEXTERONE) 360 mg/200 mL (1.8 mg/mL) infusion (premix) 1 mg/min intravenous $$ New Bag/New Syringe YAYO Mosher            Recent Results (from the past 24 hours)   CBC w/auto differential Blood, Venous    Collection Time: 04/23/25  4:11 AM   Result Value Ref Range    WBC 4.9 3.7 - 9.6 10*3/uL    RBC 4.81 4.10 - 5.80 10*6/uL    Hemoglobin 15.2 13.2 - 17.2 g/dL    Hematocrit 45.1 38.0 - 50.0 %    MCV 93.8 82.0 - 97.0 fL    MCH 31.6 29.0 - 34.0 pg    MCHC 33.6 32.0 - 36.0 g/dL    RDW 15.0 11.5 - 15.0 %    Platelets 172 130 - 350 10*3/uL    MPV 7.9 6.9 - 10.8 fL    Neutrophils% 51.1 46.0 - 70.0 %    Lymphocytes% 37.9 15.0 - 47.0 %    Monocytes% 7.6 5.0 - 13.0 %    Eosinophils% 3.0 0.0 - 3.0 %    Basophils% 0.4 0.0 - 2.0 %    ANC (auto diff) 2.50 10*3/uL    Lymphocytes Absolute 1.90 10*3/uL    Monocytes Absolute  0.40 10*3/uL    Eosinophils Absolute 0.10 10*3/uL    Basophils Absolute 0.00 10*3/uL   Basic metabolic panel Blood, Venous    Collection Time: 04/23/25  4:12 AM   Result Value Ref Range    Sodium 136 135 - 145 MMOL/L    Potassium 4.2 3.5 - 5.1 MMOL/L    Chloride 104 98 - 107 MMOL/L    CO2 26 21 - 32 MMOL/L    BUN 13 7 - 25 mg/dL    Creatinine 0.92 0.70 - 1.30 mg/dL    Glucose 119 (H) 70 - 105 MG/DL    Calcium 9.3 8.6 - 10.3 MG/DL    Anion Gap 6 3 - 11 mmol/L    eGFR 96 >60 mL/min/1.73m*2   Magnesium Blood, Venous    Collection Time: 04/23/25  4:12 AM   Result Value Ref Range    Magnesium 2.0 1.8 - 2.4 MG/DL   HS Troponin I    Collection Time: 04/23/25  4:12 AM   Result Value Ref Range    hsTnI 0 Hour 12.6 <=19.8 PG/ML   B-type natriuretic peptide (BNP) Blood, Venous    Collection Time: 04/23/25  8:24 PM   Result Value Ref Range    BNP 46 0 - 100 pg/mL   Basic metabolic panel Blood, Venous    Collection Time: 04/23/25  8:24 PM   Result Value Ref Range    Sodium 138 135 - 145 MMOL/L    Potassium 4.1 3.5 - 5.1 MMOL/L    Chloride 105 98 - 107 MMOL/L    CO2 25 21 - 32 MMOL/L    BUN 17 7 - 25 mg/dL    Creatinine 1.18 0.70 - 1.30 mg/dL    Glucose 125 (H) 70 - 105 MG/DL    Calcium 9.1 8.6 - 10.3 MG/DL    Anion Gap 8 3 - 11 mmol/L    eGFR 72 >60 mL/min/1.73m*2   CBC w/auto differential Blood, Venous    Collection Time: 04/23/25  8:24 PM   Result Value Ref Range    WBC 7.6 3.7 - 9.6 10*3/uL    RBC 4.94 4.10 - 5.80 10*6/uL    Hemoglobin 15.9 13.2 - 17.2 g/dL    Hematocrit 46.6 38.0 - 50.0 %    MCV 94.2 82.0 - 97.0 fL    MCH 32.2 29.0 - 34.0 pg    MCHC 34.2 32.0 - 36.0 g/dL    RDW 15.0 11.5 - 15.0 %    Platelets 199 130 - 350 10*3/uL    MPV 7.8 6.9 - 10.8 fL    Neutrophils% 65.0 46.0 - 70.0 %    Lymphocytes% 23.3 15.0 - 47.0 %    Monocytes% 9.0 5.0 - 13.0 %    Eosinophils% 2.1 0.0 - 3.0 %    Basophils% 0.6 0.0 - 2.0 %    ANC (auto diff) 5.00 10*3/uL    Lymphocytes Absolute 1.80 10*3/uL    Monocytes Absolute 0.70 10*3/uL     Eosinophils Absolute 0.20 10*3/uL    Basophils Absolute 0.00 10*3/uL   Magnesium Blood, Venous    Collection Time: 04/23/25  8:24 PM   Result Value Ref Range    Magnesium 2.1 1.8 - 2.4 MG/DL   HS Troponin I    Collection Time: 04/23/25  8:24 PM   Result Value Ref Range    hsTnI 0 Hour 13.1 <=19.8 PG/ML   Protime-INR Blood, Venous    Collection Time: 04/23/25  8:24 PM   Result Value Ref Range    Protime 12.8 (H) 9.4 - 12.5 SECONDS    INR 1.1 <=1.1   PTT (activated partial thromboplastin time) Blood, Venous    Collection Time: 04/23/25  8:24 PM   Result Value Ref Range    PTT 36.2 25.1 - 36.5 SECONDS   1HR High Sensitive Trop I    Collection Time: 04/23/25  9:17 PM   Result Value Ref Range    hsTnI 1 Hour 12.0 <=19.8 PG/ML    Delta from 0 Hour -1.1 -14.9 to 14.9       PROCEDURES:  Procedures    Sepsis Quality Bundle      MDM:   D Dx: Arrhythmia, ACS, acute CHF exacerbation, AICD discharge  Admission Considered: Yes  Review of previous records: Discharge summary dated 4/23/2025 which is today at 1637 was reviewed.  Patient discharged following ventricular tachycardia CHF hypertension ischemic cardiomyopathy  Discussion with other Clinicians: Dr. Wu EP. Dr. Franz cardiology. Dr. Mcknight, hospitalist  Tests considered: None  Prescriptions considered: None  Chronic conditions affecting care: AICD discharge, sustained V. tach, CHF, hypertension, ischemic cardiomyopathy  Care affected by social determinants of health: None    Patient is a 59-year-old male here for evaluation of palpitations.  Upon arrival to the emergency department today patient is tachycardic otherwise hemodynamically stable.  Telemetry reviewed showing sinus tach initially.  Patient had 3-4 witnessed runs of nonsustained V. tach between 10-20 beats long.  Patient reevaluated and denies any AICD discharge.  Comprehensive evaluation performed in the emergency department today. Continuous cardiac and respiratory monitoring were performed in the ED.  Nursing notes were reviewed as well as labs and imaging studies. Workup for life threatening or serious causes of nonsustained V. tach was initiated. Patient will be observed and labs obtained. Patient received amiodarone bolus and infusion for symptom control. Lab findings include BMP and CBC are unremarkable.  Patient's INR is normal, does not appear to be appropriately anticoagulated.  BNP is unremarkable no concern for acute CHF.  Troponins were trended show no delta change, ECG continues to show no STEMI criteria.  No concern for ACS.  While imaging was considered for this patient's workup it does not change their treatment path and therefore no emergent imaging were obtained during their visit today.  Patient was reevaluated.  They continue to be hemodynamically stable.  Patient has not had any further runs of V. tach since initiation of amiodarone.  His case was discussed with EP as well as cardiology who will plan to consult and request hospitalist admission.  Discussed findings, diagnosis, and treatment plan in detail with patient who is in agreement with plan.  Dr. Mcknight with the hospitalist group is graciously agreed to hospitalize patient for continued IV medication therapy and cardiac monitoring.  Financial and social constraints, proximity to healthcare facilities, and comorbidities were considered in the treatment plan for this patient.    Patient was seen in conjunction with Dr. Wesley, supervising physician, examined patient and agrees with plan of care and disposition.    ED COURSE:  ED Course as of 04/24/25 0016 Wed Apr 23, 2025 2024 Review of patient's telemetry does show that patient is going in and out of multiple beat runs of V. tach.  Patient will be started on amiodarone drip and bolus.  Labs will be rechecked and EP will be consulted [CR]   2050 Patient's case was discussed with  with EP.  He has no short-term recommendations for treatment other than treating with  amiodarone.  Will wait for labs and work on admission.  Patient's case was discussed with Dr. Franz with cardiology services was consulted for admission however he has no additional input and will plan to consult in the morning.  Patient's case was ultimately discussed with Dr. Mcknight who has graciously agreed to hospitalize this cardiac patient until the morning and he can be evaluated by EP and cardiology team [CR]      ED Course User Index  [CR] Mery Chen PA-C       CLINICAL IMPRESSION:  Final diagnoses:   [R00.2] Palpitations   [I47.29] Nonsustained ventricular tachycardia (CMS/HCC)       A voice recognition program was used to aid in documentation of this record.  Sometimes words are not printed exactly as they were spoken.  While efforts were made to carefully edit and correct any inaccuracies, some areas may be present; please take these into context.  Please contact the provider if areas are identified.       Mery Chen PA-C  04/24/25 0016

## 2025-04-24 NOTE — PROGRESS NOTES
"  [address] [place] [address]                                                    Electrophysiology Inpatient Progress Note    Subjective    Patient ID: Pete Trejo is a 59 y.o. male with past medical history significant for ischemic cardiomyopathy and recurrent VT status post multiple VT ablations.  Patient was recently hospitalized 4/22-23 for VT with ATP therapies.  His amiodarone had recently been decreased secondary to symptoms of ataxia, right hand tremors and significant functional decline.  He was reloaded with IV amiodarone on 4/22 and transition to oral amiodarone on 4/23.  He had no more episodes of VT and was discharged home on 4/23.  Unfortunately patient did not even make it home before he had palpitations and feeling like his ICD was \"going off\" and came back to the emergency department.  His ICD was interrogated today which showed several episodes of slow VT (heart rate 110 bpm) with several episodes of ATP.  Patient was reloaded with IV amiodarone and admitted to the hospitalist team.    Chief Complaint:   Chief Complaint   Patient presents with    Palpitations     Pt reports to the ED after recently being DC with palpitations. Pt reports the palpitations have returned in the last hour.         LOS: 1 day     HPI:  No more VT noted overnight.    Patient seen in room 361.  Patient is in bed with head of bed elevated.  His son is present at bedside.  Patient has had no further episodes of VT since admission.  Amio infusion running.  Discussed with patient that we are still trying to figure out a plan long-term for him which may include getting him back to the [facility].      Allergies as of 04/23/2025 - Reviewed 04/23/2025   Allergen Reaction Noted    Morphine  07/30/2017    Tramadol  07/30/2017       Current Facility-Administered Medications:     carvediloL (COREG) tablet 50 mg, 50 mg, oral, 2x daily with meals, Jasmin Mcknight MD    " empagliflozin (JARDIANCE) tablet 10 mg, 10 mg, oral, Daily, aJsmin Mcknight MD    furosemide (LASIX) tablet 40 mg, 40 mg, oral, Daily, Jasmin Mcknight MD    mexiletine (MEXITIL) capsule 300 mg, 300 mg, oral, q8h KULDEEP, Jasmin Mcknight MD, 300 mg at 04/24/25 0559    nitroglycerin (NITROSTAT) SL tablet 0.4 mg, 0.4 mg, sublingual, q5 min PRN, Jasmin Mcknight MD    prasugreL HCl (EFFIENT) tablet 10 mg, 10 mg, oral, Daily, Jasmin Mcknight MD    sacubitriL-valsartan (ENTRESTO)  mg 1 tablet, 1 tablet, oral, 2x daily, Jasmin Mcknight MD    spironolactone (ALDACTONE) tablet 25 mg, 25 mg, oral, Daily, Jasmin Mcknight MD    bisacodyL (DULCOLAX) suppository 10 mg, 10 mg, rectal, Daily PRN, Jasmin Mcknight MD    rivaroxaban (XARELTO) tablet 20 mg, 20 mg, oral, Daily with dinner, Jasmin Mcknight MD    melatonin tablet 4.5 mg, 4.5 mg, oral, Nightly PRN, Jasmin Mcknight MD    rosuvastatin (CRESTOR) tablet 40 mg, 40 mg, oral, Nightly, Jasmin Mcknight MD    tamsulosin (FLOMAX) 24 hr capsule 0.4 mg, 0.4 mg, oral, Daily, Jasmin Mcknight MD    Maintain IV access, , , Until discontinued **AND** Saline lock IV, , , Once **AND** sodium chloride flush 3 mL, 3 mL, intravenous, PRN, Jasmin Mcknight MD    traZODone (DESYREL) tablet 25 mg, 25 mg, oral, Nightly PRN, Jasmin Mcknight MD    acetaminophen (TYLENOL) tablet 650 mg, 650 mg, oral, q6h PRN, Jasmin Mcknight MD    fentaNYL citrate (PF) 50 mcg/mL injection 12.5 mcg, 12.5 mcg, intravenous, q2h PRN, Jasmin Mcknight MD    fentaNYL citrate (PF) 50 mcg/mL injection 25 mcg, 25 mcg, intravenous, q2h PRN, Jasmin Mcknight MD    ondansetron (ZOFRAN) tablet 4 mg, 4 mg, oral, q6h PRN **OR** ondansetron (ZOFRAN) injection 4 mg, 4 mg, intravenous, q6h PRN, Jasmin Mcknight MD    alum-mag hydroxide-simeth (MAALOX ADVANCED) suspension 30 mL, 30 mL, oral, 3x daily PRN, Jasmin Mcknight MD    calcium carbonate (TUMS) chewable tablet 500 mg, 500 mg, oral, 3x daily PRN, Jasmin Mcknight,  MD    dextrose 50 % in water (D50W) syringe 15-30 mL, 15-30 mL, intravenous, q15 min PRN, Jasmin Mcknight MD    dextrose (GLUTOSE) 40 % gel 15.2 g, 15.2 g, oral, q15 min PRN, Jasmin Mcknight MD    glucagon HCL injection 1 mg, 1 mg, intramuscular, q15 min PRN **OR** glucagon HCL injection 1 mg, 1 mg, subcutaneous, q15 min PRN, Jasmin Mcknight MD    [COMPLETED] amiodarone 150 mg/100 mL (1.5 mg/mL) IVPB - LOADING DOSE (premix), 150 mg, intravenous, Once, Stopped at 25 **FOLLOWED BY** [] amiodarone (NEXTERONE) 360 mg/200 mL (1.8 mg/mL) infusion (premix), 1 mg/min, intravenous, Continuous, Stopped at 25 **FOLLOWED BY** amiodarone (NEXTERONE) 360 mg/200 mL (1.8 mg/mL) infusion (premix), 0.5 mg/min, intravenous, Continuous, Mery Chen PA-C, Last Rate: 16.67 mL/hr at 25, 0.5 mg/min at 25    Objective     Vital signs in last 24 hours:   Temp:  [36.4 °C (97.5 °F)-37 °C (98.6 °F)] 36.6 °C (97.9 °F)  Heart Rate:  [] 71  Resp:  [9-21] 16  SpO2:  [90 %-96 %] 95 %  BP: ()/(58-84) 131/82  SpO2/FiO2 Ratio Using Approximate FiO2 (%):  [475] 475  Estimated P/F Ratio Using Approximate FiO2 (%):  [403] 403  Weight: 123.1 kg (271 lb 6.2 oz)    Intake/Output this shift:  I/O this shift:  In: -   Out: 450 [Urine:450]    Intake/Output Summary (Last 24 hours) at 2025 0834  Last data filed at 2025 0800  Gross per 24 hour   Intake 501.01 ml   Output 450 ml   Net 51.01 ml     ROS:  As noted in HPI    Physical Exam:  General Appearance: awake, alert, NAD  HEENT: EOM intact, sclera without icterus   Respiratory: Normal effort  Cardiac: Regular  Abdomen: Nondistended  Extremities: No LE edema  Musck: No gross abn  Neuro: oriented x 4, no focal deficits  Skin: Warm      Data Review:   BMP:  Lab Results   Component Value Date     2025    K 4.2 2025     2025    CO2 24 2025    BUN 14 2025    CREATININE 0.90 2025     GLUCOSE 111 (H) 04/24/2025    CALCIUM 9.1 04/24/2025     CBC:   Lab Results   Component Value Date    WBC 5.1 04/24/2025    RBC 4.84 04/24/2025    HGB 15.4 04/24/2025    HCT 45.2 04/24/2025     04/24/2025     CMP:  Lab Results   Component Value Date     04/24/2025    K 4.2 04/24/2025     04/24/2025    CO2 24 04/24/2025    GLUCOSE 111 (H) 04/24/2025    CREATININE 0.90 04/24/2025    CALCIUM 9.1 04/24/2025    BUN 14 04/24/2025    ANIONGAP 7 04/24/2025     Lab Results   Component Value Date    BNP 46 04/23/2025     Magnesium:  Lab Results   Component Value Date    MG 1.9 04/24/2025     PT/INR:   Lab Results   Component Value Date    PT 12.8 (H) 04/23/2025    INR 1.1 04/23/2025       Tests:  Cardiac catheterization  Cardiac catheterization, OXIMETRY RUN  Result Date: 4/23/2025  Narrative: DATE OF PROCEDURE: 4/23/2025  PREOPERATIVE DIAGNOSIS:  Pre-op Diagnosis    * Chronic systolic HF (heart failure) (CMS/HCC) [I50.22]    * Ischemic cardiomyopathy [I25.5] Final Impression: 1.  Right atrial mean pressure: 5 mmHg.  O2 saturation 65.0%. 2.  Right ventricular pressure: 27/-1/2 mmHg.  O2 saturation 65.6%. 3.  Right main pulmonary artery pressure: 28/13/16 mmHg.  O2 saturation 65.4%. 4.  Pulmonary capillary wedge pressure: 8 mmHg. 5.  Cardiac output thermodilution technique 4.56 L/min.  Cardiac index 1.9 L/min/m². 6.  Cardiac output Becki technique: 5.7 L/min.  Cardiac index 2.3 L/min/m². Rationale, risks, benefits and alternatives of cardiac catheterization and percutaneous coronary intervention with or without stents is explained to the patient including, but not limited to, possible risks of infection, bleeding requiring blood transfusion, damage to the blood vessel requiring repair vascular surgery, allergic reactions to the x-ray dye, acute kidney injury including need for possible hemodialysis, significant cardiac arrhythmias, myocardial infarction, stroke, and threat to life. SCAI Shock Stage: A ASA  score 3 Mallampati score 3 Procedure: Right heart catheterization with thermodilution cardiac output and O2 saturation run.  Details of the procedure: Informed consent is obtained. After sterile prep and drape 1% lidocaine is infiltrated over the right IJ region.  Access was obtained into right IJ vein using micropuncture technique and limited ultrasound.  The 7 French sheath was placed after wire confirmation with fluoroscopy.  A 7 French balloontipped Ronald-Avril catheter was advanced into the right atrium with balloon inflated.  O2 saturations in pressures were obtained.  Balloon was then advanced into the right ventricle where pressures and O2 saturations obtained.  Catheter was then advanced into the right main pulmonary artery and the balloon was deflated.  Thermodilution cardiac outputs as well as pressures and saturations were obtained.  Balloon was reinflated and placed in the wedge position.  Pressures obtained.  Balloon deflated and pulled out of the body.  Sheath will be hand pulled with manual pressure.  Anesthesia: 26 minutes 34 seconds of moderate sedation was administered was administered using Fentanyl and Versed under my supervision. I monitored the patient for further administration of agents as needed including continuous monitoring of oxygen saturation, heart rate and blood pressure.  The RN administered the medicine under my direct supervision order, and an independent trained observer was present. Antiplatelet agent: Prasugrel Recommended duration of dual antiplatelet therapy: Not applicable Total Sedation time: 26 minutes 34 seconds Sedation Medications and Contrast use:   04/23/2025 1456 MDT fentaNYL citrate (PF) 50 mcg/mL injection 25 mcg   04/23/2025 1456 MDT midazolam (VERSED) injection 1 mg intravenous Given Total Air Kerma (mGy) 8.790; Fluoro Time (min) = 0.9 MD      [employer]  Result Date: 4/22/2025  Narrative:   Normal left ventricular wall thickness.   Left ventricle is moderately  dilated.  End-diastolic dimension 6.8 cm.   Biplane ejection fraction is 32%.  Visually looks much closer to 25%.  No apical thrombi.   Severe left ventricular systolic dysfunction.  Severe ischemic cardiomyopathy with RCA and LAD wall motion abnormalities.  See below.   Grade I (mild) left ventricular diastolic abnormality.   Normal left ventricular filling pressure.   Right ventricular systolic function is low normal.   The left atrium is normal in size.   The right atrium is mildly dilated.   Normal central venous pressure (0-5 mmHg).   No pericardial effusion.   Inadequate TR spectral Doppler to accurately assess right ventricular systolic pressure.   No significant valve disease.   No change from the March 2025 study.     XR chest portable 1 view  Result Date: 4/21/2025   IMPRESSION: No acute disease.       Assessment/Plan   Patient Active Problem List    Diagnosis Date Noted    Palpitations 04/23/2025    Ventricular tachycardia (CMS/HCC) 04/22/2025    Cardiac resynchronization therapy defibrillator (CRT-D) in place 03/22/2025    Gastroesophageal reflux disease without esophagitis 10/19/2023    Benign prostatic hyperplasia without lower urinary tract symptoms 10/19/2023    Diabetes mellitus type 2 in obese (CMS/HCC) 10/19/2023    Moderate obesity 10/19/2023    Elevated troponin 10/19/2023    Lymphocytosis (symptomatic) 10/19/2023    AICD discharge 10/18/2023    Long term current use of antiarrhythmic drug 03/04/2022    LAVON (obstructive sleep apnea) 10/01/2021    Sustained ventricular tachycardia (CMS/HCC) 09/19/2021    Chronic anticoagulation 09/19/2021    Paroxysmal atrial fibrillation (CMS/HCC) 09/19/2021    Chronic systolic HF (heart failure) (CMS/[facility]) 07/11/2021    Automatic implantable cardioverter-defibrillator in situ 06/10/2020    Presence of stent in coronary artery 06/10/2020    On amiodarone therapy 06/10/2020    Coronary artery disease involving native coronary artery of native heart without angina  pectoris 06/10/2020    Ischemic cardiomyopathy 10/30/2017    Hypertension 10/30/2017    Hyperlipidemia 10/30/2017       Telemetry:      EKG:      Assessment/Plan:    Recurrent ventricular tachycardia/VT storm:  4/22: Presented to the ER last evening with tachycardic palpitations, chest tightness, shortness of breath and lightheadedness.  Symptoms were consistent with prior admissions and he presented for evaluation.  ICD interrogation revealed ventricular tachycardia at 112 bpm with multiple ATP therapies.     4/22: Limited echo showed an EF of 32%.  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    He has had multiple VT ablations ([facility],  [employer], [facility]).    4/22: reloaded with IV amiodarone.  Unfortunately he is also noted side effects on the higher dose of amiodarone with tremor in the right hand which he had previously when he was on amiodarone and has had progressive decrease in his energy with weakness in his arms and legs.  He is no longer walking the 4-6 blocks with his dogs daily.  If he stands for too long he gets wobbly and has to sit down.    4/22: Shock therapy was turned off in the slow VT zone.   4/23--Discharged home  4/23: presents to ER with palpitations and feeling like his device is going off.   Reloaded with IV amio and started on gtt. Admitted to hospitalist service. Other medications resumed.      PAF.              [id number] score: 6              Anticoagulated on Xarelto     Ischemic Cardiomyopathy  CAD S/P Multiple stents   Discussed with Dr. Leiva.            Right heart cath 4/23 showed adequate pressures, please see CHF note for data.  He is on GDMT for CHF with carvedilol 50 mg twice daily, Jardiance 10 mg daily, spironolactone 25 mg daily and Entresto 97/103 mg twice daily.      Patient ultimately needs a new heart.  Kim Wu and Cali are working on a plan which will likely include  [facility].     Plan discussed in conjunction with Dr. Wu.    Electronically signed by: Jazmin Leo CNP  4/24/2025  8:34 AM

## 2025-04-24 NOTE — MEDICATION HISTORY SPECIALIST NOTES
[facility] [id number]    Information sources used at this time:  EPIC  Complete Dispense Report      Upon entering patients chart to perform med review it was determined a list from pharmacy/facility is needed.   Attempted to reach out to pharmacy/facility and no list has been received at this time. [facility] will continue to follow up to obtain this information.       Pending Resources:  Amanda

## 2025-04-25 PROCEDURE — 6370000100 HC RX 637 (ALT 250 FOR IP): Performed by: HOSPITALIST

## 2025-04-25 PROCEDURE — 99232 SBSQ HOSP IP/OBS MODERATE 35: CPT | Performed by: HOSPITALIST

## 2025-04-25 PROCEDURE — 6360000200 HC RX 636 W HCPCS (ALT 250 FOR IP)

## 2025-04-25 PROCEDURE — 6370000100 HC RX 637 (ALT 250 FOR IP): Performed by: NURSE PRACTITIONER

## 2025-04-25 PROCEDURE — 6360000200 HC RX 636 W HCPCS (ALT 250 FOR IP): Performed by: STUDENT IN AN ORGANIZED HEALTH CARE EDUCATION/TRAINING PROGRAM

## 2025-04-25 PROCEDURE — 93010 ELECTROCARDIOGRAM REPORT: CPT | Performed by: INTERNAL MEDICINE

## 2025-04-25 PROCEDURE — 93005 ELECTROCARDIOGRAM TRACING: CPT | Performed by: NURSE PRACTITIONER

## 2025-04-25 PROCEDURE — (BLANK) HC ROOM ICU INTERMEDIATE

## 2025-04-25 RX ORDER — AMIODARONE HYDROCHLORIDE 200 MG/1
400 TABLET ORAL 2 TIMES DAILY WITH MEALS
Status: DISCONTINUED | OUTPATIENT
Start: 2025-04-25 | End: 2025-04-25

## 2025-04-25 RX ORDER — MEXILETINE HYDROCHLORIDE 150 MG/1
300 CAPSULE ORAL EVERY 8 HOURS SCHEDULED
Status: DISCONTINUED | OUTPATIENT
Start: 2025-04-25 | End: 2025-04-28 | Stop reason: HOSPADM

## 2025-04-25 RX ORDER — MEXILETINE HYDROCHLORIDE 150 MG/1
150 CAPSULE ORAL EVERY 8 HOURS SCHEDULED
Status: DISCONTINUED | OUTPATIENT
Start: 2025-04-25 | End: 2025-04-25

## 2025-04-25 RX ADMIN — SACUBITRIL AND VALSARTAN 1 TABLET: 97; 103 TABLET, FILM COATED ORAL at 19:44

## 2025-04-25 RX ADMIN — MEXILETINE HYDROCHLORIDE 300 MG: 150 CAPSULE ORAL at 19:44

## 2025-04-25 RX ADMIN — SACUBITRIL AND VALSARTAN 1 TABLET: 97; 103 TABLET, FILM COATED ORAL at 08:43

## 2025-04-25 RX ADMIN — FUROSEMIDE 40 MG: 40 TABLET ORAL at 08:43

## 2025-04-25 RX ADMIN — AMIODARONE HYDROCHLORIDE 150 MG: 1.5 INJECTION, SOLUTION INTRAVENOUS at 20:13

## 2025-04-25 RX ADMIN — CARVEDILOL 50 MG: 25 TABLET, FILM COATED ORAL at 17:22

## 2025-04-25 RX ADMIN — ROSUVASTATIN CALCIUM 40 MG: 20 TABLET, FILM COATED ORAL at 19:44

## 2025-04-25 RX ADMIN — MEXILETINE HYDROCHLORIDE 300 MG: 150 CAPSULE ORAL at 04:51

## 2025-04-25 RX ADMIN — MEXILETINE HYDROCHLORIDE 300 MG: 150 CAPSULE ORAL at 13:34

## 2025-04-25 RX ADMIN — RIVAROXABAN 20 MG: 20 TABLET, FILM COATED ORAL at 17:22

## 2025-04-25 RX ADMIN — TAMSULOSIN HYDROCHLORIDE 0.4 MG: 0.4 CAPSULE ORAL at 08:42

## 2025-04-25 RX ADMIN — EMPAGLIFLOZIN 10 MG: 10 TABLET, FILM COATED ORAL at 08:43

## 2025-04-25 RX ADMIN — SPIRONOLACTONE 25 MG: 25 TABLET ORAL at 08:43

## 2025-04-25 RX ADMIN — PRASUGREL 10 MG: 10 TABLET, FILM COATED ORAL at 08:43

## 2025-04-25 RX ADMIN — AMIODARONE HYDROCHLORIDE 0.5 MG/MIN: 1.8 INJECTION, SOLUTION INTRAVENOUS at 01:59

## 2025-04-25 NOTE — INTERDISCIPLINARY/THERAPY
Case Management Progress Note    Phone # [phone]    Diagnosis: Ventricular tachycardia    Plan of Care:  transfer to [facility], [facility]    Discussed Discharge Topics/Narrative: Chart reviewed. CM notified by Dr Leiva that pt has been accepted by Dr Urbano Stevens at the [facility]. CM spoke with Ayan at the [facility] transfer center, and sent requested documents. Ayan states we are waiting for a bed assignment, and will update this CM when a bed has opened up for the pt. CM will continue to follow for discharge planning. CM updated Jazmin Leo CNP.    Family updated: no    RN updated: In MDR    Disposition: [facility]

## 2025-04-25 NOTE — PLAN OF CARE
Problem: Neurosensory - Adult  Goal: Achieves stable or improved neurological status  Outcome: Progressing  Flowsheets (Taken 4/24/2025 0040 by Nurse Ann MCMANUS RN)  Achieves stable or improved neurological status:   Initiate measures to prevent increased intracranial pressure. For example: decrease stimuli, maintain neutral head alignment, prevent shivering, etc.   Assess for and report changes in neurological status   Maintain blood pressure and fluid volume within ordered parameters to optimize cerebral perfusion and minimize risk of hemorrhage   Monitor temperature, glucose, and sodium.  Goal: Achieves maximal functionality and self care  Outcome: Progressing  Flowsheets (Taken 4/24/2025 0040 by Nurse Ann MCMANUS RN)  Achieves maximal functionality and self care:   Encourage and assist patient to increase activity and self care with guidance from PT/OT   With patient fatigue and changes in neurological status, monitor swallowing and airway patency   Encourage visually impaired, hearing impaired and aphasic patients to use assistive/communication devices  Goal: Will maintain current level or return to usual level of neurologic status, motor/sensory function and hemodynamic stability after a hematologic cerebral event such as stroke or TIA.  Outcome: Progressing     Problem: Cardiovascular - Adult  Goal: Maintains optimal cardiac output and hemodynamic stability  Outcome: Progressing  Flowsheets (Taken 4/24/2025 1600 by Nurse Odilia SANTIZO RN)  Maintain optimal cardiac output and hemodynamic stability:   Monitor heart rate, blood pressure, and cardiac rhythm   Monitor for hypotension and other signs of decreased cardiac output   Administer or titrate ordered vasoactive medications to optimize hemodynamic stability  Goal: Absence of cardiac dysrhythmias or at baseline  Outcome: Progressing  Flowsheets (Taken 4/24/2025 0040 by Nurse Ann MCMANUS RN)  Absence of cardiac dysrhythmias or at baseline:   Continuous cardiac  monitoring, monitor vital signs (see flowsheet documentation)   Administer antiarrhythmic and heart rate control medications as ordered   Obtain 12 lead EKG if indicated   Initiate emergency measures for life threatening arrhythmias   Monitor electrolytes and administer replacement therapy as ordered     Problem: Respiratory - Adult  Goal: Achieves optimal ventilation and oxygenation  Outcome: Progressing  Flowsheets (Taken 4/24/2025 1600 by Nurse Odilia SANTIZO RN)  Achieves optimal ventilation and oxygenation:   Assess and report changes in respiratory status   Assess and report changes in mentation and behavior   Position to facilitate oxygenation and minimize respiratory effort   Oxygen supplementation based on oxygen saturation or arterial blood gases  Goal: Achieves optimal ventilation and oxygenation with noninvasive CPAP/BiLEVEL support  Outcome: Progressing  Flowsheets (Taken 4/24/2025 0040 by Nurse Ann MCMANUS RN)  Achieves Optimal Oxygenation with Noninvasive CPAP/BiLEVEL Support:   Position patient to facilitate optimal ventilation/oxygenation status and minimize respiratory effort   Provide education to patient/support person about rationale and expected outcomes associated with therapy   Assess effectiveness of therapy on ventilation/oxygenation status based on oxygen saturation and/or arterial blood gases, as indicated   Position patient to reduce aspiration risk, elevate head of bed at least 35 degrees or higher, as applicable   Assess patient and report changes in respiratory and physiological status   Assess and monitor skin condition, in relationship to the respiratory interface   Routinely monitor equipment for proper performance and settings   Assess patient and report changes in mentation and behavior   Assure equipment alarm volume is adequate for the patient's environment   Immediately repsond to equipment alarm, to assess patient and/or cause for alarm     Problem: Gastrointestinal - Adult  Goal:  Nutrient intake appropriate for improving, restoring or maintaining nutritional needs  Outcome: Progressing  Flowsheets (Taken 4/24/2025 0040 by Nurse Ann MCMANUS, RN)  Nutrient intake appropriate for improving, restoring or maintaining nutritional needs:   Assist with meals as needed   Monitor percentage of each meal consumed   Obtain nutritional consult as indicated   Provide patient/support person specific nutrition education as appropriate   Identify factors contributing to decreased intake, treat as appropriate   Monitor I&O, weight and lab values   Administer alternative nutrition interventions as ordered  Goal: Minimal or absence of nausea and vomiting  Outcome: Progressing  Flowsheets (Taken 4/24/2025 0040 by Nurse Ann MCMANUS, RN)  Minimal or absence of nausea and vomiting:   Assess patient for nausea/vomiting and report changes   Nutrition consult as indicated   Maintain NPO status as ordered   Administer ordered antiemetic medications as needed   Monitor intake and output to ensure adequate hydration   Advance diet as ordered   Provide nonpharmacologic comfort measures as appropriate   Nasogastric tube to suction as ordered  Goal: Maintains or returns to baseline digestive function  Outcome: Progressing  Flowsheets (Taken 4/24/2025 0040 by Nurse Ann MCMANUS, RN)  Maintains or returns to baseline bowel function:   Assess abdomen and bowel status and report changes in gastrointestinal function   Monitor for metabolic panel imbalances   Nutrition consult as indicated   Administer ordered medications as needed   Assess hydration and nutritional status   Assess for treatment effectiveness   Encourage mobilization and activity   Assess characteristics and frequency of stool  Goal: Maintains Optimal Tissue Perfusion  Outcome: Progressing  Flowsheets (Taken 4/24/2025 0040 by Nurse Ann MCMANUS, RN)  Maintains Optimal Tissue Perfusion:   Monitor nutritional intake, intake/output, and weight   Monitor for increased abdominal girth,  firmness or intra-abdominal pressure   Assess skin color, capillary refill and edema   Assess blood pressure, heart rate, and oxygen saturation   Monitor metabolic panels, blood count panels, and coagulations panels as ordered   Assess for blood in emesis and stool   Assess and report changes in bowel function  Goal: Gastrointestinal Editable Patient Goal  Outcome: Progressing     Problem: Genitourinary - Adult  Goal: Absence of urinary retention  Outcome: Progressing  Flowsheets (Taken 4/24/2025 0040 by Nurse Ann MCMANUS, RN)  Absence of urinary retention:   Administer medications to alleviate retention as needed   Assess patient’s ability to void and empty bladder   Monitor intake/output and perform bladder scan if indicated   If urinary catheter present, initiate and maintain CAUTI bundle  Goal: Absence of Urinary Infection  Outcome: Progressing  Flowsheets (Taken 4/24/2025 0040 by Nurse Ann MCMANUS, RN)  Absence of Urinary Infection:   Assess urinary status for burning, urgency, frequency, pain and urine characteristics   Obtain urinalysis as ordered   Monitor intake/output   Assess for neurological status changes   Monitor lab values     Problem: Metabolic/Fluid and Electrolytes - Adult  Goal: Electrolytes maintained within normal limits  Outcome: Progressing  Flowsheets (Taken 4/24/2025 0040 by Nurse Ann MCMANUS, RN)  Electrolytes maintained within normal limits:   Monitor labs and assess patient for signs and symptoms of electrolyte imbalances   Administer and monitor response to electrolyte replacements   Initiate and instruct patient/support person on fluid and nutrition restrictions as ordered  Goal: Maintain Optimal Renal Function and Hemodynamic Stability  Outcome: Progressing  Flowsheets (Taken 4/24/2025 0040 by Nurse Ann MCMANUS, RN)  Maintain optimal renal function and hemodynaimc stability:   Monitor labs and assess for signs and symptoms of volume excess or deficit   Monitor intake, output and patient weight    Monitor response to interventions for patient's volume status, including labs, urine output, blood pressure (other measures as available)   Encourage oral intake as appropriate   Instruct patient on fluid and nutrition restrictions as appropriate  Goal: Glucose maintained within prescribed range  Outcome: Progressing  Flowsheets (Taken 4/24/2025 0040 by Nurse Ann MCMANUS, RN)  Glucose maintained within prescribed range:   Assess for signs and symptoms of hyperglycemia and hypoglycemia   Monitor blood glucose as ordered   Administer ordered medications to maintain glucose within target range   Assess barriers to adequate nutritional intake and initiate nutrition consult as needed   Assess baseline knowledge and provide education as indicated   Monitor exercise as may reduce the requirements for insulin     Problem: Skin/Tissue Integrity - Adult  Goal: Skin integrity remains intact  Outcome: Progressing  Flowsheets (Taken 4/24/2025 0040 by Nurse Ann MCMANUS, RN)  Skin integrity remains intact:   Monitor for areas of redness and/or skin breakdown   Assess and document risk factors for pressure ulcer development   Initiate pressure ulcer prevention measures as indicated   Assess and document skin integrity  Goal: Incisions, wounds, or drain sites healing without S/S of infection  Outcome: Progressing  Flowsheets (Taken 4/24/2025 0040 by Nurse Ann MCMANUS RN)  Incision(s), wound(s) or drain site(s) healing without S/S of infection:   Assess and document dressing/incision, wound bed, drain sites and surrounding tissue   Assess and document risk factors for skin breakdown   Assess and document skin integrity   Implement wound care per orders  Goal: Oral mucous membranes remain intact  Outcome: Progressing  Flowsheets (Taken 4/24/2025 1017 by Nurse Odilia SANTIZO RN)  Oral mucous membranes remain intact:   Assess oral mucosa and hygiene practices   Implement oral medicated treatments as ordered   Implement preventative oral hygiene  regimen     Problem: Hematologic - Adult  Goal: Maintains hematologic stability  Outcome: Progressing  Flowsheets (Taken 4/24/2025 0040 by Nurse Ann MCMANUS RN)  Maintains hematologic stability:   Administer medications as ordered   Initiate bleeding precautions as indicated   Assess for signs and symptoms of bleeding or hemorrhage   Administer supportive blood products/factors as ordered   Monitor labs   Educate patient/support person to report signs/symptoms of bleeding     Problem: Musculoskeletal - Adult  Goal: Return mobility to safest level of function  Outcome: Progressing  Flowsheets (Taken 4/24/2025 0040 by Nurse Ann MCMANUS RN)  Return mobility to safest level of function:   Assess patient ADL status, stability and activity tolerance providing assistive devices as needed   Assist with transfers and ambulation using safe practices   Instruct patient/support person on ordered activity level and self care needs   Obtain PT/OT consults as needed  Goal: Maintain proper alignment of affected body part  Outcome: Progressing  Flowsheets (Taken 4/24/2025 0040 by Nurse Ann MCMANUS RN)  Maintain proper alignment of affected body part:   Educated patient/support person use of appropriate assistive device and precautions (e.g. spinal or hip dislocation precautions)   Support and protect limb and body alignment as ordered     Problem: Neurosensory - Adult  Goal: Achieves stable or improved neurological status  Outcome: Progressing  Flowsheets (Taken 4/24/2025 0040 by Nurse Ann MCMANUS RN)  Achieves stable or improved neurological status:   Initiate measures to prevent increased intracranial pressure. For example: decrease stimuli, maintain neutral head alignment, prevent shivering, etc.   Assess for and report changes in neurological status   Maintain blood pressure and fluid volume within ordered parameters to optimize cerebral perfusion and minimize risk of hemorrhage   Monitor temperature, glucose, and sodium.  Goal: Achieves  maximal functionality and self care  Outcome: Progressing  Flowsheets (Taken 4/24/2025 0040 by Nurse Ann MCMANUS RN)  Achieves maximal functionality and self care:   Encourage and assist patient to increase activity and self care with guidance from PT/OT   With patient fatigue and changes in neurological status, monitor swallowing and airway patency   Encourage visually impaired, hearing impaired and aphasic patients to use assistive/communication devices     Problem: Cardiovascular - Adult  Goal: Maintains optimal cardiac output and hemodynamic stability  Outcome: Progressing  Flowsheets (Taken 4/24/2025 1600 by Nurse Odilia SANTIZO RN)  Maintain optimal cardiac output and hemodynamic stability:   Monitor heart rate, blood pressure, and cardiac rhythm   Monitor for hypotension and other signs of decreased cardiac output   Administer or titrate ordered vasoactive medications to optimize hemodynamic stability  Goal: Absence of cardiac dysrhythmias or at baseline  Outcome: Progressing  Flowsheets (Taken 4/24/2025 0040 by Nurse Ann MCMANUS RN)  Absence of cardiac dysrhythmias or at baseline:   Continuous cardiac monitoring, monitor vital signs (see flowsheet documentation)   Administer antiarrhythmic and heart rate control medications as ordered   Obtain 12 lead EKG if indicated   Initiate emergency measures for life threatening arrhythmias   Monitor electrolytes and administer replacement therapy as ordered     Problem: Respiratory - Adult  Goal: Achieves optimal ventilation and oxygenation  Outcome: Progressing  Flowsheets (Taken 4/24/2025 1600 by Nurse Odilia SANTIZO RN)  Achieves optimal ventilation and oxygenation:   Assess and report changes in respiratory status   Assess and report changes in mentation and behavior   Position to facilitate oxygenation and minimize respiratory effort   Oxygen supplementation based on oxygen saturation or arterial blood gases  Goal: Achieves optimal ventilation and oxygenation with noninvasive  CPAP/BiLEVEL support  Outcome: Progressing  Flowsheets (Taken 4/24/2025 0040 by Nurse Ann MCMANUS, RN)  Achieves Optimal Oxygenation with Noninvasive CPAP/BiLEVEL Support:   Position patient to facilitate optimal ventilation/oxygenation status and minimize respiratory effort   Provide education to patient/support person about rationale and expected outcomes associated with therapy   Assess effectiveness of therapy on ventilation/oxygenation status based on oxygen saturation and/or arterial blood gases, as indicated   Position patient to reduce aspiration risk, elevate head of bed at least 35 degrees or higher, as applicable   Assess patient and report changes in respiratory and physiological status   Assess and monitor skin condition, in relationship to the respiratory interface   Routinely monitor equipment for proper performance and settings   Assess patient and report changes in mentation and behavior   Assure equipment alarm volume is adequate for the patient's environment   Immediately repsond to equipment alarm, to assess patient and/or cause for alarm     Problem: Gastrointestinal - Adult  Goal: Nutrient intake appropriate for improving, restoring or maintaining nutritional needs  Outcome: Progressing  Flowsheets (Taken 4/24/2025 0040 by Nurse Ann MCMANUS, RN)  Nutrient intake appropriate for improving, restoring or maintaining nutritional needs:   Assist with meals as needed   Monitor percentage of each meal consumed   Obtain nutritional consult as indicated   Provide patient/support person specific nutrition education as appropriate   Identify factors contributing to decreased intake, treat as appropriate   Monitor I&O, weight and lab values   Administer alternative nutrition interventions as ordered  Goal: Minimal or absence of nausea and vomiting  Outcome: Progressing  Flowsheets (Taken 4/24/2025 0040 by Nurse Ann MCMANUS, RN)  Minimal or absence of nausea and vomiting:   Assess patient for nausea/vomiting and report  changes   Nutrition consult as indicated   Maintain NPO status as ordered   Administer ordered antiemetic medications as needed   Monitor intake and output to ensure adequate hydration   Advance diet as ordered   Provide nonpharmacologic comfort measures as appropriate   Nasogastric tube to suction as ordered  Goal: Maintains or returns to baseline digestive function  Outcome: Progressing  Flowsheets (Taken 4/24/2025 0040 by Nurse Ann MCMANUS, RN)  Maintains or returns to baseline bowel function:   Assess abdomen and bowel status and report changes in gastrointestinal function   Monitor for metabolic panel imbalances   Nutrition consult as indicated   Administer ordered medications as needed   Assess hydration and nutritional status   Assess for treatment effectiveness   Encourage mobilization and activity   Assess characteristics and frequency of stool  Goal: Maintains Optimal Tissue Perfusion  Outcome: Progressing  Flowsheets (Taken 4/24/2025 0040 by Nurse Ann MCMANUS, RN)  Maintains Optimal Tissue Perfusion:   Monitor nutritional intake, intake/output, and weight   Monitor for increased abdominal girth, firmness or intra-abdominal pressure   Assess skin color, capillary refill and edema   Assess blood pressure, heart rate, and oxygen saturation   Monitor metabolic panels, blood count panels, and coagulations panels as ordered   Assess for blood in emesis and stool   Assess and report changes in bowel function     Problem: Genitourinary - Adult  Goal: Absence of urinary retention  Outcome: Progressing  Flowsheets (Taken 4/24/2025 0040 by Nurse Ann MCMANUS, RN)  Absence of urinary retention:   Administer medications to alleviate retention as needed   Assess patient’s ability to void and empty bladder   Monitor intake/output and perform bladder scan if indicated   If urinary catheter present, initiate and maintain CAUTI bundle  Goal: Absence of Urinary Infection  Outcome: Progressing  Flowsheets (Taken 4/24/2025 0040 by Nurse  Ann MCMANUS, RN)  Absence of Urinary Infection:   Assess urinary status for burning, urgency, frequency, pain and urine characteristics   Obtain urinalysis as ordered   Monitor intake/output   Assess for neurological status changes   Monitor lab values     Problem: Metabolic/Fluid and Electrolytes - Adult  Goal: Electrolytes maintained within normal limits  Outcome: Progressing  Flowsheets (Taken 4/24/2025 0040 by Nurse Ann MCMANUS, RN)  Electrolytes maintained within normal limits:   Monitor labs and assess patient for signs and symptoms of electrolyte imbalances   Administer and monitor response to electrolyte replacements   Initiate and instruct patient/support person on fluid and nutrition restrictions as ordered  Goal: Maintain Optimal Renal Function and Hemodynamic Stability  Outcome: Progressing  Flowsheets (Taken 4/24/2025 0040 by Nurse Ann MCMANUS, RN)  Maintain optimal renal function and hemodynaimc stability:   Monitor labs and assess for signs and symptoms of volume excess or deficit   Monitor intake, output and patient weight   Monitor response to interventions for patient's volume status, including labs, urine output, blood pressure (other measures as available)   Encourage oral intake as appropriate   Instruct patient on fluid and nutrition restrictions as appropriate  Goal: Glucose maintained within prescribed range  Outcome: Progressing  Flowsheets (Taken 4/24/2025 0040 by Nurse Ann MCMANUS, RN)  Glucose maintained within prescribed range:   Assess for signs and symptoms of hyperglycemia and hypoglycemia   Monitor blood glucose as ordered   Administer ordered medications to maintain glucose within target range   Assess barriers to adequate nutritional intake and initiate nutrition consult as needed   Assess baseline knowledge and provide education as indicated   Monitor exercise as may reduce the requirements for insulin     Problem: Skin/Tissue Integrity - Adult  Goal: Skin integrity remains intact  Outcome:  Progressing  Flowsheets (Taken 4/24/2025 0040 by Nurse Ann MCMANUS, RN)  Skin integrity remains intact:   Monitor for areas of redness and/or skin breakdown   Assess and document risk factors for pressure ulcer development   Initiate pressure ulcer prevention measures as indicated   Assess and document skin integrity  Goal: Incisions, wounds, or drain sites healing without S/S of infection  Outcome: Progressing  Flowsheets (Taken 4/24/2025 0040 by Nurse Ann MCMANUS, RN)  Incision(s), wound(s) or drain site(s) healing without S/S of infection:   Assess and document dressing/incision, wound bed, drain sites and surrounding tissue   Assess and document risk factors for skin breakdown   Assess and document skin integrity   Implement wound care per orders  Goal: Oral mucous membranes remain intact  Outcome: Progressing  Flowsheets (Taken 4/24/2025 1017 by Nurse Odilia SANTIZO RN)  Oral mucous membranes remain intact:   Assess oral mucosa and hygiene practices   Implement oral medicated treatments as ordered   Implement preventative oral hygiene regimen     Problem: Hematologic - Adult  Goal: Maintains hematologic stability  Outcome: Progressing  Flowsheets (Taken 4/24/2025 0040 by Nurse Ann MCMANUS, RN)  Maintains hematologic stability:   Administer medications as ordered   Initiate bleeding precautions as indicated   Assess for signs and symptoms of bleeding or hemorrhage   Administer supportive blood products/factors as ordered   Monitor labs   Educate patient/support person to report signs/symptoms of bleeding     Problem: Musculoskeletal - Adult  Goal: Return mobility to safest level of function  Outcome: Progressing  Flowsheets (Taken 4/24/2025 0040 by Nurse Ann MCMANUS, RN)  Return mobility to safest level of function:   Assess patient ADL status, stability and activity tolerance providing assistive devices as needed   Assist with transfers and ambulation using safe practices   Instruct patient/support person on ordered activity level  and self care needs   Obtain PT/OT consults as needed  Goal: Maintain proper alignment of affected body part  Outcome: Progressing  Flowsheets (Taken 4/24/2025 0040 by Nurse Ann MCMANUS RN)  Maintain proper alignment of affected body part:   Educated patient/support person use of appropriate assistive device and precautions (e.g. spinal or hip dislocation precautions)   Support and protect limb and body alignment as ordered

## 2025-04-25 NOTE — PLAN OF CARE
Problem: Neurosensory - Adult  Goal: Achieves stable or improved neurological status  Outcome: Progressing  Flowsheets (Taken 4/25/2025 0735)  Achieves stable or improved neurological status:   Maintain blood pressure and fluid volume within ordered parameters to optimize cerebral perfusion and minimize risk of hemorrhage   Initiate measures to prevent increased intracranial pressure. For example: decrease stimuli, maintain neutral head alignment, prevent shivering, etc.   Assess for and report changes in neurological status   Monitor temperature, glucose, and sodium.  Goal: Achieves maximal functionality and self care  Outcome: Progressing  Flowsheets (Taken 4/25/2025 0735)  Achieves maximal functionality and self care:   With patient fatigue and changes in neurological status, monitor swallowing and airway patency   Encourage and assist patient to increase activity and self care with guidance from PT/OT  Goal: Will maintain current level or return to usual level of neurologic status, motor/sensory function and hemodynamic stability after a hematologic cerebral event such as stroke or TIA.  Outcome: Progressing  Flowsheets (Taken 4/25/2025 0735)  Will maintain current level or return to usual level of neurologic status, motor/sensory function and hemodynamic stability after a hematologic cerebral event such as stroke or TIA: Encourage and assist patient to increase activity and self-care with guidance from PT/OT/ST/RT     Problem: Cardiovascular - Adult  Goal: Maintains optimal cardiac output and hemodynamic stability  Outcome: Progressing  Flowsheets (Taken 4/25/2025 0735)  Maintain optimal cardiac output and hemodynamic stability:   Monitor for hypotension and other signs of decreased cardiac output   Monitor heart rate, blood pressure, and cardiac rhythm   Monitor for fluid overload/dehydration, weight gain, shortness of breath and activity intolerance   Monitor arterial and/or venous puncture sites for  bleeding and/or hematoma   Assess quality of pulses, capillary refill, edema, sensation, skin color and temperature   Assess for signs of decreased coronary artery perfusion - ex. angina  Goal: Absence of cardiac dysrhythmias or at baseline  Outcome: Progressing  Flowsheets (Taken 4/25/2025 0735)  Absence of cardiac dysrhythmias or at baseline:   Continuous cardiac monitoring, monitor vital signs (see flowsheet documentation)   Obtain 12 lead EKG if indicated   Administer antiarrhythmic and heart rate control medications as ordered   Initiate emergency measures for life threatening arrhythmias   Monitor electrolytes and administer replacement therapy as ordered     Problem: Metabolic/Fluid and Electrolytes - Adult  Goal: Electrolytes maintained within normal limits  Outcome: Progressing  Flowsheets (Taken 4/25/2025 0735)  Electrolytes maintained within normal limits:   Monitor labs and assess patient for signs and symptoms of electrolyte imbalances   Administer and monitor response to electrolyte replacements   Initiate and instruct patient/support person on fluid and nutrition restrictions as ordered     Problem: Skin/Tissue Integrity - Adult  Goal: Incisions, wounds, or drain sites healing without S/S of infection  Outcome: Progressing  Flowsheets (Taken 4/25/2025 0735)  Incision(s), wound(s) or drain site(s) healing without S/S of infection:   Assess and document risk factors for skin breakdown   Assess and document skin integrity   Assess and document dressing/incision, wound bed, drain sites and surrounding tissue     Problem: Hematologic - Adult  Goal: Maintains hematologic stability  Outcome: Progressing  Flowsheets (Taken 4/25/2025 0735)  Maintains hematologic stability:   Assess for signs and symptoms of bleeding or hemorrhage   Initiate bleeding precautions as indicated   Administer medications as ordered   Monitor labs   Administer supportive blood products/factors as ordered   Educate patient/support  person to report signs/symptoms of bleeding

## 2025-04-25 NOTE — NURSING END OF SHIFT
Nursing End of Shift Summary:     Patient: Pete Trejo  MRN: [id number]  : 1965, Age: 59 y.o.    Location:     Nursing Goals  Clinical Goals for the Shift: monitor vs and tele, promote safety and comfort    Narrative Summary of Progress Toward Clinical Goals:  Nursing Shift Summary:     Patient: Pete Trejo  MRN: [id number]  : 1965, Age: 59 y.o.    Location:     Admit date: 2025  Primary Care Provider: Lynnette Amezquita CNP  Attending Provider: Jasmin Mcknight MD    25    Nursing Goals:  Clinical Goals for the Shift: monitor vs and tele, promote safety and comfort    Narrative Summary of Progress Towards Clinical Goals:  Neuro exam stable. Temp (12hrs), Av.7 °C (98.1 °F), Min:36.7 °C (98 °F), Max:36.8 °C (98.2 °F)   See below for oxygen requirements. Remains AV paced and normotensive. Tolerating current diet. Demonstrated adequate urine output this shift. No bowel movement this shift. Anticipate transfer to [facility].    Nursing Concerns/Thoughts for MD:  No    New Patient or Family Concerns/Issues:  No    Shift Summary:  No complaints of pain. No concerns. VSS  Major Event(s): none    Brief Narratives  Changes in VS and Rhythm: No  Changes in Exam: No, except where documented otherwise    Significant Events & Communications to Providers (Last 12 Hours)       Last 5 Values    No documentation.                 Oxygen Usage (Last 12 Hours)       Last 5 Values       Row Name 25 0732 25 1128 25 1532             Oxygen Therapy    SpO2 93 % 95 % 92 %      O2 Flow Rate (L/min) 0 L/min 0 L/min 0 L/min      Change in O2 Flow Rate 0 0 0                    Mobility (Last 12 Hours)       Last 5 Values       Row Name 25 0732 25 1128 25 1532 25 1723          Mobility    Activity -- -- Up ad ezekiel Bathroom privileges     Level of Assistance -- -- Independent --     Length of Time in Chair (min) -- -- 0 --     Distance  Ambulated (feet) -- -- 150 Feet --     Distance Ambulated (Meters Calculated) -- -- 45.72 Meters --     Patient Position In bed In bed In bed --     Turning/Repositioning -- -- Turns self --     Distance Ambulated (Meters Calculated)(Do Not Use) -- -- 45.72 Feet --                   Urethral Catheter       Active Urethral Catheter       None                  Active Lines       Active Central venous catheter / Peripherally inserted central catheter / Implantable Port / Hemodialysis catheter / Midline Catheter       None                  Infusing Medications   Medication Dose Last Rate       Current LOC: Intermediate/Step Down      Barriers to Goals/Nursing Concerns:  No    New Patient or Family Concerns/Issues:  No    Shift Summary:   Significant Events & Communications to Providers (Last 12 Hours)       Last 5 Values    No documentation.                 Oxygen Usage (Last 12 Hours)       Last 5 Values       Row Name 04/25/25 0800                   Room Air or Baseline Oxygen Trial by Nursing    Is Patient on Room Air OR on the Same Amount of O2 as at Home? Yes                      Mobility (Last 12 Hours)       Last 5 Values       Row Name 04/25/25 0732 04/25/25 1128 04/25/25 1532 04/25/25 1723          Mobility    Activity -- -- Up ad ezekiel Bathroom privileges     Level of Assistance -- -- Independent --     Length of Time in Chair (min) -- -- 0 --     Distance Ambulated (feet) -- -- 150 Feet --     Distance Ambulated (Meters Calculated) -- -- 45.72 Meters --     Patient Position In bed In bed In bed --     Turning/Repositioning -- -- Turns self --     Distance Ambulated (Meters Calculated)(Do Not Use) -- -- 45.72 Feet --                   Urethral Catheter       Active Urethral Catheter       None                  Active Lines       Active Central venous catheter / Peripherally inserted central catheter / Implantable Port / Hemodialysis catheter / Midline Catheter       None                  Infusing Medications    Medication Dose Last Rate     PRN Medications   Medication Dose Last Admin    nitroglycerin  0.4 mg      bisacodyL  10 mg      melatonin  4.5 mg      sodium chloride  3 mL      traZODone  25 mg      acetaminophen  650 mg      fentaNYL citrate (PF)  12.5 mcg      fentaNYL citrate (PF)  25 mcg      ondansetron  4 mg      Or    ondansetron  4 mg      alum-mag hydroxide-simeth  30 mL      calcium carbonate  500 mg      dextrose 50 % in water (D50W)  15-30 mL      dextrose  15.2 g      glucagon HCL  1 mg      Or    glucagon HCL  1 mg

## 2025-04-25 NOTE — PROGRESS NOTES
"Subjective    LOS: 2 days      Patient is no longer having any chest pain.  He denies any shortness of breath, palpitations, nausea or vomiting.  Objective   BP 98/62 (BP Location: Left arm, Patient Position: In bed, Cuff Size: Long Adult)   Pulse 70   Temp 36.7 °C (98 °F) (Oral)   Resp 18   Ht 1.803 m (5' 11\")   Wt 123.1 kg (271 lb 6.2 oz)   SpO2 95%   BMI 37.85 kg/m²     Intake/Output Summary (Last 24 hours) at 4/25/2025 1444  Last data filed at 4/25/2025 1015  Gross per 24 hour   Intake 1068.23 ml   Output 450 ml   Net 618.23 ml      Physical Exam  General: Alert, no acute distress  Heart: Rate and rhythm are regular  Lungs: Clear to auscultation bilaterally  Abdomen: Soft, nondistended, nontender, normal bowel sounds  Extremities: No edema  Neuro: Alert, oriented x 3, nonfocal    Other                      Data Used For Medical Decision Making        Case d/w cardiology, plan [facility] transfer to [facility]        Assessment/Plan     Assessment and Plan     # Recurrent ventricular tachycardia   - continue amio drip  - appreciate cardiology input  - await further EP input     # hypertension  - 24hr BP range: () / (58-84) (4/24/25)  - continue home: furosemide 40 mg po daily  - continue home: sacubitril-valsartan 97 mg-103 mg 1 tabs po BID  - continue home: spironolactone 25 mg po daily  - currently on: carvedilol 50 mg po BID     # atrial fibrillation  - [id number] score: 3 (4/24/25), moderate-high risk of stroke (3.2% per year)  - heart rate range:  (since 4/23/25 11:53)  - continue home: mexiletine 300 mg po TID  - continue home: rivaroxaban 20 mg po daily  - currently on: amiodarone 0.5 mg/min iv continuous  - currently on: carvedilol 50 mg po BID  - cardiac telemetry     # chronic systolic heart failure   - weight: 123.1 kg unchanged from 123.1 kg (4/23/25), est. baseline 118 kg  - LV ejection fraction: 32 % (4/22/25)  - continue home: empagliflozin 10 mg po daily  - continue home: furosemide 40 mg po " daily  - continue home: sacubitril-valsartan 97 mg-103 mg 1 tabs po BID  - continue home: spironolactone 25 mg po daily  - currently on: carvedilol 50 mg po BID     # diabetes mellitus  - hemoglobin A1c: 6.9 % (3/21/25) up from 6.2 % (9/22/23)  - continue home: empagliflozin 10 mg po daily  - glucose range: 111-125 (since 4/23/25)     # Coronary artery disease  - continue home: rosuvastatin 40 mg po daily  - currently on: carvedilol 50 mg po BID  - currently on: prasugrel 10 mg po daily     # Hyperlipidemia: with clinical ASCVD  - continue home: rosuvastatin 40 mg po daily     # Obesity, class II: severe  - BMI: 37.9 (4/23/25)  - complicated by atrial fibrillation, coronary artery disease, diabetes mellitus, heart failure, hyperlipidemia, hypertension, and Obstructive sleep apnea     # Tobacco use disorder    DVT Prophylaxis: Currently on therapeutic anticoagulation   Code Status: Full Code   Decision Making Capacity: Yes    Medically Ready for Discharge:Ready Now  Anticipated Disposition : Higher level of care to [facility]

## 2025-04-25 NOTE — PROGRESS NOTES
"  [address] [place] [address]                                                    Electrophysiology Inpatient Progress Note    Subjective    Patient ID: Pete Trejo is a 59 y.o. male with past medical history significant for ischemic cardiomyopathy and recurrent VT status post multiple VT ablations.  Patient was recently hospitalized 4/22-23 for VT with ATP therapies.  His amiodarone had recently been decreased secondary to symptoms of ataxia, right hand tremors and significant functional decline.  He was reloaded with IV amiodarone on 4/22 and transition to oral amiodarone on 4/23.  He had no more episodes of VT and was discharged home on 4/23.  Unfortunately patient did not even make it home before he had palpitations and feeling like his ICD was \"going off\" and came back to the emergency department.  His ICD was interrogated which showed several episodes of slow VT (heart rate 110 bpm) with several episodes of AT therapy.  Patient was reloaded with IV amiodarone and admitted to the hospitalist team.     Chief Complaint:   Chief Complaint   Patient presents with    Palpitations     Pt reports to the ED after recently being DC with palpitations. Pt reports the palpitations have returned in the last hour.         LOS: 2 days     HPI:  Telemetry reviewed, no more VT.    No acute events overnight.    Patient seen in room 361.  Patient is aware that he will be transferred to [facility].  He is anxious for that to occur.  Currently has no complaints.  His son is present at bedside.      Allergies as of 04/23/2025 - Reviewed 04/23/2025   Allergen Reaction Noted    Morphine  07/30/2017    Tramadol  07/30/2017       Current Facility-Administered Medications:     mexiletine (MEXITIL) capsule 300 mg, 300 mg, oral, q8h [facility], Jazmin Leo, CNP    carvediloL (COREG) tablet 50 mg, 50 mg, oral, 2x daily with meals, Jasmin Mcknight MD, 50 mg at 04/24/25 9539    empagliflozin " (JARDIANCE) tablet 10 mg, 10 mg, oral, Daily, Jasmin Mcknight MD, 10 mg at 04/25/25 0843    furosemide (LASIX) tablet 40 mg, 40 mg, oral, Daily, Jasmin Mcknight MD, 40 mg at 04/25/25 0843    nitroglycerin (NITROSTAT) SL tablet 0.4 mg, 0.4 mg, sublingual, q5 min PRN, Jasmin Mcknight MD    prasugreL HCl (EFFIENT) tablet 10 mg, 10 mg, oral, Daily, Jasmin Mcknight MD, 10 mg at 04/25/25 0843    sacubitriL-valsartan (ENTRESTO)  mg 1 tablet, 1 tablet, oral, 2x daily, Jasmin Mcknight MD, 1 tablet at 04/25/25 0843    spironolactone (ALDACTONE) tablet 25 mg, 25 mg, oral, Daily, Jasmin Mcknight MD, 25 mg at 04/25/25 0843    bisacodyL (DULCOLAX) suppository 10 mg, 10 mg, rectal, Daily PRN, Jasmin Mcknight MD    rivaroxaban (XARELTO) tablet 20 mg, 20 mg, oral, Daily with dinner, Jasmin Mcknight MD, 20 mg at 04/24/25 1739    melatonin tablet 4.5 mg, 4.5 mg, oral, Nightly PRN, Jasmin Mcknight MD    rosuvastatin (CRESTOR) tablet 40 mg, 40 mg, oral, Nightly, Jasmin Mcknight MD, 40 mg at 04/24/25 2053    tamsulosin (FLOMAX) 24 hr capsule 0.4 mg, 0.4 mg, oral, Daily, Jasmin Mcknight MD, 0.4 mg at 04/25/25 0842    Maintain IV access, , , Until discontinued **AND** Saline lock IV, , , Once **AND** sodium chloride flush 3 mL, 3 mL, intravenous, PRN, Jasmin Mcknight MD    traZODone (DESYREL) tablet 25 mg, 25 mg, oral, Nightly PRN, Jasmin Mcknight MD    acetaminophen (TYLENOL) tablet 650 mg, 650 mg, oral, q6h PRN, Jasmin Mcknight MD    fentaNYL citrate (PF) 50 mcg/mL injection 12.5 mcg, 12.5 mcg, intravenous, q2h PRN, Jasmin Mcknight MD    fentaNYL citrate (PF) 50 mcg/mL injection 25 mcg, 25 mcg, intravenous, q2h PRN, Jasmin Mcknight MD    ondansetron (ZOFRAN) tablet 4 mg, 4 mg, oral, q6h PRN **OR** ondansetron (ZOFRAN) injection 4 mg, 4 mg, intravenous, q6h PRN, Jasmin Mcknight MD    alum-mag hydroxide-simeth (MAALOX ADVANCED) suspension 30 mL, 30 mL, oral, 3x daily PRN, Jasmin Mcknight MD    calcium carbonate  (TUMS) chewable tablet 500 mg, 500 mg, oral, 3x daily PRN, Jasmin Mcknight MD    dextrose 50 % in water (D50W) syringe 15-30 mL, 15-30 mL, intravenous, q15 min PRN, Jasmin Mcknight MD    dextrose (GLUTOSE) 40 % gel 15.2 g, 15.2 g, oral, q15 min PRN, Jasmin Mcknight MD    glucagon HCL injection 1 mg, 1 mg, intramuscular, q15 min PRN **OR** glucagon HCL injection 1 mg, 1 mg, subcutaneous, q15 min PRN, Jasmin Mcknight MD    Objective     Vital signs in last 24 hours:   Temp:  [36.7 °C (98.1 °F)-36.8 °C (98.3 °F)] 36.8 °C (98.2 °F)  Heart Rate:  [69-71] 70  Resp:  [16-19] 18  SpO2:  [90 %-95 %] 93 %  BP: (104-115)/(63-79) 107/68  SpO2/FiO2 Ratio Using Approximate FiO2 (%):  [465] 465  Estimated P/F Ratio Using Approximate FiO2 (%):  [394.9] 394.9  Weight: 123.1 kg (271 lb 6.2 oz)    Intake/Output this shift:  I/O this shift:  In: 249.4 [I.V.:249.4]  Out: -     Intake/Output Summary (Last 24 hours) at 4/25/2025 0844  Last data filed at 4/25/2025 0838  Gross per 24 hour   Intake 1079 ml   Output 950 ml   Net 129 ml     ROS:  As noted in HPI    Physical Exam:  General Appearance: awake, alert, NAD  HEENT: EOM intact, sclera without icterus   Respiratory: Normal effort  Cardiac: Regular  Abdomen: Nondistended  Extremities: No LE edema  Musck: No gross abn  Neuro: oriented x 4, no focal deficits  Skin: Warm      Data Review:   BMP:  Lab Results   Component Value Date     04/24/2025    K 4.2 04/24/2025     04/24/2025    CO2 24 04/24/2025    BUN 14 04/24/2025    CREATININE 0.90 04/24/2025    GLUCOSE 111 (H) 04/24/2025    CALCIUM 9.1 04/24/2025     CBC:   Lab Results   Component Value Date    WBC 5.1 04/24/2025    RBC 4.84 04/24/2025    HGB 15.4 04/24/2025    HCT 45.2 04/24/2025     04/24/2025     CMP:  Lab Results   Component Value Date     04/24/2025    K 4.2 04/24/2025     04/24/2025    CO2 24 04/24/2025    GLUCOSE 111 (H) 04/24/2025    CREATININE 0.90 04/24/2025    CALCIUM 9.1 04/24/2025     BUN 14 04/24/2025    ANIONGAP 7 04/24/2025     Lab Results   Component Value Date    BNP 46 04/23/2025     Magnesium:  Lab Results   Component Value Date    MG 1.9 04/24/2025     PT/INR:   Lab Results   Component Value Date    PT 12.8 (H) 04/23/2025    INR 1.1 04/23/2025       Tests:  Cardiac catheterization  Cardiac catheterization, OXIMETRY RUN  Result Date: 4/23/2025  Narrative: DATE OF PROCEDURE: 4/23/2025  PREOPERATIVE DIAGNOSIS:  Pre-op Diagnosis    * Chronic systolic HF (heart failure) (CMS/HCC) [I50.22]    * Ischemic cardiomyopathy [I25.5] Final Impression: 1.  Right atrial mean pressure: 5 mmHg.  O2 saturation 65.0%. 2.  Right ventricular pressure: 27/-1/2 mmHg.  O2 saturation 65.6%. 3.  Right main pulmonary artery pressure: 28/13/16 mmHg.  O2 saturation 65.4%. 4.  Pulmonary capillary wedge pressure: 8 mmHg. 5.  Cardiac output thermodilution technique 4.56 L/min.  Cardiac index 1.9 L/min/m². 6.  Cardiac output Becki technique: 5.7 L/min.  Cardiac index 2.3 L/min/m². Rationale, risks, benefits and alternatives of cardiac catheterization and percutaneous coronary intervention with or without stents is explained to the patient including, but not limited to, possible risks of infection, bleeding requiring blood transfusion, damage to the blood vessel requiring repair vascular surgery, allergic reactions to the x-ray dye, acute kidney injury including need for possible hemodialysis, significant cardiac arrhythmias, myocardial infarction, stroke, and threat to life. SCAI Shock Stage: A ASA score 3 Mallampati score 3 Procedure: Right heart catheterization with thermodilution cardiac output and O2 saturation run.  Details of the procedure: Informed consent is obtained. After sterile prep and drape 1% lidocaine is infiltrated over the right IJ region.  Access was obtained into right IJ vein using micropuncture technique and limited ultrasound.  The 7 Hebrew sheath was placed after wire confirmation with  fluoroscopy.  A 7 French balloontipped [place]ril catheter was advanced into the right atrium with balloon inflated.  O2 saturations in pressures were obtained.  Balloon was then advanced into the right ventricle where pressures and O2 saturations obtained.  Catheter was then advanced into the right main pulmonary artery and the balloon was deflated.  Thermodilution cardiac outputs as well as pressures and saturations were obtained.  Balloon was reinflated and placed in the wedge position.  Pressures obtained.  Balloon deflated and pulled out of the body.  Sheath will be hand pulled with manual pressure.  Anesthesia: 26 minutes 34 seconds of moderate sedation was administered was administered using Fentanyl and Versed under my supervision. I monitored the patient for further administration of agents as needed including continuous monitoring of oxygen saturation, heart rate and blood pressure.  The RN administered the medicine under my direct supervision order, and an independent trained observer was present. Antiplatelet agent: Prasugrel Recommended duration of dual antiplatelet therapy: Not applicable Total Sedation time: 26 minutes 34 seconds Sedation Medications and Contrast use:   04/23/2025 1456 MDT fentaNYL citrate (PF) 50 mcg/mL injection 25 mcg   04/23/2025 1456 MDT midazolam (VERSED) injection 1 mg intravenous Given Total Air Kerma (mGy) 8.790; Fluoro Time (min) = 0.9 MD      [employer]  Result Date: 4/22/2025  Narrative:   Normal left ventricular wall thickness.   Left ventricle is moderately dilated.  End-diastolic dimension 6.8 cm.   Biplane ejection fraction is 32%.  Visually looks much closer to 25%.  No apical thrombi.   Severe left ventricular systolic dysfunction.  Severe ischemic cardiomyopathy with RCA and LAD wall motion abnormalities.  See below.   Grade I (mild) left ventricular diastolic abnormality.   Normal left ventricular filling pressure.   Right ventricular systolic function is low  normal.   The left atrium is normal in size.   The right atrium is mildly dilated.   Normal central venous pressure (0-5 mmHg).   No pericardial effusion.   Inadequate TR spectral Doppler to accurately assess right ventricular systolic pressure.   No significant valve disease.   No change from the March 2025 study.     XR chest portable 1 view  Result Date: 4/21/2025  IMPRESSION: No acute disease.       Assessment/Plan   Patient Active Problem List    Diagnosis Date Noted    Palpitations 04/23/2025    Ventricular tachycardia (CMS/HCC) 04/22/2025    Cardiac resynchronization therapy defibrillator (CRT-D) in place 03/22/2025    Gastroesophageal reflux disease without esophagitis 10/19/2023    Benign prostatic hyperplasia without lower urinary tract symptoms 10/19/2023    Diabetes mellitus type 2 in obese (CMS/HCC) 10/19/2023    Moderate obesity 10/19/2023    Elevated troponin 10/19/2023    Lymphocytosis (symptomatic) 10/19/2023    AICD discharge 10/18/2023    Long term current use of antiarrhythmic drug 03/04/2022    LAVON (obstructive sleep apnea) 10/01/2021    Sustained ventricular tachycardia (CMS/HCC) 09/19/2021    Chronic anticoagulation 09/19/2021    Paroxysmal atrial fibrillation (CMS/HCC) 09/19/2021    Chronic systolic HF (heart failure) (CMS/HCC) 07/11/2021    Automatic implantable cardioverter-defibrillator in situ 06/10/2020    Presence of stent in coronary artery 06/10/2020    On amiodarone therapy 06/10/2020    Coronary artery disease involving native coronary artery of native heart without angina pectoris 06/10/2020    Ischemic cardiomyopathy 10/30/2017    Hypertension 10/30/2017    Hyperlipidemia 10/30/2017       Telemetry:      Assessment/Plan:    Recurrent ventricular tachycardia/VT storm:  4/22: Presented to the ER last evening with tachycardic palpitations, chest tightness, shortness of breath and lightheadedness.  Symptoms were consistent with prior admissions and he presented for evaluation.  ICD  interrogation revealed ventricular tachycardia at 112 bpm with multiple ATP therapies.     4/22: Limited echo showed an EF of 32%.  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    He has had multiple VT ablations ([place], [facility], [facility]).    4/22: reloaded with IV amiodarone.  Unfortunately he is also noted side effects on the higher dose of amiodarone with tremor in the right hand which he had previously when he was on amiodarone and has had progressive decrease in his energy with weakness in his arms and legs.  He is no longer walking the 4-6 blocks with his dogs daily.  If he stands for too long he gets wobbly and has to sit down.    4/22: Shock therapy was turned off in the slow VT zone.   4/23--Discharged home  4/23: presents to ER with palpitations and feeling like his device is going off.  Had several episodes of slow VT (110 bpm), with several ATP therapies              Reloaded with IV amio and started on gtt. Admitted to hospitalist service. Other medications resumed.   4/25: [facility] has accepted patient.  Plan is to attempt another VT ablation, they would like amio discontinued so that VT will be able to be induced in the lab.  Plan established by Dr. Leiva.     PAF.              [id number] score: 6              Anticoagulated on Xarelto     Ischemic Cardiomyopathy  CAD S/P Multiple stents           Right heart cath 4/23 showed adequate pressures, please see CHF note for data.  He is on GDMT for CHF with carvedilol 50 mg twice daily, Jardiance 10 mg daily, spironolactone 25 mg daily and Entresto 97/103 mg twice daily.    Plan: Transfer to [facility] today for higher level of care.    Plan discussed in conjunction with Dr. Wu and Dr. Leiva.    Electronically signed by: Jazmin Leo CNP  4/25/2025  8:44 AM

## 2025-04-25 NOTE — INTERDISCIPLINARY/THERAPY
Spiritual care services   04/25/25 2213   Clinical Encounter Type   Referral By: Rounds   Visited With Patient   Visit Type Initial   Jehovah's witness Affilation   Affiliated with [facility] Unable to assess   Spiritual/Emotional Distress   Level Low   Plan of Care   Spiritual Care Plan Initiated Provide spiritual support as needed   Spiritual Assessment   Completed by    Spiritual Interventions   Spiritual/Jehovah's witness Interventions Prayer provided     Offered supportive presence, reflective listening, and prayer

## 2025-04-26 LAB
ANION GAP SERPL CALC-SCNC: 9 MMOL/L (ref 3–11)
BUN SERPL-MCNC: 14 MG/DL (ref 7–25)
CALCIUM SERPL-MCNC: 8.9 MG/DL (ref 8.6–10.3)
CHLORIDE SERPL-SCNC: 104 MMOL/L (ref 98–107)
CO2 SERPL-SCNC: 23 MMOL/L (ref 21–32)
CREAT SERPL-MCNC: 0.91 MG/DL (ref 0.7–1.3)
EGFRCR SERPLBLD CKD-EPI 2021: 98 ML/MIN/1.73M*2
GLUCOSE SERPL-MCNC: 103 MG/DL (ref 70–105)
MAGNESIUM SERPL-MCNC: 2 MG/DL (ref 1.8–2.4)
POTASSIUM SERPL-SCNC: 4 MMOL/L (ref 3.5–5.1)
SODIUM SERPL-SCNC: 136 MMOL/L (ref 135–145)

## 2025-04-26 PROCEDURE — (BLANK) HC ROOM ICU INTERMEDIATE

## 2025-04-26 PROCEDURE — 6370000100 HC RX 637 (ALT 250 FOR IP): Performed by: HOSPITALIST

## 2025-04-26 PROCEDURE — 99233 SBSQ HOSP IP/OBS HIGH 50: CPT | Performed by: INTERNAL MEDICINE

## 2025-04-26 PROCEDURE — 80048 BASIC METABOLIC PNL TOTAL CA: CPT | Performed by: STUDENT IN AN ORGANIZED HEALTH CARE EDUCATION/TRAINING PROGRAM

## 2025-04-26 PROCEDURE — 83735 ASSAY OF MAGNESIUM: CPT | Performed by: STUDENT IN AN ORGANIZED HEALTH CARE EDUCATION/TRAINING PROGRAM

## 2025-04-26 PROCEDURE — 6370000100 HC RX 637 (ALT 250 FOR IP): Performed by: NURSE PRACTITIONER

## 2025-04-26 PROCEDURE — 36415 COLL VENOUS BLD VENIPUNCTURE: CPT | Performed by: STUDENT IN AN ORGANIZED HEALTH CARE EDUCATION/TRAINING PROGRAM

## 2025-04-26 RX ADMIN — PRASUGREL 10 MG: 10 TABLET, FILM COATED ORAL at 09:54

## 2025-04-26 RX ADMIN — RIVAROXABAN 20 MG: 20 TABLET, FILM COATED ORAL at 17:26

## 2025-04-26 RX ADMIN — TAMSULOSIN HYDROCHLORIDE 0.4 MG: 0.4 CAPSULE ORAL at 09:54

## 2025-04-26 RX ADMIN — CARVEDILOL 50 MG: 25 TABLET, FILM COATED ORAL at 17:27

## 2025-04-26 RX ADMIN — EMPAGLIFLOZIN 10 MG: 10 TABLET, FILM COATED ORAL at 09:54

## 2025-04-26 RX ADMIN — MEXILETINE HYDROCHLORIDE 300 MG: 150 CAPSULE ORAL at 17:26

## 2025-04-26 RX ADMIN — CARVEDILOL 50 MG: 25 TABLET, FILM COATED ORAL at 09:54

## 2025-04-26 RX ADMIN — SACUBITRIL AND VALSARTAN 1 TABLET: 97; 103 TABLET, FILM COATED ORAL at 20:19

## 2025-04-26 RX ADMIN — MEXILETINE HYDROCHLORIDE 300 MG: 150 CAPSULE ORAL at 23:15

## 2025-04-26 RX ADMIN — ROSUVASTATIN CALCIUM 40 MG: 20 TABLET, FILM COATED ORAL at 20:19

## 2025-04-26 RX ADMIN — SPIRONOLACTONE 25 MG: 25 TABLET ORAL at 09:54

## 2025-04-26 RX ADMIN — FUROSEMIDE 40 MG: 40 TABLET ORAL at 09:54

## 2025-04-26 RX ADMIN — SACUBITRIL AND VALSARTAN 1 TABLET: 97; 103 TABLET, FILM COATED ORAL at 09:54

## 2025-04-26 RX ADMIN — MEXILETINE HYDROCHLORIDE 300 MG: 150 CAPSULE ORAL at 05:37

## 2025-04-26 ASSESSMENT — CHA2DS2 SCORE
PRIOR STROKE OR TIA OR THROMBOEMBOLISM: YES
SEX: MALE
HYPERTENSION: YES
DIABETES: YES
AGE IN YEARS: <65

## 2025-04-26 ASSESSMENT — CHA2DS2-VASC SCORE
ESTIMATED_RISK_OF_ISCHEMIC_STROKE_IN_%: 9.7
CHA2DS2-VASC SCORE: 6
ESTIMATED_RISK_OF_STROKE_TIA_EMBOLISM_IN_%: 13.6

## 2025-04-26 NOTE — INTERDISCIPLINARY/THERAPY
Pt awaits HLOC transfer to [facility]. Call to [facility] transfer [place] and no beds available. Provided my contact. Update to team    1000 Update from provider team. Pt with [facility] runs overnoc requiring amio IV push. TC to [facility] with this update et they will update their provider with potential provider-provider call. Provided contact for cardiology midlevel Tanika Sun. Update to team.

## 2025-04-26 NOTE — NURSING END OF SHIFT
Nursing End of Shift Summary:     Patient: Pete Trejo  MRN: [id number]  : 1965, Age: 59 y.o.    Location: [address] [place] [address]    Nursing Goals  Clinical Goals for the Shift: monitor vs and tele, promote safety and comfort    Narrative Summary of Progress Toward Clinical Goals:  stable    Barriers to Goals/Nursing Concerns:  No    New Patient or Family Concerns/Issues:  Yes - available bed in [place] for heart transplant evaluation and transportation there.    Shift Summary:   Pt remains stable today.    A-V paced at 70bpm, denies CP or other discomforts.    Significant Events & Communications to Providers (Last 12 Hours)       Last 5 Values    No documentation.                 Oxygen Usage (Last 12 Hours)       Last 5 Values    No documentation.                 Mobility (Last 12 Hours)       Last 5 Values       Row Name 25 0435 25 0749 25 1000 25 1200          Mobility    Activity -- -- Turn;Ambulate in room;Bathroom privileges;Dangle;Sleeping;Stand at bedside;Up ad ezekiel --     Level of Assistance -- -- Independent --     Length of Time in Chair (min) -- -- 0 --     Distance Ambulated (feet) -- -- 15 Feet --     Distance Ambulated (Meters Calculated) -- -- 4.57 Meters --     Patient Position In bed In bed -- In bed     Turning/Repositioning -- -- Turns self --     Distance Ambulated (Meters Calculated)(Do Not Use) -- -- 4.57 Feet --                   Urethral Catheter       Active Urethral Catheter       None                  Active Lines       Active Central venous catheter / Peripherally inserted central catheter / Implantable Port / Hemodialysis catheter / Midline Catheter       None                  Infusing Medications   Medication Dose Last Rate     PRN Medications   Medication Dose Last Admin    nitroglycerin  0.4 mg      bisacodyL  10 mg      melatonin  4.5 mg      sodium chloride  3 mL      traZODone  25 mg      acetaminophen  650 mg      fentaNYL citrate (PF)  12.5 mcg      fentaNYL  citrate (PF)  25 mcg      ondansetron  4 mg      Or    ondansetron  4 mg      alum-mag hydroxide-simeth  30 mL      calcium carbonate  500 mg      dextrose 50 % in water (D50W)  15-30 mL      dextrose  15.2 g      glucagon HCL  1 mg      Or    glucagon HCL  1 mg

## 2025-04-26 NOTE — CROSS COVER NOTE
CARDIOLOGY CROSS COVER NOTE    Subjective:  Patient is seen and examined.  Approximately 7:15 PM the nursing staff notified us that the patient was going in and out of VT on telemetry with a heart rate running 107-110s. The patient was seen at bedside he was lying in the bed and looked unwell.  Patient stated that he can feel his heart beating fast and feeling nauseated at the same. Mr. Trejo also expresses to me that during  fast heart beat episodes he would feel a pressure in his chest.  Patient was reassured and the team ordered an EKG which showed wide-complex V. Tach. The team paged Dr. Wu who then recommend giving patient  amiodarone 150 mg bolus. Following amiodarone bolus, patient did convert back to his normal paced rhythm with heart rate of 70 bpm.     Objective:  General: not in any acute distress, he appears unwell  HEENT: Normocephalic atraumatic  Neck: Normal JVP, no carotid bruit  Chest: Clear to auscultation, no adventitious breath sounds  CVS:  tachycardia, no murmurs rubs or gallops  Abdomen: Soft, nontender nondistended, normoactive bowel sounds present  Extremities: No edema], no cyanosis or clubbing  Vascular: 2+ radial pulsation bilaterally    Assessment and plan:  Wide-complex ventricular tachycardia  Patient was noticed to be in and out of VT  He felt pressure in his chest and nauseated  We obtain an EKG which showed ventricular tachycardia with heart rate of 108  Given amiodarone 150 bolus once  Converted back to paced rhythm with a heart rate of 70 bpm  He continued to be symptom-free  Will obtain BMP and mag in the morning  We will continue to watch and monitor telemetry        Electronically signed by: Ana Fraser PA-C  4/26/2025  12:10 AM    A voice recognition program was used to aid in documentation of this record. Sometimes words are not printed exactly as they were spoken. While efforts were made to carefully edit and correct any inaccuracies, some errors may be present; please  take these into context. Please contact the provider if errors are identified.

## 2025-04-26 NOTE — CROSS COVER NOTE
Spoke to Dr. Paniagua cardiologist on-call at [facility] in regards to Pete's slow ventricular arrhythmia last night.  Dr. Paniagua called Pete Mccormick's electrophysiologist at [facility] and they would like to avoid IV or oral amiodarone until he can get to [place] for his EP study with possible ablation.  May use IV bolus of amiodarone if patient has slow ventricular arrhythmia again but no drip.  Will await bed at [facility] for transfer.

## 2025-04-26 NOTE — PROGRESS NOTES
"Subjective    LOS: 3 days      Patient is no longer having any chest pain. He denies any shortness of breath, palpitations, nausea or vomiting. Overnight he went into slow VT, which resolved after Amio push x1. Otherwise no events reported.     Objective   /60 (BP Location: Left arm, Patient Position: In bed, Cuff Size: Long Adult)   Pulse 70   Temp 36.8 °C (98.2 °F) (Oral)   Resp 20   Ht 1.803 m (5' 11\")   Wt 118.8 kg (262 lb)   SpO2 94%   BMI 36.54 kg/m²     Intake/Output Summary (Last 24 hours) at 4/26/2025 1231  Last data filed at 4/26/2025 0514  Gross per 24 hour   Intake 910 ml   Output 475 ml   Net 435 ml      Physical Exam  General: Alert, no acute distress  Heart: Rate and rhythm are regular  Lungs: Clear to auscultation bilaterally  Abdomen: Soft, nondistended, nontender, normal bowel sounds  Extremities: No edema  Neuro: Alert, oriented x 3, nonfocal    Other           Data Used For Medical Decision Making      Case d/w cardiology, plan for transfer to [facility]  Labs reviewed  Telemetry reviewed, slow VT overnight.           Assessment/Plan   - # Recurrent ventricular tachycardia   - Rate and rhythm controlled today. Required 1 dose of Amiodarone IV overnighrt. Per [facility] EP, try and avoid amio gtt as much as possible.  - appreciate cardiology input  - await further EP input  - Stable for transfer once bed is available.      # hypertension  - 24hr BP range: () / (58-84) (4/24/25)  - continue home: furosemide 40 mg po daily  - continue home: sacubitril-valsartan 97 mg-103 mg 1 tabs po BID  - continue home: spironolactone 25 mg po daily  - currently on: carvedilol 50 mg po BID     # atrial fibrillation  - [id number] score: 3 (4/24/25), moderate-high risk of stroke (3.2% per year)  - heart rate range:  (since 4/23/25 11:53)  - continue home: mexiletine 300 mg po TID  - continue home: rivaroxaban 20 mg po daily  - currently on: amiodarone 0.5 mg/min iv continuous  - currently on: " carvedilol 50 mg po BID  - cardiac telemetry     - # chronic systolic heart failure  - weight: 123.1 kg unchanged from 123.1 kg (4/23/25), est. baseline 118 kg  - LV ejection fraction: 32 % (4/22/25)  - continue home: empagliflozin 10 mg po daily  - continue home: furosemide 40 mg po daily  - continue home: sacubitril-valsartan 97 mg-103 mg 1 tabs po BID  - continue home: spironolactone 25 mg po daily  - currently on: carvedilol 50 mg po BID  - No signs of volume overload on exam.     # diabetes mellitus  - hemoglobin A1c: 6.9 % (3/21/25) up from 6.2 % (9/22/23)  - continue home: empagliflozin 10 mg po daily  - glucose range: 111-125 (since 4/23/25)     - # Coronary artery disease  - continue home: rosuvastatin 40 mg po daily  - currently on: carvedilol 50 mg po BID  - currently on: prasugrel 10 mg po daily     # Hyperlipidemia: with clinical ASCVD  - continue home: rosuvastatin 40 mg po daily     # Obesity, class II: severe  - BMI: 37.9 (4/23/25)  - complicated by atrial fibrillation, coronary artery disease, diabetes mellitus, heart failure, hyperlipidemia, hypertension, and Obstructive sleep apnea  -  # Tobacco use disorder    DVT Prophylaxis: Currently on therapeutic anticoagulation   Code Status: Full Code   Decision Making Capacity: Yes    Medically Ready for Discharge:Ready Now  Anticipated Disposition : Higher level of care to [facility]

## 2025-04-26 NOTE — PROGRESS NOTES
Electrophysiology Inpatient Progress Note    Pete Trejo is a 59 y.o. male whom we are seeing today for the further evaluation of ventricular tachycardia.    HPI: Patient was seen and examined today.  Chart reviewed and plans discussed with nurse.  Patient is resting comfortably in bed with son at bedside.  Patient on no complaints during his exam this morning.  Last night around 7:03 PM patient had an episode of ventricular tachycardia below his ATP rate.  Patient notified nursing staff that he was in ventricular tachycardia as he could feel it.  Patient gets chest pressure and shortness of breath.  Episode lasted roughly to 8:24 PM when he did receive ATP and converted back into normal sinus rhythm.  Dr. Wu was called and patient was given 150 mg bolus of amiodarone around 8:13 PM.  Patient has been accepted to [facility] for transfer but awaiting bed.    Chief Complaint:   Chief Complaint   Patient presents with    Palpitations     Pt reports to the ED after recently being DC with palpitations. Pt reports the palpitations have returned in the last hour.        Current Medications:  Current Facility-Administered Medications   Medication Dose Route Frequency Provider Last Rate Last Admin    mexiletine (MEXITIL) capsule 300 mg  300 mg oral q8h [facility] Jazmin Leo CNP   300 mg at 04/26/25 0537    carvediloL (COREG) tablet 50 mg  50 mg oral 2x daily with meals Jasmin Mcknight MD   50 mg at 04/25/25 1722    empagliflozin (JARDIANCE) tablet 10 mg  10 mg oral Daily Jasmin Mcknight MD   10 mg at 04/25/25 0843    furosemide (LASIX) tablet 40 mg  40 mg oral Daily Jasmin Mcknight MD   40 mg at 04/25/25 0843    nitroglycerin (NITROSTAT) SL tablet 0.4 mg  0.4 mg sublingual q5 min PRN Jasmin Mcknight MD        prasugreL HCl (EFFIENT) tablet 10 mg  10 mg oral Daily Jasmin Mcknight MD   10 mg at 04/25/25 0843    sacubitriL-valsartan (ENTRESTO)  mg 1  tablet  1 tablet oral 2x daily Jasmin Mcknight MD   1 tablet at 04/25/25 1944    spironolactone (ALDACTONE) tablet 25 mg  25 mg oral Daily Jasmin Mcknight MD   25 mg at 04/25/25 0843    bisacodyL (DULCOLAX) suppository 10 mg  10 mg rectal Daily PRN Jasmin Mcknight MD        rivaroxaban (XARELTO) tablet 20 mg  20 mg oral Daily with dinner Jasmin Mcknight MD   20 mg at 04/25/25 1722    melatonin tablet 4.5 mg  4.5 mg oral Nightly PRN Jasmin Mcknight MD        rosuvastatin (CRESTOR) tablet 40 mg  40 mg oral Nightly Jasmin Mcknight MD   40 mg at 04/25/25 1944    tamsulosin (FLOMAX) 24 hr capsule 0.4 mg  0.4 mg oral Daily Jasmin Mcknight MD   0.4 mg at 04/25/25 0842    sodium chloride flush 3 mL  3 mL intravenous PRN Jasmin Mcknight MD        traZODone (DESYREL) tablet 25 mg  25 mg oral Nightly PRN Jasmin Mcknight MD        acetaminophen (TYLENOL) tablet 650 mg  650 mg oral q6h PRN Jasmin Mcknight MD        fentaNYL citrate (PF) 50 mcg/mL injection 12.5 mcg  12.5 mcg intravenous q2h PRN Jasmin Mcknight MD        fentaNYL citrate (PF) 50 mcg/mL injection 25 mcg  25 mcg intravenous q2h PRN Jasmin Mcknight MD        ondansetron (ZOFRAN) tablet 4 mg  4 mg oral q6h PRN Jasmin Mcknight MD        Or    ondansetron (ZOFRAN) injection 4 mg  4 mg intravenous q6h PRN Jasmin Mcknight MD        alum-mag hydroxide-simeth (MAALOX ADVANCED) suspension 30 mL  30 mL oral 3x daily PRN Jasmin Mcknight MD        calcium carbonate (TUMS) chewable tablet 500 mg  500 mg oral 3x daily PRN Jasmin Mcknight MD        dextrose 50 % in water (D50W) syringe 15-30 mL  15-30 mL intravenous q15 min PRN Jasmin Mcknight MD        dextrose (GLUTOSE) 40 % gel 15.2 g  15.2 g oral q15 min PRN Jasmin Mcknight MD        glucagon HCL injection 1 mg  1 mg intramuscular q15 min PRN Jasmin Mcknight MD        Or    glucagon HCL injection 1 mg  1 mg subcutaneous q15 min PRN Jasmin Mcknight MD              Objective     Vital signs in last 24 hours:    Temp:  [36.7 °C (98 °F)-36.9 °C (98.5 °F)] 36.9 °C (98.4 °F)  Heart Rate:  [70-98] 70  Resp:  [16-24] 20  SpO2:  [90 %-95 %] 92 %  BP: ()/(62-81) 121/73  SpO2/FiO2 Ratio Using Approximate FiO2 (%):  [460-475] 460  Estimated P/F Ratio Using Approximate FiO2 (%):  [390.9-403] 390.9  Weight: 118.8 kg (262 lb)    Telemetry:                Physical Exam  Constitutional:       Appearance: He is well-developed.   HENT:      Head: Normocephalic.   Cardiovascular:      Rate and Rhythm: Normal rate and regular rhythm.      Heart sounds: Normal heart sounds.   Pulmonary:      Effort: Pulmonary effort is normal.      Breath sounds: Normal breath sounds.   Abdominal:      General: Bowel sounds are normal.      Palpations: Abdomen is soft.   Musculoskeletal:         General: Normal range of motion.      Cervical back: Normal range of motion.   Skin:     General: Skin is warm and dry.   Neurological:      Mental Status: He is alert and oriented to person, place, and time.   Psychiatric:         Behavior: Behavior normal.       Lab Results   Component Value Date    WBC 5.1 04/24/2025    HGB 15.4 04/24/2025    HCT 45.2 04/24/2025     04/24/2025    CHOL 116 08/06/2024    TRIG 164 (H) 08/06/2024    HDL 31 (L) 08/06/2024    ALT 37 04/21/2025    AST 19 04/21/2025     04/26/2025    K 4.0 04/26/2025     04/26/2025    CREATININE 0.91 04/26/2025    BUN 14 04/26/2025    CO2 23 04/26/2025    TSH 4.414 04/21/2025    PSA 0.29 09/20/2018    INR 1.1 04/23/2025    HGBA1C 6.9 (H) 03/21/2025    MICROALBUR <7.0 09/22/2023     CHADS2 Score: 6 (4/26/2025 10:16 AM)  Risk of ischemic stroke %: 9.7 % (4/26/2025 10:16 AM)  Risk of stroke/TIA/systemic embolism %: 13.6 % (4/26/2025 10:16 AM)        Diagnostic data/procedures:    Cardiac catheterization/OXIMETRY RUN: 4/23/2025  DATE OF PROCEDURE: 4/23/2025  PREOPERATIVE DIAGNOSIS:  Pre-op Diagnosis    * Chronic systolic HF (heart failure) (CMS/HCC) [I50.22]    * Ischemic  cardiomyopathy [I25.5] Final Impression: 1.  Right atrial mean pressure: 5 mmHg.  O2 saturation 65.0%. 2.  Right ventricular pressure: 27/-1/2 mmHg.  O2 saturation 65.6%. 3.  Right main pulmonary artery pressure: 28/13/16 mmHg.  O2 saturation 65.4%. 4.  Pulmonary capillary wedge pressure: 8 mmHg. 5.  Cardiac output thermodilution technique 4.56 L/min.  Cardiac index 1.9 L/min/m². 6.  Cardiac output Ebcki technique: 5.7 L/min.  Cardiac index 2.3 L/min/m². Rationale, risks, benefits and alternatives of cardiac catheterization and percutaneous coronary intervention with or without stents is explained to the patient including, but not limited to, possible risks of infection, bleeding requiring blood transfusion, damage to the blood vessel requiring repair vascular surgery, allergic reactions to the x-ray dye, acute kidney injury including need for possible hemodialysis, significant cardiac arrhythmias, myocardial infarction, stroke, and threat to life. SCAI Shock Stage: A ASA score 3 Mallampati score 3 Procedure: Right heart catheterization with thermodilution cardiac output and O2 saturation run.  Details of the procedure: Informed consent is obtained. After sterile prep and drape 1% lidocaine is infiltrated over the right IJ region.  Access was obtained into right IJ vein using micropuncture technique and limited ultrasound.  The 7 [place] sheath was placed after wire confirmation with fluoroscopy.  A 7 [place] balloontipped [place] catheter was advanced into the right atrium with balloon inflated.  O2 saturations in pressures were obtained.  Balloon was then advanced into the right ventricle where pressures and O2 saturations obtained.  Catheter was then advanced into the right main pulmonary artery and the balloon was deflated.  Thermodilution cardiac outputs as well as pressures and saturations were obtained.  Balloon was reinflated and placed in the wedge position.  Pressures obtained.  Balloon deflated and pulled  out of the body.  Sheath will be hand pulled with manual pressure.  Anesthesia: 26 minutes 34 seconds of moderate sedation was administered was administered using Fentanyl and Versed under my supervision. I monitored the patient for further administration of agents as needed including continuous monitoring of oxygen saturation, heart rate and blood pressure.  The RN administered the medicine under my direct supervision order, and an independent trained observer was present. Antiplatelet agent: Prasugrel Recommended duration of dual antiplatelet therapy: Not applicable Total Sedation time: 26 minutes 34 seconds Sedation Medications and Contrast use:   2025 1456 MDT fentaNYL citrate (PF) 50 mcg/mL injection 25 mcg   2025 1456 MDT midazolam (VERSED) injection 1 mg intravenous Given Total Air Kerma (mGy) 8.790; Fluoro Time (min) = 0.9 MD       Echo ltd: 2025    Normal left ventricular wall thickness.   Left ventricle is moderately dilated.  End-diastolic dimension 6.8 cm.   Biplane ejection fraction is 32%.  Visually looks much closer to 25%.  No apical thrombi.   Severe left ventricular systolic dysfunction.  Severe ischemic cardiomyopathy with RCA and LAD wall motion abnormalities.  See below.   Grade I (mild) left ventricular diastolic abnormality.   Normal left ventricular filling pressure.   Right ventricular systolic function is low normal.   The left atrium is normal in size.   The right atrium is mildly dilated.   Normal central venous pressure (0-5 mmHg).   No pericardial effusion.   Inadequate TR spectral Doppler to accurately assess right ventricular systolic pressure.   No significant valve disease.   No change from the 2025 study.     XR chest portable 1 view: 2025  IMPRESSION: No acute disease.     [id number]        Assessment/Plan      Ventricular tachycardia  Ischemic cardiomyopathy  Coronary artery disease  : Presented to the ER last evening with tachycardic  palpitations, chest tightness, shortness of breath and lightheadedness.  Symptoms were consistent with prior admissions and he presented for evaluation.  ICD interrogation revealed ventricular tachycardia at 112 bpm with multiple ATP therapies.     4/22: Limited echo showed an EF of 32%.  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    He has had multiple VT ablations ([place], [facility], [facility]).    4/22: reloaded with IV amiodarone.  Unfortunately he is also noted side effects on the higher dose of amiodarone with tremor in the right hand which he had previously when he was on amiodarone and has had progressive decrease in his energy with weakness in his arms and legs.  He is no longer walking the 4-6 blocks with his dogs daily.  If he stands for too long he gets wobbly and has to sit down.    4/22: Shock therapy was turned off in the slow VT zone.   4/23--Discharged home  4/23: presents to ER with palpitations and feeling like his device is going off.  Had several episodes of slow VT (110 bpm), with several ATP therapies              Reloaded with IV amio and started on gtt. Admitted to hospitalist service. Other medications resumed.   4/25: [facility] has accepted patient.  Plan is to attempt another VT ablation, they would like amio discontinued so that VT will be able to be induced in the lab.  Plan established by Dr. Leiva.   - Evening of 4/25 patient had another episode of slow VT lasting roughly an hour and 23 minutes, 150 mg IV bolus of amiodarone was given and with ATP from his AICD he converted back into normal rhythm.  4:26: No further episodes of ventricular tachycardia since 8 PM last night, will continue with current plan.  I did call case management again this morning and to let [facility] know patient had recurrent slow VT last night.    -EF 32%  - Most recent coronary angiogram on  3/24/2025:  -Proximal LAD stent widely patent.  The remainder of the LAD without significant disease so it is a fairly small caliber vessel.  -80% ostial first diagonal stenosis.  Diffuse 60% proximal disease.  The vessel then bifurcates into a smaller inferior branch that is subtotally occluded within previously placed stent.  Superior diagonal branch with 40% ostial stenosis.  -PTCA of the inferior subtotal occluded diagonal branch with 2.5 mm balloon.  Stenting of the ostial and proximal first diagonal branch up to the bifurcation with 2.5 x 8 mm DIMAS postdilatation 2.5 mm noncompliant balloon to 20 he.  Good flow in both branches    Paroxysmal atrial fibrillation  - [id number] of at least 6  - Anticoagulated with Xarelto    Electronically signed by: Tanika Sun CNP     Plan made in conjunction with Dr. Wu.

## 2025-04-26 NOTE — PLAN OF CARE
Problem: Neurosensory - Adult  Goal: Achieves stable or improved neurological status  Outcome: Progressing  Flowsheets (Taken 4/25/2025 0735 by Nurse Kristen COLEMAN RN)  Achieves stable or improved neurological status:   Maintain blood pressure and fluid volume within ordered parameters to optimize cerebral perfusion and minimize risk of hemorrhage   Initiate measures to prevent increased intracranial pressure. For example: decrease stimuli, maintain neutral head alignment, prevent shivering, etc.   Assess for and report changes in neurological status   Monitor temperature, glucose, and sodium.  Goal: Achieves maximal functionality and self care  Outcome: Progressing  Flowsheets (Taken 4/25/2025 0735 by Nurse Kristen COLEMAN RN)  Achieves maximal functionality and self care:   With patient fatigue and changes in neurological status, monitor swallowing and airway patency   Encourage and assist patient to increase activity and self care with guidance from PT/OT  Goal: Will maintain current level or return to usual level of neurologic status, motor/sensory function and hemodynamic stability after a hematologic cerebral event such as stroke or TIA.  Outcome: Progressing  Flowsheets (Taken 4/25/2025 0735 by Nurse Kristen COLEMAN RN)  Will maintain current level or return to usual level of neurologic status, motor/sensory function and hemodynamic stability after a hematologic cerebral event such as stroke or TIA: Encourage and assist patient to increase activity and self-care with guidance from PT/OT/ST/RT     Problem: Cardiovascular - Adult  Goal: Maintains optimal cardiac output and hemodynamic stability  Outcome: Progressing  Flowsheets (Taken 4/25/2025 0735 by Nurse Kristen COLEMAN RN)  Maintain optimal cardiac output and hemodynamic stability:   Monitor for hypotension and other signs of decreased cardiac output   Monitor heart rate, blood pressure, and cardiac rhythm   Monitor for fluid overload/dehydration, weight gain,  shortness of breath and activity intolerance   Monitor arterial and/or venous puncture sites for bleeding and/or hematoma   Assess quality of pulses, capillary refill, edema, sensation, skin color and temperature   Assess for signs of decreased coronary artery perfusion - ex. angina  Goal: Absence of cardiac dysrhythmias or at baseline  Outcome: Progressing  Flowsheets (Taken 4/25/2025 0735 by Nurse Kristen COLEMAN RN)  Absence of cardiac dysrhythmias or at baseline:   Continuous cardiac monitoring, monitor vital signs (see flowsheet documentation)   Obtain 12 lead EKG if indicated   Administer antiarrhythmic and heart rate control medications as ordered   Initiate emergency measures for life threatening arrhythmias   Monitor electrolytes and administer replacement therapy as ordered  Goal: Cardiovascular Editable Patient Goal  Outcome: Completed     Problem: Respiratory - Adult  Goal: Achieves optimal ventilation and oxygenation  Outcome: Progressing  Flowsheets (Taken 4/24/2025 1600 by Nurse Odilia SANTIZO RN)  Achieves optimal ventilation and oxygenation:   Assess and report changes in respiratory status   Assess and report changes in mentation and behavior   Position to facilitate oxygenation and minimize respiratory effort   Oxygen supplementation based on oxygen saturation or arterial blood gases  Goal: Achieves optimal ventilation and oxygenation with noninvasive CPAP/BiLEVEL support  Outcome: Progressing  Flowsheets (Taken 4/24/2025 0040 by Nurse Ann MCMANUS RN)  Achieves Optimal Oxygenation with Noninvasive CPAP/BiLEVEL Support:   Position patient to facilitate optimal ventilation/oxygenation status and minimize respiratory effort   Provide education to patient/support person about rationale and expected outcomes associated with therapy   Assess effectiveness of therapy on ventilation/oxygenation status based on oxygen saturation and/or arterial blood gases, as indicated   Position patient to reduce aspiration risk,  elevate head of bed at least 35 degrees or higher, as applicable   Assess patient and report changes in respiratory and physiological status   Assess and monitor skin condition, in relationship to the respiratory interface   Routinely monitor equipment for proper performance and settings   Assess patient and report changes in mentation and behavior   Assure equipment alarm volume is adequate for the patient's environment   Immediately repsond to equipment alarm, to assess patient and/or cause for alarm  Goal: Respiratory Editable Patient Goal  Outcome: Completed     Problem: Gastrointestinal - Adult  Goal: Nutrient intake appropriate for improving, restoring or maintaining nutritional needs  Outcome: Progressing  Flowsheets (Taken 4/24/2025 0040 by Nurse Ann MCMANUS, RN)  Nutrient intake appropriate for improving, restoring or maintaining nutritional needs:   Assist with meals as needed   Monitor percentage of each meal consumed   Obtain nutritional consult as indicated   Provide patient/support person specific nutrition education as appropriate   Identify factors contributing to decreased intake, treat as appropriate   Monitor I&O, weight and lab values   Administer alternative nutrition interventions as ordered  Goal: Minimal or absence of nausea and vomiting  Outcome: Progressing  Flowsheets (Taken 4/24/2025 0040 by Nurse Ann MCMANUS, RN)  Minimal or absence of nausea and vomiting:   Assess patient for nausea/vomiting and report changes   Nutrition consult as indicated   Maintain NPO status as ordered   Administer ordered antiemetic medications as needed   Monitor intake and output to ensure adequate hydration   Advance diet as ordered   Provide nonpharmacologic comfort measures as appropriate   Nasogastric tube to suction as ordered  Goal: Maintains or returns to baseline digestive function  Outcome: Progressing  Flowsheets (Taken 4/24/2025 0040 by Nurse Ann MCMANUS, RN)  Maintains or returns to baseline bowel function:    Assess abdomen and bowel status and report changes in gastrointestinal function   Monitor for metabolic panel imbalances   Nutrition consult as indicated   Administer ordered medications as needed   Assess hydration and nutritional status   Assess for treatment effectiveness   Encourage mobilization and activity   Assess characteristics and frequency of stool  Goal: Establish and maintain optimal ostomy function  Outcome: Completed  Flowsheets (Taken 4/24/2025 0040 by Nurse Ann MCMANUS, RN)  Establish and maintain optimal ostomy function:   Assess and report changes to ostomy stoma and surrounding skin   Assess and report changes bowel function   Provide ostomy care   Nutrition consult as indicated   Empty/Change ostomy pouch as needed   Encourage mobilization and activity   Consult Wound Care/Ostomy Nurse as indicated  Goal: Maintains Optimal Tissue Perfusion  Outcome: Progressing  Flowsheets (Taken 4/24/2025 0040 by Nurse Ann MCMANUS, RN)  Maintains Optimal Tissue Perfusion:   Monitor nutritional intake, intake/output, and weight   Monitor for increased abdominal girth, firmness or intra-abdominal pressure   Assess skin color, capillary refill and edema   Assess blood pressure, heart rate, and oxygen saturation   Monitor metabolic panels, blood count panels, and coagulations panels as ordered   Assess for blood in emesis and stool   Assess and report changes in bowel function  Goal: Gastrointestinal Editable Patient Goal  Outcome: Completed     Problem: Genitourinary - Adult  Goal: Absence of urinary retention  Outcome: Progressing  Flowsheets (Taken 4/24/2025 0040 by Nurse Ann MCMANUS, RN)  Absence of urinary retention:   Administer medications to alleviate retention as needed   Assess patient’s ability to void and empty bladder   Monitor intake/output and perform bladder scan if indicated   If urinary catheter present, initiate and maintain CAUTI bundle  Goal: Urinary catheter remains patent  Outcome: Completed  Goal:  Absence of Urinary Infection  Outcome: Progressing  Flowsheets (Taken 4/24/2025 0040 by Nurse Ann MCMANUS, RN)  Absence of Urinary Infection:   Assess urinary status for burning, urgency, frequency, pain and urine characteristics   Obtain urinalysis as ordered   Monitor intake/output   Assess for neurological status changes   Monitor lab values  Goal: Genitourinary Editable Patient Goal  Outcome: Completed     Problem: Metabolic/Fluid and Electrolytes - Adult  Goal: Electrolytes maintained within normal limits  Outcome: Progressing  Flowsheets (Taken 4/25/2025 0735 by Nurse Kristen COLEMAN RN)  Electrolytes maintained within normal limits:   Monitor labs and assess patient for signs and symptoms of electrolyte imbalances   Administer and monitor response to electrolyte replacements   Initiate and instruct patient/support person on fluid and nutrition restrictions as ordered  Goal: Maintain Optimal Renal Function and Hemodynamic Stability  Outcome: Progressing  Flowsheets (Taken 4/24/2025 0040 by Nurse Ann MCMANUS, RN)  Maintain optimal renal function and hemodynaimc stability:   Monitor labs and assess for signs and symptoms of volume excess or deficit   Monitor intake, output and patient weight   Monitor response to interventions for patient's volume status, including labs, urine output, blood pressure (other measures as available)   Encourage oral intake as appropriate   Instruct patient on fluid and nutrition restrictions as appropriate  Goal: Glucose maintained within prescribed range  Outcome: Progressing  Flowsheets (Taken 4/24/2025 0040 by Nurse Ann MCMANUS, RN)  Glucose maintained within prescribed range:   Assess for signs and symptoms of hyperglycemia and hypoglycemia   Monitor blood glucose as ordered   Administer ordered medications to maintain glucose within target range   Assess barriers to adequate nutritional intake and initiate nutrition consult as needed   Assess baseline knowledge and provide education as  indicated   Monitor exercise as may reduce the requirements for insulin     Problem: Skin/Tissue Integrity - Adult  Goal: Skin integrity remains intact  Outcome: Progressing  Flowsheets (Taken 4/24/2025 0040 by Nurse Ann MCMANUS RN)  Skin integrity remains intact:   Monitor for areas of redness and/or skin breakdown   Assess and document risk factors for pressure ulcer development   Initiate pressure ulcer prevention measures as indicated   Assess and document skin integrity  Goal: Incisions, wounds, or drain sites healing without S/S of infection  Outcome: Completed  Goal: Oral mucous membranes remain intact  Outcome: Completed  Goal: Skin/Tissue Integrity Editable Patient Goal  Outcome: Completed     Problem: Hematologic - Adult  Goal: Maintains hematologic stability  Outcome: Progressing  Flowsheets (Taken 4/25/2025 0735 by Nurse Kristen COLEMAN RN)  Maintains hematologic stability:   Assess for signs and symptoms of bleeding or hemorrhage   Initiate bleeding precautions as indicated   Administer medications as ordered   Monitor labs   Administer supportive blood products/factors as ordered   Educate patient/support person to report signs/symptoms of bleeding     Problem: Musculoskeletal - Adult  Goal: Return mobility to safest level of function  Outcome: Completed  Goal: Maintain proper alignment of affected body part  Outcome: Completed  Goal: Musculoskeletal Editable Patient Goal  Outcome: Completed     Problem: Neurosensory - Adult  Goal: Achieves stable or improved neurological status  Outcome: Progressing  Flowsheets (Taken 4/25/2025 0735 by Nurse Kristen COLEMAN RN)  Achieves stable or improved neurological status:   Maintain blood pressure and fluid volume within ordered parameters to optimize cerebral perfusion and minimize risk of hemorrhage   Initiate measures to prevent increased intracranial pressure. For example: decrease stimuli, maintain neutral head alignment, prevent shivering, etc.   Assess for and report  changes in neurological status   Monitor temperature, glucose, and sodium.  Goal: Achieves maximal functionality and self care  Outcome: Progressing  Flowsheets (Taken 4/25/2025 0735 by Nurse Kristen COLEMAN RN)  Achieves maximal functionality and self care:   With patient fatigue and changes in neurological status, monitor swallowing and airway patency   Encourage and assist patient to increase activity and self care with guidance from PT/OT  Goal: Will maintain current level or return to usual level of neurologic status, motor/sensory function and hemodynamic stability after a hematologic cerebral event such as stroke or TIA.  Outcome: Progressing  Flowsheets (Taken 4/25/2025 0735 by Nurse Kristen COLEMAN RN)  Will maintain current level or return to usual level of neurologic status, motor/sensory function and hemodynamic stability after a hematologic cerebral event such as stroke or TIA: Encourage and assist patient to increase activity and self-care with guidance from PT/OT/ST/RT     Problem: Cardiovascular - Adult  Goal: Maintains optimal cardiac output and hemodynamic stability  Outcome: Progressing  Flowsheets (Taken 4/25/2025 0735 by Nurse Kristen COLEMAN RN)  Maintain optimal cardiac output and hemodynamic stability:   Monitor for hypotension and other signs of decreased cardiac output   Monitor heart rate, blood pressure, and cardiac rhythm   Monitor for fluid overload/dehydration, weight gain, shortness of breath and activity intolerance   Monitor arterial and/or venous puncture sites for bleeding and/or hematoma   Assess quality of pulses, capillary refill, edema, sensation, skin color and temperature   Assess for signs of decreased coronary artery perfusion - ex. angina  Goal: Absence of cardiac dysrhythmias or at baseline  Outcome: Progressing  Flowsheets (Taken 4/25/2025 0735 by Nurse Kristen COLEMAN RN)  Absence of cardiac dysrhythmias or at baseline:   Continuous cardiac monitoring, monitor vital signs (see  flowsheet documentation)   Obtain 12 lead EKG if indicated   Administer antiarrhythmic and heart rate control medications as ordered   Initiate emergency measures for life threatening arrhythmias   Monitor electrolytes and administer replacement therapy as ordered     Problem: Respiratory - Adult  Goal: Achieves optimal ventilation and oxygenation  Outcome: Progressing  Flowsheets (Taken 4/24/2025 1600 by Nurse Odilia SANTIZO RN)  Achieves optimal ventilation and oxygenation:   Assess and report changes in respiratory status   Assess and report changes in mentation and behavior   Position to facilitate oxygenation and minimize respiratory effort   Oxygen supplementation based on oxygen saturation or arterial blood gases  Goal: Achieves optimal ventilation and oxygenation with noninvasive CPAP/BiLEVEL support  Outcome: Progressing  Flowsheets (Taken 4/24/2025 0040 by Nurse Ann MCMANUS RN)  Achieves Optimal Oxygenation with Noninvasive CPAP/BiLEVEL Support:   Position patient to facilitate optimal ventilation/oxygenation status and minimize respiratory effort   Provide education to patient/support person about rationale and expected outcomes associated with therapy   Assess effectiveness of therapy on ventilation/oxygenation status based on oxygen saturation and/or arterial blood gases, as indicated   Position patient to reduce aspiration risk, elevate head of bed at least 35 degrees or higher, as applicable   Assess patient and report changes in respiratory and physiological status   Assess and monitor skin condition, in relationship to the respiratory interface   Routinely monitor equipment for proper performance and settings   Assess patient and report changes in mentation and behavior   Assure equipment alarm volume is adequate for the patient's environment   Immediately repsond to equipment alarm, to assess patient and/or cause for alarm     Problem: Gastrointestinal - Adult  Goal: Nutrient intake appropriate for  improving, restoring or maintaining nutritional needs  Outcome: Progressing  Flowsheets (Taken 4/24/2025 0040 by Nurse Ann MCMANUS, RN)  Nutrient intake appropriate for improving, restoring or maintaining nutritional needs:   Assist with meals as needed   Monitor percentage of each meal consumed   Obtain nutritional consult as indicated   Provide patient/support person specific nutrition education as appropriate   Identify factors contributing to decreased intake, treat as appropriate   Monitor I&O, weight and lab values   Administer alternative nutrition interventions as ordered  Goal: Minimal or absence of nausea and vomiting  Outcome: Progressing  Flowsheets (Taken 4/24/2025 0040 by Nurse Ann MCMANUS, RN)  Minimal or absence of nausea and vomiting:   Assess patient for nausea/vomiting and report changes   Nutrition consult as indicated   Maintain NPO status as ordered   Administer ordered antiemetic medications as needed   Monitor intake and output to ensure adequate hydration   Advance diet as ordered   Provide nonpharmacologic comfort measures as appropriate   Nasogastric tube to suction as ordered  Goal: Maintains or returns to baseline digestive function  Outcome: Progressing  Flowsheets (Taken 4/24/2025 0040 by Nurse Ann MCMANUS, RN)  Maintains or returns to baseline bowel function:   Assess abdomen and bowel status and report changes in gastrointestinal function   Monitor for metabolic panel imbalances   Nutrition consult as indicated   Administer ordered medications as needed   Assess hydration and nutritional status   Assess for treatment effectiveness   Encourage mobilization and activity   Assess characteristics and frequency of stool  Goal: Establish and maintain optimal ostomy function  Outcome: Completed  Flowsheets (Taken 4/24/2025 0040 by Nurse Ann MCMANUS, RN)  Establish and maintain optimal ostomy function:   Assess and report changes to ostomy stoma and surrounding skin   Assess and report changes bowel  function   Provide ostomy care   Nutrition consult as indicated   Empty/Change ostomy pouch as needed   Encourage mobilization and activity   Consult Wound Care/Ostomy Nurse as indicated  Goal: Maintains Optimal Tissue Perfusion  Outcome: Progressing  Flowsheets (Taken 4/24/2025 0040 by Nurse Ann MCMANUS, RN)  Maintains Optimal Tissue Perfusion:   Monitor nutritional intake, intake/output, and weight   Monitor for increased abdominal girth, firmness or intra-abdominal pressure   Assess skin color, capillary refill and edema   Assess blood pressure, heart rate, and oxygen saturation   Monitor metabolic panels, blood count panels, and coagulations panels as ordered   Assess for blood in emesis and stool   Assess and report changes in bowel function     Problem: Genitourinary - Adult  Goal: Absence of urinary retention  Outcome: Progressing  Flowsheets (Taken 4/24/2025 0040 by Nurse Ann MCMANUS, RN)  Absence of urinary retention:   Administer medications to alleviate retention as needed   Assess patient’s ability to void and empty bladder   Monitor intake/output and perform bladder scan if indicated   If urinary catheter present, initiate and maintain CAUTI bundle  Goal: Absence of Urinary Infection  Outcome: Progressing  Flowsheets (Taken 4/24/2025 0040 by Nurse Ann MCMANUS, RN)  Absence of Urinary Infection:   Assess urinary status for burning, urgency, frequency, pain and urine characteristics   Obtain urinalysis as ordered   Monitor intake/output   Assess for neurological status changes   Monitor lab values     Problem: Metabolic/Fluid and Electrolytes - Adult  Goal: Electrolytes maintained within normal limits  Outcome: Progressing  Flowsheets (Taken 4/25/2025 0735 by Nurse Kristen COLEMAN RN)  Electrolytes maintained within normal limits:   Monitor labs and assess patient for signs and symptoms of electrolyte imbalances   Administer and monitor response to electrolyte replacements   Initiate and instruct patient/support person  on fluid and nutrition restrictions as ordered  Goal: Maintain Optimal Renal Function and Hemodynamic Stability  Outcome: Progressing  Flowsheets (Taken 4/24/2025 0040 by Nurse Ann MCMANUS, RN)  Maintain optimal renal function and hemodynaimc stability:   Monitor labs and assess for signs and symptoms of volume excess or deficit   Monitor intake, output and patient weight   Monitor response to interventions for patient's volume status, including labs, urine output, blood pressure (other measures as available)   Encourage oral intake as appropriate   Instruct patient on fluid and nutrition restrictions as appropriate  Goal: Glucose maintained within prescribed range  Outcome: Progressing  Flowsheets (Taken 4/24/2025 0040 by Nurse Ann MCMANUS, RN)  Glucose maintained within prescribed range:   Assess for signs and symptoms of hyperglycemia and hypoglycemia   Monitor blood glucose as ordered   Administer ordered medications to maintain glucose within target range   Assess barriers to adequate nutritional intake and initiate nutrition consult as needed   Assess baseline knowledge and provide education as indicated   Monitor exercise as may reduce the requirements for insulin     Problem: Skin/Tissue Integrity - Adult  Goal: Skin integrity remains intact  Outcome: Progressing  Flowsheets (Taken 4/24/2025 0040 by Nurse Ann MCMANUS, RN)  Skin integrity remains intact:   Monitor for areas of redness and/or skin breakdown   Assess and document risk factors for pressure ulcer development   Initiate pressure ulcer prevention measures as indicated   Assess and document skin integrity     Problem: Hematologic - Adult  Goal: Maintains hematologic stability  Outcome: Progressing  Flowsheets (Taken 4/25/2025 0735 by Nurse Kristen COLEMAN RN)  Maintains hematologic stability:   Assess for signs and symptoms of bleeding or hemorrhage   Initiate bleeding precautions as indicated   Administer medications as ordered   Monitor labs   Administer  supportive blood products/factors as ordered   Educate patient/support person to report signs/symptoms of bleeding

## 2025-04-27 ENCOUNTER — HEALTH MAINTENANCE LETTER (OUTPATIENT)
Age: 60
End: 2025-04-27

## 2025-04-27 VITALS
DIASTOLIC BLOOD PRESSURE: 58 MMHG | SYSTOLIC BLOOD PRESSURE: 96 MMHG | BODY MASS INDEX: 36.13 KG/M2 | OXYGEN SATURATION: 93 % | HEART RATE: 70 BPM | TEMPERATURE: 98.1 F | RESPIRATION RATE: 22 BRPM | WEIGHT: 258.1 LBS | HEIGHT: 71 IN

## 2025-04-27 LAB
ALBUMIN SERPL-MCNC: 3.9 G/DL (ref 3.5–5.3)
ALP SERPL-CCNC: 60 U/L (ref 45–115)
ALT SERPL-CCNC: 38 U/L (ref 7–52)
ANION GAP SERPL CALC-SCNC: 9 MMOL/L (ref 3–11)
AST SERPL-CCNC: 19 U/L
BASOPHILS # BLD AUTO: 0 10*3/UL
BASOPHILS NFR BLD AUTO: 0.5 % (ref 0–2)
BILIRUB SERPL-MCNC: 1.12 MG/DL (ref 0.2–1.4)
BUN SERPL-MCNC: 14 MG/DL (ref 7–25)
CALCIUM ALBUM COR SERPL-MCNC: 9.2 MG/DL (ref 8.6–10.3)
CALCIUM SERPL-MCNC: 9.1 MG/DL (ref 8.6–10.3)
CHLORIDE SERPL-SCNC: 106 MMOL/L (ref 98–107)
CO2 SERPL-SCNC: 22 MMOL/L (ref 21–32)
CREAT SERPL-MCNC: 0.93 MG/DL (ref 0.7–1.3)
EGFRCR SERPLBLD CKD-EPI 2021: 95 ML/MIN/1.73M*2
EOSINOPHIL # BLD AUTO: 0.2 10*3/UL
EOSINOPHIL NFR BLD AUTO: 3.2 % (ref 0–3)
ERYTHROCYTE [DISTWIDTH] IN BLOOD BY AUTOMATED COUNT: 15 % (ref 11.5–15)
GLUCOSE SERPL-MCNC: 103 MG/DL (ref 70–105)
HCT VFR BLD AUTO: 45.6 % (ref 38–50)
HGB BLD-MCNC: 15.4 G/DL (ref 13.2–17.2)
LYMPHOCYTES # BLD AUTO: 1.7 10*3/UL
LYMPHOCYTES NFR BLD AUTO: 34.3 % (ref 15–47)
MAGNESIUM SERPL-MCNC: 1.9 MG/DL (ref 1.8–2.4)
MCH RBC QN AUTO: 31.9 PG (ref 29–34)
MCHC RBC AUTO-ENTMCNC: 33.9 G/DL (ref 32–36)
MCV RBC AUTO: 94.2 FL (ref 82–97)
MONOCYTES # BLD AUTO: 0.5 10*3/UL
MONOCYTES NFR BLD AUTO: 9.5 % (ref 5–13)
NEUTROPHILS # BLD AUTO: 2.6 10*3/UL
NEUTROPHILS NFR BLD AUTO: 52.5 % (ref 46–70)
PLATELET # BLD AUTO: 166 10*3/UL (ref 130–350)
PMV BLD AUTO: 8.1 FL (ref 6.9–10.8)
POTASSIUM SERPL-SCNC: 4 MMOL/L (ref 3.5–5.1)
PROT SERPL-MCNC: 6.5 G/DL (ref 6–8.3)
RBC # BLD AUTO: 4.84 10*6/UL (ref 4.1–5.8)
SODIUM SERPL-SCNC: 137 MMOL/L (ref 135–145)
WBC # BLD AUTO: 5 10*3/UL (ref 3.7–9.6)

## 2025-04-27 PROCEDURE — 85025 COMPLETE CBC W/AUTO DIFF WBC: CPT | Performed by: INTERNAL MEDICINE

## 2025-04-27 PROCEDURE — 80053 COMPREHEN METABOLIC PANEL: CPT | Performed by: INTERNAL MEDICINE

## 2025-04-27 PROCEDURE — (BLANK) HC ROOM ICU INTERMEDIATE

## 2025-04-27 PROCEDURE — 36415 COLL VENOUS BLD VENIPUNCTURE: CPT | Performed by: INTERNAL MEDICINE

## 2025-04-27 PROCEDURE — 6370000100 HC RX 637 (ALT 250 FOR IP): Performed by: NURSE PRACTITIONER

## 2025-04-27 PROCEDURE — 83735 ASSAY OF MAGNESIUM: CPT | Performed by: INTERNAL MEDICINE

## 2025-04-27 PROCEDURE — 99233 SBSQ HOSP IP/OBS HIGH 50: CPT | Performed by: INTERNAL MEDICINE

## 2025-04-27 PROCEDURE — 6360000200 HC RX 636 W HCPCS (ALT 250 FOR IP): Performed by: INTERNAL MEDICINE

## 2025-04-27 PROCEDURE — 6370000100 HC RX 637 (ALT 250 FOR IP): Performed by: HOSPITALIST

## 2025-04-27 RX ADMIN — EMPAGLIFLOZIN 10 MG: 10 TABLET, FILM COATED ORAL at 09:13

## 2025-04-27 RX ADMIN — MEXILETINE HYDROCHLORIDE 300 MG: 150 CAPSULE ORAL at 18:02

## 2025-04-27 RX ADMIN — AMIODARONE HYDROCHLORIDE 150 MG: 1.5 INJECTION, SOLUTION INTRAVENOUS at 21:04

## 2025-04-27 RX ADMIN — TAMSULOSIN HYDROCHLORIDE 0.4 MG: 0.4 CAPSULE ORAL at 09:13

## 2025-04-27 RX ADMIN — SACUBITRIL AND VALSARTAN 1 TABLET: 97; 103 TABLET, FILM COATED ORAL at 09:13

## 2025-04-27 RX ADMIN — ROSUVASTATIN CALCIUM 40 MG: 20 TABLET, FILM COATED ORAL at 20:26

## 2025-04-27 RX ADMIN — MEXILETINE HYDROCHLORIDE 300 MG: 150 CAPSULE ORAL at 05:54

## 2025-04-27 RX ADMIN — FUROSEMIDE 40 MG: 40 TABLET ORAL at 09:13

## 2025-04-27 RX ADMIN — CARVEDILOL 50 MG: 25 TABLET, FILM COATED ORAL at 09:12

## 2025-04-27 RX ADMIN — SACUBITRIL AND VALSARTAN 1 TABLET: 97; 103 TABLET, FILM COATED ORAL at 20:25

## 2025-04-27 RX ADMIN — PRASUGREL 10 MG: 10 TABLET, FILM COATED ORAL at 09:12

## 2025-04-27 RX ADMIN — SPIRONOLACTONE 25 MG: 25 TABLET ORAL at 09:13

## 2025-04-27 RX ADMIN — RIVAROXABAN 20 MG: 20 TABLET, FILM COATED ORAL at 17:34

## 2025-04-27 NOTE — PLAN OF CARE
Problem: Neurosensory - Adult  Goal: Achieves stable or improved neurological status  Outcome: Progressing  Flowsheets (Taken 4/25/2025 0735 by Nurse Kristen COLEMAN RN)  Achieves stable or improved neurological status:   Maintain blood pressure and fluid volume within ordered parameters to optimize cerebral perfusion and minimize risk of hemorrhage   Initiate measures to prevent increased intracranial pressure. For example: decrease stimuli, maintain neutral head alignment, prevent shivering, etc.   Assess for and report changes in neurological status   Monitor temperature, glucose, and sodium.  Goal: Achieves maximal functionality and self care  Outcome: Progressing  Flowsheets (Taken 4/25/2025 0735 by Nurse Kristen COLEMAN RN)  Achieves maximal functionality and self care:   With patient fatigue and changes in neurological status, monitor swallowing and airway patency   Encourage and assist patient to increase activity and self care with guidance from PT/OT  Goal: Will maintain current level or return to usual level of neurologic status, motor/sensory function and hemodynamic stability after a hematologic cerebral event such as stroke or TIA.  Outcome: Progressing  Flowsheets (Taken 4/25/2025 0735 by Nurse Kristen COLEMAN RN)  Will maintain current level or return to usual level of neurologic status, motor/sensory function and hemodynamic stability after a hematologic cerebral event such as stroke or TIA: Encourage and assist patient to increase activity and self-care with guidance from PT/OT/ST/RT     Problem: Cardiovascular - Adult  Goal: Maintains optimal cardiac output and hemodynamic stability  Outcome: Progressing  Flowsheets (Taken 4/26/2025 0800 by Nurse Suzan ERICKSON RN)  Maintain optimal cardiac output and hemodynamic stability:   Monitor heart rate, blood pressure, and cardiac rhythm   Monitor for hypotension and other signs of decreased cardiac output   Monitor for fluid overload/dehydration, weight gain, shortness  of breath and activity intolerance   Assess quality of pulses, capillary refill, edema, sensation, skin color and temperature  Goal: Absence of cardiac dysrhythmias or at baseline  Outcome: Progressing  Flowsheets (Taken 4/26/2025 0800 by Nurse Suzan ERICKSON RN)  Absence of cardiac dysrhythmias or at baseline:   Continuous cardiac monitoring, monitor vital signs (see flowsheet documentation)   Obtain 12 lead EKG if indicated   Administer antiarrhythmic and heart rate control medications as ordered   Initiate emergency measures for life threatening arrhythmias   Monitor electrolytes and administer replacement therapy as ordered     Problem: Respiratory - Adult  Goal: Achieves optimal ventilation and oxygenation  Outcome: Progressing  Flowsheets (Taken 4/24/2025 1600 by Nurse Odilia SANTIZO RN)  Achieves optimal ventilation and oxygenation:   Assess and report changes in respiratory status   Assess and report changes in mentation and behavior   Position to facilitate oxygenation and minimize respiratory effort   Oxygen supplementation based on oxygen saturation or arterial blood gases  Goal: Achieves optimal ventilation and oxygenation with noninvasive CPAP/BiLEVEL support  Outcome: Progressing  Flowsheets (Taken 4/24/2025 0040 by Nurse Ann MCMANUS RN)  Achieves Optimal Oxygenation with Noninvasive CPAP/BiLEVEL Support:   Position patient to facilitate optimal ventilation/oxygenation status and minimize respiratory effort   Provide education to patient/support person about rationale and expected outcomes associated with therapy   Assess effectiveness of therapy on ventilation/oxygenation status based on oxygen saturation and/or arterial blood gases, as indicated   Position patient to reduce aspiration risk, elevate head of bed at least 35 degrees or higher, as applicable   Assess patient and report changes in respiratory and physiological status   Assess and monitor skin condition, in relationship to the respiratory interface    Routinely monitor equipment for proper performance and settings   Assess patient and report changes in mentation and behavior   Assure equipment alarm volume is adequate for the patient's environment   Immediately repsond to equipment alarm, to assess patient and/or cause for alarm     Problem: Gastrointestinal - Adult  Goal: Nutrient intake appropriate for improving, restoring or maintaining nutritional needs  Outcome: Progressing  Flowsheets (Taken 4/24/2025 0040 by Nurse Ann MCMANUS, RN)  Nutrient intake appropriate for improving, restoring or maintaining nutritional needs:   Assist with meals as needed   Monitor percentage of each meal consumed   Obtain nutritional consult as indicated   Provide patient/support person specific nutrition education as appropriate   Identify factors contributing to decreased intake, treat as appropriate   Monitor I&O, weight and lab values   Administer alternative nutrition interventions as ordered  Goal: Minimal or absence of nausea and vomiting  Outcome: Progressing  Flowsheets (Taken 4/24/2025 0040 by Nurse Ann MCMANUS, RN)  Minimal or absence of nausea and vomiting:   Assess patient for nausea/vomiting and report changes   Nutrition consult as indicated   Maintain NPO status as ordered   Administer ordered antiemetic medications as needed   Monitor intake and output to ensure adequate hydration   Advance diet as ordered   Provide nonpharmacologic comfort measures as appropriate   Nasogastric tube to suction as ordered  Goal: Maintains or returns to baseline digestive function  Outcome: Progressing  Flowsheets (Taken 4/24/2025 0040 by Nurse Ann MCMANUS, RN)  Maintains or returns to baseline bowel function:   Assess abdomen and bowel status and report changes in gastrointestinal function   Monitor for metabolic panel imbalances   Nutrition consult as indicated   Administer ordered medications as needed   Assess hydration and nutritional status   Assess for treatment effectiveness   Encourage  mobilization and activity   Assess characteristics and frequency of stool  Goal: Maintains Optimal Tissue Perfusion  Outcome: Progressing  Flowsheets (Taken 4/24/2025 0040 by Nurse Ann MCMANUS, RN)  Maintains Optimal Tissue Perfusion:   Monitor nutritional intake, intake/output, and weight   Monitor for increased abdominal girth, firmness or intra-abdominal pressure   Assess skin color, capillary refill and edema   Assess blood pressure, heart rate, and oxygen saturation   Monitor metabolic panels, blood count panels, and coagulations panels as ordered   Assess for blood in emesis and stool   Assess and report changes in bowel function     Problem: Genitourinary - Adult  Goal: Absence of urinary retention  Outcome: Progressing  Flowsheets (Taken 4/24/2025 0040 by Nurse Ann MCMANUS, RN)  Absence of urinary retention:   Administer medications to alleviate retention as needed   Assess patient’s ability to void and empty bladder   Monitor intake/output and perform bladder scan if indicated   If urinary catheter present, initiate and maintain CAUTI bundle  Goal: Absence of Urinary Infection  Outcome: Progressing  Flowsheets (Taken 4/24/2025 0040 by Nurse Ann MCMANUS, RN)  Absence of Urinary Infection:   Assess urinary status for burning, urgency, frequency, pain and urine characteristics   Obtain urinalysis as ordered   Monitor intake/output   Assess for neurological status changes   Monitor lab values     Problem: Metabolic/Fluid and Electrolytes - Adult  Goal: Electrolytes maintained within normal limits  Outcome: Progressing  Flowsheets (Taken 4/25/2025 0735 by Nurse Kristen COLEMAN RN)  Electrolytes maintained within normal limits:   Monitor labs and assess patient for signs and symptoms of electrolyte imbalances   Administer and monitor response to electrolyte replacements   Initiate and instruct patient/support person on fluid and nutrition restrictions as ordered  Goal: Maintain Optimal Renal Function and Hemodynamic  Stability  Outcome: Progressing  Flowsheets (Taken 4/24/2025 0040 by Nurse Ann MCMANUS, JASON)  Maintain optimal renal function and hemodynaimc stability:   Monitor labs and assess for signs and symptoms of volume excess or deficit   Monitor intake, output and patient weight   Monitor response to interventions for patient's volume status, including labs, urine output, blood pressure (other measures as available)   Encourage oral intake as appropriate   Instruct patient on fluid and nutrition restrictions as appropriate  Goal: Glucose maintained within prescribed range  Outcome: Progressing  Flowsheets (Taken 4/24/2025 0040 by Nurse Ann MCMANUS, JASON)  Glucose maintained within prescribed range:   Assess for signs and symptoms of hyperglycemia and hypoglycemia   Monitor blood glucose as ordered   Administer ordered medications to maintain glucose within target range   Assess barriers to adequate nutritional intake and initiate nutrition consult as needed   Assess baseline knowledge and provide education as indicated   Monitor exercise as may reduce the requirements for insulin     Problem: Skin/Tissue Integrity - Adult  Goal: Skin integrity remains intact  Outcome: Progressing  Flowsheets (Taken 4/24/2025 0040 by Nurse Ann MCMANUS, JASON)  Skin integrity remains intact:   Monitor for areas of redness and/or skin breakdown   Assess and document risk factors for pressure ulcer development   Initiate pressure ulcer prevention measures as indicated   Assess and document skin integrity     Problem: Hematologic - Adult  Goal: Maintains hematologic stability  Outcome: Progressing  Flowsheets (Taken 4/25/2025 0735 by Nurse Kristen COLEMAN RN)  Maintains hematologic stability:   Assess for signs and symptoms of bleeding or hemorrhage   Initiate bleeding precautions as indicated   Administer medications as ordered   Monitor labs   Administer supportive blood products/factors as ordered   Educate patient/support person to report signs/symptoms of  bleeding     Problem: Neurosensory - Adult  Goal: Achieves stable or improved neurological status  Outcome: Progressing  Flowsheets (Taken 4/25/2025 0735 by Nurse Kristen COLEMAN RN)  Achieves stable or improved neurological status:   Maintain blood pressure and fluid volume within ordered parameters to optimize cerebral perfusion and minimize risk of hemorrhage   Initiate measures to prevent increased intracranial pressure. For example: decrease stimuli, maintain neutral head alignment, prevent shivering, etc.   Assess for and report changes in neurological status   Monitor temperature, glucose, and sodium.  Goal: Achieves maximal functionality and self care  Outcome: Progressing  Flowsheets (Taken 4/25/2025 0735 by Nurse Kristen COLEMAN RN)  Achieves maximal functionality and self care:   With patient fatigue and changes in neurological status, monitor swallowing and airway patency   Encourage and assist patient to increase activity and self care with guidance from PT/OT  Goal: Will maintain current level or return to usual level of neurologic status, motor/sensory function and hemodynamic stability after a hematologic cerebral event such as stroke or TIA.  Outcome: Progressing  Flowsheets (Taken 4/25/2025 0735 by Nurse Kristen COLEMAN RN)  Will maintain current level or return to usual level of neurologic status, motor/sensory function and hemodynamic stability after a hematologic cerebral event such as stroke or TIA: Encourage and assist patient to increase activity and self-care with guidance from PT/OT/ST/RT     Problem: Cardiovascular - Adult  Goal: Maintains optimal cardiac output and hemodynamic stability  Outcome: Progressing  Flowsheets (Taken 4/26/2025 0800 by Nurse Suzan ERICKSON RN)  Maintain optimal cardiac output and hemodynamic stability:   Monitor heart rate, blood pressure, and cardiac rhythm   Monitor for hypotension and other signs of decreased cardiac output   Monitor for fluid overload/dehydration, weight gain,  shortness of breath and activity intolerance   Assess quality of pulses, capillary refill, edema, sensation, skin color and temperature  Goal: Absence of cardiac dysrhythmias or at baseline  Outcome: Progressing  Flowsheets (Taken 4/26/2025 0800 by Nurse Suzan ERICKSON RN)  Absence of cardiac dysrhythmias or at baseline:   Continuous cardiac monitoring, monitor vital signs (see flowsheet documentation)   Obtain 12 lead EKG if indicated   Administer antiarrhythmic and heart rate control medications as ordered   Initiate emergency measures for life threatening arrhythmias   Monitor electrolytes and administer replacement therapy as ordered     Problem: Respiratory - Adult  Goal: Achieves optimal ventilation and oxygenation  Outcome: Progressing  Flowsheets (Taken 4/24/2025 1600 by Nurse Odilia SANTIZO RN)  Achieves optimal ventilation and oxygenation:   Assess and report changes in respiratory status   Assess and report changes in mentation and behavior   Position to facilitate oxygenation and minimize respiratory effort   Oxygen supplementation based on oxygen saturation or arterial blood gases  Goal: Achieves optimal ventilation and oxygenation with noninvasive CPAP/BiLEVEL support  Outcome: Progressing  Flowsheets (Taken 4/24/2025 0040 by Nurse Ann MCMANUS RN)  Achieves Optimal Oxygenation with Noninvasive CPAP/BiLEVEL Support:   Position patient to facilitate optimal ventilation/oxygenation status and minimize respiratory effort   Provide education to patient/support person about rationale and expected outcomes associated with therapy   Assess effectiveness of therapy on ventilation/oxygenation status based on oxygen saturation and/or arterial blood gases, as indicated   Position patient to reduce aspiration risk, elevate head of bed at least 35 degrees or higher, as applicable   Assess patient and report changes in respiratory and physiological status   Assess and monitor skin condition, in relationship to the respiratory  interface   Routinely monitor equipment for proper performance and settings   Assess patient and report changes in mentation and behavior   Assure equipment alarm volume is adequate for the patient's environment   Immediately repsond to equipment alarm, to assess patient and/or cause for alarm     Problem: Gastrointestinal - Adult  Goal: Nutrient intake appropriate for improving, restoring or maintaining nutritional needs  Outcome: Progressing  Flowsheets (Taken 4/24/2025 0040 by Nurse Ann MCMANUS, RN)  Nutrient intake appropriate for improving, restoring or maintaining nutritional needs:   Assist with meals as needed   Monitor percentage of each meal consumed   Obtain nutritional consult as indicated   Provide patient/support person specific nutrition education as appropriate   Identify factors contributing to decreased intake, treat as appropriate   Monitor I&O, weight and lab values   Administer alternative nutrition interventions as ordered  Goal: Minimal or absence of nausea and vomiting  Outcome: Progressing  Flowsheets (Taken 4/24/2025 0040 by Nurse Ann MCMANUS, RN)  Minimal or absence of nausea and vomiting:   Assess patient for nausea/vomiting and report changes   Nutrition consult as indicated   Maintain NPO status as ordered   Administer ordered antiemetic medications as needed   Monitor intake and output to ensure adequate hydration   Advance diet as ordered   Provide nonpharmacologic comfort measures as appropriate   Nasogastric tube to suction as ordered  Goal: Maintains or returns to baseline digestive function  Outcome: Progressing  Flowsheets (Taken 4/24/2025 0040 by Nurse Ann MCMANUS, RN)  Maintains or returns to baseline bowel function:   Assess abdomen and bowel status and report changes in gastrointestinal function   Monitor for metabolic panel imbalances   Nutrition consult as indicated   Administer ordered medications as needed   Assess hydration and nutritional status   Assess for treatment  effectiveness   Encourage mobilization and activity   Assess characteristics and frequency of stool  Goal: Maintains Optimal Tissue Perfusion  Outcome: Progressing  Flowsheets (Taken 4/24/2025 0040 by Nurse Ann MCMNAUS, RN)  Maintains Optimal Tissue Perfusion:   Monitor nutritional intake, intake/output, and weight   Monitor for increased abdominal girth, firmness or intra-abdominal pressure   Assess skin color, capillary refill and edema   Assess blood pressure, heart rate, and oxygen saturation   Monitor metabolic panels, blood count panels, and coagulations panels as ordered   Assess for blood in emesis and stool   Assess and report changes in bowel function     Problem: Genitourinary - Adult  Goal: Absence of urinary retention  Outcome: Progressing  Flowsheets (Taken 4/24/2025 0040 by Nurse Ann MCMANUS, RN)  Absence of urinary retention:   Administer medications to alleviate retention as needed   Assess patient’s ability to void and empty bladder   Monitor intake/output and perform bladder scan if indicated   If urinary catheter present, initiate and maintain CAUTI bundle  Goal: Absence of Urinary Infection  Outcome: Progressing  Flowsheets (Taken 4/24/2025 0040 by Nurse Ann MCMANUS, RN)  Absence of Urinary Infection:   Assess urinary status for burning, urgency, frequency, pain and urine characteristics   Obtain urinalysis as ordered   Monitor intake/output   Assess for neurological status changes   Monitor lab values     Problem: Metabolic/Fluid and Electrolytes - Adult  Goal: Electrolytes maintained within normal limits  Outcome: Progressing  Flowsheets (Taken 4/25/2025 0735 by Nurse Kristen COLEMAN RN)  Electrolytes maintained within normal limits:   Monitor labs and assess patient for signs and symptoms of electrolyte imbalances   Administer and monitor response to electrolyte replacements   Initiate and instruct patient/support person on fluid and nutrition restrictions as ordered  Goal: Maintain Optimal Renal Function  and Hemodynamic Stability  Outcome: Progressing  Flowsheets (Taken 4/24/2025 0040 by Nurse Ann MCMANUS, RN)  Maintain optimal renal function and hemodynaimc stability:   Monitor labs and assess for signs and symptoms of volume excess or deficit   Monitor intake, output and patient weight   Monitor response to interventions for patient's volume status, including labs, urine output, blood pressure (other measures as available)   Encourage oral intake as appropriate   Instruct patient on fluid and nutrition restrictions as appropriate  Goal: Glucose maintained within prescribed range  Outcome: Progressing  Flowsheets (Taken 4/24/2025 0040 by Nurse Ann MCMANUS, RN)  Glucose maintained within prescribed range:   Assess for signs and symptoms of hyperglycemia and hypoglycemia   Monitor blood glucose as ordered   Administer ordered medications to maintain glucose within target range   Assess barriers to adequate nutritional intake and initiate nutrition consult as needed   Assess baseline knowledge and provide education as indicated   Monitor exercise as may reduce the requirements for insulin     Problem: Skin/Tissue Integrity - Adult  Goal: Skin integrity remains intact  Outcome: Progressing  Flowsheets (Taken 4/24/2025 0040 by Nurse Ann MCMANUS, JASON)  Skin integrity remains intact:   Monitor for areas of redness and/or skin breakdown   Assess and document risk factors for pressure ulcer development   Initiate pressure ulcer prevention measures as indicated   Assess and document skin integrity     Problem: Hematologic - Adult  Goal: Maintains hematologic stability  Outcome: Progressing  Flowsheets (Taken 4/25/2025 0735 by Nurse Kristen COLEMAN RN)  Maintains hematologic stability:   Assess for signs and symptoms of bleeding or hemorrhage   Initiate bleeding precautions as indicated   Administer medications as ordered   Monitor labs   Administer supportive blood products/factors as ordered   Educate patient/support person to report  signs/symptoms of bleeding

## 2025-04-27 NOTE — PROGRESS NOTES
Electrophysiology Inpatient Progress Note    Pete Trejo is a 59 y.o. male whom we are seeing today for the further evaluation of ventricular tachycardia.    HPI: *Patient was seen and examined today.  Chart was reviewed and plan discussed with nurse.  Patient is sitting comfortably in bed with son at bedside.  Patient had an episode this started around 1009 to about 1031.  EKG was at a rate of about 109 patient did finally receive ATP at 1031 putting him back into sinus rhythm.  Patient had multiple episodes overnight.  When he has these episodes he feels palpitations accompanied by nausea and shortness of breath.    Chief Complaint:   Chief Complaint   Patient presents with    Palpitations     Pt reports to the ED after recently being DC with palpitations. Pt reports the palpitations have returned in the last hour.        Current Medications:  Current Facility-Administered Medications   Medication Dose Route Frequency Provider Last Rate Last Admin    mexiletine (MEXITIL) capsule 300 mg  300 mg oral q8h Jazmin Ocampo CNP   300 mg at 04/27/25 0554    carvediloL (COREG) tablet 50 mg  50 mg oral 2x daily with meals Jasmin Mcknight MD   50 mg at 04/26/25 1727    empagliflozin (JARDIANCE) tablet 10 mg  10 mg oral Daily Jasmin Mcknight MD   10 mg at 04/26/25 0954    furosemide (LASIX) tablet 40 mg  40 mg oral Daily Jasmin Mcknight MD   40 mg at 04/26/25 0954    nitroglycerin (NITROSTAT) SL tablet 0.4 mg  0.4 mg sublingual q5 min PRN Jasmin Mcknight MD        prasugreL HCl (EFFIENT) tablet 10 mg  10 mg oral Daily Jasmin Mcknight MD   10 mg at 04/26/25 0954    sacubitriL-valsartan (ENTRESTO)  mg 1 tablet  1 tablet oral 2x daily Jasmin Mcknight MD   1 tablet at 04/26/25 2019    spironolactone (ALDACTONE) tablet 25 mg  25 mg oral Daily Jasmin Mcknight MD   25 mg at 04/26/25 0954    bisacodyL (DULCOLAX) suppository 10 mg  10 mg rectal Daily PRN Juan Luis  MD Jasmin        rivaroxaban (XARELTO) tablet 20 mg  20 mg oral Daily with dinner Jasmin Mcknight MD   20 mg at 04/26/25 1726    melatonin tablet 4.5 mg  4.5 mg oral Nightly PRN Jasmin Mcknight MD        rosuvastatin (CRESTOR) tablet 40 mg  40 mg oral Nightly Jasmin Mcknight MD   40 mg at 04/26/25 2019    tamsulosin (FLOMAX) 24 hr capsule 0.4 mg  0.4 mg oral Daily Jamsin Mcknight MD   0.4 mg at 04/26/25 0954    sodium chloride flush 3 mL  3 mL intravenous PRN Jasmin Mcknight MD        traZODone (DESYREL) tablet 25 mg  25 mg oral Nightly PRN Jasmin Mcknight MD        acetaminophen (TYLENOL) tablet 650 mg  650 mg oral q6h PRN Jasmin Mcknight MD        fentaNYL citrate (PF) 50 mcg/mL injection 12.5 mcg  12.5 mcg intravenous q2h PRN aJsmin Mcknight MD        fentaNYL citrate (PF) 50 mcg/mL injection 25 mcg  25 mcg intravenous q2h PRN Jasmin Mcknight MD        ondansetron (ZOFRAN) tablet 4 mg  4 mg oral q6h PRN Jasmin Mcknight MD        Or    ondansetron (ZOFRAN) injection 4 mg  4 mg intravenous q6h PRN Jasmin Mcknight MD        alum-mag hydroxide-simeth (MAALOX ADVANCED) suspension 30 mL  30 mL oral 3x daily PRN Jasmin Mcknight MD        calcium carbonate (TUMS) chewable tablet 500 mg  500 mg oral 3x daily PRN Jasmin Mcknight MD        dextrose 50 % in water (D50W) syringe 15-30 mL  15-30 mL intravenous q15 min PRN Jasmin Mcknight MD        dextrose (GLUTOSE) 40 % gel 15.2 g  15.2 g oral q15 min PRN Jasmin Mcknight MD        glucagon HCL injection 1 mg  1 mg intramuscular q15 min PRN Jasmin Mcknight MD        Or    glucagon HCL injection 1 mg  1 mg subcutaneous q15 min LAUREENN Jasmin Mcknight MD              Objective     Vital signs in last 24 hours:   Temp:  [36.6 °C (97.8 °F)-37.1 °C (98.7 °F)] 36.8 °C (98.2 °F)  Heart Rate:  [] 70  Resp:  [16-20] 16  SpO2:  [90 %-95 %] 94 %  BP: ()/(54-73) 100/73  Weight: 117.1 kg (258 lb 1.6 oz)    Telemetry:                  Physical  Exam  Constitutional:       Appearance: He is well-developed.   HENT:      Head: Normocephalic.   Cardiovascular:      Rate and Rhythm: Normal rate and regular rhythm.      Heart sounds: Normal heart sounds.   Pulmonary:      Effort: Pulmonary effort is normal.      Breath sounds: Normal breath sounds.   Abdominal:      General: Bowel sounds are normal.      Palpations: Abdomen is soft.   Musculoskeletal:         General: Normal range of motion.      Cervical back: Normal range of motion.   Skin:     General: Skin is warm and dry.   Neurological:      Mental Status: He is alert and oriented to person, place, and time.   Psychiatric:         Behavior: Behavior normal.       Lab Results   Component Value Date    WBC 5.0 04/27/2025    HGB 15.4 04/27/2025    HCT 45.6 04/27/2025     04/27/2025    CHOL 116 08/06/2024    TRIG 164 (H) 08/06/2024    HDL 31 (L) 08/06/2024    ALT 38 04/27/2025    AST 19 04/27/2025     04/27/2025    K 4.0 04/27/2025     04/27/2025    CREATININE 0.93 04/27/2025    BUN 14 04/27/2025    CO2 22 04/27/2025    TSH 4.414 04/21/2025    PSA 0.29 09/20/2018    INR 1.1 04/23/2025    HGBA1C 6.9 (H) 03/21/2025    MICROALBUR <7.0 09/22/2023     CHADS2 Score: 6 (4/26/2025 10:16 AM)  Risk of ischemic stroke %: 9.7 % (4/26/2025 10:16 AM)  Risk of stroke/TIA/systemic embolism %: 13.6 % (4/26/2025 10:16 AM)        Diagnostic data/procedures:    Cardiac catheterization/OXIMETRY RUN: 4/23/2025  DATE OF PROCEDURE: 4/23/2025  PREOPERATIVE DIAGNOSIS:  Pre-op Diagnosis    * Chronic systolic HF (heart failure) (CMS/HCC) [I50.22]    * Ischemic cardiomyopathy [I25.5] Final Impression: 1.  Right atrial mean pressure: 5 mmHg.  O2 saturation 65.0%. 2.  Right ventricular pressure: 27/-1/2 mmHg.  O2 saturation 65.6%. 3.  Right main pulmonary artery pressure: 28/13/16 mmHg.  O2 saturation 65.4%. 4.  Pulmonary capillary wedge pressure: 8 mmHg. 5.  Cardiac output thermodilution technique 4.56 L/min.  Cardiac  index 1.9 L/min/m². 6.  Cardiac output Becki technique: 5.7 L/min.  Cardiac index 2.3 L/min/m². Rationale, risks, benefits and alternatives of cardiac catheterization and percutaneous coronary intervention with or without stents is explained to the patient including, but not limited to, possible risks of infection, bleeding requiring blood transfusion, damage to the blood vessel requiring repair vascular surgery, allergic reactions to the x-ray dye, acute kidney injury including need for possible hemodialysis, significant cardiac arrhythmias, myocardial infarction, stroke, and threat to life. SCAI Shock Stage: A ASA score 3 Mallampati score 3 Procedure: Right heart catheterization with thermodilution cardiac output and O2 saturation run.  Details of the procedure: Informed consent is obtained. After sterile prep and drape 1% lidocaine is infiltrated over the right IJ region.  Access was obtained into right IJ vein using micropuncture technique and limited ultrasound.  The 7 French sheath was placed after wire confirmation with fluoroscopy.  A 7 French balloontipped [place]ril catheter was advanced into the right atrium with balloon inflated.  O2 saturations in pressures were obtained.  Balloon was then advanced into the right ventricle where pressures and O2 saturations obtained.  Catheter was then advanced into the right main pulmonary artery and the balloon was deflated.  Thermodilution cardiac outputs as well as pressures and saturations were obtained.  Balloon was reinflated and placed in the wedge position.  Pressures obtained.  Balloon deflated and pulled out of the body.  Sheath will be hand pulled with manual pressure.  Anesthesia: 26 minutes 34 seconds of moderate sedation was administered was administered using Fentanyl and Versed under my supervision. I monitored the patient for further administration of agents as needed including continuous monitoring of oxygen saturation, heart rate and blood pressure.   The RN administered the medicine under my direct supervision order, and an independent trained observer was present. Antiplatelet agent: Prasugrel Recommended duration of dual antiplatelet therapy: Not applicable Total Sedation time: 26 minutes 34 seconds Sedation Medications and Contrast use:   2025 1456 MDT fentaNYL citrate (PF) 50 mcg/mL injection 25 mcg   2025 1456 MDT midazolam (VERSED) injection 1 mg intravenous Given Total Air Kerma (mGy) 8.790; Fluoro Time (min) = 0.9 MD       Echo ltd: 2025    Normal left ventricular wall thickness.   Left ventricle is moderately dilated.  End-diastolic dimension 6.8 cm.   Biplane ejection fraction is 32%.  Visually looks much closer to 25%.  No apical thrombi.   Severe left ventricular systolic dysfunction.  Severe ischemic cardiomyopathy with RCA and LAD wall motion abnormalities.  See below.   Grade I (mild) left ventricular diastolic abnormality.   Normal left ventricular filling pressure.   Right ventricular systolic function is low normal.   The left atrium is normal in size.   The right atrium is mildly dilated.   Normal central venous pressure (0-5 mmHg).   No pericardial effusion.   Inadequate TR spectral Doppler to accurately assess right ventricular systolic pressure.   No significant valve disease.   No change from the 2025 study.     XR chest portable 1 view: 2025  IMPRESSION: No acute disease.     [id number]        Assessment/Plan      Ventricular tachycardia  Ischemic cardiomyopathy  Coronary artery disease  : Presented to the ER last evening with tachycardic palpitations, chest tightness, shortness of breath and lightheadedness.  Symptoms were consistent with prior admissions and he presented for evaluation.  ICD interrogation revealed ventricular tachycardia at 112 bpm with multiple ATP therapies.     : Limited echo showed an EF of 32%.  LV severely enlarged anterior apical aneurysm basal, anterolateral and inferior  lateral wall appear to have normal contractility.    He has had multiple VT ablations ([place], [facility], [facility]).    4/22: reloaded with IV amiodarone.  Unfortunately he is also noted side effects on the higher dose of amiodarone with tremor in the right hand which he had previously when he was on amiodarone and has had progressive decrease in his energy with weakness in his arms and legs.  He is no longer walking the 4-6 blocks with his dogs daily.  If he stands for too long he gets wobbly and has to sit down.    4/22: Shock therapy was turned off in the slow VT zone.   4/23--Discharged home  4/23: presents to ER with palpitations and feeling like his device is going off.  Had several episodes of slow VT (110 bpm), with several ATP therapies              Reloaded with IV amio and started on gtt. Admitted to hospitalist service. Other medications resumed.   4/25: [facility] has accepted patient.  Plan is to attempt another VT ablation, they would like amio discontinued so that VT will be able to be induced in the lab.  Plan established by Dr. Leiva.   - Evening of 4/25 patient had another episode of slow VT lasting roughly an hour and 23 minutes, 150 mg IV bolus of amiodarone was given and with ATP from his AICD he converted back into normal rhythm.  4/26: No further episodes of ventricular tachycardia since 8 PM last night, will continue with current plan.  I did call case management again this morning and to let [facility] know patient had recurrent slow VT last night.  4/27 VT this morning lasting from 1009 to 1031 with VT rates at 109 before reaching ATP of 110 ultimately putting him in sinus rhythm.  Multiple episodes of slow VT overnight.  Case management notes still awaiting open bed at [facility] today.  Will continue to monitor telemetry.  If patient sustains VT will give 150 mg of amiodarone bolus.    -EF 32%  - Most recent coronary angiogram on  3/24/2025:  -Proximal LAD stent widely patent.  The remainder of the LAD without significant disease so it is a fairly small caliber vessel.  -80% ostial first diagonal stenosis.  Diffuse 60% proximal disease.  The vessel then bifurcates into a smaller inferior branch that is subtotally occluded within previously placed stent.  Superior diagonal branch with 40% ostial stenosis.  -PTCA of the inferior subtotal occluded diagonal branch with 2.5 mm balloon.  Stenting of the ostial and proximal first diagonal branch up to the bifurcation with 2.5 x 8 mm DIMAS postdilatation 2.5 mm noncompliant balloon to 20 he.  Good flow in both branches    Paroxysmal atrial fibrillation  - [id number] of at least 6  - Anticoagulated with Xarelto    Electronically signed by: Tanika Sun CNP     Plan made in conjunction with Dr. Wu.

## 2025-04-27 NOTE — PROGRESS NOTES
"Subjective    LOS: 4 days      Patient states he feels about the same today, offering no complaints.  Overnight he had another episode of V. tach, which resolved without interventions.  No other events reported.    Objective   /78   Pulse 71   Temp 36.8 °C (98.2 °F) (Oral)   Resp 16   Ht 1.803 m (5' 11\")   Wt 117.1 kg (258 lb 1.6 oz)   SpO2 94%   BMI 36.00 kg/m²     Intake/Output Summary (Last 24 hours) at 4/27/2025 0914  Last data filed at 4/27/2025 0600  Gross per 24 hour   Intake 870 ml   Output --   Net 870 ml      Physical Exam  General: Alert, no acute distress  Heart: Rate and rhythm are regular  Lungs: Clear to auscultation bilaterally  Abdomen: Soft, nondistended, nontender, normal bowel sounds  Extremities: No edema  Neuro: Alert, oriented x 3, nonfocal    Other           Data Used For Medical Decision Making      Case d/w cardiology, plan for transfer to [facility]  Labs reviewed  Telemetry reviewed, slow VT overnight.           Assessment/Plan   59M with history of recurrent VT, ischemic cardiomyopathy, chronic systolic HF, CAD with multiple stents, paroxysmal AF, HTN, T2DM, hyperlipidemia, and LAVON, presents with recurrent VT , now waiting for transfer to [facility] for further management.     - # Recurrent ventricular tachycardia  - Rate and rhythm controlled today. He had another episode of VT last night, that resolved without any intervetions. Per [facility] EP, try and avoid amio gtt as much as possible.  - appreciate cardiology input  - await further EP input  - Stable for transfer once bed is available.     # Hypertension  - 24hr BP range: () / (54-73) (4/27/25)  - continue home: furosemide 40 mg po daily  - continue home: sacubitril-valsartan 97 mg-103 mg 1 tabs po BID  - continue home: spironolactone 25 mg po daily  - currently on: carvedilol 50 mg po BID     # Atrial fibrillation  - [id number] score: 3 (4/27/25), moderate-high risk of stroke (3.2% per year)  - heart rate range:  " (since 4/26/25 9:00)  - continue home: mexiletine 300 mg po TID  - continue home: rivaroxaban 20 mg po daily  - currently on: carvedilol 50 mg po BID  - cardiac telemetry     - # chronic systolic heart failure  - weight: 123.1 kg unchanged from 123.1 kg (4/23/25), est. baseline 118 kg  - LV ejection fraction: 32 % (4/22/25)  - continue home: empagliflozin 10 mg po daily  - continue home: furosemide 40 mg po daily  - continue home: sacubitril-valsartan 97 mg-103 mg 1 tabs po BID  - continue home: spironolactone 25 mg po daily  - currently on: carvedilol 50 mg po BID  - No signs of volume overload on exam.     # Diabetes mellitus  - hemoglobin A1c: 6.9 % (3/21/25) up from 6.2 % (9/22/23)  - continue home: empagliflozin 10 mg po daily  - glucose: 103 (4/27/25)     - # Coronary artery disease  - continue home: rosuvastatin 40 mg po daily  - currently on: carvedilol 50 mg po BID  - currently on: prasugrel 10 mg po daily     # Hyperlipidemia: with clinical ASCVD  - continue home: rosuvastatin 40 mg po daily     # Obesity, class II: severe  - BMI: 36.0 (4/27/25)  - complicated by atrial fibrillation, coronary artery disease, diabetes mellitus, heart failure, hyperlipidemia, hypertension, and Obstructive sleep apnea  -     # Tobacco use disorder    DVT Prophylaxis: Currently on therapeutic anticoagulation   Code Status: Full Code   Decision Making Capacity: Yes    Medically Ready for Discharge:Ready Now  Anticipated Disposition : Higher level of care to [facility]

## 2025-04-27 NOTE — PLAN OF CARE
Problem: Neurosensory - Adult  Goal: Achieves stable or improved neurological status  Outcome: Progressing  Flowsheets (Taken 4/27/2025 0800)  Achieves stable or improved neurological status:   Assess for and report changes in neurological status   Maintain blood pressure and fluid volume within ordered parameters to optimize cerebral perfusion and minimize risk of hemorrhage   Monitor temperature, glucose, and sodium.  Goal: Achieves maximal functionality and self care  Outcome: Progressing  Flowsheets (Taken 4/27/2025 0800)  Achieves maximal functionality and self care: With patient fatigue and changes in neurological status, monitor swallowing and airway patency     Problem: Cardiovascular - Adult  Goal: Maintains optimal cardiac output and hemodynamic stability  Outcome: Progressing  Flowsheets (Taken 4/27/2025 0800)  Maintain optimal cardiac output and hemodynamic stability:   Monitor heart rate, blood pressure, and cardiac rhythm   Monitor for hypotension and other signs of decreased cardiac output   Monitor for fluid overload/dehydration, weight gain, shortness of breath and activity intolerance   Assess quality of pulses, capillary refill, edema, sensation, skin color and temperature  Goal: Absence of cardiac dysrhythmias or at baseline  Outcome: Progressing  Flowsheets (Taken 4/27/2025 0800)  Absence of cardiac dysrhythmias or at baseline:   Continuous cardiac monitoring, monitor vital signs (see flowsheet documentation)   Obtain 12 lead EKG if indicated   Administer antiarrhythmic and heart rate control medications as ordered   Initiate emergency measures for life threatening arrhythmias   Monitor electrolytes and administer replacement therapy as ordered     Problem: Gastrointestinal - Adult  Goal: Nutrient intake appropriate for improving, restoring or maintaining nutritional needs  Outcome: Progressing  Flowsheets (Taken 4/27/2025 0800)  Nutrient intake appropriate for improving, restoring or  maintaining nutritional needs: Monitor I&O, weight and lab values  Goal: Minimal or absence of nausea and vomiting  Outcome: Progressing  Flowsheets (Taken 4/27/2025 0800)  Minimal or absence of nausea and vomiting:   Assess patient for nausea/vomiting and report changes   Monitor intake and output to ensure adequate hydration     Problem: Skin/Tissue Integrity - Adult  Goal: Skin integrity remains intact  Outcome: Progressing  Flowsheets (Taken 4/27/2025 0800)  Skin integrity remains intact:   Assess and document risk factors for pressure ulcer development   Assess and document skin integrity

## 2025-04-27 NOTE — NURSING END OF SHIFT
Nursing End of Shift Summary:     Patient: Pete Trejo  MRN: [id number]  : 1965, Age: 59 y.o.    Location: [address] [place] [address]    Nursing Goals  Clinical Goals for the Shift: stable VS/lab    Narrative Summary of Progress Toward Clinical Goals:  Patient had a 1 hour () slow ventricular run the same as yesterday PM.  He converted back to AV paced 70 bpm    Barriers to Goals/Nursing Concerns:  No    New Patient or Family Concerns/Issues:  No    Shift Summary:   Significant Events & Communications to Providers (Last 12 Hours)       Last 5 Values    No documentation.                 Oxygen Usage (Last 12 Hours)       Last 5 Values    No documentation.                 Mobility (Last 12 Hours)       Last 5 Values       Row Name 25 1928 25 2145 25 0000 25 0017 25 0345       Mobility    Activity -- Ambulate in room;Bathroom privileges -- -- --    Length of Time in Chair (min) -- 0 -- -- --    Distance Ambulated (feet) -- 15 Feet -- -- --    Distance Ambulated (Meters Calculated) -- 4.57 Meters -- -- --    Patient Position In bed -- -- In bed In bed    Turning/Repositioning -- Turns self Turns self -- --    Distance Ambulated (Meters Calculated)(Do Not Use) -- 4.57 Feet -- -- --                  Urethral Catheter       Active Urethral Catheter       None                  Active Lines       Active Central venous catheter / Peripherally inserted central catheter / Implantable Port / Hemodialysis catheter / Midline Catheter       None                  Infusing Medications   Medication Dose Last Rate     PRN Medications   Medication Dose Last Admin    nitroglycerin  0.4 mg      bisacodyL  10 mg      melatonin  4.5 mg      sodium chloride  3 mL      traZODone  25 mg      acetaminophen  650 mg      fentaNYL citrate (PF)  12.5 mcg      fentaNYL citrate (PF)  25 mcg      ondansetron  4 mg      Or    ondansetron  4 mg      alum-mag hydroxide-simeth  30 mL      calcium carbonate  500 mg       dextrose 50 % in water (D50W)  15-30 mL      dextrose  15.2 g      glucagon HCL  1 mg      Or    glucagon HCL  1 mg

## 2025-04-27 NOTE — INTERDISCIPLINARY/THERAPY
TC  [facility] to check bed status. There is no bed anticipated to be available for pt to transfer today

## 2025-04-27 NOTE — NURSING END OF SHIFT
"Nursing End of Shift Summary:     Patient: Pete Trejo  MRN: [id number]  : 1965, Age: 59 y.o.    Location: [address] [place] [address]    Nursing Goals  Clinical Goals for the Shift: stable VS/lab    Narrative Summary of Progress Toward Clinical Goals:  Waiting for bed placement in [place]    Barriers to Goals/Nursing Concerns:  Yes - increase in frequency of VT    New Patient or Family Concerns/Issues:  Yes - anticipating transfer to [place]    Shift Summary:   Pt went into VT at 1009 to 1030, did attempt valsalva maneuvers, VS stable during episode w/o increased O2 needs. Pt did c/o palpations and \"some difficulty breathing.\" States he was dizzy after 15 minutes from start of most recent episode. Converted to AV pacing w/o interventions. Speed KARLA here at bedside during the last 5 minutes of above. VSS post event.    Son at bedside during visit w/Speed.    No additional cardiac events this shift.    Tolerates sitting in chair and is independent in room.  Significant Events & Communications to Providers (Last 12 Hours)       Last 5 Values    No documentation.                 Oxygen Usage (Last 12 Hours)       Last 5 Values    No documentation.                 Mobility (Last 12 Hours)       Last 5 Values       Row Name 25 0000 25 0017 25 0345 25 0749 25 1013       Mobility    Activity -- -- -- Ambulate in room;Back to bed --    Level of Assistance -- -- -- Independent --    Distance Ambulated (feet) -- -- -- 20 Feet --    Distance Ambulated (Meters Calculated) -- -- -- 6.1 Meters --    Patient Position -- In bed In bed In bed In bed    Turning/Repositioning Turns self -- -- Turns self --    Distance Ambulated (Meters Calculated)(Do Not Use) -- -- -- 6.1 Feet --      Row Name 25 1041                   Mobility    Patient Position In bed                      Urethral Catheter       Active Urethral Catheter       None                  Active Lines       Active Central venous catheter / Peripherally " inserted central catheter / Implantable Port / Hemodialysis catheter / Midline Catheter       None                  Infusing Medications   Medication Dose Last Rate     PRN Medications   Medication Dose Last Admin    nitroglycerin  0.4 mg      bisacodyL  10 mg      melatonin  4.5 mg      sodium chloride  3 mL      traZODone  25 mg      acetaminophen  650 mg      fentaNYL citrate (PF)  12.5 mcg      fentaNYL citrate (PF)  25 mcg      ondansetron  4 mg      Or    ondansetron  4 mg      alum-mag hydroxide-simeth  30 mL      calcium carbonate  500 mg      dextrose 50 % in water (D50W)  15-30 mL      dextrose  15.2 g      glucagon HCL  1 mg      Or    glucagon HCL  1 mg

## 2025-04-28 ENCOUNTER — HOSPITAL ENCOUNTER (INPATIENT)
Facility: CLINIC | Age: 60
DRG: 274 | End: 2025-04-28
Attending: INTERNAL MEDICINE | Admitting: INTERNAL MEDICINE
Payer: MEDICARE

## 2025-04-28 ENCOUNTER — APPOINTMENT (OUTPATIENT)
Dept: CARDIOLOGY | Facility: HOSPITAL | Age: 60
DRG: 309 | End: 2025-04-28
Payer: MEDICARE

## 2025-04-28 ENCOUNTER — TELEPHONE (OUTPATIENT)
Dept: CARDIOLOGY | Facility: CLINIC | Age: 60
End: 2025-04-28

## 2025-04-28 VITALS
HEART RATE: 70 BPM | OXYGEN SATURATION: 93 % | RESPIRATION RATE: 24 BRPM | SYSTOLIC BLOOD PRESSURE: 118 MMHG | HEIGHT: 71 IN | DIASTOLIC BLOOD PRESSURE: 78 MMHG | TEMPERATURE: 98.3 F | BODY MASS INDEX: 36.43 KG/M2 | WEIGHT: 260.2 LBS

## 2025-04-28 DIAGNOSIS — I47.20 VENTRICULAR TACHYCARDIA (H): Primary | ICD-10-CM

## 2025-04-28 DIAGNOSIS — I25.5 ISCHEMIC CARDIOMYOPATHY: ICD-10-CM

## 2025-04-28 DIAGNOSIS — I50.21 ACUTE SYSTOLIC HEART FAILURE (H): ICD-10-CM

## 2025-04-28 PROBLEM — I50.9 HEART FAILURE (H): Status: ACTIVE | Noted: 2025-04-28

## 2025-04-28 LAB
ALBUMIN SERPL-MCNC: 4 G/DL (ref 3.5–5.3)
ALP SERPL-CCNC: 58 U/L (ref 45–115)
ALT SERPL-CCNC: 41 U/L (ref 7–52)
ANION GAP SERPL CALC-SCNC: 9 MMOL/L (ref 3–11)
AST SERPL-CCNC: 19 U/L
BASOPHILS # BLD AUTO: 0.1 10*3/UL
BASOPHILS NFR BLD AUTO: 1 % (ref 0–2)
BILIRUB SERPL-MCNC: 0.91 MG/DL (ref 0.2–1.4)
BUN SERPL-MCNC: 16 MG/DL (ref 7–25)
CALCIUM ALBUM COR SERPL-MCNC: 8.9 MG/DL (ref 8.6–10.3)
CALCIUM SERPL-MCNC: 8.9 MG/DL (ref 8.6–10.3)
CHLORIDE SERPL-SCNC: 106 MMOL/L (ref 98–107)
CO2 SERPL-SCNC: 22 MMOL/L (ref 21–32)
CREAT SERPL-MCNC: 0.95 MG/DL (ref 0.7–1.3)
EGFRCR SERPLBLD CKD-EPI 2021: 93 ML/MIN/1.73M*2
EOSINOPHIL # BLD AUTO: 0.2 10*3/UL
EOSINOPHIL NFR BLD AUTO: 2.8 % (ref 0–3)
ERYTHROCYTE [DISTWIDTH] IN BLOOD BY AUTOMATED COUNT: 15.2 % (ref 11.5–15)
GLUCOSE SERPL-MCNC: 94 MG/DL (ref 70–105)
HCT VFR BLD AUTO: 48.7 % (ref 38–50)
HGB BLD-MCNC: 16.4 G/DL (ref 13.2–17.2)
LYMPHOCYTES # BLD AUTO: 2 10*3/UL
LYMPHOCYTES NFR BLD AUTO: 36.4 % (ref 15–47)
MAGNESIUM SERPL-MCNC: 1.9 MG/DL (ref 1.8–2.4)
MCH RBC QN AUTO: 31.7 PG (ref 29–34)
MCHC RBC AUTO-ENTMCNC: 33.6 G/DL (ref 32–36)
MCV RBC AUTO: 94.2 FL (ref 82–97)
MONOCYTES # BLD AUTO: 0.4 10*3/UL
MONOCYTES NFR BLD AUTO: 7 % (ref 5–13)
NEUTROPHILS # BLD AUTO: 2.9 10*3/UL
NEUTROPHILS NFR BLD AUTO: 52.8 % (ref 46–70)
PLATELET # BLD AUTO: 179 10*3/UL (ref 130–350)
PMV BLD AUTO: 8.3 FL (ref 6.9–10.8)
POTASSIUM SERPL-SCNC: 3.8 MMOL/L (ref 3.5–5.1)
PROT SERPL-MCNC: 6.7 G/DL (ref 6–8.3)
RBC # BLD AUTO: 5.17 10*6/UL (ref 4.1–5.8)
SODIUM SERPL-SCNC: 137 MMOL/L (ref 135–145)
WBC # BLD AUTO: 5.5 10*3/UL (ref 3.7–9.6)

## 2025-04-28 PROCEDURE — 80053 COMPREHEN METABOLIC PANEL: CPT | Performed by: INTERNAL MEDICINE

## 2025-04-28 PROCEDURE — 6370000100 HC RX 637 (ALT 250 FOR IP): Performed by: NURSE PRACTITIONER

## 2025-04-28 PROCEDURE — 93288 INTERROG EVL PM/LDLS PM IP: CPT

## 2025-04-28 PROCEDURE — 99222 1ST HOSP IP/OBS MODERATE 55: CPT | Mod: 25 | Performed by: INTERNAL MEDICINE

## 2025-04-28 PROCEDURE — 6370000100 HC RX 637 (ALT 250 FOR IP): Performed by: INTERNAL MEDICINE

## 2025-04-28 PROCEDURE — 83735 ASSAY OF MAGNESIUM: CPT | Performed by: INTERNAL MEDICINE

## 2025-04-28 PROCEDURE — 36415 COLL VENOUS BLD VENIPUNCTURE: CPT | Performed by: INTERNAL MEDICINE

## 2025-04-28 PROCEDURE — 85025 COMPLETE CBC W/AUTO DIFF WBC: CPT | Performed by: INTERNAL MEDICINE

## 2025-04-28 PROCEDURE — 120N000005 HC R&B MS OVERFLOW UMMC

## 2025-04-28 PROCEDURE — 6370000100 HC RX 637 (ALT 250 FOR IP): Performed by: HOSPITALIST

## 2025-04-28 PROCEDURE — 99232 SBSQ HOSP IP/OBS MODERATE 35: CPT | Performed by: INTERNAL MEDICINE

## 2025-04-28 RX ORDER — LIDOCAINE 40 MG/G
CREAM TOPICAL
Status: DISCONTINUED | OUTPATIENT
Start: 2025-04-28 | End: 2025-05-04 | Stop reason: HOSPADM

## 2025-04-28 RX ORDER — AMOXICILLIN 250 MG
2 CAPSULE ORAL 2 TIMES DAILY PRN
Status: DISCONTINUED | OUTPATIENT
Start: 2025-04-28 | End: 2025-05-04 | Stop reason: HOSPADM

## 2025-04-28 RX ORDER — CARVEDILOL 25 MG/1
25 TABLET ORAL ONCE
Status: COMPLETED | OUTPATIENT
Start: 2025-04-28 | End: 2025-04-28

## 2025-04-28 RX ORDER — LORAZEPAM 1 MG/1
1 TABLET ORAL EVERY 4 HOURS PRN
Status: DISCONTINUED | OUTPATIENT
Start: 2025-04-28 | End: 2025-04-28 | Stop reason: HOSPADM

## 2025-04-28 RX ORDER — CALCIUM CARBONATE 500 MG/1
1000 TABLET, CHEWABLE ORAL 4 TIMES DAILY PRN
Status: DISCONTINUED | OUTPATIENT
Start: 2025-04-28 | End: 2025-05-04 | Stop reason: HOSPADM

## 2025-04-28 RX ORDER — AMOXICILLIN 250 MG
1 CAPSULE ORAL 2 TIMES DAILY PRN
Status: DISCONTINUED | OUTPATIENT
Start: 2025-04-28 | End: 2025-05-04 | Stop reason: HOSPADM

## 2025-04-28 RX ORDER — POLYETHYLENE GLYCOL 3350 17 G/17G
17 POWDER, FOR SOLUTION ORAL 2 TIMES DAILY PRN
Status: DISCONTINUED | OUTPATIENT
Start: 2025-04-28 | End: 2025-05-04 | Stop reason: HOSPADM

## 2025-04-28 RX ADMIN — FUROSEMIDE 40 MG: 40 TABLET ORAL at 09:14

## 2025-04-28 RX ADMIN — EMPAGLIFLOZIN 10 MG: 10 TABLET, FILM COATED ORAL at 09:15

## 2025-04-28 RX ADMIN — LORAZEPAM 1 MG: 1 TABLET ORAL at 18:46

## 2025-04-28 RX ADMIN — SACUBITRIL AND VALSARTAN 1 TABLET: 97; 103 TABLET, FILM COATED ORAL at 09:14

## 2025-04-28 RX ADMIN — RIVAROXABAN 20 MG: 20 TABLET, FILM COATED ORAL at 18:21

## 2025-04-28 RX ADMIN — PRASUGREL 10 MG: 10 TABLET, FILM COATED ORAL at 09:14

## 2025-04-28 RX ADMIN — CARVEDILOL 25 MG: 25 TABLET, FILM COATED ORAL at 10:34

## 2025-04-28 RX ADMIN — TAMSULOSIN HYDROCHLORIDE 0.4 MG: 0.4 CAPSULE ORAL at 09:15

## 2025-04-28 RX ADMIN — CARVEDILOL 50 MG: 25 TABLET, FILM COATED ORAL at 18:21

## 2025-04-28 RX ADMIN — MEXILETINE HYDROCHLORIDE 300 MG: 150 CAPSULE ORAL at 14:17

## 2025-04-28 RX ADMIN — MEXILETINE HYDROCHLORIDE 300 MG: 150 CAPSULE ORAL at 06:14

## 2025-04-28 RX ADMIN — SPIRONOLACTONE 25 MG: 25 TABLET ORAL at 09:15

## 2025-04-28 NOTE — TELEPHONE ENCOUNTER
Received a voicemail from JASON Ewing in Electrophysiology Dept at [facility], in regards to pt needing a device check. She is aware of pt recently having a device check while in [facility] but unable to receive the results due to the Release not being signed yet. She stated that she can be called at [phone]. If not available, can call [phone].  Device check results can be faxed to [phone].

## 2025-04-28 NOTE — Clinical Note
Arrhythmia Type: polymorphic VT.   Method of Cardioversion: synchronous.   The arrhythmia was not terminated. Energy shock delivered: 200 joules.

## 2025-04-28 NOTE — PLAN OF CARE
Problem: Neurosensory - Adult  Goal: Achieves stable or improved neurological status  Outcome: Progressing  Flowsheets (Taken 4/27/2025 0800 by Nurse Suzan ERICKSON RN)  Achieves stable or improved neurological status:   Assess for and report changes in neurological status   Maintain blood pressure and fluid volume within ordered parameters to optimize cerebral perfusion and minimize risk of hemorrhage   Monitor temperature, glucose, and sodium.  Goal: Achieves maximal functionality and self care  Outcome: Progressing  Flowsheets (Taken 4/27/2025 0800 by Nurse Suzan ERICKSON RN)  Achieves maximal functionality and self care: With patient fatigue and changes in neurological status, monitor swallowing and airway patency  Goal: Will maintain current level or return to usual level of neurologic status, motor/sensory function and hemodynamic stability after a hematologic cerebral event such as stroke or TIA.  Outcome: Progressing  Flowsheets (Taken 4/25/2025 0735 by Nurse Kristen COLEMAN RN)  Will maintain current level or return to usual level of neurologic status, motor/sensory function and hemodynamic stability after a hematologic cerebral event such as stroke or TIA: Encourage and assist patient to increase activity and self-care with guidance from PT/OT/ST/RT     Problem: Cardiovascular - Adult  Goal: Maintains optimal cardiac output and hemodynamic stability  Outcome: Progressing  Flowsheets (Taken 4/27/2025 0800 by Nurse Suzan ERICKSON RN)  Maintain optimal cardiac output and hemodynamic stability:   Monitor heart rate, blood pressure, and cardiac rhythm   Monitor for hypotension and other signs of decreased cardiac output   Monitor for fluid overload/dehydration, weight gain, shortness of breath and activity intolerance   Assess quality of pulses, capillary refill, edema, sensation, skin color and temperature  Goal: Absence of cardiac dysrhythmias or at baseline  Outcome: Progressing  Flowsheets (Taken 4/27/2025 0800 by Nurse Suzan ERICKSON  RN)  Absence of cardiac dysrhythmias or at baseline:   Continuous cardiac monitoring, monitor vital signs (see flowsheet documentation)   Obtain 12 lead EKG if indicated   Administer antiarrhythmic and heart rate control medications as ordered   Initiate emergency measures for life threatening arrhythmias   Monitor electrolytes and administer replacement therapy as ordered     Problem: Respiratory - Adult  Goal: Achieves optimal ventilation and oxygenation  Outcome: Progressing  Flowsheets (Taken 4/24/2025 1600 by Nurse Odilia SANTIZO RN)  Achieves optimal ventilation and oxygenation:   Assess and report changes in respiratory status   Assess and report changes in mentation and behavior   Position to facilitate oxygenation and minimize respiratory effort   Oxygen supplementation based on oxygen saturation or arterial blood gases  Goal: Achieves optimal ventilation and oxygenation with noninvasive CPAP/BiLEVEL support  Outcome: Progressing  Flowsheets (Taken 4/24/2025 0040 by Nurse Ann MCMANUS RN)  Achieves Optimal Oxygenation with Noninvasive CPAP/BiLEVEL Support:   Position patient to facilitate optimal ventilation/oxygenation status and minimize respiratory effort   Provide education to patient/support person about rationale and expected outcomes associated with therapy   Assess effectiveness of therapy on ventilation/oxygenation status based on oxygen saturation and/or arterial blood gases, as indicated   Position patient to reduce aspiration risk, elevate head of bed at least 35 degrees or higher, as applicable   Assess patient and report changes in respiratory and physiological status   Assess and monitor skin condition, in relationship to the respiratory interface   Routinely monitor equipment for proper performance and settings   Assess patient and report changes in mentation and behavior   Assure equipment alarm volume is adequate for the patient's environment   Immediately repsond to equipment alarm, to assess  patient and/or cause for alarm     Problem: Gastrointestinal - Adult  Goal: Nutrient intake appropriate for improving, restoring or maintaining nutritional needs  Outcome: Progressing  Flowsheets (Taken 4/27/2025 0800 by Nurse Suzan ERICKSON RN)  Nutrient intake appropriate for improving, restoring or maintaining nutritional needs: Monitor I&O, weight and lab values  Goal: Minimal or absence of nausea and vomiting  Outcome: Progressing  Flowsheets (Taken 4/27/2025 0800 by Nurse Suzan ERICKSON RN)  Minimal or absence of nausea and vomiting:   Assess patient for nausea/vomiting and report changes   Monitor intake and output to ensure adequate hydration  Goal: Maintains or returns to baseline digestive function  Outcome: Progressing  Flowsheets (Taken 4/24/2025 0040 by Nurse Ann MCMANUS, RN)  Maintains or returns to baseline bowel function:   Assess abdomen and bowel status and report changes in gastrointestinal function   Monitor for metabolic panel imbalances   Nutrition consult as indicated   Administer ordered medications as needed   Assess hydration and nutritional status   Assess for treatment effectiveness   Encourage mobilization and activity   Assess characteristics and frequency of stool  Goal: Maintains Optimal Tissue Perfusion  Outcome: Progressing  Flowsheets (Taken 4/24/2025 0040 by Nurse Ann MCMANUS RN)  Maintains Optimal Tissue Perfusion:   Monitor nutritional intake, intake/output, and weight   Monitor for increased abdominal girth, firmness or intra-abdominal pressure   Assess skin color, capillary refill and edema   Assess blood pressure, heart rate, and oxygen saturation   Monitor metabolic panels, blood count panels, and coagulations panels as ordered   Assess for blood in emesis and stool   Assess and report changes in bowel function     Problem: Genitourinary - Adult  Goal: Absence of urinary retention  Outcome: Progressing  Flowsheets (Taken 4/24/2025 0040 by Nurse Ann MCMANUS RN)  Absence of urinary retention:    Administer medications to alleviate retention as needed   Assess patient’s ability to void and empty bladder   Monitor intake/output and perform bladder scan if indicated   If urinary catheter present, initiate and maintain CAUTI bundle  Goal: Absence of Urinary Infection  Outcome: Progressing  Flowsheets (Taken 4/24/2025 0040 by Nurse Ann MCMANUS, RN)  Absence of Urinary Infection:   Assess urinary status for burning, urgency, frequency, pain and urine characteristics   Obtain urinalysis as ordered   Monitor intake/output   Assess for neurological status changes   Monitor lab values     Problem: Metabolic/Fluid and Electrolytes - Adult  Goal: Electrolytes maintained within normal limits  Outcome: Progressing  Flowsheets (Taken 4/25/2025 0735 by Nurse Kristen COLEMAN RN)  Electrolytes maintained within normal limits:   Monitor labs and assess patient for signs and symptoms of electrolyte imbalances   Administer and monitor response to electrolyte replacements   Initiate and instruct patient/support person on fluid and nutrition restrictions as ordered  Goal: Maintain Optimal Renal Function and Hemodynamic Stability  Outcome: Progressing  Flowsheets (Taken 4/24/2025 0040 by Nurse Ann MCMANUS, RN)  Maintain optimal renal function and hemodynaimc stability:   Monitor labs and assess for signs and symptoms of volume excess or deficit   Monitor intake, output and patient weight   Monitor response to interventions for patient's volume status, including labs, urine output, blood pressure (other measures as available)   Encourage oral intake as appropriate   Instruct patient on fluid and nutrition restrictions as appropriate  Goal: Glucose maintained within prescribed range  Outcome: Progressing  Flowsheets (Taken 4/24/2025 0040 by Nurse Ann MCMANUS, RN)  Glucose maintained within prescribed range:   Assess for signs and symptoms of hyperglycemia and hypoglycemia   Monitor blood glucose as ordered   Administer ordered medications to  maintain glucose within target range   Assess barriers to adequate nutritional intake and initiate nutrition consult as needed   Assess baseline knowledge and provide education as indicated   Monitor exercise as may reduce the requirements for insulin     Problem: Skin/Tissue Integrity - Adult  Goal: Skin integrity remains intact  Outcome: Progressing  Flowsheets (Taken 4/27/2025 0800 by Nurse Suzan ERICKSON RN)  Skin integrity remains intact:   Assess and document risk factors for pressure ulcer development   Assess and document skin integrity     Problem: Hematologic - Adult  Goal: Maintains hematologic stability  Outcome: Progressing  Flowsheets (Taken 4/25/2025 0735 by Nurse Kristen COLEMAN RN)  Maintains hematologic stability:   Assess for signs and symptoms of bleeding or hemorrhage   Initiate bleeding precautions as indicated   Administer medications as ordered   Monitor labs   Administer supportive blood products/factors as ordered   Educate patient/support person to report signs/symptoms of bleeding     Problem: Neurosensory - Adult  Goal: Achieves stable or improved neurological status  Outcome: Progressing  Flowsheets (Taken 4/27/2025 0800 by Nurse Suzan ERICKSON RN)  Achieves stable or improved neurological status:   Assess for and report changes in neurological status   Maintain blood pressure and fluid volume within ordered parameters to optimize cerebral perfusion and minimize risk of hemorrhage   Monitor temperature, glucose, and sodium.  Goal: Achieves maximal functionality and self care  Outcome: Progressing  Flowsheets (Taken 4/27/2025 0800 by Nurse Suzan ERICKSON RN)  Achieves maximal functionality and self care: With patient fatigue and changes in neurological status, monitor swallowing and airway patency  Goal: Will maintain current level or return to usual level of neurologic status, motor/sensory function and hemodynamic stability after a hematologic cerebral event such as stroke or TIA.  Outcome:  Progressing  Flowsheets (Taken 4/25/2025 0735 by Nurse Kristen COLEMAN RN)  Will maintain current level or return to usual level of neurologic status, motor/sensory function and hemodynamic stability after a hematologic cerebral event such as stroke or TIA: Encourage and assist patient to increase activity and self-care with guidance from PT/OT/ST/RT     Problem: Cardiovascular - Adult  Goal: Maintains optimal cardiac output and hemodynamic stability  Outcome: Progressing  Flowsheets (Taken 4/27/2025 0800 by Nurse Suzan ERICKSON RN)  Maintain optimal cardiac output and hemodynamic stability:   Monitor heart rate, blood pressure, and cardiac rhythm   Monitor for hypotension and other signs of decreased cardiac output   Monitor for fluid overload/dehydration, weight gain, shortness of breath and activity intolerance   Assess quality of pulses, capillary refill, edema, sensation, skin color and temperature  Goal: Absence of cardiac dysrhythmias or at baseline  Outcome: Progressing  Flowsheets (Taken 4/27/2025 0800 by Nurse Suzan ERICKSON RN)  Absence of cardiac dysrhythmias or at baseline:   Continuous cardiac monitoring, monitor vital signs (see flowsheet documentation)   Obtain 12 lead EKG if indicated   Administer antiarrhythmic and heart rate control medications as ordered   Initiate emergency measures for life threatening arrhythmias   Monitor electrolytes and administer replacement therapy as ordered     Problem: Respiratory - Adult  Goal: Achieves optimal ventilation and oxygenation  Outcome: Progressing  Flowsheets (Taken 4/24/2025 1600 by Nurse Odilia SANTIZO RN)  Achieves optimal ventilation and oxygenation:   Assess and report changes in respiratory status   Assess and report changes in mentation and behavior   Position to facilitate oxygenation and minimize respiratory effort   Oxygen supplementation based on oxygen saturation or arterial blood gases  Goal: Achieves optimal ventilation and oxygenation with noninvasive  CPAP/BiLEVEL support  Outcome: Progressing  Flowsheets (Taken 4/24/2025 0040 by Nurse Ann MCMANUS RN)  Achieves Optimal Oxygenation with Noninvasive CPAP/BiLEVEL Support:   Position patient to facilitate optimal ventilation/oxygenation status and minimize respiratory effort   Provide education to patient/support person about rationale and expected outcomes associated with therapy   Assess effectiveness of therapy on ventilation/oxygenation status based on oxygen saturation and/or arterial blood gases, as indicated   Position patient to reduce aspiration risk, elevate head of bed at least 35 degrees or higher, as applicable   Assess patient and report changes in respiratory and physiological status   Assess and monitor skin condition, in relationship to the respiratory interface   Routinely monitor equipment for proper performance and settings   Assess patient and report changes in mentation and behavior   Assure equipment alarm volume is adequate for the patient's environment   Immediately repsond to equipment alarm, to assess patient and/or cause for alarm     Problem: Gastrointestinal - Adult  Goal: Nutrient intake appropriate for improving, restoring or maintaining nutritional needs  Outcome: Progressing  Flowsheets (Taken 4/27/2025 0800 by Nurse Suzan ERICKSON RN)  Nutrient intake appropriate for improving, restoring or maintaining nutritional needs: Monitor I&O, weight and lab values  Goal: Minimal or absence of nausea and vomiting  Outcome: Progressing  Flowsheets (Taken 4/27/2025 0800 by Nurse Suzan ERICKSON RN)  Minimal or absence of nausea and vomiting:   Assess patient for nausea/vomiting and report changes   Monitor intake and output to ensure adequate hydration  Goal: Maintains or returns to baseline digestive function  Outcome: Progressing  Flowsheets (Taken 4/24/2025 0040 by Nurse Ann MCMANUS RN)  Maintains or returns to baseline bowel function:   Assess abdomen and bowel status and report changes in gastrointestinal  function   Monitor for metabolic panel imbalances   Nutrition consult as indicated   Administer ordered medications as needed   Assess hydration and nutritional status   Assess for treatment effectiveness   Encourage mobilization and activity   Assess characteristics and frequency of stool  Goal: Maintains Optimal Tissue Perfusion  Outcome: Progressing  Flowsheets (Taken 4/24/2025 0040 by Nurse Ann MCMANUS, RN)  Maintains Optimal Tissue Perfusion:   Monitor nutritional intake, intake/output, and weight   Monitor for increased abdominal girth, firmness or intra-abdominal pressure   Assess skin color, capillary refill and edema   Assess blood pressure, heart rate, and oxygen saturation   Monitor metabolic panels, blood count panels, and coagulations panels as ordered   Assess for blood in emesis and stool   Assess and report changes in bowel function     Problem: Genitourinary - Adult  Goal: Absence of urinary retention  Outcome: Progressing  Flowsheets (Taken 4/24/2025 0040 by Nurse Ann MCMANUS, RN)  Absence of urinary retention:   Administer medications to alleviate retention as needed   Assess patient’s ability to void and empty bladder   Monitor intake/output and perform bladder scan if indicated   If urinary catheter present, initiate and maintain CAUTI bundle  Goal: Absence of Urinary Infection  Outcome: Progressing  Flowsheets (Taken 4/24/2025 0040 by Nurse Ann MCMANUS, RN)  Absence of Urinary Infection:   Assess urinary status for burning, urgency, frequency, pain and urine characteristics   Obtain urinalysis as ordered   Monitor intake/output   Assess for neurological status changes   Monitor lab values     Problem: Metabolic/Fluid and Electrolytes - Adult  Goal: Electrolytes maintained within normal limits  Outcome: Progressing  Flowsheets (Taken 4/25/2025 0735 by Nurse Kristen COLEMAN RN)  Electrolytes maintained within normal limits:   Monitor labs and assess patient for signs and symptoms of electrolyte imbalances    Administer and monitor response to electrolyte replacements   Initiate and instruct patient/support person on fluid and nutrition restrictions as ordered  Goal: Maintain Optimal Renal Function and Hemodynamic Stability  Outcome: Progressing  Flowsheets (Taken 4/24/2025 0040 by Nurse Ann MCMANUS, RN)  [facility]aint[facility]:   Monitor labs and assess for signs and symptoms of volume excess or deficit   Monitor intake, output and patient weight   Monitor response to interventions for patient's volume status, including labs, urine output, blood pressure (other measures as available)   Encourage oral intake as appropriate   Instruct patient on fluid and nutrition restrictions as appropriate  Goal: Glucose maintained within prescribed range  Outcome: Progressing  Flowsheets (Taken 4/24/2025 0040 by Nurse Ann MCMANUS, RN)  Glucose maintained within prescribed range:   Assess for signs and symptoms of hyperglycemia and hypoglycemia   Monitor blood glucose as ordered   Administer ordered medications to maintain glucose within target range   Assess barriers to adequate nutritional intake and initiate nutrition consult as needed   Assess baseline knowledge and provide education as indicated   Monitor exercise as may reduce the requirements for insulin     Problem: Skin/Tissue Integrity - Adult  Goal: Skin integrity remains intact  Outcome: Progressing  Flowsheets (Taken 4/27/2025 0800 by Nurse Suzan ERICKSON RN)  Skin integrity remains intact:   Assess and document risk factors for pressure ulcer development   Assess and document skin integrity     Problem: Hematologic - Adult  Goal: Maintains hematologic stability  Outcome: Progressing  Flowsheets (Taken 4/25/2025 0735 by Nurse Kristen COLEMAN RN)  Maintains hematologic stability:   Assess for signs and symptoms of bleeding or hemorrhage   Initiate bleeding precautions as indicated   Administer medications as ordered   Monitor labs   Administer supportive  blood products/factors as ordered   Educate patient/support person to report signs/symptoms of bleeding

## 2025-04-28 NOTE — Clinical Note
Sheath exchanged in the right femoral vein. [FreeTextEntry1] : Cardiac CT: 5/22/2019, Ascending aorta 4.1 cm at level of right pulmonary artery. Aortic root 4.5 cm. Aortic arch 2.7 cm. Main pulmonary artery normal size and mild coronary calcifications. 2x4 mm RUL nodule. 3 mm RUL nodule. No enlarged axillary or thoracic lymph nodes.

## 2025-04-28 NOTE — Clinical Note
Arrhythmia Type: polymorphic VT.   Method of Cardioversion: synchronous.   The arrhythmia was terminated.   Energy shock delivered: 200 joules.   Post cardioversion rhythm: A-paced.

## 2025-04-28 NOTE — INTERDISCIPLINARY/THERAPY
Assistant Progress Note:  [phone]    No 2nd IMM needed per Mehreen KATE (CM Manager) due to patient being transferred laterally to another facility.    CMA updated CM  RH

## 2025-04-28 NOTE — PROGRESS NOTES
"  [address] [place] [address]                                                    Electrophysiology Inpatient Progress Note    Subjective    Patient ID: Pete Trejo is a 59 y.o. male with past medical history significant for ischemic cardiomyopathy and recurrent VT status post multiple VT ablations.  Patient was recently hospitalized 4/22-23 for VT with ATP therapies.  His amiodarone had recently been decreased secondary to symptoms of ataxia, right hand tremors and significant functional decline.  He was reloaded with IV amiodarone on 4/22 and transition to oral amiodarone on 4/23.  He had no more episodes of VT and was discharged home on 4/23.  Unfortunately patient did not even make it home before he had palpitations and feeling like his ICD was \"going off\" and came back to the emergency department.  His ICD was interrogated which showed several episodes of slow VT (heart rate 110 bpm) with several episodes of AT therapy.  Patient was reloaded with IV amiodarone and admitted to the hospitalist team.  Case was discussed with [facility] and he was accepted on 4/25, unfortunately there was no bed available for him.  Recommendations from [facility] were to stop the amiodarone with plans for EP study in hopes of being able to induce VT and ablate.  Over the weekend Pete had several runs of slow VT requiring multiple episodes of ATP and 2 boluses of amiodarone, 1 on 2/25 and 1 on 2/27, both 150 mg each.  Patient was symptomatic with his episodes of VT, VT was slow with rates about 105-109.  He did get extremely short of breath and hypotensive.    Chief Complaint:   Chief Complaint   Patient presents with    Palpitations     Pt reports to the ED after recently being DC with palpitations. Pt reports the palpitations have returned in the last hour.         LOS: 5 days     HPI:  Patient is having daily Slow VT. Device checked this am and patient has " received multiple ATP therapies. He was given an Amio bolus on Friday 4/25 and another bolus on 4/27.    Tele reviewed: Running was not slow VT from 8:01 PM to 10:05 PM, his heart rate was just under the VT zone 1 so he did not receive ATP until the end.     Patient seen in room 361.  Patient is anxiously awaiting his transfer to [facility] as they did call and report they have a bed available for him.  Patient has had no more VT since last evening.  Questions and concerns answered.    Allergies as of 04/23/2025 - Reviewed 04/23/2025   Allergen Reaction Noted    Morphine  07/30/2017    Tramadol  07/30/2017       Current Facility-Administered Medications:     carvediloL (COREG) tablet 25 mg, 25 mg, oral, Once, Jazmin Leo CNP    mexiletine (MEXITIL) capsule 300 mg, 300 mg, oral, q8h KULDEEP, Jazmin Leo CNP, 300 mg at 04/28/25 0614    carvediloL (COREG) tablet 50 mg, 50 mg, oral, 2x daily with meals, Jasmin Mcknight MD, 50 mg at 04/27/25 0912    empagliflozin (JARDIANCE) tablet 10 mg, 10 mg, oral, Daily, Jasmin Mcknight MD, 10 mg at 04/28/25 0915    furosemide (LASIX) tablet 40 mg, 40 mg, oral, Daily, Jasmin Mcknight MD, 40 mg at 04/28/25 0914    nitroglycerin (NITROSTAT) SL tablet 0.4 mg, 0.4 mg, sublingual, q5 min PRN, Jasmin Mcknight MD    prasugreL HCl (EFFIENT) tablet 10 mg, 10 mg, oral, Daily, Jasmin Mcknight MD, 10 mg at 04/28/25 0914    sacubitriL-valsartan (ENTRESTO)  mg 1 tablet, 1 tablet, oral, 2x daily, Jasmin Mcknight MD, 1 tablet at 04/28/25 0914    spironolactone (ALDACTONE) tablet 25 mg, 25 mg, oral, Daily, Jasmin Mcknight MD, 25 mg at 04/28/25 0915    bisacodyL (DULCOLAX) suppository 10 mg, 10 mg, rectal, Daily PRN, Jasmin Mcknight MD    rivaroxaban (XARELTO) tablet 20 mg, 20 mg, oral, Daily with dinner, Jasmin Mcknight MD, 20 mg at 04/27/25 1734    melatonin tablet 4.5 mg, 4.5 mg, oral, Nightly PRN, Jamsin Mcknight MD    rosuvastatin (CRESTOR) tablet 40 mg, 40 mg,  oral, Nightly, Jasmin Mcknight MD, 40 mg at 04/27/25 2026    tamsulosin (FLOMAX) 24 hr capsule 0.4 mg, 0.4 mg, oral, Daily, Jasmin Mcknight MD, 0.4 mg at 04/28/25 0915    Maintain IV access, , , Until discontinued **AND** Saline lock IV, , , Once **AND** sodium chloride flush 3 mL, 3 mL, intravenous, PRN, Jasmin Mcknight MD    traZODone (DESYREL) tablet 25 mg, 25 mg, oral, Nightly PRN, Jasmin Mcknight MD    acetaminophen (TYLENOL) tablet 650 mg, 650 mg, oral, q6h PRN, Jasmin Mcknight MD    fentaNYL citrate (PF) 50 mcg/mL injection 12.5 mcg, 12.5 mcg, intravenous, q2h PRN, Jasmin Mcknight MD    fentaNYL citrate (PF) 50 mcg/mL injection 25 mcg, 25 mcg, intravenous, q2h PRN, Jasmin Mcknight MD    ondansetron (ZOFRAN) tablet 4 mg, 4 mg, oral, q6h PRN **OR** ondansetron (ZOFRAN) injection 4 mg, 4 mg, intravenous, q6h PRN, Jasmin Mcknight MD    alum-mag hydroxide-simeth (MAALOX ADVANCED) suspension 30 mL, 30 mL, oral, 3x daily PRN, Jasmin Mcknight MD    calcium carbonate (TUMS) chewable tablet 500 mg, 500 mg, oral, 3x daily PRN, Jasmin Mcknight MD    dextrose 50 % in water (D50W) syringe 15-30 mL, 15-30 mL, intravenous, q15 min PRN, Jasmin Mcknight MD    dextrose (GLUTOSE) 40 % gel 15.2 g, 15.2 g, oral, q15 min PRN, Jasmin Mcknight MD    glucagon HCL injection 1 mg, 1 mg, intramuscular, q15 min PRN **OR** glucagon HCL injection 1 mg, 1 mg, subcutaneous, q15 min PRN, Jasmin Mcknight MD    Objective     Vital signs in last 24 hours:   Temp:  [36.7 °C (98 °F)-36.9 °C (98.4 °F)] 36.8 °C (98.2 °F)  Heart Rate:  [] 70  Resp:  [16-24] 24  SpO2:  [86 %-95 %] 90 %  BP: ()/(48-75) 90/48  Weight: 118 kg (260 lb 3.2 oz)    Intake/Output this shift:  No intake/output data recorded.    Intake/Output Summary (Last 24 hours) at 4/28/2025 0986  Last data filed at 4/28/2025 0505  Gross per 24 hour   Intake 2020 ml   Output 0 ml   Net 2020 ml     ROS:  As noted in HPI    Physical Exam:  General Appearance: awake,  alert, NAD  HEENT: EOM intact, sclera without icterus   Respiratory: Normal effort  Cardiac: Regular  Abdomen: Nondistended  Extremities: No LE edema  Musck: No gross abn  Neuro: oriented x 4, no focal deficits  Skin: Warm      Data Review:   BMP:  Lab Results   Component Value Date     04/28/2025    K 3.8 04/28/2025     04/28/2025    CO2 22 04/28/2025    BUN 16 04/28/2025    CREATININE 0.95 04/28/2025    GLUCOSE 94 04/28/2025    CALCIUM 8.9 04/28/2025     CBC:   Lab Results   Component Value Date    WBC 5.5 04/28/2025    RBC 5.17 04/28/2025    HGB 16.4 04/28/2025    HCT 48.7 04/28/2025     04/28/2025     CMP:  Lab Results   Component Value Date     04/28/2025    K 3.8 04/28/2025     04/28/2025    CO2 22 04/28/2025    GLUCOSE 94 04/28/2025    CREATININE 0.95 04/28/2025    CALCIUM 8.9 04/28/2025    ALBUMIN 4.0 04/28/2025    ALKPHOS 58 04/28/2025    BILITOT 0.91 04/28/2025    ALT 41 04/28/2025    AST 19 04/28/2025    BUN 16 04/28/2025    ANIONGAP 9 04/28/2025     Magnesium:  Lab Results   Component Value Date    MG 1.9 04/28/2025     Tests:  Cardiac catheterization  Cardiac catheterization, OXIMETRY RUN  Result Date: 4/23/2025  Narrative: DATE OF PROCEDURE: 4/23/2025  PREOPERATIVE DIAGNOSIS:  Pre-op Diagnosis    * Chronic systolic HF (heart failure) (CMS/HCC) [I50.22]    * Ischemic cardiomyopathy [I25.5] Final Impression: 1.  Right atrial mean pressure: 5 mmHg.  O2 saturation 65.0%. 2.  Right ventricular pressure: 27/-1/2 mmHg.  O2 saturation 65.6%. 3.  Right main pulmonary artery pressure: 28/13/16 mmHg.  O2 saturation 65.4%. 4.  Pulmonary capillary wedge pressure: 8 mmHg. 5.  Cardiac output thermodilution technique 4.56 L/min.  Cardiac index 1.9 L/min/m². 6.  Cardiac output Becki technique: 5.7 L/min.  Cardiac index 2.3 L/min/m². Rationale, risks, benefits and alternatives of cardiac catheterization and percutaneous coronary intervention with or without stents is explained to the  patient including, but not limited to, possible risks of infection, bleeding requiring blood transfusion, damage to the blood vessel requiring repair vascular surgery, allergic reactions to the x-ray dye, acute kidney injury including need for possible hemodialysis, significant cardiac arrhythmias, myocardial infarction, stroke, and threat to life. SCAI Shock Stage: A ASA score 3 Mallampati score 3 Procedure: Right heart catheterization with thermodilution cardiac output and O2 saturation run.  Details of the procedure: Informed consent is obtained. After sterile prep and drape 1% lidocaine is infiltrated over the right IJ region.  Access was obtained into right IJ vein using micropuncture technique and limited ultrasound.  The 7 French sheath was placed after wire confirmation with fluoroscopy.  A 7 French balloontipped Gilbert-Avril catheter was advanced into the right atrium with balloon inflated.  O2 saturations in pressures were obtained.  Balloon was then advanced into the right ventricle where pressures and O2 saturations obtained.  Catheter was then advanced into the right main pulmonary artery and the balloon was deflated.  Thermodilution cardiac outputs as well as pressures and saturations were obtained.  Balloon was reinflated and placed in the wedge position.  Pressures obtained.  Balloon deflated and pulled out of the body.  Sheath will be hand pulled with manual pressure.  Anesthesia: 26 minutes 34 seconds of moderate sedation was administered was administered using Fentanyl and Versed under my supervision. I monitored the patient for further administration of agents as needed including continuous monitoring of oxygen saturation, heart rate and blood pressure.  The RN administered the medicine under my direct supervision order, and an independent trained observer was present. Antiplatelet agent: Prasugrel Recommended duration of dual antiplatelet therapy: Not applicable Total Sedation time: 26 minutes 34  seconds Sedation Medications and Contrast use:   04/23/2025 1456 MDT fentaNYL citrate (PF) 50 mcg/mL injection 25 mcg   04/23/2025 1456 MDT midazolam (VERSED) injection 1 mg intravenous Given Total Air Kerma (mGy) 8.790; Fluoro Time (min) = 0.9 MD      [employer]  Result Date: 4/22/2025  Narrative:   Normal left ventricular wall thickness.   Left ventricle is moderately dilated.  End-diastolic dimension 6.8 cm.   Biplane ejection fraction is 32%.  Visually looks much closer to 25%.  No apical thrombi.   Severe left ventricular systolic dysfunction.  Severe ischemic cardiomyopathy with RCA and LAD wall motion abnormalities.  See below.   Grade I (mild) left ventricular diastolic abnormality.   Normal left ventricular filling pressure.   Right ventricular systolic function is low normal.   The left atrium is normal in size.   The right atrium is mildly dilated.   Normal central venous pressure (0-5 mmHg).   No pericardial effusion.   Inadequate TR spectral Doppler to accurately assess right ventricular systolic pressure.   No significant valve disease.   No change from the March 2025 study.     XR chest portable 1 view  Result Date: 4/21/2025  IMPRESSION: No acute disease.       Assessment/Plan   Patient Active Problem List    Diagnosis Date Noted    Palpitations 04/23/2025    Ventricular tachycardia (CMS/HCC) 04/22/2025    Cardiac resynchronization therapy defibrillator (CRT-D) in place 03/22/2025    Gastroesophageal reflux disease without esophagitis 10/19/2023    Benign prostatic hyperplasia without lower urinary tract symptoms 10/19/2023    Diabetes mellitus type 2 in obese (CMS/HCC) 10/19/2023    Moderate obesity 10/19/2023    Elevated troponin 10/19/2023    Lymphocytosis (symptomatic) 10/19/2023    AICD discharge 10/18/2023    Long term current use of antiarrhythmic drug 03/04/2022    LAVON (obstructive sleep apnea) 10/01/2021    Sustained ventricular tachycardia (CMS/HCC) 09/19/2021    Chronic anticoagulation  09/19/2021    Paroxysmal atrial fibrillation (CMS/[facility]) 09/19/2021    Chronic systolic HF (heart failure) (CMS/[facility]) 07/11/2021    Automatic implantable cardioverter-defibrillator in situ 06/10/2020    Presence of stent in coronary artery 06/10/2020    On amiodarone therapy 06/10/2020    Coronary artery disease involving native coronary artery of native heart without angina pectoris 06/10/2020    Ischemic cardiomyopathy 10/30/2017    Hypertension 10/30/2017    Hyperlipidemia 10/30/2017       Telemetry:  Slow VT from 2001 to 2205        Assessment/Plan:    Ventricular tachycardia  Ischemic cardiomyopathy   LVEF 32%  4/22: Presented to the ER last evening with tachycardic palpitations, chest tightness, shortness of breath and lightheadedness.  Symptoms were consistent with prior admissions and he presented for evaluation.  ICD interrogation revealed ventricular tachycardia at 112 bpm with multiple ATP therapies.     4/22: Limited echo showed an EF of 32%.  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    He has had multiple VT ablations ([place], [facility], [facility]).    4/22: reloaded with IV amiodarone.  Unfortunately he is also noted side effects on the higher dose of amiodarone with tremor in the right hand which he had previously when he was on amiodarone and has had progressive decrease in his energy with weakness in his arms and legs.  He is no longer walking the 4-6 blocks with his dogs daily.  If he stands for too long he gets wobbly and has to sit down.    4/22: Shock therapy was turned off in the slow VT zone.   4/23--Discharged home  4/23: presents to ER with palpitations and feeling like his device is going off.  Had several episodes of slow VT (110 bpm), with several ATP therapies              Reloaded with IV amio and started on gtt. Admitted to hospitalist service. Other medications resumed.   4/25: [facility]  has accepted patient.  Plan is to attempt another VT ablation, they would like amio discontinued so that VT will be able to be induced in the lab.  Plan established by Dr. Leiva.              - Evening of 4/25 patient had another episode of slow VT lasting roughly an hour and 23 minutes, 150 mg IV bolus of amiodarone was given and with ATP from his AICD he converted back into normal rhythm.  4/26: No further episodes of ventricular tachycardia since 8 PM last night, will continue with current plan.  I did call case management again this morning and to let [facility] know patient had recurrent slow VT last night.  4/27 VT this morning lasting from 1009 to 1031 with VT rates at 109 before reaching ATP of 110 ultimately putting him in sinus rhythm.  Multiple episodes of slow VT overnight.  Case management notes still awaiting open bed at [facility] today.  Will continue to monitor telemetry.  If patient sustains VT will give 150 mg of amiodarone bolus.  4/28: Had slow VT on 4/27 from 2001 to 2205, rates about 105 just below the VT detect zone, therefore he did not receive ATP till right at the end of his 2-hour episode.  1400, [facility] called to report they have a bed available for him, unfortunately we are not able to transfer right away due to significant weather, flight team on standby.  Patient updated.    CAD  Most recent coronary angiogram on 3/24/2025:  -Proximal LAD stent widely patent.  The remainder of the LAD without significant disease so it is a fairly small caliber vessel.  -80% ostial first diagonal stenosis.  Diffuse 60% proximal disease.  The vessel then bifurcates into a smaller inferior branch that is subtotally occluded within previously placed stent.  Superior diagonal branch with 40% ostial stenosis.  -PTCA of the inferior subtotal occluded diagonal branch with 2.5 mm balloon.  Stenting of the ostial and proximal first diagonal branch up to the bifurcation  with 2.5 x 8 mm DIMAS postdilatation 2.5 mm noncompliant balloon to 20 he.  Good flow in both branches     PAF  [id number] of at least 6  Anticoagulated with Xarelto       Plan discussed in conjunction with Dr. Wu and Dr. Leiva.    Electronically signed by: Jazmin Leo CNP  4/28/2025  9:24 AM

## 2025-04-28 NOTE — PLAN OF CARE
Problem: Neurosensory - Adult  Goal: Achieves stable or improved neurological status  Outcome: Adequate for Discharge  Goal: Achieves maximal functionality and self care  Outcome: Adequate for Discharge  Goal: Will maintain current level or return to usual level of neurologic status, motor/sensory function and hemodynamic stability after a hematologic cerebral event such as stroke or TIA.  Outcome: Adequate for Discharge     Problem: Cardiovascular - Adult  Goal: Maintains optimal cardiac output and hemodynamic stability  Outcome: Adequate for Discharge  Goal: Absence of cardiac dysrhythmias or at baseline  Outcome: Adequate for Discharge     Problem: Respiratory - Adult  Goal: Achieves optimal ventilation and oxygenation  Outcome: Adequate for Discharge  Goal: Achieves optimal ventilation and oxygenation with noninvasive CPAP/BiLEVEL support  Outcome: Adequate for Discharge     Problem: Gastrointestinal - Adult  Goal: Nutrient intake appropriate for improving, restoring or maintaining nutritional needs  Outcome: Adequate for Discharge  Goal: Minimal or absence of nausea and vomiting  Outcome: Adequate for Discharge  Goal: Maintains or returns to baseline digestive function  Outcome: Adequate for Discharge  Goal: Maintains Optimal Tissue Perfusion  Outcome: Adequate for Discharge     Problem: Genitourinary - Adult  Goal: Absence of urinary retention  Outcome: Adequate for Discharge  Goal: Absence of Urinary Infection  Outcome: Adequate for Discharge     Problem: Metabolic/Fluid and Electrolytes - Adult  Goal: Electrolytes maintained within normal limits  Outcome: Adequate for Discharge  Goal: Maintain Optimal Renal Function and Hemodynamic Stability  Outcome: Adequate for Discharge  Goal: Glucose maintained within prescribed range  Outcome: Adequate for Discharge     Problem: Skin/Tissue Integrity - Adult  Goal: Skin integrity remains intact  Outcome: Adequate for Discharge     Problem: Hematologic - Adult  Goal:  Maintains hematologic stability  Outcome: Adequate for Discharge

## 2025-04-28 NOTE — NURSING END OF SHIFT
Nursing End of Shift Summary:     Patient: Pete Trejo  MRN: [id number]  : 1965, Age: 59 y.o.    Location: [address] [place] [address]    Nursing Goals  Clinical Goals for the Shift: stable VS/lab, management of palpations & VT, transfer to [place] for higher level of care    Narrative Summary of Progress Toward Clinical Goals:  stable    Barriers to Goals/Nursing Concerns:  No    New Patient or Family Concerns/Issues:  No    Shift Summary:   Pt has remained stable w/o VT events.    Showered w/o difficulty and ambulates in his room independently.    Son at bedside today - updated on POC, meds, and discharge planning.    Report called to  Kiana GOMEZ @ 0942 from [place] facility    Report to the flight crew given at 1715.    Flight crew here at 1848, updated report, pre-medicated with 1mg po ativan.  Pt left at 1855    Significant Events & Communications to Providers (Last 12 Hours)       Last 5 Values    No documentation.                 Oxygen Usage (Last 12 Hours)       Last 5 Values    No documentation.                 Mobility (Last 12 Hours)       Last 5 Values       Row Name 25 0409 25 0731 25 1128             Mobility    Patient Position In bed In bed In bed                    Urethral Catheter       Active Urethral Catheter       None                  Active Lines       Active Central venous catheter / Peripherally inserted central catheter / Implantable Port / Hemodialysis catheter / Midline Catheter       None                  Infusing Medications   Medication Dose Last Rate     PRN Medications   Medication Dose Last Admin    nitroglycerin  0.4 mg      bisacodyL  10 mg      melatonin  4.5 mg      sodium chloride  3 mL      traZODone  25 mg      acetaminophen  650 mg      fentaNYL citrate (PF)  12.5 mcg      fentaNYL citrate (PF)  25 mcg      ondansetron  4 mg      Or    ondansetron  4 mg      alum-mag hydroxide-simeth  30 mL      calcium carbonate  500 mg      dextrose 50 % in water (D50W)  15-30 mL       dextrose  15.2 g      glucagon HCL  1 mg      Or    glucagon HCL  1 mg

## 2025-04-28 NOTE — INTERDISCIPLINARY/THERAPY
Case Management Progress Note    Phone # [phone]    Diagnosis: Ventricular tachycardia    Plan of Care:  transfer to [facility], [facility]    Discussed Discharge Topics/Narrative: Chart reviewed. CM contacted the transfer center at [facility]. Pt remains on the wait list, and they do not think they will have a bed available for the pt today. [facility] recommended a provider to provider conversation if they believe pt needs to be transferred more urgently. CM updated provider.    CM again spoke with Ayan from [facility]. He had spoken with his provider, and they requested a more urgent transfer for the pt. Ayan to notify this CM when a bed becomes available. CM to pt bedside, and updated pt regarding transfer when bed becomes available. Pt states his understanding. CM will continue to follow for discharge planning.    Family updated: per pt    RN updated: In MDR    Disposition: [facility]

## 2025-04-28 NOTE — Clinical Note
All vital sign monitoring, airway/line management, and medication administration completed by DI Trujillo in room and JAMAR Whatley.

## 2025-04-28 NOTE — Clinical Note
Mom is told to try zarbee's, cool mist humidifier, and saline nose drops/bulb syringe.  Follow up for worsening symptoms.   Sheath prepped and inserted in the right femoral vein.

## 2025-04-28 NOTE — INTERDISCIPLINARY/THERAPY
Case Management Facility Discharge Note    Phone # [phone]    Accepting Facility: [facility]  Accepting Physician: Dr. Hairston  Unit: 6C  [address], [place]      Transportation: [facility]- Currently on a weather hold. ([phone])  RN given number for report: [phone]  Support System Notified: Yes    CM visited with pt at the bedside. Consent for transfer signed and placed into chart. RN given transfer packed and transport documents. Pt currently on standby with [facility] due to weather. RN given number to [facility] to check on status throughout the remainder of her shift and for night shift. MD updated.

## 2025-04-28 NOTE — Clinical Note
Pre-procedure brief completed with Dr. Wills, Dr. Tavera ([facility]), Cassandra SEVILLA, and CCL RN. All questions answered. Plan for a pre-op antibiotic and andres placement. Dr. Wills offered a femoral arterial line, but declined in favor of pre-induction radial line. No EDIS needed. Plan to proceed with case.

## 2025-04-28 NOTE — PROGRESS NOTES
"Subjective    LOS: 5 days      Patient states he feels about the same today, offering no complaints.  Overnight he had another episode of V. tach, symptomatic. No other events reported.    Objective   /75 (BP Location: Left arm, Patient Position: In bed, Cuff Size: Long Adult)   Pulse 70   Temp 36.8 °C (98.2 °F) (Oral)   Resp 24   Ht 1.803 m (5' 11\")   Wt 118 kg (260 lb 3.2 oz)   SpO2 94%   BMI 36.29 kg/m²     Intake/Output Summary (Last 24 hours) at 4/28/2025 0839  Last data filed at 4/28/2025 0505  Gross per 24 hour   Intake 2020 ml   Output 0 ml   Net 2020 ml      Physical Exam  General: Alert, no acute distress  Heart: Rate and rhythm are regular  Lungs: Clear to auscultation bilaterally  Abdomen: Soft, nondistended, nontender, normal bowel sounds  Extremities: No edema  Neuro: Alert, oriented x 3, nonfocal    Other           Data Used For Medical Decision Making      Case d/w cardiology, plan for transfer to [facility]  Labs reviewed  Telemetry reviewed, slow VT overnight.           Assessment/Plan   59M with history of recurrent VT, ischemic cardiomyopathy, chronic systolic HF, CAD with multiple stents, paroxysmal AF, HTN, T2DM, hyperlipidemia, and LAVON, presents with recurrent VT , now waiting for transfer to [facility] for further management.     # Recurrent ventricular tachycardia  - Rate and rhythm controlled today. He had another episode of VT last night, that did require amiodarone pushes. Per [facility] EP, try and avoid amio gtt as much as possible.  - Appreciate cardiology/EP input   - Stable for transfer once bed is available.     # Hypertension  - 24hr BP range: () / (58-78) (4/27/25)  - continue home: furosemide 40 mg po daily  - continue home: sacubitril-valsartan 97 mg-103 mg 1 tabs po BID  - continue home: spironolactone 25 mg po daily  - currently on: carvedilol 50 mg po BID  -Well controlled.     # Atrial fibrillation  - [id number] score: 3 (4/28/25), moderate-high risk of stroke (3.2% per " year)  - heart rate range:  (since 4/27/25 9:00)  - continue home: mexiletine 300 mg po TID  - continue home: rivaroxaban 20 mg po daily  - currently on: carvedilol 50 mg po BID  - cardiac telemetry     - # chronic systolic heart failure  - weight: 123.1 kg unchanged from 123.1 kg (4/23/25), est. baseline 118 kg  - LV ejection fraction: 32 % (4/22/25)  - continue home: empagliflozin 10 mg po daily  - continue home: furosemide 40 mg po daily  - continue home: sacubitril-valsartan 97 mg-103 mg 1 tabs po BID  - continue home: spironolactone 25 mg po daily  - currently on: carvedilol 50 mg po BID  - No signs of volume overload on exam.     # Diabetes mellitus  - hemoglobin A1c: 6.9 % (3/21/25) up from 6.2 % (9/22/23)  - continue home: empagliflozin 10 mg po daily  - glucose: 94 (4/28/25)  - Well controlled     - # Coronary artery disease  - continue home: rosuvastatin 40 mg po daily  - currently on: carvedilol 50 mg po BID  - currently on: prasugrel 10 mg po daily     # Hyperlipidemia: with clinical ASCVD  - continue home: rosuvastatin 40 mg po daily     # Obesity, class II: severe  - BMI: 36.3 (4/28/25)  - complicated by atrial fibrillation, coronary artery disease, diabetes mellitus, heart failure, hyperlipidemia, hypertension, and Obstructive sleep apnea     # Tobacco use disorder    DVT Prophylaxis: Currently on therapeutic anticoagulation   Code Status: Full Code   Decision Making Capacity: Yes    Medically Ready for Discharge:Ready Now  Anticipated Disposition : Higher level of care to [facility]

## 2025-04-28 NOTE — Clinical Note
Called to PACU JARAD Dalton. All questions answered. All belongings sent with patient. Instructed that Device RN saw patient post-case and turned on his ICD/Tachy Therapy. Patient transported to PACU via stretcher with CRNA.

## 2025-04-29 ENCOUNTER — PATIENT OUTREACH (OUTPATIENT)
Dept: FAMILY MEDICINE | Facility: HOSPITAL | Age: 60
End: 2025-04-29
Payer: MEDICARE

## 2025-04-29 ENCOUNTER — TELEPHONE (OUTPATIENT)
Dept: CARDIOLOGY | Facility: CLINIC | Age: 60
End: 2025-04-29

## 2025-04-29 ENCOUNTER — ANCILLARY PROCEDURE (OUTPATIENT)
Dept: CARDIOLOGY | Facility: CLINIC | Age: 60
DRG: 274 | End: 2025-04-29
Payer: MEDICARE

## 2025-04-29 ENCOUNTER — APPOINTMENT (OUTPATIENT)
Dept: OCCUPATIONAL THERAPY | Facility: CLINIC | Age: 60
DRG: 274 | End: 2025-04-29
Payer: MEDICARE

## 2025-04-29 LAB
ALBUMIN SERPL BCG-MCNC: 4.1 G/DL (ref 3.5–5.2)
ALP SERPL-CCNC: 69 U/L (ref 40–150)
ALT SERPL W P-5'-P-CCNC: 46 U/L (ref 0–70)
ANION GAP SERPL CALCULATED.3IONS-SCNC: 11 MMOL/L (ref 7–15)
ANION GAP SERPL CALCULATED.3IONS-SCNC: 13 MMOL/L (ref 7–15)
AST SERPL W P-5'-P-CCNC: 24 U/L (ref 0–45)
BILIRUB SERPL-MCNC: 0.8 MG/DL
BUN SERPL-MCNC: 13.9 MG/DL (ref 6–20)
BUN SERPL-MCNC: 15.6 MG/DL (ref 6–20)
CALCIUM SERPL-MCNC: 8.8 MG/DL (ref 8.8–10.4)
CALCIUM SERPL-MCNC: 9.3 MG/DL (ref 8.8–10.4)
CHLORIDE SERPL-SCNC: 102 MMOL/L (ref 98–107)
CHLORIDE SERPL-SCNC: 104 MMOL/L (ref 98–107)
CREAT SERPL-MCNC: 0.96 MG/DL (ref 0.67–1.17)
CREAT SERPL-MCNC: 0.96 MG/DL (ref 0.67–1.17)
EGFRCR SERPLBLD CKD-EPI 2021: >90 ML/MIN/1.73M2
EGFRCR SERPLBLD CKD-EPI 2021: >90 ML/MIN/1.73M2
ERYTHROCYTE [DISTWIDTH] IN BLOOD BY AUTOMATED COUNT: 14.8 % (ref 10–15)
FLUAV RNA SPEC QL NAA+PROBE: NEGATIVE
FLUBV RNA RESP QL NAA+PROBE: NEGATIVE
GLUCOSE BLDC GLUCOMTR-MCNC: 113 MG/DL (ref 70–99)
GLUCOSE BLDC GLUCOMTR-MCNC: 122 MG/DL (ref 70–99)
GLUCOSE SERPL-MCNC: 125 MG/DL (ref 70–99)
GLUCOSE SERPL-MCNC: 152 MG/DL (ref 70–99)
HCO3 SERPL-SCNC: 21 MMOL/L (ref 22–29)
HCO3 SERPL-SCNC: 22 MMOL/L (ref 22–29)
HCT VFR BLD AUTO: 44.7 % (ref 40–53)
HGB BLD-MCNC: 15.1 G/DL (ref 13.3–17.7)
MAGNESIUM SERPL-MCNC: 2 MG/DL (ref 1.7–2.3)
MAGNESIUM SERPL-MCNC: 2.1 MG/DL (ref 1.7–2.3)
MCH RBC QN AUTO: 31.1 PG (ref 26.5–33)
MCHC RBC AUTO-ENTMCNC: 33.8 G/DL (ref 31.5–36.5)
MCV RBC AUTO: 92 FL (ref 78–100)
MDC_IDC_LEAD_CONNECTION_STATUS: NORMAL
MDC_IDC_LEAD_IMPLANT_DT: NORMAL
MDC_IDC_LEAD_LOCATION: NORMAL
MDC_IDC_LEAD_LOCATION_DETAIL_1: NORMAL
MDC_IDC_LEAD_MFG: NORMAL
MDC_IDC_LEAD_MODEL: NORMAL
MDC_IDC_LEAD_POLARITY_TYPE: NORMAL
MDC_IDC_LEAD_SERIAL: NORMAL
MDC_IDC_MSMT_BATTERY_DTM: NORMAL
MDC_IDC_MSMT_BATTERY_REMAINING_LONGEVITY: 66 MO
MDC_IDC_MSMT_BATTERY_REMAINING_PERCENTAGE: 77 %
MDC_IDC_MSMT_BATTERY_STATUS: NORMAL
MDC_IDC_MSMT_CAP_CHARGE_DTM: NORMAL
MDC_IDC_MSMT_CAP_CHARGE_DTM: NORMAL
MDC_IDC_MSMT_CAP_CHARGE_ENERGY: 41 J
MDC_IDC_MSMT_CAP_CHARGE_TIME: 9.7 S
MDC_IDC_MSMT_CAP_CHARGE_TIME: 9.7 S
MDC_IDC_MSMT_CAP_CHARGE_TYPE: NORMAL
MDC_IDC_MSMT_CAP_CHARGE_TYPE: NORMAL
MDC_IDC_MSMT_LEADCHNL_LV_IMPEDANCE_VALUE: 1051 OHM
MDC_IDC_MSMT_LEADCHNL_LV_PACING_THRESHOLD_AMPLITUDE: 1 V
MDC_IDC_MSMT_LEADCHNL_LV_PACING_THRESHOLD_PULSEWIDTH: 0.4 MS
MDC_IDC_MSMT_LEADCHNL_RA_IMPEDANCE_VALUE: 479 OHM
MDC_IDC_MSMT_LEADCHNL_RA_PACING_THRESHOLD_AMPLITUDE: 0.4 V
MDC_IDC_MSMT_LEADCHNL_RA_PACING_THRESHOLD_PULSEWIDTH: 0.4 MS
MDC_IDC_MSMT_LEADCHNL_RV_IMPEDANCE_VALUE: 692 OHM
MDC_IDC_MSMT_LEADCHNL_RV_PACING_THRESHOLD_AMPLITUDE: 0.9 V
MDC_IDC_MSMT_LEADCHNL_RV_PACING_THRESHOLD_PULSEWIDTH: 0.4 MS
MDC_IDC_PG_IMPLANT_DTM: NORMAL
MDC_IDC_PG_MFG: NORMAL
MDC_IDC_PG_MODEL: NORMAL
MDC_IDC_PG_SERIAL: NORMAL
MDC_IDC_PG_TYPE: NORMAL
MDC_IDC_SESS_CLINIC_NAME: NORMAL
MDC_IDC_SESS_DTM: NORMAL
MDC_IDC_SESS_TYPE: NORMAL
MDC_IDC_SET_BRADY_AT_MODE_SWITCH_MODE: NORMAL
MDC_IDC_SET_BRADY_AT_MODE_SWITCH_RATE: 170 {BEATS}/MIN
MDC_IDC_SET_BRADY_LOWRATE: 70 {BEATS}/MIN
MDC_IDC_SET_BRADY_MAX_TRACKING_RATE: 100 {BEATS}/MIN
MDC_IDC_SET_BRADY_MODE: NORMAL
MDC_IDC_SET_BRADY_PAV_DELAY_LOW: 130 MS
MDC_IDC_SET_BRADY_SAV_DELAY_LOW: 110 MS
MDC_IDC_SET_CRT_LVRV_DELAY: 20 MS
MDC_IDC_SET_CRT_PACED_CHAMBERS: NORMAL
MDC_IDC_SET_LEADCHNL_LV_PACING_AMPLITUDE: 2.5 V
MDC_IDC_SET_LEADCHNL_LV_PACING_ANODE_LOCATION_1: NORMAL
MDC_IDC_SET_LEADCHNL_LV_PACING_CAPTURE_MODE: NORMAL
MDC_IDC_SET_LEADCHNL_LV_PACING_CATHODE_ELECTRODE_1: NORMAL
MDC_IDC_SET_LEADCHNL_LV_PACING_CATHODE_LOCATION_1: NORMAL
MDC_IDC_SET_LEADCHNL_LV_PACING_PULSEWIDTH: 0.4 MS
MDC_IDC_SET_LEADCHNL_LV_SENSING_ADAPTATION_MODE: NORMAL
MDC_IDC_SET_LEADCHNL_LV_SENSING_ANODE_LOCATION_1: NORMAL
MDC_IDC_SET_LEADCHNL_LV_SENSING_CATHODE_ELECTRODE_1: NORMAL
MDC_IDC_SET_LEADCHNL_LV_SENSING_CATHODE_LOCATION_1: NORMAL
MDC_IDC_SET_LEADCHNL_LV_SENSING_SENSITIVITY: 1 MV
MDC_IDC_SET_LEADCHNL_RA_PACING_AMPLITUDE: 2 V
MDC_IDC_SET_LEADCHNL_RA_PACING_CAPTURE_MODE: NORMAL
MDC_IDC_SET_LEADCHNL_RA_PACING_POLARITY: NORMAL
MDC_IDC_SET_LEADCHNL_RA_PACING_PULSEWIDTH: 0.4 MS
MDC_IDC_SET_LEADCHNL_RA_SENSING_ADAPTATION_MODE: NORMAL
MDC_IDC_SET_LEADCHNL_RA_SENSING_POLARITY: NORMAL
MDC_IDC_SET_LEADCHNL_RA_SENSING_SENSITIVITY: 0.25 MV
MDC_IDC_SET_LEADCHNL_RV_PACING_AMPLITUDE: 2.5 V
MDC_IDC_SET_LEADCHNL_RV_PACING_CAPTURE_MODE: NORMAL
MDC_IDC_SET_LEADCHNL_RV_PACING_POLARITY: NORMAL
MDC_IDC_SET_LEADCHNL_RV_PACING_PULSEWIDTH: 0.4 MS
MDC_IDC_SET_LEADCHNL_RV_SENSING_ADAPTATION_MODE: NORMAL
MDC_IDC_SET_LEADCHNL_RV_SENSING_POLARITY: NORMAL
MDC_IDC_SET_LEADCHNL_RV_SENSING_SENSITIVITY: 0.6 MV
MDC_IDC_SET_ZONE_DETECTION_INTERVAL: 300 MS
MDC_IDC_SET_ZONE_DETECTION_INTERVAL: 375 MS
MDC_IDC_SET_ZONE_DETECTION_INTERVAL: 545 MS
MDC_IDC_SET_ZONE_STATUS: NORMAL
MDC_IDC_SET_ZONE_TYPE: NORMAL
MDC_IDC_SET_ZONE_VENDOR_TYPE: NORMAL
MDC_IDC_STAT_AT_BURDEN_PERCENT: 0 %
MDC_IDC_STAT_AT_DTM_END: NORMAL
MDC_IDC_STAT_AT_DTM_START: NORMAL
MDC_IDC_STAT_BRADY_DTM_END: NORMAL
MDC_IDC_STAT_BRADY_DTM_START: NORMAL
MDC_IDC_STAT_BRADY_RA_PERCENT_PACED: 96 %
MDC_IDC_STAT_BRADY_RV_PERCENT_PACED: 99 %
MDC_IDC_STAT_CRT_DTM_END: NORMAL
MDC_IDC_STAT_CRT_DTM_START: NORMAL
MDC_IDC_STAT_CRT_LV_PERCENT_PACED: 99 %
MDC_IDC_STAT_EPISODE_RECENT_COUNT: 0
MDC_IDC_STAT_EPISODE_RECENT_COUNT: 0
MDC_IDC_STAT_EPISODE_RECENT_COUNT: 1
MDC_IDC_STAT_EPISODE_RECENT_COUNT: 32
MDC_IDC_STAT_EPISODE_RECENT_COUNT: 5
MDC_IDC_STAT_EPISODE_RECENT_COUNT: 5
MDC_IDC_STAT_EPISODE_RECENT_COUNT: 8
MDC_IDC_STAT_EPISODE_RECENT_COUNT_DTM_END: NORMAL
MDC_IDC_STAT_EPISODE_RECENT_COUNT_DTM_START: NORMAL
MDC_IDC_STAT_EPISODE_TYPE: NORMAL
MDC_IDC_STAT_EPISODE_VENDOR_TYPE: NORMAL
MDC_IDC_STAT_TACHYTHERAPY_ATP_DELIVERED_RECENT: 34
MDC_IDC_STAT_TACHYTHERAPY_ATP_DELIVERED_TOTAL: 361
MDC_IDC_STAT_TACHYTHERAPY_RECENT_DTM_END: NORMAL
MDC_IDC_STAT_TACHYTHERAPY_RECENT_DTM_START: NORMAL
MDC_IDC_STAT_TACHYTHERAPY_SHOCKS_ABORTED_RECENT: 0
MDC_IDC_STAT_TACHYTHERAPY_SHOCKS_ABORTED_TOTAL: 0
MDC_IDC_STAT_TACHYTHERAPY_SHOCKS_DELIVERED_RECENT: 2
MDC_IDC_STAT_TACHYTHERAPY_SHOCKS_DELIVERED_TOTAL: 3
MDC_IDC_STAT_TACHYTHERAPY_TOTAL_DTM_END: NORMAL
NT-PROBNP SERPL-MCNC: 103 PG/ML (ref 0–900)
PLATELET # BLD AUTO: 191 10E3/UL (ref 150–450)
POTASSIUM SERPL-SCNC: 3.6 MMOL/L (ref 3.4–5.3)
POTASSIUM SERPL-SCNC: 3.9 MMOL/L (ref 3.4–5.3)
PROT SERPL-MCNC: 6.7 G/DL (ref 6.4–8.3)
RBC # BLD AUTO: 4.85 10E6/UL (ref 4.4–5.9)
RSV RNA SPEC NAA+PROBE: NEGATIVE
SARS-COV-2 RNA RESP QL NAA+PROBE: NEGATIVE
SODIUM SERPL-SCNC: 135 MMOL/L (ref 135–145)
SODIUM SERPL-SCNC: 138 MMOL/L (ref 135–145)
TROPONIN T SERPL HS-MCNC: 11 NG/L
TROPONIN T SERPL HS-MCNC: 12 NG/L
WBC # BLD AUTO: 6.8 10E3/UL (ref 4–11)

## 2025-04-29 PROCEDURE — 36415 COLL VENOUS BLD VENIPUNCTURE: CPT

## 2025-04-29 PROCEDURE — 93284 PRGRMG EVAL IMPLANTABLE DFB: CPT | Mod: 26 | Performed by: INTERNAL MEDICINE

## 2025-04-29 PROCEDURE — 120N000005 HC R&B MS OVERFLOW UMMC

## 2025-04-29 PROCEDURE — 87637 SARSCOV2&INF A&B&RSV AMP PRB: CPT

## 2025-04-29 PROCEDURE — 93010 ELECTROCARDIOGRAM REPORT: CPT | Mod: XU | Performed by: INTERNAL MEDICINE

## 2025-04-29 PROCEDURE — 93284 PRGRMG EVAL IMPLANTABLE DFB: CPT

## 2025-04-29 PROCEDURE — 250N000013 HC RX MED GY IP 250 OP 250 PS 637: Performed by: INTERNAL MEDICINE

## 2025-04-29 PROCEDURE — 80053 COMPREHEN METABOLIC PANEL: CPT

## 2025-04-29 PROCEDURE — 250N000013 HC RX MED GY IP 250 OP 250 PS 637

## 2025-04-29 PROCEDURE — 83880 ASSAY OF NATRIURETIC PEPTIDE: CPT

## 2025-04-29 PROCEDURE — 999N000147 HC STATISTIC PT IP EVAL DEFER

## 2025-04-29 PROCEDURE — 83735 ASSAY OF MAGNESIUM: CPT | Performed by: INTERNAL MEDICINE

## 2025-04-29 PROCEDURE — 83735 ASSAY OF MAGNESIUM: CPT

## 2025-04-29 PROCEDURE — 97530 THERAPEUTIC ACTIVITIES: CPT | Mod: GO

## 2025-04-29 PROCEDURE — 84484 ASSAY OF TROPONIN QUANT: CPT

## 2025-04-29 PROCEDURE — 99222 1ST HOSP IP/OBS MODERATE 55: CPT | Mod: 25 | Performed by: NURSE PRACTITIONER

## 2025-04-29 PROCEDURE — 99233 SBSQ HOSP IP/OBS HIGH 50: CPT | Mod: 25 | Performed by: NURSE PRACTITIONER

## 2025-04-29 PROCEDURE — 97165 OT EVAL LOW COMPLEX 30 MIN: CPT | Mod: GO

## 2025-04-29 PROCEDURE — 85014 HEMATOCRIT: CPT

## 2025-04-29 PROCEDURE — 999N000248 HC STATISTIC IV INSERT WITH US BY RN

## 2025-04-29 PROCEDURE — 93005 ELECTROCARDIOGRAM TRACING: CPT

## 2025-04-29 RX ORDER — ASPIRIN 81 MG/1
81 TABLET, CHEWABLE ORAL DAILY
Status: DISCONTINUED | OUTPATIENT
Start: 2025-04-30 | End: 2025-05-02

## 2025-04-29 RX ORDER — CARVEDILOL 25 MG/1
50 TABLET ORAL 2 TIMES DAILY
Status: DISCONTINUED | OUTPATIENT
Start: 2025-04-29 | End: 2025-05-04 | Stop reason: HOSPADM

## 2025-04-29 RX ORDER — AMIODARONE HYDROCHLORIDE 200 MG/1
200 TABLET ORAL 2 TIMES DAILY
Status: ON HOLD | COMMUNITY
End: 2025-05-04

## 2025-04-29 RX ORDER — LORAZEPAM 1 MG/1
1 TABLET ORAL
Status: DISCONTINUED | OUTPATIENT
Start: 2025-04-29 | End: 2025-05-04 | Stop reason: HOSPADM

## 2025-04-29 RX ORDER — PANTOPRAZOLE SODIUM 40 MG/1
40 TABLET, DELAYED RELEASE ORAL DAILY
Status: DISCONTINUED | OUTPATIENT
Start: 2025-04-29 | End: 2025-05-04 | Stop reason: HOSPADM

## 2025-04-29 RX ORDER — POTASSIUM CHLORIDE 750 MG/1
20 TABLET, EXTENDED RELEASE ORAL ONCE
Status: COMPLETED | OUTPATIENT
Start: 2025-04-29 | End: 2025-04-29

## 2025-04-29 RX ORDER — MAGNESIUM OXIDE 400 MG/1
400 TABLET ORAL EVERY 4 HOURS
Status: COMPLETED | OUTPATIENT
Start: 2025-04-29 | End: 2025-04-29

## 2025-04-29 RX ORDER — ACETAMINOPHEN 325 MG/1
650 TABLET ORAL EVERY 6 HOURS PRN
COMMUNITY

## 2025-04-29 RX ORDER — SPIRONOLACTONE 25 MG/1
25 TABLET ORAL DAILY
Status: DISCONTINUED | OUTPATIENT
Start: 2025-04-29 | End: 2025-05-04 | Stop reason: HOSPADM

## 2025-04-29 RX ORDER — PRASUGREL 10 MG/1
10 TABLET, FILM COATED ORAL DAILY
Status: ON HOLD | COMMUNITY
End: 2025-05-04

## 2025-04-29 RX ORDER — LIDOCAINE HYDROCHLORIDE ANHYDROUS AND DEXTROSE MONOHYDRATE .8; 5 G/100ML; G/100ML
2 INJECTION, SOLUTION INTRAVENOUS CONTINUOUS
Status: CANCELLED | OUTPATIENT
Start: 2025-04-29

## 2025-04-29 RX ORDER — MEXILETINE HYDROCHLORIDE 250 MG/1
250 CAPSULE ORAL 3 TIMES DAILY
Status: DISCONTINUED | OUTPATIENT
Start: 2025-04-29 | End: 2025-05-04 | Stop reason: HOSPADM

## 2025-04-29 RX ORDER — FUROSEMIDE 40 MG/1
40 TABLET ORAL DAILY
Status: DISCONTINUED | OUTPATIENT
Start: 2025-04-29 | End: 2025-05-04 | Stop reason: HOSPADM

## 2025-04-29 RX ORDER — MEXILETINE HYDROCHLORIDE 150 MG/1
300 CAPSULE ORAL 3 TIMES DAILY
Status: ON HOLD | COMMUNITY
End: 2025-05-04

## 2025-04-29 RX ORDER — ASPIRIN 81 MG/1
81 TABLET ORAL DAILY
Status: DISCONTINUED | OUTPATIENT
Start: 2025-04-29 | End: 2025-04-29

## 2025-04-29 RX ORDER — TAMSULOSIN HYDROCHLORIDE 0.4 MG/1
0.4 CAPSULE ORAL DAILY
Status: DISCONTINUED | OUTPATIENT
Start: 2025-04-29 | End: 2025-05-04 | Stop reason: HOSPADM

## 2025-04-29 RX ORDER — ROSUVASTATIN CALCIUM 40 MG/1
40 TABLET, COATED ORAL EVERY EVENING
Status: DISCONTINUED | OUTPATIENT
Start: 2025-04-29 | End: 2025-05-04 | Stop reason: HOSPADM

## 2025-04-29 RX ORDER — PRASUGREL 10 MG/1
10 TABLET, FILM COATED ORAL DAILY
Status: DISCONTINUED | OUTPATIENT
Start: 2025-04-29 | End: 2025-05-01

## 2025-04-29 RX ADMIN — MEXILETINE HYDROCHLORIDE 250 MG: 250 CAPSULE ORAL at 08:57

## 2025-04-29 RX ADMIN — MAGNESIUM OXIDE TAB 400 MG (241.3 MG ELEMENTAL MG) 400 MG: 400 (241.3 MG) TAB at 05:12

## 2025-04-29 RX ADMIN — MAGNESIUM OXIDE TAB 400 MG (241.3 MG ELEMENTAL MG) 400 MG: 400 (241.3 MG) TAB at 08:57

## 2025-04-29 RX ADMIN — TAMSULOSIN HYDROCHLORIDE 0.4 MG: 0.4 CAPSULE ORAL at 08:57

## 2025-04-29 RX ADMIN — RIVAROXABAN 20 MG: 20 TABLET, FILM COATED ORAL at 08:57

## 2025-04-29 RX ADMIN — CARVEDILOL 50 MG: 25 TABLET, FILM COATED ORAL at 08:57

## 2025-04-29 RX ADMIN — SACUBITRIL AND VALSARTAN 1 TABLET: 97; 103 TABLET, FILM COATED ORAL at 19:44

## 2025-04-29 RX ADMIN — POTASSIUM CHLORIDE 20 MEQ: 750 TABLET, EXTENDED RELEASE ORAL at 03:26

## 2025-04-29 RX ADMIN — SACUBITRIL AND VALSARTAN 1 TABLET: 97; 103 TABLET, FILM COATED ORAL at 08:57

## 2025-04-29 RX ADMIN — ROSUVASTATIN CALCIUM 40 MG: 40 TABLET, FILM COATED ORAL at 19:44

## 2025-04-29 RX ADMIN — FUROSEMIDE 40 MG: 40 TABLET ORAL at 08:57

## 2025-04-29 RX ADMIN — PRASUGREL 10 MG: 10 TABLET, FILM COATED ORAL at 08:57

## 2025-04-29 RX ADMIN — CARVEDILOL 50 MG: 25 TABLET, FILM COATED ORAL at 19:44

## 2025-04-29 RX ADMIN — SPIRONOLACTONE 25 MG: 25 TABLET, FILM COATED ORAL at 08:57

## 2025-04-29 RX ADMIN — PANTOPRAZOLE SODIUM 40 MG: 40 TABLET, DELAYED RELEASE ORAL at 08:57

## 2025-04-29 ASSESSMENT — ACTIVITIES OF DAILY LIVING (ADL)
ADLS_ACUITY_SCORE: 35
ADLS_ACUITY_SCORE: 33
ADLS_ACUITY_SCORE: 35
ADLS_ACUITY_SCORE: 33
ADLS_ACUITY_SCORE: 35
ADLS_ACUITY_SCORE: 35
ADLS_ACUITY_SCORE: 33
ADLS_ACUITY_SCORE: 33
ADLS_ACUITY_SCORE: 35
ADLS_ACUITY_SCORE: 33
ADLS_ACUITY_SCORE: 35

## 2025-04-29 NOTE — PROGRESS NOTES
Cardiology Progress Note      Assessment & Plan:  Leo Russell is a 59 year old male with a history of recurrent VT s/p multiple ablations, ischemic cardiomyopathy, chronic systolic HF, CAD with multiple stents, paroxysmal AF, HTN, T2DM, HLD, and FREDDY.     Today's Update:  - EP consult, hold AAT with upcoming procedure  - cMRI to be completed prior to VT ablation, scheduled Friday  - Prasugrel: hold Xarelto to ablation  - Resume ASA with holding Xarelto, no need for triple therapy once DOAC resumed  - Hold SGLT2i with upcoming anesthesia  - Will watch blood sugars, potentially start sliding scale insulin    # Recurrent ventricular tachycardia/VT storm  # s/p multiple ablations ([facility] and [facility])  # Paroxysmal Atrial fibrillation  Presented to the ER last evening with tachycardic palpitations, substernal chest tightness, shortness of breath and lightheadedness. Symptoms were consistent with prior admissions and he presented for evaluation. Transferred from OSH for further management. Hemodynamically stable, and currently in AV paced rhythm.   EP consult:  cMRI ordered, scheduled for 5/2/25  Hold PTA Mexilitine and Xarelto with upcoming procedures  Avoid anti-arrhythmics with upcoming procedure, if needed could try lidocaine IVP  - AC: CHADS2-Vasc 6. On Xarelto 20mg daily , hold as above  - Continue PTA carvedilol 50 mg po BID    - avoid amiodarone gtt given noted side effects on the higher dose with R hand tremor and has had progressive decrease in his energy with weakness in his arms and legs.   - Device check    - Goal K>4, Mg>2; RN-managed replacement protocols  - Telemetry     # Chronic systolic heart failure, EF 32%  # CAD with multiple stents PCI to diagonal (2009), LAD (2010)  # HTN  # HLD   - TTE 4/27/25 - LV moderately dilated, EF 32%, severe LV systolic dysfunction, Severe ischemic cardiomyopathy with RCA and LAD wall motion abnormalities. Mild LV diastolic abnormality. RV function normal. RA mildly  "dilated. Normal CVP.   - Coronary angiogram OSH 3/24/25: PTCA and PCI  1st diag -> ASA + Prasugrel   - RHC 4/23/25: Michelle CO 5.7, CI 2.3  GDMT:  Volume status: Euvolemic.  kg, currently 117 kg. Continue home furosemide 40 mg daily  BB: PTA carvedilol 50 mg BID  ACE/ARB/ARNI: PTA sacubitril-valsartan 97-103mg BID  SGLT2i: hold PTA empagliflozin 10 mg daily  MRA: PTA spironolactone 25 mg daily              SCD ppx: ICD in place; at OSH, shock therapy was turned off in the slow VT zone.   - Daily standing weights  - Strict I&O's    # T2DM   Hgb A1c 6.9 % (3/21/25). Glucose 152 on admission.   - Holding PTA empagliflozin 10 mg daily as above  - will monitor blood glucose and start sliding scale insulin if needed    FEN: Regular  Code Status: FULL  Prophylaxis: ambulation  Isolation: none  Disposition: pending EP procedures    Patient discussed w/ Dr. Ricci, who agrees with above plan.    Medically Ready for Discharge: Anticipated in 5+ Days      GARLAND Haas, CNP  [facility]  General Cardiology  Vocera     Medical Decision Making       60 MINUTES SPENT BY ME on the date of service doing chart review, history, exam, documentation & further activities per the note.      Interval History:  NAEO, telemetry, labs, vital signs and nursing notes reviewed.  Patient transferred to [facility] overnight, no acute telemetry events noted. See EP note for further plan of care.     Most recent vital signs:  /75 (BP Location: Left arm)   Pulse 70   Temp 98.2  F (36.8  C) (Oral)   Resp 12   Ht 1.803 m (5' 11\")   Wt 117.4 kg (258 lb 14.4 oz)   SpO2 96%   BMI 36.11 kg/m    Temp:  [98.2  F (36.8  C)-98.5  F (36.9  C)] 98.2  F (36.8  C)  Pulse:  [70] 70  Resp:  [12-16] 12  BP: (118-125)/(75-76) 118/75  SpO2:  [96 %] 96 %  Wt Readings from Last 2 Encounters:   04/29/25 117.4 kg (258 lb 14.4 oz)   12/18/24 118.8 kg (262 lb)     Intake/Output Summary (Last 24 hours) at 4/29/2025 0710  Last " "data filed at 4/29/2025 0000  Gross per 24 hour   Intake 420 ml   Output --   Net 420 ml     Physical exam:  General: Pleasant elderly male. Appears comfortable and in no acute distress. Alert and interactive  HEENT: Normocephalic, atraumatic. No scleral icterus or injection  Neck: JVP not elevated  CARDIAC: Regular rate and rhythm, no m/r/g appreciated. Peripheral pulses 2+  RESP: Normal work of breathing on room air without use of accessory breathing muscles. Clear to auscultation in all fields. No wheezes, rhonchi or crackles appreciated.  GI: No abdominal distention. Soft and nontender.   EXTREMITIES: no lower extremity edema. No cyanosis or clubbing. Warm and well perfused. No venous stasis changes.   SKIN: No acute lesions appreciated. Warm and dry to touch  NEURO: Alert and oriented X3, CN II-XII grossly intact, no focal neurological deficits noted, normal speech  PSYCH: Mood and affect are appropriate    Labs (Past three days):  CBC  Recent Labs   Lab 04/29/25  0207   WBC 6.8   RBC 4.85   HGB 15.1   HCT 44.7   MCV 92   MCH 31.1   MCHC 33.8   RDW 14.8        BMP  Recent Labs   Lab 04/29/25  0207      POTASSIUM 3.6   CHLORIDE 104   CO2 21*   ANIONGAP 13   *   BUN 15.6   CR 0.96   GFRESTIMATED >90   FE 9.3   MAG 2.0     Troponins: No results found for: \"TROPI\", \"TROPONIN\", \"TROPR\", \"TROPN\"     INRNo lab results found in last 7 days.  Liver panel  Recent Labs   Lab 04/29/25  0207   PROTTOTAL 6.7   ALBUMIN 4.1   BILITOTAL 0.8   ALKPHOS 69   AST 24   ALT 46       Imaging/procedure results:  EKG 12 Lead 4/29/25:       Echocardiogram 4/22/25:    Normal left ventricular wall thickness.     Left ventricle is moderately dilated.  End-diastolic dimension 6.8 cm.     Biplane ejection fraction is 32%.  Visually looks much closer to 25%.    No apical thrombi.     Severe left ventricular systolic dysfunction.  Severe ischemic   cardiomyopathy with RCA and LAD wall motion abnormalities.  See below.     " Grade I (mild) left ventricular diastolic abnormality.     Normal left ventricular filling pressure.     Right ventricular systolic function is low normal.     The left atrium is normal in size.     The right atrium is mildly dilated.     Normal central venous pressure (0-5 mmHg).     No pericardial effusion.     Inadequate TR spectral Doppler to accurately assess right ventricular   systolic pressure.     No significant valve disease.     No change from the March 2025 study.     [facility] 4/23/25:  Final Impression:   1.  Right atrial mean pressure: 5 mmHg.  O2 saturation 65.0%.   2.  Right ventricular pressure: 27/-1/2 mmHg.  O2 saturation 65.6%.   3.  Right main pulmonary artery pressure: 28/13/16 mmHg.  O2 saturation   65.4%.   4. Pulmonary capillary wedge pressure: 8 mmHg.   5.  Cardiac output thermodilution technique 4.56 L/min.  Cardiac index 1.9   L/min/m .   6. Cardiac output Michelle technique: 5.7 L/min.  Cardiac index 2.3 L/min/m .     Coronary angiogram 3/24/25:  Final Impression:   1.  Mild mid left main stenosis of 20%.   2.  Moderate size left circumflex coronary artery with minimal mid disease   of 20%.  The major obtuse marginal branch without any significant disease.   3.  Proximal LAD stent widely patent.  The remainder of the LAD without   significant disease so it is a fairly small caliber vessel.   4.  80% ostial first diagonal stenosis.  Diffuse 60% proximal disease.    The vessel then bifurcates into a smaller inferior branch that is   subtotally occluded within previously placed stent.  Superior diagonal   branch with 40% ostial stenosis.   5.  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  Remainder of the vessel with minimal luminal irregularities   including large PDA and KANIKA.   6.  PTCA of the inferior subtotal occluded diagonal branch with 2.5 mm   balloon.  Stenting of the ostial and proximal first diagonal branch up to   the bifurcation with 2.5 x 8 mm JANE  postdilatation 2.5 mm noncompliant   balloon to 20 juan m.  Good flow in both branches.

## 2025-04-29 NOTE — CONSULTS
Electrophysiology Consultation Note   EP Attending: .   Reason for consultation: Ventricular Tachycardia.   Provider requesting consultation: Cardiology I Service.  Date of Service: 4/29/2025      HPI:   Mr. Russell is a 59 year old male who has a medical history significant for anterior wall MI, CAD s/p PCIs LAD, D1, D2 2009, 2010, 2011, & 2017, ICM LVEF 25%, s/p CRTD 2011 s/p gen change 6/2022, VT s/p prior ablations 6/2020 & 11/2021 (both at [facility]) and 7/1/24 ([facility]), PAF (CHADSVASC 6 on Xarelto), HTN, HLD, prior LV thrombus, DM2, CVA, BPH, and obesity.  He has a longstanding history of CAD for which he had prior PCIs to pLAD and D1 in 2009, PCI to totally occluded LAD in 2010, PCI to D2 and Balloon angioplasty through strut to improve blood flow to distal LAD in 2011. Further D2 branch LAD PCI in 2017, and then Angiosculpt scoring balloon angioplasty to ISR of D1 stent in 2023. He has had subsequent ICM with LVEF 25% range most recently. He has been on GDMT but continued to have reduced LVEF so had a CRTD implanted in 2011 with last generator change in 6/2022. He has also had VT with ICD therapies. He had been on amiodarone. He has followed with [facility] and felt not a good candidate for advanced HF therapies due to uncontrolled VT. This lead to an ablation in 6/2020 where he was found to have recurrent numerous VTs as a result on an extensive anterior wall scar involving much of lateral and septal walls. Ablation was felt partially successful of border zones scar areas especially inferior apex. He continued to have ICD therapies despite this and amiodarone and mexiletine. Thus,he underwent a repeat ablation VT at apical portion of the scar at [facility] in 11/2021. Amiodarone was decreased to 200 mg daily and mexiletine discontinued. Unfortunately presented to the emergency room 1/25/2022 with sensation of racing in the heart and chest fullness. He was found to be in VT at 130 bpm. He  did convert spontaneously. He had felt well for 5 or 6 weeks post ablation in [place] however began experiencing recurrent symptoms with ATP for VT at 124 bpm on 1/11/2022 and then ATP x8 for VT at 139 bpm. Mexiletine was added back. He was discharged on amiodarone 200 mg daily and mexiletine 200 mg twice daily. He was seen at [facility] on 1/31/2022 and mexiletine increased to 3 times daily and he was continued on amiodarone 200 mg daily. Amiodarone is not favored for long-term use given his young age and risk for toxicities with long-term use, thus after he had 6 months free of VT, amiodarone was stopped and he was admitted in 3/2023 for Sotalol loading. He has since been having numerous episodes of slow VT requiring ATP therapy recently. He was admitted in 11/2023 to OS for slow VT and multiple ATPs. Sotalol was increased. He had a repeat coronary angiogram in 12/2023 that showed stable native disease with 80-90% in-stent restenosis of inferior branch of D1 s/p Angiosculpt POBA and RHC showed low cardiac index with normal filling pressures. Given his persistent rhythm issues, the patient was referred to [facility] for consideration of advanced therapies. He saw Dr. Felix here who started advanced therapies work up. He was referred to EP here for an opinion about his VT management/treatment options. He underwent VT ablation (mid and basal anterior wall) and [facility] PVC ablation on 7/1/2024. At follow up post, device interrogation showed 2 short NSVT runs in late 7/2024 and one short VT episode in 8/2024 that required ATP but no further events after. At EP follow-up in 12/2024 device interrogation showed multiple slow and short VT episodes with average V rate of 115 bpm with 1:1 VA ratio that would terminate with ATP in late October to early November, mainly on Nov 1st when he was moving to a new house and he did feel some of these episodes. Low PVC burden compared to before and BiV pacing at 99%. We discussed at this  appointment if persistent VT episodes would need to consider VT ablation targeting the slow VT.   Unfortunately patient went back into VT and was admitted 3/21/25-3/25/25 with refractory VT and ICD shocks and placed on IV amiodarone. Cardiac catheterization was performed with PTCA/PCI of the diagonal and he was started on Prasugrel. Device transmission on 4/6/2025 demonstrated a slow VT with a heart rate of 110 bpm that was successfully terminated by ATP. On 4/18/25 had gone to EP clinic, and reported symptoms of ataxia, hand tremors, decreased energy levels as well as other symptoms. At that time, amiodarone was decreased 200 mg once daily. Patient presented to [facility] ED and was hospitalized 4/21/25-4/23/25 with palpitations and feeling like his defibrillator had been going off. Device interrogation at that time showed multiple episodes of VT with multiple ATP therapies. He was reloaded with IV amiodarone. After IV amiodarone reload, patient has had no further runs of VT. Recommendations were for patient to be discharged home on 200 mg twice daily of amiodarone as well as his mexiletine home dose of 300 mg 3 times daily. RHC performed as well (details noted below) showed Michelle CO 5.7 and Michelle CI 2.3. An echo showed LVEF 32% moderately dilated LV, and no significant valvular issues. Shortly after discharge from that hospitalization, patient reported re-onset of palpations, substernal chest pressure sensation, and associated mild SOB; which prompted him to go [facility] ER again. He was found to have further slow VT episodes. He was transferred to [facility] for further cares. Renal function and electrolytes stable. VSS. Device interrogation shows normal device function, stable lead parameters, 1 VT with 4 ATP delivered, 32 VT up to 15 hours 12 minutes ATP 1-15 bursts delivered and 2 episodes with 41J shock, 8 NSVT up to 16 seconds, and AP 96%, BVP 99%. Current cardiac medications include: Coreg, Jardiance,  Lasix, Mexiletine, Effient, Xarelto, Crestor, Entresto, and Spironolactone.   Past Medical History:   Past Medical History:   Diagnosis Date    Congestive heart failure (H)     Hypertension      Past Surgical History:   Past Surgical History:   Procedure Laterality Date    APPENDECTOMY      CORONARY ANGIOGRAPHY ADULT ORDER      CV RIGHT HEART CATH MEASUREMENTS RECORDED N/A 4/26/2024    Procedure: Heart Cath Right Heart Cath;  Surgeon: Ruben Mcmullen MD;  Location:  HEART CARDIAC CATH LAB    EP ABLATION VT/PVC N/A 7/1/2024    Procedure: Ablation Ventricular Tachycardia;  Surgeon: Esteban iWlls MD;  Location:  HEART CARDIAC CATH LAB    HERNIA REPAIR      IMPLANT PACEMAKER       Allergies: Per MAR     Allergies   Allergen Reactions    Morphine Anxiety     previously tolerated oxycodone and hydromorphone without issue      Tramadol Anxiety     previously tolerated oxycodone and hydromorphone without issue       Medications:   Per MAR current outpatient cardiovascular medications include:   Medications Prior to Admission   Medication Sig Dispense Refill Last Dose/Taking    aspirin 81 MG EC tablet Take 1 tablet by mouth daily       carvedilol (COREG) 25 MG tablet Take 50 mg by mouth 2 times daily       furosemide (LASIX) 40 MG tablet Take 40 mg by mouth daily       furosemide (LASIX) 40 MG tablet Take 40 mg by mouth daily as needed (for weight gain >3 lbs overnight)       magnesium oxide (MAG-OX) 400 MG tablet Take 400 mg by mouth 2 times daily       mexiletine (MEXITIL) 250 MG capsule Take 250 mg by mouth 3 times daily       nitroGLYcerin (NITROSTAT) 0.4 MG sublingual tablet Place 1 tablet under the tongue every 5 minutes as needed       pantoprazole (PROTONIX) 40 MG EC tablet Take 40 mg by mouth daily       potassium chloride ER (K-TAB/KLOR-CON) 10 MEQ CR tablet Take 30 mEq by mouth daily. Patient is currently taking 2 tablets daily       rivaroxaban ANTICOAGULANT (XARELTO) 20 MG TABS tablet Take 20  mg by mouth daily       rosuvastatin (CRESTOR) 40 MG tablet Take 40 mg by mouth every evening       sacubitril-valsartan (ENTRESTO)  MG per tablet Take 1 tablet by mouth 2 times daily       sotalol (BETAPACE) 120 MG tablet Take 180 mg by mouth every 12 hours       spironolactone (ALDACTONE) 25 MG tablet Take 50 mg by mouth daily       tamsulosin (FLOMAX) 0.4 MG capsule Take 0.4 mg by mouth daily       Vitamin D3 (VITAMIN D-1000 MAX ST) 25 mcg (1000 units) tablet Take 1,000 Units by mouth daily        No current outpatient medications on file.     Current Facility-Administered Medications   Medication Dose Route Frequency Provider Last Rate Last Admin    aspirin EC tablet 81 mg  81 mg Oral Daily Dilshad Haro MD        calcium carbonate (TUMS) chewable tablet 1,000 mg  1,000 mg Oral 4x Daily PRN Dilshad Haro MD        carvedilol (COREG) tablet 50 mg  50 mg Oral BID Dilshad Haro MD        empagliflozin (JARDIANCE) tablet 10 mg  10 mg Oral Daily Dilshad Haro MD        furosemide (LASIX) tablet 40 mg  40 mg Oral Daily Dilshad Haro MD        lidocaine (LMX4) cream   Topical Q1H PRN Dilshad Haro MD        lidocaine 1 % 0.1-1 mL  0.1-1 mL Other Q1H PRN Dilshad Haro MD        LORazepam (ATIVAN) tablet 1 mg  1 mg Oral Once PRN Mary Carrillo APRN CNP        magnesium oxide (MAG-OX) tablet 400 mg  400 mg Oral Q4H Esteban Wills MD   400 mg at 04/29/25 0512    mexiletine (MEXITIL) capsule 250 mg  250 mg Oral TID Dilshad Haro MD        pantoprazole (PROTONIX) EC tablet 40 mg  40 mg Oral Daily Dilshad Haro MD        polyethylene glycol (MIRALAX) Packet 17 g  17 g Oral BID PRN Dilshad Haro MD        prasugrel (EFFIENT) tablet 10 mg  10 mg Oral Daily Dilshad Haro MD        rivaroxaban ANTICOAGULANT (XARELTO) tablet 20 mg  20 mg Oral Daily Dilshad Haro MD        rosuvastatin (CRESTOR) tablet 40 mg  40 mg Oral QPM Dilshad Haro MD        sacubitril-valsartan (ENTRESTO)  MG per  "tablet 1 tablet  1 tablet Oral BID Dilshad Haro MD        senna-docusate (SENOKOT-S/PERICOLACE) 8.6-50 MG per tablet 1 tablet  1 tablet Oral BID PRN Dilshad Haro MD        Or    senna-docusate (SENOKOT-S/PERICOLACE) 8.6-50 MG per tablet 2 tablet  2 tablet Oral BID PRN Dilshad Haro MD        sodium chloride (PF) 0.9% PF flush 3 mL  3 mL Intracatheter Q8H HECTOR Dilshad Haro MD   3 mL at 25 0512    sodium chloride (PF) 0.9% PF flush 3 mL  3 mL Intracatheter q1 min prn Dilshad Haro MD        spironolactone (ALDACTONE) tablet 25 mg  25 mg Oral Daily Dilshad Haro MD        tamsulosin (FLOMAX) capsule 0.4 mg  0.4 mg Oral Daily Dilshad Haro MD         Family History:   No family history on file.  Social History:   Social History     Tobacco Use    Smoking status: Former     Current packs/day: 0.00     Average packs/day: 1 pack/day for 20.0 years (20.0 ttl pk-yrs)     Types: Cigarettes     Start date:      Quit date: 2017     Years since quittin.3     Passive exposure: Never    Smokeless tobacco: Never   Substance Use Topics    Alcohol use: Never       ROS:   A comprehensive 10 point ROS was negative other than as mentioned in HPI.    Physical Examination:   VITALS: /75 (BP Location: Left arm)   Pulse 70   Temp 98.2  F (36.8  C) (Oral)   Resp 12   Ht 1.803 m (5' 11\")   Wt 117.4 kg (258 lb 14.4 oz)   SpO2 96%   BMI 36.11 kg/m    GENERAL APPEARANCE: AxO, NAD   HEENT: NCAT, EOMI, MMM. PERRLA.   NECK: Supple.   CHEST: CTAB   CARDIOVASCULAR: S1S2, Reg, No m/r/g.   ABDOMEN: BS+, soft, NT, ND.   EXTREMITIES: No pedal edema. Distal pulses intact.   NEURO: Grossly nonfocal.   PSYCH: Normal affect.  SKIN: Warm and dry.   Data:   Labs:  [facility]  Recent Labs   Lab 25  0207      POTASSIUM 3.6   CHLORIDE 104   FE 9.3   CO2 21*   BUN 15.6   CR 0.96   *     CBC  Recent Labs   Lab 25  0207   WBC 6.8   RBC 4.85   HGB 15.1   HCT 44.7   MCV 92   MCH 31.1   MCHC 33.8   RDW 14.8 "        Cholesterol (mg/dL)   Date Value   2024 112     Direct Measure HDL (mg/dL)   Date Value   2024 30 (L)     LDL Cholesterol Calculated (mg/dL)   Date Value   2024 51     [id number] RHC (OS Report):   Right atrial mean pressure: 5 mmHg.  O2 saturation 65.0%.  2.  Right ventricular pressure: 27/-1/2 mmHg.  O2 saturation 65.6%.  3.  Right main pulmonary artery pressure: 28/13/16 mmHg.  O2 saturation  65.4%.  4.  Pulmonary capillary wedge pressure: 8 mmHg.  5.  Cardiac output thermodilution technique 4.56 L/min.  Cardiac index 1.9  L/min/m .  6.  Cardiac output Michelle technique: 5.7 L/min.  Cardiac index 2.3 L/min/m .      2025 Echo (OS Report):     Normal left ventricular wall thickness.     Left ventricle is moderately dilated.  End-diastolic dimension 6.8 cm.     Biplane ejection fraction is 32%.  Visually looks much closer to 25%.    No apical thrombi.     Severe left ventricular systolic dysfunction.  Severe ischemic   cardiomyopathy with RCA and LAD wall motion abnormalities.  See below.     Grade I (mild) left ventricular diastolic abnormality.     Normal left ventricular filling pressure.     Right ventricular systolic function is low normal.     The left atrium is normal in size.     The right atrium is mildly dilated.     Normal central venous pressure (0-5 mmHg).     No pericardial effusion.     Inadequate TR spectral Doppler to accurately assess right ventricular   systolic pressure.     No significant valve disease.     No change from the 2025 study.     23 Coronary Angiogram (OS Report):  Conclusions:   1.  Normal right heart pressures   2.  Pulmonary capillary wedge pressure 8 mmHg   3.  Patent proximal LAD stent   4.  50% in-stent restenosis ostial first diagonal with patent superior   branch stent.  The inferior branch of the first diagonal has severe 80-90%   in-stent restenosis.  Status post 2.5 mm Angiosculpt scoring balloon   angioplasty at the  ostium and 2.5 x 15 mm trek neononcompliant balloon   angioplasty in the in-stent restenotic segment   5.  Mild irregularities right coronary artery and left circumflex coronary   artery.  Similar to previous     1/11/24 CMR [facility] Overread:  1. The LV is severely dilated in cavity size. The global systolic function is severely decreased. The LVEF  is 13%. There is akinesis of all the septal and anterior segments. There is thinning of the apical and true  apical segments.     2. The RV is normal in cavity size. The global systolic function is moderately decreased. The RVEF is 38%.      3. There is moderate enlargement of both atria.     4. Valvular function could not be reliably assessed because of device-related artifacts.     5. Late gadolinium enhancement imaging is suboptimal due to device-related artifacts (wide-band LGE imaging  was not performed); however, there is evidence of a large myocardial infarction involving virtually the  entire LAD territory. The MI is near transmural for a brief portion at the mid level and is transmural at  the apical and true apical levels. This is suggestive of a late presentation MI. There is practically no  residual viability in the LAD territory. The RCA and LCx territories appear viable.     6. There is no pericardial effusion or thickening.     7. There is no intracardiac thrombus.     CONCLUSIONS: Severe ischemic cardiomyopathy with adverse LV remodeling, LVEF of 13% and RVEF of 38%, with a  large MI involving virtually the entire LAD territory.      [facility] Nov 2024                 Assessment:   Mr. Russell is a 59 year old male who has a medical history significant for anterior wall MI, CAD s/p PCIs LAD, D1, D2 2009, 2010, 2011, & 2017, ICM LVEF 25%, s/p CRTD 2011 s/p gen change 6/2022, VT s/p prior ablations 6/2020 & 11/2021 (both at OSH) and 7/1/24 ([facility]), PAF (CHADSVASC 6 on Xarelto), HTN, HLD, prior LV thrombus, DM2, CVA, BPH, and obesity. He is now transferred to [facility] for  advanced cares given increasing VT episodes requiring numerous ATPs and 2 ICD shocks.   EP Recommendations:  CAD s/p multiple PCIs LAD, D2, D2 2009, 2010, 2011, 2017, 2025  ICM LVEF 25%, NYHA III  Ventricular Tachycardia:  1. ACEi/ARB/ARNi: Continue Entresto.  2. BB: Continue Coreg.  3. Aldosterone antagonist: Continue Spironolactone.  4. SGLT2i: Continue Jardiance.  5. Antiplatlet: Continue ASA.  6. Statin: Continue Crestor.  7.  SCD prophylaxis: s/p CRTD   8. Fluid status: Continue Lasix.   9. Nitroglycerin 0.4 mg PRN for chest pain. Place 1 tablet (0.4 mg) under the tongue every 5 minutes as needed for chest pain. Maximum of 3 doses in 15 minutes.  10. Lifestyle: Avoid tobacco, maintain a healthy weight, limit alcohol, cardiac diet, and exercise.  11. VT: He has had VT with ICD therapies despite AAT. He was originally on amiodarone and mexiletine but had breakthroughs. Amiodarone was later stopped to avoid possible long term toxicities and he was alternatively placed on Sotalol. He has had 2 VT ablation at OSH and found to have numerous VTs arising from anterior scar area (lateral/septal walls) and inferior apical aneurysm. He had continued to have recurrent arrhyhmias. He underwent another VT ablation ( msec, mid and basal anterior wall) and [facility] PVC ablation on 7/1/2024. He has had some recurrent slower VT. We also discussed ablation and its indications, risks, and benefits. Complications include but are not limited to vascular injury, excessive bleeding requiring blood transfusion, groin hematoma, aortic injury at the time of transseptal puncture, bleeding/injury to abdominal structures at time of epicardial access, cardiac tamponade requiring pericardiocentesis or open heart surgery, and thromboembolic complications or strokes. We also discussed that VT ablation can be limited by inducibility of VT at the time of EPS/Abaltion and/or the origin of VT. Efficacy of ablation also deceases if multiple foci  are found. After an extensive discussion, the patient would like to pursue ablation in attempts to improve VT burden and symptoms. We would like to hold amiodarone and mexiletine in preporation for this. Please also get updated CMR for pre ablation planning. We will work to arrange ablation after CMR completed, anticipate early next week.     The patient states understanding and is agreeable with plan.   Thank you for allowing us to participate in the care of this patient.     The patient was discussed w/ Dr. Wills.  The above note reflects our joint plan.    GARLAND Wright CNP  Electrophysiology Consult Service  Securely message with Jajah   Text page via [facility] Paging/Directory     SHANTEL Total time spent on patient visit, reviewing notes, imaging, labs, orders, and completing necessary documentation: 70 minutes.  >50% of visit spent on counseling patient and/or coordination of care.

## 2025-04-29 NOTE — DISCHARGE SUMMARY
"Admission Date: 4/23/2025       Discharge Date: 4/28/2025.  Admitting Provider: Jasmin Mcknight MD  Discharge Provider: Dyllan Cheney MD   Consultations:   General Cardiology  Electrophysiology    Primary Care Physician at Discharge: Lynnette Amezquita CNP    Admission Diagnosis:   Recurrent Ventricular Tachycardia  HTN  A fib  Chronic Systolic Heart Failure  DM    Discharge Diagnosis  Recurrent Ventricular Tachycardia  HTN  A fib  HFrEF  DM2  CAD  HLD  Obesity  Tobacco Use Disorder    Brief HPI and Hospital Course with Pertinent Procedures:  Pete Trejo is a 59 y.o. male with past medical history significant for ischemic cardiomyopathy and recurrent VT status post multiple VT ablations.  Patient was recently hospitalized 4/22-23 for VT with ATP therapies.  His amiodarone had recently been decreased secondary to symptoms of ataxia, right hand tremors and significant functional decline.  He was reloaded with IV amiodarone on 4/22 and transition to oral amiodarone on 4/23.  He had no more episodes of VT and was discharged home on 4/23.  Unfortunately patient did not even make it home before he had palpitations and feeling like his ICD was \"going off\" and came back to the emergency department.  His ICD was interrogated which showed several episodes of slow VT (heart rate 110 bpm) with several episodes of AT therapy.  Patient was reloaded with IV amiodarone and admitted to the hospitalist team.  Case was discussed with [facility] and he was accepted on 4/25, unfortunately there was no bed available for him until the day of DC.  Recommendations from [facility] were to stop the amiodarone with plans for EP study in hopes of being able to induce VT and ablate.  During this admission, the patient had several episodes of slow VT which required Amio pushes. Patient ultimately transferred on 4/28/25 without any issues.       Physical Exam at Discharge  Discharge Condition: stable  /78   " "Pulse 70   Temp 36.8 °C (98.3 °F) (Oral)   Resp 24   Ht 1.803 m (5' 11\")   Wt 118 kg (260 lb 3.2 oz)   SpO2 93%   BMI 36.29 kg/m²   Weight: 118 kg (260 lb 3.2 oz)  Physical Exam   General: Alert, no acute distress  Heart: Rate and rhythm are regular  Lungs: Clear to auscultation bilaterally  Abdomen: Soft, nondistended, nontender, normal bowel sounds  Extremities: No edema  Neuro: Alert, oriented x 3, nonfocal    Discharge Disposition  Discharge to  [facility]   Code Status at Discharge: Full Code  Diet: Regular  Activity: As tolerated  No current facility-administered medications for this encounter.    Current Outpatient Medications:     amiodarone (PACERONE) 200 mg tablet, Take 1 tablet (200 mg total) by mouth 2 (two) times a day with meals, Disp: , Rfl:     carvediloL (Coreg) 25 mg tablet, Take 2 tablets (50 mg total) by mouth 2 (two) times a day with meals, Disp: 360 tablet, Rfl: 1    mexiletine (MEXITIL) 150 mg capsule, Take 2 capsules (300 mg total) by mouth 3 (three) times a day, Disp: 180 capsule, Rfl: 11    prasugreL HCl (EFFIENT) 10 mg tablet, Take 1 tablet (10 mg total) by mouth daily, Disp: 30 tablet, Rfl: 1    spironolactone (ALDACTONE) 25 mg tablet, Take 2 tablets (50 mg total) by mouth daily, Disp: , Rfl:     rosuvastatin (CRESTOR) 40 mg tablet, Take 1 tablet (40 mg total) by mouth daily, Disp: , Rfl:     rivaroxaban (Xarelto) 20 mg tablet, Take 1 tablet (20 mg total) by mouth daily, Disp: 90 tablet, Rfl: 1    magnesium oxide (MAG-OX) 400 mg (241.3 mg magnesium) tablet, Take 1 tablet (400 mg total) by mouth 2 (two) times a day, Disp: 180 tablet, Rfl: 3    empagliflozin (JARDIANCE) 10 mg tablet, Take 1 tablet (10 mg total) by mouth daily, Disp: 90 tablet, Rfl: 3    sacubitriL-valsartan (ENTRESTO)  mg tablet, Take 1 tablet by mouth 2 (two) times a day, Disp: 180 tablet, Rfl: 3    potassium chloride 10 mEq CR tablet, Take 2 tablets (20 mEq total) by mouth daily, Disp: 180 " tablet, Rfl: 3    furosemide (LASIX) 40 mg tablet, Take 1 tablet (40 mg total) by mouth daily, Disp: 30 tablet, Rfl: 11    tamsulosin (FLOMAX) 0.4 mg capsule, Take 1 capsule (0.4 mg total) by mouth daily, Disp: , Rfl:     cholecalciferol, vitamin D3, 25 mcg (1,000 unit) tablet, Take 1 tablet (1,000 Units total) by mouth daily, Disp: , Rfl:     pantoprazole (PROTONIX) 40 mg EC tablet, Take 1 tablet (40 mg total) by mouth daily, Disp: , Rfl:     metFORMIN XR (GLUCOPHAGE-XR) 500 mg 24 hr tablet, Take 1 tablet (500 mg total) by mouth 2 (two) times a day, Disp: , Rfl:     white petrolatum, HYDROPHOR, 42 % ointment ointment, Apply 1 Application topically as needed Apply to affected area, Disp: , Rfl:     white petrol-mineral oil-lanolin-ceresin (eucerin) cream cream, Apply 1 Application topically as needed Apply to affected area, Disp: , Rfl:     melatonin 5 mg tablet, Take 1 tablet (5 mg total) by mouth nightly as needed for sleep, Disp: 39 tablet, Rfl: 1    acetaminophen (TYLENOL) 325 mg tablet, Take 2 tablets (650 mg total) by mouth every 6 (six) hours as needed for pain scale 1-3/10, Disp: 60 tablet, Rfl: 1    nitroglycerin (NITROSTAT) 0.4 mg SL tablet, Place 1 tablet (0.4 mg total) under the tongue every 5 (five) minutes as needed for chest pain, Disp: 26 tablet, Rfl: 1    albuterol HFA (PROVENTIL HFA;VENTOLIN HFA) 90 mcg/actuation inhaler, Inhale 2 puffs every 6 (six) hours as needed for wheezing or shortness of breath, Disp: 1 Inhaler, Rfl: 1   Discharge Instructions       None             Follow Up         Total time spent today coordinating discharge: 90 minutes

## 2025-04-29 NOTE — PLAN OF CARE
Time of Care: 1559-0809     I/A:   Neuro: A&O x4.   Cardiac: AV Paced, HR 70s. Denies chest pain and palpitations.   Respiratory: Sats >92% on RA. 2L NC overnight, does not use CPAP. Denies SOB.    GI/: LBM 4/28. Voids spontaneously. Denies GI symptoms. NPO overnight.   Skin/drains: No deficits.   IV/Drips: R PIV SL   Pain: Denies   Activity: Up ad cyn    Changes this shift: Swab negative for COVID/RSV/Flu. Potassium and Magnesium replaced this shift.       P: Continue with POC Notifying Cards 2 of changes.     Goal Outcome Evaluation:      Plan of Care Reviewed With: patient    Overall Patient Progress: improvingOverall Patient Progress: improving    Outcome Evaluation: VSS. 2L NC overnight; declined CPAP. NPO.

## 2025-04-29 NOTE — PLAN OF CARE
Goal Outcome Evaluation:  [id number]:  HX: 59 year old male who has a medical history significant for anterior wall MI, CAD s/p PCIs LAD, D1, D2 2009, 2010, 2011, & 2017, ICM LVEF 25%, s/p CRTD 2011 s/p gen change 6/2022, VT s/p prior ablations 6/2020 & 11/2021 (both at OSH) and 7/1/24 ([facility]), PAF (CHADSVASC 6 on Xarelto), HTN, HLD, prior LV thrombus, DM2, CVA, BPH, and obesity.     Cardiac:1000% AV paced at 70. No episodes VT. Mexilitine and Xarelto on hold. Continues on prasugrel. Started back on ASA.   VS:VSS  IV:PIV-SL  Tubes:NA  Neuro:A/Ox4.  Resp:Denies shortness of breath, on RA.   GI/:No BM this shift, good UO.   Nutrition:Regular diet-good PO intake. Discussed fluid intake in relation to PO lasix.   Labs:NA  Endo:Jardiance on hold,  prior to breakfast.   Skin:Intact.  Activity:Up ad cyn. Ambulated in halls with PT-OK for independent ambulation.   Pain:Denied pain.   Social:No visitors present, has cell phone at bedside. States son should be coming from [place].   Plan:Cardiac MRI Thursday or Friday, VT ablation early next week. Continue to monitor cardiac rhythm. Encourage ambulation and independence in cares. Continue current cars and notify providers with questions or concerns.         Plan of Care Reviewed With: patient    Overall Patient Progress: no changeOverall Patient Progress: no change    Outcome Evaluation: Monitor shows AV paced at 70. No episodes VT requiring shock or ATP. Mexitilitine and Xarelto on hold, started on ASA. Ambulated in halls with PT without difficulties. Plan for card MRI Th or Fri and VT ablation early next week.

## 2025-04-29 NOTE — PROGRESS NOTES
"   04/29/25 0919   Appointment Info   Signing Clinician's Name / Credentials (OT) Kera Luque OTR/L   Rehab Comments (OT) CR/OT only   Living Environment   People in Home child(loreto), adult   Current Living Arrangements house   Home Accessibility stairs to enter home   Number of Stairs, Main Entrance 8   Stair Railings, Main Entrance railings on both sides of stairs   Transportation Anticipated car, drives self   Living Environment Comments Pt lives with son in 1 level house. 8 YUMI w/ james handrails. Has tub/shower with grab bars, no chair   Self-Care   Usual Activity Tolerance excellent   Current Activity Tolerance good   Regular Exercise Yes   Activity/Exercise Type walking   Exercise Amount/Frequency daily   Equipment Currently Used at Home none   Fall history within last six months no   Activity/Exercise/Self-Care Comment IND in ADLs prior to admission, walks dogs daily 4-8 blocks   Instrumental Activities of Daily Living (IADL)   IADL Comments IND in IADLs, retired from construction 3 years ago   General Information   Onset of Illness/Injury or Date of Surgery 04/28/25   Referring Physician Dilshad Haro MD   Patient/Family Therapy Goal Statement (OT) Return home   Additional Occupational Profile Info/Pertinent History of Current Problem Per EMR, \"59 year old male with PMH of recurrent VT s/p multiple ablations, ischemic cardiomyopathy, chronic systolic HF, CAD with multiple stents, paroxysmal AF, HTN, T2DM, HLD, and FREDDY.     Recent history: Patient was recently hospitalized 3/14 -3/18 for VT storm with ICD shocks. He was transitioned off sotalol and onto amiodarone and was discharged home with instructions for an amiodarone taper. Unfortunately patient went back into VT and was admitted 3/21-3/25 with refractory VT and ICD shocks and placed on IV amiodarone. Cardiac catheterization was performed with PTCA/PCI of the diagonal and he was started on Prasugrel. Device transmission on 4/6/2025 demonstrated a " "slow VT with a heart rate of 110 bpm that was successfully terminated by ATP.     4/18 had gone to EP clinic, and reported symptoms of ataxia, hand tremors, decreased energy levels as well as other symptoms. At that time, amiodarone was decreased 200 mg once daily.      Patient presented to [facility] ED (hospitalized 4/21-4/23) with palpitations and feeling like his defibrillator had been going off. Device interrogation at that time showed multiple episodes of VT with multiple ATP therapies. Case was discussed with Dr. Velasco who recommended reloading patient with IV amiodarone. After IV amiodarone reload, patient has had no further runs of VT. Recommendations were for patient to be discharged home on 200 mg twice daily of amiodarone as well as his mexiletine home dose of 300 mg 3 times daily. RHC performed as well (details noted below).      Shortly after discharge from this hospitalization, patient reported re-onset of palpations, substernal chest pressure sensation, and associated mild SOB; which prompted him [facility] go [facility] ED [facility], with subsequent transfer to [facility] today. Upon my evaluation, currently paced, and denies palpations, SOB, chest discomfort/pain, LE swelling, headaches, N/V, fevers/chills, or urinary/BM irregularities.\"   Existing Precautions/Restrictions cardiac   General Observations and Info Activity: up with assist   Cognitive Status Examination   Orientation Status orientation to person, place and time   Visual Perception   Visual Impairment/Limitations WFL   Sensory   Sensory Quick Adds sensation intact   Pain Assessment   Patient Currently in Pain No   Posture   Posture not impaired   Range of Motion Comprehensive   General Range of Motion no range of motion deficits identified   Strength Comprehensive (MMT)   General Manual Muscle Testing (MMT) Assessment no strength deficits identified   Coordination   Upper Extremity Coordination No deficits were identified   Bed Mobility "   Bed Mobility No deficits identified   Transfers   Transfers No deficits identified   Balance   Balance Assessment standing dynamic balance   Standing Balance: Dynamic independent   Activities of Daily Living   BADL Assessment/Intervention no deficits identified   Clinical Impression   Criteria for Skilled Therapeutic Interventions Met (OT) Yes, treatment indicated   OT Diagnosis Dec activity tolerance in I/ADLs   OT Problem List-Impairments impacting ADL problems related to;activity tolerance impaired   Identified Performance Deficits activity tolerance for all I/ADLs   Planned Therapy Interventions (OT) ADL retraining;IADL retraining;bed mobility training;cognition;ROM;strengthening;transfer training;progressive activity/exercise   Clinical Impression Comments Pt appears near functional baseline. Limited by dec act cece. Would benefit from continued OT services to promote strength, safety, and IND in I/ADLs   OT Total Evaluation Time   OT Eval, Low Complexity Minutes (55486) 8   OT Goals   Therapy Frequency (OT) 3 times/week   OT: Understanding of cardiac education to maximize quality of life, condition management, and health outcomes Patient;Verbalize   OT: Perform aerobic activity with stable cardiovascular response continuous;30 minutes;ambulation   OT: Functional/aerobic ambulation tolerance with stable cardiovascular response in order to return to home and community environment Independent;Greater than 300 feet   OT: Navigation of stairs simulating home set up with stable cardiovascular response in order to return to home and community environment Independent;8 stairs   OT Discharge Planning   OT Plan CR, nustep/amb in [place], stairs, EC edu   OT Discharge Recommendation (DC Rec) home   OT Rationale for DC Rec Pt near/at functional baseline. Limited by arrythmias impacting activity tolerance for I/ADL. Pending plans and LOS, pt will be safe for return home once medically ready. Lives with adult son who can  assist as needed.   OT Brief overview of current status IND   Total Session Time   Timed Code Treatment Minutes 18   Total Session Time (sum of timed and untimed services) 26

## 2025-04-29 NOTE — PHARMACY-ADMISSION MEDICATION HISTORY
Pharmacy Intern Admission Medication History    Admission medication history is complete. The information provided in this note is only as accurate as the sources available at the time of the update.    Information Source(s): Patient and Hospital records via in-person    Pertinent Information:   Patient was admitted to [facility] on 4/21, was discharged on 4/23 but patient came back to ED same date and was hospitalized there then was transferred from [facility] to [facility] on 4/28.   Per patient, he did not start any new medication at [facility].   Patient had an AVS from 4/23 with discharge med list with him, which matched On license of [facility]'s records.   Patient has been taking Carvedilol 50 mg orally two times daily but 1 dose of 25 mg was given on 4/28 at [facility].    Changes made to PTA medication list:  Added:   Amiodarone 200 mg tablet: Take 1 tablet (200 mg total) by mouth 2 (two) times a day with meals.  Mexiletine 150 mg capsule: Take 2 capsules (300 mg total) by mouth 3 (three) times a day   Prasugrel 10 mg tablet: Take 1 tablet (10 mg total) by mouth daily    Melatonin 5 mg tablet: Take 1 tablet (5 mg total) by mouth nightly as needed for sleep    Acetaminophen 325 mg tablet: Take 2 tablets (650 mg total) by mouth every 6 (six) hours as needed for pain scale 1-3/10    Empagliflozin 10 mg tablet: Take 1 tablet (10 mg total) by mouth daily     Deleted:   Aspirin 81 mg orally daily: Patient reported not taking  Furosemide 40 mg orally as needed for weight gain >3 lbs overnight: Patient reported taking daily, not as needed   Sotalol 180 mg orally every 12 hours: Patient had been taking Amiodarone at home but was switched to Sotalol during admission on 3/21, then he was switched back to Amiodarone after discharge.  Changed:   Potassium chloride CR 30 mEq daily to 20 mEq by mouth daily.  Spironolactone 50 mg by mouth daily to 25 mg orally daily: Patient was discharged with direction to take  Spironolactone 50 mg daily on 4/23 but dose was reduced to 25 mg orally daily at [facility] on 4/24.    Allergies reviewed with patient and updates made in EHR: yes    Medication History Completed By: Michaela Rios 4/29/2025 8:20 AM    PTA Med List   Medication Sig Last Dose/Taking    acetaminophen (TYLENOL) 325 MG tablet Take 650 mg by mouth every 6 hours as needed for mild pain. Past Month    amiodarone (PACERONE) 200 MG tablet Take 200 mg by mouth 2 times daily. 4/28/2025    carvedilol (COREG) 25 MG tablet Take 50 mg by mouth 2 times daily 4/27/2025    empagliflozin (JARDIANCE) 10 MG TABS tablet Take 10 mg by mouth daily. 4/28/2025    furosemide (LASIX) 40 MG tablet Take 40 mg by mouth daily Taking    magnesium oxide (MAG-OX) 400 MG tablet Take 400 mg by mouth 2 times daily 4/28/2025    melatonin 5 MG tablet Take 5 mg by mouth nightly as needed for sleep. Past Week    mexiletine (MEXITIL) 150 MG capsule Take 300 mg by mouth 3 times daily. 4/28/2025    nitroGLYcerin (NITROSTAT) 0.4 MG sublingual tablet Place 1 tablet under the tongue every 5 minutes as needed More than a month    pantoprazole (PROTONIX) 40 MG EC tablet Take 40 mg by mouth daily 4/28/2025    potassium chloride ER (K-TAB/KLOR-CON) 10 MEQ CR tablet Take 20 mEq by mouth daily. Patient is currently taking 2 tablets daily Past Week    prasugrel (EFFIENT) 10 MG TABS tablet Take 10 mg by mouth daily. 4/28/2025    rivaroxaban ANTICOAGULANT (XARELTO) 20 MG TABS tablet Take 20 mg by mouth daily (with dinner). 4/27/2025    rosuvastatin (CRESTOR) 40 MG tablet Take 40 mg by mouth every evening 4/27/2025    sacubitril-valsartan (ENTRESTO)  MG per tablet Take 1 tablet by mouth 2 times daily 4/28/2025    spironolactone (ALDACTONE) 25 MG tablet Take 50 mg by mouth daily 4/28/2025    tamsulosin (FLOMAX) 0.4 MG capsule Take 0.4 mg by mouth daily 4/28/2025    Vitamin D3 (VITAMIN D-1000 MAX ST) 25 mcg (1000 units) tablet Take 1,000 Units by mouth daily Past Week

## 2025-04-29 NOTE — PLAN OF CARE
6C Defer Physical Therapy - Per chart review and discussion with Occupational Therapy, Pt ambulating IND no AD, without concerns for safety or balance at discharge. Plan for OT to continue to follow to address cardiac rehab and progression of activity tolerance as needed. Pt with no skilled inpatient PT needs, Will complete consult and defer evaluation, please reconsult as appropriate if patient has decline in functional mobility requiring further skilled inpatient PT needs. Defer Discharge recommendations to Occupational Therapy.

## 2025-04-29 NOTE — TELEPHONE ENCOUNTER
Is the implanted device MRI conditional: Yes    Please schedule the MRI: Yes    Does the patient need a CXR prior to MRI: Yes    Device: [employer] G125 MOMENTUM CRT-D    Device Information:

## 2025-04-29 NOTE — H&P
Cardiology Inpatient H&P  April 28, 2025      HPI:   Leo Russell is a 59 year old male with PMH of recurrent VT s/p multiple ablations, ischemic cardiomyopathy, chronic systolic HF, CAD with multiple stents, paroxysmal AF, HTN, T2DM, HLD, and FREDDY.    Recent history: Patient was recently hospitalized 3/14 -3/18 for VT storm with ICD shocks. He was transitioned off sotalol and onto amiodarone and was discharged home with instructions for an amiodarone taper. Unfortunately patient went back into VT and was admitted 3/21-3/25 with refractory VT and ICD shocks and placed on IV amiodarone. Cardiac catheterization was performed with PTCA/PCI of the diagonal and he was started on Prasugrel. Device transmission on 4/6/2025 demonstrated a slow VT with a heart rate of 110 bpm that was successfully terminated by ATP.    4/18 had gone to EP clinic, and reported symptoms of ataxia, hand tremors, decreased energy levels as well as other symptoms. At that time, amiodarone was decreased 200 mg once daily.     Patient presented to [facility] ED (hospitalized 4/21-4/23) with palpitations and feeling like his defibrillator had been going off. Device interrogation at that time showed multiple episodes of VT with multiple ATP therapies. Case was discussed with Dr. Velasco who recommended reloading patient with IV amiodarone. After IV amiodarone reload, patient has had no further runs of VT. Recommendations were for patient to be discharged home on 200 mg twice daily of amiodarone as well as his mexiletine home dose of 300 mg 3 times daily. RHC performed as well (details noted below).     Shortly after discharge from this hospitalization, patient reported re-onset of palpations, substernal chest pressure sensation, and associated mild SOB; which prompted him to go [facility] ED again, with subsequent transfer to [facility] today. Upon my evaluation, currently paced, and denies palpations, SOB, chest discomfort/pain, LE  swelling, headaches, N/V, fevers/chills, or urinary/BM irregularities.     Social hx: Lives in [place] with his 28yo son. Denies smoking cigarettes, alcohol use, or recreational drug use.     Review of Systems:    Complete review of systems was performed and negative except per HPI.    PMH:  Past Medical History:   Diagnosis Date    Congestive heart failure (H)     Hypertension      Active Problems:  Patient Active Problem List    Diagnosis Date Noted    Heart failure (H) 04/28/2025     Priority: Medium    Ventricular tachycardia (H) 07/01/2024     Priority: Medium    Class 2 severe obesity due to excess calories with serious comorbidity in adult (H) 02/11/2024     Priority: Medium    Benign prostatic hyperplasia without lower urinary tract symptoms 10/19/2023     Priority: Medium    Type 2 diabetes mellitus with obesity (H) 10/19/2023     Priority: Medium    Gastroesophageal reflux disease without esophagitis 10/19/2023     Priority: Medium    Lymphocytosis (symptomatic) 10/19/2023     Priority: Medium    Moderate obesity 10/19/2023     Priority: Medium    Chronic anticoagulation 09/19/2021     Priority: Medium    History of ventricular tachycardia 09/19/2021     Priority: Medium    Paroxysmal atrial fibrillation (H) 09/19/2021     Priority: Medium    Chronic combined systolic and diastolic CHF, NYHA class 2 and ACC/AHA stage C (H) 07/11/2021     Priority: Medium    Automatic implantable cardioverter-defibrillator in situ 06/10/2020     Priority: Medium    Anterior myocardial infarction (H) 10/30/2017     Priority: Medium     Formatting of this note might be different from the original.  Sm 07/25/2009 PCI. 2.25 X 24mm Taxus drug eluting stent to diagonal. 3.0 X 8-mm Taxus stent to proximal LAD  12/03/2010 PCI. 3.5 X 15-mm Promus drug eluting stent to totally occluded LAD      Arterial aneurysm 10/30/2017     Priority: Medium     Formatting of this note might be different from the original.  Anteroapical  Akinetic      Coronary atherosclerosis 10/30/2017     Priority: Medium     Formatting of this note might be different from the original.  Progressive, involving LAD/Diagonal, Stent      CVA (cerebral vascular accident) (H) 10/30/2017     Priority: Medium     Formatting of this note might be different from the original.  2010      Hyperlipidemia 10/30/2017     Priority: Medium    Hypertension 10/30/2017     Priority: Medium    Ischemic cardiomyopathy 10/30/2017     Priority: Medium     Formatting of this note might be different from the original.  Severe 2011 BiVentricular ICD. [employer] Cognis HE Model #N119, Serial #[id number]. RV lead Guidant Model #0185, Serial#[id number]. LV lead BS Model #4591, Serial [id number]. RA lead Guidant Model #4469, Serial #[id number]  Formatting of this note might be different from the original.  Severe 2011 BiVentricular ICD. [employer] Cognis HE Model #N119, Serial #[id number]. RV lead Guidant Model #0185, Serial#[id number]. LV lead BS Model #4591, Serial [id number]. RA lead Guidant Model #4469, Serial #[id number]       Social History:  Social History     Tobacco Use    Smoking status: Former     Current packs/day: 0.00     Average packs/day: 1 pack/day for 20.0 years (20.0 ttl pk-yrs)     Types: Cigarettes     Start date:      Quit date: 2017     Years since quittin.3     Passive exposure: Never    Smokeless tobacco: Never   Vaping Use    Vaping status: Never Used   Substance Use Topics    Alcohol use: Never    Drug use: Never     Family History:  No family history on file.    Medications:  Current Facility-Administered Medications   Medication Dose Route Frequency Provider Last Rate Last Admin    aspirin EC tablet 81 mg  81 mg Oral Daily Dilshad Haro MD        carvedilol (COREG) tablet 50 mg  50 mg Oral BID Dilshad Haro MD        empagliflozin (JARDIANCE) tablet 10 mg  10 mg Oral Daily Dilshad Haro MD        furosemide (LASIX) tablet 40 mg  40 mg Oral Daily Dilshad Haro MD    "     mexiletine (MEXITIL) capsule 250 mg  250 mg Oral TID Dilshad Haro MD        pantoprazole (PROTONIX) EC tablet 40 mg  40 mg Oral Daily Dilshad Haro MD        prasugrel (EFFIENT) tablet 10 mg  10 mg Oral Daily Dilshad Haro MD        rivaroxaban ANTICOAGULANT (XARELTO) tablet 20 mg  20 mg Oral Daily Dilshad Haro MD        rosuvastatin (CRESTOR) tablet 40 mg  40 mg Oral QPM Dilshad Haro MD        sacubitril-valsartan (ENTRESTO)  MG per tablet 1 tablet  1 tablet Oral BID Dilshad Haro MD        sodium chloride (PF) 0.9% PF flush 3 mL  3 mL Intracatheter Q8H HECTOR Dilshad Haro MD        spironolactone (ALDACTONE) tablet 25 mg  25 mg Oral Daily Dilshad Haro MD        tamsulosin (FLOMAX) capsule 0.4 mg  0.4 mg Oral Daily Dilshad Haro MD           Current Facility-Administered Medications   Medication Dose Route Frequency Provider Last Rate Last Admin       Physical Exam:  Temp:  [98.5  F (36.9  C)] 98.5  F (36.9  C)  Pulse:  [70] 70  Resp:  [16] 16  BP: (125)/(76) 125/76  SpO2:  [96 %] 96 %  No intake or output data in the 24 hours ending 04/28/25 5448  GEN: pleasant, no acute distress  HEENT: no icterus  CV: normal rate, regular rhythm, normal s1/s2, no murmurs/rubs/s3/s4.  CHEST: clear to ausculation bilaterally, no rales or wheezing  ABD: soft, non-tender, non-distended  EXTR: pulses present, warm extremities. No BLE pitting edema    NEURO: alert oriented, speech fluent/appropriate, motor grossly nonfocal    Diagnostics:  All labs and imaging were reviewed, of note:    All laboratory data reviewed    No results found for: \"TROPI\", \"TROPONIN\", \"TROPR\", \"TROPN\"    [facility] 4/23/2025:   Right atrial mean pressure: 5 mmHg.  O2 saturation 65.0%.  2.  Right ventricular pressure: 27/-1/2 mmHg.  O2 saturation 65.6%.  3.  Right main pulmonary artery pressure: 28/13/16 mmHg.  O2 saturation  65.4%.  4.  Pulmonary capillary wedge pressure: 8 mmHg.  5.  Cardiac output thermodilution technique 4.56 L/min. "  Cardiac index 1.9  L/min/m .  6.  Cardiac output Michelle technique: 5.7 L/min.  Cardiac index 2.3 L/min/m .       TTE 4/27/2025:     Normal left ventricular wall thickness.     Left ventricle is moderately dilated.  End-diastolic dimension 6.8 cm.     Biplane ejection fraction is 32%.  Visually looks much closer to 25%.    No apical thrombi.     Severe left ventricular systolic dysfunction.  Severe ischemic   cardiomyopathy with RCA and LAD wall motion abnormalities.  See below.     Grade I (mild) left ventricular diastolic abnormality.     Normal left ventricular filling pressure.     Right ventricular systolic function is low normal.     The left atrium is normal in size.     The right atrium is mildly dilated.     Normal central venous pressure (0-5 mmHg).     No pericardial effusion.     Inadequate TR spectral Doppler to accurately assess right ventricular   systolic pressure.     No significant valve disease.     No change from the March 2025 study.       Assessment and Plan:  Leo Russell is a 59 year old male with PMH of recurrent VT s/p multiple ablations, ischemic cardiomyopathy, chronic systolic HF, CAD with multiple stents, paroxysmal AF, HTN, T2DM, HLD, and FREDDY. Presented to [facility] 4/23 for recurrence of palpitations along with substernal chest pain and found to be in ventricular tachycardia/VT storm.  Denies any current palpitation or SOB, chest pain, leg swelling. Denies headache, N/V, urinary/BM irregularities, fevers/chills.     # Recurrent ventricular tachycardia/VT storm  # s/p multiple ablations ([place] [facility])  # Paroxysmal Atrial fibrillation  Presented to the ER last evening with tachycardic palpitations, substernal chest tightness, shortness of breath and lightheadedness. Symptoms were consistent with prior admissions and he presented for evaluation. Transferred from [facility] for further management. Hemodynamically stable, and currently in AV paced rhythm.   - AC: CHADS2-Vasc 6.  Continue PTA Xarelto 20mg daily   - Rate/rhythm control: mexiletine 300 mg TID, carvedilol 50 mg po BID  - SCD ppx: ICD in place; at OSH, shock therapy was turned off in the slow VT zone.   - avoid amiodarone gtt given noted side effects on the higher dose with R hand tremor and has had progressive decrease in his energy with weakness in his arms and legs.   - EP consult, appreciate recommendations  - NPO at [facility] tonight for possible procedure   - Device check    - Goal K>4, Mg>2; RN-managed replacement protocols     # Chronic systolic heart failure, EF 32%  # CAD with multiple stents PCI to diagonal (2009), LAD (2010)  # HTN  # HLD   *TTE 4/27/25 - LV moderately dilated, EF 32%, severe LV systolic dysfunction, Severe ischemic cardiomyopathy with RCA and LAD wall motion abnormalities. Mild LV diastolic abnormality. RV function normal. RA mildly dilated. Normal CVP.   *RHC 4/23/25 - mRA 5, RV 27/-1/2, O2 sat 65.6%, RmPAP 28/13/16, W 8, Thermodilution CO/CI 4.56/1.9, Michelle CO/CI 5.7/2.3   Troponin 12, [id number]   Volume status: Euvolemic.  kg. Continue home furosemide 40 mg daily  ACE/ARB/ARNI: PTA sacubitril-valsartan 97-103mg BID  SGLT2i: PTA empagliflozin 10 mg daily  MR antagonist: PTA spironolactone 25 mg daily  BB: PTA carvedilol 50 mg BID  CAV: PTA statin, prasugrel 10mg daily    # T2DM   Hgb A1c 6.9 % (3/21/25). Glucose 152 on admission.   - PTA empagliflozin 10 mg daily    FEN: Cardiac diet, NPO at MN   PPx: PTA DOAC   Code: FULL     Disposition Plan   Expected discharge in >5 days to prior living arrangement once medically stable.     Entered: Dilshad Haro MD 04/29/2025, 2:48 AM       To be staffed in the AM.     Dilshad Haro MD  [facility]  Department of Internal Medicine   Resident Physician  PGY-2

## 2025-04-30 ENCOUNTER — APPOINTMENT (OUTPATIENT)
Dept: GENERAL RADIOLOGY | Facility: CLINIC | Age: 60
DRG: 274 | End: 2025-04-30
Attending: NURSE PRACTITIONER
Payer: MEDICARE

## 2025-04-30 PROBLEM — I47.20 VENTRICULAR TACHYCARDIA (H): Status: ACTIVE | Noted: 2024-07-01

## 2025-04-30 LAB
ANION GAP SERPL CALCULATED.3IONS-SCNC: 9 MMOL/L (ref 7–15)
ATRIAL RATE - MUSE: 70 BPM
ATRIAL RATE - MUSE: 70 BPM
BUN SERPL-MCNC: 13.6 MG/DL (ref 6–20)
CALCIUM SERPL-MCNC: 9.1 MG/DL (ref 8.8–10.4)
CHLORIDE SERPL-SCNC: 106 MMOL/L (ref 98–107)
CREAT SERPL-MCNC: 0.95 MG/DL (ref 0.67–1.17)
DIASTOLIC BLOOD PRESSURE - MUSE: NORMAL MMHG
DIASTOLIC BLOOD PRESSURE - MUSE: NORMAL MMHG
EGFRCR SERPLBLD CKD-EPI 2021: >90 ML/MIN/1.73M2
ERYTHROCYTE [DISTWIDTH] IN BLOOD BY AUTOMATED COUNT: 14.9 % (ref 10–15)
GLUCOSE BLDC GLUCOMTR-MCNC: 125 MG/DL (ref 70–99)
GLUCOSE SERPL-MCNC: 121 MG/DL (ref 70–99)
HCO3 SERPL-SCNC: 24 MMOL/L (ref 22–29)
HCT VFR BLD AUTO: 46.5 % (ref 40–53)
HGB BLD-MCNC: 15.4 G/DL (ref 13.3–17.7)
INTERPRETATION ECG - MUSE: NORMAL
INTERPRETATION ECG - MUSE: NORMAL
MAGNESIUM SERPL-MCNC: 2 MG/DL (ref 1.7–2.3)
MCH RBC QN AUTO: 31.8 PG (ref 26.5–33)
MCHC RBC AUTO-ENTMCNC: 33.1 G/DL (ref 31.5–36.5)
MCV RBC AUTO: 96 FL (ref 78–100)
P AXIS - MUSE: -21 DEGREES
P AXIS - MUSE: -7 DEGREES
PLATELET # BLD AUTO: 195 10E3/UL (ref 150–450)
POTASSIUM SERPL-SCNC: 4 MMOL/L (ref 3.4–5.3)
PR INTERVAL - MUSE: 162 MS
PR INTERVAL - MUSE: 162 MS
QRS DURATION - MUSE: 104 MS
QRS DURATION - MUSE: 110 MS
QT - MUSE: 424 MS
QT - MUSE: 436 MS
QTC - MUSE: 457 MS
QTC - MUSE: 470 MS
R AXIS - MUSE: 156 DEGREES
R AXIS - MUSE: 179 DEGREES
RBC # BLD AUTO: 4.85 10E6/UL (ref 4.4–5.9)
SODIUM SERPL-SCNC: 139 MMOL/L (ref 135–145)
SYSTOLIC BLOOD PRESSURE - MUSE: NORMAL MMHG
SYSTOLIC BLOOD PRESSURE - MUSE: NORMAL MMHG
T AXIS - MUSE: 16 DEGREES
T AXIS - MUSE: 32 DEGREES
VENTRICULAR RATE- MUSE: 70 BPM
VENTRICULAR RATE- MUSE: 70 BPM
WBC # BLD AUTO: 6.3 10E3/UL (ref 4–11)

## 2025-04-30 PROCEDURE — 99232 SBSQ HOSP IP/OBS MODERATE 35: CPT | Mod: 25 | Performed by: NURSE PRACTITIONER

## 2025-04-30 PROCEDURE — 250N000011 HC RX IP 250 OP 636: Performed by: NURSE PRACTITIONER

## 2025-04-30 PROCEDURE — 250N000013 HC RX MED GY IP 250 OP 250 PS 637

## 2025-04-30 PROCEDURE — 120N000003 HC R&B IMCU UMMC

## 2025-04-30 PROCEDURE — 71046 X-RAY EXAM CHEST 2 VIEWS: CPT

## 2025-04-30 PROCEDURE — 250N000013 HC RX MED GY IP 250 OP 250 PS 637: Performed by: NURSE PRACTITIONER

## 2025-04-30 PROCEDURE — 82565 ASSAY OF CREATININE: CPT

## 2025-04-30 PROCEDURE — 85027 COMPLETE CBC AUTOMATED: CPT

## 2025-04-30 PROCEDURE — 71046 X-RAY EXAM CHEST 2 VIEWS: CPT | Mod: 26 | Performed by: RADIOLOGY

## 2025-04-30 PROCEDURE — 83735 ASSAY OF MAGNESIUM: CPT | Performed by: INTERNAL MEDICINE

## 2025-04-30 PROCEDURE — 250N000013 HC RX MED GY IP 250 OP 250 PS 637: Performed by: INTERNAL MEDICINE

## 2025-04-30 PROCEDURE — 36415 COLL VENOUS BLD VENIPUNCTURE: CPT

## 2025-04-30 RX ORDER — MAGNESIUM OXIDE 400 MG/1
400 TABLET ORAL EVERY 4 HOURS
Status: COMPLETED | OUTPATIENT
Start: 2025-04-30 | End: 2025-04-30

## 2025-04-30 RX ORDER — POTASSIUM CHLORIDE 1.5 G/1.58G
20 POWDER, FOR SOLUTION ORAL DAILY
Status: DISCONTINUED | OUTPATIENT
Start: 2025-04-30 | End: 2025-04-30

## 2025-04-30 RX ORDER — POTASSIUM CHLORIDE 750 MG/1
20 TABLET, EXTENDED RELEASE ORAL DAILY
Status: DISCONTINUED | OUTPATIENT
Start: 2025-04-30 | End: 2025-05-04 | Stop reason: HOSPADM

## 2025-04-30 RX ORDER — ENOXAPARIN SODIUM 100 MG/ML
40 INJECTION SUBCUTANEOUS EVERY 24 HOURS
Status: DISCONTINUED | OUTPATIENT
Start: 2025-04-30 | End: 2025-05-02 | Stop reason: ALTCHOICE

## 2025-04-30 RX ADMIN — SPIRONOLACTONE 25 MG: 25 TABLET, FILM COATED ORAL at 08:17

## 2025-04-30 RX ADMIN — SACUBITRIL AND VALSARTAN 1 TABLET: 97; 103 TABLET, FILM COATED ORAL at 08:17

## 2025-04-30 RX ADMIN — CARVEDILOL 50 MG: 25 TABLET, FILM COATED ORAL at 19:40

## 2025-04-30 RX ADMIN — FUROSEMIDE 40 MG: 40 TABLET ORAL at 08:17

## 2025-04-30 RX ADMIN — MAGNESIUM OXIDE TAB 400 MG (241.3 MG ELEMENTAL MG) 400 MG: 400 (241.3 MG) TAB at 12:31

## 2025-04-30 RX ADMIN — PANTOPRAZOLE SODIUM 40 MG: 40 TABLET, DELAYED RELEASE ORAL at 08:17

## 2025-04-30 RX ADMIN — PRASUGREL 10 MG: 10 TABLET, FILM COATED ORAL at 09:18

## 2025-04-30 RX ADMIN — TAMSULOSIN HYDROCHLORIDE 0.4 MG: 0.4 CAPSULE ORAL at 08:17

## 2025-04-30 RX ADMIN — POTASSIUM CHLORIDE 20 MEQ: 750 TABLET, EXTENDED RELEASE ORAL at 14:25

## 2025-04-30 RX ADMIN — CARVEDILOL 50 MG: 25 TABLET, FILM COATED ORAL at 08:17

## 2025-04-30 RX ADMIN — SACUBITRIL AND VALSARTAN 1 TABLET: 97; 103 TABLET, FILM COATED ORAL at 19:40

## 2025-04-30 RX ADMIN — MAGNESIUM OXIDE TAB 400 MG (241.3 MG ELEMENTAL MG) 400 MG: 400 (241.3 MG) TAB at 08:17

## 2025-04-30 RX ADMIN — ENOXAPARIN SODIUM 40 MG: 40 INJECTION SUBCUTANEOUS at 14:25

## 2025-04-30 RX ADMIN — ASPIRIN 81 MG CHEWABLE TABLET 81 MG: 81 TABLET CHEWABLE at 08:17

## 2025-04-30 RX ADMIN — ROSUVASTATIN CALCIUM 40 MG: 40 TABLET, FILM COATED ORAL at 19:40

## 2025-04-30 ASSESSMENT — ACTIVITIES OF DAILY LIVING (ADL)
ADLS_ACUITY_SCORE: 35
ADLS_ACUITY_SCORE: 33
ADLS_ACUITY_SCORE: 35
ADLS_ACUITY_SCORE: 33
ADLS_ACUITY_SCORE: 35
ADLS_ACUITY_SCORE: 33
ADLS_ACUITY_SCORE: 35
ADLS_ACUITY_SCORE: 35
ADLS_ACUITY_SCORE: 33
ADLS_ACUITY_SCORE: 35
ADLS_ACUITY_SCORE: 35
ADLS_ACUITY_SCORE: 33

## 2025-04-30 NOTE — PLAN OF CARE
Goal Outcome Evaluation:    Pt had another 18bt run VT while resting in bed this morning-paced out back to AVpaced 70s.  K 4.0-started 20mEq daily/Mg replaced at 2.0.   Up ad cyn. Denies pain but somewhat anxious about frequent VT episodes.   CXR done. Plan cardiac MRI tomorrow at 12:30 before planned VT ablation next Monday.

## 2025-04-30 NOTE — PROGRESS NOTES
Cardiology Progress Note      Assessment & Plan:  Leo Russell is a 59 year old male with a history of recurrent VT s/p multiple ablations, ischemic cardiomyopathy, chronic systolic HF, CAD with multiple stents, paroxysmal AF, HTN, T2DM, HLD, and OS     Today's Update:  - CXR prior to cMRI  - Continue to hold AAT therapy with upcoming procedures    # Recurrent ventricular tachycardia/VT storm  # s/p multiple ablations ([place] and [facility])  # Paroxysmal Atrial fibrillation  Presented to the ER last evening with tachycardic palpitations, substernal chest tightness, shortness of breath and lightheadedness. Symptoms were consistent with prior admissions and he presented for evaluation. Transferred from OSH for further management. Hemodynamically stable, and currently in AV paced rhythm.   EP consult:  cMRI ordered, scheduled for 5/2/25  Hold PTA Mexilitine and Xarelto with upcoming procedures  Avoid anti-arrhythmics with upcoming procedure, if needed could try lidocaine IVP  - AC: CHADS2-Vasc 6. On Xarelto 20mg daily , hold as above  - Continue PTA carvedilol 50 mg po BID     - avoid amiodarone gtt given noted side effects on the higher dose with R hand tremor and has had progressive decrease in his energy with weakness in his arms and legs.   - Device check    - Goal K>4, Mg>2; RN-managed replacement protocols  - Telemetry     # Chronic systolic heart failure, EF 32%  # CAD with multiple stents PCI to diagonal (2009), LAD (2010)  # HTN  # HLD   - TTE 4/27/25 - LV moderately dilated, EF 32%, severe LV systolic dysfunction, Severe ischemic cardiomyopathy with RCA and LAD wall motion abnormalities. Mild LV diastolic abnormality. RV function normal. RA mildly dilated. Normal CVP.   - Coronary angiogram OSH 3/24/25: PTCA and PCI  1st diag -> ASA + Prasugrel   - RHC 4/23/25: Michelle CO 5.7, CI 2.3  GDMT:  Volume status: Euvolemic.  kg, currently 117 kg. Continue home furosemide 40 mg daily  BB: PTA carvedilol 50 mg  "BID  ACE/ARB/ARNI: PTA sacubitril-valsartan 97-103mg BID  SGLT2i: hold PTA empagliflozin 10 mg daily  MRA: PTA spironolactone 25 mg daily              SCD ppx: ICD in place; at OSH, shock therapy was turned off in the slow VT zone.   - Daily standing weights  - Strict I&O's    # T2DM   Hgb A1c 6.9 % (3/21/25). Glucose 152 on admission.   - Holding PTA empagliflozin 10 mg daily as above  - will monitor blood glucose and start sliding scale insulin if needed     FEN: Regular  Code Status: FULL  Prophylaxis: ambulation  Isolation: none  Disposition: pending EP procedures     Patient discussed w/ Dr. Ricci, who agrees with above plan.    Medically Ready for Discharge: Anticipated in 5+ Days    GARLAND Haas, CNP  [facility]  General Cardiology  Vocera     Medical Decision Making       45 MINUTES SPENT BY ME on the date of service doing chart review, history, exam, documentation & further activities per the note.      Interval History:  NAEO, telemetry, labs, vital signs and nursing notes reviewed.  On exam, patient feels well. 1 symptomatic run of NSVT overnight    Most recent vital signs:  /78 (BP Location: Left arm)   Pulse 70   Temp 98.2  F (36.8  C) (Oral)   Resp 17   Ht 1.803 m (5' 11\")   Wt 117.5 kg (259 lb)   SpO2 95%   BMI 36.12 kg/m    Temp:  [97.6  F (36.4  C)-98.4  F (36.9  C)] 98.2  F (36.8  C)  Pulse:  [] 70  Resp:  [14-32] 17  BP: ()/(57-78) 110/78  SpO2:  [93 %-98 %] 95 %  Wt Readings from Last 2 Encounters:   04/30/25 117.5 kg (259 lb)   12/18/24 118.8 kg (262 lb)       Intake/Output Summary (Last 24 hours) at 4/30/2025 0701  Last data filed at 4/30/2025 0350  Gross per 24 hour   Intake 1500 ml   Output 1700 ml   Net -200 ml       Physical exam:  General: Pleasant elderly male. Appears comfortable and in no acute distress. Alert and interactive  HEENT: Normocephalic, atraumatic. No scleral icterus or injection  Neck: JVP not " "elevated  CARDIAC: Regular rate and rhythm, no m/r/g appreciated. Peripheral pulses 2+  RESP: Normal work of breathing on room air without use of accessory breathing muscles. Clear to auscultation in all fields. No wheezes, rhonchi or crackles appreciated.  GI: No abdominal distention. Soft and nontender.   EXTREMITIES: no lower extremity edema. No cyanosis or clubbing. Warm and well perfused. No venous stasis changes.   SKIN: No acute lesions appreciated. Warm and dry to touch  NEURO: Alert and oriented X3, CN II-XII grossly intact, no focal neurological deficits noted, normal speech  PSYCH: Mood and affect are appropriate    Labs (Past three days):  CBC  Recent Labs   Lab 04/30/25  0542 04/29/25  0207   WBC 6.3 6.8   RBC 4.85 4.85   HGB 15.4 15.1   HCT 46.5 44.7   MCV 96 92   MCH 31.8 31.1   MCHC 33.1 33.8   RDW 14.9 14.8    191     BMP  Recent Labs   Lab 04/30/25  0542 04/29/25  2230 04/29/25  2111 04/29/25  0734 04/29/25  0207    135  --   --  138   POTASSIUM 4.0 3.9  --   --  3.6   CHLORIDE 106 102  --   --  104   CO2 24 22  --   --  21*   ANIONGAP 9 11  --   --  13   * 125* 113* 122* 152*   BUN 13.6 13.9  --   --  15.6   CR 0.95 0.96  --   --  0.96   GFRESTIMATED >90 >90  --   --  >90   FE 9.1 8.8  --   --  9.3   MAG 2.0 2.1  --   --  2.0     Troponins: No results found for: \"TROPI\", \"TROPONIN\", \"TROPR\", \"TROPN\"     INRNo lab results found in last 7 days.  Liver panel  Recent Labs   Lab 04/29/25  0207   PROTTOTAL 6.7   ALBUMIN 4.1   BILITOTAL 0.8   ALKPHOS 69   AST 24   ALT 46       Imaging/procedure results:  EKG 12 Lead 4/29/25:        Echocardiogram 4/22/25:    Normal left ventricular wall thickness.     Left ventricle is moderately dilated.  End-diastolic dimension 6.8 cm.     Biplane ejection fraction is 32%.  Visually looks much closer to 25%.    No apical thrombi.     Severe left ventricular systolic dysfunction.  Severe ischemic   cardiomyopathy with RCA and LAD wall motion " abnormalities.  See below.     Grade I (mild) left ventricular diastolic abnormality.     Normal left ventricular filling pressure.     Right ventricular systolic function is low normal.     The left atrium is normal in size.     The right atrium is mildly dilated.     Normal central venous pressure (0-5 mmHg).     No pericardial effusion.     Inadequate TR spectral Doppler to accurately assess right ventricular   systolic pressure.     No significant valve disease.     No change from the March 2025 study.      [facility] 4/23/25:  Final Impression:   1.  Right atrial mean pressure: 5 mmHg.  O2 saturation 65.0%.   2.  Right ventricular pressure: 27/-1/2 mmHg.  O2 saturation 65.6%.   3.  Right main pulmonary artery pressure: 28/13/16 mmHg.  O2 saturation   65.4%.   4. Pulmonary capillary wedge pressure: 8 mmHg.   5.  Cardiac output thermodilution technique 4.56 L/min.  Cardiac index 1.9   L/min/m .   6. Cardiac output Michelle technique: 5.7 L/min.  Cardiac index 2.3 L/min/m .      Coronary angiogram 3/24/25:  Final Impression:   1.  Mild mid left main stenosis of 20%.   2.  Moderate size left circumflex coronary artery with minimal mid disease   of 20%.  The major obtuse marginal branch without any significant disease.   3.  Proximal LAD stent widely patent.  The remainder of the LAD without   significant disease so it is a fairly small caliber vessel.   4.  80% ostial first diagonal stenosis.  Diffuse 60% proximal disease.    The vessel then bifurcates into a smaller inferior branch that is   subtotally occluded within previously placed stent.  Superior diagonal   branch with 40% ostial stenosis.   5.  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  Remainder of the vessel with minimal luminal irregularities   including large PDA and KANIKA.   6.  PTCA of the inferior subtotal occluded diagonal branch with 2.5 mm   balloon.  Stenting of the ostial and proximal first diagonal branch up to   the  bifurcation with 2.5 x 8 mm JANE postdilatation 2.5 mm noncompliant   balloon to 20 juan m.  Good flow in both branches.

## 2025-04-30 NOTE — PROVIDER NOTIFICATION
Provider notified pt had run of VT followed by ATP. Pt symptomatic with palpitations, resolved after run. /69 (81).

## 2025-04-30 NOTE — PLAN OF CARE
59 year old male who has a medical history significant for anterior wall MI, CAD s/p PCIs LAD, D1, D2 2009, 2010, 2011, & 2017, ICM LVEF 25%, s/p CRTD 2011 s/p gen change 6/2022, VT s/p prior ablations 6/2020 & 11/2021 (both at OSH) and 7/1/24 ([facility]), PAF (CHADSVASC 6 on Xarelto), HTN, HLD, prior LV thrombus, DM2, CVA, BPH, and obesity.   Shift 15:00-23:30  Neuro: A&O x 4. Denies pain.  Resp: Lungs diminished. Sats high 90s on RA.   CV: 100% AV paced at 70. A few 10 second runs of VT. MD paged. Symptomatic. VSS.  GI/: Eating and voiding well.  Mobility: Up ad cyn in the room.   Plan: MRI end of the week. VT ablation next week. Cards 1 with issues.

## 2025-04-30 NOTE — PLAN OF CARE
"Goal Outcome Evaluation:    BP 93/54 (BP Location: Left arm)   Pulse 70   Temp 98.2  F (36.8  C) (Oral)   Resp 16   Ht 1.803 m (5' 11\")   Wt 117.5 kg (259 lb)   SpO2 94%   BMI 36.12 kg/m      Plan of care reviewed with: patient   Overall patient progress: no change    Time: 3930-4569  Status: admitted on 04/28/2025 for ICD shock with hx of recurrent VT.   Neuro/Pain: A&Ox4, denied pain.   Activity: independent   Cardiac/vs: 100% AV paced. BP stable and afebrile.   Plan: Ablation on next Monday. Cardiac MRI on 05/02/2025 per provider's note.     "

## 2025-04-30 NOTE — PLAN OF CARE
Time of Care: 6884-1043     I/A:   Neuro: A&O x4.   Cardiac: AV Paced, HR 70s. Denies chest pain and palpitations.   Respiratory: Sats >92% on RA. Does not use CPAP, 2L NC while sleeping.   GI/: LBM 4/29. Voids spontaneously. Denies GI symptoms. Regular diet.   Skin/drains: No new deficits   IV/Drips: R PIV SL  Pain: Denies   Activity: Up ad cyn     P: Continue with POC Notifying Cards 2 of changes.     Goal Outcome Evaluation:      Plan of Care Reviewed With: patient    Overall Patient Progress: no changeOverall Patient Progress: no change    Outcome Evaluation: AV paced, HR 70. No espisodes of VT this shift. Planned cMRI Friday, VT ablation next week. Denies pain. VSS. 2L NC overnight, no CPAP use despite FREDDY.

## 2025-05-01 ENCOUNTER — ANCILLARY PROCEDURE (OUTPATIENT)
Dept: CARDIOLOGY | Facility: CLINIC | Age: 60
DRG: 274 | End: 2025-05-01
Attending: INTERNAL MEDICINE
Payer: MEDICARE

## 2025-05-01 ENCOUNTER — APPOINTMENT (OUTPATIENT)
Dept: MRI IMAGING | Facility: CLINIC | Age: 60
DRG: 274 | End: 2025-05-01
Attending: NURSE PRACTITIONER
Payer: MEDICARE

## 2025-05-01 ENCOUNTER — APPOINTMENT (OUTPATIENT)
Dept: OCCUPATIONAL THERAPY | Facility: CLINIC | Age: 60
DRG: 274 | End: 2025-05-01
Attending: INTERNAL MEDICINE
Payer: MEDICARE

## 2025-05-01 VITALS
DIASTOLIC BLOOD PRESSURE: 66 MMHG | WEIGHT: 261.9 LBS | HEART RATE: 70 BPM | OXYGEN SATURATION: 97 % | SYSTOLIC BLOOD PRESSURE: 107 MMHG | RESPIRATION RATE: 16 BRPM | TEMPERATURE: 97.5 F | BODY MASS INDEX: 36.67 KG/M2 | HEIGHT: 71 IN

## 2025-05-01 DIAGNOSIS — Z45.02 FITTING AND ADJUSTMENT OF AUTOMATIC IMPLANTABLE CARDIOVERTER-DEFIBRILLATOR: ICD-10-CM

## 2025-05-01 DIAGNOSIS — Z45.02 FITTING AND ADJUSTMENT OF AUTOMATIC IMPLANTABLE CARDIOVERTER-DEFIBRILLATOR: Primary | ICD-10-CM

## 2025-05-01 LAB
ANION GAP SERPL CALCULATED.3IONS-SCNC: 13 MMOL/L (ref 7–15)
ATRIAL RATE - MUSE: 70 BPM
BUN SERPL-MCNC: 9.6 MG/DL (ref 6–20)
CALCIUM SERPL-MCNC: 8.8 MG/DL (ref 8.8–10.4)
CHLORIDE SERPL-SCNC: 107 MMOL/L (ref 98–107)
CREAT SERPL-MCNC: 0.77 MG/DL (ref 0.67–1.17)
DIASTOLIC BLOOD PRESSURE - MUSE: NORMAL MMHG
EGFRCR SERPLBLD CKD-EPI 2021: >90 ML/MIN/1.73M2
ERYTHROCYTE [DISTWIDTH] IN BLOOD BY AUTOMATED COUNT: 14.9 % (ref 10–15)
GLUCOSE SERPL-MCNC: 125 MG/DL (ref 70–99)
HCO3 SERPL-SCNC: 18 MMOL/L (ref 22–29)
HCT VFR BLD AUTO: 43.9 % (ref 40–53)
HGB BLD-MCNC: 14.6 G/DL (ref 13.3–17.7)
INTERPRETATION ECG - MUSE: NORMAL
MAGNESIUM SERPL-MCNC: 1.9 MG/DL (ref 1.7–2.3)
MCH RBC QN AUTO: 31.1 PG (ref 26.5–33)
MCHC RBC AUTO-ENTMCNC: 33.3 G/DL (ref 31.5–36.5)
MCV RBC AUTO: 94 FL (ref 78–100)
P AXIS - MUSE: NORMAL DEGREES
PLATELET # BLD AUTO: 178 10E3/UL (ref 150–450)
POTASSIUM SERPL-SCNC: 4.3 MMOL/L (ref 3.4–5.3)
PR INTERVAL - MUSE: 92 MS
QRS DURATION - MUSE: 184 MS
QT - MUSE: 490 MS
QTC - MUSE: 529 MS
R AXIS - MUSE: 170 DEGREES
RBC # BLD AUTO: 4.69 10E6/UL (ref 4.4–5.9)
SODIUM SERPL-SCNC: 138 MMOL/L (ref 135–145)
SYSTOLIC BLOOD PRESSURE - MUSE: NORMAL MMHG
T AXIS - MUSE: 41 DEGREES
VENTRICULAR RATE- MUSE: 70 BPM
WBC # BLD AUTO: 6.4 10E3/UL (ref 4–11)

## 2025-05-01 PROCEDURE — 250N000013 HC RX MED GY IP 250 OP 250 PS 637: Performed by: INTERNAL MEDICINE

## 2025-05-01 PROCEDURE — 250N000011 HC RX IP 250 OP 636: Performed by: NURSE PRACTITIONER

## 2025-05-01 PROCEDURE — 75561 CARDIAC MRI FOR MORPH W/DYE: CPT

## 2025-05-01 PROCEDURE — 250N000013 HC RX MED GY IP 250 OP 250 PS 637

## 2025-05-01 PROCEDURE — 75561 CARDIAC MRI FOR MORPH W/DYE: CPT | Mod: 26 | Performed by: INTERNAL MEDICINE

## 2025-05-01 PROCEDURE — 93287 PERI-PX DEVICE EVAL & PRGR: CPT

## 2025-05-01 PROCEDURE — 99233 SBSQ HOSP IP/OBS HIGH 50: CPT | Mod: 25 | Performed by: NURSE PRACTITIONER

## 2025-05-01 PROCEDURE — 97110 THERAPEUTIC EXERCISES: CPT | Mod: GO | Performed by: OCCUPATIONAL THERAPIST

## 2025-05-01 PROCEDURE — 83735 ASSAY OF MAGNESIUM: CPT | Performed by: INTERNAL MEDICINE

## 2025-05-01 PROCEDURE — 80048 BASIC METABOLIC PNL TOTAL CA: CPT

## 2025-05-01 PROCEDURE — A9585 GADOBUTROL INJECTION: HCPCS | Performed by: NURSE PRACTITIONER

## 2025-05-01 PROCEDURE — 36415 COLL VENOUS BLD VENIPUNCTURE: CPT

## 2025-05-01 PROCEDURE — 93287 PERI-PX DEVICE EVAL & PRGR: CPT | Mod: 26 | Performed by: INTERNAL MEDICINE

## 2025-05-01 PROCEDURE — 85014 HEMATOCRIT: CPT

## 2025-05-01 PROCEDURE — 250N000013 HC RX MED GY IP 250 OP 250 PS 637: Performed by: NURSE PRACTITIONER

## 2025-05-01 PROCEDURE — 255N000002 HC RX 255 OP 636: Performed by: NURSE PRACTITIONER

## 2025-05-01 PROCEDURE — 120N000003 HC R&B IMCU UMMC

## 2025-05-01 RX ORDER — PRASUGREL 10 MG/1
10 TABLET, FILM COATED ORAL DAILY
Status: DISCONTINUED | OUTPATIENT
Start: 2025-05-02 | End: 2025-05-02

## 2025-05-01 RX ORDER — TICAGRELOR 90 MG/1
90 TABLET, FILM COATED ORAL 2 TIMES DAILY
Status: DISCONTINUED | OUTPATIENT
Start: 2025-05-02 | End: 2025-05-01

## 2025-05-01 RX ORDER — MAGNESIUM OXIDE 400 MG/1
400 TABLET ORAL EVERY 4 HOURS
Status: COMPLETED | OUTPATIENT
Start: 2025-05-01 | End: 2025-05-01

## 2025-05-01 RX ORDER — GADOBUTROL 604.72 MG/ML
15 INJECTION INTRAVENOUS ONCE
Status: COMPLETED | OUTPATIENT
Start: 2025-05-01 | End: 2025-05-01

## 2025-05-01 RX ORDER — TICAGRELOR 90 MG/1
180 TABLET, FILM COATED ORAL ONCE
Status: DISCONTINUED | OUTPATIENT
Start: 2025-05-02 | End: 2025-05-01

## 2025-05-01 RX ADMIN — SPIRONOLACTONE 25 MG: 25 TABLET, FILM COATED ORAL at 08:08

## 2025-05-01 RX ADMIN — CARVEDILOL 50 MG: 25 TABLET, FILM COATED ORAL at 08:08

## 2025-05-01 RX ADMIN — ROSUVASTATIN CALCIUM 40 MG: 40 TABLET, FILM COATED ORAL at 20:18

## 2025-05-01 RX ADMIN — PRASUGREL 10 MG: 10 TABLET, FILM COATED ORAL at 08:08

## 2025-05-01 RX ADMIN — MAGNESIUM OXIDE TAB 400 MG (241.3 MG ELEMENTAL MG) 400 MG: 400 (241.3 MG) TAB at 14:24

## 2025-05-01 RX ADMIN — ASPIRIN 81 MG CHEWABLE TABLET 81 MG: 81 TABLET CHEWABLE at 08:08

## 2025-05-01 RX ADMIN — CARVEDILOL 50 MG: 25 TABLET, FILM COATED ORAL at 20:19

## 2025-05-01 RX ADMIN — SACUBITRIL AND VALSARTAN 1 TABLET: 97; 103 TABLET, FILM COATED ORAL at 08:08

## 2025-05-01 RX ADMIN — FUROSEMIDE 40 MG: 40 TABLET ORAL at 08:08

## 2025-05-01 RX ADMIN — GADOBUTROL 15 ML: 604.72 INJECTION INTRAVENOUS at 11:33

## 2025-05-01 RX ADMIN — SACUBITRIL AND VALSARTAN 1 TABLET: 97; 103 TABLET, FILM COATED ORAL at 20:18

## 2025-05-01 RX ADMIN — PANTOPRAZOLE SODIUM 40 MG: 40 TABLET, DELAYED RELEASE ORAL at 08:08

## 2025-05-01 RX ADMIN — TAMSULOSIN HYDROCHLORIDE 0.4 MG: 0.4 CAPSULE ORAL at 08:08

## 2025-05-01 RX ADMIN — ENOXAPARIN SODIUM 40 MG: 40 INJECTION SUBCUTANEOUS at 14:24

## 2025-05-01 RX ADMIN — MAGNESIUM OXIDE TAB 400 MG (241.3 MG ELEMENTAL MG) 400 MG: 400 (241.3 MG) TAB at 09:25

## 2025-05-01 RX ADMIN — POTASSIUM CHLORIDE 20 MEQ: 750 TABLET, EXTENDED RELEASE ORAL at 08:08

## 2025-05-01 NOTE — PROGRESS NOTES
Cardiology Progress Note      Assessment & Plan:  Leo Russell is a 59 year old male with a history of VT s/p multiple ablations, ischemic cardiomyopathy, chronic systolic HF, CAD with multiple stents, paroxysmal AF, HTN, T2DM, HLD, and FREDDY who presents for EP evaluation for VT ablation    Today's Update:  - cMRI today  - DVT prophy  - Switch from Prasugrel to Brilinta for history of CVA (black box warning with Prasugrel)    # Recurrent ventricular tachycardia/VT storm  # s/p multiple ablations ([place] and [facility])  # Paroxysmal Atrial fibrillation  Presented to the ER last evening with tachycardic palpitations, substernal chest tightness, shortness of breath and lightheadedness. Symptoms were consistent with prior admissions and he presented for evaluation. Transferred from OSH for further management. Hemodynamically stable, and currently in AV paced rhythm.   EP consult:  cMRI ordered, scheduled for 5/2/25  Hold PTA Mexilitine and Xarelto with upcoming procedures  Avoid anti-arrhythmics with upcoming procedure, if needed could try lidocaine IVP  Plan for VT ablation Monday 5/5  - AC: CHADS2-Vasc 6. On Xarelto 20mg daily; hold as above  - Continue PTA carvedilol 50 mg po BID  - avoid amiodarone gtt given noted side effects on the higher dose with R hand tremor and has had progressive decrease in his energy with weakness in his arms and legs.   - Device check    - Goal K>4, Mg>2; RN-managed replacement protocols  - Telemetry     # Chronic systolic heart failure, EF 32%  # CAD with multiple stents PCI to diagonal (2009), LAD (2010)  # HTN  # HLD   - TTE 4/27/25 - LV moderately dilated, EF 32%, severe LV systolic dysfunction, Severe ischemic cardiomyopathy with RCA and LAD wall motion abnormalities. Mild LV diastolic abnormality. RV function normal. RA mildly dilated. Normal CVP.   - Coronary angiogram OSH 3/24/25: PTCA and PCI  1st diag -> ASA + Prasugrel    Discussed with PharmD 5/1 -> Prasugrel should be  "avoiding in patients with history of CVA (patient with 'small stroke' after 1st MI 2009). Will stop Prasugrel to Brilinta. Load 5/2 AM, assume Brilinta 90 mg BID starting 5/2 PM (ordered)  - [facility] 4/23/25: Michelle CO 5.7, CI 2.3  GDMT:  Volume status: Euvolemic.  kg, currently 117 kg. Continue home furosemide 40 mg daily  BB: PTA carvedilol 50 mg BID  ACE/ARB/ARNI: PTA sacubitril-valsartan 97-103mg BID  SGLT2i: hold PTA empagliflozin 10 mg daily  MRA: PTA spironolactone 25 mg daily              SCD ppx: ICD in place; at OSH, shock therapy was turned off in the slow VT zone.   - Daily standing weights  - Strict I&O's    # T2DM   Hgb A1c 6.9 % (3/21/25). Glucose 152 on admission.   - Holding PTA empagliflozin 10 mg daily as above  - will monitor blood glucose and start sliding scale insulin if needed     FEN: Regular  Code Status: FULL  Prophylaxis: ambulation  Isolation: none  Disposition: pending EP procedures     Patient discussed w/ Dr. Ricci, who agrees with above plan    Medically Ready for Discharge: Anticipated in 5+ Days    GARLAND Haas, CNP  [facility]  General Cardiology  Vocera     Medical Decision Making       40 MINUTES SPENT BY ME on the date of service doing chart review, history, exam, documentation & further activities per the note.      Interval History:  NAEO, telemetry, labs, vital signs and nursing notes reviewed.  No arrhythmias overnight, feels well this AM.     Most recent vital signs:  /66 (BP Location: Left arm)   Pulse 70   Temp 98  F (36.7  C) (Oral)   Resp 16   Ht 1.803 m (5' 11\")   Wt 118.8 kg (261 lb 14.4 oz)   SpO2 95%   BMI 36.53 kg/m    Temp:  [97.6  F (36.4  C)-98.4  F (36.9  C)] 98  F (36.7  C)  Pulse:  [70] 70  Resp:  [14-18] 16  BP: ()/(54-74) 106/66  SpO2:  [94 %-96 %] 95 %  Wt Readings from Last 2 Encounters:   05/01/25 118.8 kg (261 lb 14.4 oz)   12/18/24 118.8 kg (262 lb)     Intake/Output Summary (Last 24 hours) at " "5/1/2025 0709  Last data filed at 4/30/2025 0800  Gross per 24 hour   Intake 240 ml   Output 350 ml   Net -110 ml     Physical exam:  General: Pleasant elderly male. Appears comfortable and in no acute distress. Alert and interactive  HEENT: Normocephalic, atraumatic. No scleral icterus or injection  Neck: JVP not elevated  CARDIAC: Regular rate and rhythm, no m/r/g appreciated. Peripheral pulses 2+  RESP: Normal work of breathing on room air without use of accessory breathing muscles. Clear to auscultation in all fields. No wheezes, rhonchi or crackles appreciated.  GI: No abdominal distention. Soft and nontender.   EXTREMITIES: no lower extremity edema. No cyanosis or clubbing. Warm and well perfused. No venous stasis changes.   SKIN: No acute lesions appreciated. Warm and dry to touch  NEURO: Alert and oriented X3, CN II-XII grossly intact, no focal neurological deficits noted, normal speech  PSYCH: Mood and affect are appropriate    Labs (Past three days):  CBC  Recent Labs   Lab 05/01/25  0605 04/30/25  0542 04/29/25  0207   WBC 6.4 6.3 6.8   RBC 4.69 4.85 4.85   HGB 14.6 15.4 15.1   HCT 43.9 46.5 44.7   MCV 94 96 92   MCH 31.1 31.8 31.1   MCHC 33.3 33.1 33.8   RDW 14.9 14.9 14.8    195 191     BMP  Recent Labs   Lab 04/30/25  0756 04/30/25  0542 04/29/25  2230 04/29/25  2111 04/29/25  0734 04/29/25  0207   NA  --  139 135  --   --  138   POTASSIUM  --  4.0 3.9  --   --  3.6   CHLORIDE  --  106 102  --   --  104   CO2  --  24 22  --   --  21*   ANIONGAP  --  9 11  --   --  13   * 121* 125* 113*   < > 152*   BUN  --  13.6 13.9  --   --  15.6   CR  --  0.95 0.96  --   --  0.96   GFRESTIMATED  --  >90 >90  --   --  >90   FE  --  9.1 8.8  --   --  9.3   MAG  --  2.0 2.1  --   --  2.0    < > = values in this interval not displayed.     Troponins: No results found for: \"TROPI\", \"TROPONIN\", \"TROPR\", \"TROPN\"     INRNo lab results found in last 7 days.  Liver panel  Recent Labs   Lab 04/29/25  0207 "   PROTTOTAL 6.7   ALBUMIN 4.1   BILITOTAL 0.8   ALKPHOS 69   AST 24   ALT 46     Imaging/procedure results:  EKG 12 Lead 4/29/25:        Echocardiogram 4/22/25:    Normal left ventricular wall thickness.     Left ventricle is moderately dilated.  End-diastolic dimension 6.8 cm.     Biplane ejection fraction is 32%.  Visually looks much closer to 25%.    No apical thrombi.     Severe left ventricular systolic dysfunction.  Severe ischemic   cardiomyopathy with RCA and LAD wall motion abnormalities.  See below.     Grade I (mild) left ventricular diastolic abnormality.     Normal left ventricular filling pressure.     Right ventricular systolic function is low normal.     The left atrium is normal in size.     The right atrium is mildly dilated.     Normal central venous pressure (0-5 mmHg).     No pericardial effusion.     Inadequate TR spectral Doppler to accurately assess right ventricular   systolic pressure.     No significant valve disease.     No change from the March 2025 study.      [facility] 4/23/25:  Final Impression:   1.  Right atrial mean pressure: 5 mmHg.  O2 saturation 65.0%.   2.  Right ventricular pressure: 27/-1/2 mmHg.  O2 saturation 65.6%.   3.  Right main pulmonary artery pressure: 28/13/16 mmHg.  O2 saturation   65.4%.   4. Pulmonary capillary wedge pressure: 8 mmHg.   5.  Cardiac output thermodilution technique 4.56 L/min.  Cardiac index 1.9   L/min/m .   6. Cardiac output Michelle technique: 5.7 L/min.  Cardiac index 2.3 L/min/m .      Coronary angiogram 3/24/25:  Final Impression:   1.  Mild mid left main stenosis of 20%.   2.  Moderate size left circumflex coronary artery with minimal mid disease   of 20%.  The major obtuse marginal branch without any significant disease.   3.  Proximal LAD stent widely patent.  The remainder of the LAD without   significant disease so it is a fairly small caliber vessel.   4.  80% ostial first diagonal stenosis.  Diffuse 60% proximal disease.    The vessel then  bifurcates into a smaller inferior branch that is   subtotally occluded within previously placed stent.  Superior diagonal   branch with 40% ostial stenosis.   5.  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  Remainder of the vessel with minimal luminal irregularities   including large PDA and KANIKA.   6.  PTCA of the inferior subtotal occluded diagonal branch with 2.5 mm   balloon.  Stenting of the ostial and proximal first diagonal branch up to   the bifurcation with 2.5 x 8 mm JANE postdilatation 2.5 mm noncompliant   balloon to 20 juan m.  Good flow in both branches.

## 2025-05-01 NOTE — PROGRESS NOTES
Brief Cardiology Note:    Per EP, patient is able to have ablation completed 5/2 instead of 5/5. Plan had been to switch from Prasugrel to Brilinta 5/2 but due to upcoming procedure will hold on Brilinta load. After procedure, if no bleeding issues and ok by EP would recommend switching to Brilinta after procedure.     Mary Carrillo, APRN CNP on 5/1/2025 at 3:43 PM

## 2025-05-01 NOTE — PROGRESS NOTES
"  SPIRITUAL HEALTH SERVICES   [facility] [address]     Summary: Saw Leo Russell per admission indication that spiritual life informs care. Pt is Religious. Leo shared his extensive heart history and context of current admission. Leo is from [place], [place].     His two sons are planning to visit after anticipated procedure to discuss plan of care.  A time of prayer was requested and provided.    Plan: Oriented to [facility] and availability of chaplains for support.     ____________________________    Assessment    Patient/Family Understanding of Illness and Goals of Care - Talked about the experience of his MRI today. He worries that he may need a transplant though is hopeful for a good outcome for scheduled procedure.     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 He misses his work.  He talked about the strain and impact of living with a pacemaker and unhealthy heart.    Strengths, Coping, and Resources -   Leo has two sons who live nearby and are attentive to his needs.   Leo has a workshop and enjoys working on old cars. He has two dogs that, he says, \"have changed everything for me since I retired\".        Meaning, Beliefs, and Spirituality - Leo is Religious. He is not currently affiliated with a [facility]. He reads the bible, daily reflections and prays daily.      Chris Robledo MA  Associate   [facility] remains available 24/7 for emergent requests/referrals, either by having the on-call  paged   or by entering an ASAP/STAT consult in Epic (this will also page the on-call ).   Routine Epic consults receive an initial response within 24 hours.               "

## 2025-05-01 NOTE — PROGRESS NOTES
D/He has had 3 Ablations and now is going for the 4th one tomorrow. He has had 2 of them done at other places. He again PTA had frequent VT bursts and some shocks and some longer runs, so he is looking forward to getting done again, and if all goes well he can go home Saturday.   A/no VT runs reported today  P/monitor for changes, NPO for VT Ablation tomorrow.

## 2025-05-01 NOTE — PLAN OF CARE
Hours of Care:  2300 - 0700    No changes overnight    Neuro: A/O x 4. Makes needs known. Up ad cyn  Respiratory: On room air. Lung sounds clear. No SOB reported  Cardiac: VSS. AV paced 70s. No VT  GI/: Last BM 4/29. No report of N/V. Voids via urinal/up to toilet   Endo: Blood sugars ACHS.  Skin: See PCS for assessment and treatment of wounds and surgical incisions.  LDA: R PIV SL   Electrolytes: RN managed magnesium and potassium.   Pain: Denies  Diet: Regular    P: Continue to follow POC and report changes to Cards 1.  Goal Outcome Evaluation:      Plan of Care Reviewed With: patient    Outcome Evaluation: VSS, AV Paced. No SOB, no pain, no VT. No changed overnight. cMRI in AM

## 2025-05-02 ENCOUNTER — ANESTHESIA (OUTPATIENT)
Dept: CARDIOLOGY | Facility: CLINIC | Age: 60
DRG: 274 | End: 2025-05-02
Payer: MEDICARE

## 2025-05-02 ENCOUNTER — ANCILLARY PROCEDURE (OUTPATIENT)
Dept: CARDIOLOGY | Facility: CLINIC | Age: 60
DRG: 274 | End: 2025-05-02
Attending: INTERNAL MEDICINE
Payer: MEDICARE

## 2025-05-02 ENCOUNTER — ANESTHESIA EVENT (OUTPATIENT)
Dept: CARDIOLOGY | Facility: CLINIC | Age: 60
DRG: 274 | End: 2025-05-02
Payer: MEDICARE

## 2025-05-02 DIAGNOSIS — Z45.02 FITTING AND ADJUSTMENT OF AUTOMATIC IMPLANTABLE CARDIOVERTER-DEFIBRILLATOR: ICD-10-CM

## 2025-05-02 LAB
ABO + RH BLD: NORMAL
ACT BLD: 125 SECONDS (ref 74–150)
ACT BLD: 150 SECONDS (ref 74–150)
ACT BLD: 255 SECONDS (ref 74–150)
ACT BLD: 292 SECONDS (ref 74–150)
ACT BLD: 292 SECONDS (ref 74–150)
ACT BLD: 300 SECONDS (ref 74–150)
ACT BLD: 324 SECONDS (ref 74–150)
ACT BLD: 337 SECONDS (ref 74–150)
ANION GAP SERPL CALCULATED.3IONS-SCNC: 9 MMOL/L (ref 7–15)
ATRIAL RATE - MUSE: 70 BPM
BLD GP AB SCN SERPL QL: NEGATIVE
BUN SERPL-MCNC: 9.3 MG/DL (ref 6–20)
CALCIUM SERPL-MCNC: 8.8 MG/DL (ref 8.8–10.4)
CHLORIDE SERPL-SCNC: 106 MMOL/L (ref 98–107)
CREAT SERPL-MCNC: 0.84 MG/DL (ref 0.67–1.17)
DIASTOLIC BLOOD PRESSURE - MUSE: NORMAL MMHG
EGFRCR SERPLBLD CKD-EPI 2021: >90 ML/MIN/1.73M2
ERYTHROCYTE [DISTWIDTH] IN BLOOD BY AUTOMATED COUNT: 14.8 % (ref 10–15)
GLUCOSE BLDC GLUCOMTR-MCNC: 119 MG/DL (ref 70–99)
GLUCOSE SERPL-MCNC: 118 MG/DL (ref 70–99)
HCO3 SERPL-SCNC: 22 MMOL/L (ref 22–29)
HCT VFR BLD AUTO: 44.2 % (ref 40–53)
HGB BLD-MCNC: 14.6 G/DL (ref 13.3–17.7)
INTERPRETATION ECG - MUSE: NORMAL
MAGNESIUM SERPL-MCNC: 1.9 MG/DL (ref 1.7–2.3)
MCH RBC QN AUTO: 31.2 PG (ref 26.5–33)
MCHC RBC AUTO-ENTMCNC: 33 G/DL (ref 31.5–36.5)
MCV RBC AUTO: 94 FL (ref 78–100)
P AXIS - MUSE: NORMAL DEGREES
PLATELET # BLD AUTO: 152 10E3/UL (ref 150–450)
POTASSIUM SERPL-SCNC: 4.2 MMOL/L (ref 3.4–5.3)
POTASSIUM SERPL-SCNC: 4.5 MMOL/L (ref 3.4–5.3)
PR INTERVAL - MUSE: 92 MS
QRS DURATION - MUSE: 184 MS
QT - MUSE: 490 MS
QTC - MUSE: 529 MS
R AXIS - MUSE: 170 DEGREES
RBC # BLD AUTO: 4.68 10E6/UL (ref 4.4–5.9)
SODIUM SERPL-SCNC: 137 MMOL/L (ref 135–145)
SPECIMEN EXP DATE BLD: NORMAL
SYSTOLIC BLOOD PRESSURE - MUSE: NORMAL MMHG
T AXIS - MUSE: 41 DEGREES
VENTRICULAR RATE- MUSE: 70 BPM
WBC # BLD AUTO: 5.7 10E3/UL (ref 4–11)

## 2025-05-02 PROCEDURE — B246ZZ3 ULTRASONOGRAPHY OF RIGHT AND LEFT HEART, INTRAVASCULAR: ICD-10-PCS | Performed by: INTERNAL MEDICINE

## 2025-05-02 PROCEDURE — 250N000009 HC RX 250: Performed by: NURSE ANESTHETIST, CERTIFIED REGISTERED

## 2025-05-02 PROCEDURE — 250N000011 HC RX IP 250 OP 636: Performed by: INTERNAL MEDICINE

## 2025-05-02 PROCEDURE — 86900 BLOOD TYPING SEROLOGIC ABO: CPT | Performed by: NURSE PRACTITIONER

## 2025-05-02 PROCEDURE — 93662 INTRACARDIAC ECG (ICE): CPT | Performed by: INTERNAL MEDICINE

## 2025-05-02 PROCEDURE — 93462 L HRT CATH TRNSPTL PUNCTURE: CPT | Performed by: INTERNAL MEDICINE

## 2025-05-02 PROCEDURE — C1894 INTRO/SHEATH, NON-LASER: HCPCS | Performed by: INTERNAL MEDICINE

## 2025-05-02 PROCEDURE — 3E063KZ INTRODUCTION OF OTHER DIAGNOSTIC SUBSTANCE INTO CENTRAL ARTERY, PERCUTANEOUS APPROACH: ICD-10-PCS | Performed by: INTERNAL MEDICINE

## 2025-05-02 PROCEDURE — 250N000009 HC RX 250: Performed by: INTERNAL MEDICINE

## 2025-05-02 PROCEDURE — 120N000003 HC R&B IMCU UMMC

## 2025-05-02 PROCEDURE — 250N000024 HC ISOFLURANE, PER MIN: Performed by: INTERNAL MEDICINE

## 2025-05-02 PROCEDURE — C1769 GUIDE WIRE: HCPCS | Performed by: INTERNAL MEDICINE

## 2025-05-02 PROCEDURE — C1732 CATH, EP, DIAG/ABL, 3D/VECT: HCPCS | Performed by: INTERNAL MEDICINE

## 2025-05-02 PROCEDURE — 93287 PERI-PX DEVICE EVAL & PRGR: CPT | Mod: 26 | Performed by: INTERNAL MEDICINE

## 2025-05-02 PROCEDURE — 93654 COMPRE EP EVAL TX VT: CPT | Mod: GC | Performed by: INTERNAL MEDICINE

## 2025-05-02 PROCEDURE — 93654 COMPRE EP EVAL TX VT: CPT | Performed by: INTERNAL MEDICINE

## 2025-05-02 PROCEDURE — 85018 HEMOGLOBIN: CPT

## 2025-05-02 PROCEDURE — 93662 INTRACARDIAC ECG (ICE): CPT | Mod: 26 | Performed by: INTERNAL MEDICINE

## 2025-05-02 PROCEDURE — 99232 SBSQ HOSP IP/OBS MODERATE 35: CPT | Mod: 25 | Performed by: INTERNAL MEDICINE

## 2025-05-02 PROCEDURE — 93287 PERI-PX DEVICE EVAL & PRGR: CPT

## 2025-05-02 PROCEDURE — 258N000003 HC RX IP 258 OP 636: Performed by: NURSE ANESTHETIST, CERTIFIED REGISTERED

## 2025-05-02 PROCEDURE — 4A023FZ MEASUREMENT OF CARDIAC RHYTHM, PERCUTANEOUS APPROACH: ICD-10-PCS | Performed by: INTERNAL MEDICINE

## 2025-05-02 PROCEDURE — 93462 L HRT CATH TRNSPTL PUNCTURE: CPT | Mod: GC | Performed by: INTERNAL MEDICINE

## 2025-05-02 PROCEDURE — 82374 ASSAY BLOOD CARBON DIOXIDE: CPT

## 2025-05-02 PROCEDURE — 02K83ZZ MAP CONDUCTION MECHANISM, PERCUTANEOUS APPROACH: ICD-10-PCS | Performed by: INTERNAL MEDICINE

## 2025-05-02 PROCEDURE — 36415 COLL VENOUS BLD VENIPUNCTURE: CPT | Performed by: NURSE PRACTITIONER

## 2025-05-02 PROCEDURE — 85347 COAGULATION TIME ACTIVATED: CPT

## 2025-05-02 PROCEDURE — C1759 CATH, INTRA ECHOCARDIOGRAPHY: HCPCS | Performed by: INTERNAL MEDICINE

## 2025-05-02 PROCEDURE — C1887 CATHETER, GUIDING: HCPCS | Performed by: INTERNAL MEDICINE

## 2025-05-02 PROCEDURE — C1730 CATH, EP, 19 OR FEW ELECT: HCPCS | Performed by: INTERNAL MEDICINE

## 2025-05-02 PROCEDURE — 250N000013 HC RX MED GY IP 250 OP 250 PS 637

## 2025-05-02 PROCEDURE — 02583ZZ DESTRUCTION OF CONDUCTION MECHANISM, PERCUTANEOUS APPROACH: ICD-10-PCS | Performed by: INTERNAL MEDICINE

## 2025-05-02 PROCEDURE — 272N000001 HC OR GENERAL SUPPLY STERILE: Performed by: INTERNAL MEDICINE

## 2025-05-02 PROCEDURE — 93005 ELECTROCARDIOGRAM TRACING: CPT

## 2025-05-02 PROCEDURE — 250N000011 HC RX IP 250 OP 636: Performed by: NURSE ANESTHETIST, CERTIFIED REGISTERED

## 2025-05-02 PROCEDURE — 250N000011 HC RX IP 250 OP 636

## 2025-05-02 PROCEDURE — 99233 SBSQ HOSP IP/OBS HIGH 50: CPT

## 2025-05-02 PROCEDURE — 258N000003 HC RX IP 258 OP 636

## 2025-05-02 PROCEDURE — 93623 PRGRMD STIMJ&PACG IV RX NFS: CPT | Mod: 26 | Performed by: INTERNAL MEDICINE

## 2025-05-02 PROCEDURE — 370N000017 HC ANESTHESIA TECHNICAL FEE, PER MIN: Performed by: INTERNAL MEDICINE

## 2025-05-02 PROCEDURE — 93623 PRGRMD STIMJ&PACG IV RX NFS: CPT | Performed by: INTERNAL MEDICINE

## 2025-05-02 PROCEDURE — 83735 ASSAY OF MAGNESIUM: CPT | Performed by: NURSE PRACTITIONER

## 2025-05-02 PROCEDURE — 84132 ASSAY OF SERUM POTASSIUM: CPT | Performed by: NURSE PRACTITIONER

## 2025-05-02 PROCEDURE — 250N000013 HC RX MED GY IP 250 OP 250 PS 637: Performed by: INTERNAL MEDICINE

## 2025-05-02 PROCEDURE — 36415 COLL VENOUS BLD VENIPUNCTURE: CPT

## 2025-05-02 RX ORDER — NALOXONE HYDROCHLORIDE 0.4 MG/ML
0.1 INJECTION, SOLUTION INTRAMUSCULAR; INTRAVENOUS; SUBCUTANEOUS
Status: DISCONTINUED | OUTPATIENT
Start: 2025-05-02 | End: 2025-05-02 | Stop reason: HOSPADM

## 2025-05-02 RX ORDER — COLCHICINE 0.6 MG/1
0.6 TABLET ORAL 2 TIMES DAILY
Status: DISCONTINUED | OUTPATIENT
Start: 2025-05-02 | End: 2025-05-03

## 2025-05-02 RX ORDER — PROTAMINE SULFATE 10 MG/ML
INJECTION, SOLUTION INTRAVENOUS PRN
Status: DISCONTINUED | OUTPATIENT
Start: 2025-05-02 | End: 2025-05-02

## 2025-05-02 RX ORDER — MAGNESIUM SULFATE HEPTAHYDRATE 40 MG/ML
2 INJECTION, SOLUTION INTRAVENOUS ONCE
Status: COMPLETED | OUTPATIENT
Start: 2025-05-02 | End: 2025-05-02

## 2025-05-02 RX ORDER — FENTANYL CITRATE 50 UG/ML
25 INJECTION, SOLUTION INTRAMUSCULAR; INTRAVENOUS EVERY 5 MIN PRN
Status: DISCONTINUED | OUTPATIENT
Start: 2025-05-02 | End: 2025-05-02 | Stop reason: HOSPADM

## 2025-05-02 RX ORDER — METHYLPREDNISOLONE SODIUM SUCCINATE 125 MG/2ML
INJECTION INTRAMUSCULAR; INTRAVENOUS
Status: DISCONTINUED | OUTPATIENT
Start: 2025-05-02 | End: 2025-05-02 | Stop reason: HOSPADM

## 2025-05-02 RX ORDER — NALOXONE HYDROCHLORIDE 0.4 MG/ML
0.1 INJECTION, SOLUTION INTRAMUSCULAR; INTRAVENOUS; SUBCUTANEOUS
Status: CANCELLED | OUTPATIENT
Start: 2025-05-02

## 2025-05-02 RX ORDER — HYDROMORPHONE HYDROCHLORIDE 1 MG/ML
0.4 INJECTION, SOLUTION INTRAMUSCULAR; INTRAVENOUS; SUBCUTANEOUS EVERY 5 MIN PRN
Status: DISCONTINUED | OUTPATIENT
Start: 2025-05-02 | End: 2025-05-02 | Stop reason: HOSPADM

## 2025-05-02 RX ORDER — CEFAZOLIN SODIUM/WATER 3 G/30 ML
SYRINGE (ML) INTRAVENOUS PRN
Status: DISCONTINUED | OUTPATIENT
Start: 2025-05-02 | End: 2025-05-02

## 2025-05-02 RX ORDER — DEXAMETHASONE SODIUM PHOSPHATE 4 MG/ML
4 INJECTION, SOLUTION INTRA-ARTICULAR; INTRALESIONAL; INTRAMUSCULAR; INTRAVENOUS; SOFT TISSUE
Status: CANCELLED | OUTPATIENT
Start: 2025-05-02

## 2025-05-02 RX ORDER — SODIUM CHLORIDE, SODIUM LACTATE, POTASSIUM CHLORIDE, CALCIUM CHLORIDE 600; 310; 30; 20 MG/100ML; MG/100ML; MG/100ML; MG/100ML
INJECTION, SOLUTION INTRAVENOUS CONTINUOUS
Status: DISCONTINUED | OUTPATIENT
Start: 2025-05-02 | End: 2025-05-02 | Stop reason: HOSPADM

## 2025-05-02 RX ORDER — SODIUM CHLORIDE 9 MG/ML
INJECTION, SOLUTION INTRAVENOUS CONTINUOUS
Status: DISCONTINUED | OUTPATIENT
Start: 2025-05-02 | End: 2025-05-02 | Stop reason: HOSPADM

## 2025-05-02 RX ORDER — ONDANSETRON 4 MG/1
4 TABLET, ORALLY DISINTEGRATING ORAL EVERY 30 MIN PRN
Status: CANCELLED | OUTPATIENT
Start: 2025-05-02

## 2025-05-02 RX ORDER — NOREPINEPHRINE BITARTRATE 0.02 MG/ML
INJECTION, SOLUTION INTRAVENOUS CONTINUOUS PRN
Status: DISCONTINUED | OUTPATIENT
Start: 2025-05-02 | End: 2025-05-02

## 2025-05-02 RX ORDER — NITROGLYCERIN 10 MG/100ML
INJECTION INTRAVENOUS PRN
Status: DISCONTINUED | OUTPATIENT
Start: 2025-05-02 | End: 2025-05-02

## 2025-05-02 RX ORDER — SODIUM CHLORIDE, SODIUM LACTATE, POTASSIUM CHLORIDE, CALCIUM CHLORIDE 600; 310; 30; 20 MG/100ML; MG/100ML; MG/100ML; MG/100ML
INJECTION, SOLUTION INTRAVENOUS CONTINUOUS PRN
Status: DISCONTINUED | OUTPATIENT
Start: 2025-05-02 | End: 2025-05-02

## 2025-05-02 RX ORDER — PROPOFOL 10 MG/ML
INJECTION, EMULSION INTRAVENOUS PRN
Status: DISCONTINUED | OUTPATIENT
Start: 2025-05-02 | End: 2025-05-02

## 2025-05-02 RX ORDER — TICAGRELOR 90 MG/1
180 TABLET, FILM COATED ORAL ONCE
Status: COMPLETED | OUTPATIENT
Start: 2025-05-03 | End: 2025-05-03

## 2025-05-02 RX ORDER — OXYCODONE HYDROCHLORIDE 10 MG/1
10 TABLET ORAL
Status: CANCELLED | OUTPATIENT
Start: 2025-05-02

## 2025-05-02 RX ORDER — ONDANSETRON 2 MG/ML
4 INJECTION INTRAMUSCULAR; INTRAVENOUS EVERY 30 MIN PRN
Status: CANCELLED | OUTPATIENT
Start: 2025-05-02

## 2025-05-02 RX ORDER — ONDANSETRON 2 MG/ML
INJECTION INTRAMUSCULAR; INTRAVENOUS PRN
Status: DISCONTINUED | OUTPATIENT
Start: 2025-05-02 | End: 2025-05-02

## 2025-05-02 RX ORDER — ONDANSETRON 2 MG/ML
4 INJECTION INTRAMUSCULAR; INTRAVENOUS EVERY 30 MIN PRN
Status: DISCONTINUED | OUTPATIENT
Start: 2025-05-02 | End: 2025-05-02 | Stop reason: HOSPADM

## 2025-05-02 RX ORDER — HYDROMORPHONE HYDROCHLORIDE 1 MG/ML
0.2 INJECTION, SOLUTION INTRAMUSCULAR; INTRAVENOUS; SUBCUTANEOUS EVERY 5 MIN PRN
Status: DISCONTINUED | OUTPATIENT
Start: 2025-05-02 | End: 2025-05-02 | Stop reason: HOSPADM

## 2025-05-02 RX ORDER — ONDANSETRON 4 MG/1
4 TABLET, ORALLY DISINTEGRATING ORAL EVERY 30 MIN PRN
Status: DISCONTINUED | OUTPATIENT
Start: 2025-05-02 | End: 2025-05-02 | Stop reason: HOSPADM

## 2025-05-02 RX ORDER — TICAGRELOR 90 MG/1
90 TABLET, FILM COATED ORAL 2 TIMES DAILY
Status: DISCONTINUED | OUTPATIENT
Start: 2025-05-03 | End: 2025-05-04 | Stop reason: HOSPADM

## 2025-05-02 RX ORDER — DOPAMINE HYDROCHLORIDE 160 MG/100ML
INJECTION, SOLUTION INTRAVENOUS CONTINUOUS PRN
Status: DISCONTINUED | OUTPATIENT
Start: 2025-05-02 | End: 2025-05-02

## 2025-05-02 RX ORDER — HEPARIN SODIUM 1000 [USP'U]/ML
INJECTION, SOLUTION INTRAVENOUS; SUBCUTANEOUS PRN
Status: DISCONTINUED | OUTPATIENT
Start: 2025-05-02 | End: 2025-05-02

## 2025-05-02 RX ORDER — LIDOCAINE 40 MG/G
CREAM TOPICAL
Status: DISCONTINUED | OUTPATIENT
Start: 2025-05-02 | End: 2025-05-02 | Stop reason: HOSPADM

## 2025-05-02 RX ORDER — DEXAMETHASONE SODIUM PHOSPHATE 4 MG/ML
4 INJECTION, SOLUTION INTRA-ARTICULAR; INTRALESIONAL; INTRAMUSCULAR; INTRAVENOUS; SOFT TISSUE
Status: DISCONTINUED | OUTPATIENT
Start: 2025-05-02 | End: 2025-05-02 | Stop reason: HOSPADM

## 2025-05-02 RX ORDER — OXYCODONE HYDROCHLORIDE 5 MG/1
5 TABLET ORAL
Status: CANCELLED | OUTPATIENT
Start: 2025-05-02

## 2025-05-02 RX ORDER — FENTANYL CITRATE 50 UG/ML
INJECTION, SOLUTION INTRAMUSCULAR; INTRAVENOUS PRN
Status: DISCONTINUED | OUTPATIENT
Start: 2025-05-02 | End: 2025-05-02

## 2025-05-02 RX ORDER — FENTANYL CITRATE 50 UG/ML
50 INJECTION, SOLUTION INTRAMUSCULAR; INTRAVENOUS EVERY 5 MIN PRN
Status: DISCONTINUED | OUTPATIENT
Start: 2025-05-02 | End: 2025-05-02 | Stop reason: HOSPADM

## 2025-05-02 RX ORDER — VASOPRESSIN IN 0.9 % NACL 2 UNIT/2ML
SYRINGE (ML) INTRAVENOUS PRN
Status: DISCONTINUED | OUTPATIENT
Start: 2025-05-02 | End: 2025-05-02

## 2025-05-02 RX ADMIN — Medication 12000 UNITS: at 12:43

## 2025-05-02 RX ADMIN — NOREPINEPHRINE BITARTRATE 6.4 MCG: 1 INJECTION, SOLUTION, CONCENTRATE INTRAVENOUS at 11:12

## 2025-05-02 RX ADMIN — PROTAMINE SULFATE 40 MG: 10 INJECTION, SOLUTION INTRAVENOUS at 15:33

## 2025-05-02 RX ADMIN — COLCHICINE 0.6 MG: 0.6 TABLET, FILM COATED ORAL at 21:33

## 2025-05-02 RX ADMIN — POTASSIUM CHLORIDE 20 MEQ: 750 TABLET, EXTENDED RELEASE ORAL at 07:54

## 2025-05-02 RX ADMIN — NOREPINEPHRINE BITARTRATE 6.4 MCG: 1 INJECTION, SOLUTION, CONCENTRATE INTRAVENOUS at 12:58

## 2025-05-02 RX ADMIN — NOREPINEPHRINE BITARTRATE 6.4 MCG: 1 INJECTION, SOLUTION, CONCENTRATE INTRAVENOUS at 11:23

## 2025-05-02 RX ADMIN — RIVAROXABAN 20 MG: 20 TABLET, FILM COATED ORAL at 19:49

## 2025-05-02 RX ADMIN — Medication 20 MG: at 10:58

## 2025-05-02 RX ADMIN — Medication 20 MG: at 12:24

## 2025-05-02 RX ADMIN — SACUBITRIL AND VALSARTAN 1 TABLET: 97; 103 TABLET, FILM COATED ORAL at 21:33

## 2025-05-02 RX ADMIN — SODIUM CHLORIDE, SODIUM LACTATE, POTASSIUM CHLORIDE, CALCIUM CHLORIDE: 600; 310; 30; 20 INJECTION, SOLUTION INTRAVENOUS at 09:56

## 2025-05-02 RX ADMIN — NOREPINEPHRINE BITARTRATE 0.03 MCG/KG/MIN: 0.02 INJECTION, SOLUTION INTRAVENOUS at 09:56

## 2025-05-02 RX ADMIN — ONDANSETRON 4 MG: 2 INJECTION INTRAMUSCULAR; INTRAVENOUS at 15:36

## 2025-05-02 RX ADMIN — Medication 20 MG: at 12:48

## 2025-05-02 RX ADMIN — Medication 10 MG: at 13:52

## 2025-05-02 RX ADMIN — FENTANYL CITRATE 100 MCG: 50 INJECTION INTRAMUSCULAR; INTRAVENOUS at 11:41

## 2025-05-02 RX ADMIN — Medication 10 MG: at 14:40

## 2025-05-02 RX ADMIN — PROPOFOL 20 MG: 10 INJECTION, EMULSION INTRAVENOUS at 11:27

## 2025-05-02 RX ADMIN — SODIUM CHLORIDE, SODIUM LACTATE, POTASSIUM CHLORIDE, AND CALCIUM CHLORIDE: .6; .31; .03; .02 INJECTION, SOLUTION INTRAVENOUS at 09:28

## 2025-05-02 RX ADMIN — Medication 100 MG: at 09:58

## 2025-05-02 RX ADMIN — NOREPINEPHRINE BITARTRATE 3.2 MCG: 1 INJECTION, SOLUTION, CONCENTRATE INTRAVENOUS at 13:36

## 2025-05-02 RX ADMIN — Medication 2000 UNITS: at 13:30

## 2025-05-02 RX ADMIN — Medication 100 MG: at 15:43

## 2025-05-02 RX ADMIN — NOREPINEPHRINE BITARTRATE 6.4 MCG: 1 INJECTION, SOLUTION, CONCENTRATE INTRAVENOUS at 12:56

## 2025-05-02 RX ADMIN — MIDAZOLAM 2 MG: 1 INJECTION INTRAMUSCULAR; INTRAVENOUS at 09:29

## 2025-05-02 RX ADMIN — NOREPINEPHRINE BITARTRATE 6.4 MCG: 1 INJECTION, SOLUTION, CONCENTRATE INTRAVENOUS at 10:56

## 2025-05-02 RX ADMIN — DOPAMINE HYDROCHLORIDE 3 MCG/KG/MIN: 160 INJECTION, SOLUTION INTRAVENOUS at 10:36

## 2025-05-02 RX ADMIN — Medication 0.5 UNITS: at 12:58

## 2025-05-02 RX ADMIN — PANTOPRAZOLE SODIUM 40 MG: 40 TABLET, DELAYED RELEASE ORAL at 07:54

## 2025-05-02 RX ADMIN — Medication 3000 UNITS: at 13:09

## 2025-05-02 RX ADMIN — FENTANYL CITRATE 50 MCG: 50 INJECTION INTRAMUSCULAR; INTRAVENOUS at 16:08

## 2025-05-02 RX ADMIN — PROPOFOL 60 MG: 10 INJECTION, EMULSION INTRAVENOUS at 09:56

## 2025-05-02 RX ADMIN — Medication 3 G: at 10:12

## 2025-05-02 RX ADMIN — FENTANYL CITRATE 50 MCG: 50 INJECTION INTRAMUSCULAR; INTRAVENOUS at 14:58

## 2025-05-02 RX ADMIN — NITROGLYCERIN 50 MCG: 10 INJECTION INTRAVENOUS at 11:19

## 2025-05-02 RX ADMIN — MAGNESIUM SULFATE HEPTAHYDRATE 2 G: 2 INJECTION, SOLUTION INTRAVENOUS at 06:33

## 2025-05-02 RX ADMIN — NOREPINEPHRINE BITARTRATE 6.4 MCG: 1 INJECTION, SOLUTION, CONCENTRATE INTRAVENOUS at 11:48

## 2025-05-02 RX ADMIN — PROPOFOL 20 MG: 10 INJECTION, EMULSION INTRAVENOUS at 11:41

## 2025-05-02 RX ADMIN — CARVEDILOL 50 MG: 25 TABLET, FILM COATED ORAL at 19:49

## 2025-05-02 RX ADMIN — PROPOFOL 20 MG: 10 INJECTION, EMULSION INTRAVENOUS at 12:24

## 2025-05-02 RX ADMIN — PROTAMINE SULFATE 10 MG: 10 INJECTION, SOLUTION INTRAVENOUS at 15:32

## 2025-05-02 RX ADMIN — NOREPINEPHRINE BITARTRATE 3.2 MCG: 1 INJECTION, SOLUTION, CONCENTRATE INTRAVENOUS at 12:43

## 2025-05-02 RX ADMIN — Medication 5000 UNITS: at 12:56

## 2025-05-02 RX ADMIN — NOREPINEPHRINE BITARTRATE 6.4 MCG: 1 INJECTION, SOLUTION, CONCENTRATE INTRAVENOUS at 11:38

## 2025-05-02 RX ADMIN — PROPOFOL 20 MG: 10 INJECTION, EMULSION INTRAVENOUS at 11:18

## 2025-05-02 RX ADMIN — Medication 3000 UNITS: at 14:59

## 2025-05-02 RX ADMIN — Medication 3 G: at 14:32

## 2025-05-02 RX ADMIN — ROSUVASTATIN CALCIUM 40 MG: 40 TABLET, FILM COATED ORAL at 19:49

## 2025-05-02 RX ADMIN — AMIODARONE HYDROCHLORIDE 300 MG: 50 INJECTION, SOLUTION INTRAVENOUS at 21:34

## 2025-05-02 ASSESSMENT — ACTIVITIES OF DAILY LIVING (ADL)
ADLS_ACUITY_SCORE: 35
ADLS_ACUITY_SCORE: 35
ADLS_ACUITY_SCORE: 37
ADLS_ACUITY_SCORE: 37
ADLS_ACUITY_SCORE: 35
ADLS_ACUITY_SCORE: 35
ADLS_ACUITY_SCORE: 37
ADLS_ACUITY_SCORE: 35
ADLS_ACUITY_SCORE: 35
ADLS_ACUITY_SCORE: 37
ADLS_ACUITY_SCORE: 35
ADLS_ACUITY_SCORE: 37
ADLS_ACUITY_SCORE: 35
ADLS_ACUITY_SCORE: 35
ADLS_ACUITY_SCORE: 37
ADLS_ACUITY_SCORE: 35

## 2025-05-02 NOTE — ANESTHESIA PROCEDURE NOTES
Airway       Patient location during procedure: OR       Procedure Start/Stop Times: 5/2/2025 10:05 AM  Staff -        CRNA: Cassandra Perez       Performed By: CRNA  Consent for Airway        Urgency: elective  Indications and Patient Condition       Indications for airway management: brenda-procedural       Induction type:intravenous       Mask difficulty assessment: 2 - vent by mask + OA or adjuvant +/- NMBA    Final Airway Details       Final airway type: endotracheal airway       Successful airway: ETT - single  Endotracheal Airway Details        ETT size (mm): 7.5       Cuffed: yes       Successful intubation technique: video laryngoscopy       VL Blade Size: Glidescope 4       Grade View of Cords: 1       Adjucts: stylet       Position: Right       Measured from: lips       Secured at (cm): 23       Bite block used: None    Post intubation assessment        Placement verified by: capnometry, equal breath sounds and chest rise        Number of attempts at approach: 1       Number of other approaches attempted: 0       Secured with: tape       Ease of procedure: easy       Dentition: Intact and Unchanged    Medication(s) Administered   Medication Administration Time: 5/2/2025 10:05 AM

## 2025-05-02 NOTE — PROGRESS NOTES
Hours of Care:  2300 - 0700    Planned right heart cath @0830. CHG bath complete x 2. Magnesium replacement running. Pt transported @0645. 0800 meds tubed to 3C per RN request.    Neuro: A/O x 4. Makes needs known. Independent  Respiratory: On room air. Lung sounds clear. Denies shortness of breath reported.  Cardiac: VSS. AV Paced 70s. No VT overnight  GI/: Last BM 4/30. No report of N/V. Voids via urinal/up to toilet  Endo: Blood sugars ACHS.  Skin: See PCS for assessment and treatment of wounds and surgical incisions.  LDA: R PIV   Electrolytes: RN managed magnesium and potassium.   Pain: Denies  Diet: NPO since midnight for procedure    P: Continue to follow POC and report changes to Cards 1.

## 2025-05-02 NOTE — ANESTHESIA PREPROCEDURE EVALUATION
Anesthesia Pre-Procedure Evaluation    Patient: Leo Russell   MRN: [id number] : 1965        Procedure : Procedure(s):  EP Ablation VT          Past Medical History:   Diagnosis Date    Congestive heart failure (H)     Hypertension       Past Surgical History:   Procedure Laterality Date    APPENDECTOMY      CORONARY ANGIOGRAPHY ADULT ORDER      CV RIGHT HEART CATH MEASUREMENTS RECORDED N/A 2024    Procedure: Heart Cath Right Heart Cath;  Surgeon: Ruben Mcmullen MD;  Location:  HEART CARDIAC CATH LAB    EP ABLATION VT/PVC N/A 2024    Procedure: Ablation Ventricular Tachycardia;  Surgeon: Esteban Wills MD;  Location:  HEART CARDIAC CATH LAB    HERNIA REPAIR      IMPLANT PACEMAKER        Allergies   Allergen Reactions    Morphine Anxiety     previously tolerated oxycodone and hydromorphone without issue      Tramadol Anxiety     previously tolerated oxycodone and hydromorphone without issue        Social History     Tobacco Use    Smoking status: Former     Current packs/day: 0.00     Average packs/day: 1 pack/day for 20.0 years (20.0 ttl pk-yrs)     Types: Cigarettes     Start date:      Quit date: 2017     Years since quittin.3     Passive exposure: Never    Smokeless tobacco: Never   Substance Use Topics    Alcohol use: Never      Wt Readings from Last 1 Encounters:   25 120.7 kg (266 lb)        Anesthesia Evaluation            ROS/MED HX  ENT/Pulmonary:       Neurologic:     (+)          CVA,                      Cardiovascular: Comment: CAD s/p multiple stents (LAD, D1), last     Recurrent VT s/p multiple ablations, last 2024    cMRI 2025:  SUMMARY   ==========================================================================================================     Clinical history: 59 year old male with VT s/p ICD c/b recurrent VT storm who presents to evaluate for  arrhythmogenic nidus.     Comparison CMR: 2024     1. The left ventricle is severely  dilated with thinning and dyskinesis of the mid anteroseptum, mid  anterior and all apical segments. The global systolic function is severely reduced normal. The LVEF is 13%.      2. The right ventricle is normal in cavity size. The global systolic function is normal. The RVEF is 49%.  An ICD lead is present.      3. The left atrium is moderately enlarged. The right atrium is normal in size.     4. Valvular assessment is limited by metallic artifact.      5. Late gadolinium enhancement imaging demonstrates extensive subendocardial to transmural hyperenhancement  in the basal-mid anterior & anteroseptal, mid anterolateral and all apical segments in LAD coronary  distribution.      6. There is no pericardial effusion.     7. There is no intracardiac thrombus.     CONCLUSIONS:   Ischemic cardiomyopathy with large infarction in the LAD territory.   Severe LV dilation and severely reduced function, LVEF 13%.   Normal RV size and function, RVEF 49%.      Compared to the CMR from 01/11/2024, there are no significant changes.       (+)  hypertension- -  CAD -  - stent-      CHF             ICD    - Patient is dependent on ICD.               METS/Exercise Tolerance:     Hematologic:       Musculoskeletal:       GI/Hepatic:       Renal/Genitourinary:       Endo:     (+)  type II DM,                    Psychiatric/Substance Use:       Infectious Disease:       Malignancy:       Other:               OUTSIDE LABS:  CBC:   Lab Results   Component Value Date    WBC 5.7 05/02/2025    WBC 6.4 05/01/2025    HGB 14.6 05/02/2025    HGB 14.6 05/01/2025    HCT 44.2 05/02/2025    HCT 43.9 05/01/2025     05/02/2025     05/01/2025     BMP:   Lab Results   Component Value Date     05/02/2025     05/01/2025    POTASSIUM 4.2 05/02/2025    POTASSIUM 4.5 05/02/2025    CHLORIDE 106 05/02/2025    CHLORIDE 107 05/01/2025    CO2 22 05/02/2025    CO2 18 (L) 05/01/2025    BUN 9.3 05/02/2025    BUN 9.6 05/01/2025    CR 0.84  "05/02/2025    CR 0.77 05/01/2025     (H) 05/02/2025     (H) 05/01/2025     COAGS:   Lab Results   Component Value Date    PTT 34 04/24/2024    INR 1.33 (H) 04/24/2024     POC: No results found for: \"BGM\", \"HCG\", \"HCGS\"  HEPATIC:   Lab Results   Component Value Date    ALBUMIN 4.1 04/29/2025    PROTTOTAL 6.7 04/29/2025    ALT 46 04/29/2025    AST 24 04/29/2025    ALKPHOS 69 04/29/2025    BILITOTAL 0.8 04/29/2025     OTHER:   Lab Results   Component Value Date    FE 8.8 05/02/2025    PHOS 3.1 04/24/2024    MAG 1.9 05/02/2025    TSH 1.63 04/24/2024       Anesthesia Plan    ASA Status:  4    NPO Status:  NPO Appropriate    Anesthesia Type: General.     - Airway: ETT         Techniques and Equipment:     - Lines/Monitors: 2nd IV, Arterial Line     Consents    Anesthesia Plan(s) and associated risks, benefits, and realistic alternatives discussed. Questions answered and patient/representative(s) expressed understanding.     - Discussed:     - Discussed with:  Patient      - Extended Intubation/Ventilatory Support Discussed: No.      - Patient is DNR/DNI Status: No     Use of blood products discussed: Yes.     Postoperative Care            Comments:    Other Comments: Dental documentation is based on patient self-reporting for any loose or chipped teeth. Any obvious visual dental abnormalities seen in the airway exam is also documented.    Risks of anesthesia was discussed with the patient, that include risk of sore throat and hoarse voice that should be temporary on the order of days, risk of oral mucosa injury (eg. lip and tongue) , and a very rare risk of dental injury requiring repair.    Additional risks of anesthesia was discussed with the patient, including heart attack, stroke, blood clots, respiratory issues, and cardiac arrest.    I discussed risks involving arterial line including, bleeding, bruising, soft tissue injury including vascular and nerve, and thrombus.    Patient was given opportunity " "to ask questions and express concerns, which were then all addressed.             Aric Tavera MD    Clinically Significant Risk Factors                   # Hypertension: Noted on problem list            # Obesity: Estimated body mass index is 37.1 kg/m  as calculated from the following:    Height as of this encounter: 1.803 m (5' 11\").    Weight as of this encounter: 120.7 kg (266 lb)., PRESENT ON ADMISSION      # ICD device present         "

## 2025-05-02 NOTE — PROGRESS NOTES
Paged Device nurse [phone]: Leo Yvonne 3C room 1. pt is having an EP ablation today, has a pacemaker. please advise thanks Claudine garnica *[phone]    Device nurse Anuradha said she will come and turn pt's ICD off in the Cath lab. Claudine Garnica, RN on 5/2/2025 at 6:31 AM

## 2025-05-02 NOTE — ANESTHESIA CARE TRANSFER NOTE
Patient: Leo Russell    Procedure: Procedure(s):  EP Ablation VT       Diagnosis: ventricular tachycardia  Diagnosis Additional Information: No value filed.    Anesthesia Type:   General     Note:    Oropharynx: oropharynx clear of all foreign objects and spontaneously breathing  Level of Consciousness: drowsy  Patient oxygen source: Oxy-plus.  Level of Supplemental Oxygen (L/min / FiO2): 8  Independent Airway: airway patency satisfactory and stable  Dentition: dentition unchanged  Vital Signs Stable: post-procedure vital signs reviewed and stable  Report to RN Given: handoff report given  Patient transferred to: PACU  Comments: Pt to PACU monitored, awake. Report to RN at bedside. Groin site CDI, no signs of bleeding or hematoma. Does report pain to chest, fentanyl administered with improvement.   Handoff Report: Identifed the Patient, Identified the Reponsible Provider, Reviewed the pertinent medical history, Discussed the surgical course, Reviewed Intra-OP anesthesia mangement and issues during anesthesia, Set expectations for post-procedure period and Allowed opportunity for questions and acknowledgement of understanding      Vitals:  Vitals Value Taken Time   /66 05/02/25 1608   Temp     Pulse 70 05/02/25 1612   Resp 12 05/02/25 1612   SpO2 92 % 05/02/25 1612   Vitals shown include unfiled device data.    Electronically Signed By: GARLAND Martinez CRNA  May 2, 2025  4:12 PM

## 2025-05-02 NOTE — PROGRESS NOTES
Cardiology Progress Note      Assessment & Plan:  Leo Russell is a 59 year old male with a history of VT s/p multiple ablations, ischemic cardiomyopathy, chronic systolic HF, CAD with multiple stents, paroxysmal AF, HTN, T2DM, HLD, and FREDDY who presents for EP evaluation for VT ablation    Interval History:  - No acute events overnight  - Transported to brenda-procedure area at 0645 for VT ablation  - Seen in recovery post-procedure at 1645, no cardiac complaints at time of exam    Today's Update:  - VT ablation today  - Switch from Prasugrel to Brilinta after VT ablation for history of CVA (black box warning with Prasugrel), okay to transition tomorrow per EP  - Amiodarone 300mg IV x1 per EP   - Resume Xarelto and stop ASA  - Resume Jardiance    # Recurrent ventricular tachycardia/VT storm  # s/p multiple ablations ([facility] and [facility])  # Paroxysmal Atrial fibrillation  Presented to the ER last evening with tachycardic palpitations, substernal chest tightness, shortness of breath and lightheadedness. Symptoms were consistent with prior admissions and he presented for evaluation. Transferred from OSH for further management. Hemodynamically stable, and currently in AV paced rhythm.   - EP consult:   cMRI 5/1, VT ablation 5/2  Give Amio 300mg Ivx1 post ablation; then discharge on Amio 200mg daily + Mexilitine 200mg BID with virtual follow up in 1 month with Dr. Wills  - AC: CHADS2-Vasc 6. Resume PTA Xarelto 20mg daily  - Continue PTA carvedilol 50 mg po BID  - Device check    - Goal K>4, Mg>2; RN-managed replacement protocols  - Telemetry     # Chronic systolic heart failure  # CAD with multiple stents PCI to diagonal (2009), LAD (2010)  # HTN  # HLD   TTE 4/27/25 - LV moderately dilated, EF 32%, severe LV systolic dysfunction, Severe ischemic cardiomyopathy with RCA and LAD wall motion abnormalities. Mild LV diastolic abnormality. RV function normal. RA mildly dilated. Normal CVP.     Coronary angiogram OSH  "3/24/25: PTCA and PCI  1st diag -> ASA + Prasugrel    Discussed with PharmD 5/1 -> Prasugrel should be avoiding in patients with history of CVA (patient with 'small stroke' after 1st MI 2009). Will stop Prasugrel to Brilinta. Load 5/3 AM, with Brilinta 90 mg BID starting 5/3 PM (ordered)    RHC 4/23/25: Michelle CO 5.7, CI 2.3    cMRI 5/1/25: ICM with large infarction in the LAD territory, severe LV dilation and severely reduced function, LVEF 13%, left atrium moderately enlarged, normal RV size and function, RVEF 49%     - GDMT:  Volume status: Euvolemic.  kg, currently 117 kg. Continue home furosemide 40 mg daily  BB: PTA carvedilol 50 mg BID  ACE/ARB/ARNI: PTA sacubitril-valsartan 97-103mg BID  SGLT2i: resume PTA empagliflozin 10 mg daily  MRA: PTA spironolactone 25 mg daily              SCD ppx: ICD in place; at OSH, shock therapy was turned off in the slow VT zone.   - Daily standing weights  - Strict I&O's    # T2DM   Hgb A1c 6.9 % (3/21/25). Glucose 152 on admission.   - Holding PTA empagliflozin 10 mg daily as above  - will monitor blood glucose and start sliding scale insulin if needed     FEN: Regular  Code Status: FULL  Prophylaxis: ambulation  Isolation: none  Disposition: pending EP procedures     Patient discussed w/ Dr. Ricci, who agrees with above plan    Medically Ready for Discharge: Anticipated in 5+ Days    GARLAND Mello, CNP  [facility]  General Cardiology    Medical Decision Making   50 MINUTES SPENT BY ME on the date of service doing chart review, history, exam, documentation & further activities per the note.        Most recent vital signs:  /71 (BP Location: Left arm)   Pulse 70   Temp 98.4  F (36.9  C) (Oral)   Resp 18   Ht 1.803 m (5' 11\")   Wt 120.7 kg (266 lb)   SpO2 96%   BMI 37.10 kg/m    Temp:  [97.5  F (36.4  C)-98.6  F (37  C)] 98.4  F (36.9  C)  Pulse:  [70-75] 70  Resp:  [16-20] 18  BP: ()/(50-75) 109/71  SpO2:  [94 %-98 %] " "96 %  Wt Readings from Last 2 Encounters:   05/02/25 120.7 kg (266 lb)   12/18/24 118.8 kg (262 lb)     Intake/Output Summary (Last 24 hours) at 5/2/2025 0734  Last data filed at 5/2/2025 0500  Gross per 24 hour   Intake 860 ml   Output 2050 ml   Net -1190 ml        Physical exam:  General: Appears comfortable and in no acute distress.  HEENT: Normocephalic, atraumatic. No scleral icterus or injection  Neck: JVP not elevated  CARDIAC: Regular rate and rhythm, no m/r/g appreciated. Peripheral pulses 2+  RESP: Normal work of breathing on room air without use of accessory breathing muscles. No wheezes, rhonchi or crackles appreciated.  GI: No abdominal distention. Soft and nontender.   EXTREMITIES: no lower extremity edema. No cyanosis or clubbing. Warm and well perfused. No venous stasis changes.   SKIN: Warm and dry to touch. Right groin with suture in place and small dressing to mid-sternum with scant drainage and intact  NEURO: Alert and oriented X3, CN II-XII grossly intact, no focal neurological deficits noted, normal speech  PSYCH: Mood and affect are appropriate    Labs (Past three days):  CBC  Recent Labs   Lab 05/02/25  0429 05/01/25  0605 04/30/25  0542 04/29/25  0207   WBC 5.7 6.4 6.3 6.8   RBC 4.68 4.69 4.85 4.85   HGB 14.6 14.6 15.4 15.1   HCT 44.2 43.9 46.5 44.7   MCV 94 94 96 92   MCH 31.2 31.1 31.8 31.1   MCHC 33.0 33.3 33.1 33.8   RDW 14.8 14.9 14.9 14.8    178 195 191     BMP  Recent Labs   Lab 05/02/25  0429 05/01/25  0605 04/30/25  0756 04/30/25  0542 04/29/25  2230    138  --  139 135   POTASSIUM 4.5 4.3  --  4.0 3.9   CHLORIDE 106 107  --  106 102   CO2 22 18*  --  24 22   ANIONGAP 9 13  --  9 11   * 125* 125* 121* 125*   BUN 9.3 9.6  --  13.6 13.9   CR 0.84 0.77  --  0.95 0.96   GFRESTIMATED >90 >90  --  >90 >90   FE 8.8 8.8  --  9.1 8.8   MAG 1.9 1.9  --  2.0 2.1     Troponins: No results found for: \"TROPI\", \"TROPONIN\", \"TROPR\", \"TROPN\"     INRNo lab results found in last 7 " days.  Liver panel  Recent Labs   Lab 04/29/25  0207   PROTTOTAL 6.7   ALBUMIN 4.1   BILITOTAL 0.8   ALKPHOS 69   AST 24   ALT 46     Imaging/procedure results:  EKG 12 Lead 4/29/25:     cMRI 5/1/25:  1. The left ventricle is severely dilated with thinning and dyskinesis of the mid anteroseptum, mid  anterior and all apical segments. The global systolic function is severely reduced normal. The LVEF is 13%.   2. The right ventricle is normal in cavity size. The global systolic function is normal. The RVEF is 49%.  An ICD lead is present.   3. The left atrium is moderately enlarged. The right atrium is normal in size.  4. Valvular assessment is limited by metallic artifact.   5. Late gadolinium enhancement imaging demonstrates extensive subendocardial to transmural hyperenhancement in the basal-mid anterior & anteroseptal, mid anterolateral and all apical segments in LAD coronary  distribution.   6. There is no pericardial effusion.  7. There is no intracardiac thrombus.    CONCLUSIONS:   Ischemic cardiomyopathy with large infarction in the LAD territory.   Severe LV dilation and severely reduced function, LVEF 13%.   Normal RV size and function, RVEF 49%.      Compared to the CMR from 01/11/2024, there are no significant changes.     Echocardiogram 4/22/25:    Normal left ventricular wall thickness.     Left ventricle is moderately dilated.  End-diastolic dimension 6.8 cm.     Biplane ejection fraction is 32%.  Visually looks much closer to 25%.    No apical thrombi.     Severe left ventricular systolic dysfunction.  Severe ischemic   cardiomyopathy with RCA and LAD wall motion abnormalities.  See below.     Grade I (mild) left ventricular diastolic abnormality.     Normal left ventricular filling pressure.     Right ventricular systolic function is low normal.     The left atrium is normal in size.     The right atrium is mildly dilated.     Normal central venous pressure (0-5 mmHg).     No pericardial effusion.      Inadequate TR spectral Doppler to accurately assess right ventricular   systolic pressure.     No significant valve disease.     No change from the March 2025 study.      [facility] 4/23/25:  Final Impression:   1.  Right atrial mean pressure: 5 mmHg.  O2 saturation 65.0%.   2.  Right ventricular pressure: 27/-1/2 mmHg.  O2 saturation 65.6%.   3.  Right main pulmonary artery pressure: 28/13/16 mmHg.  O2 saturation   65.4%.   4. Pulmonary capillary wedge pressure: 8 mmHg.   5.  Cardiac output thermodilution technique 4.56 L/min.  Cardiac index 1.9   L/min/m .   6. Cardiac output Michelle technique: 5.7 L/min.  Cardiac index 2.3 L/min/m .      Coronary angiogram 3/24/25:  Final Impression:   1.  Mild mid left main stenosis of 20%.   2.  Moderate size left circumflex coronary artery with minimal mid disease   of 20%.  The major obtuse marginal branch without any significant disease.   3.  Proximal LAD stent widely patent.  The remainder of the LAD without   significant disease so it is a fairly small caliber vessel.   4.  80% ostial first diagonal stenosis.  Diffuse 60% proximal disease.    The vessel then bifurcates into a smaller inferior branch that is   subtotally occluded within previously placed stent.  Superior diagonal   branch with 40% ostial stenosis.   5.  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  Remainder of the vessel with minimal luminal irregularities   including large PDA and KANIKA.   6.  PTCA of the inferior subtotal occluded diagonal branch with 2.5 mm   balloon.  Stenting of the ostial and proximal first diagonal branch up to   the bifurcation with 2.5 x 8 mm JANE postdilatation 2.5 mm noncompliant   balloon to 20 juan m.  Good flow in both branches.

## 2025-05-03 ENCOUNTER — APPOINTMENT (OUTPATIENT)
Dept: GENERAL RADIOLOGY | Facility: CLINIC | Age: 60
DRG: 274 | End: 2025-05-03
Payer: MEDICARE

## 2025-05-03 LAB
ANION GAP SERPL CALCULATED.3IONS-SCNC: 10 MMOL/L (ref 7–15)
BUN SERPL-MCNC: 12.6 MG/DL (ref 6–20)
CALCIUM SERPL-MCNC: 9 MG/DL (ref 8.8–10.4)
CHLORIDE SERPL-SCNC: 107 MMOL/L (ref 98–107)
CREAT SERPL-MCNC: 0.76 MG/DL (ref 0.67–1.17)
EGFRCR SERPLBLD CKD-EPI 2021: >90 ML/MIN/1.73M2
ERYTHROCYTE [DISTWIDTH] IN BLOOD BY AUTOMATED COUNT: 14.6 % (ref 10–15)
GLUCOSE BLDC GLUCOMTR-MCNC: 220 MG/DL (ref 70–99)
GLUCOSE SERPL-MCNC: 146 MG/DL (ref 70–99)
HCO3 SERPL-SCNC: 21 MMOL/L (ref 22–29)
HCT VFR BLD AUTO: 42.2 % (ref 40–53)
HGB BLD-MCNC: 14.2 G/DL (ref 13.3–17.7)
MAGNESIUM SERPL-MCNC: 2 MG/DL (ref 1.7–2.3)
MCH RBC QN AUTO: 31.1 PG (ref 26.5–33)
MCHC RBC AUTO-ENTMCNC: 33.6 G/DL (ref 31.5–36.5)
MCV RBC AUTO: 93 FL (ref 78–100)
MDC_IDC_LEAD_CONNECTION_STATUS: NORMAL
MDC_IDC_LEAD_IMPLANT_DT: NORMAL
MDC_IDC_LEAD_LOCATION: NORMAL
MDC_IDC_LEAD_LOCATION_DETAIL_1: NORMAL
MDC_IDC_LEAD_MFG: NORMAL
MDC_IDC_LEAD_MODEL: NORMAL
MDC_IDC_LEAD_POLARITY_TYPE: NORMAL
MDC_IDC_LEAD_SERIAL: NORMAL
MDC_IDC_MSMT_BATTERY_DTM: NORMAL
MDC_IDC_MSMT_BATTERY_REMAINING_LONGEVITY: 66 MO
MDC_IDC_MSMT_BATTERY_REMAINING_LONGEVITY: 68 MO
MDC_IDC_MSMT_BATTERY_REMAINING_LONGEVITY: 78 MO
MDC_IDC_MSMT_BATTERY_REMAINING_LONGEVITY: 96 MO
MDC_IDC_MSMT_BATTERY_REMAINING_PERCENTAGE: 100 %
MDC_IDC_MSMT_BATTERY_REMAINING_PERCENTAGE: 76 %
MDC_IDC_MSMT_BATTERY_REMAINING_PERCENTAGE: 76.3 %
MDC_IDC_MSMT_BATTERY_REMAINING_PERCENTAGE: 86 %
MDC_IDC_MSMT_BATTERY_STATUS: NORMAL
MDC_IDC_MSMT_CAP_CHARGE_DTM: NORMAL
MDC_IDC_MSMT_CAP_CHARGE_ENERGY: 41 J
MDC_IDC_MSMT_CAP_CHARGE_TIME: 9.7 S
MDC_IDC_MSMT_CAP_CHARGE_TYPE: NORMAL
MDC_IDC_MSMT_LEADCHNL_LV_IMPEDANCE_VALUE: 1012 OHM
MDC_IDC_MSMT_LEADCHNL_LV_IMPEDANCE_VALUE: 1075 OHM
MDC_IDC_MSMT_LEADCHNL_LV_IMPEDANCE_VALUE: 1081 OHM
MDC_IDC_MSMT_LEADCHNL_LV_IMPEDANCE_VALUE: 956 OHM
MDC_IDC_MSMT_LEADCHNL_LV_PACING_THRESHOLD_AMPLITUDE: 0.8 V
MDC_IDC_MSMT_LEADCHNL_LV_PACING_THRESHOLD_AMPLITUDE: 0.9 V
MDC_IDC_MSMT_LEADCHNL_LV_PACING_THRESHOLD_AMPLITUDE: 1 V
MDC_IDC_MSMT_LEADCHNL_LV_PACING_THRESHOLD_AMPLITUDE: 1 V
MDC_IDC_MSMT_LEADCHNL_LV_PACING_THRESHOLD_PULSEWIDTH: 0.4 MS
MDC_IDC_MSMT_LEADCHNL_LV_SENSING_INTR_AMPL: 17.4 MV
MDC_IDC_MSMT_LEADCHNL_RA_IMPEDANCE_VALUE: 389 OHM
MDC_IDC_MSMT_LEADCHNL_RA_IMPEDANCE_VALUE: 456 OHM
MDC_IDC_MSMT_LEADCHNL_RA_IMPEDANCE_VALUE: 472 OHM
MDC_IDC_MSMT_LEADCHNL_RA_IMPEDANCE_VALUE: 479 OHM
MDC_IDC_MSMT_LEADCHNL_RA_PACING_THRESHOLD_AMPLITUDE: 0.4 V
MDC_IDC_MSMT_LEADCHNL_RA_PACING_THRESHOLD_PULSEWIDTH: 0.4 MS
MDC_IDC_MSMT_LEADCHNL_RA_SENSING_INTR_AMPL: 5.1 MV
MDC_IDC_MSMT_LEADCHNL_RV_IMPEDANCE_VALUE: 599 OHM
MDC_IDC_MSMT_LEADCHNL_RV_IMPEDANCE_VALUE: 632 OHM
MDC_IDC_MSMT_LEADCHNL_RV_IMPEDANCE_VALUE: 690 OHM
MDC_IDC_MSMT_LEADCHNL_RV_IMPEDANCE_VALUE: 735 OHM
MDC_IDC_MSMT_LEADCHNL_RV_PACING_THRESHOLD_AMPLITUDE: 0.8 V
MDC_IDC_MSMT_LEADCHNL_RV_PACING_THRESHOLD_AMPLITUDE: 0.9 V
MDC_IDC_MSMT_LEADCHNL_RV_PACING_THRESHOLD_AMPLITUDE: 1.1 V
MDC_IDC_MSMT_LEADCHNL_RV_PACING_THRESHOLD_AMPLITUDE: 1.6 V
MDC_IDC_MSMT_LEADCHNL_RV_PACING_THRESHOLD_PULSEWIDTH: 0.4 MS
MDC_IDC_MSMT_LEADCHNL_RV_SENSING_INTR_AMPL: 21.6 MV
MDC_IDC_PG_IMPLANT_DTM: NORMAL
MDC_IDC_PG_MFG: NORMAL
MDC_IDC_PG_MODEL: NORMAL
MDC_IDC_PG_SERIAL: NORMAL
MDC_IDC_PG_TYPE: NORMAL
MDC_IDC_SESS_CLINIC_NAME: NORMAL
MDC_IDC_SESS_DTM: NORMAL
MDC_IDC_SESS_TYPE: NORMAL
MDC_IDC_SET_BRADY_AT_MODE_SWITCH_MODE: NORMAL
MDC_IDC_SET_BRADY_AT_MODE_SWITCH_RATE: 170 {BEATS}/MIN
MDC_IDC_SET_BRADY_HYSTRATE: NORMAL
MDC_IDC_SET_BRADY_LOWRATE: 60 {BEATS}/MIN
MDC_IDC_SET_BRADY_LOWRATE: 70 {BEATS}/MIN
MDC_IDC_SET_BRADY_MAX_TRACKING_RATE: 100 {BEATS}/MIN
MDC_IDC_SET_BRADY_MODE: NORMAL
MDC_IDC_SET_BRADY_PAV_DELAY_HIGH: 130 MS
MDC_IDC_SET_BRADY_PAV_DELAY_LOW: 130 MS
MDC_IDC_SET_BRADY_SAV_DELAY_HIGH: 110 MS
MDC_IDC_SET_BRADY_SAV_DELAY_LOW: 110 MS
MDC_IDC_SET_CRT_LVRV_DELAY: 20 MS
MDC_IDC_SET_CRT_PACED_CHAMBERS: NORMAL
MDC_IDC_SET_LEADCHNL_LV_PACING_AMPLITUDE: 2.5 V
MDC_IDC_SET_LEADCHNL_LV_PACING_ANODE_ELECTRODE_1: NORMAL
MDC_IDC_SET_LEADCHNL_LV_PACING_ANODE_LOCATION_1: NORMAL
MDC_IDC_SET_LEADCHNL_LV_PACING_CAPTURE_MODE: NORMAL
MDC_IDC_SET_LEADCHNL_LV_PACING_CATHODE_ELECTRODE_1: NORMAL
MDC_IDC_SET_LEADCHNL_LV_PACING_CATHODE_LOCATION_1: NORMAL
MDC_IDC_SET_LEADCHNL_LV_PACING_POLARITY: NORMAL
MDC_IDC_SET_LEADCHNL_LV_PACING_PULSEWIDTH: 0.4 MS
MDC_IDC_SET_LEADCHNL_LV_SENSING_ADAPTATION_MODE: NORMAL
MDC_IDC_SET_LEADCHNL_LV_SENSING_ANODE_ELECTRODE_1: NORMAL
MDC_IDC_SET_LEADCHNL_LV_SENSING_ANODE_LOCATION_1: NORMAL
MDC_IDC_SET_LEADCHNL_LV_SENSING_CATHODE_ELECTRODE_1: NORMAL
MDC_IDC_SET_LEADCHNL_LV_SENSING_CATHODE_LOCATION_1: NORMAL
MDC_IDC_SET_LEADCHNL_LV_SENSING_POLARITY: NORMAL
MDC_IDC_SET_LEADCHNL_LV_SENSING_SENSITIVITY: 1 MV
MDC_IDC_SET_LEADCHNL_RA_PACING_AMPLITUDE: 2 V
MDC_IDC_SET_LEADCHNL_RA_PACING_CAPTURE_MODE: NORMAL
MDC_IDC_SET_LEADCHNL_RA_PACING_POLARITY: NORMAL
MDC_IDC_SET_LEADCHNL_RA_PACING_PULSEWIDTH: 0.4 MS
MDC_IDC_SET_LEADCHNL_RA_SENSING_ADAPTATION_MODE: NORMAL
MDC_IDC_SET_LEADCHNL_RA_SENSING_POLARITY: NORMAL
MDC_IDC_SET_LEADCHNL_RA_SENSING_SENSITIVITY: 0.25 MV
MDC_IDC_SET_LEADCHNL_RV_PACING_AMPLITUDE: 2.5 V
MDC_IDC_SET_LEADCHNL_RV_PACING_CAPTURE_MODE: NORMAL
MDC_IDC_SET_LEADCHNL_RV_PACING_POLARITY: NORMAL
MDC_IDC_SET_LEADCHNL_RV_PACING_PULSEWIDTH: 0.4 MS
MDC_IDC_SET_LEADCHNL_RV_SENSING_ADAPTATION_MODE: NORMAL
MDC_IDC_SET_LEADCHNL_RV_SENSING_POLARITY: NORMAL
MDC_IDC_SET_LEADCHNL_RV_SENSING_SENSITIVITY: 0.6 MV
MDC_IDC_SET_ZONE_DETECTION_INTERVAL: 300 MS
MDC_IDC_SET_ZONE_DETECTION_INTERVAL: 375 MS
MDC_IDC_SET_ZONE_DETECTION_INTERVAL: 545 MS
MDC_IDC_SET_ZONE_STATUS: NORMAL
MDC_IDC_SET_ZONE_TYPE: NORMAL
MDC_IDC_SET_ZONE_VENDOR_TYPE: NORMAL
MDC_IDC_STAT_AT_BURDEN_PERCENT: 0 %
MDC_IDC_STAT_AT_BURDEN_PERCENT: 0 %
MDC_IDC_STAT_AT_DTM_END: NORMAL
MDC_IDC_STAT_AT_DTM_START: NORMAL
MDC_IDC_STAT_BRADY_DTM_END: NORMAL
MDC_IDC_STAT_BRADY_DTM_START: NORMAL
MDC_IDC_STAT_BRADY_RA_PERCENT_PACED: 90 %
MDC_IDC_STAT_BRADY_RA_PERCENT_PACED: 96 %
MDC_IDC_STAT_BRADY_RA_PERCENT_PACED: 96 %
MDC_IDC_STAT_BRADY_RA_PERCENT_PACED: 99 %
MDC_IDC_STAT_BRADY_RV_PERCENT_PACED: 99 %
MDC_IDC_STAT_CRT_DTM_END: NORMAL
MDC_IDC_STAT_CRT_DTM_START: NORMAL
MDC_IDC_STAT_CRT_LV_PERCENT_PACED: 100 %
MDC_IDC_STAT_CRT_LV_PERCENT_PACED: 100 %
MDC_IDC_STAT_CRT_LV_PERCENT_PACED: 99 %
MDC_IDC_STAT_CRT_LV_PERCENT_PACED: 99 %
MDC_IDC_STAT_EPISODE_RECENT_COUNT: 0
MDC_IDC_STAT_EPISODE_RECENT_COUNT: 3
MDC_IDC_STAT_EPISODE_RECENT_COUNT: 3
MDC_IDC_STAT_EPISODE_RECENT_COUNT: 6
MDC_IDC_STAT_EPISODE_RECENT_COUNT_DTM_END: NORMAL
MDC_IDC_STAT_EPISODE_RECENT_COUNT_DTM_START: NORMAL
MDC_IDC_STAT_EPISODE_TOTAL_COUNT: 0
MDC_IDC_STAT_EPISODE_TOTAL_COUNT: 20
MDC_IDC_STAT_EPISODE_TOTAL_COUNT: 361
MDC_IDC_STAT_EPISODE_TOTAL_COUNT_DTM_END: NORMAL
MDC_IDC_STAT_EPISODE_TYPE: NORMAL
MDC_IDC_STAT_EPISODE_VENDOR_TYPE: NORMAL
MDC_IDC_STAT_TACHYTHERAPY_ATP_DELIVERED_RECENT: 0
MDC_IDC_STAT_TACHYTHERAPY_ATP_DELIVERED_RECENT: 0
MDC_IDC_STAT_TACHYTHERAPY_ATP_DELIVERED_RECENT: 37
MDC_IDC_STAT_TACHYTHERAPY_ATP_DELIVERED_RECENT: 37
MDC_IDC_STAT_TACHYTHERAPY_ATP_DELIVERED_TOTAL: 364
MDC_IDC_STAT_TACHYTHERAPY_RECENT_DTM_END: NORMAL
MDC_IDC_STAT_TACHYTHERAPY_RECENT_DTM_START: NORMAL
MDC_IDC_STAT_TACHYTHERAPY_SHOCKS_ABORTED_RECENT: 0
MDC_IDC_STAT_TACHYTHERAPY_SHOCKS_ABORTED_TOTAL: 0
MDC_IDC_STAT_TACHYTHERAPY_SHOCKS_DELIVERED_RECENT: 0
MDC_IDC_STAT_TACHYTHERAPY_SHOCKS_DELIVERED_RECENT: 0
MDC_IDC_STAT_TACHYTHERAPY_SHOCKS_DELIVERED_RECENT: 2
MDC_IDC_STAT_TACHYTHERAPY_SHOCKS_DELIVERED_RECENT: 2
MDC_IDC_STAT_TACHYTHERAPY_SHOCKS_DELIVERED_TOTAL: 3
MDC_IDC_STAT_TACHYTHERAPY_TOTAL_DTM_END: NORMAL
PLATELET # BLD AUTO: 174 10E3/UL (ref 150–450)
POTASSIUM SERPL-SCNC: 4.4 MMOL/L (ref 3.4–5.3)
RBC # BLD AUTO: 4.56 10E6/UL (ref 4.4–5.9)
SODIUM SERPL-SCNC: 138 MMOL/L (ref 135–145)
WBC # BLD AUTO: 8.9 10E3/UL (ref 4–11)

## 2025-05-03 PROCEDURE — 71045 X-RAY EXAM CHEST 1 VIEW: CPT

## 2025-05-03 PROCEDURE — 250N000013 HC RX MED GY IP 250 OP 250 PS 637: Performed by: INTERNAL MEDICINE

## 2025-05-03 PROCEDURE — 250N000013 HC RX MED GY IP 250 OP 250 PS 637

## 2025-05-03 PROCEDURE — 120N000003 HC R&B IMCU UMMC

## 2025-05-03 PROCEDURE — 85014 HEMATOCRIT: CPT

## 2025-05-03 PROCEDURE — 83735 ASSAY OF MAGNESIUM: CPT | Performed by: INTERNAL MEDICINE

## 2025-05-03 PROCEDURE — 36415 COLL VENOUS BLD VENIPUNCTURE: CPT

## 2025-05-03 PROCEDURE — 80048 BASIC METABOLIC PNL TOTAL CA: CPT

## 2025-05-03 PROCEDURE — 99231 SBSQ HOSP IP/OBS SF/LOW 25: CPT | Mod: FS

## 2025-05-03 PROCEDURE — 71045 X-RAY EXAM CHEST 1 VIEW: CPT | Mod: 26 | Performed by: RADIOLOGY

## 2025-05-03 RX ORDER — MAGNESIUM OXIDE 400 MG/1
400 TABLET ORAL EVERY 4 HOURS
Status: COMPLETED | OUTPATIENT
Start: 2025-05-03 | End: 2025-05-03

## 2025-05-03 RX ORDER — COLCHICINE 0.6 MG/1
0.6 TABLET ORAL DAILY
Status: DISCONTINUED | OUTPATIENT
Start: 2025-05-04 | End: 2025-05-04 | Stop reason: HOSPADM

## 2025-05-03 RX ORDER — AMIODARONE HYDROCHLORIDE 200 MG/1
200 TABLET ORAL DAILY
Status: DISCONTINUED | OUTPATIENT
Start: 2025-05-03 | End: 2025-05-04 | Stop reason: HOSPADM

## 2025-05-03 RX ADMIN — FUROSEMIDE 40 MG: 40 TABLET ORAL at 08:36

## 2025-05-03 RX ADMIN — ROSUVASTATIN CALCIUM 40 MG: 40 TABLET, FILM COATED ORAL at 20:36

## 2025-05-03 RX ADMIN — SACUBITRIL AND VALSARTAN 1 TABLET: 97; 103 TABLET, FILM COATED ORAL at 08:35

## 2025-05-03 RX ADMIN — EMPAGLIFLOZIN 10 MG: 10 TABLET, FILM COATED ORAL at 08:36

## 2025-05-03 RX ADMIN — CARVEDILOL 50 MG: 25 TABLET, FILM COATED ORAL at 08:35

## 2025-05-03 RX ADMIN — MEXILETINE HYDROCHLORIDE 250 MG: 250 CAPSULE ORAL at 16:03

## 2025-05-03 RX ADMIN — CARVEDILOL 50 MG: 25 TABLET, FILM COATED ORAL at 20:36

## 2025-05-03 RX ADMIN — TICAGRELOR 180 MG: 90 TABLET ORAL at 08:36

## 2025-05-03 RX ADMIN — TICAGRELOR 90 MG: 90 TABLET ORAL at 20:36

## 2025-05-03 RX ADMIN — RIVAROXABAN 20 MG: 20 TABLET, FILM COATED ORAL at 16:03

## 2025-05-03 RX ADMIN — PANTOPRAZOLE SODIUM 40 MG: 40 TABLET, DELAYED RELEASE ORAL at 08:36

## 2025-05-03 RX ADMIN — SENNOSIDES AND DOCUSATE SODIUM 2 TABLET: 50; 8.6 TABLET ORAL at 08:34

## 2025-05-03 RX ADMIN — SACUBITRIL AND VALSARTAN 1 TABLET: 97; 103 TABLET, FILM COATED ORAL at 20:36

## 2025-05-03 RX ADMIN — COLCHICINE 0.6 MG: 0.6 TABLET, FILM COATED ORAL at 08:36

## 2025-05-03 RX ADMIN — MAGNESIUM OXIDE TAB 400 MG (241.3 MG ELEMENTAL MG) 400 MG: 400 (241.3 MG) TAB at 08:35

## 2025-05-03 RX ADMIN — SPIRONOLACTONE 25 MG: 25 TABLET, FILM COATED ORAL at 08:35

## 2025-05-03 RX ADMIN — TAMSULOSIN HYDROCHLORIDE 0.4 MG: 0.4 CAPSULE ORAL at 08:36

## 2025-05-03 RX ADMIN — MEXILETINE HYDROCHLORIDE 250 MG: 250 CAPSULE ORAL at 20:36

## 2025-05-03 RX ADMIN — AMIODARONE HYDROCHLORIDE 200 MG: 200 TABLET ORAL at 12:16

## 2025-05-03 RX ADMIN — POTASSIUM CHLORIDE 20 MEQ: 750 TABLET, EXTENDED RELEASE ORAL at 08:36

## 2025-05-03 RX ADMIN — MAGNESIUM OXIDE TAB 400 MG (241.3 MG ELEMENTAL MG) 400 MG: 400 (241.3 MG) TAB at 12:16

## 2025-05-03 ASSESSMENT — ACTIVITIES OF DAILY LIVING (ADL)
ADLS_ACUITY_SCORE: 33
ADLS_ACUITY_SCORE: 33
ADLS_ACUITY_SCORE: 35
ADLS_ACUITY_SCORE: 37
ADLS_ACUITY_SCORE: 33
ADLS_ACUITY_SCORE: 37
ADLS_ACUITY_SCORE: 37
ADLS_ACUITY_SCORE: 33
ADLS_ACUITY_SCORE: 33
ADLS_ACUITY_SCORE: 37
ADLS_ACUITY_SCORE: 33
ADLS_ACUITY_SCORE: 33
ADLS_ACUITY_SCORE: 37
ADLS_ACUITY_SCORE: 33
ADLS_ACUITY_SCORE: 37
ADLS_ACUITY_SCORE: 33
ADLS_ACUITY_SCORE: 37
ADLS_ACUITY_SCORE: 33
ADLS_ACUITY_SCORE: 37

## 2025-05-03 NOTE — PROGRESS NOTES
"D/post VT Ablation, ICD reset to home settings per PACU , CCL RN removed sutures from right groin site in PACU prior to arrival to us. Had arterial and venous sheaths in right side for procedure. CCL gave Fentanyl and Versed, Has andres in place, right groin is soft and flat with folded  dressing which is CDI. They placed him on oxygen for procedure.  A/He finally got a courtesy meal and is eating and wanted oxygen off-done as sats were 95%.   I/there were orders in PACU for Amio bolus, and then order held in PACU and then suddenly I see this med due. I questioned pharmacy as the patient had an Ablation and is not in VT so there is no indication for this med at this time. He called and talked to the team. The resident came up and talked to me, and again I explained there is no indication for this med and have cared for many Ablations and have never seen this order post Ablation. He discussed this with me and showed me a note that they want a bolus of Amio given this evening to \"help Ablation hold\" and he told me he talked to cards fellow about, so he said to give it. I also talked to pharmacy about this later. I explained reason for Amio bolus.  P/monitor for changes, keep team and him updated  "

## 2025-05-03 NOTE — PLAN OF CARE
Goal Outcome Evaluation:      Plan of Care Reviewed With: patient      END of Shift Notes: 2300H - 0730H    Neuro: AO x4 , calm and cooperative    Cardiac: AV paced , HR = 70s bpm     Respiratory: on NC 2LPM    GI/: voids spontaneously, LBM = 5/1/25; offered to give senna, patient refused    Diet/appetite: regular diet, good appetite    Activity: independent    Pain: denies    Skin: no new skin deficits noted, R groin site CDI    Lines: 2 R PIV SL         Changes this shift: andres cath removed at 0031H    Plan: continue plan of care, possible discharge today, notify Cards 1 for any changes

## 2025-05-03 NOTE — DISCHARGE SUMMARY
[address] [address]  p: [phone]    Discharge Summary: Cardiology Service    Leo Russell MRN# [id number]   YOB: 1965 Age: 59 year old       Admission Date: 4/28/2025  Discharge Date: 05/04/2025    Discharge Diagnoses:  # Recurrent Ventricular Tachycardia/VT storm  # s/p Multiple Ablations ([facility] and [facility])  # Paroxysmal Atrial fibrillation  # Chronic Systolic Heart Failure  # CAD w/ Multiple PCI --> OM1 (2009), LAD (2010)  # Hypertension  # Hyperlipidemia  # Diabetes Mellitus Type 2    Brief HPI:  Leo Russell is a 59 year old male with a history of VT s/p multiple ablations, ischemic cardiomyopathy, chronic systolic HF, CAD with multiple stents, paroxysmal AF, HTN, T2DM, HLD, and FREDDY who presents for EP evaluation for VT ablation. Patient subequently underwent VT ablation with Dr. Wills on 5/2/25. The procedure was successful. He was discharged on amiodarone and Mexiletine with outpatient EP follow up.     Hospital Course by Diagnosis:  # Recurrent Ventricular Tachycardia/VT storm  # s/p Multiple Ablations ([facility] and [facility])  # Paroxysmal Atrial fibrillation  Presented to the ER last evening with tachycardic palpitations, substernal chest tightness, shortness of breath and lightheadedness. Symptoms were consistent with prior admissions and he presented for evaluation. Transferred from OSH for further management. Hemodynamically stable, and currently in AV paced rhythm. CHADS2-Vasc 6  - EP consulted s/p VT ablation 5/2  - Resumed Mexiletine 200mg BID and amiodarone 200mg per EP  - AC: Continue PTA Xarelto 20mg daily  - Continue PTA Coreg 50mg PO BID  - Outpatient virtual EP follow up in one month per Dr. Wills     # Chronic Systolic Heart Failure  # CAD w/ Multiple PCI --> OM1 (2009), LAD (2010)  # Hypertension  # Hyperlipidemia  TTE 4/27/25 - LV moderately dilated, EF 32%, severe LV systolic dysfunction, Severe  ischemic cardiomyopathy with RCA and LAD wall motion abnormalities. Mild LV diastolic abnormality. RV function normal. RA mildly dilated. Normal CVP. 3/24/25 OSH angiogram PTCA and PCI 1st diag -> ASA + Prasugrel. 4/213/25 RHC Michelle CO 5.7, CI 2.3.  Discussed with PharmD 5/1 -> Prasugrel should be avoiding in patients with history of CVA (patient with 'small stroke' after 1st MI 2009). Will stop Prasugrel to Brilinta. Load 5/3 AM, with Brilinta 90 mg BID starting 5/3 PM (ordered). cMRI 5/1/25: ICM with large infarction in the LAD territory, severe LV dilation and severely reduced function, LVEF 13%, left atrium moderately enlarged, normal RV size and function, RVEF 49%   - Fluid Status: Euvolemic. EDW 119kg. [id number]. Continue PO Lasix 40mg  - ACE/ARB/ARNi: Continue PTA Entresto 97-102mg BID  - SGLT2i: Continue PTA Jardiance 10mg  - Beta Blocker: Continue PTA Coreg 50mg BID  - Aldosterone Antagonist: Continue Spironolactone 25mg daily   - SCD ppx: ICD in place; at OSH, shock therapy was turned off in the slow VT zone.   - Should establish care in HF clinic in [place]   - Continue Brilinta 90mg BID; NO Prasugrel due to black box warning with CVA patients    # Diabetes Mellitus Type 2  Most recent A1c 6.9 % on 3/21/25. Glucose 152 on admission.   - Resumed PTA empagliflozin 10 mg daily as above  - Will monitor blood glucose and start sliding scale insulin if needed    Pertinent Procedures:  1. VT Ablation 5/2/25    Consults:  - Electrophysiology    Medication Changes:  - REDUCE Mexiletine to 200mg BID  - REDUCE Amiodarone to 200mg daily  - REDUCE Spironolactone to 25mg daily  - INCREASE Coreg to 50mg BID  - START Brilinta 90mg BID  - START Colchicine 0.6mg daily   - STOP Prasugrel     Discharge medications:   Current Discharge Medication List        START taking these medications    Details   colchicine (COLCRYS) 0.6 MG tablet Take 1 tablet (0.6 mg) by mouth daily.  Qty: 90 tablet, Refills: 0    Associated  Diagnoses: Ventricular tachycardia (H)      ticagrelor (BRILINTA) 90 MG tablet Take 1 tablet (90 mg) by mouth 2 times daily.  Qty: 180 tablet, Refills: 3    Associated Diagnoses: Ischemic cardiomyopathy           CONTINUE these medications which have CHANGED    Details   amiodarone (PACERONE) 200 MG tablet Take 1 tablet (200 mg) by mouth daily.  Qty: 90 tablet, Refills: 3    Associated Diagnoses: Ventricular tachycardia (H)      carvedilol (COREG) 25 MG tablet Take 2 tablets (50 mg) by mouth 2 times daily.  Qty: 260 tablet, Refills: 3    Associated Diagnoses: Ventricular tachycardia (H)      mexiletine (MEXITIL) 200 MG capsule Take 1 capsule (200 mg) by mouth 2 times daily.  Qty: 180 capsule, Refills: 3    Associated Diagnoses: Ventricular tachycardia (H)      !! rivaroxaban ANTICOAGULANT (XARELTO) 20 MG TABS tablet Take 1 tablet (20 mg) by mouth daily.  Qty: 90 tablet, Refills: 3    Associated Diagnoses: Ventricular tachycardia (H)      spironolactone (ALDACTONE) 25 MG tablet Take 1 tablet (25 mg) by mouth daily.  Qty: 90 tablet, Refills: 3    Associated Diagnoses: Acute systolic heart failure (H)       !! - Potential duplicate medications found. Please discuss with provider.        CONTINUE these medications which have NOT CHANGED    Details   acetaminophen (TYLENOL) 325 MG tablet Take 650 mg by mouth every 6 hours as needed for mild pain.      empagliflozin (JARDIANCE) 10 MG TABS tablet Take 10 mg by mouth daily.      furosemide (LASIX) 40 MG tablet Take 40 mg by mouth daily      magnesium oxide (MAG-OX) 400 MG tablet Take 400 mg by mouth 2 times daily      melatonin 5 MG tablet Take 5 mg by mouth nightly as needed for sleep.      nitroGLYcerin (NITROSTAT) 0.4 MG sublingual tablet Place 1 tablet under the tongue every 5 minutes as needed      pantoprazole (PROTONIX) 40 MG EC tablet Take 40 mg by mouth daily      potassium chloride ER (K-TAB/KLOR-CON) 10 MEQ CR tablet Take 20 mEq by mouth daily. Patient is  currently taking 2 tablets daily      !! rivaroxaban ANTICOAGULANT (XARELTO) 20 MG TABS tablet Take 20 mg by mouth daily (with dinner).      rosuvastatin (CRESTOR) 40 MG tablet Take 40 mg by mouth every evening      sacubitril-valsartan (ENTRESTO)  MG per tablet Take 1 tablet by mouth 2 times daily      tamsulosin (FLOMAX) 0.4 MG capsule Take 0.4 mg by mouth daily      Vitamin D3 (VITAMIN D-1000 MAX ST) 25 mcg (1000 units) tablet Take 1,000 Units by mouth daily       !! - Potential duplicate medications found. Please discuss with provider.        STOP taking these medications       prasugrel (EFFIENT) 10 MG TABS tablet Comments:   Reason for Stopping:               Follow-up:  - EP follow up with Dr. Wills in one month  - PCP follow up in 7-10 days    Labs or imaging requiring follow-up after discharge:  - BMP, CBC in one week     Code status:  Full     Condition on discharge  Temp:  [97.8  F (36.6  C)-99  F (37.2  C)] 98.3  F (36.8  C)  Pulse:  [63-76] 63  Resp:  [12-18] 17  BP: ()/(54-87) 86/54  MAP:  [75 mmHg-76 mmHg] 76 mmHg  Arterial Line BP: (80-82)/(68-72) 80/72  SpO2:  [90 %-95 %] 93 %    General: Alert, interactive, NAD  Eyes: sclera anicteric, EOMI  Neck: JVP not elevated, carotid 2+ bilaterally  Cardiovascular: regular rate and rhythm, normal S1 and S2, no murmurs, gallops, or rubs  Resp: clear to auscultation bilaterally, no rales, wheezes, or rhonchi  GI: Soft, nontender, nondistended. +BS.  No HSM or masses, no rebound or guarding.  Extremities: no LE edema, no cyanosis or clubbing, dorsalis pedis and posterior tibialis pulses 2+ bilaterally  Skin: Warm and dry, no jaundice or rash  Neuro: CN 2-12 intact, moves all extremities equally  Psych: Alert & oriented x 3    Imaging with results:  EKG 12 Lead 4/29/25:     cMRI 5/1/25:  1. The left ventricle is severely dilated with thinning and dyskinesis of the mid anteroseptum, mid  anterior and all apical segments. The global systolic function  is severely reduced normal. The LVEF is 13%.   2. The right ventricle is normal in cavity size. The global systolic function is normal. The RVEF is 49%.  An ICD lead is present.   3. The left atrium is moderately enlarged. The right atrium is normal in size.  4. Valvular assessment is limited by metallic artifact.   5. Late gadolinium enhancement imaging demonstrates extensive subendocardial to transmural hyperenhancement in the basal-mid anterior & anteroseptal, mid anterolateral and all apical segments in LAD coronary  distribution.   6. There is no pericardial effusion.  7. There is no intracardiac thrombus.     CONCLUSIONS:   Ischemic cardiomyopathy with large infarction in the LAD territory.   Severe LV dilation and severely reduced function, LVEF 13%.   Normal RV size and function, RVEF 49%.      Compared to the CMR from 01/11/2024, there are no significant changes.      Echocardiogram 4/22/25:    Normal left ventricular wall thickness.     Left ventricle is moderately dilated.  End-diastolic dimension 6.8 cm.     Biplane ejection fraction is 32%.  Visually looks much closer to 25%.    No apical thrombi.     Severe left ventricular systolic dysfunction.  Severe ischemic   cardiomyopathy with RCA and LAD wall motion abnormalities.  See below.     Grade I (mild) left ventricular diastolic abnormality.     Normal left ventricular filling pressure.     Right ventricular systolic function is low normal.     The left atrium is normal in size.     The right atrium is mildly dilated.     Normal central venous pressure (0-5 mmHg).     No pericardial effusion.     Inadequate TR spectral Doppler to accurately assess right ventricular   systolic pressure.     No significant valve disease.     No change from the March 2025 study.      RHC 4/23/25:  Final Impression:   1.  Right atrial mean pressure: 5 mmHg.  O2 saturation 65.0%.   2.  Right ventricular pressure: 27/-1/2 mmHg.  O2 saturation 65.6%.   3.  Right main  pulmonary artery pressure: 28/13/16 mmHg.  O2 saturation   65.4%.   4. Pulmonary capillary wedge pressure: 8 mmHg.   5.  Cardiac output thermodilution technique 4.56 L/min.  Cardiac index 1.9   L/min/m .   6. Cardiac output Michelle technique: 5.7 L/min.  Cardiac index 2.3 L/min/m .      Coronary angiogram 3/24/25:  Final Impression:   1.  Mild mid left main stenosis of 20%.   2.  Moderate size left circumflex coronary artery with minimal mid disease   of 20%.  The major obtuse marginal branch without any significant disease.   3.  Proximal LAD stent widely patent.  The remainder of the LAD without   significant disease so it is a fairly small caliber vessel.   4.  80% ostial first diagonal stenosis.  Diffuse 60% proximal disease.    The vessel then bifurcates into a smaller inferior branch that is   subtotally occluded within previously placed stent.  Superior diagonal   branch with 40% ostial stenosis.   5.  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  Remainder of the vessel with minimal luminal irregularities   including large PDA and KANIKA.   6.  PTCA of the inferior subtotal occluded diagonal branch with 2.5 mm   balloon.  Stenting of the ostial and proximal first diagonal branch up to   the bifurcation with 2.5 x 8 mm JANE postdilatation 2.5 mm noncompliant   balloon to 20 juan m.  Good flow in both branches.     Patient Care Team:  Suman Nagel MD as PCP - General  Rolanda Felder, RN as Specialty Care Coordinator (Cardiology)  Caity Felix MD as MD (Advanced Heart Failure and Transplant Cardiology)  Riri Dangelo, RN as VAD Coordinator  Roya Robledo, PhD as Psychologist (Neuropsychology)  Cyndy Arvizu, [facility] as  (Primary Care - CC)  Charlie Coulter MD as MD (Cardiovascular & Thoracic Surgery)  Jeannine Mendoza RD as Registered Dietitian (Dietitian, Registered)  Alen Raymond MD as MD (Hospice And Palliative Care)  Segundo  Alen Silverman MD as Assigned Palliative Care Provider  Esteban Wills MD as Assigned Heart and Vascular Provider  Charlie Coulter MD as Assigned Heart and Vascular Surgical Provider    Time Spent on this Encounter   I, GARLAND Bhandari CNP, personally saw the patient today and spent greater than 30 minutes discharging this patient.     Patient discussed with staff cardiologist, Dr. Ricci, who agrees with the above documentation and plan. Documentation represents joint decision making.     GARLAND Bhandari CNP on 5/4/2025 at 9:46 AM  [facility]

## 2025-05-03 NOTE — PLAN OF CARE
Neuro: A&Ox4.   Cardiac: 100% AV paced VSS.   Respiratory: Sating 98 on RA.  GI/: Adequate urine output. BM X1  Diet/appetite: Tolerating regular diet. Eating well.  Activity:  IND, up to chair and in halls.  Pain: At acceptable level on current regimen.   Skin: No new deficits noted.  LDA's:  PIV SL  PIV SL  Plan: plan to DC tomorrow. Continue with POC. Notify primary team with changes.

## 2025-05-03 NOTE — PROGRESS NOTES
[facility]   Cardiology   Progress Note     ASSESSMENT/PLAN:  Leo Russell is a 59 year old male with a history of VT s/p multiple ablations, ischemic cardiomyopathy, chronic systolic HF, CAD with multiple stents, paroxysmal AF, HTN, T2DM, HLD, and FREDDY who presents for EP evaluation for VT ablation.    Interval History: No acute events overnight. Patient hemodynamically stable, on 2L per NC. He is s/p VT ablation yesterday. Rhythm AV paced ith HR in the 70's. No ectopy noted. At the time of interview he denies shortness of breath, palpitations, dizziness, lightheadedness. Feels it is difficult to take a deep breath; will obtain CXR.     Changes Today:  - OK with EP to reduce Colchicine to 0.6mg daily   - Resume Mexelitine 200mg BID and amiodarone 200mg daily per EP   - CXR today   - Likely discharge tomorrow     # Recurrent Ventricular Tachycardia/VT storm  # s/p Multiple Ablations ([facility] and [facility])  # Paroxysmal Atrial fibrillation  Presented to the ER last evening with tachycardic palpitations, substernal chest tightness, shortness of breath and lightheadedness. Symptoms were consistent with prior admissions and he presented for evaluation. Transferred from OSH for further management. Hemodynamically stable, and currently in AV paced rhythm. CHADS2-Vasc 6  - EP consulted s/p VT ablation 5/2; give Amio 300mg Ivx1 post ablation; then discharge on Amio 200mg daily + Mexilitine 200mg BID with virtual follow up in 1 month with Dr. Wills  - Resumed Mexiletine 200mg BID and amiodarone 200mg now; OK per EP   - AC: Continue PTA Xarelto 20mg daily  - Continue PTA carvedilol 50 mg PO BID  - Device check    - Goal K>4, Mg>2; RN-managed replacement protocols  - Telemetry   - Outpatient EP follow up in 8-10 weeks     # Chronic Systolic Heart Failure  # CAD w/ Multiple PCI --> OM1 (2009), LAD (2010)  # Hypertension  # Hyperlipidemia  TTE 4/27/25 - LV moderately dilated, EF 32%, severe LV  systolic dysfunction, Severe ischemic cardiomyopathy with RCA and LAD wall motion abnormalities. Mild LV diastolic abnormality. RV function normal. RA mildly dilated. Normal CVP. 3/24/25 OSH angiogram PTCA and PCI 1st diag -> ASA + Prasugrel. 4/213/25 RHC Michelle CO 5.7, CI 2.3.  Discussed with PharmD 5/1 -> Prasugrel should be avoiding in patients with history of CVA (patient with 'small stroke' after 1st MI 2009). Will stop Prasugrel to Brilinta. Load 5/3 AM, with Brilinta 90 mg BID starting 5/3 PM (ordered). cMRI 5/1/25: ICM with large infarction in the LAD territory, severe LV dilation and severely reduced function, LVEF 13%, left atrium moderately enlarged, normal RV size and function, RVEF 49%   - Fluid Status: Euvolemic. EDW 119kg. [id number]. Continue PO Lasix 40mg  - ACE/ARB/ARNi: Continue PTA Entresto 97-102mg BID  - SGLT2i: Continue PTA Jardiance 10mg  - Beta Blocker: Continue PTA Coreg 50mg BID  - Aldosterone Antagonist: Continue Spironolactone 25mg daily   - Strict I &O's  - Daily standing weights  - 2g Na Diet / 2 L Fluid restriction   - Keep K+ > 4 and Mg > 2.0; Replacement protocols ordered   - CORE consult  - SCD ppx: ICD in place; at OSH, shock therapy was turned off in the slow VT zone.   - Daily standing weights  - Strict I&O's    # Diabetes Mellitus Type 2  Most recent A1c 6.9 % on 3/21/25. Glucose 152 on admission.   - Resumed PTA empagliflozin 10 mg daily as above  - Will monitor blood glucose and start sliding scale insulin if needed     FEN: Regular  Code Status: Full  Prophylaxis: Ambulation  Isolation: none  Disposition: Tomorrow     Patient discussed w/ Dr. Ricci, who agrees with above plan    Medically Ready for Discharge: Anticipated Tomorrow        Parvin HAQUE, CNP  [facility] Cardiology Team    Physical Exam:  Temp:  [97.8  F (36.6  C)-99  F (37.2  C)] 97.9  F (36.6  C)  Pulse:  [70-76] 70  Resp:  [12-29] 17  BP: (103-140)/(59-87) 107/61  MAP:  [75 mmHg-76 mmHg] 76 mmHg  Arterial Line  BP: (80-82)/(68-72) 80/72  SpO2:  [90 %-95 %] 92 %    I/O:   Intake/Output Summary (Last 24 hours) at 5/3/2025 0720  Last data filed at 5/3/2025 0631  Gross per 24 hour   Intake 3326.33 ml   Output 2350 ml   Net 976.33 ml       Wt:   Wt Readings from Last 5 Encounters:   05/03/25 122.7 kg (270 lb 8 oz)   12/18/24 118.8 kg (262 lb)   10/02/24 125.2 kg (276 lb)   07/02/24 122.2 kg (269 lb 6.4 oz)   04/26/24 120.7 kg (266 lb 1.5 oz)       General: NAD  HEENT:  PERRLA, EOMI.   Neck: JVD difficult to assess due to body haitus.   CV: RRR. No murmur appreciated. No rubs or gallops. Peripheral radial pulse intact.  Resp: No increased work of breathing or use of accessory muscles, breathing comfortably on room air.  Lung sounds clear throughout/bilaterally  Abdomen:  Normal active bowel sounds.  Abdomen is soft. No distension, non-tender to palpation.    Extremities: Warm. Capillary refill less than 3 sec. 2/4 radial pulses bilaterally.  2/4 pedal pulses bilaterally. No pre-tibial edema. No cyanosis or clubbing.  Skin:  Warm and dry. No erythema, rashes, ulceration or diaphoresis. Right groin soft, flat, non-tender.   Neuro: Alert and oriented x3.      Medications:  Current Facility-Administered Medications   Medication Dose Route Frequency Provider Last Rate Last Admin    carvedilol (COREG) tablet 50 mg  50 mg Oral BID Dilshad Haro MD   50 mg at 05/02/25 1949    colchicine (COLCRYS) tablet 0.6 mg  0.6 mg Oral BID Kaylynn Mckeon PA-C   0.6 mg at 05/02/25 2133    empagliflozin (JARDIANCE) tablet 10 mg  10 mg Oral Daily Jing Montgomery, APRN CNP        furosemide (LASIX) tablet 40 mg  40 mg Oral Daily Dilshad Haro MD   40 mg at 05/01/25 0808    [Held by provider] mexiletine (MEXITIL) capsule 250 mg  250 mg Oral TID Dilshad Haro MD   250 mg at 04/29/25 0857    pantoprazole (PROTONIX) EC tablet 40 mg  40 mg Oral Daily Dilshad Haro MD   40 mg at 05/02/25 0754    potassium chloride yahaira ER (KLOR-CON M10) CR tablet 20  mEq  20 mEq Oral Daily Esteban Wills MD   20 mEq at 05/02/25 0754    rivaroxaban ANTICOAGULANT (XARELTO) tablet 20 mg  20 mg Oral Daily with supper Esteban Wills MD   20 mg at 05/02/25 1949    rosuvastatin (CRESTOR) tablet 40 mg  40 mg Oral QPM Dilshad Haro MD   40 mg at 05/02/25 1949    sacubitril-valsartan (ENTRESTO)  MG per tablet 1 tablet  1 tablet Oral BID Dilshad Haro MD   1 tablet at 05/02/25 2133    sodium chloride (PF) 0.9% PF flush 3 mL  3 mL Intracatheter Q8H HECTOR Dilshad Haro MD   3 mL at 05/03/25 0631    spironolactone (ALDACTONE) tablet 25 mg  25 mg Oral Daily Dilshad Haro MD   25 mg at 05/01/25 0808    tamsulosin (FLOMAX) capsule 0.4 mg  0.4 mg Oral Daily Dilshad Haro MD   0.4 mg at 05/01/25 0808    ticagrelor (BRILINTA) tablet 180 mg  180 mg Oral Once Jing Montgomery APRN CNP        ticagrelor (BRILINTA) tablet 90 mg  90 mg Oral BID Jing Montgomery APRN CNP         Current Facility-Administered Medications   Medication Dose Route Frequency Provider Last Rate Last Admin       Labs:   [facility]  Recent Labs   Lab 05/03/25  0019 05/02/25  1624 05/02/25  0734 05/02/25  0429 05/01/25  0605 04/30/25  0756 04/30/25  0542 04/29/25  2230 04/29/25  0734 04/29/25  0207   NA  --   --   --  137 138  --  139 135  --  138   POTASSIUM  --   --  4.2 4.5 4.3  --  4.0 3.9  --  3.6   CHLORIDE  --   --   --  106 107  --  106 102  --  104   CO2  --   --   --  22 18*  --  24 22  --  21*   ANIONGAP  --   --   --  9 13  --  9 11  --  13   * 119*  --  118* 125*   < > 121* 125*   < > 152*   BUN  --   --   --  9.3 9.6  --  13.6 13.9  --  15.6   CR  --   --   --  0.84 0.77  --  0.95 0.96  --  0.96   GFRESTIMATED  --   --   --  >90 >90  --  >90 >90  --  >90   FE  --   --   --  8.8 8.8  --  9.1 8.8  --  9.3   MAG  --   --   --  1.9 1.9  --  2.0 2.1  --  2.0   PROTTOTAL  --   --   --   --   --   --   --   --   --  6.7   ALBUMIN  --   --   --   --   --   --   --   --   --  4.1   BILITOTAL  --   --   --   --    --   --   --   --   --  0.8   ALKPHOS  --   --   --   --   --   --   --   --   --  69   AST  --   --   --   --   --   --   --   --   --  24   ALT  --   --   --   --   --   --   --   --   --  46    < > = values in this interval not displayed.     CBC  Recent Labs   Lab 05/03/25  0642 05/02/25  0429 05/01/25  0605 04/30/25  0542   WBC 8.9 5.7 6.4 6.3   RBC 4.56 4.68 4.69 4.85   HGB 14.2 14.6 14.6 15.4   HCT 42.2 44.2 43.9 46.5   MCV 93 94 94 96   MCH 31.1 31.2 31.1 31.8   MCHC 33.6 33.0 33.3 33.1   RDW 14.6 14.8 14.9 14.9    152 178 195     INRNo lab results found in last 7 days.  Arterial Blood GasNo lab results found in last 7 days.    Diagnostics:  EKG 12 Lead 4/29/25:     cMRI 5/1/25:  1. The left ventricle is severely dilated with thinning and dyskinesis of the mid anteroseptum, mid  anterior and all apical segments. The global systolic function is severely reduced normal. The LVEF is 13%.   2. The right ventricle is normal in cavity size. The global systolic function is normal. The RVEF is 49%.  An ICD lead is present.   3. The left atrium is moderately enlarged. The right atrium is normal in size.  4. Valvular assessment is limited by metallic artifact.   5. Late gadolinium enhancement imaging demonstrates extensive subendocardial to transmural hyperenhancement in the basal-mid anterior & anteroseptal, mid anterolateral and all apical segments in LAD coronary  distribution.   6. There is no pericardial effusion.  7. There is no intracardiac thrombus.     CONCLUSIONS:   Ischemic cardiomyopathy with large infarction in the LAD territory.   Severe LV dilation and severely reduced function, LVEF 13%.   Normal RV size and function, RVEF 49%.      Compared to the CMR from 01/11/2024, there are no significant changes.      Echocardiogram 4/22/25:    Normal left ventricular wall thickness.     Left ventricle is moderately dilated.  End-diastolic dimension 6.8 cm.     Biplane ejection fraction is 32%.  Visually  looks much closer to 25%.    No apical thrombi.     Severe left ventricular systolic dysfunction.  Severe ischemic   cardiomyopathy with RCA and LAD wall motion abnormalities.  See below.     Grade I (mild) left ventricular diastolic abnormality.     Normal left ventricular filling pressure.     Right ventricular systolic function is low normal.     The left atrium is normal in size.     The right atrium is mildly dilated.     Normal central venous pressure (0-5 mmHg).     No pericardial effusion.     Inadequate TR spectral Doppler to accurately assess right ventricular   systolic pressure.     No significant valve disease.     No change from the March 2025 study.      [facility] 4/23/25:  Final Impression:   1.  Right atrial mean pressure: 5 mmHg.  O2 saturation 65.0%.   2.  Right ventricular pressure: 27/-1/2 mmHg.  O2 saturation 65.6%.   3.  Right main pulmonary artery pressure: 28/13/16 mmHg.  O2 saturation   65.4%.   4. Pulmonary capillary wedge pressure: 8 mmHg.   5.  Cardiac output thermodilution technique 4.56 L/min.  Cardiac index 1.9   L/min/m .   6. Cardiac output Michelle technique: 5.7 L/min.  Cardiac index 2.3 L/min/m .      Coronary angiogram 3/24/25:  Final Impression:   1.  Mild mid left main stenosis of 20%.   2.  Moderate size left circumflex coronary artery with minimal mid disease   of 20%.  The major obtuse marginal branch without any significant disease.   3.  Proximal LAD stent widely patent.  The remainder of the LAD without   significant disease so it is a fairly small caliber vessel.   4.  80% ostial first diagonal stenosis.  Diffuse 60% proximal disease.    The vessel then bifurcates into a smaller inferior branch that is   subtotally occluded within previously placed stent.  Superior diagonal   branch with 40% ostial stenosis.   5.  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  Remainder of the vessel with minimal luminal irregularities   including large PDA and KANIKA.    6.  PTCA of the inferior subtotal occluded diagonal branch with 2.5 mm   balloon.  Stenting of the ostial and proximal first diagonal branch up to   the bifurcation with 2.5 x 8 mm JANE postdilatation 2.5 mm noncompliant   balloon to 20 juan m.  Good flow in both branches.     Recent Results (from the past 24 hours)   Cardiac Device Check - Inpatient   Result Value    Date Time Interrogation Session [id number]    Implantable Pulse Generator  [employer]    Implantable Pulse Generator Model G125 MOMENTUM CRT-D    Implantable Pulse Generator Serial Number [id number]    Type Interrogation Session In Clinic    Clinic Name [facility] [place]    Implantable Pulse Generator Type Cardiac Resynchronization Therapy - Defibrillator    Implantable Pulse Generator Implant Date 20220620    Implantable Lead  Guidant    Implantable Lead Model 0185 Endotak Sagle G    Implantable Lead Serial Number [id number]    Implantable Lead Implant Date 20111010    Implantable Lead Polarity Type Tripolar Lead    Implantable Lead Location Detail 1 UNKNOWN    Implantable Lead Location Right Ventricle    Implantable Lead Connection Status Connected    Implantable Lead  Guidant    Implantable Lead Model 4591 Acuity Spiral    Implantable Lead Serial Number [id number]    Implantable Lead Implant Date 20111010    Implantable Lead Polarity Type Unipolar Lead    Implantable Lead Location Detail 1 UNKNOWN    Implantable Lead Location Left Ventricle    Implantable Lead Connection Status Connected    Implantable Lead  [employer]    Implantable Lead Model 4469 Fineline II Sterox EZ    Implantable Lead Serial Number [id number]    Implantable Lead Implant Date 20111001    Implantable Lead Polarity Type Bipolar Lead    Implantable Lead Location Detail 1 UNKNOWN    Implantable Lead Location Right Atrium    Implantable Lead Connection Status Connected    Jose Setting Mode (NBG Code) AAI     Jose Setting Lower Rate Limit 60    Lead Channel Setting Sensing Polarity Bipolar    Lead Channel Setting Sensing Sensitivity 0.25    Lead Channel Setting Sensing Adaptation Mode Adaptive    Lead Channel Setting Sensing Polarity Bipolar    Lead Channel Setting Pacing Polarity Bipolar    Lead Channel Setting Pacing Pulse Width 0.4    Lead Channel Setting Pacing Amplitude 2.0    Lead Channel Setting Pacing Capture Mode Fixed Pacing    Lead Channel Setting Pacing Polarity Bipolar    Zone Setting Type Category VF    Zone Setting Vendor Type Category VF    Zone Setting Status Inactive    Zone Setting Type Category VT    Zone Setting Vendor Type Category VT    Zone Setting Status Inactive    Zone Setting Type Category VT    Zone Setting Vendor Type Category VT-1    Zone Setting Status Inactive    Lead Channel Impedance Value 1,012    Lead Channel Pacing Threshold Amplitude 1.0    Lead Channel Pacing Threshold Pulse Width 0.4    Lead Channel Impedance Value 479    Lead Channel Pacing Threshold Amplitude 0.4    Lead Channel Pacing Threshold Pulse Width 0.4    Lead Channel Impedance Value 632    Lead Channel Pacing Threshold Amplitude 1.1    Lead Channel Pacing Threshold Pulse Width 0.4    Battery Date Time of Measurements 20250502093200    Battery Status Middle of Service    Battery Remaining Longevity 96    Battery Remaining Percentage 100    Capacitor Charge Type Reformation    Capacitor Last Charge Date Time 20250321101900    Capacitor Charge Time 9.7    Capacitor Charge Type Shock    Capacitor Last Charge Date Time 20250321101900    Capacitor Charge Time 9.7    Capacitor Charge Energy 41    Jose Statistic Date Time Start 20250501000000    Jose Statistic Date Time End 20250502000000    Jose Statistic RA Percent Paced 99    Jose Statistic RV Percent Paced 99    CRT Statistic LV Percent Paced 100    CRT Statistic Date Time Start 20250501000000    CRT Statistic Date Time End 20250502000000    Therapy Statistic Recent  Shocks Delivered 0    Therapy Statistic Recent Shocks Aborted 0    Therapy Statistic Recent ATP Delivered 0    Therapy Statistic Recent Date Time Start 20250501000000    Therapy Statistic Recent Date Time End 20250502000000    Therapy Statistic Total Shocks Delivered 3    Therapy Statistic Total Shocks Aborted 0    Therapy Statistic Total ATP Delivered 364    Therapy Statistic Total  Date Time End 20250502000000    Episode Statistic Recent Count 0    Episode Statistic Type Category Other    Episode Statistic Recent Count 0    Episode Statistic Type Category VT    Episode Statistic Vendor Type Category NSVT    Episode Statistic Recent Count 0    Episode Statistic Type Category VT    Episode Statistic Vendor Type Category VT    Episode Statistic Recent Count 0    Episode Statistic Type Category VT    Episode Statistic Vendor Type Category VT-1    Episode Statistic Recent Date Time Start 20250501000000    Episode Statistic Recent Date Time End 20250502000000    Episode Statistic Recent Date Time Start 20250501000000    Episode Statistic Recent Date Time End 20250502000000    Episode Statistic Recent Date Time Start 20250501000000    Episode Statistic Recent Date Time End 20250502000000    Episode Statistic Recent Date Time Start 20250501000000    Episode Statistic Recent Date Time End 20250502000000    Narrative    Patient seen in Cath Lab 1 for evaluation and iterative programming of their ICD per MD orders pre VT Ablation.    Device: [employer] G125 MOMENTUM CRT-D  Normal device function.  Mode: AAI  AP: 99%  BVP: 100%  Intrinsic rhythm: SB 44 bpm  Lead Trends Appear Stable.  Estimated battery longevity to RRT = 8 years.   Atrial Arrhythmia: None.  Anticoagulant: Xarelto  Ventricular Arrhythmia: None.  Setting Changes: ICD Tachy Therapies turned OFF. BiV trigger turned OFF. Mode changed to AAI from DDD. LRL decreased from 70 to 60 bpm.    Plan: Page device RN post procedure.  GOLD Gómez RN    Multi lead  ICD    I have reviewed and interpreted the device interrogation, settings, programming and nurse's summary. The device is functioning within normal device parameters. I agree with the current findings, assessment and plan.   EP Ablation    Narrative    EP PROCEDURE NOTE     Procedures:  1. Ventricular tachycardia ablation (Endoardial/Epicardial)  2. Intracardiac Echocardiography.  3. Invasive Hemodynamic Monitoring.  4. 3D Mapping using Carto3.  5. General anesthesia.  6. Trans-septal puncture  7. Cardioversion       Electrophysiologist: Dr. Wills   EP Fellow: Rivas Phan     Pre-operative Diagnosis: Ischemic VT, EF 10-15%.  Post-operative diagnosis: Successful ablation of ischemic VT with   extensive scar modification in the mid to distal anterior wall both with   ablation in the endocardial and epicardial aspect  Complications: None.  Fluoroscopy time/dose: 25.5 minutes, 03629  cGycm2.    Clinical Profile:   Mr. Russell is a 59 year old male who has a medical history significant for   anterior wall MI, CAD s/p PCIs LAD, D1, D2 2009, 2010, 2011, & 2017, ICM   LVEF 25%, s/p CRTD 2011 s/p gen change 6/2022, VT s/p prior ablations   6/2020 & 11/2021 (both at OSH) and 7/1/24 ([facility] with ablation in the mid   and basal anterior wall and [facility] PVC ablation), PAF (CHADSVASC 6 on   Xarelto), prior LV thrombus, DM2 and CVA. He is now transferred to [facility]   for advanced cares given increasing VT episodes requiring numerous ATPs   and 2 ICD shocks. Prior to being transferred, he underwent a coronary   angiogram and had PTCA and PCI 1st diag -> ASA + Prasugrel. ICD   interrogation revealed a slow VT with TCL that ranged between 380-450 msec   with a monomorphic morphology. CMR showed LVEF 12% and large transmural   scar in the LAD territory. During this course, he has been stabilized with   amiodarone and mexiletine. He presents today for EPS and VT ablation.      PROCEDURE  The risks and benefits of the procedure were explained  to the patient in   full. The risks include, but are not limited to: pain, bleeding,blood   transfusion reactions, arrhythmia, dissection of vessels,cardiac   perforation, pericardial effusion, stroke, and death. Informed Consent was   obtained. The patient was brought to the EP lab in a fasting and   hemodynamically stable condition. The patient was prepped and draped in a   sterile fashion.    Sheaths and Catheters:  After local anesthesia with 2% lidocaine, vascular access was obtained   using the modified Seldinger technique for the following access points:    Right Femoral Vein:  - 8Fr Locking Sheath: A decanav catheter was positioned into the coronary   sinus and RV.  - 8.5Fr Sheath: Exchanged for an Agilis steerable sheath through which a   pentaray catheter was placed into the LA/LV. This was then used for   ablation catheter as well.  - 9 Fr Sheath - an ICE catheter was placed into the RA / RV.     Right Femoral Artery:  - 4Fr Sheath was placed for hemodynamic monitoring.     EP study results (in milliseconds):  Pre-ablation: In Sinus rhythm, AA= 854, VV= 854, NJ= 112, LCU=486, QT=   455.  Post-ablation: AA=VV= 939, NJ= 140, QRS= 150, QT= 450.    VT on 12-lead ECG prior to the procedure: , RBBB with transition in   V5, left inferior axis with II/III discrepancy (- in II), +AVR/-AVL      Procedure Description:  After the above sheaths were in place, the catheters were positioned under   fluoroscopic guidance. The ICE catheter was placed into the RA. Baseline   survey of the heart with the ICE did not reveal the presence of any   significant pericardial effusion. The ICD therapies were turned off.     Given the recurrent VT after multiple ablations, and our prior knowledge   with the heavy anterior and lateral LV scar, we decided to map the   epicardial surface first.     Epicardial Access:  We performed sterile preparation of the subxiphoid region. Antibiotics are   administered to minimize the risk of  bacterial pericarditis. The puncture   was performed one fingerbreadth left of the subxiphoid process, using a   long spinal needle, as micropuncture needle did not reach well, and   directed under fluoroscopy. We performed an anterior access as we advanced   the needle into the pericardial space. We advanced the micropunture wire   into the pericardial space. The wire is exchanged to a long guidewire that   was ushed to cover the entire shadow of the heart to further confirm the   pericardial access. We pre-dilated with an 11 Fr sheath. A short Agilis   sheath was then advanced over the guidewire into the pericardial space.   The Agilis sheath was aspirated every 15 minutes per protocol.    Transeptal Access:   We then performed the trans-septal access. The guide wire was advanced   into the SVC. The Agilis trans-septal sheath and dilator were advanced   over the guide wire into the SVC and directed medially. The guide wire was   removed, the dilator was aspirated and the BRK-1 needle was advanced.   Using both the JUDGE and KEISHA projections, the trans-septal system was then   dragged down and the Fossa Ovalis was engaged. Tenting of the interatrial   septum was observed with ICE. The needle was advanced into the LA and the   location confirmed using needle pressure monitoring and ICE. While fixing   the needle, the dilator was slightly advanced across the septum. The   trans-septal needle was then withdrawn from the dilator. Then the sheath   were advanced though the septum into the LA. The dilator was removed and   the sheath was then flushed. A Heparin bolus and drip were started, with   serial monitoring of ACTs to keep ACT around 300.    Voltage mapping of the epicardial LV revealed heavy scar across the entire   anterior wall, lateral wall and most of the septum, with the inferior wall   being the only part that was preserved. We marked few late potentials   across the mid to distal anterior wall. We also  performed deep pacing with   a decapolar catheter in the RV with PES, but that did not reveal any   significant late potentials delay. The phrenic nerve course was mapped   with high output pacing. Voltage mapping of the endocardial LV revealed a   similar heavy scar across the entire anterior wall from the base to the   apex, lateral wall and across the septum. And again here, the inferior   wall was the only healthy part. There were very subtle late potentials on   the mid to distal anterior wall that we targeted for ablation. We also   pace mapped, but compared to the pre-procedure 12-lead ECG as we did not   have a PASO template given the lack of inducibility. Interestingly, we   were able to see a paced QRS that was very similar to the clinical VT in   the distal anterior scar, both endocardially and epicardially. During   mapping endocardially, the patient went into VF twice for which we   performed cardioversion.    Induction: Despite extensive PES from multiple sites, including isuprel   use at 5 mcg/min, we could not induce the VT.   Pacing was perfomed form : RV apex, anterior LV, inferolateral LV and   anterior epicardial LV with PES at PCL: 600/280 (ERP) from RV, 600/400/320   from RV, 600/280 (ERP) from LV, 600/400/260 (S3 ERP) from LV, 600/360/270   (S3 ERP) from LV, 600/260 (ERP) from  epicardial LV and 600/500/500 (S3   ERP) from epicardial LV but with no induction of VT. This was probably   related to the AAD loading prior to the procedure during the prolonged   hospital course.     Ablation:   With the non-inducibility of the VT, we decided to modify the scar   endocardially and epicardially. We started with endocardial ablation. We   delivered 40W lesions to modify the scar from the mid to the apical   anterior LV wall. We also covered areas that were not ablated more   apically during the previous ablation. We then ablated across the same   scar on the epicardial surface. The phrenic nerve was  mapped with   high-output pacing and was more lateral to our ablation targets. Multiple   RFA lesions were delivered modifying the epicardial scar in the mid-apical   anterior LV wall.     At this point, we decided to conclude the procedure.     The sheaths were pulled out of the left atrium into the RA. Heparin was   stopped and protamine was given, after a test dose revealed no reaction.   Solumedrol 125 mg was given into the pericardial space then the sheath was   pulled. The catheters were withdrawn, and the sheaths were pulled. Figure   of 8 suture and manual pressure was applied to both groins. The ICD was   reprogrammed with therapies on. The patient was then transported to PACU   for extubation then to a monitored bed.    ASSESSMENT:  1. Sustained monomorphic VT refractory to AAT.  2. No VT inducibility. Catheter ablation of ischemic VT with extensive   scar modification in the mid-distal anterior wall with ablation the   endocardial and epicardial aspect     PLAN:  1. Two hours bedrest followed by figure of 8 suture removal  2. EP will continue to follow for further AAD and discharge recs   Cardiac Device Check - Inpatient   Result Value    Date Time Interrogation Session [id number]    Implantable Pulse Generator  [employer]    Implantable Pulse Generator Model G125 MOMENTUM CRT-D    Implantable Pulse Generator Serial Number [id number]    Type Interrogation Session In Clinic    Clinic Name [facility] [place]    Implantable Pulse Generator Type Cardiac Resynchronization Therapy - Defibrillator    Implantable Pulse Generator Implant Date 20220620    Implantable Lead  [employer]    Implantable Lead Model 0185 Endotak Bremen G    Implantable Lead Serial Number [id number]    Implantable Lead Implant Date 20111010    Implantable Lead Polarity Type Tripolar Lead    Implantable Lead Location Detail 1 UNKNOWN    Implantable Lead Location Right Ventricle    Implantable Lead  Connection Status Connected    Implantable Lead  [employer]    Implantable Lead Model 4591 Acuity Spiral    Implantable Lead Serial Number [id number]    Implantable Lead Implant Date 20111010    Implantable Lead Polarity Type Unipolar Lead    Implantable Lead Location Detail 1 UNKNOWN    Implantable Lead Location Left Ventricle    Implantable Lead Connection Status Connected    Implantable Lead  [employer]    Implantable Lead Model 4469 Fineline II Sterox EZ    Implantable Lead Serial Number [id number]    Implantable Lead Implant Date 20111001    Implantable Lead Polarity Type Bipolar Lead    Implantable Lead Location Detail 1 UNKNOWN    Implantable Lead Location Right Atrium    Implantable Lead Connection Status Connected    Jose Setting Mode (NBG Code) DDD    Jose Setting Lower Rate Limit 70    Jose Setting Maximum Tracking Rate 100    Jose Setting CATHY Delay Low 110    Jose Setting PAV Delay Low 130    Jose Setting AT Mode Switch Rate 170    Jose Setting AT Mode Switch Mode VDI    Lead Channel Setting Sensing Polarity Bipolar    Lead Channel Setting Sensing Sensitivity 0.25    Lead Channel Setting Sensing Adaptation Mode Adaptive    Lead Channel Setting Sensing Polarity Bipolar    Lead Channel Setting Sensing Sensitivity 0.6    Lead Channel Setting Sensing Adaptation Mode Adaptive    Lead Channel Setting Sensing Anode Location Right Ventricle    Lead Channel Setting Sensing Cathode Location Left Ventricle    Lead Channel Setting Sensing Cathode Terminal Tip    Lead Channel Setting Sensing Sensitivity 1.0    Lead Channel Setting Sensing Adaptation Mode Adaptive    Ventricular chambers paced during CRT pacing. BiV    CRT LV-RV Delay 20    Lead Channel Setting Pacing Polarity Bipolar    Lead Channel Setting Pacing Pulse Width 0.4    Lead Channel Setting Pacing Amplitude 2.0    Lead Channel Setting Pacing Capture Mode Fixed Pacing    Lead Channel Setting Pacing Polarity Bipolar    Lead  Channel Setting Pacing Pulse Width 0.4    Lead Channel Setting Pacing Amplitude 2.5    Lead Channel Setting Pacing Capture Mode Fixed Pacing    Lead Channel Setting Pacing Anode Location Right Ventricle    Lead Channel Setting Sensing Cathode Location Left Ventricle    Lead Channel Setting Sensing Cathode Terminal Tip    Lead Channel Setting Pacing Pulse Width 0.4    Lead Channel Setting Pacing Amplitude 2.5    Lead Channel Setting Pacing Capture Mode Fixed Pacing    Zone Setting Type Category VF    Zone Setting Vendor Type Category VF    Zone Setting Status Active    Zone Setting Detection Interval 300    Zone Setting Type Category VT    Zone Setting Vendor Type Category VT    Zone Setting Status Active    Zone Setting Detection Interval 375    Zone Setting Type Category VT    Zone Setting Vendor Type Category VT-1    Zone Setting Status Active    Zone Setting Detection Interval 545    Lead Channel Impedance Value 956    Lead Channel Pacing Threshold Amplitude 1.0    Lead Channel Pacing Threshold Pulse Width 0.4    Lead Channel Impedance Value 389    Lead Channel Pacing Threshold Amplitude 0.4    Lead Channel Pacing Threshold Pulse Width 0.4    Lead Channel Impedance Value 599    Lead Channel Pacing Threshold Amplitude 1.6    Lead Channel Pacing Threshold Pulse Width 0.4    Battery Date Time of Measurements [id number]    Battery Status Middle of Service    Battery Remaining Longevity 78    Battery Remaining Percentage 86    Capacitor Charge Type Reformation    Capacitor Last Charge Date Time 20250321101900    Capacitor Charge Time 9.7    Capacitor Charge Type Shock    Capacitor Last Charge Date Time 20250321101900    Capacitor Charge Time 9.7    Capacitor Charge Energy 41    Jose Statistic Date Time Start 20250501000000    Jose Statistic Date Time End 20250502000000    Jose Statistic RA Percent Paced 90    Jose Statistic RV Percent Paced 99    CRT Statistic LV Percent Paced 100    CRT Statistic Date Time  Start 20250501000000    CRT Statistic Date Time End 20250502000000    Therapy Statistic Recent Shocks Delivered 0    Therapy Statistic Recent Shocks Aborted 0    Therapy Statistic Recent ATP Delivered 0    Therapy Statistic Recent Date Time Start 20250501000000    Therapy Statistic Recent Date Time End 20250502000000    Therapy Statistic Total Shocks Delivered 3    Therapy Statistic Total Shocks Aborted 0    Therapy Statistic Total ATP Delivered 364    Therapy Statistic Total  Date Time End 20250502000000    Episode Statistic Recent Count 0    Episode Statistic Type Category Other    Episode Statistic Recent Count 0    Episode Statistic Type Category VT    Episode Statistic Vendor Type Category NSVT    Episode Statistic Recent Count 0    Episode Statistic Type Category VT    Episode Statistic Vendor Type Category VT    Episode Statistic Recent Count 0    Episode Statistic Type Category VT    Episode Statistic Vendor Type Category VT-1    Episode Statistic Recent Date Time Start 20250501000000    Episode Statistic Recent Date Time End 20250502000000    Episode Statistic Recent Date Time Start 20250501000000    Episode Statistic Recent Date Time End 20250502000000    Episode Statistic Recent Date Time Start 20250501000000    Episode Statistic Recent Date Time End 20250502000000    Episode Statistic Recent Date Time Start 20250501000000    Episode Statistic Recent Date Time End 20250502000000    Narrative    Patient seen in Cath lab 1 for evaluation and iterative programming of their ICD per MD orders s/p VT ablation.    Device: [employer] G125 MOMENTUM CRT-D  Normal device function.  Mode: DDD  bpm  AP: 90%  BVP: 100%  Intrinsic rhythm: SB 41 bpm  Lead Trends Appear Stable: RV pacing threshold pre ablation was 1.1 V @ 0.4 ms, post ablation measuring 1.7 V @ 0.4 ms. Other trends stable.  Estimated battery longevity to RRT = 6 years.   Atrial Arrhythmia: Non  Setting Changes: Mode changed to DDD from AAI.  LRL increased from 60 bpm to 70 bpm. BiV trigger turned ON. ICD Tachy Therapies turned ON.    Plan: follow inpatient as needed.  GOLD Gómez, RN    Multi lead ICD    I have reviewed and interpreted the device interrogation, settings, programming and nurse's summary. The device is functioning within normal device parameters. I agree with the current findings, assessment and plan.       Medical Decision Making     30 MINUTES SPENT BY ME on the date of service doing chart review, history, exam, documentation & further activities per the note.        Parvin Easley, GARLAND CNP on 5/3/2025 at 10:45 AM

## 2025-05-04 VITALS
DIASTOLIC BLOOD PRESSURE: 61 MMHG | WEIGHT: 267.8 LBS | SYSTOLIC BLOOD PRESSURE: 89 MMHG | RESPIRATION RATE: 17 BRPM | BODY MASS INDEX: 37.49 KG/M2 | TEMPERATURE: 98.3 F | HEIGHT: 71 IN | HEART RATE: 70 BPM | OXYGEN SATURATION: 98 %

## 2025-05-04 LAB
ANION GAP SERPL CALCULATED.3IONS-SCNC: 9 MMOL/L (ref 7–15)
BUN SERPL-MCNC: 15 MG/DL (ref 6–20)
CALCIUM SERPL-MCNC: 8.4 MG/DL (ref 8.8–10.4)
CHLORIDE SERPL-SCNC: 107 MMOL/L (ref 98–107)
CREAT SERPL-MCNC: 0.77 MG/DL (ref 0.67–1.17)
EGFRCR SERPLBLD CKD-EPI 2021: >90 ML/MIN/1.73M2
ERYTHROCYTE [DISTWIDTH] IN BLOOD BY AUTOMATED COUNT: 15.1 % (ref 10–15)
GLUCOSE SERPL-MCNC: 123 MG/DL (ref 70–99)
HCO3 SERPL-SCNC: 24 MMOL/L (ref 22–29)
HCT VFR BLD AUTO: 41.4 % (ref 40–53)
HGB BLD-MCNC: 13.9 G/DL (ref 13.3–17.7)
MAGNESIUM SERPL-MCNC: 1.9 MG/DL (ref 1.7–2.3)
MCH RBC QN AUTO: 31.6 PG (ref 26.5–33)
MCHC RBC AUTO-ENTMCNC: 33.6 G/DL (ref 31.5–36.5)
MCV RBC AUTO: 94 FL (ref 78–100)
PLATELET # BLD AUTO: 162 10E3/UL (ref 150–450)
POTASSIUM SERPL-SCNC: 4.4 MMOL/L (ref 3.4–5.3)
RBC # BLD AUTO: 4.4 10E6/UL (ref 4.4–5.9)
SODIUM SERPL-SCNC: 140 MMOL/L (ref 135–145)
WBC # BLD AUTO: 6.6 10E3/UL (ref 4–11)

## 2025-05-04 PROCEDURE — 85048 AUTOMATED LEUKOCYTE COUNT: CPT

## 2025-05-04 PROCEDURE — 83735 ASSAY OF MAGNESIUM: CPT | Performed by: INTERNAL MEDICINE

## 2025-05-04 PROCEDURE — 36415 COLL VENOUS BLD VENIPUNCTURE: CPT

## 2025-05-04 PROCEDURE — 250N000013 HC RX MED GY IP 250 OP 250 PS 637

## 2025-05-04 PROCEDURE — 99239 HOSP IP/OBS DSCHRG MGMT >30: CPT | Mod: FS

## 2025-05-04 PROCEDURE — 250N000013 HC RX MED GY IP 250 OP 250 PS 637: Performed by: INTERNAL MEDICINE

## 2025-05-04 PROCEDURE — 80051 ELECTROLYTE PANEL: CPT

## 2025-05-04 RX ORDER — TICAGRELOR 90 MG/1
90 TABLET, FILM COATED ORAL 2 TIMES DAILY
Qty: 180 TABLET | Refills: 3 | Status: SHIPPED | OUTPATIENT
Start: 2025-05-04

## 2025-05-04 RX ORDER — MAGNESIUM OXIDE 400 MG/1
400 TABLET ORAL EVERY 4 HOURS
Status: COMPLETED | OUTPATIENT
Start: 2025-05-04 | End: 2025-05-04

## 2025-05-04 RX ORDER — CARVEDILOL 25 MG/1
50 TABLET ORAL 2 TIMES DAILY
Qty: 260 TABLET | Refills: 3 | Status: SHIPPED | OUTPATIENT
Start: 2025-05-04

## 2025-05-04 RX ORDER — AMIODARONE HYDROCHLORIDE 200 MG/1
200 TABLET ORAL DAILY
Qty: 90 TABLET | Refills: 3 | Status: SHIPPED | OUTPATIENT
Start: 2025-05-04

## 2025-05-04 RX ORDER — COLCHICINE 0.6 MG/1
0.6 TABLET ORAL DAILY
Qty: 90 TABLET | Refills: 0 | Status: SHIPPED | OUTPATIENT
Start: 2025-05-04

## 2025-05-04 RX ORDER — SPIRONOLACTONE 25 MG/1
25 TABLET ORAL DAILY
Qty: 90 TABLET | Refills: 3 | Status: SHIPPED | OUTPATIENT
Start: 2025-05-04

## 2025-05-04 RX ORDER — MEXILETINE HYDROCHLORIDE 200 MG/1
200 CAPSULE ORAL 2 TIMES DAILY
Qty: 180 CAPSULE | Refills: 3 | Status: SHIPPED | OUTPATIENT
Start: 2025-05-04

## 2025-05-04 RX ADMIN — SACUBITRIL AND VALSARTAN 1 TABLET: 97; 103 TABLET, FILM COATED ORAL at 08:39

## 2025-05-04 RX ADMIN — TICAGRELOR 90 MG: 90 TABLET ORAL at 08:38

## 2025-05-04 RX ADMIN — POTASSIUM CHLORIDE 20 MEQ: 750 TABLET, EXTENDED RELEASE ORAL at 08:39

## 2025-05-04 RX ADMIN — SPIRONOLACTONE 25 MG: 25 TABLET, FILM COATED ORAL at 08:39

## 2025-05-04 RX ADMIN — MEXILETINE HYDROCHLORIDE 250 MG: 250 CAPSULE ORAL at 08:39

## 2025-05-04 RX ADMIN — MAGNESIUM OXIDE TAB 400 MG (241.3 MG ELEMENTAL MG) 400 MG: 400 (241.3 MG) TAB at 08:39

## 2025-05-04 RX ADMIN — TAMSULOSIN HYDROCHLORIDE 0.4 MG: 0.4 CAPSULE ORAL at 08:39

## 2025-05-04 RX ADMIN — MAGNESIUM OXIDE TAB 400 MG (241.3 MG ELEMENTAL MG) 400 MG: 400 (241.3 MG) TAB at 12:27

## 2025-05-04 RX ADMIN — COLCHICINE 0.6 MG: 0.6 TABLET, FILM COATED ORAL at 08:38

## 2025-05-04 RX ADMIN — PANTOPRAZOLE SODIUM 40 MG: 40 TABLET, DELAYED RELEASE ORAL at 08:39

## 2025-05-04 RX ADMIN — AMIODARONE HYDROCHLORIDE 200 MG: 200 TABLET ORAL at 08:38

## 2025-05-04 RX ADMIN — CARVEDILOL 50 MG: 25 TABLET, FILM COATED ORAL at 08:39

## 2025-05-04 RX ADMIN — FUROSEMIDE 40 MG: 40 TABLET ORAL at 08:39

## 2025-05-04 RX ADMIN — EMPAGLIFLOZIN 10 MG: 10 TABLET, FILM COATED ORAL at 08:38

## 2025-05-04 ASSESSMENT — ACTIVITIES OF DAILY LIVING (ADL)
ADLS_ACUITY_SCORE: 33
ADLS_ACUITY_SCORE: 35
ADLS_ACUITY_SCORE: 33
ADLS_ACUITY_SCORE: 35
ADLS_ACUITY_SCORE: 35
ADLS_ACUITY_SCORE: 33
ADLS_ACUITY_SCORE: 35
ADLS_ACUITY_SCORE: 33
ADLS_ACUITY_SCORE: 35

## 2025-05-04 NOTE — PLAN OF CARE
Goal Outcome Evaluation:      Plan of Care Reviewed With: patient      END of Shift Notes: 2300H - 0730H     Neuro: AO x4 , calm and cooperative     Cardiac: AV paced , HR = 70s bpm                Respiratory: on RA     GI/: voids spontaneously, LBM = 5/3/25     Diet/appetite: regular diet, good appetite     Activity: independent     Pain: denies      Skin: no new skin deficits noted     Lines: 2 R PIV SL              Plan: continue plan of care, possible discharge today 5/4/25,  notify Cards 1 for any changes

## 2025-05-05 NOTE — ANESTHESIA POSTPROCEDURE EVALUATION
Patient: Leo Russell    Procedure: Procedure(s):  EP Ablation VT       Anesthesia Type:  General    Note:  Disposition: Admission   Postop Pain Control: Uneventful            Sign Out: Well controlled pain   PONV: No   Neuro/Psych: Uneventful            Sign Out: Acceptable/Baseline neuro status   Airway/Respiratory: Uneventful            Sign Out: Acceptable/Baseline resp. status   CV/Hemodynamics: Uneventful            Sign Out: Acceptable CV status; No obvious hypovolemia; No obvious fluid overload   Other NRE: NONE   DID A NON-ROUTINE EVENT OCCUR? No           Last vitals:  Vitals Value Taken Time   /76 05/02/25 1815   Temp 36.7  C (98.1  F) 05/02/25 1800   Pulse 70 05/02/25 1826   Resp 17 05/02/25 1826   SpO2 94 % 05/02/25 1826   Vitals shown include unfiled device data.    Electronically Signed By: Aric Tavera MD  May 5, 2025  10:53 AM

## 2025-05-05 NOTE — PLAN OF CARE
Occupational Therapy Discharge Summary    Reason for therapy discharge:    Discharged to home.    Progress towards therapy goal(s). See goals on Care Plan in [facility] electronic health record for goal details.  Goals partially met.  Barriers to achieving goals:   discharge from facility.    Therapy recommendation(s):    No further therapy is recommended.  Continue home exercise program.

## 2025-05-06 ENCOUNTER — PATIENT OUTREACH (OUTPATIENT)
Dept: CARE COORDINATION | Facility: CLINIC | Age: 60
End: 2025-05-06
Payer: MEDICARE

## 2025-05-06 ENCOUNTER — DOCUMENTATION ONLY (OUTPATIENT)
Dept: ANTICOAGULATION | Facility: CLINIC | Age: 60
End: 2025-05-06
Payer: MEDICARE

## 2025-05-06 LAB
ATRIAL RATE - MUSE: 70 BPM
DIASTOLIC BLOOD PRESSURE - MUSE: NORMAL MMHG
INTERPRETATION ECG - MUSE: NORMAL
P AXIS - MUSE: 108 DEGREES
PR INTERVAL - MUSE: 100 MS
QRS DURATION - MUSE: 162 MS
QT - MUSE: 470 MS
QTC - MUSE: 507 MS
R AXIS - MUSE: 154 DEGREES
SYSTOLIC BLOOD PRESSURE - MUSE: NORMAL MMHG
T AXIS - MUSE: 98 DEGREES
VENTRICULAR RATE- MUSE: 70 BPM

## 2025-05-06 NOTE — PROGRESS NOTES
[facility] Care [facility]    Background: Transitional Care Management program identified per system criteria and reviewed by [facility] team for possible outreach.    Assessment: Upon chart review, [facility] Team member will not proceed with patient outreach related to this episode of Transitional Care Management program due to reason below:    Patient declined to answer all post hospital discharge questions with CCRC team member and disconnected call.    Plan: Transitional Care Management episode addressed appropriately per reason noted above.      ULICES Avalos  [facility]    *Connected Care Resource Team does NOT follow patient ongoing. Referrals are identified based on internal discharge reports and the outreach is to ensure patient has an understanding of their discharge instructions.

## 2025-05-06 NOTE — PROGRESS NOTES
Anticoagulant Therapeutic Duplication    Duplicate orders identified: identical order(s)    The duplicate anticoagulant order(s) has been discontinued    Active anticoagulant: rivaroxaban (Xarelto)    Plan made per [facility] anticoagulation protocol.    Angy Helms RN  5/6/2025

## 2025-05-12 ENCOUNTER — TELEPHONE (OUTPATIENT)
Dept: CARDIAC REHAB | Facility: HOSPITAL | Age: 60
End: 2025-05-12
Payer: MEDICARE

## 2025-05-28 ENCOUNTER — TELEPHONE (OUTPATIENT)
Dept: CARDIOLOGY | Facility: CLINIC | Age: 60
End: 2025-05-28
Payer: MEDICARE

## 2025-05-28 NOTE — TELEPHONE ENCOUNTER
Called [phone] and Spoke with JASON Ewing at EP clinic at [facility]. Confirmed fax number [phone].     Docket from 5/28/25 faxed as requested by pt.

## 2025-05-28 NOTE — TELEPHONE ENCOUNTER
Pt called to state that he will be sending a manual transmission of his pacemaker and he would like the information received to be sent to [facility] since that is where he recently had an ablation. Pt stated he has an appt there on Saturday, 5/31/25.

## 2025-05-29 ENCOUNTER — OFFICE VISIT (OUTPATIENT)
Dept: CARDIOLOGY | Facility: CLINIC | Age: 60
End: 2025-05-29
Payer: MEDICARE

## 2025-05-29 VITALS
SYSTOLIC BLOOD PRESSURE: 90 MMHG | HEART RATE: 70 BPM | OXYGEN SATURATION: 92 % | HEIGHT: 71 IN | WEIGHT: 265.7 LBS | DIASTOLIC BLOOD PRESSURE: 60 MMHG | BODY MASS INDEX: 37.2 KG/M2

## 2025-05-29 DIAGNOSIS — I47.20 VT (VENTRICULAR TACHYCARDIA) (CMS/HCC): ICD-10-CM

## 2025-05-29 PROCEDURE — 99214 OFFICE O/P EST MOD 30 MIN: CPT | Performed by: INTERNAL MEDICINE

## 2025-05-29 PROCEDURE — 93005 ELECTROCARDIOGRAM TRACING: CPT | Performed by: INTERNAL MEDICINE

## 2025-05-29 PROCEDURE — G0463 HOSPITAL OUTPT CLINIC VISIT: HCPCS | Performed by: INTERNAL MEDICINE

## 2025-05-29 PROCEDURE — 93010 ELECTROCARDIOGRAM REPORT: CPT | Performed by: INTERNAL MEDICINE

## 2025-05-29 RX ORDER — TICAGRELOR 90 MG/1
90 TABLET, FILM COATED ORAL 2 TIMES DAILY
COMMUNITY
Start: 2025-05-04

## 2025-05-29 RX ORDER — MEXILETINE HYDROCHLORIDE 200 MG/1
200 CAPSULE ORAL 2 TIMES DAILY
COMMUNITY
Start: 2025-05-04

## 2025-05-29 RX ORDER — COLCHICINE 0.6 MG/1
0.6 TABLET ORAL DAILY
COMMUNITY
Start: 2025-05-04

## 2025-05-29 RX ORDER — SPIRONOLACTONE 25 MG/1
25 TABLET ORAL DAILY
COMMUNITY
Start: 2025-05-04

## 2025-05-29 ASSESSMENT — PAIN SCALES - GENERAL: PAINLEVEL_OUTOF10: 0-NO PAIN

## 2025-05-29 NOTE — PROGRESS NOTES
ELECTROPHYSIOLOGY CONSULT    Chief Complaint:    The encounter diagnosis was VT (ventricular tachycardia) (CMS/HCC)..    HPI:    Pete Trejo is a very pleasant 59 y.o. y/o male who presents with a PMH significant for   Patient Active Problem List   Diagnosis    Ischemic cardiomyopathy    Hypertension    Hyperlipidemia    Automatic implantable cardioverter-defibrillator in situ    Presence of stent in coronary artery    On amiodarone therapy    Coronary artery disease involving native coronary artery of native heart without angina pectoris    Chronic systolic HF (heart failure) (CMS/HCC)    Sustained ventricular tachycardia (CMS/HCC)    Chronic anticoagulation    Paroxysmal atrial fibrillation (CMS/HCC)    LAVON (obstructive sleep apnea)    Long term current use of antiarrhythmic drug    AICD discharge    Gastroesophageal reflux disease without esophagitis    Benign prostatic hyperplasia without lower urinary tract symptoms    Diabetes mellitus type 2 in obese (CMS/HCC)    Moderate obesity    Elevated troponin    Lymphocytosis (symptomatic)    Cardiac resynchronization therapy defibrillator (CRT-D) in place    Ventricular tachycardia (CMS/HCC)    Palpitations         Pete  presents today in follow-up for his ischemic cardiomyopathy sustained ventricular arrhythmias.   He has had several attempts at VT ablation including 1 at our institution, 1 at the [facility] [place] and most recently had ablation performed at the [facility] where they targeted both scar modification and a distinct outflow tract PVC which was seen with significant frequency.    He had been doing well for a while but then developed recurrent sustained slow VT at about 110 bpm.  He was recently reevaluated at the [facility] and in the first week of May was taken back to the EP lab.  I reviewed the report from that procedure and it seems that they were unable to induce the clinical VT despite multiple attempts.  In the  end they performed extensive substrate modification both endocardial and epicardial.  He was placed on low-dose amiodarone as well as mexiletine at discharge.    He tells me today that he has been feeling reasonably well but does have fatigue and has noted symptoms suggestive of orthostatic hypotension.  He has not had kathrine syncope and he denies palpitations with sensation of ICD shocks.      Patient history items personally reviewed:     Tobacco  Allergies  Problems  Med Hx  Surg Hx  Fam Hx           Medications:    Current Outpatient Medications   Medication Sig Dispense Refill    colchicine 0.6 mg tablet Take 1 tablet (0.6 mg total) by mouth daily      ticagrelor (BRILINTA) 90 mg tablet Take 1 tablet (90 mg total) by mouth 2 times daily      mexiletine (MEXITIL) 200 mg capsule Take 1 capsule (200 mg total) by mouth 2 times daily      spironolactone (ALDACTONE) 25 mg tablet Take 1 tablet (25 mg total) by mouth daily      metFORMIN XR (GLUCOPHAGE-XR) 500 mg 24 hr tablet Take 1 tablet (500 mg total) by mouth 2 (two) times a day      white petrolatum, HYDROPHOR, 42 % ointment ointment Apply 1 Application topically as needed Apply to affected area      white petrol-mineral oil-lanolin-ceresin (eucerin) cream cream Apply 1 Application topically as needed Apply to affected area      amiodarone (PACERONE) 200 mg tablet Take 1 tablet (200 mg total) by mouth 2 (two) times a day with meals      carvediloL (Coreg) 25 mg tablet Take 2 tablets (50 mg total) by mouth 2 (two) times a day with meals 360 tablet 1    melatonin 5 mg tablet Take 1 tablet (5 mg total) by mouth nightly as needed for sleep 39 tablet 1    acetaminophen (TYLENOL) 325 mg tablet Take 2 tablets (650 mg total) by mouth every 6 (six) hours as needed for pain scale 1-3/10 60 tablet 1    rosuvastatin (CRESTOR) 40 mg tablet Take 1 tablet (40 mg total) by mouth daily      rivaroxaban (Xarelto) 20 mg tablet Take 1 tablet (20 mg total) by mouth daily 90  tablet 1    magnesium oxide (MAG-OX) 400 mg (241.3 mg magnesium) tablet Take 1 tablet (400 mg total) by mouth 2 (two) times a day 180 tablet 3    empagliflozin (JARDIANCE) 10 mg tablet Take 1 tablet (10 mg total) by mouth daily 90 tablet 3    sacubitriL-valsartan (ENTRESTO)  mg tablet Take 1 tablet by mouth 2 (two) times a day 180 tablet 3    potassium chloride 10 mEq CR tablet Take 2 tablets (20 mEq total) by mouth daily 180 tablet 3    furosemide (LASIX) 40 mg tablet Take 1 tablet (40 mg total) by mouth daily 30 tablet 11    nitroglycerin (NITROSTAT) 0.4 mg SL tablet Place 1 tablet (0.4 mg total) under the tongue every 5 (five) minutes as needed for chest pain 26 tablet 1    tamsulosin (FLOMAX) 0.4 mg capsule Take 1 capsule (0.4 mg total) by mouth daily      cholecalciferol, vitamin D3, 25 mcg (1,000 unit) tablet Take 1 tablet (1,000 Units total) by mouth daily      albuterol HFA (PROVENTIL HFA;VENTOLIN HFA) 90 mcg/actuation inhaler Inhale 2 puffs every 6 (six) hours as needed for wheezing or shortness of breath 1 Inhaler 1    pantoprazole (PROTONIX) 40 mg EC tablet Take 1 tablet (40 mg total) by mouth daily       No current facility-administered medications for this visit.      ROS:    Non contributory except as noted in the HPI    Physical Exam:    Vitals:    Vitals:    05/29/25 1107   BP: 90/60   Pulse: 70   SpO2: 92%         General:  Well developed, well nourished male in no apparent distress.  HEENT:  NC/AT, sclerae anicteric, no redness  Cardiac:  RRR, no M/R/G  Lungs:  Clear, good air entry, no wheezes or rales  Ext: radial pulses +2/4     Testing Personally Reviewed:    Echo Results    Echo Interpretation Summary    Results for orders placed during the hospital encounter of 10/18/23    US Echo complete    Interpretation Summary    Normal left ventricular wall thickness.    Biplane ejection fraction is 25%.    Severe left ventricular systolic dysfunction.  Large anteroapical aneurysm.  Only the  basilar left ventricular segments have preserved contractility.  No LV mural thrombi.    Grade II (moderate) left ventricular diastolic abnormality.    Elevated left ventricular filling pressure.    The left atrium is severely dilated.    The right atrium is moderately dilated.    Normal central venous pressure (0-5 mmHg).    No pericardial effusion.    Inadequate TR spectral Doppler to accurately assess right ventricular systolic pressure.    Comment: No obvious changes from the January 2022 echo.       Lab Results   Component Value Date    EF 32 04/22/2025    LASIZE 5.04 04/22/2025    LAVOL 65 04/22/2025        ECG: The tracing performed in the office today shows biventricular pacing at a rate of 70 bpm.    VT/PVC ablation: 7/2024 [place]  ASSESSMENT:  1. Sustained monomorphic VT refractory to AAT.  2. Successful catheter ablation of ischemic VT with scar modification  3. Successful PVC ablation.    PLAN:  1. 2 hours of bedrest, anticipate discharge later today.  2. Follow up in clinic in 3 months; continue current mediations. Restart anticoagulation tonight.  3. If patient has recurrent arrhythmia would plan on bringing him back for epicardial ablation.     Ventricular tachycardia ablation at the [facility]: May 2025  1. Sustained monomorphic VT refractory to AAT.  2. No VT inducibility. Catheter ablation of ischemic VT with extensive   scar modification in the mid-distal anterior wall with ablation the   endocardial and epicardial aspect     I have interrogated his defibrillator today.  It is functioning normally.  The only arrhythmia since the ablation is an episode of nonsustained VT lasting about 7 seconds.    Stress:   Lab Results   Component Value Date    SUMMEDDIFFER 1 07/11/2021    TID 1.04 07/11/2021    NUCEF 16 06/08/2020        Assessment and Plan:    Pete is a very pleasant 59 y.o. male with:      Diagnoses and all orders for this visit:    VT (ventricular tachycardia) (CMS/[facility])  -      Ambulatory referral to Cardiac Electrophysiology  -     ECG 12 lead -Normal, Today    He is doing generally well and I have asked him to continue his current medication.  We did discuss the possibility of adjusting his medications to deal with his orthostatic hypotension however it would be best if both the [facility] and the heart failure team as an opportunity to weigh in on any potential changes.  He does have a telemedicine visit with the [facility] in a few days and a follow-up with the heart failure team in about 2 weeks.  I will plan on following up in about 6 months, sooner if clinically indicated.  We will keep an eye on his cardiac rhythm through remote monitoring in the interim.    Thank you for allowing our participation in the care of this very pleasant patient.  Please do not hesitate to contact us at [phone] with any questions or concerns that you may have.    Dougie Wu, DO     Please note that portions of this document were generated with speech recognition software.  Reasonable efforts have been made to correct any transcriptional errors however some typographical errors may remain.

## 2025-05-29 NOTE — PROGRESS NOTES
ELECTROPHYSIOLOGY VIRTUAL CLINIC VISIT    Assessment/Recommendations   Assessment/Plan:    Mr. Russell is a 59 year old male who has a medical history significant for anterior wall MI, CAD s/p PCIs LAD, D1, D2 2009, 2010, 2011, & 2017, ICM LVEF 25%, s/p CRTD 2011 s/p gen change 6/2022, VT s/p prior ablations 6/2020 & 11/2021 (both at OSH) and 7/1/24 ([facility]) and 5/2/25 ([facility]), PAF (CHADSVASC 6 on Xarelto), HTN, HLD, prior LV thrombus, DM2, CVA, BPH, and obesity.    CAD s/p multiple PCIs LAD, D2, D2 2009, 2010, 2011, 2017, 2025  ICM LVEF 25%, NYHA III  Ventricular Tachycardia:  1. ACEi/ARB/ARNi: Continue Entresto.  2. BB: Continue Coreg.  3. Aldosterone antagonist: Continue Spironolactone.  4. SGLT2i: Continue Jardiance.  5. Antiplatlet: Continue ASA.  6. Statin: Continue Crestor.  7.  SCD prophylaxis: s/p CRTD   8. Fluid status: Continue Lasix.   9. Nitroglycerin 0.4 mg PRN for chest pain. Place 1 tablet (0.4 mg) under the tongue every 5 minutes as needed for chest pain. Maximum of 3 doses in 15 minutes.  10. Lifestyle: Avoid tobacco, maintain a healthy weight, limit alcohol, cardiac diet, and exercise.  11. VT: He has had VT with ICD therapies despite AAT. He was originally on amiodarone and mexiletine but had breakthroughs. Amiodarone was later stopped to avoid possible long term toxicities and he was alternatively placed on Sotalol. He has had 2 VT ablation at OSH and found to have numerous VTs arising from anterior scar area (lateral/septal walls) and inferior apical aneurysm. He had continued to have recurrent arrhyhmias. He underwent another VT ablation ( msec, mid and basal anterior wall) and [facility] PVC ablation on 7/1/2024. He has had some recurrent slower VT. We also discussed ablation and its indications, risks, and benefits. He elected to pursue ablation and he had endo/epi VT ablation with scar modification on 5/2/25. He was transitioned from Sotalol to amiodarone when admitted. We will continue  amiodarone for no. While on Amiodarone, he will require annual PFTs and LFT/TFT every 6 months. He needs updated PFTs.  We will refer him to establish with HF specialist at [facility].    Follow up 2 months with device check before the visit       History of Present Illness/Subjective    Mr. Leo Russell is a 59 year old male who comes in today for EP follow-up of VT.    Mr. Russell is a 59 year old male who has a medical history significant for anterior wall MI, CAD s/p PCIs LAD, D1, D2 2009, 2010, 2011, & 2017, ICM LVEF 25%, s/p CRTD 2011 s/p gen change 6/2022, VT s/p prior ablations 6/2020 & 11/2021 (both at OS) and 7/1/24 ([facility]) and 5/2/25 ([facility]), PAF (CHADSVASC 6 on Xarelto), HTN, HLD, prior LV thrombus, DM2, CVA, BPH, and obesity.    He has a longstanding history of CAD for which he had prior PCIs to pLAD and D1 in 2009, PCI to totally occluded LAD in 2010, PCI to D2 and Balloon angioplasty through strut to improve blood flow to distal LAD in 2011. Further D2 branch LAD PCI in 2017, and then Angiosculpt scoring balloon angioplasty to ISR of D1 stent in 2023. He has had subsequent ICM with LVEF 25% range most recently. He has been on GDMT but continued to have reduced LVEF so had a CRTD implanted in 2011 with last generator change in 6/2022. He has also had VT with ICD therapies. He had been on amiodarone. He has followed with [facility] and felt not a good candidate for advanced HF therapies due to uncontrolled VT. This lead to an ablation in 6/2020 where he was found to have recurrent numerous VTs as a result on an extensive anterior wall scar involving much of lateral and septal walls. Ablation was felt partially successful of border zones scar areas especially inferior apex. He continued to have ICD therapies despite this and amiodarone and mexiletine. Thus,he underwent a repeat ablation VT at apical portion of the scar at [facility] ([place], [place]) in 11/2021. Amiodarone was decreased to 200 mg daily and  mexiletine discontinued. Unfortunately presented to the emergency room 1/25/2022 with sensation of racing in the heart and chest fullness. He was found to be in VT at 130 bpm. He did convert spontaneously. He had felt well for 5 or 6 weeks post ablation in [place] however began experiencing recurrent symptoms with ATP for VT at 124 bpm on 1/11/2022 and then ATP x8 for VT at 139 bpm. Mexiletine was added back. He was discharged on amiodarone 200 mg daily and mexiletine 200 mg twice daily. He was seen at [facility] on 1/31/2022 and mexiletine increased to 3 times daily and he was continued on amiodarone 200 mg daily. Amiodarone is not favored for long-term use given his young age and risk for toxicities with long-term use, thus after he had 6 months free of VT, amiodarone was stopped and he was admitted in 3/2023 for Sotalol loading. He has since been having numerous episodes of slow VT requiring ATP therapy recently. He was admitted in 11/2023 to OSH for slow VT and multiple ATPs. Sotalol was increased. He had a repeat coronary angiogram in 12/2023 that showed stable native disease with 80-90% in-stent restenosis of inferior branch of D1 s/p Angiosculpt POBA and RHC showed low cardiac index with normal filling pressures. Given his persistent rhythm issues, the patient was referred to [facility] for consideration of advanced therapies. He saw Dr. Felix here who started advanced therapies work up. He was referred to EP here for an opinion about his VT management/treatment options. He underwent VT ablation (mid and basal anterior wall) and [facility] PVC ablation on 7/1/2024. At follow up post, device interrogation showed 2 short NSVT runs in late 7/2024 and one short VT episode in 8/2024 that required ATP but no further events after. At EP follow-up in 12/2024 device interrogation showed multiple slow and short VT episodes with average V rate of 115 bpm with 1:1 VA ratio that would terminate with ATP in late October to early November,  mainly on Nov 1st when he was moving to a new house and he did feel some of these episodes. Low PVC burden compared to before and BiV pacing at 99%. We discussed at this appointment if persistent VT episodes would need to consider VT ablation targeting the slow VT.     Unfortunately patient went back into VT and was admitted 3/21/25-3/25/25 with refractory VT and ICD shocks and placed on IV amiodarone. Cardiac catheterization was performed with PTCA/PCI of the diagonal and he was started on Prasugrel. Device transmission on 4/6/2025 demonstrated a slow VT with a heart rate of 110 bpm that was successfully terminated by ATP. On 4/18/25 had gone to EP clinic, and reported symptoms of ataxia, hand tremors, decreased energy levels as well as other symptoms. At that time, amiodarone was decreased 200 mg once daily. [facility] and was hospitalized 4/21/25-4/23/25 with palpitations and feeling like his defibrillator had been going off. Device interrogation at that time showed multiple episodes of VT with multiple ATP therapies. He was reloaded with IV amiodarone. After IV amiodarone reload, patient has had no further runs of VT. Recommendations were for patient to be discharged home on 200 mg twice daily of amiodarone as well as his mexiletine home dose of 300 mg 3 times daily. RHC performed as well (details noted below) showed Michelle CO 5.7 and Michelle CI 2.3. An echo showed LVEF 32% moderately dilated LV, and no significant valvular issues. Shortly after discharge from that hospitalization, patient reported re-onset of palpations, substernal chest pressure sensation, and associated mild SOB; which prompted him to go [facility] ER again. He was found to have further slow VT episodes. He was transferred to [facility] for further cares. Renal function and electrolytes stable. VSS. Device interrogation shows normal device function, stable lead parameters, 1 VT with 4 ATP delivered, 32 VT up to 15 hours  12 minutes ATP 1-15 bursts delivered and 2 episodes with 41J shock, 8 NSVT up to 16 seconds, and AP 96%, BVP 99%. He was transitioned from Sotalol to amiodarone. He had an ablation with extensive scar modification in mid to distal anterior wall endo/epi VT ablation on 5/2/25.     He is following up today. Groin sites well healed. He reports feeling well overall. He had 1 NSVT 6 seconds on 5/6/25. He has some orthostatic dizziness at times. He has some SOB/ROONA on and off. He denies chest discomfort, palpitations, abdominal fullness/bloating or peripheral edema, shortness of breath, paroxysmal nocturnal dyspnea, orthopnea, lightheadedness, dizziness, pre-syncope, or syncope.  TSH/LFT WDL in 4/2025.  Current cardiac medications include: Amiodarone, Coreg, Spironolactone, Brilinta, Mexiletine, Entresto, Lasix, Crestor, and Xarelto.       I have reviewed and updated the patient's Past Medical History, Social History, Family History and Medication List.     Cardiographics (Personally Reviewed) :   4/23/2025 RHC (OSH Report):   Right atrial mean pressure: 5 mmHg.  O2 saturation 65.0%.  2.  Right ventricular pressure: 27/-1/2 mmHg.  O2 saturation 65.6%.  3.  Right main pulmonary artery pressure: 28/13/16 mmHg.  O2 saturation  65.4%.  4.  Pulmonary capillary wedge pressure: 8 mmHg.  5.  Cardiac output thermodilution technique 4.56 L/min.  Cardiac index 1.9  L/min/m .  6.  Cardiac output Michelle technique: 5.7 L/min.  Cardiac index 2.3 L/min/m .      4/27/2025 Echo (OSH Report):     Normal left ventricular wall thickness.     Left ventricle is moderately dilated.  End-diastolic dimension 6.8 cm.     Biplane ejection fraction is 32%.  Visually looks much closer to 25%.    No apical thrombi.     Severe left ventricular systolic dysfunction.  Severe ischemic   cardiomyopathy with RCA and LAD wall motion abnormalities.  See below.     Grade I (mild) left ventricular diastolic abnormality.     Normal left ventricular filling  pressure.     Right ventricular systolic function is low normal.     The left atrium is normal in size.     The right atrium is mildly dilated.     Normal central venous pressure (0-5 mmHg).     No pericardial effusion.     Inadequate TR spectral Doppler to accurately assess right ventricular   systolic pressure.     No significant valve disease.     No change from the March 2025 study.      12/5/23 Coronary Angiogram (OS Report):  Conclusions:   1.  Normal right heart pressures   2.  Pulmonary capillary wedge pressure 8 mmHg   3.  Patent proximal LAD stent   4.  50% in-stent restenosis ostial first diagonal with patent superior   branch stent.  The inferior branch of the first diagonal has severe 80-90%   in-stent restenosis.  Status post 2.5 mm Angiosculpt scoring balloon   angioplasty at the ostium and 2.5 x 15 mm trek neononcompliant balloon   angioplasty in the in-stent restenotic segment   5.  Mild irregularities right coronary artery and left circumflex coronary   artery.  Similar to previous     1/11/24 CMR [facility] Overread:  1. The LV is severely dilated in cavity size. The global systolic function is severely decreased. The LVEF  is 13%. There is akinesis of all the septal and anterior segments. There is thinning of the apical and true  apical segments.     2. The RV is normal in cavity size. The global systolic function is moderately decreased. The RVEF is 38%.      3. There is moderate enlargement of both atria.     4. Valvular function could not be reliably assessed because of device-related artifacts.     5. Late gadolinium enhancement imaging is suboptimal due to device-related artifacts (wide-band LGE imaging  was not performed); however, there is evidence of a large myocardial infarction involving virtually the  entire LAD territory. The MI is near transmural for a brief portion at the mid level and is transmural at  the apical and true apical levels. This is suggestive of a late presentation MI.  There is practically no  residual viability in the LAD territory. The RCA and LCx territories appear viable.     6. There is no pericardial effusion or thickening.     7. There is no intracardiac thrombus.     CONCLUSIONS: Severe ischemic cardiomyopathy with adverse LV remodeling, LVEF of 13% and RVEF of 38%, with a  large MI involving virtually the entire LAD territory.     5/1/2025 CMR:  1. The left ventricle is severely dilated with thinning and dyskinesis of the mid anteroseptum, mid  anterior and all apical segments. The global systolic function is severely reduced normal. The LVEF is 13%.      2. The right ventricle is normal in cavity size. The global systolic function is normal. The RVEF is 49%.  An ICD lead is present.      3. The left atrium is moderately enlarged. The right atrium is normal in size.     4. Valvular assessment is limited by metallic artifact.      5. Late gadolinium enhancement imaging demonstrates extensive subendocardial to transmural hyperenhancement  in the basal-mid anterior & anteroseptal, mid anterolateral and all apical segments in LAD coronary  distribution.      6. There is no pericardial effusion.     7. There is no intracardiac thrombus.     CONCLUSIONS:   Ischemic cardiomyopathy with large infarction in the LAD territory.   Severe LV dilation and severely reduced function, LVEF 13%.   Normal RV size and function, RVEF 49%.      Compared to the CMR from 01/11/2024, there are no significant changes.      [facility] Nov 2024                     Physical Examination   There were no vitals taken for this visit.  Wt Readings from Last 3 Encounters:   05/04/25 121.5 kg (267 lb 12.8 oz)   12/18/24 118.8 kg (262 lb)   10/02/24 125.2 kg (276 lb)     The rest of a comprehensive physical examination is deferred due to nature of video visit restrictions.  CONSITUTIONAL: no acute distress  HEENT: no icterus, no redness or discharge, neck supple  CV: no visible edema of visualized extremities. No  JVD.   RESPIRATORY: respirations nonlabored, no cough  NEURO: AA&Ox3, speech fluent/appropriate, motor grossly nonfocal  PSYCH: cooperative, affect appropriate  DERM: no rashes on visualized face/neck/upper extremities       Medications  Allergies   Current Outpatient Medications   Medication Sig Dispense Refill    acetaminophen (TYLENOL) 325 MG tablet Take 650 mg by mouth every 6 hours as needed for mild pain.      amiodarone (PACERONE) 200 MG tablet Take 1 tablet (200 mg) by mouth daily. 90 tablet 3    carvedilol (COREG) 25 MG tablet Take 2 tablets (50 mg) by mouth 2 times daily. 260 tablet 3    colchicine (COLCRYS) 0.6 MG tablet Take 1 tablet (0.6 mg) by mouth daily. 90 tablet 0    empagliflozin (JARDIANCE) 10 MG TABS tablet Take 10 mg by mouth daily.      furosemide (LASIX) 40 MG tablet Take 40 mg by mouth daily      magnesium oxide (MAG-OX) 400 MG tablet Take 400 mg by mouth 2 times daily      melatonin 5 MG tablet Take 5 mg by mouth nightly as needed for sleep.      mexiletine (MEXITIL) 200 MG capsule Take 1 capsule (200 mg) by mouth 2 times daily. 180 capsule 3    nitroGLYcerin (NITROSTAT) 0.4 MG sublingual tablet Place 1 tablet under the tongue every 5 minutes as needed      pantoprazole (PROTONIX) 40 MG EC tablet Take 40 mg by mouth daily      potassium chloride ER (K-TAB/KLOR-CON) 10 MEQ CR tablet Take 20 mEq by mouth daily. Patient is currently taking 2 tablets daily      rivaroxaban ANTICOAGULANT (XARELTO) 20 MG TABS tablet Take 1 tablet (20 mg) by mouth daily. 90 tablet 3    rosuvastatin (CRESTOR) 40 MG tablet Take 40 mg by mouth every evening      sacubitril-valsartan (ENTRESTO)  MG per tablet Take 1 tablet by mouth 2 times daily      spironolactone (ALDACTONE) 25 MG tablet Take 1 tablet (25 mg) by mouth daily. 90 tablet 3    tamsulosin (FLOMAX) 0.4 MG capsule Take 0.4 mg by mouth daily      ticagrelor (BRILINTA) 90 MG tablet Take 1 tablet (90 mg) by mouth 2 times daily. 180 tablet 3    Vitamin  D3 (VITAMIN D-1000 MAX ST) 25 mcg (1000 units) tablet Take 1,000 Units by mouth daily      Allergies   Allergen Reactions    Morphine Anxiety     previously tolerated oxycodone and hydromorphone without issue      Tramadol Anxiety     previously tolerated oxycodone and hydromorphone without issue           Lab Results (Personally Reviewed)    Chemistry/lipid CBC Cardiac Enzymes/BNP/TSH/INR   Lab Results   Component Value Date    BUN 15.0 05/04/2025     05/04/2025    CO2 24 05/04/2025     Creatinine   Date Value Ref Range Status   05/04/2025 0.77 0.67 - 1.17 mg/dL Final       Lab Results   Component Value Date    CHOL 112 04/24/2024    HDL 30 (L) 04/24/2024    LDL 51 04/24/2024      Lab Results   Component Value Date    WBC 6.6 05/04/2025    HGB 13.9 05/04/2025    HCT 41.4 05/04/2025    MCV 94 05/04/2025     05/04/2025    Lab Results   Component Value Date    TSH 1.63 04/24/2024    INR 1.33 (H) 04/24/2024          Video-Visit Details    Type of service:  Video Visit   Video Start Time: 12:30  Video End Time:1:00    Originating Location (pt. Location): Home  Distant Location (provider location):  On-site  Platform used for Video Visit: Vonda    I have reviewed the note as documented above.  This accurately captures the substance of my virtual visit with the patient. The patient states understanding and is agreeable with the plan.   Esteban Wills MD [facility]  Cardiology - Electrophysiology          Total time spent on patient visit, reviewing notes, imaging, labs, orders, and completing necessary documentation: 45 minutes.

## 2025-05-31 ENCOUNTER — VIRTUAL VISIT (OUTPATIENT)
Dept: CARDIOLOGY | Facility: CLINIC | Age: 60
End: 2025-05-31
Attending: INTERNAL MEDICINE
Payer: MEDICARE

## 2025-05-31 DIAGNOSIS — I50.21 ACUTE SYSTOLIC HEART FAILURE (H): ICD-10-CM

## 2025-05-31 DIAGNOSIS — Z79.899 ON AMIODARONE THERAPY: Primary | ICD-10-CM

## 2025-05-31 PROCEDURE — 98007 SYNCH AUDIO-VIDEO EST HI 40: CPT | Performed by: INTERNAL MEDICINE

## 2025-05-31 PROCEDURE — 1126F AMNT PAIN NOTED NONE PRSNT: CPT | Mod: 95 | Performed by: INTERNAL MEDICINE

## 2025-05-31 NOTE — PATIENT INSTRUCTIONS
Plan:    Continue amiodarone  I will order PFTs (please call to schedule this)  I recommend you see a Heart Failure Specialist at the [facility]. I will place the referral and you should receive a call to schedule.  Follow up with me in 2 months with a device check prior      Your Care Team:  EP Cardiology   Telephone Number     Shyam Jung, RN [phone]    After business hours: [phone], ask for cardiologist on-call   Non-procedure scheduling:    Radha HENSLEY   [phone]   Procedure scheduling:    Darby Randhawa   [phone]   Device Clinic (Pacemakers, ICDs, Loop Recorders)    During business hours: [phone]  After business hours:   [phone]- select option 4 and ask for job code [phone].       Cardiovascular Clinic:   [address] [place] [address]. [place] [address]      As always, thank you for trusting us with your health care needs!    If you have further questions, please utilize MC2 to contact us.

## 2025-05-31 NOTE — NURSING NOTE
Leo Russell is a 59 year old who is being evaluated via a billable video visit.      How would you like to obtain your AVS? MyChart  If the video visit is dropped, the invitation should be resent by: Text to cell phone: [phone]  Will anyone else be joining your video visit? No      Vitals - Patient Reported  Systolic (Patient Reported): 90  Diastolic (Patient Reported): 60  Weight (Patient Reported): 120.2 kg (265 lb)  Pain Score: No Pain (0) (some SOB)      Vitals were taken and medications reconciled.    Ivan Marie, Clinical Assistant  12:15 PM

## 2025-05-31 NOTE — LETTER
5/31/2025      RE: Leo Russell  [address] [facility] [address]       Dear Colleague,    Thank you for the opportunity to participate in the care of your patient, Leo Russell, at the [facility] [place] [facility]. Please see a copy of my visit note below.        ELECTROPHYSIOLOGY VIRTUAL CLINIC VISIT    Assessment/Recommendations   Assessment/Plan:    Mr. Russell is a 59 year old male who has a medical history significant for anterior wall MI, CAD s/p PCIs LAD, D1, D2 2009, 2010, 2011, & 2017, ICM LVEF 25%, s/p CRTD 2011 s/p gen change 6/2022, VT s/p prior ablations 6/2020 & 11/2021 (both at OSH) and 7/1/24 ([facility]) and 5/2/25 ([facility]), PAF (CHADSVASC 6 on Xarelto), HTN, HLD, prior LV thrombus, DM2, CVA, BPH, and obesity.    CAD s/p multiple PCIs LAD, D2, D2 2009, 2010, 2011, 2017, 2025  ICM LVEF 25%, NYHA III  Ventricular Tachycardia:  1. ACEi/ARB/ARNi: Continue Entresto.  2. BB: Continue Coreg.  3. Aldosterone antagonist: Continue Spironolactone.  4. SGLT2i: Continue Jardiance.  5. Antiplatlet: Continue ASA.  6. Statin: Continue Crestor.  7.  SCD prophylaxis: s/p CRTD   8. Fluid status: Continue Lasix.   9. Nitroglycerin 0.4 mg PRN for chest pain. Place 1 tablet (0.4 mg) under the tongue every 5 minutes as needed for chest pain. Maximum of 3 doses in 15 minutes.  10. Lifestyle: Avoid tobacco, maintain a healthy weight, limit alcohol, cardiac diet, and exercise.  11. VT: He has had VT with ICD therapies despite AAT. He was originally on amiodarone and mexiletine but had breakthroughs. Amiodarone was later stopped to avoid possible long term toxicities and he was alternatively placed on Sotalol. He has had 2 VT ablation at OSH and found to have numerous VTs arising from anterior scar area (lateral/septal walls) and inferior apical aneurysm. He had continued to have recurrent arrhyhmias. He underwent another VT ablation ( msec, mid and  basal anterior wall) and [facility] PVC ablation on 7/1/2024. He has had some recurrent slower VT. We also discussed ablation and its indications, risks, and benefits. He elected to pursue ablation and he had endo/epi VT ablation with scar modification on 5/2/25. He was transitioned from Sotalol to amiodarone when admitted. We will continue amiodarone for no. While on Amiodarone, he will require annual PFTs and LFT/TFT every 6 months. He needs updated PFTs.  We will refer him to establish with HF specialist at [facility].    Follow up 2 months with device check before the visit       History of Present Illness/Subjective    Mr. Leo Russell is a 59 year old male who comes in today for EP follow-up of VT.    Mr. Russell is a 59 year old male who has a medical history significant for anterior wall MI, CAD s/p PCIs LAD, D1, D2 2009, 2010, 2011, & 2017, ICM LVEF 25%, s/p CRTD 2011 s/p gen change 6/2022, VT s/p prior ablations 6/2020 & 11/2021 (both at OSH) and 7/1/24 ([facility]) and 5/2/25 ([facility]), PAF (CHADSVASC 6 on Xarelto), HTN, HLD, prior LV thrombus, DM2, CVA, BPH, and obesity.    He has a longstanding history of CAD for which he had prior PCIs to pLAD and D1 in 2009, PCI to totally occluded LAD in 2010, PCI to D2 and Balloon angioplasty through strut to improve blood flow to distal LAD in 2011. Further D2 branch LAD PCI in 2017, and then Angiosculpt scoring balloon angioplasty to ISR of D1 stent in 2023. He has had subsequent ICM with LVEF 25% range most recently. He has been on GDMT but continued to have reduced LVEF so had a CRTD implanted in 2011 with last generator change in 6/2022. He has also had VT with ICD therapies. He had been on amiodarone. He has followed with Pending sale to [facility] and felt not a good candidate for advanced HF therapies due to uncontrolled VT. This lead to an ablation in 6/2020 where he was found to have recurrent numerous VTs as a result on an extensive anterior wall scar involving much of lateral and septal  walls. Ablation was felt partially successful of border zones scar areas especially inferior apex. He continued to have ICD therapies despite this and amiodarone and mexiletine. Thus,he underwent a repeat ablation VT at apical portion of the scar at [facility] in 11/2021. Amiodarone was decreased to 200 mg daily and mexiletine discontinued. Unfortunately presented to the emergency room 1/25/2022 with sensation of racing in the heart and chest fullness. He was found to be in VT at 130 bpm. He did convert spontaneously. He had felt well for 5 or 6 weeks post ablation in [place] however began experiencing recurrent symptoms with ATP for VT at 124 bpm on 1/11/2022 and then ATP x8 for VT at 139 bpm. Mexiletine was added back. He was discharged on amiodarone 200 mg daily and mexiletine 200 mg twice daily. He was seen at [facility] on 1/31/2022 and mexiletine increased to 3 times daily and he was continued on amiodarone 200 mg daily. Amiodarone is not favored for long-term use given his young age and risk for toxicities with long-term use, thus after he had 6 months free of VT, amiodarone was stopped and he was admitted in 3/2023 for Sotalol loading. He has since been having numerous episodes of slow VT requiring ATP therapy recently. He was admitted in 11/2023 to OSH for slow VT and multiple ATPs. Sotalol was increased. He had a repeat coronary angiogram in 12/2023 that showed stable native disease with 80-90% in-stent restenosis of inferior branch of D1 s/p Angiosculpt POBA and RHC showed low cardiac index with normal filling pressures. Given his persistent rhythm issues, the patient was referred to [facility] for consideration of advanced therapies. He saw Dr. Felix here who started advanced therapies work up. He was referred to EP here for an opinion about his VT management/treatment options. He underwent VT ablation (mid and basal anterior wall) and [facility] PVC ablation on 7/1/2024. At follow up post, device  interrogation showed 2 short NSVT runs in late 7/2024 and one short VT episode in 8/2024 that required ATP but no further events after. At EP follow-up in 12/2024 device interrogation showed multiple slow and short VT episodes with average V rate of 115 bpm with 1:1 VA ratio that would terminate with ATP in late October to early November, mainly on Nov 1st when he was moving to a new house and he did feel some of these episodes. Low PVC burden compared to before and BiV pacing at 99%. We discussed at this appointment if persistent VT episodes would need to consider VT ablation targeting the slow VT.     Unfortunately patient went back into VT and was admitted 3/21/25-3/25/25 with refractory VT and ICD shocks and placed on IV amiodarone. Cardiac catheterization was performed with PTCA/PCI of the diagonal and he was started on Prasugrel. Device transmission on 4/6/2025 demonstrated a slow VT with a heart rate of 110 bpm that was successfully terminated by ATP. On 4/18/25 had gone to EP clinic, and reported symptoms of ataxia, hand tremors, decreased energy levels as well as other symptoms. At that time, amiodarone was decreased 200 mg once daily. Patient presented to [facility] ED and was hospitalized 4/21/25-4/23/25 with palpitations and feeling like his defibrillator had been going off. Device interrogation at that time showed multiple episodes of VT with multiple ATP therapies. He was reloaded with IV amiodarone. After IV amiodarone reload, patient has had no further runs of VT. Recommendations were for patient to be discharged home on 200 mg twice daily of amiodarone as well as his mexiletine home dose of 300 mg 3 times daily. RHC performed as well (details noted below) showed Michelle CO 5.7 and Imchelle CI 2.3. An echo showed LVEF 32% moderately dilated LV, and no significant valvular issues. Shortly after discharge from that hospitalization, patient reported re-onset of palpations, substernal chest pressure  sensation, and associated mild SOB; which prompted him to go [facility] ER again. He was found to have further slow VT episodes. He was transferred to [facility] for further cares. Renal function and electrolytes stable. VSS. Device interrogation shows normal device function, stable lead parameters, 1 VT with 4 ATP delivered, 32 VT up to 15 hours 12 minutes ATP 1-15 bursts delivered and 2 episodes with 41J shock, 8 NSVT up to 16 seconds, and AP 96%, BVP 99%. He was transitioned from Sotalol to amiodarone. He had an ablation with extensive scar modification in mid to distal anterior wall endo/epi VT ablation on 5/2/25.     He is following up today. Groin sites well healed. He reports feeling well overall. He had 1 NSVT 6 seconds on 5/6/25. He has some orthostatic dizziness at times. He has some SOB/ORONA on and off. He denies chest discomfort, palpitations, abdominal fullness/bloating or peripheral edema, shortness of breath, paroxysmal nocturnal dyspnea, orthopnea, lightheadedness, dizziness, pre-syncope, or syncope.  TSH/LFT WDL in 4/2025.  Current cardiac medications include: Amiodarone, Coreg, Spironolactone, Brilinta, Mexiletine, Entresto, Lasix, Crestor, and Xarelto.       I have reviewed and updated the patient's Past Medical History, Social History, Family History and Medication List.     Cardiographics (Personally Reviewed) :   4/23/2025 RHC (OS Report):   Right atrial mean pressure: 5 mmHg.  O2 saturation 65.0%.  2.  Right ventricular pressure: 27/-1/2 mmHg.  O2 saturation 65.6%.  3.  Right main pulmonary artery pressure: 28/13/16 mmHg.  O2 saturation  65.4%.  4.  Pulmonary capillary wedge pressure: 8 mmHg.  5.  Cardiac output thermodilution technique 4.56 L/min.  Cardiac index 1.9  L/min/m .  6.  Cardiac output Michelle technique: 5.7 L/min.  Cardiac index 2.3 L/min/m .      4/27/2025 Echo (OS Report):      Normal left ventricular wall thickness.      Left ventricle is moderately dilated.  End-diastolic  dimension 6.8 cm.      Biplane ejection fraction is 32%.  Visually looks much closer to 25%.    No apical thrombi.      Severe left ventricular systolic dysfunction.  Severe ischemic   cardiomyopathy with RCA and LAD wall motion abnormalities.  See below.      Grade I (mild) left ventricular diastolic abnormality.      Normal left ventricular filling pressure.      Right ventricular systolic function is low normal.      The left atrium is normal in size.      The right atrium is mildly dilated.      Normal central venous pressure (0-5 mmHg).      No pericardial effusion.      Inadequate TR spectral Doppler to accurately assess right ventricular   systolic pressure.      No significant valve disease.      No change from the March 2025 study.      12/5/23 Coronary Angiogram (OSH Report):  Conclusions:   1.  Normal right heart pressures   2.  Pulmonary capillary wedge pressure 8 mmHg   3.  Patent proximal LAD stent   4.  50% in-stent restenosis ostial first diagonal with patent superior   branch stent.  The inferior branch of the first diagonal has severe 80-90%   in-stent restenosis.  Status post 2.5 mm Angiosculpt scoring balloon   angioplasty at the ostium and 2.5 x 15 mm trek neononcompliant balloon   angioplasty in the in-stent restenotic segment   5.  Mild irregularities right coronary artery and left circumflex coronary   artery.  Similar to previous     1/11/24 CMR [facility] Overread:  1. The LV is severely dilated in cavity size. The global systolic function is severely decreased. The LVEF  is 13%. There is akinesis of all the septal and anterior segments. There is thinning of the apical and true  apical segments.     2. The RV is normal in cavity size. The global systolic function is moderately decreased. The RVEF is 38%.      3. There is moderate enlargement of both atria.     4. Valvular function could not be reliably assessed because of device-related artifacts.     5. Late gadolinium enhancement imaging is  suboptimal due to device-related artifacts (wide-band LGE imaging  was not performed); however, there is evidence of a large myocardial infarction involving virtually the  entire LAD territory. The MI is near transmural for a brief portion at the mid level and is transmural at  the apical and true apical levels. This is suggestive of a late presentation MI. There is practically no  residual viability in the LAD territory. The RCA and LCx territories appear viable.     6. There is no pericardial effusion or thickening.     7. There is no intracardiac thrombus.     CONCLUSIONS: Severe ischemic cardiomyopathy with adverse LV remodeling, LVEF of 13% and RVEF of 38%, with a  large MI involving virtually the entire LAD territory.     5/1/2025 CMR:  1. The left ventricle is severely dilated with thinning and dyskinesis of the mid anteroseptum, mid  anterior and all apical segments. The global systolic function is severely reduced normal. The LVEF is 13%.      2. The right ventricle is normal in cavity size. The global systolic function is normal. The RVEF is 49%.  An ICD lead is present.      3. The left atrium is moderately enlarged. The right atrium is normal in size.     4. Valvular assessment is limited by metallic artifact.      5. Late gadolinium enhancement imaging demonstrates extensive subendocardial to transmural hyperenhancement  in the basal-mid anterior & anteroseptal, mid anterolateral and all apical segments in LAD coronary  distribution.      6. There is no pericardial effusion.     7. There is no intracardiac thrombus.     CONCLUSIONS:   Ischemic cardiomyopathy with large infarction in the LAD territory.   Severe LV dilation and severely reduced function, LVEF 13%.   Normal RV size and function, RVEF 49%.      Compared to the CMR from 01/11/2024, there are no significant changes.      EG Nov 2024                     Physical Examination   There were no vitals taken for this visit.  Wt Readings from Last 3  Encounters:   05/04/25 121.5 kg (267 lb 12.8 oz)   12/18/24 118.8 kg (262 lb)   10/02/24 125.2 kg (276 lb)     The rest of a comprehensive physical examination is deferred due to nature of video visit restrictions.  CONSITUTIONAL: no acute distress  HEENT: no icterus, no redness or discharge, neck supple  CV: no visible edema of visualized extremities. No JVD.   RESPIRATORY: respirations nonlabored, no cough  NEURO: AA&Ox3, speech fluent/appropriate, motor grossly nonfocal  PSYCH: cooperative, affect appropriate  DERM: no rashes on visualized face/neck/upper extremities       Medications  Allergies   Current Outpatient Medications   Medication Sig Dispense Refill     acetaminophen (TYLENOL) 325 MG tablet Take 650 mg by mouth every 6 hours as needed for mild pain.       amiodarone (PACERONE) 200 MG tablet Take 1 tablet (200 mg) by mouth daily. 90 tablet 3     carvedilol (COREG) 25 MG tablet Take 2 tablets (50 mg) by mouth 2 times daily. 260 tablet 3     colchicine (COLCRYS) 0.6 MG tablet Take 1 tablet (0.6 mg) by mouth daily. 90 tablet 0     empagliflozin (JARDIANCE) 10 MG TABS tablet Take 10 mg by mouth daily.       furosemide (LASIX) 40 MG tablet Take 40 mg by mouth daily       magnesium oxide (MAG-OX) 400 MG tablet Take 400 mg by mouth 2 times daily       melatonin 5 MG tablet Take 5 mg by mouth nightly as needed for sleep.       mexiletine (MEXITIL) 200 MG capsule Take 1 capsule (200 mg) by mouth 2 times daily. 180 capsule 3     nitroGLYcerin (NITROSTAT) 0.4 MG sublingual tablet Place 1 tablet under the tongue every 5 minutes as needed       pantoprazole (PROTONIX) 40 MG EC tablet Take 40 mg by mouth daily       potassium chloride ER (K-TAB/KLOR-CON) 10 MEQ CR tablet Take 20 mEq by mouth daily. Patient is currently taking 2 tablets daily       rivaroxaban ANTICOAGULANT (XARELTO) 20 MG TABS tablet Take 1 tablet (20 mg) by mouth daily. 90 tablet 3     rosuvastatin (CRESTOR) 40 MG tablet Take 40 mg by mouth every  evening       sacubitril-valsartan (ENTRESTO)  MG per tablet Take 1 tablet by mouth 2 times daily       spironolactone (ALDACTONE) 25 MG tablet Take 1 tablet (25 mg) by mouth daily. 90 tablet 3     tamsulosin (FLOMAX) 0.4 MG capsule Take 0.4 mg by mouth daily       ticagrelor (BRILINTA) 90 MG tablet Take 1 tablet (90 mg) by mouth 2 times daily. 180 tablet 3     Vitamin D3 (VITAMIN D-1000 MAX ST) 25 mcg (1000 units) tablet Take 1,000 Units by mouth daily      Allergies   Allergen Reactions     Morphine Anxiety     previously tolerated oxycodone and hydromorphone without issue       Tramadol Anxiety     previously tolerated oxycodone and hydromorphone without issue           Lab Results (Personally Reviewed)    Chemistry/lipid CBC Cardiac Enzymes/BNP/TSH/INR   Lab Results   Component Value Date    BUN 15.0 05/04/2025     05/04/2025    CO2 24 05/04/2025     Creatinine   Date Value Ref Range Status   05/04/2025 0.77 0.67 - 1.17 mg/dL Final       Lab Results   Component Value Date    CHOL 112 04/24/2024    HDL 30 (L) 04/24/2024    LDL 51 04/24/2024      Lab Results   Component Value Date    WBC 6.6 05/04/2025    HGB 13.9 05/04/2025    HCT 41.4 05/04/2025    MCV 94 05/04/2025     05/04/2025    Lab Results   Component Value Date    TSH 1.63 04/24/2024    INR 1.33 (H) 04/24/2024          Video-Visit Details    Type of service:  Video Visit   Video Start Time: 12:30  Video End Time:1:00    Originating Location (pt. Location): Home  Distant Location (provider location):  On-site  Platform used for Video Visit: Vonda    I have reviewed the note as documented above.  This accurately captures the substance of my virtual visit with the patient. The patient states understanding and is agreeable with the plan.   Esteban Wills MD [facility]  Cardiology - Electrophysiology          Total time spent on patient visit, reviewing notes, imaging, labs, orders, and completing necessary documentation: 45 minutes.                     Please do not hesitate to contact me if you have any questions/concerns.     Sincerely,     Esteban Wills MD

## 2025-06-01 NOTE — Clinical Note
Removed over Guidewire You were treated for a right wrist fracture. Continue to wear your brace as directed by orthopedics. Can use over the counter tylenol as needed for pain management.

## 2025-06-08 ENCOUNTER — HEALTH MAINTENANCE LETTER (OUTPATIENT)
Age: 60
End: 2025-06-08

## 2025-06-17 ENCOUNTER — LAB (OUTPATIENT)
Dept: LAB | Facility: CLINIC | Age: 60
End: 2025-06-17
Payer: MEDICARE

## 2025-06-17 ENCOUNTER — RESULTS FOLLOW-UP (OUTPATIENT)
Dept: CARDIOLOGY | Facility: CLINIC | Age: 60
End: 2025-06-17

## 2025-06-17 ENCOUNTER — OFFICE VISIT (OUTPATIENT)
Dept: CARDIOLOGY | Facility: CLINIC | Age: 60
End: 2025-06-17
Payer: MEDICARE

## 2025-06-17 VITALS
SYSTOLIC BLOOD PRESSURE: 102 MMHG | DIASTOLIC BLOOD PRESSURE: 68 MMHG | OXYGEN SATURATION: 96 % | HEART RATE: 70 BPM | BODY MASS INDEX: 37 KG/M2 | WEIGHT: 265.3 LBS

## 2025-06-17 DIAGNOSIS — I25.5 ISCHEMIC CARDIOMYOPATHY: ICD-10-CM

## 2025-06-17 DIAGNOSIS — I50.22 CHRONIC SYSTOLIC HEART FAILURE (CMS/HCC): Primary | ICD-10-CM

## 2025-06-17 LAB
ANION GAP SERPL CALC-SCNC: 9 MMOL/L (ref 3–11)
BUN SERPL-MCNC: 15 MG/DL (ref 7–25)
CALCIUM SERPL-MCNC: 9.5 MG/DL (ref 8.6–10.3)
CHLORIDE SERPL-SCNC: 106 MMOL/L (ref 98–107)
CO2 SERPL-SCNC: 21 MMOL/L (ref 21–32)
CREAT SERPL-MCNC: 0.96 MG/DL (ref 0.7–1.3)
EGFRCR SERPLBLD CKD-EPI 2021: 92 ML/MIN/1.73M*2
GLUCOSE SERPL-MCNC: 99 MG/DL (ref 70–105)
MAGNESIUM SERPL-MCNC: 2.1 MG/DL (ref 1.8–2.4)
NT-PROBNP SERPL-MCNC: 69 NG/L
POTASSIUM SERPL-SCNC: 4.2 MMOL/L (ref 3.5–5.1)
SODIUM SERPL-SCNC: 136 MMOL/L (ref 135–145)

## 2025-06-17 PROCEDURE — 83880 ASSAY OF NATRIURETIC PEPTIDE: CPT | Performed by: NURSE PRACTITIONER

## 2025-06-17 PROCEDURE — 99214 OFFICE O/P EST MOD 30 MIN: CPT | Performed by: NURSE PRACTITIONER

## 2025-06-17 PROCEDURE — 80048 BASIC METABOLIC PNL TOTAL CA: CPT | Performed by: NURSE PRACTITIONER

## 2025-06-17 PROCEDURE — 36415 COLL VENOUS BLD VENIPUNCTURE: CPT | Performed by: NURSE PRACTITIONER

## 2025-06-17 PROCEDURE — 83735 ASSAY OF MAGNESIUM: CPT | Performed by: NURSE PRACTITIONER

## 2025-06-17 PROCEDURE — G0463 HOSPITAL OUTPT CLINIC VISIT: HCPCS | Performed by: NURSE PRACTITIONER

## 2025-06-17 RX ORDER — FUROSEMIDE 40 MG/1
20 TABLET ORAL DAILY
Start: 2025-06-17

## 2025-06-17 ASSESSMENT — ENCOUNTER SYMPTOMS
ORTHOPNEA: 0
PND: 0
IRREGULAR HEARTBEAT: 1
SPUTUM PRODUCTION: 0
DECREASED APPETITE: 0
BRUISES/BLEEDS EASILY: 0
DYSPNEA ON EXERTION: 0
DIZZINESS: 1
SHORTNESS OF BREATH: 0
WEIGHT GAIN: 0
COUGH: 0
WEAKNESS: 0
SYNCOPE: 0
PALPITATIONS: 1
WEIGHT LOSS: 0
SLEEP DISTURBANCES DUE TO BREATHING: 1
BLOATING: 0
LIGHT-HEADEDNESS: 1
NEAR-SYNCOPE: 0

## 2025-06-17 ASSESSMENT — PAIN SCALES - GENERAL: PAINLEVEL_OUTOF10: 0-NO PAIN

## 2025-06-17 NOTE — PATIENT INSTRUCTIONS
- decrease furosemide to a half tablet (20mg) every morning. Please contact our office if you start having signs or symptoms of fluid retention.
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
## 2025-06-26 ENCOUNTER — TELEPHONE (OUTPATIENT)
Dept: CARDIOLOGY | Facility: CLINIC | Age: 60
End: 2025-06-26
Payer: MEDICARE

## 2025-06-26 PROCEDURE — 93295 DEV INTERROG REMOTE 1/2/MLT: CPT | Performed by: INTERNAL MEDICINE
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
## 2025-06-27 NOTE — TELEPHONE ENCOUNTER
Patient was called and informed of transmission results and recommendations.  He stated understanding.

## 2025-06-30 ENCOUNTER — TELEPHONE (OUTPATIENT)
Dept: CARDIOLOGY | Facility: CLINIC | Age: 60
End: 2025-06-30
Payer: MEDICARE

## 2025-07-22 ENCOUNTER — TELEPHONE (OUTPATIENT)
Dept: CARDIOLOGY | Facility: CLINIC | Age: 60
End: 2025-07-22
Payer: MEDICARE

## 2025-07-22 DIAGNOSIS — I47.20 SUSTAINED VENTRICULAR TACHYCARDIA (CMS/HCC): Primary | ICD-10-CM

## 2025-07-22 DIAGNOSIS — R79.0 LOW MAGNESIUM LEVEL: ICD-10-CM

## 2025-07-22 NOTE — TELEPHONE ENCOUNTER
Raul has a [facility] CRT-D.  Implanted 6/20/2022.    Transmission received today, since 7/22/2025, regarding VT with ATP therapy  Pt with VT with therapy delivered ATP x 4 on 7/21/25. No shocks delivered. Episode lasted 54 seconds w mean of 129 bpm which broke to sinus after fourth round of burst ATP.     Per Epic pt is amiodarone 200 mg po bid, Coreg 50 mg po bid, mexiletine 200 mg po bid, lasix 20 mg po daily, entresto bid, aldactone 20 mg po daily, potassium chloride 20 mg po daily, mag-ox 400 mg po bid, crestor 40 mg po daily, xarelto 20 mg po daily, brilanta 90 mg daily for cardiac related medications.    Attempted to contact patient on 7/22 x 2. Left message.     Docket sent to Nan.  Message sent to EP KARLA pool for further evaluation if needed.

## 2025-07-23 ENCOUNTER — LAB (OUTPATIENT)
Dept: LAB | Facility: CLINIC | Age: 60
End: 2025-07-23
Payer: MEDICARE

## 2025-07-23 ENCOUNTER — TELEPHONE (OUTPATIENT)
Dept: CARDIOLOGY | Facility: CLINIC | Age: 60
End: 2025-07-23
Payer: MEDICARE

## 2025-07-23 DIAGNOSIS — R79.0 LOW MAGNESIUM LEVEL: ICD-10-CM

## 2025-07-23 DIAGNOSIS — I47.20 SUSTAINED VENTRICULAR TACHYCARDIA (CMS/HCC): ICD-10-CM

## 2025-07-23 LAB
ANION GAP SERPL CALC-SCNC: 7 MMOL/L (ref 3–11)
BUN SERPL-MCNC: 12 MG/DL (ref 7–25)
CALCIUM SERPL-MCNC: 9.3 MG/DL (ref 8.6–10.3)
CHLORIDE SERPL-SCNC: 103 MMOL/L (ref 98–107)
CO2 SERPL-SCNC: 27 MMOL/L (ref 21–32)
CREAT SERPL-MCNC: 1.04 MG/DL (ref 0.7–1.3)
EGFRCR SERPLBLD CKD-EPI 2021: 83 ML/MIN/1.73M*2
GLUCOSE SERPL-MCNC: 104 MG/DL (ref 70–105)
MAGNESIUM SERPL-MCNC: 2.1 MG/DL (ref 1.8–2.4)
POTASSIUM SERPL-SCNC: 4.4 MMOL/L (ref 3.5–5.1)
SODIUM SERPL-SCNC: 137 MMOL/L (ref 135–145)

## 2025-07-23 PROCEDURE — 80048 BASIC METABOLIC PNL TOTAL CA: CPT

## 2025-07-23 PROCEDURE — 36415 COLL VENOUS BLD VENIPUNCTURE: CPT

## 2025-07-23 PROCEDURE — 83735 ASSAY OF MAGNESIUM: CPT

## 2025-07-23 RX ORDER — MEXILETINE HYDROCHLORIDE 250 MG/1
250 CAPSULE ORAL 3 TIMES DAILY
Qty: 90 CAPSULE | Refills: 11 | Status: SHIPPED | OUTPATIENT
Start: 2025-07-23 | End: 2026-07-23

## 2025-07-23 RX ORDER — TICAGRELOR 90 MG/1
90 TABLET, FILM COATED ORAL 2 TIMES DAILY
Qty: 180 TABLET | Refills: 3 | Status: SHIPPED | OUTPATIENT
Start: 2025-07-23

## 2025-07-23 NOTE — TELEPHONE ENCOUNTER
Called patient with recommendations from [facility] werner patient stated understanding.  Patient asked for current med list.  I told him that would be the  waiting for him.

## 2025-07-23 NOTE — TELEPHONE ENCOUNTER
Called patient to give him results and have him change his mexiletine from 200 mg to 250 mg's per [facility]'s recommendations.  Patient asked if he is in need any labs?  I asked [facility] she said yes to order a BMP and a mag.  Patient said he needed his Brilinta refilled and also has colchicine refilled I explained to him that he did not need his colchicine refilled as it was only used 30 days post ablation.  His Brilinta was called to [facility] pharmacy.  Labs have been put in.  Patient will get his labs drawn either today 7/23/2025 or tomorrow 7/24/2025.  Patient stated he needed nothing further I thanked him for his time.

## 2025-07-23 NOTE — TELEPHONE ENCOUNTER
Raul has a [employer] CRT D.  Implanted 6/20/2022.      Received a transmission today 7/23/2025 for ATP therapy.  Stored EGMs are consistent with or suggestive of Ventricular Tachycardia  Total episodes: 1  Number of ATP therapy: 1  Number of shocks delivered: 0  Episode occurred on Jul 22, 2025 at 2:13 PM  EGM shows VT at 126 bpm, resolved with 1 round of ATP. Resulting rhythm is AP/BP, no HV therapy delivered.  Total duration: 22 seconds.    CRMS nursing contacted patient:  Call placed to patient for further symptom assessment from 7/22/25 ATP device therapy.  Bot events were reviewed with him.  Patient describes onset of racing heart rate, followed by near syncope. He confirmed he did not have syncope with both events.  States 7/22/25 event symptoms are newer for him along with affecting his daily living, Feels he can't get overheated or symptoms start. Denies dyspnea changes, chest pain/pressure, palpitations.   In regards to his medications, he was gaining fluid weight, felt it in the abdomen, and  recently went back  up to his Lasix to 40 mg a day, amiodarone 200 mg qd since ablation 5/2/25. no other changes, current in Epic.  Vital Signs: 1:40 pm on 7/22/25 during symptom: 132/80/ HR 71. WTs: Most recent daily weights since Monday:. 266.9, 264, 263, yesterday 262 am.    7/25/25 [place] cardiology virtual visit. @ 9:15 am. He states he needs reports sent over to them for this visit.    Docket sent to Nan.  Message sent to EP KARLA pool for further evaluation if needed.

## 2025-07-24 ENCOUNTER — TELEPHONE (OUTPATIENT)
Dept: CARDIOLOGY | Facility: CLINIC | Age: 60
End: 2025-07-24
Payer: MEDICARE

## 2025-07-24 NOTE — TELEPHONE ENCOUNTER
Pete has a [facility] [place] CRT-D.  Implanted 6/20/2022.    Transmission received today for VT with ATP.  Appropriate VT Therapy: Successful  Stored EGMs are consistent with or suggestive of Ventricular Tachycardia  Total episodes: 1  Number of ATP therapy: 1  Number of shocks delivered: 0  Episode occurred on Jul 23, 2025 at 9:14 PM  EGM shows VT at 122 bpm, resolved with ATP X1. Resulting rhythm is AP&AS/BP. No HV therapy delivered.  Total duration: 22 seconds.    CRMS RN Call placed to patient for further symptom assessment.   Describes a  sudden onset of heart racing, lasting just a few secs. with lightheadedness that was a mild feeling of near syncope. Resolved quickly. Currently patient denies dyspnea changes, chest pain/pressure, palpitations, Heart racing, lightheadedness/dizziness, near-syncope/syncope, or lower extremity edema. Medications reviewed. Started increased 1st doses of Amiodarone and Mexitil last night. Today he is without cardiac symptoms.   Reported Vital Signs: 1 hour ago, prior to morning medications 133/80. P. 75 bpm.    Patient would like  a callback if he was  to stop Colchicine 0.6 mg. States during a call yesterday, he was advised he should only have been on that for 30 days.  He would like clarification since it is still listed as as active.    Docket sent to Nan.  Message sent to [facility] pool for further evaluation if needed.

## 2025-07-25 ENCOUNTER — TELEPHONE (OUTPATIENT)
Dept: CARDIOLOGY | Facility: CLINIC | Age: 60
End: 2025-07-25
Payer: MEDICARE

## 2025-07-25 NOTE — TELEPHONE ENCOUNTER
"Pete Trejo  [facility] CRT-D   4th transmission in a row - pt had another VT episode with ATP    Appropriate VT Therapy: Successful  Stored EGMs are consistent with or suggestive of Ventricular Tachycardia  Total episodes: 1  Number of ATP therapy: 1  Number of shocks delivered: 0  Episode occurred on Jul 24, 2025 at 7:25 PM  EGM shows VT at 119 bpm, resolved with ATP X1. Resulting rhythm is AP/BP at 72 bpm. No HV therapy delivered.  Total duration: 22 seconds    CRMS RN:   Call placed to patient for further symptom assessment. Patient states he was standing in the kitchen, felt a sudden onset of near-syncope lightheadedness, so he \"took a knee to the floor\" to prevent syncope confirms no syncope occurred. States symptoms lasted 5 secs. and resolved. He confirms no dyspnea changes, chest pain/pressure, palpitations, Heart racing, syncope, or lower extremity edema. Medications reviewed. No concerns with current changes. Today he is without symptoms.   Vital Signs: During event.7:25 pm 7/24/25, /88, P. 71, 7:30 pm 152/89 P. 71.       7/25/25 Virtual visit scheduled  today with EP at the [facility].    Docket sent to Dr. [place].  Message sent to EP [facility] pool for further evaluation if needed.    "

## 2025-07-28 ENCOUNTER — TELEPHONE (OUTPATIENT)
Dept: CARDIOLOGY | Facility: CLINIC | Age: 60
End: 2025-07-28
Payer: MEDICARE

## 2025-07-28 NOTE — TELEPHONE ENCOUNTER
Front he has a [facility] CRT-D.  Implanted 6/20/2022.    Transmission received on 7/27/2025 and 7/28/2025.  Episode occurred on Jul 26, 2025 at 12:44 AM EGM shows VT at 126 bpm, resolved with 1 burst of ATP. Resulting rhythm is AP/BP at 72 bpm. No HV therapy delivered.   Jul 27, 2025 at 8:13 AM EGM shows VT at 126 bpm, resolved with ATP X2. Resulting rhythm is AP/BP at 70 bpm. No HV therapy delivered. Total duration of episode: 34 seconds.      CRMS RN Call for symptom review:   Patient states he was unaware of the 7/26/25 event, he would have been asleep. He felt the 7/27/25 episode, with sudden feeling of near syncope, He held on to a shelf while at the store, lasted about 5 secs. no other cardiac symptoms. Confirms no syncope. States he had his virtual visit on 7/25/25 with , He will be scheduled for 2 weeks in person there. Medications reviewed. No new changes since 7/25/25 epic review.  Patient Reported Vital Signs:He bought an arm cuff BP machine  did checks , compared it with Wrist machine  He uses his left arm checked 9 am, /64, P 71(prior to morning medications),  States afternoon reading at 3 pm 87/57, P. 54, (Asymptomatic). He did compare with wrist:111/68 during the lower systolic arm reading. After waiting a couple of minutes.  Daily Wts: 260/261 lbs.      He is asking to set up an appointment to bring in his new BP machine for education/comparison. He will call the clinic but asked If I could get a message of the reason for the visit.

## 2025-07-30 ENCOUNTER — TELEPHONE (OUTPATIENT)
Dept: CARDIOLOGY | Facility: CLINIC | Age: 60
End: 2025-07-30
Payer: MEDICARE

## 2025-07-30 NOTE — TELEPHONE ENCOUNTER
Latitude monitor is programmed to send transmissions if he receives VT therapy. We get transmissions regardless if he is symptomatic or not.      Do you want us to continue to reach out to the patient when he receives ATP therapy?  Do you want to continue to send all the transmissions through as dockets to the providers? We have received 7 transmissions in the last 9 days.

## 2025-07-30 NOTE — TELEPHONE ENCOUNTER
"Pete has a [facility] [place] CRT-D.  Implanted 6/20/2022.      Transmission received today, 7/30/2025, for VT therapy.  (Patient has had several VT with ATP therapies since July 22 multiple docket's have been created and are in [place])  Appropriate VT Therapy: Successful  Stored EGMs are consistent with or suggestive of Ventricular Tachycardia  Total episodes: 3  Number of ATP therapy: 3  Number of shocks delivered: 0  Longest, most recent episode: occurred on Jul 30, 2025 at 12:25 AM. EGM shows VT at 123 bpm, resolved with ATP X1. No HV therapy delivered. Total duration of episode: 22 seconds.  Additional episodes occurred on Jul 29 at 8:29 and 10:25 PM, due to VT at 119 and 120 bpm and each was resolved with ATP X1 .    CRMS RN Call to patient for review of symptoms.   Patient states He was not aware of the 7/30/25 event since he was asleep. States the other 2 are the same symptoms he has been having with feeling \"out of no where\" like he will pass out, heart rate starts going fast, but it resolves quickly (seconds), No syncope, confirms no other cardiac symptoms.    He expresses he is now getting more feelings \"off and on\" throughout the day of heart racing symptoms. He only checks vital signs when he gets the near syncopal feelings. He does have a pulse Ox he can carry when he is not near  his BP machine, to see if he can detect what the rate is.   Patient Reported Vital Signs: right after events: /60, Rate 70 bpm. This am he is asymptomatic: /56. P. 71. Right before morning meds. States no missed medications.  He is still awaiting c/b from  for follow up.in person. He will call there today for follow up.    Docket sent to Nan.  Message sent to [facility] pool for further evaluation if needed.    "

## 2025-07-31 ENCOUNTER — TELEPHONE (OUTPATIENT)
Dept: CARDIOLOGY | Facility: CLINIC | Age: 60
End: 2025-07-31
Payer: MEDICARE

## 2025-07-31 NOTE — TELEPHONE ENCOUNTER
Pete has [facility] CRT-D.  Implanted 6/20/2022.    Transmission received today, 7/31/2025, for yellow alert successful ATP x 1 for VT. No shocks.  Total episodes: 1  Number of ATP therapy: 1  Number of shocks delivered: 0  Episode occurred on Jul 30, 2025 at 1:45 PM  EGM shows VT at 122 bpm, resolved with ATP X1. No HV therapy delivered. Resulting rhythm is AP&AS/BP at 78 bpm.    CRMS RN Call placed to patient for further symptom assessment.   States he did have symptoms while walking out to his truck, started feeling lightheaded/dizzy like he may have syncope, but did not. Lasting approximately  7-8 secs. Then symptoms resolved. Has not had any further symptoms since. Currently patient denies dyspnea changes, chest pain/pressure, palpitations, Heart racing, lightheadedness/dizziness, near-syncope/syncope, or lower extremity edema.   Medications reviewed. no missed doses. Patient Reported  Vital Signs: Today: /66, Pulse 71 bpm.      Docket sent to Nan.  Message sent to EP [facility] pool for further evaluation if needed.

## 2025-08-01 ENCOUNTER — TELEPHONE (OUTPATIENT)
Dept: CARDIOLOGY | Facility: CLINIC | Age: 60
End: 2025-08-01
Payer: MEDICARE

## 2025-08-01 DIAGNOSIS — I25.10 CORONARY ARTERY DISEASE INVOLVING NATIVE CORONARY ARTERY OF NATIVE HEART WITHOUT ANGINA PECTORIS: Primary | ICD-10-CM

## 2025-08-01 RX ORDER — PRASUGREL 10 MG/1
10 TABLET, FILM COATED ORAL DAILY
Qty: 90 TABLET | Refills: 3 | Status: SHIPPED | OUTPATIENT
Start: 2025-08-01 | End: 2026-08-01

## 2025-08-01 NOTE — TELEPHONE ENCOUNTER
Patient notified and verbalized understanding and had no questions. Spelled out name of ticagrelor for him and his daughter so they know which medication to stop.  He will  Effient tonight.

## 2025-08-01 NOTE — TELEPHONE ENCOUNTER
Received a call from [facility] pharmacy asking why the patient was prescribed Brilinta as they do not have this on formulary.  Our office has never prescribed Brilinta or any other antiplatelet medication for the patient.  The pharmacy said they carry Plavix and Effient.

## 2025-08-10 ENCOUNTER — HEALTH MAINTENANCE LETTER (OUTPATIENT)
Age: 60
End: 2025-08-10

## 2025-08-18 ENCOUNTER — HOSPITAL ENCOUNTER (OUTPATIENT)
Dept: CARDIOLOGY | Facility: CLINIC | Age: 60
Discharge: HOME | End: 2025-08-18
Attending: STUDENT IN AN ORGANIZED HEALTH CARE EDUCATION/TRAINING PROGRAM | Admitting: STUDENT IN AN ORGANIZED HEALTH CARE EDUCATION/TRAINING PROGRAM
Payer: MEDICARE

## 2025-08-18 VITALS — WEIGHT: 268.74 LBS | BODY MASS INDEX: 37.62 KG/M2 | HEIGHT: 71 IN

## 2025-08-18 DIAGNOSIS — I50.22 CHRONIC SYSTOLIC HEART FAILURE (H): ICD-10-CM

## 2025-08-18 PROCEDURE — 94621 CARDIOPULM EXERCISE TESTING: CPT

## 2025-08-19 ENCOUNTER — APPOINTMENT (OUTPATIENT)
Dept: MEDSURG UNIT | Facility: CLINIC | Age: 60
End: 2025-08-19
Attending: HOSPITALIST
Payer: MEDICARE

## 2025-08-19 ENCOUNTER — APPOINTMENT (OUTPATIENT)
Dept: LAB | Facility: CLINIC | Age: 60
End: 2025-08-19
Attending: HOSPITALIST
Payer: MEDICARE

## 2025-08-19 ENCOUNTER — HOSPITAL ENCOUNTER (OUTPATIENT)
Facility: CLINIC | Age: 60
Discharge: HOME | End: 2025-08-19
Attending: HOSPITALIST | Admitting: HOSPITALIST
Payer: MEDICARE

## 2025-08-19 VITALS
OXYGEN SATURATION: 97 % | WEIGHT: 264.55 LBS | TEMPERATURE: 98.3 F | BODY MASS INDEX: 36.91 KG/M2 | RESPIRATION RATE: 18 BRPM | SYSTOLIC BLOOD PRESSURE: 115 MMHG | DIASTOLIC BLOOD PRESSURE: 68 MMHG | HEART RATE: 70 BPM

## 2025-08-19 DIAGNOSIS — I50.22 CHRONIC SYSTOLIC HEART FAILURE (H): ICD-10-CM

## 2025-08-19 DIAGNOSIS — Z79.899 ON AMIODARONE THERAPY: ICD-10-CM

## 2025-08-19 DIAGNOSIS — I50.21 ACUTE SYSTOLIC HEART FAILURE (H): ICD-10-CM

## 2025-08-19 LAB
ALBUMIN SERPL BCG-MCNC: 4.5 G/DL (ref 3.5–5.2)
ALP SERPL-CCNC: 77 U/L (ref 40–150)
ALT SERPL W P-5'-P-CCNC: 48 U/L (ref 0–70)
ANION GAP SERPL CALCULATED.3IONS-SCNC: 12 MMOL/L (ref 7–15)
AST SERPL W P-5'-P-CCNC: 31 U/L (ref 0–45)
BASOPHILS # BLD AUTO: 0.04 10E3/UL (ref 0–0.2)
BASOPHILS NFR BLD AUTO: 0.8 %
BILIRUB SERPL-MCNC: 1 MG/DL
BUN SERPL-MCNC: 12.8 MG/DL (ref 6–20)
CALCIUM SERPL-MCNC: 9.4 MG/DL (ref 8.8–10.4)
CARDIOPULMONARY ANAEROBIC THRESHOLD PREDICTED PEAK: 41 %
CARDIOPULMONARY ANAEROBIC THRESHOLD VO2: 12.8 ML/KG/MIN
CARDIOPULMONARY BLOOD PRESSURE REST: NORMAL MMHG
CARDIOPULMONARY BREATHING RESERVE REST: 89.5
CARDIOPULMONARY BREATHING RESERVE V02MAX: 42
CARDIOPULMONARY CO2 OUTPUT REST: 381 ML/MIN
CARDIOPULMONARY CO2 OUTPUT VO2MAX: 2414 ML/MIN
CARDIOPULMONARY FEV 1.0 (L) ACTUAL: 4.09
CARDIOPULMONARY FEV 1.0 (L) PRECENT: 108 %
CARDIOPULMONARY FEV 1.0 (L) PREDICTED: 3.8
CARDIOPULMONARY FEV 1.0 FVC (%) ACTUAL: 81.7
CARDIOPULMONARY FEV 1.0 FVC (%) PERCENT: 107 %
CARDIOPULMONARY FEV 1.0 FVC (%) PREDICTED: 76.1
CARDIOPULMONARY FUNCTIONAL CAPACITY MAX ML/KG/MIN: 16.7 ML/KG/MIN
CARDIOPULMONARY FUNCTIONAL CAPACITY PERCENT: 53 %
CARDIOPULMONARY FUNCTIONAL CAPACITY PREDICTED: 31.6 ML/KG/MIN
CARDIOPULMONARY FVC (L) ACTUAL: 5
CARDIOPULMONARY FVC (L) PERCENT: 100 %
CARDIOPULMONARY FVC (L) PREDICTED: 5
CARDIOPULMONARY HEART RATE REST: 70 BPM
CARDIOPULMONARY MET'S REST: 1
CARDIOPULMONARY MINUTE VENTILATION REST: 15 L/MIN
CARDIOPULMONARY MINUTE VENTILATION VO2MAX: 83.6 L/MIN
CARDIOPULMONARY MYOCARDIAC O2 DEMAND MAX: NORMAL
CARDIOPULMONARY OXYGEN CONSUMPTION REST: 3.6 ML/KG/MIN
CARDIOPULMONARY OXYGEN CONSUMPTION VO2MAX: 16.7 ML/KG/MIN
CARDIOPULMONARY OXYGEN PULSE REST: 6 ML/BEAT
CARDIOPULMONARY OXYGEN PULSE VO2MAX: 12.6 ML/BEAT
CARDIOPULMONARY OXYGEN SATURATION- OXIMETRY REST: 100 %
CARDIOPULMONARY OXYGEN SATURATION- OXIMETRY VO2MAX: 100 %
CARDIOPULMONARY PET C02 REST: 33
CARDIOPULMONARY PET C02 VO2MAX: 32
CARDIOPULMONARY PET02 REST: 109
CARDIOPULMONARY PET02 V02 MAX: 116
CARDIOPULMONARY RER: 1.15
CARDIOPULMONARY RESPIRALORY EXCHANGE RATIO VO2MAX: 1.15
CARDIOPULMONARY RESPIRALORY EXCHANGE RATIO: 0.86
CARDIOPULMONARY RESPIRATORY RATE REST: 19 BR/MIN
CARDIOPULMONARY RESPIRATORY RATE VO2MAX: 30 BR/MIN
CARDIOPULMONARY STRESS BASE 1 BP MMHG: NORMAL MMHG
CARDIOPULMONARY STRESS BASE 1 BPA: 87 BPM
CARDIOPULMONARY STRESS BASE 1 SPO2: 99 % SPO2
CARDIOPULMONARY STRESS BASE 1 TIME: 1 MINS
CARDIOPULMONARY STRESS BASE 2 BP MMHG: NORMAL MMHG
CARDIOPULMONARY STRESS BASE 2 BPA: 70 BPM
CARDIOPULMONARY STRESS BASE 2 SPO2: 100 % SPO2
CARDIOPULMONARY STRESS BASE 2 TIME: 3 MINS
CARDIOPULMONARY STRESS BASE 3 BP MMHG: NORMAL MMHG
CARDIOPULMONARY STRESS BASE 3 BPA: 70 BPM
CARDIOPULMONARY STRESS BASE 3 SPO2: 100 % SPO2
CARDIOPULMONARY STRESS BASE 3 TIME: 5 MINS
CARDIOPULMONARY STRESS PHASE 1 BP MMHG: NORMAL MMHG
CARDIOPULMONARY STRESS PHASE 1 BPM: 71 BPM
CARDIOPULMONARY STRESS PHASE 1 SPO2: 100 % SPO2
CARDIOPULMONARY STRESS PHASE 1 TIME: 2 MINS
CARDIOPULMONARY STRESS PHASE 2 BP MMHG: NORMAL MMHG
CARDIOPULMONARY STRESS PHASE 2 BPM: 73 BPM
CARDIOPULMONARY STRESS PHASE 2 SPO2: 100 % SPO2
CARDIOPULMONARY STRESS PHASE 2 TIME: 4 MINS
CARDIOPULMONARY STRESS PHASE 3 BP MMHG: NORMAL MMHG
CARDIOPULMONARY STRESS PHASE 3 BPM: 68 BPM
CARDIOPULMONARY STRESS PHASE 3 SPO2: 100 % SPO2
CARDIOPULMONARY STRESS PHASE 3 TIME: 6 MINS
CARDIOPULMONARY STRESS PHASE 4 BP MMHG: NORMAL MMHG
CARDIOPULMONARY STRESS PHASE 4 BPM: 85 BPM
CARDIOPULMONARY STRESS PHASE 4 SPO2: 99 % SPO2
CARDIOPULMONARY STRESS PHASE 4 TIME: 8 MINS
CARDIOPULMONARY STRESS PHASE 5 BPM: 88 BPM
CARDIOPULMONARY STRESS PHASE 5 SPO2: 100 % SPO2
CARDIOPULMONARY STRESS PHASE 5 TIME SEC: 16 SEC
CARDIOPULMONARY STRESS PHASE 5 TIME: 9 MINS
CARDIOPULMONARY SVC (L) ACTUAL: 5.05
CARDIOPULMONARY SVC (L) PERCENT: 101 %
CARDIOPULMONARY SVC (L) PREDICTED: 5
CARDIOPULMONARY TIDAL VOLUME REST: 771 ML
CARDIOPULMONARY TIDAL VOLUME VO2MAX: 2802 ML
CARDIOPULMONARY VE/VCO2 SLOPE: 33.32
CARDIOPULMONARY VENTILATORY EQUIVALENT 02 REST: 34
CARDIOPULMONARY VENTILATORY EQUIVALENT 02 V02: 40
CARDIOPULMONARY VENTILATORY EQUIVALENT C02 REST: 39
CARDIOPULMONARY VENTILATORY EQUIVALENT C02 SLOPE VO2MAX: 33.32
CARDIOPULMONARY VENTILATORY EQUIVALENT C02 VO2MAX: 35
CHLORIDE SERPL-SCNC: 104 MMOL/L (ref 98–107)
CREAT SERPL-MCNC: 1.03 MG/DL (ref 0.67–1.17)
CV STRESS MAX HR HE: 88
EGFRCR SERPLBLD CKD-EPI 2021: 84 ML/MIN/1.73M2
EOSINOPHIL # BLD AUTO: 0.22 10E3/UL (ref 0–0.7)
EOSINOPHIL NFR BLD AUTO: 4.3 %
ERYTHROCYTE [DISTWIDTH] IN BLOOD BY AUTOMATED COUNT: 14.5 % (ref 10–15)
GLUCOSE SERPL-MCNC: 132 MG/DL (ref 70–99)
HCO3 SERPL-SCNC: 22 MMOL/L (ref 22–29)
HCT VFR BLD AUTO: 47.4 % (ref 40–53)
HGB BLD-MCNC: 15.8 G/DL (ref 13.3–17.7)
HGB BLD-MCNC: 15.8 G/DL (ref 13.3–17.7)
IMM GRANULOCYTES # BLD: <0.03 10E3/UL
IMM GRANULOCYTES NFR BLD: 0.4 %
LYMPHOCYTES # BLD AUTO: 2.01 10E3/UL (ref 0.8–5.3)
LYMPHOCYTES NFR BLD AUTO: 39.1 %
MCH RBC QN AUTO: 31.6 PG (ref 26.5–33)
MCHC RBC AUTO-ENTMCNC: 33.3 G/DL (ref 31.5–36.5)
MCV RBC AUTO: 94.8 FL (ref 78–100)
MONOCYTES # BLD AUTO: 0.46 10E3/UL (ref 0–1.3)
MONOCYTES NFR BLD AUTO: 8.9 %
NEUTROPHILS # BLD AUTO: 2.39 10E3/UL (ref 1.6–8.3)
NEUTROPHILS NFR BLD AUTO: 46.5 %
NRBC # BLD AUTO: <0.03 10E3/UL
NRBC BLD AUTO-RTO: 0 /100
OXYHGB MFR BLDV: 68 % (ref 70–75)
PLATELET # BLD AUTO: 208 10E3/UL (ref 150–450)
POTASSIUM SERPL-SCNC: 4.3 MMOL/L (ref 3.4–5.3)
PROT SERPL-MCNC: 7.2 G/DL (ref 6.4–8.3)
RBC # BLD AUTO: 5 10E6/UL (ref 4.4–5.9)
SODIUM SERPL-SCNC: 138 MMOL/L (ref 135–145)
STRESS ANGINA INDEX: 0
STRESS ECHO CALCULATED PERCENT HR: 54 %
STRESS ECHO LAST STRESS BP: NORMAL MMHG
STRESS ECHO POST ESTIMATED WORKLOAD: 4.8 METS
STRESS ECHO POST EXERCISE DUR MIN: 9 MIN
STRESS ECHO POST EXERCISE DUR SEC: 16 SEC
STRESS ECHO TARGET HR: 162
WBC # BLD AUTO: 5.14 10E3/UL (ref 4–11)

## 2025-08-19 PROCEDURE — 85004 AUTOMATED DIFF WBC COUNT: CPT | Performed by: STUDENT IN AN ORGANIZED HEALTH CARE EDUCATION/TRAINING PROGRAM

## 2025-08-19 PROCEDURE — C1751 CATH, INF, PER/CENT/MIDLINE: HCPCS | Performed by: HOSPITALIST

## 2025-08-19 PROCEDURE — 36415 COLL VENOUS BLD VENIPUNCTURE: CPT | Performed by: STUDENT IN AN ORGANIZED HEALTH CARE EDUCATION/TRAINING PROGRAM

## 2025-08-19 PROCEDURE — 250N000009 HC RX 250: Performed by: STUDENT IN AN ORGANIZED HEALTH CARE EDUCATION/TRAINING PROGRAM

## 2025-08-19 PROCEDURE — 80053 COMPREHEN METABOLIC PANEL: CPT | Performed by: STUDENT IN AN ORGANIZED HEALTH CARE EDUCATION/TRAINING PROGRAM

## 2025-08-19 PROCEDURE — 272N000001 HC OR GENERAL SUPPLY STERILE: Performed by: HOSPITALIST

## 2025-08-19 PROCEDURE — 999N000142 HC STATISTIC PROCEDURE PREP ONLY

## 2025-08-19 PROCEDURE — 82810 BLOOD GASES O2 SAT ONLY: CPT

## 2025-08-19 PROCEDURE — C1894 INTRO/SHEATH, NON-LASER: HCPCS | Performed by: HOSPITALIST

## 2025-08-19 PROCEDURE — 999N000132 HC STATISTIC PP CARE STAGE 1

## 2025-08-19 PROCEDURE — 250N000009 HC RX 250: Performed by: HOSPITALIST

## 2025-08-19 PROCEDURE — 93451 RIGHT HEART CATH: CPT | Performed by: HOSPITALIST

## 2025-08-19 PROCEDURE — 85018 HEMOGLOBIN: CPT

## 2025-08-19 PROCEDURE — 84443 ASSAY THYROID STIM HORMONE: CPT | Performed by: INTERNAL MEDICINE

## 2025-08-19 RX ORDER — PRASUGREL 10 MG/1
10 TABLET, FILM COATED ORAL DAILY
COMMUNITY

## 2025-08-19 RX ORDER — LIDOCAINE 40 MG/G
CREAM TOPICAL
Status: COMPLETED | OUTPATIENT
Start: 2025-08-19 | End: 2025-08-19

## 2025-08-19 RX ADMIN — LIDOCAINE: 40 CREAM TOPICAL at 13:27

## 2025-08-19 ASSESSMENT — ACTIVITIES OF DAILY LIVING (ADL)
ADLS_ACUITY_SCORE: 56

## 2025-08-20 ENCOUNTER — ANCILLARY PROCEDURE (OUTPATIENT)
Dept: CARDIOLOGY | Facility: CLINIC | Age: 60
End: 2025-08-20
Attending: INTERNAL MEDICINE
Payer: MEDICARE

## 2025-08-20 ENCOUNTER — OFFICE VISIT (OUTPATIENT)
Dept: CARDIOLOGY | Facility: CLINIC | Age: 60
End: 2025-08-20
Attending: STUDENT IN AN ORGANIZED HEALTH CARE EDUCATION/TRAINING PROGRAM
Payer: MEDICARE

## 2025-08-20 VITALS
OXYGEN SATURATION: 95 % | BODY MASS INDEX: 37.81 KG/M2 | WEIGHT: 271 LBS | SYSTOLIC BLOOD PRESSURE: 98 MMHG | HEART RATE: 70 BPM | DIASTOLIC BLOOD PRESSURE: 64 MMHG

## 2025-08-20 DIAGNOSIS — Z95.810 IMPLANTABLE CARDIOVERTER-DEFIBRILLATOR (ICD) IN SITU: ICD-10-CM

## 2025-08-20 DIAGNOSIS — Z79.899 ON AMIODARONE THERAPY: Primary | ICD-10-CM

## 2025-08-20 DIAGNOSIS — I47.20 VENTRICULAR TACHYCARDIA (H): Primary | ICD-10-CM

## 2025-08-20 LAB
MDC_IDC_LEAD_CONNECTION_STATUS: NORMAL
MDC_IDC_LEAD_IMPLANT_DT: NORMAL
MDC_IDC_LEAD_LOCATION: NORMAL
MDC_IDC_LEAD_LOCATION_DETAIL_1: NORMAL
MDC_IDC_LEAD_MFG: NORMAL
MDC_IDC_LEAD_MODEL: NORMAL
MDC_IDC_LEAD_POLARITY_TYPE: NORMAL
MDC_IDC_LEAD_SERIAL: NORMAL
MDC_IDC_MSMT_BATTERY_DTM: NORMAL
MDC_IDC_MSMT_BATTERY_REMAINING_LONGEVITY: 90 MO
MDC_IDC_MSMT_BATTERY_REMAINING_PERCENTAGE: 100 %
MDC_IDC_MSMT_BATTERY_STATUS: NORMAL
MDC_IDC_MSMT_CAP_CHARGE_DTM: NORMAL
MDC_IDC_MSMT_CAP_CHARGE_DTM: NORMAL
MDC_IDC_MSMT_CAP_CHARGE_ENERGY: 41 J
MDC_IDC_MSMT_CAP_CHARGE_TIME: 9.7 S
MDC_IDC_MSMT_CAP_CHARGE_TIME: 9.7 S
MDC_IDC_MSMT_CAP_CHARGE_TYPE: NORMAL
MDC_IDC_MSMT_CAP_CHARGE_TYPE: NORMAL
MDC_IDC_MSMT_LEADCHNL_LV_IMPEDANCE_VALUE: 1082 OHM
MDC_IDC_MSMT_LEADCHNL_LV_PACING_THRESHOLD_AMPLITUDE: 1 V
MDC_IDC_MSMT_LEADCHNL_LV_PACING_THRESHOLD_PULSEWIDTH: 0.4 MS
MDC_IDC_MSMT_LEADCHNL_RA_IMPEDANCE_VALUE: 451 OHM
MDC_IDC_MSMT_LEADCHNL_RA_PACING_THRESHOLD_AMPLITUDE: 0.4 V
MDC_IDC_MSMT_LEADCHNL_RA_PACING_THRESHOLD_PULSEWIDTH: 0.4 MS
MDC_IDC_MSMT_LEADCHNL_RV_IMPEDANCE_VALUE: 686 OHM
MDC_IDC_MSMT_LEADCHNL_RV_PACING_THRESHOLD_AMPLITUDE: 1.4 V
MDC_IDC_MSMT_LEADCHNL_RV_PACING_THRESHOLD_PULSEWIDTH: 0.4 MS
MDC_IDC_PG_IMPLANT_DTM: NORMAL
MDC_IDC_PG_MFG: NORMAL
MDC_IDC_PG_MODEL: NORMAL
MDC_IDC_PG_SERIAL: NORMAL
MDC_IDC_PG_TYPE: NORMAL
MDC_IDC_SESS_CLINIC_NAME: NORMAL
MDC_IDC_SESS_DTM: NORMAL
MDC_IDC_SESS_TYPE: NORMAL
MDC_IDC_SET_BRADY_AT_MODE_SWITCH_MODE: NORMAL
MDC_IDC_SET_BRADY_AT_MODE_SWITCH_RATE: 170 {BEATS}/MIN
MDC_IDC_SET_BRADY_LOWRATE: 70 {BEATS}/MIN
MDC_IDC_SET_BRADY_MAX_SENSOR_RATE: 100 {BEATS}/MIN
MDC_IDC_SET_BRADY_MAX_TRACKING_RATE: 100 {BEATS}/MIN
MDC_IDC_SET_BRADY_MODE: NORMAL
MDC_IDC_SET_BRADY_PAV_DELAY_LOW: 130 MS
MDC_IDC_SET_BRADY_SAV_DELAY_LOW: 110 MS
MDC_IDC_SET_CRT_LVRV_DELAY: 20 MS
MDC_IDC_SET_CRT_PACED_CHAMBERS: NORMAL
MDC_IDC_SET_LEADCHNL_LV_PACING_AMPLITUDE: 2.5 V
MDC_IDC_SET_LEADCHNL_LV_PACING_ANODE_LOCATION_1: NORMAL
MDC_IDC_SET_LEADCHNL_LV_PACING_CAPTURE_MODE: NORMAL
MDC_IDC_SET_LEADCHNL_LV_PACING_CATHODE_ELECTRODE_1: NORMAL
MDC_IDC_SET_LEADCHNL_LV_PACING_CATHODE_LOCATION_1: NORMAL
MDC_IDC_SET_LEADCHNL_LV_PACING_PULSEWIDTH: 0.4 MS
MDC_IDC_SET_LEADCHNL_LV_SENSING_ADAPTATION_MODE: NORMAL
MDC_IDC_SET_LEADCHNL_LV_SENSING_ANODE_LOCATION_1: NORMAL
MDC_IDC_SET_LEADCHNL_LV_SENSING_CATHODE_ELECTRODE_1: NORMAL
MDC_IDC_SET_LEADCHNL_LV_SENSING_CATHODE_LOCATION_1: NORMAL
MDC_IDC_SET_LEADCHNL_LV_SENSING_SENSITIVITY: 1 MV
MDC_IDC_SET_LEADCHNL_RA_PACING_AMPLITUDE: 2 V
MDC_IDC_SET_LEADCHNL_RA_PACING_CAPTURE_MODE: NORMAL
MDC_IDC_SET_LEADCHNL_RA_PACING_POLARITY: NORMAL
MDC_IDC_SET_LEADCHNL_RA_PACING_PULSEWIDTH: 0.4 MS
MDC_IDC_SET_LEADCHNL_RA_SENSING_ADAPTATION_MODE: NORMAL
MDC_IDC_SET_LEADCHNL_RA_SENSING_POLARITY: NORMAL
MDC_IDC_SET_LEADCHNL_RA_SENSING_SENSITIVITY: 0.25 MV
MDC_IDC_SET_LEADCHNL_RV_PACING_AMPLITUDE: 2.5 V
MDC_IDC_SET_LEADCHNL_RV_PACING_CAPTURE_MODE: NORMAL
MDC_IDC_SET_LEADCHNL_RV_PACING_POLARITY: NORMAL
MDC_IDC_SET_LEADCHNL_RV_PACING_PULSEWIDTH: 0.4 MS
MDC_IDC_SET_LEADCHNL_RV_SENSING_ADAPTATION_MODE: NORMAL
MDC_IDC_SET_LEADCHNL_RV_SENSING_POLARITY: NORMAL
MDC_IDC_SET_LEADCHNL_RV_SENSING_SENSITIVITY: 0.6 MV
MDC_IDC_SET_ZONE_DETECTION_INTERVAL: 300 MS
MDC_IDC_SET_ZONE_DETECTION_INTERVAL: 375 MS
MDC_IDC_SET_ZONE_DETECTION_INTERVAL: 545 MS
MDC_IDC_SET_ZONE_STATUS: NORMAL
MDC_IDC_SET_ZONE_TYPE: NORMAL
MDC_IDC_SET_ZONE_VENDOR_TYPE: NORMAL
MDC_IDC_STAT_AT_BURDEN_PERCENT: 0 %
MDC_IDC_STAT_AT_DTM_END: NORMAL
MDC_IDC_STAT_AT_DTM_START: NORMAL
MDC_IDC_STAT_BRADY_DTM_END: NORMAL
MDC_IDC_STAT_BRADY_DTM_START: NORMAL
MDC_IDC_STAT_BRADY_RA_PERCENT_PACED: 100 %
MDC_IDC_STAT_BRADY_RV_PERCENT_PACED: 100 %
MDC_IDC_STAT_CRT_DTM_END: NORMAL
MDC_IDC_STAT_CRT_DTM_START: NORMAL
MDC_IDC_STAT_CRT_LV_PERCENT_PACED: 100 %
MDC_IDC_STAT_EPISODE_RECENT_COUNT: 0
MDC_IDC_STAT_EPISODE_RECENT_COUNT: 1
MDC_IDC_STAT_EPISODE_RECENT_COUNT: 18
MDC_IDC_STAT_EPISODE_RECENT_COUNT: 3
MDC_IDC_STAT_EPISODE_RECENT_COUNT_DTM_END: NORMAL
MDC_IDC_STAT_EPISODE_RECENT_COUNT_DTM_START: NORMAL
MDC_IDC_STAT_EPISODE_TYPE: NORMAL
MDC_IDC_STAT_EPISODE_VENDOR_TYPE: NORMAL
MDC_IDC_STAT_TACHYTHERAPY_ATP_DELIVERED_RECENT: 17
MDC_IDC_STAT_TACHYTHERAPY_ATP_DELIVERED_TOTAL: 381
MDC_IDC_STAT_TACHYTHERAPY_RECENT_DTM_END: NORMAL
MDC_IDC_STAT_TACHYTHERAPY_RECENT_DTM_START: NORMAL
MDC_IDC_STAT_TACHYTHERAPY_SHOCKS_ABORTED_RECENT: 0
MDC_IDC_STAT_TACHYTHERAPY_SHOCKS_ABORTED_TOTAL: 0
MDC_IDC_STAT_TACHYTHERAPY_SHOCKS_DELIVERED_RECENT: 0
MDC_IDC_STAT_TACHYTHERAPY_SHOCKS_DELIVERED_TOTAL: 3
MDC_IDC_STAT_TACHYTHERAPY_TOTAL_DTM_END: NORMAL
TSH SERPL DL<=0.005 MIU/L-ACNC: 2.76 UIU/ML (ref 0.3–4.2)

## 2025-08-20 PROCEDURE — G0463 HOSPITAL OUTPT CLINIC VISIT: HCPCS | Performed by: INTERNAL MEDICINE

## 2025-08-20 PROCEDURE — 3078F DIAST BP <80 MM HG: CPT | Performed by: INTERNAL MEDICINE

## 2025-08-20 PROCEDURE — 99215 OFFICE O/P EST HI 40 MIN: CPT | Performed by: INTERNAL MEDICINE

## 2025-08-20 PROCEDURE — 93284 PRGRMG EVAL IMPLANTABLE DFB: CPT | Performed by: INTERNAL MEDICINE

## 2025-08-20 PROCEDURE — 3074F SYST BP LT 130 MM HG: CPT | Performed by: INTERNAL MEDICINE

## 2025-08-20 RX ORDER — SODIUM CHLORIDE 9 MG/ML
INJECTION, SOLUTION INTRAVENOUS CONTINUOUS
OUTPATIENT
Start: 2025-08-20

## 2025-08-20 RX ORDER — LIDOCAINE 40 MG/G
CREAM TOPICAL
OUTPATIENT
Start: 2025-08-20

## 2025-08-23 LAB
MDC_IDC_LEAD_CONNECTION_STATUS: NORMAL
MDC_IDC_LEAD_IMPLANT_DT: NORMAL
MDC_IDC_LEAD_LOCATION: NORMAL
MDC_IDC_LEAD_LOCATION_DETAIL_1: NORMAL
MDC_IDC_LEAD_MFG: NORMAL
MDC_IDC_LEAD_MODEL: NORMAL
MDC_IDC_LEAD_POLARITY_TYPE: NORMAL
MDC_IDC_LEAD_SERIAL: NORMAL
MDC_IDC_MSMT_BATTERY_DTM: NORMAL
MDC_IDC_MSMT_BATTERY_REMAINING_LONGEVITY: 90 MO
MDC_IDC_MSMT_BATTERY_REMAINING_PERCENTAGE: 100 %
MDC_IDC_MSMT_BATTERY_STATUS: NORMAL
MDC_IDC_MSMT_CAP_CHARGE_DTM: NORMAL
MDC_IDC_MSMT_CAP_CHARGE_DTM: NORMAL
MDC_IDC_MSMT_CAP_CHARGE_ENERGY: 41 J
MDC_IDC_MSMT_CAP_CHARGE_TIME: 9.7 S
MDC_IDC_MSMT_CAP_CHARGE_TIME: 9.7 S
MDC_IDC_MSMT_CAP_CHARGE_TYPE: NORMAL
MDC_IDC_MSMT_CAP_CHARGE_TYPE: NORMAL
MDC_IDC_MSMT_LEADCHNL_LV_IMPEDANCE_VALUE: 1082 OHM
MDC_IDC_MSMT_LEADCHNL_LV_PACING_THRESHOLD_AMPLITUDE: 1 V
MDC_IDC_MSMT_LEADCHNL_LV_PACING_THRESHOLD_PULSEWIDTH: 0.4 MS
MDC_IDC_MSMT_LEADCHNL_RA_IMPEDANCE_VALUE: 451 OHM
MDC_IDC_MSMT_LEADCHNL_RA_PACING_THRESHOLD_AMPLITUDE: 0.4 V
MDC_IDC_MSMT_LEADCHNL_RA_PACING_THRESHOLD_PULSEWIDTH: 0.4 MS
MDC_IDC_MSMT_LEADCHNL_RV_IMPEDANCE_VALUE: 686 OHM
MDC_IDC_MSMT_LEADCHNL_RV_PACING_THRESHOLD_AMPLITUDE: 1.4 V
MDC_IDC_MSMT_LEADCHNL_RV_PACING_THRESHOLD_PULSEWIDTH: 0.4 MS
MDC_IDC_PG_IMPLANT_DTM: NORMAL
MDC_IDC_PG_MFG: NORMAL
MDC_IDC_PG_MODEL: NORMAL
MDC_IDC_PG_SERIAL: NORMAL
MDC_IDC_PG_TYPE: NORMAL
MDC_IDC_SESS_CLINIC_NAME: NORMAL
MDC_IDC_SESS_DTM: NORMAL
MDC_IDC_SESS_TYPE: NORMAL
MDC_IDC_SET_BRADY_AT_MODE_SWITCH_MODE: NORMAL
MDC_IDC_SET_BRADY_AT_MODE_SWITCH_RATE: 170 {BEATS}/MIN
MDC_IDC_SET_BRADY_LOWRATE: 70 {BEATS}/MIN
MDC_IDC_SET_BRADY_MAX_SENSOR_RATE: 100 {BEATS}/MIN
MDC_IDC_SET_BRADY_MAX_TRACKING_RATE: 100 {BEATS}/MIN
MDC_IDC_SET_BRADY_MODE: NORMAL
MDC_IDC_SET_BRADY_PAV_DELAY_LOW: 130 MS
MDC_IDC_SET_BRADY_SAV_DELAY_LOW: 110 MS
MDC_IDC_SET_CRT_LVRV_DELAY: 20 MS
MDC_IDC_SET_CRT_PACED_CHAMBERS: NORMAL
MDC_IDC_SET_LEADCHNL_LV_PACING_AMPLITUDE: 2.5 V
MDC_IDC_SET_LEADCHNL_LV_PACING_ANODE_LOCATION_1: NORMAL
MDC_IDC_SET_LEADCHNL_LV_PACING_CAPTURE_MODE: NORMAL
MDC_IDC_SET_LEADCHNL_LV_PACING_CATHODE_ELECTRODE_1: NORMAL
MDC_IDC_SET_LEADCHNL_LV_PACING_CATHODE_LOCATION_1: NORMAL
MDC_IDC_SET_LEADCHNL_LV_PACING_PULSEWIDTH: 0.4 MS
MDC_IDC_SET_LEADCHNL_LV_SENSING_ADAPTATION_MODE: NORMAL
MDC_IDC_SET_LEADCHNL_LV_SENSING_ANODE_LOCATION_1: NORMAL
MDC_IDC_SET_LEADCHNL_LV_SENSING_CATHODE_ELECTRODE_1: NORMAL
MDC_IDC_SET_LEADCHNL_LV_SENSING_CATHODE_LOCATION_1: NORMAL
MDC_IDC_SET_LEADCHNL_LV_SENSING_SENSITIVITY: 1 MV
MDC_IDC_SET_LEADCHNL_RA_PACING_AMPLITUDE: 2 V
MDC_IDC_SET_LEADCHNL_RA_PACING_CAPTURE_MODE: NORMAL
MDC_IDC_SET_LEADCHNL_RA_PACING_POLARITY: NORMAL
MDC_IDC_SET_LEADCHNL_RA_PACING_PULSEWIDTH: 0.4 MS
MDC_IDC_SET_LEADCHNL_RA_SENSING_ADAPTATION_MODE: NORMAL
MDC_IDC_SET_LEADCHNL_RA_SENSING_POLARITY: NORMAL
MDC_IDC_SET_LEADCHNL_RA_SENSING_SENSITIVITY: 0.25 MV
MDC_IDC_SET_LEADCHNL_RV_PACING_AMPLITUDE: 2.5 V
MDC_IDC_SET_LEADCHNL_RV_PACING_CAPTURE_MODE: NORMAL
MDC_IDC_SET_LEADCHNL_RV_PACING_POLARITY: NORMAL
MDC_IDC_SET_LEADCHNL_RV_PACING_PULSEWIDTH: 0.4 MS
MDC_IDC_SET_LEADCHNL_RV_SENSING_ADAPTATION_MODE: NORMAL
MDC_IDC_SET_LEADCHNL_RV_SENSING_POLARITY: NORMAL
MDC_IDC_SET_LEADCHNL_RV_SENSING_SENSITIVITY: 0.6 MV
MDC_IDC_SET_ZONE_DETECTION_INTERVAL: 300 MS
MDC_IDC_SET_ZONE_DETECTION_INTERVAL: 375 MS
MDC_IDC_SET_ZONE_DETECTION_INTERVAL: 545 MS
MDC_IDC_SET_ZONE_STATUS: NORMAL
MDC_IDC_SET_ZONE_TYPE: NORMAL
MDC_IDC_SET_ZONE_VENDOR_TYPE: NORMAL
MDC_IDC_STAT_AT_BURDEN_PERCENT: 0 %
MDC_IDC_STAT_AT_DTM_END: NORMAL
MDC_IDC_STAT_AT_DTM_START: NORMAL
MDC_IDC_STAT_BRADY_DTM_END: NORMAL
MDC_IDC_STAT_BRADY_DTM_START: NORMAL
MDC_IDC_STAT_BRADY_RA_PERCENT_PACED: 100 %
MDC_IDC_STAT_BRADY_RV_PERCENT_PACED: 100 %
MDC_IDC_STAT_CRT_DTM_END: NORMAL
MDC_IDC_STAT_CRT_DTM_START: NORMAL
MDC_IDC_STAT_CRT_LV_PERCENT_PACED: 100 %
MDC_IDC_STAT_EPISODE_RECENT_COUNT: 0
MDC_IDC_STAT_EPISODE_RECENT_COUNT: 1
MDC_IDC_STAT_EPISODE_RECENT_COUNT: 18
MDC_IDC_STAT_EPISODE_RECENT_COUNT: 3
MDC_IDC_STAT_EPISODE_RECENT_COUNT_DTM_END: NORMAL
MDC_IDC_STAT_EPISODE_RECENT_COUNT_DTM_START: NORMAL
MDC_IDC_STAT_EPISODE_TYPE: NORMAL
MDC_IDC_STAT_EPISODE_VENDOR_TYPE: NORMAL
MDC_IDC_STAT_TACHYTHERAPY_ATP_DELIVERED_RECENT: 17
MDC_IDC_STAT_TACHYTHERAPY_ATP_DELIVERED_TOTAL: 381
MDC_IDC_STAT_TACHYTHERAPY_RECENT_DTM_END: NORMAL
MDC_IDC_STAT_TACHYTHERAPY_RECENT_DTM_START: NORMAL
MDC_IDC_STAT_TACHYTHERAPY_SHOCKS_ABORTED_RECENT: 0
MDC_IDC_STAT_TACHYTHERAPY_SHOCKS_ABORTED_TOTAL: 0
MDC_IDC_STAT_TACHYTHERAPY_SHOCKS_DELIVERED_RECENT: 0
MDC_IDC_STAT_TACHYTHERAPY_SHOCKS_DELIVERED_TOTAL: 3
MDC_IDC_STAT_TACHYTHERAPY_TOTAL_DTM_END: NORMAL

## 2025-08-25 DIAGNOSIS — I47.20 VENTRICULAR TACHYARRHYTHMIA (CMS/HCC): ICD-10-CM

## 2025-08-25 DIAGNOSIS — I50.22 CHRONIC SYSTOLIC HEART FAILURE (CMS/HCC): ICD-10-CM

## 2025-08-25 RX ORDER — FUROSEMIDE 40 MG/1
20 TABLET ORAL DAILY
Qty: 15 TABLET | Refills: 2 | Status: SHIPPED | OUTPATIENT
Start: 2025-08-25 | End: 2025-08-28 | Stop reason: DRUGHIGH

## 2025-08-28 ENCOUNTER — TELEPHONE (OUTPATIENT)
Dept: CARDIOLOGY | Facility: CLINIC | Age: 60
End: 2025-08-28
Payer: MEDICARE

## 2025-08-28 DIAGNOSIS — I47.20 VT (VENTRICULAR TACHYCARDIA) (CMS/HCC): ICD-10-CM

## 2025-08-28 DIAGNOSIS — I50.22 CHRONIC SYSTOLIC HEART FAILURE (CMS/HCC): Primary | ICD-10-CM

## 2025-08-28 RX ORDER — FUROSEMIDE 40 MG/1
40 TABLET ORAL DAILY
COMMUNITY
End: 2025-08-28 | Stop reason: SDUPTHER

## 2025-08-28 RX ORDER — FUROSEMIDE 40 MG/1
40 TABLET ORAL DAILY
Qty: 90 TABLET | Refills: 3 | Status: SHIPPED | OUTPATIENT
Start: 2025-08-28

## 2025-08-28 RX ORDER — LANOLIN ALCOHOL/MO/W.PET/CERES
400 CREAM (GRAM) TOPICAL 2 TIMES DAILY
Qty: 180 TABLET | Refills: 3 | Status: SHIPPED | OUTPATIENT
Start: 2025-08-28 | End: 2026-08-28

## (undated) DEVICE — INTRO SHEATH MICRO PLATINUM TIP 4FRX40CM 7274

## (undated) DEVICE — BALLOON 2.50 X 15MM CORONARY DILATATION CATH RAPID-EXCHANGE NC TREK NEO

## (undated) DEVICE — PATCH CARTO 3 EXTERNAL REFERENCE 3D MAPPING CREFP6

## (undated) DEVICE — SET TUBING SMARTABLAT COOLFLOW

## (undated) DEVICE — TUBING PRESSURE 30"

## (undated) DEVICE — INTRO CATH 12CM 8.5FR FST-CATH

## (undated) DEVICE — CATH DXTERITY 5F JL3.5 100CM

## (undated) DEVICE — CATH DIAG 5F JR4.0

## (undated) DEVICE — TUBE SET SMARKABLATE IRRIGATION

## (undated) DEVICE — ELECTRODE DEFIB CADENCE 22550R

## (undated) DEVICE — INTRO SHEATH 7FRX10CM PINNACLE RSS702

## (undated) DEVICE — SHEATH PINNACLE 7F 10CM MGW INTRODUCER W MINI GUIDE WIRE

## (undated) DEVICE — NEEDLE XS TRANSSEPTAL 18GA 98CM G407212

## (undated) DEVICE — CATH DXTERITY 5F JR40 100CM

## (undated) DEVICE — CATH GUIDE 6F CLS3 RUNWAY

## (undated) DEVICE — DEVICE INFLATION MONARCH 9F CS/4 BX/5 EA

## (undated) DEVICE — KIT HAND CONTROL DELIVERY AT-X65 HC COEX

## (undated) DEVICE — CATH THERMODILUTION 7F J-TIP SWAN GANZ VIP TPL LUMEN

## (undated) DEVICE — WIRE GUIDE 35 X 260 ROSEN THSCF-35-260-1.5-ROSEN

## (undated) DEVICE — CATH SOUNDSTAR 8FRX90CM 10439011

## (undated) DEVICE — INTRO SHEATH 4FRX10CM PINNACLE RSS402

## (undated) DEVICE — INTRO SHTH INTRO SHTH 8.5F AGI

## (undated) DEVICE — PACK HEART LEFT CUSTOM

## (undated) DEVICE — VALVE HEMOSTATIC GUARDIAN W/TORQUE DEVICE TOUHY

## (undated) DEVICE — INTRO SHTH INTRO SHTH 40CM ST

## (undated) DEVICE — KIT TRANSSEPTAL NRG RF NRG-E-HF-71-CO TF85-32-63-45

## (undated) DEVICE — WIRE GUIDE HT PILOT50 .014 190

## (undated) DEVICE — CATH ABLATION 8FR 115CM D-F CURVE BI-DIR QDOT MICRO D139505

## (undated) DEVICE — Device

## (undated) DEVICE — SHEATH PINNACLE 8F MINI GW INTRODUCER MINI GUIDE WIRE

## (undated) DEVICE — GUIDE WIRE NITRIX NIT 0 DEGREE .018INX60CM N180601

## (undated) DEVICE — CATH THERMOCOOL SMARTTOUCH FJ SF BI-DIRECTIONAL

## (undated) DEVICE — COVER SITE RITE WITH GEL ULTRASOUND PROBE STL

## (undated) DEVICE — CATH EP 7FR X 115CM DECANAV CA

## (undated) DEVICE — CATH RADIAL GUIDE 6F EBU 35

## (undated) DEVICE — WIRE GUIDE FIELDER J 180CM ASAHI .014 MIN ORDER 5 EA

## (undated) DEVICE — IRRIGATION WOUND IRRISEPT WOUND DEBRIDEMENT SYSTEM

## (undated) DEVICE — CATH  8FX90CM SOUNDSTAR ECO ULTRASOUND

## (undated) DEVICE — INTRODUCER MICRO 5F 40CM ANG

## (undated) DEVICE — CANNULA NASAL NOMOLINE LH WITH CO2 ADULT NON-INTUBATED PATIENT

## (undated) DEVICE — SNARE EXACTO COLD 9MM

## (undated) DEVICE — REPRO BARD EP XT DECAPL STRBL DX EP CATH 6F

## (undated) DEVICE — MANIFOLD KIT ANGIO AUTOMATED 014613

## (undated) DEVICE — SHEATH PINNACLE 4F 10CM MGW @ INTRODUCER W MINI GUIDE WIRE

## (undated) DEVICE — BAND TR STD W INFLATOR

## (undated) DEVICE — KIT ENDO PROCEDURE CARRY ON

## (undated) DEVICE — INTRO SHEATH AVANTI 4FRX23CM 504604T

## (undated) DEVICE — KIT VENOUS FLUSH 60210177

## (undated) DEVICE — CATH NAV PENTARAY F CURVE

## (undated) DEVICE — INTRODUCER SHEATH FAST-CATH 9FRX12CM 406116

## (undated) DEVICE — PACK HEART LEFT CUSTOM PC15OT92B

## (undated) DEVICE — KIT RIGHT HEART CATH 60130719

## (undated) DEVICE — RADPAD PERIPHERAL SHIELD 11X34 W/ABSORBENT

## (undated) DEVICE — BLADE PLASMA 4.0 STERILE DISPOSABLE

## (undated) DEVICE — SHEATH LARGE 50MM CURVE VIZIGO CARTO

## (undated) DEVICE — KIT SHEATH GLIDE 6/5F 16CM

## (undated) DEVICE — INTRODUCER SHEATH FAST-CATH CATH-LOCK 8FRX12CM 406706

## (undated) DEVICE — CATH THERMOCOOL SMARTTOUCH SF FJ CURVE

## (undated) DEVICE — SHIELD DRAPE COMPLETE STERILE

## (undated) DEVICE — SHEATH PINNACLE 9F 10CM MGW INTRODUCER W MINI GUIDE WIRE

## (undated) DEVICE — PACK HEART RIGHT CUSTOM SAN32RHF18

## (undated) DEVICE — CATH BI-DIR CS FJ CURVE

## (undated) DEVICE — CATH PENTARAY NAV ECO 7FR,D, 2-6-2

## (undated) DEVICE — NEEDLE RADIAL 21G 2.5CM

## (undated) DEVICE — KIT MICROINTRODUCER VASCULAR  4FRX21GAX4CM

## (undated) DEVICE — PATCH REFERENCE CARTO 3 EXT EXTERNAL

## (undated) DEVICE — INTRODUCER SWARTZ 8.5F 60 SRO TRANSEPTAL GUIDING CATH@

## (undated) DEVICE — INTRODUCER SHEATH FAST-CATH 8FRX12CM 406112

## (undated) DEVICE — TUBING SUCTION 3/16"X10'

## (undated) DEVICE — KIT MICROINTRODUCER VASCULAR VSI 4FRX0.018INX40CM 7195X

## (undated) DEVICE — BAND TR 29 LARGE W INFLATOR

## (undated) DEVICE — TIP YANKAUER OPEN CLEAR

## (undated) DEVICE — BALLOON MINI TREK 2.0 MM X 12 MM   RAPID-EXCHANGE CORONARY DILATATION CATHETER

## (undated) DEVICE — TRAP POLYP E TRAP

## (undated) DEVICE — CATH GUIDE 6F CLS3.5 RUNWAY

## (undated) RX ORDER — HEPARIN SODIUM 1000 [USP'U]/ML
INJECTION, SOLUTION INTRAVENOUS; SUBCUTANEOUS
Status: DISPENSED
Start: 2024-07-01

## (undated) RX ORDER — METOPROLOL TARTRATE 1 MG/ML
INJECTION, SOLUTION INTRAVENOUS
Status: DISPENSED
Start: 2024-07-01

## (undated) RX ORDER — ONDANSETRON 2 MG/ML
INJECTION INTRAMUSCULAR; INTRAVENOUS
Status: DISPENSED
Start: 2024-07-01

## (undated) RX ORDER — FENTANYL CITRATE-0.9 % NACL/PF 10 MCG/ML
PLASTIC BAG, INJECTION (ML) INTRAVENOUS
Status: DISPENSED
Start: 2024-07-01

## (undated) RX ORDER — HEPARIN SODIUM 1000 [USP'U]/ML
INJECTION, SOLUTION INTRAVENOUS; SUBCUTANEOUS
Status: DISPENSED
Start: 2025-05-02

## (undated) RX ORDER — FENTANYL CITRATE 50 UG/ML
INJECTION, SOLUTION INTRAMUSCULAR; INTRAVENOUS
Status: DISPENSED
Start: 2025-05-02

## (undated) RX ORDER — ONDANSETRON 2 MG/ML
INJECTION INTRAMUSCULAR; INTRAVENOUS
Status: DISPENSED
Start: 2025-05-02

## (undated) RX ORDER — BUPIVACAINE HYDROCHLORIDE 5 MG/ML
INJECTION, SOLUTION EPIDURAL; INTRACAUDAL
Status: DISPENSED
Start: 2024-07-01

## (undated) RX ORDER — LIDOCAINE 40 MG/G
CREAM TOPICAL
Status: DISPENSED
Start: 2024-04-26

## (undated) RX ORDER — LIDOCAINE 40 MG/G
CREAM TOPICAL
Status: DISPENSED
Start: 2025-08-19

## (undated) RX ORDER — FENTANYL CITRATE 50 UG/ML
INJECTION, SOLUTION INTRAMUSCULAR; INTRAVENOUS
Status: DISPENSED
Start: 2024-07-01

## (undated) RX ORDER — CEFAZOLIN SODIUM 1 G/3ML
INJECTION, POWDER, FOR SOLUTION INTRAMUSCULAR; INTRAVENOUS
Status: DISPENSED
Start: 2024-07-01

## (undated) RX ORDER — METHYLPREDNISOLONE SODIUM SUCCINATE 125 MG/2ML
INJECTION INTRAMUSCULAR; INTRAVENOUS
Status: DISPENSED
Start: 2025-05-02

## (undated) RX ORDER — PROTAMINE SULFATE 10 MG/ML
INJECTION, SOLUTION INTRAVENOUS
Status: DISPENSED
Start: 2024-07-01

## (undated) RX ORDER — PROPOFOL 10 MG/ML
INJECTION, EMULSION INTRAVENOUS
Status: DISPENSED
Start: 2024-07-01

## (undated) RX ORDER — DOPAMINE HYDROCHLORIDE 160 MG/100ML
INJECTION, SOLUTION INTRAVENOUS
Status: DISPENSED
Start: 2024-07-01

## (undated) RX ORDER — PROTAMINE SULFATE 10 MG/ML
INJECTION, SOLUTION INTRAVENOUS
Status: DISPENSED
Start: 2025-05-02